# Patient Record
Sex: FEMALE | Race: WHITE | Employment: OTHER
[De-identification: names, ages, dates, MRNs, and addresses within clinical notes are randomized per-mention and may not be internally consistent; named-entity substitution may affect disease eponyms.]

---

## 2017-01-03 ENCOUNTER — TELEPHONE (OUTPATIENT)
Dept: CARDIOTHORACIC SURGERY | Facility: CLINIC | Age: 67
End: 2017-01-03

## 2017-01-03 DIAGNOSIS — R19.7 ACUTE DIARRHEA: Primary | ICD-10-CM

## 2017-01-05 ENCOUNTER — TELEPHONE (OUTPATIENT)
Dept: CARDIOTHORACIC SURGERY | Facility: CLINIC | Age: 67
End: 2017-01-05

## 2017-01-17 ENCOUNTER — TELEPHONE (OUTPATIENT)
Dept: CARDIOTHORACIC SURGERY | Facility: CLINIC | Age: 67
End: 2017-01-17

## 2017-01-27 PROBLEM — J96.10 CHRONIC RESPIRATORY FAILURE (HCC): Status: ACTIVE | Noted: 2017-01-27

## 2017-01-27 PROBLEM — R29.6 RECURRENT FALLS: Status: ACTIVE | Noted: 2017-01-27

## 2017-04-07 ENCOUNTER — HOSPITAL ENCOUNTER (OUTPATIENT)
Dept: PREADMISSION TESTING | Age: 67
Discharge: HOME | End: 2017-04-07
Payer: MEDICARE

## 2017-04-07 ENCOUNTER — HOSPITAL ENCOUNTER (OUTPATIENT)
Dept: GENERAL RADIOLOGY | Age: 67
Discharge: HOME | End: 2017-04-07
Payer: MEDICARE

## 2017-04-07 VITALS
BODY MASS INDEX: 28.8 KG/M2 | WEIGHT: 190.04 LBS | SYSTOLIC BLOOD PRESSURE: 105 MMHG | DIASTOLIC BLOOD PRESSURE: 71 MMHG | RESPIRATION RATE: 18 BRPM | OXYGEN SATURATION: 94 % | HEART RATE: 76 BPM | HEIGHT: 68 IN

## 2017-04-07 LAB
-: ABNORMAL
ABSOLUTE EOS #: 0.2 K/UL (ref 0–0.4)
ABSOLUTE LYMPH #: 3 K/UL (ref 1–4.8)
ABSOLUTE MONO #: 0.4 K/UL (ref 0.2–0.8)
ALBUMIN SERPL-MCNC: 4.1 G/DL (ref 3.5–5.2)
ALBUMIN/GLOBULIN RATIO: ABNORMAL (ref 1–2.5)
ALP BLD-CCNC: 32 U/L (ref 35–104)
ALT SERPL-CCNC: 23 U/L (ref 5–33)
AMORPHOUS: ABNORMAL
ANION GAP SERPL CALCULATED.3IONS-SCNC: 13 MMOL/L (ref 9–17)
AST SERPL-CCNC: 20 U/L
BACTERIA: ABNORMAL
BASOPHILS # BLD: 1 % (ref 0–2)
BASOPHILS ABSOLUTE: 0.1 K/UL (ref 0–0.2)
BILIRUB SERPL-MCNC: 0.15 MG/DL (ref 0.3–1.2)
BILIRUBIN URINE: NEGATIVE
BUN BLDV-MCNC: 17 MG/DL (ref 8–23)
BUN/CREAT BLD: 18 (ref 9–20)
CALCIUM SERPL-MCNC: 8.9 MG/DL (ref 8.6–10.4)
CASTS UA: ABNORMAL /LPF
CHLORIDE BLD-SCNC: 96 MMOL/L (ref 98–107)
CO2: 27 MMOL/L (ref 20–31)
COLOR: YELLOW
COMMENT UA: ABNORMAL
CREAT SERPL-MCNC: 0.96 MG/DL (ref 0.5–0.9)
CRYSTALS, UA: ABNORMAL /HPF
DIFFERENTIAL TYPE: NORMAL
EOSINOPHILS RELATIVE PERCENT: 2 % (ref 1–4)
EPITHELIAL CELLS UA: ABNORMAL /HPF
GFR AFRICAN AMERICAN: >60 ML/MIN
GFR NON-AFRICAN AMERICAN: 58 ML/MIN
GFR SERPL CREATININE-BSD FRML MDRD: ABNORMAL ML/MIN/{1.73_M2}
GFR SERPL CREATININE-BSD FRML MDRD: ABNORMAL ML/MIN/{1.73_M2}
GLUCOSE BLD-MCNC: 97 MG/DL (ref 70–99)
GLUCOSE URINE: NEGATIVE
HCT VFR BLD CALC: 38.5 % (ref 36–46)
HEMOGLOBIN: 13 G/DL (ref 12–16)
INR BLD: 1
KETONES, URINE: NEGATIVE
LEUKOCYTE ESTERASE, URINE: ABNORMAL
LYMPHOCYTES # BLD: 33 % (ref 24–44)
MCH RBC QN AUTO: 29.7 PG (ref 26–34)
MCHC RBC AUTO-ENTMCNC: 33.9 G/DL (ref 31–37)
MCV RBC AUTO: 87.7 FL (ref 80–100)
MONOCYTES # BLD: 4 % (ref 1–7)
MUCUS: ABNORMAL
NITRITE, URINE: NEGATIVE
OTHER OBSERVATIONS UA: ABNORMAL
PARTIAL THROMBOPLASTIN TIME: 27.9 SEC (ref 23–31)
PDW BLD-RTO: 13.4 % (ref 11.5–14.5)
PH UA: 5.5 (ref 5–8)
PLATELET # BLD: 269 K/UL (ref 130–400)
PLATELET ESTIMATE: NORMAL
PMV BLD AUTO: 7.2 FL (ref 6–12)
POTASSIUM SERPL-SCNC: 4.7 MMOL/L (ref 3.7–5.3)
PROTEIN UA: NEGATIVE
PROTHROMBIN TIME: 9.9 SEC (ref 9.7–11.6)
RBC # BLD: 4.39 M/UL (ref 4–5.2)
RBC # BLD: NORMAL 10*6/UL
RBC UA: ABNORMAL /HPF (ref 0–2)
RENAL EPITHELIAL, UA: ABNORMAL /HPF
SEG NEUTROPHILS: 60 % (ref 36–66)
SEGMENTED NEUTROPHILS ABSOLUTE COUNT: 5.6 K/UL (ref 1.8–7.7)
SODIUM BLD-SCNC: 136 MMOL/L (ref 135–144)
SPECIFIC GRAVITY UA: 1.01 (ref 1–1.03)
TOTAL PROTEIN: 7.1 G/DL (ref 6.4–8.3)
TRICHOMONAS: ABNORMAL
TURBIDITY: ABNORMAL
URINE HGB: ABNORMAL
UROBILINOGEN, URINE: NORMAL
WBC # BLD: 9.2 K/UL (ref 3.5–11)
WBC # BLD: NORMAL 10*3/UL
WBC UA: ABNORMAL /HPF (ref 0–5)
YEAST: ABNORMAL

## 2017-04-07 PROCEDURE — 93005 ELECTROCARDIOGRAM TRACING: CPT

## 2017-04-07 PROCEDURE — 85730 THROMBOPLASTIN TIME PARTIAL: CPT

## 2017-04-07 PROCEDURE — 36415 COLL VENOUS BLD VENIPUNCTURE: CPT

## 2017-04-07 PROCEDURE — 71020 XR CHEST STANDARD TWO VW: CPT

## 2017-04-07 PROCEDURE — 80053 COMPREHEN METABOLIC PANEL: CPT

## 2017-04-07 PROCEDURE — 86920 COMPATIBILITY TEST SPIN: CPT

## 2017-04-07 PROCEDURE — 87086 URINE CULTURE/COLONY COUNT: CPT

## 2017-04-07 PROCEDURE — 86900 BLOOD TYPING SEROLOGIC ABO: CPT

## 2017-04-07 PROCEDURE — 86850 RBC ANTIBODY SCREEN: CPT

## 2017-04-07 PROCEDURE — 86901 BLOOD TYPING SEROLOGIC RH(D): CPT

## 2017-04-07 PROCEDURE — 81001 URINALYSIS AUTO W/SCOPE: CPT

## 2017-04-07 PROCEDURE — 85025 COMPLETE CBC W/AUTO DIFF WBC: CPT

## 2017-04-07 PROCEDURE — 87641 MR-STAPH DNA AMP PROBE: CPT

## 2017-04-07 PROCEDURE — 85610 PROTHROMBIN TIME: CPT

## 2017-04-07 RX ORDER — CHOLECALCIFEROL (VITAMIN D3) 1250 MCG
1 CAPSULE ORAL
Status: ON HOLD | COMMUNITY
End: 2018-12-30 | Stop reason: HOSPADM

## 2017-04-08 LAB
CULTURE: NORMAL
CULTURE: NORMAL
EKG ATRIAL RATE: 69 BPM
EKG P AXIS: 66 DEGREES
EKG P-R INTERVAL: 170 MS
EKG Q-T INTERVAL: 448 MS
EKG QRS DURATION: 150 MS
EKG QTC CALCULATION (BAZETT): 480 MS
EKG R AXIS: -58 DEGREES
EKG T AXIS: 0 DEGREES
EKG VENTRICULAR RATE: 69 BPM
Lab: NORMAL
MRSA, DNA, NASAL: NORMAL
SPECIMEN DESCRIPTION: NORMAL
STATUS: NORMAL

## 2017-04-10 ENCOUNTER — TELEPHONE (OUTPATIENT)
Dept: CARDIOVASCULAR ICU | Age: 67
End: 2017-04-10

## 2017-04-11 ENCOUNTER — ANESTHESIA (OUTPATIENT)
Dept: OPERATING ROOM | Age: 67
DRG: 220 | End: 2017-04-11
Payer: MEDICARE

## 2017-04-11 ENCOUNTER — APPOINTMENT (OUTPATIENT)
Dept: GENERAL RADIOLOGY | Age: 67
DRG: 220 | End: 2017-04-11
Attending: THORACIC SURGERY (CARDIOTHORACIC VASCULAR SURGERY)
Payer: MEDICARE

## 2017-04-11 ENCOUNTER — HOSPITAL ENCOUNTER (INPATIENT)
Age: 67
LOS: 4 days | Discharge: NURSING, REHAB OR LONG-TERM CARE | DRG: 220 | End: 2017-04-15
Attending: THORACIC SURGERY (CARDIOTHORACIC VASCULAR SURGERY) | Admitting: THORACIC SURGERY (CARDIOTHORACIC VASCULAR SURGERY)
Payer: MEDICARE

## 2017-04-11 ENCOUNTER — ANESTHESIA EVENT (OUTPATIENT)
Dept: OPERATING ROOM | Age: 67
DRG: 220 | End: 2017-04-11
Payer: MEDICARE

## 2017-04-11 VITALS — TEMPERATURE: 95.4 F | DIASTOLIC BLOOD PRESSURE: 49 MMHG | SYSTOLIC BLOOD PRESSURE: 87 MMHG | OXYGEN SATURATION: 93 %

## 2017-04-11 DIAGNOSIS — J98.11 ATELECTASIS: ICD-10-CM

## 2017-04-11 DIAGNOSIS — J44.1 COPD EXACERBATION (HCC): ICD-10-CM

## 2017-04-11 DIAGNOSIS — R29.6 FALLS FREQUENTLY: ICD-10-CM

## 2017-04-11 DIAGNOSIS — Z95.2 S/P AORTIC VALVE REPLACEMENT: Primary | ICD-10-CM

## 2017-04-11 DIAGNOSIS — I38 VALVULAR HEART DISEASE: ICD-10-CM

## 2017-04-11 LAB
-: ABNORMAL
ABSOLUTE EOS #: 0.2 K/UL (ref 0–0.4)
ABSOLUTE LYMPH #: 3.1 K/UL (ref 1–4.8)
ABSOLUTE MONO #: 0.6 K/UL (ref 0.2–0.8)
ALBUMIN SERPL-MCNC: 4 G/DL (ref 3.5–5.2)
ALBUMIN/GLOBULIN RATIO: ABNORMAL (ref 1–2.5)
ALP BLD-CCNC: 37 U/L (ref 35–104)
ALT SERPL-CCNC: 41 U/L (ref 5–33)
AMORPHOUS: ABNORMAL
ANION GAP SERPL CALCULATED.3IONS-SCNC: 13 MMOL/L (ref 9–17)
ANION GAP SERPL CALCULATED.3IONS-SCNC: 13 MMOL/L (ref 9–17)
AST SERPL-CCNC: 33 U/L
BACTERIA: ABNORMAL
BASOPHILS # BLD: 0 % (ref 0–2)
BASOPHILS ABSOLUTE: 0 K/UL (ref 0–0.2)
BILIRUB SERPL-MCNC: 0.13 MG/DL (ref 0.3–1.2)
BILIRUBIN URINE: NEGATIVE
BUN BLDV-MCNC: 13 MG/DL (ref 8–23)
BUN BLDV-MCNC: 14 MG/DL (ref 8–23)
BUN/CREAT BLD: 14 (ref 9–20)
BUN/CREAT BLD: 16 (ref 9–20)
CALCIUM SERPL-MCNC: 7.3 MG/DL (ref 8.6–10.4)
CALCIUM SERPL-MCNC: 8.6 MG/DL (ref 8.6–10.4)
CASTS UA: ABNORMAL /LPF
CHLORIDE BLD-SCNC: 103 MMOL/L (ref 98–107)
CHLORIDE BLD-SCNC: 108 MMOL/L (ref 98–107)
CO2: 22 MMOL/L (ref 20–31)
CO2: 25 MMOL/L (ref 20–31)
COLOR: YELLOW
COMMENT UA: ABNORMAL
CREAT SERPL-MCNC: 0.82 MG/DL (ref 0.5–0.9)
CREAT SERPL-MCNC: 0.97 MG/DL (ref 0.5–0.9)
CRYSTALS, UA: ABNORMAL /HPF
DIFFERENTIAL TYPE: NORMAL
EOSINOPHILS RELATIVE PERCENT: 2 % (ref 1–4)
EPITHELIAL CELLS UA: ABNORMAL /HPF
FIO2: 40
FIO2: 40
FIO2: 50
FIO2: 50
FIO2: 60
FIO2: ABNORMAL
GFR AFRICAN AMERICAN: >60 ML/MIN
GFR AFRICAN AMERICAN: >60 ML/MIN
GFR NON-AFRICAN AMERICAN: 57 ML/MIN
GFR NON-AFRICAN AMERICAN: >60 ML/MIN
GFR SERPL CREATININE-BSD FRML MDRD: ABNORMAL ML/MIN/{1.73_M2}
GLUCOSE BLD-MCNC: 101 MG/DL (ref 65–105)
GLUCOSE BLD-MCNC: 103 MG/DL (ref 70–99)
GLUCOSE BLD-MCNC: 112 MG/DL (ref 65–105)
GLUCOSE BLD-MCNC: 123 MG/DL (ref 74–106)
GLUCOSE BLD-MCNC: 127 MG/DL (ref 65–105)
GLUCOSE BLD-MCNC: 147 MG/DL (ref 65–105)
GLUCOSE BLD-MCNC: 170 MG/DL (ref 65–105)
GLUCOSE BLD-MCNC: 225 MG/DL (ref 74–106)
GLUCOSE BLD-MCNC: 227 MG/DL (ref 70–99)
GLUCOSE BLD-MCNC: 227 MG/DL (ref 74–106)
GLUCOSE BLD-MCNC: 92 MG/DL (ref 74–106)
GLUCOSE BLD-MCNC: 97 MG/DL (ref 74–106)
GLUCOSE URINE: NEGATIVE
HCO3: 22.2 MMOL/L (ref 24–32)
HCT VFR BLD CALC: 36.4 % (ref 36–46)
HCT VFR BLD CALC: 37.1 % (ref 36–46)
HEMOGLOBIN: 12.2 G/DL (ref 12–16)
HEMOGLOBIN: 12.5 G/DL (ref 12–16)
INR BLD: 1.1
KETONES, URINE: NEGATIVE
LEUKOCYTE ESTERASE, URINE: ABNORMAL
LYMPHOCYTES # BLD: 31 % (ref 24–44)
MAGNESIUM: 2.6 MG/DL (ref 1.6–2.6)
MCH RBC QN AUTO: 29.3 PG (ref 26–34)
MCH RBC QN AUTO: 29.5 PG (ref 26–34)
MCHC RBC AUTO-ENTMCNC: 33.4 G/DL (ref 31–37)
MCHC RBC AUTO-ENTMCNC: 33.6 G/DL (ref 31–37)
MCV RBC AUTO: 87.2 FL (ref 80–100)
MCV RBC AUTO: 88.3 FL (ref 80–100)
MONOCYTES # BLD: 6 % (ref 1–7)
MUCUS: ABNORMAL
NEGATIVE BASE EXCESS, ART: 1 (ref 0–2)
NEGATIVE BASE EXCESS, ART: 2 (ref 0–2)
NEGATIVE BASE EXCESS, ART: 3 (ref 0–2)
NEGATIVE BASE EXCESS, ART: 4 (ref 0–2)
NEGATIVE BASE EXCESS, ART: 4 (ref 0–2)
NEGATIVE BASE EXCESS, ART: ABNORMAL (ref 0–2)
NEGATIVE BASE EXCESS: 6 (ref 0–2)
NITRITE, URINE: NEGATIVE
O2 DEVICE/FLOW/%: ABNORMAL
O2 SATURATION: 95 %
OTHER OBSERVATIONS UA: ABNORMAL
PARTIAL THROMBOPLASTIN TIME: 25.7 SEC (ref 23–31)
PARTIAL THROMBOPLASTIN TIME: 27 SEC (ref 23–31)
PATIENT TEMP: 36
PATIENT TEMP: 36.2
PATIENT TEMP: 36.5
PATIENT TEMP: 36.8
PATIENT TEMP: 37
PATIENT TEMP: 97.3
PATIENT TEMP: 98
PATIENT TEMP: 98.8
PATIENT TEMP: ABNORMAL
PCO2: 54 MM HG
PDW BLD-RTO: 13.5 % (ref 11.5–14.5)
PDW BLD-RTO: 13.6 % (ref 11.5–14.5)
PH UA: 6 (ref 5–8)
PH: 7.22
PLATELET # BLD: 160 K/UL (ref 130–400)
PLATELET # BLD: 258 K/UL (ref 130–400)
PLATELET ESTIMATE: NORMAL
PMV BLD AUTO: 7.3 FL (ref 6–12)
PMV BLD AUTO: 7.9 FL (ref 6–12)
PO2: 89 MM HG
POC HCO3: 23.1 MMOL/L (ref 22–27)
POC HCO3: 23.1 MMOL/L (ref 22–27)
POC HCO3: 23.3 MMOL/L (ref 22–27)
POC HCO3: 23.4 MMOL/L (ref 22–27)
POC HCO3: 23.7 MMOL/L (ref 22–27)
POC HCO3: 24.3 MMOL/L (ref 22–27)
POC HCO3: 24.6 MMOL/L (ref 22–27)
POC HCO3: 24.6 MMOL/L (ref 22–27)
POC HCO3: 24.7 MMOL/L (ref 22–27)
POC HCO3: 25.4 MMOL/L (ref 22–27)
POC HCO3: 26.3 MMOL/L (ref 22–27)
POC HEMATOCRIT: 23 % (ref 36–46)
POC HEMATOCRIT: 24 % (ref 36–46)
POC HEMATOCRIT: 28 % (ref 36–46)
POC HEMATOCRIT: 33 % (ref 36–46)
POC HEMATOCRIT: 35 % (ref 36–46)
POC HEMOGLOBIN: 11.2 GM/DL (ref 12–16)
POC HEMOGLOBIN: 11.9 GM/DL (ref 12–16)
POC HEMOGLOBIN: 7.8 GM/DL (ref 12–16)
POC HEMOGLOBIN: 8.2 GM/DL (ref 12–16)
POC HEMOGLOBIN: 9.5 GM/DL (ref 12–16)
POC IONIZED CALCIUM: 0.97 MMOL/L (ref 1.13–1.33)
POC IONIZED CALCIUM: 1.01 MMOL/L (ref 1.13–1.33)
POC IONIZED CALCIUM: 1.14 MMOL/L (ref 1.13–1.33)
POC IONIZED CALCIUM: 1.14 MMOL/L (ref 1.13–1.33)
POC IONIZED CALCIUM: 1.18 MMOL/L (ref 1.13–1.33)
POC O2 SATURATION: 100 %
POC O2 SATURATION: 90 %
POC O2 SATURATION: 96 %
POC O2 SATURATION: 97 %
POC O2 SATURATION: 98 %
POC O2 SATURATION: 98 %
POC O2 SATURATION: 99 %
POC PCO2 TEMP: ABNORMAL MM HG
POC PCO2: 38 MM HG (ref 32–45)
POC PCO2: 41 MM HG (ref 32–45)
POC PCO2: 42 MM HG (ref 32–45)
POC PCO2: 43 MM HG (ref 32–45)
POC PCO2: 43 MM HG (ref 32–45)
POC PCO2: 45 MM HG (ref 32–45)
POC PCO2: 48 MM HG (ref 32–45)
POC PCO2: 51 MM HG (ref 32–45)
POC PCO2: 53 MM HG (ref 32–45)
POC PCO2: 53 MM HG (ref 32–45)
POC PCO2: 57 MM HG (ref 32–45)
POC PH TEMP: ABNORMAL
POC PH: 7.25 (ref 7.35–7.45)
POC PH: 7.25 (ref 7.35–7.45)
POC PH: 7.26 (ref 7.35–7.45)
POC PH: 7.27 (ref 7.35–7.45)
POC PH: 7.3 (ref 7.35–7.45)
POC PH: 7.34 (ref 7.35–7.45)
POC PH: 7.35 (ref 7.35–7.45)
POC PH: 7.36 (ref 7.35–7.45)
POC PH: 7.38 (ref 7.35–7.45)
POC PH: 7.39 (ref 7.35–7.45)
POC PH: 7.45 (ref 7.35–7.45)
POC PO2 TEMP: ABNORMAL MM HG
POC PO2: 111 MM HG (ref 75–95)
POC PO2: 122 MM HG (ref 75–95)
POC PO2: 131 MM HG (ref 75–95)
POC PO2: 132 MM HG (ref 75–95)
POC PO2: 140 MM HG (ref 75–95)
POC PO2: 280 MM HG (ref 75–95)
POC PO2: 326 MM HG (ref 75–95)
POC PO2: 345 MM HG (ref 75–95)
POC PO2: 68 MM HG (ref 75–95)
POC PO2: 98 MM HG (ref 75–95)
POC PO2: 98 MM HG (ref 75–95)
POC POTASSIUM: 3.8 MMOL/L (ref 3.5–5.1)
POC POTASSIUM: 3.8 MMOL/L (ref 3.5–5.1)
POC POTASSIUM: 4 MMOL/L (ref 3.5–5.1)
POC POTASSIUM: 4 MMOL/L (ref 3.5–5.1)
POC POTASSIUM: 4.6 MMOL/L (ref 3.5–5.1)
POC SODIUM: 144 MMOL/L (ref 136–145)
POC TCO2: 24 MM HG
POSITIVE BASE EXCESS, ART: 0 (ref 0–2)
POSITIVE BASE EXCESS, ART: 0 (ref 0–2)
POSITIVE BASE EXCESS, ART: 2 (ref 0–2)
POSITIVE BASE EXCESS, ART: ABNORMAL (ref 0–2)
POSITIVE BASE EXCESS: ABNORMAL (ref 0–2)
POTASSIUM SERPL-SCNC: 3.9 MMOL/L (ref 3.7–5.3)
POTASSIUM SERPL-SCNC: 3.9 MMOL/L (ref 3.7–5.3)
POTASSIUM SERPL-SCNC: 4.5 MMOL/L (ref 3.7–5.3)
PROTEIN UA: ABNORMAL
PROTHROMBIN TIME: 11.7 SEC (ref 9.7–11.6)
RBC # BLD: 4.12 M/UL (ref 4–5.2)
RBC # BLD: 4.26 M/UL (ref 4–5.2)
RBC # BLD: NORMAL 10*6/UL
RBC UA: ABNORMAL /HPF (ref 0–2)
RENAL EPITHELIAL, UA: ABNORMAL /HPF
SEG NEUTROPHILS: 61 % (ref 36–66)
SEGMENTED NEUTROPHILS ABSOLUTE COUNT: 5.9 K/UL (ref 1.8–7.7)
SODIUM BLD-SCNC: 141 MMOL/L (ref 135–144)
SODIUM BLD-SCNC: 143 MMOL/L (ref 135–144)
SPECIFIC GRAVITY UA: 1.02 (ref 1–1.03)
TCO2 (CALC), ART: 24 MM HG (ref 23–28)
TCO2 (CALC), ART: 25 MM HG (ref 23–28)
TCO2 (CALC), ART: 26 MM HG (ref 23–28)
TCO2 (CALC), ART: 27 MM HG (ref 23–28)
TCO2 (CALC), ART: 27 MM HG (ref 23–28)
TOTAL PROTEIN: 7.2 G/DL (ref 6.4–8.3)
TRICHOMONAS: ABNORMAL
TURBIDITY: CLEAR
URINE HGB: ABNORMAL
UROBILINOGEN, URINE: NORMAL
WBC # BLD: 31.2 K/UL (ref 3.5–11)
WBC # BLD: 9.8 K/UL (ref 3.5–11)
WBC # BLD: NORMAL 10*3/UL
WBC UA: ABNORMAL /HPF (ref 0–5)
YEAST: ABNORMAL

## 2017-04-11 PROCEDURE — 2780000006 HC MISC HEART VALVE: Performed by: THORACIC SURGERY (CARDIOTHORACIC VASCULAR SURGERY)

## 2017-04-11 PROCEDURE — 2580000003 HC RX 258: Performed by: NURSE ANESTHETIST, CERTIFIED REGISTERED

## 2017-04-11 PROCEDURE — 6360000002 HC RX W HCPCS: Performed by: NURSE ANESTHETIST, CERTIFIED REGISTERED

## 2017-04-11 PROCEDURE — 93312 ECHO TRANSESOPHAGEAL: CPT

## 2017-04-11 PROCEDURE — 3700000001 HC ADD 15 MINUTES (ANESTHESIA): Performed by: THORACIC SURGERY (CARDIOTHORACIC VASCULAR SURGERY)

## 2017-04-11 PROCEDURE — 94770 HC ETCO2 MONITOR DAILY: CPT

## 2017-04-11 PROCEDURE — 2100000000 HC CCU R&B

## 2017-04-11 PROCEDURE — 6370000000 HC RX 637 (ALT 250 FOR IP): Performed by: NURSE ANESTHETIST, CERTIFIED REGISTERED

## 2017-04-11 PROCEDURE — 88305 TISSUE EXAM BY PATHOLOGIST: CPT

## 2017-04-11 PROCEDURE — 6370000000 HC RX 637 (ALT 250 FOR IP): Performed by: THORACIC SURGERY (CARDIOTHORACIC VASCULAR SURGERY)

## 2017-04-11 PROCEDURE — P9045 ALBUMIN (HUMAN), 5%, 250 ML: HCPCS | Performed by: THORACIC SURGERY (CARDIOTHORACIC VASCULAR SURGERY)

## 2017-04-11 PROCEDURE — 80048 BASIC METABOLIC PNL TOTAL CA: CPT

## 2017-04-11 PROCEDURE — 3600000018 HC SURGERY OHS ADDTL 15MIN: Performed by: THORACIC SURGERY (CARDIOTHORACIC VASCULAR SURGERY)

## 2017-04-11 PROCEDURE — 94761 N-INVAS EAR/PLS OXIMETRY MLT: CPT

## 2017-04-11 PROCEDURE — 2500000003 HC RX 250 WO HCPCS: Performed by: NURSE ANESTHETIST, CERTIFIED REGISTERED

## 2017-04-11 PROCEDURE — 84295 ASSAY OF SERUM SODIUM: CPT

## 2017-04-11 PROCEDURE — 83735 ASSAY OF MAGNESIUM: CPT

## 2017-04-11 PROCEDURE — 82947 ASSAY GLUCOSE BLOOD QUANT: CPT

## 2017-04-11 PROCEDURE — 85025 COMPLETE CBC W/AUTO DIFF WBC: CPT

## 2017-04-11 PROCEDURE — 94002 VENT MGMT INPAT INIT DAY: CPT

## 2017-04-11 PROCEDURE — 3700000000 HC ANESTHESIA ATTENDED CARE: Performed by: THORACIC SURGERY (CARDIOTHORACIC VASCULAR SURGERY)

## 2017-04-11 PROCEDURE — 82803 BLOOD GASES ANY COMBINATION: CPT

## 2017-04-11 PROCEDURE — 2580000003 HC RX 258: Performed by: THORACIC SURGERY (CARDIOTHORACIC VASCULAR SURGERY)

## 2017-04-11 PROCEDURE — C1757 CATH, THROMBECTOMY/EMBOLECT: HCPCS | Performed by: THORACIC SURGERY (CARDIOTHORACIC VASCULAR SURGERY)

## 2017-04-11 PROCEDURE — C1751 CATH, INF, PER/CENT/MIDLINE: HCPCS | Performed by: THORACIC SURGERY (CARDIOTHORACIC VASCULAR SURGERY)

## 2017-04-11 PROCEDURE — 85610 PROTHROMBIN TIME: CPT

## 2017-04-11 PROCEDURE — 3600000008 HC SURGERY OHS BASE: Performed by: THORACIC SURGERY (CARDIOTHORACIC VASCULAR SURGERY)

## 2017-04-11 PROCEDURE — 85730 THROMBOPLASTIN TIME PARTIAL: CPT

## 2017-04-11 PROCEDURE — 81001 URINALYSIS AUTO W/SCOPE: CPT

## 2017-04-11 PROCEDURE — 2500000003 HC RX 250 WO HCPCS: Performed by: THORACIC SURGERY (CARDIOTHORACIC VASCULAR SURGERY)

## 2017-04-11 PROCEDURE — 37799 UNLISTED PX VASCULAR SURGERY: CPT

## 2017-04-11 PROCEDURE — P9041 ALBUMIN (HUMAN),5%, 50ML: HCPCS | Performed by: THORACIC SURGERY (CARDIOTHORACIC VASCULAR SURGERY)

## 2017-04-11 PROCEDURE — 2720000010 HC SURG SUPPLY STERILE: Performed by: THORACIC SURGERY (CARDIOTHORACIC VASCULAR SURGERY)

## 2017-04-11 PROCEDURE — 88311 DECALCIFY TISSUE: CPT

## 2017-04-11 PROCEDURE — 6360000002 HC RX W HCPCS: Performed by: THORACIC SURGERY (CARDIOTHORACIC VASCULAR SURGERY)

## 2017-04-11 PROCEDURE — 87086 URINE CULTURE/COLONY COUNT: CPT

## 2017-04-11 PROCEDURE — 71010 XR CHEST PORTABLE: CPT

## 2017-04-11 PROCEDURE — 85014 HEMATOCRIT: CPT

## 2017-04-11 PROCEDURE — B246ZZ4 ULTRASONOGRAPHY OF RIGHT AND LEFT HEART, TRANSESOPHAGEAL: ICD-10-PCS | Performed by: THORACIC SURGERY (CARDIOTHORACIC VASCULAR SURGERY)

## 2017-04-11 PROCEDURE — C9113 INJ PANTOPRAZOLE SODIUM, VIA: HCPCS | Performed by: THORACIC SURGERY (CARDIOTHORACIC VASCULAR SURGERY)

## 2017-04-11 PROCEDURE — 02RF08Z REPLACEMENT OF AORTIC VALVE WITH ZOOPLASTIC TISSUE, OPEN APPROACH: ICD-10-PCS | Performed by: THORACIC SURGERY (CARDIOTHORACIC VASCULAR SURGERY)

## 2017-04-11 PROCEDURE — 94640 AIRWAY INHALATION TREATMENT: CPT

## 2017-04-11 PROCEDURE — C1894 INTRO/SHEATH, NON-LASER: HCPCS | Performed by: THORACIC SURGERY (CARDIOTHORACIC VASCULAR SURGERY)

## 2017-04-11 PROCEDURE — 85027 COMPLETE CBC AUTOMATED: CPT

## 2017-04-11 PROCEDURE — C1729 CATH, DRAINAGE: HCPCS | Performed by: THORACIC SURGERY (CARDIOTHORACIC VASCULAR SURGERY)

## 2017-04-11 PROCEDURE — 2000000000 HC ICU R&B

## 2017-04-11 PROCEDURE — 93005 ELECTROCARDIOGRAM TRACING: CPT

## 2017-04-11 PROCEDURE — 5A1221Z PERFORMANCE OF CARDIAC OUTPUT, CONTINUOUS: ICD-10-PCS | Performed by: THORACIC SURGERY (CARDIOTHORACIC VASCULAR SURGERY)

## 2017-04-11 PROCEDURE — L8670 VASCULAR GRAFT, SYNTHETIC: HCPCS | Performed by: THORACIC SURGERY (CARDIOTHORACIC VASCULAR SURGERY)

## 2017-04-11 PROCEDURE — 80053 COMPREHEN METABOLIC PANEL: CPT

## 2017-04-11 PROCEDURE — 82330 ASSAY OF CALCIUM: CPT

## 2017-04-11 PROCEDURE — 2720000003 HC MISC SUTURE/STAPLES/RELOADS/ETC: Performed by: THORACIC SURGERY (CARDIOTHORACIC VASCULAR SURGERY)

## 2017-04-11 PROCEDURE — 84132 ASSAY OF SERUM POTASSIUM: CPT

## 2017-04-11 DEVICE — VALVE AORT H16MM DIA23MM ORIFICE DIA20.5MM SUT RNG DIA28MM: Type: IMPLANTABLE DEVICE | Site: HEART | Status: FUNCTIONAL

## 2017-04-11 RX ORDER — SODIUM CHLORIDE 0.9 % (FLUSH) 0.9 %
10 SYRINGE (ML) INJECTION PRN
Status: DISCONTINUED | OUTPATIENT
Start: 2017-04-11 | End: 2017-04-11 | Stop reason: SDUPTHER

## 2017-04-11 RX ORDER — HYDROCODONE BITARTRATE AND ACETAMINOPHEN 5; 325 MG/1; MG/1
2 TABLET ORAL EVERY 4 HOURS PRN
Status: DISCONTINUED | OUTPATIENT
Start: 2017-04-11 | End: 2017-04-11

## 2017-04-11 RX ORDER — PROPOFOL 10 MG/ML
INJECTION, EMULSION INTRAVENOUS CONTINUOUS PRN
Status: DISCONTINUED | OUTPATIENT
Start: 2017-04-11 | End: 2017-04-11 | Stop reason: SDUPTHER

## 2017-04-11 RX ORDER — POTASSIUM CHLORIDE 7.45 MG/ML
10 INJECTION INTRAVENOUS PRN
Status: DISCONTINUED | OUTPATIENT
Start: 2017-04-11 | End: 2017-04-15 | Stop reason: HOSPADM

## 2017-04-11 RX ORDER — POTASSIUM CHLORIDE 29.8 MG/ML
20 INJECTION INTRAVENOUS PRN
Status: DISCONTINUED | OUTPATIENT
Start: 2017-04-11 | End: 2017-04-15 | Stop reason: HOSPADM

## 2017-04-11 RX ORDER — IPRATROPIUM BROMIDE AND ALBUTEROL SULFATE 2.5; .5 MG/3ML; MG/3ML
1 SOLUTION RESPIRATORY (INHALATION)
Status: DISCONTINUED | OUTPATIENT
Start: 2017-04-11 | End: 2017-04-15 | Stop reason: HOSPADM

## 2017-04-11 RX ORDER — PROTAMINE SULFATE 10 MG/ML
INJECTION, SOLUTION INTRAVENOUS PRN
Status: DISCONTINUED | OUTPATIENT
Start: 2017-04-11 | End: 2017-04-11 | Stop reason: SDUPTHER

## 2017-04-11 RX ORDER — AMIODARONE HYDROCHLORIDE 200 MG/1
200 TABLET ORAL 3 TIMES DAILY
Status: DISCONTINUED | OUTPATIENT
Start: 2017-04-11 | End: 2017-04-15 | Stop reason: HOSPADM

## 2017-04-11 RX ORDER — DEXTROSE MONOHYDRATE 25 G/50ML
12.5 INJECTION, SOLUTION INTRAVENOUS PRN
Status: DISCONTINUED | OUTPATIENT
Start: 2017-04-11 | End: 2017-04-15 | Stop reason: HOSPADM

## 2017-04-11 RX ORDER — ACETAMINOPHEN 325 MG/1
650 TABLET ORAL EVERY 4 HOURS PRN
Status: DISCONTINUED | OUTPATIENT
Start: 2017-04-11 | End: 2017-04-15 | Stop reason: HOSPADM

## 2017-04-11 RX ORDER — VECURONIUM BROMIDE 1 MG/ML
10 INJECTION, POWDER, LYOPHILIZED, FOR SOLUTION INTRAVENOUS ONCE
Status: DISCONTINUED | OUTPATIENT
Start: 2017-04-11 | End: 2017-04-11

## 2017-04-11 RX ORDER — DEXTROSE MONOHYDRATE 50 MG/ML
100 INJECTION, SOLUTION INTRAVENOUS PRN
Status: DISCONTINUED | OUTPATIENT
Start: 2017-04-11 | End: 2017-04-15 | Stop reason: HOSPADM

## 2017-04-11 RX ORDER — ROCURONIUM BROMIDE 10 MG/ML
INJECTION, SOLUTION INTRAVENOUS PRN
Status: DISCONTINUED | OUTPATIENT
Start: 2017-04-11 | End: 2017-04-11 | Stop reason: SDUPTHER

## 2017-04-11 RX ORDER — FENTANYL CITRATE 50 UG/ML
INJECTION, SOLUTION INTRAMUSCULAR; INTRAVENOUS PRN
Status: DISCONTINUED | OUTPATIENT
Start: 2017-04-11 | End: 2017-04-11 | Stop reason: SDUPTHER

## 2017-04-11 RX ORDER — MIDAZOLAM HYDROCHLORIDE 1 MG/ML
INJECTION INTRAMUSCULAR; INTRAVENOUS PRN
Status: DISCONTINUED | OUTPATIENT
Start: 2017-04-11 | End: 2017-04-11 | Stop reason: SDUPTHER

## 2017-04-11 RX ORDER — SODIUM CHLORIDE 0.9 % (FLUSH) 0.9 %
10 SYRINGE (ML) INJECTION EVERY 12 HOURS SCHEDULED
Status: DISCONTINUED | OUTPATIENT
Start: 2017-04-11 | End: 2017-04-15 | Stop reason: HOSPADM

## 2017-04-11 RX ORDER — OXYCODONE HYDROCHLORIDE AND ACETAMINOPHEN 5; 325 MG/1; MG/1
1 TABLET ORAL 3 TIMES DAILY PRN
Status: DISCONTINUED | OUTPATIENT
Start: 2017-04-11 | End: 2017-04-15 | Stop reason: HOSPADM

## 2017-04-11 RX ORDER — SODIUM CHLORIDE 0.9 % (FLUSH) 0.9 %
10 SYRINGE (ML) INJECTION EVERY 12 HOURS SCHEDULED
Status: DISCONTINUED | OUTPATIENT
Start: 2017-04-11 | End: 2017-04-11 | Stop reason: SDUPTHER

## 2017-04-11 RX ORDER — NICOTINE POLACRILEX 4 MG
15 LOZENGE BUCCAL PRN
Status: DISCONTINUED | OUTPATIENT
Start: 2017-04-11 | End: 2017-04-15 | Stop reason: HOSPADM

## 2017-04-11 RX ORDER — ASPIRIN 81 MG/1
81 TABLET ORAL DAILY
Status: DISCONTINUED | OUTPATIENT
Start: 2017-04-11 | End: 2017-04-12

## 2017-04-11 RX ORDER — MORPHINE SULFATE 2 MG/ML
2 INJECTION, SOLUTION INTRAMUSCULAR; INTRAVENOUS
Status: DISCONTINUED | OUTPATIENT
Start: 2017-04-11 | End: 2017-04-12

## 2017-04-11 RX ORDER — DOPAMINE HYDROCHLORIDE 160 MG/100ML
INJECTION, SOLUTION INTRAVENOUS CONTINUOUS PRN
Status: DISCONTINUED | OUTPATIENT
Start: 2017-04-11 | End: 2017-04-11 | Stop reason: SDUPTHER

## 2017-04-11 RX ORDER — HYDROCODONE BITARTRATE AND ACETAMINOPHEN 5; 325 MG/1; MG/1
1 TABLET ORAL EVERY 4 HOURS PRN
Status: DISCONTINUED | OUTPATIENT
Start: 2017-04-11 | End: 2017-04-11

## 2017-04-11 RX ORDER — PROPOFOL 10 MG/ML
INJECTION, EMULSION INTRAVENOUS PRN
Status: DISCONTINUED | OUTPATIENT
Start: 2017-04-11 | End: 2017-04-11 | Stop reason: SDUPTHER

## 2017-04-11 RX ORDER — ONDANSETRON 2 MG/ML
4 INJECTION INTRAMUSCULAR; INTRAVENOUS EVERY 8 HOURS PRN
Status: DISCONTINUED | OUTPATIENT
Start: 2017-04-11 | End: 2017-04-15 | Stop reason: HOSPADM

## 2017-04-11 RX ORDER — MEPERIDINE HYDROCHLORIDE 50 MG/ML
25 INJECTION INTRAMUSCULAR; INTRAVENOUS; SUBCUTANEOUS
Status: ACTIVE | OUTPATIENT
Start: 2017-04-11 | End: 2017-04-11

## 2017-04-11 RX ORDER — ALBUMIN, HUMAN INJ 5% 5 %
25 SOLUTION INTRAVENOUS PRN
Status: DISCONTINUED | OUTPATIENT
Start: 2017-04-11 | End: 2017-04-15 | Stop reason: HOSPADM

## 2017-04-11 RX ORDER — SODIUM CHLORIDE, SODIUM LACTATE, POTASSIUM CHLORIDE, CALCIUM CHLORIDE 600; 310; 30; 20 MG/100ML; MG/100ML; MG/100ML; MG/100ML
INJECTION, SOLUTION INTRAVENOUS CONTINUOUS PRN
Status: DISCONTINUED | OUTPATIENT
Start: 2017-04-11 | End: 2017-04-11 | Stop reason: SDUPTHER

## 2017-04-11 RX ORDER — 0.9 % SODIUM CHLORIDE 0.9 %
10 VIAL (ML) INJECTION DAILY
Status: DISCONTINUED | OUTPATIENT
Start: 2017-04-11 | End: 2017-04-13 | Stop reason: ALTCHOICE

## 2017-04-11 RX ORDER — HEPARIN SODIUM 1000 [USP'U]/ML
INJECTION, SOLUTION INTRAVENOUS; SUBCUTANEOUS PRN
Status: DISCONTINUED | OUTPATIENT
Start: 2017-04-11 | End: 2017-04-11 | Stop reason: SDUPTHER

## 2017-04-11 RX ORDER — SODIUM CHLORIDE 9 MG/ML
INJECTION, SOLUTION INTRAVENOUS CONTINUOUS
Status: DISCONTINUED | OUTPATIENT
Start: 2017-04-11 | End: 2017-04-12

## 2017-04-11 RX ORDER — DOPAMINE HYDROCHLORIDE 160 MG/100ML
2 INJECTION, SOLUTION INTRAVENOUS CONTINUOUS
Status: DISCONTINUED | OUTPATIENT
Start: 2017-04-11 | End: 2017-04-12

## 2017-04-11 RX ORDER — PROPOFOL 10 MG/ML
10 INJECTION, EMULSION INTRAVENOUS
Status: DISCONTINUED | OUTPATIENT
Start: 2017-04-11 | End: 2017-04-12

## 2017-04-11 RX ORDER — LIDOCAINE HYDROCHLORIDE 20 MG/ML
INJECTION, SOLUTION EPIDURAL; INFILTRATION; INTRACAUDAL; PERINEURAL PRN
Status: DISCONTINUED | OUTPATIENT
Start: 2017-04-11 | End: 2017-04-11 | Stop reason: SDUPTHER

## 2017-04-11 RX ORDER — SODIUM CHLORIDE 450 MG/100ML
INJECTION, SOLUTION INTRAVENOUS CONTINUOUS
Status: DISCONTINUED | OUTPATIENT
Start: 2017-04-11 | End: 2017-04-15 | Stop reason: HOSPADM

## 2017-04-11 RX ORDER — DOCUSATE SODIUM 100 MG/1
100 CAPSULE, LIQUID FILLED ORAL 2 TIMES DAILY
Status: DISCONTINUED | OUTPATIENT
Start: 2017-04-11 | End: 2017-04-15 | Stop reason: HOSPADM

## 2017-04-11 RX ORDER — PANTOPRAZOLE SODIUM 40 MG/10ML
40 INJECTION, POWDER, LYOPHILIZED, FOR SOLUTION INTRAVENOUS DAILY
Status: DISCONTINUED | OUTPATIENT
Start: 2017-04-11 | End: 2017-04-13 | Stop reason: ALTCHOICE

## 2017-04-11 RX ORDER — SODIUM BICARBONATE 42 MG/ML
INJECTION, SOLUTION INTRAVENOUS PRN
Status: DISCONTINUED | OUTPATIENT
Start: 2017-04-11 | End: 2017-04-11 | Stop reason: SDUPTHER

## 2017-04-11 RX ORDER — SODIUM CHLORIDE 0.9 % (FLUSH) 0.9 %
10 SYRINGE (ML) INJECTION PRN
Status: DISCONTINUED | OUTPATIENT
Start: 2017-04-11 | End: 2017-04-15 | Stop reason: HOSPADM

## 2017-04-11 RX ADMIN — FENTANYL CITRATE 100 MCG: 50 INJECTION, SOLUTION INTRAMUSCULAR; INTRAVENOUS at 07:20

## 2017-04-11 RX ADMIN — PHENYLEPHRINE HYDROCHLORIDE 50 MCG/MIN: 10 INJECTION, SOLUTION INTRAMUSCULAR; INTRAVENOUS; SUBCUTANEOUS at 09:50

## 2017-04-11 RX ADMIN — FENTANYL CITRATE 150 MCG: 50 INJECTION, SOLUTION INTRAMUSCULAR; INTRAVENOUS at 07:50

## 2017-04-11 RX ADMIN — PROTAMINE SULFATE 200 MG: 10 INJECTION, SOLUTION INTRAVENOUS at 10:04

## 2017-04-11 RX ADMIN — PHENYLEPHRINE HYDROCHLORIDE 100 MCG: 10 INJECTION INTRAVENOUS at 08:20

## 2017-04-11 RX ADMIN — DOPAMINE HYDROCHLORIDE 2 MCG/KG/MIN: 160 INJECTION, SOLUTION INTRAVENOUS at 09:52

## 2017-04-11 RX ADMIN — AMIODARONE HYDROCHLORIDE 200 MG: 200 TABLET ORAL at 14:04

## 2017-04-11 RX ADMIN — DOPAMINE HYDROCHLORIDE 2 MCG/KG/MIN: 160 INJECTION, SOLUTION INTRAVENOUS at 11:05

## 2017-04-11 RX ADMIN — PANTOPRAZOLE SODIUM 40 MG: 40 INJECTION, POWDER, FOR SOLUTION INTRAVENOUS at 11:50

## 2017-04-11 RX ADMIN — ROCURONIUM BROMIDE 70 MG: 10 INJECTION, SOLUTION INTRAVENOUS at 07:20

## 2017-04-11 RX ADMIN — PHENYLEPHRINE HYDROCHLORIDE 100 MCG: 10 INJECTION INTRAVENOUS at 08:12

## 2017-04-11 RX ADMIN — EPINEPHRINE 0.02 MCG/KG/MIN: 1 INJECTION PARENTERAL at 11:05

## 2017-04-11 RX ADMIN — SODIUM CHLORIDE: 4.5 INJECTION, SOLUTION INTRAVENOUS at 11:15

## 2017-04-11 RX ADMIN — VANCOMYCIN HYDROCHLORIDE 1.5 G: 1 INJECTION, POWDER, LYOPHILIZED, FOR SOLUTION INTRAVENOUS at 07:50

## 2017-04-11 RX ADMIN — PROPOFOL 130 MG: 10 INJECTION, EMULSION INTRAVENOUS at 07:20

## 2017-04-11 RX ADMIN — PHENYLEPHRINE HYDROCHLORIDE 100 MCG: 10 INJECTION INTRAVENOUS at 07:39

## 2017-04-11 RX ADMIN — CEFAZOLIN 2 G: 1 INJECTION, POWDER, FOR SOLUTION INTRAMUSCULAR; INTRAVENOUS at 19:59

## 2017-04-11 RX ADMIN — Medication 10 ML: at 11:50

## 2017-04-11 RX ADMIN — MIDAZOLAM HYDROCHLORIDE 2 MG: 1 INJECTION, SOLUTION INTRAMUSCULAR; INTRAVENOUS at 07:21

## 2017-04-11 RX ADMIN — Medication 10 ML: at 21:12

## 2017-04-11 RX ADMIN — FENTANYL CITRATE 250 MCG: 50 INJECTION, SOLUTION INTRAMUSCULAR; INTRAVENOUS at 08:45

## 2017-04-11 RX ADMIN — PROPOFOL 28 MCG/KG/MIN: 10 INJECTION, EMULSION INTRAVENOUS at 11:05

## 2017-04-11 RX ADMIN — Medication 1 UNITS/HR: at 09:22

## 2017-04-11 RX ADMIN — EPINEPHRINE 0.02 MCG/KG/MIN: 1 INJECTION PARENTERAL at 09:52

## 2017-04-11 RX ADMIN — FENTANYL CITRATE 250 MCG: 50 INJECTION, SOLUTION INTRAMUSCULAR; INTRAVENOUS at 10:53

## 2017-04-11 RX ADMIN — MIDAZOLAM HYDROCHLORIDE 2 MG: 1 INJECTION, SOLUTION INTRAMUSCULAR; INTRAVENOUS at 07:10

## 2017-04-11 RX ADMIN — OXYCODONE HYDROCHLORIDE AND ACETAMINOPHEN 1 TABLET: 5; 325 TABLET ORAL at 19:02

## 2017-04-11 RX ADMIN — ROCURONIUM BROMIDE 30 MG: 10 INJECTION, SOLUTION INTRAVENOUS at 08:37

## 2017-04-11 RX ADMIN — SODIUM CHLORIDE, POTASSIUM CHLORIDE, SODIUM LACTATE AND CALCIUM CHLORIDE: 600; 310; 30; 20 INJECTION, SOLUTION INTRAVENOUS at 07:50

## 2017-04-11 RX ADMIN — IPRATROPIUM BROMIDE AND ALBUTEROL SULFATE 1 AMPULE: .5; 3 SOLUTION RESPIRATORY (INHALATION) at 11:40

## 2017-04-11 RX ADMIN — IPRATROPIUM BROMIDE AND ALBUTEROL SULFATE 1 AMPULE: .5; 3 SOLUTION RESPIRATORY (INHALATION) at 15:20

## 2017-04-11 RX ADMIN — ALBUMIN (HUMAN) 25 G: 12.5 INJECTION, SOLUTION INTRAVENOUS at 12:28

## 2017-04-11 RX ADMIN — MIDAZOLAM HYDROCHLORIDE 2 MG: 1 INJECTION, SOLUTION INTRAMUSCULAR; INTRAVENOUS at 10:06

## 2017-04-11 RX ADMIN — Medication 8 UNITS/HR: at 11:05

## 2017-04-11 RX ADMIN — SODIUM CHLORIDE: 9 INJECTION, SOLUTION INTRAVENOUS at 06:31

## 2017-04-11 RX ADMIN — PROPOFOL 35 MCG/KG/MIN: 10 INJECTION, EMULSION INTRAVENOUS at 17:27

## 2017-04-11 RX ADMIN — PHENYLEPHRINE HYDROCHLORIDE 100 MCG: 10 INJECTION INTRAVENOUS at 07:25

## 2017-04-11 RX ADMIN — METOPROLOL TARTRATE 12.5 MG: 25 TABLET ORAL at 06:42

## 2017-04-11 RX ADMIN — BUMETANIDE 0.1 MG/HR: 0.25 INJECTION INTRAMUSCULAR; INTRAVENOUS at 09:50

## 2017-04-11 RX ADMIN — ROCURONIUM BROMIDE 50 MG: 10 INJECTION, SOLUTION INTRAVENOUS at 09:50

## 2017-04-11 RX ADMIN — FENTANYL CITRATE 250 MCG: 50 INJECTION, SOLUTION INTRAMUSCULAR; INTRAVENOUS at 08:30

## 2017-04-11 RX ADMIN — FENTANYL CITRATE 250 MCG: 50 INJECTION, SOLUTION INTRAMUSCULAR; INTRAVENOUS at 08:25

## 2017-04-11 RX ADMIN — IPRATROPIUM BROMIDE AND ALBUTEROL SULFATE 1 AMPULE: .5; 3 SOLUTION RESPIRATORY (INHALATION) at 19:34

## 2017-04-11 RX ADMIN — AMIODARONE HYDROCHLORIDE 200 MG: 200 TABLET ORAL at 21:12

## 2017-04-11 RX ADMIN — ONDANSETRON 4 MG: 2 INJECTION INTRAMUSCULAR; INTRAVENOUS at 22:18

## 2017-04-11 RX ADMIN — SODIUM CHLORIDE, POTASSIUM CHLORIDE, SODIUM LACTATE AND CALCIUM CHLORIDE: 600; 310; 30; 20 INJECTION, SOLUTION INTRAVENOUS at 10:12

## 2017-04-11 RX ADMIN — PROPOFOL 40 MCG/KG/MIN: 10 INJECTION, EMULSION INTRAVENOUS at 22:12

## 2017-04-11 RX ADMIN — PHENYLEPHRINE HYDROCHLORIDE 100 MCG/MIN: 10 INJECTION, SOLUTION INTRAMUSCULAR; INTRAVENOUS; SUBCUTANEOUS at 11:05

## 2017-04-11 RX ADMIN — SODIUM BICARBONATE 50 MEQ: 42 INJECTION, SOLUTION INTRAVENOUS at 10:37

## 2017-04-11 RX ADMIN — LIDOCAINE HYDROCHLORIDE 100 MG: 20 INJECTION, SOLUTION EPIDURAL; INFILTRATION; INTRACAUDAL; PERINEURAL at 07:20

## 2017-04-11 RX ADMIN — MIDAZOLAM HYDROCHLORIDE 2 MG: 1 INJECTION, SOLUTION INTRAMUSCULAR; INTRAVENOUS at 10:53

## 2017-04-11 RX ADMIN — BUMETANIDE 0.1 MG/HR: 0.25 INJECTION INTRAMUSCULAR; INTRAVENOUS at 11:05

## 2017-04-11 RX ADMIN — PROPOFOL 5 MCG/KG/MIN: 10 INJECTION, EMULSION INTRAVENOUS at 09:50

## 2017-04-11 RX ADMIN — HEPARIN SODIUM 20000 UNITS: 1000 INJECTION, SOLUTION INTRAVENOUS; SUBCUTANEOUS at 08:35

## 2017-04-11 RX ADMIN — PHENYLEPHRINE HYDROCHLORIDE 100 MCG: 10 INJECTION INTRAVENOUS at 07:21

## 2017-04-11 RX ADMIN — PHENYLEPHRINE HYDROCHLORIDE 100 MCG: 10 INJECTION INTRAVENOUS at 07:36

## 2017-04-11 RX ADMIN — CEFAZOLIN 2 G: 1 INJECTION, POWDER, FOR SOLUTION INTRAMUSCULAR; INTRAVENOUS at 11:46

## 2017-04-11 RX ADMIN — SODIUM BICARBONATE 50 MEQ: 84 INJECTION, SOLUTION INTRAVENOUS at 12:02

## 2017-04-11 RX ADMIN — PHENYLEPHRINE HYDROCHLORIDE 100 MCG: 10 INJECTION INTRAVENOUS at 08:10

## 2017-04-11 RX ADMIN — PHENYLEPHRINE HYDROCHLORIDE 100 MCG: 10 INJECTION INTRAVENOUS at 08:41

## 2017-04-11 RX ADMIN — ALBUMIN (HUMAN) 25 G: 12.5 INJECTION, SOLUTION INTRAVENOUS at 11:13

## 2017-04-11 ASSESSMENT — PULMONARY FUNCTION TESTS
PIF_VALUE: 31
PIF_VALUE: 27
PIF_VALUE: 11
PIF_VALUE: 23
PIF_VALUE: 25
PIF_VALUE: 25

## 2017-04-12 LAB
ANION GAP SERPL CALCULATED.3IONS-SCNC: 14 MMOL/L (ref 9–17)
BUN BLDV-MCNC: 13 MG/DL (ref 8–23)
BUN/CREAT BLD: 16 (ref 9–20)
CALCIUM SERPL-MCNC: 7.8 MG/DL (ref 8.6–10.4)
CHLORIDE BLD-SCNC: 105 MMOL/L (ref 98–107)
CO2: 24 MMOL/L (ref 20–31)
CREAT SERPL-MCNC: 0.83 MG/DL (ref 0.5–0.9)
CULTURE: NO GROWTH
CULTURE: NORMAL
FIO2: 40
FIO2: 40
GFR AFRICAN AMERICAN: >60 ML/MIN
GFR NON-AFRICAN AMERICAN: >60 ML/MIN
GFR SERPL CREATININE-BSD FRML MDRD: ABNORMAL ML/MIN/{1.73_M2}
GFR SERPL CREATININE-BSD FRML MDRD: ABNORMAL ML/MIN/{1.73_M2}
GLUCOSE BLD-MCNC: 112 MG/DL (ref 65–105)
GLUCOSE BLD-MCNC: 113 MG/DL (ref 65–105)
GLUCOSE BLD-MCNC: 114 MG/DL (ref 70–99)
GLUCOSE BLD-MCNC: 117 MG/DL (ref 65–105)
GLUCOSE BLD-MCNC: 117 MG/DL (ref 65–105)
GLUCOSE BLD-MCNC: 122 MG/DL (ref 65–105)
GLUCOSE BLD-MCNC: 123 MG/DL (ref 65–105)
GLUCOSE BLD-MCNC: 124 MG/DL (ref 65–105)
GLUCOSE BLD-MCNC: 127 MG/DL (ref 65–105)
GLUCOSE BLD-MCNC: 136 MG/DL (ref 65–105)
GLUCOSE BLD-MCNC: 142 MG/DL (ref 65–105)
GLUCOSE BLD-MCNC: 210 MG/DL (ref 65–105)
GLUCOSE BLD-MCNC: 236 MG/DL (ref 65–105)
GLUCOSE BLD-MCNC: 269 MG/DL (ref 65–105)
HCT VFR BLD CALC: 30.2 % (ref 36–46)
HEMOGLOBIN: 10.2 G/DL (ref 12–16)
INR BLD: 1
Lab: NORMAL
MAGNESIUM: 2.1 MG/DL (ref 1.6–2.6)
MCH RBC QN AUTO: 29.3 PG (ref 26–34)
MCHC RBC AUTO-ENTMCNC: 33.6 G/DL (ref 31–37)
MCV RBC AUTO: 87.2 FL (ref 80–100)
NEGATIVE BASE EXCESS, ART: NORMAL (ref 0–2)
NEGATIVE BASE EXCESS, ART: NORMAL (ref 0–2)
O2 DEVICE/FLOW/%: NORMAL
O2 DEVICE/FLOW/%: NORMAL
PATIENT TEMP: 98
PATIENT TEMP: NORMAL
PDW BLD-RTO: 13.9 % (ref 11.5–14.5)
PLATELET # BLD: 124 K/UL (ref 130–400)
PMV BLD AUTO: 7.4 FL (ref 6–12)
POC HCO3: 25.3 MMOL/L (ref 22–27)
POC HCO3: 26 MMOL/L (ref 22–27)
POC O2 SATURATION: 97 %
POC O2 SATURATION: 97 %
POC PCO2 TEMP: NORMAL MM HG
POC PCO2 TEMP: NORMAL MM HG
POC PCO2: 42 MM HG (ref 32–45)
POC PCO2: 45 MM HG (ref 32–45)
POC PH TEMP: NORMAL
POC PH TEMP: NORMAL
POC PH: 7.36 (ref 7.35–7.45)
POC PH: 7.4 (ref 7.35–7.45)
POC PO2 TEMP: NORMAL MM HG
POC PO2 TEMP: NORMAL MM HG
POC PO2: 93 MM HG (ref 75–95)
POC PO2: 95 MM HG (ref 75–95)
POSITIVE BASE EXCESS, ART: 0 (ref 0–2)
POSITIVE BASE EXCESS, ART: 1 (ref 0–2)
POTASSIUM SERPL-SCNC: 3.4 MMOL/L (ref 3.7–5.3)
POTASSIUM SERPL-SCNC: 3.9 MMOL/L (ref 3.7–5.3)
PROTHROMBIN TIME: 10.7 SEC (ref 9.7–11.6)
RBC # BLD: 3.46 M/UL (ref 4–5.2)
SODIUM BLD-SCNC: 143 MMOL/L (ref 135–144)
SPECIMEN DESCRIPTION: NORMAL
SPECIMEN DESCRIPTION: NORMAL
STATUS: NORMAL
SURGICAL PATHOLOGY REPORT: NORMAL
TCO2 (CALC), ART: 27 MM HG (ref 23–28)
TCO2 (CALC), ART: 27 MM HG (ref 23–28)
WBC # BLD: 14.1 K/UL (ref 3.5–11)

## 2017-04-12 PROCEDURE — 85610 PROTHROMBIN TIME: CPT

## 2017-04-12 PROCEDURE — 37799 UNLISTED PX VASCULAR SURGERY: CPT

## 2017-04-12 PROCEDURE — 2580000003 HC RX 258: Performed by: THORACIC SURGERY (CARDIOTHORACIC VASCULAR SURGERY)

## 2017-04-12 PROCEDURE — 82803 BLOOD GASES ANY COMBINATION: CPT

## 2017-04-12 PROCEDURE — 94760 N-INVAS EAR/PLS OXIMETRY 1: CPT

## 2017-04-12 PROCEDURE — 97166 OT EVAL MOD COMPLEX 45 MIN: CPT

## 2017-04-12 PROCEDURE — 94640 AIRWAY INHALATION TREATMENT: CPT

## 2017-04-12 PROCEDURE — 94667 MNPJ CHEST WALL 1ST: CPT

## 2017-04-12 PROCEDURE — 97535 SELF CARE MNGMENT TRAINING: CPT

## 2017-04-12 PROCEDURE — 83735 ASSAY OF MAGNESIUM: CPT

## 2017-04-12 PROCEDURE — 85027 COMPLETE CBC AUTOMATED: CPT

## 2017-04-12 PROCEDURE — 2700000000 HC OXYGEN THERAPY PER DAY

## 2017-04-12 PROCEDURE — 84132 ASSAY OF SERUM POTASSIUM: CPT

## 2017-04-12 PROCEDURE — 94761 N-INVAS EAR/PLS OXIMETRY MLT: CPT

## 2017-04-12 PROCEDURE — 82947 ASSAY GLUCOSE BLOOD QUANT: CPT

## 2017-04-12 PROCEDURE — 97530 THERAPEUTIC ACTIVITIES: CPT

## 2017-04-12 PROCEDURE — 6370000000 HC RX 637 (ALT 250 FOR IP): Performed by: THORACIC SURGERY (CARDIOTHORACIC VASCULAR SURGERY)

## 2017-04-12 PROCEDURE — P9045 ALBUMIN (HUMAN), 5%, 250 ML: HCPCS | Performed by: THORACIC SURGERY (CARDIOTHORACIC VASCULAR SURGERY)

## 2017-04-12 PROCEDURE — G8988 SELF CARE GOAL STATUS: HCPCS

## 2017-04-12 PROCEDURE — 97110 THERAPEUTIC EXERCISES: CPT

## 2017-04-12 PROCEDURE — G8987 SELF CARE CURRENT STATUS: HCPCS

## 2017-04-12 PROCEDURE — 80048 BASIC METABOLIC PNL TOTAL CA: CPT

## 2017-04-12 PROCEDURE — 97116 GAIT TRAINING THERAPY: CPT

## 2017-04-12 PROCEDURE — C9113 INJ PANTOPRAZOLE SODIUM, VIA: HCPCS | Performed by: THORACIC SURGERY (CARDIOTHORACIC VASCULAR SURGERY)

## 2017-04-12 PROCEDURE — 97530 THERAPEUTIC ACTIVITIES: CPT | Performed by: NURSE PRACTITIONER

## 2017-04-12 PROCEDURE — 6360000002 HC RX W HCPCS: Performed by: THORACIC SURGERY (CARDIOTHORACIC VASCULAR SURGERY)

## 2017-04-12 PROCEDURE — 2000000000 HC ICU R&B

## 2017-04-12 PROCEDURE — G8978 MOBILITY CURRENT STATUS: HCPCS

## 2017-04-12 PROCEDURE — G8979 MOBILITY GOAL STATUS: HCPCS

## 2017-04-12 PROCEDURE — 97162 PT EVAL MOD COMPLEX 30 MIN: CPT

## 2017-04-12 PROCEDURE — 94668 MNPJ CHEST WALL SBSQ: CPT

## 2017-04-12 RX ORDER — ASPIRIN 81 MG/1
81 TABLET, CHEWABLE ORAL DAILY
Status: DISCONTINUED | OUTPATIENT
Start: 2017-04-12 | End: 2017-04-15 | Stop reason: HOSPADM

## 2017-04-12 RX ORDER — CLONAZEPAM 0.5 MG/1
1 TABLET ORAL 3 TIMES DAILY
Status: DISCONTINUED | OUTPATIENT
Start: 2017-04-12 | End: 2017-04-15 | Stop reason: HOSPADM

## 2017-04-12 RX ORDER — GLIMEPIRIDE 2 MG/1
2 TABLET ORAL
Status: DISCONTINUED | OUTPATIENT
Start: 2017-04-13 | End: 2017-04-15 | Stop reason: HOSPADM

## 2017-04-12 RX ORDER — ATORVASTATIN CALCIUM 40 MG/1
40 TABLET, FILM COATED ORAL NIGHTLY
Status: DISCONTINUED | OUTPATIENT
Start: 2017-04-12 | End: 2017-04-15 | Stop reason: HOSPADM

## 2017-04-12 RX ORDER — GLIMEPIRIDE 1 MG/1
1 TABLET ORAL
Status: DISCONTINUED | OUTPATIENT
Start: 2017-04-12 | End: 2017-04-15 | Stop reason: HOSPADM

## 2017-04-12 RX ADMIN — AMIODARONE HYDROCHLORIDE 200 MG: 200 TABLET ORAL at 14:18

## 2017-04-12 RX ADMIN — IPRATROPIUM BROMIDE AND ALBUTEROL SULFATE 1 AMPULE: .5; 3 SOLUTION RESPIRATORY (INHALATION) at 15:06

## 2017-04-12 RX ADMIN — CLONAZEPAM 1 MG: 0.5 TABLET ORAL at 20:42

## 2017-04-12 RX ADMIN — INSULIN LISPRO 1 UNITS: 100 INJECTION, SOLUTION INTRAVENOUS; SUBCUTANEOUS at 20:42

## 2017-04-12 RX ADMIN — METOPROLOL TARTRATE 12.5 MG: 25 TABLET ORAL at 11:53

## 2017-04-12 RX ADMIN — Medication 10 ML: at 20:42

## 2017-04-12 RX ADMIN — METOPROLOL TARTRATE 12.5 MG: 25 TABLET ORAL at 09:16

## 2017-04-12 RX ADMIN — METOPROLOL TARTRATE 25 MG: 25 TABLET ORAL at 20:42

## 2017-04-12 RX ADMIN — IPRATROPIUM BROMIDE AND ALBUTEROL SULFATE 1 AMPULE: .5; 3 SOLUTION RESPIRATORY (INHALATION) at 07:19

## 2017-04-12 RX ADMIN — CLONAZEPAM 1 MG: 0.5 TABLET ORAL at 14:18

## 2017-04-12 RX ADMIN — GLIMEPIRIDE 1 MG: 1 TABLET ORAL at 16:13

## 2017-04-12 RX ADMIN — POTASSIUM CHLORIDE 20 MEQ: 400 INJECTION, SOLUTION INTRAVENOUS at 05:28

## 2017-04-12 RX ADMIN — IPRATROPIUM BROMIDE AND ALBUTEROL SULFATE 1 AMPULE: .5; 3 SOLUTION RESPIRATORY (INHALATION) at 20:07

## 2017-04-12 RX ADMIN — OXYCODONE HYDROCHLORIDE AND ACETAMINOPHEN 1 TABLET: 5; 325 TABLET ORAL at 02:06

## 2017-04-12 RX ADMIN — CEFAZOLIN 2 G: 1 INJECTION, POWDER, FOR SOLUTION INTRAMUSCULAR; INTRAVENOUS at 04:10

## 2017-04-12 RX ADMIN — OXYCODONE HYDROCHLORIDE AND ACETAMINOPHEN 1 TABLET: 5; 325 TABLET ORAL at 10:14

## 2017-04-12 RX ADMIN — ATORVASTATIN CALCIUM 40 MG: 40 TABLET, FILM COATED ORAL at 20:42

## 2017-04-12 RX ADMIN — ACETAMINOPHEN 650 MG: 325 TABLET ORAL at 16:13

## 2017-04-12 RX ADMIN — ASPIRIN 81 MG 81 MG: 81 TABLET ORAL at 11:52

## 2017-04-12 RX ADMIN — ALBUMIN (HUMAN) 25 G: 12.5 INJECTION, SOLUTION INTRAVENOUS at 02:16

## 2017-04-12 RX ADMIN — POTASSIUM CHLORIDE 20 MEQ: 400 INJECTION, SOLUTION INTRAVENOUS at 06:37

## 2017-04-12 RX ADMIN — PANTOPRAZOLE SODIUM 40 MG: 40 INJECTION, POWDER, FOR SOLUTION INTRAVENOUS at 09:15

## 2017-04-12 RX ADMIN — INSULIN LISPRO 3 UNITS: 100 INJECTION, SOLUTION INTRAVENOUS; SUBCUTANEOUS at 17:24

## 2017-04-12 RX ADMIN — ONDANSETRON 4 MG: 2 INJECTION INTRAMUSCULAR; INTRAVENOUS at 07:34

## 2017-04-12 RX ADMIN — Medication 10 ML: at 09:17

## 2017-04-12 RX ADMIN — INSULIN LISPRO 2 UNITS: 100 INJECTION, SOLUTION INTRAVENOUS; SUBCUTANEOUS at 11:54

## 2017-04-12 RX ADMIN — METFORMIN HYDROCHLORIDE 500 MG: 500 TABLET, FILM COATED ORAL at 16:13

## 2017-04-12 RX ADMIN — AMIODARONE HYDROCHLORIDE 200 MG: 200 TABLET ORAL at 20:42

## 2017-04-12 RX ADMIN — ACETAMINOPHEN 650 MG: 325 TABLET ORAL at 20:42

## 2017-04-12 RX ADMIN — IPRATROPIUM BROMIDE AND ALBUTEROL SULFATE 1 AMPULE: .5; 3 SOLUTION RESPIRATORY (INHALATION) at 11:10

## 2017-04-12 RX ADMIN — AMIODARONE HYDROCHLORIDE 200 MG: 200 TABLET ORAL at 09:15

## 2017-04-12 RX ADMIN — SODIUM CHLORIDE: 4.5 INJECTION, SOLUTION INTRAVENOUS at 02:14

## 2017-04-12 ASSESSMENT — PAIN SCALES - GENERAL
PAINLEVEL_OUTOF10: 7
PAINLEVEL_OUTOF10: 10
PAINLEVEL_OUTOF10: 7
PAINLEVEL_OUTOF10: 10
PAINLEVEL_OUTOF10: 6
PAINLEVEL_OUTOF10: 9
PAINLEVEL_OUTOF10: 10
PAINLEVEL_OUTOF10: 9
PAINLEVEL_OUTOF10: 7
PAINLEVEL_OUTOF10: 10

## 2017-04-12 ASSESSMENT — PULMONARY FUNCTION TESTS: PIF_VALUE: 15

## 2017-04-12 ASSESSMENT — PAIN DESCRIPTION - PAIN TYPE
TYPE: SURGICAL PAIN
TYPE_2: CHRONIC PAIN
TYPE: SURGICAL PAIN

## 2017-04-12 ASSESSMENT — PAIN DESCRIPTION - LOCATION
LOCATION_2: BACK
LOCATION: CHEST

## 2017-04-12 ASSESSMENT — PAIN DESCRIPTION - ORIENTATION: ORIENTATION: MID

## 2017-04-12 ASSESSMENT — PAIN DESCRIPTION - INTENSITY: RATING_2: 6

## 2017-04-13 ENCOUNTER — APPOINTMENT (OUTPATIENT)
Dept: GENERAL RADIOLOGY | Age: 67
DRG: 220 | End: 2017-04-13
Attending: THORACIC SURGERY (CARDIOTHORACIC VASCULAR SURGERY)
Payer: MEDICARE

## 2017-04-13 LAB
ANION GAP SERPL CALCULATED.3IONS-SCNC: 14 MMOL/L (ref 9–17)
BUN BLDV-MCNC: 11 MG/DL (ref 8–23)
BUN/CREAT BLD: 16 (ref 9–20)
CALCIUM SERPL-MCNC: 8 MG/DL (ref 8.6–10.4)
CHLORIDE BLD-SCNC: 101 MMOL/L (ref 98–107)
CO2: 24 MMOL/L (ref 20–31)
CREAT SERPL-MCNC: 0.68 MG/DL (ref 0.5–0.9)
EKG ATRIAL RATE: 105 BPM
EKG P AXIS: 11 DEGREES
EKG P-R INTERVAL: 138 MS
EKG Q-T INTERVAL: 412 MS
EKG QRS DURATION: 154 MS
EKG QTC CALCULATION (BAZETT): 544 MS
EKG R AXIS: -77 DEGREES
EKG T AXIS: 57 DEGREES
EKG VENTRICULAR RATE: 105 BPM
GFR AFRICAN AMERICAN: >60 ML/MIN
GFR NON-AFRICAN AMERICAN: >60 ML/MIN
GFR SERPL CREATININE-BSD FRML MDRD: ABNORMAL ML/MIN/{1.73_M2}
GFR SERPL CREATININE-BSD FRML MDRD: ABNORMAL ML/MIN/{1.73_M2}
GLUCOSE BLD-MCNC: 165 MG/DL (ref 65–105)
GLUCOSE BLD-MCNC: 183 MG/DL (ref 70–99)
GLUCOSE BLD-MCNC: 223 MG/DL (ref 65–105)
GLUCOSE BLD-MCNC: 248 MG/DL (ref 65–105)
GLUCOSE BLD-MCNC: 278 MG/DL (ref 65–105)
HCT VFR BLD CALC: 29.6 % (ref 36–46)
HEMOGLOBIN: 9.9 G/DL (ref 12–16)
INR BLD: 1.1
MAGNESIUM: 2 MG/DL (ref 1.6–2.6)
MCH RBC QN AUTO: 29.4 PG (ref 26–34)
MCHC RBC AUTO-ENTMCNC: 33.5 G/DL (ref 31–37)
MCV RBC AUTO: 87.8 FL (ref 80–100)
PDW BLD-RTO: 13.4 % (ref 11.5–14.5)
PLATELET # BLD: 130 K/UL (ref 130–400)
PMV BLD AUTO: 7.3 FL (ref 6–12)
POTASSIUM SERPL-SCNC: 3.6 MMOL/L (ref 3.7–5.3)
PROTHROMBIN TIME: 11.1 SEC (ref 9.7–11.6)
RBC # BLD: 3.37 M/UL (ref 4–5.2)
SODIUM BLD-SCNC: 139 MMOL/L (ref 135–144)
WBC # BLD: 17.7 K/UL (ref 3.5–11)

## 2017-04-13 PROCEDURE — 97535 SELF CARE MNGMENT TRAINING: CPT

## 2017-04-13 PROCEDURE — 97530 THERAPEUTIC ACTIVITIES: CPT

## 2017-04-13 PROCEDURE — 97116 GAIT TRAINING THERAPY: CPT

## 2017-04-13 PROCEDURE — 71020 XR CHEST STANDARD TWO VW: CPT

## 2017-04-13 PROCEDURE — 6370000000 HC RX 637 (ALT 250 FOR IP): Performed by: THORACIC SURGERY (CARDIOTHORACIC VASCULAR SURGERY)

## 2017-04-13 PROCEDURE — 85610 PROTHROMBIN TIME: CPT

## 2017-04-13 PROCEDURE — 2000000000 HC ICU R&B

## 2017-04-13 PROCEDURE — 94668 MNPJ CHEST WALL SBSQ: CPT

## 2017-04-13 PROCEDURE — 2580000003 HC RX 258: Performed by: THORACIC SURGERY (CARDIOTHORACIC VASCULAR SURGERY)

## 2017-04-13 PROCEDURE — 94640 AIRWAY INHALATION TREATMENT: CPT

## 2017-04-13 PROCEDURE — 6360000002 HC RX W HCPCS: Performed by: THORACIC SURGERY (CARDIOTHORACIC VASCULAR SURGERY)

## 2017-04-13 PROCEDURE — 85027 COMPLETE CBC AUTOMATED: CPT

## 2017-04-13 PROCEDURE — 82947 ASSAY GLUCOSE BLOOD QUANT: CPT

## 2017-04-13 PROCEDURE — 83735 ASSAY OF MAGNESIUM: CPT

## 2017-04-13 PROCEDURE — 2700000000 HC OXYGEN THERAPY PER DAY

## 2017-04-13 PROCEDURE — 80048 BASIC METABOLIC PNL TOTAL CA: CPT

## 2017-04-13 PROCEDURE — C9113 INJ PANTOPRAZOLE SODIUM, VIA: HCPCS | Performed by: THORACIC SURGERY (CARDIOTHORACIC VASCULAR SURGERY)

## 2017-04-13 PROCEDURE — 94761 N-INVAS EAR/PLS OXIMETRY MLT: CPT

## 2017-04-13 RX ORDER — FUROSEMIDE 40 MG/1
40 TABLET ORAL DAILY
Status: DISCONTINUED | OUTPATIENT
Start: 2017-04-13 | End: 2017-04-15 | Stop reason: HOSPADM

## 2017-04-13 RX ORDER — POTASSIUM CHLORIDE 20 MEQ/1
40 TABLET, EXTENDED RELEASE ORAL ONCE
Status: COMPLETED | OUTPATIENT
Start: 2017-04-13 | End: 2017-04-13

## 2017-04-13 RX ORDER — PANTOPRAZOLE SODIUM 40 MG/1
40 TABLET, DELAYED RELEASE ORAL
Status: DISCONTINUED | OUTPATIENT
Start: 2017-04-14 | End: 2017-04-15 | Stop reason: HOSPADM

## 2017-04-13 RX ADMIN — FUROSEMIDE 40 MG: 40 TABLET ORAL at 09:43

## 2017-04-13 RX ADMIN — INSULIN LISPRO 2 UNITS: 100 INJECTION, SOLUTION INTRAVENOUS; SUBCUTANEOUS at 12:17

## 2017-04-13 RX ADMIN — ACETAMINOPHEN 650 MG: 325 TABLET ORAL at 21:27

## 2017-04-13 RX ADMIN — IPRATROPIUM BROMIDE AND ALBUTEROL SULFATE 1 AMPULE: .5; 3 SOLUTION RESPIRATORY (INHALATION) at 15:46

## 2017-04-13 RX ADMIN — INSULIN LISPRO 1 UNITS: 100 INJECTION, SOLUTION INTRAVENOUS; SUBCUTANEOUS at 20:18

## 2017-04-13 RX ADMIN — AMIODARONE HYDROCHLORIDE 200 MG: 200 TABLET ORAL at 14:08

## 2017-04-13 RX ADMIN — AMIODARONE HYDROCHLORIDE 200 MG: 200 TABLET ORAL at 20:18

## 2017-04-13 RX ADMIN — Medication 10 ML: at 09:57

## 2017-04-13 RX ADMIN — DOCUSATE SODIUM 100 MG: 100 CAPSULE, LIQUID FILLED ORAL at 20:18

## 2017-04-13 RX ADMIN — METOPROLOL TARTRATE 25 MG: 25 TABLET ORAL at 09:43

## 2017-04-13 RX ADMIN — POTASSIUM CHLORIDE 40 MEQ: 1500 TABLET, EXTENDED RELEASE ORAL at 09:43

## 2017-04-13 RX ADMIN — AMIODARONE HYDROCHLORIDE 200 MG: 200 TABLET ORAL at 09:43

## 2017-04-13 RX ADMIN — IPRATROPIUM BROMIDE AND ALBUTEROL SULFATE 1 AMPULE: .5; 3 SOLUTION RESPIRATORY (INHALATION) at 11:51

## 2017-04-13 RX ADMIN — METFORMIN HYDROCHLORIDE 500 MG: 500 TABLET, FILM COATED ORAL at 17:21

## 2017-04-13 RX ADMIN — CLONAZEPAM 1 MG: 0.5 TABLET ORAL at 09:43

## 2017-04-13 RX ADMIN — SODIUM CHLORIDE: 4.5 INJECTION, SOLUTION INTRAVENOUS at 04:55

## 2017-04-13 RX ADMIN — INSULIN LISPRO 1 UNITS: 100 INJECTION, SOLUTION INTRAVENOUS; SUBCUTANEOUS at 09:44

## 2017-04-13 RX ADMIN — OXYCODONE HYDROCHLORIDE AND ACETAMINOPHEN 1 TABLET: 5; 325 TABLET ORAL at 23:26

## 2017-04-13 RX ADMIN — Medication 10 ML: at 20:19

## 2017-04-13 RX ADMIN — IPRATROPIUM BROMIDE AND ALBUTEROL SULFATE 1 AMPULE: .5; 3 SOLUTION RESPIRATORY (INHALATION) at 08:24

## 2017-04-13 RX ADMIN — IPRATROPIUM BROMIDE AND ALBUTEROL SULFATE 1 AMPULE: .5; 3 SOLUTION RESPIRATORY (INHALATION) at 19:37

## 2017-04-13 RX ADMIN — METOPROLOL TARTRATE 12.5 MG: 25 TABLET ORAL at 21:27

## 2017-04-13 RX ADMIN — INSULIN LISPRO 3 UNITS: 100 INJECTION, SOLUTION INTRAVENOUS; SUBCUTANEOUS at 17:21

## 2017-04-13 RX ADMIN — GLIMEPIRIDE 1 MG: 1 TABLET ORAL at 17:21

## 2017-04-13 RX ADMIN — DOCUSATE SODIUM 100 MG: 100 CAPSULE, LIQUID FILLED ORAL at 09:43

## 2017-04-13 RX ADMIN — ASPIRIN 81 MG 81 MG: 81 TABLET ORAL at 09:43

## 2017-04-13 RX ADMIN — CLONAZEPAM 1 MG: 0.5 TABLET ORAL at 14:08

## 2017-04-13 RX ADMIN — CLONAZEPAM 1 MG: 0.5 TABLET ORAL at 20:17

## 2017-04-13 RX ADMIN — METOPROLOL TARTRATE 25 MG: 25 TABLET ORAL at 20:18

## 2017-04-13 RX ADMIN — ACETAMINOPHEN 650 MG: 325 TABLET ORAL at 16:38

## 2017-04-13 RX ADMIN — ATORVASTATIN CALCIUM 40 MG: 40 TABLET, FILM COATED ORAL at 20:18

## 2017-04-13 RX ADMIN — METFORMIN HYDROCHLORIDE 500 MG: 500 TABLET, FILM COATED ORAL at 09:42

## 2017-04-13 RX ADMIN — PANTOPRAZOLE SODIUM 40 MG: 40 INJECTION, POWDER, FOR SOLUTION INTRAVENOUS at 09:43

## 2017-04-13 ASSESSMENT — PAIN SCALES - GENERAL
PAINLEVEL_OUTOF10: 10
PAINLEVEL_OUTOF10: 8
PAINLEVEL_OUTOF10: 10
PAINLEVEL_OUTOF10: 7
PAINLEVEL_OUTOF10: 0
PAINLEVEL_OUTOF10: 8
PAINLEVEL_OUTOF10: 9
PAINLEVEL_OUTOF10: 10
PAINLEVEL_OUTOF10: 8

## 2017-04-13 ASSESSMENT — PAIN DESCRIPTION - PROGRESSION
CLINICAL_PROGRESSION: NOT CHANGED
CLINICAL_PROGRESSION: GRADUALLY IMPROVING

## 2017-04-13 ASSESSMENT — PAIN DESCRIPTION - PAIN TYPE
TYPE: SURGICAL PAIN
TYPE: CHRONIC PAIN
TYPE: CHRONIC PAIN
TYPE: SURGICAL PAIN

## 2017-04-13 ASSESSMENT — PAIN DESCRIPTION - LOCATION
LOCATION: CHEST
LOCATION: CHEST
LOCATION: BACK
LOCATION: BACK

## 2017-04-13 ASSESSMENT — PAIN DESCRIPTION - ORIENTATION
ORIENTATION: MID
ORIENTATION: LOWER
ORIENTATION: MID

## 2017-04-13 ASSESSMENT — PAIN DESCRIPTION - FREQUENCY: FREQUENCY: CONTINUOUS

## 2017-04-13 ASSESSMENT — PAIN DESCRIPTION - ONSET: ONSET: ON-GOING

## 2017-04-13 ASSESSMENT — PAIN DESCRIPTION - DESCRIPTORS: DESCRIPTORS: PATIENT UNABLE TO DESCRIBE

## 2017-04-14 LAB
ANION GAP SERPL CALCULATED.3IONS-SCNC: 12 MMOL/L (ref 9–17)
BUN BLDV-MCNC: 14 MG/DL (ref 8–23)
BUN/CREAT BLD: 20 (ref 9–20)
CALCIUM SERPL-MCNC: 8.1 MG/DL (ref 8.6–10.4)
CHLORIDE BLD-SCNC: 102 MMOL/L (ref 98–107)
CO2: 27 MMOL/L (ref 20–31)
CREAT SERPL-MCNC: 0.69 MG/DL (ref 0.5–0.9)
GFR AFRICAN AMERICAN: >60 ML/MIN
GFR NON-AFRICAN AMERICAN: >60 ML/MIN
GFR SERPL CREATININE-BSD FRML MDRD: ABNORMAL ML/MIN/{1.73_M2}
GFR SERPL CREATININE-BSD FRML MDRD: ABNORMAL ML/MIN/{1.73_M2}
GLUCOSE BLD-MCNC: 112 MG/DL (ref 65–105)
GLUCOSE BLD-MCNC: 139 MG/DL (ref 70–99)
GLUCOSE BLD-MCNC: 143 MG/DL (ref 65–105)
GLUCOSE BLD-MCNC: 187 MG/DL (ref 65–105)
GLUCOSE BLD-MCNC: 197 MG/DL (ref 65–105)
GLUCOSE BLD-MCNC: 208 MG/DL (ref 65–105)
HCT VFR BLD CALC: 29.3 % (ref 36–46)
HEMOGLOBIN: 10 G/DL (ref 12–16)
INR BLD: 1
MAGNESIUM: 2 MG/DL (ref 1.6–2.6)
MCH RBC QN AUTO: 29.7 PG (ref 26–34)
MCHC RBC AUTO-ENTMCNC: 34.1 G/DL (ref 31–37)
MCV RBC AUTO: 87.1 FL (ref 80–100)
PDW BLD-RTO: 14.2 % (ref 11.5–14.5)
PLATELET # BLD: 157 K/UL (ref 130–400)
PMV BLD AUTO: 7.2 FL (ref 6–12)
POTASSIUM SERPL-SCNC: 3.8 MMOL/L (ref 3.7–5.3)
PROTHROMBIN TIME: 10.4 SEC (ref 9.7–11.6)
RBC # BLD: 3.37 M/UL (ref 4–5.2)
SODIUM BLD-SCNC: 141 MMOL/L (ref 135–144)
WBC # BLD: 14.4 K/UL (ref 3.5–11)

## 2017-04-14 PROCEDURE — 6370000000 HC RX 637 (ALT 250 FOR IP): Performed by: THORACIC SURGERY (CARDIOTHORACIC VASCULAR SURGERY)

## 2017-04-14 PROCEDURE — 94761 N-INVAS EAR/PLS OXIMETRY MLT: CPT

## 2017-04-14 PROCEDURE — 2060000000 HC ICU INTERMEDIATE R&B

## 2017-04-14 PROCEDURE — 85027 COMPLETE CBC AUTOMATED: CPT

## 2017-04-14 PROCEDURE — 36592 COLLECT BLOOD FROM PICC: CPT | Performed by: NURSE PRACTITIONER

## 2017-04-14 PROCEDURE — 97535 SELF CARE MNGMENT TRAINING: CPT | Performed by: NURSE PRACTITIONER

## 2017-04-14 PROCEDURE — 2700000000 HC OXYGEN THERAPY PER DAY

## 2017-04-14 PROCEDURE — 97110 THERAPEUTIC EXERCISES: CPT

## 2017-04-14 PROCEDURE — 82947 ASSAY GLUCOSE BLOOD QUANT: CPT

## 2017-04-14 PROCEDURE — 83735 ASSAY OF MAGNESIUM: CPT

## 2017-04-14 PROCEDURE — 94668 MNPJ CHEST WALL SBSQ: CPT

## 2017-04-14 PROCEDURE — 85610 PROTHROMBIN TIME: CPT

## 2017-04-14 PROCEDURE — 80048 BASIC METABOLIC PNL TOTAL CA: CPT

## 2017-04-14 PROCEDURE — 97116 GAIT TRAINING THERAPY: CPT

## 2017-04-14 PROCEDURE — 94640 AIRWAY INHALATION TREATMENT: CPT

## 2017-04-14 PROCEDURE — 97530 THERAPEUTIC ACTIVITIES: CPT

## 2017-04-14 PROCEDURE — 2580000003 HC RX 258: Performed by: THORACIC SURGERY (CARDIOTHORACIC VASCULAR SURGERY)

## 2017-04-14 RX ORDER — OXYCODONE HYDROCHLORIDE AND ACETAMINOPHEN 5; 325 MG/1; MG/1
1 TABLET ORAL EVERY 12 HOURS PRN
Qty: 25 TABLET | Refills: 0 | Status: SHIPPED | OUTPATIENT
Start: 2017-04-14 | End: 2017-05-09

## 2017-04-14 RX ADMIN — DOCUSATE SODIUM 100 MG: 100 CAPSULE, LIQUID FILLED ORAL at 08:09

## 2017-04-14 RX ADMIN — METFORMIN HYDROCHLORIDE 500 MG: 500 TABLET, FILM COATED ORAL at 08:15

## 2017-04-14 RX ADMIN — ASPIRIN 81 MG 81 MG: 81 TABLET ORAL at 08:10

## 2017-04-14 RX ADMIN — DOCUSATE SODIUM 100 MG: 100 CAPSULE, LIQUID FILLED ORAL at 21:02

## 2017-04-14 RX ADMIN — GLIMEPIRIDE 2 MG: 2 TABLET ORAL at 08:18

## 2017-04-14 RX ADMIN — Medication 10 ML: at 21:03

## 2017-04-14 RX ADMIN — OXYCODONE HYDROCHLORIDE AND ACETAMINOPHEN 1 TABLET: 5; 325 TABLET ORAL at 15:47

## 2017-04-14 RX ADMIN — IPRATROPIUM BROMIDE AND ALBUTEROL SULFATE 1 AMPULE: .5; 3 SOLUTION RESPIRATORY (INHALATION) at 11:23

## 2017-04-14 RX ADMIN — METOPROLOL TARTRATE 37.5 MG: 25 TABLET ORAL at 08:12

## 2017-04-14 RX ADMIN — IPRATROPIUM BROMIDE AND ALBUTEROL SULFATE 1 AMPULE: .5; 3 SOLUTION RESPIRATORY (INHALATION) at 15:32

## 2017-04-14 RX ADMIN — AMIODARONE HYDROCHLORIDE 200 MG: 200 TABLET ORAL at 08:10

## 2017-04-14 RX ADMIN — OXYCODONE HYDROCHLORIDE AND ACETAMINOPHEN 1 TABLET: 5; 325 TABLET ORAL at 08:11

## 2017-04-14 RX ADMIN — ATORVASTATIN CALCIUM 40 MG: 40 TABLET, FILM COATED ORAL at 21:01

## 2017-04-14 RX ADMIN — Medication 10 ML: at 14:04

## 2017-04-14 RX ADMIN — GLIMEPIRIDE 1 MG: 1 TABLET ORAL at 17:06

## 2017-04-14 RX ADMIN — CLONAZEPAM 1 MG: 0.5 TABLET ORAL at 08:10

## 2017-04-14 RX ADMIN — FUROSEMIDE 40 MG: 40 TABLET ORAL at 08:10

## 2017-04-14 RX ADMIN — INSULIN LISPRO 1 UNITS: 100 INJECTION, SOLUTION INTRAVENOUS; SUBCUTANEOUS at 17:05

## 2017-04-14 RX ADMIN — IPRATROPIUM BROMIDE AND ALBUTEROL SULFATE 1 AMPULE: .5; 3 SOLUTION RESPIRATORY (INHALATION) at 07:40

## 2017-04-14 RX ADMIN — INSULIN LISPRO 1 UNITS: 100 INJECTION, SOLUTION INTRAVENOUS; SUBCUTANEOUS at 21:03

## 2017-04-14 RX ADMIN — AMIODARONE HYDROCHLORIDE 200 MG: 200 TABLET ORAL at 13:59

## 2017-04-14 RX ADMIN — CLONAZEPAM 1 MG: 0.5 TABLET ORAL at 21:01

## 2017-04-14 RX ADMIN — IPRATROPIUM BROMIDE AND ALBUTEROL SULFATE 1 AMPULE: .5; 3 SOLUTION RESPIRATORY (INHALATION) at 20:05

## 2017-04-14 RX ADMIN — PANTOPRAZOLE SODIUM 40 MG: 40 TABLET, DELAYED RELEASE ORAL at 06:06

## 2017-04-14 RX ADMIN — METFORMIN HYDROCHLORIDE 500 MG: 500 TABLET, FILM COATED ORAL at 17:06

## 2017-04-14 RX ADMIN — INSULIN LISPRO 2 UNITS: 100 INJECTION, SOLUTION INTRAVENOUS; SUBCUTANEOUS at 13:58

## 2017-04-14 RX ADMIN — AMIODARONE HYDROCHLORIDE 200 MG: 200 TABLET ORAL at 21:01

## 2017-04-14 RX ADMIN — CLONAZEPAM 1 MG: 0.5 TABLET ORAL at 13:59

## 2017-04-14 RX ADMIN — METOPROLOL TARTRATE 37.5 MG: 25 TABLET ORAL at 21:02

## 2017-04-14 ASSESSMENT — PAIN SCALES - GENERAL
PAINLEVEL_OUTOF10: 10
PAINLEVEL_OUTOF10: 8

## 2017-04-14 ASSESSMENT — PAIN DESCRIPTION - ORIENTATION
ORIENTATION: MID;UPPER

## 2017-04-14 ASSESSMENT — PAIN DESCRIPTION - FREQUENCY
FREQUENCY: CONTINUOUS

## 2017-04-14 ASSESSMENT — PAIN DESCRIPTION - DESCRIPTORS
DESCRIPTORS: SHARP

## 2017-04-14 ASSESSMENT — PAIN DESCRIPTION - LOCATION
LOCATION: CHEST

## 2017-04-14 ASSESSMENT — PAIN DESCRIPTION - PAIN TYPE: TYPE: CHRONIC PAIN

## 2017-04-15 VITALS
HEIGHT: 68 IN | DIASTOLIC BLOOD PRESSURE: 54 MMHG | HEART RATE: 102 BPM | WEIGHT: 167.99 LBS | BODY MASS INDEX: 25.46 KG/M2 | RESPIRATION RATE: 18 BRPM | TEMPERATURE: 98.2 F | SYSTOLIC BLOOD PRESSURE: 125 MMHG | OXYGEN SATURATION: 95 %

## 2017-04-15 LAB
ABO/RH: NORMAL
ANION GAP SERPL CALCULATED.3IONS-SCNC: 15 MMOL/L (ref 9–17)
ANTIBODY SCREEN: NEGATIVE
ARM BAND NUMBER: NORMAL
BLD PROD TYP BPU: NORMAL
BLD PROD TYP BPU: NORMAL
BUN BLDV-MCNC: 16 MG/DL (ref 8–23)
BUN/CREAT BLD: 22 (ref 9–20)
CALCIUM SERPL-MCNC: 8.2 MG/DL (ref 8.6–10.4)
CHLORIDE BLD-SCNC: 103 MMOL/L (ref 98–107)
CO2: 25 MMOL/L (ref 20–31)
CREAT SERPL-MCNC: 0.72 MG/DL (ref 0.5–0.9)
CROSSMATCH RESULT: NORMAL
CROSSMATCH RESULT: NORMAL
DISPENSE STATUS BLOOD BANK: NORMAL
DISPENSE STATUS BLOOD BANK: NORMAL
EXPIRATION DATE: NORMAL
GFR AFRICAN AMERICAN: >60 ML/MIN
GFR NON-AFRICAN AMERICAN: >60 ML/MIN
GFR SERPL CREATININE-BSD FRML MDRD: ABNORMAL ML/MIN/{1.73_M2}
GFR SERPL CREATININE-BSD FRML MDRD: ABNORMAL ML/MIN/{1.73_M2}
GLUCOSE BLD-MCNC: 123 MG/DL (ref 65–105)
GLUCOSE BLD-MCNC: 137 MG/DL (ref 70–99)
GLUCOSE BLD-MCNC: 209 MG/DL (ref 65–105)
HCT VFR BLD CALC: 29.1 % (ref 36–46)
HEMOGLOBIN: 9.8 G/DL (ref 12–16)
INR BLD: 1
MAGNESIUM: 1.9 MG/DL (ref 1.6–2.6)
MCH RBC QN AUTO: 29.6 PG (ref 26–34)
MCHC RBC AUTO-ENTMCNC: 33.5 G/DL (ref 31–37)
MCV RBC AUTO: 88.3 FL (ref 80–100)
PDW BLD-RTO: 13.9 % (ref 11.5–14.5)
PLATELET # BLD: 179 K/UL (ref 130–400)
PMV BLD AUTO: 7.6 FL (ref 6–12)
POTASSIUM SERPL-SCNC: 3.6 MMOL/L (ref 3.7–5.3)
PROTHROMBIN TIME: 10.3 SEC (ref 9.7–11.6)
RBC # BLD: 3.3 M/UL (ref 4–5.2)
SODIUM BLD-SCNC: 143 MMOL/L (ref 135–144)
TRANSFUSION STATUS: NORMAL
TRANSFUSION STATUS: NORMAL
UNIT DIVISION: 0
UNIT DIVISION: 0
UNIT NUMBER: NORMAL
UNIT NUMBER: NORMAL
WBC # BLD: 10.7 K/UL (ref 3.5–11)

## 2017-04-15 PROCEDURE — 85610 PROTHROMBIN TIME: CPT

## 2017-04-15 PROCEDURE — 97535 SELF CARE MNGMENT TRAINING: CPT | Performed by: NURSE PRACTITIONER

## 2017-04-15 PROCEDURE — 2580000003 HC RX 258: Performed by: THORACIC SURGERY (CARDIOTHORACIC VASCULAR SURGERY)

## 2017-04-15 PROCEDURE — 94640 AIRWAY INHALATION TREATMENT: CPT

## 2017-04-15 PROCEDURE — 6370000000 HC RX 637 (ALT 250 FOR IP): Performed by: THORACIC SURGERY (CARDIOTHORACIC VASCULAR SURGERY)

## 2017-04-15 PROCEDURE — 85027 COMPLETE CBC AUTOMATED: CPT

## 2017-04-15 PROCEDURE — 97530 THERAPEUTIC ACTIVITIES: CPT | Performed by: NURSE PRACTITIONER

## 2017-04-15 PROCEDURE — 97110 THERAPEUTIC EXERCISES: CPT

## 2017-04-15 PROCEDURE — 94761 N-INVAS EAR/PLS OXIMETRY MLT: CPT

## 2017-04-15 PROCEDURE — 36415 COLL VENOUS BLD VENIPUNCTURE: CPT

## 2017-04-15 PROCEDURE — 82947 ASSAY GLUCOSE BLOOD QUANT: CPT

## 2017-04-15 PROCEDURE — 94668 MNPJ CHEST WALL SBSQ: CPT

## 2017-04-15 PROCEDURE — 83735 ASSAY OF MAGNESIUM: CPT

## 2017-04-15 PROCEDURE — 80048 BASIC METABOLIC PNL TOTAL CA: CPT

## 2017-04-15 PROCEDURE — 97116 GAIT TRAINING THERAPY: CPT

## 2017-04-15 RX ADMIN — CLONAZEPAM 1 MG: 0.5 TABLET ORAL at 08:49

## 2017-04-15 RX ADMIN — AMIODARONE HYDROCHLORIDE 200 MG: 200 TABLET ORAL at 14:50

## 2017-04-15 RX ADMIN — INSULIN LISPRO 2 UNITS: 100 INJECTION, SOLUTION INTRAVENOUS; SUBCUTANEOUS at 12:12

## 2017-04-15 RX ADMIN — OXYCODONE HYDROCHLORIDE AND ACETAMINOPHEN 1 TABLET: 5; 325 TABLET ORAL at 02:40

## 2017-04-15 RX ADMIN — METFORMIN HYDROCHLORIDE 500 MG: 500 TABLET, FILM COATED ORAL at 08:48

## 2017-04-15 RX ADMIN — CLONAZEPAM 1 MG: 0.5 TABLET ORAL at 14:50

## 2017-04-15 RX ADMIN — FUROSEMIDE 40 MG: 40 TABLET ORAL at 08:48

## 2017-04-15 RX ADMIN — METOPROLOL TARTRATE 37.5 MG: 25 TABLET ORAL at 08:48

## 2017-04-15 RX ADMIN — GLIMEPIRIDE 2 MG: 2 TABLET ORAL at 08:50

## 2017-04-15 RX ADMIN — Medication 10 ML: at 08:55

## 2017-04-15 RX ADMIN — ASPIRIN 81 MG 81 MG: 81 TABLET ORAL at 08:49

## 2017-04-15 RX ADMIN — AMIODARONE HYDROCHLORIDE 200 MG: 200 TABLET ORAL at 08:49

## 2017-04-15 RX ADMIN — PANTOPRAZOLE SODIUM 40 MG: 40 TABLET, DELAYED RELEASE ORAL at 08:57

## 2017-04-15 RX ADMIN — OXYCODONE HYDROCHLORIDE AND ACETAMINOPHEN 1 TABLET: 5; 325 TABLET ORAL at 11:57

## 2017-04-15 RX ADMIN — IPRATROPIUM BROMIDE AND ALBUTEROL SULFATE 1 AMPULE: .5; 3 SOLUTION RESPIRATORY (INHALATION) at 06:22

## 2017-04-15 RX ADMIN — IPRATROPIUM BROMIDE AND ALBUTEROL SULFATE 1 AMPULE: .5; 3 SOLUTION RESPIRATORY (INHALATION) at 10:54

## 2017-04-15 RX ADMIN — IPRATROPIUM BROMIDE AND ALBUTEROL SULFATE 1 AMPULE: .5; 3 SOLUTION RESPIRATORY (INHALATION) at 14:24

## 2017-04-15 RX ADMIN — ACETAMINOPHEN 650 MG: 325 TABLET ORAL at 14:50

## 2017-04-15 RX ADMIN — DOCUSATE SODIUM 100 MG: 100 CAPSULE, LIQUID FILLED ORAL at 08:48

## 2017-04-15 ASSESSMENT — PAIN DESCRIPTION - PAIN TYPE
TYPE: CHRONIC PAIN
TYPE: SURGICAL PAIN

## 2017-04-15 ASSESSMENT — PAIN DESCRIPTION - LOCATION
LOCATION: BACK
LOCATION: CHEST

## 2017-04-15 ASSESSMENT — PAIN SCALES - GENERAL
PAINLEVEL_OUTOF10: 7
PAINLEVEL_OUTOF10: 10

## 2017-04-15 ASSESSMENT — PAIN DESCRIPTION - DESCRIPTORS: DESCRIPTORS: SORE

## 2017-04-15 ASSESSMENT — PAIN DESCRIPTION - ORIENTATION: ORIENTATION: MID

## 2017-04-15 ASSESSMENT — PAIN DESCRIPTION - PROGRESSION: CLINICAL_PROGRESSION: GRADUALLY IMPROVING

## 2017-04-15 ASSESSMENT — PAIN DESCRIPTION - FREQUENCY: FREQUENCY: CONTINUOUS

## 2017-04-17 ENCOUNTER — HOSPITAL ENCOUNTER (OUTPATIENT)
Age: 67
Setting detail: SPECIMEN
Discharge: HOME | End: 2017-04-17
Payer: MEDICARE

## 2017-04-17 ENCOUNTER — TELEPHONE (OUTPATIENT)
Dept: CASE MANAGEMENT | Age: 67
End: 2017-04-17

## 2017-04-17 LAB
ANION GAP SERPL CALCULATED.3IONS-SCNC: 13 MMOL/L (ref 9–17)
BUN BLDV-MCNC: 16 MG/DL (ref 8–23)
BUN/CREAT BLD: 19 (ref 9–20)
CALCIUM SERPL-MCNC: 8.7 MG/DL (ref 8.6–10.4)
CHLORIDE BLD-SCNC: 102 MMOL/L (ref 98–107)
CO2: 28 MMOL/L (ref 20–31)
CREAT SERPL-MCNC: 0.84 MG/DL (ref 0.5–0.9)
GFR AFRICAN AMERICAN: >60 ML/MIN
GFR NON-AFRICAN AMERICAN: >60 ML/MIN
GFR SERPL CREATININE-BSD FRML MDRD: ABNORMAL ML/MIN/{1.73_M2}
GFR SERPL CREATININE-BSD FRML MDRD: ABNORMAL ML/MIN/{1.73_M2}
GLUCOSE BLD-MCNC: 116 MG/DL (ref 70–99)
HCT VFR BLD CALC: 30 % (ref 36–46)
HEMOGLOBIN: 10 G/DL (ref 12–16)
MCH RBC QN AUTO: 29.5 PG (ref 26–34)
MCHC RBC AUTO-ENTMCNC: 33.5 G/DL (ref 31–37)
MCV RBC AUTO: 88 FL (ref 80–100)
PDW BLD-RTO: 13.7 % (ref 11.5–14.5)
PLATELET # BLD: 293 K/UL (ref 130–400)
PMV BLD AUTO: 7.4 FL (ref 6–12)
POTASSIUM SERPL-SCNC: 4.2 MMOL/L (ref 3.7–5.3)
RBC # BLD: 3.4 M/UL (ref 4–5.2)
SODIUM BLD-SCNC: 143 MMOL/L (ref 135–144)
WBC # BLD: 10.1 K/UL (ref 3.5–11)

## 2017-04-17 PROCEDURE — 36415 COLL VENOUS BLD VENIPUNCTURE: CPT

## 2017-04-17 PROCEDURE — P9603 ONE-WAY ALLOW PRORATED MILES: HCPCS

## 2017-04-17 PROCEDURE — 85027 COMPLETE CBC AUTOMATED: CPT

## 2017-04-17 PROCEDURE — 80048 BASIC METABOLIC PNL TOTAL CA: CPT

## 2017-04-19 ENCOUNTER — HOSPITAL ENCOUNTER (OUTPATIENT)
Dept: GENERAL RADIOLOGY | Age: 67
Discharge: HOME | End: 2017-04-19
Payer: MEDICARE

## 2017-04-19 ENCOUNTER — HOSPITAL ENCOUNTER (OUTPATIENT)
Age: 67
Discharge: HOME | End: 2017-04-19
Payer: MEDICARE

## 2017-04-19 DIAGNOSIS — Z95.2 S/P AORTIC VALVE REPLACEMENT: ICD-10-CM

## 2017-04-19 DIAGNOSIS — J98.11 ATELECTASIS: ICD-10-CM

## 2017-04-19 PROCEDURE — 71020 XR CHEST STANDARD TWO VW: CPT

## 2017-04-24 ENCOUNTER — HOSPITAL ENCOUNTER (OUTPATIENT)
Age: 67
Setting detail: SPECIMEN
Discharge: HOME | End: 2017-04-24
Payer: MEDICARE

## 2017-04-24 LAB
ANION GAP SERPL CALCULATED.3IONS-SCNC: 12 MMOL/L (ref 9–17)
BUN BLDV-MCNC: 16 MG/DL (ref 8–23)
BUN/CREAT BLD: 18 (ref 9–20)
CALCIUM SERPL-MCNC: 8.8 MG/DL (ref 8.6–10.4)
CHLORIDE BLD-SCNC: 100 MMOL/L (ref 98–107)
CO2: 29 MMOL/L (ref 20–31)
CREAT SERPL-MCNC: 0.89 MG/DL (ref 0.5–0.9)
GFR AFRICAN AMERICAN: >60 ML/MIN
GFR NON-AFRICAN AMERICAN: >60 ML/MIN
GFR SERPL CREATININE-BSD FRML MDRD: NORMAL ML/MIN/{1.73_M2}
GFR SERPL CREATININE-BSD FRML MDRD: NORMAL ML/MIN/{1.73_M2}
GLUCOSE BLD-MCNC: 96 MG/DL (ref 70–99)
HCT VFR BLD CALC: 33.4 % (ref 36–46)
HEMOGLOBIN: 10.9 G/DL (ref 12–16)
MCH RBC QN AUTO: 28.7 PG (ref 26–34)
MCHC RBC AUTO-ENTMCNC: 32.5 G/DL (ref 31–37)
MCV RBC AUTO: 88.3 FL (ref 80–100)
PDW BLD-RTO: 14.2 % (ref 11.5–14.5)
PLATELET # BLD: 511 K/UL (ref 130–400)
PMV BLD AUTO: 6.8 FL (ref 6–12)
POTASSIUM SERPL-SCNC: 4.4 MMOL/L (ref 3.7–5.3)
RBC # BLD: 3.79 M/UL (ref 4–5.2)
SODIUM BLD-SCNC: 141 MMOL/L (ref 135–144)
WBC # BLD: 10.1 K/UL (ref 3.5–11)

## 2017-04-24 PROCEDURE — 36415 COLL VENOUS BLD VENIPUNCTURE: CPT

## 2017-04-24 PROCEDURE — P9603 ONE-WAY ALLOW PRORATED MILES: HCPCS

## 2017-04-24 PROCEDURE — 80048 BASIC METABOLIC PNL TOTAL CA: CPT

## 2017-04-24 PROCEDURE — 85027 COMPLETE CBC AUTOMATED: CPT

## 2017-04-25 DIAGNOSIS — J98.11 ATELECTASIS: ICD-10-CM

## 2017-04-25 DIAGNOSIS — Z95.2 S/P AVR (AORTIC VALVE REPLACEMENT): Primary | ICD-10-CM

## 2017-04-26 ENCOUNTER — HOSPITAL ENCOUNTER (OUTPATIENT)
Dept: GENERAL RADIOLOGY | Facility: CLINIC | Age: 67
Discharge: HOME | End: 2017-04-26
Payer: MEDICARE

## 2017-04-26 ENCOUNTER — HOSPITAL ENCOUNTER (OUTPATIENT)
Facility: CLINIC | Age: 67
Discharge: HOME | End: 2017-04-26
Payer: MEDICARE

## 2017-04-26 DIAGNOSIS — J98.11 ATELECTASIS: ICD-10-CM

## 2017-04-26 DIAGNOSIS — Z95.2 S/P AVR (AORTIC VALVE REPLACEMENT): ICD-10-CM

## 2017-04-26 PROCEDURE — 71020 XR CHEST STANDARD TWO VW: CPT

## 2017-05-01 ENCOUNTER — HOSPITAL ENCOUNTER (OUTPATIENT)
Age: 67
Setting detail: SPECIMEN
Discharge: HOME | End: 2017-05-01
Payer: MEDICARE

## 2017-05-01 LAB
ANION GAP SERPL CALCULATED.3IONS-SCNC: 13 MMOL/L (ref 9–17)
BUN BLDV-MCNC: 17 MG/DL (ref 8–23)
BUN/CREAT BLD: 22 (ref 9–20)
CALCIUM SERPL-MCNC: 9.2 MG/DL (ref 8.6–10.4)
CHLORIDE BLD-SCNC: 104 MMOL/L (ref 98–107)
CO2: 28 MMOL/L (ref 20–31)
CREAT SERPL-MCNC: 0.76 MG/DL (ref 0.5–0.9)
GFR AFRICAN AMERICAN: >60 ML/MIN
GFR NON-AFRICAN AMERICAN: >60 ML/MIN
GFR SERPL CREATININE-BSD FRML MDRD: ABNORMAL ML/MIN/{1.73_M2}
GFR SERPL CREATININE-BSD FRML MDRD: ABNORMAL ML/MIN/{1.73_M2}
GLUCOSE BLD-MCNC: 81 MG/DL (ref 70–99)
HCT VFR BLD CALC: 33.5 % (ref 36–46)
HEMOGLOBIN: 10.9 G/DL (ref 12–16)
MCH RBC QN AUTO: 28.1 PG (ref 26–34)
MCHC RBC AUTO-ENTMCNC: 32.7 G/DL (ref 31–37)
MCV RBC AUTO: 86.1 FL (ref 80–100)
PDW BLD-RTO: 14.2 % (ref 11.5–14.5)
PLATELET # BLD: 374 K/UL (ref 130–400)
PMV BLD AUTO: 7.5 FL (ref 6–12)
POTASSIUM SERPL-SCNC: 4.6 MMOL/L (ref 3.7–5.3)
RBC # BLD: 3.89 M/UL (ref 4–5.2)
SODIUM BLD-SCNC: 145 MMOL/L (ref 135–144)
WBC # BLD: 7.8 K/UL (ref 3.5–11)

## 2017-05-01 PROCEDURE — 85027 COMPLETE CBC AUTOMATED: CPT

## 2017-05-01 PROCEDURE — 36415 COLL VENOUS BLD VENIPUNCTURE: CPT

## 2017-05-01 PROCEDURE — P9603 ONE-WAY ALLOW PRORATED MILES: HCPCS

## 2017-05-01 PROCEDURE — 80048 BASIC METABOLIC PNL TOTAL CA: CPT

## 2017-05-12 ENCOUNTER — TELEPHONE (OUTPATIENT)
Dept: CARDIOVASCULAR ICU | Age: 67
End: 2017-05-12

## 2017-10-31 ENCOUNTER — APPOINTMENT (OUTPATIENT)
Dept: GENERAL RADIOLOGY | Age: 67
End: 2017-10-31
Payer: MEDICARE

## 2017-10-31 ENCOUNTER — APPOINTMENT (OUTPATIENT)
Dept: GENERAL RADIOLOGY | Age: 67
DRG: 086 | End: 2017-10-31
Payer: MEDICARE

## 2017-10-31 ENCOUNTER — APPOINTMENT (OUTPATIENT)
Dept: CT IMAGING | Age: 67
DRG: 086 | End: 2017-10-31
Payer: MEDICARE

## 2017-10-31 ENCOUNTER — APPOINTMENT (OUTPATIENT)
Dept: CT IMAGING | Age: 67
End: 2017-10-31
Payer: MEDICARE

## 2017-10-31 ENCOUNTER — HOSPITAL ENCOUNTER (INPATIENT)
Age: 67
LOS: 2 days | Discharge: HOME HEALTH | DRG: 086 | End: 2017-11-02
Attending: EMERGENCY MEDICINE | Admitting: SURGERY
Payer: MEDICARE

## 2017-10-31 ENCOUNTER — HOSPITAL ENCOUNTER (EMERGENCY)
Age: 67
Discharge: ANOTHER HOSPITAL | End: 2017-10-31
Attending: EMERGENCY MEDICINE
Payer: MEDICARE

## 2017-10-31 VITALS
BODY MASS INDEX: 30.92 KG/M2 | HEIGHT: 68 IN | HEART RATE: 90 BPM | TEMPERATURE: 97.7 F | DIASTOLIC BLOOD PRESSURE: 64 MMHG | RESPIRATION RATE: 20 BRPM | OXYGEN SATURATION: 96 % | WEIGHT: 204 LBS | SYSTOLIC BLOOD PRESSURE: 149 MMHG

## 2017-10-31 DIAGNOSIS — S06.5X0A TRAUMATIC SUBDURAL HEMATOMA WITHOUT LOSS OF CONSCIOUSNESS, INITIAL ENCOUNTER (HCC): Primary | ICD-10-CM

## 2017-10-31 DIAGNOSIS — I60.9 SUBARACHNOID HEMORRHAGE (HCC): Primary | ICD-10-CM

## 2017-10-31 DIAGNOSIS — S06.320A CONTUSION OF LEFT CEREBRAL HEMISPHERE WITHOUT LOSS OF CONSCIOUSNESS, INITIAL ENCOUNTER (HCC): ICD-10-CM

## 2017-10-31 DIAGNOSIS — S62.101A RIGHT WRIST FRACTURE, CLOSED, INITIAL ENCOUNTER: ICD-10-CM

## 2017-10-31 DIAGNOSIS — S06.5XAA SUBDURAL HEMATOMA: ICD-10-CM

## 2017-10-31 PROBLEM — S06.36AA TRAUMATIC HEMORRHAGE OF CEREBRUM: Status: ACTIVE | Noted: 2017-10-31

## 2017-10-31 PROBLEM — S52.501A CLOSED FRACTURE OF RIGHT DISTAL RADIUS: Status: ACTIVE | Noted: 2017-10-31

## 2017-10-31 PROBLEM — G89.29 CHRONIC BILATERAL LOW BACK PAIN: Status: ACTIVE | Noted: 2017-10-31

## 2017-10-31 PROBLEM — M54.50 CHRONIC BILATERAL LOW BACK PAIN: Status: ACTIVE | Noted: 2017-10-31

## 2017-10-31 PROBLEM — I62.00 SUBDURAL HEMORRHAGE (HCC): Status: ACTIVE | Noted: 2017-10-31

## 2017-10-31 LAB
ABO/RH: NORMAL
ABSOLUTE EOS #: 0.1 K/UL (ref 0–0.4)
ABSOLUTE IMMATURE GRANULOCYTE: ABNORMAL K/UL (ref 0–0.3)
ABSOLUTE LYMPH #: 2.2 K/UL (ref 1–4.8)
ABSOLUTE MONO #: 0.5 K/UL (ref 0.2–0.8)
ACT TEG: 89 SEC (ref 86–118)
ALLEN TEST: ABNORMAL
ANGLE, RAPID TEG: 76.4 DEG (ref 64–80)
ANION GAP SERPL CALCULATED.3IONS-SCNC: 13 MMOL/L (ref 9–17)
ANION GAP SERPL CALCULATED.3IONS-SCNC: 16 MMOL/L (ref 9–17)
ANTIBODY SCREEN: NEGATIVE
ARM BAND NUMBER: NORMAL
BASOPHILS # BLD: 0 %
BASOPHILS ABSOLUTE: 0 K/UL (ref 0–0.2)
BLOOD BANK SPECIMEN: ABNORMAL
BUN BLDV-MCNC: 20 MG/DL (ref 8–23)
BUN BLDV-MCNC: 21 MG/DL (ref 8–23)
BUN/CREAT BLD: 22 (ref 9–20)
CALCIUM SERPL-MCNC: 8.9 MG/DL (ref 8.6–10.4)
CARBOXYHEMOGLOBIN: 2.9 % (ref 0–5)
CHLORIDE BLD-SCNC: 101 MMOL/L (ref 98–107)
CHLORIDE BLD-SCNC: 102 MMOL/L (ref 98–107)
CO2: 21 MMOL/L (ref 20–31)
CO2: 27 MMOL/L (ref 20–31)
CREAT SERPL-MCNC: 0.97 MG/DL (ref 0.5–0.9)
CREAT SERPL-MCNC: 0.97 MG/DL (ref 0.5–0.9)
DIFFERENTIAL TYPE: ABNORMAL
EOSINOPHILS RELATIVE PERCENT: 1 %
EPL-TEG: 0.1 % (ref 0–15)
ETHANOL PERCENT: <0.01 %
ETHANOL: <10 MG/DL
EXPIRATION DATE: NORMAL
FIO2: ABNORMAL
GFR AFRICAN AMERICAN: >60 ML/MIN
GFR AFRICAN AMERICAN: >60 ML/MIN
GFR NON-AFRICAN AMERICAN: 57 ML/MIN
GFR NON-AFRICAN AMERICAN: 57 ML/MIN
GFR SERPL CREATININE-BSD FRML MDRD: ABNORMAL ML/MIN/{1.73_M2}
GLUCOSE BLD-MCNC: 128 MG/DL (ref 65–105)
GLUCOSE BLD-MCNC: 161 MG/DL (ref 70–99)
GLUCOSE BLD-MCNC: 177 MG/DL (ref 70–99)
HCO3 VENOUS: 29.9 MMOL/L (ref 24–30)
HCT VFR BLD CALC: 43.1 % (ref 36–46)
HCT VFR BLD CALC: 44.2 % (ref 36.3–47.1)
HEMOGLOBIN: 13.6 G/DL (ref 11.9–15.1)
HEMOGLOBIN: 14.3 G/DL (ref 12–16)
HEPARIN THERAPY: NORMAL
IMMATURE GRANULOCYTES: ABNORMAL %
INR BLD: 0.9
KINETICS RAPID TEG: 1.2 MIN (ref 1–2)
LY30 (LYSIS) TEG: 0.1 % (ref 0–8)
LYMPHOCYTES # BLD: 20 %
MA(MAX CLOT) RAPID TEG: 61.2 MM (ref 52–71)
MCH RBC QN AUTO: 28.4 PG (ref 25.2–33.5)
MCH RBC QN AUTO: 29.4 PG (ref 26–34)
MCHC RBC AUTO-ENTMCNC: 30.8 G/DL (ref 29.9–34.7)
MCHC RBC AUTO-ENTMCNC: 33.1 G/DL (ref 31–37)
MCV RBC AUTO: 88.8 FL (ref 80–100)
MCV RBC AUTO: 92.3 FL (ref 82.6–102.9)
METHEMOGLOBIN: ABNORMAL % (ref 0–1.5)
MODE: ABNORMAL
MONOCYTES # BLD: 5 %
NEGATIVE BASE EXCESS, VEN: ABNORMAL MMOL/L (ref 0–2)
NOTIFICATION TIME: ABNORMAL
NOTIFICATION: ABNORMAL
O2 DEVICE/FLOW/%: ABNORMAL
O2 SAT, VEN: 75.4 % (ref 60–85)
OXYHEMOGLOBIN: ABNORMAL % (ref 95–98)
PARTIAL THROMBOPLASTIN TIME: 24.1 SEC (ref 21.3–31.3)
PATIENT TEMP: 37
PCO2, VEN, TEMP ADJ: ABNORMAL MMHG (ref 39–55)
PCO2, VEN: 64.3 (ref 39–55)
PDW BLD-RTO: 14.7 % (ref 11.8–14.4)
PDW BLD-RTO: 15.4 % (ref 11.5–14.5)
PEEP/CPAP: ABNORMAL
PH VENOUS: 7.29 (ref 7.32–7.42)
PH, VEN, TEMP ADJ: ABNORMAL (ref 7.32–7.42)
PLATELET # BLD: 241 K/UL (ref 130–400)
PLATELET # BLD: 265 K/UL (ref 138–453)
PLATELET ESTIMATE: ABNORMAL
PMV BLD AUTO: 8 FL (ref 6–12)
PMV BLD AUTO: 9.2 FL (ref 8.1–13.5)
PO2, VEN, TEMP ADJ: ABNORMAL MMHG (ref 30–50)
PO2, VEN: 44.7 (ref 30–50)
POSITIVE BASE EXCESS, VEN: 2 MMOL/L (ref 0–2)
POTASSIUM SERPL-SCNC: 4.9 MMOL/L (ref 3.7–5.3)
POTASSIUM SERPL-SCNC: 5.2 MMOL/L (ref 3.7–5.3)
PROTHROMBIN TIME: 9.8 SEC (ref 9.4–12.6)
PSV: ABNORMAL
PT. POSITION: ABNORMAL
RBC # BLD: 4.79 M/UL (ref 3.95–5.11)
RBC # BLD: 4.85 M/UL (ref 4–5.2)
RBC # BLD: ABNORMAL 10*6/UL
REACTION TIME RAPID TEG: 0.4 MIN (ref 0–1)
RESPIRATORY RATE: ABNORMAL
SAMPLE SITE: ABNORMAL
SEG NEUTROPHILS: 74 %
SEGMENTED NEUTROPHILS ABSOLUTE COUNT: 8.3 K/UL (ref 1.8–7.7)
SET RATE: ABNORMAL
SODIUM BLD-SCNC: 139 MMOL/L (ref 135–144)
SODIUM BLD-SCNC: 141 MMOL/L (ref 135–144)
TEG COMMENT: NORMAL
TEXT FOR RESPIRATORY: ABNORMAL
TOTAL HB: ABNORMAL G/DL (ref 12–16)
TOTAL RATE: ABNORMAL
VITAMIN D 25-HYDROXY: 21.8 NG/ML (ref 30–100)
VT: ABNORMAL
WBC # BLD: 11.2 K/UL (ref 3.5–11)
WBC # BLD: 13.9 K/UL (ref 3.5–11.3)
WBC # BLD: ABNORMAL 10*3/UL

## 2017-10-31 PROCEDURE — 6370000000 HC RX 637 (ALT 250 FOR IP): Performed by: EMERGENCY MEDICINE

## 2017-10-31 PROCEDURE — 73100 X-RAY EXAM OF WRIST: CPT

## 2017-10-31 PROCEDURE — 84520 ASSAY OF UREA NITROGEN: CPT

## 2017-10-31 PROCEDURE — 80048 BASIC METABOLIC PNL TOTAL CA: CPT

## 2017-10-31 PROCEDURE — 85025 COMPLETE CBC W/AUTO DIFF WBC: CPT

## 2017-10-31 PROCEDURE — 85610 PROTHROMBIN TIME: CPT

## 2017-10-31 PROCEDURE — 73110 X-RAY EXAM OF WRIST: CPT

## 2017-10-31 PROCEDURE — 6360000002 HC RX W HCPCS: Performed by: STUDENT IN AN ORGANIZED HEALTH CARE EDUCATION/TRAINING PROGRAM

## 2017-10-31 PROCEDURE — 99285 EMERGENCY DEPT VISIT HI MDM: CPT

## 2017-10-31 PROCEDURE — 85730 THROMBOPLASTIN TIME PARTIAL: CPT

## 2017-10-31 PROCEDURE — 96372 THER/PROPH/DIAG INJ SC/IM: CPT

## 2017-10-31 PROCEDURE — 71010 XR CHEST PORTABLE: CPT

## 2017-10-31 PROCEDURE — 82947 ASSAY GLUCOSE BLOOD QUANT: CPT

## 2017-10-31 PROCEDURE — 70450 CT HEAD/BRAIN W/O DYE: CPT

## 2017-10-31 PROCEDURE — 82805 BLOOD GASES W/O2 SATURATION: CPT

## 2017-10-31 PROCEDURE — G0480 DRUG TEST DEF 1-7 CLASSES: HCPCS

## 2017-10-31 PROCEDURE — 70486 CT MAXILLOFACIAL W/O DYE: CPT

## 2017-10-31 PROCEDURE — 85390 FIBRINOLYSINS SCREEN I&R: CPT

## 2017-10-31 PROCEDURE — 86900 BLOOD TYPING SEROLOGIC ABO: CPT

## 2017-10-31 PROCEDURE — 2500000003 HC RX 250 WO HCPCS: Performed by: STUDENT IN AN ORGANIZED HEALTH CARE EDUCATION/TRAINING PROGRAM

## 2017-10-31 PROCEDURE — 82565 ASSAY OF CREATININE: CPT

## 2017-10-31 PROCEDURE — 72128 CT CHEST SPINE W/O DYE: CPT

## 2017-10-31 PROCEDURE — 72170 X-RAY EXAM OF PELVIS: CPT

## 2017-10-31 PROCEDURE — 2580000003 HC RX 258: Performed by: STUDENT IN AN ORGANIZED HEALTH CARE EDUCATION/TRAINING PROGRAM

## 2017-10-31 PROCEDURE — 85027 COMPLETE CBC AUTOMATED: CPT

## 2017-10-31 PROCEDURE — 90471 IMMUNIZATION ADMIN: CPT | Performed by: EMERGENCY MEDICINE

## 2017-10-31 PROCEDURE — 90715 TDAP VACCINE 7 YRS/> IM: CPT | Performed by: EMERGENCY MEDICINE

## 2017-10-31 PROCEDURE — 72125 CT NECK SPINE W/O DYE: CPT

## 2017-10-31 PROCEDURE — 36415 COLL VENOUS BLD VENIPUNCTURE: CPT

## 2017-10-31 PROCEDURE — 2000000003 HC NEURO ICU R&B

## 2017-10-31 PROCEDURE — 72131 CT LUMBAR SPINE W/O DYE: CPT

## 2017-10-31 PROCEDURE — 82306 VITAMIN D 25 HYDROXY: CPT

## 2017-10-31 PROCEDURE — 73080 X-RAY EXAM OF ELBOW: CPT

## 2017-10-31 PROCEDURE — 87641 MR-STAPH DNA AMP PROBE: CPT

## 2017-10-31 PROCEDURE — 0PSHXZZ REPOSITION RIGHT RADIUS, EXTERNAL APPROACH: ICD-10-PCS | Performed by: ORTHOPAEDIC SURGERY

## 2017-10-31 PROCEDURE — 86901 BLOOD TYPING SEROLOGIC RH(D): CPT

## 2017-10-31 PROCEDURE — 86850 RBC ANTIBODY SCREEN: CPT

## 2017-10-31 PROCEDURE — 6370000000 HC RX 637 (ALT 250 FOR IP): Performed by: STUDENT IN AN ORGANIZED HEALTH CARE EDUCATION/TRAINING PROGRAM

## 2017-10-31 PROCEDURE — 6360000002 HC RX W HCPCS: Performed by: EMERGENCY MEDICINE

## 2017-10-31 PROCEDURE — 29125 APPL SHORT ARM SPLINT STATIC: CPT

## 2017-10-31 PROCEDURE — 80051 ELECTROLYTE PANEL: CPT

## 2017-10-31 PROCEDURE — 85210 CLOT FACTOR II PROTHROM SPEC: CPT

## 2017-10-31 RX ORDER — ONDANSETRON 2 MG/ML
4 INJECTION INTRAMUSCULAR; INTRAVENOUS EVERY 6 HOURS PRN
Status: DISCONTINUED | OUTPATIENT
Start: 2017-10-31 | End: 2017-11-02 | Stop reason: HOSPADM

## 2017-10-31 RX ORDER — DOCUSATE SODIUM 100 MG/1
100 CAPSULE, LIQUID FILLED ORAL 2 TIMES DAILY
Status: DISCONTINUED | OUTPATIENT
Start: 2017-10-31 | End: 2017-11-02 | Stop reason: HOSPADM

## 2017-10-31 RX ORDER — BISACODYL 10 MG
10 SUPPOSITORY, RECTAL RECTAL DAILY PRN
Status: DISCONTINUED | OUTPATIENT
Start: 2017-10-31 | End: 2017-11-02 | Stop reason: HOSPADM

## 2017-10-31 RX ORDER — FUROSEMIDE 40 MG/1
40 TABLET ORAL DAILY
Status: DISCONTINUED | OUTPATIENT
Start: 2017-11-01 | End: 2017-10-31

## 2017-10-31 RX ORDER — DEXTROSE MONOHYDRATE 50 MG/ML
100 INJECTION, SOLUTION INTRAVENOUS PRN
Status: DISCONTINUED | OUTPATIENT
Start: 2017-10-31 | End: 2017-11-02 | Stop reason: HOSPADM

## 2017-10-31 RX ORDER — PANTOPRAZOLE SODIUM 40 MG/1
40 TABLET, DELAYED RELEASE ORAL
Status: DISCONTINUED | OUTPATIENT
Start: 2017-11-01 | End: 2017-11-02 | Stop reason: HOSPADM

## 2017-10-31 RX ORDER — DEXTROSE MONOHYDRATE 25 G/50ML
12.5 INJECTION, SOLUTION INTRAVENOUS PRN
Status: DISCONTINUED | OUTPATIENT
Start: 2017-10-31 | End: 2017-11-02 | Stop reason: HOSPADM

## 2017-10-31 RX ORDER — TRAZODONE HYDROCHLORIDE 150 MG/1
300 TABLET ORAL NIGHTLY
Status: ON HOLD | COMMUNITY
End: 2020-09-04 | Stop reason: SDUPTHER

## 2017-10-31 RX ORDER — SODIUM CHLORIDE 0.9 % (FLUSH) 0.9 %
10 SYRINGE (ML) INJECTION EVERY 12 HOURS SCHEDULED
Status: DISCONTINUED | OUTPATIENT
Start: 2017-10-31 | End: 2017-11-02 | Stop reason: HOSPADM

## 2017-10-31 RX ORDER — ACETAMINOPHEN 325 MG/1
650 TABLET ORAL EVERY 4 HOURS PRN
Status: DISCONTINUED | OUTPATIENT
Start: 2017-10-31 | End: 2017-11-02 | Stop reason: HOSPADM

## 2017-10-31 RX ORDER — POTASSIUM CHLORIDE 750 MG/1
10 CAPSULE, EXTENDED RELEASE ORAL DAILY
Status: DISCONTINUED | OUTPATIENT
Start: 2017-11-01 | End: 2017-11-02 | Stop reason: HOSPADM

## 2017-10-31 RX ORDER — LIDOCAINE HYDROCHLORIDE 10 MG/ML
20 INJECTION, SOLUTION INFILTRATION; PERINEURAL ONCE
Status: COMPLETED | OUTPATIENT
Start: 2017-10-31 | End: 2017-10-31

## 2017-10-31 RX ORDER — TRAZODONE HYDROCHLORIDE 50 MG/1
50 TABLET ORAL NIGHTLY PRN
Status: DISCONTINUED | OUTPATIENT
Start: 2017-11-01 | End: 2017-11-02 | Stop reason: HOSPADM

## 2017-10-31 RX ORDER — ESTRADIOL 1 MG/1
0.5 TABLET ORAL DAILY
Status: DISCONTINUED | OUTPATIENT
Start: 2017-11-01 | End: 2017-11-02 | Stop reason: HOSPADM

## 2017-10-31 RX ORDER — MORPHINE SULFATE 4 MG/ML
4 INJECTION, SOLUTION INTRAMUSCULAR; INTRAVENOUS
Status: DISCONTINUED | OUTPATIENT
Start: 2017-10-31 | End: 2017-10-31

## 2017-10-31 RX ORDER — MORPHINE SULFATE 2 MG/ML
2 INJECTION, SOLUTION INTRAMUSCULAR; INTRAVENOUS
Status: DISCONTINUED | OUTPATIENT
Start: 2017-10-31 | End: 2017-10-31

## 2017-10-31 RX ORDER — SODIUM CHLORIDE, SODIUM LACTATE, POTASSIUM CHLORIDE, CALCIUM CHLORIDE 600; 310; 30; 20 MG/100ML; MG/100ML; MG/100ML; MG/100ML
INJECTION, SOLUTION INTRAVENOUS CONTINUOUS
Status: DISCONTINUED | OUTPATIENT
Start: 2017-10-31 | End: 2017-11-01

## 2017-10-31 RX ORDER — NICOTINE POLACRILEX 4 MG
15 LOZENGE BUCCAL PRN
Status: DISCONTINUED | OUTPATIENT
Start: 2017-10-31 | End: 2017-11-02 | Stop reason: HOSPADM

## 2017-10-31 RX ORDER — SODIUM CHLORIDE 0.9 % (FLUSH) 0.9 %
10 SYRINGE (ML) INJECTION PRN
Status: DISCONTINUED | OUTPATIENT
Start: 2017-10-31 | End: 2017-11-02 | Stop reason: HOSPADM

## 2017-10-31 RX ADMIN — MOMETASONE FUROATE AND FORMOTEROL FUMARATE DIHYDRATE 2 PUFF: 200; 5 AEROSOL RESPIRATORY (INHALATION) at 23:08

## 2017-10-31 RX ADMIN — METOPROLOL TARTRATE 37.5 MG: 25 TABLET ORAL at 23:00

## 2017-10-31 RX ADMIN — HYDROMORPHONE HYDROCHLORIDE 0.5 MG: 1 INJECTION, SOLUTION INTRAMUSCULAR; INTRAVENOUS; SUBCUTANEOUS at 21:34

## 2017-10-31 RX ADMIN — LIDOCAINE HYDROCHLORIDE 20 ML: 10 INJECTION, SOLUTION INFILTRATION; PERINEURAL at 18:33

## 2017-10-31 RX ADMIN — SODIUM CHLORIDE, POTASSIUM CHLORIDE, SODIUM LACTATE AND CALCIUM CHLORIDE: 600; 310; 30; 20 INJECTION, SOLUTION INTRAVENOUS at 12:37

## 2017-10-31 RX ADMIN — DOCUSATE SODIUM 100 MG: 100 CAPSULE ORAL at 23:00

## 2017-10-31 RX ADMIN — TETANUS TOXOID, REDUCED DIPHTHERIA TOXOID AND ACELLULAR PERTUSSIS VACCINE, ADSORBED 0.5 ML: 5; 2.5; 8; 8; 2.5 SUSPENSION INTRAMUSCULAR at 08:52

## 2017-10-31 RX ADMIN — HYDROMORPHONE HYDROCHLORIDE 0.5 MG: 1 INJECTION, SOLUTION INTRAMUSCULAR; INTRAVENOUS; SUBCUTANEOUS at 17:14

## 2017-10-31 ASSESSMENT — ENCOUNTER SYMPTOMS
CONSTIPATION: 0
VOMITING: 0
SHORTNESS OF BREATH: 0
EYE DISCHARGE: 0
COLOR CHANGE: 0
ABDOMINAL PAIN: 0
EYE REDNESS: 0
DIARRHEA: 0
COUGH: 0
FACIAL SWELLING: 0

## 2017-10-31 ASSESSMENT — PAIN SCALES - GENERAL
PAINLEVEL_OUTOF10: 8
PAINLEVEL_OUTOF10: 7
PAINLEVEL_OUTOF10: 8
PAINLEVEL_OUTOF10: 8
PAINLEVEL_OUTOF10: 0
PAINLEVEL_OUTOF10: 0
PAINLEVEL_OUTOF10: 8

## 2017-10-31 ASSESSMENT — PAIN DESCRIPTION - FREQUENCY
FREQUENCY: CONTINUOUS
FREQUENCY: CONTINUOUS

## 2017-10-31 ASSESSMENT — PAIN DESCRIPTION - DESCRIPTORS
DESCRIPTORS: ACHING;THROBBING
DESCRIPTORS: ACHING

## 2017-10-31 ASSESSMENT — PAIN DESCRIPTION - PAIN TYPE
TYPE: ACUTE PAIN
TYPE: ACUTE PAIN;CHRONIC PAIN

## 2017-10-31 ASSESSMENT — PAIN DESCRIPTION - PROGRESSION
CLINICAL_PROGRESSION: GRADUALLY WORSENING
CLINICAL_PROGRESSION: NOT CHANGED

## 2017-10-31 ASSESSMENT — PAIN DESCRIPTION - LOCATION: LOCATION: ARM;BACK

## 2017-10-31 ASSESSMENT — PAIN DESCRIPTION - ORIENTATION
ORIENTATION: RIGHT
ORIENTATION: RIGHT

## 2017-10-31 ASSESSMENT — PAIN DESCRIPTION - ONSET
ONSET: ON-GOING
ONSET: SUDDEN

## 2017-10-31 NOTE — ED NOTES
Patient arrived by ems on stretcher. . advised by patient and ems that around one half hour prior to arrival she was ambulating  with assistance of a walker and lost her balance and struck her head and right arm against a wall. Claims that when she attempted to regain her balance she again struck her head against the wall. Denies loss of consciousness. Upon arrival patient is alert/oriented. Respirations non labored. Has large amt of swelling and bruises to right orbital area and has pain and swelling and a large bruise to right forearm and pain /swelling to right wrist.  denies any other specific areas of injury. Ice pack applied to right orbital and right lower arm/wrist area. Patient  Evaluated by doctor Sydney Whyte. Venus Leger ordered.      Rolando Kaufman RN  10/31/17 0568

## 2017-10-31 NOTE — ED PROVIDER NOTES
date: 8/24/2016    Smokeless tobacco: Never Used    Alcohol use No    Drug use: No    Sexual activity: Not Currently     Other Topics Concern    Not on file     Social History Narrative    No narrative on file       Family History   Problem Relation Age of Onset   [place] Cancer Mother     Cancer Father        Allergies:  Codeine and Dye [iodides]    Home Medications:  Prior to Admission medications    Medication Sig Start Date End Date Taking? Authorizing Provider   TRAZODONE HCL PO Take by mouth nightly as needed Pt unsure of dosage    Historical Provider, MD   Venlafaxine HCl (EFFEXOR PO) Take 2 tablets by mouth 2 times daily Pt unsure of dosage    Historical Provider, MD   metoprolol tartrate (LOPRESSOR) 25 MG tablet Take 1.5 tablets by mouth 2 times daily 4/13/17   Prisca Schmidt MD   Cholecalciferol (VITAMIN D3) 62415 UNITS CAPS Take 1 capsule by mouth every 7 days Mondays    Historical Provider, MD   clonazePAM Audriwatson Mclean) 1 MG tablet Take 1 tablet by mouth daily Take three tablets daily 1/28/17   Stef Hunter DO   oxyCODONE-acetaminophen (PERCOCET) 5-325 MG per tablet Take 1 tablet by mouth 3 times daily as needed for Pain . Patient taking differently: Take 1 tablet by mouth 4 times daily as needed for Pain  .  1/28/17   Stef Hunter DO   lansoprazole (PREVACID) 30 MG delayed release capsule Take 30 mg by mouth daily 11/10/16   Historical Provider, MD   metFORMIN (GLUCOPHAGE) 500 MG tablet Take 500 mg by mouth 2 times daily 12/7/16   Historical Provider, MD   aspirin EC 81 MG EC tablet Take 81 mg by mouth daily    Historical Provider, MD   mometasone-formoterol (DULERA) 200-5 MCG/ACT inhaler Inhale 2 puffs into the lungs every 12 hours    Historical Provider, MD   estradiol (ESTRACE) 0.5 MG tablet Take 0.5 mg by mouth daily    Historical Provider, MD   furosemide (LASIX) 40 MG tablet Take 40 mg by mouth daily    Historical Provider, MD   gabapentin (NEURONTIN) 400 MG capsule Take 400-800 mg by mouth 2 times daily     Historical Provider, MD   glimepiride (AMARYL) 1 MG tablet Take 2 mg by mouth every morning (before breakfast) Indications: Takes 2mg AM and 1mg PM     Historical Provider, MD   potassium chloride (MICRO-K) 10 MEQ CR capsule Take 10 mEq by mouth daily     Historical Provider, MD       REVIEW OF SYSTEMS    (2-9 systems for level 4, 10 or more for level 5)      Review of Systems   Unable to perform ROS: Acuity of condition       PHYSICAL EXAM   (up to 7 for level 4, 8 or more for level 5)      INITIAL VITALS:   There were no vitals taken for this visit. Physical Exam   Constitutional: She is oriented to person, place, and time. She appears well-developed and well-nourished. No distress. HENT:   Head: Normocephalic. Mouth/Throat: Oropharynx is clear and moist. No oropharyngeal exudate. R periorbital swelling/ecchymosis   Eyes: Conjunctivae and EOM are normal. Pupils are equal, round, and reactive to light. Right eye exhibits no discharge. Left eye exhibits no discharge. Neck: No tracheal deviation present. Soft collar in place. No posterior c/t ttp midline but +Lspine ttp midline   Cardiovascular: Normal rate. Pulmonary/Chest: Effort normal and breath sounds normal. No respiratory distress. She has no wheezes. She has no rales. Abdominal: Soft. There is no tenderness. No pelvic ttp, no pelvic instability on exam   Musculoskeletal:   5/5 motor strength bilateral upper/lower extremities. Sensation preserved/intact bilateral upper/lower extremities. Warm, well perfused bilateral upper/lower extremities. +splint in place RUE with good motor/sensory/cap refill distally. Neurological: She is alert and oriented to person, place, and time. Skin: Skin is warm. She is not diaphoretic.        DIFFERENTIAL  DIAGNOSIS     PLAN (LABS / IMAGING / EKG):  Orders Placed This Encounter   Procedures    XR Chest Portable    XR PELVIS (1-2 VIEWS)    CT THORACIC SPINE WO CONTRAST    CT LUMBAR SPINE WO CONTRAST    Trauma Panel    TEG, Rapid Citrated    Urine Drug Screen    Urinalysis    Type and Screen    PATIENT STATUS (FROM ED OR OR/PROCEDURAL) Inpatient       MEDICATIONS ORDERED:  No orders of the defined types were placed in this encounter. DIAGNOSTIC RESULTS / EMERGENCY DEPARTMENT COURSE / MDM     LABS:  Results for orders placed or performed during the hospital encounter of 10/31/17   Trauma Panel   Result Value Ref Range    WBC 13.9 (H) 3.5 - 11.3 k/uL    RBC 4.79 3.95 - 5.11 m/uL    Hemoglobin 13.6 11.9 - 15.1 g/dL    Hematocrit 44.2 36.3 - 47.1 %    MCV 92.3 82.6 - 102.9 fL    MCH 28.4 25.2 - 33.5 pg    MCHC 30.8 29.9 - 34.7 g/dL    RDW 14.7 (H) 11.8 - 14.4 %    Platelets 568 623 - 361 k/uL    MPV 9.2 8.1 - 13.5 fL    Sodium 141 135 - 144 mmol/L    Potassium 4.9 3.7 - 5.3 mmol/L    Chloride 101 98 - 107 mmol/L    CO2 27 20 - 31 mmol/L    Anion Gap 13 9 - 17 mmol/L    Glucose 177 (H) 70 - 99 mg/dL    pH, Varun 7.290 (L) 7.320 - 7.420    pCO2, Varun 64.3 (H) 39 - 55    pO2, Varun 44.7 30 - 50    HCO3, Venous 29.9 24 - 30 mmol/L    Positive Base Excess, Varun 2.0 0.0 - 2.0 mmol/L    Negative Base Excess, Varun NOT REPORTED 0.0 - 2.0 mmol/L    O2 Sat, Varun 75.4 60.0 - 85.0 %    Total Hb NOT REPORTED 12.0 - 16.0 g/dl    Oxyhemoglobin NOT REPORTED 95.0 - 98.0 %    Carboxyhemoglobin 2.9 0 - 5.0 %    Methemoglobin NOT REPORTED 0.0 - 1.5 %    Pt Temp 37.0     pH, Varun, Temp Adj NOT REPORTED 7.320 - 7.420    pCO2, Varun, Temp Adj NOT REPORTED 39 - 55 mmHg    pO2, Varun, Temp Adj NOT REPORTED 30 - 50 mmHg    O2 Device/Flow/% NOT REPORTED     Respiratory Rate NOT REPORTED     Jez Test NOT REPORTED     Sample Site NOT REPORTED     Pt.  Position NOT REPORTED     Mode NOT REPORTED     Set Rate NOT REPORTED     Total Rate NOT REPORTED     VT NOT REPORTED     FIO2 UNKNOWN     Peep/Cpap NOT REPORTED     PSV NOT REPORTED     Text for Respiratory NOT REPORTED     NOTIFICATION NOT REPORTED     NOTIFICATION TIME intact. The mandible is intact. The mandibular condyles are normally situated. The nasal bones and maxillary nasal processes are intact. ORBITS:  There is a comminuted, mildly displaced right lamina papyracea fracture. There is a minimally displaced right orbital floor and inferior rim fracture, involving the infraorbital foramen. Curvilinear soft tissue attenuation in the medial aspect of the right orbit measures up to 3 mm in thickness and is consistent with extraconal hemorrhage. The globes appear intact. The extraocular muscles, optic nerve sheath complexes and lacrimal glands appear unremarkable. No retrobulbar hematoma or mass is seen. The orbital walls and rims are intact. SINUSES/MASTOIDS:  There are nondisplaced fractures of the anterior and lateral right maxillary sinus wall. There is a mildly displaced fracture of the medial right maxillary sinus wall. High attenuation fluid opacifying the right maxillary sinus is consistent with blood products. Mild opacification of the right ethmoid air cells. Remaining paranasal sinuses are clear. No fluid is seen in the mastoid air cells. SOFT TISSUES:  There is marked right periorbital soft tissue swelling. Areas of bifrontal intraparenchymal hemorrhage are noted. Please refer to separately dictated report of CT of the brain performed earlier the same day. Comminuted, mildly displaced right lamina papyracea fracture. Minimally displaced right orbital floor and inferior rim fracture, involving the infraorbital foramen. There is thin curvilinear extraconal hemorrhage in the medial right orbit, measuring up to 3 mm in thickness. Fractures of the right anterior, lateral, and medial right maxillary sinus wall.      Ct Cervical Spine Wo Contrast    Result Date: 10/31/2017  EXAMINATION: CT OF THE CERVICAL SPINE WITHOUT CONTRAST 10/31/2017 9:10 am TECHNIQUE: CT of the cervical spine was performed without the administration of intravenous contrast.

## 2017-10-31 NOTE — ED NOTES
Bed: 02  Expected date: 10/31/17  Expected time: 10:29 AM  Means of arrival: Ambulance  Comments:  [facility] transfer      Layla Charlton RN  10/31/17 8880

## 2017-10-31 NOTE — CONSULTS
bladder     patient incont. wears a brief    Pneumonia     PONV (postoperative nausea and vomiting)     Vitamin D deficiency        Past Surgical History:    Past Surgical History:   Procedure Laterality Date    AORTIC VALVE REPAIR N/A 4/11/2017    AORTIC VALVE REPAIR REPLACEMENT performed by Armida [facility]      ENDOSCOPY, COLON, DIAGNOSTIC  12/08/2016    EYE SURGERY      HYSTERECTOMY      LUMBAR LAMINECTOMY      TONSILLECTOMY         Social History:   Social History     Social History    Marital status: Single     Spouse name: N/A    Number of children: N/A    Years of education: N/A     Occupational History    Not on file. Social History Main Topics    Smoking status: Former Smoker     Quit date: 8/24/2016    Smokeless tobacco: Never Used    Alcohol use No    Drug use: No    Sexual activity: Not Currently     Other Topics Concern    Not on file     Social History Narrative    No narrative on file       Family History:   Family History   Problem Relation Age of Onset    Cancer Mother     Cancer Father        Allergies:  Codeine and Dye [iodides]    Home Medications:  Prior to Admission medications    Medication Sig Start Date End Date Taking?  Authorizing Provider   TRAZODONE HCL PO Take by mouth nightly as needed Pt unsure of dosage    Historical Provider, MD   Venlafaxine HCl (EFFEXOR PO) Take 2 tablets by mouth 2 times daily Pt unsure of dosage    Historical Provider, MD   metoprolol tartrate (LOPRESSOR) 25 MG tablet Take 1.5 tablets by mouth 2 times daily 4/13/17   Armida Moreira MD   Cholecalciferol (VITAMIN D3) 27955 UNITS CAPS Take 1 capsule by mouth every 7 days Mondays    Historical Provider, MD   clonazePAM Angeles Hernandez) 1 MG tablet Take 1 tablet by mouth daily Take three tablets daily 1/28/17   Gilford Rattler, DO   oxyCODONE-acetaminophen (PERCOCET) 5-325 MG per tablet Take 1 tablet by mouth 3 times daily as needed for Pain . Patient taking differently: Take 1 tablet by mouth 4 times daily as needed for Pain  . 1/28/17   Cortney Valdes DO   lansoprazole (PREVACID) 30 MG delayed release capsule Take 30 mg by mouth daily 11/10/16   Historical Provider, MD   metFORMIN (GLUCOPHAGE) 500 MG tablet Take 500 mg by mouth 2 times daily 12/7/16   Historical Provider, MD   aspirin EC 81 MG EC tablet Take 81 mg by mouth daily    Historical Provider, MD   mometasone-formoterol (DULERA) 200-5 MCG/ACT inhaler Inhale 2 puffs into the lungs every 12 hours    Historical Provider, MD   estradiol (ESTRACE) 0.5 MG tablet Take 0.5 mg by mouth daily    Historical Provider, MD   furosemide (LASIX) 40 MG tablet Take 40 mg by mouth daily    Historical Provider, MD   gabapentin (NEURONTIN) 400 MG capsule Take 400-800 mg by mouth 2 times daily     Historical Provider, MD   glimepiride (AMARYL) 1 MG tablet Take 2 mg by mouth every morning (before breakfast) Indications: Takes 2mg AM and 1mg PM     Historical Provider, MD   potassium chloride (MICRO-K) 10 MEQ CR capsule Take 10 mEq by mouth daily     Historical Provider, MD         REVIEW OF SYSTEMS:       CONSTITUTIONAL: negative for fevers, chills, fatigue and malaise   EYES: negative for double vision, blurred vision and photophobia    HEENT: negative for tinnitus, epistaxis and sore throat   RESPIRATORY: negative for cough, shortness of breath, wheezing   CARDIOVASCULAR: negative for chest pain, palpitations, syncope, edema   GASTROINTESTINAL: negative for nausea, vomiting, diarrhea, constipation, abdominal pain   GENITOURINARY: negative for incontinence   MUSCULOSKELETAL: negative for neck pain, positive for back pain   NEUROLOGICAL: positive for headaches and visual disturbance  negative for weakness, numbness and tingling   PSYCHIATRIC: negative for agitated     Review of systems otherwise negative.     PHYSICAL EXAM:       There were no vitals taken for this intact. Zygomatic arches are intact. SINUSES: As noted previously, there is a fracture with hemorrhage in the right maxillary sinus. A small amount of hemorrhage is also seen in the right ethmoid sinuses, adjacent to the fracture in the medial wall of the right orbit. Minimal opacification is noted in the dependent right mastoid air cells. SOFT TISSUES/SKULL:  No evidence of a skull fracture. 1.  Tiny focus of subarachnoid hemorrhage in the right frontal region and focal intraparenchymal contusions in bilateral frontal lobes. In addition, there is a 7 mm subdural hematoma along the anterior falx. No significant midline shift. 2.  Large amount of right periorbital edema with nondisplaced fractures noted at the medial wall of the right orbit and lateral wall of the right maxillary sinus. Right maxillary intrasinus hemorrhage is noted as well. There is also mild stranding in the right retrobulbar fat. These findings could be assessed in more detail with dedicated CT maxillofacial. Critical results were called by Dr. Karen Solorzano to [facility] on 10/31/2017 at 08:40. Revised results (additional finding of right frontal contusion) were discussed with the nurse practitioner, Karen Stephen, at 9:34 a.m. Ct Facial Bones Wo Contrast    Result Date: 10/31/2017  EXAMINATION: CT OF THE FACE WITHOUT CONTRAST  10/31/2017 9:10 am TECHNIQUE: CT of the face was performed without the administration of intravenous contrast. Multiplanar reformatted images are provided for review. Dose modulation, iterative reconstruction, and/or weight based adjustment of the mA/kV was utilized to reduce the radiation dose to as low as reasonably achievable. COMPARISON: Noncontrast CT head 10/31/2017. HISTORY: ORDERING SYSTEM PROVIDED HISTORY: fx on head CT FINDINGS: FACIAL BONES:  The maxilla, pterygoid plates and zygomatic arches are intact. The mandible is intact. The mandibular condyles are normally situated.   The nasal bones and

## 2017-10-31 NOTE — CONSULTS
Lazarus Bode Dr. Jabier Forester      CC/Reason for consult:  Right distal radius fracture    HPI:      The patient is a 78 y.o. female who presented to the ED this morning after sustaining a mechanical fall from home. Patient fell from standing height hitting the right side of her head on the way down as well as her right arm. Patient utilized her life alert and initially taken to [facility] [address]. Was transferred here for definitive care after imaging demonstrated a right SAH, IPH, and SDH. Was also found to have multiple facial fractures. X rays demonstrated a right distal radius fracture for which we were consulted. We were consulted after patient admitted to floor. On presentation she has GCS15, VSS, complaining of right wrist pain, head pain, eye pain, and back pain. Past Medical History:    Past Medical History:   Diagnosis Date    AAA (abdominal aortic aneurysm) ([facility] [address]     Pt denies having a history of AAA    Acid reflux     Anxiety and depression     Aortic stenosis     Arthritis     Blister of ankle, right 10/13/2016    blister broke open & draining, is on antibiotics    CAD (coronary artery disease)     COPD [facility]ch[facility] obstructive pulmonary disease) ([facility])     Diabetes mellitus ([facility]     Falls     Heart block     bifasicular    Hypokalemia     MDRO (multiple drug resistant organisms) resistance 10/17/2014    E. Coli urine    MRSA (methicillin resistant staph aureus) culture positive resolved 12/2016    2 negative nasal screens - 2016 (hx in urine 2014)    On home oxygen therapy     uses 2 liters at night    On home oxygen therapy     patient states 2 liters/nasal cannula continuous    Overactive bladder     patient incont.  wears a brief    Pneumonia     PONV (postoperative nausea and vomiting)     Vitamin D deficiency        Past Surgical History:    Past Surgical History:   Procedure Laterality Date    AORTIC VALVE REPAIR N/A 4/11/2017    AORTIC VALVE REPAIR REPLACEMENT performed by (before breakfast) Indications: Takes 2mg AM and 1mg PM     Historical Provider, MD   potassium chloride (MICRO-K) 10 MEQ CR capsule Take 10 mEq by mouth daily     Historical Provider, MD       Allergies:    Codeine and Dye [iodides]    Social History:   Social History     Social History    Marital status: Single     Spouse name: N/A    Number of children: N/A    Years of education: N/A     Social History Main Topics    Smoking status: Former Smoker     Quit date: 8/24/2016    Smokeless tobacco: Never Used    Alcohol use No    Drug use: No    Sexual activity: Not Currently     Other Topics Concern    None     Social History Narrative    None       Family History:  Family History   Problem Relation Age of Onset    Cancer Mother     Cancer Father        REVIEW OF SYSTEMS:   Review of Systems   Constitutional: Negative for fever and chills. HENT: Negative for congestion. Eyes: Negative for blurred vision and double vision. Respiratory: Negative for cough, shortness of breath and wheezing. Cardiovascular: Negative for chest pain and palpitations. Gastrointestinal: Negative for nausea. Negative for vomiting. Musculoskeletal: Positive for myalgias and joint pain. Skin: Negative for itching and rash. Neurological: Negative for dizziness, sensory change and headaches. Psychiatric/Behavioral: Negative for depression and suicidal ideas. PHYSICAL EXAM:  Blood pressure (!) 164/79, pulse 93, temperature 98.8 °F (37.1 °C), temperature source Oral, resp. rate 19, height 5' 8\" (1.727 m), weight 212 lb 11.9 oz (96.5 kg), SpO2 99 %. Gen: alert and oriented, NAD, cooperative  Head: normocephalic, large periorbital hematoma of right eye with significant soft tissue swelling  Neck: supple  Chest: Symmetric chest excursion, non labored breathing. Heart: Regular rate, no edema, distal pulses 2+     RUE: Splint taken down, skin examined. Skin intact. Large area of ecchymoses along dorsal mid forearm. TTP about distal radius dorsally. No TTP or crepitus to shoulder, arm, elbow, or hand. Compartments soft and compressible. Hand is warm and perfused. Axillary/MSC/Ulnar/Median/AIN/PIN motor intact. C4-T1 SILT. Radial pulse 2+ with BCR    LABS:  Recent Labs      10/31/17   1100   WBC  13.9*   HGB  13.6   HCT  44.2   PLT  265   INR  0.9   NA  141   K  4.9   BUN  20   CREATININE  0.97*   GLUCOSE  177*        Radiology:   XR RIGHT WRIST - demonstrates minimally displaced distal radius fracture    A/P: 78 y.o. female being seen for right distal radius fracture s/p ffsh    - No acute orthopedic surgical intervention indicated at this time  - NWB RUE  - Splint placed after hematoma block  - Pain control and medical management per primary  - Maintain splint at all times. Do not remove. Do NOT get wet. If splint falls off or becomes wet or soiled do not attempt to re-apply yourself. Come to the ED for new splint.  - Always look for signs of compartment syndrome: pain out of proportion to the injury, pain not controlled with pain medication, numbness in digits, changing of color of digits (paleness). If these signs occur return to ED immediately for reassessment. - Ice (20 minutes on and off 1 hour) and elevate (above heart) as needed for swelling/pain  - DVT ppx: per primary  - F/u Vit D  - Case was discussed with Dr. Susie Das  - Please page DO Ortho with any questions or concerns    Nicola Barahona DO  7:22 PM 10/31/2017    Procedure Note: Procedure: Risks, benefits, and alternatives have been discussed regarding closed reduction of the fracture with use of hematoma block. Patient agreed to move forward with the proposed procedure. After injection of 10mL of 1% lidocaine dorsally into the fracture hematoma we proceeded to manually reduce the fracture with appreciable motion indicating improved alignment. At this time post reduction films were obtained demonstrating improved interval alignment.   Splint was applied at this

## 2017-10-31 NOTE — H&P
TRAUMA HISTORY AND PHYSICAL EXAMINATION    PATIENT NAME: Melvina Marcelino  YOB: 1950  MEDICAL RECORD NO. [id number]   DATE: 10/31/2017  PRIMARY CARE PHYSICIAN: Vitaliy Parr MD  PATIENT EVALUATED AT THE REQUEST OF Trauma Priority    ACTIVATION   []Trauma Alert     [x] Trauma Priority     []Trauma Consult. IMPRESSION:     Patient Active Problem List   Diagnosis    Fall    Valvular heart disease    Type 2 diabetes mellitus without complication, without long-term current use of insulin ([facility]    Open wound of right ankle    COPD exacerbation (HCC)    Syncope and collapse    Chronic ulcer of right heel ([address]    Multifocal pneumonia    Aortic valve stenosis    Esophageal dilatation    Aspiration pneumonia of both lower lobes due to gastric secretions (HCC)    Falls frequently    Chronic respiratory failure (HCC)    Contusion of right knee    Pneumonia     · SDH, IPH, SAH  · Right distal radius fx  · Thoraco-Lumbar back pain to palpation  · multiple facial fxs including right orbital floor and inferior rim fx and right maxillary sinus fxs. MEDICAL DECISION MAKING AND PLAN:     · Consult NeuroSurgery and Orthopedic surgery, and OMF  · NPO  · Pain Control   · Await imaging results for further recommendations  · CT thoracic and lumbar imaging  · Admit to Neuro ICU  · Trauma panel and PFA    CONSULT SERVICES    [x] Neurosurgery     [x] Orthopedic Surgery    [] Cardiothoracic     [] Facial Trauma    [] Plastic Surgery (Burn)    [] Pediatric Surgery     [] Internal Medicine    [] Pulmonary Medicine    [] Other:      HISTORY:     SOURCE OF INFORMATION  Patient information was obtained from patient, EMS personnel and past medical records. History/Exam limitations: none. INJURY SUMMARY  · SDH, IPH, SAH  · Right distal radius fx  · Thoraco-Lumbar back pain to palpation  · multiple facial fxs including right orbital floor and inferior rim fx and right maxillary sinus fxs.     GENERAL DATA  Age includes Cancer in her father and mother. SOCIAL HISTORY  []   Information not available due to exam limitations documented above     reports that she quit smoking about 14 months ago. She has never used smokeless tobacco.   reports that she does not drink alcohol. reports that she does not use drugs. PERTINENT SYSTEMIC REVIEW:  []   Information not available due to exam limitations documented above    Pertinent items are noted in HPI. PHYSICAL EXAMINATION:   GLASCOW COMA SCALE  NEUROMUSCULAR BLOCKADE PRIOR TO ARRIVAL     [x]No        []Yes      Variable  Score   Variable  Score  Eye opening [x]Spontaneous 4 Verbal  [x]Oriented  5     []To voice  3   []Confused  4    []To pain  2   []Inapp words  3    []None  1   []Incomp words 2       []None  1   Motor   [x]Obeys  6    []Localizes pain 5    []Withdraws(pain) 4    []Flexion(pain) 3  []Extension(pain) 2    []None  1     GCS Total = 15     PHYSICAL EXAMINATION  VITAL SIGNS: There were no vitals filed for this visit.     General Appearance: alert and oriented to person, place and time, well developed and well- nourished, in no acute distress  Skin: warm and dry, no rash or erythema  Head: normocephalic and atraumatic  Eyes: pupils equal, round, and reactive to light, extraocular eye movements intact, conjunctivae normal, significant periorbital swelling and ecchymosis around the right eye, no visual acuity change   ENT: tympanic membrane, external ear and ear canal normal bilaterally, nose without deformity, but tenderness around nasal bridge, nasal mucosa and turbinates normal without polyps  Neck: supple and non-tender without mass, no thyromegaly or thyroid nodules, no midline cervical tenderness, in soft cervical collar on arrival  Pulmonary/Chest: clear to auscultation bilaterally- no wheezes, rales or rhonchi, normal air movement, no respiratory distress  Cardiovascular: normal rate, regular rhythm, normal S1 and S2, no murmurs, rubs, clicks, or

## 2017-10-31 NOTE — FLOWSHEET NOTE
10/31/17 1116   Encounter Summary   Services provided to: Patient   Place of [facility] No   Continue Visiting (10/31/17)   Complexity of Encounter Moderate   Length of Encounter 30 minutes   Spiritual Assessment Completed Yes   Routine   Type Initial       SPIRITUAL CARE DEPARTMENT - [facility]     Emergency/Trauma Note    PATIENT NAME: Avis Angeles    Shift date: 10/31/17  Shift day: Tuesday   Shift # 1    Room # 8552/5152-51   Name: Avis Angeles            Age: 78 y.o. Gender: female          Jainism: [address] of Evangelical: [facility] on [facility]  Trauma/Incident type: Adult Trauma Priority  Admit Date & Time: 10/31/2017 10:40 AM    PATIENT/EVENT DESCRIPTION:  Avis Angeles is a 78 y.o. female who arrived via ground ambulance as transfer from [facility]. Per report the patient fell from a standing position at home. She contacted EMS via the medical alert button she wears. She has been awake, alert and talking since arrival at ED. She is diagnosed with some brain bleeds and a broken wrist. She has a swollen right eye. Pt to be admitted to 0528/0528-01. SPIRITUAL ASSESSMENT/INTERVENTION:  I spoke with the patient and at her request I called her son, Suzy Dobson ([phone]), and a friend, Harris Bee ([phone]). I left voice mails for both to call the ED and/or come to the hospital. I provided calming presence for the patient and a prayer which seemed to help her to relax. PATIENT BELONGINGS:  No belongings noted    REGISTRATION STAFF NOTIFIED? Yes    WHAT IS YOUR SPIRITUAL CARE PLAN FOR THIS PATIENT?:  Clint Chung will continue attempts to contact the patient's son.  will meet the son and any other family or friends upon arrival at hospital and arrange consult with appropriate doctor(s).   will escort the family to see the patient and to continue to provide emotional and spiritual support as needed.     Electronically signed by Chaplain Abida, on 10/31/2017 at 3:00 PM.

## 2017-10-31 NOTE — PROGRESS NOTES
A small amount of hemorrhage is also seen in the right ethmoid sinuses, adjacent to the fracture in the medial wall of the right orbit. Minimal opacification is noted in the dependent right mastoid air cells. SOFT TISSUES/SKULL:  No evidence of a skull fracture. 1.  Tiny focus of subarachnoid hemorrhage in the right frontal region and focal intraparenchymal contusions in bilateral frontal lobes. In addition, there is a 7 mm subdural hematoma along the anterior falx. No significant midline shift. 2.  Large amount of right periorbital edema with nondisplaced fractures noted at the medial wall of the right orbit and lateral wall of the right maxillary sinus. Right maxillary intrasinus hemorrhage is noted as well. There is also mild stranding in the right retrobulbar fat. These findings could be assessed in more detail with dedicated CT maxillofacial. Critical results were called by Dr. Jaspreet Le to Alivn Chowdary on 10/31/2017 at 08:40. Revised results (additional finding of right frontal contusion) were discussed with the nurse practitioner, Iker Oshea, at 9:34 a.m. Ct Facial Bones Wo Contrast    Result Date: 10/31/2017  EXAMINATION: CT OF THE FACE WITHOUT CONTRAST  10/31/2017 9:10 am TECHNIQUE: CT of the face was performed without the administration of intravenous contrast. Multiplanar reformatted images are provided for review. Dose modulation, iterative reconstruction, and/or weight based adjustment of the mA/kV was utilized to reduce the radiation dose to as low as reasonably achievable. COMPARISON: Noncontrast CT head 10/31/2017. HISTORY: ORDERING SYSTEM PROVIDED HISTORY: fx on head CT FINDINGS: FACIAL BONES:  The maxilla, pterygoid plates and zygomatic arches are intact. The mandible is intact. The mandibular condyles are normally situated. The nasal bones and maxillary nasal processes are intact. ORBITS:  There is a comminuted, mildly displaced right lamina papyracea fracture.   There is a cervical spine dated 10/5/2016 HISTORY: ORDERING SYSTEM PROVIDED HISTORY: fall FINDINGS: BONES/ALIGNMENT: There is no evidence of an acute cervical spine fracture. There is normal alignment of the cervical spine. DEGENERATIVE CHANGES: There is moderate disc space narrowing and endplate spurring at O5-K0. Mild to moderate multilevel facet arthropathy is also noted. There is mild neural foraminal narrowing at C3-C4. There is mild to moderate left neural foraminal narrowing at C4-C5. SOFT TISSUES: No prevertebral soft tissue swelling. Carotid artery calcifications are noted. Emphysema is seen at the lung apices. 1.  No acute fracture or subluxation in the cervical spine. 2.  Mild to moderate degenerative changes. 3.  Centrilobular emphysema in the lung apices. Ct Thoracic Spine Wo Contrast    Result Date: 10/31/2017  EXAMINATION: CT OF THE THORACIC SPINE WITHOUT CONTRAST,  10/31/2017 11:15 am: TECHNIQUE: CT of the thoracic spine was performed without the administration of intravenous contrast. Multiplanar reformatted images are provided for review. Dose modulation, iterative reconstruction, and/or weight based adjustment of the mA/kV was utilized to reduce the radiation dose to as low as reasonably achievable. COMPARISON: CT chest dated 12/06/2016 HISTORY: ORDERING SYSTEM PROVIDED HISTORY: Trauma FINDINGS: BONES/ALIGNMENT: There is normal alignment of the spine. The vertebral body heights are maintained. No osseous destructive lesion is seen. DEGENERATIVE CHANGES:  There is mild multilevel disc space narrowing and endplate spurring. No gross spinal canal stenosis or bony neural foraminal narrowing of the thoracic spine. SOFT TISSUES: No paraspinal mass is seen. 1.  No acute fracture or malalignment in the thoracic spine. 2.  Minimal-to-mild degenerative changes.      Ct Lumbar Spine Wo Contrast    Result Date: 10/31/2017  EXAMINATION: CT OF THE LUMBAR SPINE WITHOUT CONTRAST  10/31/2017 TECHNIQUE: CT of the lumbar spine was performed without the administration of intravenous contrast. Multiplanar reformatted images are provided for review. Dose modulation, iterative reconstruction, and/or weight based adjustment of the mA/kV was utilized to reduce the radiation dose to as low as reasonably achievable. COMPARISON: Lumbar spine radiographs from 01/26/2017 HISTORY: ORDERING SYSTEM PROVIDED HISTORY: trauma TECHNOLOGIST PROVIDED HISTORY: Reason for exam:->trauma Fall. FINDINGS: BONES/ALIGNMENT:  Partial right-sided laminectomy L4-L5. There is normal alignment of the spine. The vertebral body heights are maintained. No osseous destructive lesion is seen. DEGENERATIVE CHANGES: There is multilevel degenerative disc disease most evident at L4-L5. There are hypertrophic degenerative changes of the facet joints and ligamentum flavum most evident at L4-5 and L5-S1 on the right. These findings contribute to mild spinal canal narrowing and bilateral neural foraminal stenosis at L4-L5. SOFT TISSUES/RETROPERITONEUM: No paraspinal mass is seen. There is moderate calcific atherosclerotic disease of the abdominal aorta, which is ectatic, but without aneurysm. Multilevel degenerative disc disease and hypertrophic degenerative changes of the lumbar spine, but no CT evidence for acute osseous abnormality. Xr Chest Portable    Result Date: 10/31/2017  EXAMINATION: SINGLE VIEW OF THE CHEST 10/31/2017 10:58 am COMPARISON: August 26, 2017 HISTORY: ORDERING SYSTEM PROVIDED HISTORY: trauma TECHNOLOGIST PROVIDED HISTORY: Reason for exam:->trauma Acute / initial encounter FINDINGS: No pneumothorax. No focal areas of consolidation. Prior sternotomy. Mild cardiomegaly. Mild pulmonary edema. Mild pulmonary edema.        Physical Exam:    GCS:  4 - Opens eyes on own   6 - Follows simple motor commands  5 - Alert and oriented       Left Right   Pupil size: 3 mm Unable to assess 2/2 periorbital swelling (eye would not open)   Pupil ORTHOPEDIC SURGERY  IP CONSULT TO ORAL SURGERY  IP CONSULT TO CARDIOLOGY    Procedures:  Splint placed by ortho to patient's right forearm for right distal radius fracture    Injuries:  No new injuries found on tertiary exam.    Patient Active Problem List   Diagnosis    Fall    Closed fracture of right distal radius    Subdural hemorrhage ([address]    Subarachnoid hemorrhage ([address]    Traumatic hemorrhage of cerebrum (HCC)    Chronic bilateral low back pain       Assessment/Plan:     Continue to monitor progress. Bowel regimen placed. Patient started on liquid diet.     PROPHYLAXIS:   Stress ulcer: None at this time   VTE: SCDs    DISPOSITION:   Continue to assess progress, consider transfer from ICU to step-down      Dickson Rahman [place]  Emergency Medicine Resident  Trauma/Surgery Service  10/31/2017 at 8:11 PM

## 2017-10-31 NOTE — ED PROVIDER NOTES
[address] [place] [address] ED  eMERGENCY dEPARTMENT eNCOUnter      Pt Name: Humberto Bustos  MRN: [id number]  [place] 1950  Date of evaluation: 10/31/2017  Provider: Santos Holt MD    [address] [place] [address]       Chief Complaint   Patient presents with   Cephus Celis    Eye Pain     Right    Wrist Injury     Right         HISTORY OF PRESENT ILLNESS  (Location/Symptom, Timing/Onset, Context/Setting, Quality, Duration, Modifying Factors, Severity.)   Humberto Bustos is a 78 y.o. female who presents to the emergency department For an injury to her face. She was in her home today and was walking down the hallway to the bathroom when she lost her balance and fell and hit the right side of her face causing bruising and swelling around her right eye. She also complains of some pain to the right wrist.  No other injuries and no other complaints. No LOC or vomiting. No neck pain. The pain is moderate. Nursing Notes were reviewed.     ALLERGIES     Codeine and Dye [iodides]    CURRENT MEDICATIONS       Previous Medications    ASPIRIN EC 81 MG EC TABLET    Take 81 mg by mouth daily    CHOLECALCIFEROL (VITAMIN D3) 94398 UNITS CAPS    Take 1 capsule by mouth every 7 days Mondays    CLONAZEPAM (KLONOPIN) 1 MG TABLET    Take 1 tablet by mouth daily Take three tablets daily    ESTRADIOL (ESTRACE) 0.5 MG TABLET    Take 0.5 mg by mouth daily    FUROSEMIDE (LASIX) 40 MG TABLET    Take 40 mg by mouth daily    GABAPENTIN (NEURONTIN) 400 MG CAPSULE    Take 400-800 mg by mouth 2 times daily     GLIMEPIRIDE (AMARYL) 1 MG TABLET    Take 2 mg by mouth every morning (before breakfast) Indications: Takes 2mg AM and 1mg PM     LANSOPRAZOLE (PREVACID) 30 MG DELAYED RELEASE CAPSULE    Take 30 mg by mouth daily    METFORMIN (GLUCOPHAGE) 500 MG TABLET    Take 500 mg by mouth 2 times daily    METOPROLOL TARTRATE (LOPRESSOR) 25 MG TABLET    Take 1.5 tablets by mouth 2 times daily    MOMETASONE-FORMOTEROL (DULERA) 200-5 MCG/ACT INHALER    Inhale 2 quit smoking about 14 months ago. She has never used smokeless tobacco. She reports that she does not drink alcohol or use drugs. REVIEW OF SYSTEMS    (2-9 systems for level 4, 10 or more for level 5)     Review of Systems   Constitutional: Negative for chills, fatigue and fever. HENT: Negative for congestion, ear discharge and facial swelling. Eyes: Negative for discharge and redness. Respiratory: Negative for cough and shortness of breath. Cardiovascular: Negative for chest pain. Gastrointestinal: Negative for abdominal pain, constipation, diarrhea and vomiting. Genitourinary: Negative for dysuria and hematuria. Musculoskeletal: Negative for arthralgias. Skin: Negative for color change and rash. Neurological: Negative for syncope, numbness and headaches. Hematological: Negative for adenopathy. Psychiatric/Behavioral: Negative for confusion. The patient is not nervous/anxious. Except as noted above the remainder of the review of systems was reviewed and negative. PHYSICAL EXAM    (up to 7 for level 4, 8 or more for level 5)     Vitals:    10/31/17 0807   BP: (!) 147/64   Pulse: 88   Resp: 20   Temp: 97.7 °F (36.5 °C)   TempSrc: Oral   SpO2: 95%   Weight: 204 lb (92.5 kg)   Height: 5' 8\" (1.727 m)       Physical Exam   Constitutional: She is oriented to person, place, and time. She appears well-developed and well-nourished. No distress. HENT:   Head: Normocephalic. She has bruising and swelling around the right eye. The globe is unaffected. She reports that the vision to that eye is normal.   Eyes: Right eye exhibits no discharge. Left eye exhibits no discharge. No scleral icterus. Neck: Neck supple. Cardiovascular: Normal rate and regular rhythm. Pulmonary/Chest: Effort normal and breath sounds normal. No stridor. No respiratory distress. She has no wheezes. She has no rales. Abdominal: Soft. She exhibits no distension. There is no tenderness.    Musculoskeletal: She exhibits tenderness. She exhibits no deformity. She has tenderness diffusely in the right wrist.  There is bruising on the dorsum of her right forearm as well. Palpation of her lower extremities including the hips and knees and ankle shows no tenderness. Left arm is nontender. Right shoulder and right elbow are nontender. Lymphadenopathy:     She has no cervical adenopathy. Neurological: She is alert and oriented to person, place, and time. Skin: Skin is warm and dry. No rash noted. She is not diaphoretic. No erythema. Psychiatric: She has a normal mood and affect. Her behavior is normal.   Vitals reviewed. DIAGNOSTIC RESULTS     EKG: All EKG's are interpreted by the Emergency Department Physician who either signs or Co-signs this chart in the absence of a cardiologist.    RADIOLOGY:   Non-plain film images such as CT, Ultrasound and MRI are read by the radiologist. Plain radiographic images are visualized and preliminarily interpreted by the emergency physician with the below findings:    Interpretation per the Radiologist below, if available at the time of this note:    Ct Head Wo Contrast    Result Date: 10/31/2017  1. Tiny focus of subarachnoid hemorrhage in the right frontal region and a 5 mm focal intraparenchymal contusion in the left frontal lobe. In addition, there is a 7 mm subdural hematoma along the anterior falx. No significant midline shift. 2.  Large amount of right periorbital edema with nondisplaced fractures noted at the medial wall of the right orbit and lateral wall of the right maxillary sinus. Right maxillary intrasinus hemorrhage is noted as well. There is also mild strandiness in the right retrobulbar fat. These findings could be assessed in more detail with dedicated CT maxillofacial. Critical results were called by Dr. Jaspreet Le to Alvin Chowdary on 10/31/2017 at 08:40.      LABS:  Labs Reviewed - No data to display    All other labs were within normal range No medications on file         (Please note that portions of this note were completed with a voice recognition program.  Efforts were made to edit the dictations but occasionally words are mis-transcribed.)    Enio Lombardi MD  Attending Emergency Physician             Enio Lombardi MD  10/31/17 8789

## 2017-10-31 NOTE — ED PROVIDER NOTES
informed of the following: The patient may have Pre-hypertension/Hypertension: The patient has been informed that they may have pre-hypertension or Hypertension based on a blood pressure reading in the emergency department. I recommend that the patient call the primary care provider listed on their discharge instructions or a physician of their choice this week to arrange follow up for further evaluation of possible pre-hypertension or Hypertension. (Please note that portions of this note were completed with a voice recognition program. Efforts were made to edit the dictations but occasionally words are mis-transcribed.  Whenever words are used in this note in any gender, they shall be construed as though they were used in the gender appropriate to the circumstances; and whenever words are used in this note in the singular or plural form, they shall be construed as though they were used in the form appropriate to the circumstances.)    MD Sariah Bhatia  Attending Emergency Medicine Physician              Solange Zuluaga MD  10/31/17 5731

## 2017-10-31 NOTE — FLOWSHEET NOTE
10/31/17 1116   Encounter Summary   Services provided to: Patient   Place of [address] [place] [address] No   Continue Visiting (10/31/17)   Complexity of Encounter Moderate   Length of Encounter 30 minutes   Spiritual Assessment Completed Yes   Routine   Type Initial       SPIRITUAL CARE DEPARTMENT - [facility]     Emergency/Trauma Note    PATIENT NAME: Valdo Wilkerson    Shift date: 10/31/17  Shift day: Tuesday   Shift # 1    Room # 02/02   Name: Valdo Wilkerson            Age: 78 y.o. Gender: female          Yazidism: [address] of Mandaeism: [employer] on [facility]  Trauma/Incident type: Adult Trauma Priority  Admit Date & Time: 10/31/2017 10:40 AM    PATIENT/EVENT DESCRIPTION:  Valdo Wilkerson is a 78 y.o. female who arrived via ground ambulance as transfer from [facility]. Per report the patient fell from a standing position at home. She contacted EMS via the medical alert button she wears. She has been awake, alert and talking since arrival at ED. She is diagnosed with some brain bleeds and a broken wrist. She has a swollen right eye. Pt to be admitted to 02/02. SPIRITUAL ASSESSMENT/INTERVENTION:  I spoke with the patient and at her request I called her son, Santiago Garay ([phone]), and a friend, Lester Hernández ([phone]). I left voice mails for both to call the ED and/or come to the hospital. I provided calming presence for the patient and a prayer which seemed to help her to relax. PATIENT BELONGINGS:  No belongings noted    REGISTRATION STAFF NOTIFIED? Yes    WHAT IS YOUR SPIRITUAL CARE PLAN FOR THIS PATIENT?:  Mars Liu will continue attempts to contact the patient's son.  will meet the son and any other family or friends upon arrival at hospital and arrange consult with appropriate doctor(s).  will escort the family to see the patient and to continue to provide emotional and spiritual support as needed.     Electronically signed by Chaplain Rosmery, on 10/31/2017 at 11:19 AM.

## 2017-11-01 ENCOUNTER — APPOINTMENT (OUTPATIENT)
Dept: CT IMAGING | Age: 67
DRG: 086 | End: 2017-11-01
Payer: MEDICARE

## 2017-11-01 LAB
ABSOLUTE EOS #: 0.12 K/UL (ref 0–0.44)
ABSOLUTE IMMATURE GRANULOCYTE: 0.05 K/UL (ref 0–0.3)
ABSOLUTE LYMPH #: 3.28 K/UL (ref 1.1–3.7)
ABSOLUTE MONO #: 0.66 K/UL (ref 0.1–1.2)
ANION GAP SERPL CALCULATED.3IONS-SCNC: 12 MMOL/L (ref 9–17)
BASOPHILS # BLD: 1 % (ref 0–2)
BASOPHILS ABSOLUTE: 0.07 K/UL (ref 0–0.2)
BUN BLDV-MCNC: 16 MG/DL (ref 8–23)
BUN/CREAT BLD: ABNORMAL (ref 9–20)
CALCIUM SERPL-MCNC: 8.6 MG/DL (ref 8.6–10.4)
CHLORIDE BLD-SCNC: 100 MMOL/L (ref 98–107)
CO2: 25 MMOL/L (ref 20–31)
CREAT SERPL-MCNC: 0.91 MG/DL (ref 0.5–0.9)
DIFFERENTIAL TYPE: ABNORMAL
EOSINOPHILS RELATIVE PERCENT: 1 % (ref 1–4)
GFR AFRICAN AMERICAN: >60 ML/MIN
GFR NON-AFRICAN AMERICAN: >60 ML/MIN
GFR SERPL CREATININE-BSD FRML MDRD: ABNORMAL ML/MIN/{1.73_M2}
GFR SERPL CREATININE-BSD FRML MDRD: ABNORMAL ML/MIN/{1.73_M2}
GLUCOSE BLD-MCNC: 144 MG/DL (ref 70–99)
GLUCOSE BLD-MCNC: 161 MG/DL (ref 65–105)
GLUCOSE BLD-MCNC: 172 MG/DL (ref 65–105)
GLUCOSE BLD-MCNC: 176 MG/DL (ref 65–105)
GLUCOSE BLD-MCNC: 204 MG/DL (ref 65–105)
HCT VFR BLD CALC: 38.3 % (ref 36.3–47.1)
HEMOGLOBIN: 11.8 G/DL (ref 11.9–15.1)
IMMATURE GRANULOCYTES: 1 %
LYMPHOCYTES # BLD: 33 % (ref 24–43)
MCH RBC QN AUTO: 28.7 PG (ref 25.2–33.5)
MCHC RBC AUTO-ENTMCNC: 30.8 G/DL (ref 29.9–34.7)
MCV RBC AUTO: 93.2 FL (ref 82.6–102.9)
MONOCYTES # BLD: 7 % (ref 3–12)
MRSA, DNA, NASAL: NORMAL
PDW BLD-RTO: 14.6 % (ref 11.8–14.4)
PLATELET # BLD: 208 K/UL (ref 138–453)
PLATELET ESTIMATE: ABNORMAL
PMV BLD AUTO: 9.2 FL (ref 8.1–13.5)
POTASSIUM SERPL-SCNC: 4.3 MMOL/L (ref 3.7–5.3)
RBC # BLD: 4.11 M/UL (ref 3.95–5.11)
RBC # BLD: ABNORMAL 10*6/UL
SEG NEUTROPHILS: 59 % (ref 36–65)
SEGMENTED NEUTROPHILS ABSOLUTE COUNT: 5.9 K/UL (ref 1.5–8.1)
SODIUM BLD-SCNC: 137 MMOL/L (ref 135–144)
SPECIMEN DESCRIPTION: NORMAL
WBC # BLD: 10.1 K/UL (ref 3.5–11.3)
WBC # BLD: ABNORMAL 10*3/UL

## 2017-11-01 PROCEDURE — 6370000000 HC RX 637 (ALT 250 FOR IP): Performed by: STUDENT IN AN ORGANIZED HEALTH CARE EDUCATION/TRAINING PROGRAM

## 2017-11-01 PROCEDURE — 6370000000 HC RX 637 (ALT 250 FOR IP): Performed by: EMERGENCY MEDICINE

## 2017-11-01 PROCEDURE — 94640 AIRWAY INHALATION TREATMENT: CPT

## 2017-11-01 PROCEDURE — 97166 OT EVAL MOD COMPLEX 45 MIN: CPT

## 2017-11-01 PROCEDURE — 70450 CT HEAD/BRAIN W/O DYE: CPT

## 2017-11-01 PROCEDURE — 2580000003 HC RX 258: Performed by: STUDENT IN AN ORGANIZED HEALTH CARE EDUCATION/TRAINING PROGRAM

## 2017-11-01 PROCEDURE — 80048 BASIC METABOLIC PNL TOTAL CA: CPT

## 2017-11-01 PROCEDURE — 92523 SPEECH SOUND LANG COMPREHEN: CPT

## 2017-11-01 PROCEDURE — G9165 ATTEN CURRENT STATUS: HCPCS

## 2017-11-01 PROCEDURE — 6360000002 HC RX W HCPCS: Performed by: STUDENT IN AN ORGANIZED HEALTH CARE EDUCATION/TRAINING PROGRAM

## 2017-11-01 PROCEDURE — G8988 SELF CARE GOAL STATUS: HCPCS

## 2017-11-01 PROCEDURE — 97535 SELF CARE MNGMENT TRAINING: CPT

## 2017-11-01 PROCEDURE — 97530 THERAPEUTIC ACTIVITIES: CPT

## 2017-11-01 PROCEDURE — 97162 PT EVAL MOD COMPLEX 30 MIN: CPT

## 2017-11-01 PROCEDURE — G9166 ATTEN GOAL STATUS: HCPCS

## 2017-11-01 PROCEDURE — 36415 COLL VENOUS BLD VENIPUNCTURE: CPT

## 2017-11-01 PROCEDURE — 1200000000 HC SEMI PRIVATE

## 2017-11-01 PROCEDURE — 85025 COMPLETE CBC W/AUTO DIFF WBC: CPT

## 2017-11-01 PROCEDURE — 97116 GAIT TRAINING THERAPY: CPT

## 2017-11-01 PROCEDURE — 82947 ASSAY GLUCOSE BLOOD QUANT: CPT

## 2017-11-01 PROCEDURE — G8980 MOBILITY D/C STATUS: HCPCS

## 2017-11-01 PROCEDURE — 6360000002 HC RX W HCPCS: Performed by: EMERGENCY MEDICINE

## 2017-11-01 PROCEDURE — 6360000002 HC RX W HCPCS

## 2017-11-01 PROCEDURE — G8979 MOBILITY GOAL STATUS: HCPCS

## 2017-11-01 PROCEDURE — G8987 SELF CARE CURRENT STATUS: HCPCS

## 2017-11-01 RX ORDER — OXYCODONE HYDROCHLORIDE 5 MG/1
5 TABLET ORAL EVERY 4 HOURS PRN
Status: DISCONTINUED | OUTPATIENT
Start: 2017-11-01 | End: 2017-11-02 | Stop reason: HOSPADM

## 2017-11-01 RX ORDER — OXYCODONE HYDROCHLORIDE 5 MG/1
10 TABLET ORAL EVERY 4 HOURS PRN
Status: DISCONTINUED | OUTPATIENT
Start: 2017-11-01 | End: 2017-11-02 | Stop reason: HOSPADM

## 2017-11-01 RX ORDER — ERGOCALCIFEROL 1.25 MG/1
50000 CAPSULE ORAL WEEKLY
Qty: 8 CAPSULE | Refills: 0 | Status: SHIPPED | OUTPATIENT
Start: 2017-11-01 | End: 2018-12-28 | Stop reason: ALTCHOICE

## 2017-11-01 RX ORDER — AMOXICILLIN AND CLAVULANATE POTASSIUM 500; 125 MG/1; MG/1
1 TABLET, FILM COATED ORAL EVERY 12 HOURS SCHEDULED
Status: DISCONTINUED | OUTPATIENT
Start: 2017-11-01 | End: 2017-11-02 | Stop reason: HOSPADM

## 2017-11-01 RX ADMIN — METOPROLOL TARTRATE 37.5 MG: 25 TABLET ORAL at 20:11

## 2017-11-01 RX ADMIN — POTASSIUM CHLORIDE 10 MEQ: 750 CAPSULE, EXTENDED RELEASE ORAL at 09:04

## 2017-11-01 RX ADMIN — INSULIN LISPRO 2 UNITS: 100 INJECTION, SOLUTION INTRAVENOUS; SUBCUTANEOUS at 14:52

## 2017-11-01 RX ADMIN — HYDROMORPHONE HYDROCHLORIDE 0.5 MG: 1 INJECTION, SOLUTION INTRAMUSCULAR; INTRAVENOUS; SUBCUTANEOUS at 02:27

## 2017-11-01 RX ADMIN — MOMETASONE FUROATE AND FORMOTEROL FUMARATE DIHYDRATE 2 PUFF: 200; 5 AEROSOL RESPIRATORY (INHALATION) at 08:26

## 2017-11-01 RX ADMIN — DOCUSATE SODIUM 100 MG: 100 CAPSULE ORAL at 20:11

## 2017-11-01 RX ADMIN — PANTOPRAZOLE SODIUM 40 MG: 40 TABLET, DELAYED RELEASE ORAL at 06:46

## 2017-11-01 RX ADMIN — Medication 10 ML: at 06:47

## 2017-11-01 RX ADMIN — AMOXICILLIN AND CLAVULANATE POTASSIUM 1 TABLET: 500; 125 TABLET, FILM COATED ORAL at 20:11

## 2017-11-01 RX ADMIN — INSULIN LISPRO 2 UNITS: 100 INJECTION, SOLUTION INTRAVENOUS; SUBCUTANEOUS at 17:23

## 2017-11-01 RX ADMIN — Medication 10 ML: at 09:01

## 2017-11-01 RX ADMIN — OXYCODONE HYDROCHLORIDE 10 MG: 5 TABLET ORAL at 15:24

## 2017-11-01 RX ADMIN — HYDROMORPHONE HYDROCHLORIDE: 1 INJECTION, SOLUTION INTRAMUSCULAR; INTRAVENOUS; SUBCUTANEOUS at 08:59

## 2017-11-01 RX ADMIN — Medication 10 ML: at 20:12

## 2017-11-01 RX ADMIN — HYDROMORPHONE HYDROCHLORIDE 0.5 MG: 1 INJECTION, SOLUTION INTRAMUSCULAR; INTRAVENOUS; SUBCUTANEOUS at 06:47

## 2017-11-01 RX ADMIN — METOPROLOL TARTRATE 37.5 MG: 25 TABLET ORAL at 09:04

## 2017-11-01 RX ADMIN — ESTRADIOL 0.5 MG: 1 TABLET ORAL at 09:03

## 2017-11-01 RX ADMIN — INSULIN LISPRO 2 UNITS: 100 INJECTION, SOLUTION INTRAVENOUS; SUBCUTANEOUS at 20:12

## 2017-11-01 RX ADMIN — OXYCODONE HYDROCHLORIDE 10 MG: 5 TABLET ORAL at 11:21

## 2017-11-01 RX ADMIN — OXYCODONE HYDROCHLORIDE 10 MG: 5 TABLET ORAL at 19:45

## 2017-11-01 RX ADMIN — DOCUSATE SODIUM 100 MG: 100 CAPSULE ORAL at 09:04

## 2017-11-01 RX ADMIN — INSULIN LISPRO 2 UNITS: 100 INJECTION, SOLUTION INTRAVENOUS; SUBCUTANEOUS at 09:04

## 2017-11-01 RX ADMIN — ENOXAPARIN SODIUM 30 MG: 30 INJECTION SUBCUTANEOUS at 20:11

## 2017-11-01 ASSESSMENT — PAIN SCALES - GENERAL
PAINLEVEL_OUTOF10: 4
PAINLEVEL_OUTOF10: 7
PAINLEVEL_OUTOF10: 7
PAINLEVEL_OUTOF10: 4
PAINLEVEL_OUTOF10: 8
PAINLEVEL_OUTOF10: 8
PAINLEVEL_OUTOF10: 7
PAINLEVEL_OUTOF10: 10
PAINLEVEL_OUTOF10: 8
PAINLEVEL_OUTOF10: 5
PAINLEVEL_OUTOF10: 5
PAINLEVEL_OUTOF10: 8
PAINLEVEL_OUTOF10: 10

## 2017-11-01 ASSESSMENT — PAIN DESCRIPTION - PROGRESSION

## 2017-11-01 ASSESSMENT — PAIN DESCRIPTION - DESCRIPTORS
DESCRIPTORS: ACHING
DESCRIPTORS: THROBBING

## 2017-11-01 ASSESSMENT — PAIN DESCRIPTION - PAIN TYPE
TYPE: ACUTE PAIN
TYPE: ACUTE PAIN;CHRONIC PAIN
TYPE: ACUTE PAIN

## 2017-11-01 ASSESSMENT — PAIN DESCRIPTION - ORIENTATION
ORIENTATION: RIGHT
ORIENTATION: RIGHT

## 2017-11-01 ASSESSMENT — PAIN DESCRIPTION - LOCATION
LOCATION: WRIST
LOCATION: ARM

## 2017-11-01 ASSESSMENT — PAIN DESCRIPTION - FREQUENCY: FREQUENCY: INTERMITTENT

## 2017-11-01 NOTE — PROGRESS NOTES
Neurosurgery MARIBEL/Resident    Daily Progress Note     11/1/2017  9:55 AM    Chart reviewed. No acute events overnight. Continues to have mild headache. Denies vision changes, nausea or vomiting. Vitals:    11/01/17 0500 11/01/17 0600 11/01/17 0702 11/01/17 0828   BP:   (!) 127/52    Pulse: 84 88 84    Resp: 15 15 12    Temp:       TempSrc:       SpO2: 95% 95% 94% 95%   Weight:       Height:           PE:   AOx3   CNII-XII intact   PERRL, EOMI   CVS: normal s2/s1  Resp: equal air entry bilaterally   Abd: soft, no rigidity  Motor and sensory intact all over, except RUE limited due to cast      Lab Results   Component Value Date    WBC 10.1 11/01/2017    HGB 11.8 (L) 11/01/2017    HCT 38.3 11/01/2017     11/01/2017    ALT 41 (H) 04/11/2017    AST 33 (H) 04/11/2017     11/01/2017    K 4.3 11/01/2017     11/01/2017    CREATININE 0.91 (H) 11/01/2017    BUN 16 11/01/2017    CO2 25 11/01/2017    INR 0.9 10/31/2017    LABA1C 6.6 (H) 12/29/2016       Radiology   CT head 11/1  Impression:     Stable examination with very small foci of parenchymal, subarachnoid,  subdural hemorrhage. A/P    78 y.o. female who presents with subdural hematoma along anterior falx, bifrontal hemorrhagic contusions, scattered SAH, & right orbital fxs s/p mechanical fall against wall. Patient also has right distal radial fx.       - No neurosurgical interventions needed  - Neuro checks per protocol  - No anticoagulants/antiplatelets/NSAIDs, please hold aspirin home med x2 weeks  - CTLS cleared  - Repeat CTH today remains stable  - okay to discharge from NS standpoint  - Will sign off, follow up as needed  - Additional recommendations may follow      Please contact neurosurgery with any changes in patients neurologic status. --  NO ACUTE NEUROSURGICAL INTERVENTION AT THIS TIME    NEUROSURGERY TO SIGN OFF     Please page Neurosurgery pager with any questions.     ---  [facility]  [address] [place] [address]  [phone]        Electronically signed by Kirstie Bumpers, NP on 11/1/2017 at 9:55 AM

## 2017-11-01 NOTE — PROGRESS NOTES
Speech Language Pathology  Facility/Department: [address]  Initial Speech/Language/Cognitive Assessment    NAME: Gloria Rojas  : 1950   MRN: [id number]  ADMISSION DATE: 10/31/2017  ADMITTING DIAGNOSIS: has Fall; Valvular heart disease; Type 2 diabetes mellitus without complication, without long-term current use of insulin ([address] Open wound of right ankle; COPD exacerbation ([address] Syncope and collapse; Chronic ulcer of right heel ([address] Multifocal pneumonia; Aortic valve stenosis; Esophageal dilatation; Aspiration pneumonia of both lower lobes due to gastric secretions ([address] Falls frequently; Chronic respiratory failure ([address] Contusion of right knee; Pneumonia; Closed fracture of right distal radius; Subdural hemorrhage ([address] Subarachnoid hemorrhage ([address] Traumatic hemorrhage of cerebrum ([address] and Chronic bilateral low back pain on her problem list.  DATE ONSET: 10/31/17    Date of Eval: 2017   Evaluating Therapist: Neal Barr    RECENT RESULTS  CT OF HEAD/MRI:   Stable examination with very small foci of parenchymal, subarachnoid,   subdural hemorrhage. Primary Complaint: Gloria Rojas is a 78 y.o. female who presents to the emergency department For an injury to her face. She was in her home today and was walking down the hallway to the bathroom when she lost her balance and fell and hit the right side of her face causing bruising and swelling around her right eye. She also complains of some pain to the right wrist.  No other injuries and no other complaints. No LOC or vomiting. No neck pain. The pain is moderate.     Pain:  Pain Assessment  Patient Currently in Pain: Denies  Pain Assessment: 0-10  Pain Level: 7  Pain Type: Acute pain, Chronic pain  Pain Location: Arm, Back (right arm and back)  Pain Orientation: Right  Pain Descriptors: Aching, Throbbing  Pain Frequency: Continuous  Clinical Progression: Not changed  Multiple Pain Sites: Yes    Assessment:     Pt presents with moderate cognitive deficits characterized by impaired immediate and delayed recall, word associations, thought flexibility, divergent thinking, word generation, and deductive reasoning. ST to follow up and provide treatment to address noted deficits. Education provided. ST recommends inpatient rehab at this time. Recommendations:  Requires SLP Intervention: Yes  Duration/Frequency of Treatment: 3-5 x/wk  D/C Recommendations: Inpatient rehabilitation       Plan:   Goals:  Short-term Goals  Goal 1: Pt. will complete word association task with 90% accuracy  Goal 2: Pt. will complete immediate and delayed recall task with 90% accuracy  Goal 3: Pt. will complete divergent thinking task with 90% accuracy  Goal 4: Pt. will complete deductive reasoning task with 90% accuracy  Goal 5: Pt. will complete thought flexibility task with 90% accuracy   Patient/family involved in developing goals and treatment plan: yes    Subjective:  General  Chart Reviewed: Yes  Patient assessed for rehabilitation services?: Yes  Family / Caregiver Present: No     Vision  Vision: Within Functional Limits  Hearing  Hearing: Within functional limits     Cognition:      Orientation  Overall Orientation Status: Within Functional Limits  Attention  Attention: Within Functional Limits  Memory  Memory: [facility]  Working Memory: Moderate (3/3, 1/5, 3/3)  Delayed recall: mild (2/3, 2/3)  Problem Solving  Problem Solving: Exceptions to [place] [place]  Verbal Reasoning Skills: Moderate (deductive reasonin/4)  Abstract Reasoning  Abstract Reasoning: Within Functional Limits  Divergent Thinking:  Moderate (2/4)  Safety/Judgement  Safety/Judgement: Exceptions to [place] [place]  Flexibility of Thought: Moderate (1/3)  Organization (moderate)  Word generation: 4 in 30 seconds  Word associations: 2/4    Prognosis:  Speech Therapy Prognosis  Prognosis: Fair  Individuals consulted  Consulted and agree with results and recommendations: Patient    Education:  Patient Education: yes  Patient Education Response: Verbalizes understanding    G-Code:  SLP G-Codes  Functional Limitations: Attention  Attention Current Status (): At least 40 percent but less than 60 percent impaired, limited or restricted  Attention Goal Status ():  At least 20 percent but less than 40 percent impaired, limited or restricted    Therapy Time:   Individual Concurrent Group Co-treatment   Time In 1030         Time Out 1040         Minutes 10               Completed by Sujata Donohue,  Clinician    Co-signed by Ce Shepherd M.S., CCC/SLP    Sujata Donohue  11/1/2017 12:42 PM

## 2017-11-01 NOTE — PROGRESS NOTES
Orthopedic Progress Note    Patient:  Agustin Culver  YOB: 1950     78 y.o. female    Subjective:  Patient seen and examined  Pt having pain in her right wrist, head, and eye, does not complain of pain anywhere else  Pain controlled with medication  Denies fever, HA, CP, SOB, N/V, dysuria    Vitals reviewed, afebrile    Objective:   Vitals:    11/01/17 0103   BP: (!) 149/76   Pulse: 93   Resp: 17   Temp: 97.8 °F (36.6 °C)   SpO2: 98%     Gen: NAD, cooperative  Cardiovascular: Regular rate, no dependent edema, distal pulses 2+  Respiratory: Chest symmetric, no accessory muscle use, normal respirations, no audible wheezes  RUE: Splint in place, c/d/i, pt denies dysesthesias to the fingers. Compartments soft and compressible under the splint. Radial/median/ulnar/AIN/PIN motor complex intact grossly. Superficial radial/median/ulnar sensory nerves SILT grossly. Fingers warm and well perfused w/ BCR. Recent Labs      10/31/17   1100   WBC  13.9*   HGB  13.6   HCT  44.2   PLT  265   INR  0.9   NA  141   K  4.9   BUN  20   CREATININE  0.97*   GLUCOSE  177*      Meds:   See rec for complete list    Impression/plan: 78 y.o.  female being seen for right distal radius fracture s/p ffsh    -NWB RUE, do not lift, push, or pull anything  -Keep dressing clean and dry. Maintain splint, do not get wet, do not remove. -If splint were to fall off or become saturated, do not attempt to put back on or dry out. Return to ED/call the ortho team immediately for reapplication.  -Always look for signs of compartment syndrome: pain out of proportion to the injury, pain not controlled with pain medication, numbness in digits, changing of color of digits (paleness).  If these signs occur return to ED immediately for reassessment.   -Always work on finger motion (to non-injured fingers) while in splint to decrease swelling.  -It is important to ice (20 min on and 1 hour off) and elevate at heart level to decrease

## 2017-11-01 NOTE — PROGRESS NOTES
)  Position Activity Restriction  Other position/activity restrictions: Up w/ assistance; Per ortho phone call, pt okay to ambulate using platform walker  Vision/Hearing  Vision: Impaired  Vision Exceptions: Wears glasses at all times  Hearing: Within functional limits     Subjective  General  Chart Reviewed: Yes  Patient assessed for rehabilitation services?: Yes  Family / Caregiver Present: No  Follows Commands: Within Functional Limits  General Comment  Comments: Per RN, pt given okay to mobilize  Pain Screening  Patient Currently in Pain: Yes  Pain Assessment  Pain Assessment: 0-10  Pain Level: 5  Pain Type: Acute pain;Chronic pain (Acute (R wirst, headache); Chronic (back))  Vital Signs  Patient Currently in Pain: Yes       Orientation  Orientation  Overall Orientation Status: Within Functional Limits    Social/Functional History  Social/Functional History  Lives With: Alone (with great anam dog Simultaneous filing. User may not have seen previous data.)  Type of Home: House (Simultaneous filing. User may not have seen previous data.)  Home Layout: Multi-level, Able to Live on Main level with bedroom/bathroom (1.5 level Simultaneous filing. User may not have seen previous data.)  Home Access: Ramped entrance (Simultaneous filing. User may not have seen previous data.)  Bathroom Shower/Tub: Tub only, Walk-in shower (Simultaneous filing. User may not have seen previous data.)  Bathroom Toilet: Standard  Bathroom Equipment: Shower chair (Simultaneous filing. User may not have seen previous data.)  Bathroom Accessibility: Accessible  Home Equipment: Rolling walker, Oxygen (2L O2 at night Simultaneous filing. User may not have seen previous data.)  Receives Help From: Family  ADL Assistance: Independent (Simultaneous filing. User may not have seen previous data.)  Homemaking Assistance: Needs assistance (\"Maid\" performs cleaning and shopping Simultaneous filing.  User may not have seen previous data.)  Homemaking Responsibilities: Yes (Simultaneous filing. User may not have seen previous data.)  Ambulation Assistance: Independent (With RW Simultaneous filing. User may not have seen previous data.)  Transfer Assistance: Independent (Simultaneous filing. User may not have seen previous data.)  Active : No (Simultaneous filing. User may not have seen previous data.)  Mode of Transportation: Family (Son and privately hired aid)  Occupation: Retired (Simultaneous filing. User may not have seen previous data.)  Leisure & Hobbies: dog, tv  Additional Comments: Pt states aid comes in 1x week to bring in groceries and clean home (Simultaneous filing. User may not have seen previous data.)  Objective     Observation/Palpation  Posture: Fair  Observation: Forward head and shoulders in seated and standing positions    AROM RLE (degrees)  RLE AROM: WFL  AROM LLE (degrees)  LLE AROM : WFL  AROM RUE (degrees)  RUE AROM : WFL  AROM LUE (degrees)  LUE AROM : WFL  Strength RLE  Strength RLE: WFL  Comment: grossly 4+/5  Strength LLE  Strength LLE: WFL  Comment: grossly 4+/5  Strength RUE  Comment: Unable to assess due to WB status  Strength LUE  Strength LUE: WFL (Assessed by OT)  Motor Control  Gross Motor?: WFL  Sensation  Overall Sensation Status: WFL (Patient denies weakness or tingling in the extremities)  Bed mobility  Rolling to Left: Minimal assistance  Supine to Sit: Minimal assistance  Scooting: Minimal assistance  Transfers  Sit to Stand: Contact guard assistance  Stand to sit: Contact guard assistance  Ambulation  Ambulation?: Yes  More Ambulation?: Yes  Ambulation 1  Surface: level tile  Device: Rolling Walker  Assistance: Contact guard assistance  Distance: 10ft  Ambulation 2  Surface - 2: level tile  Device 2: Large Base Quad Cane  Assistance 2: Contact guard assistance  Distance: 30ft  Comments: Tried demonstrating modified-2 point gait with patient. Patient unable to complete at this time.  Patient able to ambulate with quad cane by moving quad cane forward with LUE, then RLE to level of cane, then LLE to level of cane     Balance  Posture: Fair  Sitting - Static: Fair;+  Sitting - Dynamic: Fair  Standing - Static: Fair;+  Standing - Dynamic: Fair        Assessment   Body structures, Functions, Activity limitations: Decreased functional mobility ; Decreased ROM; Decreased strength;Decreased endurance;Decreased coordination  Assessment: Patient currently NWB RUE. Patient was Aimee for bed mobility, SBA for functional transfers, and CGA 30ft using quad cane and 10ft using RW. Patient will benefit from acute PT at this time in order to increase strength and exercise tolerance as well as decrease the assistance needed for functional transfers (Assessment completed w/ OT)  Prognosis: Good  Decision Making: Medium Complexity  Activity Tolerance  Activity Tolerance: Patient limited by fatigue;Patient limited by pain     Discharge Recommendations:  [address] (If patient denies, patient will requires platform walker, home PT, and home nursing aid)      Plan   Plan  Times per week: 6-7x/wk  Current Treatment Recommendations: Strengthening, ROM, Balance Training, Functional Mobility Training, Transfer Training, Gait Training, Endurance Training, Neuromuscular Re-education  Plan Comment: ambulation w/ platform walker  Safety Devices  Type of devices: Call light within reach, Gait belt, Left in chair    G-Code  PT G-Codes  Functional Assessment Tool Used: [place] tool  Score: 17  Functional Limitation: Mobility: Walking and moving around  Mobility: Walking and Moving Around Goal Status (): At least 20 percent but less than 40 percent impaired, limited or restricted  Mobility: Walking and Moving Around Discharge Status ():  At least 1 percent but less than 20 percent impaired, limited or restricted    Goals  Short term goals  Time Frame for Short term goals: 14 visits  Short term goal 1: Patient will complete bed mobility tasks while maintaining WB status with CGA  Short term goal 2: Patient will complete functional transfers w/ [place] assist  Short term goal 3: Patient will ambulate 75 ft using platform walker w/ SBA  Short term goal 4: Patient will complete standing balance activites w/ good minus posture for 2'       Therapy Time   Individual Concurrent Group Co-treatment   Time In 0940         Time Out 1021         Minutes 39                 CHRIS Matamoros  Evaluation/treatment performed by Student PT under the supervision of co-signing PT who agrees with all evaluation/treatment and documentation.

## 2017-11-01 NOTE — PROGRESS NOTES
Contrast    Result Date: 11/1/2017  EXAMINATION: CT OF THE HEAD WITHOUT CONTRAST - STROKE ALERT  11/1/2017 4:46 am TECHNIQUE: CT of the head was performed without the administration of intravenous contrast. Dose modulation, iterative reconstruction, and/or weight based adjustment of the mA/kV was utilized to reduce the radiation dose to as low as reasonably achievable. COMPARISON: 10/31/2017 HISTORY: ORDERING SYSTEM PROVIDED HISTORY: [facility], [facility] TECHNOLOGIST PROVIDED HISTORY: Has a \"code stroke\" or \"stroke alert\" been called? ->No FINDINGS: BRAIN/VENTRICLES: Again seen are very small frontal lobe parenchymal contusions and trace subarachnoid hemorrhage. Also again seen is a subdural hematoma along the left side of falx. No new areas of hemorrhage or edema. The posterior fossa is normal. ORBITS: Right periorbital edema again noted. Right medial orbital wall and orbital floor fractures. SINUSES: Right maxillary and ethmoid sinus mucosal disease again noted. SOFT TISSUES/SKULL:  Skull base and calvarium are intact. Stable examination with very small foci of parenchymal, subarachnoid, subdural hemorrhage. Ct Head Wo Contrast    Result Date: 10/31/2017  EXAMINATION: CT OF THE HEAD WITHOUT CONTRAST - STROKE ALERT  10/31/2017 8:24 am TECHNIQUE: CT of the head was performed without the administration of intravenous contrast. Dose modulation, iterative reconstruction, and/or weight based adjustment of the mA/kV was utilized to reduce the radiation dose to as low as reasonably achievable. COMPARISON: CT head dated 1/26/2017 HISTORY: ORDERING SYSTEM PROVIDED HISTORY: head injury, right periorbital swelling FINDINGS: BRAIN/VENTRICLES: There is a tiny, 5 mm, intraparenchymal contusion in the left frontal lobe. There is also a tiny focus of intraparenchymal contusion in the right frontal lobe, measuring 0.6 x 1.3 cm. In addition, there is a tiny focus of subarachnoid hemorrhage in the right frontal region.   A subdural hematoma is seen along the anterior falx, measuring 7 mm in thickness. No significant midline shift. Gray-white matter differentiation is preserved. There is no hydrocephalus. ORBITS: There is prominent periorbital edema on the right. The globe appears to be grossly intact. However, there is mild stranding in the retrobulbar fat. There is a nondisplaced fracture in the medial wall of the right orbit. In addition, there is hemorrhage in the right maxillary sinus with a suspected nondisplaced fracture in the lateral wall of the right maxillary sinus. There is a small amount of soft tissue gas adjacent to the lateral wall of the right maxillary sinus. Pterygoid plates appear to be grossly intact. Zygomatic arches are intact. SINUSES: As noted previously, there is a fracture with hemorrhage in the right maxillary sinus. A small amount of hemorrhage is also seen in the right ethmoid sinuses, adjacent to the fracture in the medial wall of the right orbit. Minimal opacification is noted in the dependent right mastoid air cells. SOFT TISSUES/SKULL:  No evidence of a skull fracture. 1.  Tiny focus of subarachnoid hemorrhage in the right frontal region and focal intraparenchymal contusions in bilateral frontal lobes. In addition, there is a 7 mm subdural hematoma along the anterior falx. No significant midline shift. 2.  Large amount of right periorbital edema with nondisplaced fractures noted at the medial wall of the right orbit and lateral wall of the right maxillary sinus. Right maxillary intrasinus hemorrhage is noted as well. There is also mild stranding in the right retrobulbar fat. These findings could be assessed in more detail with dedicated CT maxillofacial. Critical results were called by Dr. Anthony Ryan to Jaylin Hogan on 10/31/2017 at 08:40. Revised results (additional finding of right frontal contusion) were discussed with the nurse practitioner, Klaus Calvo, at 9:34 a.m.      Ct Facial Bones Wo Contrast    Result Date: 10/31/2017  EXAMINATION: CT OF THE FACE WITHOUT CONTRAST  10/31/2017 9:10 am TECHNIQUE: CT of the face was performed without the administration of intravenous contrast. Multiplanar reformatted images are provided for review. Dose modulation, iterative reconstruction, and/or weight based adjustment of the mA/kV was utilized to reduce the radiation dose to as low as reasonably achievable. COMPARISON: Noncontrast CT head 10/31/2017. HISTORY: ORDERING SYSTEM PROVIDED HISTORY: fx on head CT FINDINGS: FACIAL BONES:  The maxilla, pterygoid plates and zygomatic arches are intact. The mandible is intact. The mandibular condyles are normally situated. The nasal bones and maxillary nasal processes are intact. ORBITS:  There is a comminuted, mildly displaced right lamina papyracea fracture. There is a minimally displaced right orbital floor and inferior rim fracture, involving the infraorbital foramen. Curvilinear soft tissue attenuation in the medial aspect of the right orbit measures up to 3 mm in thickness and is consistent with extraconal hemorrhage. The globes appear intact. The extraocular muscles, optic nerve sheath complexes and lacrimal glands appear unremarkable. No retrobulbar hematoma or mass is seen. The orbital walls and rims are intact. SINUSES/MASTOIDS:  There are nondisplaced fractures of the anterior and lateral right maxillary sinus wall. There is a mildly displaced fracture of the medial right maxillary sinus wall. High attenuation fluid opacifying the right maxillary sinus is consistent with blood products. Mild opacification of the right ethmoid air cells. Remaining paranasal sinuses are clear. No fluid is seen in the mastoid air cells. SOFT TISSUES:  There is marked right periorbital soft tissue swelling. Areas of bifrontal intraparenchymal hemorrhage are noted. Please refer to separately dictated report of CT of the brain performed earlier the same day. Comminuted, mildly displaced right lamina papyracea fracture. Minimally displaced right orbital floor and inferior rim fracture, involving the infraorbital foramen. There is thin curvilinear extraconal hemorrhage in the medial right orbit, measuring up to 3 mm in thickness. Fractures of the right anterior, lateral, and medial right maxillary sinus wall. Ct Cervical Spine Wo Contrast    Result Date: 10/31/2017  EXAMINATION: CT OF THE CERVICAL SPINE WITHOUT CONTRAST 10/31/2017 9:10 am TECHNIQUE: CT of the cervical spine was performed without the administration of intravenous contrast. Multiplanar reformatted images are provided for review. Dose modulation, iterative reconstruction, and/or weight based adjustment of the mA/kV was utilized to reduce the radiation dose to as low as reasonably achievable. COMPARISON: CT cervical spine dated 10/5/2016 HISTORY: ORDERING SYSTEM PROVIDED HISTORY: fall FINDINGS: BONES/ALIGNMENT: There is no evidence of an acute cervical spine fracture. There is normal alignment of the cervical spine. DEGENERATIVE CHANGES: There is moderate disc space narrowing and endplate spurring at J6-B0. Mild to moderate multilevel facet arthropathy is also noted. There is mild neural foraminal narrowing at C3-C4. There is mild to moderate left neural foraminal narrowing at C4-C5. SOFT TISSUES: No prevertebral soft tissue swelling. Carotid artery calcifications are noted. Emphysema is seen at the lung apices. 1.  No acute fracture or subluxation in the cervical spine. 2.  Mild to moderate degenerative changes. 3.  Centrilobular emphysema in the lung apices. Ct Thoracic Spine Wo Contrast    Result Date: 10/31/2017  EXAMINATION: CT OF THE THORACIC SPINE WITHOUT CONTRAST,  10/31/2017 11:15 am: TECHNIQUE: CT of the thoracic spine was performed without the administration of intravenous contrast. Multiplanar reformatted images are provided for review.  Dose modulation, iterative reconstruction, and/or weight based adjustment of the mA/kV was utilized to reduce the radiation dose to as low as reasonably achievable. COMPARISON: CT chest dated 12/06/2016 HISTORY: ORDERING SYSTEM PROVIDED HISTORY: Trauma FINDINGS: BONES/ALIGNMENT: There is normal alignment of the spine. The vertebral body heights are maintained. No osseous destructive lesion is seen. DEGENERATIVE CHANGES:  There is mild multilevel disc space narrowing and endplate spurring. No gross spinal canal stenosis or bony neural foraminal narrowing of the thoracic spine. SOFT TISSUES: No paraspinal mass is seen. 1.  No acute fracture or malalignment in the thoracic spine. 2.  Minimal-to-mild degenerative changes. Ct Lumbar Spine Wo Contrast    Result Date: 10/31/2017  EXAMINATION: CT OF THE LUMBAR SPINE WITHOUT CONTRAST  10/31/2017 TECHNIQUE: CT of the lumbar spine was performed without the administration of intravenous contrast. Multiplanar reformatted images are provided for review. Dose modulation, iterative reconstruction, and/or weight based adjustment of the mA/kV was utilized to reduce the radiation dose to as low as reasonably achievable. COMPARISON: Lumbar spine radiographs from 01/26/2017 HISTORY: ORDERING SYSTEM PROVIDED HISTORY: trauma TECHNOLOGIST PROVIDED HISTORY: Reason for exam:->trauma Fall. FINDINGS: BONES/ALIGNMENT:  Partial right-sided laminectomy L4-L5. There is normal alignment of the spine. The vertebral body heights are maintained. No osseous destructive lesion is seen. DEGENERATIVE CHANGES: There is multilevel degenerative disc disease most evident at L4-L5. There are hypertrophic degenerative changes of the facet joints and ligamentum flavum most evident at L4-5 and L5-S1 on the right. These findings contribute to mild spinal canal narrowing and bilateral neural foraminal stenosis at L4-L5. SOFT TISSUES/RETROPERITONEUM: No paraspinal mass is seen.   There is moderate calcific atherosclerotic disease of

## 2017-11-01 NOTE — PROGRESS NOTES
Occupational Therapy   Occupational Therapy Initial Assessment  Date: 2017   Patient Name: Jesus Thomas  MRN: [id number]     : 1950    Patient Diagnosis(es): The encounter diagnosis was Traumatic subdural hematoma without loss of consciousness, initial encounter [facility]). has a past medical history of AAA (abdominal aortic aneurysm) ([facility] [address] Acid reflux; Anxiety and depression; Aortic stenosis; Arthritis; Blister of ankle, right; CAD (coronary artery disease); COPD (chronic obstructive pulmonary disease) ([facility] Diabetes mellitus ([facility] Falls; Heart block; Hypokalemia; MDRO (multiple drug resistant organisms) resistance; MRSA (methicillin resistant staph aureus) culture positive; On home oxygen therapy; On home oxygen therapy; Overactive bladder; Pneumonia; PONV (postoperative nausea and vomiting); and Vitamin D deficiency. has a past surgical history that includes back surgery; eye surgery; Cholecystectomy; Appendectomy; Hysterectomy; Tonsillectomy; lumbar laminectomy; Endoscopy, colon, diagnostic (2016); and aortic valve repair (N/A, 2017). Restrictions  Restrictions/Precautions  Restrictions/Precautions: Weight Bearing, General Precautions, Contact Precautions (NWB RUE (no lifting, pushing, or pulling anything))  Required Braces or Orthoses?: No (Patient has RUE splint in place)  Upper Extremity Weight Bearing Restrictions  Right Upper Extremity Weight Bearing: Non Weight Bearing (RUE )  Position Activity Restriction  Other position/activity restrictions: Up w/ assistance    Subjective   General  Patient assessed for rehabilitation services?: Yes  Family / Caregiver Present: No  Diagnosis: Fall, R wrist fx  Pain Assessment  Patient Currently in Pain: Yes  Pain Assessment: 0-10  Pain Level: 5  Pain Type: Acute pain, Chronic pain (Acute (R wirst, headache); Chronic (back))  Pain Location: Wrist  Pain Orientation: Right  Pain Descriptors:  Throbbing  Pain Frequency: Impaired  Vision Exceptions: Wears glasses at all times  Hearing: Within functional limits    Orientation  Overall Orientation Status: Within Functional Limits  Observation/Palpation  Posture: Fair  Observation: Forward head and shoulders in seated and standing positions  Balance  Sitting Balance: Modified independent   Standing Balance: Contact guard assistance  Standing Balance  Time: 10 min  Activity: Pt stood bedside, and func mob around floor  Sit to stand: Contact guard assistance  Stand to sit: Contact guard assistance  Functional Mobility  Functional - Mobility Device: Cane (Quad)  Activity: Other  Assist Level: Minimal assistance  Functional Mobility Comments: Pt unsteady, demo's fall risk, pt educated on proper walking technique with quad cane which improved balance  ADL  Feeding: Setup;Supervision  Grooming: Setup;Stand by assistance  UE Bathing: Setup; Moderate assistance  LE Bathing: Setup; Moderate assistance  UE Dressing: Setup;Maximum assistance  LE Dressing: Setup; Moderate assistance  Toileting: Setup; Moderate assistance  Tone RUE  RUE Tone: Normotonic  Tone LUE  LUE Tone: Normotonic  Coordination  Movements Are Fluid And Coordinated: No  Coordination and Movement description: Fine motor impairments;Gross motor impairments;Right UE     Bed mobility  Rolling to Left: Minimal assistance  Supine to Sit: Minimal assistance  Scooting: Minimal assistance  Comment: Pt left in chair upon exit.   Transfers  Sit to stand: Contact guard assistance  Stand to sit: Contact guard assistance     Cognition  Overall Cognitive Status: Exceptions  Memory: Decreased short term memory;Decreased long term memory  Insights: Decreased awareness of deficits     Sensation  Overall Sensation Status: WFL (Patient denies weakness or tingling in the extremities)      LUE AROM (degrees)  LUE AROM : WFL  RUE AROM (degrees)  RUE AROM : Exceptions  RUE General AROM: Cast over forearm d/t wrist fx, digits WFL's  LUE Strength  Gross LUE Strength: WFL  L Shoulder Flex: 5/5  L Elbow Flex: 5/5  L Elbow Ext: 5/5  L Hand Grasp: 5/5  L Hand Release: 5/5  RUE Strength  Gross RUE Strength: Exceptions to [facility]  R Shoulder Flex: 5/5  R Elbow Flex: NT  R Elbow Ext: NT  R Hand Grasp: NT  R Hand Release: NT  RUE Strength Comment: Wrist limited d/t fx, cast limited testing of hand/elbow        Assessment   Performance deficits / Impairments: Decreased functional mobility ; Decreased safe awareness;Decreased balance;Decreased ADL status; Decreased high-level IADLs;Decreased endurance  Prognosis: Fair  Decision Making: Medium Complexity  Patient Education: Safety awareness, OTPOC, discharge rec  Discharge Recommendations: Subacute/Skilled Nursing Facility;Continue to assess pending progress (Pt likely to refuse SNF-if pt refuses SNF placement then Home with aid/OT/PT with a quad cane would be a necessity)  REQUIRES OT FOLLOW UP: Yes  Activity Tolerance  Activity Tolerance: Patient limited by pain; Patient limited by fatigue  Activity Tolerance: NWB-RUE  Safety Devices  Safety Devices in place: Yes  Type of devices: Nurse notified;Gait belt;Left in chair;Call light within reach; All fall risk precautions in place (SLP in room)  Restraints  Initially in place: No  OT Equipment Recommendations  Equipment Needed: Yes  Mobility Devices: Canes  Cane: Small Base Quad Cane        Discharge Recommendations:  [address], Continue to assess pending progress (Pt likely to refuse SNF-if pt refuses SNF placement then Home with aid/OT/PT with a quad cane would be a necessity)     Plan   Plan  Times per week: 4-5x  Current Treatment Recommendations: Balance Training, Functional Mobility Training, Safety Education & Training, Pain Management, Equipment Evaluation, Education, & procurement, Self-Care / ADL, Patient/Caregiver Education & Training, Home Management Training, Endurance Training    G-Code  OT G-codes  Functional Assessment Tool Used: [employer]

## 2017-11-01 NOTE — PLAN OF CARE
Problem: Falls - Risk of  Goal: Absence of falls  Outcome: Ongoing  Maintain bed alarm and education with call light.

## 2017-11-01 NOTE — PROGRESS NOTES
Patient transferred to Room 505_ from Room 528_ . Oriented patient to to room and call light. Plan of care reviewed with patient. Call light within reach. Will continue to monitor.

## 2017-11-02 VITALS
WEIGHT: 212.74 LBS | BODY MASS INDEX: 32.24 KG/M2 | OXYGEN SATURATION: 96 % | TEMPERATURE: 98.5 F | SYSTOLIC BLOOD PRESSURE: 136 MMHG | HEIGHT: 68 IN | DIASTOLIC BLOOD PRESSURE: 72 MMHG | RESPIRATION RATE: 16 BRPM | HEART RATE: 96 BPM

## 2017-11-02 LAB
ABSOLUTE EOS #: 0.16 K/UL (ref 0–0.44)
ABSOLUTE IMMATURE GRANULOCYTE: 0.05 K/UL (ref 0–0.3)
ABSOLUTE LYMPH #: 2.1 K/UL (ref 1.1–3.7)
ABSOLUTE MONO #: 0.52 K/UL (ref 0.1–1.2)
ANION GAP SERPL CALCULATED.3IONS-SCNC: 9 MMOL/L (ref 9–17)
BASOPHILS # BLD: 1 % (ref 0–2)
BASOPHILS ABSOLUTE: 0.04 K/UL (ref 0–0.2)
BUN BLDV-MCNC: 12 MG/DL (ref 8–23)
BUN/CREAT BLD: ABNORMAL (ref 9–20)
CALCIUM SERPL-MCNC: 8.7 MG/DL (ref 8.6–10.4)
CHLORIDE BLD-SCNC: 101 MMOL/L (ref 98–107)
CO2: 30 MMOL/L (ref 20–31)
CREAT SERPL-MCNC: 0.9 MG/DL (ref 0.5–0.9)
DIFFERENTIAL TYPE: ABNORMAL
EOSINOPHILS RELATIVE PERCENT: 2 % (ref 1–4)
GFR AFRICAN AMERICAN: >60 ML/MIN
GFR NON-AFRICAN AMERICAN: >60 ML/MIN
GFR SERPL CREATININE-BSD FRML MDRD: ABNORMAL ML/MIN/{1.73_M2}
GFR SERPL CREATININE-BSD FRML MDRD: ABNORMAL ML/MIN/{1.73_M2}
GLUCOSE BLD-MCNC: 152 MG/DL (ref 65–105)
GLUCOSE BLD-MCNC: 167 MG/DL (ref 70–99)
GLUCOSE BLD-MCNC: 225 MG/DL (ref 65–105)
HCT VFR BLD CALC: 38.5 % (ref 36.3–47.1)
HEMOGLOBIN: 12.2 G/DL (ref 11.9–15.1)
IMMATURE GRANULOCYTES: 1 %
LV EF: 50 %
LVEF MODALITY: NORMAL
LYMPHOCYTES # BLD: 26 % (ref 24–43)
MCH RBC QN AUTO: 28.6 PG (ref 25.2–33.5)
MCHC RBC AUTO-ENTMCNC: 31.7 G/DL (ref 29.9–34.7)
MCV RBC AUTO: 90.4 FL (ref 82.6–102.9)
MONOCYTES # BLD: 6 % (ref 3–12)
PDW BLD-RTO: 14.5 % (ref 11.8–14.4)
PLATELET # BLD: 229 K/UL (ref 138–453)
PLATELET ESTIMATE: ABNORMAL
PMV BLD AUTO: 9.2 FL (ref 8.1–13.5)
POTASSIUM SERPL-SCNC: 4.4 MMOL/L (ref 3.7–5.3)
RBC # BLD: 4.26 M/UL (ref 3.95–5.11)
RBC # BLD: ABNORMAL 10*6/UL
SEG NEUTROPHILS: 65 % (ref 36–65)
SEGMENTED NEUTROPHILS ABSOLUTE COUNT: 5.28 K/UL (ref 1.5–8.1)
SODIUM BLD-SCNC: 140 MMOL/L (ref 135–144)
WBC # BLD: 8.2 K/UL (ref 3.5–11.3)
WBC # BLD: ABNORMAL 10*3/UL

## 2017-11-02 PROCEDURE — 6370000000 HC RX 637 (ALT 250 FOR IP): Performed by: STUDENT IN AN ORGANIZED HEALTH CARE EDUCATION/TRAINING PROGRAM

## 2017-11-02 PROCEDURE — 80048 BASIC METABOLIC PNL TOTAL CA: CPT

## 2017-11-02 PROCEDURE — 6370000000 HC RX 637 (ALT 250 FOR IP): Performed by: EMERGENCY MEDICINE

## 2017-11-02 PROCEDURE — 97110 THERAPEUTIC EXERCISES: CPT

## 2017-11-02 PROCEDURE — 82947 ASSAY GLUCOSE BLOOD QUANT: CPT

## 2017-11-02 PROCEDURE — 85025 COMPLETE CBC W/AUTO DIFF WBC: CPT

## 2017-11-02 PROCEDURE — 93306 TTE W/DOPPLER COMPLETE: CPT

## 2017-11-02 PROCEDURE — 2580000003 HC RX 258: Performed by: STUDENT IN AN ORGANIZED HEALTH CARE EDUCATION/TRAINING PROGRAM

## 2017-11-02 PROCEDURE — 6360000002 HC RX W HCPCS: Performed by: EMERGENCY MEDICINE

## 2017-11-02 PROCEDURE — 36415 COLL VENOUS BLD VENIPUNCTURE: CPT

## 2017-11-02 PROCEDURE — 94640 AIRWAY INHALATION TREATMENT: CPT

## 2017-11-02 PROCEDURE — 97116 GAIT TRAINING THERAPY: CPT

## 2017-11-02 PROCEDURE — 94664 DEMO&/EVAL PT USE INHALER: CPT

## 2017-11-02 RX ORDER — OXYCODONE HYDROCHLORIDE 10 MG/1
10 TABLET ORAL EVERY 4 HOURS PRN
Qty: 45 TABLET | Refills: 0 | Status: SHIPPED | OUTPATIENT
Start: 2017-11-02 | End: 2018-07-02

## 2017-11-02 RX ORDER — AMOXICILLIN AND CLAVULANATE POTASSIUM 500; 125 MG/1; MG/1
1 TABLET, FILM COATED ORAL 2 TIMES DAILY
Qty: 20 TABLET | Refills: 0 | Status: SHIPPED | OUTPATIENT
Start: 2017-11-02 | End: 2017-11-12

## 2017-11-02 RX ORDER — FENTANYL CITRATE 50 UG/ML
50 INJECTION, SOLUTION INTRAMUSCULAR; INTRAVENOUS ONCE
Status: COMPLETED | OUTPATIENT
Start: 2017-11-02 | End: 2017-11-02

## 2017-11-02 RX ADMIN — FENTANYL CITRATE 50 MCG: 50 INJECTION, SOLUTION INTRAMUSCULAR; INTRAVENOUS at 02:02

## 2017-11-02 RX ADMIN — OXYCODONE HYDROCHLORIDE 10 MG: 5 TABLET ORAL at 00:08

## 2017-11-02 RX ADMIN — DOCUSATE SODIUM 100 MG: 100 CAPSULE ORAL at 08:44

## 2017-11-02 RX ADMIN — AMOXICILLIN AND CLAVULANATE POTASSIUM 1 TABLET: 500; 125 TABLET, FILM COATED ORAL at 08:44

## 2017-11-02 RX ADMIN — MOMETASONE FUROATE AND FORMOTEROL FUMARATE DIHYDRATE 2 PUFF: 200; 5 AEROSOL RESPIRATORY (INHALATION) at 09:51

## 2017-11-02 RX ADMIN — OXYCODONE HYDROCHLORIDE 10 MG: 5 TABLET ORAL at 06:10

## 2017-11-02 RX ADMIN — INSULIN LISPRO 2 UNITS: 100 INJECTION, SOLUTION INTRAVENOUS; SUBCUTANEOUS at 08:45

## 2017-11-02 RX ADMIN — POTASSIUM CHLORIDE 10 MEQ: 750 CAPSULE, EXTENDED RELEASE ORAL at 08:44

## 2017-11-02 RX ADMIN — Medication 10 ML: at 08:47

## 2017-11-02 RX ADMIN — OXYCODONE HYDROCHLORIDE 10 MG: 5 TABLET ORAL at 11:02

## 2017-11-02 RX ADMIN — ESTRADIOL 0.5 MG: 1 TABLET ORAL at 08:47

## 2017-11-02 RX ADMIN — ENOXAPARIN SODIUM 30 MG: 30 INJECTION SUBCUTANEOUS at 08:43

## 2017-11-02 RX ADMIN — METOPROLOL TARTRATE 37.5 MG: 25 TABLET ORAL at 08:48

## 2017-11-02 RX ADMIN — INSULIN LISPRO 4 UNITS: 100 INJECTION, SOLUTION INTRAVENOUS; SUBCUTANEOUS at 12:46

## 2017-11-02 RX ADMIN — PANTOPRAZOLE SODIUM 40 MG: 40 TABLET, DELAYED RELEASE ORAL at 08:44

## 2017-11-02 ASSESSMENT — PAIN DESCRIPTION - LOCATION
LOCATION: ARM;BACK
LOCATION: ARM

## 2017-11-02 ASSESSMENT — PAIN DESCRIPTION - DESCRIPTORS
DESCRIPTORS: ACHING
DESCRIPTORS: ACHING;THROBBING
DESCRIPTORS: ACHING;THROBBING

## 2017-11-02 ASSESSMENT — PAIN DESCRIPTION - PAIN TYPE
TYPE: ACUTE PAIN

## 2017-11-02 ASSESSMENT — PAIN DESCRIPTION - ORIENTATION
ORIENTATION: RIGHT

## 2017-11-02 ASSESSMENT — PAIN SCALES - GENERAL
PAINLEVEL_OUTOF10: 0
PAINLEVEL_OUTOF10: 8
PAINLEVEL_OUTOF10: 10
PAINLEVEL_OUTOF10: 5
PAINLEVEL_OUTOF10: 10

## 2017-11-02 ASSESSMENT — PAIN DESCRIPTION - FREQUENCY
FREQUENCY: CONTINUOUS

## 2017-11-02 ASSESSMENT — PAIN DESCRIPTION - PROGRESSION: CLINICAL_PROGRESSION: NOT CHANGED

## 2017-11-02 NOTE — PLAN OF CARE
Problem: Falls - Risk of  Goal: Absence of falls  Outcome: Met This Shift      Problem: Pain:  Goal: Pain level will decrease  Pain level will decrease   Outcome: Ongoing    Goal: Control of acute pain  Control of acute pain   Outcome: Ongoing    Goal: Control of chronic pain  Control of chronic pain   Outcome: Ongoing

## 2017-11-02 NOTE — CARE COORDINATION
Discharge [address] Case Management Department  Written by: Swetha Garcia RN    Patient Name: Marianela Benoit  Attending Provider: Noé Chavez DO  Admit Date: 10/31/2017 10:40 AM  MRN: [id number]  Account: [de-identified]                     : 1950  Discharge Date: 17      Disposition: Home with home care and [facility] for DME needs. Marvin Madden from [address] [place] [address] is notified of the discharge. Writer explained the [facility] completive bid and patient chose [facility] as her DME supplier. [facility] is faxed to [facility] with a request to deliver the DME to the patient's home.     Swetha Garcia RN

## 2017-11-02 NOTE — CONSULTS
[address] [facility]                                 CONSULTATION    PATIENT NAME: Patricia Arellano                  :         1950  MED REC NO:   [id number]                             ROOM:       0505  ACCOUNT NO:   [de-identified]                           ADMIT DATE:  10/31/2017  PROVIDER:     Ja Michael    CONSULT DATE:  2017    REASON FOR CONSULTATION:  Facial pain after a fall. HISTORY OF PRESENT ILLNESS:  A 20-year-old woman who fell striking her  face when she tripped over her walker. She hit the right side of her  face on the wall, falling forward to the ground. She had no loss of  consciousness. At the present time, she is complaining of some facial  pain. She really does not complain of any headaches. She was seen in  [facility], where CT scan showed a small traumatic subarachnoid  hemorrhage. There was also a small cerebral contusion. She is on no  anticoagulants. PAST MEDICAL HISTORY:  Significant for having had lumbar laminectomy. She has a history of COPD, history of diabetes mellitus, bifascicular  heart block, hypokalemia. PAST SURGICAL HISTORY:  Aortic valve repair, appendectomy,  laminectomy, and tonsillectomy. REVIEW OF SYSTEMS:  She has had no chest pain, shortness of breath,  melena, hematochezia, hemoptysis, hematemesis, or hematuria. PHYSICAL EXAMINATION:  She is awake, alert, and oriented. There are  periorbital ecchymoses and swelling. There is no focal tenderness to  her neck. Cranial nerves are intact. The strength is normal in all 4  extremities. I reviewed her films. This included a CT of the cervical spine, which  was negative. CT of the head does show a small cerebral contusion as  well as a small traumatic subarachnoid hemorrhage. All of which are  stable on a followup CT scan. IMPRESSION:  Craniocerebral trauma.     PLAN:  From our

## 2017-11-02 NOTE — FLOWSHEET NOTE
Pt discharged to home with home care. Heplock and monitor removed. Discharge instructions given and explained to pt. Pt verbalized understanding. Pt denies any questions or concerns. Pt assisted to front door in wheelchair with all belongings, discharge paperwork, scripts, and educational pamphlets on disease process. Son beside her to hear discharge instructions and follow up. plateform walker to be delivered to pt's home.

## 2017-11-02 NOTE — DISCHARGE INSTR - DIET

## 2017-11-02 NOTE — PROGRESS NOTES
Physical Therapy  Facility/Department: [address] NEURO  Daily Treatment Note  NAME: Noah Hillman  : 1950  MRN: [id number]    Date of Service: 2017    Patient Diagnosis(es):   Patient Active Problem List    Diagnosis Date Noted    Closed fracture of right distal radius 10/31/2017    Subdural hemorrhage ([address] 10/31/2017    Subarachnoid hemorrhage ([address] 10/31/2017    Traumatic hemorrhage of cerebrum ([address] 10/31/2017    Chronic bilateral low back pain 10/31/2017    Pneumonia     Falls frequently 2017    Chronic respiratory failure ([address] 2017    Contusion of right knee     Aspiration pneumonia of both lower lobes due to gastric secretions ([address]     Multifocal pneumonia 2016    Aortic valve stenosis 2016    Esophageal dilatation 2016    Chronic ulcer of right heel ([address] 10/21/2016    Fall 10/20/2016    Valvular heart disease 10/20/2016    Type 2 diabetes mellitus without complication, without long-term current use of insulin ([address] 10/20/2016    Open wound of right ankle 10/20/2016    COPD exacerbation ([address] 10/20/2016    Syncope and collapse        Past Medical History:   Diagnosis Date    AAA (abdominal aortic aneurysm) ([address]     Pt denies having a history of AAA    Acid reflux     Anxiety and depression     Aortic stenosis     Arthritis     Blister of ankle, right 10/13/2016    blister broke open & draining, is on antibiotics    CAD (coronary artery disease)     COPD (chronic obstructive pulmonary disease) ([address]     Diabetes mellitus ([address]     Falls     Heart block     bifasicular    Hypokalemia     MDRO (multiple drug resistant organisms) resistance 10/17/2014    E.  Coli urine    MRSA (methicillin resistant staph aureus) culture positive resolved 2016    2 negative nasal screens -  (hx in urine )    On home oxygen therapy     uses 2 liters at night    On home oxygen therapy     patient states 2 liters/nasal cannula continuous   

## 2017-11-02 NOTE — PROGRESS NOTES
[facility]   Section of Cardiology  Progress Note      Date:  11/2/2017  Patient: Tana Kevinr  Admission:  10/31/2017 10:40 AM  Admit DX: SDH (subdural hematoma) ([facility] [I62.00]  Age:  78 y.o., 1950     LOS: 2 days     Reason for evaluation:   S/p fall, hx AVR. SUBJECTIVE:     The patient was seen and examined. Notes and labs reviewed. There were not complications over night. Patient's cardiac review of systems: negative. The patient is generally feeling unchanged. OBJECTIVE:      EXAM:   Vitals:    VITALS:  /72   Pulse 96   Temp 98.5 °F (36.9 °C) (Oral)   Resp 16   Ht 5' 8\" (1.727 m)   Wt 212 lb 11.9 oz (96.5 kg)   SpO2 96%   BMI 32.35 kg/m²   24HR INTAKE/OUTPUT:  No intake or output data in the 24 hours ending 11/02/17 1544    CONSTITUTIONAL:  awake, alert, cooperative, no apparent distress, and appears stated age. HEENT: Normal jugular venous pulsations, no carotid bruits. Head is atraumatic, normocephalic. Eyes and oral mucosa are normal.  LUNGS: Good respiratory effort On auscultation: clear to auscultation bilaterally  CARDIOVASCULAR:  Normal apical impulse, regular rate and rhythm, normal S1 and S2, no S3 or 2/6 АНДРЕЙ AT AORTIC AREA. dorsalis pedis and bilateralpresent 2+  ABDOMEN: Soft, nontender, nondistended. Bowel sounds present. No masses or tenderness. No bruit. SKIN: Warm and dry. EXTREMITIES:No lower extremity edema. Motor movement grossly intact. No cyanosis or clubbing.     Current Inpatient Medications:   enoxaparin  30 mg Subcutaneous BID    amoxicillin-clavulanate  1 tablet Oral 2 times per day    sodium chloride flush  10 mL Intravenous 2 times per day    docusate sodium  100 mg Oral BID    estradiol  0.5 mg Oral Daily    pantoprazole  40 mg Oral QAM AC    metoprolol tartrate  37.5 mg Oral BID    mometasone-formoterol  2 puff Inhalation Q12H    potassium chloride  10 mEq Oral Daily    insulin lispro  0-12 Units Subcutaneous TID WC    insulin lispro  0-6 Units Subcutaneous Nightly       IV Infusions (if any):   dextrose         Diagnostics:   Telemetry:SINUS RHYTHM. Labs:   CBC:  Recent Labs      11/01/17   0631  11/02/17   0724   WBC  10.1  8.2   HGB  11.8*  12.2   HCT  38.3  38.5   PLT  208  229     Magnesium:No results for input(s): MG in the last 72 hours. BMP:  Recent Labs      11/01/17   0631  11/02/17   0724   NA  137  140   K  4.3  4.4   CALCIUM  8.6  8.7   CO2  25  30   BUN  16  12   CREATININE  0.91*  0.90   LABGLOM  >60  >60   GLUCOSE  144*  167*     BNP:No results for input(s): BNP in the last 72 hours. PT/INR:  Recent Labs      10/31/17   1100   PROTIME  9.8   INR  0.9     APTT:  Recent Labs      10/31/17   1100   APTT  24.1     CARDIAC ENZYMES:No results for input(s): CKTOTAL, CKMB, CKMBINDEX, TROPONINT in the last 72 hours. FASTING LIPID PANEL:No results found for: HDL, LDLDIRECT, LDLCALC, TRIG  LIVER PROFILE:No results for input(s): AST, ALT, LABALBU, ALKPHOS, BILITOT, BILIDIR, IBILI, PROT, GLOB, ALBUMIN in the last 72 hours. ASSESSMENT:  1. S/P FALL. 2. S/P AVR. Patient Active Problem List   Diagnosis    Fall    Valvular heart disease    Type 2 diabetes mellitus without complication, without long-term current use of insulin ([address]    Open wound of right ankle    COPD exacerbation (HCC)    Syncope and collapse    Chronic ulcer of right heel ([address]    Multifocal pneumonia    Aortic valve stenosis    Esophageal dilatation    Aspiration pneumonia of both lower lobes due to gastric secretions ([address]    Falls frequently    Chronic respiratory failure (HCC)    Contusion of right knee    Pneumonia    Closed fracture of right distal radius    Subdural hemorrhage (HCC)    Subarachnoid hemorrhage (HCC)    Traumatic hemorrhage of cerebrum (HCC)    Chronic bilateral low back pain       PLAN:  1. Continue current medications. 2. OK TO DISCHARGE WHEN OK WITH ALL SERVICES.   3. I RECOMMEND LOOP RECORDER IMPLANT IF SHE HAS RECURRENT SYNCOPE. Discussed with patient and nursing.     Cecilia Warren MD

## 2017-11-02 NOTE — PROGRESS NOTES
PROGRESS NOTE          PATIENT NAME: [facility] [facility] RECORD NO. [id number]  DATE: 2017  SURGEON: Dr. Jessica Higgins: Julien Prieto MD    HD: # 2    ASSESSMENT    SDH, SAH, Traumatic cerebral contusion, right distal radius fx    MEDICAL DECISION MAKING AND PLAN    1. Facial fractures: Sinus precautions and sinus antibiotics per OMF  2. Neurosurgery signed off  3. Continue work with PT/OT today  4. Continue general diet, and bowel regimen  5. Cardiology recommendations: Will obtain echocardiogram, if normal can follow-up outpatient  6. Follow-up with orthopedic surgery in 7-10 days for right upper extremity distal radius fracture, splinted and wrapped      SUBJECTIVE    Coralyn Grist is Unchanged since yesterday's exam.  Patient continues to have pain to your right upper extremity and right periorbital area. OMF is recommended to follow-up as an outpatient, with sinus precautions and sinus antibiotics. Neurosurgery is signed off. To work with PT/OT more today, with likely discharge later this afternoon. Patient to go home with home care. Tolerating PO. Awaiting echocardiogram results.       OBJECTIVE  VITALS: Temp: Temp: 99.2 °F (37.3 °C)Temp  Av.7 °F (37.1 °C)  Min: 97.5 °F (36.4 °C)  Max: 100 °F (33.3 °C) BP Systolic (77TBW), BHV:299 , Min:126 , LZX:928   Diastolic (16FXO), KGT:39, Min:52, Max:78   Pulse Pulse  Av.3  Min: 78  Max: 90 Resp Resp  Avg: 15.5  Min: 12  Max: 20 Pulse ox SpO2  Av.3 %  Min: 90 %  Max: 98 %  GENERAL: Mild-moderate pain, alert, no distress  NEURO: No focal neuro deficits noted  HEENT: Eye: PERRL, improving periorbital ecchymosis on the right, continues to have tenderness to palpation in the right periorbital area  : Deferred  LUNGS: CTAB  HEART: normal rate and regular rhythm  ABDOMEN: soft, non-tender, non-distended, bowel sounds present in all 4 quadrants and no guarding or peritoneal signs present  EXTERMITY: no cyanosis, clubbing or edema and splint and dressing right upper extremity, neurovascularly intact    No intake/output data recorded. Drain/tube output:  No intake/output data recorded.     LAB:  CBC:   Recent Labs      10/31/17   0956  10/31/17   1100  11/01/17   0631   WBC  11.2*  13.9*  10.1   HGB  14.3  13.6  11.8*   HCT  43.1  44.2  38.3   MCV  88.8  92.3  93.2   PLT  241  265  208     BMP:   Recent Labs      10/31/17   0956  10/31/17   1100  11/01/17   0631   NA  139  141  137   K  5.2  4.9  4.3   CL  102  101  100   CO2  21  27  25   BUN  21  20  16   CREATININE  0.97*  0.97*  0.91*   GLUCOSE  161*  177*  144*     COAGS:   Recent Labs      10/31/17   1100   APTT  24.1   INR  0.9       RADIOLOGY:  none        Mandi Rodriguez DO  Emergency Medicine Resident Physician  Trauma Surgery/ General Surgery Service

## 2017-11-02 NOTE — PROGRESS NOTES
[address]  Speech Language Pathology    Date: 11/2/2017  Patient Name: Samra Moore  YOB: 1950   AGE: 78 y.o. MRN: [id number]        Patient Not Available for Speech Therapy     Due to:  [] Testing  [] Hemodialysis  [] Cancelled by RN  [] Surgery   [] Intubation/Sedation/Pain Medication  [] Medical instability  [x] Other: Pt.  Refused     Next scheduled treatment: 11/3/17  Completed by: Carmen Jaquez,  Clinician    Co-signed by Uvaldo Stewart M.S., CCC/SLP

## 2017-11-02 NOTE — PROGRESS NOTES
Nancy Norman was evaluated today and a DME order was entered for a wheeled platform walker because she requires this to successfully complete daily living tasks of personal cares and ambulating. A wheeled walker is necessary due to the patient's unsteady gait, upper body weakness, and inability to  an ambulation device; and she can ambulate only by pushing a walker instead of a lesser assistive device such as a cane, crutch, or standard walker. The need for this equipment was discussed with the patient and she understands and is in agreement.

## 2017-11-02 NOTE — DISCHARGE SUMMARY
[facility]    DISCHARGE TO HOME w/ HOMECARE    PATIENT NAME: Nadia Andrade  YOB: 1950  MEDICAL RECORD NO. [id number]  DATE: 11/6/2017  PRIMARY CARE PHYSICIAN: Ham Brennan MD  DISCHARGE DATE:  11/2/2017  4:25 PM  DISPOSITION: to home  ADMITTING DIAGNOSIS:   1. Traumatic subdural hematoma without loss of consciousness, initial encounter ([facility]      DISCHARGE DIAGNOSIS:     Closed fracture of the right distal radius  Subdural hemorrhage  Subarachnoid hemorrhage  Traumatic hemorrhage of cerebrum    CONSULTANTS:  cardiology, orthopedic, OMF, surgery and Neurosurgery  PROCEDURES / DIAGNOSTIC TESTS:  Splint placement, RUE for distal radius fx    [address] originally presented to the hospital on 10/31/2017 10:40 AM. S/p fall from standing height due to syncopal episode, where she sustained  SDH, cerebral contusion, SAH, right distal radius fracture, and multiple orbital/maxillary fractures. She was evaluated and treated accordingly with the appropriate consultants. OMF to follow up with as an outpatient, as well as with Orthopedic surgery. Labs and imaging were followed daily. At time of discharge, Nadia Andrade was tolerating a regular diet, having bowel movements,ambulating on She own accord and had adequate analgesia on oral pain medications, and had no signs of symptoms of complications. She is medically stable to be discharged. PHYSICAL EXAMINATION        Discharge Vitals:  height is 5' 8\" (1.727 m) and weight is 212 lb 11.9 oz (96.5 kg). Her oral temperature is 98.5 °F (36.9 °C). Her blood pressure is 136/72 and her pulse is 96. Her respiration is 16 and oxygen saturation is 96%.    GENERAL: Mild-moderate pain, alert, no distress  NEURO: No focal neuro deficits noted  HEENT: Eye: PERRL, improving periorbital ecchymosis on the right, continues to have tenderness to palpation in the right periorbital area  : Deferred  LUNGS: CTAB  HEART: normal

## 2017-11-02 NOTE — PROGRESS NOTES
Contacted Dr. Padma Griffith about pt's breakthrough pain. Dr. Padma Griffith stated she will come to assess the patient. Will continue to monitor.     Electronically signed by Alannah Padron RN on 11/2/2017 at 1:49 AM

## 2017-11-02 NOTE — CONSULTS
15-point system review was performed and pertinent findings are as  noted. PHYSICAL EXAMINATION:  Currently on examination, the patient appears  comfortable at bedrest.  GENERAL:  The patient is awake, alert, and oriented. VITAL SIGNS:  Blood pressure of 150/70, pulse rate of 90, respiratory  rate of 18. Weight is 212 pounds. SpO2 is 99%. HEENT:  Cranium is atraumatic, normocephalic. There is no jugular  venous distention. Normal carotid upstrokes. No audible bruits. Sclerae anicteric. Oral mucosa is moist.  SKIN:  Warm and dry. HEART:  Reveals S1 and S2. There is a grade 8-4/2 systolic ejection  murmur at the aortic area. LUNGS:  Clear. ABDOMEN:  Soft. Bowel sounds are present. EXTREMITIES:  Without edema. Symmetric distal pulses are noted which  are equal in volume bilaterally. A 12-lead EKG on the record shows sinus tachycardia with bifascicular  AV block. IMPRESSION:  1. Status post fall. 2.  History of aortic valve replacement. 3.  Hypertension. 4.  Degenerative arthropathy. 5.  Esophageal strictures requiring dilation. 6.  History of subdural hemorrhage and subarachnoid hemorrhage in the  past.  7.  COPD. 8.  Diabetes mellitus. PLAN:  It does not appear that the patient has had any recurrent  episodes of dizziness. Telemetry monitoring shows sinus rhythm, and  echocardiogram will be ordered to evaluate for aortic stenosis and  guide further therapy. If she has recurrent episodes of syncope, a  loop recorder could be considered to evaluate for dysrhythmia and  guide further therapy. Thank you for allowing us to participate in  the care of the patient, and we will follow the patient with you and  make further recommendations.         On license of [facility]    D: 11/01/2017 19:52:46       T: 11/01/2017 23:54:07     [email or web address]  Job#: [id number]     Doc#: [id number]

## 2017-11-14 ENCOUNTER — HOSPITAL ENCOUNTER (OUTPATIENT)
Dept: GENERAL RADIOLOGY | Age: 67
Discharge: HOME | End: 2017-11-14
Payer: MEDICARE

## 2017-11-14 ENCOUNTER — HOSPITAL ENCOUNTER (OUTPATIENT)
Age: 67
Discharge: HOME | End: 2017-11-14
Payer: MEDICARE

## 2017-11-14 ENCOUNTER — TELEPHONE (OUTPATIENT)
Dept: ORTHOPEDIC SURGERY | Age: 67
End: 2017-11-14

## 2017-11-14 ENCOUNTER — OFFICE VISIT (OUTPATIENT)
Dept: ORTHOPEDIC SURGERY | Age: 67
End: 2017-11-14

## 2017-11-14 VITALS — WEIGHT: 212.74 LBS | BODY MASS INDEX: 32.24 KG/M2 | HEIGHT: 68 IN

## 2017-11-14 DIAGNOSIS — S52.591D OTHER CLOSED FRACTURE OF DISTAL END OF RIGHT RADIUS WITH ROUTINE HEALING, SUBSEQUENT ENCOUNTER: ICD-10-CM

## 2017-11-14 DIAGNOSIS — S52.591D OTHER CLOSED FRACTURE OF DISTAL END OF RIGHT RADIUS WITH ROUTINE HEALING, SUBSEQUENT ENCOUNTER: Primary | ICD-10-CM

## 2017-11-14 PROCEDURE — 99024 POSTOP FOLLOW-UP VISIT: CPT | Performed by: ORTHOPAEDIC SURGERY

## 2017-11-14 PROCEDURE — 73110 X-RAY EXAM OF WRIST: CPT

## 2017-11-14 NOTE — PROGRESS NOTES
This patient who is sustained a right distal radial fracture on October 31 is seen here in follow-up. She had actually fallen at home and was found to have a subarachnoid hemorrhage and was transferred from [place]. She also had multiple facial fractures. The fractured distal radius was treated with the close reduction and application of the splint. Today patient says that her symptoms are much improved. The splint is intact. She is able to make a full fist with the fingers. Shoulder motions are excellent. Diagnosis: Closed transverse fracture distal radius right wrist.    Treatment:The splint has been  circularized to a short-arm cast.   Today's x rays show the reduction is still acceptable allowing for the slight rotational difference in the X-rays of today compared with the last set but when compared with the pre casting ,post reduction it appears identical.    I have recommended she have repeat X rays on Saturday and return to the office on Tuesday. If there is gross displacement on new X rays will get in touch with her.

## 2017-11-21 ENCOUNTER — HOSPITAL ENCOUNTER (OUTPATIENT)
Age: 67
Discharge: HOME | End: 2017-11-21
Payer: MEDICARE

## 2017-11-21 ENCOUNTER — HOSPITAL ENCOUNTER (OUTPATIENT)
Dept: GENERAL RADIOLOGY | Age: 67
Discharge: HOME | End: 2017-11-21
Payer: MEDICARE

## 2017-11-21 ENCOUNTER — OFFICE VISIT (OUTPATIENT)
Dept: ORTHOPEDIC SURGERY | Age: 67
End: 2017-11-21

## 2017-11-21 VITALS — HEIGHT: 68 IN | WEIGHT: 212.74 LBS | BODY MASS INDEX: 32.24 KG/M2

## 2017-11-21 DIAGNOSIS — S52.591D OTHER CLOSED FRACTURE OF DISTAL END OF RIGHT RADIUS WITH ROUTINE HEALING, SUBSEQUENT ENCOUNTER: Primary | ICD-10-CM

## 2017-11-21 DIAGNOSIS — S52.591D OTHER CLOSED FRACTURE OF DISTAL END OF RIGHT RADIUS WITH ROUTINE HEALING, SUBSEQUENT ENCOUNTER: ICD-10-CM

## 2017-11-21 PROCEDURE — 73110 X-RAY EXAM OF WRIST: CPT

## 2017-11-21 PROCEDURE — 99024 POSTOP FOLLOW-UP VISIT: CPT | Performed by: ORTHOPAEDIC SURGERY

## 2017-11-21 RX ORDER — OXYCODONE HYDROCHLORIDE AND ACETAMINOPHEN 5; 325 MG/1; MG/1
1 TABLET ORAL 4 TIMES DAILY PRN
Status: ON HOLD | COMMUNITY
End: 2020-09-04 | Stop reason: SDUPTHER

## 2017-11-21 NOTE — PROGRESS NOTES
This patient who is sustained right distal radial fracture on October 31 came to the office but could not get the x-rays because of the traffic pattern and floating. Her cast is intact. She has good excellent motion of the fingers. However unfortunately she has not been doing her shoulder exercises. I did again emphasized to her and in fact the shoulder was getting a little stiff. This has been demonstrated to her. She did go to [facility]. and afterwards and further x-rays taken show the position is maintained.   She is she'll be asked to return here in 2 weeks' time to have cast off and then x-ray 3 views of the wrist.

## 2017-11-28 ENCOUNTER — TELEPHONE (OUTPATIENT)
Dept: ORTHOPEDIC SURGERY | Age: 67
End: 2017-11-28

## 2017-12-05 ENCOUNTER — HOSPITAL ENCOUNTER (OUTPATIENT)
Age: 67
Discharge: HOME | End: 2017-12-05
Payer: MEDICARE

## 2017-12-05 ENCOUNTER — HOSPITAL ENCOUNTER (OUTPATIENT)
Dept: GENERAL RADIOLOGY | Age: 67
Discharge: HOME | End: 2017-12-05
Payer: MEDICARE

## 2017-12-05 ENCOUNTER — OFFICE VISIT (OUTPATIENT)
Dept: ORTHOPEDIC SURGERY | Age: 67
End: 2017-12-05

## 2017-12-05 VITALS — WEIGHT: 206 LBS | BODY MASS INDEX: 30.51 KG/M2 | HEIGHT: 69 IN

## 2017-12-05 DIAGNOSIS — S52.591D OTHER CLOSED FRACTURE OF DISTAL END OF RIGHT RADIUS WITH ROUTINE HEALING, SUBSEQUENT ENCOUNTER: Primary | ICD-10-CM

## 2017-12-05 DIAGNOSIS — S52.591D OTHER CLOSED FRACTURE OF DISTAL END OF RIGHT RADIUS WITH ROUTINE HEALING, SUBSEQUENT ENCOUNTER: ICD-10-CM

## 2017-12-05 PROCEDURE — 73110 X-RAY EXAM OF WRIST: CPT

## 2017-12-05 PROCEDURE — 99024 POSTOP FOLLOW-UP VISIT: CPT | Performed by: ORTHOPAEDIC SURGERY

## 2017-12-05 NOTE — PROGRESS NOTES
This patient who is sustained a right distal radial fracture on October 31 had her cast removed today. Clinically and radiologically the fracture is well-healed. She has normal motions of the elbow and the fingers. I have demonstrated to her mobilizing exercises of the wrist.  I'll see her again in 2 weeks' time and if she is not improving then was referred to physical therapy.

## 2017-12-19 ENCOUNTER — OFFICE VISIT (OUTPATIENT)
Dept: ORTHOPEDIC SURGERY | Age: 67
End: 2017-12-19

## 2017-12-19 VITALS — WEIGHT: 205.91 LBS | BODY MASS INDEX: 30.5 KG/M2 | HEIGHT: 69 IN

## 2017-12-19 DIAGNOSIS — S52.591D OTHER CLOSED FRACTURE OF DISTAL END OF RIGHT RADIUS WITH ROUTINE HEALING, SUBSEQUENT ENCOUNTER: Primary | ICD-10-CM

## 2017-12-19 PROCEDURE — 99024 POSTOP FOLLOW-UP VISIT: CPT | Performed by: ORTHOPAEDIC SURGERY

## 2017-12-19 NOTE — PROGRESS NOTES
This patient was sustained a right distal radial fracture is seen here in follow-up. She is asymptomatic and has excellent full range of motion and excellent  strength. She will continue her regular activities and we will see her as necessary.

## 2018-07-02 ENCOUNTER — HOSPITAL ENCOUNTER (EMERGENCY)
Age: 68
Discharge: HOME | End: 2018-07-02
Attending: EMERGENCY MEDICINE
Payer: MEDICARE

## 2018-07-02 VITALS
RESPIRATION RATE: 24 BRPM | BODY MASS INDEX: 31.07 KG/M2 | HEART RATE: 92 BPM | HEIGHT: 68 IN | SYSTOLIC BLOOD PRESSURE: 144 MMHG | WEIGHT: 205 LBS | DIASTOLIC BLOOD PRESSURE: 72 MMHG | TEMPERATURE: 98.6 F | OXYGEN SATURATION: 96 %

## 2018-07-02 DIAGNOSIS — R10.13 EPIGASTRIC PAIN: Primary | ICD-10-CM

## 2018-07-02 DIAGNOSIS — R11.2 NON-INTRACTABLE VOMITING WITH NAUSEA, UNSPECIFIED VOMITING TYPE: ICD-10-CM

## 2018-07-02 LAB
ABSOLUTE EOS #: 0.2 K/UL (ref 0–0.4)
ABSOLUTE IMMATURE GRANULOCYTE: ABNORMAL K/UL (ref 0–0.3)
ABSOLUTE LYMPH #: 2.8 K/UL (ref 1–4.8)
ABSOLUTE MONO #: 0.5 K/UL (ref 0.2–0.8)
ALBUMIN SERPL-MCNC: 4.5 G/DL (ref 3.5–5.2)
ALBUMIN/GLOBULIN RATIO: ABNORMAL (ref 1–2.5)
ALP BLD-CCNC: 37 U/L (ref 35–104)
ALT SERPL-CCNC: 30 U/L (ref 5–33)
ANION GAP SERPL CALCULATED.3IONS-SCNC: 17 MMOL/L (ref 9–17)
AST SERPL-CCNC: 24 U/L
BASOPHILS # BLD: 0 % (ref 0–2)
BASOPHILS ABSOLUTE: 0 K/UL (ref 0–0.2)
BILIRUB SERPL-MCNC: 0.23 MG/DL (ref 0.3–1.2)
BILIRUBIN DIRECT: <0.08 MG/DL
BILIRUBIN, INDIRECT: ABNORMAL MG/DL (ref 0–1)
BUN BLDV-MCNC: 17 MG/DL (ref 8–23)
BUN/CREAT BLD: 18 (ref 9–20)
CALCIUM SERPL-MCNC: 9.3 MG/DL (ref 8.6–10.4)
CHLORIDE BLD-SCNC: 99 MMOL/L (ref 98–107)
CO2: 25 MMOL/L (ref 20–31)
CREAT SERPL-MCNC: 0.97 MG/DL (ref 0.5–0.9)
DIFFERENTIAL TYPE: ABNORMAL
EOSINOPHILS RELATIVE PERCENT: 2 % (ref 1–4)
GFR AFRICAN AMERICAN: >60 ML/MIN
GFR NON-AFRICAN AMERICAN: 57 ML/MIN
GFR SERPL CREATININE-BSD FRML MDRD: ABNORMAL ML/MIN/{1.73_M2}
GFR SERPL CREATININE-BSD FRML MDRD: ABNORMAL ML/MIN/{1.73_M2}
GLOBULIN: ABNORMAL G/DL (ref 1.5–3.8)
GLUCOSE BLD-MCNC: 164 MG/DL (ref 70–99)
HCT VFR BLD CALC: 41.7 % (ref 36–46)
HEMOGLOBIN: 13.7 G/DL (ref 12–16)
IMMATURE GRANULOCYTES: ABNORMAL %
LIPASE: 18 U/L (ref 13–60)
LYMPHOCYTES # BLD: 27 % (ref 24–44)
MCH RBC QN AUTO: 28.4 PG (ref 26–34)
MCHC RBC AUTO-ENTMCNC: 32.8 G/DL (ref 31–37)
MCV RBC AUTO: 86.4 FL (ref 80–100)
MONOCYTES # BLD: 5 % (ref 1–7)
NRBC AUTOMATED: ABNORMAL PER 100 WBC
PDW BLD-RTO: 15.8 % (ref 11.5–14.5)
PLATELET # BLD: 268 K/UL (ref 130–400)
PLATELET ESTIMATE: ABNORMAL
PMV BLD AUTO: 7.3 FL (ref 6–12)
POTASSIUM SERPL-SCNC: 4 MMOL/L (ref 3.7–5.3)
RBC # BLD: 4.82 M/UL (ref 4–5.2)
RBC # BLD: ABNORMAL 10*6/UL
SEG NEUTROPHILS: 66 % (ref 36–66)
SEGMENTED NEUTROPHILS ABSOLUTE COUNT: 6.7 K/UL (ref 1.8–7.7)
SODIUM BLD-SCNC: 141 MMOL/L (ref 135–144)
TOTAL PROTEIN: 7.5 G/DL (ref 6.4–8.3)
WBC # BLD: 10.2 K/UL (ref 3.5–11)
WBC # BLD: ABNORMAL 10*3/UL

## 2018-07-02 PROCEDURE — 99283 EMERGENCY DEPT VISIT LOW MDM: CPT

## 2018-07-02 PROCEDURE — 83690 ASSAY OF LIPASE: CPT

## 2018-07-02 PROCEDURE — 80048 BASIC METABOLIC PNL TOTAL CA: CPT

## 2018-07-02 PROCEDURE — 96375 TX/PRO/DX INJ NEW DRUG ADDON: CPT

## 2018-07-02 PROCEDURE — 85025 COMPLETE CBC W/AUTO DIFF WBC: CPT

## 2018-07-02 PROCEDURE — C9113 INJ PANTOPRAZOLE SODIUM, VIA: HCPCS | Performed by: NURSE PRACTITIONER

## 2018-07-02 PROCEDURE — 80076 HEPATIC FUNCTION PANEL: CPT

## 2018-07-02 PROCEDURE — 2580000003 HC RX 258: Performed by: NURSE PRACTITIONER

## 2018-07-02 PROCEDURE — 6360000002 HC RX W HCPCS: Performed by: NURSE PRACTITIONER

## 2018-07-02 PROCEDURE — 96374 THER/PROPH/DIAG INJ IV PUSH: CPT

## 2018-07-02 RX ORDER — SODIUM CHLORIDE 9 MG/ML
INJECTION, SOLUTION INTRAVENOUS CONTINUOUS
Status: DISCONTINUED | OUTPATIENT
Start: 2018-07-02 | End: 2018-07-02 | Stop reason: HOSPADM

## 2018-07-02 RX ORDER — 0.9 % SODIUM CHLORIDE 0.9 %
10 VIAL (ML) INJECTION ONCE
Status: COMPLETED | OUTPATIENT
Start: 2018-07-02 | End: 2018-07-02

## 2018-07-02 RX ORDER — ONDANSETRON 2 MG/ML
4 INJECTION INTRAMUSCULAR; INTRAVENOUS ONCE
Status: COMPLETED | OUTPATIENT
Start: 2018-07-02 | End: 2018-07-02

## 2018-07-02 RX ORDER — LORAZEPAM 2 MG/ML
1 INJECTION INTRAMUSCULAR ONCE
Status: COMPLETED | OUTPATIENT
Start: 2018-07-02 | End: 2018-07-02

## 2018-07-02 RX ORDER — ONDANSETRON 4 MG/1
4 TABLET, ORALLY DISINTEGRATING ORAL EVERY 8 HOURS PRN
Qty: 20 TABLET | Refills: 0 | Status: ON HOLD | OUTPATIENT
Start: 2018-07-02 | End: 2020-08-28

## 2018-07-02 RX ORDER — PANTOPRAZOLE SODIUM 40 MG/10ML
40 INJECTION, POWDER, LYOPHILIZED, FOR SOLUTION INTRAVENOUS ONCE
Status: COMPLETED | OUTPATIENT
Start: 2018-07-02 | End: 2018-07-02

## 2018-07-02 RX ADMIN — LORAZEPAM 1 MG: 2 INJECTION, SOLUTION INTRAMUSCULAR; INTRAVENOUS at 10:49

## 2018-07-02 RX ADMIN — Medication 10 ML: at 10:47

## 2018-07-02 RX ADMIN — ONDANSETRON 4 MG: 2 INJECTION INTRAMUSCULAR; INTRAVENOUS at 10:48

## 2018-07-02 RX ADMIN — PANTOPRAZOLE SODIUM 40 MG: 40 INJECTION, POWDER, FOR SOLUTION INTRAVENOUS at 10:47

## 2018-07-02 RX ADMIN — SODIUM CHLORIDE: 9 INJECTION, SOLUTION INTRAVENOUS at 10:47

## 2018-07-02 ASSESSMENT — ENCOUNTER SYMPTOMS
COUGH: 0
SHORTNESS OF BREATH: 0
VOMITING: 1
DIARRHEA: 0
SORE THROAT: 0
COLOR CHANGE: 0
BACK PAIN: 0
NAUSEA: 1
ABDOMINAL PAIN: 1

## 2018-07-02 ASSESSMENT — PAIN DESCRIPTION - DESCRIPTORS: DESCRIPTORS: CRAMPING

## 2018-07-02 ASSESSMENT — PAIN DESCRIPTION - ORIENTATION: ORIENTATION: MID

## 2018-07-02 ASSESSMENT — PAIN SCALES - GENERAL: PAINLEVEL_OUTOF10: 10

## 2018-07-02 ASSESSMENT — PAIN DESCRIPTION - LOCATION: LOCATION: ABDOMEN

## 2018-07-02 ASSESSMENT — PAIN DESCRIPTION - PAIN TYPE: TYPE: ACUTE PAIN

## 2018-07-02 NOTE — ED NOTES
Bed: 17  Expected date:   Expected time:   Means of arrival:   Comments:  vomiting     Khushbu Fair RN  07/02/18 [id number]
room air

## 2018-07-02 NOTE — ED PROVIDER NOTES
Attending Supervisory Note/Shared Visit   I have personally performed a face to face diagnostic evaluation on this patient. I have reviewed the mid-levels findings and agree.         (Please note that portions of this note were completed with a voice recognition program.  Efforts were made to edit the dictations but occasionally words are mis-transcribed.)    Ce Villasenor MD  Attending Emergency Physician        Ce Villasenor MD  07/02/18 8241

## 2018-07-02 NOTE — ED PROVIDER NOTES
.  [facility] ED  eMERGENCY dEPARTMENT eNCOUnter      Pt Name: Mary Mistry  MRN: [id number]  [place] 1950  Date of evaluation: 7/2/2018  Provider: AV Melchor 9726       Chief Complaint   Patient presents with    Emesis     x3 days    Anxiety     ran out of klonopin         HISTORY OF PRESENT ILLNESS  (Location/Symptom, Timing/Onset, Context/Setting, Quality, Duration, Modifying Factors, Severity.)   Mary Mistry is a 80 y.o. female who presents to the emergency department via EMS for epigastric pain, N/V, anxiety. Onset was 3 days ago. She ran out of her klonopin 3 days ago. States she has a refill waiting at her pharmacy. Denies fever, chills, diarrhea, urinary symptoms. Rates her pain 10/10 at this time. Nursing Notes were reviewed. ALLERGIES     Codeine and Dye [iodides]    CURRENT MEDICATIONS       Discharge Medication List as of 7/2/2018 11:29 AM      CONTINUE these medications which have NOT CHANGED    Details   oxyCODONE-acetaminophen (PERCOCET) 5-325 MG per tablet Take 1 tablet by mouth every 6 hours . Historical Med      TRAZODONE HCL PO Take by mouth nightly as needed Pt unsure of dosageHistorical Med      Venlafaxine HCl (EFFEXOR PO) Take 2 tablets by mouth 2 times daily Pt unsure of dosageHistorical Med      metoprolol tartrate (LOPRESSOR) 25 MG tablet Take 1.5 tablets by mouth 2 times daily, Disp-60 tablet, R-3Normal      Cholecalciferol (VITAMIN D3) 74399 UNITS CAPS Take 1 capsule by mouth every 7 days MondaysHistorical Med      clonazePAM (KLONOPIN) 1 MG tablet Take 1 tablet by mouth daily Take three tablets daily, Disp-10 tablet, R-3      lansoprazole (PREVACID) 30 MG delayed release capsule Take 30 mg by mouth daily      metFORMIN (GLUCOPHAGE) 500 MG tablet Take 500 mg by mouth 2 times daily      aspirin EC 81 MG EC tablet Take 81 mg by mouth daily      mometasone-formoterol (DULERA) 200-5 MCG/ACT inhaler Inhale 2 puffs into the HYSTERECTOMY      LUMBAR LAMINECTOMY      TONSILLECTOMY           FAMILY HISTORY       Family History   Problem Relation Age of Onset    Cancer Mother     Cancer Father      Family Status   Relation Status    Mother (Not Specified)    Father (Not Specified)        SOCIAL HISTORY      reports that she quit smoking about 22 months ago. She has never used smokeless tobacco. She reports that she does not drink alcohol or use drugs. REVIEW OF SYSTEMS    (2-9 systems for level 4, 10 or more for level 5)     Review of Systems   Constitutional: Positive for chills. Negative for diaphoresis, fatigue and fever. HENT: Negative for congestion and sore throat. Respiratory: Negative for cough and shortness of breath. Cardiovascular: Negative for chest pain. Gastrointestinal: Positive for abdominal pain, nausea and vomiting. Negative for diarrhea. Genitourinary: Negative for dysuria, flank pain, frequency, hematuria and urgency. Musculoskeletal: Negative for arthralgias, back pain, myalgias and neck pain. Skin: Negative for color change, rash and wound. Neurological: Negative for dizziness, light-headedness and headaches. Psychiatric/Behavioral: Negative for suicidal ideas. The patient is nervous/anxious. Except as noted above the remainder of the review of systems was reviewed and negative. PHYSICAL EXAM    (up to 7 for level 4, 8 or more for level 5)     ED Triage Vitals   BP Temp Temp Source Pulse Resp SpO2 Height Weight   07/02/18 0930 07/02/18 0923 07/02/18 0923 07/02/18 0923 07/02/18 0923 07/02/18 0923 07/02/18 0923 07/02/18 0923   (!) 143/111 98.6 °F (37 °C) Oral 58 24 96 % 5' 8\" (1.727 m) 205 lb (93 kg)     Physical Exam   Constitutional: She is oriented to person, place, and time. She appears well-developed and well-nourished. No distress.    HENT:   Mouth/Throat: Oropharynx is clear and moist.   Eyes: Conjunctivae are normal.   Cardiovascular: Normal rate, regular rhythm and normal

## 2018-12-28 ENCOUNTER — APPOINTMENT (OUTPATIENT)
Dept: GENERAL RADIOLOGY | Age: 68
DRG: 603 | End: 2018-12-28
Payer: MEDICARE

## 2018-12-28 ENCOUNTER — HOSPITAL ENCOUNTER (INPATIENT)
Age: 68
LOS: 1 days | Discharge: HOME | DRG: 603 | End: 2018-12-30
Attending: EMERGENCY MEDICINE | Admitting: INTERNAL MEDICINE
Payer: MEDICARE

## 2018-12-28 DIAGNOSIS — I50.9 ACUTE ON CHRONIC CONGESTIVE HEART FAILURE, UNSPECIFIED HEART FAILURE TYPE (HCC): ICD-10-CM

## 2018-12-28 DIAGNOSIS — L03.119 CELLULITIS OF LOWER EXTREMITY, UNSPECIFIED LATERALITY: Primary | ICD-10-CM

## 2018-12-28 DIAGNOSIS — R09.02 HYPOXIA: ICD-10-CM

## 2018-12-28 DIAGNOSIS — R60.0 LEG EDEMA: ICD-10-CM

## 2018-12-28 LAB
% CKMB: 5.6 % (ref 0–3)
ABSOLUTE EOS #: 0.2 K/UL (ref 0–0.4)
ABSOLUTE IMMATURE GRANULOCYTE: ABNORMAL K/UL (ref 0–0.3)
ABSOLUTE LYMPH #: 3.2 K/UL (ref 1–4.8)
ABSOLUTE MONO #: 0.5 K/UL (ref 0.2–0.8)
ANION GAP SERPL CALCULATED.3IONS-SCNC: 13 MMOL/L (ref 9–17)
BASOPHILS # BLD: 1 % (ref 0–2)
BASOPHILS ABSOLUTE: 0.1 K/UL (ref 0–0.2)
BNP INTERPRETATION: ABNORMAL
BUN BLDV-MCNC: 13 MG/DL (ref 8–23)
BUN/CREAT BLD: 15 (ref 9–20)
CALCIUM SERPL-MCNC: 8.8 MG/DL (ref 8.6–10.4)
CHLORIDE BLD-SCNC: 101 MMOL/L (ref 98–107)
CHP ED QC CHECK: NORMAL
CK MB: 2.7 NG/ML
CKMB INTERPRETATION: ABNORMAL
CO2: 26 MMOL/L (ref 20–31)
CREAT SERPL-MCNC: 0.88 MG/DL (ref 0.5–0.9)
DIFFERENTIAL TYPE: ABNORMAL
EKG ATRIAL RATE: 130 BPM
EKG P-R INTERVAL: 112 MS
EKG Q-T INTERVAL: 348 MS
EKG QRS DURATION: 156 MS
EKG QTC CALCULATION (BAZETT): 512 MS
EKG R AXIS: -67 DEGREES
EKG T AXIS: 78 DEGREES
EKG VENTRICULAR RATE: 130 BPM
EOSINOPHILS RELATIVE PERCENT: 2 % (ref 1–4)
GFR AFRICAN AMERICAN: >60 ML/MIN
GFR NON-AFRICAN AMERICAN: >60 ML/MIN
GFR SERPL CREATININE-BSD FRML MDRD: ABNORMAL ML/MIN/{1.73_M2}
GFR SERPL CREATININE-BSD FRML MDRD: ABNORMAL ML/MIN/{1.73_M2}
GLUCOSE BLD-MCNC: 195 MG/DL (ref 70–99)
GLUCOSE BLD-MCNC: 202 MG/DL
GLUCOSE BLD-MCNC: 202 MG/DL (ref 65–105)
HCT VFR BLD CALC: 39.2 % (ref 36–46)
HEMOGLOBIN: 12.9 G/DL (ref 12–16)
IMMATURE GRANULOCYTES: ABNORMAL %
LYMPHOCYTES # BLD: 29 % (ref 24–44)
MAGNESIUM: 1.8 MG/DL (ref 1.6–2.6)
MCH RBC QN AUTO: 26.9 PG (ref 26–34)
MCHC RBC AUTO-ENTMCNC: 32.8 G/DL (ref 31–37)
MCV RBC AUTO: 82.2 FL (ref 80–100)
MONOCYTES # BLD: 5 % (ref 1–7)
MYOGLOBIN: 27 NG/ML (ref 25–58)
NRBC AUTOMATED: ABNORMAL PER 100 WBC
PDW BLD-RTO: 15.1 % (ref 11.5–14.5)
PLATELET # BLD: 314 K/UL (ref 130–400)
PLATELET ESTIMATE: ABNORMAL
PMV BLD AUTO: ABNORMAL FL (ref 6–12)
POTASSIUM SERPL-SCNC: 4.2 MMOL/L (ref 3.7–5.3)
PRO-BNP: 352 PG/ML
RBC # BLD: 4.77 M/UL (ref 4–5.2)
RBC # BLD: ABNORMAL 10*6/UL
SEG NEUTROPHILS: 63 % (ref 36–66)
SEGMENTED NEUTROPHILS ABSOLUTE COUNT: 6.9 K/UL (ref 1.8–7.7)
SODIUM BLD-SCNC: 140 MMOL/L (ref 135–144)
TOTAL CK: 48 U/L (ref 26–192)
TROPONIN INTERP: NORMAL
TROPONIN T: NORMAL NG/ML
TROPONIN, HIGH SENSITIVITY: 14 NG/L (ref 0–14)
WBC # BLD: 10.9 K/UL (ref 3.5–11)
WBC # BLD: ABNORMAL 10*3/UL

## 2018-12-28 PROCEDURE — 82553 CREATINE MB FRACTION: CPT

## 2018-12-28 PROCEDURE — 85610 PROTHROMBIN TIME: CPT

## 2018-12-28 PROCEDURE — 80048 BASIC METABOLIC PNL TOTAL CA: CPT

## 2018-12-28 PROCEDURE — 85379 FIBRIN DEGRADATION QUANT: CPT

## 2018-12-28 PROCEDURE — 99285 EMERGENCY DEPT VISIT HI MDM: CPT

## 2018-12-28 PROCEDURE — 96365 THER/PROPH/DIAG IV INF INIT: CPT

## 2018-12-28 PROCEDURE — 96375 TX/PRO/DX INJ NEW DRUG ADDON: CPT

## 2018-12-28 PROCEDURE — 85730 THROMBOPLASTIN TIME PARTIAL: CPT

## 2018-12-28 PROCEDURE — 6360000002 HC RX W HCPCS: Performed by: EMERGENCY MEDICINE

## 2018-12-28 PROCEDURE — 83735 ASSAY OF MAGNESIUM: CPT

## 2018-12-28 PROCEDURE — 82550 ASSAY OF CK (CPK): CPT

## 2018-12-28 PROCEDURE — 83880 ASSAY OF NATRIURETIC PEPTIDE: CPT

## 2018-12-28 PROCEDURE — 93005 ELECTROCARDIOGRAM TRACING: CPT

## 2018-12-28 PROCEDURE — 84484 ASSAY OF TROPONIN QUANT: CPT

## 2018-12-28 PROCEDURE — 83874 ASSAY OF MYOGLOBIN: CPT

## 2018-12-28 PROCEDURE — 85025 COMPLETE CBC W/AUTO DIFF WBC: CPT

## 2018-12-28 PROCEDURE — 71045 X-RAY EXAM CHEST 1 VIEW: CPT

## 2018-12-28 PROCEDURE — 82947 ASSAY GLUCOSE BLOOD QUANT: CPT

## 2018-12-28 RX ORDER — MORPHINE SULFATE 2 MG/ML
2 INJECTION, SOLUTION INTRAMUSCULAR; INTRAVENOUS ONCE
Status: COMPLETED | OUTPATIENT
Start: 2018-12-28 | End: 2018-12-28

## 2018-12-28 RX ORDER — ONDANSETRON 2 MG/ML
4 INJECTION INTRAMUSCULAR; INTRAVENOUS ONCE
Status: COMPLETED | OUTPATIENT
Start: 2018-12-28 | End: 2018-12-28

## 2018-12-28 RX ORDER — MAGNESIUM SULFATE 1 G/100ML
1 INJECTION INTRAVENOUS ONCE
Status: COMPLETED | OUTPATIENT
Start: 2018-12-28 | End: 2018-12-29

## 2018-12-28 RX ORDER — IPRATROPIUM BROMIDE AND ALBUTEROL SULFATE 2.5; .5 MG/3ML; MG/3ML
1 SOLUTION RESPIRATORY (INHALATION) ONCE
Status: COMPLETED | OUTPATIENT
Start: 2018-12-28 | End: 2018-12-29

## 2018-12-28 RX ORDER — METOPROLOL TARTRATE 5 MG/5ML
2.5 INJECTION INTRAVENOUS ONCE
Status: DISCONTINUED | OUTPATIENT
Start: 2018-12-28 | End: 2018-12-30 | Stop reason: HOSPADM

## 2018-12-28 RX ORDER — FUROSEMIDE 10 MG/ML
40 INJECTION INTRAMUSCULAR; INTRAVENOUS ONCE
Status: DISCONTINUED | OUTPATIENT
Start: 2018-12-28 | End: 2018-12-28

## 2018-12-28 RX ORDER — FUROSEMIDE 10 MG/ML
20 INJECTION INTRAMUSCULAR; INTRAVENOUS ONCE
Status: COMPLETED | OUTPATIENT
Start: 2018-12-28 | End: 2018-12-28

## 2018-12-28 RX ADMIN — FUROSEMIDE 20 MG: 10 INJECTION, SOLUTION INTRAMUSCULAR; INTRAVENOUS at 23:08

## 2018-12-28 RX ADMIN — MAGNESIUM SULFATE HEPTAHYDRATE 1 G: 1 INJECTION, SOLUTION INTRAVENOUS at 23:01

## 2018-12-28 RX ADMIN — ONDANSETRON 4 MG: 2 INJECTION INTRAMUSCULAR; INTRAVENOUS at 23:02

## 2018-12-28 RX ADMIN — MORPHINE SULFATE 2 MG: 2 INJECTION, SOLUTION INTRAMUSCULAR; INTRAVENOUS at 23:03

## 2018-12-28 ASSESSMENT — ENCOUNTER SYMPTOMS
GASTROINTESTINAL NEGATIVE: 1
SHORTNESS OF BREATH: 1

## 2018-12-28 ASSESSMENT — PAIN SCALES - GENERAL
PAINLEVEL_OUTOF10: 8
PAINLEVEL_OUTOF10: 8

## 2018-12-29 ENCOUNTER — APPOINTMENT (OUTPATIENT)
Dept: NUCLEAR MEDICINE | Age: 68
DRG: 603 | End: 2018-12-29
Payer: MEDICARE

## 2018-12-29 PROBLEM — R60.0 BILATERAL LEG EDEMA: Status: ACTIVE | Noted: 2018-12-29

## 2018-12-29 PROBLEM — I50.9 ACUTE ON CHRONIC CONGESTIVE HEART FAILURE (HCC): Status: ACTIVE | Noted: 2018-12-29

## 2018-12-29 PROBLEM — L03.115 CELLULITIS OF BOTH FEET: Status: ACTIVE | Noted: 2018-12-29

## 2018-12-29 PROBLEM — L03.116 CELLULITIS OF BOTH FEET: Status: ACTIVE | Noted: 2018-12-29

## 2018-12-29 LAB
D-DIMER QUANTITATIVE: 0.58 MG/L FEU
GLUCOSE BLD-MCNC: 132 MG/DL (ref 65–105)
GLUCOSE BLD-MCNC: 148 MG/DL (ref 65–105)
GLUCOSE BLD-MCNC: 150 MG/DL (ref 65–105)
GLUCOSE BLD-MCNC: 188 MG/DL (ref 65–105)
INR BLD: 0.9
PARTIAL THROMBOPLASTIN TIME: 27.3 SEC (ref 23–31)
PROTHROMBIN TIME: 9.7 SEC (ref 9.7–11.6)
URIC ACID: 4.9 MG/DL (ref 2.4–5.7)
VITAMIN D 25-HYDROXY: 22.6 NG/ML (ref 30–100)

## 2018-12-29 PROCEDURE — 82947 ASSAY GLUCOSE BLOOD QUANT: CPT

## 2018-12-29 PROCEDURE — 2060000000 HC ICU INTERMEDIATE R&B

## 2018-12-29 PROCEDURE — 78582 LUNG VENTILAT&PERFUS IMAGING: CPT

## 2018-12-29 PROCEDURE — 96366 THER/PROPH/DIAG IV INF ADDON: CPT

## 2018-12-29 PROCEDURE — 96367 TX/PROPH/DG ADDL SEQ IV INF: CPT

## 2018-12-29 PROCEDURE — 87205 SMEAR GRAM STAIN: CPT

## 2018-12-29 PROCEDURE — 6360000002 HC RX W HCPCS: Performed by: EMERGENCY MEDICINE

## 2018-12-29 PROCEDURE — A9540 TC99M MAA: HCPCS | Performed by: EMERGENCY MEDICINE

## 2018-12-29 PROCEDURE — 2580000003 HC RX 258: Performed by: NURSE PRACTITIONER

## 2018-12-29 PROCEDURE — 99223 1ST HOSP IP/OBS HIGH 75: CPT | Performed by: INTERNAL MEDICINE

## 2018-12-29 PROCEDURE — 3430000000 HC RX DIAGNOSTIC RADIOPHARMACEUTICAL: Performed by: EMERGENCY MEDICINE

## 2018-12-29 PROCEDURE — 96376 TX/PRO/DX INJ SAME DRUG ADON: CPT

## 2018-12-29 PROCEDURE — 6360000002 HC RX W HCPCS: Performed by: NURSE PRACTITIONER

## 2018-12-29 PROCEDURE — 6370000000 HC RX 637 (ALT 250 FOR IP): Performed by: INTERNAL MEDICINE

## 2018-12-29 PROCEDURE — 6370000000 HC RX 637 (ALT 250 FOR IP): Performed by: NURSE PRACTITIONER

## 2018-12-29 PROCEDURE — 84550 ASSAY OF BLOOD/URIC ACID: CPT

## 2018-12-29 PROCEDURE — 94640 AIRWAY INHALATION TREATMENT: CPT

## 2018-12-29 PROCEDURE — A9538 TC99M PYROPHOSPHATE: HCPCS | Performed by: EMERGENCY MEDICINE

## 2018-12-29 PROCEDURE — 94760 N-INVAS EAR/PLS OXIMETRY 1: CPT

## 2018-12-29 PROCEDURE — 87040 BLOOD CULTURE FOR BACTERIA: CPT

## 2018-12-29 PROCEDURE — 87070 CULTURE OTHR SPECIMN AEROBIC: CPT

## 2018-12-29 PROCEDURE — 94664 DEMO&/EVAL PT USE INHALER: CPT

## 2018-12-29 PROCEDURE — 82306 VITAMIN D 25 HYDROXY: CPT

## 2018-12-29 PROCEDURE — 6370000000 HC RX 637 (ALT 250 FOR IP): Performed by: EMERGENCY MEDICINE

## 2018-12-29 PROCEDURE — 36415 COLL VENOUS BLD VENIPUNCTURE: CPT

## 2018-12-29 PROCEDURE — 93970 EXTREMITY STUDY: CPT

## 2018-12-29 PROCEDURE — 96372 THER/PROPH/DIAG INJ SC/IM: CPT

## 2018-12-29 PROCEDURE — 2580000003 HC RX 258: Performed by: EMERGENCY MEDICINE

## 2018-12-29 PROCEDURE — 93306 TTE W/DOPPLER COMPLETE: CPT

## 2018-12-29 RX ORDER — ONDANSETRON 4 MG/1
4 TABLET, ORALLY DISINTEGRATING ORAL EVERY 6 HOURS PRN
Status: DISCONTINUED | OUTPATIENT
Start: 2018-12-29 | End: 2018-12-30 | Stop reason: HOSPADM

## 2018-12-29 RX ORDER — ALBUTEROL SULFATE 2.5 MG/3ML
2.5 SOLUTION RESPIRATORY (INHALATION)
Status: DISCONTINUED | OUTPATIENT
Start: 2018-12-29 | End: 2018-12-30 | Stop reason: HOSPADM

## 2018-12-29 RX ORDER — TRAZODONE HYDROCHLORIDE 50 MG/1
50 TABLET ORAL NIGHTLY PRN
Status: DISCONTINUED | OUTPATIENT
Start: 2018-12-29 | End: 2018-12-30 | Stop reason: HOSPADM

## 2018-12-29 RX ORDER — GLIMEPIRIDE 1 MG/1
1 TABLET ORAL
Status: DISCONTINUED | OUTPATIENT
Start: 2018-12-29 | End: 2018-12-30 | Stop reason: HOSPADM

## 2018-12-29 RX ORDER — POTASSIUM CHLORIDE 20 MEQ/1
40 TABLET, EXTENDED RELEASE ORAL PRN
Status: DISCONTINUED | OUTPATIENT
Start: 2018-12-29 | End: 2018-12-30 | Stop reason: HOSPADM

## 2018-12-29 RX ORDER — CLONAZEPAM 0.5 MG/1
1 TABLET ORAL DAILY
Status: DISCONTINUED | OUTPATIENT
Start: 2018-12-29 | End: 2018-12-29

## 2018-12-29 RX ORDER — ONDANSETRON 2 MG/ML
4 INJECTION INTRAMUSCULAR; INTRAVENOUS EVERY 6 HOURS PRN
Status: DISCONTINUED | OUTPATIENT
Start: 2018-12-29 | End: 2018-12-29 | Stop reason: SDUPTHER

## 2018-12-29 RX ORDER — SODIUM CHLORIDE 9 MG/ML
INJECTION, SOLUTION INTRAVENOUS CONTINUOUS
Status: DISCONTINUED | OUTPATIENT
Start: 2018-12-29 | End: 2018-12-29

## 2018-12-29 RX ORDER — MAGNESIUM SULFATE 1 G/100ML
1 INJECTION INTRAVENOUS PRN
Status: DISCONTINUED | OUTPATIENT
Start: 2018-12-29 | End: 2018-12-30 | Stop reason: HOSPADM

## 2018-12-29 RX ORDER — SODIUM CHLORIDE 0.9 % (FLUSH) 0.9 %
10 SYRINGE (ML) INJECTION PRN
Status: DISCONTINUED | OUTPATIENT
Start: 2018-12-29 | End: 2018-12-30 | Stop reason: HOSPADM

## 2018-12-29 RX ORDER — DEXTROSE MONOHYDRATE 25 G/50ML
12.5 INJECTION, SOLUTION INTRAVENOUS PRN
Status: DISCONTINUED | OUTPATIENT
Start: 2018-12-29 | End: 2018-12-30 | Stop reason: HOSPADM

## 2018-12-29 RX ORDER — ACETAMINOPHEN 325 MG/1
650 TABLET ORAL EVERY 4 HOURS PRN
Status: DISCONTINUED | OUTPATIENT
Start: 2018-12-29 | End: 2018-12-30 | Stop reason: HOSPADM

## 2018-12-29 RX ORDER — MORPHINE SULFATE 2 MG/ML
2 INJECTION, SOLUTION INTRAMUSCULAR; INTRAVENOUS
Status: DISCONTINUED | OUTPATIENT
Start: 2018-12-29 | End: 2018-12-30 | Stop reason: HOSPADM

## 2018-12-29 RX ORDER — METOPROLOL TARTRATE 50 MG/1
50 TABLET, FILM COATED ORAL 2 TIMES DAILY
Status: DISCONTINUED | OUTPATIENT
Start: 2018-12-29 | End: 2018-12-30 | Stop reason: HOSPADM

## 2018-12-29 RX ORDER — POTASSIUM CHLORIDE 7.45 MG/ML
10 INJECTION INTRAVENOUS PRN
Status: DISCONTINUED | OUTPATIENT
Start: 2018-12-29 | End: 2018-12-30 | Stop reason: HOSPADM

## 2018-12-29 RX ORDER — DOCUSATE SODIUM 100 MG/1
100 CAPSULE, LIQUID FILLED ORAL 2 TIMES DAILY
Status: DISCONTINUED | OUTPATIENT
Start: 2018-12-29 | End: 2018-12-30 | Stop reason: HOSPADM

## 2018-12-29 RX ORDER — POTASSIUM CHLORIDE 750 MG/1
10 CAPSULE, EXTENDED RELEASE ORAL DAILY
Status: DISCONTINUED | OUTPATIENT
Start: 2018-12-29 | End: 2018-12-30 | Stop reason: HOSPADM

## 2018-12-29 RX ORDER — CLONAZEPAM 0.5 MG/1
1 TABLET ORAL 3 TIMES DAILY PRN
Status: DISCONTINUED | OUTPATIENT
Start: 2018-12-29 | End: 2018-12-30 | Stop reason: HOSPADM

## 2018-12-29 RX ORDER — ONDANSETRON 2 MG/ML
4 INJECTION INTRAMUSCULAR; INTRAVENOUS EVERY 6 HOURS PRN
Status: DISCONTINUED | OUTPATIENT
Start: 2018-12-29 | End: 2018-12-30 | Stop reason: HOSPADM

## 2018-12-29 RX ORDER — CHOLECALCIFEROL (VITAMIN D3) 1250 MCG
1 CAPSULE ORAL
Status: DISCONTINUED | OUTPATIENT
Start: 2018-12-29 | End: 2018-12-29

## 2018-12-29 RX ORDER — ALBUTEROL SULFATE 2.5 MG/3ML
2.5 SOLUTION RESPIRATORY (INHALATION) EVERY 6 HOURS PRN
Status: DISCONTINUED | OUTPATIENT
Start: 2018-12-29 | End: 2018-12-30 | Stop reason: HOSPADM

## 2018-12-29 RX ORDER — ASPIRIN 81 MG/1
81 TABLET ORAL DAILY
Status: DISCONTINUED | OUTPATIENT
Start: 2018-12-29 | End: 2018-12-30 | Stop reason: HOSPADM

## 2018-12-29 RX ORDER — DEXTROSE MONOHYDRATE 50 MG/ML
100 INJECTION, SOLUTION INTRAVENOUS PRN
Status: DISCONTINUED | OUTPATIENT
Start: 2018-12-29 | End: 2018-12-30 | Stop reason: HOSPADM

## 2018-12-29 RX ORDER — POTASSIUM CHLORIDE 20MEQ/15ML
40 LIQUID (ML) ORAL PRN
Status: DISCONTINUED | OUTPATIENT
Start: 2018-12-29 | End: 2018-12-30 | Stop reason: HOSPADM

## 2018-12-29 RX ORDER — FUROSEMIDE 40 MG/1
40 TABLET ORAL 2 TIMES DAILY
Status: DISCONTINUED | OUTPATIENT
Start: 2018-12-29 | End: 2018-12-30 | Stop reason: HOSPADM

## 2018-12-29 RX ORDER — OXYCODONE HYDROCHLORIDE AND ACETAMINOPHEN 5; 325 MG/1; MG/1
1 TABLET ORAL EVERY 6 HOURS PRN
Status: DISCONTINUED | OUTPATIENT
Start: 2018-12-29 | End: 2018-12-30 | Stop reason: HOSPADM

## 2018-12-29 RX ORDER — PANTOPRAZOLE SODIUM 40 MG/1
40 TABLET, DELAYED RELEASE ORAL
Status: DISCONTINUED | OUTPATIENT
Start: 2018-12-29 | End: 2018-12-30 | Stop reason: HOSPADM

## 2018-12-29 RX ORDER — NICOTINE POLACRILEX 4 MG
15 LOZENGE BUCCAL PRN
Status: DISCONTINUED | OUTPATIENT
Start: 2018-12-29 | End: 2018-12-30 | Stop reason: HOSPADM

## 2018-12-29 RX ORDER — MORPHINE SULFATE 4 MG/ML
4 INJECTION, SOLUTION INTRAMUSCULAR; INTRAVENOUS
Status: DISCONTINUED | OUTPATIENT
Start: 2018-12-29 | End: 2018-12-30 | Stop reason: HOSPADM

## 2018-12-29 RX ORDER — FUROSEMIDE 40 MG/1
40 TABLET ORAL DAILY
Status: DISCONTINUED | OUTPATIENT
Start: 2018-12-29 | End: 2018-12-29

## 2018-12-29 RX ORDER — DOXYCYCLINE 100 MG/1
100 CAPSULE ORAL EVERY 12 HOURS SCHEDULED
Status: DISCONTINUED | OUTPATIENT
Start: 2018-12-29 | End: 2018-12-30 | Stop reason: HOSPADM

## 2018-12-29 RX ORDER — BISACODYL 10 MG
10 SUPPOSITORY, RECTAL RECTAL DAILY PRN
Status: DISCONTINUED | OUTPATIENT
Start: 2018-12-29 | End: 2018-12-30 | Stop reason: HOSPADM

## 2018-12-29 RX ORDER — SODIUM CHLORIDE 0.9 % (FLUSH) 0.9 %
10 SYRINGE (ML) INJECTION EVERY 12 HOURS SCHEDULED
Status: DISCONTINUED | OUTPATIENT
Start: 2018-12-29 | End: 2018-12-30 | Stop reason: HOSPADM

## 2018-12-29 RX ORDER — NICOTINE 21 MG/24HR
1 PATCH, TRANSDERMAL 24 HOURS TRANSDERMAL DAILY PRN
Status: DISCONTINUED | OUTPATIENT
Start: 2018-12-29 | End: 2018-12-30 | Stop reason: HOSPADM

## 2018-12-29 RX ORDER — OXYCODONE HYDROCHLORIDE AND ACETAMINOPHEN 5; 325 MG/1; MG/1
1 TABLET ORAL EVERY 6 HOURS
Status: DISCONTINUED | OUTPATIENT
Start: 2018-12-29 | End: 2018-12-29

## 2018-12-29 RX ORDER — ESTRADIOL 1 MG/1
0.5 TABLET ORAL DAILY
Status: DISCONTINUED | OUTPATIENT
Start: 2018-12-29 | End: 2018-12-30 | Stop reason: HOSPADM

## 2018-12-29 RX ORDER — GLIMEPIRIDE 2 MG/1
2 TABLET ORAL
Status: DISCONTINUED | OUTPATIENT
Start: 2018-12-29 | End: 2018-12-30 | Stop reason: HOSPADM

## 2018-12-29 RX ADMIN — INSULIN LISPRO 1 UNITS: 100 INJECTION, SOLUTION INTRAVENOUS; SUBCUTANEOUS at 13:09

## 2018-12-29 RX ADMIN — FUROSEMIDE 40 MG: 40 TABLET ORAL at 09:23

## 2018-12-29 RX ADMIN — INSULIN LISPRO 1 UNITS: 100 INJECTION, SOLUTION INTRAVENOUS; SUBCUTANEOUS at 17:46

## 2018-12-29 RX ADMIN — METFORMIN HYDROCHLORIDE 500 MG: 500 TABLET ORAL at 09:23

## 2018-12-29 RX ADMIN — CLONAZEPAM 1 MG: 0.5 TABLET ORAL at 22:52

## 2018-12-29 RX ADMIN — MOMETASONE FUROATE AND FORMOTEROL FUMARATE DIHYDRATE 2 PUFF: 200; 5 AEROSOL RESPIRATORY (INHALATION) at 19:57

## 2018-12-29 RX ADMIN — Medication 8.5 MILLICURIE: at 03:01

## 2018-12-29 RX ADMIN — DOXYCYCLINE 100 MG: 100 CAPSULE ORAL at 12:46

## 2018-12-29 RX ADMIN — GABAPENTIN 400 MG: 100 CAPSULE ORAL at 20:38

## 2018-12-29 RX ADMIN — Medication 35.8 MILLICURIE: at 02:39

## 2018-12-29 RX ADMIN — SODIUM CHLORIDE: 9 INJECTION, SOLUTION INTRAVENOUS at 05:15

## 2018-12-29 RX ADMIN — METFORMIN HYDROCHLORIDE 500 MG: 500 TABLET ORAL at 20:38

## 2018-12-29 RX ADMIN — ASPIRIN 81 MG: 81 TABLET, COATED ORAL at 09:29

## 2018-12-29 RX ADMIN — OXYCODONE AND ACETAMINOPHEN 1 TABLET: 5; 325 TABLET ORAL at 12:47

## 2018-12-29 RX ADMIN — MORPHINE SULFATE 2 MG: 2 INJECTION, SOLUTION INTRAMUSCULAR; INTRAVENOUS at 09:32

## 2018-12-29 RX ADMIN — METOPROLOL TARTRATE 12.5 MG: 25 TABLET, FILM COATED ORAL at 09:48

## 2018-12-29 RX ADMIN — OXYCODONE AND ACETAMINOPHEN 1 TABLET: 5; 325 TABLET ORAL at 20:38

## 2018-12-29 RX ADMIN — MORPHINE SULFATE 4 MG: 4 INJECTION INTRAVENOUS at 05:43

## 2018-12-29 RX ADMIN — ENOXAPARIN SODIUM 40 MG: 40 INJECTION SUBCUTANEOUS at 09:23

## 2018-12-29 RX ADMIN — GABAPENTIN 400 MG: 100 CAPSULE ORAL at 09:23

## 2018-12-29 RX ADMIN — SODIUM CHLORIDE 3 G: 9 INJECTION, SOLUTION INTRAVENOUS at 06:56

## 2018-12-29 RX ADMIN — INSULIN LISPRO 1 UNITS: 100 INJECTION, SOLUTION INTRAVENOUS; SUBCUTANEOUS at 20:41

## 2018-12-29 RX ADMIN — VANCOMYCIN HYDROCHLORIDE 1500 MG: 1 INJECTION, POWDER, LYOPHILIZED, FOR SOLUTION INTRAVENOUS at 04:55

## 2018-12-29 RX ADMIN — ESTRADIOL 0.5 MG: 1 TABLET ORAL at 09:29

## 2018-12-29 RX ADMIN — METOPROLOL TARTRATE 37.5 MG: 25 TABLET ORAL at 09:23

## 2018-12-29 RX ADMIN — GLIMEPIRIDE 2 MG: 2 TABLET ORAL at 09:29

## 2018-12-29 RX ADMIN — PANTOPRAZOLE SODIUM 40 MG: 40 TABLET, DELAYED RELEASE ORAL at 07:24

## 2018-12-29 RX ADMIN — POTASSIUM CHLORIDE 10 MEQ: 750 CAPSULE, EXTENDED RELEASE ORAL at 09:23

## 2018-12-29 RX ADMIN — GLIMEPIRIDE 1 MG: 1 TABLET ORAL at 17:46

## 2018-12-29 RX ADMIN — Medication 10 ML: at 20:39

## 2018-12-29 RX ADMIN — IPRATROPIUM BROMIDE AND ALBUTEROL SULFATE 1 AMPULE: .5; 3 SOLUTION RESPIRATORY (INHALATION) at 00:15

## 2018-12-29 RX ADMIN — CLONAZEPAM 1 MG: 0.5 TABLET ORAL at 09:23

## 2018-12-29 RX ADMIN — DOXYCYCLINE 100 MG: 100 CAPSULE ORAL at 20:38

## 2018-12-29 RX ADMIN — ACETAMINOPHEN 650 MG: 325 TABLET ORAL at 05:38

## 2018-12-29 RX ADMIN — FUROSEMIDE 40 MG: 40 TABLET ORAL at 17:46

## 2018-12-29 RX ADMIN — METOPROLOL TARTRATE 50 MG: 50 TABLET ORAL at 20:38

## 2018-12-29 ASSESSMENT — PAIN SCALES - GENERAL
PAINLEVEL_OUTOF10: 8
PAINLEVEL_OUTOF10: 9
PAINLEVEL_OUTOF10: 3
PAINLEVEL_OUTOF10: 8
PAINLEVEL_OUTOF10: 6
PAINLEVEL_OUTOF10: 9
PAINLEVEL_OUTOF10: 6
PAINLEVEL_OUTOF10: 8

## 2018-12-29 ASSESSMENT — PAIN DESCRIPTION - LOCATION
LOCATION: LEG
LOCATION: HEAD

## 2018-12-29 ASSESSMENT — PAIN DESCRIPTION - PROGRESSION: CLINICAL_PROGRESSION: GRADUALLY IMPROVING

## 2018-12-29 ASSESSMENT — PAIN DESCRIPTION - PAIN TYPE
TYPE: ACUTE PAIN
TYPE: ACUTE PAIN

## 2018-12-29 ASSESSMENT — PAIN DESCRIPTION - ORIENTATION: ORIENTATION: RIGHT;LEFT

## 2018-12-30 VITALS
SYSTOLIC BLOOD PRESSURE: 138 MMHG | RESPIRATION RATE: 18 BRPM | HEART RATE: 81 BPM | BODY MASS INDEX: 29.52 KG/M2 | HEIGHT: 69 IN | DIASTOLIC BLOOD PRESSURE: 70 MMHG | TEMPERATURE: 97.9 F | WEIGHT: 199.3 LBS | OXYGEN SATURATION: 95 %

## 2018-12-30 LAB
ANION GAP SERPL CALCULATED.3IONS-SCNC: 10 MMOL/L (ref 9–17)
BUN BLDV-MCNC: 15 MG/DL (ref 8–23)
BUN/CREAT BLD: 15 (ref 9–20)
CALCIUM SERPL-MCNC: 8.9 MG/DL (ref 8.6–10.4)
CHLORIDE BLD-SCNC: 98 MMOL/L (ref 98–107)
CO2: 34 MMOL/L (ref 20–31)
CREAT SERPL-MCNC: 1.03 MG/DL (ref 0.5–0.9)
GFR AFRICAN AMERICAN: >60 ML/MIN
GFR NON-AFRICAN AMERICAN: 53 ML/MIN
GFR SERPL CREATININE-BSD FRML MDRD: ABNORMAL ML/MIN/{1.73_M2}
GFR SERPL CREATININE-BSD FRML MDRD: ABNORMAL ML/MIN/{1.73_M2}
GLUCOSE BLD-MCNC: 112 MG/DL (ref 70–99)
GLUCOSE BLD-MCNC: 97 MG/DL (ref 65–105)
HCT VFR BLD CALC: 42.7 % (ref 36–46)
HEMOGLOBIN: 13.9 G/DL (ref 12–16)
MCH RBC QN AUTO: 26.5 PG (ref 26–34)
MCHC RBC AUTO-ENTMCNC: 32.5 G/DL (ref 31–37)
MCV RBC AUTO: 81.5 FL (ref 80–100)
NRBC AUTOMATED: ABNORMAL PER 100 WBC
PDW BLD-RTO: 15.1 % (ref 11.5–14.5)
PLATELET # BLD: 339 K/UL (ref 130–400)
PMV BLD AUTO: ABNORMAL FL (ref 6–12)
POTASSIUM SERPL-SCNC: 4.4 MMOL/L (ref 3.7–5.3)
RBC # BLD: 5.24 M/UL (ref 4–5.2)
SODIUM BLD-SCNC: 142 MMOL/L (ref 135–144)
WBC # BLD: 12.1 K/UL (ref 3.5–11)

## 2018-12-30 PROCEDURE — 6360000002 HC RX W HCPCS: Performed by: NURSE PRACTITIONER

## 2018-12-30 PROCEDURE — 36415 COLL VENOUS BLD VENIPUNCTURE: CPT

## 2018-12-30 PROCEDURE — 80048 BASIC METABOLIC PNL TOTAL CA: CPT

## 2018-12-30 PROCEDURE — 6370000000 HC RX 637 (ALT 250 FOR IP): Performed by: NURSE PRACTITIONER

## 2018-12-30 PROCEDURE — 85027 COMPLETE CBC AUTOMATED: CPT

## 2018-12-30 PROCEDURE — 6370000000 HC RX 637 (ALT 250 FOR IP): Performed by: INTERNAL MEDICINE

## 2018-12-30 PROCEDURE — 96372 THER/PROPH/DIAG INJ SC/IM: CPT

## 2018-12-30 PROCEDURE — 94640 AIRWAY INHALATION TREATMENT: CPT

## 2018-12-30 PROCEDURE — 99232 SBSQ HOSP IP/OBS MODERATE 35: CPT | Performed by: INTERNAL MEDICINE

## 2018-12-30 PROCEDURE — 82947 ASSAY GLUCOSE BLOOD QUANT: CPT

## 2018-12-30 RX ORDER — ERGOCALCIFEROL (VITAMIN D2) 1250 MCG
50000 CAPSULE ORAL WEEKLY
Qty: 4 CAPSULE | Refills: 0 | Status: ON HOLD | OUTPATIENT
Start: 2018-12-30 | End: 2020-08-28

## 2018-12-30 RX ORDER — ERGOCALCIFEROL 1.25 MG/1
50000 CAPSULE ORAL WEEKLY
Status: DISCONTINUED | OUTPATIENT
Start: 2018-12-30 | End: 2018-12-30 | Stop reason: HOSPADM

## 2018-12-30 RX ORDER — DOXYCYCLINE HYCLATE 100 MG/1
100 CAPSULE ORAL 2 TIMES DAILY
Qty: 14 CAPSULE | Refills: 0 | Status: SHIPPED | OUTPATIENT
Start: 2018-12-30 | End: 2019-01-06

## 2018-12-30 RX ORDER — GUAIFENESIN/DEXTROMETHORPHAN 100-10MG/5
10 SYRUP ORAL EVERY 4 HOURS PRN
Status: DISCONTINUED | OUTPATIENT
Start: 2018-12-30 | End: 2018-12-30 | Stop reason: HOSPADM

## 2018-12-30 RX ORDER — NICOTINE 21 MG/24HR
1 PATCH, TRANSDERMAL 24 HOURS TRANSDERMAL DAILY
Qty: 30 PATCH | Refills: 0 | Status: ON HOLD | OUTPATIENT
Start: 2018-12-30 | End: 2020-08-28

## 2018-12-30 RX ORDER — GUAIFENESIN 100 MG/5ML
10 SYRUP ORAL EVERY 4 HOURS PRN
Qty: 236 ML | Refills: 0 | Status: ON HOLD | OUTPATIENT
Start: 2018-12-30 | End: 2020-08-28

## 2018-12-30 RX ADMIN — ASPIRIN 81 MG: 81 TABLET, COATED ORAL at 09:13

## 2018-12-30 RX ADMIN — OXYCODONE AND ACETAMINOPHEN 1 TABLET: 5; 325 TABLET ORAL at 03:27

## 2018-12-30 RX ADMIN — PANTOPRAZOLE SODIUM 40 MG: 40 TABLET, DELAYED RELEASE ORAL at 06:03

## 2018-12-30 RX ADMIN — METOPROLOL TARTRATE 50 MG: 50 TABLET ORAL at 09:12

## 2018-12-30 RX ADMIN — ERGOCALCIFEROL 50000 UNITS: 1.25 CAPSULE ORAL at 09:13

## 2018-12-30 RX ADMIN — GABAPENTIN 400 MG: 100 CAPSULE ORAL at 09:12

## 2018-12-30 RX ADMIN — FUROSEMIDE 40 MG: 40 TABLET ORAL at 09:12

## 2018-12-30 RX ADMIN — ESTRADIOL 0.5 MG: 1 TABLET ORAL at 09:12

## 2018-12-30 RX ADMIN — POTASSIUM CHLORIDE 10 MEQ: 750 CAPSULE, EXTENDED RELEASE ORAL at 09:13

## 2018-12-30 RX ADMIN — MOMETASONE FUROATE AND FORMOTEROL FUMARATE DIHYDRATE 2 PUFF: 200; 5 AEROSOL RESPIRATORY (INHALATION) at 10:03

## 2018-12-30 RX ADMIN — METFORMIN HYDROCHLORIDE 500 MG: 500 TABLET ORAL at 09:12

## 2018-12-30 RX ADMIN — GLIMEPIRIDE 2 MG: 2 TABLET ORAL at 09:18

## 2018-12-30 RX ADMIN — DOXYCYCLINE 100 MG: 100 CAPSULE ORAL at 09:13

## 2018-12-30 RX ADMIN — ENOXAPARIN SODIUM 40 MG: 40 INJECTION SUBCUTANEOUS at 09:13

## 2018-12-30 ASSESSMENT — PAIN SCALES - GENERAL: PAINLEVEL_OUTOF10: 6

## 2019-01-04 LAB
CULTURE: NORMAL
CULTURE: NORMAL
Lab: NORMAL
Lab: NORMAL
SPECIMEN DESCRIPTION: NORMAL
SPECIMEN DESCRIPTION: NORMAL
STATUS: NORMAL
STATUS: NORMAL

## 2019-01-24 ENCOUNTER — TELEPHONE (OUTPATIENT)
Dept: OTHER | Facility: CLINIC | Age: 69
End: 2019-01-24

## 2019-01-24 ENCOUNTER — APPOINTMENT (OUTPATIENT)
Dept: GENERAL RADIOLOGY | Age: 69
DRG: 603 | End: 2019-01-24
Payer: MEDICARE

## 2019-01-24 ENCOUNTER — HOSPITAL ENCOUNTER (INPATIENT)
Age: 69
LOS: 2 days | Discharge: HOME HEALTH | DRG: 603 | End: 2019-01-26
Attending: EMERGENCY MEDICINE | Admitting: FAMILY MEDICINE
Payer: MEDICARE

## 2019-01-24 DIAGNOSIS — L03.115 CELLULITIS OF BOTH FEET: Primary | ICD-10-CM

## 2019-01-24 DIAGNOSIS — L03.116 CELLULITIS OF BOTH FEET: Primary | ICD-10-CM

## 2019-01-24 PROBLEM — L03.90 CELLULITIS: Status: ACTIVE | Noted: 2019-01-24

## 2019-01-24 LAB
ABSOLUTE EOS #: 0.3 K/UL (ref 0–0.4)
ABSOLUTE IMMATURE GRANULOCYTE: ABNORMAL K/UL (ref 0–0.3)
ABSOLUTE LYMPH #: 3.2 K/UL (ref 1–4.8)
ABSOLUTE MONO #: 0.7 K/UL (ref 0.2–0.8)
ALBUMIN SERPL-MCNC: 4.2 G/DL (ref 3.5–5.2)
ALBUMIN/GLOBULIN RATIO: ABNORMAL (ref 1–2.5)
ALP BLD-CCNC: 36 U/L (ref 35–104)
ALT SERPL-CCNC: 16 U/L (ref 5–33)
ANION GAP SERPL CALCULATED.3IONS-SCNC: 11 MMOL/L (ref 9–17)
AST SERPL-CCNC: 22 U/L
BASOPHILS # BLD: 0 % (ref 0–2)
BASOPHILS ABSOLUTE: 0 K/UL (ref 0–0.2)
BILIRUB SERPL-MCNC: 0.1 MG/DL (ref 0.3–1.2)
BUN BLDV-MCNC: 11 MG/DL (ref 8–23)
BUN/CREAT BLD: 11 (ref 9–20)
CALCIUM SERPL-MCNC: 9.3 MG/DL (ref 8.6–10.4)
CHLORIDE BLD-SCNC: 99 MMOL/L (ref 98–107)
CO2: 30 MMOL/L (ref 20–31)
CREAT SERPL-MCNC: 0.96 MG/DL (ref 0.5–0.9)
DIFFERENTIAL TYPE: ABNORMAL
EKG ATRIAL RATE: 96 BPM
EKG P AXIS: 73 DEGREES
EKG P-R INTERVAL: 172 MS
EKG Q-T INTERVAL: 398 MS
EKG QRS DURATION: 154 MS
EKG QTC CALCULATION (BAZETT): 502 MS
EKG R AXIS: -55 DEGREES
EKG T AXIS: 52 DEGREES
EKG VENTRICULAR RATE: 96 BPM
EOSINOPHILS RELATIVE PERCENT: 3 % (ref 1–4)
GFR AFRICAN AMERICAN: >60 ML/MIN
GFR NON-AFRICAN AMERICAN: 58 ML/MIN
GFR SERPL CREATININE-BSD FRML MDRD: ABNORMAL ML/MIN/{1.73_M2}
GFR SERPL CREATININE-BSD FRML MDRD: ABNORMAL ML/MIN/{1.73_M2}
GLUCOSE BLD-MCNC: 51 MG/DL (ref 65–105)
GLUCOSE BLD-MCNC: 67 MG/DL (ref 70–99)
GLUCOSE BLD-MCNC: 68 MG/DL (ref 65–105)
HCT VFR BLD CALC: 43 % (ref 36–46)
HEMOGLOBIN: 13.9 G/DL (ref 12–16)
IMMATURE GRANULOCYTES: ABNORMAL %
LACTIC ACID: 1.1 MMOL/L (ref 0.5–2.2)
LYMPHOCYTES # BLD: 29 % (ref 24–44)
MCH RBC QN AUTO: 26.2 PG (ref 26–34)
MCHC RBC AUTO-ENTMCNC: 32.4 G/DL (ref 31–37)
MCV RBC AUTO: 80.8 FL (ref 80–100)
MONOCYTES # BLD: 6 % (ref 1–7)
NRBC AUTOMATED: ABNORMAL PER 100 WBC
PDW BLD-RTO: 16.7 % (ref 11.5–14.5)
PLATELET # BLD: 335 K/UL (ref 130–400)
PLATELET ESTIMATE: ABNORMAL
PMV BLD AUTO: 7.7 FL (ref 6–12)
POTASSIUM SERPL-SCNC: 4.6 MMOL/L (ref 3.7–5.3)
RBC # BLD: 5.32 M/UL (ref 4–5.2)
RBC # BLD: ABNORMAL 10*6/UL
SEG NEUTROPHILS: 62 % (ref 36–66)
SEGMENTED NEUTROPHILS ABSOLUTE COUNT: 7 K/UL (ref 1.8–7.7)
SODIUM BLD-SCNC: 140 MMOL/L (ref 135–144)
TOTAL PROTEIN: 7.5 G/DL (ref 6.4–8.3)
WBC # BLD: 11.2 K/UL (ref 3.5–11)
WBC # BLD: ABNORMAL 10*3/UL

## 2019-01-24 PROCEDURE — 83036 HEMOGLOBIN GLYCOSYLATED A1C: CPT

## 2019-01-24 PROCEDURE — 85025 COMPLETE CBC W/AUTO DIFF WBC: CPT

## 2019-01-24 PROCEDURE — 36415 COLL VENOUS BLD VENIPUNCTURE: CPT

## 2019-01-24 PROCEDURE — 80053 COMPREHEN METABOLIC PANEL: CPT

## 2019-01-24 PROCEDURE — 6360000002 HC RX W HCPCS: Performed by: EMERGENCY MEDICINE

## 2019-01-24 PROCEDURE — 83605 ASSAY OF LACTIC ACID: CPT

## 2019-01-24 PROCEDURE — 96365 THER/PROPH/DIAG IV INF INIT: CPT

## 2019-01-24 PROCEDURE — 93005 ELECTROCARDIOGRAM TRACING: CPT

## 2019-01-24 PROCEDURE — 73630 X-RAY EXAM OF FOOT: CPT

## 2019-01-24 PROCEDURE — 6370000000 HC RX 637 (ALT 250 FOR IP): Performed by: NURSE PRACTITIONER

## 2019-01-24 PROCEDURE — 87040 BLOOD CULTURE FOR BACTERIA: CPT

## 2019-01-24 PROCEDURE — 96375 TX/PRO/DX INJ NEW DRUG ADDON: CPT

## 2019-01-24 PROCEDURE — 1200000000 HC SEMI PRIVATE

## 2019-01-24 PROCEDURE — 99284 EMERGENCY DEPT VISIT MOD MDM: CPT

## 2019-01-24 PROCEDURE — 82947 ASSAY GLUCOSE BLOOD QUANT: CPT

## 2019-01-24 RX ORDER — POTASSIUM CHLORIDE 20 MEQ/1
40 TABLET, EXTENDED RELEASE ORAL PRN
Status: DISCONTINUED | OUTPATIENT
Start: 2019-01-24 | End: 2019-01-26 | Stop reason: HOSPADM

## 2019-01-24 RX ORDER — DOCUSATE SODIUM 100 MG/1
100 CAPSULE, LIQUID FILLED ORAL 2 TIMES DAILY PRN
Status: DISCONTINUED | OUTPATIENT
Start: 2019-01-24 | End: 2019-01-26 | Stop reason: HOSPADM

## 2019-01-24 RX ORDER — CEFAZOLIN SODIUM 1 G/50ML
1 INJECTION, SOLUTION INTRAVENOUS ONCE
Status: COMPLETED | OUTPATIENT
Start: 2019-01-24 | End: 2019-01-24

## 2019-01-24 RX ORDER — FUROSEMIDE 40 MG/1
40 TABLET ORAL DAILY
Status: DISCONTINUED | OUTPATIENT
Start: 2019-01-25 | End: 2019-01-26 | Stop reason: HOSPADM

## 2019-01-24 RX ORDER — POTASSIUM CHLORIDE 7.45 MG/ML
10 INJECTION INTRAVENOUS PRN
Status: DISCONTINUED | OUTPATIENT
Start: 2019-01-24 | End: 2019-01-26 | Stop reason: HOSPADM

## 2019-01-24 RX ORDER — ONDANSETRON 2 MG/ML
4 INJECTION INTRAMUSCULAR; INTRAVENOUS EVERY 6 HOURS PRN
Status: DISCONTINUED | OUTPATIENT
Start: 2019-01-24 | End: 2019-01-24 | Stop reason: SDUPTHER

## 2019-01-24 RX ORDER — MORPHINE SULFATE 4 MG/ML
4 INJECTION, SOLUTION INTRAMUSCULAR; INTRAVENOUS ONCE
Status: COMPLETED | OUTPATIENT
Start: 2019-01-24 | End: 2019-01-24

## 2019-01-24 RX ORDER — ESTRADIOL 1 MG/1
0.5 TABLET ORAL DAILY
Status: DISCONTINUED | OUTPATIENT
Start: 2019-01-25 | End: 2019-01-26 | Stop reason: HOSPADM

## 2019-01-24 RX ORDER — ACETAMINOPHEN 325 MG/1
650 TABLET ORAL EVERY 4 HOURS PRN
Status: DISCONTINUED | OUTPATIENT
Start: 2019-01-24 | End: 2019-01-26 | Stop reason: HOSPADM

## 2019-01-24 RX ORDER — CLONAZEPAM 0.5 MG/1
1 TABLET ORAL DAILY
Status: DISCONTINUED | OUTPATIENT
Start: 2019-01-25 | End: 2019-01-26 | Stop reason: HOSPADM

## 2019-01-24 RX ORDER — GABAPENTIN 400 MG/1
400 CAPSULE ORAL 2 TIMES DAILY
Status: DISCONTINUED | OUTPATIENT
Start: 2019-01-24 | End: 2019-01-26 | Stop reason: HOSPADM

## 2019-01-24 RX ORDER — DEXTROSE MONOHYDRATE 50 MG/ML
100 INJECTION, SOLUTION INTRAVENOUS PRN
Status: DISCONTINUED | OUTPATIENT
Start: 2019-01-24 | End: 2019-01-26 | Stop reason: HOSPADM

## 2019-01-24 RX ORDER — BISACODYL 10 MG
10 SUPPOSITORY, RECTAL RECTAL DAILY PRN
Status: DISCONTINUED | OUTPATIENT
Start: 2019-01-24 | End: 2019-01-26 | Stop reason: HOSPADM

## 2019-01-24 RX ORDER — ALBUTEROL SULFATE 2.5 MG/3ML
2.5 SOLUTION RESPIRATORY (INHALATION)
Status: DISCONTINUED | OUTPATIENT
Start: 2019-01-24 | End: 2019-01-24

## 2019-01-24 RX ORDER — POTASSIUM CHLORIDE 20MEQ/15ML
40 LIQUID (ML) ORAL PRN
Status: DISCONTINUED | OUTPATIENT
Start: 2019-01-24 | End: 2019-01-26 | Stop reason: HOSPADM

## 2019-01-24 RX ORDER — NICOTINE 21 MG/24HR
1 PATCH, TRANSDERMAL 24 HOURS TRANSDERMAL DAILY PRN
Status: DISCONTINUED | OUTPATIENT
Start: 2019-01-24 | End: 2019-01-26 | Stop reason: HOSPADM

## 2019-01-24 RX ORDER — CEFAZOLIN SODIUM 1 G/50ML
1 INJECTION, SOLUTION INTRAVENOUS EVERY 8 HOURS
Status: DISCONTINUED | OUTPATIENT
Start: 2019-01-25 | End: 2019-01-26 | Stop reason: HOSPADM

## 2019-01-24 RX ORDER — MAGNESIUM SULFATE 1 G/100ML
1 INJECTION INTRAVENOUS PRN
Status: DISCONTINUED | OUTPATIENT
Start: 2019-01-24 | End: 2019-01-26 | Stop reason: HOSPADM

## 2019-01-24 RX ORDER — ONDANSETRON 4 MG/1
4 TABLET, ORALLY DISINTEGRATING ORAL EVERY 6 HOURS PRN
Status: DISCONTINUED | OUTPATIENT
Start: 2019-01-24 | End: 2019-01-26 | Stop reason: HOSPADM

## 2019-01-24 RX ORDER — NICOTINE POLACRILEX 4 MG
15 LOZENGE BUCCAL PRN
Status: DISCONTINUED | OUTPATIENT
Start: 2019-01-24 | End: 2019-01-26 | Stop reason: HOSPADM

## 2019-01-24 RX ORDER — SODIUM CHLORIDE 0.9 % (FLUSH) 0.9 %
10 SYRINGE (ML) INJECTION EVERY 12 HOURS SCHEDULED
Status: DISCONTINUED | OUTPATIENT
Start: 2019-01-24 | End: 2019-01-26 | Stop reason: HOSPADM

## 2019-01-24 RX ORDER — ALBUTEROL SULFATE 2.5 MG/3ML
2.5 SOLUTION RESPIRATORY (INHALATION) EVERY 6 HOURS PRN
Status: DISCONTINUED | OUTPATIENT
Start: 2019-01-24 | End: 2019-01-26 | Stop reason: HOSPADM

## 2019-01-24 RX ORDER — SODIUM CHLORIDE 9 MG/ML
INJECTION, SOLUTION INTRAVENOUS CONTINUOUS
Status: DISCONTINUED | OUTPATIENT
Start: 2019-01-24 | End: 2019-01-25

## 2019-01-24 RX ORDER — PANTOPRAZOLE SODIUM 40 MG/1
40 TABLET, DELAYED RELEASE ORAL
Status: DISCONTINUED | OUTPATIENT
Start: 2019-01-25 | End: 2019-01-26 | Stop reason: HOSPADM

## 2019-01-24 RX ORDER — ASPIRIN 81 MG/1
81 TABLET ORAL DAILY
Status: DISCONTINUED | OUTPATIENT
Start: 2019-01-25 | End: 2019-01-26 | Stop reason: HOSPADM

## 2019-01-24 RX ORDER — ONDANSETRON 2 MG/ML
4 INJECTION INTRAMUSCULAR; INTRAVENOUS EVERY 6 HOURS PRN
Status: DISCONTINUED | OUTPATIENT
Start: 2019-01-24 | End: 2019-01-26 | Stop reason: HOSPADM

## 2019-01-24 RX ORDER — DEXTROSE MONOHYDRATE 25 G/50ML
12.5 INJECTION, SOLUTION INTRAVENOUS PRN
Status: DISCONTINUED | OUTPATIENT
Start: 2019-01-24 | End: 2019-01-26 | Stop reason: HOSPADM

## 2019-01-24 RX ORDER — SODIUM CHLORIDE 0.9 % (FLUSH) 0.9 %
10 SYRINGE (ML) INJECTION PRN
Status: DISCONTINUED | OUTPATIENT
Start: 2019-01-24 | End: 2019-01-26 | Stop reason: HOSPADM

## 2019-01-24 RX ADMIN — METOPROLOL TARTRATE 37.5 MG: 25 TABLET ORAL at 23:57

## 2019-01-24 RX ADMIN — GABAPENTIN 400 MG: 400 CAPSULE ORAL at 23:58

## 2019-01-24 RX ADMIN — CEFAZOLIN SODIUM 1 G: 1 INJECTION, SOLUTION INTRAVENOUS at 20:36

## 2019-01-24 RX ADMIN — MORPHINE SULFATE 4 MG: 4 INJECTION, SOLUTION INTRAMUSCULAR; INTRAVENOUS at 20:35

## 2019-01-24 ASSESSMENT — PAIN DESCRIPTION - PAIN TYPE: TYPE: ACUTE PAIN

## 2019-01-24 ASSESSMENT — PAIN DESCRIPTION - ORIENTATION: ORIENTATION: RIGHT;LEFT;LOWER

## 2019-01-24 ASSESSMENT — ENCOUNTER SYMPTOMS
VOMITING: 0
COLOR CHANGE: 1

## 2019-01-24 ASSESSMENT — PAIN DESCRIPTION - DESCRIPTORS: DESCRIPTORS: ACHING;THROBBING;TIGHTNESS

## 2019-01-24 ASSESSMENT — PAIN SCALES - GENERAL
PAINLEVEL_OUTOF10: 10
PAINLEVEL_OUTOF10: 10
PAINLEVEL_OUTOF10: 6

## 2019-01-24 ASSESSMENT — PAIN DESCRIPTION - LOCATION: LOCATION: LEG;FOOT

## 2019-01-25 LAB
ANION GAP SERPL CALCULATED.3IONS-SCNC: 10 MMOL/L (ref 9–17)
BUN BLDV-MCNC: 10 MG/DL (ref 8–23)
BUN/CREAT BLD: 11 (ref 9–20)
CALCIUM SERPL-MCNC: 8.6 MG/DL (ref 8.6–10.4)
CHLORIDE BLD-SCNC: 104 MMOL/L (ref 98–107)
CO2: 30 MMOL/L (ref 20–31)
CREAT SERPL-MCNC: 0.89 MG/DL (ref 0.5–0.9)
ESTIMATED AVERAGE GLUCOSE: 151 MG/DL
GFR AFRICAN AMERICAN: >60 ML/MIN
GFR NON-AFRICAN AMERICAN: >60 ML/MIN
GFR SERPL CREATININE-BSD FRML MDRD: ABNORMAL ML/MIN/{1.73_M2}
GFR SERPL CREATININE-BSD FRML MDRD: ABNORMAL ML/MIN/{1.73_M2}
GLUCOSE BLD-MCNC: 100 MG/DL (ref 70–99)
GLUCOSE BLD-MCNC: 111 MG/DL (ref 65–105)
GLUCOSE BLD-MCNC: 118 MG/DL (ref 65–105)
GLUCOSE BLD-MCNC: 153 MG/DL (ref 65–105)
GLUCOSE BLD-MCNC: 155 MG/DL (ref 65–105)
GLUCOSE BLD-MCNC: 79 MG/DL (ref 65–105)
HBA1C MFR BLD: 6.9 % (ref 4–6)
HCT VFR BLD CALC: 38.8 % (ref 36–46)
HEMOGLOBIN: 12.4 G/DL (ref 12–16)
MCH RBC QN AUTO: 26.1 PG (ref 26–34)
MCHC RBC AUTO-ENTMCNC: 32 G/DL (ref 31–37)
MCV RBC AUTO: 81.5 FL (ref 80–100)
NRBC AUTOMATED: ABNORMAL PER 100 WBC
PDW BLD-RTO: 17.3 % (ref 11.5–14.5)
PLATELET # BLD: 313 K/UL (ref 130–400)
PMV BLD AUTO: 7.7 FL (ref 6–12)
POTASSIUM SERPL-SCNC: 4.5 MMOL/L (ref 3.7–5.3)
RBC # BLD: 4.76 M/UL (ref 4–5.2)
SODIUM BLD-SCNC: 144 MMOL/L (ref 135–144)
WBC # BLD: 9.5 K/UL (ref 3.5–11)

## 2019-01-25 PROCEDURE — 99223 1ST HOSP IP/OBS HIGH 75: CPT | Performed by: FAMILY MEDICINE

## 2019-01-25 PROCEDURE — 1200000000 HC SEMI PRIVATE

## 2019-01-25 PROCEDURE — 94640 AIRWAY INHALATION TREATMENT: CPT

## 2019-01-25 PROCEDURE — 82947 ASSAY GLUCOSE BLOOD QUANT: CPT

## 2019-01-25 PROCEDURE — 2500000003 HC RX 250 WO HCPCS: Performed by: FAMILY MEDICINE

## 2019-01-25 PROCEDURE — 2700000000 HC OXYGEN THERAPY PER DAY

## 2019-01-25 PROCEDURE — 96372 THER/PROPH/DIAG INJ SC/IM: CPT

## 2019-01-25 PROCEDURE — 6370000000 HC RX 637 (ALT 250 FOR IP): Performed by: NURSE PRACTITIONER

## 2019-01-25 PROCEDURE — 2580000003 HC RX 258: Performed by: NURSE PRACTITIONER

## 2019-01-25 PROCEDURE — 36415 COLL VENOUS BLD VENIPUNCTURE: CPT

## 2019-01-25 PROCEDURE — 80048 BASIC METABOLIC PNL TOTAL CA: CPT

## 2019-01-25 PROCEDURE — 6360000002 HC RX W HCPCS: Performed by: NURSE PRACTITIONER

## 2019-01-25 PROCEDURE — 85027 COMPLETE CBC AUTOMATED: CPT

## 2019-01-25 PROCEDURE — 6370000000 HC RX 637 (ALT 250 FOR IP): Performed by: FAMILY MEDICINE

## 2019-01-25 PROCEDURE — 96366 THER/PROPH/DIAG IV INF ADDON: CPT

## 2019-01-25 PROCEDURE — 94664 DEMO&/EVAL PT USE INHALER: CPT

## 2019-01-25 RX ORDER — TRAZODONE HYDROCHLORIDE 50 MG/1
50 TABLET ORAL NIGHTLY PRN
Status: DISCONTINUED | OUTPATIENT
Start: 2019-01-25 | End: 2019-01-26 | Stop reason: HOSPADM

## 2019-01-25 RX ORDER — GUAIFENESIN 100 MG/5ML
10 SOLUTION ORAL EVERY 4 HOURS PRN
Status: DISCONTINUED | OUTPATIENT
Start: 2019-01-25 | End: 2019-01-26 | Stop reason: HOSPADM

## 2019-01-25 RX ORDER — INSULIN GLARGINE 100 [IU]/ML
12 INJECTION, SOLUTION SUBCUTANEOUS NIGHTLY
Status: DISCONTINUED | OUTPATIENT
Start: 2019-01-25 | End: 2019-01-26 | Stop reason: HOSPADM

## 2019-01-25 RX ORDER — NICOTINE 21 MG/24HR
1 PATCH, TRANSDERMAL 24 HOURS TRANSDERMAL DAILY
Status: DISCONTINUED | OUTPATIENT
Start: 2019-01-25 | End: 2019-01-26 | Stop reason: HOSPADM

## 2019-01-25 RX ORDER — OXYCODONE HYDROCHLORIDE AND ACETAMINOPHEN 5; 325 MG/1; MG/1
2 TABLET ORAL EVERY 4 HOURS PRN
Status: DISCONTINUED | OUTPATIENT
Start: 2019-01-25 | End: 2019-01-26 | Stop reason: HOSPADM

## 2019-01-25 RX ORDER — OXYCODONE HYDROCHLORIDE AND ACETAMINOPHEN 5; 325 MG/1; MG/1
1 TABLET ORAL EVERY 6 HOURS PRN
Status: DISCONTINUED | OUTPATIENT
Start: 2019-01-25 | End: 2019-01-26 | Stop reason: HOSPADM

## 2019-01-25 RX ORDER — OXYCODONE HYDROCHLORIDE AND ACETAMINOPHEN 5; 325 MG/1; MG/1
1 TABLET ORAL EVERY 4 HOURS PRN
Status: DISCONTINUED | OUTPATIENT
Start: 2019-01-25 | End: 2019-01-26 | Stop reason: HOSPADM

## 2019-01-25 RX ADMIN — ASPIRIN 81 MG: 81 TABLET, COATED ORAL at 08:27

## 2019-01-25 RX ADMIN — FUROSEMIDE 40 MG: 40 TABLET ORAL at 08:27

## 2019-01-25 RX ADMIN — INSULIN LISPRO 1 UNITS: 100 INJECTION, SOLUTION INTRAVENOUS; SUBCUTANEOUS at 21:09

## 2019-01-25 RX ADMIN — GABAPENTIN 400 MG: 400 CAPSULE ORAL at 22:02

## 2019-01-25 RX ADMIN — GABAPENTIN 400 MG: 400 CAPSULE ORAL at 08:27

## 2019-01-25 RX ADMIN — CEFAZOLIN SODIUM 1 G: 1 INJECTION, SOLUTION INTRAVENOUS at 12:59

## 2019-01-25 RX ADMIN — INSULIN LISPRO 2 UNITS: 100 INJECTION, SOLUTION INTRAVENOUS; SUBCUTANEOUS at 12:59

## 2019-01-25 RX ADMIN — CEFAZOLIN SODIUM 1 G: 1 INJECTION, SOLUTION INTRAVENOUS at 05:26

## 2019-01-25 RX ADMIN — CLONAZEPAM 1 MG: 0.5 TABLET ORAL at 08:27

## 2019-01-25 RX ADMIN — ENOXAPARIN SODIUM 40 MG: 40 INJECTION SUBCUTANEOUS at 08:29

## 2019-01-25 RX ADMIN — MICONAZOLE NITRATE: 20.6 POWDER TOPICAL at 23:18

## 2019-01-25 RX ADMIN — METOPROLOL TARTRATE 37.5 MG: 25 TABLET ORAL at 22:01

## 2019-01-25 RX ADMIN — MOMETASONE FUROATE AND FORMOTEROL FUMARATE DIHYDRATE 2 PUFF: 200; 5 AEROSOL RESPIRATORY (INHALATION) at 20:24

## 2019-01-25 RX ADMIN — PANTOPRAZOLE SODIUM 40 MG: 40 TABLET, DELAYED RELEASE ORAL at 05:26

## 2019-01-25 RX ADMIN — METOPROLOL TARTRATE 37.5 MG: 25 TABLET ORAL at 08:28

## 2019-01-25 RX ADMIN — SODIUM CHLORIDE: 9 INJECTION, SOLUTION INTRAVENOUS at 00:09

## 2019-01-25 RX ADMIN — OXYCODONE AND ACETAMINOPHEN 2 TABLET: 5; 325 TABLET ORAL at 22:01

## 2019-01-25 RX ADMIN — Medication 10 ML: at 00:00

## 2019-01-25 RX ADMIN — OXYCODONE AND ACETAMINOPHEN 1 TABLET: 5; 325 TABLET ORAL at 02:13

## 2019-01-25 RX ADMIN — INSULIN GLARGINE 12 UNITS: 100 INJECTION, SOLUTION SUBCUTANEOUS at 21:09

## 2019-01-25 RX ADMIN — CEFAZOLIN SODIUM 1 G: 1 INJECTION, SOLUTION INTRAVENOUS at 22:01

## 2019-01-25 RX ADMIN — OXYCODONE AND ACETAMINOPHEN 1 TABLET: 5; 325 TABLET ORAL at 09:22

## 2019-01-25 RX ADMIN — MOMETASONE FUROATE AND FORMOTEROL FUMARATE DIHYDRATE 2 PUFF: 200; 5 AEROSOL RESPIRATORY (INHALATION) at 09:41

## 2019-01-25 RX ADMIN — OXYCODONE AND ACETAMINOPHEN 1 TABLET: 5; 325 TABLET ORAL at 14:05

## 2019-01-25 ASSESSMENT — PAIN DESCRIPTION - FREQUENCY
FREQUENCY: CONTINUOUS
FREQUENCY: CONTINUOUS
FREQUENCY: INTERMITTENT

## 2019-01-25 ASSESSMENT — ENCOUNTER SYMPTOMS
SHORTNESS OF BREATH: 0
WHEEZING: 0
VOICE CHANGE: 0
SORE THROAT: 0
VOMITING: 0
COLOR CHANGE: 1
DIARRHEA: 0
CONSTIPATION: 0
ABDOMINAL PAIN: 0
TROUBLE SWALLOWING: 1
SINUS PRESSURE: 0
NAUSEA: 0
COUGH: 0
BLOOD IN STOOL: 0

## 2019-01-25 ASSESSMENT — PAIN DESCRIPTION - PAIN TYPE
TYPE: ACUTE PAIN

## 2019-01-25 ASSESSMENT — PAIN SCALES - GENERAL
PAINLEVEL_OUTOF10: 0
PAINLEVEL_OUTOF10: 9
PAINLEVEL_OUTOF10: 0
PAINLEVEL_OUTOF10: 7
PAINLEVEL_OUTOF10: 0
PAINLEVEL_OUTOF10: 5
PAINLEVEL_OUTOF10: 0
PAINLEVEL_OUTOF10: 4
PAINLEVEL_OUTOF10: 0

## 2019-01-25 ASSESSMENT — PAIN DESCRIPTION - DESCRIPTORS
DESCRIPTORS: ACHING;DISCOMFORT
DESCRIPTORS: ACHING
DESCRIPTORS: ACHING

## 2019-01-25 ASSESSMENT — PAIN DESCRIPTION - LOCATION
LOCATION: LEG;FOOT
LOCATION: LEG
LOCATION: FOOT;LEG

## 2019-01-25 ASSESSMENT — PAIN DESCRIPTION - ORIENTATION
ORIENTATION: RIGHT;LEFT
ORIENTATION: RIGHT;LEFT;LOWER

## 2019-01-25 ASSESSMENT — PAIN DESCRIPTION - ONSET
ONSET: ON-GOING
ONSET: ON-GOING

## 2019-01-25 ASSESSMENT — PAIN DESCRIPTION - PROGRESSION: CLINICAL_PROGRESSION: GRADUALLY WORSENING

## 2019-01-26 VITALS
HEART RATE: 93 BPM | RESPIRATION RATE: 18 BRPM | TEMPERATURE: 97.7 F | SYSTOLIC BLOOD PRESSURE: 134 MMHG | DIASTOLIC BLOOD PRESSURE: 65 MMHG | OXYGEN SATURATION: 95 % | WEIGHT: 192.9 LBS | HEIGHT: 68 IN | BODY MASS INDEX: 29.24 KG/M2

## 2019-01-26 LAB
GLUCOSE BLD-MCNC: 128 MG/DL (ref 65–105)
GLUCOSE BLD-MCNC: 93 MG/DL (ref 65–105)
LV EF: 55 %
LVEF MODALITY: NORMAL

## 2019-01-26 PROCEDURE — 99239 HOSP IP/OBS DSCHRG MGMT >30: CPT | Performed by: FAMILY MEDICINE

## 2019-01-26 PROCEDURE — 94760 N-INVAS EAR/PLS OXIMETRY 1: CPT

## 2019-01-26 PROCEDURE — 93306 TTE W/DOPPLER COMPLETE: CPT

## 2019-01-26 PROCEDURE — 82947 ASSAY GLUCOSE BLOOD QUANT: CPT

## 2019-01-26 PROCEDURE — 2700000000 HC OXYGEN THERAPY PER DAY

## 2019-01-26 PROCEDURE — 6370000000 HC RX 637 (ALT 250 FOR IP): Performed by: NURSE PRACTITIONER

## 2019-01-26 PROCEDURE — 6360000002 HC RX W HCPCS: Performed by: NURSE PRACTITIONER

## 2019-01-26 PROCEDURE — 2580000003 HC RX 258: Performed by: NURSE PRACTITIONER

## 2019-01-26 PROCEDURE — 96372 THER/PROPH/DIAG INJ SC/IM: CPT

## 2019-01-26 PROCEDURE — 96366 THER/PROPH/DIAG IV INF ADDON: CPT

## 2019-01-26 PROCEDURE — 94640 AIRWAY INHALATION TREATMENT: CPT

## 2019-01-26 RX ORDER — CEPHALEXIN 500 MG/1
500 CAPSULE ORAL 3 TIMES DAILY
Qty: 21 CAPSULE | Refills: 0 | Status: SHIPPED | OUTPATIENT
Start: 2019-01-26 | End: 2019-02-02

## 2019-01-26 RX ORDER — KETOCONAZOLE 20 MG/G
CREAM TOPICAL
Qty: 30 G | Refills: 1 | Status: ON HOLD | OUTPATIENT
Start: 2019-01-26 | End: 2020-08-28

## 2019-01-26 RX ADMIN — METOPROLOL TARTRATE 37.5 MG: 25 TABLET ORAL at 09:31

## 2019-01-26 RX ADMIN — ASPIRIN 81 MG: 81 TABLET, COATED ORAL at 09:31

## 2019-01-26 RX ADMIN — GABAPENTIN 400 MG: 400 CAPSULE ORAL at 09:30

## 2019-01-26 RX ADMIN — OXYCODONE AND ACETAMINOPHEN 2 TABLET: 5; 325 TABLET ORAL at 04:50

## 2019-01-26 RX ADMIN — FUROSEMIDE 40 MG: 40 TABLET ORAL at 09:34

## 2019-01-26 RX ADMIN — ESTRADIOL 0.5 MG: 1 TABLET ORAL at 09:30

## 2019-01-26 RX ADMIN — ENOXAPARIN SODIUM 40 MG: 40 INJECTION SUBCUTANEOUS at 09:33

## 2019-01-26 RX ADMIN — MOMETASONE FUROATE AND FORMOTEROL FUMARATE DIHYDRATE 2 PUFF: 200; 5 AEROSOL RESPIRATORY (INHALATION) at 09:24

## 2019-01-26 RX ADMIN — CEFAZOLIN SODIUM 1 G: 1 INJECTION, SOLUTION INTRAVENOUS at 04:46

## 2019-01-26 RX ADMIN — Medication 10 ML: at 09:33

## 2019-01-26 RX ADMIN — MICONAZOLE NITRATE: 20.6 POWDER TOPICAL at 09:29

## 2019-01-26 RX ADMIN — CLONAZEPAM 1 MG: 0.5 TABLET ORAL at 09:31

## 2019-01-26 RX ADMIN — PANTOPRAZOLE SODIUM 40 MG: 40 TABLET, DELAYED RELEASE ORAL at 06:22

## 2019-01-26 ASSESSMENT — ENCOUNTER SYMPTOMS
BLOOD IN STOOL: 0
SORE THROAT: 0
COLOR CHANGE: 1
SHORTNESS OF BREATH: 0
VOMITING: 0
DIARRHEA: 0
NAUSEA: 0
COUGH: 0
CONSTIPATION: 0
ABDOMINAL PAIN: 0
WHEEZING: 0
VOICE CHANGE: 0
SINUS PRESSURE: 0

## 2019-01-26 ASSESSMENT — PAIN DESCRIPTION - LOCATION: LOCATION: BACK;FOOT;LEG

## 2019-01-26 ASSESSMENT — PAIN DESCRIPTION - PAIN TYPE: TYPE: ACUTE PAIN;CHRONIC PAIN

## 2019-01-26 ASSESSMENT — PAIN DESCRIPTION - FREQUENCY: FREQUENCY: CONTINUOUS

## 2019-01-26 ASSESSMENT — PAIN SCALES - GENERAL: PAINLEVEL_OUTOF10: 10

## 2019-01-26 ASSESSMENT — PAIN DESCRIPTION - DESCRIPTORS: DESCRIPTORS: ACHING;DISCOMFORT

## 2019-03-16 ENCOUNTER — HOSPITAL ENCOUNTER (OUTPATIENT)
Facility: CLINIC | Age: 69
Setting detail: SPECIMEN
Discharge: HOME | End: 2019-03-16
Payer: MEDICARE

## 2019-03-16 LAB
-: ABNORMAL
AMORPHOUS: ABNORMAL
BACTERIA: ABNORMAL
BILIRUBIN URINE: NEGATIVE
CASTS UA: ABNORMAL /LPF (ref 0–2)
COLOR: YELLOW
COMMENT UA: ABNORMAL
CRYSTALS, UA: ABNORMAL /HPF
EPITHELIAL CELLS UA: ABNORMAL /HPF (ref 0–5)
GLUCOSE URINE: NEGATIVE
KETONES, URINE: NEGATIVE
LEUKOCYTE ESTERASE, URINE: ABNORMAL
MUCUS: ABNORMAL
NITRITE, URINE: NEGATIVE
OTHER OBSERVATIONS UA: ABNORMAL
PH UA: 7.5 (ref 5–8)
PROTEIN UA: ABNORMAL
RBC UA: ABNORMAL /HPF (ref 0–2)
RENAL EPITHELIAL, UA: ABNORMAL /HPF
SPECIFIC GRAVITY UA: 1.01 (ref 1–1.03)
TRICHOMONAS: ABNORMAL
TURBIDITY: ABNORMAL
URINE HGB: ABNORMAL
UROBILINOGEN, URINE: NORMAL
WBC UA: ABNORMAL /HPF (ref 0–5)
YEAST: ABNORMAL

## 2019-03-16 PROCEDURE — 87077 CULTURE AEROBIC IDENTIFY: CPT

## 2019-03-16 PROCEDURE — 87186 SC STD MICRODIL/AGAR DIL: CPT

## 2019-03-16 PROCEDURE — 81001 URINALYSIS AUTO W/SCOPE: CPT

## 2019-03-16 PROCEDURE — 87086 URINE CULTURE/COLONY COUNT: CPT

## 2019-03-18 LAB
CULTURE: ABNORMAL
Lab: ABNORMAL
SPECIMEN DESCRIPTION: ABNORMAL

## 2020-08-27 ENCOUNTER — APPOINTMENT (OUTPATIENT)
Dept: CT IMAGING | Age: 70
DRG: 167 | End: 2020-08-27
Payer: MEDICARE

## 2020-08-27 ENCOUNTER — APPOINTMENT (OUTPATIENT)
Dept: GENERAL RADIOLOGY | Age: 70
DRG: 167 | End: 2020-08-27
Payer: MEDICARE

## 2020-08-27 ENCOUNTER — HOSPITAL ENCOUNTER (INPATIENT)
Age: 70
LOS: 4 days | Discharge: NURSING, REHAB OR LONG-TERM CARE | DRG: 167 | End: 2020-09-04
Attending: EMERGENCY MEDICINE | Admitting: FAMILY MEDICINE
Payer: MEDICARE

## 2020-08-27 PROBLEM — R91.8 LUNG MASS: Status: ACTIVE | Noted: 2020-08-27

## 2020-08-27 LAB
-: ABNORMAL
ABSOLUTE EOS #: 0.12 K/UL (ref 0–0.44)
ABSOLUTE IMMATURE GRANULOCYTE: 0.07 K/UL (ref 0–0.3)
ABSOLUTE LYMPH #: 1.38 K/UL (ref 1.1–3.7)
ABSOLUTE MONO #: 0.56 K/UL (ref 0.1–1.2)
AMORPHOUS: ABNORMAL
ANION GAP SERPL CALCULATED.3IONS-SCNC: 8 MMOL/L (ref 9–17)
ANION GAP SERPL CALCULATED.3IONS-SCNC: 9 MMOL/L (ref 9–17)
BACTERIA: ABNORMAL
BASOPHILS # BLD: 0 % (ref 0–2)
BASOPHILS ABSOLUTE: 0.05 K/UL (ref 0–0.2)
BILIRUBIN URINE: NEGATIVE
BUN BLDV-MCNC: 10 MG/DL (ref 8–23)
BUN BLDV-MCNC: 9 MG/DL (ref 8–23)
BUN/CREAT BLD: 11 (ref 9–20)
BUN/CREAT BLD: 12 (ref 9–20)
CALCIUM SERPL-MCNC: 8.7 MG/DL (ref 8.6–10.4)
CALCIUM SERPL-MCNC: 9.5 MG/DL (ref 8.6–10.4)
CASTS UA: ABNORMAL /LPF
CHLORIDE BLD-SCNC: 100 MMOL/L (ref 98–107)
CHLORIDE BLD-SCNC: 99 MMOL/L (ref 98–107)
CHP ED QC CHECK: NORMAL
CO2: 32 MMOL/L (ref 20–31)
CO2: 33 MMOL/L (ref 20–31)
COLOR: YELLOW
COMMENT UA: ABNORMAL
CREAT SERPL-MCNC: 0.73 MG/DL (ref 0.5–0.9)
CREAT SERPL-MCNC: 0.87 MG/DL (ref 0.5–0.9)
CRYSTALS, UA: ABNORMAL /HPF
DIFFERENTIAL TYPE: ABNORMAL
EOSINOPHILS RELATIVE PERCENT: 1 % (ref 1–4)
EPITHELIAL CELLS UA: ABNORMAL /HPF (ref 0–5)
GFR AFRICAN AMERICAN: >60 ML/MIN
GFR AFRICAN AMERICAN: >60 ML/MIN
GFR NON-AFRICAN AMERICAN: >60 ML/MIN
GFR NON-AFRICAN AMERICAN: >60 ML/MIN
GFR SERPL CREATININE-BSD FRML MDRD: ABNORMAL ML/MIN/{1.73_M2}
GLUCOSE BLD-MCNC: 131 MG/DL (ref 65–105)
GLUCOSE BLD-MCNC: 140 MG/DL
GLUCOSE BLD-MCNC: 140 MG/DL (ref 65–105)
GLUCOSE BLD-MCNC: 148 MG/DL (ref 70–99)
GLUCOSE BLD-MCNC: 36 MG/DL (ref 70–99)
GLUCOSE BLD-MCNC: 85 MG/DL (ref 65–105)
GLUCOSE URINE: ABNORMAL
HCT VFR BLD CALC: 40.4 % (ref 36.3–47.1)
HEMOGLOBIN: 11.6 G/DL (ref 11.9–15.1)
IMMATURE GRANULOCYTES: 1 %
KETONES, URINE: NEGATIVE
LEUKOCYTE ESTERASE, URINE: ABNORMAL
LYMPHOCYTES # BLD: 12 % (ref 24–43)
MAGNESIUM: 1.9 MG/DL (ref 1.6–2.6)
MCH RBC QN AUTO: 22 PG (ref 25.2–33.5)
MCHC RBC AUTO-ENTMCNC: 28.7 G/DL (ref 28.4–34.8)
MCV RBC AUTO: 76.5 FL (ref 82.6–102.9)
MONOCYTES # BLD: 5 % (ref 3–12)
MUCUS: ABNORMAL
MYOGLOBIN: 83 NG/ML (ref 25–58)
NITRITE, URINE: POSITIVE
NRBC AUTOMATED: 0 PER 100 WBC
OTHER OBSERVATIONS UA: ABNORMAL
PDW BLD-RTO: 20 % (ref 11.8–14.4)
PH UA: 7.5 (ref 5–8)
PLATELET # BLD: 310 K/UL (ref 138–453)
PLATELET ESTIMATE: ABNORMAL
PMV BLD AUTO: 9.3 FL (ref 8.1–13.5)
POTASSIUM SERPL-SCNC: 3.9 MMOL/L (ref 3.7–5.3)
POTASSIUM SERPL-SCNC: 3.9 MMOL/L (ref 3.7–5.3)
PROTEIN UA: NEGATIVE
RBC # BLD: 5.28 M/UL (ref 3.95–5.11)
RBC # BLD: ABNORMAL 10*6/UL
RBC UA: ABNORMAL /HPF (ref 0–2)
RENAL EPITHELIAL, UA: ABNORMAL /HPF
SEG NEUTROPHILS: 81 % (ref 36–65)
SEGMENTED NEUTROPHILS ABSOLUTE COUNT: 9.81 K/UL (ref 1.5–8.1)
SODIUM BLD-SCNC: 139 MMOL/L (ref 135–144)
SODIUM BLD-SCNC: 142 MMOL/L (ref 135–144)
SPECIFIC GRAVITY UA: 1.01 (ref 1–1.03)
TOTAL CK: 42 U/L (ref 26–192)
TRICHOMONAS: ABNORMAL
TROPONIN INTERP: ABNORMAL
TROPONIN T: ABNORMAL NG/ML
TROPONIN, HIGH SENSITIVITY: 22 NG/L (ref 0–14)
TURBIDITY: ABNORMAL
URINE HGB: NEGATIVE
UROBILINOGEN, URINE: NORMAL
WBC # BLD: 12 K/UL (ref 3.5–11.3)
WBC # BLD: ABNORMAL 10*3/UL
WBC UA: ABNORMAL /HPF (ref 0–5)
YEAST: ABNORMAL

## 2020-08-27 PROCEDURE — 87088 URINE BACTERIA CULTURE: CPT

## 2020-08-27 PROCEDURE — 87150 DNA/RNA AMPLIFIED PROBE: CPT

## 2020-08-27 PROCEDURE — 6360000002 HC RX W HCPCS: Performed by: FAMILY MEDICINE

## 2020-08-27 PROCEDURE — 85025 COMPLETE CBC W/AUTO DIFF WBC: CPT

## 2020-08-27 PROCEDURE — 83735 ASSAY OF MAGNESIUM: CPT

## 2020-08-27 PROCEDURE — 71250 CT THORAX DX C-: CPT

## 2020-08-27 PROCEDURE — 84484 ASSAY OF TROPONIN QUANT: CPT

## 2020-08-27 PROCEDURE — G0378 HOSPITAL OBSERVATION PER HR: HCPCS

## 2020-08-27 PROCEDURE — 6370000000 HC RX 637 (ALT 250 FOR IP): Performed by: FAMILY MEDICINE

## 2020-08-27 PROCEDURE — 72100 X-RAY EXAM L-S SPINE 2/3 VWS: CPT

## 2020-08-27 PROCEDURE — 80048 BASIC METABOLIC PNL TOTAL CA: CPT

## 2020-08-27 PROCEDURE — 87186 SC STD MICRODIL/AGAR DIL: CPT

## 2020-08-27 PROCEDURE — 81001 URINALYSIS AUTO W/SCOPE: CPT

## 2020-08-27 PROCEDURE — 2580000003 HC RX 258: Performed by: FAMILY MEDICINE

## 2020-08-27 PROCEDURE — 96372 THER/PROPH/DIAG INJ SC/IM: CPT

## 2020-08-27 PROCEDURE — 96375 TX/PRO/DX INJ NEW DRUG ADDON: CPT

## 2020-08-27 PROCEDURE — 2580000003 HC RX 258: Performed by: NURSE PRACTITIONER

## 2020-08-27 PROCEDURE — 87077 CULTURE AEROBIC IDENTIFY: CPT

## 2020-08-27 PROCEDURE — 93005 ELECTROCARDIOGRAM TRACING: CPT | Performed by: EMERGENCY MEDICINE

## 2020-08-27 PROCEDURE — 36415 COLL VENOUS BLD VENIPUNCTURE: CPT

## 2020-08-27 PROCEDURE — 83874 ASSAY OF MYOGLOBIN: CPT

## 2020-08-27 PROCEDURE — 87040 BLOOD CULTURE FOR BACTERIA: CPT

## 2020-08-27 PROCEDURE — 87076 CULTURE ANAEROBE IDENT EACH: CPT

## 2020-08-27 PROCEDURE — 6360000002 HC RX W HCPCS: Performed by: NURSE PRACTITIONER

## 2020-08-27 PROCEDURE — 87205 SMEAR GRAM STAIN: CPT

## 2020-08-27 PROCEDURE — 82947 ASSAY GLUCOSE BLOOD QUANT: CPT

## 2020-08-27 PROCEDURE — 71045 X-RAY EXAM CHEST 1 VIEW: CPT

## 2020-08-27 PROCEDURE — 99285 EMERGENCY DEPT VISIT HI MDM: CPT

## 2020-08-27 PROCEDURE — 82550 ASSAY OF CK (CPK): CPT

## 2020-08-27 PROCEDURE — 87086 URINE CULTURE/COLONY COUNT: CPT

## 2020-08-27 PROCEDURE — 96374 THER/PROPH/DIAG INJ IV PUSH: CPT

## 2020-08-27 PROCEDURE — 96365 THER/PROPH/DIAG IV INF INIT: CPT

## 2020-08-27 RX ORDER — GLIMEPIRIDE 2 MG/1
2 TABLET ORAL
Status: DISCONTINUED | OUTPATIENT
Start: 2020-08-28 | End: 2020-09-04 | Stop reason: HOSPADM

## 2020-08-27 RX ORDER — ACETAMINOPHEN 650 MG/1
650 SUPPOSITORY RECTAL EVERY 6 HOURS PRN
Status: DISCONTINUED | OUTPATIENT
Start: 2020-08-27 | End: 2020-09-04 | Stop reason: HOSPADM

## 2020-08-27 RX ORDER — ACETAMINOPHEN 325 MG/1
650 TABLET ORAL EVERY 6 HOURS PRN
Status: DISCONTINUED | OUTPATIENT
Start: 2020-08-27 | End: 2020-09-04 | Stop reason: HOSPADM

## 2020-08-27 RX ORDER — CLONAZEPAM 0.5 MG/1
1 TABLET ORAL DAILY
Status: DISCONTINUED | OUTPATIENT
Start: 2020-08-27 | End: 2020-08-28

## 2020-08-27 RX ORDER — NICOTINE 21 MG/24HR
1 PATCH, TRANSDERMAL 24 HOURS TRANSDERMAL DAILY PRN
Status: DISCONTINUED | OUTPATIENT
Start: 2020-08-27 | End: 2020-09-04 | Stop reason: HOSPADM

## 2020-08-27 RX ORDER — DOCUSATE SODIUM 100 MG/1
100 CAPSULE, LIQUID FILLED ORAL 2 TIMES DAILY PRN
Status: DISCONTINUED | OUTPATIENT
Start: 2020-08-27 | End: 2020-09-04 | Stop reason: HOSPADM

## 2020-08-27 RX ORDER — ONDANSETRON 2 MG/ML
4 INJECTION INTRAMUSCULAR; INTRAVENOUS EVERY 6 HOURS PRN
Status: DISCONTINUED | OUTPATIENT
Start: 2020-08-27 | End: 2020-09-04 | Stop reason: HOSPADM

## 2020-08-27 RX ORDER — SODIUM CHLORIDE 9 MG/ML
INJECTION, SOLUTION INTRAVENOUS CONTINUOUS
Status: DISCONTINUED | OUTPATIENT
Start: 2020-08-27 | End: 2020-08-28

## 2020-08-27 RX ORDER — ACETAMINOPHEN 325 MG/1
650 TABLET ORAL EVERY 4 HOURS PRN
Status: DISCONTINUED | OUTPATIENT
Start: 2020-08-27 | End: 2020-08-27 | Stop reason: SDUPTHER

## 2020-08-27 RX ORDER — SODIUM CHLORIDE 0.9 % (FLUSH) 0.9 %
10 SYRINGE (ML) INJECTION EVERY 12 HOURS SCHEDULED
Status: DISCONTINUED | OUTPATIENT
Start: 2020-08-27 | End: 2020-08-27 | Stop reason: SDUPTHER

## 2020-08-27 RX ORDER — ASPIRIN 81 MG/1
81 TABLET ORAL DAILY
Status: DISCONTINUED | OUTPATIENT
Start: 2020-08-27 | End: 2020-09-04 | Stop reason: HOSPADM

## 2020-08-27 RX ORDER — SODIUM CHLORIDE 0.9 % (FLUSH) 0.9 %
10 SYRINGE (ML) INJECTION PRN
Status: DISCONTINUED | OUTPATIENT
Start: 2020-08-27 | End: 2020-08-27 | Stop reason: SDUPTHER

## 2020-08-27 RX ORDER — SODIUM POLYSTYRENE SULFONATE 15 G/60ML
15 SUSPENSION ORAL; RECTAL
Status: ACTIVE | OUTPATIENT
Start: 2020-08-27 | End: 2020-08-27

## 2020-08-27 RX ORDER — DEXTROSE MONOHYDRATE 25 G/50ML
25 INJECTION, SOLUTION INTRAVENOUS ONCE
Status: COMPLETED | OUTPATIENT
Start: 2020-08-27 | End: 2020-08-27

## 2020-08-27 RX ORDER — TRAZODONE HYDROCHLORIDE 50 MG/1
50 TABLET ORAL NIGHTLY PRN
Status: DISCONTINUED | OUTPATIENT
Start: 2020-08-27 | End: 2020-08-28

## 2020-08-27 RX ORDER — SODIUM CHLORIDE 0.9 % (FLUSH) 0.9 %
10 SYRINGE (ML) INJECTION EVERY 12 HOURS SCHEDULED
Status: DISCONTINUED | OUTPATIENT
Start: 2020-08-27 | End: 2020-09-04 | Stop reason: HOSPADM

## 2020-08-27 RX ORDER — SODIUM CHLORIDE 0.9 % (FLUSH) 0.9 %
10 SYRINGE (ML) INJECTION PRN
Status: DISCONTINUED | OUTPATIENT
Start: 2020-08-27 | End: 2020-09-04 | Stop reason: HOSPADM

## 2020-08-27 RX ORDER — BUDESONIDE AND FORMOTEROL FUMARATE DIHYDRATE 160; 4.5 UG/1; UG/1
2 AEROSOL RESPIRATORY (INHALATION) 2 TIMES DAILY
Status: DISCONTINUED | OUTPATIENT
Start: 2020-08-27 | End: 2020-09-04 | Stop reason: HOSPADM

## 2020-08-27 RX ORDER — PROMETHAZINE HYDROCHLORIDE 12.5 MG/1
12.5 TABLET ORAL EVERY 6 HOURS PRN
Status: DISCONTINUED | OUTPATIENT
Start: 2020-08-27 | End: 2020-09-04 | Stop reason: HOSPADM

## 2020-08-27 RX ORDER — GABAPENTIN 400 MG/1
400 CAPSULE ORAL 2 TIMES DAILY
Status: DISCONTINUED | OUTPATIENT
Start: 2020-08-27 | End: 2020-08-28

## 2020-08-27 RX ORDER — OXYCODONE HYDROCHLORIDE AND ACETAMINOPHEN 5; 325 MG/1; MG/1
1 TABLET ORAL EVERY 6 HOURS SCHEDULED
Status: DISCONTINUED | OUTPATIENT
Start: 2020-08-27 | End: 2020-09-04 | Stop reason: HOSPADM

## 2020-08-27 RX ADMIN — DEXTROSE MONOHYDRATE 25 G: 25 INJECTION, SOLUTION INTRAVENOUS at 14:40

## 2020-08-27 RX ADMIN — CEFTRIAXONE SODIUM 1 G: 1 INJECTION, POWDER, FOR SOLUTION INTRAMUSCULAR; INTRAVENOUS at 16:48

## 2020-08-27 RX ADMIN — SODIUM CHLORIDE: 9 INJECTION, SOLUTION INTRAVENOUS at 21:53

## 2020-08-27 RX ADMIN — METOPROLOL TARTRATE 37.5 MG: 25 TABLET, FILM COATED ORAL at 21:46

## 2020-08-27 RX ADMIN — SODIUM CHLORIDE, PRESERVATIVE FREE 10 ML: 5 INJECTION INTRAVENOUS at 21:51

## 2020-08-27 RX ADMIN — METFORMIN HYDROCHLORIDE 500 MG: 500 TABLET ORAL at 21:48

## 2020-08-27 RX ADMIN — CLONAZEPAM 1 MG: 0.5 TABLET ORAL at 21:53

## 2020-08-27 RX ADMIN — GABAPENTIN 400 MG: 400 CAPSULE ORAL at 21:46

## 2020-08-27 RX ADMIN — ENOXAPARIN SODIUM 40 MG: 40 INJECTION SUBCUTANEOUS at 21:46

## 2020-08-27 RX ADMIN — OXYCODONE HYDROCHLORIDE AND ACETAMINOPHEN 1 TABLET: 5; 325 TABLET ORAL at 21:48

## 2020-08-27 ASSESSMENT — ENCOUNTER SYMPTOMS
COUGH: 0
SHORTNESS OF BREATH: 0
SINUS PRESSURE: 0
NAUSEA: 0
RHINORRHEA: 0
COLOR CHANGE: 0
CONSTIPATION: 0
DIARRHEA: 0
SORE THROAT: 0
ABDOMINAL PAIN: 0
WHEEZING: 0
VOMITING: 0

## 2020-08-27 ASSESSMENT — PAIN DESCRIPTION - LOCATION
LOCATION: BACK
LOCATION: BACK

## 2020-08-27 ASSESSMENT — PAIN DESCRIPTION - ORIENTATION
ORIENTATION: LOWER
ORIENTATION: LOWER

## 2020-08-27 ASSESSMENT — PAIN SCALES - GENERAL
PAINLEVEL_OUTOF10: 7
PAINLEVEL_OUTOF10: 10

## 2020-08-27 ASSESSMENT — PAIN DESCRIPTION - DESCRIPTORS
DESCRIPTORS: ACHING
DESCRIPTORS: ACHING

## 2020-08-27 ASSESSMENT — PAIN DESCRIPTION - PAIN TYPE
TYPE: ACUTE PAIN;CHRONIC PAIN
TYPE: CHRONIC PAIN

## 2020-08-27 NOTE — ED PROVIDER NOTES
[address] [place] [address] ED  eMERGENCY dEPARTMENT eNCOUnter      Pt Name: Jermaine Arevalo  MRN: [id number]  [place] 1950  Date of evaluation: 8/27/2020  Provider: Gonzalo Wick NP, AV Mel[facility] [id number]       Chief Complaint   Patient presents with    Fall    Back Pain    Cough    Rash         HISTORY OF PRESENT ILLNESS  (Location/Symptom, Timing/Onset, Context/Setting, Quality, Duration, Modifying Factors, Severity.)   Jermaine Arevalo is a 72 y.o. female who presents to the emergency [facility] today by EMS for evaluation of a fall. The patient states that she has had weakness and frequent falls. She states that she is having some back pain. She states that she was walking lost her balance her knees give out on her and she fell. She lives at home alone. She states that EMS was there yesterday to assist her in getting up. She also states that she has had a cough. She states that she cannot cough anything up. She also has a rash under her breasts. Nursing Notes were reviewed. ALLERGIES     Codeine and Dye [iodides]    CURRENT MEDICATIONS       Previous Medications    ASPIRIN EC 81 MG EC TABLET    Take 81 mg by mouth daily    CLONAZEPAM (KLONOPIN) 1 MG TABLET    Take 1 tablet by mouth daily Take three tablets daily    DOCUSATE SODIUM 100 MG/5ML ENEM    Take 100 mg by mouth    ESTRADIOL (ESTRACE) 0.5 MG TABLET    Take 0.5 mg by mouth daily    FUROSEMIDE (LASIX) 40 MG TABLET    Take 40 mg by mouth daily    GABAPENTIN (NEURONTIN) 400 MG CAPSULE    Take 400-800 mg by mouth 2 times daily     GLIMEPIRIDE (AMARYL) 1 MG TABLET    Take 2 mg by mouth every morning (before breakfast) Indications: Takes 2mg AM and 1mg PM     GUAIFENESIN (ALTARUSSIN) 100 MG/5ML SYRUP    Take 10 mLs by mouth every 4 hours as needed for Cough    KETOCONAZOLE (NIZORAL) 2 % CREAM    Apply topically daily both feet .     LANSOPRAZOLE (PREVACID) 30 MG DELAYED RELEASE CAPSULE    Take 30 mg by mouth daily    METFORMIN (GLUCOPHAGE) 500 MG TABLET    Take 500 mg by mouth 2 times daily    METOPROLOL TARTRATE (LOPRESSOR) 25 MG TABLET    Take 1.5 tablets by mouth 2 times daily    MICONAZOLE (MICOTIN) 2 % POWDER    Apply topically 2 times daily. MOMETASONE-FORMOTEROL (DULERA) 200-5 MCG/ACT INHALER    Inhale 2 puffs into the lungs every 12 hours    NICOTINE (NICODERM CQ) 21 MG/24HR    Place 1 patch onto the skin daily    ONDANSETRON (ZOFRAN ODT) 4 MG DISINTEGRATING TABLET    Take 1 tablet by mouth every 8 hours as needed for Nausea or Vomiting    OXYCODONE-ACETAMINOPHEN (PERCOCET) 5-325 MG PER TABLET    Take 1 tablet by mouth every 6 hours . POTASSIUM CHLORIDE (MICRO-K) 10 MEQ CR CAPSULE    Take 10 mEq by mouth daily     TRAZODONE HCL PO    Take by mouth nightly as needed Pt unsure of dosage    VENLAFAXINE HCL (EFFEXOR PO)    Take 2 tablets by mouth 2 times daily Pt unsure of dosage    VITAMIN D (ERGOCALCIFEROL) 52106 UNITS CAPSULE    Take 1 capsule by mouth once a week for 4 doses       PAST MEDICAL HISTORY         Diagnosis Date    AAA (abdominal aortic aneurysm) ([facility] [address]     Pt denies having a history of AAA    Acid reflux     Anxiety and depression     Aortic stenosis     Arthritis     Blister of ankle, right 10/13/2016    blister broke open & draining, is on antibiotics    CAD (coronary artery disease)     COPD (chronic obstructive pulmonary disease) ([facility] [address] [facility]     Diabetes mellitus ([facility] [address] [facility]     Falls     Heart block     bifasicular    Hypokalemia     MDRO (multiple drug resistant organisms) resistance 10/17/2014    E. Coli urine    MRSA (methicillin resistant staph aureus) culture positive resolved 12/2016    2 negative nasal screens - 2016 (hx in urine 2014)    On home oxygen therapy     uses 2 liters at night    On home oxygen therapy     patient states 2 liters/nasal cannula continuous    Overactive bladder     patient incont.  wears a brief    Pneumonia     PONV (postoperative nausea and vomiting)     Vitamin D deficiency        SURGICAL HISTORY           Procedure Laterality Date    AORTIC VALVE REPAIR N/A 4/11/2017    AORTIC VALVE REPAIR REPLACEMENT performed by Kimberlee Griffin MD at [address]      ENDOSCOPY, COLON, DIAGNOSTIC  12/08/2016    EYE SURGERY      HYSTERECTOMY      LUMBAR LAMINECTOMY      TONSILLECTOMY           FAMILY HISTORY           Problem Relation Age of Onset    Cancer Mother     Cancer Father      Family Status   Relation Name Status    Mother  (Not Specified)    Father  (Not Specified)        SOCIAL HISTORY      reports that she quit smoking about 4 years ago. She has never used smokeless tobacco. She reports that she does not drink alcohol or use drugs. REVIEW OF SYSTEMS    (2-9 systems for level 4, 10 or more for level 5)     Review of Systems   Constitutional: Negative for chills, fever and unexpected weight change. HENT: Negative for congestion, rhinorrhea, sinus pressure and sore throat. Respiratory: Negative for cough, shortness of breath and wheezing. Cardiovascular: Negative for chest pain and palpitations. Gastrointestinal: Negative for abdominal pain, constipation, diarrhea, nausea and vomiting. Genitourinary: Negative for dysuria and hematuria. Musculoskeletal: Negative for arthralgias and myalgias. Skin: Negative for color change and rash. Neurological: Negative for dizziness, weakness and headaches. Hematological: Negative for adenopathy. All other systems reviewed and are negative. Except as noted above the remainder of the review of systems was reviewed and negative. PHYSICAL EXAM    (up to 7 for level 4, 8 or more for level 5)     ED Triage Vitals [08/27/20 1327]   BP Temp Temp Source Pulse Resp SpO2 Height Weight   (!) 145/48 97.6 °F (36.4 °C) Oral 100 20 95 % 5' 9\" (1.753 m) 140 lb (63.5 kg)       Physical Exam  Vitals signs reviewed.    Constitutional:       Appearance: She is images are provided for review. Dose modulation, iterative reconstruction, and/or weight based adjustment of the mA/kV was utilized to reduce the radiation dose to as low as reasonably achievable. COMPARISON: 12/06/2016 HISTORY: ORDERING SYSTEM PROVIDED HISTORY: abnormal xray TECHNOLOGIST PROVIDED HISTORY: abnormal xray Reason for Exam: Cough x 3 weeks. Abnormal xray; right sided mass/infiltrate. Hx prior cardiac surgery. Acuity: Acute Type of Exam: Initial FINDINGS: Mediastinum: Three vessel coronary artery calcification. Newly enlarged mediastinal lymph nodes including example station 7 node measuring 3.1 x 2.1 cm on series 2, image 45. Lungs/pleura: Mild upper lobe predominant centrilobular emphysema. Cindy Dye Spiculated mass of the right upper lobe measuring 7.2 x 6.5 cm with broad-base of contact with the mediastinal pleura, right major fissure, encircling and obliterating the right upper lobe bronchus. Spicules are seen to extend to the anterior costal pleura. There is adjacent bronchial wall thickening and interlobular septal thickening. Stable 5 mm solid nodule of the left upper lobe since 2016, benign. No pleural effusion or pneumothorax. Upper Abdomen: Adrenals are unremarkable. Soft Tissues/Bones: Old right rib fractures. Approximate 7.2 cm spiculated mass of the right upper lobe likely with invasion of the mediastinum, adjacent lymphangitic spread and suspected metastatic mediastinal lymphadenopathy. Xr Chest Portable    Result Date: 8/27/2020  EXAMINATION: ONE XRAY VIEW OF THE CHEST 8/27/2020 2:13 pm COMPARISON: December 28, 2018, chest examination HISTORY: ORDERING SYSTEM PROVIDED HISTORY: Chest Pain TECHNOLOGIST PROVIDED HISTORY: Chest Pain Reason for Exam: fell Acuity: Chronic Type of Exam: Ongoing Relevant Medical/Surgical History: multiple chronic falls FINDINGS: Median sternotomy Stable normal cardiac size Extensive poorly defined nodular opacity medial right upper lung.   Otherwise clear lungs Suspect right superior mediastinal mass with mild mass effect upon the adjacent tracheal air column No significant pleural process     Extensive poorly defined nodular opacity medial right upper lung with possible associated mediastinal involvement. Findings suggest underlying mass with associated infiltrate. Correlative CT chest suggested for further assessment     Interpretation per the Radiologist below, if available at the time of this note:    CT CHEST WO CONTRAST   Final Result   Approximate 7.2 cm spiculated mass of the right upper lobe likely with   invasion of the mediastinum, adjacent lymphangitic spread and suspected   metastatic mediastinal lymphadenopathy. XR LUMBAR SPINE (2-3 VIEWS)   Final Result   No acute osseous abnormality. Multilevel mild degenerative disc disease, not   significantly changed since 2017. XR CHEST PORTABLE   Final Result   Extensive poorly defined nodular opacity medial right upper lung with   possible associated mediastinal involvement. Findings suggest underlying   mass with associated infiltrate.   Correlative CT chest suggested for further   assessment                 LABS:  Labs Reviewed   CBC WITH AUTO DIFFERENTIAL - Abnormal; Notable for the following components:       Result Value    WBC 12.0 (*)     RBC 5.28 (*)     Hemoglobin 11.6 (*)     MCV 76.5 (*)     MCH 22.0 (*)     RDW 20.0 (*)     Seg Neutrophils 81 (*)     Lymphocytes 12 (*)     Immature Granulocytes 1 (*)     Segs Absolute 9.81 (*)     All other components within normal limits   BASIC METABOLIC PANEL - Abnormal; Notable for the following components:    Glucose 36 (*)     CO2 33 (*)     All other components within normal limits   URINE RT REFLEX TO CULTURE - Abnormal; Notable for the following components:    Turbidity UA CLOUDY (*)     Glucose, Ur TRACE (*)     Nitrite, Urine POSITIVE (*)     Leukocyte Esterase, Urine LARGE (*)     All other components within normal limits TROP/MYOGLOBIN - Abnormal; Notable for the following components:    Troponin, High Sensitivity 22 (*)     Myoglobin 83 (*)     All other components within normal limits   MICROSCOPIC URINALYSIS - Abnormal; Notable for the following components:    Bacteria, UA MANY (*)     All other components within normal limits   POC GLUCOSE FINGERSTICK - Abnormal; Notable for the following components:    POC Glucose 140 (*)     All other components within normal limits   POCT GLUCOSE - Normal   CULTURE, URINE   CULTURE, BLOOD 1   CULTURE, BLOOD 1   CK   MAGNESIUM   POC GLUCOSE FINGERSTICK       All other labs were within normal range or not returned as of this dictation. EMERGENCY DEPARTMENT COURSE and DIFFERENTIAL DIAGNOSIS/MDM:   Vitals:    Vitals:    08/27/20 1327 08/27/20 1506 08/27/20 1649   BP: (!) 145/48 (!) 140/66 (!) 137/56   Pulse: 100 99 101   Resp: 20 18 18   Temp: 97.6 °F (36.4 °C)     TempSrc: Oral     SpO2: 95% 95% 96%   Weight: 140 lb (63.5 kg)     Height: 5' 9\" (1.753 m)         Medical Decision Making: She had a normal CAT scan that showed a possible mass so recommended CT chest.  A CT chest was done without contrast due to IV dye allergy. It shows a spiculated mass with some involvement of mediastinum. She also is found to have a significant urinary tract infection with positive nitrates and large leukocytes. She will be started on IV Rocephin and admitted to the hospital.  Case was discussed with Dr. Laura Cevallos. Medications   dextrose 50 % IV solution (25 g Intravenous Given 8/27/20 1440)   cefTRIAXone (ROCEPHIN) 1 g IVPB in 50 mL D5W minibag (0 g Intravenous Stopped 8/27/20 1741)       CONSULTS:  IP CONSULT TO INTERNAL MEDICINE  IP CONSULT TO PULMONOLOGY      FINAL IMPRESSION      1. Lung mass    2.  Acute cystitis without hematuria          DISPOSITION/PLAN   DISPOSITION Admitted 08/27/2020 04:50:33 PM      PATIENT REFERRED TO:   Noah Braswell MD  [address] [place] [id number]  [address]:     New Prescriptions    No medications on file           (Please note that portions of this note were completed with a voice recognition program.  Efforts were made to edit the dictations but occasionally words are mis-transcribed.)    [address] [facility] Schmitz NP, APRN - CNP  Certified Nurse Practitioner          AV Tai CNP  08/27/20 [address], APRN - CNP  08/27/20 9686

## 2020-08-27 NOTE — ED PROVIDER NOTES
[address] [place] [address] ED  EMERGENCY DEPARTMENT ENCOUNTER   ATTENDING ATTESTATION     Pt Name: Josette Zayas  MRN: [id number]  [place] 1950  Date of evaluation: 8/27/20       Josette Zayas is a 72 y.o. female who presents with Fall; Back Pain; Cough; and Rash      MDM:     Patient's EKG shows sinus tachycardia with rate of 106, TX QRS QTC and was unremarkable and the patient has left axis deviation, no ST elevations or depressions, no significant T wave changes. Nonspecific EKG. Vitals:   Vitals:    08/27/20 1327 08/27/20 1506   BP: (!) 145/48 (!) 140/66   Pulse: 100 99   Resp: 20 18   Temp: 97.6 °F (36.4 °C)    TempSrc: Oral    SpO2: 95% 95%   Weight: 140 lb (63.5 kg)    Height: 5' 9\" (1.753 m)          I personally evaluated and examined the patient in conjunction with the Midlevel provider and agree with the assessment, treatment plan, and disposition of the patient as recorded by the midlevel. I performed a history and physical examination of the patient and discussed management with the midlevel. I reviewed the midlevels note and agree with the documented findings and plan of care. Any areas of disagreement are noted on the chart. I was personally present for the key portions of any procedures. I have documented in the chart those procedures where I was not present during the key portions. I have personally reviewed all images and agree with the midlevel's interpretation. I have reviewed the emergency nurses triage note. I agree with the chief complaint, past medical history, past surgical history, allergies, medications, social and family history as documented unless otherwise noted.     Kyle Matt MD  Attending Emergency  Physician                  Candida Adames MD  08/27/20 [id number]

## 2020-08-27 NOTE — ED NOTES
Pt arrived via EMS after a fall. EMS reports her BS was 42 and after 2 rounds of glucose it was 127. Pt reports that she has fallen a lot in the last few weeks. She states that EMS broke the lock on her door a few days ago and now she is afraid to be home alone. Pt has a strong smell of urine and feces. Feet are covered in dirt and toenails are extreamely long. Edema in BLE. Pt brief is saturated with urine and BM. There was also cigarette butt inside the brief. Pt has a bedsore on coccyx and back. Pt states her back hurts from fall. Under her breast the skin is excoriated and she states she has medication for it but is not using it. EKG done, IV placed, vitals taken. Minnie Johnson NP aware.            Brad Nielson RN  08/27/20 [id number]

## 2020-08-27 NOTE — ED NOTES
Bed: 15  Expected date:   Expected time:   Means of arrival:   Comments:  anders Patterson, ELLIE  08/27/20 [id number]

## 2020-08-28 LAB
ALBUMIN SERPL-MCNC: 2.6 G/DL (ref 3.5–5.2)
ALBUMIN/GLOBULIN RATIO: ABNORMAL (ref 1–2.5)
ALP BLD-CCNC: 30 U/L (ref 35–104)
ALT SERPL-CCNC: 6 U/L (ref 5–33)
ANION GAP SERPL CALCULATED.3IONS-SCNC: 7 MMOL/L (ref 9–17)
AST SERPL-CCNC: 11 U/L
BILIRUB SERPL-MCNC: 0.14 MG/DL (ref 0.3–1.2)
BUN BLDV-MCNC: 11 MG/DL (ref 8–23)
BUN/CREAT BLD: 13 (ref 9–20)
CALCIUM SERPL-MCNC: 8.3 MG/DL (ref 8.6–10.4)
CHLORIDE BLD-SCNC: 104 MMOL/L (ref 98–107)
CO2: 28 MMOL/L (ref 20–31)
CREAT SERPL-MCNC: 0.86 MG/DL (ref 0.5–0.9)
EKG ATRIAL RATE: 105 BPM
EKG ATRIAL RATE: 106 BPM
EKG P AXIS: 80 DEGREES
EKG P AXIS: 85 DEGREES
EKG P-R INTERVAL: 160 MS
EKG P-R INTERVAL: 162 MS
EKG Q-T INTERVAL: 396 MS
EKG Q-T INTERVAL: 406 MS
EKG QRS DURATION: 152 MS
EKG QRS DURATION: 156 MS
EKG QTC CALCULATION (BAZETT): 526 MS
EKG QTC CALCULATION (BAZETT): 536 MS
EKG R AXIS: -67 DEGREES
EKG R AXIS: -67 DEGREES
EKG T AXIS: 63 DEGREES
EKG T AXIS: 79 DEGREES
EKG VENTRICULAR RATE: 105 BPM
EKG VENTRICULAR RATE: 106 BPM
ESTIMATED AVERAGE GLUCOSE: 128 MG/DL
GFR AFRICAN AMERICAN: >60 ML/MIN
GFR NON-AFRICAN AMERICAN: >60 ML/MIN
GFR SERPL CREATININE-BSD FRML MDRD: ABNORMAL ML/MIN/{1.73_M2}
GFR SERPL CREATININE-BSD FRML MDRD: ABNORMAL ML/MIN/{1.73_M2}
GLUCOSE BLD-MCNC: 126 MG/DL (ref 65–105)
GLUCOSE BLD-MCNC: 144 MG/DL (ref 65–105)
GLUCOSE BLD-MCNC: 75 MG/DL (ref 65–105)
GLUCOSE BLD-MCNC: 87 MG/DL (ref 65–105)
GLUCOSE BLD-MCNC: 88 MG/DL (ref 70–99)
HBA1C MFR BLD: 6.1 % (ref 4–6)
HCT VFR BLD CALC: 34.4 % (ref 36.3–47.1)
HEMOGLOBIN: 9.9 G/DL (ref 11.9–15.1)
INR BLD: 1.1
LV EF: 65 %
LVEF MODALITY: NORMAL
MAGNESIUM: 1.7 MG/DL (ref 1.6–2.6)
MCH RBC QN AUTO: 21.7 PG (ref 25.2–33.5)
MCHC RBC AUTO-ENTMCNC: 28.8 G/DL (ref 28.4–34.8)
MCV RBC AUTO: 75.4 FL (ref 82.6–102.9)
NRBC AUTOMATED: 0 PER 100 WBC
PDW BLD-RTO: 19.8 % (ref 11.8–14.4)
PLATELET # BLD: 275 K/UL (ref 138–453)
PMV BLD AUTO: 9.3 FL (ref 8.1–13.5)
POTASSIUM SERPL-SCNC: 4.2 MMOL/L (ref 3.7–5.3)
PROTHROMBIN TIME: 14.4 SEC (ref 11.5–14.2)
RBC # BLD: 4.56 M/UL (ref 3.95–5.11)
SODIUM BLD-SCNC: 139 MMOL/L (ref 135–144)
TOTAL PROTEIN: 5.5 G/DL (ref 6.4–8.3)
WBC # BLD: 10.2 K/UL (ref 3.5–11.3)

## 2020-08-28 PROCEDURE — 85610 PROTHROMBIN TIME: CPT

## 2020-08-28 PROCEDURE — 83036 HEMOGLOBIN GLYCOSYLATED A1C: CPT

## 2020-08-28 PROCEDURE — 97116 GAIT TRAINING THERAPY: CPT

## 2020-08-28 PROCEDURE — 6370000000 HC RX 637 (ALT 250 FOR IP): Performed by: FAMILY MEDICINE

## 2020-08-28 PROCEDURE — 97530 THERAPEUTIC ACTIVITIES: CPT

## 2020-08-28 PROCEDURE — 96366 THER/PROPH/DIAG IV INF ADDON: CPT

## 2020-08-28 PROCEDURE — 6360000002 HC RX W HCPCS: Performed by: FAMILY MEDICINE

## 2020-08-28 PROCEDURE — G0378 HOSPITAL OBSERVATION PER HR: HCPCS

## 2020-08-28 PROCEDURE — 93306 TTE W/DOPPLER COMPLETE: CPT

## 2020-08-28 PROCEDURE — 2580000003 HC RX 258: Performed by: FAMILY MEDICINE

## 2020-08-28 PROCEDURE — 36415 COLL VENOUS BLD VENIPUNCTURE: CPT

## 2020-08-28 PROCEDURE — 93010 ELECTROCARDIOGRAM REPORT: CPT | Performed by: INTERNAL MEDICINE

## 2020-08-28 PROCEDURE — 97163 PT EVAL HIGH COMPLEX 45 MIN: CPT

## 2020-08-28 PROCEDURE — 97166 OT EVAL MOD COMPLEX 45 MIN: CPT

## 2020-08-28 PROCEDURE — 2700000000 HC OXYGEN THERAPY PER DAY

## 2020-08-28 PROCEDURE — 96372 THER/PROPH/DIAG INJ SC/IM: CPT

## 2020-08-28 PROCEDURE — 2500000003 HC RX 250 WO HCPCS: Performed by: FAMILY MEDICINE

## 2020-08-28 PROCEDURE — 94640 AIRWAY INHALATION TREATMENT: CPT

## 2020-08-28 PROCEDURE — 97535 SELF CARE MNGMENT TRAINING: CPT

## 2020-08-28 PROCEDURE — 82947 ASSAY GLUCOSE BLOOD QUANT: CPT

## 2020-08-28 PROCEDURE — 85027 COMPLETE CBC AUTOMATED: CPT

## 2020-08-28 PROCEDURE — 83735 ASSAY OF MAGNESIUM: CPT

## 2020-08-28 PROCEDURE — 80053 COMPREHEN METABOLIC PANEL: CPT

## 2020-08-28 RX ORDER — TRAZODONE HYDROCHLORIDE 100 MG/1
300 TABLET ORAL NIGHTLY
Status: DISCONTINUED | OUTPATIENT
Start: 2020-08-28 | End: 2020-09-04 | Stop reason: HOSPADM

## 2020-08-28 RX ORDER — IPRATROPIUM BROMIDE AND ALBUTEROL SULFATE 2.5; .5 MG/3ML; MG/3ML
1 SOLUTION RESPIRATORY (INHALATION) EVERY 4 HOURS PRN
Status: DISCONTINUED | OUTPATIENT
Start: 2020-08-28 | End: 2020-09-04 | Stop reason: HOSPADM

## 2020-08-28 RX ORDER — DEXTROSE MONOHYDRATE 50 MG/ML
100 INJECTION, SOLUTION INTRAVENOUS PRN
Status: DISCONTINUED | OUTPATIENT
Start: 2020-08-28 | End: 2020-09-04 | Stop reason: HOSPADM

## 2020-08-28 RX ORDER — DEXTROSE MONOHYDRATE 25 G/50ML
12.5 INJECTION, SOLUTION INTRAVENOUS PRN
Status: DISCONTINUED | OUTPATIENT
Start: 2020-08-28 | End: 2020-09-04 | Stop reason: HOSPADM

## 2020-08-28 RX ORDER — CLONAZEPAM 0.5 MG/1
1 TABLET ORAL 3 TIMES DAILY PRN
Status: DISCONTINUED | OUTPATIENT
Start: 2020-08-28 | End: 2020-09-04 | Stop reason: HOSPADM

## 2020-08-28 RX ORDER — GABAPENTIN 400 MG/1
400 CAPSULE ORAL NIGHTLY
Status: ON HOLD | COMMUNITY
End: 2020-09-04 | Stop reason: HOSPADM

## 2020-08-28 RX ORDER — GLIMEPIRIDE 1 MG/1
1 TABLET ORAL
Status: DISCONTINUED | OUTPATIENT
Start: 2020-08-28 | End: 2020-09-04 | Stop reason: HOSPADM

## 2020-08-28 RX ORDER — GLIMEPIRIDE 1 MG/1
1 TABLET ORAL 2 TIMES DAILY WITH MEALS
COMMUNITY

## 2020-08-28 RX ORDER — CLONAZEPAM 1 MG/1
1 TABLET ORAL 3 TIMES DAILY
Status: ON HOLD | COMMUNITY
End: 2020-09-04 | Stop reason: HOSPADM

## 2020-08-28 RX ORDER — NICOTINE POLACRILEX 4 MG
15 LOZENGE BUCCAL PRN
Status: DISCONTINUED | OUTPATIENT
Start: 2020-08-28 | End: 2020-09-04 | Stop reason: HOSPADM

## 2020-08-28 RX ORDER — CLONAZEPAM 0.5 MG/1
1 TABLET ORAL 2 TIMES DAILY PRN
Status: DISCONTINUED | OUTPATIENT
Start: 2020-08-28 | End: 2020-08-28

## 2020-08-28 RX ORDER — BENZONATATE 100 MG/1
100 CAPSULE ORAL 3 TIMES DAILY PRN
Status: DISCONTINUED | OUTPATIENT
Start: 2020-08-28 | End: 2020-09-04 | Stop reason: HOSPADM

## 2020-08-28 RX ORDER — VENLAFAXINE HYDROCHLORIDE 75 MG/1
150 CAPSULE, EXTENDED RELEASE ORAL 2 TIMES DAILY
Status: DISCONTINUED | OUTPATIENT
Start: 2020-08-28 | End: 2020-09-04 | Stop reason: HOSPADM

## 2020-08-28 RX ORDER — VENLAFAXINE HYDROCHLORIDE 150 MG/1
150 CAPSULE, EXTENDED RELEASE ORAL 2 TIMES DAILY
COMMUNITY

## 2020-08-28 RX ORDER — GABAPENTIN 400 MG/1
400 CAPSULE ORAL 3 TIMES DAILY
Status: DISCONTINUED | OUTPATIENT
Start: 2020-08-28 | End: 2020-09-04 | Stop reason: HOSPADM

## 2020-08-28 RX ADMIN — OXYCODONE HYDROCHLORIDE AND ACETAMINOPHEN 1 TABLET: 5; 325 TABLET ORAL at 12:24

## 2020-08-28 RX ADMIN — BENZONATATE 100 MG: 100 CAPSULE ORAL at 01:58

## 2020-08-28 RX ADMIN — GABAPENTIN 400 MG: 400 CAPSULE ORAL at 09:09

## 2020-08-28 RX ADMIN — OXYCODONE HYDROCHLORIDE AND ACETAMINOPHEN 1 TABLET: 5; 325 TABLET ORAL at 01:47

## 2020-08-28 RX ADMIN — ENOXAPARIN SODIUM 40 MG: 40 INJECTION SUBCUTANEOUS at 09:09

## 2020-08-28 RX ADMIN — ANTI-FUNGAL POWDER MICONAZOLE NITRATE TALC FREE: 1.42 POWDER TOPICAL at 01:48

## 2020-08-28 RX ADMIN — BUDESONIDE AND FORMOTEROL FUMARATE DIHYDRATE 2 PUFF: 160; 4.5 AEROSOL RESPIRATORY (INHALATION) at 20:07

## 2020-08-28 RX ADMIN — ANTI-FUNGAL POWDER MICONAZOLE NITRATE TALC FREE: 1.42 POWDER TOPICAL at 09:09

## 2020-08-28 RX ADMIN — INSULIN LISPRO 2 UNITS: 100 INJECTION, SOLUTION INTRAVENOUS; SUBCUTANEOUS at 12:24

## 2020-08-28 RX ADMIN — METFORMIN HYDROCHLORIDE 500 MG: 500 TABLET ORAL at 09:09

## 2020-08-28 RX ADMIN — ASPIRIN 81 MG: 81 TABLET, COATED ORAL at 09:09

## 2020-08-28 RX ADMIN — BUDESONIDE AND FORMOTEROL FUMARATE DIHYDRATE 2 PUFF: 160; 4.5 AEROSOL RESPIRATORY (INHALATION) at 09:19

## 2020-08-28 RX ADMIN — METOPROLOL TARTRATE 37.5 MG: 25 TABLET, FILM COATED ORAL at 09:09

## 2020-08-28 RX ADMIN — OXYCODONE HYDROCHLORIDE AND ACETAMINOPHEN 1 TABLET: 5; 325 TABLET ORAL at 18:11

## 2020-08-28 RX ADMIN — CEFTRIAXONE SODIUM 1 G: 1 INJECTION, POWDER, FOR SOLUTION INTRAMUSCULAR; INTRAVENOUS at 18:20

## 2020-08-28 RX ADMIN — OXYCODONE HYDROCHLORIDE AND ACETAMINOPHEN 1 TABLET: 5; 325 TABLET ORAL at 23:56

## 2020-08-28 RX ADMIN — OXYCODONE HYDROCHLORIDE AND ACETAMINOPHEN 1 TABLET: 5; 325 TABLET ORAL at 06:07

## 2020-08-28 RX ADMIN — TRAZODONE HYDROCHLORIDE 50 MG: 50 TABLET ORAL at 00:16

## 2020-08-28 ASSESSMENT — PAIN SCALES - GENERAL
PAINLEVEL_OUTOF10: 8
PAINLEVEL_OUTOF10: 7
PAINLEVEL_OUTOF10: 8
PAINLEVEL_OUTOF10: 6
PAINLEVEL_OUTOF10: 6
PAINLEVEL_OUTOF10: 9
PAINLEVEL_OUTOF10: 7
PAINLEVEL_OUTOF10: 8

## 2020-08-28 ASSESSMENT — PAIN DESCRIPTION - PAIN TYPE
TYPE: CHRONIC PAIN

## 2020-08-28 ASSESSMENT — PAIN DESCRIPTION - ORIENTATION
ORIENTATION: LOWER

## 2020-08-28 ASSESSMENT — PAIN DESCRIPTION - PROGRESSION: CLINICAL_PROGRESSION: NOT CHANGED

## 2020-08-28 ASSESSMENT — PAIN DESCRIPTION - LOCATION
LOCATION: BACK

## 2020-08-28 ASSESSMENT — PAIN DESCRIPTION - FREQUENCY: FREQUENCY: CONTINUOUS

## 2020-08-28 ASSESSMENT — PAIN - FUNCTIONAL ASSESSMENT: PAIN_FUNCTIONAL_ASSESSMENT: PREVENTS OR INTERFERES SOME ACTIVE ACTIVITIES AND ADLS

## 2020-08-28 ASSESSMENT — PAIN DESCRIPTION - DESCRIPTORS
DESCRIPTORS: ACHING

## 2020-08-28 ASSESSMENT — PAIN DESCRIPTION - ONSET: ONSET: ON-GOING

## 2020-08-28 NOTE — PROGRESS NOTES
Pt received approx 2020 via cart in stable condition, moved to bed x2 assist, minimal assistance from pt. Oriented to room and call bell, discussed POC, reviewed history --    Pt indicates understanding --    Patient education flyer \"[facility] What you need to know\" was provided and reviewed. Questions answered and understanding was verbalized by the patient and/or family.

## 2020-08-28 NOTE — PROGRESS NOTES
Subjective  General  Chart Reviewed: Yes  Patient assessed for rehabilitation services?: Yes  Response To Previous Treatment: Not applicable  Family / Caregiver Present: No  Follows Commands: Within Functional Limits  General Comment  Comments: OK for PT per Olga Lidia ARGUETA  Pain Screening  Patient Currently in Pain: Yes  Pain Assessment  Pain Assessment: 0-10  Pain Level: 7  Pain Type: Chronic pain  Pain Location: Back  Pain Orientation: Lower  Vital Signs  Patient Currently in Pain: Yes       Orientation  Orientation  Overall Orientation Status: Within Normal Limits  Social/Functional History  Social/Functional History  Lives With: Alone  Type of Home: House  Home Layout: Multi-level, Able to Live on Main level with bedroom/bathroom  Home Access: Ramped entrance  Bathroom Shower/Tub: Walk-in shower  Bathroom Toilet: Standard  Bathroom Equipment: Shower chair  Home Equipment: Rolling walker, Oxygen, Alert Button(O2 at night or when resting. 2L)  Receives Help From: Family, Friend(s)  ADL Assistance: Independent  Homemaking Assistance: Needs assistance(assist from family for cleaning, laundry, groceries)  Ambulation Assistance: Independent(uses RW)  Transfer Assistance: Independent  Active : No  Patient's  Info: family or friends. Occupation: Retired  Type of occupation: factory  Leisure & Hobbies: watches TV.   Additional Comments: 11 falls in last 3 weeks (states she falls backwards and legs give out)  Cognition        Objective     Observation/Palpation  Posture: Fair(Trunk flexed posture)    AROM RLE (degrees)  RLE AROM: WNL  AROM LLE (degrees)  LLE AROM : WNL  AROM RUE (degrees)  RUE General AROM: See OT eval for B UE ROM  Strength RLE  Strength RLE: WFL  Comment: B ankles 4/5, B hips/knees 3+/5  Strength LLE  Strength LLE: WFL  Strength RUE  Comment: See OT eval for B UE MMT  Tone RLE  RLE Tone: Normotonic  Tone LLE  LLE Tone: Normotonic  Motor Control  Gross Motor?: WNL  Sensation  Overall Sensation Co-treatment with OT warranted secondary to decreased safety and independence requiring 2 skilled therapy professionals to address individual discipline. Alexa Monroe, PT

## 2020-08-28 NOTE — H&P
History & Physical  [facility]    Adult Hospitalist      Name: Tha Khan  MRN: [id number]     [place]: [de-identified]  Room: 2026/2026-01    Admit Date: 8/27/2020  1:16 PM  PCP: Laurie Avendano MD    Primary Problem  Active Problems:    Lung mass  Resolved Problems:    * No resolved hospital problems. *        Assesment:     · Recurrent falls  · Hypoglycemia   · Acute cystitis without hematuria  · Lung mass   · Pedal edema  · Decubitus ulcer  · Cutaneous candidiasis   · Coronary artery disease   · Abdominal aortic aneurysm   · Diabetes mellitus type 2  · Essential hypertension  · Moderate persistent asthma / COPD  · Nicotine dependence  · Chronic pain syndrome  · Major depressive disorder    · Diabetic sensory neuropathy  · GERD  · Vitamin D deficiency   · Anxiety disorder         Plan:     · Admit MS  · Monitor vitals closely  · Keep SPO2 above 90%  · Is/ Os  · Daily weights  · IV heplock  · Check 2 D echo  · ASA  · Symbicort  · Klonopin  · Neurontin  · Amaryl  · Metformin  · Lopressor  · Add BP and HR parameters   · Oxycodone prn   · Pain control  · Hold lasix  · Hold Potassium  · Trazodone prn  · Check CBC, CMP  · Check BMP after that  · Consult Pulmonology  · Consult SW  · DVT and GI prophylaxis. Chief Complaint:     Chief Complaint   Patient presents with    Fall    Back Pain    Cough    Rash         History of Present Illness:      Tha Khan is a 72 y.o.  female who presents with Fall; Back Pain; Cough; and Rash    Pt presents to the emergency room via EMS after she had fallen several times at home. Pt says this has been happening for almost 3 weeks now. States she fell 2 to 3 times every week. States it seemed as if she did not have any strength in her legs and she would fall straight down or backwards. Patient states that she has been feeling weak. Pt states that she had fallen several days ago and EMTs had to break the door to get in.  She states that she is now afraid of being by herself at home alone now. Pt is noted to ave cough. She states that she has had a cough which has bene congested. Phlegm is clear and at times difficult to put out. She denies any chest pain, dyspnea, orthopnea, nausea, vomiting, abd pain. She also has a rash under her breasts which has been itching. Pt says she has a cream for it but never got to use it. Pt was also found to have a soiled diaper full of urine and BM. Pt also has a DQ ulcer over the back and c/o pain over it. Pt underwent testing which revealed a lung mass with likely metastasis. Pt says she quit smoking in the last few years. I have personally reviewed the past medical history, past surgical history, medications, social history, and family history, and summarized in the note. Review of Systems:     All 10 point system is reviewed and negative otherwise mentioned in HPI. Past Medical History:     Past Medical History:   Diagnosis Date    AAA (abdominal aortic aneurysm) ([facility] [address]     Pt denies having a history of AAA    Acid reflux     Anxiety and depression     Aortic stenosis     Arthritis     Blister of ankle, right 10/13/2016    blister broke open & draining, is on antibiotics    CAD (coronary artery disease)     COPD (chronic obstructive pulmonary disease) (HCC)     Diabetes mellitus ([facility] [address]     Falls     Heart block     bifasicular    Hypokalemia     MDRO (multiple drug resistant organisms) resistance 10/17/2014    E. Coli urine    MRSA (methicillin resistant staph aureus) culture positive resolved 12/2016    2 negative nasal screens - 2016 (hx in urine 2014)    On home oxygen therapy     uses 2 liters at night    On home oxygen therapy     patient states 2 liters/nasal cannula continuous    Overactive bladder     patient incont.  wears a brief    Pneumonia     PONV (postoperative nausea and vomiting)     Vitamin D deficiency         Past Surgical History:     Past Surgical History:   Procedure Laterality Date    AORTIC VALVE REPAIR N/A 4/11/2017    AORTIC VALVE REPAIR REPLACEMENT performed by Mathieu Schilling MD at [facility] ENDOSCOPY, COLON, DIAGNOSTIC  12/08/2016    EYE SURGERY      HYSTERECTOMY      LUMBAR LAMINECTOMY      TONSILLECTOMY          Medications Prior to Admission:       Prior to Admission medications    Medication Sig Start Date End Date Taking? Authorizing Provider   oxyCODONE-acetaminophen (PERCOCET) 5-325 MG per tablet Take 1 tablet by mouth every 6 hours . Yes Historical Provider, MD   TRAZODONE HCL PO Take by mouth nightly as needed Pt unsure of dosage   Yes Historical Provider, MD   Venlafaxine HCl (EFFEXOR PO) Take 2 tablets by mouth 2 times daily Pt unsure of dosage   Yes Historical Provider, MD   metoprolol tartrate (LOPRESSOR) 25 MG tablet Take 1.5 tablets by mouth 2 times daily 4/13/17  Yes Mathieu Schilling MD   clonazePAM (KLONOPIN) 1 MG tablet Take 1 tablet by mouth daily Take three tablets daily  Patient taking differently: Take 1 mg by mouth daily. Take three tablets daily - 1 in a.m., 2 at nite 1/28/17  Yes Jorge Pulse, DO   metFORMIN (GLUCOPHAGE) 500 MG tablet Take 500 mg by mouth 2 times daily 12/7/16  Yes Historical Provider, MD   aspirin EC 81 MG EC tablet Take 81 mg by mouth daily   Yes Historical Provider, MD   gabapentin (NEURONTIN) 400 MG capsule Take 400-800 mg by mouth 2 times daily    Yes Historical Provider, MD   glimepiride (AMARYL) 1 MG tablet Take 2 mg by mouth every morning (before breakfast) Indications: Takes 2mg AM and 1mg PM    Yes Historical Provider, MD   miconazole (MICOTIN) 2 % powder Apply topically 2 times daily. 1/26/19   Rosanne Thayer MD   ketoconazole (NIZORAL) 2 % cream Apply topically daily both feet .  1/26/19   Rosanne Thayer MD   nicotine (NICODERM CQ) 21 MG/24HR Place 1 patch onto the skin daily 12/30/18   Екатерина Castañeda MD   vitamin D (ERGOCALCIFEROL) 88400 units capsule Take 1 capsule by mouth once a week for 4 doses 18  John Tapia MD   guaiFENesin (ALTARUSSIN) 100 MG/5ML syrup Take 10 mLs by mouth every 4 hours as needed for Cough 18   Bay Leblanc MD   Docusate Sodium 100 MG/5ML ENEM Take 100 mg by mouth    Historical Provider, MD   ondansetron (ZOFRAN ODT) 4 MG disintegrating tablet Take 1 tablet by mouth every 8 hours as needed for Nausea or Vomiting 18   AV Simmons - CNP   lansoprazole (PREVACID) 30 MG delayed release capsule Take 30 mg by mouth daily 11/10/16   Historical Provider, MD   mometasone-formoterol (DULERA) 200-5 MCG/ACT inhaler Inhale 2 puffs into the lungs every 12 hours    Historical Provider, MD   estradiol (ESTRACE) 0.5 MG tablet Take 0.5 mg by mouth daily    Historical Provider, MD   furosemide (LASIX) 40 MG tablet Take 40 mg by mouth daily Hasn't taken in \"long time\"    Historical Provider, MD   potassium chloride (MICRO-K) 10 MEQ CR capsule Take 10 mEq by mouth daily     Historical Provider, MD        Allergies:       Codeine and Dye [iodides]    Social History:     Tobacco:    reports that she quit smoking about 4 years ago. She has never used smokeless tobacco.  Alcohol:      reports no history of alcohol use. Drug Use:  reports no history of drug use.     Family History:     Family History   Problem Relation Age of Onset    Cancer Mother     Cancer Father          Physical Exam:     Vitals:  BP (!) 133/50   Pulse 92   Temp 99.3 °F (37.4 °C) (Oral)   Resp 18   Ht 5' 8.5\" (1.74 m)   Wt 157 lb 3.2 oz (71.3 kg)   SpO2 95%   BMI 23.55 kg/m²   Temp (24hrs), Av.6 °F (37 °C), Min:97.6 °F (36.4 °C), Max:99.3 °F (37.4 °C)          General appearance - alert, well appearing, and in no acute distress, unkempt   Mental status - oriented to person, place, and time with normal affect  Head - normocephalic and atraumatic  Eyes - pupils equal and reactive, extraocular eye movements intact, conjunctiva clear  Ears - hearing appears to be intact  Nose - no drainage noted  Mouth - mucous membranes moist  Neck - supple, no carotid bruits, thyroid not palpable  Chest - coarse crackles to auscultation, normal effort  Heart - normal rate, regular rhythm, no murmur  Abdomen - soft, nontender, nondistended, bowel sounds present all four quadrants, no masses, hepatomegaly or splenomegaly  Neurological - normal speech, no focal findings or movement disorder noted, cranial nerves II through XII grossly intact  Extremities - peripheral pulses palpable, BL  pedal edema, no calf pain with palpation  Skin - no gross lesions, rashes, or induration noted        Data:     Labs:    Hematology:  Recent Labs     08/27/20  1327   WBC 12.0*   RBC 5.28*   HGB 11.6*   HCT 40.4   MCV 76.5*   MCH 22.0*   MCHC 28.7   RDW 20.0*      MPV 9.3     Chemistry:  Recent Labs     08/27/20  1327 08/27/20  1509 08/27/20  2044     --  139   K 3.9  --  3.9     --  99   CO2 33*  --  32*   GLUCOSE 36* 140 148*   BUN 9  --  10   CREATININE 0.73  --  0.87   MG 1.9  --   --    ANIONGAP 9  --  8*   LABGLOM >60  --  >60   GFRAA >60  --  >60   CALCIUM 9.5  --  8.7   TROPHS 22*  --   --    CKTOTAL 42  --   --    MYOGLOBIN 83*  --   --      Recent Labs     08/27/20  1506 08/27/20  1612 08/27/20  2204   POCGLU 140* 85 131*       Lab Results   Component Value Date    INR 0.9 12/28/2018    INR 0.9 10/31/2017    INR 1.0 04/15/2017    PROTIME 9.7 12/28/2018    PROTIME 9.8 10/31/2017    PROTIME 10.3 04/15/2017       Lab Results   Component Value Date/Time    SPECIAL NOT REPORTED 03/16/2019 09:00 AM     Lab Results   Component Value Date/Time    CULTURE ENTEROBACTER CLOACAE  >591013 CFU/ML   (A) 03/16/2019 09:00 AM       Lab Results   Component Value Date    POCPH 7.36 04/12/2017    PHART 7.42 08/26/2012    PH 7.22 04/11/2017    POCPCO2 45 04/12/2017    CJV1KGH 41 08/26/2012    PCO2 54 04/11/2017    POCPO2 93 04/12/2017    PO2ART 59 08/26/2012    PO2 89 04/11/2017    POCHCO3 25.3 04/12/2017    CWS6GEF 26.1 08/26/2012    HCO3 22.2 04/11/2017    NBEA NOT REPORTED 04/12/2017    PBEA 0 04/12/2017    YOI3TMW 27 04/12/2017    TMPB0NTR 97 04/12/2017    N5DGNNSK 92.1 08/26/2012    O2SAT 95 04/11/2017    FIO2 UNKNOWN 10/31/2017       Radiology:    Xr Lumbar Spine (2-3 Views)    Result Date: 8/27/2020  No acute osseous abnormality. Multilevel mild degenerative disc disease, not significantly changed since 2017. Ct Chest Wo Contrast    Result Date: 8/27/2020  Approximate 7.2 cm spiculated mass of the right upper lobe likely with invasion of the mediastinum, adjacent lymphangitic spread and suspected metastatic mediastinal lymphadenopathy. Xr Chest Portable    Result Date: 8/27/2020  Extensive poorly defined nodular opacity medial right upper lung with possible associated mediastinal involvement. Findings suggest underlying mass with associated infiltrate. Correlative CT chest suggested for further assessment         All radiological studies reviewed                Code Status:  Full Code    Electronically signed by Sam Park MD on 8/28/2020 at 12:41 AM     Copy sent to Dr. Naun Byrd MD    This note was created with the assistance of a speech-recognition program.  Although the intention is to generate a document that actually reflects the content of the visit, no guarantees can be provided that every mistake has been identified and corrected by editing. Note was updated later by me after  physical examination and  completion of the assessment.

## 2020-08-28 NOTE — PLAN OF CARE
Problem: Skin Integrity:  Goal: Will show no infection signs and symptoms  Description: Will show no infection signs and symptoms  Outcome: Ongoing  Note: Chronic redness to BLE per pt, redness under breasts/silver textile placed, coccyx red/zinc/mepilex placed, waffle mattress in place - checked inflation, scattered bruises noted     Problem: Falls - Risk of:  Goal: Will remain free from falls  Description: Will remain free from falls  Outcome: Ongoing  Note: Bed alarm in use, call bell in reach, frequent rounds, walker available -- pt weak at this time     Problem: Pain:  Goal: Pain level will decrease  Description: Pain level will decrease  Outcome: Ongoing  Note: Pt indicates adequate relief from scheduled pain med given     Problem: ABCDS Injury Assessment  Goal: Absence of physical injury  Outcome: Ongoing

## 2020-08-28 NOTE — PLAN OF CARE
Problem: Skin Integrity:  Goal: Will show no infection signs and symptoms  Description: Will show no infection signs and symptoms  8/28/2020 0908 by Alexa Vasquez RN  Outcome: Ongoing  8/28/2020 0046 by Sekou Ralph RN  Outcome: Ongoing  Note: Chronic redness to BLE per pt, redness under breasts/silver textile placed, coccyx red/zinc/mepilex placed, waffle mattress in place - checked inflation, scattered bruises noted  Goal: Absence of new skin breakdown  Description: Absence of new skin breakdown  Outcome: Ongoing     Problem: Falls - Risk of:  Goal: Will remain free from falls  Description: Will remain free from falls  8/28/2020 0908 by Alexa Vasquez RN  Outcome: Ongoing  8/28/2020 0046 by Sekou Ralph RN  Outcome: Ongoing  Note: Bed alarm in use, call bell in reach, frequent rounds, walker available -- pt weak at this time  Goal: Absence of physical injury  Description: Absence of physical injury  Outcome: Ongoing     Problem: Pain:  Goal: Pain level will decrease  Description: Pain level will decrease  8/28/2020 0908 by Alexa Vasquez RN  Outcome: Ongoing  8/28/2020 0046 by Sekou Ralph RN  Outcome: Ongoing  Note: Pt indicates adequate relief from scheduled pain med given  Goal: Control of acute pain  Description: Control of acute pain  Outcome: Ongoing  Goal: Control of chronic pain  Description: Control of chronic pain  Outcome: Ongoing     Problem: ABCDS Injury Assessment  Goal: Absence of physical injury  8/28/2020 0908 by Alexa Vasquez RN  Outcome: Ongoing  8/28/2020 0046 by Sekou Ralph RN  Outcome: Ongoing

## 2020-08-28 NOTE — CONSULTS
IR note    Imaging was reviewed which demonstrated large medial right upper lobe mass with ubrupt cutoff of a right upper lobar bronchial branch. Bronchoscopy and biopsy would be less invasive given the imaging appearance. In addition, due to pt's emphysema she is at a higher risk for pneumothorax with percutaneous biopsy. If endobronchial biopsy is inconclusive, then percutaneous biopsy can be attempted.     Ching Rankin MD  IR attending

## 2020-08-28 NOTE — CONSULTS
Pulmonary Medicine and Critical Care Consult  Esthela LEY-CNP/Janie Pena MD      Patient - Jermaine Arevalo   MRN -  [id number]   Acct # - [de-identified]   - 1950      Date of Admission -  2020  1:16 PM  Date of evaluation -  2020  Room -    Braydon Moreno MD Primary Care Physician - Enmanuel Milton MD     Reason for Consult    Lung mass    Assessment   · 7.2 cm spiculated mass right upper lobe with invasion of mediastinum, concerning for malignancy  · Mediastinal lymphadenopathy  · 5 mm left upper lobe nodule, stable since 2016  · Centrilobular emphysema/50-pack-year smoking history  · Chronic hypoxic/hypercarbic respiratory failure, on home oxygen  · Frequent falls  · Anxiety/depression, DM, diastolic heart failure s/p AVR     Recommendations   · Consult IR to evaluate for biopsy of right upper lobe mass, if unable to obtain patient will need bronchoscopy  · Continue Rocephin for UTI   · Discontinue IV fluids  · Albuterol and Ipratropium Q 4 hours prn  · Symbicort 160  · Labs: CBC and BMP in am  · Echocardiogram  · Incentive spirometry every hour while awake  · 2 liters/min via nasal cannula  · DVT prophylaxis with low molecular weight heparin  · PT/OT  · Discussed with RN  · Will follow with you    Problem List      Patient Active Problem List   Diagnosis    Valvular heart disease    Type 2 diabetes mellitus without complication, without long-term current use of insulin ([address]    Open wound of right ankle    COPD (chronic obstructive pulmonary disease) ([address]    Syncope and collapse    Chronic ulcer of right heel ([address]    Multifocal pneumonia    Aortic valve stenosis    Esophageal dilatation    Aspiration pneumonia of both lower lobes due to gastric secretions ([address]    Falls frequently    Chronic respiratory failure ([address]    Contusion of right knee    Pneumonia    Closed fracture of right distal radius    [facility] hemorrhage [facility]    Subarachnoid hemorrhage (HCC)    Traumatic hemorrhage of cerebrum (HCC)    Chronic bilateral low back pain    Cellulitis of both feet    Acute on chronic congestive heart failure (HCC)    Bilateral leg edema    Cellulitis    Lung mass       HPI     Nadira Hummel is 72 y.o.,  female, admitted because of lung mass and UTI. She presented to the emergency room via EMS for evaluation of a fall. She has been having multiple falls over the past 1 to 2 weeks. She states her knees give out and she falls. She is a history of emphysema, former smoker, about 50-pack-year history, quit 3 years ago. She is on oxygen at 2 L 24/7 at home. She takes Dulera at home. She denies any fever or chills. She denies any chest pain. She has occasional nonproductive cough. No hemoptysis. Her shortness of breath is at her baseline. She denies any dizziness. She used to follow in our office with Dr. Pennie Walters but has not been seen in about 3-4 years. PMHx   Past Medical History      Diagnosis Date    AAA (abdominal aortic aneurysm) ([facility]     Pt denies having a history of AAA    Acid reflux     Anxiety and depression     Aortic stenosis     Arthritis     Blister of ankle, right 10/13/2016    blister broke open & draining, is on antibiotics    CAD (coronary artery disease)     COPD (chronic obstructive pulmonary disease) ([facility]     Diabetes mellitus ([address]     Falls     Heart block     bifasicular    Hypokalemia     MDRO (multiple drug resistant organisms) resistance 10/17/2014    E. Coli urine    MRSA (methicillin resistant staph aureus) culture positive resolved 12/2016    2 negative nasal screens - 2016 (hx in urine 2014)    On home oxygen therapy     uses 2 liters at night    On home oxygen therapy     patient states 2 liters/nasal cannula continuous    Overactive bladder     patient incont.  wears a brief    Pneumonia     PONV (postoperative nausea and vomiting)     Vitamin D deficiency       Past Surgical History        Procedure Laterality Date    AORTIC VALVE REPAIR N/A 4/11/2017    AORTIC VALVE REPAIR REPLACEMENT performed by Mell Brooks MD at [address]      ENDOSCOPY, COLON, DIAGNOSTIC  12/08/2016    EYE SURGERY      HYSTERECTOMY      LUMBAR LAMINECTOMY      TONSILLECTOMY         Meds    Current Medications    miconazole   Topical BID    cefTRIAXone (ROCEPHIN) IV  1 g Intravenous Q24H    insulin lispro  0-12 Units Subcutaneous TID WC    insulin lispro  0-6 Units Subcutaneous Nightly    aspirin EC  81 mg Oral Daily    gabapentin  400 mg Oral BID    glimepiride  2 mg Oral QAM AC    metFORMIN  500 mg Oral BID    metoprolol tartrate  37.5 mg Oral BID    budesonide-formoterol  2 puff Inhalation BID    oxyCODONE-acetaminophen  1 tablet Oral 4 times per day    sodium chloride flush  10 mL Intravenous 2 times per day    enoxaparin  40 mg Subcutaneous Daily     glucose, dextrose, glucagon (rDNA), dextrose, clonazePAM, benzonatate, ipratropium-albuterol, docusate sodium, traZODone, sodium chloride flush, acetaminophen **OR** acetaminophen, promethazine **OR** ondansetron, nicotine  IV Drips/Infusions   dextrose      sodium chloride 50 mL/hr at 08/28/20 0133     Home Medications  Medications Prior to Admission: oxyCODONE-acetaminophen (PERCOCET) 5-325 MG per tablet, Take 1 tablet by mouth every 6 hours . TRAZODONE HCL PO, Take by mouth nightly as needed Pt unsure of dosage  Venlafaxine HCl (EFFEXOR PO), Take 2 tablets by mouth 2 times daily Pt unsure of dosage  metoprolol tartrate (LOPRESSOR) 25 MG tablet, Take 1.5 tablets by mouth 2 times daily  clonazePAM (KLONOPIN) 1 MG tablet, Take 1 tablet by mouth daily Take three tablets daily (Patient taking differently: Take 1 mg by mouth daily.  Take three tablets daily - 1 in a.m., 2 at nite)  metFORMIN (GLUCOPHAGE) 500 MG tablet, Take 500 mg by mouth 2 times incontinence  Heme Denies bruising or bleeding easily  Endocrine Denies any history of  thyroid disease  Neuro Denies any focal motor or sensory deficits  Psychiatric Denies anxiety, depression, suicidal ideation  Skin Denies rashes, itching, open sores    Vitals     height is 5' 8.5\" (1.74 m) and weight is 161 lb (73 kg). Her oral temperature is 98.4 °F (36.9 °C). Her blood pressure is 108/43 (abnormal) and her pulse is 69. Her respiration is 18 and oxygen saturation is 93%. Body mass index is 24.12 kg/m². I/O        Intake/Output Summary (Last 24 hours) at 8/28/2020 1019  Last data filed at 8/28/2020 0507  Gross per 24 hour   Intake 602.1 ml   Output --   Net 602.1 ml     I/O last 3 completed shifts: In: 602.1 [P.O.:300; I.V.:302.1]  Out: -    Patient Vitals for the past 96 hrs (Last 3 readings):   Weight   08/28/20 0424 161 lb (73 kg)   08/27/20 2039 157 lb 3.2 oz (71.3 kg)   08/27/20 1327 140 lb (63.5 kg)     Exam   General Appearance  Awake, alert, oriented, in no acute distress  HEENT - Head is normocephalic, atraumatic. Pupil reactive to light  Neck - Supple,  trachea midline and straight  Lungs -diminished right upper lobe, otherwise moderate air exchange, no wheezing  Cardiovascular - Heart sounds are normal.  Regular rhythm normal rate without murmur, gallop or rub. Abdomen - Soft, nontender, nondistended, no masses or organomegaly  Neurologic - CN II-XII are grossly intact.  There are no focal motor or sensory deficits  Skin - No bruising or bleeding  Extremities - No cyanosis, clubbing or edema    Labs  - Old records and notes have been reviewed in [facility]   CBC     Lab Results   Component Value Date    WBC 10.2 08/28/2020    RBC 4.56 08/28/2020    HGB 9.9 08/28/2020    HCT 34.4 08/28/2020     08/28/2020    MCV 75.4 08/28/2020    MCH 21.7 08/28/2020    MCHC 28.8 08/28/2020    RDW 19.8 08/28/2020    LYMPHOPCT 12 08/27/2020    MONOPCT 5 08/27/2020    BASOPCT 0 08/27/2020    MONOSABS 0.56 08/27/2020    LYMPHSABS 1.38 08/27/2020    EOSABS 0.12 08/27/2020    BASOSABS 0.05 08/27/2020    DIFFTYPE NOT REPORTED 08/27/2020     BMP   Lab Results   Component Value Date     08/28/2020    K 4.2 08/28/2020     08/28/2020    CO2 28 08/28/2020    BUN 11 08/28/2020    CREATININE 0.86 08/28/2020    GLUCOSE 88 08/28/2020    CALCIUM 8.3 08/28/2020    MG 1.7 08/28/2020     LFTS  Lab Results   Component Value Date    ALKPHOS 30 08/28/2020    ALT 6 08/28/2020    AST 11 08/28/2020    PROT 5.5 08/28/2020    BILITOT 0.14 08/28/2020    BILIDIR <0.08 07/02/2018    IBILI CANNOT BE CALCULATED 07/02/2018    LABALBU 2.6 08/28/2020     ABG   Lab Results   Component Value Date    PHART 7.42 08/26/2012    OHY2JDM 41 08/26/2012    PO2ART 59 08/26/2012    SCD5LHX 26.1 08/26/2012    LMH2KKF 27 04/12/2017    P8JMSMJI 92.1 08/26/2012     PTT  Lab Results   Component Value Date    APTT 27.3 12/28/2018     INR   Lab Results   Component Value Date    INR 1.1 08/28/2020    INR 0.9 12/28/2018    INR 0.9 10/31/2017    PROTIME 14.4 (H) 08/28/2020    PROTIME 9.7 12/28/2018    PROTIME 9.8 10/31/2017       Radiology    CXR  8/27/20       CT Scans  8/27/2020    (See actual reports for details)    \"Thank you for asking us to see this patient\"    Case discussed with nurse and patient. Questions and concerns addressed. Electronically signed by     AV Asencio-YEN  Pulmonary Critical Care and Sleep Medicine,  Patient seen under the supervision of Sascha Flower MD, [facility]       Patient seen and evaluated by me. 72years old white female with history of emphysema/COPD/smoking on home oxygen was admitted for urinary tract infection was also noted to have a lung mass. At this time she denies any chest pain. She does have shortness of breath. He has occasional nonproductive cough. Lung exam reveals him moderate air exchange no active wheezing. Her CT scan of the chest was reviewed. Patient does have large right upper lobe mass. Will ask IR for evaluation for CT-guided biopsy, in case patient not able to do CT-guided biopsy will consider bronchoscopy. Continue bronchodilators. Continue IV Rocephin for UTI. Obtain echocardiogram.  Patient encouraged for incentive spirometry. Above was reviewed and discussed with Esthela Hollis CNP. And I agree with assessment and plan of care.   Electronically signed by     Trice Young MD on 8/28/2020 at 1:50 PM  Pulmonary Critical Care and Sleep Medicine,  [facility]  Cell: [phone]  Office: [phone]

## 2020-08-28 NOTE — PROGRESS NOTES
[address] Hospitalist        8/28/2020   1:01 PM    Name:  Meg Mcginnis  MRN:    [id number]     [place]:     [de-identified]   Room:  2026/2026-01  IP Day: 0     Admit Date: 8/27/2020  1:16 PM  PCP: Lei Lee MD    C/C:   Chief Complaint   Patient presents with   Rondel Graham    Back Pain    Cough    Rash       Assessment:      · Recurrent falls  · Hypoglycemia   · Acute cystitis without hematuria  · Lung mass RUL 7.2 cm spiculated   · Pedal edema- improved  · Decubitus ulcer  · Cutaneous candidiasis   · Coronary artery disease   · Abdominal aortic aneurysm   · Diabetes mellitus type 2  · Essential hypertension  · Moderate persistent asthma / COPD  · Nicotine dependence  · Chronic pain syndrome  · Major depressive disorder    · Diabetic sensory neuropathy  · GERD  · Vitamin D deficiency   · Anxiety disorder         Plan:        · Admit MS  · Monitor vitals closely  · Keep SPO2 above 90%  · Is/ Os  · Daily weights  · IV heplock  · Check 2 D echo- EF 65%  · Aortic stenosis mod  · ASA  · Symbicort  · Rocephin IV  · Klonopin- dose adjusted through pharm  · Neurontin- dose adj  · Amaryl- dose adj  · Metformin  · Lopressor  · Add BP and HR parameters   · Oxycodone prn   · Pain control  · Hold lasix  · Hold Potassium  · Trazodone prn- dose adj  · Check CBC, CMP  · Check BMP after that  · Consult Pulmonology- input appreciated  · SW input appreciated  · Consult SW  · DVT and GI prophylaxis  · accuchecks AC and HS  · ISS  · HbA1C 6.1  · Hypoglycemia protocol  · Miconazole top  · effexor   · Tessalon pearles prn      Subjective:     Patient seen and examined at bedside. No overnight events. No acute complaints today. Afebrile    Feels better  Cough improved    Pt. Denies any CP, palpitation, HA, dizziness, cold, changes in urination, BM or skin changes or any pain. ROS:  A 10 point system reviewed and negative otherwise mentioned above.         Physical Examination:      Vitals:  BP (!) 100/46   Pulse 70   Temp 98.5 °F (36.9 °C) (Oral)   Resp 18   Ht 5' 8.5\" (1.74 m)   Wt 161 lb (73 kg)   SpO2 95%   BMI 24.12 kg/m²   Temp (24hrs), Av.5 °F (36.9 °C), Min:97.6 °F (36.4 °C), Max:99.3 °F (37.4 °C)    Weight:   Wt Readings from Last 3 Encounters:   20 161 lb (73 kg)   19 192 lb 14.4 oz (87.5 kg)   18 199 lb 4.8 oz (90.4 kg)     I/O last 3 completed shifts:  I/O last 3 completed shifts: In: 602.1 [P.O.:300; I.V.:302.1]  Out: -      Recent Labs     20  1612 20  2204 20  0618 20  1117   POCGLU 85 131* 75 144*         General appearance - alert, well appearing, and in no acute distress  Mental status - oriented to person, place, and time with normal affect  Head - normocephalic and atraumatic  Eyes - pupils equal and reactive, extraocular eye movements intact, conjunctiva clear  Ears - hearing appears to be intact  Nose - no drainage noted  Mouth - mucous membranes moist  Neck - supple, no carotid bruits, thyroid not palpable  Chest - coarse crackles to auscultation, normal effort  Heart - normal rate, regular rhythm, no murmur  Abdomen - soft, nontender, nondistended, bowel sounds present all four quadrants, no masses, hepatomegaly or splenomegaly  Neurological - normal speech, no focal findings or movement disorder noted, cranial nerves II through XII grossly intact  Extremities - peripheral pulses palpable, no pedal edema or calf pain with palpation, long nails  Skin - no gross lesions, rashes, or induration noted        Medications: Allergies:    Allergies   Allergen Reactions    Codeine      \"feeling of heavy on chest\"    Dye [Iodides]      Hallucination,vomiting       Current Meds:   Current Facility-Administered Medications:     miconazole (MICOTIN) 2 % powder, , Topical, BID, Whitney Becerril MD    cefTRIAXone (ROCEPHIN) 1 g IVPB in 50 mL D5W minibag, 1 g, Intravenous, Q24H, Whitney Becerril MD    insulin lispro (HUMALOG) injection vial 0-12 Units, 0-12 Units, Subcutaneous, TID WC, Whitney Becerril MD, 2 Units at 08/28/20 1224    insulin lispro (HUMALOG) injection vial 0-6 Units, 0-6 Units, Subcutaneous, Nightly, Whitney Becerril MD    glucose (GLUTOSE) 40 % oral gel 15 g, 15 g, Oral, PRN, Whitney Becerril MD    dextrose 50 % IV solution, 12.5 g, Intravenous, PRN, Whitney Becerril MD    glucagon (rDNA) injection 1 mg, 1 mg, Intramuscular, PRN, Whitney Becerril MD    dextrose 5 % solution, 100 mL/hr, Intravenous, PRN, Whitney Becerril MD    clonazePAM (KLONOPIN) tablet 1 mg, 1 mg, Oral, BID PRN, Whitney Becerril MD    benzonatate (TESSALON) capsule 100 mg, 100 mg, Oral, TID PRN, hWitney Becerril MD, 100 mg at 08/28/20 0158    ipratropium-albuterol (DUONEB) nebulizer solution 1 ampule, 1 ampule, Inhalation, Q4H PRN, Whitney Becerril MD    aspirin EC tablet 81 mg, 81 mg, Oral, Daily, Whitney Becerril MD, 81 mg at 08/28/20 0909    docusate sodium (COLACE) capsule 100 mg, 100 mg, Oral, BID PRN, Whitney Becerril MD    gabapentin (NEURONTIN) capsule 400 mg, 400 mg, Oral, BID, Whitney Becerril MD, 400 mg at 08/28/20 0909    glimepiride (AMARYL) tablet 2 mg, 2 mg, Oral, QAM AC, Whitney Becerril MD    metFORMIN (GLUCOPHAGE) tablet 500 mg, 500 mg, Oral, BID, Whitney Becerril MD, 500 mg at 08/28/20 0909    metoprolol tartrate (LOPRESSOR) tablet 37.5 mg, 37.5 mg, Oral, BID, Whitney Becerril MD, 37.5 mg at 08/28/20 0909    budesonide-formoterol (SYMBICORT) 160-4.5 MCG/ACT inhaler 2 puff, 2 puff, Inhalation, BID, Whitney Becerril MD, 2 puff at 08/28/20 0919    oxyCODONE-acetaminophen (PERCOCET) 5-325 MG per tablet 1 tablet, 1 tablet, Oral, 4 times per day, Missy Olmedo MD, 1 tablet at 08/28/20 1224    traZODone (DESYREL) tablet 50 mg, 50 mg, Oral, Nightly PRN, Whitney Becerril MD, 50 mg at 08/28/20 0016    sodium chloride flush 0.9 % injection 10 mL, 10 mL, Intravenous, 2 times per day, Whitney Becerril MD, 10 mL at 08/27/20 2151    sodium chloride flush 0.9 % injection 10 mL, 10 mL, Intravenous, PRN, Whitney Becerril MD    acetaminophen (TYLENOL) tablet 650 mg, 650 mg, Oral, Q6H PRN **OR** acetaminophen (TYLENOL) suppository 650 mg, 650 mg, Rectal, Q6H PRN, Whitney Becerril MD    promethazine (PHENERGAN) tablet 12.5 mg, 12.5 mg, Oral, Q6H PRN **OR** ondansetron (ZOFRAN) injection 4 mg, 4 mg, Intravenous, Q6H PRN, Whitney Becerril MD    nicotine (NICODERM CQ) 21 MG/24HR 1 patch, 1 patch, Transdermal, Daily PRN, Whitney Becerril MD    enoxaparin (LOVENOX) injection 40 mg, 40 mg, Subcutaneous, Daily, Whitney Becerril MD, 40 mg at 08/28/20 0909      I/O (24Hr):     Intake/Output Summary (Last 24 hours) at 8/28/2020 1301  Last data filed at 8/28/2020 0507  Gross per 24 hour   Intake 602.1 ml   Output --   Net 602.1 ml       Data:           Labs:    Hematology:  Recent Labs     08/27/20  1327 08/28/20  0546   WBC 12.0* 10.2   RBC 5.28* 4.56   HGB 11.6* 9.9*   HCT 40.4 34.4*   MCV 76.5* 75.4*   MCH 22.0* 21.7*   MCHC 28.7 28.8   RDW 20.0* 19.8*    275   MPV 9.3 9.3   INR  --  1.1     Chemistry:  Recent Labs     08/27/20  1327 08/27/20  1509 08/27/20  2044 08/28/20  0546     --  139 139   K 3.9  --  3.9 4.2     --  99 104   CO2 33*  --  32* 28   GLUCOSE 36* 140 148* 88   BUN 9  --  10 11   CREATININE 0.73  --  0.87 0.86   MG 1.9  --   --  1.7   ANIONGAP 9  --  8* 7*   LABGLOM >60  --  >60 >60   GFRAA >60  --  >60 >60   CALCIUM 9.5  --  8.7 8.3*   TROPHS 22*  --   --   --    CKTOTAL 42  --   --   --    MYOGLOBIN 83*  --   --   --      Recent Labs     08/27/20  1506 08/27/20  1612 08/27/20  2204 08/28/20  0546 08/28/20  0618 08/28/20  1117   PROT  --   --   --  5.5*  --   --    LABALBU  --   --   --  2.6*  --   --    LABA1C  --   --   --  6.1*  --   --    AST  --   --   --  11  --   --    ALT  --   --   --  6  --   --    ALKPHOS  --   --   --  30*  --   --    BILITOT  --   --   --  0.14*  --   --    POCGLU 140* 85 131*  --  75 144*       Lab Results   Component Value Date/Time    SPECIAL LTAC,10CC 08/27/2020 04:45 PM     Lab Results   Component Value Date/Time    CULTURE NO GROWTH 14 HOURS 08/27/2020 04:45 PM       Lab Results   Component Value Date    POCPH 7.36 04/12/2017    PHART 7.42 08/26/2012    PH 7.22 04/11/2017    POCPCO2 45 04/12/2017    CAZ7PEN 41 08/26/2012    PCO2 54 04/11/2017    POCPO2 93 04/12/2017    PO2ART 59 08/26/2012    PO2 89 04/11/2017    POCHCO3 25.3 04/12/2017    OZV2DXN 26.1 08/26/2012    HCO3 22.2 04/11/2017    NBEA NOT REPORTED 04/12/2017    PBEA 0 04/12/2017    WMV2RCN 27 04/12/2017    VZLA0SWE 97 04/12/2017    S1VKILBF 92.1 08/26/2012    O2SAT 95 04/11/2017    FIO2 UNKNOWN 10/31/2017       Radiology:    Xr Lumbar Spine (2-3 Views)    Result Date: 8/27/2020  No acute osseous abnormality. Multilevel mild degenerative disc disease, not significantly changed since 2017. Ct Chest Wo Contrast    Result Date: 8/27/2020  Approximate 7.2 cm spiculated mass of the right upper lobe likely with invasion of the mediastinum, adjacent lymphangitic spread and suspected metastatic mediastinal lymphadenopathy. Xr Chest Portable    Result Date: 8/27/2020  Extensive poorly defined nodular opacity medial right upper lung with possible associated mediastinal involvement. Findings suggest underlying mass with associated infiltrate. Correlative CT chest suggested for further assessment         All radiological studies reviewed  Code Status:  Full Code        Electronically signed by Miguel Angel Obregon MD on 8/28/2020 at 1:01 PM    This note was created with the assistance of a speech-recognition program.  Although the intention is to generate a document that actually reflects the content of the visit, no guarantees can be provided that every mistake has been identified and corrected by editing. Note was updated later by me after  physical examination and  completion of the assessment.

## 2020-08-28 NOTE — PROGRESS NOTES
Occupational Therapy   Occupational Therapy Initial Assessment  Date: 2020   Patient Name: João Jennings  MRN: [id number]     : 1950    Date of Service: 2020    Discharge Recommendations:  [address] RN reports patient is medically stable for therapy treatment this date. Chart reviewed prior to treatment and patient is agreeable for therapy. All lines intact and patient positioned comfortably at end of treatment. All patient needs addressed prior to ending therapy session. Assessment   Performance deficits / Impairments: Decreased functional mobility ; Decreased ADL status; Decreased strength;Decreased safe awareness;Decreased cognition;Decreased balance;Decreased endurance;Decreased posture  Prognosis: Good;Fair  Decision Making: High Complexity  OT Education: OT Role;Plan of Care;Home Exercise Program;Precautions; ADL Adaptive Strategies;Transfer Training;Energy Conservation;Equipment; Family Education;IADL Safety  REQUIRES OT FOLLOW UP: Yes  Activity Tolerance  Activity Tolerance: Patient Tolerated treatment well;Patient limited by fatigue  Safety Devices  Safety Devices in place: Yes  Type of devices: Call light within reach; All fall risk precautions in place;Nurse notified; Patient at risk for falls;Gait belt;Left in chair;Chair alarm in place           Patient Diagnosis(es): The primary encounter diagnosis was Lung mass. A diagnosis of Acute cystitis without hematuria was also pertinent to this visit.      has a past medical history of AAA (abdominal aortic aneurysm) (HCC), Acid reflux, Anxiety and depression, Aortic stenosis, Arthritis, Blister of ankle, right, CAD (coronary artery disease), COPD (chronic obstructive pulmonary disease) ([facility] [address] Diabetes mellitus ([facility] [address] Falls, Heart block, Hypokalemia, MDRO (multiple drug resistant organisms) resistance, MRSA (methicillin resistant staph aureus) culture positive, On home oxygen therapy, On home oxygen therapy, Overactive bladder, Pneumonia, PONV (postoperative nausea and vomiting), and Vitamin D deficiency. has a past surgical history that includes back surgery; eye surgery; Cholecystectomy; Appendectomy; Hysterectomy; Tonsillectomy; lumbar laminectomy; Endoscopy, colon, diagnostic (12/08/2016); and aortic valve repair (N/A, 4/11/2017). Restrictions  Restrictions/Precautions  Restrictions/Precautions: General Precautions, Fall Risk  Required Braces or Orthoses?: No    Subjective   General  Chart Reviewed: Yes  Patient assessed for rehabilitation services?: Yes  Family / Caregiver Present: No  Pain Assessment  Pain Level: 8  Vital Signs  Temp: 98.5 °F (36.9 °C)  Temp Source: Oral  Pulse: 70  Heart Rate Source: Monitor  Resp: 18  BP: (!) 100/46  BP Location: Left upper arm  BP Upper/Lower: Upper  MAP (mmHg): (!) 60  Oxygen Therapy  SpO2: 95 %  O2 Device: Nasal cannula  Social/Functional History  Social/Functional History  Lives With: Alone  Type of Home: House  Home Layout: Multi-level, Able to Live on Main level with bedroom/bathroom  Home Access: Ramped entrance  Bathroom Shower/Tub: Walk-in shower  Bathroom Toilet: Standard  Bathroom Equipment: Shower chair  Home Equipment: Rolling walker, Oxygen, Alert Button(O2 at night or when resting. 2L)  Receives Help From: Family, Friend(s)  ADL Assistance: Independent  Homemaking Assistance: Needs assistance(assist from family for cleaning, laundry, groceries)  Ambulation Assistance: Independent(uses RW)  Transfer Assistance: Independent  Active : No  Patient's  Info: family or friends. Occupation: Retired  Type of occupation: factory  Leisure & Hobbies: watches TV.   Additional Comments: 11 falls in last 3 weeks (states she falls backwards and legs give out)       Objective   Vision: Impaired  Vision Exceptions: Wears glasses at all times  Hearing: Within functional limits    Orientation  Overall Orientation Status: Impaired  Orientation Level: Oriented to person;Oriented to place; Disoriented to time;Oriented to situation  Observation/Palpation  Posture: Fair  Observation: pt with posterior LOB both in sitting and in standing. Balance  Sitting Balance: Minimal assistance(pt having difficulty gaining balance initially in sitting. Pt losing balance posteriorly and needing min-mod A to correct, then able to sit SBA for ~5 min in prep for standing.)  Standing Balance: Moderate assistance(pt mod A initially when standing, posterior lean and needing assist to shift wt forward over TIFFANY. Pt progressed to min A for short distance in room but only able to walk distance to bathroom before needing break.)  Functional Mobility  Functional - Mobility Device: Rolling Walker  Assist Level: Moderate assistance(min-mod A for safety (d/t posterior LOB) and for pacing as pt cannot tolerate much distance this session. Pt has multiple falls at home in last couple weeks and is significantly weak overall.)  Functional Mobility Comments: Educated on fall prevention techs and need for assistance when up  ADL  Feeding: Independent  Grooming: Stand by assistance  UE Bathing: Minimal assistance  LE Bathing: Moderate assistance  UE Dressing: Minimal assistance  LE Dressing: Moderate assistance  Toileting: Moderate assistance  Tone RUE  RUE Tone: Normotonic  Tone LUE  LUE Tone: Normotonic  Coordination  Movements Are Fluid And Coordinated: Yes     Bed mobility  Supine to Sit: Moderate assistance  Comment: up to chair at end of session. Transfers  Sit to stand: Moderate assistance  Stand to sit: Minimal assistance; Moderate assistance  Transfer Comments: cues for hand placement, safety with walker and keeping wt forward.      Cognition  Overall Cognitive Status: Exceptions  Safety Judgement: Decreased awareness of need for safety;Decreased awareness of need for assistance  Problem Solving: Assistance required to generate solutions;Assistance required to implement solutions;Decreased awareness of errors;Assistance required to correct errors made  Insights: Decreased awareness of deficits  Initiation: Requires cues for some  Sequencing: Requires cues for some  Perception  Overall Perceptual Status: WFL     Sensation  Overall Sensation Status: WFL        LUE AROM (degrees)  LUE AROM : WFL  RUE AROM (degrees)  RUE AROM : WFL  LUE Strength  Gross LUE Strength: WFL  LUE Strength Comment: B UE's 4-/5  RUE Strength  Gross RUE Strength: WFL                   Plan   Plan  Times per week: 4-5x/week, 1-2x/day  Current Treatment Recommendations: Strengthening, Balance Training, Functional Mobility Training, Endurance Training, Safety Education & Training, Self-Care / ADL, Equipment Evaluation, Education, & procurement, Patient/Caregiver Education & Training            Goals  Short term goals  Time Frame for Short term goals: by discharge, pt will  Short term goal 1: demo CGA with ADL transfers with good safety and AD/DME as needed  Short term goal 2: demo CGA with functional mob in room with good safety/pacing for safe ADL completions  Short term goal 3: demo CGA with toileting routine with good sfaety  Short term goal 4: demo SBA with UB ADLs with and min A with LB ADLs with good safety/pacing and DME as needed  Short term goal 5: demo and verb good understanding of fall prevention techs, pacing, possible equip needs, and B UE HEP  Patient Goals   Patient goals : to go home (does not want to go to SNF)       Therapy Time   Individual Concurrent Group Co-treatment   Time In 0905         Time Out 0939(+10 min chart review)         Minutes 34              Patient would benefit from SNF for continued occupational therapy to increase independence with  ADL of bathing, dressing, toileting and grooming. Writer recommending SNF placement for for activity tolerance and strength which will increase independence with ADL's coordinated with bed mobility and chair transfers.  Continued skilled OT services to address decreased safety

## 2020-08-29 LAB
ANION GAP SERPL CALCULATED.3IONS-SCNC: 8 MMOL/L (ref 9–17)
BUN BLDV-MCNC: 13 MG/DL (ref 8–23)
BUN/CREAT BLD: 15 (ref 9–20)
CALCIUM SERPL-MCNC: 8.6 MG/DL (ref 8.6–10.4)
CHLORIDE BLD-SCNC: 102 MMOL/L (ref 98–107)
CO2: 29 MMOL/L (ref 20–31)
CREAT SERPL-MCNC: 0.86 MG/DL (ref 0.5–0.9)
CULTURE: ABNORMAL
CULTURE: ABNORMAL
GFR AFRICAN AMERICAN: >60 ML/MIN
GFR NON-AFRICAN AMERICAN: >60 ML/MIN
GFR SERPL CREATININE-BSD FRML MDRD: ABNORMAL ML/MIN/{1.73_M2}
GFR SERPL CREATININE-BSD FRML MDRD: ABNORMAL ML/MIN/{1.73_M2}
GLUCOSE BLD-MCNC: 111 MG/DL (ref 65–105)
GLUCOSE BLD-MCNC: 141 MG/DL (ref 65–105)
GLUCOSE BLD-MCNC: 168 MG/DL (ref 65–105)
GLUCOSE BLD-MCNC: 94 MG/DL (ref 65–105)
GLUCOSE BLD-MCNC: 97 MG/DL (ref 70–99)
HCT VFR BLD CALC: 35.3 % (ref 36.3–47.1)
HEMOGLOBIN: 10.3 G/DL (ref 11.9–15.1)
Lab: ABNORMAL
MCH RBC QN AUTO: 22 PG (ref 25.2–33.5)
MCHC RBC AUTO-ENTMCNC: 29.2 G/DL (ref 28.4–34.8)
MCV RBC AUTO: 75.4 FL (ref 82.6–102.9)
NRBC AUTOMATED: 0 PER 100 WBC
PDW BLD-RTO: 19.6 % (ref 11.8–14.4)
PLATELET # BLD: 287 K/UL (ref 138–453)
PMV BLD AUTO: 9.7 FL (ref 8.1–13.5)
POTASSIUM SERPL-SCNC: 4.3 MMOL/L (ref 3.7–5.3)
RBC # BLD: 4.68 M/UL (ref 3.95–5.11)
SODIUM BLD-SCNC: 139 MMOL/L (ref 135–144)
SPECIMEN DESCRIPTION: ABNORMAL
WBC # BLD: 9.7 K/UL (ref 3.5–11.3)

## 2020-08-29 PROCEDURE — 96372 THER/PROPH/DIAG INJ SC/IM: CPT

## 2020-08-29 PROCEDURE — U0003 INFECTIOUS AGENT DETECTION BY NUCLEIC ACID (DNA OR RNA); SEVERE ACUTE RESPIRATORY SYNDROME CORONAVIRUS 2 (SARS-COV-2) (CORONAVIRUS DISEASE [COVID-19]), AMPLIFIED PROBE TECHNIQUE, MAKING USE OF HIGH THROUGHPUT TECHNOLOGIES AS DESCRIBED BY CMS-2020-01-R: HCPCS

## 2020-08-29 PROCEDURE — 6360000002 HC RX W HCPCS: Performed by: FAMILY MEDICINE

## 2020-08-29 PROCEDURE — G0378 HOSPITAL OBSERVATION PER HR: HCPCS

## 2020-08-29 PROCEDURE — 6370000000 HC RX 637 (ALT 250 FOR IP): Performed by: FAMILY MEDICINE

## 2020-08-29 PROCEDURE — 2700000000 HC OXYGEN THERAPY PER DAY

## 2020-08-29 PROCEDURE — 94640 AIRWAY INHALATION TREATMENT: CPT

## 2020-08-29 PROCEDURE — 2580000003 HC RX 258: Performed by: FAMILY MEDICINE

## 2020-08-29 PROCEDURE — 80048 BASIC METABOLIC PNL TOTAL CA: CPT

## 2020-08-29 PROCEDURE — 85027 COMPLETE CBC AUTOMATED: CPT

## 2020-08-29 PROCEDURE — 97116 GAIT TRAINING THERAPY: CPT

## 2020-08-29 PROCEDURE — 96366 THER/PROPH/DIAG IV INF ADDON: CPT

## 2020-08-29 PROCEDURE — 94761 N-INVAS EAR/PLS OXIMETRY MLT: CPT

## 2020-08-29 PROCEDURE — 97535 SELF CARE MNGMENT TRAINING: CPT

## 2020-08-29 PROCEDURE — 36415 COLL VENOUS BLD VENIPUNCTURE: CPT

## 2020-08-29 PROCEDURE — 97110 THERAPEUTIC EXERCISES: CPT

## 2020-08-29 PROCEDURE — 82947 ASSAY GLUCOSE BLOOD QUANT: CPT

## 2020-08-29 RX ADMIN — METFORMIN HYDROCHLORIDE 500 MG: 500 TABLET ORAL at 09:20

## 2020-08-29 RX ADMIN — BUDESONIDE AND FORMOTEROL FUMARATE DIHYDRATE 2 PUFF: 160; 4.5 AEROSOL RESPIRATORY (INHALATION) at 09:28

## 2020-08-29 RX ADMIN — INSULIN LISPRO 2 UNITS: 100 INJECTION, SOLUTION INTRAVENOUS; SUBCUTANEOUS at 12:37

## 2020-08-29 RX ADMIN — SODIUM CHLORIDE, PRESERVATIVE FREE 10 ML: 5 INJECTION INTRAVENOUS at 21:51

## 2020-08-29 RX ADMIN — ANTI-FUNGAL POWDER MICONAZOLE NITRATE TALC FREE: 1.42 POWDER TOPICAL at 21:50

## 2020-08-29 RX ADMIN — OXYCODONE HYDROCHLORIDE AND ACETAMINOPHEN 1 TABLET: 5; 325 TABLET ORAL at 06:00

## 2020-08-29 RX ADMIN — METOPROLOL TARTRATE 37.5 MG: 25 TABLET, FILM COATED ORAL at 21:49

## 2020-08-29 RX ADMIN — ASPIRIN 81 MG: 81 TABLET, COATED ORAL at 09:20

## 2020-08-29 RX ADMIN — BUDESONIDE AND FORMOTEROL FUMARATE DIHYDRATE 2 PUFF: 160; 4.5 AEROSOL RESPIRATORY (INHALATION) at 20:58

## 2020-08-29 RX ADMIN — GLIMEPIRIDE 2 MG: 2 TABLET ORAL at 09:19

## 2020-08-29 RX ADMIN — CEFTRIAXONE SODIUM 1 G: 1 INJECTION, POWDER, FOR SOLUTION INTRAMUSCULAR; INTRAVENOUS at 18:50

## 2020-08-29 RX ADMIN — ENOXAPARIN SODIUM 40 MG: 40 INJECTION SUBCUTANEOUS at 09:24

## 2020-08-29 RX ADMIN — INSULIN LISPRO 1 UNITS: 100 INJECTION, SOLUTION INTRAVENOUS; SUBCUTANEOUS at 21:50

## 2020-08-29 RX ADMIN — METFORMIN HYDROCHLORIDE 500 MG: 500 TABLET ORAL at 21:50

## 2020-08-29 RX ADMIN — METOPROLOL TARTRATE 37.5 MG: 25 TABLET, FILM COATED ORAL at 09:21

## 2020-08-29 RX ADMIN — ANTI-FUNGAL POWDER MICONAZOLE NITRATE TALC FREE: 1.42 POWDER TOPICAL at 10:42

## 2020-08-29 RX ADMIN — OXYCODONE HYDROCHLORIDE AND ACETAMINOPHEN 1 TABLET: 5; 325 TABLET ORAL at 14:03

## 2020-08-29 ASSESSMENT — PAIN SCALES - GENERAL
PAINLEVEL_OUTOF10: 10
PAINLEVEL_OUTOF10: 8
PAINLEVEL_OUTOF10: 8
PAINLEVEL_OUTOF10: 5
PAINLEVEL_OUTOF10: 0
PAINLEVEL_OUTOF10: 10

## 2020-08-29 ASSESSMENT — PAIN - FUNCTIONAL ASSESSMENT: PAIN_FUNCTIONAL_ASSESSMENT: PREVENTS OR INTERFERES SOME ACTIVE ACTIVITIES AND ADLS

## 2020-08-29 ASSESSMENT — PAIN DESCRIPTION - PROGRESSION: CLINICAL_PROGRESSION: NOT CHANGED

## 2020-08-29 ASSESSMENT — PAIN DESCRIPTION - ORIENTATION: ORIENTATION: LOWER

## 2020-08-29 ASSESSMENT — PAIN DESCRIPTION - FREQUENCY: FREQUENCY: CONTINUOUS

## 2020-08-29 ASSESSMENT — PAIN DESCRIPTION - PAIN TYPE: TYPE: CHRONIC PAIN

## 2020-08-29 ASSESSMENT — PAIN DESCRIPTION - DESCRIPTORS: DESCRIPTORS: ACHING

## 2020-08-29 ASSESSMENT — PAIN DESCRIPTION - ONSET: ONSET: ON-GOING

## 2020-08-29 ASSESSMENT — PAIN DESCRIPTION - LOCATION
LOCATION: GENERALIZED
LOCATION: BACK

## 2020-08-29 NOTE — PROGRESS NOTES
Physical Therapy  Facility/Department: [facility]  Daily Treatment Note  NAME: Simeon Nunez  : 1950  MRN: [id number]    Date of Service: 2020    Discharge Recommendations:  Subacute/Skilled Nursing Facility        Assessment   Body structures, Functions, Activity limitations: Decreased functional mobility ; Decreased strength;Decreased endurance;Decreased balance  Assessment: pt progressing toward goals   Activity Tolerance  Activity Tolerance: Patient limited by endurance     Patient Diagnosis(es): The primary encounter diagnosis was Lung mass. A diagnosis of Acute cystitis without hematuria was also pertinent to this visit. has a past medical history of AAA (abdominal aortic aneurysm) (HCC), Acid reflux, Anxiety and depression, Aortic stenosis, Arthritis, Blister of ankle, right, CAD (coronary artery disease), COPD (chronic obstructive pulmonary disease) ([facility] [address] Diabetes mellitus ([facility] [address] Falls, Heart block, Hypokalemia, MDRO (multiple drug resistant organisms) resistance, MRSA (methicillin resistant staph aureus) culture positive, On home oxygen therapy, On home oxygen therapy, Overactive bladder, Pneumonia, PONV (postoperative nausea and vomiting), and Vitamin D deficiency. has a past surgical history that includes back surgery; eye surgery; Cholecystectomy; Appendectomy; Hysterectomy; Tonsillectomy; lumbar laminectomy; Endoscopy, colon, diagnostic (2016); and aortic valve repair (N/A, 2017). Restrictions  Restrictions/Precautions  Restrictions/Precautions: General Precautions, Fall Risk  Required Braces or Orthoses?: No  Subjective   General  Chart Reviewed: Yes  General Comment  Comments: OK for PT per  RN  Pain Screening  Patient Currently in Pain: Yes  Pain Assessment  Pain Assessment: 0-10  Pain Level: 5  Pain Location: Generalized  Vital Signs  Patient Currently in Pain: Yes       Orientation     Cognition      Objective   Bed mobility  Scooting:  Moderate assistance;2 Person assistance  Transfers  Sit to Stand: Moderate Assistance;2 Person Assistance  Stand to sit: Moderate Assistance;2 Person Assistance  Stand Pivot Transfers: Moderate Assistance;2 Person Assistance  Ambulation  Ambulation?: Yes  Ambulation 1  Surface: level tile  Device: Rolling Walker  Assistance: Moderate assistance;2 Person assistance  Quality of Gait: gait unsteady  Gait Deviations: Decreased step length;Decreased step height  Distance: 5 ft  Comments: assisted pt to chair, pt incontinent of urine therefore brief change and edilia care completed      Balance  Posture: Fair  Sitting - Static: Good  Sitting - Dynamic: Good;-  Standing - Static: Fair;+  Standing - Dynamic: Fair  Exercises  Comments: seated AROM UE/LE ex x 20 reps                        G-Code     OutComes Score                                                     AM-PAC Score  AM-PAC Inpatient Mobility Raw Score : 14 (08/29/20 1109)  AM-PAC Inpatient T-Scale Score : 38.1 (08/29/20 1109)  Mobility Inpatient CMS 0-100% Score: 61.29 (08/29/20 1109)  Mobility Inpatient CMS G-Code Modifier : CL (08/29/20 1109)          Goals  Short term goals  Time Frame for Short term goals: 8 treatments  Short term goal 1: Independent bed mobility/transfers  Short term goal 2:  Independent ambulation w/ RW 75' x 1  Short term goal 3: Good standing balance and posture  Short term goal 4: Tolerate 30 min ther act  Short term goal 5: 1/2 to 1 grade strength increase  Patient Goals   Patient goals : Home    Plan    Plan  Times per week: 1-2x/day,6-7 days/week  Current Treatment Recommendations: Strengthening, Transfer Training, Endurance Training, Gait Training, Functional Mobility Training, Balance Training  Safety Devices  Type of devices: Call light within reach, Left in chair, Gait belt, Chair alarm in place, Nurse notified  Restraints  Initially in place: No     Therapy Time   Individual Concurrent Group Co-treatment   Time In  938         Time Out  1002 Minutes  24               Co-treatment with OT warranted secondary to decreased safety and independence requiring 2 skilled therapy professionals to address individual discipline's goals. PT addressing preparation for  Transfers, gait and pre gait activities,  sitting balance for increased  sitting/activity tolerance, functional mobility, environmental safety/scanning, fall prevention, functional mobility  transfers and functional LE strength. Upon writer exit, call light within reach, pt retired to bed. All lines intact and patient positioned comfortably. All patient needs addressed prior to ending therapy session. Chart reviewed prior to treatment and patient is agreeable for therapy. RN reports patient is medically stable for therapy treatment this date.        FARHANA GIL, PTA

## 2020-08-29 NOTE — PROGRESS NOTES
Pulmonary Critical Care Progress Note  Dagmar Veras MD     Patient seen for the follow up of lung mass, COPD/emphysema, chronic hypoxic/hypercarbic respiratory failure    Subjective:  She is sitting up in bedside chair, in no distress. No significant events noted overnight. She is feeling quite anxious/nervous secondary to finding out about the lung mass. Her shortness of breath is at her baseline. She denies any chest pain or significant cough. Examination:  Vitals: BP (!) 156/55   Pulse 92   Temp 98.6 °F (37 °C) (Oral)   Resp 8   Ht 5' 8.5\" (1.74 m)   Wt 162 lb 3.2 oz (73.6 kg)   SpO2 95%   BMI 24.30 kg/m²   General appearance: alert and cooperative with exam, sitting up in chair  Neck: No JVD  Lungs: Decreased air exchange right upper lobe  Heart: regular rate and rhythm, S1, S2 normal, no gallop  Abdomen: Soft, non tender, + BS  Extremities: no cyanosis or clubbing.  No significant edema    LABs:  CBC:   Recent Labs     08/28/20  0546 08/29/20  0526   WBC 10.2 9.7   HGB 9.9* 10.3*   HCT 34.4* 35.3*    287     BMP:   Recent Labs     08/28/20  0546 08/29/20  0526    139   K 4.2 4.3   CO2 28 29   BUN 11 13   CREATININE 0.86 0.86   LABGLOM >60 >60   GLUCOSE 88 97     PT/INR:   Recent Labs     08/28/20  0546   PROTIME 14.4*   INR 1.1     LIVER PROFILE:  Recent Labs     08/28/20  0546   AST 11   ALT 6   LABALBU 2.6*     ABG:  Lab Results   Component Value Date    PHART 7.42 08/26/2012    PH 7.22 04/11/2017    VMP4MPZ 41 08/26/2012    PCO2 54 04/11/2017    PO2ART 59 08/26/2012    PO2 89 04/11/2017    LJZ8UWV 26.1 08/26/2012    HCO3 22.2 04/11/2017    ONP9ZUO 27 04/12/2017    B5MHXCWN 92.1 08/26/2012    O2SAT 95 04/11/2017    FIO2 UNKNOWN 10/31/2017       Lab Results   Component Value Date    POCPH 7.36 04/12/2017    PHART 7.42 08/26/2012    PH 7.22 04/11/2017    POCPCO2 45 04/12/2017    TQJ5TAD 41 08/26/2012    PCO2 54 04/11/2017    POCPO2 93 04/12/2017    PO2ART 59 08/26/2012    PO2 89 04/11/2017    POCHCO3 25.3 04/12/2017    ICI6JDT 26.1 08/26/2012    HCO3 22.2 04/11/2017    NBEA NOT REPORTED 04/12/2017    PBEA 0 04/12/2017    NTA1HLM 27 04/12/2017    PSIO5JDI 97 04/12/2017    C9LWDVXD 92.1 08/26/2012    O2SAT 95 04/11/2017    FIO2 UNKNOWN 10/31/2017     Radiology:      Impression:  · 7.2 cm spiculated mass right upper lobe with invasion of mediastinum, concerning for malignancy  · Mediastinal lymphadenopathy  · 5 mm left upper lobe nodule, stable since 2016  · Centrilobular emphysema/50-pack-year smoking history  · Chronic hypoxic/hypercarbic respiratory failure, on home oxygen  · Frequent falls  · Anxiety/depression, DM, diastolic heart failure s/p AVR 2017    Recommendations:  · IR input noted, will arrange for bronchoscopy with biopsy on Monday or Tuesday. She will need to be n.p.o. after midnight on Sunday and a.m. dose of Lovenox on Monday morning will also need to be held. · Continue Rocephin for UTI   · Albuterol and Ipratropium Q 4 hours prn  · Symbicort 160  · Echocardiogram: EF 65%  · Incentive spirometry every hour while awake  · 2 liters/min via nasal cannula  · DVT prophylaxis with low molecular weight heparin  · PT/OT  · Discussed with RN  · Discussed with patient's son Melania Johnson.   · Will follow with you      Electronically signed by     Wilfred Escobar MD on 8/29/2020 at 12:43 PM  Pulmonary Critical Care and Sleep Medicine,  [facility]  Cell: [phone]  Office: [phone]

## 2020-08-29 NOTE — PLAN OF CARE
Problem: Skin Integrity:  Goal: Will show no infection signs and symptoms  Description: Will show no infection signs and symptoms  Outcome: Ongoing  Goal: Absence of new skin breakdown  Description: Absence of new skin breakdown  Outcome: Ongoing     Problem: Falls - Risk of:  Goal: Will remain free from falls  Description: Will remain free from falls  Outcome: Ongoing  Goal: Absence of physical injury  Description: Absence of physical injury  Outcome: Ongoing     Problem: Pain:  Goal: Pain level will decrease  Description: Pain level will decrease  Outcome: Ongoing  Goal: Control of acute pain  Description: Control of acute pain  Outcome: Ongoing  Goal: Control of chronic pain  Description: Control of chronic pain  Outcome: Ongoing     Problem: ABCDS Injury Assessment  Goal: Absence of physical injury  Outcome: Ongoing

## 2020-08-29 NOTE — PLAN OF CARE
Problem: Falls - Risk of:  Goal: Will remain free from falls  Description: Will remain free from falls  Outcome: Met This Shift     Problem: Falls - Risk of:  Goal: Absence of physical injury  Description: Absence of physical injury  Outcome: Met This Shift     Problem: ABCDS Injury Assessment  Goal: Absence of physical injury  Outcome: Met This Shift     Problem: Skin Integrity:  Goal: Will show no infection signs and symptoms  Description: Will show no infection signs and symptoms  Outcome: Ongoing     Problem: Skin Integrity:  Goal: Absence of new skin breakdown  Description: Absence of new skin breakdown  Outcome: Ongoing     Problem: Pain:  Goal: Control of acute pain  Description: Control of acute pain  Outcome: Ongoing     Problem: Pain:  Goal: Control of chronic pain  Description: Control of chronic pain  Outcome: Ongoing

## 2020-08-29 NOTE — PROGRESS NOTES
[address] Hospitalist        8/29/2020   2:37 PM    Name:  Laura Marcos  MRN:    [id number]     [place]:     [de-identified]   Room:  2026/2026-01  IP Day: 0     Admit Date: 8/27/2020  1:16 PM  PCP: Katherine Nevarez MD    C/C:   Chief Complaint   Patient presents with   Sabrina Murrain    Back Pain    Cough    Rash       Assessment:      · Recurrent falls  · Hypoglycemia   · Acute cystitis without hematuria  · Lung mass RUL 7.2 cm spiculated   · Pedal edema- improved  · Decubitus ulcer  · Cutaneous candidiasis   · Coronary artery disease   · Abdominal aortic aneurysm   · Diabetes mellitus type 2  · Essential hypertension  · Moderate persistent asthma / COPD  · Nicotine dependence  · Chronic pain syndrome  · Major depressive disorder    · Diabetic sensory neuropathy  · GERD  · Vitamin D deficiency   · Anxiety disorder   · Adjustment disorder         Plan:        · Admit MS  · Monitor vitals closely  · Keep SPO2 above 90%  · Is/ Os  · Daily weights  · IV heplock  · Check 2 D echo- EF 65%  · Aortic stenosis mod  · ASA  · Symbicort  · Rocephin IV  · Klonopin- dose adjusted through pharm  · Neurontin- dose adj  · Amaryl- dose adj  · Metformin  · Lopressor  · Add BP and HR parameters   · Oxycodone prn   · Pain control  · Hold lasix  · Hold Potassium  · Trazodone prn- dose adj  · Check CBC, CMP  · Check BMP after that  · Consult Pulmonology- input appreciated  · SW input appreciated  · Consult SW  · DVT and GI prophylaxis  · accuchecks AC and HS  · ISS  · HbA1C 6.1  · Hypoglycemia protocol  · Miconazole top  · effexor   · Tessalon pearles prn      Subjective:     Patient seen and examined at bedside. No overnight events. No acute complaints today. Afebrile  Stressed  Says ready for procedure on Monday or Tuesday  C/o low back pain  Angry     Pt. Denies any CP, palpitation, HA, dizziness, cold, changes in urination, BM or skin changes or any pain.     ROS:  A 10 point system reviewed and negative otherwise mentioned above. Physical Examination:      Vitals:  BP (!) 130/55   Pulse 76   Temp 98.2 °F (36.8 °C) (Oral)   Resp 18   Ht 5' 8.5\" (1.74 m)   Wt 162 lb 3.2 oz (73.6 kg)   SpO2 97%   BMI 24.30 kg/m²   Temp (24hrs), Av.4 °F (36.9 °C), Min:98.2 °F (36.8 °C), Max:98.6 °F (37 °C)    Weight:   Wt Readings from Last 3 Encounters:   20 162 lb 3.2 oz (73.6 kg)   19 192 lb 14.4 oz (87.5 kg)   18 199 lb 4.8 oz (90.4 kg)     I/O last 3 completed shifts:  I/O last 3 completed shifts: In: 100 [P.O.:100]  Out: -      Recent Labs     20  1644 20  2027 20  0645 20  1141   POCGLU 87 126* 94 168*         General appearance - alert, well appearing, and in no acute distress  Mental status - oriented to person, place, and time with normal affect  Head - normocephalic and atraumatic  Eyes - pupils equal and reactive, extraocular eye movements intact, conjunctiva clear  Ears - hearing appears to be intact  Nose - no drainage noted  Mouth - mucous membranes moist  Neck - supple, no carotid bruits, thyroid not palpable  Chest -  normal effort  Heart - normal rate  Abdomen - not examined  Neurological - normal speech, no focal findings or movement disorder noted  Extremities - no pedal edema or calf pain, long nails  Skin - no gross lesions, rashes, or induration noted        Medications: Allergies:    Allergies   Allergen Reactions    Codeine      \"feeling of heavy on chest\"    Dye [Iodides]      Hallucination,vomiting       Current Meds:   Current Facility-Administered Medications:     miconazole (MICOTIN) 2 % powder, , Topical, BID, Whitney Becerril MD    cefTRIAXone (ROCEPHIN) 1 g IVPB in 50 mL D5W minibag, 1 g, Intravenous, Q24H, Whitney Becerril MD, Stopped at 20 1850    insulin lispro (HUMALOG) injection vial 0-12 Units, 0-12 Units, Subcutaneous, TID WC, Whitney Becerril MD, 2 Units at 20 1237    insulin lispro (HUMALOG) injection vial 0-6 Units, 0-6 Units, Subcutaneous, Nightly, Whitney Becerril MD    glucose (GLUTOSE) 40 % oral gel 15 g, 15 g, Oral, PRN, Whitney Becerril MD    dextrose 50 % IV solution, 12.5 g, Intravenous, PRN, Whitney Becerril MD    glucagon (rDNA) injection 1 mg, 1 mg, Intramuscular, PRN, Whitney Becerril MD    dextrose 5 % solution, 100 mL/hr, Intravenous, PRN, Whitney Becerril MD    benzonatate (TESSALON) capsule 100 mg, 100 mg, Oral, TID PRN, Whitney Becerril MD, 100 mg at 08/28/20 0158    ipratropium-albuterol (DUONEB) nebulizer solution 1 ampule, 1 ampule, Inhalation, Q4H PRN, Whitney Becerril MD    clonazePAM (KLONOPIN) tablet 1 mg, 1 mg, Oral, TID PRN, Whitney Bceerril MD    gabapentin (NEURONTIN) capsule 400 mg, 400 mg, Oral, TID, Whitney Becerril MD    glimepiride (AMARYL) tablet 1 mg, 1 mg, Oral, Dinner, Whitney Becerril MD    traZODone (DESYREL) tablet 300 mg, 300 mg, Oral, Nightly, Whitney Becerril MD    venlafaxine (EFFEXOR XR) extended release capsule 150 mg, 150 mg, Oral, BID, Whitney Becerril MD    aspirin EC tablet 81 mg, 81 mg, Oral, Daily, Whitney Becerril MD, 81 mg at 08/29/20 0920    docusate sodium (COLACE) capsule 100 mg, 100 mg, Oral, BID PRN, Whitney Becerril MD    glimepiride (AMARYL) tablet 2 mg, 2 mg, Oral, QAM AC, Whitney Becerril MD, 2 mg at 08/29/20 0919    metFORMIN (GLUCOPHAGE) tablet 500 mg, 500 mg, Oral, BID, Whitney Becerril MD, 500 mg at 08/29/20 0920    metoprolol tartrate (LOPRESSOR) tablet 37.5 mg, 37.5 mg, Oral, BID, Whitney Becerril MD, 37.5 mg at 08/29/20 0921    budesonide-formoterol (SYMBICORT) 160-4.5 MCG/ACT inhaler 2 puff, 2 puff, Inhalation, BID, Whitney Becerril MD, 2 puff at 08/29/20 0928    oxyCODONE-acetaminophen (PERCOCET) 5-325 MG per tablet 1 tablet, 1 tablet, Oral, 4 times per day, Malissa Villasenor MD, 1 tablet at 08/29/20 1403    sodium chloride flush 0.9 % injection 10 mL, 10 mL, Intravenous, 2 times per day, Whitney Becerril MD, 10 mL at 08/27/20 2151    sodium chloride flush 0.9 % injection 10 mL, 10 mL, Intravenous, PRN, Whitney Becerril MD    acetaminophen (TYLENOL) tablet 650 mg, 650 mg, Oral, Q6H PRN **OR** acetaminophen (TYLENOL) suppository 650 mg, 650 mg, Rectal, Q6H PRN, Whitney Becerril MD    promethazine (PHENERGAN) tablet 12.5 mg, 12.5 mg, Oral, Q6H PRN **OR** ondansetron (ZOFRAN) injection 4 mg, 4 mg, Intravenous, Q6H PRN, Whitney Becerril MD    nicotine (NICODERM CQ) 21 MG/24HR 1 patch, 1 patch, Transdermal, Daily PRN, Whitney Becerril MD    enoxaparin (LOVENOX) injection 40 mg, 40 mg, Subcutaneous, Daily, Whitney Becerril MD, 40 mg at 08/29/20 0924      I/O (24Hr): Intake/Output Summary (Last 24 hours) at 8/29/2020 1437  Last data filed at 8/29/2020 1240  Gross per 24 hour   Intake 220 ml   Output --   Net 220 ml       Data:           Labs:    Hematology:  Recent Labs     08/27/20  1327 08/28/20  0546 08/29/20  0526   WBC 12.0* 10.2 9.7   RBC 5.28* 4.56 4.68   HGB 11.6* 9.9* 10.3*   HCT 40.4 34.4* 35.3*   MCV 76.5* 75.4* 75.4*   MCH 22.0* 21.7* 22.0*   MCHC 28.7 28.8 29.2   RDW 20.0* 19.8* 19.6*    275 287   MPV 9.3 9.3 9.7   INR  --  1.1  --      Chemistry:  Recent Labs     08/27/20  1327  08/27/20  2044 08/28/20  0546 08/29/20  0526     --  139 139 139   K 3.9  --  3.9 4.2 4.3     --  99 104 102   CO2 33*  --  32* 28 29   GLUCOSE 36*   < > 148* 88 97   BUN 9  --  10 11 13   CREATININE 0.73  --  0.87 0.86 0.86   MG 1.9  --   --  1.7  --    ANIONGAP 9  --  8* 7* 8*   LABGLOM >60  --  >60 >60 >60   GFRAA >60  --  >60 >60 >60   CALCIUM 9.5  --  8.7 8.3* 8.6   TROPHS 22*  --   --   --   --    CKTOTAL 42  --   --   --   --    MYOGLOBIN 83*  --   --   --   --     < > = values in this interval not displayed.      Recent Labs     08/28/20  0546 08/28/20  0618 08/28/20  1117 08/28/20  1644 08/28/20  2027 08/29/20  0645 08/29/20  1141   PROT 5.5*  --   --   --   --   --   --    LABALBU 2.6*  --   --   --   --   --   --    LABA1C 6.1*  --   --   --   --   --   --    AST 11  --   --   -- --   --   --    ALT 6  --   --   --   --   --   --    ALKPHOS 30*  --   --   --   --   --   --    BILITOT 0.14*  --   --   --   --   --   --    POCGLU  --  75 144* 87 126* 94 168*       Lab Results   Component Value Date/Time    SPECIAL LTAC,10CC 08/27/2020 04:45 PM     Lab Results   Component Value Date/Time    CULTURE NO GROWTH 2 DAYS 08/27/2020 04:45 PM       Lab Results   Component Value Date    POCPH 7.36 04/12/2017    PHART 7.42 08/26/2012    PH 7.22 04/11/2017    POCPCO2 45 04/12/2017    CPR9FKJ 41 08/26/2012    PCO2 54 04/11/2017    POCPO2 93 04/12/2017    PO2ART 59 08/26/2012    PO2 89 04/11/2017    POCHCO3 25.3 04/12/2017    OZE6WIV 26.1 08/26/2012    HCO3 22.2 04/11/2017    NBEA NOT REPORTED 04/12/2017    PBEA 0 04/12/2017    DJF2ISU 27 04/12/2017    POJQ4APX 97 04/12/2017    W7KWZCUT 92.1 08/26/2012    O2SAT 95 04/11/2017    FIO2 UNKNOWN 10/31/2017       Radiology:    Xr Lumbar Spine (2-3 Views)    Result Date: 8/27/2020  No acute osseous abnormality. Multilevel mild degenerative disc disease, not significantly changed since 2017. Ct Chest Wo Contrast    Result Date: 8/27/2020  Approximate 7.2 cm spiculated mass of the right upper lobe likely with invasion of the mediastinum, adjacent lymphangitic spread and suspected metastatic mediastinal lymphadenopathy. Xr Chest Portable    Result Date: 8/27/2020  Extensive poorly defined nodular opacity medial right upper lung with possible associated mediastinal involvement. Findings suggest underlying mass with associated infiltrate.   Correlative CT chest suggested for further assessment         All radiological studies reviewed  Code Status:  Full Code        Electronically signed by Nani Carlin MD on 8/29/2020 at 2:37 PM    This note was created with the assistance of a speech-recognition program.  Although the intention is to generate a document that actually reflects the content of the visit, no guarantees can be provided that every mistake has been identified and corrected by editing. Note was updated later by me after  physical examination and  completion of the assessment.

## 2020-08-29 NOTE — PROGRESS NOTES
Occupational Therapy  Facility/Department: [facility]  Daily Treatment Note  NAME: Jose Rafael Roy  : 1950  MRN: [id number]    Date of Service: 2020    Discharge Recommendations:  Subacute/Skilled Nursing Facility       Assessment   Performance deficits / Impairments: Decreased functional mobility ; Decreased ADL status; Decreased strength;Decreased safe awareness;Decreased cognition;Decreased balance;Decreased endurance;Decreased posture  Prognosis: Good;Fair  OT Education: OT Role;Plan of Care;Precautions;Transfer Training;Energy Conservation;Equipment;IADL Safety;Home Exercise Program  REQUIRES OT FOLLOW UP: Yes  Activity Tolerance  Activity Tolerance: Patient Tolerated treatment well;Patient limited by fatigue  Safety Devices  Safety Devices in place: Yes  Type of devices: Call light within reach; All fall risk precautions in place;Nurse notified; Patient at risk for falls;Gait belt;Left in chair;Chair alarm in place         Patient Diagnosis(es): The primary encounter diagnosis was Lung mass. A diagnosis of Acute cystitis without hematuria was also pertinent to this visit. has a past medical history of AAA (abdominal aortic aneurysm) (HCC), Acid reflux, Anxiety and depression, Aortic stenosis, Arthritis, Blister of ankle, right, CAD (coronary artery disease), COPD (chronic obstructive pulmonary disease) ([facility] [address] Diabetes mellitus ([facility] [address] Falls, Heart block, Hypokalemia, MDRO (multiple drug resistant organisms) resistance, MRSA (methicillin resistant staph aureus) culture positive, On home oxygen therapy, On home oxygen therapy, Overactive bladder, Pneumonia, PONV (postoperative nausea and vomiting), and Vitamin D deficiency. has a past surgical history that includes back surgery; eye surgery; Cholecystectomy; Appendectomy; Hysterectomy; Tonsillectomy; lumbar laminectomy;  Endoscopy, colon, diagnostic (2016); and aortic valve repair (N/A, 4/11/2017). Restrictions  Restrictions/Precautions  Restrictions/Precautions: General Precautions, Fall Risk  Required Braces or Orthoses?: No  Subjective   General  Chart Reviewed: Yes  Patient assessed for rehabilitation services?: Yes  Response to previous treatment: Patient with no complaints from previous session  Family / Caregiver Present: No      Orientation  Orientation  Overall Orientation Status: Within Functional Limits  Objective    ADL  Grooming: Maximum assistance(Max A to comb tangled hair. Patel's hair was wrapped around life alert necklace.)  Toileting: Moderate assistance(Max A x2 for brief change in stance)  Additional Comments: Patient in soiled brief and bed upon arrival. Patient required Mod A x2 for brief change to increase safety, and DEP for edilia hygiene        Balance  Sitting Balance: Contact guard assistance  Standing Balance: Minimal assistance(x2)  Standing Balance  Time: standing tolerance ~1 minute  Activity: for breif change  Comment: Mod A x2  Functional Mobility  Functional - Mobility Device: Rolling Walker  Activity: Other  Assist Level: Minimal assistance(x2)  Functional Mobility Comments: Min A x2 from EOB>bed side chair with verbal cues for pacing self, environment scanning, posture, and [place] assist with RW safety/navigation to increase overall safety and (I) with function  Bed mobility  Supine to Sit: Moderate assistance;2 Person assistance  Scooting: Moderate assistance  Comment: Assist with UB into upright position  Transfers  Sit to stand: Minimal assistance;2 Person assistance  Stand to sit: Minimal assistance;2 Person assistance  Transfer Comments: Patient slightly impulsive, Min A x2 to increase overall safety and verbal cues for handplacements and pacing self         Cognition  Overall Cognitive Status: Exceptions  Arousal/Alertness: Appropriate responses to stimuli  Following Commands:  Follows all commands without difficulty  Attention Span: Attends with cues to redirect  Memory: Decreased short term memory  Safety Judgement: Decreased awareness of need for safety;Decreased awareness of need for assistance  Problem Solving: Assistance required to generate solutions;Assistance required to implement solutions;Decreased awareness of errors;Assistance required to correct errors made  Insights: Decreased awareness of deficits  Initiation: Requires cues for some  Sequencing: Requires cues for some         Type of ROM/Therapeutic Exercise  Type of ROM/Therapeutic Exercise: AROM  Comment: Patient instructed in ~4 BUE shoulder exercises to increase ROM and stregnth in BUE's. Patel completed 10 reps x1 set and became fatigued with exercises and requested to stop.          Plan   Plan  Times per week: 4-5x/week, 1-2x/day  Current Treatment Recommendations: Strengthening, Balance Training, Functional Mobility Training, Endurance Training, Safety Education & Training, Self-Care / ADL, Equipment Evaluation, Education, & procurement, Patient/Caregiver Education & Training  AM-PAC Score        AM-[facility] Inpatient Daily Activity Raw Score: 16 (08/29/20 1104)  AM-PAC Inpatient ADL T-Scale Score : 35.96 (08/29/20 1104)  ADL Inpatient CMS 0-100% Score: 53.32 (08/29/20 1104)  ADL Inpatient CMS G-Code Modifier : CK (08/29/20 1104)    Goals  Short term goals  Time Frame for Short term goals: by discharge, pt will  Short term goal 1: demo CGA with ADL transfers with good safety and AD/DME as needed  Short term goal 2: demo CGA with functional mob in room with good safety/pacing for safe ADL completions  Short term goal 3: demo CGA with toileting routine with good sfaety  Short term goal 4: demo SBA with UB ADLs with and min A with LB ADLs with good safety/pacing and DME as needed  Short term goal 5: demo and verb good understanding of fall prevention techs, pacing, possible equip needs, and B UE HEP  Patient Goals   Patient goals : to go home (does not want to go to SNF)       Therapy Time Individual Concurrent Group Co-treatment   Time In [phone]         Time Out 200         Minutes 25           Co-treatment with PT warranted secondary to decreased safety and independence requiring 2 skilled therapy professionals to address individual discipline's goals. OT addressing preparation for ADL transfer, functional reaching, environmental safety/scanning, fall prevention, functional mobility for ADL transfers and functional UE strength. Upon writer exit, call light within reach, pt retired to chair. All lines intact and patient positioned comfortably. Chair alarm in place. All patient needs addressed prior to ending therapy session. Chart reviewed prior to treatment and patient is agreeable for therapy. RN reports patient is medically stable for therapy treatment this date.       HARSHA Barrios/JARROD

## 2020-08-30 LAB
ANION GAP SERPL CALCULATED.3IONS-SCNC: 12 MMOL/L (ref 9–17)
BUN BLDV-MCNC: 10 MG/DL (ref 8–23)
BUN/CREAT BLD: 14 (ref 9–20)
CALCIUM SERPL-MCNC: 8.9 MG/DL (ref 8.6–10.4)
CHLORIDE BLD-SCNC: 101 MMOL/L (ref 98–107)
CO2: 26 MMOL/L (ref 20–31)
CREAT SERPL-MCNC: 0.71 MG/DL (ref 0.5–0.9)
GFR AFRICAN AMERICAN: >60 ML/MIN
GFR NON-AFRICAN AMERICAN: >60 ML/MIN
GFR SERPL CREATININE-BSD FRML MDRD: NORMAL ML/MIN/{1.73_M2}
GFR SERPL CREATININE-BSD FRML MDRD: NORMAL ML/MIN/{1.73_M2}
GLUCOSE BLD-MCNC: 117 MG/DL (ref 65–105)
GLUCOSE BLD-MCNC: 178 MG/DL (ref 65–105)
GLUCOSE BLD-MCNC: 85 MG/DL (ref 70–99)
HCT VFR BLD CALC: 37.9 % (ref 36.3–47.1)
HEMOGLOBIN: 11.1 G/DL (ref 11.9–15.1)
MCH RBC QN AUTO: 21.8 PG (ref 25.2–33.5)
MCHC RBC AUTO-ENTMCNC: 29.3 G/DL (ref 28.4–34.8)
MCV RBC AUTO: 74.3 FL (ref 82.6–102.9)
NRBC AUTOMATED: 0 PER 100 WBC
PDW BLD-RTO: 19.4 % (ref 11.8–14.4)
PLATELET # BLD: 365 K/UL (ref 138–453)
PMV BLD AUTO: 10 FL (ref 8.1–13.5)
POTASSIUM SERPL-SCNC: 3.9 MMOL/L (ref 3.7–5.3)
RBC # BLD: 5.1 M/UL (ref 3.95–5.11)
SARS-COV-2, PCR: NORMAL
SARS-COV-2, RAPID: NORMAL
SARS-COV-2: NOT DETECTED
SODIUM BLD-SCNC: 139 MMOL/L (ref 135–144)
SOURCE: NORMAL
WBC # BLD: 9.9 K/UL (ref 3.5–11.3)

## 2020-08-30 PROCEDURE — 85027 COMPLETE CBC AUTOMATED: CPT

## 2020-08-30 PROCEDURE — 6370000000 HC RX 637 (ALT 250 FOR IP): Performed by: FAMILY MEDICINE

## 2020-08-30 PROCEDURE — 2700000000 HC OXYGEN THERAPY PER DAY

## 2020-08-30 PROCEDURE — 97530 THERAPEUTIC ACTIVITIES: CPT

## 2020-08-30 PROCEDURE — 80048 BASIC METABOLIC PNL TOTAL CA: CPT

## 2020-08-30 PROCEDURE — 82947 ASSAY GLUCOSE BLOOD QUANT: CPT

## 2020-08-30 PROCEDURE — G0378 HOSPITAL OBSERVATION PER HR: HCPCS

## 2020-08-30 PROCEDURE — 6360000002 HC RX W HCPCS: Performed by: FAMILY MEDICINE

## 2020-08-30 PROCEDURE — 36415 COLL VENOUS BLD VENIPUNCTURE: CPT

## 2020-08-30 PROCEDURE — 2580000003 HC RX 258: Performed by: FAMILY MEDICINE

## 2020-08-30 PROCEDURE — 97110 THERAPEUTIC EXERCISES: CPT

## 2020-08-30 PROCEDURE — 94640 AIRWAY INHALATION TREATMENT: CPT

## 2020-08-30 PROCEDURE — 96372 THER/PROPH/DIAG INJ SC/IM: CPT

## 2020-08-30 PROCEDURE — 96366 THER/PROPH/DIAG IV INF ADDON: CPT

## 2020-08-30 RX ADMIN — ASPIRIN 81 MG: 81 TABLET, COATED ORAL at 07:47

## 2020-08-30 RX ADMIN — SODIUM CHLORIDE, PRESERVATIVE FREE 10 ML: 5 INJECTION INTRAVENOUS at 07:52

## 2020-08-30 RX ADMIN — METOPROLOL TARTRATE 37.5 MG: 25 TABLET, FILM COATED ORAL at 23:22

## 2020-08-30 RX ADMIN — OXYCODONE HYDROCHLORIDE AND ACETAMINOPHEN 1 TABLET: 5; 325 TABLET ORAL at 23:23

## 2020-08-30 RX ADMIN — ENOXAPARIN SODIUM 40 MG: 40 INJECTION SUBCUTANEOUS at 07:49

## 2020-08-30 RX ADMIN — PROMETHAZINE HYDROCHLORIDE 12.5 MG: 12.5 TABLET ORAL at 16:33

## 2020-08-30 RX ADMIN — INSULIN LISPRO 2 UNITS: 100 INJECTION, SOLUTION INTRAVENOUS; SUBCUTANEOUS at 12:21

## 2020-08-30 RX ADMIN — METOPROLOL TARTRATE 37.5 MG: 25 TABLET, FILM COATED ORAL at 07:47

## 2020-08-30 RX ADMIN — CEFTRIAXONE SODIUM 1 G: 1 INJECTION, POWDER, FOR SOLUTION INTRAMUSCULAR; INTRAVENOUS at 17:34

## 2020-08-30 RX ADMIN — GLIMEPIRIDE 2 MG: 2 TABLET ORAL at 07:45

## 2020-08-30 RX ADMIN — BUDESONIDE AND FORMOTEROL FUMARATE DIHYDRATE 2 PUFF: 160; 4.5 AEROSOL RESPIRATORY (INHALATION) at 20:03

## 2020-08-30 RX ADMIN — SODIUM CHLORIDE, PRESERVATIVE FREE 10 ML: 5 INJECTION INTRAVENOUS at 23:33

## 2020-08-30 RX ADMIN — OXYCODONE HYDROCHLORIDE AND ACETAMINOPHEN 1 TABLET: 5; 325 TABLET ORAL at 00:03

## 2020-08-30 RX ADMIN — Medication 10 ML: at 17:37

## 2020-08-30 RX ADMIN — OXYCODONE HYDROCHLORIDE AND ACETAMINOPHEN 1 TABLET: 5; 325 TABLET ORAL at 13:58

## 2020-08-30 RX ADMIN — ANTI-FUNGAL POWDER MICONAZOLE NITRATE TALC FREE: 1.42 POWDER TOPICAL at 07:55

## 2020-08-30 RX ADMIN — OXYCODONE HYDROCHLORIDE AND ACETAMINOPHEN 1 TABLET: 5; 325 TABLET ORAL at 05:55

## 2020-08-30 RX ADMIN — METFORMIN HYDROCHLORIDE 500 MG: 500 TABLET ORAL at 07:46

## 2020-08-30 RX ADMIN — GABAPENTIN 400 MG: 400 CAPSULE ORAL at 23:22

## 2020-08-30 RX ADMIN — ANTI-FUNGAL POWDER MICONAZOLE NITRATE TALC FREE: 1.42 POWDER TOPICAL at 23:18

## 2020-08-30 RX ADMIN — INSULIN LISPRO 1 UNITS: 100 INJECTION, SOLUTION INTRAVENOUS; SUBCUTANEOUS at 23:22

## 2020-08-30 RX ADMIN — METFORMIN HYDROCHLORIDE 500 MG: 500 TABLET ORAL at 23:24

## 2020-08-30 ASSESSMENT — PAIN DESCRIPTION - ORIENTATION
ORIENTATION: LOWER

## 2020-08-30 ASSESSMENT — PAIN SCALES - GENERAL
PAINLEVEL_OUTOF10: 8
PAINLEVEL_OUTOF10: 10
PAINLEVEL_OUTOF10: 4
PAINLEVEL_OUTOF10: 10
PAINLEVEL_OUTOF10: 5

## 2020-08-30 ASSESSMENT — PAIN DESCRIPTION - PROGRESSION: CLINICAL_PROGRESSION: NOT CHANGED

## 2020-08-30 ASSESSMENT — PAIN DESCRIPTION - PAIN TYPE
TYPE: CHRONIC PAIN

## 2020-08-30 ASSESSMENT — PAIN DESCRIPTION - ONSET
ONSET: ON-GOING

## 2020-08-30 ASSESSMENT — PAIN DESCRIPTION - FREQUENCY
FREQUENCY: CONTINUOUS

## 2020-08-30 ASSESSMENT — PAIN DESCRIPTION - DESCRIPTORS
DESCRIPTORS: ACHING

## 2020-08-30 ASSESSMENT — PAIN DESCRIPTION - LOCATION
LOCATION: GENERALIZED
LOCATION: BACK
LOCATION: GENERALIZED

## 2020-08-30 NOTE — PLAN OF CARE
Problem: Skin Integrity:  Goal: Will show no infection signs and symptoms  Description: Will show no infection signs and symptoms  8/30/2020 0523 by Phyllis Fountain RN  Outcome: Ongoing  8/29/2020 1902 by Kerwin Lira RN  Outcome: Ongoing  Goal: Absence of new skin breakdown  Description: Absence of new skin breakdown  8/30/2020 0523 by Phlylis Fountain RN  Outcome: Ongoing  8/29/2020 1902 by Kerwin Lira RN  Outcome: Ongoing     Problem: Falls - Risk of:  Goal: Will remain free from falls  Description: Will remain free from falls  8/30/2020 0523 by Phyllis Fountain RN  Outcome: Ongoing  8/29/2020 1902 by Kerwin Lira RN  Outcome: Met This Shift  Goal: Absence of physical injury  Description: Absence of physical injury  8/30/2020 0523 by Phyllis Fountain RN  Outcome: Ongoing  8/29/2020 1902 by Kerwin Lira RN  Outcome: Met This Shift     Problem: Pain:  Goal: Pain level will decrease  Description: Pain level will decrease  Outcome: Ongoing  Goal: Control of acute pain  Description: Control of acute pain  8/30/2020 0523 by Phyllis Fountain RN  Outcome: Ongoing  8/29/2020 1902 by Kerwin Lira RN  Outcome: Ongoing  Goal: Control of chronic pain  Description: Control of chronic pain  8/30/2020 0523 by Phyllis Fountain RN  Outcome: Ongoing  8/29/2020 1902 by Kerwin Lira RN  Outcome: Ongoing     Problem: ABCDS Injury Assessment  Goal: Absence of physical injury  8/30/2020 0523 by Phyllis Fountain RN  Outcome: Ongoing  8/29/2020 1902 by Kerwin Lira RN  Outcome: Met This Shift

## 2020-08-30 NOTE — PROGRESS NOTES
[address] Hospitalist        8/30/2020   2:39 PM    Name:  João Jennings  MRN:    [id number]     [place]:     [de-identified]   Room:  2026/2026-01  IP Day: 0     Admit Date: 8/27/2020  1:16 PM  PCP: Naun Byrd MD    C/C:   Chief Complaint   Patient presents with   [place]    Back Pain    Cough    Rash       Assessment:      · Recurrent falls  · Hypoglycemia   · Acute cystitis without hematuria  · Lung mass RUL 7.2 cm spiculated   · Pedal edema- improved  · Decubitus ulcer  · Cutaneous candidiasis   · Coronary artery disease   · Abdominal aortic aneurysm   · Diabetes mellitus type 2  · Essential hypertension  · Moderate persistent asthma / COPD  · Nicotine dependence  · Chronic pain syndrome  · Major depressive disorder    · Diabetic sensory neuropathy  · GERD  · Vitamin D deficiency   · Anxiety disorder   · Adjustment disorder         Plan:        · Admit MS  · Monitor vitals closely  · Keep SPO2 above 90%  · Is/ Os  · Daily weights  · IV heplock  · Check 2 D echo- EF 65%  · Aortic stenosis mod  · ASA  · Symbicort  · Rocephin IV  · Klonopin- dose adjusted through pharm  · Neurontin- dose adj  · Amaryl- dose adj  · Metformin  · Lopressor  · Add BP and HR parameters   · Oxycodone prn   · Pain control  · Hold lasix  · Hold Potassium  · Trazodone prn- dose adj  · Check CBC, CMP  · Check BMP after that  · Consult Pulmonology- input appreciated  · SW input appreciated  · Consult SW  · DVT and GI prophylaxis  · accuchecks AC and HS  · ISS  · HbA1C 6.1  · Hypoglycemia protocol  · Miconazole top  · effexor   · Tessalon pearles prn      Subjective:     Patient seen and examined at bedside. No overnight events. No acute complaints today. Afebrile  Stressed  Says ready for procedure on Monday or Tuesday  C/o low back pain  Mood much improved     Pt. Denies any CP, palpitation, HA, dizziness, cold, changes in urination, BM or skin changes or any pain.     ROS:  A 10 point system reviewed and negative otherwise mentioned above. Physical Examination:      Vitals:  BP (!) 169/53   Pulse 84   Temp 97.9 °F (36.6 °C) (Oral)   Resp 16   Ht 5' 8.5\" (1.74 m)   Wt 148 lb 9.6 oz (67.4 kg)   SpO2 94%   BMI 22.27 kg/m²   Temp (24hrs), Av.2 °F (36.8 °C), Min:97.9 °F (36.6 °C), Max:98.4 °F (36.9 °C)    Weight:   Wt Readings from Last 3 Encounters:   20 148 lb 9.6 oz (67.4 kg)   19 192 lb 14.4 oz (87.5 kg)   18 199 lb 4.8 oz (90.4 kg)     I/O last 3 completed shifts:  I/O last 3 completed shifts: In: 120 [P.O.:120]  Out: -      Recent Labs     20  1700 20  2054 20  0852 20  1045   POCGLU 111* 141* 117* 178*         General appearance - alert, well appearing, and in no acute distress  Mental status - oriented to person, place, and time with normal affect  Head - normocephalic and atraumatic  Eyes - pupils equal and reactive, extraocular eye movements intact, conjunctiva clear  Ears - hearing appears to be intact  Nose - no drainage noted  Mouth - mucous membranes moist  Neck - supple, no carotid bruits, thyroid not palpable  Chest -  Lungs show coarse crackles, no rhochi, normal effort  Heart - RRR, normal rate, no murmer  Abdomen - soft, NT, no rebound, no rigidity   Neurological - normal speech, no focal findings or movement disorder noted  Extremities - no pedal edema or calf pain, long nails  Skin - no gross lesions, rashes, or induration noted        Medications: Allergies:    Allergies   Allergen Reactions    Codeine      \"feeling of heavy on chest\"    Dye [Iodides]      Hallucination,vomiting       Current Meds:   Current Facility-Administered Medications:     miconazole (MICOTIN) 2 % powder, , Topical, BID, Whitney Becerril MD    cefTRIAXone (ROCEPHIN) 1 g IVPB in 50 mL D5W minibag, 1 g, Intravenous, Q24H, Whitney Becerril MD, Stopped at 20 192    insulin lispro (HUMALOG) injection vial 0-12 Units, 0-12 Units, Subcutaneous, TID Whitney CRUZ Intravenous, 2 times per day, Paddy Robledo MD, 10 mL at 08/30/20 0752    sodium chloride flush 0.9 % injection 10 mL, 10 mL, Intravenous, PRN, Whitney Becerril MD    acetaminophen (TYLENOL) tablet 650 mg, 650 mg, Oral, Q6H PRN **OR** acetaminophen (TYLENOL) suppository 650 mg, 650 mg, Rectal, Q6H PRN, Whitney Becerril MD    promethazine (PHENERGAN) tablet 12.5 mg, 12.5 mg, Oral, Q6H PRN **OR** ondansetron (ZOFRAN) injection 4 mg, 4 mg, Intravenous, Q6H PRN, Whitney Becerril MD    nicotine (NICODERM CQ) 21 MG/24HR 1 patch, 1 patch, Transdermal, Daily PRN, Whitney Becerril MD    enoxaparin (LOVENOX) injection 40 mg, 40 mg, Subcutaneous, Daily, Whitney Becerril MD, 40 mg at 08/30/20 0749      I/O (24Hr):   No intake or output data in the 24 hours ending 08/30/20 1439    Data:           Labs:    Hematology:  Recent Labs     08/28/20  0546 08/29/20  0526 08/30/20  0610   WBC 10.2 9.7 9.9   RBC 4.56 4.68 5.10   HGB 9.9* 10.3* 11.1*   HCT 34.4* 35.3* 37.9   MCV 75.4* 75.4* 74.3*   MCH 21.7* 22.0* 21.8*   MCHC 28.8 29.2 29.3   RDW 19.8* 19.6* 19.4*    287 365   MPV 9.3 9.7 10.0   INR 1.1  --   --      Chemistry:  Recent Labs     08/28/20  0546 08/29/20  0526 08/30/20  0610    139 139   K 4.2 4.3 3.9    102 101   CO2 28 29 26   GLUCOSE 88 97 85   BUN 11 13 10   CREATININE 0.86 0.86 0.71   MG 1.7  --   --    ANIONGAP 7* 8* 12   LABGLOM >60 >60 >60   GFRAA >60 >60 >60   CALCIUM 8.3* 8.6 8.9     Recent Labs     08/28/20  0546  08/29/20  0645 08/29/20  1141 08/29/20  1700 08/29/20  2054 08/30/20  0852 08/30/20  1045   PROT 5.5*  --   --   --   --   --   --   --    LABALBU 2.6*  --   --   --   --   --   --   --    LABA1C 6.1*  --   --   --   --   --   --   --    AST 11  --   --   --   --   --   --   --    ALT 6  --   --   --   --   --   --   --    ALKPHOS 30*  --   --   --   --   --   --   --    BILITOT 0.14*  --   --   --   --   --   --   --    POCGLU  --    < > 94 168* 111* 141* 117* 178*    < > = values in this

## 2020-08-30 NOTE — PROGRESS NOTES
Physical Therapy  Facility/Department: [facility]  Daily Treatment Note  NAME: Josette Zayas  : 1950  MRN: [id number]    Date of Service: 2020    Discharge Recommendations:  (P) Subacute/Skilled Nursing Facility        Assessment   Body structures, Functions, Activity limitations: (P) Decreased functional mobility ; Decreased strength;Decreased endurance;Decreased balance  Assessment: (P) Pt unsteady with mobility. Therefore will be appropriate for SNF. Prognosis: (P) Good  Decision Making: (P) High Complexity  REQUIRES PT FOLLOW UP: (P) Yes  Activity Tolerance  Activity Tolerance: (P) Patient limited by endurance     Patient Diagnosis(es): The primary encounter diagnosis was Lung mass. A diagnosis of Acute cystitis without hematuria was also pertinent to this visit. has a past medical history of AAA (abdominal aortic aneurysm) (HCC), Acid reflux, Anxiety and depression, Aortic stenosis, Arthritis, Blister of ankle, right, CAD (coronary artery disease), COPD (chronic obstructive pulmonary disease) ([address] Diabetes mellitus ([facility] [address] Falls, Heart block, Hypokalemia, MDRO (multiple drug resistant organisms) resistance, MRSA (methicillin resistant staph aureus) culture positive, On home oxygen therapy, On home oxygen therapy, Overactive bladder, Pneumonia, PONV (postoperative nausea and vomiting), and Vitamin D deficiency. has a past surgical history that includes back surgery; eye surgery; Cholecystectomy; Appendectomy; Hysterectomy; Tonsillectomy; lumbar laminectomy; Endoscopy, colon, diagnostic (2016); and aortic valve repair (N/A, 2017). Restrictions  Restrictions/Precautions  Restrictions/Precautions: (P) General Precautions, Fall Risk  Required Braces or Orthoses?: (P) No  Subjective   General  Chart Reviewed: (P) Yes  Response To Previous Treatment: (P) Patient with no complaints from previous session.   Family / Caregiver Present: (P) No  General Comment  Comments: (P) OK for PT per  RN  Pain Screening  Patient Currently in Pain: (P) Yes  Pain Assessment  Pain Assessment: (P) 0-10  Pain Level: (P) 5  Pain Type: (P) Chronic pain  Pain Location: (P) Generalized  Vital Signs  Patient Currently in Pain: (P) Yes  Patient Observation  Observations: (P) Pt in bed upon arrival. Agreeable to therapy. Orientation  Orientation  Overall Orientation Status: (P) Within Normal Limits  Cognition      Objective   Bed mobility  Scooting: (P) Minimal assistance  Transfers  Sit to Stand: (P) Minimal Assistance  Stand to sit: (P) Minimal Assistance  Lateral Transfers: (P) Minimal Assistance  Ambulation  Ambulation?: (P) Yes  Ambulation 1  Surface: (P) level tile  Device: (P) Rolling Walker  Other Apparatus: (P) O2  Assistance: (P) Moderate assistance  Quality of Gait: (P) gait unsteady  Gait Deviations: (P) Decreased step length;Decreased step height  Distance: (P) 2'  Comments: (P) Pt assisted to the chair. Stairs/Curb  Stairs?: (P) No     Balance  Posture: (P) Fair  Sitting - Static: (P) Good  Sitting - Dynamic: (P) Good;-  Standing - Static: (P) Fair;+  Standing - Dynamic: (P) Fair  Exercises  Comments: (P) Seated AROM UE/LE ex x 20 reps with rest breaks.                         G-Code     OutComes Score                                                     AM-PAC Score  -PAC Inpatient Mobility Raw Score : 14 (08/30/20 1122)  AM-PAC Inpatient T-Scale Score : 38.1 (08/30/20 1122)  Mobility Inpatient CMS 0-100% Score: 61.29 (08/30/20 1122)  Mobility Inpatient CMS G-Code Modifier : CL (08/30/20 1122)          Goals  Short term goals  Time Frame for Short term goals: (P) 8 treatments  Short term goal 1: (P) Independent bed mobility/transfers  Short term goal 2: (P) Independent ambulation w/ RW 75' x 1  Short term goal 3: (P) Good standing balance and posture  Short term goal 4: (P) Tolerate 30 min ther act  Short term goal 5: (P) 1/2 to 1 grade strength increase  Patient Goals   Patient goals : (P) Home    Plan    Plan  Times per week: (P) 1-2x/day,6-7 days/week  Current Treatment Recommendations: (P) Strengthening, Transfer Training, Endurance Training, Gait Training, Functional Mobility Training, Balance Training  Safety Devices  Type of devices: (P) Call light within reach, Left in chair, Gait belt, Chair alarm in place, Nurse notified  Restraints  Initially in place: (P) No     Therapy Time   Individual Concurrent Group Co-treatment   Time In  0925         Time Out  0950         Minutes  [address]

## 2020-08-30 NOTE — PROGRESS NOTES
Pulmonary Critical Care Progress Note  Ena Councilman, MD     Patient seen for the follow up of lung mass, COPD/emphysema, chronic hypoxic/hypercarbic respiratory failure    Subjective:  She is sitting up in bedside chair, in no distress. She did not sleep very well last night. She is feeling quite anxious/nervous secondary to finding out about the lung mass. Her shortness of breath is at her baseline. She denies any chest pain. She has a nonproductive cough. Examination:  Vitals: BP (!) 169/53   Pulse 84   Temp 97.9 °F (36.6 °C) (Oral)   Resp 16   Ht 5' 8.5\" (1.74 m)   Wt 148 lb 9.6 oz (67.4 kg)   SpO2 94%   BMI 22.27 kg/m²   General appearance: alert and cooperative with exam, sitting up in chair  Neck: No JVD  Lungs: Decreased air exchange right upper lobe, no wheezing rales or rhonchi  Heart: regular rate and rhythm, S1, S2 normal, no gallop  Abdomen: Soft, non tender, + BS  Extremities: no cyanosis or clubbing.  No significant edema    LABs:  CBC:   Recent Labs     08/29/20  0526 08/30/20  0610   WBC 9.7 9.9   HGB 10.3* 11.1*   HCT 35.3* 37.9    365     BMP:   Recent Labs     08/29/20  0526 08/30/20  0610    139   K 4.3 3.9   CO2 29 26   BUN 13 10   CREATININE 0.86 0.71   LABGLOM >60 >60   GLUCOSE 97 85     PT/INR:   Recent Labs     08/28/20  0546   PROTIME 14.4*   INR 1.1     LIVER PROFILE:  Recent Labs     08/28/20  0546   AST 11   ALT 6   LABALBU 2.6*     ABG:  Lab Results   Component Value Date    PHART 7.42 08/26/2012    PH 7.22 04/11/2017    IFR1NUE 41 08/26/2012    PCO2 54 04/11/2017    PO2ART 59 08/26/2012    PO2 89 04/11/2017    FZA2EPC 26.1 08/26/2012    HCO3 22.2 04/11/2017    FSR9CIK 27 04/12/2017    L4IFXUUP 92.1 08/26/2012    O2SAT 95 04/11/2017    FIO2 UNKNOWN 10/31/2017       Lab Results   Component Value Date    POCPH 7.36 04/12/2017    PHART 7.42 08/26/2012    PH 7.22 04/11/2017    POCPCO2 45 04/12/2017    CNR0SKP 41 08/26/2012    PCO2 54 04/11/2017    POCPO2 93

## 2020-08-30 NOTE — FLOWSHEET NOTE
Patient was busy with RN's unable to visit. Silent prayer shared. Follow up as needed.      08/30/20 1820   Encounter Summary   Services provided to: Patient not available   Referral/Consult From: Henrry   Continue Visiting   (8-30-20 PT: busy, RN)   Complexity of Encounter Low   Length of Encounter 15 minutes   Routine   Type Initial   Assessment Unable to respond   Intervention Prayer

## 2020-08-31 ENCOUNTER — ANESTHESIA EVENT (OUTPATIENT)
Dept: OPERATING ROOM | Age: 70
DRG: 167 | End: 2020-08-31
Payer: MEDICARE

## 2020-08-31 ENCOUNTER — ANESTHESIA (OUTPATIENT)
Dept: OPERATING ROOM | Age: 70
DRG: 167 | End: 2020-08-31
Payer: MEDICARE

## 2020-08-31 VITALS — DIASTOLIC BLOOD PRESSURE: 81 MMHG | SYSTOLIC BLOOD PRESSURE: 139 MMHG | OXYGEN SATURATION: 98 %

## 2020-08-31 LAB
ANION GAP SERPL CALCULATED.3IONS-SCNC: 10 MMOL/L (ref 9–17)
BUN BLDV-MCNC: 9 MG/DL (ref 8–23)
BUN/CREAT BLD: 13 (ref 9–20)
CALCIUM SERPL-MCNC: 9.2 MG/DL (ref 8.6–10.4)
CHLORIDE BLD-SCNC: 103 MMOL/L (ref 98–107)
CO2: 29 MMOL/L (ref 20–31)
CREAT SERPL-MCNC: 0.68 MG/DL (ref 0.5–0.9)
CULTURE: NORMAL
CULTURE: NORMAL
DIRECT EXAM: NORMAL
DIRECT EXAM: NORMAL
GFR AFRICAN AMERICAN: >60 ML/MIN
GFR NON-AFRICAN AMERICAN: >60 ML/MIN
GFR SERPL CREATININE-BSD FRML MDRD: ABNORMAL ML/MIN/{1.73_M2}
GFR SERPL CREATININE-BSD FRML MDRD: ABNORMAL ML/MIN/{1.73_M2}
GLUCOSE BLD-MCNC: 106 MG/DL (ref 65–105)
GLUCOSE BLD-MCNC: 112 MG/DL (ref 65–105)
GLUCOSE BLD-MCNC: 113 MG/DL (ref 65–105)
GLUCOSE BLD-MCNC: 176 MG/DL (ref 65–105)
GLUCOSE BLD-MCNC: 243 MG/DL (ref 65–105)
GLUCOSE BLD-MCNC: 97 MG/DL (ref 65–105)
GLUCOSE BLD-MCNC: 99 MG/DL (ref 70–99)
HCT VFR BLD CALC: 41.4 % (ref 36.3–47.1)
HEMOGLOBIN: 12.2 G/DL (ref 11.9–15.1)
Lab: NORMAL
Lab: NORMAL
MCH RBC QN AUTO: 21.7 PG (ref 25.2–33.5)
MCHC RBC AUTO-ENTMCNC: 29.5 G/DL (ref 28.4–34.8)
MCV RBC AUTO: 73.7 FL (ref 82.6–102.9)
NRBC AUTOMATED: 0 PER 100 WBC
PDW BLD-RTO: 20.3 % (ref 11.8–14.4)
PLATELET # BLD: 421 K/UL (ref 138–453)
PMV BLD AUTO: 9.4 FL (ref 8.1–13.5)
POTASSIUM SERPL-SCNC: 3.6 MMOL/L (ref 3.7–5.3)
RBC # BLD: 5.62 M/UL (ref 3.95–5.11)
SODIUM BLD-SCNC: 142 MMOL/L (ref 135–144)
SPECIMEN DESCRIPTION: NORMAL
SPECIMEN DESCRIPTION: NORMAL
WBC # BLD: 10.7 K/UL (ref 3.5–11.3)

## 2020-08-31 PROCEDURE — 87300 AG DETECTION POLYVAL IF: CPT

## 2020-08-31 PROCEDURE — 7100000001 HC PACU RECOVERY - ADDTL 15 MIN: Performed by: INTERNAL MEDICINE

## 2020-08-31 PROCEDURE — 87102 FUNGUS ISOLATION CULTURE: CPT

## 2020-08-31 PROCEDURE — 2580000003 HC RX 258: Performed by: INTERNAL MEDICINE

## 2020-08-31 PROCEDURE — 88305 TISSUE EXAM BY PATHOLOGIST: CPT

## 2020-08-31 PROCEDURE — 0B9C8ZX DRAINAGE OF RIGHT UPPER LUNG LOBE, VIA NATURAL OR ARTIFICIAL OPENING ENDOSCOPIC, DIAGNOSTIC: ICD-10-PCS | Performed by: INTERNAL MEDICINE

## 2020-08-31 PROCEDURE — 3700000000 HC ANESTHESIA ATTENDED CARE: Performed by: INTERNAL MEDICINE

## 2020-08-31 PROCEDURE — 80048 BASIC METABOLIC PNL TOTAL CA: CPT

## 2020-08-31 PROCEDURE — 2500000003 HC RX 250 WO HCPCS: Performed by: NURSE ANESTHETIST, CERTIFIED REGISTERED

## 2020-08-31 PROCEDURE — 87106 FUNGI IDENTIFICATION YEAST: CPT

## 2020-08-31 PROCEDURE — 36415 COLL VENOUS BLD VENIPUNCTURE: CPT

## 2020-08-31 PROCEDURE — 2580000003 HC RX 258: Performed by: NURSE ANESTHETIST, CERTIFIED REGISTERED

## 2020-08-31 PROCEDURE — 2580000003 HC RX 258: Performed by: FAMILY MEDICINE

## 2020-08-31 PROCEDURE — 87015 SPECIMEN INFECT AGNT CONCNTJ: CPT

## 2020-08-31 PROCEDURE — 0BBC8ZX EXCISION OF RIGHT UPPER LUNG LOBE, VIA NATURAL OR ARTIFICIAL OPENING ENDOSCOPIC, DIAGNOSTIC: ICD-10-PCS | Performed by: INTERNAL MEDICINE

## 2020-08-31 PROCEDURE — 87252 VIRUS INOCULATION TISSUE: CPT

## 2020-08-31 PROCEDURE — 85027 COMPLETE CBC AUTOMATED: CPT

## 2020-08-31 PROCEDURE — 87205 SMEAR GRAM STAIN: CPT

## 2020-08-31 PROCEDURE — 3700000001 HC ADD 15 MINUTES (ANESTHESIA): Performed by: INTERNAL MEDICINE

## 2020-08-31 PROCEDURE — 87075 CULTR BACTERIA EXCEPT BLOOD: CPT

## 2020-08-31 PROCEDURE — 6360000002 HC RX W HCPCS: Performed by: FAMILY MEDICINE

## 2020-08-31 PROCEDURE — 6370000000 HC RX 637 (ALT 250 FOR IP): Performed by: INTERNAL MEDICINE

## 2020-08-31 PROCEDURE — 87255 GENET VIRUS ISOLATE HSV: CPT

## 2020-08-31 PROCEDURE — 6360000002 HC RX W HCPCS: Performed by: NURSE ANESTHETIST, CERTIFIED REGISTERED

## 2020-08-31 PROCEDURE — 7100000000 HC PACU RECOVERY - FIRST 15 MIN: Performed by: INTERNAL MEDICINE

## 2020-08-31 PROCEDURE — 0BC78ZZ EXTIRPATION OF MATTER FROM LEFT MAIN BRONCHUS, VIA NATURAL OR ARTIFICIAL OPENING ENDOSCOPIC: ICD-10-PCS | Performed by: INTERNAL MEDICINE

## 2020-08-31 PROCEDURE — 2700000000 HC OXYGEN THERAPY PER DAY

## 2020-08-31 PROCEDURE — 3609011300 HC BRONCHOSCOPY BRONCHIAL/ENDOBRNCL BX 1+ SITES: Performed by: INTERNAL MEDICINE

## 2020-08-31 PROCEDURE — 2709999900 HC NON-CHARGEABLE SUPPLY: Performed by: INTERNAL MEDICINE

## 2020-08-31 PROCEDURE — 87140 CULTURE TYPE IMMUNOFLUORESC: CPT

## 2020-08-31 PROCEDURE — 87070 CULTURE OTHR SPECIMN AEROBIC: CPT

## 2020-08-31 PROCEDURE — 87116 MYCOBACTERIA CULTURE: CPT

## 2020-08-31 PROCEDURE — 94640 AIRWAY INHALATION TREATMENT: CPT

## 2020-08-31 PROCEDURE — 1200000000 HC SEMI PRIVATE

## 2020-08-31 PROCEDURE — 87206 SMEAR FLUORESCENT/ACID STAI: CPT

## 2020-08-31 PROCEDURE — 82947 ASSAY GLUCOSE BLOOD QUANT: CPT

## 2020-08-31 PROCEDURE — 88112 CYTOPATH CELL ENHANCE TECH: CPT

## 2020-08-31 RX ORDER — FENTANYL CITRATE 50 UG/ML
25 INJECTION, SOLUTION INTRAMUSCULAR; INTRAVENOUS EVERY 5 MIN PRN
Status: DISCONTINUED | OUTPATIENT
Start: 2020-08-31 | End: 2020-08-31 | Stop reason: HOSPADM

## 2020-08-31 RX ORDER — OXYCODONE HYDROCHLORIDE AND ACETAMINOPHEN 5; 325 MG/1; MG/1
2 TABLET ORAL PRN
Status: DISCONTINUED | OUTPATIENT
Start: 2020-08-31 | End: 2020-08-31 | Stop reason: HOSPADM

## 2020-08-31 RX ORDER — OXYCODONE HYDROCHLORIDE AND ACETAMINOPHEN 5; 325 MG/1; MG/1
1 TABLET ORAL PRN
Status: DISCONTINUED | OUTPATIENT
Start: 2020-08-31 | End: 2020-08-31 | Stop reason: HOSPADM

## 2020-08-31 RX ORDER — DEXAMETHASONE SODIUM PHOSPHATE 10 MG/ML
INJECTION, SOLUTION INTRAMUSCULAR; INTRAVENOUS PRN
Status: DISCONTINUED | OUTPATIENT
Start: 2020-08-31 | End: 2020-08-31 | Stop reason: SDUPTHER

## 2020-08-31 RX ORDER — PHENYLEPHRINE HCL IN 0.9% NACL 1 MG/10 ML
SYRINGE (ML) INTRAVENOUS PRN
Status: DISCONTINUED | OUTPATIENT
Start: 2020-08-31 | End: 2020-08-31 | Stop reason: SDUPTHER

## 2020-08-31 RX ORDER — ESMOLOL HYDROCHLORIDE 10 MG/ML
INJECTION INTRAVENOUS PRN
Status: DISCONTINUED | OUTPATIENT
Start: 2020-08-31 | End: 2020-08-31 | Stop reason: SDUPTHER

## 2020-08-31 RX ORDER — SUCCINYLCHOLINE/SOD CL,ISO/PF 100 MG/5ML
SYRINGE (ML) INTRAVENOUS PRN
Status: DISCONTINUED | OUTPATIENT
Start: 2020-08-31 | End: 2020-08-31 | Stop reason: SDUPTHER

## 2020-08-31 RX ORDER — PROMETHAZINE HYDROCHLORIDE 25 MG/ML
6.25 INJECTION, SOLUTION INTRAMUSCULAR; INTRAVENOUS
Status: DISCONTINUED | OUTPATIENT
Start: 2020-08-31 | End: 2020-08-31 | Stop reason: HOSPADM

## 2020-08-31 RX ORDER — HYDROMORPHONE HCL 110MG/55ML
0.5 PATIENT CONTROLLED ANALGESIA SYRINGE INTRAVENOUS EVERY 5 MIN PRN
Status: DISCONTINUED | OUTPATIENT
Start: 2020-08-31 | End: 2020-08-31 | Stop reason: HOSPADM

## 2020-08-31 RX ORDER — PROPOFOL 10 MG/ML
INJECTION, EMULSION INTRAVENOUS PRN
Status: DISCONTINUED | OUTPATIENT
Start: 2020-08-31 | End: 2020-08-31 | Stop reason: SDUPTHER

## 2020-08-31 RX ORDER — SODIUM CHLORIDE 9 MG/ML
INJECTION, SOLUTION INTRAVENOUS CONTINUOUS PRN
Status: DISCONTINUED | OUTPATIENT
Start: 2020-08-31 | End: 2020-08-31 | Stop reason: SDUPTHER

## 2020-08-31 RX ORDER — LIDOCAINE HYDROCHLORIDE 20 MG/ML
INJECTION, SOLUTION EPIDURAL; INFILTRATION; INTRACAUDAL; PERINEURAL PRN
Status: DISCONTINUED | OUTPATIENT
Start: 2020-08-31 | End: 2020-08-31 | Stop reason: SDUPTHER

## 2020-08-31 RX ORDER — ONDANSETRON 2 MG/ML
INJECTION INTRAMUSCULAR; INTRAVENOUS PRN
Status: DISCONTINUED | OUTPATIENT
Start: 2020-08-31 | End: 2020-08-31 | Stop reason: SDUPTHER

## 2020-08-31 RX ORDER — ONDANSETRON 2 MG/ML
4 INJECTION INTRAMUSCULAR; INTRAVENOUS
Status: DISCONTINUED | OUTPATIENT
Start: 2020-08-31 | End: 2020-08-31 | Stop reason: HOSPADM

## 2020-08-31 RX ORDER — LABETALOL HYDROCHLORIDE 5 MG/ML
5 INJECTION, SOLUTION INTRAVENOUS EVERY 10 MIN PRN
Status: DISCONTINUED | OUTPATIENT
Start: 2020-08-31 | End: 2020-08-31 | Stop reason: HOSPADM

## 2020-08-31 RX ORDER — HYDRALAZINE HYDROCHLORIDE 20 MG/ML
5 INJECTION INTRAMUSCULAR; INTRAVENOUS EVERY 10 MIN PRN
Status: DISCONTINUED | OUTPATIENT
Start: 2020-08-31 | End: 2020-08-31 | Stop reason: HOSPADM

## 2020-08-31 RX ADMIN — PROPOFOL 50 MG: 10 INJECTION, EMULSION INTRAVENOUS at 15:24

## 2020-08-31 RX ADMIN — ANTI-FUNGAL POWDER MICONAZOLE NITRATE TALC FREE: 1.42 POWDER TOPICAL at 10:25

## 2020-08-31 RX ADMIN — SODIUM CHLORIDE, PRESERVATIVE FREE 10 ML: 5 INJECTION INTRAVENOUS at 10:25

## 2020-08-31 RX ADMIN — DEXAMETHASONE SODIUM PHOSPHATE 10 MG: 10 INJECTION INTRAMUSCULAR; INTRAVENOUS at 15:23

## 2020-08-31 RX ADMIN — BUDESONIDE AND FORMOTEROL FUMARATE DIHYDRATE 2 PUFF: 160; 4.5 AEROSOL RESPIRATORY (INHALATION) at 20:19

## 2020-08-31 RX ADMIN — INSULIN LISPRO 2 UNITS: 100 INJECTION, SOLUTION INTRAVENOUS; SUBCUTANEOUS at 21:47

## 2020-08-31 RX ADMIN — ONDANSETRON 4 MG: 2 INJECTION INTRAMUSCULAR; INTRAVENOUS at 16:25

## 2020-08-31 RX ADMIN — PROPOFOL 50 MG: 10 INJECTION, EMULSION INTRAVENOUS at 15:15

## 2020-08-31 RX ADMIN — TRAZODONE HYDROCHLORIDE 300 MG: 100 TABLET ORAL at 21:45

## 2020-08-31 RX ADMIN — Medication 150 MCG: at 15:35

## 2020-08-31 RX ADMIN — ONDANSETRON 4 MG: 2 INJECTION, SOLUTION INTRAMUSCULAR; INTRAVENOUS at 15:23

## 2020-08-31 RX ADMIN — LIDOCAINE HYDROCHLORIDE 1000 MG: 20 INJECTION, SOLUTION EPIDURAL; INFILTRATION; INTRACAUDAL; PERINEURAL at 15:15

## 2020-08-31 RX ADMIN — PROPOFOL 50 MG: 10 INJECTION, EMULSION INTRAVENOUS at 15:30

## 2020-08-31 RX ADMIN — Medication 100 MG: at 15:16

## 2020-08-31 RX ADMIN — ESMOLOL HYDROCHLORIDE 30 MG: 10 INJECTION, SOLUTION INTRAVENOUS at 15:17

## 2020-08-31 RX ADMIN — BUDESONIDE AND FORMOTEROL FUMARATE DIHYDRATE 2 PUFF: 160; 4.5 AEROSOL RESPIRATORY (INHALATION) at 09:42

## 2020-08-31 RX ADMIN — METFORMIN HYDROCHLORIDE 500 MG: 500 TABLET ORAL at 21:45

## 2020-08-31 RX ADMIN — Medication 200 MCG: at 15:47

## 2020-08-31 RX ADMIN — PROPOFOL 50 MG: 10 INJECTION, EMULSION INTRAVENOUS at 15:26

## 2020-08-31 RX ADMIN — OXYCODONE HYDROCHLORIDE AND ACETAMINOPHEN 1 TABLET: 5; 325 TABLET ORAL at 18:39

## 2020-08-31 RX ADMIN — VENLAFAXINE HYDROCHLORIDE 150 MG: 75 CAPSULE, EXTENDED RELEASE ORAL at 18:38

## 2020-08-31 RX ADMIN — SODIUM CHLORIDE, PRESERVATIVE FREE 10 ML: 5 INJECTION INTRAVENOUS at 21:48

## 2020-08-31 RX ADMIN — Medication 150 MCG: at 15:34

## 2020-08-31 RX ADMIN — ESMOLOL HYDROCHLORIDE 30 MG: 10 INJECTION, SOLUTION INTRAVENOUS at 15:26

## 2020-08-31 RX ADMIN — Medication 150 MCG: at 15:37

## 2020-08-31 RX ADMIN — ANTI-FUNGAL POWDER MICONAZOLE NITRATE TALC FREE: 1.42 POWDER TOPICAL at 21:47

## 2020-08-31 RX ADMIN — GLIMEPIRIDE 1 MG: 1 TABLET ORAL at 18:39

## 2020-08-31 RX ADMIN — PROPOFOL 50 MG: 10 INJECTION, EMULSION INTRAVENOUS at 15:18

## 2020-08-31 RX ADMIN — SODIUM CHLORIDE: 9 INJECTION, SOLUTION INTRAVENOUS at 15:43

## 2020-08-31 RX ADMIN — ESMOLOL HYDROCHLORIDE 20 MG: 10 INJECTION, SOLUTION INTRAVENOUS at 15:18

## 2020-08-31 RX ADMIN — SODIUM CHLORIDE: 9 INJECTION, SOLUTION INTRAVENOUS at 15:12

## 2020-08-31 RX ADMIN — PROPOFOL 50 MG: 10 INJECTION, EMULSION INTRAVENOUS at 15:16

## 2020-08-31 RX ADMIN — METOPROLOL TARTRATE 37.5 MG: 25 TABLET, FILM COATED ORAL at 21:43

## 2020-08-31 RX ADMIN — GABAPENTIN 400 MG: 400 CAPSULE ORAL at 21:45

## 2020-08-31 ASSESSMENT — PAIN SCALES - GENERAL
PAINLEVEL_OUTOF10: 0
PAINLEVEL_OUTOF10: 8
PAINLEVEL_OUTOF10: 0
PAINLEVEL_OUTOF10: 10
PAINLEVEL_OUTOF10: 0
PAINLEVEL_OUTOF10: 0

## 2020-08-31 ASSESSMENT — PAIN DESCRIPTION - LOCATION
LOCATION: BACK
LOCATION: BACK

## 2020-08-31 ASSESSMENT — PULMONARY FUNCTION TESTS
PIF_VALUE: 2
PIF_VALUE: 25
PIF_VALUE: 35
PIF_VALUE: 23
PIF_VALUE: 2
PIF_VALUE: 3
PIF_VALUE: 25
PIF_VALUE: 28
PIF_VALUE: 24
PIF_VALUE: 28
PIF_VALUE: 17
PIF_VALUE: 26
PIF_VALUE: 12
PIF_VALUE: 26
PIF_VALUE: 15
PIF_VALUE: 30
PIF_VALUE: 16
PIF_VALUE: 0
PIF_VALUE: 18
PIF_VALUE: 29
PIF_VALUE: 19
PIF_VALUE: 2
PIF_VALUE: 1
PIF_VALUE: 1
PIF_VALUE: 30
PIF_VALUE: 1
PIF_VALUE: 15
PIF_VALUE: 15
PIF_VALUE: 16
PIF_VALUE: 2
PIF_VALUE: 35
PIF_VALUE: 28
PIF_VALUE: 16
PIF_VALUE: 28
PIF_VALUE: 23
PIF_VALUE: 16
PIF_VALUE: 21
PIF_VALUE: 38
PIF_VALUE: 33
PIF_VALUE: 29
PIF_VALUE: 12

## 2020-08-31 ASSESSMENT — PAIN DESCRIPTION - DESCRIPTORS
DESCRIPTORS: ACHING
DESCRIPTORS: ACHING

## 2020-08-31 ASSESSMENT — PAIN DESCRIPTION - PROGRESSION
CLINICAL_PROGRESSION: NOT CHANGED

## 2020-08-31 ASSESSMENT — PAIN DESCRIPTION - PAIN TYPE
TYPE: CHRONIC PAIN
TYPE: CHRONIC PAIN

## 2020-08-31 ASSESSMENT — PAIN DESCRIPTION - ORIENTATION
ORIENTATION: LOWER
ORIENTATION: LOWER

## 2020-08-31 ASSESSMENT — PAIN DESCRIPTION - ONSET
ONSET: ON-GOING
ONSET: ON-GOING

## 2020-08-31 ASSESSMENT — PAIN DESCRIPTION - FREQUENCY
FREQUENCY: CONTINUOUS
FREQUENCY: CONTINUOUS

## 2020-08-31 ASSESSMENT — PAIN - FUNCTIONAL ASSESSMENT: PAIN_FUNCTIONAL_ASSESSMENT: PREVENTS OR INTERFERES SOME ACTIVE ACTIVITIES AND ADLS

## 2020-08-31 NOTE — PLAN OF CARE
Problem: Skin Integrity:  Goal: Will show no infection signs and symptoms  Description: Will show no infection signs and symptoms  8/31/2020 0316 by William Cox RN  Outcome: Ongoing  8/30/2020 2028 by Paul Persaud RN  Outcome: Met This Shift  Goal: Absence of new skin breakdown  Description: Absence of new skin breakdown  8/31/2020 0316 by William Cox RN  Outcome: Ongoing  8/30/2020 2028 by Paul Persaud RN  Outcome: Ongoing     Problem: Falls - Risk of:  Goal: Will remain free from falls  Description: Will remain free from falls  8/31/2020 0316 by William Cox RN  Outcome: Ongoing  8/30/2020 2028 by Paul Persaud RN  Outcome: Met This Shift  Goal: Absence of physical injury  Description: Absence of physical injury  8/31/2020 0316 by William Cox RN  Outcome: Ongoing  8/30/2020 2028 by Paul Persaud RN  Outcome: Met This Shift     Problem: Pain:  Goal: Pain level will decrease  Description: Pain level will decrease  Outcome: Ongoing  Goal: Control of acute pain  Description: Control of acute pain  8/31/2020 0316 by William Cox RN  Outcome: Ongoing  8/30/2020 2028 by Paul Persaud RN  Outcome: Ongoing  Goal: Control of chronic pain  Description: Control of chronic pain  8/31/2020 0316 by William Cox RN  Outcome: Ongoing  8/30/2020 2028 by Paul Persaud RN  Outcome: Ongoing     Problem: ABCDS Injury Assessment  Goal: Absence of physical injury  Outcome: Ongoing

## 2020-08-31 NOTE — PROGRESS NOTES
Physical Therapy  DATE: 2020    NAME: Mary Phelan  MRN: [id number]   : 1950    Patient not seen this date for Physical Therapy due to:  [] Blood transfusion in progress  [] Cancel by RN  [] Hemodialysis  [x]  Refusal by Patient  : Patient firmly refused PT including bed exercies. [] Spine Precautions   [] Strict Bedrest  [] Surgery  [] Testing      [] Other        [] PT being discontinued at this time. Patient independent. No further needs. [] PT being discontinued at this time as the patient has been transferred to hospice care. No further needs.     Zaid Balderrama, PT

## 2020-08-31 NOTE — PLAN OF CARE
Problem: Skin Integrity:  Goal: Will show no infection signs and symptoms  Description: Will show no infection signs and symptoms  Outcome: Met This Shift     Problem: Falls - Risk of:  Goal: Will remain free from falls  Description: Will remain free from falls  Outcome: Met This Shift  Goal: Absence of physical injury  Description: Absence of physical injury  Outcome: Met This Shift     Problem: Skin Integrity:  Goal: Absence of new skin breakdown  Description: Absence of new skin breakdown  Outcome: Ongoing     Problem: Pain:  Goal: Control of acute pain  Description: Control of acute pain  Outcome: Ongoing  Goal: Control of chronic pain  Description: Control of chronic pain  Outcome: Ongoing

## 2020-08-31 NOTE — PLAN OF CARE
Problem: Skin Integrity:  Goal: Will show no infection signs and symptoms  Description: Will show no infection signs and symptoms  8/31/2020 1052 by Allan Kaba RN  Outcome: Ongoing  Note: Patient displays no signs or symptoms of infection at the moment. Will continue to turn patient as needed, check brief every hour, and monitor lab values. Will continue to monitor. Problem: Falls - Risk of:  Goal: Will remain free from falls  Description: Will remain free from falls  8/31/2020 1052 by Allan Kaba RN  Outcome: Ongoing  Note: Patient is a fall risk during this admission. Fall risk assessment was performed. Patient is absent of falls. Bed is in the lowest position. Wheels on the bed are locked. Call light and bed side table are within reach. Clutter is removed. Patient was educated to call out when needing assistance or wanting to get out of bed. Patient offered toileting assistance during rounding. Hourly rounds have been performed.

## 2020-08-31 NOTE — PROGRESS NOTES
Pulmonary Critical Care Progress Note       Patient seen for the follow up of lung mass, COPD/emphysema, chronic hypoxic/hypercarbic respiratory failure    Subjective:  She is on oxygen 2 L nasal cannula. She denies shortness of breath. She has been n.p.o. She has occasional dry cough. No chest pain. She reports back chronic back pain      Examination:  Vitals: BP (!) 145/63   Pulse 93   Temp 98.2 °F (36.8 °C) (Oral)   Resp 16   Ht 5' 8.5\" (1.74 m)   Wt 150 lb (68 kg)   SpO2 92%   BMI 22.48 kg/m²   General appearance: alert and cooperative with exam  Neck: No JVD  Lungs: Decreased breath sounds right upper lobe no wheezing  Heart: regular rate and rhythm, S1, S2 normal, no gallop  Abdomen: Soft, non tender, + BS  Extremities: no cyanosis or clubbing.  No significant edema    LABs:  CBC:   Recent Labs     08/30/20  0610 08/31/20  0607   WBC 9.9 10.7   HGB 11.1* 12.2   HCT 37.9 41.4    421     BMP:   Recent Labs     08/30/20  0610 08/31/20  0607    142   K 3.9 3.6*   CO2 26 29   BUN 10 9   CREATININE 0.71 0.68   LABGLOM >60 >60   GLUCOSE 85 99     ABG:  Lab Results   Component Value Date    PHART 7.42 08/26/2012    PH 7.22 04/11/2017    MWD1COL 41 08/26/2012    PCO2 54 04/11/2017    PO2ART 59 08/26/2012    PO2 89 04/11/2017    PRG6JUM 26.1 08/26/2012    HCO3 22.2 04/11/2017    NHV0CQQ 27 04/12/2017    M9WCMUGP 92.1 08/26/2012    O2SAT 95 04/11/2017    FIO2 UNKNOWN 10/31/2017       Lab Results   Component Value Date    POCPH 7.36 04/12/2017    PHART 7.42 08/26/2012    PH 7.22 04/11/2017    POCPCO2 45 04/12/2017    KIQ3FAZ 41 08/26/2012    PCO2 54 04/11/2017    POCPO2 93 04/12/2017    PO2ART 59 08/26/2012    PO2 89 04/11/2017    POCHCO3 25.3 04/12/2017    NJH7GTD 26.1 08/26/2012    HCO3 22.2 04/11/2017    NBEA NOT REPORTED 04/12/2017    PBEA 0 04/12/2017    IAM2GCV 27 04/12/2017    AJFQ5FKY 97 04/12/2017    N7LHUEIU 92.1 08/26/2012    O2SAT 95 04/11/2017    FIO2 UNKNOWN 10/31/2017 Radiology:    CT chest 8/27/2020  Approximate 7.2 cm spiculated mass of the right upper lobe likely with    invasion of the mediastinum, adjacent lymphangitic spread and suspected    metastatic mediastinal lymphadenopathy. Impression:  · 7.2 cm spiculated mass right upper lobe with invasion of mediastinum, concerning for malignancy  · Mediastinal lymphadenopathy  · 5 mm left upper lobe nodule, stable since 2016  · Centrilobular emphysema/50-pack-year smoking history  · Chronic hypoxic/hypercarbic respiratory failure, on home oxygen  · Frequent falls  · Anxiety/depression, DM, diastolic heart failure s/p AVR 2017    Recommendations:  · Oxygen by nasal cannula   · Incentive spirometry every hour while awake  · DuoNeb by nebulizer  · Continue Rocephin for UTI   · Symbicort 160  · N.p.o. for bronchoscopy today  · PT/OT  · Discussed with RN  · Discussed with patient's son Jamila Diza; advised about risk of bronchoscopy including fatal bleeding/anesthesia side effects  · EPC for DVT prophylaxis for now.           Redd Banuelos MD on 8/31/2020 at 3:11 PM  Pulmonary Critical Care and Sleep Medicine,  [facility] AT [place]: [phone]

## 2020-08-31 NOTE — PROGRESS NOTES
[address] Hospitalist        8/31/2020   8:29 AM    Name:  Jovan West  MRN:    [id number]     Acct:     [de-identified]   Room:  2026/2026-01  IP Day: 0     Admit Date: 8/27/2020  1:16 PM  PCP: Lian Lombardo MD    C/C:   Chief Complaint   Patient presents with   Carrie Gibes    Back Pain    Cough    Rash       Assessment:      · Recurrent falls  · Hypoglycemia   · Acute cystitis without hematuria  · Lung mass RUL 7.2 cm spiculated   · Pedal edema- improved  · Decubitus ulcer  · Cutaneous candidiasis   · Coronary artery disease   · Abdominal aortic aneurysm   · Diabetes mellitus type 2  · Essential hypertension  · Moderate persistent asthma / COPD  · Nicotine dependence  · Chronic pain syndrome  · Major depressive disorder    · Diabetic sensory neuropathy  · GERD  · Vitamin D deficiency   · Anxiety disorder   · Adjustment disorder         Plan:        · Admit MS  · Monitor vitals closely  · Keep SPO2 above 90%  · Is/ Os  · Daily weights  · IV heplock  · Check 2 D echo- EF 65%  · Aortic stenosis mod  · ASA  · Symbicort  · Rocephin IV  · Klonopin- dose adjusted through pharm  · Neurontin- dose adj  · Amaryl- dose adj  · Metformin  · Lopressor  · Add BP and HR parameters   · Oxycodone prn   · Pain control  · Hold lasix  · Hold Potassium  · Trazodone prn- dose adj  · Check CBC, CMP  · Check BMP after that  · Consult Pulmonology- input appreciated  · SW input appreciated  · Consult SW  · DVT and GI prophylaxis  · accuchecks AC and HS  · ISS  · HbA1C 6.1  · Hypoglycemia protocol  · Miconazole top  · effexor   · Tessalon pearles prn  · Bronch today      Subjective:     Patient seen and examined at bedside. No overnight events. No acute complaints today. Afebrile    Pt. Denies any CP, palpitation, HA, dizziness, cold, changes in urination, BM or skin changes or any pain. ROS:  A 10 point system reviewed and negative otherwise mentioned above.     History of Present Illness:      [place] Place Devin Andrade is a 72 y.o.  female who presents with Fall; Back Pain; Cough; and Rash    Pt presents to the emergency room via EMS after she had fallen several times at home. Pt says this has been happening for almost 3 weeks now. States she fell 2 to 3 times every week. States it seemed as if she did not have any strength in her legs and she would fall straight down or backwards.      Patient states that she has been feeling weak. Pt states that she had fallen several days ago and EMTs had to break the door to get in. She states that she is now afraid of being by herself at home alone now.      Pt is noted to ave cough. She states that she has had a cough which has bene congested. Phlegm is clear and at times difficult to put out. She denies any chest pain, dyspnea, orthopnea, nausea, vomiting, abd pain.  She also has a rash under her breasts which has been itching. Pt says she has a cream for it but never got to use it. Pt was also found to have a soiled diaper full of urine and BM. Pt also has a DQ ulcer over the back and c/o pain over it.     Pt underwent testing which revealed a lung mass with likely metastasis. Pt says she quit smoking in the last few years.     I have personally reviewed the past medical history, past surgical history, medications, social history, and family history, and summarized in the note. Physical Examination:      Vitals:  BP (!) 146/45   Pulse 88   Temp 98.2 °F (36.8 °C) (Oral)   Resp 16   Ht 5' 8.5\" (1.74 m)   Wt 150 lb (68 kg)   SpO2 91%   BMI 22.48 kg/m²   Temp (24hrs), Av.2 °F (36.8 °C), Min:98.1 °F (36.7 °C), Max:98.2 °F (36.8 °C)    Weight:   Wt Readings from Last 3 Encounters:   20 150 lb (68 kg)   19 192 lb 14.4 oz (87.5 kg)   18 199 lb 4.8 oz (90.4 kg)     I/O last 3 completed shifts:  I/O last 3 completed shifts:   In: 240 [P.O.:240]  Out: -      Recent Labs     20  1045 20  1647 207 20  0705   IWONA 178* 113* 176* 97 General appearance - alert, well appearing, and in no acute distress  Mental status - oriented to person, place, and time with normal affect  Head - normocephalic and atraumatic  Eyes - pupils equal and reactive, extraocular eye movements intact, conjunctiva clear  Ears - hearing appears to be intact  Nose - no drainage noted  Mouth - mucous membranes moist  Neck - supple, no carotid bruits, thyroid not palpable  Chest -  Lungs show coarse crackles, no rhochi, normal effort  Heart - RRR, normal rate, no murmer  Abdomen - soft, NT, no rebound, no rigidity   Neurological - normal speech, no focal findings or movement disorder noted  Extremities - no pedal edema or calf pain, long nails  Skin - no gross lesions, rashes, or induration noted        Medications: Allergies:    Allergies   Allergen Reactions    Codeine      \"feeling of heavy on chest\"    Dye [Iodides]      Hallucination,vomiting       Current Meds:   Current Facility-Administered Medications:     miconazole (MICOTIN) 2 % powder, , Topical, BID, Whitney Becerril MD    cefTRIAXone (ROCEPHIN) 1 g IVPB in 50 mL D5W minibag, 1 g, Intravenous, Q24H, Whitney Becerril MD, Stopped at 08/30/20 1930    insulin lispro (HUMALOG) injection vial 0-12 Units, 0-12 Units, Subcutaneous, TID WC, Whitney Becerril MD, 2 Units at 08/30/20 1221    insulin lispro (HUMALOG) injection vial 0-6 Units, 0-6 Units, Subcutaneous, Nightly, Whitney Becerril MD, 1 Units at 08/30/20 2322    glucose (GLUTOSE) 40 % oral gel 15 g, 15 g, Oral, PRN, Whitney Becerril MD    dextrose 50 % IV solution, 12.5 g, Intravenous, PRN, Whitney Becerril MD    glucagon (rDNA) injection 1 mg, 1 mg, Intramuscular, PRN, Whitney Becerril MD    dextrose 5 % solution, 100 mL/hr, Intravenous, PRN, Whitney Becerril MD    benzonatate (TESSALON) capsule 100 mg, 100 mg, Oral, TID PRN, Whitney Becerril MD, 100 mg at 08/28/20 0158    ipratropium-albuterol (DUONEB) nebulizer solution 1 ampule, 1 ampule, Inhalation, Q4H PRN, Clover Molina MD    clonazePAM (KLONOPIN) tablet 1 mg, 1 mg, Oral, TID PRN, Whitney Becerril MD    gabapentin (NEURONTIN) capsule 400 mg, 400 mg, Oral, TID, Whitney Becerril MD, 400 mg at 08/30/20 2322    glimepiride (AMARYL) tablet 1 mg, 1 mg, Oral, Dinner, Whitney Becerril MD    traZODone (DESYREL) tablet 300 mg, 300 mg, Oral, Nightly, Whitney Becerril MD    venlafaxine (EFFEXOR XR) extended release capsule 150 mg, 150 mg, Oral, BID, Whitney Becerril MD    aspirin EC tablet 81 mg, 81 mg, Oral, Daily, Clover Molina MD, 81 mg at 08/30/20 0747    docusate sodium (COLACE) capsule 100 mg, 100 mg, Oral, BID PRN, Clover Molina MD    glimepiride (AMARYL) tablet 2 mg, 2 mg, Oral, QAM AC, Whitney Becerril MD, 2 mg at 08/30/20 0745    metFORMIN (GLUCOPHAGE) tablet 500 mg, 500 mg, Oral, BID, Clover Molina MD, 500 mg at 08/30/20 2324    metoprolol tartrate (LOPRESSOR) tablet 37.5 mg, 37.5 mg, Oral, BID, Clover Molina MD, 37.5 mg at 08/30/20 2322    budesonide-formoterol (SYMBICORT) 160-4.5 MCG/ACT inhaler 2 puff, 2 puff, Inhalation, BID, Clover Molina MD, 2 puff at 08/30/20 2003    oxyCODONE-acetaminophen (PERCOCET) 5-325 MG per tablet 1 tablet, 1 tablet, Oral, 4 times per day, Clover Molina MD, 1 tablet at 08/30/20 2323    sodium chloride flush 0.9 % injection 10 mL, 10 mL, Intravenous, 2 times per day, Clover Molina MD, 10 mL at 08/30/20 2333    sodium chloride flush 0.9 % injection 10 mL, 10 mL, Intravenous, PRN, Clover Molina MD, 10 mL at 08/30/20 1737    acetaminophen (TYLENOL) tablet 650 mg, 650 mg, Oral, Q6H PRN **OR** acetaminophen (TYLENOL) suppository 650 mg, 650 mg, Rectal, Q6H PRN, Whitney Becerril MD    promethazine (PHENERGAN) tablet 12.5 mg, 12.5 mg, Oral, Q6H PRN, 12.5 mg at 08/30/20 1633 **OR** ondansetron (ZOFRAN) injection 4 mg, 4 mg, Intravenous, Q6H PRN, Whitney Becerril MD    nicotine (NICODERM CQ) 21 MG/24HR 1 patch, 1 patch, Transdermal, Daily PRN, Clover Molina MD    enoxaparin (LOVENOX) injection 40 mg, 40 mg, Subcutaneous, Daily, Whitney Becerril MD, Stopped at 08/31/20 0900      I/O (24Hr): Intake/Output Summary (Last 24 hours) at 8/31/2020 0829  Last data filed at 8/30/2020 1734  Gross per 24 hour   Intake 240 ml   Output --   Net 240 ml       Data:           Labs:    Hematology:  Recent Labs     08/29/20  0526 08/30/20  0610 08/31/20  0607   WBC 9.7 9.9 10.7   RBC 4.68 5.10 5.62*   HGB 10.3* 11.1* 12.2   HCT 35.3* 37.9 41.4   MCV 75.4* 74.3* 73.7*   MCH 22.0* 21.8* 21.7*   MCHC 29.2 29.3 29.5   RDW 19.6* 19.4* 20.3*    365 421   MPV 9.7 10.0 9.4     Chemistry:  Recent Labs     08/29/20  0526 08/30/20  0610 08/31/20  0607    139 142   K 4.3 3.9 3.6*    101 103   CO2 29 26 29   GLUCOSE 97 85 99   BUN 13 10 9   CREATININE 0.86 0.71 0.68   ANIONGAP 8* 12 10   LABGLOM >60 >60 >60   GFRAA >60 >60 >60   CALCIUM 8.6 8.9 9.2     Recent Labs     08/29/20 2054 08/30/20  0852 08/30/20  1045 08/30/20  1647 08/30/20  1957 08/31/20  0705   POCGLU 141* 117* 178* 113* 176* 97       Lab Results   Component Value Date/Time    SPECIAL LTAC,10CC 08/27/2020 04:45 PM     Lab Results   Component Value Date/Time    CULTURE NO GROWTH 3 DAYS 08/27/2020 04:45 PM       Lab Results   Component Value Date    POCPH 7.36 04/12/2017    PHART 7.42 08/26/2012    PH 7.22 04/11/2017    POCPCO2 45 04/12/2017    FGD3KJC 41 08/26/2012    PCO2 54 04/11/2017    POCPO2 93 04/12/2017    PO2ART 59 08/26/2012    PO2 89 04/11/2017    POCHCO3 25.3 04/12/2017    MZG5AAY 26.1 08/26/2012    HCO3 22.2 04/11/2017    NBEA NOT REPORTED 04/12/2017    PBEA 0 04/12/2017    UIT8XPV 27 04/12/2017    WSQG3EWA 97 04/12/2017    I5ABMGGS 92.1 08/26/2012    O2SAT 95 04/11/2017    FIO2 UNKNOWN 10/31/2017       Radiology:    Xr Lumbar Spine (2-3 Views)    Result Date: 8/27/2020  No acute osseous abnormality. Multilevel mild degenerative disc disease, not significantly changed since 2017.      Ct Chest Wo Contrast    Result Date:

## 2020-08-31 NOTE — PROGRESS NOTES
Patient oriented back to room from PACU. Bed locked and in lowest position, 2/4 side rails up, call light within reach, and side table next to bed. Will continue to monitor.

## 2020-08-31 NOTE — OP NOTE
Operative Note      Patient: Dillard Alpers  YOB: 1950  MRN: [id number]    Date of Procedure: 8/31/2020    Pre-Op Diagnosis: Right upper lobe lung mass    Post-Op Diagnosis: Lung mass/probable carcinoma with right upper lobe endobronchial lesion       Procedure(s):  BRONCHOSCOPY BIOPSY BRONCHUS with bronchoalveolar lavage and endobronchial biopsies    Surgeon(s):  Shakira Moralez MD      Anesthesia: General    Estimated Blood Loss (mL): 2 ml     Complications: None    Specimens:   ID Type Source Tests Collected by Time Destination   B : Right Upper Lobe Body Fluid Lung CYTOLOGY, NON-GYN, BODY FLUID CELL COUNT, CULTURE, FUNGUS, CULTURE, BODY FLUID, FUNGAL STAIN, CULTURE WITH SMEAR, ACID FAST BACILLIUS, CULTURE, RESPIRATORY, CULTURE, ANAEROBIC AND AEROBIC Shakira Moralez MD 8/31/2020 1531    C : bronchial biopsy Tissue Bronchial Biopsy SURGICAL PATHOLOGY, CULTURE, FUNGUS, CULTURE, BODY FLUID, CULTURE, TISSUE, FUNGAL STAIN, CULTURE WITH SMEAR, ACID FAST BACILLIUS, CULTURE, ANAEROBIC AND AEROBIC, CULTURE, VIRUS, NON RESPIRATORY Shakira Moralez MD 8/31/2020 1534        Implants:  * No implants in log *      Drains: * No LDAs found *    Procedure/findings: Patient was intubated and sedated on the ventilator. I introduced the bronchoscope through the ET tube. Immediately was noted was a large mucous plug obstructing left main bronchus. Therapeutic aspiration of secretions with aggressive suctioning was performed. Airway exam was performed. There was no significant inflammation. There was evidence of right upper lobe endobronchial lesion obstructing the apical and anterior segment of the right upper lobe. BAL was done right upper lobe. Endobronchial biopsies were done 4 times with difficulty. Patient had no significant bleeding.       Electronically signed by Shakira Moralez MD on 8/31/2020 at 4:07 PM

## 2020-08-31 NOTE — ANESTHESIA POSTPROCEDURE EVALUATION
Department of Anesthesiology  Postprocedure Note    Patient: Daniela Eddy  MRN: [id number]  YOB: 1950  Date of evaluation: 8/31/2020  Time:  5:09 PM     Procedure Summary     Date:  08/31/20 Room / Location:  [place] [id number] / [facility] - INPATIENT    Anesthesia Start:  8741 Anesthesia Stop:  1600    Procedure:  BRONCHOSCOPY BIOPSY BRONCHUS (N/A ) Diagnosis:  (inpat)    Surgeon:  Cristian Bass MD Responsible Provider:  Tan Lew DO    Anesthesia Type:  general ASA Status:  3          Anesthesia Type: general    Franci Phase I: Franci Score: 9    Franci Phase II:      Last vitals: Reviewed and per EMR flowsheets.        Anesthesia Post Evaluation    Patient location during evaluation: PACU  Complications: no

## 2020-08-31 NOTE — ANESTHESIA PRE PROCEDURE
Department of Anesthesiology  Preprocedure Note       Name:  Yumiko Mays   Age:  72 y.o.  :  1950                                          MRN:  [id number]         Date:  2020      Surgeon: Dariusz Moon):  Stacia Lowry MD    Procedure: Procedure(s):  BRONCHOSCOPY BIOPSY BRONCHUS    Medications prior to admission:   Prior to Admission medications    Medication Sig Start Date End Date Taking? Authorizing Provider   clonazePAM (KLONOPIN) 1 MG tablet Take 1 mg by mouth 3 times daily. Yes Historical Provider, MD   gabapentin (NEURONTIN) 400 MG capsule Take 400 mg by mouth nightly. Yes Historical Provider, MD   glimepiride (AMARYL) 1 MG tablet Take 1 mg by mouth Daily with supper In addition to 2 tablets (=2mg) every morning    Yes Historical Provider, MD   metoprolol tartrate (LOPRESSOR) 25 MG tablet Take 25 mg by mouth 2 times daily   Yes Historical Provider, MD   venlafaxine (EFFEXOR XR) 150 MG extended release capsule Take 150 mg by mouth 2 times daily   Yes Historical Provider, MD   oxyCODONE-acetaminophen (PERCOCET) 5-325 MG per tablet Take 1 tablet by mouth 4 times daily as needed for Pain. Yes Historical Provider, MD   traZODone (DESYREL) 150 MG tablet Take 300 mg by mouth nightly Take 2 tablets (=300mg) nightly   Yes Historical Provider, MD   lansoprazole (PREVACID) 30 MG delayed release capsule Take 30 mg by mouth daily 11/10/16  Yes Historical Provider, MD   metFORMIN (GLUCOPHAGE) 500 MG tablet Take 500 mg by mouth 2 times daily 16  Yes Historical Provider, MD   aspirin EC 81 MG EC tablet Take 81 mg by mouth daily   Yes Historical Provider, MD   gabapentin (NEURONTIN) 400 MG capsule Take 400 mg by mouth 2 times daily.  And 1 capsules nightly   Yes Historical Provider, MD   glimepiride (AMARYL) 1 MG tablet Take 2 mg by mouth every morning In addition to 1mg nightly   Yes Historical Provider, MD   mometasone-formoterol (DULERA) 200-5 MCG/ACT inhaler Inhale 2 puffs into the lungs every 12 hours    Historical Provider, MD       Current medications:    Current Facility-Administered Medications   Medication Dose Route Frequency Provider Last Rate Last Dose    miconazole (MICOTIN) 2 % powder   Topical BID Whitney Becerril MD        cefTRIAXone (ROCEPHIN) 1 g IVPB in 50 mL D5W minibag  1 g Intravenous Q24H Shirl Siemens, MD   Stopped at 08/30/20 1930    insulin lispro (HUMALOG) injection vial 0-12 Units  0-12 Units Subcutaneous TID WC Whitney Becerril MD   2 Units at 08/30/20 1221    insulin lispro (HUMALOG) injection vial 0-6 Units  0-6 Units Subcutaneous Nightly Whitney Becerril MD   1 Units at 08/30/20 2322    glucose (GLUTOSE) 40 % oral gel 15 g  15 g Oral PRN Whitney Becerril MD        dextrose 50 % IV solution  12.5 g Intravenous PRN Shirl Siemens, MD        glucagon (rDNA) injection 1 mg  1 mg Intramuscular PRN Whitney Becerril MD        dextrose 5 % solution  100 mL/hr Intravenous PRN Shirl Siemens, MD        benzonatate (TESSALON) capsule 100 mg  100 mg Oral TID PRN Shirl Siemens, MD   100 mg at 08/28/20 0158    ipratropium-albuterol (DUONEB) nebulizer solution 1 ampule  1 ampule Inhalation Q4H PRN Whitney Becerril MD        clonazePAM (KLONOPIN) tablet 1 mg  1 mg Oral TID PRN Whitney Becerril MD        gabapentin (NEURONTIN) capsule 400 mg  400 mg Oral TID Shirl Siemens, MD   Stopped at 08/31/20 0904    glimepiride (AMARYL) tablet 1 mg  1 mg Oral Dinner Whitney Becerril MD        traZODone (DESYREL) tablet 300 mg  300 mg Oral Nightly Shirl Siemens, MD        venlafaxine (EFFEXOR XR) extended release capsule 150 mg  150 mg Oral BID Shirl Siemens, MD   Stopped at 08/31/20 0904    aspirin EC tablet 81 mg  81 mg Oral Daily Shirl Siemens, MD   Stopped at 08/31/20 0904    docusate sodium (COLACE) capsule 100 mg  100 mg Oral BID PRN Whitney Becerril MD        glimepiride (AMARYL) tablet 2 mg  2 mg Oral QAM AC Whitney Becerril MD   2 mg at 08/30/20 0745    metFORMIN (GLUCOPHAGE) tablet 500 mg  500 mg Oral BID Jenn Castro MD   Stopped at 08/31/20 0903    metoprolol tartrate (LOPRESSOR) tablet 37.5 mg  37.5 mg Oral BID Jenn Castro MD   Stopped at 08/31/20 0903    budesonide-formoterol (SYMBICORT) 160-4.5 MCG/ACT inhaler 2 puff  2 puff Inhalation BID Whitney Becerril MD   2 puff at 08/31/20 0942    oxyCODONE-acetaminophen (PERCOCET) 5-325 MG per tablet 1 tablet  1 tablet Oral 4 times per day Jenn Castro MD   1 tablet at 08/30/20 2323    sodium chloride flush 0.9 % injection 10 mL  10 mL Intravenous 2 times per day Jenn Castro MD   10 mL at 08/31/20 1025    sodium chloride flush 0.9 % injection 10 mL  10 mL Intravenous PRN Whitney Becerril MD   10 mL at 08/30/20 1737    acetaminophen (TYLENOL) tablet 650 mg  650 mg Oral Q6H PRN Whitney Becerril MD        Or    acetaminophen (TYLENOL) suppository 650 mg  650 mg Rectal Q6H PRN Whitney Becerril MD        promethazine (PHENERGAN) tablet 12.5 mg  12.5 mg Oral Q6H PRN Whitney Becerril MD   12.5 mg at 08/30/20 1633    Or    ondansetron (ZOFRAN) injection 4 mg  4 mg Intravenous Q6H PRN Whitney Becerril MD        nicotine (NICODERM CQ) 21 MG/24HR 1 patch  1 patch Transdermal Daily PRN Whitney Becerril MD        enoxaparin (LOVENOX) injection 40 mg  40 mg Subcutaneous Daily Jenn Castro MD   Stopped at 08/31/20 0900       Allergies:     Allergies   Allergen Reactions    Codeine      \"feeling of heavy on chest\"    Dye [Iodides]      Hallucination,vomiting       Problem List:    Patient Active Problem List   Diagnosis Code    Valvular heart disease I38    Type 2 diabetes mellitus without complication, without long-term current use of insulin ([facility] E11.9    Open wound of right ankle S91.001A    COPD (chronic obstructive pulmonary disease) ([facility]) J44.9    Syncope and collapse R55    Chronic ulcer of right heel ([facility]) L97.419    Multifocal pneumonia J18.9    Aortic valve stenosis I35.0    Esophageal dilatation K22.8    Aspiration pneumonia of both lower lobes History     Tobacco Use    Smoking status: Former Smoker     Last attempt to quit: 2016     Years since quittin.0    Smokeless tobacco: Never Used   Substance Use Topics    Alcohol use: No                                Counseling given: Not Answered      Vital Signs (Current):   Vitals:    20 2230 20 0447 20 0706 20 1147   BP: (!) 152/63  (!) 146/45 (!) 145/63   Pulse:   88 93   Resp:   16 16   Temp:   98.2 °F (36.8 °C) 98.2 °F (36.8 °C)   TempSrc:   Oral Oral   SpO2:   91% 92%   Weight:  150 lb (68 kg)     Height:                                                  BP Readings from Last 3 Encounters:   20 (!) 145/63   19 134/65   18 138/70       NPO Status: Time of last liquid consumption: (P)                         Time of last solid consumption: (P)                         Date of last liquid consumption: (P) 20                        Date of last solid food consumption: (P) 20    BMI:   Wt Readings from Last 3 Encounters:   20 150 lb (68 kg)   19 192 lb 14.4 oz (87.5 kg)   18 199 lb 4.8 oz (90.4 kg)     Body mass index is 22.48 kg/m².     CBC:   Lab Results   Component Value Date    WBC 10.7 2020    RBC 5.62 2020    HGB 12.2 2020    HCT 41.4 2020    MCV 73.7 2020    RDW 20.3 2020     2020       CMP:   Lab Results   Component Value Date     2020    K 3.6 2020     2020    CO2 29 2020    BUN 9 2020    CREATININE 0.68 2020    GFRAA >60 2020    LABGLOM >60 2020    GLUCOSE 99 2020    PROT 5.5 2020    CALCIUM 9.2 2020    BILITOT 0.14 2020    ALKPHOS 30 2020    AST 11 2020    ALT 6 2020       POC Tests:   Recent Labs     20  1113   POCGLU 106*       Coags:   Lab Results   Component Value Date    PROTIME 14.4 2020    INR 1.1 2020    APTT 27.3 2018       HCG (If

## 2020-09-01 LAB
ANION GAP SERPL CALCULATED.3IONS-SCNC: 8 MMOL/L (ref 9–17)
APPEARANCE FLUID: NORMAL
BUN BLDV-MCNC: 16 MG/DL (ref 8–23)
BUN/CREAT BLD: 20 (ref 9–20)
CALCIUM SERPL-MCNC: 8.7 MG/DL (ref 8.6–10.4)
CASE NUMBER:: NORMAL
CHLORIDE BLD-SCNC: 100 MMOL/L (ref 98–107)
CO2: 28 MMOL/L (ref 20–31)
COLOR FLUID: NORMAL
CREAT SERPL-MCNC: 0.81 MG/DL (ref 0.5–0.9)
CULTURE: ABNORMAL
DIRECT EXAM: ABNORMAL
DIRECT EXAM: ABNORMAL
DIRECT EXAM: NORMAL
GFR AFRICAN AMERICAN: >60 ML/MIN
GFR NON-AFRICAN AMERICAN: >60 ML/MIN
GFR SERPL CREATININE-BSD FRML MDRD: ABNORMAL ML/MIN/{1.73_M2}
GFR SERPL CREATININE-BSD FRML MDRD: ABNORMAL ML/MIN/{1.73_M2}
GLUCOSE BLD-MCNC: 128 MG/DL (ref 65–105)
GLUCOSE BLD-MCNC: 143 MG/DL (ref 65–105)
GLUCOSE BLD-MCNC: 81 MG/DL (ref 65–105)
GLUCOSE BLD-MCNC: 84 MG/DL (ref 70–99)
GLUCOSE BLD-MCNC: 85 MG/DL (ref 65–105)
HCT VFR BLD CALC: 37.1 % (ref 36.3–47.1)
HEMOGLOBIN: 11 G/DL (ref 11.9–15.1)
Lab: ABNORMAL
Lab: ABNORMAL
Lab: NORMAL
MAGNESIUM: 1.9 MG/DL (ref 1.6–2.6)
MCH RBC QN AUTO: 22 PG (ref 25.2–33.5)
MCHC RBC AUTO-ENTMCNC: 29.6 G/DL (ref 28.4–34.8)
MCV RBC AUTO: 74.3 FL (ref 82.6–102.9)
NRBC AUTOMATED: 0 PER 100 WBC
PDW BLD-RTO: 19.4 % (ref 11.8–14.4)
PLATELET # BLD: 374 K/UL (ref 138–453)
PMV BLD AUTO: 9.4 FL (ref 8.1–13.5)
POTASSIUM SERPL-SCNC: 4.6 MMOL/L (ref 3.7–5.3)
PROCALCITONIN: 0.06 NG/ML
RBC # BLD: 4.99 M/UL (ref 3.95–5.11)
RBC FLUID: 12 /MM3
SODIUM BLD-SCNC: 136 MMOL/L (ref 135–144)
SPECIMEN DESCRIPTION: ABNORMAL
SPECIMEN DESCRIPTION: ABNORMAL
SPECIMEN DESCRIPTION: NORMAL
SPECIMEN DESCRIPTION: NORMAL
SPECIMEN TYPE: NORMAL
WBC # BLD: 11.2 K/UL (ref 3.5–11.3)
WBC FLUID: 326 /MM3

## 2020-09-01 PROCEDURE — 6360000002 HC RX W HCPCS: Performed by: INTERNAL MEDICINE

## 2020-09-01 PROCEDURE — 2700000000 HC OXYGEN THERAPY PER DAY

## 2020-09-01 PROCEDURE — 2580000003 HC RX 258: Performed by: INTERNAL MEDICINE

## 2020-09-01 PROCEDURE — 6370000000 HC RX 637 (ALT 250 FOR IP): Performed by: HOSPITALIST

## 2020-09-01 PROCEDURE — 1200000000 HC SEMI PRIVATE

## 2020-09-01 PROCEDURE — 83735 ASSAY OF MAGNESIUM: CPT

## 2020-09-01 PROCEDURE — 80048 BASIC METABOLIC PNL TOTAL CA: CPT

## 2020-09-01 PROCEDURE — 94761 N-INVAS EAR/PLS OXIMETRY MLT: CPT

## 2020-09-01 PROCEDURE — 6370000000 HC RX 637 (ALT 250 FOR IP): Performed by: INTERNAL MEDICINE

## 2020-09-01 PROCEDURE — 99222 1ST HOSP IP/OBS MODERATE 55: CPT | Performed by: INTERNAL MEDICINE

## 2020-09-01 PROCEDURE — 82947 ASSAY GLUCOSE BLOOD QUANT: CPT

## 2020-09-01 PROCEDURE — 36415 COLL VENOUS BLD VENIPUNCTURE: CPT

## 2020-09-01 PROCEDURE — 85027 COMPLETE CBC AUTOMATED: CPT

## 2020-09-01 PROCEDURE — 94640 AIRWAY INHALATION TREATMENT: CPT

## 2020-09-01 PROCEDURE — 84145 PROCALCITONIN (PCT): CPT

## 2020-09-01 RX ORDER — POTASSIUM CHLORIDE 20 MEQ/1
40 TABLET, EXTENDED RELEASE ORAL PRN
Status: DISCONTINUED | OUTPATIENT
Start: 2020-09-01 | End: 2020-09-04 | Stop reason: HOSPADM

## 2020-09-01 RX ORDER — POTASSIUM CHLORIDE 7.45 MG/ML
10 INJECTION INTRAVENOUS PRN
Status: DISCONTINUED | OUTPATIENT
Start: 2020-09-01 | End: 2020-09-04 | Stop reason: HOSPADM

## 2020-09-01 RX ORDER — FUROSEMIDE 40 MG/1
40 TABLET ORAL DAILY
Status: DISCONTINUED | OUTPATIENT
Start: 2020-09-01 | End: 2020-09-04 | Stop reason: HOSPADM

## 2020-09-01 RX ORDER — MAGNESIUM SULFATE 1 G/100ML
1 INJECTION INTRAVENOUS PRN
Status: DISCONTINUED | OUTPATIENT
Start: 2020-09-01 | End: 2020-09-04 | Stop reason: HOSPADM

## 2020-09-01 RX ADMIN — CEFTRIAXONE SODIUM 1 G: 1 INJECTION, POWDER, FOR SOLUTION INTRAMUSCULAR; INTRAVENOUS at 17:13

## 2020-09-01 RX ADMIN — BUDESONIDE AND FORMOTEROL FUMARATE DIHYDRATE 2 PUFF: 160; 4.5 AEROSOL RESPIRATORY (INHALATION) at 19:37

## 2020-09-01 RX ADMIN — CLONAZEPAM 1 MG: 0.5 TABLET ORAL at 13:47

## 2020-09-01 RX ADMIN — OXYCODONE HYDROCHLORIDE AND ACETAMINOPHEN 1 TABLET: 5; 325 TABLET ORAL at 13:02

## 2020-09-01 RX ADMIN — BUDESONIDE AND FORMOTEROL FUMARATE DIHYDRATE 2 PUFF: 160; 4.5 AEROSOL RESPIRATORY (INHALATION) at 09:41

## 2020-09-01 RX ADMIN — GLIMEPIRIDE 1 MG: 1 TABLET ORAL at 17:14

## 2020-09-01 RX ADMIN — GLIMEPIRIDE 2 MG: 2 TABLET ORAL at 09:20

## 2020-09-01 RX ADMIN — ANTI-FUNGAL POWDER MICONAZOLE NITRATE TALC FREE: 1.42 POWDER TOPICAL at 09:29

## 2020-09-01 RX ADMIN — TRAZODONE HYDROCHLORIDE 300 MG: 100 TABLET ORAL at 21:26

## 2020-09-01 RX ADMIN — GABAPENTIN 400 MG: 400 CAPSULE ORAL at 09:25

## 2020-09-01 RX ADMIN — METFORMIN HYDROCHLORIDE 500 MG: 500 TABLET ORAL at 21:26

## 2020-09-01 RX ADMIN — ENOXAPARIN SODIUM 40 MG: 40 INJECTION SUBCUTANEOUS at 09:27

## 2020-09-01 RX ADMIN — GABAPENTIN 400 MG: 400 CAPSULE ORAL at 13:41

## 2020-09-01 RX ADMIN — OXYCODONE HYDROCHLORIDE AND ACETAMINOPHEN 1 TABLET: 5; 325 TABLET ORAL at 00:08

## 2020-09-01 RX ADMIN — FUROSEMIDE 40 MG: 40 TABLET ORAL at 13:04

## 2020-09-01 RX ADMIN — VENLAFAXINE HYDROCHLORIDE 150 MG: 75 CAPSULE, EXTENDED RELEASE ORAL at 09:23

## 2020-09-01 RX ADMIN — OXYCODONE HYDROCHLORIDE AND ACETAMINOPHEN 1 TABLET: 5; 325 TABLET ORAL at 17:14

## 2020-09-01 RX ADMIN — SODIUM CHLORIDE, PRESERVATIVE FREE 10 ML: 5 INJECTION INTRAVENOUS at 09:29

## 2020-09-01 RX ADMIN — METOPROLOL TARTRATE 37.5 MG: 25 TABLET, FILM COATED ORAL at 21:26

## 2020-09-01 RX ADMIN — CLONAZEPAM 1 MG: 0.5 TABLET ORAL at 21:32

## 2020-09-01 RX ADMIN — VENLAFAXINE HYDROCHLORIDE 150 MG: 75 CAPSULE, EXTENDED RELEASE ORAL at 17:14

## 2020-09-01 RX ADMIN — AMPICILLIN SODIUM 2 G: 2 INJECTION, POWDER, FOR SOLUTION INTRAMUSCULAR; INTRAVENOUS at 18:45

## 2020-09-01 RX ADMIN — ASPIRIN 81 MG: 81 TABLET, COATED ORAL at 09:20

## 2020-09-01 RX ADMIN — OXYCODONE HYDROCHLORIDE AND ACETAMINOPHEN 1 TABLET: 5; 325 TABLET ORAL at 06:44

## 2020-09-01 RX ADMIN — GABAPENTIN 400 MG: 400 CAPSULE ORAL at 21:26

## 2020-09-01 RX ADMIN — METFORMIN HYDROCHLORIDE 500 MG: 500 TABLET ORAL at 09:25

## 2020-09-01 RX ADMIN — METOPROLOL TARTRATE 37.5 MG: 25 TABLET, FILM COATED ORAL at 09:21

## 2020-09-01 RX ADMIN — INSULIN LISPRO 2 UNITS: 100 INJECTION, SOLUTION INTRAVENOUS; SUBCUTANEOUS at 13:03

## 2020-09-01 ASSESSMENT — PAIN DESCRIPTION - DESCRIPTORS
DESCRIPTORS: ACHING
DESCRIPTORS: ACHING;SORE
DESCRIPTORS: ACHING;SORE

## 2020-09-01 ASSESSMENT — PAIN DESCRIPTION - ONSET
ONSET: ON-GOING

## 2020-09-01 ASSESSMENT — PAIN SCALES - GENERAL
PAINLEVEL_OUTOF10: 8
PAINLEVEL_OUTOF10: 10
PAINLEVEL_OUTOF10: 10
PAINLEVEL_OUTOF10: 8
PAINLEVEL_OUTOF10: 10
PAINLEVEL_OUTOF10: 10
PAINLEVEL_OUTOF10: 8

## 2020-09-01 ASSESSMENT — PAIN DESCRIPTION - PROGRESSION
CLINICAL_PROGRESSION: NOT CHANGED

## 2020-09-01 ASSESSMENT — PAIN DESCRIPTION - FREQUENCY
FREQUENCY: CONTINUOUS

## 2020-09-01 ASSESSMENT — PAIN DESCRIPTION - PAIN TYPE
TYPE: CHRONIC PAIN

## 2020-09-01 ASSESSMENT — PAIN - FUNCTIONAL ASSESSMENT
PAIN_FUNCTIONAL_ASSESSMENT: PREVENTS OR INTERFERES SOME ACTIVE ACTIVITIES AND ADLS
PAIN_FUNCTIONAL_ASSESSMENT: PREVENTS OR INTERFERES SOME ACTIVE ACTIVITIES AND ADLS
PAIN_FUNCTIONAL_ASSESSMENT: PREVENTS OR INTERFERES WITH MANY ACTIVE NOT PASSIVE ACTIVITIES

## 2020-09-01 ASSESSMENT — PAIN DESCRIPTION - ORIENTATION
ORIENTATION: LOWER

## 2020-09-01 ASSESSMENT — PAIN DESCRIPTION - LOCATION
LOCATION: BACK

## 2020-09-01 NOTE — PLAN OF CARE
Problem: Skin Integrity:  Goal: Will show no infection signs and symptoms  Description: Will show no infection signs and symptoms  Outcome: Ongoing  Note: Checked for incontinence every 2 hours and prn. Pericare as needed. Assisted to reposition off back frequently. On waffle mattress. Heels off bed with pillows. Goal: Absence of new skin breakdown  Description: Absence of new skin breakdown  Outcome: Ongoing     Problem: Falls - Risk of:  Goal: Will remain free from falls  Description: Will remain free from falls  Outcome: Ongoing  Note: Siderails up x 2  Hourly rounding  Call light in reach  Instructed to call for assist before attempting out of bed. Remains free from falls and accidental injury at this time   Floor free from obstacles  Bed is locked and in lowest position  Adequate lighting provided  Bed alarm on, [place] star and Stay with Me signs posted  Goal: Absence of physical injury  Description: Absence of physical injury  Outcome: Ongoing     Problem: Pain:  Goal: Pain level will decrease  Description: Pain level will decrease  Outcome: Ongoing  Note: Pain level assessment complete.    Patient educated on pain scale and control interventions  Scheduled pain medication given -Perococet  Patient instructed to call out with new onset of pain or unrelieved pain    Goal: Control of acute pain  Description: Control of acute pain  Outcome: Ongoing  Goal: Control of chronic pain  Description: Control of chronic pain  Outcome: Ongoing     Problem: ABCDS Injury Assessment  Goal: Absence of physical injury  Outcome: Ongoing

## 2020-09-01 NOTE — PROGRESS NOTES
83   Temp 98.1 °F (36.7 °C) (Oral)   Resp 16   Ht 5' 8.5\" (1.74 m)   Wt 142 lb 9.6 oz (64.7 kg)   SpO2 92%   BMI 21.37 kg/m²   Temp (24hrs), Av.8 °F (36.6 °C), Min:97.3 °F (36.3 °C), Max:98.1 °F (36.7 °C)    Weight:   Wt Readings from Last 3 Encounters:   20 142 lb 9.6 oz (64.7 kg)   19 192 lb 14.4 oz (87.5 kg)   18 199 lb 4.8 oz (90.4 kg)     I/O last 3 completed shifts:  I/O last 3 completed shifts: In: 1180 [P.O.:360; I.V.:820]  Out: -      Recent Labs     20  1647 20  1935 20  0653 20  1115   POCGLU 112* 243* 85 143*         General appearance - alert, well appearing, and in no acute distress  Mental status - oriented to person, place, and time with normal affect  Head - normocephalic and atraumatic  Eyes - pupils equal and reactive, extraocular eye movements intact, conjunctiva clear  Ears - hearing appears to be intact  Nose - no drainage noted  Mouth - mucous membranes moist  Neck - supple, no carotid bruits, thyroid not palpable  Chest - clear to auscultation, normal effort  Heart - normal rate, regular rhythm, no murmur  Abdomen - soft, nontender, nondistended, bowel sounds present all four quadrants, no masses, hepatomegaly or splenomegaly  Neurological - normal speech, no focal findings or movement disorder noted, cranial nerves II through XII grossly intact  Extremities -1+ peripheral edema  Skin - no gross lesions, rashes, or induration noted        Medications: Allergies:    Allergies   Allergen Reactions    Codeine      \"feeling of heavy on chest\"    Dye [Iodides]      Hallucination,vomiting       Current Meds:   Current Facility-Administered Medications:     miconazole (MICOTIN) 2 % powder, , Topical, BID, Eileen Ireland MD    cefTRIAXone (ROCEPHIN) 1 g IVPB in 50 mL D5W minibag, 1 g, Intravenous, Q24H, Eileen Ireland MD, Stopped at 20    insulin lispro (HUMALOG) injection vial 0-12 Units, 0-12 Units, Subcutaneous, TID WC, Kell Fry MD, 2 Units at 08/30/20 1221    insulin lispro (HUMALOG) injection vial 0-6 Units, 0-6 Units, Subcutaneous, Nightly, Kell Fry MD, 2 Units at 08/31/20 2147    glucose (GLUTOSE) 40 % oral gel 15 g, 15 g, Oral, PRN, Kell Fry MD    dextrose 50 % IV solution, 12.5 g, Intravenous, PRN, Kell Fry MD    glucagon (rDNA) injection 1 mg, 1 mg, Intramuscular, PRN, Kell Fry MD    dextrose 5 % solution, 100 mL/hr, Intravenous, PRN, Kell Fry MD    benzonatate (TESSALON) capsule 100 mg, 100 mg, Oral, TID PRN, Kell Fry MD, 100 mg at 08/28/20 0158    ipratropium-albuterol (DUONEB) nebulizer solution 1 ampule, 1 ampule, Inhalation, Q4H PRN, Kell Fry MD    clonazePAM [facility]) tablet 1 mg, 1 mg, Oral, TID PRN, Kell Fry MD    gabapentin (NEURONTIN) capsule 400 mg, 400 mg, Oral, TID, Kell Fry MD, 400 mg at 09/01/20 7001    glimepiride (AMARYL) tablet 1 mg, 1 mg, Oral, Dinner, Kell Fry MD, 1 mg at 08/31/20 1839    traZODone (DESYREL) tablet 300 mg, 300 mg, Oral, Nightly, Kell Fry MD, 300 mg at 08/31/20 2145    venlafaxine (EFFEXOR XR) extended release capsule 150 mg, 150 mg, Oral, BID, Kell Fry MD, 150 mg at 09/01/20 2333    aspirin EC tablet 81 mg, 81 mg, Oral, Daily, Kell Fry MD, 81 mg at 09/01/20 0920    docusate sodium (COLACE) capsule 100 mg, 100 mg, Oral, BID PRN, Kell Fry MD    glimepiride (AMARYL) tablet 2 mg, 2 mg, Oral, QAM AC, Kell Fry MD, 2 mg at 09/01/20 0920    metFORMIN (GLUCOPHAGE) tablet 500 mg, 500 mg, Oral, BID, Kell Fry MD, 500 mg at 09/01/20 2657    metoprolol tartrate (LOPRESSOR) tablet 37.5 mg, 37.5 mg, Oral, BID, Kell Fry MD, 37.5 mg at 09/01/20 0921    budesonide-formoterol (SYMBICORT) 160-4.5 MCG/ACT inhaler 2 puff, 2 puff, Inhalation, BID, Kell Fry MD, 2 puff at 09/01/20 0941    oxyCODONE-acetaminophen (PERCOCET) 5-325 MG per tablet 1 tablet, 1 tablet, Oral, 4 times per day, Odin Jovel MD, 1 tablet at 09/01/20 0644    sodium chloride flush 0.9 % injection 10 mL, 10 mL, Intravenous, 2 times per day, Odin Jovel MD, 10 mL at 09/01/20 0929    sodium chloride flush 0.9 % injection 10 mL, 10 mL, Intravenous, PRN, Odin Jovel MD, 10 mL at 08/30/20 1737    acetaminophen (TYLENOL) tablet 650 mg, 650 mg, Oral, Q6H PRN **OR** acetaminophen (TYLENOL) suppository 650 mg, 650 mg, Rectal, Q6H PRN, Odin Jovel MD    promethazine (PHENERGAN) tablet 12.5 mg, 12.5 mg, Oral, Q6H PRN, 12.5 mg at 08/30/20 1633 **OR** ondansetron (ZOFRAN) injection 4 mg, 4 mg, Intravenous, Q6H PRN, Odin Jovel MD, 4 mg at 08/31/20 1625    nicotine (NICODERM CQ) 21 MG/24HR 1 patch, 1 patch, Transdermal, Daily PRN, Odin Jovel MD    enoxaparin (LOVENOX) injection 40 mg, 40 mg, Subcutaneous, Daily, Odin Jovel MD, 40 mg at 09/01/20 1766      I/O (24Hr):     Intake/Output Summary (Last 24 hours) at 9/1/2020 1203  Last data filed at 9/1/2020 1038  Gross per 24 hour   Intake 1380 ml   Output --   Net 1380 ml       Data:           Labs:    Hematology:  Recent Labs     08/30/20  0610 08/31/20  0607 09/01/20  0523   WBC 9.9 10.7 11.2   RBC 5.10 5.62* 4.99   HGB 11.1* 12.2 11.0*   HCT 37.9 41.4 37.1   MCV 74.3* 73.7* 74.3*   MCH 21.8* 21.7* 22.0*   MCHC 29.3 29.5 29.6   RDW 19.4* 20.3* 19.4*    421 374   MPV 10.0 9.4 9.4     Chemistry:  Recent Labs     08/30/20  0610 08/31/20  0607 09/01/20  0523    142 136   K 3.9 3.6* 4.6    103 100   CO2 26 29 28   GLUCOSE 85 99 84   BUN 10 9 16   CREATININE 0.71 0.68 0.81   ANIONGAP 12 10 8*   LABGLOM >60 >60 >60   GFRAA >60 >60 >60   CALCIUM 8.9 9.2 8.7     Recent Labs     08/31/20  0705 08/31/20  1113 08/31/20  1647 08/31/20  1935 09/01/20  0653 09/01/20  1115   POCGLU 97 106* 112* 243* 85 143*       Lab Results   Component Value Date/Time    SPECIAL NOT REPORTED 08/31/2020 03:31 PM    SPECIAL NOT REPORTED 08/31/2020 03:31 PM    SPECIAL NOT REPORTED 08/31/2020 03:31 PM    SPECIAL NOT REPORTED 08/31/2020 03:31 PM    SPECIAL NOT REPORTED 08/31/2020 03:31 PM     Lab Results   Component Value Date/Time    CULTURE DUPLICATE ORDER 86/38/0272 03:31 PM    CULTURE PENDING 08/31/2020 03:31 PM    CULTURE NORMAL RESPIRATORY OLIVA LIGHT GROWTH 08/31/2020 03:31 PM    CULTURE DUPLICATE ORDER 96/87/2689 03:31 PM       Lab Results   Component Value Date    POCPH 7.36 04/12/2017    PHART 7.42 08/26/2012    PH 7.22 04/11/2017    POCPCO2 45 04/12/2017    BAH6SSG 41 08/26/2012    PCO2 54 04/11/2017    POCPO2 93 04/12/2017    PO2ART 59 08/26/2012    PO2 89 04/11/2017    POCHCO3 25.3 04/12/2017    UTI3GVP 26.1 08/26/2012    HCO3 22.2 04/11/2017    NBEA NOT REPORTED 04/12/2017    PBEA 0 04/12/2017    IYV3VTJ 27 04/12/2017    SDJO3TWU 97 04/12/2017    U9UFVZBX 92.1 08/26/2012    O2SAT 95 04/11/2017    FIO2 UNKNOWN 10/31/2017       Radiology:    Xr Lumbar Spine (2-3 Views)    Result Date: 8/27/2020  No acute osseous abnormality. Multilevel mild degenerative disc disease, not significantly changed since 2017. Ct Chest Wo Contrast    Result Date: 8/27/2020  Approximate 7.2 cm spiculated mass of the right upper lobe likely with invasion of the mediastinum, adjacent lymphangitic spread and suspected metastatic mediastinal lymphadenopathy. Xr Chest Portable    Result Date: 8/27/2020  Extensive poorly defined nodular opacity medial right upper lung with possible associated mediastinal involvement. Findings suggest underlying mass with associated infiltrate.   Correlative CT chest suggested for further assessment         All radiological studies reviewed  Code Status:  Full Code        Electronically signed by Alex Mcnulty MD on 9/1/2020 at 12:03 PM    This note was created with the assistance of a speech-recognition program.  Although the intention is to generate a document that actually reflects the content of the visit, no guarantees can be provided that every mistake has been identified and corrected by editing. Note was updated later by me after  physical examination and  completion of the assessment.

## 2020-09-01 NOTE — PROGRESS NOTES
Physical Therapy  DATE: 2020    NAME: Nadira Hummel  MRN: [id number]   : 1950    Patient not seen this date for Physical Therapy due to:  [] Blood transfusion in progress  [] Cancel by RN  [] Hemodialysis  [x]  Refusal by Patient   [] Spine Precautions   [] Strict Bedrest  [] Surgery  [] Testing      [] Other        [] PT being discontinued at this time. Patient independent. No further needs. [] PT being discontinued at this time as the patient has been transferred to hospice care. No further needs.     Edi Vidales, PT

## 2020-09-01 NOTE — CONSULTS
[facility]  Initial Consult Note  Date: 9/1/2020    Hospital day :1     Impression:   1. Right upper lobe spiculated mass with invasion of the mediastinum and mediastinal lymphadenopathy concerning for malignancy  2. Chronic respiratory failure on home O2  3. Coagulase-negative staph bacteremia likely a contaminant  4. Actinomyces Odontolyticus bacteremia unclear if this is a pathogen or not  5. E. coli/Aerococcus urinary tract infection-asymptomatic bacteriuria    Recommendations   · I will switch the antimicrobial therapy to ampicillin which should cover the actinomyces, E. coli, and Aerococcus  · I will plan on a 2-week course of therapy given the actinomyces that was in the blood presuming that this is likely related to the malignancy  · The patient is status post biopsy and awaiting the pathology  · The care was discussed with the patient and all of her questions were answered    Chief complaint/reason for consultation:   Sepsis    History of Present Illness:   Yoana Clemente is a 72y.o.-year-old female who was initially admitted on 8/27/2020. Zulma has a history of diabetes mellitus, coronary artery disease, abdominal aortic aneurysm, essential hypertension, central lobar emphysema with 50-pack-year smoking history, chronic hypoxic and hypercapnic respiratory failure on home oxygen, depression/anxiety, diastolic heart failure and she was brought into the emergency room after multiple falls at home. On evaluation the patient was found to have bilateral breast rash, sacral pressure ulcer. Imaging of the chest showed extensively poorly defined nodular opacity in the right middle upper lobe with possible mediastinal involvement and a CT scan was suggested for further evaluation.   A CT scan of the chest was done that showed an approximate 7.2 cm spiculated mass in the right upper lobe with likely invasion of the mediastinum and adjacent lymphangitic spread as well as suspected metastatic mediastinal lymphadenopathy was noted. The patient was admitted and seen in consultation by the pulmonary team who consulted interventional radiology to evaluate for biopsy of the right upper lobe mass and they felt that a endoscopic approach was safer. A bronchoscopy was done 8/31/2020 with bronchoalveolar lavage as well as endobronchial biopsies. The patient was started on Rocephin for a urinary tract infection. An echocardiogram was done that showed ejection fraction 65% with moderate aortic stenosis and grade 1 diastolic dysfunction. Blood cultures done on admission had 1 out of 2 sets with coagulase-negative staph/actinomyces ordontolyticus. The patient reports some intermittent subjective fevers but no significant change in her respiratory parameters in terms of cough or shortness of breath, no abdominal pain nausea vomiting or diarrhea. She does report urinary incontinence but no dysuria frequency or urgency. No flank pain. I have personally reviewed the past medical history, past surgical history, medications, social history, and family history, and I have updated the database accordingly. Past Medical History:     Past Medical History:   Diagnosis Date    AAA (abdominal aortic aneurysm) ([facility] [address]     Pt denies having a history of AAA    Acid reflux     Anxiety and depression     Aortic stenosis     Arthritis     Blister of ankle, right 10/13/2016    blister broke open & draining, is on antibiotics    CAD (coronary artery disease)     COPD (chronic obstructive pulmonary disease) (HCC)     Diabetes mellitus ([address]     Falls     Heart block     bifasicular    Hypokalemia     MDRO (multiple drug resistant organisms) resistance 10/17/2014    E.  Coli urine    MRSA (methicillin resistant staph aureus) culture positive resolved 12/2016    2 negative nasal screens - 2016 (hx in urine 2014)    On home oxygen therapy     uses 2 liters at night    On home oxygen therapy     patient states 2 liters/nasal cannula continuous    Overactive bladder     patient incont.  wears a brief    Pneumonia     PONV (postoperative nausea and vomiting)     Vitamin D deficiency      Past Surgical  History:     Past Surgical History:   Procedure Laterality Date    AORTIC VALVE REPAIR N/A 4/11/2017    AORTIC VALVE REPAIR REPLACEMENT performed by Layne Madera MD at [address] N/A 8/31/2020    BRONCHOSCOPY BIOPSY BRONCHUS performed by Qasim Fabian MD at [address] [facility], COLON, DIAGNOSTIC  12/08/2016    EYE SURGERY      HYSTERECTOMY      LUMBAR LAMINECTOMY      TONSILLECTOMY       Medications:      furosemide  40 mg Oral Daily    miconazole   Topical BID    cefTRIAXone (ROCEPHIN) IV  1 g Intravenous Q24H    insulin lispro  0-12 Units Subcutaneous TID WC    insulin lispro  0-6 Units Subcutaneous Nightly    gabapentin  400 mg Oral TID    glimepiride  1 mg Oral Dinner    traZODone  300 mg Oral Nightly    venlafaxine  150 mg Oral BID    aspirin EC  81 mg Oral Daily    glimepiride  2 mg Oral QAM AC    metFORMIN  500 mg Oral BID    metoprolol tartrate  37.5 mg Oral BID    budesonide-formoterol  2 puff Inhalation BID    oxyCODONE-acetaminophen  1 tablet Oral 4 times per day    sodium chloride flush  10 mL Intravenous 2 times per day    enoxaparin  40 mg Subcutaneous Daily     Social History:     Social History     Socioeconomic History    Marital status: Single     Spouse name: Not on file    Number of children: Not on file    Years of education: Not on file    Highest education level: Not on file   Occupational History    Not on file   Social Needs    Financial resource strain: Not on file    Food insecurity     Worry: Not on file     Inability: Not on file    Transportation needs     Medical: Not on file     Non-medical: Not on file   Tobacco Use    Smoking status: Former Smoker     Last attempt to quit: 2016     Years since quittin.0    Smokeless tobacco: Never Used   Substance and Sexual Activity    Alcohol use: No    Drug use: No    Sexual activity: Not on file   Lifestyle    Physical activity     Days per week: Not on file     Minutes per session: Not on file    Stress: Not on file   Relationships    Social connections     Talks on phone: Not on file     Gets together: Not on file     Attends [employer] service: Not on file     Active member of club or organization: Not on file     Attends meetings of clubs or organizations: Not on file     Relationship status: Not on file    Intimate partner violence     Fear of current or ex partner: Not on file     Emotionally abused: Not on file     Physically abused: Not on file     Forced sexual activity: Not on file   Other Topics Concern    Not on file   Social History Narrative    Not on file     Family History:     Family History   Problem Relation Age of Onset    Cancer Mother     Cancer Father       Allergies:   Codeine and Dye [iodides]     Review of Systems:   General: No fevers or chills. Eyes: No double vision or blurry vision. ENT: No sore throat or runny nose. Cardiovascular: No chest pain or palpitations. Lung: Chronic shortness of breath with no acute changes is on home oxygen  Abdomen: No nausea, vomiting, diarrhea, or abdominal pain. Genitourinary: No increased urinary frequency, dysuria, flank pain but has urinary incontinence  Musculoskeletal: No muscle aches or pains. Hematologic: No bleeding or bruising. Neurologic: No headache, weakness, numbness, or tingling.     Physical Examination :   BP (!) 124/58   Pulse 83   Temp 98.1 °F (36.7 °C) (Oral)   Resp 16   Ht 5' 8.5\" (1.74 m)   Wt 142 lb 9.6 oz (64.7 kg)   SpO2 92%   BMI 21.37 kg/m²     Temperature Range: Temp: 98.1 °F (36.7 °C) Temp  Av.8 °F (36.6 °C)  Min: 97.3 °F (36.3 °C)  Max: 98.1 °F (36.7 °C)  General Appearance: Awake, alert, and in no apparent distress  Head: Normocephalic, without obvious abnormality, atraumatic  Eyes: Pupils equal, round, reactive, to light and accommodation; extraocular movements intact; sclera anicteric; conjunctivae pink  ENT: Oropharynx clear, without erythema, exudate, or thrush. Neck: Supple, without lymphadenopathy. Pulmonary/Chest: Clear to auscultation, without wheezes, rales, or rhonchi  Cardiovascular: Regular rate and rhythm without murmurs, rubs, or gallops. Abdomen: soft, non-tender, non-distended, normal bowel sounds, no masses or organomegaly and obese abdomen  Extremities: No cyanosis, clubbing, edema, or effusions. Neurologic: The patient does have some weakness and has not ambulated here and has refused to work with physical therapy  Skin: Warm and dry with no rash. Medical Decision Making:   I have independently reviewed/ordered the following labs:  CBC with Differential:   Recent Labs     08/31/20 0607 09/01/20  0523   WBC 10.7 11.2   HGB 12.2 11.0*   HCT 41.4 37.1    374     BMP:   Recent Labs     08/31/20 0607 09/01/20  0523    136   K 3.6* 4.6    100   CO2 29 28   BUN 9 16   CREATININE 0.68 0.81   MG  --  1.9     Hepatic Function Panel: No results for input(s): PROT, LABALBU, BILIDIR, IBILI, BILITOT, ALKPHOS, ALT, AST in the last 72 hours. Lab Results   Component Value Date    PROCAL 0.06 09/01/2020       No results found for: CRP  No results found for: FERRITIN  No results found for: FIBRINOGEN  Lab Results   Component Value Date    DDIMER 0.58 12/28/2018    DDIMER 0.45 12/23/2013     No results found for: LDH    No results found for: Sima Abner    Lab Results   Component Value Date    COVID19 Not Detected 08/29/2020     No results found for requested labs within last 30 days. Imaging Studies:   THREE XRAY VIEWS OF THE LUMBAR SPINE 8/27/2020 2:13 pm  FINDINGS:   No acute osseous abnormality. Multilevel mild degenerative disc disease, not significantly changed since 2017. CT OF THE CHEST WITHOUT CONTRAST 8/27/2020 3:35 pm  FINDINGS:   Approximate 7.2 cm spiculated mass of the right upper lobe likely with invasion of the mediastinum, adjacent lymphangitic spread and suspected metastatic mediastinal lymphadenopathy. ONE XRAY VIEW OF THE CHEST 8/27/2020 2:13 pm   FINDINGS:   Extensive poorly defined nodular opacity medial right upper lung with possible associated mediastinal involvement. Findings suggest underlying mass with associated infiltrate. Correlative CT chest suggested for further assessment       Cultures:     Culture, Respiratory [[id number]]  (Abnormal)  Collected: 08/31/20 1531    Order Status: Completed  Specimen: Body Fluid from Lung  Updated: 09/01/20 1154     Specimen Description  . LUNG     Special Requests  NOT REPORTED     Direct Exam  FEW NEUTROPHILSAbnormal        NO BACTERIA SEEN     Culture  NORMAL RESPIRATORY OLIVA LIGHT GROWTH      Culture, Blood 1 [[id number]]   Collected: 08/27/20 1645    Order Status: Completed  Specimen: Blood  Updated: 09/01/20 1019     Specimen Description  . BLOOD     Special Requests  LTAC,10CC     Culture  NO GROWTH 5 DAYS      Culture, Blood 1 [[id number]]  (Abnormal)  Collected: 08/27/20 1620    Order Status: Completed  Specimen: Blood  Updated: 09/01/20 0800     Specimen Description  . BLOOD     Special Requests  RT AC,10CC     Culture  POSITIVE Blood Culture Results called to and read back by: ELLIE ERICKSON AT 2150 ON 8/28/20Abnormal        DIRECT GRAM STAIN FROM BOTTLE: GRAM POSITIVE COCCI IN CLUSTERS AND GRAM POSITIVE RODS      Coagulase negative Staphylococcus species detected by PCR, mecA gene detected (Methicillin Resistant Organism)Abnormal        STAPHYLOCOCCUS SPECIES, COAGULASE NEGATIVE A single positive blood culture of coagulase negative Staphylocci, diptheroids, micrococci, Cutibacterium, viridans Streptocci, Bacillus, or Lactobacillus species should be interpreted with caution and viewed as a likely skin contaminant. Abnormal        ACTINOMYCES ODONTOLYTICUSAbnormal         Culture with Smear, Acid Fast Bacillius [[id number]]   Collected: 08/31/20 1531    Order Status: Completed  Specimen: Body Fluid from Lung  Updated: 09/01/20 1018     Specimen Description  . LUNG     Special Requests  NOT REPORTED     Direct Exam  NO ACID FAST BACILLI SEEN (CONCENTRATED SMEAR)     Culture  PENDING    Fungal stain [[id number]]   Collected: 08/31/20 1531    Order Status: Completed  Specimen: Body Fluid from Lung  Updated: 09/01/20 1018     Specimen Description  . LUNG     Special Requests  NOT REPORTED     Direct Exam  NO FUNGAL ELEMENTS SEEN      Culture, Urine [[id number]]  (Abnormal)   Collected: 08/27/20 1502    Order Status: Completed  Specimen: Urine  Updated: 08/29/20 1404     Specimen Description  . URINE     Special Requests  NOT REPORTED     Culture  ESCHERICHIA COLI >020877 CFU/MLAbnormal        AEROCOCCUS URINAE >280286 CFU/ML There are no CLSI interpretive guidelines for routine susceptibility testing.  Aerococcus species are reported to be susceptible to penicillin.  A. urinae has also been described as susceptible to amoxicillin and nitrofurantoin (for treatment of urinary tract infections only). Abnormal     Escherichia coli (1)     Antibiotic  Interpretation  MUNIR  Status     amikacin    Final      NOT REPORTED    ampicillin  Sensitive   Final      4   SUSCEPTIBLE    ampicillin-sulbactam    Final      NOT REPORTED    aztreonam  Sensitive   Final      <=1   SUSCEPTIBLE    ceFAZolin  Sensitive   Final      <=4   SUSCEPTIBLE    ceFAZolin  Sensitive  Cefazolin sensitivity results can be used to predict the effectiveness of oral cephalosporins (eg.  Cephalexin) in uncomplicated Urinary Tract Infections due to E. coli, K. pneumoniae, and P. mirabilis  Final     cefepime    Final      NOT REPORTED    cefTRIAXone  Sensitive   Final      <=1   SUSCEPTIBLE    ciprofloxacin  Sensitive   Final      <=0.25   SUSCEPTIBLE    ertapenem Final      NOT REPORTED    Confirmatory Extended Spectrum Beta-Lactamase  Negative  NEGATIVE  Final     gentamicin  Sensitive   Final      <=1   SUSCEPTIBLE    meropenem    Final      NOT REPORTED    nitrofurantoin  Sensitive   Final      <=16   SUSCEPTIBLE    tigecycline    Final      NOT REPORTED    tobramycin  Sensitive   Final      <=1   SUSCEPTIBLE    trimethoprim-sulfamethoxazole  Sensitive   Final      <=20   SUSCEPTIBLE    piperacillin-tazobactam  Sensitive   Final      <=4   SUSCEPTIBLE            Thank you for allowing us to participate in the care of this patient. Please call with questions. Electronically signed by Lawyer Mary MD on 9/1/2020 at 4:09 PM      [facility]  Lawyer Mary ARNOLD  [employer]  OFFICE: [phone]      This note is created with the assistance of a speech recognition program.  While intending to generate a document that actually reflects the content of the visit, the document can still have some errors including those of syntax and sound a like substitutions which may escape proof reading. In such instances, actual meaning can be extrapolated by contextual diversion.

## 2020-09-01 NOTE — PROGRESS NOTES
Pulmonary Critical Care Progress Note       Patient seen for the follow up of lung mass, COPD/emphysema, chronic hypoxic/hypercarbic respiratory failure    Subjective:    She is on oxygen 2 L nasal cannula. She has borderline oxygen saturation. She denies shortness of breath. She has tolerated oral intake. She has occasional dry cough. No chest pain. She is not ambulating much. Examination:  Vitals: BP (!) 124/58   Pulse 83   Temp 98.1 °F (36.7 °C) (Oral)   Resp 16   Ht 5' 8.5\" (1.74 m)   Wt 142 lb 9.6 oz (64.7 kg)   SpO2 92%   BMI 21.37 kg/m²   General appearance: alert and cooperative with exam  Neck: No JVD  Lungs: Decreased breath sounds right upper lobe no wheezing  Heart: regular rate and rhythm, S1, S2 normal, no gallop  Abdomen: Soft, non tender, + BS  Extremities: no cyanosis or clubbing.  No significant edema    LABs:  CBC:   Recent Labs     08/31/20  0607 09/01/20  0523   WBC 10.7 11.2   HGB 12.2 11.0*   HCT 41.4 37.1    374     BMP:   Recent Labs     08/31/20  0607 09/01/20  0523    136   K 3.6* 4.6   CO2 29 28   BUN 9 16   CREATININE 0.68 0.81   LABGLOM >60 >60   GLUCOSE 99 84     ABG:  Lab Results   Component Value Date    PHART 7.42 08/26/2012    PH 7.22 04/11/2017    UYO2TIS 41 08/26/2012    PCO2 54 04/11/2017    PO2ART 59 08/26/2012    PO2 89 04/11/2017    UPM6WVZ 26.1 08/26/2012    HCO3 22.2 04/11/2017    SKJ0JRU 27 04/12/2017    P3OYUYLM 92.1 08/26/2012    O2SAT 95 04/11/2017    FIO2 UNKNOWN 10/31/2017       Lab Results   Component Value Date    POCPH 7.36 04/12/2017    PHART 7.42 08/26/2012    PH 7.22 04/11/2017    POCPCO2 45 04/12/2017    PHT1IFF 41 08/26/2012    PCO2 54 04/11/2017    POCPO2 93 04/12/2017    PO2ART 59 08/26/2012    PO2 89 04/11/2017    POCHCO3 25.3 04/12/2017    XGS9JAK 26.1 08/26/2012    HCO3 22.2 04/11/2017    NBEA NOT REPORTED 04/12/2017    PBEA 0 04/12/2017    TZP0PDN 27 04/12/2017    RIHX6KWC 97 04/12/2017    C8NGLVSK 92.1 08/26/2012    O2SAT 95 04/11/2017    FIO2 UNKNOWN 10/31/2017     Radiology:    CT chest 8/27/2020  Approximate 7.2 cm spiculated mass of the right upper lobe likely with    invasion of the mediastinum, adjacent lymphangitic spread and suspected    metastatic mediastinal lymphadenopathy. Impression:  · 7.2 cm spiculated mass right upper lobe with invasion of mediastinum, concerning for malignancy  · Mediastinal lymphadenopathy  · 5 mm left upper lobe nodule, stable since 2016  · Centrilobular emphysema/50-pack-year smoking history  · Chronic hypoxic/hypercarbic respiratory failure, on home oxygen  · Frequent falls  · Anxiety/depression, DM, diastolic heart failure s/p AVR 2017    Recommendations:  · Oxygen by nasal cannula   · Incentive spirometry every hour while awake  · DuoNeb by nebulizer  · Check bronchoscopy/biopsy results  · Oncology consult  · Continue Rocephin for UTI   · Symbicort 160   · Diet as tolerated  · Physical therapy  · Discussed with RN  · Transfer to telemetry  · EPC for DVT prophylaxis for now.           Jasmin Ramirez MD on 9/1/2020 at 3:34 PM  Pulmonary Critical Care and Sleep Medicine,  [facility] AT [place]: [phone]

## 2020-09-01 NOTE — PLAN OF CARE
Problem: Skin Integrity:  Goal: Will show no infection signs and symptoms  Description: Will show no infection signs and symptoms  9/1/2020 1036 by Red Khan RN  Outcome: Ongoing  9/1/2020 0108 by Radha Gray RN  Outcome: Ongoing  Note: Checked for incontinence every 2 hours and prn. Pericare as needed. Assisted to reposition off back frequently. On waffle mattress. Heels off bed with pillows. Goal: Absence of new skin breakdown  Description: Absence of new skin breakdown  9/1/2020 1036 by Red Khan RN  Outcome: Ongoing  9/1/2020 0108 by Radha Gray RN  Outcome: Ongoing     Problem: Falls - Risk of:  Goal: Will remain free from falls  Description: Will remain free from falls  9/1/2020 1036 by Red Khan RN  Outcome: Ongoing  9/1/2020 0108 by Radha Gray RN  Outcome: Ongoing  Note: Siderails up x 2  Hourly rounding  Call light in reach  Instructed to call for assist before attempting out of bed. Remains free from falls and accidental injury at this time   Floor free from obstacles  Bed is locked and in lowest position  Adequate lighting provided  Bed alarm on, Red [place]alling star and Stay with Me signs posted  Goal: Absence of physical injury  Description: Absence of physical injury  9/1/2020 1036 by Red Khan RN  Outcome: Ongoing  9/1/2020 0108 by Radha Gray RN  Outcome: Ongoing     Problem: Pain:  Goal: Pain level will decrease  Description: Pain level will decrease  9/1/2020 1036 by Red Khan RN  Outcome: Ongoing  9/1/2020 0108 by Radha Gray RN  Outcome: Ongoing  Note: Pain level assessment complete.    Patient educated on pain scale and control interventions  Scheduled pain medication given -Perococet  Patient instructed to call out with new onset of pain or unrelieved pain    Goal: Control of acute pain  Description: Control of acute pain  9/1/2020 0108 by Radha Gray RN  Outcome: Ongoing  Goal: Control of chronic pain  Description: Control of chronic pain  9/1/2020 1036 by Kerwin Lira RN  Outcome: Ongoing  9/1/2020 0108 by Karin Vallejo RN  Outcome: Ongoing     Problem: ABCDS Injury Assessment  Goal: Absence of physical injury  9/1/2020 1036 by Kerwin Lira RN  Outcome: Ongoing  9/1/2020 0108 by Karin Vallejo RN  Outcome: Ongoing

## 2020-09-01 NOTE — PROGRESS NOTES
Occupational Therapy  DATE: 2020    NAME: Luli Howell  MRN: [id number]   : 1950  [facility]  Occupational Therapy Not Seen Note    Patient not available for Occupational Therapy due to:    [] Testing:    [] Hemodialysis    [] Cancelled by RN:    [x]Refusal by Patient:Patient refused therapy, writer attempted to educate about the benefits of getting up, but patient still refusing.     [] Surgery:     [] Intubation:     [] Pain Medication:    [] Sedation:     [] Spine Precautions :    [] Medical Instability:    [] Other:    HARSHA Andres/JARROD

## 2020-09-02 LAB
ANION GAP SERPL CALCULATED.3IONS-SCNC: 9 MMOL/L (ref 9–17)
BNP INTERPRETATION: ABNORMAL
BUN BLDV-MCNC: 19 MG/DL (ref 8–23)
BUN/CREAT BLD: 22 (ref 9–20)
CALCIUM SERPL-MCNC: 8.5 MG/DL (ref 8.6–10.4)
CHLORIDE BLD-SCNC: 102 MMOL/L (ref 98–107)
CO2: 28 MMOL/L (ref 20–31)
CREAT SERPL-MCNC: 0.85 MG/DL (ref 0.5–0.9)
CULTURE: NORMAL
GFR AFRICAN AMERICAN: >60 ML/MIN
GFR NON-AFRICAN AMERICAN: >60 ML/MIN
GFR SERPL CREATININE-BSD FRML MDRD: ABNORMAL ML/MIN/{1.73_M2}
GFR SERPL CREATININE-BSD FRML MDRD: ABNORMAL ML/MIN/{1.73_M2}
GLUCOSE BLD-MCNC: 103 MG/DL (ref 65–105)
GLUCOSE BLD-MCNC: 111 MG/DL (ref 65–105)
GLUCOSE BLD-MCNC: 175 MG/DL (ref 65–105)
GLUCOSE BLD-MCNC: 50 MG/DL (ref 70–99)
GLUCOSE BLD-MCNC: 72 MG/DL (ref 65–105)
HCT VFR BLD CALC: 34.7 % (ref 36.3–47.1)
HEMOGLOBIN: 10 G/DL (ref 11.9–15.1)
LACTIC ACID: 0.9 MMOL/L (ref 0.5–2.2)
Lab: NORMAL
MCH RBC QN AUTO: 21.7 PG (ref 25.2–33.5)
MCHC RBC AUTO-ENTMCNC: 28.8 G/DL (ref 28.4–34.8)
MCV RBC AUTO: 75.3 FL (ref 82.6–102.9)
NRBC AUTOMATED: 0 PER 100 WBC
PDW BLD-RTO: 19.8 % (ref 11.8–14.4)
PLATELET # BLD: 364 K/UL (ref 138–453)
PMV BLD AUTO: 9.6 FL (ref 8.1–13.5)
POTASSIUM SERPL-SCNC: 4.1 MMOL/L (ref 3.7–5.3)
PRO-BNP: 1350 PG/ML
RBC # BLD: 4.61 M/UL (ref 3.95–5.11)
SODIUM BLD-SCNC: 139 MMOL/L (ref 135–144)
SPECIMEN DESCRIPTION: NORMAL
WBC # BLD: 10.7 K/UL (ref 3.5–11.3)

## 2020-09-02 PROCEDURE — 6360000002 HC RX W HCPCS: Performed by: INTERNAL MEDICINE

## 2020-09-02 PROCEDURE — 2580000003 HC RX 258: Performed by: INTERNAL MEDICINE

## 2020-09-02 PROCEDURE — 6370000000 HC RX 637 (ALT 250 FOR IP): Performed by: HOSPITALIST

## 2020-09-02 PROCEDURE — 83605 ASSAY OF LACTIC ACID: CPT

## 2020-09-02 PROCEDURE — 1200000000 HC SEMI PRIVATE

## 2020-09-02 PROCEDURE — 99232 SBSQ HOSP IP/OBS MODERATE 35: CPT | Performed by: INTERNAL MEDICINE

## 2020-09-02 PROCEDURE — 97116 GAIT TRAINING THERAPY: CPT

## 2020-09-02 PROCEDURE — 6370000000 HC RX 637 (ALT 250 FOR IP): Performed by: INTERNAL MEDICINE

## 2020-09-02 PROCEDURE — 85027 COMPLETE CBC AUTOMATED: CPT

## 2020-09-02 PROCEDURE — 97530 THERAPEUTIC ACTIVITIES: CPT

## 2020-09-02 PROCEDURE — 2500000003 HC RX 250 WO HCPCS: Performed by: INTERNAL MEDICINE

## 2020-09-02 PROCEDURE — 83880 ASSAY OF NATRIURETIC PEPTIDE: CPT

## 2020-09-02 PROCEDURE — 82947 ASSAY GLUCOSE BLOOD QUANT: CPT

## 2020-09-02 PROCEDURE — 2700000000 HC OXYGEN THERAPY PER DAY

## 2020-09-02 PROCEDURE — 80048 BASIC METABOLIC PNL TOTAL CA: CPT

## 2020-09-02 PROCEDURE — 36415 COLL VENOUS BLD VENIPUNCTURE: CPT

## 2020-09-02 PROCEDURE — 94640 AIRWAY INHALATION TREATMENT: CPT

## 2020-09-02 PROCEDURE — 94761 N-INVAS EAR/PLS OXIMETRY MLT: CPT

## 2020-09-02 RX ADMIN — METFORMIN HYDROCHLORIDE 500 MG: 500 TABLET ORAL at 22:24

## 2020-09-02 RX ADMIN — CLONAZEPAM 1 MG: 0.5 TABLET ORAL at 22:24

## 2020-09-02 RX ADMIN — METOPROLOL TARTRATE 37.5 MG: 25 TABLET, FILM COATED ORAL at 10:18

## 2020-09-02 RX ADMIN — SODIUM CHLORIDE, PRESERVATIVE FREE 10 ML: 5 INJECTION INTRAVENOUS at 10:20

## 2020-09-02 RX ADMIN — BUDESONIDE AND FORMOTEROL FUMARATE DIHYDRATE 2 PUFF: 160; 4.5 AEROSOL RESPIRATORY (INHALATION) at 20:29

## 2020-09-02 RX ADMIN — AMPICILLIN SODIUM 2 G: 2 INJECTION, POWDER, FOR SOLUTION INTRAMUSCULAR; INTRAVENOUS at 17:28

## 2020-09-02 RX ADMIN — VENLAFAXINE HYDROCHLORIDE 150 MG: 75 CAPSULE, EXTENDED RELEASE ORAL at 10:18

## 2020-09-02 RX ADMIN — OXYCODONE HYDROCHLORIDE AND ACETAMINOPHEN 1 TABLET: 5; 325 TABLET ORAL at 17:23

## 2020-09-02 RX ADMIN — ASPIRIN 81 MG: 81 TABLET, COATED ORAL at 10:18

## 2020-09-02 RX ADMIN — AMPICILLIN SODIUM 2 G: 2 INJECTION, POWDER, FOR SOLUTION INTRAMUSCULAR; INTRAVENOUS at 06:12

## 2020-09-02 RX ADMIN — BUDESONIDE AND FORMOTEROL FUMARATE DIHYDRATE 2 PUFF: 160; 4.5 AEROSOL RESPIRATORY (INHALATION) at 09:51

## 2020-09-02 RX ADMIN — METFORMIN HYDROCHLORIDE 500 MG: 500 TABLET ORAL at 13:12

## 2020-09-02 RX ADMIN — OXYCODONE HYDROCHLORIDE AND ACETAMINOPHEN 1 TABLET: 5; 325 TABLET ORAL at 00:00

## 2020-09-02 RX ADMIN — SODIUM CHLORIDE, PRESERVATIVE FREE 10 ML: 5 INJECTION INTRAVENOUS at 22:25

## 2020-09-02 RX ADMIN — TRAZODONE HYDROCHLORIDE 300 MG: 100 TABLET ORAL at 22:24

## 2020-09-02 RX ADMIN — OXYCODONE HYDROCHLORIDE AND ACETAMINOPHEN 1 TABLET: 5; 325 TABLET ORAL at 06:11

## 2020-09-02 RX ADMIN — GABAPENTIN 400 MG: 400 CAPSULE ORAL at 10:19

## 2020-09-02 RX ADMIN — ENOXAPARIN SODIUM 40 MG: 40 INJECTION SUBCUTANEOUS at 10:18

## 2020-09-02 RX ADMIN — ANTI-FUNGAL POWDER MICONAZOLE NITRATE TALC FREE: 1.42 POWDER TOPICAL at 22:24

## 2020-09-02 RX ADMIN — VENLAFAXINE HYDROCHLORIDE 150 MG: 75 CAPSULE, EXTENDED RELEASE ORAL at 17:23

## 2020-09-02 RX ADMIN — GLIMEPIRIDE 1 MG: 1 TABLET ORAL at 17:23

## 2020-09-02 RX ADMIN — GABAPENTIN 400 MG: 400 CAPSULE ORAL at 15:09

## 2020-09-02 RX ADMIN — FUROSEMIDE 40 MG: 40 TABLET ORAL at 10:19

## 2020-09-02 RX ADMIN — METOPROLOL TARTRATE 37.5 MG: 25 TABLET, FILM COATED ORAL at 22:24

## 2020-09-02 RX ADMIN — AMPICILLIN SODIUM 2 G: 2 INJECTION, POWDER, FOR SOLUTION INTRAMUSCULAR; INTRAVENOUS at 13:12

## 2020-09-02 RX ADMIN — INSULIN LISPRO 2 UNITS: 100 INJECTION, SOLUTION INTRAVENOUS; SUBCUTANEOUS at 17:24

## 2020-09-02 RX ADMIN — GABAPENTIN 400 MG: 400 CAPSULE ORAL at 22:24

## 2020-09-02 RX ADMIN — ANTI-FUNGAL POWDER MICONAZOLE NITRATE TALC FREE: 1.42 POWDER TOPICAL at 14:26

## 2020-09-02 RX ADMIN — AMPICILLIN SODIUM 2 G: 2 INJECTION, POWDER, FOR SOLUTION INTRAMUSCULAR; INTRAVENOUS at 00:00

## 2020-09-02 RX ADMIN — GLIMEPIRIDE 2 MG: 2 TABLET ORAL at 06:11

## 2020-09-02 ASSESSMENT — ENCOUNTER SYMPTOMS
GASTROINTESTINAL NEGATIVE: 1
RESPIRATORY NEGATIVE: 1

## 2020-09-02 ASSESSMENT — PAIN SCALES - GENERAL
PAINLEVEL_OUTOF10: 8
PAINLEVEL_OUTOF10: 10
PAINLEVEL_OUTOF10: 10

## 2020-09-02 NOTE — PROGRESS NOTES
Occupational Therapy  DATE: 2020    NAME: Caitie Cedeño  MRN: [id number]   : 1950  [facility]  Occupational Therapy Not Seen Note    Patient not available for Occupational Therapy due to:    [] Testing:    [] Hemodialysis    [] Cancelled by RN:    [x]Refusal by Patient: Lisbeth Rocha refusal    [] Surgery:     [] Intubation:     [] Pain Medication:    [] Sedation:     [] Spine Precautions :    [] Medical Instability:    [] Other:    Eilleen Spatz, COTA/L

## 2020-09-02 NOTE — PROGRESS NOTES
mentioned above. Physical Examination:      Vitals:  BP (!) 125/53   Pulse 79   Temp 97.9 °F (36.6 °C) (Oral)   Resp 16   Ht 5' 8.5\" (1.74 m)   Wt 142 lb 9.6 oz (64.7 kg)   SpO2 92%   BMI 21.37 kg/m²   Temp (24hrs), Av.1 °F (36.7 °C), Min:97.9 °F (36.6 °C), Max:98.2 °F (36.8 °C)    Weight:   Wt Readings from Last 3 Encounters:   20 142 lb 9.6 oz (64.7 kg)   19 192 lb 14.4 oz (87.5 kg)   18 199 lb 4.8 oz (90.4 kg)     I/O last 3 completed shifts:  I/O last 3 completed shifts: In: 200 [P.O.:200]  Out: -      Recent Labs     20  1115 20  1657 20  0754   POCGLU 143* 81 128* 72         General appearance - alert, well appearing, and in no acute distress  Mental status - oriented to person, place, and time with normal affect  Head - normocephalic and atraumatic  Eyes - pupils equal and reactive, extraocular eye movements intact, conjunctiva clear  Ears - hearing appears to be intact  Nose - no drainage noted  Mouth - mucous membranes moist  Neck - supple, no carotid bruits, thyroid not palpable  Chest - clear to auscultation, normal effort  Heart - normal rate, regular rhythm, no murmur  Abdomen - soft, nontender, nondistended, bowel sounds present all four quadrants, no masses, hepatomegaly or splenomegaly  Neurological - normal speech, no focal findings or movement disorder noted, cranial nerves II through XII grossly intact  Extremities -1+ peripheral edema  Skin - no gross lesions, rashes, or induration noted        Medications: Allergies:    Allergies   Allergen Reactions    Codeine      \"feeling of heavy on chest\"    Dye [Iodides]      Hallucination,vomiting       Current Meds:   Current Facility-Administered Medications:     furosemide (LASIX) tablet 40 mg, 40 mg, Oral, Daily, Helen Delgadillo MD, 40 mg at 20 1019    magnesium sulfate 1 g in dextrose 5% 100 mL IVPB, 1 g, Intravenous, PRN, Helen Delgadillo MD    potassium chloride (KLOR-CON M) extended release tablet 40 mEq, 40 mEq, Oral, PRN **OR** potassium bicarb-citric acid (EFFER-K) effervescent tablet 40 mEq, 40 mEq, Oral, PRN **OR** potassium chloride 10 mEq/100 mL IVPB (Peripheral Line), 10 mEq, Intravenous, PRN, Jennifer Lin MD    ampicillin 2 g ivpb mini bag, 2 g, Intravenous, 4 times per day, [address] [place] [address], MD, Stopped at 09/02/20 0725    miconazole (MICOTIN) 2 % powder, , Topical, BID, Binh Solorio MD    insulin lispro (HUMALOG) injection vial 0-12 Units, 0-12 Units, Subcutaneous, TID WC, Binh Solorio MD, 2 Units at 09/01/20 1303    insulin lispro (HUMALOG) injection vial 0-6 Units, 0-6 Units, Subcutaneous, Nightly, Binh Solorio MD, 2 Units at 08/31/20 2147    glucose (GLUTOSE) 40 % oral gel 15 g, 15 g, Oral, PRN, Binh Solorio MD    dextrose 50 % IV solution, 12.5 g, Intravenous, PRN, Binh Solorio MD    glucagon (rDNA) injection 1 mg, 1 mg, Intramuscular, PRN, Binh Solorio MD    dextrose 5 % solution, 100 mL/hr, Intravenous, PRN, Binh Solorio MD    benzonatate (TESSALON) capsule 100 mg, 100 mg, Oral, TID PRN, Binh Solorio MD, 100 mg at 08/28/20 0158    ipratropium-albuterol (DUONEB) nebulizer solution 1 ampule, 1 ampule, Inhalation, Q4H PRN, Binh Solorio MD    clonazePAM Gerardo Fitting) tablet 1 mg, 1 mg, Oral, TID PRN, Binh Solorio MD, 1 mg at 09/01/20 2132    gabapentin (NEURONTIN) capsule 400 mg, 400 mg, Oral, TID, Binh Solorio MD, 400 mg at 09/02/20 1019    glimepiride (AMARYL) tablet 1 mg, 1 mg, Oral, Dinner, Binh Solorio MD, 1 mg at 09/01/20 1714    traZODone (DESYREL) tablet 300 mg, 300 mg, Oral, Nightly, Binh Solorio MD, 300 mg at 09/01/20 2126    venlafaxine (EFFEXOR XR) extended release capsule 150 mg, 150 mg, Oral, BID, Binh Solorio MD, 150 mg at 09/02/20 1018    aspirin EC tablet 81 mg, 81 mg, Oral, Daily, Binh Solorio MD, 81 mg at 09/02/20 1018    docusate sodium (COLACE) capsule 100 mg, 100 mg, Oral, BID PRN, Shakira Moralez MD    glimepiride (AMARYL) tablet 2 mg, 2 mg, Oral, QAM AC, Shakira Moralez MD, 2 mg at 09/02/20 4947    metFORMIN (GLUCOPHAGE) tablet 500 mg, 500 mg, Oral, BID, Shakira Moralez MD, 500 mg at 09/01/20 2126    metoprolol tartrate (LOPRESSOR) tablet 37.5 mg, 37.5 mg, Oral, BID, Shakira Moralez MD, 37.5 mg at 09/02/20 1018    budesonide-formoterol (SYMBICORT) 160-4.5 MCG/ACT inhaler 2 puff, 2 puff, Inhalation, BID, Shakira Moralez MD, 2 puff at 09/02/20 0951    oxyCODONE-acetaminophen (PERCOCET) 5-325 MG per tablet 1 tablet, 1 tablet, Oral, 4 times per day, Shakira Moralez MD, 1 tablet at 09/02/20 4306    sodium chloride flush 0.9 % injection 10 mL, 10 mL, Intravenous, 2 times per day, Shakira Moralez MD, 10 mL at 09/02/20 1020    sodium chloride flush 0.9 % injection 10 mL, 10 mL, Intravenous, PRN, Shakira Moralez MD, 10 mL at 08/30/20 1737    acetaminophen (TYLENOL) tablet 650 mg, 650 mg, Oral, Q6H PRN **OR** acetaminophen (TYLENOL) suppository 650 mg, 650 mg, Rectal, Q6H PRN, Shakira Moralez MD    promethazine (PHENERGAN) tablet 12.5 mg, 12.5 mg, Oral, Q6H PRN, 12.5 mg at 08/30/20 1633 **OR** ondansetron (ZOFRAN) injection 4 mg, 4 mg, Intravenous, Q6H PRN, Shakira Moralez MD, 4 mg at 08/31/20 1625    nicotine (NICODERM CQ) 21 MG/24HR 1 patch, 1 patch, Transdermal, Daily PRN, Shakira Moralez MD    enoxaparin (LOVENOX) injection 40 mg, 40 mg, Subcutaneous, Daily, Shakira Moralez MD, 40 mg at 09/02/20 1018      I/O (24Hr):   No intake or output data in the 24 hours ending 09/02/20 1138    Data:           Labs:    Hematology:  Recent Labs     08/31/20  0607 09/01/20  0523 09/02/20  0524   WBC 10.7 11.2 10.7   RBC 5.62* 4.99 4.61   HGB 12.2 11.0* 10.0*   HCT 41.4 37.1 34.7*   MCV 73.7* 74.3* 75.3*   MCH 21.7* 22.0* 21.7*   MCHC 29.5 29.6 28.8   RDW 20.3* 19.4* 19.8*    374 364   MPV 9.4 9.4 9.6     Chemistry:  Recent 8/27/2020  Extensive poorly defined nodular opacity medial right upper lung with possible associated mediastinal involvement. Findings suggest underlying mass with associated infiltrate. Correlative CT chest suggested for further assessment         All radiological studies reviewed  Code Status:  Full Code        Electronically signed by Travis Walden MD on 9/2/2020 at 11:38 AM    This note was created with the assistance of a speech-recognition program.  Although the intention is to generate a document that actually reflects the content of the visit, no guarantees can be provided that every mistake has been identified and corrected by editing. Note was updated later by me after  physical examination and  completion of the assessment.

## 2020-09-02 NOTE — PROGRESS NOTES
Pulmonary Critical Care Progress Note  Javid Solorio CNP / Dr. Ana Glover M.D. Patient seen for the follow up of lung mass, respiratory failure    Subjective:  She is resting in bed with family and RN present. She denies any shortness of breath, she does admit to an occasional cough without significant sputum production. She denies any chest pain. Length of stay: 2 Days    Examination:  Vitals: BP (!) 125/53   Pulse 79   Temp 97.9 °F (36.6 °C) (Oral)   Resp 16   Ht 5' 8.5\" (1.74 m)   Wt 142 lb 9.6 oz (64.7 kg)   SpO2 92%   BMI 21.37 kg/m²     General appearance: alert and cooperative with exam, somewhat confused  Neck: No JVD  Lungs: Moderate air exchange with slight rhonchi  Heart: regular rate and rhythm, S1, S2 normal, no gallop  Abdomen: Soft, non tender, + BS  Extremities: no cyanosis or clubbing.  No significant edema    LABs:  CBC:   Recent Labs     09/01/20 0523 09/02/20  0524   WBC 11.2 10.7   HGB 11.0* 10.0*   HCT 37.1 34.7*    364     BMP:   Recent Labs     09/01/20 0523 09/02/20  0524    139   K 4.6 4.1   CO2 28 28   BUN 16 19   CREATININE 0.81 0.85   LABGLOM >60 >60   GLUCOSE 84 50*     Impression:  · Chronic hypoxic/hypercarbic respiratory failure, on home oxygen  · 7.2 cm spiculated mass right upper lobe with invasion of mediastinum, concerning for malignancy  · Mediastinal lymphadenopathy  · 5 mm left upper lobe nodule, stable since 2016  · Centrilobular emphysema/50-pack-year smoking history  · Coagulase-negative staph bacteremia likely a contaminant  · E. coli/Aerococcus urinary tract infection-asymptomatic bacteriuria  · Frequent falls  · Diastolic heart failure s/p [id number]    Recommendations:  · Oxygen by nasal cannula  · Continue antibiotics per ID   · Albuterol and Ipratropium Q 4 hours and prn  · Symbicort 160  · Await biopsy results  · Oncology on consult  · PT/OT  · Labs: CBC and BMP in am  · DVT prophylaxis with low molecular weight heparin  · Incentive spirometry every hour while awake  · Discussed with RN  · Will follow with you    Electronically signed by     AV Connor CNP on 9/2/2020 at 12:49 PM  Patient was seen under the supervision of Dr. Percy Wang and Sleep Medicine,    [facility] AT [place]: [phone]

## 2020-09-02 NOTE — PROGRESS NOTES
[facility]  Progress Note    Charity Collazo  MRN: [id number]  Date: 9/2/2020  LOS: 2     Reason for F/U :   Actinomyces bacteremia    Impression :   1. Right upper lobe spiculated mass with invasion of the mediastinum and mediastinal lymphadenopathy concerning for malignancy  2. Chronic respiratory failure on home O2  3. Coagulase-negative staph bacteremia likely a contaminant  4. Actinomyces Odontolyticus bacteremia unclear if this is a pathogen or not  5. E. coli/Aerococcus urinary tract infection-asymptomatic bacteriuria    Recommendations:   · Continue intravenous antimicrobial therapy with ampicillin and the plan is to complete a 2-week course of therapy. · The patient is still awaiting the surgical pathology from the biopsy. · The source of the actinomyces is not entirely clear  · Clinically the patient is not toxic appearing    Infection Control Recommendations:   Universal precautions    Discharge Planning:   Estimated Length of IV antimicrobials: 14 days through 9/14/2020  Patient will need Midline Catheter Insertion/ PICC line Insertion: No  Patient will need: Home IV , [place],  SNF,  LTAC: Undetermined  Patient willneed outpatient wound care: No    Medical Decision making / Summary of Stay:   Charity Collazo is a 72y.o.-year-old female who was initially admitted on 8/27/2020. Fatemeh Backer has a history of diabetes mellitus, coronary artery disease, abdominal aortic aneurysm, essential hypertension, central lobar emphysema with 50-pack-year smoking history, chronic hypoxic and hypercapnic respiratory failure on home oxygen, depression/anxiety, diastolic heart failure and she was brought into the emergency room after multiple falls at home. On evaluation the patient was found to have bilateral breast rash, sacral pressure ulcer.   Imaging of the chest showed extensively poorly defined nodular opacity in the right middle upper lobe with possible mediastinal involvement and a CT scan was suggested for further evaluation. A CT scan of the chest was done that showed an approximate 7.2 cm spiculated mass in the right upper lobe with likely invasion of the mediastinum and adjacent lymphangitic spread as well as suspected metastatic mediastinal lymphadenopathy was noted. The patient was admitted and seen in consultation by the pulmonary team who consulted interventional radiology to evaluate for biopsy of the right upper lobe mass and they felt that a endoscopic approach was safer. A bronchoscopy was done 2020 with bronchoalveolar lavage as well as endobronchial biopsies. The patient was started on Rocephin for a urinary tract infection. An echocardiogram was done that showed ejection fraction 65% with moderate aortic stenosis and grade 1 diastolic dysfunction. Blood cultures done on admission had 1 out of 2 sets with coagulase-negative staph/actinomyces ordontolyticus. Current evaluation:2020    BP (!) 125/53   Pulse 79   Temp 97.9 °F (36.6 °C) (Oral)   Resp 16   Ht 5' 8.5\" (1.74 m)   Wt 142 lb 9.6 oz (64.7 kg)   SpO2 92%   BMI 21.37 kg/m²     Temperature Range: Temp: 97.9 °F (36.6 °C) Temp  Av.1 °F (36.7 °C)  Min: 97.9 °F (36.6 °C)  Max: 98.2 °F (36.8 °C)  The patient is seen and evaluated at bedside she is awake and alert in no acute distress. She was sleeping at the time of my evaluation and I was able to arouse her. She reports he did not sleep very well overnight. She does not report any subjective fevers or chills. No cough or shortness of breath. She did agree to being discharged to extended care facility. Review of Systems   Constitutional: Negative. HENT: Negative. Respiratory: Negative. Cardiovascular: Negative. Gastrointestinal: Negative. Genitourinary: Negative. Musculoskeletal: Negative. Skin: Negative. Neurological: Negative. Psychiatric/Behavioral: Negative.         Physical Examination :     Physical Exam  Constitutional:       Appearance: She is well-developed. HENT:      Head: Normocephalic and atraumatic. Mouth/Throat:      Mouth: Mucous membranes are moist.   Eyes:      Pupils: Pupils are equal, round, and reactive to light. Neck:      Musculoskeletal: Normal range of motion and neck supple. Cardiovascular:      Rate and Rhythm: Regular rhythm. Heart sounds: Normal heart sounds. Pulmonary:      Effort: Pulmonary effort is normal.      Breath sounds: Normal breath sounds. Abdominal:      General: Bowel sounds are normal.      Palpations: Abdomen is soft. Skin:     General: Skin is warm and dry. Neurological:      Mental Status: She is alert and oriented to person, place, and time. Laboratory data:   I have independently reviewed the followinglabs:  CBC with Differential:   Recent Labs     09/01/20 0523 09/02/20 0524   WBC 11.2 10.7   HGB 11.0* 10.0*   HCT 37.1 34.7*    364     BMP:   Recent Labs     09/01/20 0523 09/02/20 0524    139   K 4.6 4.1    102   CO2 28 28   BUN 16 19   CREATININE 0.81 0.85   MG 1.9  --      Hepatic Function Panel: No results for input(s): PROT, LABALBU, BILIDIR, IBILI, BILITOT, ALKPHOS, ALT, AST in the last 72 hours. Lab Results   Component Value Date    PROCAL 0.06 09/01/2020     No results found for: CRP  No results found for: SEDRATE      Lab Results   Component Value Date    DDIMER 0.58 12/28/2018    DDIMER 0.45 12/23/2013     No results found for: FERRITIN  No results found for: LDH  No results found for: FIBRINOGEN    Results in Past 30 Days  Result Component Current Result Ref Range Previous Result Ref Range   SARS-CoV-2, PCR      (8/29/2020)  Not in Time Range     Not Detected (8/29/2020) Not Detected           (8/29/2020)        Lab Results   Component Value Date    COVID19 Not Detected 08/29/2020       No results for input(s): VANCOTROUGH in the last 72 hours.     Imaging Studies:   No new imaging    Cultures: Culture, Virus, Non Respiratory [[id number]]   Collected: 08/31/20 1531    Order Status: Completed  Specimen: Tissue from Bronchial Biopsy  Updated: 09/02/20 1432     Specimen Description  . BRONCHIAL BIOPSY     Special Requests  NOT REPORTED     Culture  NEGATIVE.  No Herpes Simplex Virus detected by Enzyme Linked Virus Inducible System culture. at day 1      NEGATIVE for Influenza A and B, Para influenza 1,2,3, Adenovirus and RSV. at day 1      Negative results do not preclude respiratory virus infection and should not be used as the sole basis for diagnosis, treatment or other management decisions. NEGATIVE for Cytomegalovirus at day 1    Culture, Blood 1 [[id number]]   Collected: 08/27/20 1645    Order Status: Completed  Specimen: Blood  Updated: 09/02/20 0907     Specimen Description  . BLOOD     Special Requests  LTAC,10CC     Culture  NO GROWTH 6 DAYS    Culture, Respiratory [[id number]]  (Abnormal)  Collected: 08/31/20 1531    Order Status: Completed  Specimen: Body Fluid from Lung  Updated: 09/02/20 0827     Specimen Description  . LUNG     Special Requests  NOT REPORTED     Direct Exam  FEW NEUTROPHILSAbnormal        NO BACTERIA SEEN     Culture  NORMAL RESPIRATORY OLIVA LIGHT GROWTH    Culture with Smear, Acid Fast Bacillius [[id number]]   Collected: 08/31/20 1531    Order Status: Completed  Specimen: Body Fluid from Lung  Updated: 09/01/20 1018     Specimen Description  . LUNG     Special Requests  NOT REPORTED     Direct Exam  NO ACID FAST BACILLI SEEN (CONCENTRATED SMEAR)     Culture  PENDING    Fungal stain [[id number]]   Collected: 08/31/20 1531    Order Status: Completed  Specimen: Body Fluid from Lung  Updated: 09/01/20 1018     Specimen Description  . LUNG     Special Requests  NOT REPORTED     Direct Exam  NO FUNGAL ELEMENTS SEEN    Culture, Blood 1 [[id number]]  (Abnormal)  Collected: 08/27/20 1620    Order Status: Completed  Specimen: Blood  Updated: 09/01/20 0800     Specimen Description Rimafrandy Oliveros BLOOD     Special Requests  RT AC,10CC     Culture  POSITIVE Blood Culture Results called to and read back by: ELLIE ERICKSON AT 2150 ON 8/28/20Abnormal        DIRECT GRAM STAIN FROM BOTTLE: GRAM POSITIVE COCCI IN CLUSTERS AND GRAM POSITIVE RODS      Coagulase negative Staphylococcus species detected by PCR, mecA gene detected (Methicillin Resistant Organism)Abnormal        STAPHYLOCOCCUS SPECIES, COAGULASE NEGATIVE A single positive blood culture of coagulase negative Staphylocci, diptheroids, micrococci, Cutibacterium, viridans Streptocci, Bacillus, or Lactobacillus species should be interpreted with caution and viewed as a likely skin contaminant. Abnormal        ACTINOMYCES ODONTOLYTICUSAbnormal      Culture, Fungus [[id number]]   Collected: 08/31/20 1531    Order Status: Sent  Specimen: Body Fluid from Lung  Updated: 08/31/20 2228          Medications:      furosemide  40 mg Oral Daily    ampicillin IV  2 g Intravenous 4 times per day    miconazole   Topical BID    insulin lispro  0-12 Units Subcutaneous TID WC    insulin lispro  0-6 Units Subcutaneous Nightly    gabapentin  400 mg Oral TID    glimepiride  1 mg Oral Dinner    traZODone  300 mg Oral Nightly    venlafaxine  150 mg Oral BID    aspirin EC  81 mg Oral Daily    glimepiride  2 mg Oral QAM AC    metFORMIN  500 mg Oral BID    metoprolol tartrate  37.5 mg Oral BID    budesonide-formoterol  2 puff Inhalation BID    oxyCODONE-acetaminophen  1 tablet Oral 4 times per day    sodium chloride flush  10 mL Intravenous 2 times per day    enoxaparin  40 mg Subcutaneous Daily           [facility]  Torri Servin MD  [employer]  OFFICE: [phone]      Electronically signed by Torri Servin MD on 9/2/2020 at 3:41 PM  Thank you for allowing us to participate in the care of this patient. Please call with questions.     This note iscreated with the assistance of a speech recognition program.  While intending to generate a document that actually reflects the content of the visit, the document can still have some errors including those of syntax andsound a like substitutions which may escape proof reading. In such instances, actual meaning can be extrapolated by contextual diversion.

## 2020-09-02 NOTE — PROGRESS NOTES
Physical Therapy  Facility/Department: [facility] MED SURG  Daily Treatment Note  NAME: Florina Tavarez  : 1950  MRN: [id number]    Date of Service: 2020    Discharge Recommendations:  Subacute/Skilled Nursing Facility        Assessment   Body structures, Functions, Activity limitations: Decreased functional mobility ; Decreased strength;Decreased endurance;Decreased balance  Assessment: Recommend SNF as patient not able to ambulate functional distances. Patient will benefit from skilled PT to improve gait, transfers, and balance. Prognosis: Good  Decision Making: Medium Complexity  PT Education: Goals;PT Role;Plan of Care  Patient Education: Attempted to explain beenfits of PT and need for PT eval for SNF placement. Patient not receptive to education. REQUIRES PT FOLLOW UP: Yes  Activity Tolerance  Activity Tolerance: Patient limited by endurance     Patient Diagnosis(es): The primary encounter diagnosis was Lung mass. A diagnosis of Acute cystitis without hematuria was also pertinent to this visit. has a past medical history of AAA (abdominal aortic aneurysm) (HCC), Acid reflux, Anxiety and depression, Aortic stenosis, Arthritis, Blister of ankle, right, CAD (coronary artery disease), COPD (chronic obstructive pulmonary disease) ([address] Diabetes mellitus ([facility] [address] Falls, Heart block, Hypokalemia, MDRO (multiple drug resistant organisms) resistance, MRSA (methicillin resistant staph aureus) culture positive, On home oxygen therapy, On home oxygen therapy, Overactive bladder, Pneumonia, PONV (postoperative nausea and vomiting), and Vitamin D deficiency. has a past surgical history that includes back surgery; eye surgery; Cholecystectomy; Appendectomy; Hysterectomy; Tonsillectomy; lumbar laminectomy; Endoscopy, colon, diagnostic (2016); aortic valve repair (N/A, 2017); and bronchoscopy (N/A, 2020).     Restrictions  Restrictions/Precautions  Restrictions/Precautions: General Precautions, Fall Risk  Required Braces or Orthoses?: No  Position Activity Restriction  Other position/activity restrictions: 02  Subjective   General  Chart Reviewed: Yes  Response To Previous Treatment: Not applicable  Family / Caregiver Present: Yes(Son)  Subjective  Subjective: Patient alert. Patient only answering a few questions, mostly ignoring therapist's questions. Son present, did report social work was just in and patient agreed to SNF, but not happy about it. General Comment  Comments: RN reports patient medically appropriate for PT. Therapist and discharge planner went in to talk with patient together as patient has been firmly refusing PT and has not been OOB for a few days. Orientation     Cognition      Objective   Bed mobility  Rolling to Left: Modified independent  Rolling to Right: Modified independent  Supine to Sit: Stand by assistance  Sit to Supine: Stand by assistance  Transfers  Sit to Stand: Contact guard assistance(RW)  Stand to sit: Contact guard assistance(RW)  Lateral Transfers: Contact guard assistance(RW)  Comment: Patient required multiple attempts to stand, increased time and effort, but did not want therapist to touch her. Ambulation  Ambulation?: Yes  Ambulation 1  Surface: level tile  Device: Rolling Walker  Other Apparatus: O2  Assistance: Contact guard assistance  Quality of Gait: gait unsteady  Gait Deviations: Slow Gwendolyn; Increased TIFFANY; Decreased step length;Decreased step height  Distance: 3 feet forward and back  Comments: Patient did not want therapist to touch her     Balance  Sitting - Static: Good  Sitting - Dynamic: Good  Standing - Static: Fair;+(RW)  Standing - Dynamic: Fair(RW)        G-Code     OutComes Score  Balance Score: 3 (09/02/20 1549)  Gait Score: 5 (09/02/20 1549)        Tinetti Total Score: 8 (09/02/20 1549)                                      AM-PAC Score  AM-PAC Inpatient Mobility Raw Score : 15 (09/02/20 1549)  AM-PAC Inpatient T-Scale Score : 39.45 (09/02/20 1549)  Mobility Inpatient CMS 0-100% Score: 57.7 (09/02/20 1549)  Mobility Inpatient CMS G-Code Modifier : CK (09/02/20 1549)          Goals  Short term goals  Time Frame for Short term goals: 8 treatments  Short term goal 1: Independent bed mobility/transfers  Short term goal 2:  Independent ambulation w/ RW 75' x 1  Short term goal 3: Good standing balance and posture  Short term goal 4: Tolerate 30 min ther act  Short term goal 5: 1/2 to 1 grade strength increase  Patient Goals   Patient goals : Home    Plan    Plan  Times per week: 1-2x/day,6-7 days/week  Current Treatment Recommendations: Strengthening, Transfer Training, Endurance Training, Gait Training, Functional Mobility Training, Balance Training  Safety Devices  Type of devices: Call light within reach, Gait belt, Nurse notified, All fall risk precautions in place, Left in bed, Bed alarm in place  Restraints  Initially in place: No     Therapy Time   Individual Concurrent Group Co-treatment   Time In 1521         Time Out 1547         Minutes 69 Abrahan Corcoran, PT

## 2020-09-03 LAB
ANION GAP SERPL CALCULATED.3IONS-SCNC: 8 MMOL/L (ref 9–17)
BNP INTERPRETATION: ABNORMAL
BUN BLDV-MCNC: 17 MG/DL (ref 8–23)
BUN/CREAT BLD: 21 (ref 9–20)
CALCIUM SERPL-MCNC: 9 MG/DL (ref 8.6–10.4)
CHLORIDE BLD-SCNC: 101 MMOL/L (ref 98–107)
CO2: 33 MMOL/L (ref 20–31)
CREAT SERPL-MCNC: 0.81 MG/DL (ref 0.5–0.9)
GFR AFRICAN AMERICAN: >60 ML/MIN
GFR NON-AFRICAN AMERICAN: >60 ML/MIN
GFR SERPL CREATININE-BSD FRML MDRD: ABNORMAL ML/MIN/{1.73_M2}
GFR SERPL CREATININE-BSD FRML MDRD: ABNORMAL ML/MIN/{1.73_M2}
GLUCOSE BLD-MCNC: 132 MG/DL (ref 65–105)
GLUCOSE BLD-MCNC: 136 MG/DL (ref 65–105)
GLUCOSE BLD-MCNC: 196 MG/DL (ref 65–105)
GLUCOSE BLD-MCNC: 33 MG/DL (ref 65–105)
GLUCOSE BLD-MCNC: 57 MG/DL (ref 65–105)
GLUCOSE BLD-MCNC: 77 MG/DL (ref 65–105)
GLUCOSE BLD-MCNC: 83 MG/DL (ref 70–99)
HCT VFR BLD CALC: 39.6 % (ref 36.3–47.1)
HEMOGLOBIN: 11.3 G/DL (ref 11.9–15.1)
LACTIC ACID: 1.6 MMOL/L (ref 0.5–2.2)
MCH RBC QN AUTO: 21.9 PG (ref 25.2–33.5)
MCHC RBC AUTO-ENTMCNC: 28.5 G/DL (ref 28.4–34.8)
MCV RBC AUTO: 76.9 FL (ref 82.6–102.9)
NRBC AUTOMATED: 0 PER 100 WBC
PDW BLD-RTO: 20.3 % (ref 11.8–14.4)
PLATELET # BLD: 367 K/UL (ref 138–453)
PMV BLD AUTO: 9.4 FL (ref 8.1–13.5)
POTASSIUM SERPL-SCNC: 4.1 MMOL/L (ref 3.7–5.3)
PRO-BNP: 962 PG/ML
RBC # BLD: 5.15 M/UL (ref 3.95–5.11)
SODIUM BLD-SCNC: 142 MMOL/L (ref 135–144)
SURGICAL PATHOLOGY REPORT: NORMAL
SURGICAL PATHOLOGY REPORT: NORMAL
WBC # BLD: 9.9 K/UL (ref 3.5–11.3)

## 2020-09-03 PROCEDURE — 1200000000 HC SEMI PRIVATE

## 2020-09-03 PROCEDURE — 6360000002 HC RX W HCPCS: Performed by: INTERNAL MEDICINE

## 2020-09-03 PROCEDURE — 6370000000 HC RX 637 (ALT 250 FOR IP): Performed by: HOSPITALIST

## 2020-09-03 PROCEDURE — 2700000000 HC OXYGEN THERAPY PER DAY

## 2020-09-03 PROCEDURE — 97116 GAIT TRAINING THERAPY: CPT

## 2020-09-03 PROCEDURE — 36415 COLL VENOUS BLD VENIPUNCTURE: CPT

## 2020-09-03 PROCEDURE — 83880 ASSAY OF NATRIURETIC PEPTIDE: CPT

## 2020-09-03 PROCEDURE — 85027 COMPLETE CBC AUTOMATED: CPT

## 2020-09-03 PROCEDURE — 2580000003 HC RX 258: Performed by: INTERNAL MEDICINE

## 2020-09-03 PROCEDURE — 6370000000 HC RX 637 (ALT 250 FOR IP): Performed by: INTERNAL MEDICINE

## 2020-09-03 PROCEDURE — 80048 BASIC METABOLIC PNL TOTAL CA: CPT

## 2020-09-03 PROCEDURE — 99232 SBSQ HOSP IP/OBS MODERATE 35: CPT | Performed by: INTERNAL MEDICINE

## 2020-09-03 PROCEDURE — 83605 ASSAY OF LACTIC ACID: CPT

## 2020-09-03 PROCEDURE — 94761 N-INVAS EAR/PLS OXIMETRY MLT: CPT

## 2020-09-03 PROCEDURE — 82947 ASSAY GLUCOSE BLOOD QUANT: CPT

## 2020-09-03 PROCEDURE — 99223 1ST HOSP IP/OBS HIGH 75: CPT | Performed by: INTERNAL MEDICINE

## 2020-09-03 PROCEDURE — 94640 AIRWAY INHALATION TREATMENT: CPT

## 2020-09-03 RX ADMIN — FUROSEMIDE 40 MG: 40 TABLET ORAL at 09:14

## 2020-09-03 RX ADMIN — AMPICILLIN SODIUM 2 G: 2 INJECTION, POWDER, FOR SOLUTION INTRAMUSCULAR; INTRAVENOUS at 06:35

## 2020-09-03 RX ADMIN — METOPROLOL TARTRATE 37.5 MG: 25 TABLET, FILM COATED ORAL at 09:14

## 2020-09-03 RX ADMIN — METFORMIN HYDROCHLORIDE 500 MG: 500 TABLET ORAL at 21:18

## 2020-09-03 RX ADMIN — INSULIN LISPRO 2 UNITS: 100 INJECTION, SOLUTION INTRAVENOUS; SUBCUTANEOUS at 12:29

## 2020-09-03 RX ADMIN — VENLAFAXINE HYDROCHLORIDE 150 MG: 75 CAPSULE, EXTENDED RELEASE ORAL at 18:10

## 2020-09-03 RX ADMIN — VENLAFAXINE HYDROCHLORIDE 150 MG: 75 CAPSULE, EXTENDED RELEASE ORAL at 09:14

## 2020-09-03 RX ADMIN — GABAPENTIN 400 MG: 400 CAPSULE ORAL at 21:18

## 2020-09-03 RX ADMIN — METOPROLOL TARTRATE 37.5 MG: 25 TABLET, FILM COATED ORAL at 21:18

## 2020-09-03 RX ADMIN — GABAPENTIN 400 MG: 400 CAPSULE ORAL at 15:00

## 2020-09-03 RX ADMIN — OXYCODONE HYDROCHLORIDE AND ACETAMINOPHEN 1 TABLET: 5; 325 TABLET ORAL at 12:29

## 2020-09-03 RX ADMIN — ASPIRIN 81 MG: 81 TABLET, COATED ORAL at 09:15

## 2020-09-03 RX ADMIN — ENOXAPARIN SODIUM 40 MG: 40 INJECTION SUBCUTANEOUS at 09:15

## 2020-09-03 RX ADMIN — BUDESONIDE AND FORMOTEROL FUMARATE DIHYDRATE 2 PUFF: 160; 4.5 AEROSOL RESPIRATORY (INHALATION) at 19:48

## 2020-09-03 RX ADMIN — AMPICILLIN SODIUM 2 G: 2 INJECTION, POWDER, FOR SOLUTION INTRAMUSCULAR; INTRAVENOUS at 12:28

## 2020-09-03 RX ADMIN — ANTI-FUNGAL POWDER MICONAZOLE NITRATE TALC FREE: 1.42 POWDER TOPICAL at 19:52

## 2020-09-03 RX ADMIN — TRAZODONE HYDROCHLORIDE 300 MG: 100 TABLET ORAL at 21:18

## 2020-09-03 RX ADMIN — AMPICILLIN SODIUM 2 G: 2 INJECTION, POWDER, FOR SOLUTION INTRAMUSCULAR; INTRAVENOUS at 00:42

## 2020-09-03 RX ADMIN — ANTI-FUNGAL POWDER MICONAZOLE NITRATE TALC FREE: 1.42 POWDER TOPICAL at 09:15

## 2020-09-03 RX ADMIN — AMPICILLIN SODIUM 2 G: 2 INJECTION, POWDER, FOR SOLUTION INTRAMUSCULAR; INTRAVENOUS at 18:10

## 2020-09-03 RX ADMIN — SODIUM CHLORIDE, PRESERVATIVE FREE 10 ML: 5 INJECTION INTRAVENOUS at 09:15

## 2020-09-03 RX ADMIN — OXYCODONE HYDROCHLORIDE AND ACETAMINOPHEN 1 TABLET: 5; 325 TABLET ORAL at 23:45

## 2020-09-03 RX ADMIN — CLONAZEPAM 1 MG: 0.5 TABLET ORAL at 19:52

## 2020-09-03 RX ADMIN — OXYCODONE HYDROCHLORIDE AND ACETAMINOPHEN 1 TABLET: 5; 325 TABLET ORAL at 18:10

## 2020-09-03 RX ADMIN — GABAPENTIN 400 MG: 400 CAPSULE ORAL at 09:15

## 2020-09-03 RX ADMIN — AMPICILLIN SODIUM 2 G: 2 INJECTION, POWDER, FOR SOLUTION INTRAMUSCULAR; INTRAVENOUS at 23:46

## 2020-09-03 RX ADMIN — OXYCODONE HYDROCHLORIDE AND ACETAMINOPHEN 1 TABLET: 5; 325 TABLET ORAL at 06:36

## 2020-09-03 RX ADMIN — METFORMIN HYDROCHLORIDE 500 MG: 500 TABLET ORAL at 09:15

## 2020-09-03 RX ADMIN — GLIMEPIRIDE 1 MG: 1 TABLET ORAL at 18:10

## 2020-09-03 RX ADMIN — BUDESONIDE AND FORMOTEROL FUMARATE DIHYDRATE 2 PUFF: 160; 4.5 AEROSOL RESPIRATORY (INHALATION) at 09:51

## 2020-09-03 ASSESSMENT — PAIN DESCRIPTION - ONSET
ONSET: ON-GOING

## 2020-09-03 ASSESSMENT — PAIN SCALES - GENERAL
PAINLEVEL_OUTOF10: 10
PAINLEVEL_OUTOF10: 6
PAINLEVEL_OUTOF10: 10
PAINLEVEL_OUTOF10: 10
PAINLEVEL_OUTOF10: 4

## 2020-09-03 ASSESSMENT — PAIN DESCRIPTION - ORIENTATION
ORIENTATION: LOWER

## 2020-09-03 ASSESSMENT — PAIN DESCRIPTION - PROGRESSION
CLINICAL_PROGRESSION: NOT CHANGED
CLINICAL_PROGRESSION: NOT CHANGED

## 2020-09-03 ASSESSMENT — PAIN DESCRIPTION - FREQUENCY
FREQUENCY: CONTINUOUS

## 2020-09-03 ASSESSMENT — PAIN DESCRIPTION - DESCRIPTORS
DESCRIPTORS: ACHING;SHARP
DESCRIPTORS: ACHING
DESCRIPTORS: ACHING;SHARP

## 2020-09-03 ASSESSMENT — PAIN DESCRIPTION - PAIN TYPE
TYPE: CHRONIC PAIN

## 2020-09-03 ASSESSMENT — PAIN - FUNCTIONAL ASSESSMENT
PAIN_FUNCTIONAL_ASSESSMENT: PREVENTS OR INTERFERES WITH MANY ACTIVE NOT PASSIVE ACTIVITIES
PAIN_FUNCTIONAL_ASSESSMENT: PREVENTS OR INTERFERES WITH MANY ACTIVE NOT PASSIVE ACTIVITIES

## 2020-09-03 ASSESSMENT — PAIN DESCRIPTION - LOCATION
LOCATION: BACK

## 2020-09-03 ASSESSMENT — ENCOUNTER SYMPTOMS
RESPIRATORY NEGATIVE: 1
GASTROINTESTINAL NEGATIVE: 1

## 2020-09-03 NOTE — PLAN OF CARE
Problem: Skin Integrity:  Goal: Will show no infection signs and symptoms  Description: Will show no infection signs and symptoms  9/3/2020 0455 by Rex Keita RN  Outcome: Ongoing  9/2/2020 1530 by Brenton Schmitt RN  Outcome: Ongoing  Note: Skin assessment performed. Patient turns self PRN. Pressure ulcer prevention being practiced. Waffle mattress to the bed, using barrier cream to the buttocks,encouraging activity out of the bed and did in the afternoon sit at the side of the bed with PT  Goal: Absence of new skin breakdown  Description: Absence of new skin breakdown  9/3/2020 0455 by Rex Keita RN  Outcome: Ongoing  9/2/2020 1530 by Brenton Schmitt RN  Outcome: Ongoing     Problem: Falls - Risk of:  Goal: Will remain free from falls  Description: Will remain free from falls  9/3/2020 0455 by Rex Keita RN  Outcome: Ongoing  9/2/2020 1530 by Brenton Schmitt RN  Outcome: Ongoing  Note: Patient is a fall risk during this admission. Fall risk assessment was performed. Patient is absent of falls. Bed is in the lowest position. Wheels on the bed are locked. Call light and bed side table are within reach. Clutter is removed. Patient was educated to call out when needing assistance or wanting to get out of bed. Patient offered toileting assistance during rounding. Hourly rounds have been performed. Goal: Absence of physical injury  Description: Absence of physical injury  Outcome: Ongoing     Problem: Pain:  Goal: Pain level will decrease  Description: Pain level will decrease  9/3/2020 0455 by Rex Keita RN  Outcome: Ongoing  9/2/2020 1530 by Brenton Schmitt RN  Outcome: Ongoing  Note: Pain level assessment complete.    Patient educated on pain scale and control interventions  PRN pain medication given per patient request  Patient instructed to call out with new onset of pain or unrelieved pain , pt taking pain meds ATC every 6 hours, pt more lethargic this AM and needed to be repeatedly called by name and then would fall asleep while talking to this nurse, difficult to assess, even after being awakened and falling asleep would still rate pain as 10  Goal: Control of acute pain  Description: Control of acute pain  Outcome: Ongoing  Goal: Control of chronic pain  Description: Control of chronic pain  Outcome: Ongoing     Problem: ABCDS Injury Assessment  Goal: Absence of physical injury  Outcome: Ongoing

## 2020-09-03 NOTE — PROGRESS NOTES
Pulmonary Critical Care Progress Note  Paulina Primrose, YEN / Isidro Cheng M.D. Patient seen for the follow up of lung mass, respiratory failure    Subjective:  She is transferring from the chair to the bed. RN and brother at bedside. She denies any shortness of breath, she does admit to an occasional cough without significant sputum production. She denies any chest pain. Examination:  Vitals: BP (!) 107/46   Pulse 74   Temp 97.3 °F (36.3 °C) (Oral)   Resp 18   Ht 5' 8.5\" (1.74 m)   Wt 142 lb 8 oz (64.6 kg)   SpO2 91%   BMI 21.35 kg/m²     General appearance: alert and cooperative with exam, in no distress  Neck: No JVD  Lungs: Moderate air exchange with occasional rhonchi  Heart: regular rate and rhythm, S1, S2 normal, no gallop  Abdomen: Soft, non tender, + BS  Extremities: no cyanosis or clubbing.  No significant edema    LABs:  CBC:   Recent Labs     09/02/20  0524 09/03/20  0647   WBC 10.7 9.9   HGB 10.0* 11.3*   HCT 34.7* 39.6    367     BMP:   Recent Labs     09/02/20  0524 09/03/20  0647    142   K 4.1 4.1   CO2 28 33*   BUN 19 17   CREATININE 0.85 0.81   LABGLOM >60 >60   GLUCOSE 50* 83     Impression:  · Chronic hypoxic/hypercarbic respiratory failure, on home oxygen  · 7.2 cm spiculated mass right upper lobe with invasion of mediastinum, concerning for malignancy  · Mediastinal lymphadenopathy  · 5 mm left upper lobe nodule, stable since 2016  · Centrilobular emphysema/50-pack-year smoking history  · Coagulase-negative staph bacteremia likely a contaminant  · E. coli/Aerococcus urinary tract infection-asymptomatic bacteriuria  · Frequent falls  · Diastolic heart failure s/p JSU 5652  · Actinomyces Odontolyticus bacteremia   · Bronch washings positive for squamous cell carcinoma    Recommendations:  · Oxygen by nasal cannula  · Continue antibiotics per ID, ampicillin  · Albuterol and Ipratropium Q 4 hours and prn  · Symbicort 160  · Consult oncology  · PT/OT  · DVT prophylaxis with low molecular weight heparin  · Incentive spirometry every hour while awake  · Discussed with patient and her brother  · Discussed with RN  · Will follow with you    Electronically signed by     AV Napier CNP on 9/3/2020 at 10:49 AM  Patient was seen under the supervision of Dr. Melissa Johnson and Sleep Medicine,  [facility] AT [place]: [phone]

## 2020-09-03 NOTE — PROGRESS NOTES
Patient's blood sugar was 33. Patient awake and alert. Patient given orange juice, patient ate some of her personal chocolate.   Will recheck blood sugar

## 2020-09-03 NOTE — CONSULTS
Today's Date: 9/3/2020  Patient Name: Quynh Truong  Date of admission: 8/27/2020  1:16 PM  Patient's age: 72 y.o., 1950  Admission Dx: Lung mass [R91.8]  Lung mass [R91.8]    Reason for Consult: new diagnosis of squamous cell carcinoma   Requesting Physician: Gerardo Newsome MD    CHIEF COMPLAINT:    Chief Complaint   Patient presents with    Fall    Back Pain    Cough    Rash     History Obtained From: pt and chart    HISTORY OF PRESENT ILLNESS:      Bev Tom is a 67-year-old female with a past medical history of COPD, history of tobacco abuse, depression, CAD, arthritis was admitted with multiple falls at home. She complains of back pain and also chronic cough. On admission she had a CT scan of the chest which showed 7.2 cm spiculated mass in the right upper lobe with mediastinal lymphadenopathy suspicious for malignancy. 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 Oncology consulted for further evaluation. Patient has COPD and uses Home oxygen at night and sues walker at home. She smokes more than a PPD. 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 Past Medical History:   has a past medical history of AAA (abdominal aortic aneurysm) ([facility] [address] Acid reflux, Anxiety and depression, Aortic stenosis, Arthritis, Blister of ankle, right, CAD (coronary artery disease), COPD (chronic obstructive pulmonary disease) ([address] Diabetes mellitus ([address] Falls, Heart block, Hypokalemia, MDRO (multiple drug resistant organisms) resistance, MRSA (methicillin resistant staph aureus) culture positive, On home oxygen therapy, On home oxygen therapy, Overactive bladder, Pneumonia, PONV (postoperative nausea and vomiting), and Vitamin D deficiency. Past Surgical History:   has a past surgical history that includes back surgery; eye surgery; Cholecystectomy; Appendectomy; Hysterectomy;  Tonsillectomy; lumbar laminectomy; Endoscopy, colon, diagnostic (12/08/2016); aortic valve repair (N/A, 4/11/2017); and bronchoscopy (N/A, 8/31/2020). Medications:    Prior to Admission medications    Medication Sig Start Date End Date Taking? Authorizing Provider   clonazePAM (KLONOPIN) 1 MG tablet Take 1 mg by mouth 3 times daily. Yes Historical Provider, MD   gabapentin (NEURONTIN) 400 MG capsule Take 400 mg by mouth nightly. Yes Historical Provider, MD   glimepiride (AMARYL) 1 MG tablet Take 1 mg by mouth Daily with supper In addition to 2 tablets (=2mg) every morning    Yes Historical Provider, MD   metoprolol tartrate (LOPRESSOR) 25 MG tablet Take 25 mg by mouth 2 times daily   Yes Historical Provider, MD   venlafaxine (EFFEXOR XR) 150 MG extended release capsule Take 150 mg by mouth 2 times daily   Yes Historical Provider, MD   oxyCODONE-acetaminophen (PERCOCET) 5-325 MG per tablet Take 1 tablet by mouth 4 times daily as needed for Pain. Yes Historical Provider, MD   traZODone (DESYREL) 150 MG tablet Take 300 mg by mouth nightly Take 2 tablets (=300mg) nightly   Yes Historical Provider, MD   lansoprazole (PREVACID) 30 MG delayed release capsule Take 30 mg by mouth daily 11/10/16  Yes Historical Provider, MD   metFORMIN (GLUCOPHAGE) 500 MG tablet Take 500 mg by mouth 2 times daily 12/7/16  Yes Historical Provider, MD   aspirin EC 81 MG EC tablet Take 81 mg by mouth daily   Yes Historical Provider, MD   gabapentin (NEURONTIN) 400 MG capsule Take 400 mg by mouth 2 times daily.  And 1 capsules nightly   Yes Historical Provider, MD   glimepiride (AMARYL) 1 MG tablet Take 2 mg by mouth every morning In addition to 1mg nightly   Yes Historical Provider, MD   mometasone-formoterol (DULERA) 200-5 MCG/ACT inhaler Inhale 2 puffs into the lungs every 12 hours    Historical Provider, MD     Current Facility-Administered Medications   Medication Dose Route Frequency Provider Last Rate Last Dose    furosemide (LASIX) tablet 40 mg  40 mg Oral Daily Jaret Mccallum MD   40 mg at 09/03/20 0914    magnesium sulfate 1 g in dextrose 5% 100 mL IVPB  1 g Intravenous PRN Jaret Mccallum MD        potassium chloride (KLOR-CON M) extended release tablet 40 mEq  40 mEq Oral PRN Jaret Mccallum MD        Or    potassium bicarb-citric acid (EFFER-K) effervescent tablet 40 mEq  40 mEq Oral PRN Jaret Mccallum MD        Or    potassium chloride 10 mEq/100 mL IVPB (Peripheral Line)  10 mEq Intravenous PRN Jaret Mccallum MD        ampicillin 2 g ivpb mini bag  2 g Intravenous 4 times per day Wilmer Freitas MD   Stopped at 09/03/20 1258    miconazole (MICOTIN) 2 % powder   Topical BID Bola Newsome MD        insulin lispro (HUMALOG) injection vial 0-12 Units  0-12 Units Subcutaneous TID WC Bola Newsome MD   2 Units at 09/03/20 1229    insulin lispro (HUMALOG) injection vial 0-6 Units  0-6 Units Subcutaneous Nightly Bola Newsome MD   2 Units at 08/31/20 2147    glucose (GLUTOSE) 40 % oral gel 15 g  15 g Oral PRN Bola Newsome MD        dextrose 50 % IV solution  12.5 g Intravenous PRN Bola Newsome MD        glucagon (rDNA) injection 1 mg  1 mg Intramuscular PRN Bola Newsome MD        dextrose 5 % solution  100 mL/hr Intravenous PRN Bola Newsome MD        benzonatate (TESSALON) capsule 100 mg  100 mg Oral TID PRN Bola Newsome MD   100 mg at 08/28/20 0158    ipratropium-albuterol (DUONEB) nebulizer solution 1 ampule  1 ampule Inhalation Q4H PRN Bola Newsome MD        clonazePAM Cheri Lazara) tablet 1 mg  1 mg Oral TID PRN Bola Newsome MD   1 mg at 09/02/20 2224    gabapentin (NEURONTIN) capsule 400 mg  400 mg Oral TID Bola Newsome MD   400 mg at 09/03/20 1500    glimepiride (AMARYL) tablet 1 mg  1 mg Oral Dinner Bola Newsome MD   1 mg at 09/02/20 1723    traZODone (DESYREL) tablet 300 mg  300 mg Oral Nightly Bola Newsome MD   300 mg at 09/02/20 2224    venlafaxine [facility] XR) extended release capsule 150 mg  150 mg Oral BID Ginny Mcfadden MD   150 mg at 09/03/20 0914    aspirin EC tablet 81 mg  81 mg Oral Daily Ginny Mcfadden MD   81 mg at 09/03/20 0915    docusate sodium (COLACE) capsule 100 mg  100 mg Oral BID PRN Ginny Mcfadden MD        glimepiride (AMARYL) tablet 2 mg  2 mg Oral QAM AC Ginny Mcfadden MD   2 mg at 09/02/20 4781    metFORMIN (GLUCOPHAGE) tablet 500 mg  500 mg Oral BID Ginny Mcfadden MD   500 mg at 09/03/20 0915    metoprolol tartrate (LOPRESSOR) tablet 37.5 mg  37.5 mg Oral BID Ginny Mcfadden MD   37.5 mg at 09/03/20 0914    budesonide-formoterol (SYMBICORT) 160-4.5 MCG/ACT inhaler 2 puff  2 puff Inhalation BID Ginny Mcfadden MD   2 puff at 09/03/20 0951    oxyCODONE-acetaminophen (PERCOCET) 5-325 MG per tablet 1 tablet  1 tablet Oral 4 times per day Ginny Mcfadden MD   1 tablet at 09/03/20 1229    sodium chloride flush 0.9 % injection 10 mL  10 mL Intravenous 2 times per day Ginny Mcfadden MD   10 mL at 09/03/20 0915    sodium chloride flush 0.9 % injection 10 mL  10 mL Intravenous PRN Ginny Mcfadden MD   10 mL at 08/30/20 1737    acetaminophen (TYLENOL) tablet 650 mg  650 mg Oral Q6H PRN Ginny Mcfadden MD        Or   Jamesetta Medal acetaminophen (TYLENOL) suppository 650 mg  650 mg Rectal Q6H PRN Ginny Mcfadden MD        promethazine (PHENERGAN) tablet 12.5 mg  12.5 mg Oral Q6H PRN Ginny Mcfadden MD   12.5 mg at 08/30/20 1633    Or    ondansetron (ZOFRAN) injection 4 mg  4 mg Intravenous Q6H PRN Ginny Mcfadden MD   4 mg at 08/31/20 1625    nicotine (NICODERM CQ) 21 MG/24HR 1 patch  1 patch Transdermal Daily PRN Ginny Mcfadden MD        enoxaparin (LOVENOX) injection 40 mg  40 mg Subcutaneous Daily Ginny Mcfadden MD   40 mg at 09/03/20 0915       Allergies:  Codeine and Dye [iodides]    Social History:   reports that she quit smoking about 4 years ago.  She has never used smokeless tobacco. She reports that she does not drink alcohol or use drugs. Family History: family history includes Cancer in her father and mother. REVIEW OF SYSTEMS:    Constitutional: No fever or chills. No night sweats, no weight loss   Eyes: No eye discharge, double vision, or eye pain   HEENT: negative for sore mouth, sore throat, hoarseness and voice change   Respiratory: negative for cough , sputum, dyspnea, wheezing, hemoptysis, chest pain   Cardiovascular: negative for chest pain, dyspnea, palpitations, orthopnea, PND   Gastrointestinal: negative for nausea, vomiting, diarrhea, constipation, abdominal pain, Dysphagia, hematemesis and hematochezia   Genitourinary: negative for frequency, dysuria, nocturia, urinary incontinence, and hematuria   Integument: negative for rash, skin lesions, bruises.    Hematologic/Lymphatic: negative for easy bruising, bleeding, lymphadenopathy, or petechiae   Endocrine: negative for heat or cold intolerance,weight changes, change in bowel habits and hair loss   Musculoskeletal: negative for myalgias, arthralgias, pain, joint swelling,and bone pain   Neurological: negative for headaches, dizziness, seizures, weakness, numbness    PHYSICAL EXAM:      BP (!) 117/45   Pulse 78   Temp 97.3 °F (36.3 °C) (Oral)   Resp 17   Ht 5' 8.5\" (1.74 m)   Wt 142 lb 8 oz (64.6 kg)   SpO2 100%   BMI 21.35 kg/m²    Temp (24hrs), Av.7 °F (36.5 °C), Min:97.3 °F (36.3 °C), Max:98.1 °F (36.7 °C)    General appearance - well appearing, no in pain or distress   Mental status - alert and cooperative   Eyes - pupils equal and reactive, extraocular eye movements intact   Ears - bilateral TM's and external ear canals normal   Mouth - mucous membranes moist, pharynx normal without lesions   Neck - supple, no significant adenopathy   Lymphatics - no palpable lymphadenopathy, no hepatosplenomegaly   Chest - clear to auscultation, no wheezes, rales or rhonchi, symmetric air entry   Heart - normal rate, regular rhythm, normal S1, S2, no murmurs  Abdomen - soft, nontender, nondistended, no masses or organomegaly   Neurological - alert, oriented, normal speech, no focal findings or movement disorder noted   Musculoskeletal - no joint tenderness, deformity or swelling   Extremities - peripheral pulses normal, no pedal edema, no clubbing or cyanosis   Skin - normal coloration and turgor, no rashes, no suspicious skin lesions noted ,    DATA:    Labs:   CBC:   Recent Labs     09/02/20  0524 09/03/20  0647   WBC 10.7 9.9   HGB 10.0* 11.3*   HCT 34.7* 39.6    367     BMP:   Recent Labs     09/02/20  0524 09/03/20  0647    142   K 4.1 4.1   CO2 28 33*   BUN 19 17   CREATININE 0.85 0.81   LABGLOM >60 >60   GLUCOSE 50* 83     PT/INR: No results for input(s): PROTIME, INR in the last 72 hours. Surgical Pathology Report   Surgical Pathology   Collected:  08/31/20 0803    Lab status:  Final    Resulting lab:  Phase Eight    Value:  -- Diagnosis --     BRONCHIAL WASHINGS RIGHT UPPER LOBE:          POSITIVE FOR MALIGNANCY.        SQUAMOUS CELL CARCINOMA.           COMMENT: VOLUME OF TUMOR IN THE CELL BLOCK IS LOW AND ADDITIONAL   MATERIAL WOULD BE REQUIRED IF MOLECULAR TESTING IS NECESSARY. IMAGING DATA:  @IMG@   CT CHEST WO CONTRAST   Final Result   Approximate 7.2 cm spiculated mass of the right upper lobe likely with   invasion of the mediastinum, adjacent lymphangitic spread and suspected   metastatic mediastinal lymphadenopathy. XR LUMBAR SPINE (2-3 VIEWS)   Final Result   No acute osseous abnormality. Multilevel mild degenerative disc disease, not   significantly changed since 2017. XR CHEST PORTABLE   Final Result   Extensive poorly defined nodular opacity medial right upper lung with   possible associated mediastinal involvement. Findings suggest underlying   mass with associated infiltrate.   Correlative CT chest suggested for further   assessment         IR INSERT PICC VAD W SQ PORT >5 YEARS    (Results Pending)       Primary Problem  <principal problem not specified>    Active Hospital Problems    Diagnosis Date Noted    Lung mass [R91.8] 08/27/2020         IMPRESSION:   1. Right upper lobe non-small cell cancer biopsy showing squamous cell carcinoma, mediastinal involvement with contact with mediastinal pleura as well as encircling right upper bronchus and also mediastinal lymphadenopathy noted suggesting locally advanced disease  2. COPD  3. Falls  4. CAD  5. Tobacco abuse  6. Actinomyces bacteremia    RECOMMENDATIONS:  1. I reviewed the laboratory data, imaging studies, biopsy finding, diagnosis, prognosis and treatment options with patient  2. I reviewed the NCCN guidelines and goals of care  3. Patient appears to have at least locally advanced disease but she will need staging work-up including PET scan and MRI brain as outpatient  4. Further recommendations will be based on the staging work-up  5. We will follow her as outpatient to get the staging work-up and further recommendations  6. Continue IV antibiotic as per ID until 9/14/2020  7. Discharge planning as per primary. 8. Patient's questions were sought and answered to her satisfaction      Discussed with patient and Nurse. Thank you for asking us to see this patient. Mendoza Beaulieu MD  Hematologist/Medical Oncologist    Cell: [phone]      This note is created with the assistance of a speech recognition program.  While intending to generate a document that actually reflects the content of the visit, the document can still have some errors including those of syntax and sound a like substitutions which may escape proof reading. It such instances, actual meaning can be extrapolated by contextual diversion.

## 2020-09-03 NOTE — PROGRESS NOTES
[facility]  Progress Note    Sandra Salguero  MRN: [id number]  Date: 9/3/2020  LOS: 3     Reason for F/U :   Actinomyces bacteremia    Impression :   1. Right upper lobe spiculated mass with invasion of the mediastinum and mediastinal lymphadenopathy   · Status post bronchoscopy cytology showing squamous cell carcinoma  2. Chronic respiratory failure on home O2  3. Coagulase-negative staph bacteremia likely a contaminant  4. 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 E. coli/Aerococcus urinary tract infection-asymptomatic bacteriuria    Recommendations:   · Continue intravenous antimicrobial therapy with ampicillin and the plan is to complete a 2-week course of therapy on 9/14/2020. · The source of the actinomyces is not entirely clear  · The patient is to be seen by the oncology service today  · The plan is for discharge to the extended care facility  · I will plan on placement of a midline or potentially a PICC line prior to discharge    Infection Control Recommendations:   Universal precautions    Discharge Planning:   Estimated Length of IV antimicrobials: 14 days through 9/14/2020  Patient will need  PICC line Insertion  Patient will need: SNF  Patient willneed outpatient wound care: No    Medical Decision making / Summary of Stay:   Sandra Salguero is a 72y.o.-year-old female who was initially admitted on 8/27/2020. Charlene Faria has a history of diabetes mellitus, coronary artery disease, abdominal aortic aneurysm, essential hypertension, central lobar emphysema with 50-pack-year smoking history, chronic hypoxic and hypercapnic respiratory failure on home oxygen, depression/anxiety, diastolic heart failure and she was brought into the emergency room after multiple falls at home. On evaluation the patient was found to have bilateral breast rash, sacral pressure ulcer.   Imaging of the chest showed extensively poorly defined nodular opacity in the right middle upper lobe with possible mediastinal involvement and a CT scan was suggested for further evaluation. A CT scan of the chest was done that showed an approximate 7.2 cm spiculated mass in the right upper lobe with likely invasion of the mediastinum and adjacent lymphangitic spread as well as suspected metastatic mediastinal lymphadenopathy was noted. The patient was admitted and seen in consultation by the pulmonary team who consulted interventional radiology to evaluate for biopsy of the right upper lobe mass and they felt that a endoscopic approach was safer. A bronchoscopy was done 2020 with bronchoalveolar lavage as well as endobronchial biopsies. The patient was started on Rocephin for a urinary tract infection. An echocardiogram was done that showed ejection fraction 65% with moderate aortic stenosis and grade 1 diastolic dysfunction. Blood cultures done on admission had 1 out of 2 sets with coagulase-negative staph/actinomyces ordontolyticus. Current evaluation:9/3/2020    BP (!) 117/45   Pulse 78   Temp 97.3 °F (36.3 °C) (Oral)   Resp 17   Ht 5' 8.5\" (1.74 m)   Wt 142 lb 8 oz (64.6 kg)   SpO2 100%   BMI 21.35 kg/m²     Temperature Range: Temp: 97.3 °F (36.3 °C) Temp  Av.7 °F (36.5 °C)  Min: 97.3 °F (36.3 °C)  Max: 98.1 °F (36.7 °C)  The patient is seen and evaluated at bedside she is awake and alert in no acute distress. She is aware of the cancer cytology findings. She does not report any fevers, chills, cough or shortness of breath. No abdominal pain nausea vomiting or diarrhea. Review of Systems   Constitutional: Negative. HENT: Negative. Respiratory: Negative. Cardiovascular: Negative. Gastrointestinal: Negative. Genitourinary: Negative. Musculoskeletal: Negative. Skin: Negative. Neurological: Negative. Psychiatric/Behavioral: Negative. Physical Examination :     Physical Exam  Constitutional:       Appearance: She is well-developed.    HENT: Head: Normocephalic and atraumatic. Mouth/Throat:      Mouth: Mucous membranes are moist.   Eyes:      Pupils: Pupils are equal, round, and reactive to light. Neck:      Musculoskeletal: Normal range of motion and neck supple. Cardiovascular:      Rate and Rhythm: Regular rhythm. Heart sounds: Normal heart sounds. Pulmonary:      Effort: Pulmonary effort is normal.      Breath sounds: Normal breath sounds. Abdominal:      General: Bowel sounds are normal.      Palpations: Abdomen is soft. Skin:     General: Skin is warm and dry. Neurological:      Mental Status: She is alert and oriented to person, place, and time. Laboratory data:   I have independently reviewed the followinglabs:  CBC with Differential:   Recent Labs     09/02/20  0524 09/03/20  0647   WBC 10.7 9.9   HGB 10.0* 11.3*   HCT 34.7* 39.6    367     BMP:   Recent Labs     09/01/20 0523 09/02/20  0524 09/03/20  0647    139 142   K 4.6 4.1 4.1    102 101   CO2 28 28 33*   BUN 16 19 17   CREATININE 0.81 0.85 0.81   MG 1.9  --   --      Hepatic Function Panel: No results for input(s): PROT, LABALBU, BILIDIR, IBILI, BILITOT, ALKPHOS, ALT, AST in the last 72 hours. Lab Results   Component Value Date    PROCAL 0.06 09/01/2020     No results found for: CRP  No results found for: SEDRATE      Lab Results   Component Value Date    DDIMER 0.58 12/28/2018    DDIMER 0.45 12/23/2013     No results found for: FERRITIN  No results found for: LDH  No results found for: FIBRINOGEN    Results in Past 30 Days  Result Component Current Result Ref Range Previous Result Ref Range   SARS-CoV-2, PCR      (8/29/2020)  Not in Time Range     Not Detected (8/29/2020) Not Detected           (8/29/2020)        Lab Results   Component Value Date    COVID19 Not Detected 08/29/2020       No results for input(s): [facility] in [facility] last 72 hours.   Surgical Pathology Report  08/31/2020 10:15 AM  [facility]    -- Diagnosis --     Bronchial biopsy:          Bronchial mucosa and submucosa, negative for malignancy. Comment: See cytology case [id number].       CLARIBEL Lynch   **Electronically Signed Out**         rdd/9/2/2020         Clinical Information   Operative Findings:  RIGHT UPPER LOBE LUNG (CYTOLOGY, NON-GYN, BODY   FLUID CELL COUNT CULTURE, FUNGUS CULTURE, BODY FLUID FUNGAL STAIN,   CULTURE WITH SMEAR, ACID FAST BACILLUS CULTURE, RESPIRATORY CULTURE,   ANAEROBIC AND AEROBIC CULTURE); BRONCHIAL BIOPSY (PATHOLOGY, CULTURE   FUNGUS, CULTURE BODY FLUID, CULTURE TISSUE, FUNGAL STAIN, CULTURE WITH   SMEAR, ACID FAST)   Operation Performed:  BRONCHOSCOPY BIOPSY BRONCHUS     Source of Specimen   1: BRONCHIAL BIOPSY      Surgical Pathology Report  08/31/2020  8:03 AM  [address]    -- Diagnosis --     BRONCHIAL WASHINGS RIGHT UPPER LOBE:          POSITIVE FOR MALIGNANCY.        SQUAMOUS CELL CARCINOMA.           COMMENT: VOLUME OF TUMOR IN THE CELL BLOCK IS LOW AND ADDITIONAL   MATERIAL WOULD BE REQUIRED IF MOLECULAR TESTING IS NECESSARY.       Thanh Castro   **Electronically Signed Out**         rdd/9/3/2020            Imaging Studies:   No new imaging    Cultures:     Culture, Virus, Non Respiratory [[id number]]   Collected: 08/31/20 1531    Order Status: Completed  Specimen: Tissue from Bronchial Biopsy  Updated: 09/03/20 1013     Specimen Description  . BRONCHIAL BIOPSY     Special Requests  NOT REPORTED     Culture  NEGATIVE.  No Herpes Simplex Virus detected by Enzyme Linked Virus Inducible System culture. at day 2      NEGATIVE for Influenza A and B, Para influenza 1,2,3, Adenovirus and RSV. at day 1      Negative results do not preclude respiratory virus infection and should not be used as the sole basis for diagnosis, treatment or other management decisions.       NEGATIVE for Cytomegalovirus at day 1        Culture, Blood 1 [[id number]]   Collected: 08/27/20 1645    Order Status: Completed  Specimen: Blood  Updated: 09/02/20 0907     Specimen Description  . BLOOD     Special Requests  LTAC,10CC     Culture  NO GROWTH 6 DAYS    Culture, Respiratory [[id number]]  (Abnormal)  Collected: 08/31/20 1531    Order Status: Completed  Specimen: Body Fluid from Lung  Updated: 09/02/20 0827     Specimen Description  . LUNG     Special Requests  NOT REPORTED     Direct Exam  FEW NEUTROPHILSAbnormal        NO BACTERIA SEEN     Culture  NORMAL RESPIRATORY OLIVA LIGHT GROWTH    Culture with Smear, Acid Fast Bacillius [[id number]]   Collected: 08/31/20 1531    Order Status: Completed  Specimen: Body Fluid from Lung  Updated: 09/01/20 1018     Specimen Description  . LUNG     Special Requests  NOT REPORTED     Direct Exam  NO ACID FAST BACILLI SEEN (CONCENTRATED SMEAR)     Culture  PENDING    Fungal stain [[id number]]   Collected: 08/31/20 1531    Order Status: Completed  Specimen: Body Fluid from Lung  Updated: 09/01/20 1018     Specimen Description  . LUNG     Special Requests  NOT REPORTED     Direct Exam  NO FUNGAL ELEMENTS SEEN    Culture, Blood 1 [[id number]]  (Abnormal)  Collected: 08/27/20 1620    Order Status: Completed  Specimen: Blood  Updated: 09/01/20 0800     Specimen Description  . BLOOD     Special Requests  RT AC,10CC     Culture  POSITIVE Blood Culture Results called to and read back by: ELLIE ERICKSON AT 2150 ON 8/28/20Abnormal        DIRECT GRAM STAIN FROM BOTTLE: GRAM POSITIVE COCCI IN CLUSTERS AND GRAM POSITIVE RODS      Coagulase negative Staphylococcus species detected by PCR, mecA gene detected (Methicillin Resistant Organism)Abnormal        STAPHYLOCOCCUS SPECIES, COAGULASE NEGATIVE A single positive blood culture of coagulase negative Staphylocci, diptheroids, micrococci, Cutibacterium, viridans Streptocci, Bacillus, or Lactobacillus species should be interpreted with caution and viewed as a likely skin contaminant. Abnormal        ACTINOMYCES ODONTOLYTICUSAbnormal      Culture, Fungus [[id number]]   Collected: 08/31/20 1531    Order Status: Sent  Specimen: Body Fluid from Lung  Updated: 08/31/20 2228      Culture, Urine [[id number]]  (Abnormal)   Collected: 08/27/20 1502    Order Status: Completed  Specimen: Urine  Updated: 08/29/20 1404     Specimen Description  . URINE     Special Requests  NOT REPORTED     Culture  ESCHERICHIA COLI >[id number] CFU/MLAbnormal        AEROCOCCUS URINAE >235616 CFU/ML There are no CLSI interpretive guidelines for routine susceptibility testing.  Aerococcus species are reported to be susceptible to penicillin.  A. urinae has also been described as susceptible to amoxicillin and nitrofurantoin (for treatment of urinary tract infections only). Abnormal     Escherichia coli (1)     Antibiotic  Interpretation  MUNIR  Status     amikacin    Final      NOT REPORTED    ampicillin  Sensitive   Final      4   SUSCEPTIBLE    ampicillin-sulbactam    Final      NOT REPORTED    aztreonam  Sensitive   Final      <=1   SUSCEPTIBLE    ceFAZolin  Sensitive   Final      <=4   SUSCEPTIBLE    ceFAZolin  Sensitive  Cefazolin sensitivity results can be used to predict the effectiveness of oral cephalosporins (eg.  Cephalexin) in uncomplicated Urinary Tract Infections due to E. coli, K. pneumoniae, and P. mirabilis  Final     cefepime    Final      NOT REPORTED    cefTRIAXone  Sensitive   Final      <=1   SUSCEPTIBLE    ciprofloxacin  Sensitive   Final      <=0.25   SUSCEPTIBLE    ertapenem    Final      NOT REPORTED    Confirmatory Extended Spectrum Beta-Lactamase  Negative  NEGATIVE  Final     gentamicin  Sensitive   Final      <=1   SUSCEPTIBLE    meropenem    Final      NOT REPORTED    nitrofurantoin  Sensitive   Final      <=16   SUSCEPTIBLE    tigecycline    Final      NOT REPORTED    tobramycin  Sensitive   Final      <=1   SUSCEPTIBLE    trimethoprim-sulfamethoxazole  Sensitive   Final      <=20   SUSCEPTIBLE    piperacillin-tazobactam  Sensitive   Final      <=4   SUSCEPTIBLE Medications:      furosemide  40 mg Oral Daily    ampicillin IV  2 g Intravenous 4 times per day    miconazole   Topical BID    insulin lispro  0-12 Units Subcutaneous TID WC    insulin lispro  0-6 Units Subcutaneous Nightly    gabapentin  400 mg Oral TID    glimepiride  1 mg Oral Dinner    traZODone  300 mg Oral Nightly    venlafaxine  150 mg Oral BID    aspirin EC  81 mg Oral Daily    glimepiride  2 mg Oral QAM AC    metFORMIN  500 mg Oral BID    metoprolol tartrate  37.5 mg Oral BID    budesonide-formoterol  2 puff Inhalation BID    oxyCODONE-acetaminophen  1 tablet Oral 4 times per day    sodium chloride flush  10 mL Intravenous 2 times per day    enoxaparin  40 mg Subcutaneous Daily           [facility]  Suzanna Barrow MD  [employer] messaging  OFFICE: [phone]      Electronically signed by Suzanna Barrow MD on 9/3/2020 at 2:59 PM  Thank you for allowing us to participate in the care of this patient. Please call with questions. This note iscreated with the assistance of a speech recognition program.  While intending to generate a document that actually reflects the content of the visit, the document can still have some errors including those of syntax andsound a like substitutions which may escape proof reading. In such instances, actual meaning can be extrapolated by contextual diversion.

## 2020-09-03 NOTE — PROGRESS NOTES
Patient had a run of SVPB's with HR up to 142 with return to SR-SA on monitor.  Patient has no c/o CP or SOB and VSS

## 2020-09-03 NOTE — PROGRESS NOTES
[address] Hospitalist        9/3/2020   7:10 PM    Name:  Ilene Henning  MRN:    [id number]     [place]:     [de-identified]   Room:  2003/2003-02  IP Day: 3     Admit Date: 8/27/2020  1:16 PM  PCP: hBarati Campuzano MD    C/C:   Chief Complaint   Patient presents with   Jesus Simper    Back Pain    Cough    Rash       Assessment:      · Recurrent falls  · Hypoglycemia-resolved  · UTI-E. coli  · Right upper lobe spiculated mass with invasion of the mediastinum, biopsy results positive for squamous cell cancer  · Mediastinal lymphadenopathy  · Left upper lobe nodule/5 mm-stable since 2016  · Chronic COPD  · Chronic hypoxic respiratory failure on home oxygen  · Status post AVR in 2017  · Tobacco use  · Diabetes type 2  · Hypertension  · Coronary artery disease  · History of AAA  · Chronic diastolic CHF  · Depression with anxiety        Plan:        · Patient is currently on MedSurg with telemetry  · O2 maintain oxygen saturation greater than 92%  · Echocardiogram shows EF 65%, moderate aortic stenosis,, grade 1 diastolic dysfunction  · Urine culture shows E. coli  · Blood culture from 8/27/2020 shows coagulase-negative staph/actinomyces ordontolyticus.   · IV ceftriaxone switch to ampicillin  · Lactate and pro calcitonin  · ID consult, input noted  · Pulmonology following/status post bronchoscopy on 8/31/2020, confirmed squamous cell cancer  · Resume Lasix  · Monitor BNP  · Continue Amaryl, metformin, SSI/hemoglobin A1c 6.1  · Continue Lopressor  · Continue Effexor and trazodone  · DVT and GI prophylaxis  · Continue to monitor/telemetry/CBC with differential daily/BMP daily  · Hematology-Oncology consult      Subjective:      Patient seen examined bedside, patient denies any chest pain no nausea vomiting diarrhea  No headache no palpitation  Patient is aware of her cancer diagnosis, awaiting for Hematology-Oncology    ROS:  A 10 point system reviewed and negative otherwise mentioned above.        Physical Examination:      Vitals:  BP (!) 117/45   Pulse 78   Temp 97.3 °F (36.3 °C) (Oral)   Resp 17   Ht 5' 8.5\" (1.74 m)   Wt 142 lb 8 oz (64.6 kg)   SpO2 100%   BMI 21.35 kg/m²   Temp (24hrs), Av.7 °F (36.5 °C), Min:97.3 °F (36.3 °C), Max:98.1 °F (36.7 °C)    Weight:   Wt Readings from Last 3 Encounters:   20 142 lb 8 oz (64.6 kg)   19 192 lb 14.4 oz (87.5 kg)   18 199 lb 4.8 oz (90.4 kg)     I/O last 3 completed shifts:  I/O last 3 completed shifts: In: 350 [P.O.:250; IV Piggyback:100]  Out: -      Recent Labs     20  0615 20  0655 20  0714 20  1142   POCGLU 33* 57* 132* 196*         General appearance - alert, well appearing, and in no acute distress  Mental status - oriented to person, place, and time with normal affect  Head - normocephalic and atraumatic  Eyes - pupils equal and reactive, extraocular eye movements intact, conjunctiva clear  Ears - hearing appears to be intact  Nose - no drainage noted  Mouth - mucous membranes moist  Neck - supple, no carotid bruits, thyroid not palpable  Chest - clear to auscultation, normal effort  Heart - normal rate, regular rhythm, no murmur  Abdomen - soft, nontender, nondistended, bowel sounds present all four quadrants, no masses, hepatomegaly or splenomegaly  Neurological - normal speech, no focal findings or movement disorder noted, cranial nerves II through XII grossly intact  Extremities -1+ peripheral edema  Skin - no gross lesions, rashes, or induration noted        Medications: Allergies:    Allergies   Allergen Reactions    Codeine      \"feeling of heavy on chest\"    Dye [Iodides]      Hallucination,vomiting       Current Meds:   Current Facility-Administered Medications:     furosemide (LASIX) tablet 40 mg, 40 mg, Oral, Daily, Rosmery Harrington MD, 40 mg at 20 0914    magnesium sulfate 1 g in dextrose 5% 100 mL IVPB, 1 g, Intravenous, PRN, Rosmery Harrington MD    potassium chloride (KLOR-CON M) extended release tablet 40 mEq, 40 mEq, Oral, PRN **OR** potassium bicarb-citric acid (EFFER-K) effervescent tablet 40 mEq, 40 mEq, Oral, PRN **OR** potassium chloride 10 mEq/100 mL IVPB (Peripheral Line), 10 mEq, Intravenous, PRN, Tran Britt MD    ampicillin 2 g ivpb mini bag, 2 g, Intravenous, 4 times per day, Kristie Foreman MD, Last Rate: 200 mL/hr at 09/03/20 1810, 2 g at 09/03/20 1810    miconazole (MICOTIN) 2 % powder, , Topical, BID, Claudette Igo, MD    insulin lispro (HUMALOG) injection vial 0-12 Units, 0-12 Units, Subcutaneous, TID WC, Claudette Igo, MD, 2 Units at 09/03/20 1229    insulin lispro (HUMALOG) injection vial 0-6 Units, 0-6 Units, Subcutaneous, Nightly, Claudette Igo, MD, 2 Units at 08/31/20 2147    glucose (GLUTOSE) 40 % oral gel 15 g, 15 g, Oral, PRN, Claudette Igo, MD    dextrose 50 % IV solution, 12.5 g, Intravenous, PRN, Claudette Igo, MD    glucagon (rDNA) injection 1 mg, 1 mg, Intramuscular, PRN, Claudette Igo, MD    dextrose 5 % solution, 100 mL/hr, Intravenous, PRN, Claudette Igo, MD    benzonatate (TESSALON) capsule 100 mg, 100 mg, Oral, TID PRN, Claudette Igo, MD, 100 mg at 08/28/20 0158    ipratropium-albuterol (DUONEB) nebulizer solution 1 ampule, 1 ampule, Inhalation, Q4H PRN, Claudette Igo, MD    clonazePAM Jace Drafts) tablet 1 mg, 1 mg, Oral, TID PRN, Claudette Igo, MD, 1 mg at 09/02/20 2224    gabapentin (NEURONTIN) capsule 400 mg, 400 mg, Oral, TID, Claudette Igo, MD, 400 mg at 09/03/20 1500    glimepiride (AMARYL) tablet 1 mg, 1 mg, Oral, Dinner, Claudette Igo, MD, 1 mg at 09/03/20 1810    traZODone (DESYREL) tablet 300 mg, 300 mg, Oral, Nightly, Claudette Igo, MD, 300 mg at 09/02/20 2224    venlafaxine (EFFEXOR XR) extended release capsule 150 mg, 150 mg, Oral, BID, Claudette Igo, MD, 150 mg at 09/03/20 1810    aspirin EC tablet 81 mg, 81 mg, Oral, Daily, Claudette Igo, MD, 81 mg at 09/03/20 0915    docusate sodium (COLACE) capsule 100 mg, 100 mg, Oral, BID PRN, Eileen Ireland MD    glimepiride (AMARYL) tablet 2 mg, 2 mg, Oral, QAM AC, Eileen Ireland MD, 2 mg at 09/02/20 3285    metFORMIN (GLUCOPHAGE) tablet 500 mg, 500 mg, Oral, BID, Eileen Ireland MD, 500 mg at 09/03/20 0915    metoprolol tartrate (LOPRESSOR) tablet 37.5 mg, 37.5 mg, Oral, BID, Eileen Ireland MD, 37.5 mg at 09/03/20 0914    budesonide-formoterol (SYMBICORT) 160-4.5 MCG/ACT inhaler 2 puff, 2 puff, Inhalation, BID, Eileen Ireland MD, 2 puff at 09/03/20 0951    oxyCODONE-acetaminophen (PERCOCET) 5-325 MG per tablet 1 tablet, 1 tablet, Oral, 4 times per day, Eileen Ierland MD, 1 tablet at 09/03/20 1810    sodium chloride flush 0.9 % injection 10 mL, 10 mL, Intravenous, 2 times per day, Eileen Ireland MD, 10 mL at 09/03/20 0915    sodium chloride flush 0.9 % injection 10 mL, 10 mL, Intravenous, PRN, Eileen Ireland MD, 10 mL at 08/30/20 1737    acetaminophen (TYLENOL) tablet 650 mg, 650 mg, Oral, Q6H PRN **OR** acetaminophen (TYLENOL) suppository 650 mg, 650 mg, Rectal, Q6H PRN, Eileen Ireland MD    promethazine (PHENERGAN) tablet 12.5 mg, 12.5 mg, Oral, Q6H PRN, 12.5 mg at 08/30/20 1633 **OR** ondansetron (ZOFRAN) injection 4 mg, 4 mg, Intravenous, Q6H PRN, Eileen Ireland MD, 4 mg at 08/31/20 1625    nicotine (NICODERM CQ) 21 MG/24HR 1 patch, 1 patch, Transdermal, Daily PRN, Eileen Ireland MD    enoxaparin (LOVENOX) injection 40 mg, 40 mg, Subcutaneous, Daily, Eileen Ireland MD, 40 mg at 09/03/20 0915      I/O (24Hr):     Intake/Output Summary (Last 24 hours) at 9/3/2020 1910  Last data filed at 9/3/2020 1810  Gross per 24 hour   Intake 440 ml   Output --   Net 440 ml       Data:           Labs:    Hematology:  Recent Labs     09/01/20  0523 09/02/20  0524 09/03/20  0647   WBC 11.2 10.7 9.9   RBC 4.99 4.61 5.15*   HGB 11.0* 10.0* 11.3*   HCT 37.1 34.7* 39.6   MCV 74.3* 75.3* 76.9* MCH 22.0* 21.7* 21.9*   MCHC 29.6 28.8 28.5   RDW 19.4* 19.8* 20.3*    364 367   MPV 9.4 9.6 9.4     Chemistry:  Recent Labs     09/01/20  0523 09/02/20  0524 09/03/20  0647    139 142   K 4.6 4.1 4.1    102 101   CO2 28 28 33*   GLUCOSE 84 50* 83   BUN 16 19 17   CREATININE 0.81 0.85 0.81   MG 1.9  --   --    ANIONGAP 8* 9 8*   LABGLOM >60 >60 >60   GFRAA >60 >60 >60   CALCIUM 8.7 8.5* 9.0   PROBNP  --  1,350* 962*     Recent Labs     09/02/20  1648 09/02/20  2111 09/03/20  0615 09/03/20  0655 09/03/20  0714 09/03/20  1142   POCGLU 175* 103 33* 57* 132* 196*       Lab Results   Component Value Date/Time    SPECIAL NOT REPORTED 08/31/2020 03:31 PM    SPECIAL NOT REPORTED 08/31/2020 03:31 PM    SPECIAL NOT REPORTED 08/31/2020 03:31 PM    SPECIAL NOT REPORTED 08/31/2020 03:31 PM    SPECIAL NOT REPORTED 08/31/2020 03:31 PM    SPECIAL NOT REPORTED 08/31/2020 03:31 PM     Lab Results   Component Value Date/Time    CULTURE DUPLICATE ORDER 53/44/5093 03:31 PM    CULTURE PENDING 08/31/2020 03:31 PM    CULTURE NORMAL RESPIRATORY OLIVA LIGHT GROWTH 08/31/2020 03:31 PM    CULTURE DUPLICATE ORDER 01/63/7546 03:31 PM    CULTURE  08/31/2020 03:31 PM     NEGATIVE. No Herpes Simplex Virus detected by Enzyme Linked Virus Inducible System culture. at day 2    CULTURE  08/31/2020 03:31 PM     NEGATIVE for Influenza A and B, Para influenza 1,2,3, Adenovirus and RSV. at day 1    CULTURE  08/31/2020 03:31 PM     Negative results do not preclude respiratory virus infection and should not be used as the sole basis for diagnosis, treatment or other management decisions.     CULTURE NEGATIVE for Cytomegalovirus at day 1 08/31/2020 03:31 PM       Lab Results   Component Value Date    POCPH 7.36 04/12/2017    PHART 7.42 08/26/2012    PH 7.22 04/11/2017    POCPCO2 45 04/12/2017    HXF6WQI 41 08/26/2012    PCO2 54 04/11/2017    POCPO2 93 04/12/2017    PO2ART 59 08/26/2012    PO2 89 04/11/2017    POCHCO3 25.3 04/12/2017

## 2020-09-03 NOTE — PROGRESS NOTES
Gait belt, Nurse notified(RN attempted to put chair alarm on chair and patient yelled, no RN agreed to leave alarm off chair)  Restraints  Initially in place: No     Therapy Time   Individual Concurrent Group Co-treatment   Time In [address] [facility]. [facility] [address], [address]         Time Out 0855         Minutes 18                 Citlali Jha, PT

## 2020-09-04 ENCOUNTER — APPOINTMENT (OUTPATIENT)
Dept: INTERVENTIONAL RADIOLOGY/VASCULAR | Age: 70
DRG: 167 | End: 2020-09-04
Payer: MEDICARE

## 2020-09-04 VITALS
BODY MASS INDEX: 21.65 KG/M2 | WEIGHT: 146.19 LBS | HEART RATE: 72 BPM | RESPIRATION RATE: 16 BRPM | OXYGEN SATURATION: 96 % | HEIGHT: 69 IN | DIASTOLIC BLOOD PRESSURE: 50 MMHG | SYSTOLIC BLOOD PRESSURE: 109 MMHG | TEMPERATURE: 98.8 F

## 2020-09-04 LAB
ANION GAP SERPL CALCULATED.3IONS-SCNC: 9 MMOL/L (ref 9–17)
BNP INTERPRETATION: ABNORMAL
BUN BLDV-MCNC: 19 MG/DL (ref 8–23)
BUN/CREAT BLD: 23 (ref 9–20)
CALCIUM SERPL-MCNC: 8.7 MG/DL (ref 8.6–10.4)
CHLORIDE BLD-SCNC: 103 MMOL/L (ref 98–107)
CO2: 30 MMOL/L (ref 20–31)
CREAT SERPL-MCNC: 0.84 MG/DL (ref 0.5–0.9)
GFR AFRICAN AMERICAN: >60 ML/MIN
GFR NON-AFRICAN AMERICAN: >60 ML/MIN
GFR SERPL CREATININE-BSD FRML MDRD: ABNORMAL ML/MIN/{1.73_M2}
GFR SERPL CREATININE-BSD FRML MDRD: ABNORMAL ML/MIN/{1.73_M2}
GLUCOSE BLD-MCNC: 141 MG/DL (ref 65–105)
GLUCOSE BLD-MCNC: 219 MG/DL (ref 65–105)
GLUCOSE BLD-MCNC: 40 MG/DL (ref 65–105)
GLUCOSE BLD-MCNC: 40 MG/DL (ref 70–99)
GLUCOSE BLD-MCNC: 46 MG/DL (ref 65–105)
GLUCOSE BLD-MCNC: 56 MG/DL (ref 65–105)
GLUCOSE BLD-MCNC: 98 MG/DL (ref 65–105)
HCT VFR BLD CALC: 39.9 % (ref 36.3–47.1)
HEMOGLOBIN: 11.4 G/DL (ref 11.9–15.1)
LACTIC ACID: 0.7 MMOL/L (ref 0.5–2.2)
MCH RBC QN AUTO: 21.8 PG (ref 25.2–33.5)
MCHC RBC AUTO-ENTMCNC: 28.6 G/DL (ref 28.4–34.8)
MCV RBC AUTO: 76.4 FL (ref 82.6–102.9)
NRBC AUTOMATED: 0 PER 100 WBC
PDW BLD-RTO: 20.5 % (ref 11.8–14.4)
PLATELET # BLD: 359 K/UL (ref 138–453)
PMV BLD AUTO: 9.5 FL (ref 8.1–13.5)
POTASSIUM SERPL-SCNC: 4.7 MMOL/L (ref 3.7–5.3)
PRO-BNP: 964 PG/ML
RBC # BLD: 5.22 M/UL (ref 3.95–5.11)
SODIUM BLD-SCNC: 142 MMOL/L (ref 135–144)
WBC # BLD: 9.4 K/UL (ref 3.5–11.3)

## 2020-09-04 PROCEDURE — 83880 ASSAY OF NATRIURETIC PEPTIDE: CPT

## 2020-09-04 PROCEDURE — 6370000000 HC RX 637 (ALT 250 FOR IP): Performed by: INTERNAL MEDICINE

## 2020-09-04 PROCEDURE — 36573 INSJ PICC RS&I 5 YR+: CPT | Performed by: RADIOLOGY

## 2020-09-04 PROCEDURE — 6360000002 HC RX W HCPCS: Performed by: INTERNAL MEDICINE

## 2020-09-04 PROCEDURE — 94640 AIRWAY INHALATION TREATMENT: CPT

## 2020-09-04 PROCEDURE — 2580000003 HC RX 258: Performed by: INTERNAL MEDICINE

## 2020-09-04 PROCEDURE — 85027 COMPLETE CBC AUTOMATED: CPT

## 2020-09-04 PROCEDURE — 82947 ASSAY GLUCOSE BLOOD QUANT: CPT

## 2020-09-04 PROCEDURE — 99232 SBSQ HOSP IP/OBS MODERATE 35: CPT | Performed by: INTERNAL MEDICINE

## 2020-09-04 PROCEDURE — 2700000000 HC OXYGEN THERAPY PER DAY

## 2020-09-04 PROCEDURE — 80048 BASIC METABOLIC PNL TOTAL CA: CPT

## 2020-09-04 PROCEDURE — 02HV33Z INSERTION OF INFUSION DEVICE INTO SUPERIOR VENA CAVA, PERCUTANEOUS APPROACH: ICD-10-PCS | Performed by: FAMILY MEDICINE

## 2020-09-04 PROCEDURE — C1894 INTRO/SHEATH, NON-LASER: HCPCS

## 2020-09-04 PROCEDURE — 97535 SELF CARE MNGMENT TRAINING: CPT

## 2020-09-04 PROCEDURE — 83605 ASSAY OF LACTIC ACID: CPT

## 2020-09-04 PROCEDURE — 6370000000 HC RX 637 (ALT 250 FOR IP): Performed by: HOSPITALIST

## 2020-09-04 PROCEDURE — 94761 N-INVAS EAR/PLS OXIMETRY MLT: CPT

## 2020-09-04 PROCEDURE — 36415 COLL VENOUS BLD VENIPUNCTURE: CPT

## 2020-09-04 RX ORDER — TRAZODONE HYDROCHLORIDE 150 MG/1
300 TABLET ORAL NIGHTLY
Qty: 10 TABLET | Refills: 0 | Status: SHIPPED | OUTPATIENT
Start: 2020-09-04 | End: 2020-11-03 | Stop reason: SDUPTHER

## 2020-09-04 RX ORDER — OXYCODONE HYDROCHLORIDE AND ACETAMINOPHEN 5; 325 MG/1; MG/1
1 TABLET ORAL 4 TIMES DAILY PRN
Qty: 5 TABLET | Refills: 0 | Status: SHIPPED | OUTPATIENT
Start: 2020-09-04 | End: 2020-09-05

## 2020-09-04 RX ORDER — BENZONATATE 100 MG/1
100 CAPSULE ORAL 3 TIMES DAILY PRN
Qty: 20 CAPSULE | Refills: 0 | Status: SHIPPED | OUTPATIENT
Start: 2020-09-04 | End: 2020-09-11

## 2020-09-04 RX ORDER — FUROSEMIDE 40 MG/1
40 TABLET ORAL DAILY
Qty: 60 TABLET | Refills: 3 | Status: SHIPPED | OUTPATIENT
Start: 2020-09-04 | End: 2020-09-25

## 2020-09-04 RX ORDER — GABAPENTIN 400 MG/1
400 CAPSULE ORAL 2 TIMES DAILY
Qty: 2 CAPSULE | Refills: 0 | Status: SHIPPED | OUTPATIENT
Start: 2020-09-04 | End: 2020-09-25

## 2020-09-04 RX ORDER — CLONAZEPAM 1 MG/1
1 TABLET ORAL 3 TIMES DAILY PRN
Qty: 5 TABLET | Refills: 0 | Status: ON HOLD | OUTPATIENT
Start: 2020-09-04 | End: 2020-09-27

## 2020-09-04 RX ADMIN — AMPICILLIN SODIUM 2 G: 2 INJECTION, POWDER, FOR SOLUTION INTRAMUSCULAR; INTRAVENOUS at 05:36

## 2020-09-04 RX ADMIN — FUROSEMIDE 40 MG: 40 TABLET ORAL at 10:17

## 2020-09-04 RX ADMIN — AMPICILLIN SODIUM 2 G: 2 INJECTION, POWDER, FOR SOLUTION INTRAMUSCULAR; INTRAVENOUS at 13:16

## 2020-09-04 RX ADMIN — AMPICILLIN SODIUM 2 G: 2 INJECTION, POWDER, FOR SOLUTION INTRAMUSCULAR; INTRAVENOUS at 17:00

## 2020-09-04 RX ADMIN — ANTI-FUNGAL POWDER MICONAZOLE NITRATE TALC FREE: 1.42 POWDER TOPICAL at 10:17

## 2020-09-04 RX ADMIN — VENLAFAXINE HYDROCHLORIDE 150 MG: 75 CAPSULE, EXTENDED RELEASE ORAL at 16:59

## 2020-09-04 RX ADMIN — DEXTROSE MONOHYDRATE 12.5 G: 25 INJECTION, SOLUTION INTRAVENOUS at 16:45

## 2020-09-04 RX ADMIN — SODIUM CHLORIDE, PRESERVATIVE FREE 10 ML: 5 INJECTION INTRAVENOUS at 10:18

## 2020-09-04 RX ADMIN — GABAPENTIN 400 MG: 400 CAPSULE ORAL at 10:16

## 2020-09-04 RX ADMIN — OXYCODONE HYDROCHLORIDE AND ACETAMINOPHEN 1 TABLET: 5; 325 TABLET ORAL at 16:59

## 2020-09-04 RX ADMIN — Medication 10 ML: at 16:46

## 2020-09-04 RX ADMIN — METFORMIN HYDROCHLORIDE 500 MG: 500 TABLET ORAL at 10:16

## 2020-09-04 RX ADMIN — OXYCODONE HYDROCHLORIDE AND ACETAMINOPHEN 1 TABLET: 5; 325 TABLET ORAL at 13:15

## 2020-09-04 RX ADMIN — OXYCODONE HYDROCHLORIDE AND ACETAMINOPHEN 1 TABLET: 5; 325 TABLET ORAL at 05:36

## 2020-09-04 RX ADMIN — BUDESONIDE AND FORMOTEROL FUMARATE DIHYDRATE 2 PUFF: 160; 4.5 AEROSOL RESPIRATORY (INHALATION) at 09:29

## 2020-09-04 RX ADMIN — ENOXAPARIN SODIUM 40 MG: 40 INJECTION SUBCUTANEOUS at 10:17

## 2020-09-04 RX ADMIN — METOPROLOL TARTRATE 37.5 MG: 25 TABLET, FILM COATED ORAL at 13:15

## 2020-09-04 RX ADMIN — INSULIN LISPRO 4 UNITS: 100 INJECTION, SOLUTION INTRAVENOUS; SUBCUTANEOUS at 13:16

## 2020-09-04 RX ADMIN — VENLAFAXINE HYDROCHLORIDE 150 MG: 75 CAPSULE, EXTENDED RELEASE ORAL at 10:16

## 2020-09-04 RX ADMIN — GABAPENTIN 400 MG: 400 CAPSULE ORAL at 13:15

## 2020-09-04 RX ADMIN — ASPIRIN 81 MG: 81 TABLET, COATED ORAL at 10:16

## 2020-09-04 ASSESSMENT — PAIN DESCRIPTION - LOCATION
LOCATION: BACK

## 2020-09-04 ASSESSMENT — PAIN SCALES - GENERAL
PAINLEVEL_OUTOF10: 0
PAINLEVEL_OUTOF10: 3
PAINLEVEL_OUTOF10: 8
PAINLEVEL_OUTOF10: 10
PAINLEVEL_OUTOF10: 0
PAINLEVEL_OUTOF10: 3
PAINLEVEL_OUTOF10: 10

## 2020-09-04 ASSESSMENT — PAIN DESCRIPTION - DESCRIPTORS
DESCRIPTORS: CONSTANT
DESCRIPTORS: ACHING;SHARP
DESCRIPTORS: CONSTANT

## 2020-09-04 ASSESSMENT — PAIN DESCRIPTION - ONSET
ONSET: ON-GOING

## 2020-09-04 ASSESSMENT — PAIN DESCRIPTION - ORIENTATION
ORIENTATION: LOWER

## 2020-09-04 ASSESSMENT — PAIN DESCRIPTION - PROGRESSION
CLINICAL_PROGRESSION: NOT CHANGED

## 2020-09-04 ASSESSMENT — PAIN - FUNCTIONAL ASSESSMENT
PAIN_FUNCTIONAL_ASSESSMENT: PREVENTS OR INTERFERES SOME ACTIVE ACTIVITIES AND ADLS

## 2020-09-04 ASSESSMENT — PAIN DESCRIPTION - PAIN TYPE
TYPE: CHRONIC PAIN

## 2020-09-04 ASSESSMENT — ENCOUNTER SYMPTOMS
RESPIRATORY NEGATIVE: 1
GASTROINTESTINAL NEGATIVE: 1

## 2020-09-04 ASSESSMENT — PAIN DESCRIPTION - FREQUENCY
FREQUENCY: CONTINUOUS

## 2020-09-04 NOTE — PLAN OF CARE
Problem: Skin Integrity:  Goal: Will show no infection signs and symptoms  Description: Will show no infection signs and symptoms  9/4/2020 0316 by Ziyad Campbell RN  Outcome: Ongoing  9/3/2020 2000 by Ladan Gurrola RN  Outcome: Ongoing  Goal: Absence of new skin breakdown  Description: Absence of new skin breakdown  9/4/2020 0316 by Ziyad Campbell RN  Outcome: Ongoing  9/3/2020 2000 by Ladan Gurrola RN  Outcome: Ongoing     Problem: Falls - Risk of:  Goal: Will remain free from falls  Description: Will remain free from falls  9/4/2020 0316 by Ziyad Campbell RN  Outcome: Ongoing  9/3/2020 2000 by Ladan Gurrola RN  Outcome: Ongoing  Goal: Absence of physical injury  Description: Absence of physical injury  9/4/2020 0316 by Ziyad Campbell RN  Outcome: Ongoing  9/3/2020 2000 by Ladan Gurrola RN  Outcome: Ongoing     Problem: Pain:  Goal: Pain level will decrease  Description: Pain level will decrease  9/4/2020 0316 by Ziyad Campbell RN  Outcome: Ongoing  9/3/2020 2000 by Ladan Gurrola RN  Outcome: Ongoing  Goal: Control of acute pain  Description: Control of acute pain  9/4/2020 0316 by Ziyad Campbell RN  Outcome: Ongoing  9/3/2020 2000 by Ladan Gurrola RN  Outcome: Ongoing  Goal: Control of chronic pain  Description: Control of chronic pain  9/4/2020 0316 by Ziyad Campbell RN  Outcome: Ongoing  9/3/2020 2000 by Ladan Gurrola RN  Outcome: Ongoing     Problem: ABCDS Injury Assessment  Goal: Absence of physical injury  9/4/2020 0316 by Ziyad Campbell RN  Outcome: Ongoing  9/3/2020 2000 by Ladan Gurrola RN  Outcome: Ongoing

## 2020-09-04 NOTE — PROGRESS NOTES
[facility]  Progress Note    Kari Lau  MRN: [id number]  Date: 9/4/2020  LOS: 4     Reason for F/U :   Actinomyces bacteremia    Impression :   1. Right upper lobe spiculated mass with invasion of the mediastinum and mediastinal lymphadenopathy   · Status post bronchoscopy cytology showing squamous cell carcinoma  2. Chronic respiratory failure on home O2  3. Coagulase-negative staph bacteremia likely a contaminant  4. 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 E. coli/Aerococcus urinary tract infection-asymptomatic bacteriuria    Recommendations:   · The source of the actinomyces is not clear-continue intravenous antimicrobial therapy with ampicillin and the plan is to complete a 2-week course of therapy on 9/14/2020. · She will get the PICC line placed today  · The patient was seen by the oncology service and will need staging work-up with PET scan and MRI as an outpatient. · The patient is okay for discharge once all arrangements have been made    Infection Control Recommendations:   Universal precautions    Discharge Planning:   Estimated Length of IV antimicrobials: through 9/14/2020  Patient will need  PICC line Insertion  Patient will need: SNF  Patient willneed outpatient wound care: No    Medical Decision making / Summary of Stay:   Kari Lau is a 72y.o.-year-old female who was initially admitted on 8/27/2020. Willian Mcneal has a history of diabetes mellitus, coronary artery disease, abdominal aortic aneurysm, essential hypertension, central lobar emphysema with 50-pack-year smoking history, chronic hypoxic and hypercapnic respiratory failure on home oxygen, depression/anxiety, diastolic heart failure and she was brought into the emergency room after multiple falls at home. On evaluation the patient was found to have bilateral breast rash, sacral pressure ulcer.   Imaging of the chest showed extensively poorly defined nodular opacity in the right middle upper lobe with possible mediastinal involvement and a CT scan was suggested for further evaluation. A CT scan of the chest was done that showed an approximate 7.2 cm spiculated mass in the right upper lobe with likely invasion of the mediastinum and adjacent lymphangitic spread as well as suspected metastatic mediastinal lymphadenopathy was noted. The patient was admitted and seen in consultation by the pulmonary team who consulted interventional radiology to evaluate for biopsy of the right upper lobe mass and they felt that a endoscopic approach was safer. A bronchoscopy was done 2020 with bronchoalveolar lavage as well as endobronchial biopsies. The patient was started on Rocephin for a urinary tract infection. An echocardiogram was done that showed ejection fraction 65% with moderate aortic stenosis and grade 1 diastolic dysfunction. Blood cultures done on admission had 1 out of 2 sets with coagulase-negative staph/actinomyces ordontolyticus. Current evaluation:2020    BP (!) 116/52   Pulse 77   Temp 98.1 °F (36.7 °C) (Oral)   Resp 17   Ht 5' 8.5\" (1.74 m)   Wt 146 lb 3 oz (66.3 kg)   SpO2 97%   BMI 21.90 kg/m²     Temperature Range: Temp: 98.1 °F (36.7 °C) Temp  Av.1 °F (36.7 °C)  Min: 98 °F (36.7 °C)  Max: 98.2 °F (36.8 °C)  The patient is seen and evaluated at bedside she is sitting up in the chair awake and alert in no acute distress. Her brother is in the room with her and he came up to see her from [place]. No subjective fevers or chills. No abdominal pain nausea vomiting or diarrhea. Review of Systems   Constitutional: Negative. HENT: Negative. Respiratory: Negative. Cardiovascular: Negative. Gastrointestinal: Negative. Genitourinary: Negative. Musculoskeletal: Negative. Skin: Negative. Neurological: Negative. Psychiatric/Behavioral: Negative.         Physical Examination :     Physical Exam  Constitutional:       Appearance: She biopsy:          Bronchial mucosa and submucosa, negative for malignancy. Comment: See cytology case [id number].       CLARIBEL Kerr   **Electronically Signed Out**         rdd/9/2/2020         Clinical Information   Operative Findings:  RIGHT UPPER LOBE LUNG (CYTOLOGY, NON-GYN, BODY   FLUID CELL COUNT CULTURE, FUNGUS CULTURE, BODY FLUID FUNGAL STAIN,   CULTURE WITH SMEAR, ACID FAST BACILLUS CULTURE, RESPIRATORY CULTURE,   ANAEROBIC AND AEROBIC CULTURE); BRONCHIAL BIOPSY (PATHOLOGY, CULTURE   FUNGUS, CULTURE BODY FLUID, CULTURE TISSUE, FUNGAL STAIN, CULTURE WITH   SMEAR, ACID FAST)   Operation Performed:  BRONCHOSCOPY BIOPSY BRONCHUS     Source of Specimen   1: BRONCHIAL BIOPSY      Surgical Pathology Report  08/31/2020  8:03 AM  [address]    -- Diagnosis --     BRONCHIAL WASHINGS RIGHT UPPER LOBE:          POSITIVE FOR MALIGNANCY.        SQUAMOUS CELL CARCINOMA.           COMMENT: VOLUME OF TUMOR IN THE CELL BLOCK IS LOW AND ADDITIONAL   MATERIAL WOULD BE REQUIRED IF MOLECULAR TESTING IS NECESSARY.       Halina Varela   **Electronically Signed Out**         rdd/9/3/2020            Imaging Studies:   No new imaging    Cultures:     Culture, Virus, Non Respiratory [[id number]]   Collected: 08/31/20 1531    Order Status: Completed  Specimen: Tissue from Bronchial Biopsy  Updated: 09/03/20 1013     Specimen Description  . BRONCHIAL BIOPSY     Special Requests  NOT REPORTED     Culture  NEGATIVE.  No Herpes Simplex Virus detected by Enzyme Linked Virus Inducible System culture. at day 2      NEGATIVE for Influenza A and B, Para influenza 1,2,3, Adenovirus and RSV. at day 1      Negative results do not preclude respiratory virus infection and should not be used as the sole basis for diagnosis, treatment or other management decisions.       NEGATIVE for Cytomegalovirus at day 1        Culture, Blood 1 [[id number]]   Collected: 08/27/20 1645    Order Status: Completed  Specimen: Blood Updated: 09/02/20 0907     Specimen Description  . BLOOD     Special Requests  LTAC,10CC     Culture  NO GROWTH 6 DAYS    Culture, Respiratory [[id number]]  (Abnormal)  Collected: 08/31/20 1531    Order Status: Completed  Specimen: Body Fluid from Lung  Updated: 09/02/20 0827     Specimen Description  . LUNG     Special Requests  NOT REPORTED     Direct Exam  FEW NEUTROPHILSAbnormal        NO BACTERIA SEEN     Culture  NORMAL RESPIRATORY OLIVA LIGHT GROWTH    Culture with Smear, Acid Fast Bacillius [[id number]]   Collected: 08/31/20 1531    Order Status: Completed  Specimen: Body Fluid from Lung  Updated: 09/01/20 1018     Specimen Description  . LUNG     Special Requests  NOT REPORTED     Direct Exam  NO ACID FAST BACILLI SEEN (CONCENTRATED SMEAR)     Culture  PENDING    Fungal stain [[id number]]   Collected: 08/31/20 1531    Order Status: Completed  Specimen: Body Fluid from Lung  Updated: 09/01/20 1018     Specimen Description  . LUNG     Special Requests  NOT REPORTED     Direct Exam  NO FUNGAL ELEMENTS SEEN    Culture, Blood 1 [[id number]]  (Abnormal)  Collected: 08/27/20 1620    Order Status: Completed  Specimen: Blood  Updated: 09/01/20 0800     Specimen Description  . BLOOD     Special Requests  RT AC,10CC     Culture  POSITIVE Blood Culture Results called to and read back by: ELLIE ERICKSON AT 2150 ON 8/28/20Abnormal        DIRECT GRAM STAIN FROM BOTTLE: GRAM POSITIVE COCCI IN CLUSTERS AND GRAM POSITIVE RODS      Coagulase negative Staphylococcus species detected by PCR, mecA gene detected (Methicillin Resistant Organism)Abnormal        STAPHYLOCOCCUS SPECIES, COAGULASE NEGATIVE A single positive blood culture of coagulase negative Staphylocci, diptheroids, micrococci, Cutibacterium, viridans Streptocci, Bacillus, or Lactobacillus species should be interpreted with caution and viewed as a likely skin contaminant. Abnormal        ACTINOMYCES ODONTOLYTICUSAbnormal      Culture, Fungus [[id number]]   Collected: 08/31/20 [address]    Order Status: Sent  Specimen: Body Fluid from Lung  Updated: 08/31/20 2227      Culture, Urine [[id number]]  (Abnormal)   Collected: 08/27/20 1502    Order Status: Completed  Specimen: Urine  Updated: 08/29/20 1404     Specimen Description  . URINE     Special Requests  NOT REPORTED     Culture  ESCHERICHIA COLI >129745 CFU/MLAbnormal        AEROCOCCUS URINAE >414906 CFU/ML There are no CLSI interpretive guidelines for routine susceptibility testing.  Aerococcus species are reported to be susceptible to penicillin.  A. urinae has also been described as susceptible to amoxicillin and nitrofurantoin (for treatment of urinary tract infections only). Abnormal     Escherichia coli (1)     Antibiotic  Interpretation  MUNIR  Status     amikacin    Final      NOT REPORTED    ampicillin  Sensitive   Final      4   SUSCEPTIBLE    ampicillin-sulbactam    Final      NOT REPORTED    aztreonam  Sensitive   Final      <=1   SUSCEPTIBLE    ceFAZolin  Sensitive   Final      <=4   SUSCEPTIBLE    ceFAZolin  Sensitive  Cefazolin sensitivity results can be used to predict the effectiveness of oral cephalosporins (eg.  Cephalexin) in uncomplicated Urinary Tract Infections due to E. coli, K. pneumoniae, and P. mirabilis  Final     cefepime    Final      NOT REPORTED    cefTRIAXone  Sensitive   Final      <=1   SUSCEPTIBLE    ciprofloxacin  Sensitive   Final      <=0.25   SUSCEPTIBLE    ertapenem    Final      NOT REPORTED    Confirmatory Extended Spectrum Beta-Lactamase  Negative  NEGATIVE  Final     gentamicin  Sensitive   Final      <=1   SUSCEPTIBLE    meropenem    Final      NOT REPORTED    nitrofurantoin  Sensitive   Final      <=16   SUSCEPTIBLE    tigecycline    Final      NOT REPORTED    tobramycin  Sensitive   Final      <=1   SUSCEPTIBLE    trimethoprim-sulfamethoxazole  Sensitive   Final      <=20   SUSCEPTIBLE    piperacillin-tazobactam  Sensitive   Final      <=4   SUSCEPTIBLE        Medications:      furosemide  40 mg Oral Daily    ampicillin IV  2 g Intravenous 4 times per day    miconazole   Topical BID    insulin lispro  0-12 Units Subcutaneous TID WC    insulin lispro  0-6 Units Subcutaneous Nightly    gabapentin  400 mg Oral TID    glimepiride  1 mg Oral Dinner    traZODone  300 mg Oral Nightly    venlafaxine  150 mg Oral BID    aspirin EC  81 mg Oral Daily    glimepiride  2 mg Oral QAM AC    metFORMIN  500 mg Oral BID    metoprolol tartrate  37.5 mg Oral BID    budesonide-formoterol  2 puff Inhalation BID    oxyCODONE-acetaminophen  1 tablet Oral 4 times per day    sodium chloride flush  10 mL Intravenous 2 times per day    enoxaparin  40 mg Subcutaneous Daily           [facility]  [address] [place]  Perfect Serve messaging  OFFICE: [phone]      Electronically signed by [address], [place] on 9/4/2020 at 12:37 PM  Thank you for allowing us to participate in the care of this patient. Please call with questions. This note iscreated with the assistance of a speech recognition program.  While intending to generate a document that actually reflects the content of the visit, the document can still have some errors including those of syntax andsound a like substitutions which may escape proof reading. In such instances, actual meaning can be extrapolated by contextual diversion.

## 2020-09-04 NOTE — PLAN OF CARE
Problem: Skin Integrity:  Goal: Will show no infection signs and symptoms  Description: Will show no infection signs and symptoms  Outcome: Ongoing  Goal: Absence of new skin breakdown  Description: Absence of new skin breakdown  Outcome: Ongoing     Problem: Falls - Risk of:  Goal: Will remain free from falls  Description: Will remain free from falls  Outcome: Ongoing    Siderails up x 2  Hourly rounding  Call light in reach  Instructed to call for assist before attempting out of bed.   Remains free from falls and accidental injury at this time   Floor free from obstacles  Bed is locked and in lowest position  Adequate lighting provided  Bed alarm on, [place] star and Stay with Me signs posted    Goal: Absence of physical injury  Description: Absence of physical injury  Outcome: Ongoing     Problem: Pain:  Goal: Pain level will decrease  Description: Pain level will decrease  Outcome: Ongoing  Goal: Control of acute pain  Description: Control of acute pain  Outcome: Ongoing  Goal: Control of chronic pain  Description: Control of chronic pain  Outcome: Ongoing     Problem: ABCDS Injury Assessment  Goal: Absence of physical injury  Outcome: Ongoing

## 2020-09-04 NOTE — DISCHARGE INSTR - COC
Administered Date(s) Administered    Tdap (Boostrix, Adacel) 10/31/2017       Active Problems:  Patient Active Problem List   Diagnosis Code    Valvular heart disease I38    Type 2 diabetes mellitus without complication, without long-term current use of insulin ([facility] E11.9    Open wound of right ankle S91.001A    COPD (chronic obstructive pulmonary disease) ([facility]) J44.9    Syncope and collapse R55    Chronic ulcer of right heel ([facility]) L97.419    Multifocal pneumonia J18.9    Aortic valve stenosis I35.0    Esophageal dilatation K22.8    Aspiration pneumonia of both lower lobes due to gastric secretions ([facility] J69.0    Falls frequently R29.6    Chronic respiratory failure ([facility]) J96.10    Contusion of right knee S80. 01XA    Pneumonia J18.9    Closed fracture of right distal radius S52.501A    Subdural hemorrhage ([facility]) I62.00    Subarachnoid hemorrhage ([facility]) I60.9    Traumatic hemorrhage of cerebrum ([facility]) Z30.330F    Chronic bilateral low back pain M54.5, G89.29    Cellulitis of both feet L03.115, L03. 116    Acute on chronic congestive heart failure ([facility]) I50.9    Bilateral leg edema R60.0    Cellulitis L03.90    Lung mass R91.8       Isolation/Infection:   Isolation          Contact        Patient Infection Status     Infection Onset Added Last Indicated Last Indicated By Review Planned Expiration Resolved Resolved By    COVID-19 Rule Out 08/31/20 08/31/20 08/31/20 COVID-19 (Ordered) 09/07/20 09/14/20      MDRO (multi-drug resistant organism)  11/24/14 11/24/14 Daly Butcher RN        E.  Coli - urine 10/2016      Resolved    COVID-19 Rule Out 08/29/20 08/29/20 08/29/20 COVID-19 (Ordered)   08/30/20 Rule-Out Test Resulted    MRSA  11/24/14 11/24/14 Daly Butcher RN   04/11/17 Elida Santiago RN    2 negative nasal screens 10/2016 & 12/2016  Urine - 12/2014            Nurse Assessment:  Last Vital Signs: BP (!) 112/44   Pulse 68   Temp 98 °F (36.7 °C) (Oral)   Resp 16   Ht 5' 8.5\" (1.74 m)   Wt 142 lb 8 oz (64.6 kg)   SpO2 94%   BMI 21.35 kg/m²     Last documented pain score (0-10 scale): Pain Level: 0  Last Weight:   Wt Readings from Last 1 Encounters:   09/03/20 142 lb 8 oz (64.6 kg)     Mental Status:  oriented, alert, coherent, logical, thought processes intact and able to concentrate and follow conversation    IV Access:  - PICC - site  R Basilic, insertion date: 9/4/2020    Nursing Mobility/ADLs:  Walking   Assisted  Transfer  Assisted  Bathing  Assisted  Dressing  Assisted  Toileting  Assisted  Feeding  Independent  Med [address]  Independent  Med Delivery   whole    Wound Care Documentation and Therapy:        Elimination:  Continence:   · Bowel: No   · Bladder: No wears brief but knows when needs to void   Urinary Catheter: None   Colostomy/Ileostomy/Ileal Conduit: No       Date of Last BM: ***    Intake/Output Summary (Last 24 hours) at 9/4/2020 0336  Last data filed at 9/3/2020 1810  Gross per 24 hour   Intake 940 ml   Output --   Net 940 ml     I/O last 3 completed shifts: In: 940 [P.O.:640; I.V.:100; IV Piggyback:200]  Out: -     Safety Concerns:     History of Falls (last 30 days) and At Risk for Falls    Impairments/Disabilities:      Vision    Nutrition Therapy:  Current Nutrition Therapy:   - Oral Diet:  Carb Control 4 carbs/meal (1800kcals/day)    Routes of Feeding: Oral  Liquids: No Restrictions  Daily Fluid Restriction: no  Last Modified Barium Swallow with Video (Video Swallowing Test): not done    Treatments at the Time of Hospital Discharge:   Respiratory Treatments: yes  Oxygen Therapy:  is on oxygen at 2 L/min per nasal cannula.   Ventilator:    - No ventilator support    Rehab Therapies: Physical Therapy and Occupational Therapy  Weight Bearing Status/Restrictions: No weight bearing restirctions  Other Medical Equipment (for information only, NOT a DME order):  walker and bedside commode  Other Treatments:   · PICC line care   · Check Blood Sugars AC&HS  · Continue Oxygen at 2 liters per NC  · IV antibiotics through  9/14/2020  PET scan scheduled 9-11-20 at 12:30pm at [address] at [address]. · Pt needs to be there at noon, NPO 4-6 hours prior to scan   · Check CBC, BMP on Mondays    Patient's personal belongings (please select all that are sent with patient):  Dentures upper and lower, Jewelry, cell phone, purse    RN SIGNATURE:  Electronically signed by Agustin Baez RN on 9/4/20 at 4:16 PM EDT    CASE MANAGEMENT/SOCIAL WORK SECTION    Inpatient Status Date: ***    Readmission Risk Assessment Score:  Readmission Risk              Risk of Unplanned Readmission:        15           Discharging to Facility/ Agency   Name: Meg Coffey are discharging to [facility]    Name:       [facility]  Address:    [address] [place] [address], [address] [place] [address]  Phone:  [phone]  · Fax:  [phone]  · Address:   · Phone:  · Fax:       / signature: Electronically signed by Edi Maciel RN on 9/4/20 at 12:19 PM EDT    PHYSICIAN SECTION    Prognosis: Good    Condition at Discharge: Stable    Rehab Potential (if transferring to Rehab): Good    Recommended Labs or Other Treatments After Discharge:   Diagnosis:    Skilled Nursing per hospital recommendation ( Monitor Vitals,pain, Mental status, daily weight Blood sugar monitoring TIDAC.) Call MD if Blood sugar<60 or > 350,Fall precaution, wound care quach catheter removal as per Nursing home attending)  Skilled OT  Physical Therapy  Daily Labs: cbc,bmp q weekly  Monitor INR Daily if pt on coumadin   Coumadin management per SNF admitting physician unless otherwise specified. Oxygen 2L/minute   Blood sugar accucheck TIDAC  Daily Pulse oxymetry  Continue CPAP/BIPAP if pt wearing at home. Catheter/drain care per surgery  If pt on Tube feeding- please continue per hospital orders.   Continue IV ampicillin till 9/14/2020  PICC line care as per protocol      PET scan scheduled 9-11-20 at 12:30pm at [address] at [address]. Pt needs to be there at noon, NPO 4-6 hours prior to scan   Check CBC, BMP on Mondays    Physician Certification: I certify the above information and transfer of João Jennings  is necessary for the continuing treatment of the diagnosis listed and that she requires [facility] for less 30 days.      Update Admission H&P: No change in H&P    PHYSICIAN SIGNATURE:  Electronically signed by Devon Moore MD on 9/4/20 at 2:07 PM EDT

## 2020-09-04 NOTE — PROGRESS NOTES
Physical Therapy    Facility/Department: [facility]  Initial Assessment    NAME: Ilene Henning  : 1950  MRN: [id number]    Date of Service: 2020    Discharge Recommendations:  Subacute/Skilled Nursing Facility        Assessment   Body structures, Functions, Activity limitations: Decreased functional mobility ; Decreased strength;Decreased endurance;Decreased balance  Assessment: patient demonstrating decreased motivation making progress towards goals difficult. Continue to recommend SNF to improve gait, endurance, trasnfers, and overall function  PT Education: Goals;PT Role;Plan of Care;Gait Training  Activity Tolerance  Activity Tolerance: Patient limited by endurance       Patient Diagnosis(es): The primary encounter diagnosis was Lung mass. A diagnosis of Acute cystitis without hematuria was also pertinent to this visit. has a past medical history of AAA (abdominal aortic aneurysm) (HCC), Acid reflux, Anxiety and depression, Aortic stenosis, Arthritis, Blister of ankle, right, CAD (coronary artery disease), COPD (chronic obstructive pulmonary disease) ([facility] [address] Diabetes mellitus ([facility] [address] Falls, Heart block, Hypokalemia, MDRO (multiple drug resistant organisms) resistance, MRSA (methicillin resistant staph aureus) culture positive, On home oxygen therapy, On home oxygen therapy, Overactive bladder, Pneumonia, PONV (postoperative nausea and vomiting), and Vitamin D deficiency. has a past surgical history that includes back surgery; eye surgery; Cholecystectomy; Appendectomy; Hysterectomy; Tonsillectomy; lumbar laminectomy; Endoscopy, colon, diagnostic (2016); aortic valve repair (N/A, 2017); bronchoscopy (N/A, 2020); and IR INSERT PICC VAD W SQ PORT >5 YEARS (2020).     Restrictions  Restrictions/Precautions  Restrictions/Precautions: General Precautions, Fall Risk  Required Braces or Orthoses?: No  Position Activity Restriction  Other position/activity restrictions: 02  Vision/Hearing        Subjective  General  Patient assessed for rehabilitation services?: Yes  Response To Previous Treatment: Patient with no complaints from previous session. General Comment  Comments: ELLIE Fallon states patient appropriate for therapy  Subjective  Subjective: Patient in bathroom with RN, only agreeable to PT to get back to bed          Orientation     Social/Functional History  Social/Functional History  Lives With: Alone  Type of Home: House  Home Layout: Multi-level, Able to Live on Main level with bedroom/bathroom  Home Access: Ramped entrance  Bathroom Shower/Tub: Walk-in shower  Bathroom Toilet: Standard  Bathroom Equipment: Shower chair  Home Equipment: Rolling walker, Oxygen, Alert Button(O2 at night or when resting. 2L)  Receives Help From: Family, Friend(s)  ADL Assistance: Independent  Homemaking Assistance: Needs assistance(assist from family for cleaning, laundry, groceries)  Ambulation Assistance: Independent(uses RW)  Transfer Assistance: Independent  Active : No  Patient's  Info: family or friends. Occupation: Retired  Type of occupation: factory  Leisure & Hobbies: watches TV. Additional Comments: 11 falls in last 3 weeks (states she falls backwards and legs give out)  Cognition        Objective                      Bed mobility  Sit to Supine: Stand by assistance  Scooting: Stand by assistance  Transfers  Sit to Stand: Stand by assistance  Stand to sit: Stand by assistance  Ambulation  Ambulation?: Yes  Ambulation 1  Surface: level tile  Device: Rolling Walker  Other Apparatus: O2  Assistance: Stand by assistance  Gait Deviations: Decreased step length  Distance: 10 feet from bathroom to bed  Comments: stood at sink for hand hygiene with SBA, adamantly refused further gait, stated \"I'm going to bed! \"        Exercises  Comments: refused supine LE ther ex     Plan   Plan  Times per week: 1-2x/day,6-7 days/week  Current Treatment Recommendations: Strengthening, Transfer Training, Endurance Training, Gait Training, Functional Mobility Training, Balance Training  Safety Devices  Type of devices: Call light within reach, Gait belt, Nurse notified(RN attempted to put chair alarm on chair and patient yelled, no RN agreed to leave alarm off chair)  Restraints  Initially in place: No    G-Code       OutComes Score                                                  AM-PAC Score     AM-PAC Inpatient Mobility without Stair Climbing Raw Score : 15 (09/04/20 1429)  AM-PAC Inpatient without Stair Climbing T-Scale Score : 43.03 (09/04/20 1429)  Mobility Inpatient CMS 0-100% Score: 47.43 (09/04/20 1429)  Mobility Inpatient without Stair CMS G-Code Modifier : CK (09/04/20 1429)       Goals  Short term goals  Time Frame for Short term goals: 8 treatments  Short term goal 1: Independent bed mobility/transfers  Short term goal 2:  Independent ambulation w/ RW 75' x 1  Short term goal 3: Good standing balance and posture  Short term goal 4: Tolerate 30 min ther act  Short term goal 5: 1/2 to 1 grade strength increase  Patient Goals   Patient goals : Home       Therapy Time   Individual Concurrent Group Co-treatment   Time In [address]         Time Out 1429         Minutes 15                 Chris Patricio, PT

## 2020-09-04 NOTE — CARE COORDINATION
[facility] approved patient for admission and started precert.  Ginette Nieves
[facility] for [facility] [place] [employer] [place].  Mark Serna
Script written for PET scan per Caroline Rivers CNP. Spoke to Tess in scheduling and appointment scheduled 9-11-20 at 12:30pm in [facility].
Social Work-Met with patient and son to discuss dc options. Patient is insisting that she is returning home. She is refusing therapy. She has had multiple falls at home. Patient continues to refuse SNF. Discussed that APS has been notified due to her frequent falls and that she is not able to care for herself. Will meet with patient again tomorrow to discuss dc plans.  Elena Moyer
Social Work-Met with patient to discuss dc options. She is agreeable to SNF. The Plan for Transition of Care is related to the following treatment goals: SNF with PT/OT and skilled nursing    The Patient and/or patient representative patient/son was provided with a choice of provider and agrees   with the discharge plan. [x] Yes [] No    Freedom of choice list was provided with basic dialogue that supports the patient's individualized plan of care/goals, treatment preferences and shares the quality data associated with the providers. [x] Yes [] No. Patient would like to go to [facility].  Sent referral. Concetta Nyhan
Social Work-Met with patient to discuss discharge options . Discussed therapy recommendation of SNF. Pt refused. Discussed that she has had several falls at home and continued therapy may strengthen her so she would not fall. She continued to refuse. She states that she has Isa. Contacted Isa. Patient is current with [facility] for aide service. She would be agreeable to skilled services. Phone call to son. He states that patient has fallen probably more than 11 times in the past few weeks. He states that the police have kicked in her door around 6 times and the frame is bent. He states that she is on O2 and continues to smoke. He states that Kathleen Vazquez pays her bills. Referral made to APS.  Rajeev Salas
Social Work-Priti received precert and can admit today.  Khai Sunshine
Social Work-Spoke with patient's son regarding therapy recommendation of short term of SNF. He states that patient will continue to refuse and he is not able to talk her into it. Asked if there is a family member that she would be able to care for her . He said there was not. Will continue to talk with patient regarding safety issues at home.  April Hale
Patient stated she gets dizzy and fell backward. Patient stated she lives at home alone in 1.5 story home with ramp to enter. Patient stated she is independent with dressing, cooking, cleaning, laundry, finances, medications, and driving. Patient stated she is compliant with medication administration. Patient stated she has 2ww and elevated toilet seat with grab bars. Patient stated she does have an aide 2 times per week to assist with showers and cooking. LSW called [facility], message left regarding services and any concerns home care may have about patient living at home. Awaiting call back. LSW discussed concern of patient returning home at discharge. Patient does not agree with the concerns and stated she is taking care of herself at home. She stated she only needs help with showers and receives assist from home care. She did not acknowledge hygiene concerns and is adamant about discharge to home. Patient does not want snf. It is recommended for patient health and safety. Patient stated that when EMS broke down her door 2 days ago, the lock was not replaced and she is nervous to return home without a lock. Patient stated she does not have the financial means to pay for a new one. Patient will need resources for the lock. Patient may be APS referral.  Patient to be admitted. She was started on IV Ceftriaxone. She will need social work follow up.         Electronically signed by MINI Hernandez on 8/27/20 at 4:10 PM EDT

## 2020-09-04 NOTE — PROGRESS NOTES
8/31/2020). Restrictions  Restrictions/Precautions  Restrictions/Precautions: General Precautions, Fall Risk  Required Braces or Orthoses?: No  Position Activity Restriction  Other position/activity restrictions: 02  Subjective   General  Chart Reviewed: Yes  Patient assessed for rehabilitation services?: Yes  Response to previous treatment: Patient with no complaints from previous session  Family / Caregiver Present: No      Orientation  Orientation  Orientation Level: Oriented to person;Oriented to place; Disoriented to time;Oriented to situation  Objective    ADL  Grooming: Minimal assistance(standing at sink for hand hygiene post void)  Toileting: Maximum assistance(bowel hygiene and breif change)  Additional Comments: Patient in wet brief upon arrival, with edilia wick, required assist for use of toilet and breif change. Patient is limited by generalized weakness and increased fatgiue with activity        Balance  Sitting Balance: Stand by assistance  Standing Balance: Minimal assistance  Standing Balance  Time: standng tolerance ~3 minutes  Activity: brief change and hand hygiene at sink  Comment: Patient with 1 LOB standing at sink, Min A to correct  Functional Mobility  Functional - Mobility Device: Rolling Walker  Activity: To/from bathroom  Assist Level: Minimal assistance  Functional Mobility Comments: Patient completed functional mobility to/from bathroom with CGA-Min A with RW and verbal cues for pacing, environment scanning, and posture to increase safety with mobility.   Toilet Transfers  Toilet - Technique: Ambulating  Equipment Used: Grab bars  Toilet Transfer: Contact guard assistance  Toilet Transfers Comments: Increased time and effort  Bed mobility  Supine to Sit: Stand by assistance  Scooting: Stand by assistance  Comment: With increased time and effort with use of bed rails  Transfers  Sit to stand: Contact guard assistance  Stand to sit: Contact guard assistance  Transfer Comments: CGA with verbal cues for hand placements and increased time to complete         Cognition  Overall Cognitive Status: Exceptions  Arousal/Alertness: Appropriate responses to stimuli  Following Commands:  Follows all commands without difficulty  Attention Span: Attends with cues to redirect  Memory: Decreased short term memory;Decreased long term memory  Safety Judgement: Decreased awareness of need for safety;Decreased awareness of need for assistance  Problem Solving: Assistance required to generate solutions;Assistance required to implement solutions;Decreased awareness of errors;Assistance required to correct errors made  Insights: Decreased awareness of deficits  Initiation: Requires cues for some  Sequencing: Requires cues for some      Plan   Plan  Times per week: 4-5x/week, 1-2x/day  Current Treatment Recommendations: Strengthening, Balance Training, Functional Mobility Training, Endurance Training, Safety Education & Training, Self-Care / ADL, Equipment Evaluation, Education, & procurement, Patient/Caregiver Education & Training  AM-PAC Score        AM-[facility] Inpatient Daily Activity Raw Score: 16 (09/04/20 1017)  AM-PAC Inpatient ADL T-Scale Score : 35.96 (09/04/20 1017)  ADL Inpatient CMS 0-100% Score: 53.32 (09/04/20 1017)  ADL Inpatient CMS G-Code Modifier : CK (09/04/20 1017)    Goals  Short term goals  Time Frame for Short term goals: by discharge, pt will  Short term goal 1: demo CGA with ADL transfers with good safety and AD/DME as needed  Short term goal 2: demo CGA with functional mob in room with good safety/pacing for safe ADL completions  Short term goal 3: demo CGA with toileting routine with good sfaety  Short term goal 4: demo SBA with UB ADLs with and min A with LB ADLs with good safety/pacing and DME as needed  Short term goal 5: demo and verb good understanding of fall prevention techs, pacing, possible equip needs, and B UE HEP  Patient Goals   Patient goals : to go home (does not want to go to SNF) Therapy Time   Individual Concurrent Group Co-treatment   Time In 7255         Time Out 0909         Minutes 26           Upon writer exit, call light within reach, pt retired to chair. All lines intact and patient positioned comfortably. Chair alarm in place. All patient needs addressed prior to ending therapy session. Chart reviewed prior to treatment and patient is agreeable for therapy. RN reports patient is medically stable for therapy treatment this date.   HARSHA Fam/JARROD

## 2020-09-04 NOTE — PROGRESS NOTES
Patient discharged to [facility] via Ambulance with Margoth Molina. All patient's belongings were packed and taken with patient at time of discharge. PICC line was flushed and capped. Attempted to call report to [facility] with no answer. Will attempt to call report at a later time.

## 2020-09-04 NOTE — PROGRESS NOTES
Pulmonary Critical Care Progress Note  Rand Kaufman CNP / Yayo Dodson M.D. Patient seen for the follow up of lung mass, respiratory failure    Subjective:  She is up in chair. No significant overnight events noted. RN is at bedside as well as her brother. Her shortness of breath is at her baseline. She has occasional nonproductive cough. She denies any chest pain. Examination:  Vitals: BP (!) 105/39   Pulse 70   Temp 98 °F (36.7 °C) (Oral)   Resp 18   Ht 5' 8.5\" (1.74 m)   Wt 146 lb 3 oz (66.3 kg)   SpO2 95%   BMI 21.90 kg/m²     General appearance: alert and cooperative with exam, up in chair, in no distress  Neck: No JVD  Lungs: Moderate air exchange with occasional rhonchi  Heart: regular rate and rhythm, S1, S2 normal, no gallop  Abdomen: Soft, non tender, + BS  Extremities: no cyanosis or clubbing.  No significant edema    LABs:  CBC:   Recent Labs     09/03/20  0647 09/04/20  0543   WBC 9.9 9.4   HGB 11.3* 11.4*   HCT 39.6 39.9    359     BMP:   Recent Labs     09/03/20  0647 09/04/20  0543    142   K 4.1 4.7   CO2 33* 30   BUN 17 19   CREATININE 0.81 0.84   LABGLOM >60 >60   GLUCOSE 83 40*     Impression:  · Chronic hypoxic/hypercarbic respiratory failure, on home oxygen  · 7.2 cm spiculated mass right upper lobe with invasion of mediastinum, concerning for malignancy  · Mediastinal lymphadenopathy  · 5 mm left upper lobe nodule, stable since 2016  · Centrilobular emphysema/50-pack-year smoking history  · Coagulase-negative staph bacteremia likely a contaminant  · E. coli/Aerococcus urinary tract infection-asymptomatic bacteriuria  · Frequent falls  · Diastolic heart failure s/p AWL 4963  · Actinomyces Odontolyticus bacteremia   · Bronch washings positive for squamous cell carcinoma    Recommendations:  · Oxygen by nasal cannula  · Continue antibiotics per ID, ampicillin until 9/14/2020  · Albuterol and Ipratropium Q 4 hours and prn  · Symbicort 160  · Oncology input noted  · PT/OT  · DVT prophylaxis with low molecular weight heparin  · Incentive spirometry every hour while awake  · Okay for discharge planning to SNF from pulmonary standpoint when pre-CERT obtained. Outpatient follow-up in 1 to 2 weeks. · Will discuss with discharge planner about getting outpatient PET scan scheduled.   · Discussed with RN  · Will follow with you    Electronically signed by     AV Contreras CNP on 9/4/2020 at 10:14 AM  Patient was seen under the supervision of Dr. Yenni Bonilla and Sleep Medicine,  [facility] AT [place]: [phone]

## 2020-09-04 NOTE — DISCHARGE SUMMARY
4 [facility].,    Adult Hospitalist      Patient ID: Meg Mcginnis  MRN: [id number]     [place]:  [de-identified]       Patient's PCP: Lei Lee MD    Admit Date: 8/27/2020     Discharge Date:  9/4/2020    Admitting Physician: Alexa Leger MD    Discharge Physician: Rose Marie Hightower MD     CONSULTANTS: Patient Care Team:  Lei Lee MD as PCP - General (Family Medicine)    PROCEDURES PERFORMED: Bronchoscopy on 8/31/2020    Active Discharge Diagnoses:  · Recurrent falls  · Hypoglycemia-resolved  · UTI-E. coli  · Right upper lobe spiculated mass with invasion of the mediastinum concerning for malignancy  · Mediastinal lymphadenopathy  · Left upper lobe nodule/5 mm-stable since 2016  · Chronic COPD  · Chronic hypoxic respiratory failure on home oxygen  · Status post AVR in 2017  · Tobacco use  · Diabetes type 2  · Hypertension  · Coronary artery disease  · History of AAA  · Chronic diastolic CHF  · Depression with anxiety      Primary Problem  <principal problem not specified>    Hospital Course:     69-year-old lady with past medical history of COPD, chronic hypoxic respiratory failure on home O2, status post AVR in 2017, diabetes, hypertension, coronary artery disease, history of AAA, CHF presented to ER after a fall. Per report patient has had multiple falls at home. Patient mentioned that this is been going on for 3 weeks prior to her presentation. She fell 2-3 times every week. She felt as if she does not have the strength in her legs. Patient is also complaining of cough, congestion, productive cough. Patient denies any chest pain, shortness of breath, nausea, vomiting, abdominal pain. Patient stated that she quit smoking few years ago. Her CT chest was consistent with right upper lobe spiculated mass with invasion of the mediastinum concerning for malignancy along with mediastinal lymphadenopathy, left upper lobe nodule 5 mm. It was stable since 2016.   Patient also noted to have hypoglycemia on presentation which was resolved during hospital stay. Pulmonology was consulted. Patient had a bronchoscopy on 8/31/2020 with bronchoalveolar lavage as well as endotracheal biopsies. Biopsy came back positive for non-small cell cancer/squamous cell carcinoma, mediastinal involvement with mediastinal pleura as well as a encircling right upper bronchus and also mediastinal lymphadenopathy noted suggesting locally advanced disease. Oncology was consulted. Recommended to have a PET scan and MRI brain as outpatient. Further recommendations will be based on the staging work-up. Patient has a follow-up appointment scheduled with oncology. Patient also found to have UTI for which she was a started on Rocephin. She had an echocardiogram done which shows EF 65% with moderate aortic stenosis and a grade 1 diastolic dysfunction. 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 ID was consulted. IV antibiotic was switched to IV ampicillin. Source of actinomyces is not clear. ID recommended to continue IV antibiotic till 9/14/2020. Patient got a PICC line prior to her discharge. Patient is discharged once consulting services signed off. At the time of discharge patient was hemodynamically stable and asymptomatic. The plan was discussed in detail with patient and her son who agreed with the plan and verbalized understanding . The patient was seen and examined on day of discharge and this discharge summary is in conjunction with any daily progress note from day of discharge.     Hospital Data:    Labs:    Hematology:  Recent Labs     09/02/20  0524 09/03/20  0647 09/04/20  0543   WBC 10.7 9.9 9.4   RBC 4.61 5.15* 5.22*   HGB 10.0* 11.3* 11.4*   HCT 34.7* 39.6 39.9   MCV 75.3* 76.9* 76.4*   MCH 21.7* 21.9* 21.8*   MCHC 28.8 28.5 28.6   RDW 19.8* 20.3* 20.5*    367 359   MPV 9.6 9.4 9.5     Chemistry:  Recent Labs 09/02/20  0524 09/03/20  0647 09/04/20  0543    142 142   K 4.1 4.1 4.7    101 103   CO2 28 33* 30   GLUCOSE 50* 83 40*   BUN 19 17 19   CREATININE 0.85 0.81 0.84   ANIONGAP 9 8* 9   LABGLOM >60 >60 >60   GFRAA >60 >60 >60   CALCIUM 8.5* 9.0 8.7   PROBNP 1,350* 962* 964*     Recent Labs     09/03/20  1617 09/03/20  1941 09/04/20  0629 09/04/20  0658 09/04/20  0841 09/04/20  1137   POCGLU 77 136* 40* 56* 141* 219*     Lab Results   Component Value Date    INR 1.1 08/28/2020    INR 0.9 12/28/2018    INR 0.9 10/31/2017    PROTIME 14.4 (H) 08/28/2020    PROTIME 9.7 12/28/2018    PROTIME 9.8 10/31/2017     Lab Results   Component Value Date/Time    SPECIAL NOT REPORTED 08/31/2020 03:31 PM    SPECIAL NOT REPORTED 08/31/2020 03:31 PM    SPECIAL NOT REPORTED 08/31/2020 03:31 PM    SPECIAL NOT REPORTED 08/31/2020 03:31 PM    SPECIAL NOT REPORTED 08/31/2020 03:31 PM    SPECIAL NOT REPORTED 08/31/2020 03:31 PM     Lab Results   Component Value Date/Time    CULTURE DUPLICATE ORDER 43/62/0955 03:31 PM    CULTURE PENDING 08/31/2020 03:31 PM    CULTURE NORMAL RESPIRATORY OLIVA LIGHT GROWTH 08/31/2020 03:31 PM    CULTURE DUPLICATE ORDER 37/93/7229 03:31 PM    CULTURE  08/31/2020 03:31 PM     NEGATIVE. No Herpes Simplex Virus detected by Enzyme Linked Virus Inducible System culture. at day 2    CULTURE  08/31/2020 03:31 PM     NEGATIVE for Influenza A and B, Para influenza 1,2,3, Adenovirus and RSV. at day 1    CULTURE  08/31/2020 03:31 PM     Negative results do not preclude respiratory virus infection and should not be used as the sole basis for diagnosis, treatment or other management decisions.     CULTURE NEGATIVE for Cytomegalovirus at day 3 08/31/2020 03:31 PM       Lab Results   Component Value Date    POCPH 7.36 04/12/2017    PHART 7.42 08/26/2012    PH 7.22 04/11/2017    POCPCO2 45 04/12/2017    ZRF9MNO 41 08/26/2012    PCO2 54 04/11/2017    POCPO2 93 04/12/2017    PO2ART 59 08/26/2012    PO2 89 04/11/2017 POCHCO3 25.3 2017    NGH1BVV 26.1 2012    HCO3 22.2 2017    NBEA NOT REPORTED 2017    PBEA 0 2017    FOW0YQD 27 2017    DALF5RNU 97 2017    I8ZGQHTU 92.1 2012    O2SAT 95 2017    FIO2 UNKNOWN 10/31/2017       Radiology:    Ir Insert Picc Vad W Sq Port >5 Years    Result Date: 2020  Successful ultrasound and fluoroscopy guided PICC placement         All radiological studies reviewed      Reviews of Symptoms:    A 10 point system is reviewed and  negative except described in hospital course    Physical Exam:    Vitals:  BP (!) 116/52   Pulse 77   Temp 98.1 °F (36.7 °C) (Oral)   Resp 17   Ht 5' 8.5\" (1.74 m)   Wt 146 lb 3 oz (66.3 kg)   SpO2 97%   BMI 21.90 kg/m²   Temp (24hrs), Av.1 °F (36.7 °C), Min:98 °F (36.7 °C), Max:98.2 °F (36.8 °C)      General appearance - alert, well appearing, and in no acute distress  Mental status - oriented to person, place, and time with normal affect  Head - normocephalic and atraumatic  Eyes - pupils equal and reactive, extraocular eye movements intact, conjunctiva clear  Ears - hearing appears to be intact  Nose - no drainage noted  Mouth - mucous membranes moist  Neck - supple, no carotid bruits, thyroid not palpable  Chest - clear to auscultation, normal effort  Heart - normal rate, regular rhythm, no murmur  Abdomen - soft, nontender, nondistended, bowel sounds present all four quadrants, no masses, hepatomegaly or splenomegaly  Neurological - normal speech, no focal findings or movement disorder noted, cranial nerves II through XII grossly intact  Extremities - peripheral pulses palpable, no pedal edema or calf pain with palpation  Skin - no gross lesions, rashes, or induration noted      Consults:  IP CONSULT TO INTERNAL MEDICINE  IP CONSULT TO PULMONOLOGY  IP CONSULT TO PULMONOLOGY  IP CONSULT TO SOCIAL WORK  IP CONSULT TO INTERVENTIONAL RADIOLOGY  IP CONSULT TO INFECTIOUS DISEASES  IP CONSULT TO ONCOLOGY    Disposition: SNF    Discharged Condition: Stable    Follow Up: Wayne Rdz MD  [address] [facility]  [facility]. [address] [address]  [phone]  On 9/11/2020  Appointment for PET scan 9-11-20 at 12:30pm (be there by noon)      Lab Frequency Next Occurrence   PET CT SKULL BASE TO MID THIGH Once 09/11/2020   Up with assistance PRN    Up with assistance PRN    Intake and output EVERY 8 HOURS    Initiate Oxygen Therapy Protocol DAILY (RT)    Pulse Oximetry Spot Check PRN    CBC DAILY    Basic Metabolic Prof DAILY    POCT Glucose 4 TIMES DAILY BEFORE MEALS & AT BEDTIME    HYPOGLYCEMIA TREATMENT: blood glucose less than 50 mg/dL and patient  ALERT and TOLERATING PO PRN    HYPOGLYCEMIA TREATMENT: blood glucose less than 70 mg/dL and patient ALERT and TOLERATING PO PRN    HYPOGLYCEMIA TREATMENT: blood glucose less than 70 mg/dL and patient NOT ALERT or NPO PRN    POCT Glucose PRN    Respiratory care evaluation only DAILY (RT)    HHN Treatment EVERY 4 HOURS WHILE AWAKE    Nasal Cannula Oxygen DAILY (RT)    Brain Natriuretic Peptide DAILY    Basic Metabolic Panel DAILY    Basic Metabolic Panel w/ Reflex to MG DAILY    Lactic Acid DAILY          Diet: DIET CARB CONTROL; Discharge Medications:    Elizabeth Stalin   Home Medication Instructions [id number]    Printed on:09/04/20 6926   Medication Information                      ampicillin (OMNIPEN) infusion  Infuse 2,000 mg intravenously every 6 hours for 10 days Compound per protocol. aspirin EC 81 MG EC tablet  Take 81 mg by mouth daily             benzonatate (TESSALON) 100 MG capsule  Take 1 capsule by mouth 3 times daily as needed for Cough             clonazePAM (KLONOPIN) 1 MG tablet  Take 1 tablet by mouth 3 times daily as needed for Anxiety for up to 5 doses.              furosemide (LASIX) 40 MG tablet  Take 1 tablet by mouth daily Hasn't taken in \"long time\"             gabapentin (NEURONTIN) 400 MG capsule  Take 1 capsule by mouth 2 times daily for 1 day. And 1 capsules nightly             glimepiride (AMARYL) 1 MG tablet  Take 2 mg by mouth every morning In addition to 1mg nightly             glimepiride (AMARYL) 1 MG tablet  Take 1 mg by mouth Daily with supper In addition to 2 tablets (=2mg) every morning              lansoprazole (PREVACID) 30 MG delayed release capsule  Take 30 mg by mouth daily             metFORMIN (GLUCOPHAGE) 500 MG tablet  Take 500 mg by mouth 2 times daily             metoprolol tartrate (LOPRESSOR) 25 MG tablet  Take 25 mg by mouth 2 times daily             mometasone-formoterol (DULERA) 200-5 MCG/ACT inhaler  Inhale 2 puffs into the lungs every 12 hours             oxyCODONE-acetaminophen (PERCOCET) 5-325 MG per tablet  Take 1 tablet by mouth 4 times daily as needed for Pain for up to 5 doses. traZODone (DESYREL) 150 MG tablet  Take 300 mg by mouth nightly Take 2 tablets (=300mg) nightly             venlafaxine (EFFEXOR XR) 150 MG extended release capsule  Take 150 mg by mouth 2 times daily                 Code Status:  Full Code    Time Spent on discharge is  42 minutes in patient examination, evaluation, counseling as well as medication reconciliation, prescriptions for required medications, discharge plan and follow up. Electronically signed by Beto Lee MD on 9/4/2020 at 2:55 PM     Thank you Dr. Aidee Davis MD for the opportunity to be involved in this patient's care. This note was created with the assistance of a speech-recognition program.  Although the intention is to generate a document that actually reflects the content of the visit, no guarantees can be provided that every mistake has been identified and corrected by editing. Note was updated later by me after  physical examination and  completion of the assessment.

## 2020-09-04 NOTE — PLAN OF CARE
Problem: Falls - Risk of:  Goal: Will remain free from falls  Description: Will remain free from falls  9/4/2020 1503 by Evonne Carr RN  Outcome: Ongoing  Note: Patient is a fall risk during this admission. Fall risk assessment was performed. Patient is absent of falls. Bed is in the lowest position. Wheels on the bed are locked. Call light and bed side table are within reach. Clutter is removed. Patient was educated to call out when needing assistance or wanting to get out of bed. Patient offered toileting assistance during rounding. Hourly rounds have been performed.        Problem: Skin Integrity:  Goal: Will show no infection signs and symptoms  Description: Will show no infection signs and symptoms  9/4/2020 1503 by Evonne Carr RN  Outcome: Ongoing     Problem: Pain:  Goal: Pain level will decrease  Description: Pain level will decrease  9/4/2020 1503 by Evonne Carr RN  Outcome: Ongoing     Problem: Pain:  Goal: Control of chronic pain  Description: Control of chronic pain  9/4/2020 1503 by Evonne Carr RN  Outcome: Ongoing

## 2020-09-05 NOTE — PROGRESS NOTES
Second attempt to call report to [facility] made and report was given to [facility].

## 2020-09-06 LAB
CULTURE: ABNORMAL
CULTURE: ABNORMAL
Lab: ABNORMAL
SPECIMEN DESCRIPTION: ABNORMAL

## 2020-09-08 ENCOUNTER — TELEPHONE (OUTPATIENT)
Dept: ONCOLOGY | Age: 70
End: 2020-09-08

## 2020-09-08 LAB
CULTURE: NORMAL
Lab: NORMAL
SPECIMEN DESCRIPTION: NORMAL

## 2020-09-08 NOTE — TELEPHONE ENCOUNTER
Writer covering for [place]. Received assignment for pt's case r/t recent bronchial washings positive for squamous cell carcinoma. Noted Dr. Lourdes Gates completed inpatient consultation, pt dc'd from [facility] to [facility] 9/4/2020 & PET/CT scan scheduled 9/11/2020. Spoke with Dr. Lourdes Gates updated on findings. Dr. Lourdes Gates stated will place order for MRI brain, requested MRI brain scheduled, & pt scheduled for post hospital f/u 9/16/2020 to discuss PET/CT along with MRI brain results. Ester Valenzuela, [facility]/ , updated on pt & conversation with Dr. Lourdes Gates.

## 2020-09-09 NOTE — TELEPHONE ENCOUNTER
Tried calling the [facility] ([phone]) phone only rings, no answer. Tried calling twice. Then tried calling patient's cellphone and voice mail is full.

## 2020-09-09 NOTE — TELEPHONE ENCOUNTER
I CALLED AND SPOKE TO [place] WITH [facility] SCHEDULING AS DR. RUBIN ORDERED A CT SCAN TO REPLACE THE MRI. CT SCHEUDLED FOR [facility] ON 9/12/20 AT 10 AM WITH ARRIVAL TIME OF 9:45AM. I CALLED THE [address] [place] [address] Dr SPOKE TO Maria Guadalupe Lemus. I GAVE ALIYA THE INFO FOR THE CT SCAN AND WE SCHEDULED PT FOR 9/16/20 AT 1:15 PM WITH DR. RUBIN AT [address] [place] [address].

## 2020-09-10 ENCOUNTER — HOSPITAL ENCOUNTER (OUTPATIENT)
Facility: MEDICAL CENTER | Age: 70
End: 2020-09-10

## 2020-09-10 ENCOUNTER — HOSPITAL ENCOUNTER (OUTPATIENT)
Age: 70
Setting detail: SPECIMEN
Discharge: HOME | End: 2020-09-10
Payer: MEDICARE

## 2020-09-10 LAB
ANION GAP SERPL CALCULATED.3IONS-SCNC: 12 MMOL/L (ref 9–17)
BUN BLDV-MCNC: 12 MG/DL (ref 8–23)
BUN/CREAT BLD: ABNORMAL (ref 9–20)
CALCIUM SERPL-MCNC: 9.3 MG/DL (ref 8.6–10.4)
CHLORIDE BLD-SCNC: 104 MMOL/L (ref 98–107)
CO2: 29 MMOL/L (ref 20–31)
CREAT SERPL-MCNC: 0.71 MG/DL (ref 0.5–0.9)
GFR AFRICAN AMERICAN: >60 ML/MIN
GFR NON-AFRICAN AMERICAN: >60 ML/MIN
GFR SERPL CREATININE-BSD FRML MDRD: ABNORMAL ML/MIN/{1.73_M2}
GFR SERPL CREATININE-BSD FRML MDRD: ABNORMAL ML/MIN/{1.73_M2}
GLUCOSE BLD-MCNC: 82 MG/DL (ref 70–99)
HCT VFR BLD CALC: 42 % (ref 36.3–47.1)
HEMOGLOBIN: 12 G/DL (ref 11.9–15.1)
MCH RBC QN AUTO: 22.1 PG (ref 25.2–33.5)
MCHC RBC AUTO-ENTMCNC: 28.6 G/DL (ref 28.4–34.8)
MCV RBC AUTO: 77.3 FL (ref 82.6–102.9)
NRBC AUTOMATED: 0 PER 100 WBC
PDW BLD-RTO: 21.5 % (ref 11.8–14.4)
PLATELET # BLD: 437 K/UL (ref 138–453)
PMV BLD AUTO: 10.9 FL (ref 8.1–13.5)
POTASSIUM SERPL-SCNC: 3.9 MMOL/L (ref 3.7–5.3)
RBC # BLD: 5.43 M/UL (ref 3.95–5.11)
SODIUM BLD-SCNC: 145 MMOL/L (ref 135–144)
WBC # BLD: 12.5 K/UL (ref 3.5–11.3)

## 2020-09-10 PROCEDURE — P9603 ONE-WAY ALLOW PRORATED MILES: HCPCS

## 2020-09-10 PROCEDURE — 80048 BASIC METABOLIC PNL TOTAL CA: CPT

## 2020-09-10 PROCEDURE — 36415 COLL VENOUS BLD VENIPUNCTURE: CPT

## 2020-09-10 PROCEDURE — 85027 COMPLETE CBC AUTOMATED: CPT

## 2020-09-10 NOTE — TELEPHONE ENCOUNTER
Message from [facility] [phone], message left that they cannot get transportation for Ct scan on Saturday and they are cancelling     Called scheduling-notified of patient cancellation     Called [facility] x 2, rang over 20 times each, no answer     [facility] who would like us to reschedule and call them back     called scheduling, rescheduled Ct brain for 9- at 2pm and md visit 9- 1030.      [facility] spoke [place], [facility] appts given

## 2020-09-11 ENCOUNTER — HOSPITAL ENCOUNTER (OUTPATIENT)
Dept: NUCLEAR MEDICINE | Age: 70
Discharge: HOME | End: 2020-09-13
Payer: MEDICARE

## 2020-09-11 LAB — GLUCOSE BLD-MCNC: 73 MG/DL (ref 65–105)

## 2020-09-11 PROCEDURE — 3430000000 HC RX DIAGNOSTIC RADIOPHARMACEUTICAL: Performed by: NURSE PRACTITIONER

## 2020-09-11 PROCEDURE — 82947 ASSAY GLUCOSE BLOOD QUANT: CPT

## 2020-09-11 PROCEDURE — 78815 PET IMAGE W/CT SKULL-THIGH: CPT

## 2020-09-11 PROCEDURE — A9552 F18 FDG: HCPCS | Performed by: NURSE PRACTITIONER

## 2020-09-11 PROCEDURE — 2580000003 HC RX 258: Performed by: NURSE PRACTITIONER

## 2020-09-11 RX ORDER — FLUDEOXYGLUCOSE F 18 200 MCI/ML
10 INJECTION, SOLUTION INTRAVENOUS
Status: COMPLETED | OUTPATIENT
Start: 2020-09-11 | End: 2020-09-11

## 2020-09-11 RX ORDER — SODIUM CHLORIDE 0.9 % (FLUSH) 0.9 %
10 SYRINGE (ML) INJECTION ONCE
Status: COMPLETED | OUTPATIENT
Start: 2020-09-11 | End: 2020-09-11

## 2020-09-11 RX ADMIN — Medication 10 ML: at 12:14

## 2020-09-11 RX ADMIN — FLUDEOXYGLUCOSE F 18 12.2 MILLICURIE: 200 INJECTION, SOLUTION INTRAVENOUS at 12:11

## 2020-09-14 ENCOUNTER — TELEPHONE (OUTPATIENT)
Dept: CASE MANAGEMENT | Age: 70
End: 2020-09-14

## 2020-09-14 ENCOUNTER — HOSPITAL ENCOUNTER (OUTPATIENT)
Facility: MEDICAL CENTER | Age: 70
End: 2020-09-14

## 2020-09-16 ENCOUNTER — HOSPITAL ENCOUNTER (OUTPATIENT)
Dept: CT IMAGING | Age: 70
Discharge: HOME | End: 2020-09-18
Payer: MEDICARE

## 2020-09-16 PROCEDURE — 70450 CT HEAD/BRAIN W/O DYE: CPT

## 2020-09-17 ENCOUNTER — HOSPITAL ENCOUNTER (OUTPATIENT)
Age: 70
Setting detail: SPECIMEN
Discharge: HOME | End: 2020-09-17
Payer: MEDICARE

## 2020-09-17 LAB
ANION GAP SERPL CALCULATED.3IONS-SCNC: 11 MMOL/L (ref 9–17)
BUN BLDV-MCNC: 12 MG/DL (ref 8–23)
BUN/CREAT BLD: ABNORMAL (ref 9–20)
CALCIUM SERPL-MCNC: 9.5 MG/DL (ref 8.6–10.4)
CHLORIDE BLD-SCNC: 101 MMOL/L (ref 98–107)
CO2: 28 MMOL/L (ref 20–31)
CREAT SERPL-MCNC: 0.79 MG/DL (ref 0.5–0.9)
GFR AFRICAN AMERICAN: >60 ML/MIN
GFR NON-AFRICAN AMERICAN: >60 ML/MIN
GFR SERPL CREATININE-BSD FRML MDRD: ABNORMAL ML/MIN/{1.73_M2}
GFR SERPL CREATININE-BSD FRML MDRD: ABNORMAL ML/MIN/{1.73_M2}
GLUCOSE BLD-MCNC: 124 MG/DL (ref 70–99)
HCT VFR BLD CALC: 38 % (ref 36.3–47.1)
HEMOGLOBIN: 11.2 G/DL (ref 11.9–15.1)
MCH RBC QN AUTO: 22.6 PG (ref 25.2–33.5)
MCHC RBC AUTO-ENTMCNC: 29.5 G/DL (ref 28.4–34.8)
MCV RBC AUTO: 76.8 FL (ref 82.6–102.9)
NRBC AUTOMATED: 0 PER 100 WBC
PDW BLD-RTO: 22.4 % (ref 11.8–14.4)
PLATELET # BLD: 343 K/UL (ref 138–453)
PMV BLD AUTO: 10.4 FL (ref 8.1–13.5)
POTASSIUM SERPL-SCNC: 4.2 MMOL/L (ref 3.7–5.3)
RBC # BLD: 4.95 M/UL (ref 3.95–5.11)
SODIUM BLD-SCNC: 140 MMOL/L (ref 135–144)
WBC # BLD: 11.7 K/UL (ref 3.5–11.3)

## 2020-09-17 PROCEDURE — P9603 ONE-WAY ALLOW PRORATED MILES: HCPCS

## 2020-09-17 PROCEDURE — 80048 BASIC METABOLIC PNL TOTAL CA: CPT

## 2020-09-17 PROCEDURE — 36415 COLL VENOUS BLD VENIPUNCTURE: CPT

## 2020-09-17 PROCEDURE — 85027 COMPLETE CBC AUTOMATED: CPT

## 2020-09-18 ENCOUNTER — TELEPHONE (OUTPATIENT)
Dept: ONCOLOGY | Age: 70
End: 2020-09-18

## 2020-09-18 ENCOUNTER — TELEPHONE (OUTPATIENT)
Dept: RADIATION ONCOLOGY | Facility: MEDICAL CENTER | Age: 70
End: 2020-09-18

## 2020-09-18 ENCOUNTER — OFFICE VISIT (OUTPATIENT)
Dept: ONCOLOGY | Age: 70
End: 2020-09-18
Payer: MEDICARE

## 2020-09-18 VITALS
TEMPERATURE: 97.5 F | BODY MASS INDEX: 21.37 KG/M2 | HEIGHT: 69 IN | WEIGHT: 144.3 LBS | DIASTOLIC BLOOD PRESSURE: 40 MMHG | SYSTOLIC BLOOD PRESSURE: 88 MMHG | HEART RATE: 51 BPM

## 2020-09-18 PROBLEM — C34.11 MALIGNANT NEOPLASM OF UPPER LOBE OF RIGHT LUNG (HCC): Status: ACTIVE | Noted: 2020-09-18

## 2020-09-18 PROCEDURE — 99212 OFFICE O/P EST SF 10 MIN: CPT

## 2020-09-18 PROCEDURE — 99211 OFF/OP EST MAY X REQ PHY/QHP: CPT | Performed by: INTERNAL MEDICINE

## 2020-09-18 PROCEDURE — 99215 OFFICE O/P EST HI 40 MIN: CPT | Performed by: INTERNAL MEDICINE

## 2020-09-18 RX ORDER — OXYCODONE HYDROCHLORIDE AND ACETAMINOPHEN 5; 325 MG/1; MG/1
1 TABLET ORAL EVERY 6 HOURS PRN
COMMUNITY
End: 2020-09-25 | Stop reason: ALTCHOICE

## 2020-09-18 NOTE — TELEPHONE ENCOUNTER
Received call from [address] for consult request for Christina Fabian. I tried to call [facility] to schedule [phone]. Mailbox full can't leave a message. Patient Is at [facility]. Will try again on Monday to reach Mira Sultana.

## 2020-09-18 NOTE — PROGRESS NOTES
Today's Date: 9/18/2020  Patient Name: Laura Raines  Patient's age: 72 y.o., 1950    Diagnosis:   RUL squamous cell carcinoma,   Cancer Staging  Malignant neoplasm of upper lobe of right lung [facility])  Staging form: Lung, AJCC 8th Edition  - Clinical stage from 9/18/2020: Stage IIIB (cT4, cN2, cM0) - Signed by Rubi English MD on 9/18/2020    Current therapy:  Plan for concurrent chemoradiation    CHIEF COMPLAINT:    Chief Complaint   Patient presents with    Follow[facility]Up from [facility] Results     CT     INTERVAL HISTORY:    Emmanuel Luna is returning for follow-up visit and to discuss PET scan results and further recommendations. She is accompanied by her son Fer Rocha during today's office visit. She denies any chest pain or shortness of breath. She is still at rehab and receiving physical therapy and recovering well. During this visit patient's allergy, social, medical, surgical history and medications were reviewed and updated. HISTORY OF PRESENT ILLNESS:    Emmanuel Luna is a 81-year-old female with a past medical history of COPD, history of tobacco abuse, depression, CAD, arthritis was admitted with multiple falls at home. She complains of back pain and also chronic cough. On admission she had a CT scan of the chest which showed 7.2 cm spiculated mass in the right upper lobe with mediastinal lymphadenopathy suspicious for malignancy. 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 Oncology consulted for further evaluation. Patient has COPD and uses Home oxygen at night and sues walker at home. She smokes more than a PPD. 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     Past Medical History:   has a past medical history of AAA (abdominal aortic aneurysm) ([facility] [address] Acid reflux, Anxiety and depression, Aortic stenosis, Arthritis, Blister of ankle, right, CAD (coronary artery disease), COPD (chronic obstructive pulmonary disease) ([facility] [address] Diabetes mellitus ([facility] [address] Falls, Heart block, Hypokalemia, MDRO (multiple drug resistant organisms) resistance, MRSA (methicillin resistant staph aureus) culture positive, On home oxygen therapy, On home oxygen therapy, Overactive bladder, Pneumonia, PONV (postoperative nausea and vomiting), and Vitamin D deficiency. Past Surgical History:   has a past surgical history that includes back surgery; eye surgery; Cholecystectomy; Appendectomy; Hysterectomy; Tonsillectomy; lumbar laminectomy; Endoscopy, colon, diagnostic (12/08/2016); aortic valve repair (N/A, 4/11/2017); bronchoscopy (N/A, 8/31/2020); and IR INSERT PICC VAD W SQ PORT >5 YEARS (9/4/2020). Medications:    Current Outpatient Medications   Medication Sig Dispense Refill    Melatonin-Pyridoxine (MELATIN PO) Take 5 mg by mouth nightly      oxyCODONE-acetaminophen (PERCOCET) 5-325 MG per tablet Take 1 tablet by mouth every 6 hours as needed for Pain.  clonazePAM (KLONOPIN) 1 MG tablet Take 1 tablet by mouth 3 times daily as needed for Anxiety for up to 5 doses. 5 tablet 0    gabapentin (NEURONTIN) 400 MG capsule Take 1 capsule by mouth 2 times daily for 1 day.  And 1 capsules nightly 2 capsule 0    furosemide (LASIX) 40 MG tablet Take 1 tablet by mouth daily Hasn't taken in \"long time\" 60 tablet 3    traZODone (DESYREL) 150 MG tablet Take 2 tablets by mouth nightly for 5 doses Take 2 tablets (=300mg) nightly 10 tablet 0    glimepiride (AMARYL) 1 MG tablet Take 1 mg by mouth Daily with supper In addition to 2 tablets (=2mg) every morning       metoprolol tartrate (LOPRESSOR) 25 MG tablet Take 25 mg by mouth 2 times daily      venlafaxine (EFFEXOR XR) 150 MG extended release capsule Take 150 mg by mouth 2 times daily      lansoprazole (PREVACID) 30 MG delayed release capsule Take 30 mg by mouth daily      metFORMIN (GLUCOPHAGE) 500 MG tablet Take 500 mg by mouth 2 times daily      aspirin EC 81 MG EC tablet Take 81 mg by mouth daily      mometasone-formoterol (DULERA) 200-5 MCG/ACT inhaler Inhale 2 puffs into the lungs every 12 hours      glimepiride (AMARYL) 1 MG tablet Take 2 mg by mouth every morning In addition to 1mg nightly       No current facility-administered medications for this visit. Allergies:  Codeine and Dye [iodides]    Social History:   reports that she quit smoking about 4 years ago. She has never used smokeless tobacco. She reports that she does not drink alcohol or use drugs. Family History: family history includes Cancer in her father and mother. REVIEW OF SYSTEMS:    Constitutional: No fever or chills. No night sweats, no weight loss   Eyes: No eye discharge, double vision, or eye pain   HEENT: negative for sore mouth, sore throat, hoarseness and voice change   Respiratory: negative for cough , sputum, dyspnea, wheezing, hemoptysis, chest pain   Cardiovascular: negative for chest pain, dyspnea, palpitations, orthopnea, PND   Gastrointestinal: negative for nausea, vomiting, diarrhea, constipation, abdominal pain, Dysphagia, hematemesis and hematochezia   Genitourinary: negative for frequency, dysuria, nocturia, urinary incontinence, and hematuria   Integument: negative for rash, skin lesions, bruises.    Hematologic/Lymphatic: negative for easy bruising, bleeding, lymphadenopathy, or petechiae   Endocrine: negative for heat or cold intolerance,weight changes, change in bowel habits and hair loss   Musculoskeletal: negative for myalgias, arthralgias, pain, joint swelling,and bone pain   Neurological: negative for headaches, dizziness, seizures, weakness, numbness    PHYSICAL EXAM:      BP (!) 88/40   Pulse 51   Temp 97.5 °F (36.4 °C) (Temporal)   Ht 5' 8.5\" (1.74 m)   Wt 144 lb 4.8 oz (65.5 kg)   BMI 21.62 kg/m²    Temp (24hrs), Av.7 °F (36.5 °C), Min:97.3 °F (36.3 °C), Max:98.1 °F (36.7 °C)  General appearance - well appearing, no in pain or distress   Mental status - alert and cooperative   Eyes - pupils equal and reactive, extraocular eye movements intact   Ears - bilateral TM's and external ear canals normal   Mouth - mucous membranes moist, pharynx normal without lesions   Neck - supple, no significant adenopathy   Lymphatics - no palpable lymphadenopathy, no hepatosplenomegaly   Chest - clear to auscultation, no wheezes, rales or rhonchi, symmetric air entry   Heart - normal rate, regular rhythm, normal S1, S2, no murmurs  Abdomen - soft, nontender, nondistended, no masses or organomegaly   Neurological - alert, oriented, normal speech, no focal findings or movement disorder noted   Musculoskeletal - no joint tenderness, deformity or swelling   Extremities - peripheral pulses normal, no pedal edema, no clubbing or cyanosis   Skin - normal coloration and turgor, no rashes, no suspicious skin lesions noted ,    DATA:    Labs:   CBC:   Recent Labs     09/17/20  0842   WBC 11.7*   HGB 11.2*   HCT 38.0        BMP:   Recent Labs     09/17/20  0842      K 4.2   CO2 28   BUN 12   CREATININE 0.79   LABGLOM >60   GLUCOSE 124*     PT/INR: No results for input(s): PROTIME, INR in the last 72 hours. Surgical Pathology Report   Surgical Pathology   Collected:  08/31/20 0803    Lab status:  Final    Resulting lab:  [facility]    Value:  -- Diagnosis --     BRONCHIAL WASHINGS RIGHT UPPER LOBE:          POSITIVE FOR MALIGNANCY.        SQUAMOUS CELL CARCINOMA.           COMMENT: VOLUME OF TUMOR IN THE CELL BLOCK IS LOW AND ADDITIONAL   MATERIAL WOULD BE REQUIRED IF MOLECULAR TESTING IS NECESSARY. IMAGING DATA:  CT chest 8/27/2020:   FINDINGS:    Mediastinum: Three vessel coronary artery calcification.  Newly enlarged    mediastinal lymph nodes including example station 7 node measuring 3.1 x 2.1    cm on series 2, image 45.         Lungs/pleura: Mild upper lobe predominant centrilobular emphysema. Collette Ruts     Spiculated mass of the right upper lobe measuring 7.2 x 6.5 effects with patient  7. We will refer her to radiation oncology  8. Arrange for chemo teaching  9. Return to clinic with cycle  10. Patient's questions were sought and answered to her satisfaction      Mendoza Kaur MD  Hematologist/Medical Oncologist    This note is created with the assistance of a speech recognition program.  While intending to generate a document that actually reflects the content of the visit, the document can still have some errors including those of syntax and sound a like substitutions which may escape proof reading. It such instances, actual meaning can be extrapolated by contextual diversion.

## 2020-09-18 NOTE — TELEPHONE ENCOUNTER
[facility] MD VISIT  DR Delfina Bauer IN TO SEE PATIENT  ORDERS RECEIVED  CHEMO TEACH  IR FOR PORT PLACEMENT  REFER TO RAD/ONC  RV W/C1 D1  CALLED RAD/ONC, CLOSED FOR LUNCH, LEFT VOICEMAIL  CALLED IR, NO ANSWER, LEFT VOICEMAIL & FAXED ORDER [phone], CONFIRMATION SCANNED TO CHART  NEW CHEMO ORDERS ARE PENDING IN PRECERT, NOTE PRINTED AND GIVEN TO SHAYNE IN PRECERT  AVS PRINTED AND GIVEN TO PATIENT WITH INSTRUCTIONS  PATIENT DISCHARGED VIA WHEELCHAIR

## 2020-09-22 ENCOUNTER — TELEPHONE (OUTPATIENT)
Dept: ONCOLOGY | Age: 70
End: 2020-09-22

## 2020-09-22 ENCOUNTER — TELEPHONE (OUTPATIENT)
Dept: INFUSION THERAPY | Facility: MEDICAL CENTER | Age: 70
End: 2020-09-22

## 2020-09-22 ENCOUNTER — HOSPITAL ENCOUNTER (OUTPATIENT)
Facility: MEDICAL CENTER | Age: 70
Discharge: HOME | End: 2020-09-22
Payer: MEDICARE

## 2020-09-22 RX ORDER — DEXAMETHASONE 4 MG/1
20 TABLET ORAL SEE ADMIN INSTRUCTIONS
Qty: 30 TABLET | Refills: 2 | Status: ON HOLD | OUTPATIENT
Start: 2020-09-22 | End: 2020-09-27 | Stop reason: HOSPADM

## 2020-09-22 NOTE — TELEPHONE ENCOUNTER
Received new chemo orders today. Xsgsal=692 cm. Weight =65.5 kg. BSA=1.78. Cycle =weekly with radiation. Taxol 45 mg/m2=78 mg IV. Carboplatin AUC 2. Decadron script sent to MD to sign. Labs in [address] [place] [address] Rd.   Sent to pool for second RN check,

## 2020-09-23 ENCOUNTER — TELEPHONE (OUTPATIENT)
Dept: INFUSION THERAPY | Facility: MEDICAL CENTER | Age: 70
End: 2020-09-23

## 2020-09-23 NOTE — TELEPHONE ENCOUNTER
THE CHEMOTHERAPY FOR STEPHENIE IS APPROVED AND READY TO SCHEDULE. PT IS A CONCURRENT WITH RADIATION SO ONCE THEIR TREATMENT PLAN IS COMPLETED, PT CAN THEN GET SCHEDULED.

## 2020-09-24 ENCOUNTER — TELEPHONE (OUTPATIENT)
Dept: RADIATION ONCOLOGY | Facility: MEDICAL CENTER | Age: 70
End: 2020-09-24

## 2020-09-24 ENCOUNTER — TELEPHONE (OUTPATIENT)
Dept: ONCOLOGY | Age: 70
End: 2020-09-24

## 2020-09-24 ENCOUNTER — HOSPITAL ENCOUNTER (EMERGENCY)
Age: 70
Discharge: HOME | DRG: 312 | End: 2020-09-24
Attending: EMERGENCY MEDICINE
Payer: MEDICARE

## 2020-09-24 ENCOUNTER — APPOINTMENT (OUTPATIENT)
Dept: CT IMAGING | Age: 70
DRG: 312 | End: 2020-09-24
Payer: MEDICARE

## 2020-09-24 VITALS
DIASTOLIC BLOOD PRESSURE: 74 MMHG | BODY MASS INDEX: 21.39 KG/M2 | SYSTOLIC BLOOD PRESSURE: 144 MMHG | TEMPERATURE: 97.9 F | HEART RATE: 97 BPM | OXYGEN SATURATION: 95 % | WEIGHT: 144.4 LBS | RESPIRATION RATE: 18 BRPM | HEIGHT: 69 IN

## 2020-09-24 LAB
-: ABNORMAL
ABSOLUTE EOS #: 0 K/UL (ref 0–0.44)
ABSOLUTE IMMATURE GRANULOCYTE: 0.13 K/UL (ref 0–0.3)
ABSOLUTE LYMPH #: 1.63 K/UL (ref 1.1–3.7)
ABSOLUTE MONO #: 0.38 K/UL (ref 0.1–1.2)
ALBUMIN SERPL-MCNC: 3.8 G/DL (ref 3.5–5.2)
ALBUMIN/GLOBULIN RATIO: ABNORMAL (ref 1–2.5)
ALP BLD-CCNC: 42 U/L (ref 35–104)
ALT SERPL-CCNC: 9 U/L (ref 5–33)
AMORPHOUS: ABNORMAL
ANION GAP SERPL CALCULATED.3IONS-SCNC: 11 MMOL/L (ref 9–17)
AST SERPL-CCNC: 14 U/L
BACTERIA: ABNORMAL
BASOPHILS # BLD: 1 % (ref 0–2)
BASOPHILS ABSOLUTE: 0.13 K/UL (ref 0–0.2)
BILIRUB SERPL-MCNC: 0.21 MG/DL (ref 0.3–1.2)
BILIRUBIN DIRECT: <0.08 MG/DL
BILIRUBIN URINE: NEGATIVE
BILIRUBIN, INDIRECT: ABNORMAL MG/DL (ref 0–1)
BUN BLDV-MCNC: 18 MG/DL (ref 8–23)
BUN/CREAT BLD: 26 (ref 9–20)
CALCIUM SERPL-MCNC: 9.5 MG/DL (ref 8.6–10.4)
CASTS UA: ABNORMAL /LPF
CHLORIDE BLD-SCNC: 93 MMOL/L (ref 98–107)
CO2: 28 MMOL/L (ref 20–31)
COLOR: YELLOW
COMMENT UA: ABNORMAL
CREAT SERPL-MCNC: 0.68 MG/DL (ref 0.5–0.9)
CRYSTALS, UA: ABNORMAL /HPF
DIFFERENTIAL TYPE: ABNORMAL
EKG ATRIAL RATE: 103 BPM
EKG P AXIS: 74 DEGREES
EKG P-R INTERVAL: 162 MS
EKG Q-T INTERVAL: 402 MS
EKG QRS DURATION: 154 MS
EKG QTC CALCULATION (BAZETT): 526 MS
EKG R AXIS: -54 DEGREES
EKG T AXIS: 56 DEGREES
EKG VENTRICULAR RATE: 103 BPM
EOSINOPHILS RELATIVE PERCENT: 0 % (ref 1–4)
EPITHELIAL CELLS UA: ABNORMAL /HPF (ref 0–5)
GFR AFRICAN AMERICAN: >60 ML/MIN
GFR NON-AFRICAN AMERICAN: >60 ML/MIN
GFR SERPL CREATININE-BSD FRML MDRD: ABNORMAL ML/MIN/{1.73_M2}
GFR SERPL CREATININE-BSD FRML MDRD: ABNORMAL ML/MIN/{1.73_M2}
GLOBULIN: ABNORMAL G/DL (ref 1.5–3.8)
GLUCOSE BLD-MCNC: 145 MG/DL (ref 70–99)
GLUCOSE URINE: NEGATIVE
HCT VFR BLD CALC: 36.3 % (ref 36.3–47.1)
HEMOGLOBIN: 11 G/DL (ref 11.9–15.1)
IMMATURE GRANULOCYTES: 1 %
KETONES, URINE: NEGATIVE
LACTIC ACID: 1 MMOL/L (ref 0.5–2.2)
LEUKOCYTE ESTERASE, URINE: NEGATIVE
LIPASE: 11 U/L (ref 13–60)
LYMPHOCYTES # BLD: 13 % (ref 24–43)
MCH RBC QN AUTO: 23 PG (ref 25.2–33.5)
MCHC RBC AUTO-ENTMCNC: 30.3 G/DL (ref 28.4–34.8)
MCV RBC AUTO: 75.8 FL (ref 82.6–102.9)
MONOCYTES # BLD: 3 % (ref 3–12)
MORPHOLOGY: ABNORMAL
MUCUS: ABNORMAL
NITRITE, URINE: NEGATIVE
NRBC AUTOMATED: 0 PER 100 WBC
OTHER OBSERVATIONS UA: ABNORMAL
PDW BLD-RTO: 21.4 % (ref 11.8–14.4)
PH UA: 6.5 (ref 5–8)
PLATELET # BLD: 319 K/UL (ref 138–453)
PLATELET ESTIMATE: ABNORMAL
PMV BLD AUTO: 9.6 FL (ref 8.1–13.5)
POTASSIUM SERPL-SCNC: 4.8 MMOL/L (ref 3.7–5.3)
PROTEIN UA: ABNORMAL
RBC # BLD: 4.79 M/UL (ref 3.95–5.11)
RBC # BLD: ABNORMAL 10*6/UL
RBC UA: ABNORMAL /HPF (ref 0–2)
RENAL EPITHELIAL, UA: ABNORMAL /HPF
SEG NEUTROPHILS: 82 % (ref 36–65)
SEGMENTED NEUTROPHILS ABSOLUTE COUNT: 10.23 K/UL (ref 1.5–8.1)
SODIUM BLD-SCNC: 132 MMOL/L (ref 135–144)
SPECIFIC GRAVITY UA: 1.01 (ref 1–1.03)
TOTAL PROTEIN: 7.6 G/DL (ref 6.4–8.3)
TRICHOMONAS: ABNORMAL
TROPONIN INTERP: NORMAL
TROPONIN T: NORMAL NG/ML
TROPONIN, HIGH SENSITIVITY: 14 NG/L (ref 0–14)
TURBIDITY: CLEAR
URINE HGB: NEGATIVE
UROBILINOGEN, URINE: NORMAL
WBC # BLD: 12.5 K/UL (ref 3.5–11.3)
WBC # BLD: ABNORMAL 10*3/UL
WBC UA: ABNORMAL /HPF (ref 0–5)
YEAST: ABNORMAL

## 2020-09-24 PROCEDURE — 84484 ASSAY OF TROPONIN QUANT: CPT

## 2020-09-24 PROCEDURE — 2580000003 HC RX 258: Performed by: EMERGENCY MEDICINE

## 2020-09-24 PROCEDURE — 80076 HEPATIC FUNCTION PANEL: CPT

## 2020-09-24 PROCEDURE — 83605 ASSAY OF LACTIC ACID: CPT

## 2020-09-24 PROCEDURE — 99283 EMERGENCY DEPT VISIT LOW MDM: CPT

## 2020-09-24 PROCEDURE — 6360000002 HC RX W HCPCS: Performed by: EMERGENCY MEDICINE

## 2020-09-24 PROCEDURE — 85025 COMPLETE CBC W/AUTO DIFF WBC: CPT

## 2020-09-24 PROCEDURE — 6370000000 HC RX 637 (ALT 250 FOR IP): Performed by: EMERGENCY MEDICINE

## 2020-09-24 PROCEDURE — 96375 TX/PRO/DX INJ NEW DRUG ADDON: CPT

## 2020-09-24 PROCEDURE — 96374 THER/PROPH/DIAG INJ IV PUSH: CPT

## 2020-09-24 PROCEDURE — 81001 URINALYSIS AUTO W/SCOPE: CPT

## 2020-09-24 PROCEDURE — 74176 CT ABD & PELVIS W/O CONTRAST: CPT

## 2020-09-24 PROCEDURE — 93005 ELECTROCARDIOGRAM TRACING: CPT | Performed by: EMERGENCY MEDICINE

## 2020-09-24 PROCEDURE — 83690 ASSAY OF LIPASE: CPT

## 2020-09-24 PROCEDURE — 80048 BASIC METABOLIC PNL TOTAL CA: CPT

## 2020-09-24 PROCEDURE — 93010 ELECTROCARDIOGRAM REPORT: CPT | Performed by: INTERNAL MEDICINE

## 2020-09-24 PROCEDURE — C9113 INJ PANTOPRAZOLE SODIUM, VIA: HCPCS | Performed by: EMERGENCY MEDICINE

## 2020-09-24 RX ORDER — SODIUM CHLORIDE 9 MG/ML
10 INJECTION INTRAVENOUS ONCE
Status: COMPLETED | OUTPATIENT
Start: 2020-09-24 | End: 2020-09-24

## 2020-09-24 RX ORDER — PANTOPRAZOLE SODIUM 40 MG/10ML
40 INJECTION, POWDER, LYOPHILIZED, FOR SOLUTION INTRAVENOUS ONCE
Status: COMPLETED | OUTPATIENT
Start: 2020-09-24 | End: 2020-09-24

## 2020-09-24 RX ORDER — 0.9 % SODIUM CHLORIDE 0.9 %
1000 INTRAVENOUS SOLUTION INTRAVENOUS ONCE
Status: COMPLETED | OUTPATIENT
Start: 2020-09-24 | End: 2020-09-24

## 2020-09-24 RX ORDER — ONDANSETRON 4 MG/1
4 TABLET, ORALLY DISINTEGRATING ORAL EVERY 8 HOURS PRN
Qty: 20 TABLET | Refills: 0 | Status: SHIPPED | OUTPATIENT
Start: 2020-09-24 | End: 2020-09-25 | Stop reason: ALTCHOICE

## 2020-09-24 RX ORDER — PROMETHAZINE HYDROCHLORIDE 25 MG/ML
12.5 INJECTION, SOLUTION INTRAMUSCULAR; INTRAVENOUS ONCE
Status: COMPLETED | OUTPATIENT
Start: 2020-09-24 | End: 2020-09-24

## 2020-09-24 RX ORDER — ONDANSETRON 2 MG/ML
4 INJECTION INTRAMUSCULAR; INTRAVENOUS ONCE
Status: COMPLETED | OUTPATIENT
Start: 2020-09-24 | End: 2020-09-24

## 2020-09-24 RX ADMIN — ONDANSETRON 4 MG: 2 INJECTION INTRAMUSCULAR; INTRAVENOUS at 15:22

## 2020-09-24 RX ADMIN — LIDOCAINE HYDROCHLORIDE: 20 SOLUTION ORAL; TOPICAL at 15:57

## 2020-09-24 RX ADMIN — SODIUM CHLORIDE 10 ML: 9 INJECTION, SOLUTION INTRAMUSCULAR; INTRAVENOUS; SUBCUTANEOUS at 15:22

## 2020-09-24 RX ADMIN — SODIUM CHLORIDE 1000 ML: 9 INJECTION, SOLUTION INTRAVENOUS at 15:22

## 2020-09-24 RX ADMIN — PROMETHAZINE HYDROCHLORIDE 12.5 MG: 25 INJECTION INTRAMUSCULAR; INTRAVENOUS at 16:40

## 2020-09-24 RX ADMIN — PANTOPRAZOLE SODIUM 40 MG: 40 INJECTION, POWDER, FOR SOLUTION INTRAVENOUS at 15:22

## 2020-09-24 ASSESSMENT — ENCOUNTER SYMPTOMS
EYE DISCHARGE: 0
ABDOMINAL PAIN: 1
COUGH: 0
NAUSEA: 1
EYE REDNESS: 0
SHORTNESS OF BREATH: 0
COLOR CHANGE: 0
SORE THROAT: 0
VOMITING: 1
RHINORRHEA: 0
DIARRHEA: 1

## 2020-09-24 NOTE — ED TRIAGE NOTES
Patient to ED with complaints of vomiting. Patient states that vomiting started this morning when she woke up. Patient has home health that came out to see her today and told her she should go to the hospital. Patient was recently diagnosed with stage 3 lung cancer.

## 2020-09-24 NOTE — ED NOTES
Pt states she had recent stay at [facility] for her newly diagnosed lung cancer. Pt states she went to rehab after due to weakness and falls. Pt states she just got home Monday and today she began vomiting. Pt states she vomited x5 today. Pt does have PICC to [facility] that she arrived with. Pt denied diarrhea for this RN but when the doctor asked her, she said yes. Pt denies any cp or sob. Pt states her right side \"feels more swollen\" and point to her RUQ. Pt denies urinary complaints.       Herman Feldman, ELLEI  09/24/20 [id number]

## 2020-09-24 NOTE — ED PROVIDER NOTES
EMERGENCY DEPARTMENT ENCOUNTER    Pt Name: Malissa Pope  MRN: [id number]  [place] 1950  Date of evaluation: 9/24/20  CHIEF COMPLAINT       Chief Complaint   Patient presents with    Emesis     HISTORY OF PRESENT ILLNESS   This is a 71-year-old female that presents with complaints of nausea vomiting and some abdominal pain. The patient states that she woke up this morning and began feeling severely nauseated, she had multiple episodes of nonbilious emesis as well as a few episodes of loose watery diarrhea. Patient describes her pain is in the epigastrium and right upper quadrant, without any specific aggravating relieving factors. Unable to tolerate oral intake. She describes no bloody stool, no hematemesis or hematochezia. REVIEW OF SYSTEMS     Review of Systems   Constitutional: Negative for chills and fever. HENT: Negative for rhinorrhea and sore throat. Eyes: Negative for discharge, redness and visual disturbance. Respiratory: Negative for cough and shortness of breath. Cardiovascular: Negative for chest pain, palpitations and leg swelling. Gastrointestinal: Positive for abdominal pain, diarrhea, nausea and vomiting. Genitourinary: Negative for dysuria and hematuria. Musculoskeletal: Negative for arthralgias, myalgias and neck pain. Skin: Negative for color change and rash. Neurological: Negative for seizures, weakness and headaches. Psychiatric/Behavioral: Negative for hallucinations, self-injury and suicidal ideas.      PASTMEDICAL HISTORY     Past Medical History:   Diagnosis Date    AAA (abdominal aortic aneurysm) ([facility]     Pt denies having a history of AAA    Acid reflux     Anxiety and depression     Aortic stenosis     Arthritis     Blister of ankle, right 10/13/2016    blister broke open & draining, is on antibiotics    CAD (coronary artery disease)     COPD (chronic obstructive pulmonary disease) ([facility]     Diabetes mellitus ([facility]     Falls     Heart block     bifasicular    Hypokalemia     MDRO (multiple drug resistant organisms) resistance 10/17/2014    E. Coli urine    MRSA (methicillin resistant staph aureus) culture positive resolved 12/2016    2 negative nasal screens - 2016 (hx in urine 2014)    On home oxygen therapy     uses 2 liters at night    On home oxygen therapy     patient states 2 liters/nasal cannula continuous    Overactive bladder     patient incont. wears a brief    Pneumonia     PONV (postoperative nausea and vomiting)     Vitamin D deficiency      Past Problem List  Patient Active Problem List   Diagnosis Code    Valvular heart disease I38    Type 2 diabetes mellitus without complication, without long-term current use of insulin ([facility] E11.9    Open wound of right ankle S91.001A    COPD (chronic obstructive pulmonary disease) ([facility]) J44.9    Syncope and collapse R55    Chronic ulcer of right heel ([facility]) L97.419    Multifocal pneumonia J18.9    Aortic valve stenosis I35.0    Esophageal dilatation K22.8    Aspiration pneumonia of both lower lobes due to gastric secretions ([address] J69.0    Falls frequently R29.6    Chronic respiratory failure ([address] J96.10    Contusion of right knee S80. 01XA    Pneumonia J18.9    Closed fracture of right distal radius S52.501A    Subdural hemorrhage ([facility]) I62.00    Subarachnoid hemorrhage ([facility]) I60.9    Traumatic hemorrhage of cerebrum ([facility]) M62.482T    Chronic bilateral low back pain M54.5, G89.29    Cellulitis of both feet L03.115, L03. 116    Acute on chronic congestive heart failure ([facility]) I50.9    Bilateral leg edema R60.0    Cellulitis L03.90    Lung mass R91.8    Malignant neoplasm of upper lobe of right lung ([facility]) C34.11     SURGICAL HISTORY       Past Surgical History:   Procedure Laterality Date    AORTIC VALVE REPAIR N/A 4/11/2017    AORTIC VALVE REPAIR REPLACEMENT performed by Heron Helms MD at [address] [place] [address] 8/31/2020    BRONCHOSCOPY BIOPSY BRONCHUS performed by Chava Longoria MD at [address], COLON, DIAGNOSTIC  12/08/2016    EYE SURGERY      HYSTERECTOMY      IR INS PICC VAD W SQ PORT GREATER THAN 5  9/4/2020    IR INS PICC VAD W SQ PORT GREATER THAN 5 9/4/2020 [facility]    LUMBAR LAMINECTOMY      TONSILLECTOMY       CURRENT MEDICATIONS       Previous Medications    ASPIRIN EC 81 MG EC TABLET    Take 81 mg by mouth daily    CLONAZEPAM (KLONOPIN) 1 MG TABLET    Take 1 tablet by mouth 3 times daily as needed for Anxiety for up to 5 doses. DEXAMETHASONE (DECADRON) 4 MG TABLET    Take 5 tablets by mouth See Admin Instructions Take 20 mg by mouth 12 and 6 hours before Taxol    FUROSEMIDE (LASIX) 40 MG TABLET    Take 1 tablet by mouth daily Hasn't taken in \"long time\"    GABAPENTIN (NEURONTIN) 400 MG CAPSULE    Take 1 capsule by mouth 2 times daily for 1 day. And 1 capsules nightly    GLIMEPIRIDE (AMARYL) 1 MG TABLET    Take 2 mg by mouth every morning In addition to 1mg nightly    GLIMEPIRIDE (AMARYL) 1 MG TABLET    Take 1 mg by mouth Daily with supper In addition to 2 tablets (=2mg) every morning     LANSOPRAZOLE (PREVACID) 30 MG DELAYED RELEASE CAPSULE    Take 30 mg by mouth daily    MELATONIN-PYRIDOXINE (MELATIN PO)    Take 5 mg by mouth nightly    METFORMIN (GLUCOPHAGE) 500 MG TABLET    Take 500 mg by mouth 2 times daily    METOPROLOL TARTRATE (LOPRESSOR) 25 MG TABLET    Take 25 mg by mouth 2 times daily    MOMETASONE-FORMOTEROL (DULERA) 200-5 MCG/ACT INHALER    Inhale 2 puffs into the lungs every 12 hours    OXYCODONE-ACETAMINOPHEN (PERCOCET) 5-325 MG PER TABLET    Take 1 tablet by mouth every 6 hours as needed for Pain.     TRAZODONE (DESYREL) 150 MG TABLET    Take 2 tablets by mouth nightly for 5 doses Take 2 tablets (=300mg) nightly    VENLAFAXINE (EFFEXOR XR) 150 MG EXTENDED RELEASE CAPSULE    Take 150 mg by mouth 2 times daily     ALLERGIES     is allergic to codeine and dye [iodides]. FAMILY HISTORY     She indicated that the status of her mother is unknown. She indicated that the status of her father is unknown. SOCIAL HISTORY       Social History     Tobacco Use    Smoking status: Former Smoker     Last attempt to quit: 2016     Years since quittin.0    Smokeless tobacco: Never Used   Substance Use Topics    Alcohol use: No    Drug use: No     PHYSICAL EXAM     INITIAL VITALS: BP (!) 168/82   Pulse 105   Temp 97.9 °F (36.6 °C)   Resp 20   Ht 5' 8.5\" (1.74 m)   Wt 144 lb 6.4 oz (65.5 kg)   SpO2 96%   BMI 21.64 kg/m²    Physical Exam  Constitutional:       Appearance: Normal appearance. She is well-developed. She is not ill-appearing or toxic-appearing. HENT:      Head: Normocephalic and atraumatic. Eyes:      Conjunctiva/sclera: Conjunctivae normal.      Pupils: Pupils are equal, round, and reactive to light. Neck:      Musculoskeletal: Normal range of motion and neck supple. Trachea: Trachea normal.   Cardiovascular:      Rate and Rhythm: Normal rate and regular rhythm. Heart sounds: S1 normal and S2 normal. No murmur. Pulmonary:      Effort: Pulmonary effort is normal. No accessory muscle usage or respiratory distress. Breath sounds: Normal breath sounds. Chest:      Chest wall: No deformity or tenderness. Abdominal:      General: Bowel sounds are normal. There is no distension or abdominal bruit. Palpations: Abdomen is not rigid. Tenderness: There is no abdominal tenderness. There is no guarding or rebound. Skin:     General: Skin is warm. Findings: No rash. Neurological:      Mental Status: She is alert and oriented to person, place, and time. GCS: GCS eye subscore is 4. GCS verbal subscore is 5. GCS motor subscore is 6. Psychiatric:         Speech: Speech normal.         MEDICAL DECISION MAKIN-year-old female presents with complaints of nausea vomiting and some diarrhea. Patient symptoms began today. Plan is basic labs IV fluids and reevaluation. 4:53 PM EDT  Patient still has some mild nausea. We will provide a second dose of Phenergan, at this time the patient has no findings on her clinical exam to suggest an acute surgical pathology, her CT scan was negative. Plan is discharged with Zofran, outpatient follow-up with PCP and return if symptoms worsen or change. The patient's troponin and EKG were unremarkable. CRITICAL CARE:       PROCEDURES:    Procedures    DIAGNOSTIC RESULTS   EKG:All EKG's are interpreted by the Emergency Department Physician who either signs or Co-signs this chart in the absence of a cardiologist.    Patient's EKG shows sinus tachycardia with rate of 103, GA QRS QTC intervals unremarkable, the patient has left axis, no ST elevations or depressions, nonspecific EKG. RADIOLOGY:All plain film, CT, MRI, and formal ultrasound images (except ED bedside ultrasound) are read by the radiologist, see reports below, unless otherwisenoted in MDM or here. CT ABDOMEN PELVIS WO CONTRAST   Final Result   1. No acute process in the abdomen or pelvis. 2. Suspected cystic lesions of the pancreatic head without main pancreatic   ductal dilatation measuring up to 1.1 cm. Consider further evaluation with   nonemergent contrast-enhanced abdominal MRI with MRCP. 3. Colonic diverticulosis. 4. Distended trabeculated urinary bladder indicative of some degree of outlet   obstruction or neurogenic bladder. LABS: All lab results were reviewed by myself, and all abnormals are listed below.   Labs Reviewed   BASIC METABOLIC PANEL - Abnormal; Notable for the following components:       Result Value    Glucose 145 (*)     Bun/Cre Ratio 26 (*)     Sodium 132 (*)     Chloride 93 (*)     All other components within normal limits   CBC WITH AUTO DIFFERENTIAL - Abnormal; Notable for the following components:    WBC 12.5 (*)     Hemoglobin 11.0 (*)     MCV 75.8 (*)     MCH 23.0 (*)     RDW 21.4 (*)     Seg Neutrophils 82 (*)     Lymphocytes 13 (*)     Eosinophils % 0 (*)     Immature Granulocytes 1 (*)     Segs Absolute 10.23 (*)     All other components within normal limits   HEPATIC FUNCTION PANEL - Abnormal; Notable for the following components: Total Bilirubin 0.21 (*)     All other components within normal limits   LIPASE - Abnormal; Notable for the following components:    Lipase 11 (*)     All other components within normal limits   URINALYSIS - Abnormal; Notable for the following components:    Protein, UA TRACE (*)     All other components within normal limits   MICROSCOPIC URINALYSIS - Abnormal; Notable for the following components:    Bacteria, UA RARE (*)     All other components within normal limits   LACTIC ACID   TROPONIN   LACTIC ACID   TROPONIN       EMERGENCY DEPARTMENTCOURSE:         Vitals:    Vitals:    09/24/20 1455 09/24/20 1456   BP: (!) 168/82    Pulse: 105    Resp: 20    Temp: 97.9 °F (36.6 °C)    SpO2: 96%    Weight:  144 lb 6.4 oz (65.5 kg)   Height:  5' 8.5\" (1.74 m)       The patient was given the following medications while in the emergency department:  Orders Placed This Encounter   Medications    0.9 % sodium chloride bolus    ondansetron (ZOFRAN) injection 4 mg    AND Linked Order Group     pantoprazole (PROTONIX) injection 40 mg     sodium chloride (PF) 0.9 % injection 10 mL    aluminum & magnesium hydroxide-simethicone (MAALOX) 30 mL, lidocaine viscous hcl (XYLOCAINE) 5 mL (GI COCKTAIL)    promethazine (PHENERGAN) injection 12.5 mg    ondansetron (ZOFRAN ODT) 4 MG disintegrating tablet     Sig: Take 1 tablet by mouth every 8 hours as needed for Nausea     Dispense:  20 tablet     Refill:  0     CONSULTS:  None    FINAL IMPRESSION      1.  Non-intractable vomiting with nausea, unspecified vomiting type          DISPOSITION/PLAN   DISPOSITION Discharge - Pending Orders Complete 09/24/2020 04:52:59 PM      PATIENT REFERRED TO:  Maricarmen Andersen MD  [address] [address] [facility]  [phone]    Schedule an appointment as soon as possible for a visit in 2 days      DISCHARGE MEDICATIONS:  New Prescriptions    ONDANSETRON (ZOFRAN ODT) 4 MG DISINTEGRATING TABLET    Take 1 tablet by mouth every 8 hours as needed for Nausea     Jhonny Salvador MD  Attending Emergency Physician                   Jhonny Salvador MD  09/24/20 [id number]

## 2020-09-24 NOTE — TELEPHONE ENCOUNTER
9- call from [id number] [address] inquiring about the PICC line, states pt to have port inserted next week but they do not have orders for the PICC line to flush or change dressing   Per Dr Maricruz Head notes, PICC line to be removed     Spoke with Dr Maricruz Head, yes PICC line is to be removed     9-  Spoke with Kendal De Paz in IR, they can remove the PICC line with the port insertion   Updated orders faxed to [phone]  Confirmation rec'd

## 2020-09-25 ENCOUNTER — APPOINTMENT (OUTPATIENT)
Dept: NUCLEAR MEDICINE | Age: 70
DRG: 312 | End: 2020-09-25
Payer: MEDICARE

## 2020-09-25 ENCOUNTER — TELEPHONE (OUTPATIENT)
Dept: RADIATION ONCOLOGY | Facility: MEDICAL CENTER | Age: 70
End: 2020-09-25

## 2020-09-25 ENCOUNTER — APPOINTMENT (OUTPATIENT)
Dept: GENERAL RADIOLOGY | Age: 70
DRG: 312 | End: 2020-09-25
Payer: MEDICARE

## 2020-09-25 ENCOUNTER — APPOINTMENT (OUTPATIENT)
Dept: CT IMAGING | Age: 70
DRG: 312 | End: 2020-09-25
Payer: MEDICARE

## 2020-09-25 ENCOUNTER — HOSPITAL ENCOUNTER (INPATIENT)
Age: 70
LOS: 2 days | Discharge: HOME HEALTH | DRG: 312 | End: 2020-09-27
Attending: EMERGENCY MEDICINE | Admitting: FAMILY MEDICINE
Payer: MEDICARE

## 2020-09-25 PROBLEM — E86.0 DEHYDRATION: Status: ACTIVE | Noted: 2020-09-25

## 2020-09-25 LAB
-: ABNORMAL
ABSOLUTE EOS #: 0.14 K/UL (ref 0–0.44)
ABSOLUTE IMMATURE GRANULOCYTE: 0.14 K/UL (ref 0–0.3)
ABSOLUTE LYMPH #: 2.4 K/UL (ref 1.1–3.7)
ABSOLUTE MONO #: 0.71 K/UL (ref 0.1–1.2)
ALBUMIN SERPL-MCNC: 3.6 G/DL (ref 3.5–5.2)
ALBUMIN/GLOBULIN RATIO: ABNORMAL (ref 1–2.5)
ALP BLD-CCNC: 36 U/L (ref 35–104)
ALT SERPL-CCNC: 9 U/L (ref 5–33)
AMORPHOUS: ABNORMAL
ANION GAP SERPL CALCULATED.3IONS-SCNC: 10 MMOL/L (ref 9–17)
AST SERPL-CCNC: 18 U/L
BACTERIA: ABNORMAL
BASOPHILS # BLD: 1 % (ref 0–2)
BASOPHILS ABSOLUTE: 0.14 K/UL (ref 0–0.2)
BILIRUB SERPL-MCNC: 0.2 MG/DL (ref 0.3–1.2)
BILIRUBIN URINE: NEGATIVE
BNP INTERPRETATION: ABNORMAL
BNP INTERPRETATION: ABNORMAL
BUN BLDV-MCNC: 17 MG/DL (ref 8–23)
BUN/CREAT BLD: 22 (ref 9–20)
CALCIUM SERPL-MCNC: 9.7 MG/DL (ref 8.6–10.4)
CASTS UA: ABNORMAL /LPF
CHLORIDE BLD-SCNC: 99 MMOL/L (ref 98–107)
CO2: 28 MMOL/L (ref 20–31)
COLOR: YELLOW
COMMENT UA: ABNORMAL
CREAT SERPL-MCNC: 0.76 MG/DL (ref 0.5–0.9)
CRYSTALS, UA: ABNORMAL /HPF
D-DIMER QUANTITATIVE: 4.51 MG/L FEU (ref 0–0.59)
DIFFERENTIAL TYPE: ABNORMAL
EOSINOPHILS RELATIVE PERCENT: 1 % (ref 1–4)
EPITHELIAL CELLS UA: ABNORMAL /HPF (ref 0–5)
GFR AFRICAN AMERICAN: >60 ML/MIN
GFR NON-AFRICAN AMERICAN: >60 ML/MIN
GFR SERPL CREATININE-BSD FRML MDRD: ABNORMAL ML/MIN/{1.73_M2}
GFR SERPL CREATININE-BSD FRML MDRD: ABNORMAL ML/MIN/{1.73_M2}
GLUCOSE BLD-MCNC: 125 MG/DL (ref 70–99)
GLUCOSE URINE: NEGATIVE
HCT VFR BLD CALC: 38.2 % (ref 36.3–47.1)
HEMOGLOBIN: 11.6 G/DL (ref 11.9–15.1)
IMMATURE GRANULOCYTES: 1 %
KETONES, URINE: NEGATIVE
LACTIC ACID: 1.4 MMOL/L (ref 0.5–2.2)
LEUKOCYTE ESTERASE, URINE: NEGATIVE
LIPASE: 12 U/L (ref 13–60)
LYMPHOCYTES # BLD: 17 % (ref 24–43)
MAGNESIUM: 2 MG/DL (ref 1.6–2.6)
MCH RBC QN AUTO: 22.8 PG (ref 25.2–33.5)
MCHC RBC AUTO-ENTMCNC: 30.4 G/DL (ref 28.4–34.8)
MCV RBC AUTO: 75 FL (ref 82.6–102.9)
MONOCYTES # BLD: 5 % (ref 3–12)
MORPHOLOGY: ABNORMAL
MUCUS: ABNORMAL
MYOGLOBIN: 45 NG/ML (ref 25–58)
NITRITE, URINE: NEGATIVE
NRBC AUTOMATED: 0 PER 100 WBC
OTHER OBSERVATIONS UA: ABNORMAL
PDW BLD-RTO: 21.7 % (ref 11.8–14.4)
PH UA: 7 (ref 5–8)
PLATELET # BLD: 359 K/UL (ref 138–453)
PLATELET ESTIMATE: ABNORMAL
PMV BLD AUTO: 9.4 FL (ref 8.1–13.5)
POTASSIUM SERPL-SCNC: 5.3 MMOL/L (ref 3.7–5.3)
PRO-BNP: 1266 PG/ML
PRO-BNP: 2286 PG/ML
PROTEIN UA: ABNORMAL
RBC # BLD: 5.09 M/UL (ref 3.95–5.11)
RBC # BLD: ABNORMAL 10*6/UL
RBC UA: ABNORMAL /HPF (ref 0–2)
RENAL EPITHELIAL, UA: ABNORMAL /HPF
SEG NEUTROPHILS: 75 % (ref 36–65)
SEGMENTED NEUTROPHILS ABSOLUTE COUNT: 10.57 K/UL (ref 1.5–8.1)
SODIUM BLD-SCNC: 137 MMOL/L (ref 135–144)
SPECIFIC GRAVITY UA: 1.01 (ref 1–1.03)
TOTAL CK: 45 U/L (ref 26–192)
TOTAL PROTEIN: 7.4 G/DL (ref 6.4–8.3)
TRICHOMONAS: ABNORMAL
TROPONIN INTERP: ABNORMAL
TROPONIN T: ABNORMAL NG/ML
TROPONIN, HIGH SENSITIVITY: 18 NG/L (ref 0–14)
TROPONIN, HIGH SENSITIVITY: 20 NG/L (ref 0–14)
TROPONIN, HIGH SENSITIVITY: 22 NG/L (ref 0–14)
TURBIDITY: CLEAR
URINE HGB: NEGATIVE
UROBILINOGEN, URINE: NORMAL
WBC # BLD: 14.1 K/UL (ref 3.5–11.3)
WBC # BLD: ABNORMAL 10*3/UL
WBC UA: ABNORMAL /HPF (ref 0–5)
YEAST: ABNORMAL

## 2020-09-25 PROCEDURE — 87086 URINE CULTURE/COLONY COUNT: CPT

## 2020-09-25 PROCEDURE — 81001 URINALYSIS AUTO W/SCOPE: CPT

## 2020-09-25 PROCEDURE — 6370000000 HC RX 637 (ALT 250 FOR IP): Performed by: FAMILY MEDICINE

## 2020-09-25 PROCEDURE — 93005 ELECTROCARDIOGRAM TRACING: CPT | Performed by: EMERGENCY MEDICINE

## 2020-09-25 PROCEDURE — 83690 ASSAY OF LIPASE: CPT

## 2020-09-25 PROCEDURE — 6370000000 HC RX 637 (ALT 250 FOR IP): Performed by: EMERGENCY MEDICINE

## 2020-09-25 PROCEDURE — 83605 ASSAY OF LACTIC ACID: CPT

## 2020-09-25 PROCEDURE — 83735 ASSAY OF MAGNESIUM: CPT

## 2020-09-25 PROCEDURE — 2580000003 HC RX 258: Performed by: EMERGENCY MEDICINE

## 2020-09-25 PROCEDURE — G0378 HOSPITAL OBSERVATION PER HR: HCPCS

## 2020-09-25 PROCEDURE — 80053 COMPREHEN METABOLIC PANEL: CPT

## 2020-09-25 PROCEDURE — 2580000003 HC RX 258: Performed by: FAMILY MEDICINE

## 2020-09-25 PROCEDURE — 87077 CULTURE AEROBIC IDENTIFY: CPT

## 2020-09-25 PROCEDURE — 96374 THER/PROPH/DIAG INJ IV PUSH: CPT

## 2020-09-25 PROCEDURE — 70450 CT HEAD/BRAIN W/O DYE: CPT

## 2020-09-25 PROCEDURE — 84484 ASSAY OF TROPONIN QUANT: CPT

## 2020-09-25 PROCEDURE — 78580 LUNG PERFUSION IMAGING: CPT

## 2020-09-25 PROCEDURE — 82947 ASSAY GLUCOSE BLOOD QUANT: CPT

## 2020-09-25 PROCEDURE — A9540 TC99M MAA: HCPCS | Performed by: EMERGENCY MEDICINE

## 2020-09-25 PROCEDURE — 2060000000 HC ICU INTERMEDIATE R&B

## 2020-09-25 PROCEDURE — 71045 X-RAY EXAM CHEST 1 VIEW: CPT

## 2020-09-25 PROCEDURE — 85025 COMPLETE CBC W/AUTO DIFF WBC: CPT

## 2020-09-25 PROCEDURE — 36415 COLL VENOUS BLD VENIPUNCTURE: CPT

## 2020-09-25 PROCEDURE — 83880 ASSAY OF NATRIURETIC PEPTIDE: CPT

## 2020-09-25 PROCEDURE — 94640 AIRWAY INHALATION TREATMENT: CPT

## 2020-09-25 PROCEDURE — 6360000002 HC RX W HCPCS: Performed by: EMERGENCY MEDICINE

## 2020-09-25 PROCEDURE — 3430000000 HC RX DIAGNOSTIC RADIOPHARMACEUTICAL: Performed by: EMERGENCY MEDICINE

## 2020-09-25 PROCEDURE — 72125 CT NECK SPINE W/O DYE: CPT

## 2020-09-25 PROCEDURE — 6360000002 HC RX W HCPCS: Performed by: FAMILY MEDICINE

## 2020-09-25 PROCEDURE — 96372 THER/PROPH/DIAG INJ SC/IM: CPT

## 2020-09-25 PROCEDURE — 83874 ASSAY OF MYOGLOBIN: CPT

## 2020-09-25 PROCEDURE — 85379 FIBRIN DEGRADATION QUANT: CPT

## 2020-09-25 PROCEDURE — 87186 SC STD MICRODIL/AGAR DIL: CPT

## 2020-09-25 PROCEDURE — 96361 HYDRATE IV INFUSION ADD-ON: CPT

## 2020-09-25 PROCEDURE — 82550 ASSAY OF CK (CPK): CPT

## 2020-09-25 PROCEDURE — 99283 EMERGENCY DEPT VISIT LOW MDM: CPT

## 2020-09-25 PROCEDURE — 99284 EMERGENCY DEPT VISIT MOD MDM: CPT

## 2020-09-25 RX ORDER — LANOLIN ALCOHOL/MO/W.PET/CERES
3 CREAM (GRAM) TOPICAL NIGHTLY PRN
Status: DISCONTINUED | OUTPATIENT
Start: 2020-09-25 | End: 2020-09-27 | Stop reason: HOSPADM

## 2020-09-25 RX ORDER — ACETAMINOPHEN 325 MG/1
650 TABLET ORAL ONCE
Status: COMPLETED | OUTPATIENT
Start: 2020-09-25 | End: 2020-09-25

## 2020-09-25 RX ORDER — GLIMEPIRIDE 1 MG/1
1 TABLET ORAL
Status: DISCONTINUED | OUTPATIENT
Start: 2020-09-25 | End: 2020-09-27 | Stop reason: HOSPADM

## 2020-09-25 RX ORDER — ONDANSETRON 2 MG/ML
4 INJECTION INTRAMUSCULAR; INTRAVENOUS EVERY 8 HOURS PRN
Status: DISCONTINUED | OUTPATIENT
Start: 2020-09-25 | End: 2020-09-25

## 2020-09-25 RX ORDER — ACETAMINOPHEN 325 MG/1
650 TABLET ORAL EVERY 4 HOURS PRN
Status: DISCONTINUED | OUTPATIENT
Start: 2020-09-25 | End: 2020-09-25

## 2020-09-25 RX ORDER — POLYETHYLENE GLYCOL 3350 17 G/17G
17 POWDER, FOR SOLUTION ORAL DAILY PRN
Status: DISCONTINUED | OUTPATIENT
Start: 2020-09-25 | End: 2020-09-27 | Stop reason: HOSPADM

## 2020-09-25 RX ORDER — PANTOPRAZOLE SODIUM 40 MG/1
40 TABLET, DELAYED RELEASE ORAL
Status: DISCONTINUED | OUTPATIENT
Start: 2020-09-26 | End: 2020-09-27 | Stop reason: HOSPADM

## 2020-09-25 RX ORDER — ACETAMINOPHEN 325 MG/1
650 TABLET ORAL EVERY 6 HOURS PRN
Status: DISCONTINUED | OUTPATIENT
Start: 2020-09-25 | End: 2020-09-27 | Stop reason: HOSPADM

## 2020-09-25 RX ORDER — POTASSIUM CHLORIDE 7.45 MG/ML
10 INJECTION INTRAVENOUS PRN
Status: DISCONTINUED | OUTPATIENT
Start: 2020-09-25 | End: 2020-09-27 | Stop reason: HOSPADM

## 2020-09-25 RX ORDER — NICOTINE POLACRILEX 4 MG
15 LOZENGE BUCCAL PRN
Status: DISCONTINUED | OUTPATIENT
Start: 2020-09-25 | End: 2020-09-27 | Stop reason: HOSPADM

## 2020-09-25 RX ORDER — DEXTROSE MONOHYDRATE 25 G/50ML
12.5 INJECTION, SOLUTION INTRAVENOUS PRN
Status: DISCONTINUED | OUTPATIENT
Start: 2020-09-25 | End: 2020-09-27 | Stop reason: HOSPADM

## 2020-09-25 RX ORDER — PROMETHAZINE HYDROCHLORIDE 12.5 MG/1
12.5 TABLET ORAL EVERY 6 HOURS PRN
Status: DISCONTINUED | OUTPATIENT
Start: 2020-09-25 | End: 2020-09-27 | Stop reason: HOSPADM

## 2020-09-25 RX ORDER — GLIMEPIRIDE 2 MG/1
2 TABLET ORAL EVERY MORNING
Status: DISCONTINUED | OUTPATIENT
Start: 2020-09-26 | End: 2020-09-27 | Stop reason: HOSPADM

## 2020-09-25 RX ORDER — DEXTROSE MONOHYDRATE 50 MG/ML
100 INJECTION, SOLUTION INTRAVENOUS PRN
Status: DISCONTINUED | OUTPATIENT
Start: 2020-09-25 | End: 2020-09-27 | Stop reason: HOSPADM

## 2020-09-25 RX ORDER — 0.9 % SODIUM CHLORIDE 0.9 %
1000 INTRAVENOUS SOLUTION INTRAVENOUS ONCE
Status: COMPLETED | OUTPATIENT
Start: 2020-09-25 | End: 2020-09-25

## 2020-09-25 RX ORDER — ACETAMINOPHEN 650 MG/1
650 SUPPOSITORY RECTAL EVERY 6 HOURS PRN
Status: DISCONTINUED | OUTPATIENT
Start: 2020-09-25 | End: 2020-09-27 | Stop reason: HOSPADM

## 2020-09-25 RX ORDER — CHOLECALCIFEROL (VITAMIN D3) 125 MCG
20 CAPSULE ORAL NIGHTLY
Status: ON HOLD | COMMUNITY
End: 2021-11-27 | Stop reason: SDUPTHER

## 2020-09-25 RX ORDER — TRAZODONE HYDROCHLORIDE 100 MG/1
100 TABLET ORAL NIGHTLY
Status: DISCONTINUED | OUTPATIENT
Start: 2020-09-25 | End: 2020-09-27 | Stop reason: HOSPADM

## 2020-09-25 RX ORDER — GABAPENTIN 400 MG/1
400 CAPSULE ORAL NIGHTLY
Status: DISCONTINUED | OUTPATIENT
Start: 2020-09-25 | End: 2020-09-27 | Stop reason: HOSPADM

## 2020-09-25 RX ORDER — SODIUM CHLORIDE 0.9 % (FLUSH) 0.9 %
10 SYRINGE (ML) INJECTION PRN
Status: DISCONTINUED | OUTPATIENT
Start: 2020-09-25 | End: 2020-09-25

## 2020-09-25 RX ORDER — FUROSEMIDE 40 MG/1
40 TABLET ORAL DAILY
Status: ON HOLD | COMMUNITY
End: 2020-09-27 | Stop reason: SDUPTHER

## 2020-09-25 RX ORDER — FUROSEMIDE 40 MG/1
40 TABLET ORAL DAILY
Status: DISCONTINUED | OUTPATIENT
Start: 2020-09-25 | End: 2020-09-27 | Stop reason: HOSPADM

## 2020-09-25 RX ORDER — NICOTINE 21 MG/24HR
1 PATCH, TRANSDERMAL 24 HOURS TRANSDERMAL DAILY PRN
Status: DISCONTINUED | OUTPATIENT
Start: 2020-09-25 | End: 2020-09-27 | Stop reason: HOSPADM

## 2020-09-25 RX ORDER — VENLAFAXINE HYDROCHLORIDE 75 MG/1
150 CAPSULE, EXTENDED RELEASE ORAL 2 TIMES DAILY
Status: DISCONTINUED | OUTPATIENT
Start: 2020-09-25 | End: 2020-09-27 | Stop reason: HOSPADM

## 2020-09-25 RX ORDER — MAGNESIUM SULFATE 1 G/100ML
1 INJECTION INTRAVENOUS PRN
Status: DISCONTINUED | OUTPATIENT
Start: 2020-09-25 | End: 2020-09-27 | Stop reason: HOSPADM

## 2020-09-25 RX ORDER — ASPIRIN 81 MG/1
81 TABLET ORAL DAILY
Status: DISCONTINUED | OUTPATIENT
Start: 2020-09-25 | End: 2020-09-27 | Stop reason: HOSPADM

## 2020-09-25 RX ORDER — BUDESONIDE AND FORMOTEROL FUMARATE DIHYDRATE 160; 4.5 UG/1; UG/1
2 AEROSOL RESPIRATORY (INHALATION) 2 TIMES DAILY
Status: DISCONTINUED | OUTPATIENT
Start: 2020-09-25 | End: 2020-09-27 | Stop reason: HOSPADM

## 2020-09-25 RX ORDER — GABAPENTIN 400 MG/1
400 CAPSULE ORAL 2 TIMES DAILY
Status: ON HOLD | COMMUNITY
End: 2020-11-05 | Stop reason: SDUPTHER

## 2020-09-25 RX ORDER — OXYCODONE HYDROCHLORIDE AND ACETAMINOPHEN 5; 325 MG/1; MG/1
1 TABLET ORAL EVERY 4 HOURS PRN
Status: DISCONTINUED | OUTPATIENT
Start: 2020-09-25 | End: 2020-09-27 | Stop reason: HOSPADM

## 2020-09-25 RX ORDER — POTASSIUM CHLORIDE 20 MEQ/1
40 TABLET, EXTENDED RELEASE ORAL PRN
Status: DISCONTINUED | OUTPATIENT
Start: 2020-09-25 | End: 2020-09-27 | Stop reason: HOSPADM

## 2020-09-25 RX ORDER — ONDANSETRON 2 MG/ML
4 INJECTION INTRAMUSCULAR; INTRAVENOUS ONCE
Status: COMPLETED | OUTPATIENT
Start: 2020-09-25 | End: 2020-09-25

## 2020-09-25 RX ORDER — CLONAZEPAM 0.5 MG/1
1 TABLET ORAL 2 TIMES DAILY PRN
Status: DISCONTINUED | OUTPATIENT
Start: 2020-09-25 | End: 2020-09-27 | Stop reason: HOSPADM

## 2020-09-25 RX ORDER — SODIUM CHLORIDE 0.9 % (FLUSH) 0.9 %
10 SYRINGE (ML) INJECTION PRN
Status: DISCONTINUED | OUTPATIENT
Start: 2020-09-25 | End: 2020-09-27 | Stop reason: HOSPADM

## 2020-09-25 RX ORDER — SODIUM CHLORIDE 0.9 % (FLUSH) 0.9 %
10 SYRINGE (ML) INJECTION EVERY 12 HOURS SCHEDULED
Status: DISCONTINUED | OUTPATIENT
Start: 2020-09-25 | End: 2020-09-27 | Stop reason: HOSPADM

## 2020-09-25 RX ORDER — ONDANSETRON 2 MG/ML
4 INJECTION INTRAMUSCULAR; INTRAVENOUS EVERY 6 HOURS PRN
Status: DISCONTINUED | OUTPATIENT
Start: 2020-09-25 | End: 2020-09-27 | Stop reason: HOSPADM

## 2020-09-25 RX ORDER — SODIUM CHLORIDE 0.9 % (FLUSH) 0.9 %
10 SYRINGE (ML) INJECTION EVERY 12 HOURS SCHEDULED
Status: DISCONTINUED | OUTPATIENT
Start: 2020-09-25 | End: 2020-09-25

## 2020-09-25 RX ADMIN — ENOXAPARIN SODIUM 40 MG: 40 INJECTION SUBCUTANEOUS at 20:27

## 2020-09-25 RX ADMIN — OXYCODONE HYDROCHLORIDE AND ACETAMINOPHEN 1 TABLET: 5; 325 TABLET ORAL at 21:19

## 2020-09-25 RX ADMIN — TRAZODONE HYDROCHLORIDE 100 MG: 100 TABLET ORAL at 20:29

## 2020-09-25 RX ADMIN — METOPROLOL TARTRATE 25 MG: 25 TABLET, FILM COATED ORAL at 20:29

## 2020-09-25 RX ADMIN — ASPIRIN 81 MG: 81 TABLET, COATED ORAL at 20:29

## 2020-09-25 RX ADMIN — GLIMEPIRIDE 1 MG: 1 TABLET ORAL at 20:29

## 2020-09-25 RX ADMIN — Medication 10 ML: at 20:28

## 2020-09-25 RX ADMIN — FUROSEMIDE 40 MG: 40 TABLET ORAL at 20:29

## 2020-09-25 RX ADMIN — SODIUM CHLORIDE 1000 ML: 9 INJECTION, SOLUTION INTRAVENOUS at 10:28

## 2020-09-25 RX ADMIN — Medication 6.3 MILLICURIE: at 13:42

## 2020-09-25 RX ADMIN — ACETAMINOPHEN 650 MG: 325 TABLET ORAL at 10:28

## 2020-09-25 RX ADMIN — VENLAFAXINE HYDROCHLORIDE 150 MG: 75 CAPSULE, EXTENDED RELEASE ORAL at 20:29

## 2020-09-25 RX ADMIN — GABAPENTIN 400 MG: 400 CAPSULE ORAL at 20:29

## 2020-09-25 RX ADMIN — BUDESONIDE AND FORMOTEROL FUMARATE DIHYDRATE 2 PUFF: 160; 4.5 AEROSOL RESPIRATORY (INHALATION) at 19:50

## 2020-09-25 RX ADMIN — ONDANSETRON 4 MG: 2 INJECTION INTRAMUSCULAR; INTRAVENOUS at 10:28

## 2020-09-25 RX ADMIN — METFORMIN HYDROCHLORIDE 500 MG: 500 TABLET ORAL at 20:29

## 2020-09-25 ASSESSMENT — PAIN SCALES - GENERAL
PAINLEVEL_OUTOF10: 10
PAINLEVEL_OUTOF10: 2
PAINLEVEL_OUTOF10: 2

## 2020-09-25 ASSESSMENT — ENCOUNTER SYMPTOMS
BLOOD IN STOOL: 0
SHORTNESS OF BREATH: 0
CHEST TIGHTNESS: 1
NAUSEA: 1
DIARRHEA: 0
BACK PAIN: 0
ABDOMINAL PAIN: 1
VOMITING: 1
CONSTIPATION: 0
TROUBLE SWALLOWING: 0

## 2020-09-25 NOTE — TELEPHONE ENCOUNTER
Jannette's son called to cancel her consult this morning. She is in ER. She fell and hit her head.  He rescheduled for 10/01/2020 @ 9:15am

## 2020-09-25 NOTE — ED PROVIDER NOTES
EMERGENCY DEPARTMENT ENCOUNTER    Pt Name: Teo Millard  MRN: [id number]  [place] 1950  Date of evaluation: 9/25/20  CHIEF COMPLAINT       Chief Complaint   Patient presents with    Fall    Head Injury    Loss of Consciousness     HISTORY OF PRESENT ILLNESS   Patient is a 67-year-old female with history of CAD, CHF, COPD, lung cancer, diabetes, aortic valve replacement here after fall and syncopal episode and hitting her head. Her son had come over to her house to take her to her first radiation consultation given her new lung cancer diagnosis and he states she got up and when she was walking she fell and hit her head on the windowsill and has a large swollen area to the left side of her scalp. The patient does not remember anything from today, states she does not remember passing out. She is unsure if she has been taking her medications. She was seen here yesterday with abdominal pain nausea and vomiting and is felt very weak over the past few days. She has been dry heaving today. She states she has a headache over the area where she has the swelling. Denies any neck or back pain. Denies any blurred or double vision any trouble speech any history of strokes any focal weakness numbness tingling. She does have a prior traumatic intracranial bleed. Not on blood thinners. Denies any chest pain but has had some sensation of swelling to the left side of her chest.  No shortness of breath, has had mild chronic cough no worse no fever no abdominal pain today no dark or bloody stools. Denies history of PE or DVT or recent surgeries. REVIEW OF SYSTEMS     Review of Systems   Constitutional: Positive for activity change and fatigue. Negative for chills and fever. HENT: Negative for trouble swallowing. Eyes: Negative for visual disturbance. Respiratory: Positive for chest tightness. Negative for shortness of breath. Cardiovascular: Negative for chest pain.    Gastrointestinal: Positive for abdominal pain, nausea and vomiting. Negative for blood in stool, constipation and diarrhea. Genitourinary: Negative for difficulty urinating and hematuria. Musculoskeletal: Negative for back pain and neck pain. Skin: Negative for rash and wound. Neurological: Positive for syncope, weakness, light-headedness and headaches. Psychiatric/Behavioral: Negative for confusion. PASTMEDICAL HISTORY     Past Medical History:   Diagnosis Date    AAA (abdominal aortic aneurysm) ([facility] [address]     Pt denies having a history of AAA    Acid reflux     Anxiety and depression     Aortic stenosis     Arthritis     Blister of ankle, right 10/13/2016    blister broke open & draining, is on antibiotics    CAD (coronary artery disease)     COPD (chronic obstructive pulmonary disease) ([facility] [address] [facility]     Diabetes mellitus ([facility] [address] [facility]     Falls     Heart block     bifasicular    Hypokalemia     MDRO (multiple drug resistant organisms) resistance 10/17/2014    E. Coli urine    MRSA (methicillin resistant staph aureus) culture positive resolved 12/2016    2 negative nasal screens - 2016 (hx in urine 2014)    On home oxygen therapy     uses 2 liters at night    On home oxygen therapy     patient states 2 liters/nasal cannula continuous    Overactive bladder     patient incont.  wears a brief    Pneumonia     PONV (postoperative nausea and vomiting)     Vitamin D deficiency      SURGICAL HISTORY       Past Surgical History:   Procedure Laterality Date    AORTIC VALVE REPAIR N/A 4/11/2017    AORTIC VALVE REPAIR REPLACEMENT performed by Marty Garcia MD at [facility] N/A 8/31/2020    BRONCHOSCOPY BIOPSY BRONCHUS performed by Abraham Pratt MD at [facility], COLON, DIAGNOSTIC  12/08/2016    EYE SURGERY      HYSTERECTOMY      IR INS PICC VAD W SQ PORT GREATER THAN 5  9/4/2020    IR INS PICC VAD W SQ PORT GREATER THAN 5 9/4/2020 [facility] use: No     PHYSICAL EXAM     INITIAL VITALS: BP (!) 120/59   Pulse 104   Temp 98.1 °F (36.7 °C)   Resp 16   SpO2 96%    Physical Exam  Vitals signs and nursing note reviewed. Constitutional:       General: She is not in acute distress. Appearance: She is ill-appearing. She is not toxic-appearing or diaphoretic. Comments: Appears tired and frail. HENT:      Head: Normocephalic and atraumatic. No raccoon eyes or Roberts's sign. Jaw: No trismus or malocclusion. Comments: No raccoon eyes or roberts sign. Patient does have large swelling likely hematoma to the left parietal region of the scalp no open wound. Mouth/Throat:      Mouth: Mucous membranes are moist.      Pharynx: Oropharynx is clear. Eyes:      General: No scleral icterus. Extraocular Movements: Extraocular movements intact. Pupils: Pupils are equal, round, and reactive to light. Neck:      Musculoskeletal: Normal range of motion and neck supple. No neck rigidity. Vascular: No carotid bruit. Cardiovascular:      Rate and Rhythm: Regular rhythm. Tachycardia present. Pulses: Normal pulses. Radial pulses are 2+ on the right side and 2+ on the left side. Heart sounds: Normal heart sounds. Pulmonary:      Effort: Pulmonary effort is normal. No respiratory distress. Breath sounds: Normal breath sounds. Abdominal:      General: There is no distension. Palpations: Abdomen is soft. There is no mass. Tenderness: There is no abdominal tenderness. There is no right CVA tenderness, left CVA tenderness, guarding or rebound. Hernia: No hernia is present. Musculoskeletal: Normal range of motion. General: No deformity. Right lower leg: No edema. Left lower leg: No edema. Comments: No cervical thoracic or lumbar midline bony tenderness step-offs or deformities or signs of trauma to the neck or back   Skin:     General: Skin is warm and dry.       Capillary Refill: Capillary refill takes less than 2 seconds. Findings: No rash. Neurological:      General: No focal deficit present. Mental Status: She is alert and oriented to person, place, and time. GCS: GCS eye subscore is 4. GCS verbal subscore is 5. GCS motor subscore is 6. Cranial Nerves: No cranial nerve deficit, dysarthria or facial asymmetry. Sensory: Sensation is intact. Motor: Motor function is intact. Coordination: Finger-Nose-Finger Test normal.      Deep Tendon Reflexes: Babinski sign absent on the right side. Babinski sign absent on the left side. Psychiatric:         Thought Content: Thought content normal.         MEDICAL DECISION MAKING:          Please see ED Course below for MDM/ED course. DDx: Stroke, arrhythmia, intracranial bleed, PE    All patient's question's and concerns were answered prior to disposition and patient and/or family expressed understanding and agreement of treatment plan. NIH STROKE SCALE:            PROCEDURES:    Procedures    DIAGNOSTIC RESULTS   EKG:All EKG's are interpreted by the Emergency Department Physician who either signs or Co-signs this chart in the absence of a cardiologist.    Sinus tachycardia rate of 118 with PACs, right bundle branch block and left anterior fascicular block, bifascicular block, , QTc 552, septal infarct age undetermined, T wave inversions V1 V2 V3, biphasic in V4 and V5, nonspecific ST changes, LVH. Abnormal EKG, compared to yesterday September 24, 2020 no significant changes, QTC is longer however at 552    RADIOLOGY:All plain film, CT, MRI, and formal ultrasound images (except ED bedside ultrasound) are read by the radiologist, see reports below, unless otherwisenoted in MDM or here. CT CERVICAL SPINE WO CONTRAST   Final Result   1. No acute findings in the cervical spine. 2. Moderate cervical spine degenerative changes.    3. Partial inclusion of right upper lobe malignancy better evaluated on the   recent PET/CT. Adjacent interlobular septal thickening could be due to   lymphangitic carcinomatosis and/or edema from vascular obstruction. CT HEAD WO CONTRAST   Final Result   1. No acute intracranial abnormality nor acute calvarial fracture. 2. Subgaleal hematoma, subcutaneous hematoma, and subcutaneous contusion in   the posterior left parietal scalp. XR CHEST 1 VIEW   Final Result   Right upper lobe mass. No acute findings. NM LUNG SCAN PERFUSION ONLY    (Results Pending)     LABS: All lab results were reviewed by myself, and all abnormals are listed below.   Labs Reviewed   CBC WITH AUTO DIFFERENTIAL - Abnormal; Notable for the following components:       Result Value    WBC 14.1 (*)     Hemoglobin 11.6 (*)     MCV 75.0 (*)     MCH 22.8 (*)     RDW 21.7 (*)     Seg Neutrophils 75 (*)     Lymphocytes 17 (*)     Immature Granulocytes 1 (*)     Segs Absolute 10.57 (*)     All other components within normal limits   COMPREHENSIVE METABOLIC PANEL - Abnormal; Notable for the following components:    Glucose 125 (*)     Bun/Cre Ratio 22 (*)     Total Bilirubin 0.20 (*)     All other components within normal limits   LIPASE - Abnormal; Notable for the following components:    Lipase 12 (*)     All other components within normal limits   URINALYSIS WITH MICROSCOPIC - Abnormal; Notable for the following components:    Protein, UA TRACE (*)     All other components within normal limits   D-DIMER, QUANTITATIVE - Abnormal; Notable for the following components:    D-Dimer, Quant 4.51 (*)     All other components within normal limits   BRAIN NATRIURETIC PEPTIDE - Abnormal; Notable for the following components:    Pro-BNP 2,286 (*)     All other components within normal limits   TROP/MYOGLOBIN - Abnormal; Notable for the following components:    Troponin, High Sensitivity 18 (*)     All other components within normal limits   CULTURE, URINE   LACTIC ACID   MAGNESIUM   CK EMERGENCY DEPARTMENTCOURSE:     Patient is a 15-year-old female with history of COPD, aortic valve replacement, diabetes, CAD, CHF here after a fall with swelling to her scalp. She was seen here yesterday with nausea vomiting and abdominal pain and had work-up and was discharged. She was recently diagnosed with lung cancer. She has been dry heaving today. On exam she appears tired, pale, she is hypertensive tachycardic afebrile nontoxic neck is supple she does have some swollen area to her left parietal area with no wounds to the head or neck no back tenderness she is moving all extremities well cranial nerves II through XII intact heart sounds tachycardic abdomen soft nontender no pulsatile mass. Will check cardiac and metabolic work-up, infectious work-up, CT head and neck given her fall, chest x-ray, would like to CT her chest for PE however chart states she is allergic to iodine dye. Will get d-dimer, hydrate, treat her nausea, anticipate admission. 1:56 PM EDT  D-dimer is elevated at 4. Troponin is 18 as well as BNP elevated at 2200. EKG was nonischemic. Patient cannot have IV contrast she states she is very sick. Will get VQ scan for now, she is not hemodynamically unstable by any means. Urine not infected. She does avoid count of 14 but I do not believe she is septic. No pneumonia on chest x-ray no signs of skin infection no UTI. Will monitor this. Given her falls confusion and memory problems and borderline elevated troponin XVIII will admit for further evaluation. Spoke with Dr. Tamera Renae who agrees to admit.     Vitals:    Vitals:    09/25/20 1227 09/25/20 1253 09/25/20 1257 09/25/20 1313   BP: (!) 112/57  (!) 120/59    Pulse: 103 104 107 104   Resp:       Temp:       SpO2: 97% 96% 97% 96%       The patient was given the following medications while in the emergency department:  Orders Placed This Encounter   Medications    0.9 % sodium chloride bolus    ondansetron (ZOFRAN) injection 4

## 2020-09-25 NOTE — PROGRESS NOTES
morning        metoprolol tartrate (LOPRESSOR) 25 MG tablet Take 25 mg by mouth 2 times daily       lansoprazole (PREVACID) 30 MG delayed release capsule Take 30 mg by mouth daily       metFORMIN (GLUCOPHAGE) 500 MG tablet Take 500 mg by mouth 2 times daily       aspirin EC 81 MG EC tablet Take 81 mg by mouth daily       glimepiride (AMARYL) 1 MG tablet Take 2 mg by mouth every morning In addition to 1mg nightly       dexamethasone (DECADRON) 4 MG tablet Take 5 tablets by mouth See Admin Instructions Take 20 mg by mouth 12 and 6 hours before Taxol       traZODone (DESYREL) 150 MG tablet Take 2 tablets by mouth nightly for 5 doses Take 2 tablets (=300mg) nightly       venlafaxine (EFFEXOR XR) 150 MG extended release capsule Take 150 mg by mouth 2 times daily       mometasone-formoterol (DULERA) 200-5 MCG/ACT inhaler Inhale 2 puffs into the lungs every 12 hours as needed (wheezing/SOB)

## 2020-09-25 NOTE — H&P
History & Physical  Merged with [facility].,    Adult Hospitalist      Name: Kimmie Andrews  MRN: [id number]     Acct: [de-identified]  Room: 1001/1001-02    Admit Date: 9/25/2020  9:13 AM  PCP: Kimberlee Kanner, MD    Primary Problem  Active Problems:    Syncope and collapse    Dehydration  Resolved Problems:    * No resolved hospital problems. *        Assesment:     · Syncope  · Coronary artery disease  · Chronic obstructive pulmonary disease   · Lung cancer   · Former smoker   · Essential hypertension  · Diabetes mellitus   · Diabetic neuropathy   · AAA  · Anxiety disorder  · Major depressive disorder         Plan:     · Admit Progressive   · Monitor vitals closely  · Keep SpO2 above 90%  · Is/ Os  · IV heplock  · Daily weights  · Neuro checks  · Ortho stats  · Fall precaution  · Resume ASA  · Resume lasix  · Resume Neuronitin  · Resume Amaryl  · Resume Glucophage  · Resume Metoprolol  · Add parameters   · Resume Protonix  · Resume Effexor  · Resume Trazodone  · Resume Klonopin   · Resume Dulera   · Check CBC, BMP  · accu checks AC and HS  · Insulin sliding scale  · Hypoglycemia protocol   · DVT and GI prophylaxis. Chief Complaint:     Chief Complaint   Patient presents with    Fall    Head Injury    Loss of Consciousness         History of Present Illness:      Kimmie Andrews is a 72 y.o.  female who presents with Fall; Head Injury; and Loss of Consciousness    Pt presented to the ER after a syncopal attack. Pt says she had been at her home when her son came over to take her to her doctor's appointment. Pt says she was going for her appointment to the oncologist since there are plans to place a port early next week. She says that as she was walking and pivoted she lost her balance and fell backwards. She denies any dizziness or black out before her fall. However she says she remembers that she fell back wards and hit her head on the window sill.  Pt says she lost her consciousness and does not remember how long it would have been. She thinks it was probable several minutes. She thinks she came around spontaneously and had severe pain in her neck and upper back. Pt was brought to the ER were she underwent extensive testing. She says her neck pain and headache is better. However she feels dizzy. Denies double vision, fever, photophobia, nausea, vomiting, orthopnea, chest pain, abdominal pain, rash or joint swelling      I have personally reviewed the past medical history, past surgical history, medications, social history, and family history, and summarized in the note. Review of Systems:     All 10 point system is reviewed and negative otherwise mentioned in HPI. Past Medical History:     Past Medical History:   Diagnosis Date    AAA (abdominal aortic aneurysm) ([facility] [address]     Pt denies having a history of AAA    Acid reflux     Anxiety and depression     Aortic stenosis     Arthritis     Blister of ankle, right 10/13/2016    blister broke open & draining, is on antibiotics    CAD (coronary artery disease)     [facility]     Diabetes mellitus ([facility] [address]     Falls     Heart block     bifasicular    Hypokalemia     MDRO (multiple drug resistant organisms) resistance 10/17/2014    E. Coli urine    MRSA (methicillin resistant staph aureus) culture positive resolved 12/2016    2 negative nasal screens - 2016 (hx in urine 2014)    On home oxygen therapy     uses 2 liters at night    On home oxygen therapy     patient states 2 liters/nasal cannula continuous    Overactive bladder     patient incont.  wears a brief    Pneumonia     PONV (postoperative nausea and vomiting)     Vitamin D deficiency         Past Surgical History:     Past Surgical History:   Procedure Laterality Date    AORTIC VALVE REPAIR N/A 4/11/2017    AORTIC VALVE REPAIR REPLACEMENT performed by Marcial Allan MD at [address] [place] [address] 8/31/2020    BRONCHOSCOPY BIOPSY BRONCHUS performed by Jasmin Ramirez MD at [address] [facility] [address], COLON, DIAGNOSTIC  12/08/2016    EYE SURGERY      HYSTERECTOMY      IR INS PICC VAD W SQ PORT GREATER THAN 5  9/4/2020    IR INS PICC VAD W SQ PORT GREATER THAN 5 9/4/2020 [facility]    LUMBAR LAMINECTOMY      TONSILLECTOMY          Medications Prior to Admission:       Prior to Admission medications    Medication Sig Start Date End Date Taking? Authorizing Provider   gabapentin (NEURONTIN) 400 MG capsule Take 400 mg by mouth 2 times daily. Yes Historical Provider, MD   furosemide (LASIX) 40 MG tablet Take 40 mg by mouth daily   Yes Historical Provider, MD   melatonin 5 MG TABS tablet Take 5 mg by mouth nightly as needed (sleep)   Yes Historical Provider, MD   clonazePAM (KLONOPIN) 1 MG tablet Take 1 tablet by mouth 3 times daily as needed for Anxiety for up to 5 doses.  9/4/20 9/25/20 Yes Fabiola Stanton MD   glimepiride (AMARYL) 1 MG tablet Take 1 mg by mouth Daily with supper In addition to 2 tablets (=2mg) every morning    Yes Historical Provider, MD   metoprolol tartrate (LOPRESSOR) 25 MG tablet Take 25 mg by mouth 2 times daily   Yes Historical Provider, MD   lansoprazole (PREVACID) 30 MG delayed release capsule Take 30 mg by mouth daily 11/10/16  Yes Historical Provider, MD   metFORMIN (GLUCOPHAGE) 500 MG tablet Take 500 mg by mouth 2 times daily 12/7/16  Yes Historical Provider, MD   aspirin EC 81 MG EC tablet Take 81 mg by mouth daily   Yes Historical Provider, MD   glimepiride (AMARYL) 1 MG tablet Take 2 mg by mouth every morning In addition to 1mg nightly   Yes Historical Provider, MD   dexamethasone (DECADRON) 4 MG tablet Take 5 tablets by mouth See Admin Instructions Take 20 mg by mouth 12 and 6 hours before Taxol 9/22/20 10/22/20  Cristopher Franks MD   traZODone (DESYREL) 150 MG tablet Take 2 tablets by mouth nightly for 5 doses Take 2 tablets (=300mg) nightly diagnosis, treatment or other management decisions. CULTURE NEGATIVE for Cytomegalovirus at day 3 08/31/2020 03:31 PM    CULTURE NEGATIVE for Enterovirus at day 5 08/31/2020 03:31 PM       Lab Results   Component Value Date    POCPH 7.36 04/12/2017    PHART 7.42 08/26/2012    PH 7.22 04/11/2017    POCPCO2 45 04/12/2017    PUJ4DJS 41 08/26/2012    PCO2 54 04/11/2017    POCPO2 93 04/12/2017    PO2ART 59 08/26/2012    PO2 89 04/11/2017    POCHCO3 25.3 04/12/2017    URI2LBH 26.1 08/26/2012    HCO3 22.2 04/11/2017    NBEA NOT REPORTED 04/12/2017    PBEA 0 04/12/2017    FKB1DZA 27 04/12/2017    XUMJ3HAJ 97 04/12/2017    P1ZJMCBT 92.1 08/26/2012    O2SAT 95 04/11/2017    FIO2 UNKNOWN 10/31/2017       Radiology:    Ct Abdomen Pelvis Wo Contrast    Result Date: 9/24/2020  1. No acute process in the abdomen or pelvis. 2. Suspected cystic lesions of the pancreatic head without main pancreatic ductal dilatation measuring up to 1.1 cm. Consider further evaluation with nonemergent contrast-enhanced abdominal MRI with MRCP. 3. Colonic diverticulosis. 4. Distended trabeculated urinary bladder indicative of some degree of outlet obstruction or neurogenic bladder. Ct Head Wo Contrast    Result Date: 9/25/2020  1. No acute intracranial abnormality nor acute calvarial fracture. 2. Subgaleal hematoma, subcutaneous hematoma, and subcutaneous contusion in the posterior left parietal scalp. Ct Cervical Spine Wo Contrast    Result Date: 9/25/2020  1. No acute findings in the cervical spine. 2. Moderate cervical spine degenerative changes. 3. Partial inclusion of right upper lobe malignancy better evaluated on the recent PET/CT. Adjacent interlobular septal thickening could be due to lymphangitic carcinomatosis and/or edema from vascular obstruction. Nm Lung Scan Perfusion Only    Result Date: 9/25/2020  Very low probability for pulmonary embolism. Xr Chest 1 View    Result Date: 9/25/2020  Right upper lobe mass.  No acute findings. All radiological studies reviewed                Code Status:  Full Code    Electronically signed by Lisa Sherman MD on 9/25/2020 at 6:52 PM     Copy sent to Dr. Kimberlee Kanner, MD    This note was created with the assistance of a speech-recognition program.  Although the intention is to generate a document that actually reflects the content of the visit, no guarantees can be provided that every mistake has been identified and corrected by editing. Note was updated later by me after  physical examination and  completion of the assessment.

## 2020-09-25 NOTE — ED NOTES
Pt brief changed. Full brief noted.  Pt states increased comfort     Pablo Shipman RN  09/25/20 1623

## 2020-09-25 NOTE — ED NOTES
Pt presents to ED via wheelchair with steady, slow gait transferring to cart c/o of fall this morning with head injury. Pt's son states she was up walking about and he watched her stumble a couple steps backwards then fall backwards. Pt hit head on sink. Lump on left side of head noted. No open wounds or bleeding noted. Pt son states she was \"out cold for a couple seconds after hitting head. \" Pt states she does not remember falling at all. Pt states she also does not remember being at hospital yesterday, but is able to talk about things that occurred yesterday while in the hospital (example. Pt stated she had a CT scan yesterday and they yohana blood off of her picc line). Pt denies chest pain or SOB. Respirations are even and non-labored. Skin is warm and dry. Pt rates pain 2/10. PERRLA. Pt states some nausea, but denies v/d/c. Pt denies urinary symptoms. Pt denies fever or chills.       Pablo Shipman RN  09/25/20 [id number]

## 2020-09-26 LAB
ANION GAP SERPL CALCULATED.3IONS-SCNC: 8 MMOL/L (ref 9–17)
BUN BLDV-MCNC: 21 MG/DL (ref 8–23)
BUN/CREAT BLD: 24 (ref 9–20)
CALCIUM SERPL-MCNC: 9.1 MG/DL (ref 8.6–10.4)
CHLORIDE BLD-SCNC: 100 MMOL/L (ref 98–107)
CO2: 32 MMOL/L (ref 20–31)
CREAT SERPL-MCNC: 0.86 MG/DL (ref 0.5–0.9)
CULTURE: ABNORMAL
GFR AFRICAN AMERICAN: >60 ML/MIN
GFR NON-AFRICAN AMERICAN: >60 ML/MIN
GFR SERPL CREATININE-BSD FRML MDRD: ABNORMAL ML/MIN/{1.73_M2}
GFR SERPL CREATININE-BSD FRML MDRD: ABNORMAL ML/MIN/{1.73_M2}
GLUCOSE BLD-MCNC: 100 MG/DL (ref 65–105)
GLUCOSE BLD-MCNC: 118 MG/DL (ref 65–105)
GLUCOSE BLD-MCNC: 48 MG/DL (ref 65–105)
GLUCOSE BLD-MCNC: 65 MG/DL (ref 65–105)
GLUCOSE BLD-MCNC: 66 MG/DL (ref 65–105)
GLUCOSE BLD-MCNC: 67 MG/DL (ref 70–99)
HCT VFR BLD CALC: 32.2 % (ref 36.3–47.1)
HEMOGLOBIN: 9.7 G/DL (ref 11.9–15.1)
Lab: ABNORMAL
MAGNESIUM: 1.7 MG/DL (ref 1.6–2.6)
MCH RBC QN AUTO: 23.4 PG (ref 25.2–33.5)
MCHC RBC AUTO-ENTMCNC: 30.1 G/DL (ref 28.4–34.8)
MCV RBC AUTO: 77.6 FL (ref 82.6–102.9)
NRBC AUTOMATED: 0 PER 100 WBC
PDW BLD-RTO: 21.8 % (ref 11.8–14.4)
PLATELET # BLD: 281 K/UL (ref 138–453)
PMV BLD AUTO: 9.7 FL (ref 8.1–13.5)
POTASSIUM SERPL-SCNC: 4.1 MMOL/L (ref 3.7–5.3)
RBC # BLD: 4.15 M/UL (ref 3.95–5.11)
SODIUM BLD-SCNC: 140 MMOL/L (ref 135–144)
SPECIMEN DESCRIPTION: ABNORMAL
WBC # BLD: 11 K/UL (ref 3.5–11.3)

## 2020-09-26 PROCEDURE — 97535 SELF CARE MNGMENT TRAINING: CPT

## 2020-09-26 PROCEDURE — 36415 COLL VENOUS BLD VENIPUNCTURE: CPT

## 2020-09-26 PROCEDURE — 83735 ASSAY OF MAGNESIUM: CPT

## 2020-09-26 PROCEDURE — 97110 THERAPEUTIC EXERCISES: CPT

## 2020-09-26 PROCEDURE — 6370000000 HC RX 637 (ALT 250 FOR IP): Performed by: FAMILY MEDICINE

## 2020-09-26 PROCEDURE — 80048 BASIC METABOLIC PNL TOTAL CA: CPT

## 2020-09-26 PROCEDURE — 97166 OT EVAL MOD COMPLEX 45 MIN: CPT

## 2020-09-26 PROCEDURE — 97530 THERAPEUTIC ACTIVITIES: CPT

## 2020-09-26 PROCEDURE — 85027 COMPLETE CBC AUTOMATED: CPT

## 2020-09-26 PROCEDURE — 2580000003 HC RX 258: Performed by: FAMILY MEDICINE

## 2020-09-26 PROCEDURE — 82947 ASSAY GLUCOSE BLOOD QUANT: CPT

## 2020-09-26 PROCEDURE — 94640 AIRWAY INHALATION TREATMENT: CPT

## 2020-09-26 PROCEDURE — 97162 PT EVAL MOD COMPLEX 30 MIN: CPT

## 2020-09-26 PROCEDURE — 83036 HEMOGLOBIN GLYCOSYLATED A1C: CPT

## 2020-09-26 PROCEDURE — 6360000002 HC RX W HCPCS: Performed by: FAMILY MEDICINE

## 2020-09-26 PROCEDURE — 97116 GAIT TRAINING THERAPY: CPT

## 2020-09-26 PROCEDURE — G0378 HOSPITAL OBSERVATION PER HR: HCPCS

## 2020-09-26 PROCEDURE — 96372 THER/PROPH/DIAG INJ SC/IM: CPT

## 2020-09-26 PROCEDURE — 2060000000 HC ICU INTERMEDIATE R&B

## 2020-09-26 PROCEDURE — 94761 N-INVAS EAR/PLS OXIMETRY MLT: CPT

## 2020-09-26 RX ADMIN — METFORMIN HYDROCHLORIDE 500 MG: 500 TABLET ORAL at 09:33

## 2020-09-26 RX ADMIN — VENLAFAXINE HYDROCHLORIDE 150 MG: 75 CAPSULE, EXTENDED RELEASE ORAL at 09:33

## 2020-09-26 RX ADMIN — GLIMEPIRIDE 2 MG: 2 TABLET ORAL at 09:33

## 2020-09-26 RX ADMIN — Medication 10 ML: at 09:32

## 2020-09-26 RX ADMIN — METOPROLOL TARTRATE 25 MG: 25 TABLET, FILM COATED ORAL at 21:42

## 2020-09-26 RX ADMIN — OXYCODONE HYDROCHLORIDE AND ACETAMINOPHEN 1 TABLET: 5; 325 TABLET ORAL at 21:42

## 2020-09-26 RX ADMIN — FUROSEMIDE 40 MG: 40 TABLET ORAL at 09:33

## 2020-09-26 RX ADMIN — ENOXAPARIN SODIUM 40 MG: 40 INJECTION SUBCUTANEOUS at 09:32

## 2020-09-26 RX ADMIN — TRAZODONE HYDROCHLORIDE 100 MG: 100 TABLET ORAL at 21:42

## 2020-09-26 RX ADMIN — VENLAFAXINE HYDROCHLORIDE 150 MG: 75 CAPSULE, EXTENDED RELEASE ORAL at 21:42

## 2020-09-26 RX ADMIN — DEXTROSE 15 G: 15 GEL ORAL at 17:04

## 2020-09-26 RX ADMIN — PANTOPRAZOLE SODIUM 40 MG: 40 TABLET, DELAYED RELEASE ORAL at 05:42

## 2020-09-26 RX ADMIN — Medication 10 ML: at 21:42

## 2020-09-26 RX ADMIN — BUDESONIDE AND FORMOTEROL FUMARATE DIHYDRATE 2 PUFF: 160; 4.5 AEROSOL RESPIRATORY (INHALATION) at 07:57

## 2020-09-26 RX ADMIN — GABAPENTIN 400 MG: 400 CAPSULE ORAL at 21:41

## 2020-09-26 RX ADMIN — METFORMIN HYDROCHLORIDE 500 MG: 500 TABLET ORAL at 21:42

## 2020-09-26 RX ADMIN — OXYCODONE HYDROCHLORIDE AND ACETAMINOPHEN 1 TABLET: 5; 325 TABLET ORAL at 02:40

## 2020-09-26 RX ADMIN — OXYCODONE HYDROCHLORIDE AND ACETAMINOPHEN 1 TABLET: 5; 325 TABLET ORAL at 09:44

## 2020-09-26 RX ADMIN — METOPROLOL TARTRATE 25 MG: 25 TABLET, FILM COATED ORAL at 09:32

## 2020-09-26 RX ADMIN — BUDESONIDE AND FORMOTEROL FUMARATE DIHYDRATE 2 PUFF: 160; 4.5 AEROSOL RESPIRATORY (INHALATION) at 21:03

## 2020-09-26 RX ADMIN — ASPIRIN 81 MG: 81 TABLET, COATED ORAL at 09:33

## 2020-09-26 RX ADMIN — OXYCODONE HYDROCHLORIDE AND ACETAMINOPHEN 1 TABLET: 5; 325 TABLET ORAL at 14:08

## 2020-09-26 ASSESSMENT — PAIN DESCRIPTION - ORIENTATION
ORIENTATION: RIGHT
ORIENTATION: RIGHT

## 2020-09-26 ASSESSMENT — PAIN SCALES - GENERAL
PAINLEVEL_OUTOF10: 6
PAINLEVEL_OUTOF10: 8
PAINLEVEL_OUTOF10: 0
PAINLEVEL_OUTOF10: 8
PAINLEVEL_OUTOF10: 8
PAINLEVEL_OUTOF10: 0
PAINLEVEL_OUTOF10: 4
PAINLEVEL_OUTOF10: 6
PAINLEVEL_OUTOF10: 10
PAINLEVEL_OUTOF10: 6

## 2020-09-26 ASSESSMENT — PAIN DESCRIPTION - LOCATION
LOCATION: ABDOMEN;BACK
LOCATION: BACK;HEAD;ABDOMEN
LOCATION: BACK;HEAD;FLANK

## 2020-09-26 ASSESSMENT — PAIN DESCRIPTION - PAIN TYPE
TYPE: CHRONIC PAIN
TYPE: ACUTE PAIN;CHRONIC PAIN
TYPE: CHRONIC PAIN;ACUTE PAIN

## 2020-09-26 ASSESSMENT — PAIN DESCRIPTION - DESCRIPTORS: DESCRIPTORS: ACHING;DISCOMFORT

## 2020-09-26 ASSESSMENT — PAIN DESCRIPTION - ONSET: ONSET: ON-GOING

## 2020-09-26 ASSESSMENT — PAIN DESCRIPTION - FREQUENCY: FREQUENCY: CONTINUOUS

## 2020-09-26 ASSESSMENT — PAIN DESCRIPTION - PROGRESSION: CLINICAL_PROGRESSION: GRADUALLY WORSENING

## 2020-09-26 NOTE — PROGRESS NOTES
Occupational Therapy\   Occupational Therapy Initial Assessment  Date: 2020   Patient Name: Sandra Salguero  MRN: [id number]     : 1950    Date of Service: 2020    Discharge Recommendations:  Home with Home health OT     RN reports patient is medically stable for therapy treatment this date. Chart reviewed prior to treatment and patient is agreeable for therapy. All lines intact and patient positioned comfortably at end of treatment. All patient needs addressed prior to ending therapy session. Assessment   Neurological  Neurological (WDL): Within Defined Limits  Performance deficits / Impairments: Decreased functional mobility ; Decreased ADL status; Decreased balance;Decreased high-level IADLs  Prognosis: Good  Decision Making: Medium Complexity  OT Education: OT Role;Transfer Training;Plan of Care;ADL Adaptive Strategies;IADL Safety;Equipment  REQUIRES OT FOLLOW UP: Yes  Activity Tolerance  Activity Tolerance: Patient Tolerated treatment well  Safety Devices  Safety Devices in place: Yes  Type of devices: Bed alarm in place; All fall risk precautions in place; Chair alarm in place; Left in bed           Patient Diagnosis(es): The encounter diagnosis was Syncope and collapse.     has a past medical history of AAA (abdominal aortic aneurysm) (HCC), Acid reflux, Anxiety and depression, Aortic stenosis, Arthritis, Blister of ankle, right, CAD (coronary artery disease), COPD (chronic obstructive pulmonary disease) ([address] Diabetes mellitus ([address] Falls, Heart block, Hypokalemia, MDRO (multiple drug resistant organisms) resistance, MRSA (methicillin resistant staph aureus) culture positive, On home oxygen therapy, On home oxygen therapy, Overactive bladder, Pneumonia, PONV (postoperative nausea and vomiting), and Vitamin D deficiency. has a past surgical history that includes back surgery; eye surgery; Cholecystectomy; Appendectomy; Hysterectomy; Tonsillectomy; lumbar laminectomy;  Endoscopy, colon, diagnostic (12/08/2016); aortic valve repair (N/A, 4/11/2017); bronchoscopy (N/A, 8/31/2020); and IR INSERT PICC VAD W SQ PORT >5 YEARS (9/4/2020). Restrictions  Restrictions/Precautions  Restrictions/Precautions: General Precautions, Fall Risk, Up as Tolerated  Position Activity Restriction  Other position/activity restrictions: Fall history- reports \"11 falls\" in past month. States with each fall she was using her RW and fell backwards. Subjective   General  Chart Reviewed: Yes  Patient assessed for rehabilitation services?: Yes  Family / Caregiver Present: No  Patient Currently in Pain: Yes  Pain Assessment  Pain Assessment: 0-10  Pain Level: 8  Response to Pain Intervention: Patient Satisfied  Vital Signs  Temp: 98.8 °F (37.1 °C)  Temp Source: Oral  Pulse: 74  Heart Rate Source: Monitor  Resp: 18  BP: (!) 117/43  BP Location: Left Arm  BP Upper/Lower: Upper  MAP (mmHg): (!) 62  Level of Consciousness: Alert  MEWS Score: 1  Patient Currently in Pain: Yes  Oxygen Therapy  SpO2: 97 %  O2 Device: None (Room air)  Social/Functional History  Social/Functional History  Lives With: Alone  Type of Home: House  Home Layout: Multi-level, Able to Live on Main level with bedroom/bathroom  Home Access: Ramped entrance  Bathroom Toilet: Standard  Bathroom Equipment: Grab bars around toilet, Shower chair  Home Equipment: Rolling walker, Reacher  ADL Assistance: Independent  Homemaking Assistance: Needs assistance(Independent cooking assist with cleaning and shopping)  Ambulation Assistance: Independent(uses RW all the time)  Transfer Assistance: Independent  Active : No  Patient's  Info: family or friends. (son lives 10 minutes away)  Occupation: Retired  Type of occupation: Power Train  Leisure & Hobbies: TV  Additional Comments: States she came home 6 days ago from SNF.   (At [facility] beginning of Sept. and discharged to [address]) Since she has been home she has only sponge bathed and had not done assistance  Problem Solving: Assistance required to correct errors made  Insights: Decreased awareness of deficits        Sensation  Overall Sensation Status: WNL        LUE AROM (degrees)  LUE AROM : WFL  Left Hand AROM (degrees)  Left Hand AROM: WFL  RUE AROM (degrees)  RUE AROM : WFL  Right Hand AROM (degrees)  Right Hand AROM: WFL  LUE Strength  Gross LUE Strength: WFL  L Hand General: 4+/5  LUE Strength Comment: 4+/5 mmt  RUE Strength  Gross RUE Strength: WFL  R Hand General: 4+/5  RUE Strength Comment: 4+/5 mmt     Hand Dominance  Hand Dominance: Right             Plan   Plan  Times per week: 4-5X per week  Current Treatment Recommendations: Balance Training, Functional Mobility Training, Safety Education & Training, Patient/Caregiver Education & Training, Self-Care / ADL    G-Code     OutComes Score                                                  AM-PAC Score        AM-[facility] Inpatient Daily Activity Raw Score: 18 (09/26/20 1302)  AM-PAC Inpatient ADL T-Scale Score : 38.66 (09/26/20 1302)  ADL Inpatient CMS 0-100% Score: 46.65 (09/26/20 1302)  ADL Inpatient CMS G-Code Modifier : CK (09/26/20 1302)    Goals  Short term goals  Time Frame for Short term goals: LOS  Short term goal 1: Pt will independently complete grooming tasks. Short term goal 2: Pt will i ndependently complete UB and LB bathe and dress task. Short term goal 3: Pt will independently complete toileting task. Short term goal 4: Pt will independently complete functional mobility with good .   Patient Goals   Patient goals : Return home       Therapy Time   Individual Concurrent Group Co-treatment   Time In  1025         Time Out  1117         Minutes  52 minutes                 Daxaor Mary OT

## 2020-09-26 NOTE — PROGRESS NOTES
Physical Therapy    Facility/Department: [facility] PROGRESSIVE CARE  Initial Assessment    NAME: Ilene Henning  : 1950  MRN: [id number]    Date of Service: 2020  RN reports patient is medically stable for therapy treatment this date. Chart reviewed prior to treatment and patient is agreeable for therapy. All lines intact and patient positioned comfortably at end of treatment. All patient needs addressed prior to ending therapy session. Discharge Recommendations:  Home with Home health PT, Continue to assess pending progress      Patient is a 71-year-old female with history of CAD, CHF, COPD, lung cancer, diabetes, aortic valve replacement here after fall and syncopal episode and hitting her head. Her son had come over to her house to take her to her first radiation consultation given her new lung cancer diagnosis and he states she got up and when she was walking she fell and hit her head on the windowsill and has a large swollen area to the left side of her scalp. The patient does not remember anything from today, states she does not remember passing out. She is unsure if she has been taking her medications. She was seen here yesterday with abdominal pain nausea and vomiting and is felt very weak over the past few days. She has been dry heaving today. She states she has a headache over the area where she has the swelling. Denies any neck or back pain. Assessment   Body structures, Functions, Activity limitations: Decreased functional mobility ; Decreased balance; Increased pain;Decreased posture  Assessment: Pt. doing well with strength, gait, endurance during this eval, however has a concerning history over past month with reported 11 falls. Pt. is adamantly refusing return to SNF. Social Work trying to get pt. in [place] program for a home aide. Recommending home PT. Specific instructions for Next Treatment: Attempt to get pt. to try 4WW.   Show how to use seat and give her practical examples of how to use in her home to prevent falls (using seat during food prep vs. standing at counter or as a resting place, using seat to carry items)  Prognosis: Excellent  Decision Making: Medium Complexity  PT Education: Goals;PT Role;Plan of Care  Patient Education: Edu. for use of 4WW with seat to have a place to sit in kitchen, as this is where most falls have happened while using her RW. (Loss of balance backwards.)  Pt. is NOT interested in even trying 4WW, stating she likes her RW. Unable to follow thought process that she is falling with her RW during standing activities and needs to make a change.  (7HA with seat to sit on in kitchen). Reviewed proper way to approach counter with RW and then with reaching (moving in front of cabinet reaching into. )  Pt. states she is already doing this. REQUIRES PT FOLLOW UP: Yes  Activity Tolerance  Activity Tolerance: Patient Tolerated treatment well       Patient Diagnosis(es): The encounter diagnosis was Syncope and collapse.     has a past medical history of AAA (abdominal aortic aneurysm) (HCC), Acid reflux, Anxiety and depression, Aortic stenosis, Arthritis, Blister of ankle, right, CAD (coronary artery disease), COPD (chronic obstructive pulmonary disease) ([facility] Diabetes mellitus ([address] Falls, Heart block, Hypokalemia, MDRO (multiple drug resistant organisms) resistance, MRSA (methicillin resistant staph aureus) culture positive, On home oxygen therapy, On home oxygen therapy, Overactive bladder, Pneumonia, PONV (postoperative nausea and vomiting), and Vitamin D deficiency. has a past surgical history that includes back surgery; eye surgery; Cholecystectomy; Appendectomy; Hysterectomy; Tonsillectomy; lumbar laminectomy; Endoscopy, colon, diagnostic (12/08/2016); aortic valve repair (N/A, 4/11/2017); bronchoscopy (N/A, 8/31/2020); and IR INSERT PICC VAD W SQ PORT >5 YEARS (9/4/2020).     Restrictions  Restrictions/Precautions  Restrictions/Precautions: General Precautions, Fall Risk, Up as Tolerated  Position Activity Restriction  Other position/activity restrictions: Fall history- reports \"11 falls\" in past month. States with each fall she was using her RW and fell backwards. Vision/Hearing  Vision: Within Functional Limits  Hearing: Within functional limits     Subjective  General  Chart Reviewed: Yes  Patient assessed for rehabilitation services?: Yes  Family / Caregiver Present: No  Follows Commands: Within Functional Limits  Subjective  Subjective: reports back and Rt. side pain at time of eval, 8/10  \"from the lung CA in Rt. lung\"; Planning to put chemo port in this coming Tuesday. Pain Screening  Patient Currently in Pain: Denies          Orientation  Orientation  Overall Orientation Status: Within Functional Limits  Social/Functional History  Social/Functional History  Lives With: Alone  Type of Home: House  Home Layout: Multi-level, Able to Live on Main level with bedroom/bathroom  Home Access: Ramped entrance  Bathroom Toilet: Standard  Bathroom Equipment: Grab bars around toilet, Shower chair  Home Equipment: Rolling walker  ADL Assistance: Independent(see comment below)  Ambulation Assistance: Independent(uses RW at all times)  Transfer Assistance: Independent  Active : No  Occupation: Retired  Type of occupation: Power Train  Leisure & Hobbies: TV  Additional Comments: States she came home 6 days ago from SNF. (At [facility] beginning of Sept. and discharged to [address]) Since she has been home she has only sponge bathed and had not done any cleaning. Had done light meal prep. States she has been sleeping in a recliner at home for some time. Has not had any falls in last 6 days since home from SNF except syncopal event PTA. Cognition   Cognition  Overall Cognitive Status: Exceptions  Following Commands:  Follows all commands without difficulty  Safety Judgement: Decreased awareness of need for assistance  Problem Solving: Assistance required to correct errors made  Insights: Decreased awareness of deficits    Objective     Observation/Palpation  Posture: Fair(fwd. head; tight HS)  Observation: 2L O2 when sleeping    AROM RLE (degrees)  RLE AROM: WFL  AROM LLE (degrees)  LLE AROM : WFL  LLE General AROM: tight HS left > right  AROM RUE (degrees)  RUE AROM : WFL  AROM LUE (degrees)  LUE AROM : WFL  Strength RLE  Strength RLE: WFL  Strength LLE  Strength LLE: WFL  Strength RUE  Strength RUE: WFL  Strength LUE  Strength LUE: WFL  Tone RLE  RLE Tone: Normotonic  Tone LLE  LLE Tone: Normotonic  Sensation  Overall Sensation Status: WNL  Bed mobility  Supine to Sit: Stand by assistance  Sit to Supine: Stand by assistance  Transfers  Sit to Stand: Contact guard assistance  Stand to sit: Contact guard assistance  Ambulation  Ambulation?: Yes  Ambulation 1  Surface: level tile  Device: Rolling Walker  Assistance: Contact guard assistance  Quality of Gait: steady gait with RW  Distance: 80 ft. Comments: Pt. refused up to chair     Balance  Posture: Fair  Sitting - Static: Good  Standing - Static: Good;-(RW)  Exercises  Comments: standing HS stretch with both feet on floor, tall posture/hip ext. and knees straight. Held 30 sec. Edu. For how to incorporate this in her day. Sat unsupported EOB x 10 min. Plan   Plan  Times per week: 1-2x/day; 5-6days/wk  Specific instructions for Next Treatment: Attempt to get pt. to try 4WW. Show how to use seat and give her practical examples of how to use in her home to prevent falls (using seat during food prep vs. standing at counter or as a resting place, using seat to carry items)  Current Treatment Recommendations: Strengthening, ROM, Balance Training, Functional Mobility Training, Transfer Training, Gait Training, Endurance Training, Safety Education & Training, Patient/Caregiver Education & Training  Safety Devices  Type of devices:  All fall risk precautions in place, Bed alarm in place, Call light within reach, Gait belt, Patient at risk for falls, Left in bed, Nurse notified      Goals  Short term goals  Time Frame for Short term goals: 12 visits:  Short term goal 1: Pt. to be indep with bed mob. Short term goal 2: Pt. to be indep with transfers with RW  Short term goal 3: Pt. to be indep with gait with RW, 100ft. Short term goal 4: Pt. to have good standing dynamic balance with RW  Short term goal 5: Pt to tolerate 25+ min.  of PT for ther ex/ gait/ balance training  Patient Goals   Patient goals : no falls       Therapy Time   Individual Concurrent Group Co-treatment   Time In 0858         Time Out 0957         Minutes 59          Treatment time:  True Kumari PT

## 2020-09-26 NOTE — CARE COORDINATION
Social Work- Met with patient to discuss dc options. Patient states that she had only one fall since her dc from [place] and 2 ER visits. One visit was for nausea and the second was for the fall. She has history of multiple falls. She also smokes and wears O2. She states that [facility] was arranged, but she did not have a visit. Phone call to [facility] and spoke with answering service. She is refusing SNF. She is alert and oriented. She would like to return home with [facility]. Facesheet and H&P sent. Left message for APS. Patient was referred to APS on last hospitalization. Provided info on Passport.  Zenia Olivera
Social Work-Phone call back form APS. Patient has an open case. The worker is Sallie Orellana [facility]([phone]. Left message with Sallie Orellana.  Harris Palmer
patient may need long term care or Passport in the future.  [address]        Electronically signed by MINI Celestin on 9/26/20 at 9:06 AM EDT

## 2020-09-26 NOTE — DISCHARGE INSTR - COC
Continuity of Care Form    Patient Name: Alvin Og   :  1950  MRN:  [id number]    Admit date:  2020  Discharge date:  2020    Code Status Order: Full Code   Advance Directives:   [facility] Documentation       Date/Time Healthcare Directive Type of Healthcare Directive Copy in [address] Agent's Name Healthcare Agent's Phone Number    20 0844  Yes, patient has an advance directive for healthcare treatment  Durable power of  for health care  No, copy requested from family  Healthcare power of   son  [phone]            Admitting Physician:  Carole Faith MD  PCP: Arnaud Mena MD    Discharging Nurse: Genet Arias Unit/Room#: 1001/1001-02  Discharging Unit Phone Number: [phone]    Emergency Contact:   Extended Emergency Contact Information  Primary Emergency Contact: JoelKalen  Address: [address], [address] [place] [address] Phone: [phone]  Relation: Child  Secondary Emergency Contact: Henry Fish  Mobile Phone: [phone]  Relation: Other    Past Surgical History:  Past Surgical History:   Procedure Laterality Date    AORTIC VALVE REPAIR N/A 2017    AORTIC VALVE REPAIR REPLACEMENT performed by Robert Son MD at [facility] N/A 2020    BRONCHOSCOPY BIOPSY BRONCHUS performed by Mely Mary MD at [facility], COLON, DIAGNOSTIC  2016    EYE SURGERY      HYSTERECTOMY      IR INS PICC VAD W SQ PORT GREATER THAN 5  2020    IR INS PICC VAD W SQ PORT GREATER THAN 5 2020 [facility]    LUMBAR LAMINECTOMY      TONSILLECTOMY         Immunization History:   Immunization History   Administered Date(s) Administered    Tdap (Boostrix, Adacel) 10/31/2017       Active Problems:  Patient Active Problem List   Diagnosis Code    Valvular heart disease I38    Type 2 diabetes mellitus without complication, without long-term current use of insulin ([facility]) E11.9    Open wound of right ankle S91.001A    COPD (chronic obstructive pulmonary disease) ([facility]) J44.9    Syncope and collapse R55    Chronic ulcer of right heel ([facility]) L97.419    Multifocal pneumonia J18.9    Aortic valve stenosis I35.0    Esophageal dilatation K22.8    Aspiration pneumonia of both lower lobes due to gastric secretions ([address] J69.0    Falls frequently R29.6    Chronic respiratory failure ([facility]) J96.10    Contusion of right knee S80. 01XA    Pneumonia J18.9    Closed fracture of right distal radius S52.501A    Subdural hemorrhage ([facility]) I62.00    Subarachnoid hemorrhage ([facility]) I60.9    Traumatic hemorrhage of cerebrum ([facility]) I88.283W    Chronic bilateral low back pain M54.5, G89.29    Cellulitis of both feet L03.115, L03. 116    Acute on chronic congestive heart failure ([facility]) I50.9    Bilateral leg edema R60.0    Cellulitis L03.90    Lung mass R91.8    Malignant neoplasm of upper lobe of right lung ([facility]) C34.11    Dehydration E86.0       Isolation/Infection:   Isolation            Contact          Patient Infection Status       Infection Onset Added Last Indicated Last Indicated By Review Planned Expiration Resolved Resolved By    MDRO (multi-drug resistant organism)  14 Sintia Beavers RN        E.  Coli - urine 10/2016      Resolved    COVID-19 Rule Out 20 COVID-19 (Ordered)   20     COVID-19 Rule Out 20 COVID-19 (Ordered)   20 Rule-Out Test Resulted    MRSA  14 Sintia Beavers RN   17 Zayda Mota RN    2 negative nasal screens 10/2016 & 2016  Urine - 2014              Nurse Assessment:  Last Vital Signs: BP (!) 116/56   Pulse 88   Temp 98.2 °F (36.8 °C) (Oral)   Resp 16   Ht 5' 8.5\" (1.74 m)   Wt 144 lb 6.4 oz (65.5 kg)   SpO2 95%   BMI 21.63 kg/m² Last documented pain score (0-10 scale): Pain Level: 6  Last Weight:   Wt Readings from Last 1 Encounters:   09/25/20 144 lb 6.4 oz (65.5 kg)     Mental Status:  oriented and alert    IV Access:  - PICC - site  R Basilic, insertion date: ***    Nursing Mobility/ADLs:  Walking   Assisted  Transfer  Assisted  Bathing  Assisted  Dressing  Assisted  Toileting  Assisted  Feeding  [address]  Independent  Med Delivery   whole    Wound Care Documentation and Therapy:        Elimination:  Continence:   · Bowel: Yes  · Bladder: No and urge incontinence  ·   Urinary Catheter: None   Colostomy/Ileostomy/Ileal Conduit: No       Date of Last BM: 09/27/2020    Intake/Output Summary (Last 24 hours) at 9/26/2020 1115  Last data filed at 9/26/2020 0945  Gross per 24 hour   Intake 600 ml   Output 400 ml   Net 200 ml     I/O last 3 completed shifts:  In: -   Out: 400 [Urine:400]    Safety Concerns:     History of Falls (last 30 days) and At Risk for Falls    Impairments/Disabilities:      Vision    Nutrition Therapy:  Current Nutrition Therapy:   - Oral Diet:  Cardiac    Routes of Feeding: Oral  Liquids: No Restrictions  Daily Fluid Restriction: no  Last Modified Barium Swallow with Video (Video Swallowing Test): not done    Treatments at the Time of Hospital Discharge:   Respiratory Treatments: ***  Oxygen Therapy:  is not on home oxygen therapy.   Ventilator:    - No ventilator support    Rehab Therapies: Physical Therapy and Occupational Therapy  Weight Bearing Status/Restrictions: No weight bearing restirctions  Other Medical Equipment (for information only, NOT a DME order):  walker  Other Treatments: ***    Patient's personal belongings (please select all that are sent with patient):  Glasses    RN SIGNATURE:  Electronically signed by Janette Yang RN on 9/27/20 at 4:39 PM EDT    CASE MANAGEMENT/SOCIAL WORK SECTION    Inpatient Status Date: ***    Readmission Risk Assessment Score:  Readmission Risk Risk of Unplanned Readmission:        24           Discharging to Facility/ Agency   · Name: [facility]  · Address:  · Phone: [phone]  · Fax: [phone]    Dialysis Facility (if applicable)   · Name:  · Address:  · Dialysis Schedule:  · Phone:  · Fax:    / signature: Electronically signed by Juancarlos Valente RN on 9/27/20 at 4:42 PM EDT    PHYSICIAN SECTION    Prognosis: Fair    Condition at Discharge: Stable    Rehab Potential (if transferring to Rehab): Fair    Recommended Labs or Other Treatments After Discharge:     Physician Certification: I certify the above information and transfer of Eston Body  is necessary for the continuing treatment of the diagnosis listed and that she requires Home Care for greater 30 days.      Update Admission H&P: No change in H&P    PHYSICIAN SIGNATURE:  Electronically signed by Emily Arteaga MD on 9/27/20 at 2:51 PM EDT

## 2020-09-26 NOTE — FLOWSHEET NOTE
Patient states about her lung cancer, pain, worries, fears. Patient states her worries about her son the fear of her son about her possible death. Shared about her cancer treatment.  gave consult to [facility] about the patient. .   shared in presence, listening, prayer. Follow up as needed. 09/26/20 5019   Encounter Summary   Services provided to: Patient   Referral/Consult From: [address] [place] [address] Road Visiting   (9-26-20)   Complexity of Encounter High   Length of Encounter 30 minutes   Spiritual Assessment Completed Yes   Routine   Type Initial   Spiritual/Taoist   Type Spiritual support   Assessment Passive; Anxious; Fearful;Helplessness   Intervention Active listening;Explored feelings, thoughts, concerns;Prayer;Sustaining presence/ Ministry of presence; Discussed illness/injury and it's impact; Discussed belief system/Gnosticist practices/chuck;Grief care   Outcome Expressed gratitude;Receptive;Engaged in conversation;Expressed feelings/needs/concerns

## 2020-09-26 NOTE — PLAN OF CARE
Problem: Falls - Risk of:  Goal: Will remain free from falls  Description: Will remain free from falls  Outcome: Ongoing     Problem: Falls - Risk of:  Goal: Absence of physical injury  Description: Absence of physical injury  Outcome: Ongoing     Problem: Cardiac:  Goal: Ability to maintain vital signs within normal range will improve  Description: Ability to maintain vital signs within normal range will improve  Outcome: Ongoing     Problem: Cardiac:  Goal: Cardiovascular alteration will improve  Description: Cardiovascular alteration will improve  Outcome: Ongoing

## 2020-09-26 NOTE — PROGRESS NOTES
Office note from Dr. Letitia Rivas on 9/18/2020:  ----------------------------------------------------------  IMPRESSION:   1. 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 PET scan negative for distant metastasis: Clinical stage T4 N2 M0, stage IIIb  2. COPD  3. Falls  4. CAD  5. Tobacco abuse  6. Actinomyces bacteremia     RECOMMENDATIONS:  1. I reviewed the laboratory data, imaging studies, biopsy finding, diagnosis, prognosis and treatment options with patient  2. I reviewed the NCCN guidelines and goals of care  3. I reviewed the CT head and PET scan results with patient. 4. She has completed IV antibiotic for her actinomyces infection  5. I recommended getting port placement and removal of PICC line  6. I discussed the option of concurrent chemoradiation. I discussed the risk benefits and side effects with patient  7. We will refer her to radiation oncology  8. Arrange for chemo teaching  9. Return to clinic with cycle  10. Patient's questions were sought and answered to her satisfaction. Mendoza Rivas MD  Hematologist/Medical Oncologist  --------------------------------------------------------------------------    In regards to the patients current PICC line, RN will address with attending provider. It appears patient will keep PICC line until port placement can be obtained, per oncology.

## 2020-09-27 VITALS
OXYGEN SATURATION: 92 % | HEART RATE: 83 BPM | RESPIRATION RATE: 18 BRPM | BODY MASS INDEX: 21.39 KG/M2 | SYSTOLIC BLOOD PRESSURE: 120 MMHG | WEIGHT: 144.4 LBS | HEIGHT: 69 IN | DIASTOLIC BLOOD PRESSURE: 52 MMHG | TEMPERATURE: 98.6 F

## 2020-09-27 LAB
ANION GAP SERPL CALCULATED.3IONS-SCNC: 9 MMOL/L (ref 9–17)
BUN BLDV-MCNC: 21 MG/DL (ref 8–23)
BUN/CREAT BLD: 24 (ref 9–20)
CALCIUM SERPL-MCNC: 9 MG/DL (ref 8.6–10.4)
CHLORIDE BLD-SCNC: 100 MMOL/L (ref 98–107)
CO2: 31 MMOL/L (ref 20–31)
CREAT SERPL-MCNC: 0.86 MG/DL (ref 0.5–0.9)
GFR AFRICAN AMERICAN: >60 ML/MIN
GFR NON-AFRICAN AMERICAN: >60 ML/MIN
GFR SERPL CREATININE-BSD FRML MDRD: ABNORMAL ML/MIN/{1.73_M2}
GFR SERPL CREATININE-BSD FRML MDRD: ABNORMAL ML/MIN/{1.73_M2}
GLUCOSE BLD-MCNC: 248 MG/DL (ref 65–105)
GLUCOSE BLD-MCNC: 73 MG/DL (ref 65–105)
GLUCOSE BLD-MCNC: 73 MG/DL (ref 70–99)
HCT VFR BLD CALC: 34.6 % (ref 36.3–47.1)
HEMOGLOBIN: 10.2 G/DL (ref 11.9–15.1)
MAGNESIUM: 1.8 MG/DL (ref 1.6–2.6)
MCH RBC QN AUTO: 22.7 PG (ref 25.2–33.5)
MCHC RBC AUTO-ENTMCNC: 29.5 G/DL (ref 28.4–34.8)
MCV RBC AUTO: 76.9 FL (ref 82.6–102.9)
NRBC AUTOMATED: 0 PER 100 WBC
PDW BLD-RTO: 22.3 % (ref 11.8–14.4)
PLATELET # BLD: 286 K/UL (ref 138–453)
PMV BLD AUTO: 9.1 FL (ref 8.1–13.5)
POTASSIUM SERPL-SCNC: 4.5 MMOL/L (ref 3.7–5.3)
RBC # BLD: 4.5 M/UL (ref 3.95–5.11)
SODIUM BLD-SCNC: 140 MMOL/L (ref 135–144)
WBC # BLD: 11.7 K/UL (ref 3.5–11.3)

## 2020-09-27 PROCEDURE — 94640 AIRWAY INHALATION TREATMENT: CPT

## 2020-09-27 PROCEDURE — 83735 ASSAY OF MAGNESIUM: CPT

## 2020-09-27 PROCEDURE — 94761 N-INVAS EAR/PLS OXIMETRY MLT: CPT

## 2020-09-27 PROCEDURE — 36415 COLL VENOUS BLD VENIPUNCTURE: CPT

## 2020-09-27 PROCEDURE — 96372 THER/PROPH/DIAG INJ SC/IM: CPT

## 2020-09-27 PROCEDURE — 82947 ASSAY GLUCOSE BLOOD QUANT: CPT

## 2020-09-27 PROCEDURE — 80048 BASIC METABOLIC PNL TOTAL CA: CPT

## 2020-09-27 PROCEDURE — 6370000000 HC RX 637 (ALT 250 FOR IP): Performed by: FAMILY MEDICINE

## 2020-09-27 PROCEDURE — G0378 HOSPITAL OBSERVATION PER HR: HCPCS

## 2020-09-27 PROCEDURE — 2580000003 HC RX 258: Performed by: FAMILY MEDICINE

## 2020-09-27 PROCEDURE — 2700000000 HC OXYGEN THERAPY PER DAY

## 2020-09-27 PROCEDURE — 85027 COMPLETE CBC AUTOMATED: CPT

## 2020-09-27 PROCEDURE — 6360000002 HC RX W HCPCS: Performed by: FAMILY MEDICINE

## 2020-09-27 RX ORDER — CLONAZEPAM 1 MG/1
1 TABLET ORAL 2 TIMES DAILY PRN
Qty: 5 TABLET | Refills: 0
Start: 2020-09-27 | End: 2020-11-03

## 2020-09-27 RX ORDER — FUROSEMIDE 40 MG/1
20 TABLET ORAL DAILY
Qty: 60 TABLET | Refills: 3 | Status: SHIPPED | OUTPATIENT
Start: 2020-09-27 | End: 2020-11-03

## 2020-09-27 RX ADMIN — GLIMEPIRIDE 1 MG: 1 TABLET ORAL at 17:25

## 2020-09-27 RX ADMIN — PANTOPRAZOLE SODIUM 40 MG: 40 TABLET, DELAYED RELEASE ORAL at 06:06

## 2020-09-27 RX ADMIN — OXYCODONE HYDROCHLORIDE AND ACETAMINOPHEN 1 TABLET: 5; 325 TABLET ORAL at 07:42

## 2020-09-27 RX ADMIN — ASPIRIN 81 MG: 81 TABLET, COATED ORAL at 09:37

## 2020-09-27 RX ADMIN — METFORMIN HYDROCHLORIDE 500 MG: 500 TABLET ORAL at 09:37

## 2020-09-27 RX ADMIN — FUROSEMIDE 40 MG: 40 TABLET ORAL at 09:37

## 2020-09-27 RX ADMIN — OXYCODONE HYDROCHLORIDE AND ACETAMINOPHEN 1 TABLET: 5; 325 TABLET ORAL at 15:57

## 2020-09-27 RX ADMIN — ENOXAPARIN SODIUM 40 MG: 40 INJECTION SUBCUTANEOUS at 09:38

## 2020-09-27 RX ADMIN — BUDESONIDE AND FORMOTEROL FUMARATE DIHYDRATE 2 PUFF: 160; 4.5 AEROSOL RESPIRATORY (INHALATION) at 08:24

## 2020-09-27 RX ADMIN — Medication 10 ML: at 09:38

## 2020-09-27 RX ADMIN — GLIMEPIRIDE 2 MG: 2 TABLET ORAL at 09:37

## 2020-09-27 RX ADMIN — VENLAFAXINE HYDROCHLORIDE 150 MG: 75 CAPSULE, EXTENDED RELEASE ORAL at 09:37

## 2020-09-27 RX ADMIN — METOPROLOL TARTRATE 25 MG: 25 TABLET, FILM COATED ORAL at 09:37

## 2020-09-27 ASSESSMENT — PAIN SCALES - GENERAL
PAINLEVEL_OUTOF10: 6
PAINLEVEL_OUTOF10: 10
PAINLEVEL_OUTOF10: 8

## 2020-09-27 NOTE — PROGRESS NOTES
[address] Hospitalist            Name:  Rodolfo Hernandes  MRN:    [id number]     Acct:     [de-identified]   Room:  [facility]/1001[facility]02  IP Day: 2     Admit Date: 9/25/2020  9:13 AM  PCP: Samaria Guillen MD    C/C:   Chief Complaint   Patient presents with   [address]    Head Injury    Loss of Consciousness       Assessment:      · Syncope  · Coronary artery disease  · Chronic obstructive pulmonary disease   · Lung cancer   · Former smoker   · Essential hypertension  · Diabetes mellitus   · Diabetic neuropathy   · AAA  · Anxiety disorder  · Major depressive disorder         Plan:        · Admit Progressive   · Monitor vitals closely  · Keep SpO2 above 90%  · Is/ Os  · IV heplock  · Daily weights  · Neuro checks  · Ortho stats  · Fall precaution  · Resume ASA  · Resume lasix  · Resume Neuronitin  · Resume Amaryl  · Resume Glucophage  · Resume Metoprolol  · Add parameters   · Resume Protonix  · Resume Effexor  · Resume Trazodone  · Resume Klonopin   · Resume Dulera   · Check CBC, BMP  · accu checks AC and HS  · Insulin sliding scale  · Hypoglycemia protocol   · DVT and GI prophylaxis  · DC plan in am      Subjective:     Patient seen and examined at bedside. No overnight events. No acute complaints today. Afebrile  Ambulated in hallway  Feels some better  Pt. Denies any CP, SOB, palpitation, HA, chills, cough, cold, changes in urination, BM or skin changes or any pain. ROS:  A 10 point system reviewed and negative otherwise mentioned above. History of Present Illness:      Rodolfo Hernandes is a 72 y.o.  female who presents with Fall; Head Injury; and Loss of Consciousness     Pt presented to the ER after a syncopal attack. Pt says she had been at her home when her son came over to take her to her doctor's appointment. Pt says she was going for her appointment to the oncologist since there are plans to place a port early next week.  She says that as she was walking and pivoted she lost her rhythm, no murmur  Abdomen - soft, nontender, nondistended, bowel sounds present all four quadrants, no masses, hepatomegaly or splenomegaly  Neurological - normal speech, no focal findings or movement disorder noted, cranial nerves II through XII grossly intact  Extremities - peripheral pulses palpable, no pedal edema or calf pain with palpation  Skin - no gross lesions, rashes, or induration noted        Medications: Allergies:    Allergies   Allergen Reactions    Codeine      \"feeling of heavy on chest\"    Dye [Iodides]      Hallucination,vomiting       Current Meds:   Current Facility-Administered Medications:     aspirin EC tablet 81 mg, 81 mg, Oral, Daily, Whitney Becerril MD, 81 mg at 09/26/20 0933    budesonide-formoterol (SYMBICORT) 160-4.5 MCG/ACT inhaler 2 puff, 2 puff, Inhalation, BID, Whitney Becerril MD, 2 puff at 09/26/20 2103    glimepiride (AMARYL) tablet 2 mg, 2 mg, Oral, QAM, Whitney Becerril MD, 2 mg at 09/26/20 0933    pantoprazole (PROTONIX) tablet 40 mg, 40 mg, Oral, QAM AC, Whitney Becerril MD, 40 mg at 09/26/20 0542    metFORMIN (GLUCOPHAGE) tablet 500 mg, 500 mg, Oral, BID, Whitney Becerril MD, 500 mg at 09/26/20 2142    glimepiride (AMARYL) tablet 1 mg, 1 mg, Oral, Dinner, Susan Alberts MD, Stopped at 09/26/20 1700    metoprolol tartrate (LOPRESSOR) tablet 25 mg, 25 mg, Oral, BID, Whitney Becerril MD, 25 mg at 09/26/20 2142    venlafaxine (EFFEXOR XR) extended release capsule 150 mg, 150 mg, Oral, BID, Whitney Becerril MD, 150 mg at 09/26/20 2142    clonazePAM (KLONOPIN) tablet 1 mg, 1 mg, Oral, BID PRN, Whitney Becerril MD    traZODone (DESYREL) tablet 100 mg, 100 mg, Oral, Nightly, Whitney Becerril MD, 100 mg at 09/26/20 2142    gabapentin (NEURONTIN) capsule 400 mg, 400 mg, Oral, Nightly, Whitney Becerril MD, 400 mg at 09/26/20 2141    furosemide (LASIX) tablet 40 mg, 40 mg, Oral, Daily, Whitney Becerril MD, 40 mg at 09/26/20 0933    melatonin tablet 3 mg, 3 mg, Oral, Nightly PRN, Whitney MD Jerrica    sodium chloride flush 0.9 % injection 10 mL, 10 mL, Intravenous, 2 times per day, Lisa Sherman MD, 10 mL at 09/26/20 2142    sodium chloride flush 0.9 % injection 10 mL, 10 mL, Intravenous, PRN, Whitney Becerril MD    potassium chloride (KLOR-CON M) extended release tablet 40 mEq, 40 mEq, Oral, PRN **OR** potassium bicarb-citric acid (EFFER-K) effervescent tablet 40 mEq, 40 mEq, Oral, PRN **OR** potassium chloride 10 mEq/100 mL IVPB (Peripheral Line), 10 mEq, Intravenous, PRN, Whitney Becerril MD    magnesium sulfate 1 g in dextrose 5% 100 mL IVPB, 1 g, Intravenous, PRN, Whitney Becerril MD    acetaminophen (TYLENOL) tablet 650 mg, 650 mg, Oral, Q6H PRN **OR** acetaminophen (TYLENOL) suppository 650 mg, 650 mg, Rectal, Q6H PRN, Whitney Becerril MD    polyethylene glycol (GLYCOLAX) packet 17 g, 17 g, Oral, Daily PRN, Whitney Becerril MD    promethazine (PHENERGAN) tablet 12.5 mg, 12.5 mg, Oral, Q6H PRN **OR** ondansetron (ZOFRAN) injection 4 mg, 4 mg, Intravenous, Q6H PRN, Whitney Becerril MD    nicotine (NICODERM CQ) 21 MG/24HR 1 patch, 1 patch, Transdermal, Daily PRN, Whitney Becerril MD    enoxaparin (LOVENOX) injection 40 mg, 40 mg, Subcutaneous, Daily, Whitney Becerril MD, 40 mg at 09/26/20 0932    glucose (GLUTOSE) 40 % oral gel 15 g, 15 g, Oral, PRN, Whitney Becerril MD, 15 g at 09/26/20 1704    dextrose 50 % IV solution, 12.5 g, Intravenous, PRN, Whitney Becerril MD    glucagon (rDNA) injection 1 mg, 1 mg, Intramuscular, PRN, Whitney Becerril MD    dextrose 5 % solution, 100 mL/hr, Intravenous, PRNWhitney MD    oxyCODONE-acetaminophen (PERCOCET) 5-325 MG per tablet 1 tablet, 1 tablet, Oral, Q4H PRNWhitney MD, 1 tablet at 09/26/20 2142      I/O (24Hr):     Intake/Output Summary (Last 24 hours) at 9/27/2020 0030  Last data filed at 9/26/2020 0945  Gross per 24 hour   Intake 600 ml   Output --   Net 600 ml       Data:           Labs:    Hematology:  Recent Labs     09/24/20  3372 09/25/20  1036 09/26/20  0447   WBC 12.5* 14.1* 11.0   RBC 4.79 5.09 4.15   HGB 11.0* 11.6* 9.7*   HCT 36.3 38.2 32.2*   MCV 75.8* 75.0* 77.6*   MCH 23.0* 22.8* 23.4*   MCHC 30.3 30.4 30.1   RDW 21.4* 21.7* 21.8*    359 281   MPV 9.6 9.4 9.7   DDIMER  --  4.51*  --      Chemistry:  Recent Labs     09/24/20  1515 09/25/20  1036 09/25/20  1858 09/25/20  2250 09/26/20 0447   * 137  --   --  140   K 4.8 5.3  --   --  4.1   CL 93* 99  --   --  100   CO2 28 28  --   --  32*   GLUCOSE 145* 125*  --   --  67*   BUN 18 17  --   --  21   CREATININE 0.68 0.76  --   --  0.86   MG  --  2.0  --   --  1.7   ANIONGAP 11 10  --   --  8*   LABGLOM >60 >60  --   --  >60   GFRAA >60 >60  --   --  >60   CALCIUM 9.5 9.7  --   --  9.1   PROBNP  --  2,286* 1,266*  --   --    TROPHS 14 18* 22* 20*  --    CKTOTAL  --  45  --   --   --    MYOGLOBIN  --  45  --   --   --      Recent Labs     09/24/20  1515 09/25/20  1036 09/26/20  0716 09/26/20  1156 09/26/20  1623 09/26/20  1652 09/26/20  1925   PROT 7.6 7.4  --   --   --   --   --    LABALBU 3.8 3.6  --   --   --   --   --    AST 14 18  --   --   --   --   --    ALT 9 9  --   --   --   --   --    ALKPHOS 42 36  --   --   --   --   --    BILITOT 0.21* 0.20*  --   --   --   --   --    BILIDIR <0.08  --   --   --   --   --   --    LIPASE 11* 12*  --   --   --   --   --    POCGLU  --   --  65 100 48* 66 118*       Lab Results   Component Value Date/Time    SPECIAL NOT REPORTED 09/25/2020 11:27 AM     Lab Results   Component Value Date/Time    CULTURE ESCHERICHIA COLI 50 to 100,000 CFU/ML (A) 09/25/2020 11:27 AM       Lab Results   Component Value Date    POCPH 7.36 04/12/2017    PHART 7.42 08/26/2012    PH 7.22 04/11/2017    POCPCO2 45 04/12/2017    XGU0XUY 41 08/26/2012    PCO2 54 04/11/2017    POCPO2 93 04/12/2017    PO2ART 59 08/26/2012    PO2 89 04/11/2017    POCHCO3 25.3 04/12/2017    KLI9PMY 26.1 08/26/2012    HCO3 22.2 04/11/2017    NBEA NOT REPORTED 04/12/2017    PBEA 0 04/12/2017    VAD6VMC 27 04/12/2017    YLOC5DVU 97 04/12/2017    J1WABJRR 92.1 08/26/2012    O2SAT 95 04/11/2017    FIO2 UNKNOWN 10/31/2017       Radiology:    Ct Abdomen Pelvis Wo Contrast    Result Date: 9/24/2020  1. No acute process in the abdomen or pelvis. 2. Suspected cystic lesions of the pancreatic head without main pancreatic ductal dilatation measuring up to 1.1 cm. Consider further evaluation with nonemergent contrast-enhanced abdominal MRI with MRCP. 3. Colonic diverticulosis. 4. Distended trabeculated urinary bladder indicative of some degree of outlet obstruction or neurogenic bladder. Ct Head Wo Contrast    Result Date: 9/25/2020  1. No acute intracranial abnormality nor acute calvarial fracture. 2. Subgaleal hematoma, subcutaneous hematoma, and subcutaneous contusion in the posterior left parietal scalp. Ct Cervical Spine Wo Contrast    Result Date: 9/25/2020  1. No acute findings in the cervical spine. 2. Moderate cervical spine degenerative changes. 3. Partial inclusion of right upper lobe malignancy better evaluated on the recent PET/CT. Adjacent interlobular septal thickening could be due to lymphangitic carcinomatosis and/or edema from vascular obstruction. Nm Lung Scan Perfusion Only    Result Date: 9/25/2020  Very low probability for pulmonary embolism. Xr Chest 1 View    Result Date: 9/25/2020  Right upper lobe mass. No acute findings. All radiological studies reviewed  Code Status:  Full Code        Electronically signed by Francisco Javier Seo MD on 9/27/2020 at 12:30 AM    This note was created with the assistance of a speech-recognition program.  Although the intention is to generate a document that actually reflects the content of the visit, no guarantees can be provided that every mistake has been identified and corrected by editing. Note was updated later by me after  physical examination and  completion of the assessment.

## 2020-09-27 NOTE — PLAN OF CARE
Problem: Falls - Risk of:  Goal: Will remain free from falls  Description: Will remain free from falls  9/27/2020 1703 by Urban Brunner, RN  Outcome: Ongoing   Patient is fall risk per fall scale. Falling star on door. Fall sticker on armband. Hourly rounding performed. Personal belongings and call light within reach. Bed in low position. Restraint alternatives in place. Problem: Pain:  Goal: Pain level will decrease  Description: Pain level will decrease  9/27/2020 1703 by Urban Brunner, RN  Outcome: Ongoing   Patient states understanding of pain scale and interventions. Pain assessed with hourly rounding and PRN. Patient states pain level is moderate. Will continue to monitor.

## 2020-09-27 NOTE — PLAN OF CARE
Problem: Pain:  Goal: Pain level will decrease  Description: Pain level will decrease  Outcome: Ongoing   Pt c/o chronic back pain and has head pain d/t fall-small bump noted. Pt able to rate pain appropriately and request pain meds. . Will assess pain level  with rounding and medicate per prn order. Percocet effective pain control. Problem: Falls - Risk of:  Goal: Will remain free from falls  Description: Will remain free from falls  Outcome: Ongoing   Fall risk assessment done, bed locked in low position, falling star in place, call light in reach at all times and encouraged to use for assist. Pathway to bathroom clutter-free and non skid socks on. Continue with hourly rounding, monitor for change. Pt remains free of falls/injury . Pt using walker to aide with ambulation.  Fair gait observed

## 2020-09-27 NOTE — PROGRESS NOTES
Patient discharged per wheelchair to private vehicle with documented belongings. Discharge paperwork given and discussed, questions and concerns answered to the best of writers knowledge.

## 2020-09-27 NOTE — PROGRESS NOTES
[address] Hospitalist            Name:  Kari Lau  MRN:    [id number]     Acct:     [de-identified]   Room:  [facility]/1001-02  IP Day: 2     Admit Date: 9/25/2020  9:13 AM  PCP: Andrei Meza MD    C/C:   Chief Complaint   Patient presents with   [place]    Head Injury    Loss of Consciousness       Assessment:      · Syncope  · Coronary artery disease  · Chronic obstructive pulmonary disease   · Lung cancer   · Former smoker   · Essential hypertension  · Diabetes mellitus type 2  · Diabetic neuropathy   · AAA  · Anxiety disorder  · Major depressive disorder         Plan:        · Admit Progressive   · Monitor vitals closely  · Keep SpO2 above 90%  · Is/ Os  · IV heplock  · Daily weights  · Neuro checks  · Ortho stats  · Fall precaution  · Resume ASA  · Resume lasix  · Resume Neuronitin  · Resume Amaryl  · Resume Glucophage  · Resume Metoprolol  · Add parameters   · Resume Protonix  · Resume Effexor  · Resume Trazodone  · Resume Klonopin   · Resume Dulera   · Check CBC, BMP  · accu checks AC and HS  · Insulin sliding scale  · Hypoglycemia protocol   · DVT and GI prophylaxis  · DC plan today  · Adjusted meds  · Reduce lasix at DC  · Reduce Neurontin  · Reduce Klonopin  · Reviewed causes of falls  · continue to monitor  · VELMA signed  · meds reconciled      Subjective:     Patient seen and examined at bedside. No overnight events. No acute complaints today. Afebrile  Ambulated in hallway  Feels much better  Stable on feet now  Understands avoid pivoting and use of walker    Pt. Denies any CP, SOB, palpitation, HA, chills, cough, cold, changes in urination, BM or skin changes or any pain. ROS:  A 10 point system reviewed and negative otherwise mentioned above. History of Present Illness:      Kari Lau is a 72 y.o.  female who presents with Fall; Head Injury; and Loss of Consciousness     Pt presented to the ER after a syncopal attack.  Pt says she had been at her home (NEURONTIN) capsule 400 mg, 400 mg, Oral, Nightly, Whitney Becerril MD, 400 mg at 09/26/20 2141    furosemide (LASIX) tablet 40 mg, 40 mg, Oral, Daily, Whitney Becerril MD, 40 mg at 09/27/20 0937    melatonin tablet 3 mg, 3 mg, Oral, Nightly PRN, Whitney Becerril MD    sodium chloride flush 0.9 % injection 10 mL, 10 mL, Intravenous, 2 times per day, Frances Rocha MD, 10 mL at 09/27/20 0938    sodium chloride flush 0.9 % injection 10 mL, 10 mL, Intravenous, PRN, Whitney Becerril MD    potassium chloride (KLOR-CON M) extended release tablet 40 mEq, 40 mEq, Oral, PRN **OR** potassium bicarb-citric acid (EFFER-K) effervescent tablet 40 mEq, 40 mEq, Oral, PRN **OR** potassium chloride 10 mEq/100 mL IVPB (Peripheral Line), 10 mEq, Intravenous, PRN, Whitney Becerril MD    magnesium sulfate 1 g in dextrose 5% 100 mL IVPB, 1 g, Intravenous, PRN, Whitney Becerril MD    acetaminophen (TYLENOL) tablet 650 mg, 650 mg, Oral, Q6H PRN **OR** acetaminophen (TYLENOL) suppository 650 mg, 650 mg, Rectal, Q6H PRN, Whitney Becerril MD    polyethylene glycol (GLYCOLAX) packet 17 g, 17 g, Oral, Daily PRN, Whitney Becerril MD    promethazine (PHENERGAN) tablet 12.5 mg, 12.5 mg, Oral, Q6H PRN **OR** ondansetron (ZOFRAN) injection 4 mg, 4 mg, Intravenous, Q6H PRN, hWitney Becerril MD    nicotine (NICODERM CQ) 21 MG/24HR 1 patch, 1 patch, Transdermal, Daily PRN, Whitney Becerril MD    enoxaparin (LOVENOX) injection 40 mg, 40 mg, Subcutaneous, Daily, Whitney Becerril MD, 40 mg at 09/27/20 0938    glucose (GLUTOSE) 40 % oral gel 15 g, 15 g, Oral, PRN, Whitney Becerril MD, 15 g at 09/26/20 1704    dextrose 50 % IV solution, 12.5 g, Intravenous, PRN, Whitney Becerril MD    glucagon (rDNA) injection 1 mg, 1 mg, Intramuscular, PRN, Whitney Becerril MD    dextrose 5 % solution, 100 mL/hr, Intravenous, PRN, Whitney Becerril MD    oxyCODONE-acetaminophen (PERCOCET) 5-325 MG per tablet 1 tablet, 1 tablet, Oral, Q4H PRN, Whitney Becerril MD, 1 tablet at 09/27/20 5507      I/O (24Hr): No intake or output data in the 24 hours ending 09/27/20 1442    Data:           Labs:    Hematology:  Recent Labs     09/25/20  1036 09/26/20 0447 09/27/20  0518   WBC 14.1* 11.0 11.7*   RBC 5.09 4.15 4.50   HGB 11.6* 9.7* 10.2*   HCT 38.2 32.2* 34.6*   MCV 75.0* 77.6* 76.9*   MCH 22.8* 23.4* 22.7*   MCHC 30.4 30.1 29.5   RDW 21.7* 21.8* 22.3*    281 286   MPV 9.4 9.7 9.1   DDIMER 4.51*  --   --      Chemistry:  Recent Labs     09/25/20  1036 09/25/20  1858 09/25/20 2250 09/26/20 0447 09/27/20  0518     --   --  140 140   K 5.3  --   --  4.1 4.5   CL 99  --   --  100 100   CO2 28  --   --  32* 31   GLUCOSE 125*  --   --  67* 73   BUN 17  --   --  21 21   CREATININE 0.76  --   --  0.86 0.86   MG 2.0  --   --  1.7 1.8   ANIONGAP 10  --   --  8* 9   LABGLOM >60  --   --  >60 >60   GFRAA >60  --   --  >60 >60   CALCIUM 9.7  --   --  9.1 9.0   PROBNP 2,286* 1,266*  --   --   --    TROPHS 18* 22* 20*  --   --    CKTOTAL 45  --   --   --   --    MYOGLOBIN 45  --   --   --   --      Recent Labs     09/24/20  1515 09/25/20  1036  09/26/20  1156 09/26/20  1623 09/26/20  1652 09/26/20  1925 09/27/20  0733 09/27/20  1136   PROT 7.6 7.4  --   --   --   --   --   --   --    LABALBU 3.8 3.6  --   --   --   --   --   --   --    AST 14 18  --   --   --   --   --   --   --    ALT 9 9  --   --   --   --   --   --   --    ALKPHOS 42 36  --   --   --   --   --   --   --    BILITOT 0.21* 0.20*  --   --   --   --   --   --   --    BILIDIR <0.08  --   --   --   --   --   --   --   --    LIPASE 11* 12*  --   --   --   --   --   --   --    POCGLU  --   --    < > 100 48* 66 118* 73 248*    < > = values in this interval not displayed.        Lab Results   Component Value Date/Time    SPECIAL NOT REPORTED 09/25/2020 11:27 AM     Lab Results   Component Value Date/Time    CULTURE ESCHERICHIA COLI 50 to 100,000 CFU/ML (A) 09/25/2020 11:27 AM       Lab Results   Component Value Date    POCPH 7.36 04/12/2017 PHART 7.42 08/26/2012    PH 7.22 04/11/2017    POCPCO2 45 04/12/2017    UCF2PZG 41 08/26/2012    PCO2 54 04/11/2017    POCPO2 93 04/12/2017    PO2ART 59 08/26/2012    PO2 89 04/11/2017    POCHCO3 25.3 04/12/2017    CHN3BUB 26.1 08/26/2012    HCO3 22.2 04/11/2017    NBEA NOT REPORTED 04/12/2017    PBEA 0 04/12/2017    BIY1ZXV 27 04/12/2017    AIFW5YVK 97 04/12/2017    V6YKXBNM 92.1 08/26/2012    O2SAT 95 04/11/2017    FIO2 UNKNOWN 10/31/2017       Radiology:    Ct Abdomen Pelvis Wo Contrast    Result Date: 9/24/2020  1. No acute process in the abdomen or pelvis. 2. Suspected cystic lesions of the pancreatic head without main pancreatic ductal dilatation measuring up to 1.1 cm. Consider further evaluation with nonemergent contrast-enhanced abdominal MRI with MRCP. 3. Colonic diverticulosis. 4. Distended trabeculated urinary bladder indicative of some degree of outlet obstruction or neurogenic bladder. Ct Head Wo Contrast    Result Date: 9/25/2020  1. No acute intracranial abnormality nor acute calvarial fracture. 2. Subgaleal hematoma, subcutaneous hematoma, and subcutaneous contusion in the posterior left parietal scalp. Ct Cervical Spine Wo Contrast    Result Date: 9/25/2020  1. No acute findings in the cervical spine. 2. Moderate cervical spine degenerative changes. 3. Partial inclusion of right upper lobe malignancy better evaluated on the recent PET/CT. Adjacent interlobular septal thickening could be due to lymphangitic carcinomatosis and/or edema from vascular obstruction. Nm Lung Scan Perfusion Only    Result Date: 9/25/2020  Very low probability for pulmonary embolism. Xr Chest 1 View    Result Date: 9/25/2020  Right upper lobe mass. No acute findings.          All radiological studies reviewed  Code Status:  Full Code        Electronically signed by Leonela Hankins MD on 9/27/2020 at 2:42 PM    This note was created with the assistance of a speech-recognition program.  Although the intention is to generate a document that actually reflects the content of the visit, no guarantees can be provided that every mistake has been identified and corrected by editing. Note was updated later by me after  physical examination and  completion of the assessment.

## 2020-09-28 ENCOUNTER — TELEPHONE (OUTPATIENT)
Dept: ONCOLOGY | Age: 70
End: 2020-09-28

## 2020-09-28 ENCOUNTER — TELEPHONE (OUTPATIENT)
Dept: CASE MANAGEMENT | Age: 70
End: 2020-09-28

## 2020-09-28 LAB
EKG ATRIAL RATE: 118 BPM
EKG P AXIS: 67 DEGREES
EKG P-R INTERVAL: 146 MS
EKG Q-T INTERVAL: 394 MS
EKG QRS DURATION: 148 MS
EKG QTC CALCULATION (BAZETT): 552 MS
EKG R AXIS: -71 DEGREES
EKG T AXIS: 60 DEGREES
EKG VENTRICULAR RATE: 118 BPM
GLUCOSE BLD-MCNC: 151 MG/DL (ref 65–105)

## 2020-09-28 RX ORDER — SODIUM CHLORIDE 9 MG/ML
INJECTION, SOLUTION INTRAVENOUS CONTINUOUS
Status: CANCELLED | OUTPATIENT
Start: 2020-09-29

## 2020-09-28 RX ORDER — SODIUM CHLORIDE 0.9 % (FLUSH) 0.9 %
10 SYRINGE (ML) INJECTION PRN
Status: CANCELLED | OUTPATIENT
Start: 2020-09-29

## 2020-09-28 NOTE — TELEPHONE ENCOUNTER
Name: Hiren Stallings  : 1950  MRN: [id number]    Oncology Navigation Follow-Up Note    Navigator spoke with pt. And pt. Home and requesting nutritional supplements and incontinence pads. Aníbal Groves is in pts. Home for [address] services . Diane  @ [phone]. Plan to discuss pt. possiby getting samples of enlive when @ RO appt. Later this week  And pt. Unable to purchase incontinence pads and needing script. Plan to have Adelita assess need. Navigator spoke with dietary and will request supplements through [employer](pt. On medicare and will probably not be covered). Pt. tohave ort placed tomorrow.   Electronically signed by Clinton Ortiz RN on 2020 at 2:51 PM

## 2020-09-28 NOTE — TELEPHONE ENCOUNTER
RECEIVED A FAX FROM [facility] STATING THE RECENT XF ORDER FOR STE[facility] HAS BEEN CLOSED? [phone], SPOKE WITH DANILO TO OBTAIN CLARITY. SEEMS STEPHENIE ALREADY HAS TWO OPEN TEST ONE ON TISSUE AND ONE ON LIQUID  SO THIS WAS DUPLICATE AND CLOSED.

## 2020-09-29 ENCOUNTER — TELEPHONE (OUTPATIENT)
Dept: CASE MANAGEMENT | Age: 70
End: 2020-09-29

## 2020-09-29 ENCOUNTER — HOSPITAL ENCOUNTER (OUTPATIENT)
Dept: INTERVENTIONAL RADIOLOGY/VASCULAR | Age: 70
Discharge: HOME | End: 2020-10-01
Payer: MEDICARE

## 2020-09-29 VITALS
TEMPERATURE: 97.5 F | HEIGHT: 69 IN | WEIGHT: 148.5 LBS | OXYGEN SATURATION: 97 % | DIASTOLIC BLOOD PRESSURE: 52 MMHG | HEART RATE: 79 BPM | RESPIRATION RATE: 18 BRPM | BODY MASS INDEX: 22 KG/M2 | SYSTOLIC BLOOD PRESSURE: 128 MMHG

## 2020-09-29 LAB
ESTIMATED AVERAGE GLUCOSE: 134 MG/DL
HBA1C MFR BLD: 6.3 % (ref 4–6)
INR BLD: 0.9
PARTIAL THROMBOPLASTIN TIME: 33.4 SEC (ref 23.9–33.8)
PLATELET # BLD: 347 K/UL (ref 138–453)
PROTHROMBIN TIME: 11.8 SEC (ref 11.5–14.2)

## 2020-09-29 PROCEDURE — 7100000010 HC PHASE II RECOVERY - FIRST 15 MIN

## 2020-09-29 PROCEDURE — 2580000003 HC RX 258: Performed by: RADIOLOGY

## 2020-09-29 PROCEDURE — 96361 HYDRATE IV INFUSION ADD-ON: CPT

## 2020-09-29 PROCEDURE — 7100000011 HC PHASE II RECOVERY - ADDTL 15 MIN

## 2020-09-29 PROCEDURE — 6360000002 HC RX W HCPCS: Performed by: RADIOLOGY

## 2020-09-29 PROCEDURE — C1788 PORT, INDWELLING, IMP: HCPCS

## 2020-09-29 PROCEDURE — 99152 MOD SED SAME PHYS/QHP 5/>YRS: CPT | Performed by: RADIOLOGY

## 2020-09-29 PROCEDURE — 36561 INSERT TUNNELED CV CATH: CPT | Performed by: RADIOLOGY

## 2020-09-29 PROCEDURE — 85730 THROMBOPLASTIN TIME PARTIAL: CPT

## 2020-09-29 PROCEDURE — 96375 TX/PRO/DX INJ NEW DRUG ADDON: CPT

## 2020-09-29 PROCEDURE — 76937 US GUIDE VASCULAR ACCESS: CPT | Performed by: RADIOLOGY

## 2020-09-29 PROCEDURE — 85610 PROTHROMBIN TIME: CPT

## 2020-09-29 PROCEDURE — 77001 FLUOROGUIDE FOR VEIN DEVICE: CPT | Performed by: RADIOLOGY

## 2020-09-29 PROCEDURE — 99153 MOD SED SAME PHYS/QHP EA: CPT | Performed by: RADIOLOGY

## 2020-09-29 PROCEDURE — 85049 AUTOMATED PLATELET COUNT: CPT

## 2020-09-29 PROCEDURE — 96376 TX/PRO/DX INJ SAME DRUG ADON: CPT

## 2020-09-29 PROCEDURE — 96365 THER/PROPH/DIAG IV INF INIT: CPT

## 2020-09-29 RX ORDER — HEPARIN SODIUM (PORCINE) LOCK FLUSH IV SOLN 100 UNIT/ML 100 UNIT/ML
SOLUTION INTRAVENOUS
Status: COMPLETED | OUTPATIENT
Start: 2020-09-29 | End: 2020-09-29

## 2020-09-29 RX ORDER — SODIUM CHLORIDE 0.9 % (FLUSH) 0.9 %
10 SYRINGE (ML) INJECTION PRN
Status: DISCONTINUED | OUTPATIENT
Start: 2020-09-29 | End: 2020-10-02 | Stop reason: HOSPADM

## 2020-09-29 RX ORDER — ACETAMINOPHEN 325 MG/1
650 TABLET ORAL EVERY 4 HOURS PRN
Status: DISCONTINUED | OUTPATIENT
Start: 2020-09-29 | End: 2020-10-02 | Stop reason: HOSPADM

## 2020-09-29 RX ORDER — OXYCODONE HYDROCHLORIDE AND ACETAMINOPHEN 5; 325 MG/1; MG/1
1 TABLET ORAL EVERY 6 HOURS PRN
Qty: 60 TABLET | Refills: 0 | Status: SHIPPED | OUTPATIENT
Start: 2020-09-29 | End: 2020-10-23 | Stop reason: SDUPTHER

## 2020-09-29 RX ORDER — SODIUM CHLORIDE 9 MG/ML
INJECTION, SOLUTION INTRAVENOUS CONTINUOUS
Status: DISCONTINUED | OUTPATIENT
Start: 2020-09-29 | End: 2020-10-02 | Stop reason: HOSPADM

## 2020-09-29 RX ORDER — FENTANYL CITRATE 50 UG/ML
INJECTION, SOLUTION INTRAMUSCULAR; INTRAVENOUS
Status: COMPLETED | OUTPATIENT
Start: 2020-09-29 | End: 2020-09-29

## 2020-09-29 RX ADMIN — Medication 500 UNITS: at 10:09

## 2020-09-29 RX ADMIN — CEFAZOLIN SODIUM 1 G: 1 INJECTION, POWDER, FOR SOLUTION INTRAMUSCULAR; INTRAVENOUS at 09:39

## 2020-09-29 RX ADMIN — SODIUM CHLORIDE: 9 INJECTION, SOLUTION INTRAVENOUS at 08:17

## 2020-09-29 RX ADMIN — Medication 25 MCG: at 10:02

## 2020-09-29 RX ADMIN — Medication 50 MCG: at 09:54

## 2020-09-29 ASSESSMENT — PAIN SCALES - GENERAL
PAINLEVEL_OUTOF10: 0

## 2020-09-29 ASSESSMENT — PAIN DESCRIPTION - DESCRIPTORS: DESCRIPTORS: STABBING

## 2020-09-29 ASSESSMENT — PAIN - FUNCTIONAL ASSESSMENT: PAIN_FUNCTIONAL_ASSESSMENT: 0-10

## 2020-09-29 NOTE — BRIEF OP NOTE
Brief Postoperative Note    Susan Cueva  YOB: 1950  [id number]    Pre-operative Diagnosis: Lung cancer    Post-operative Diagnosis: Same    Procedure: Left IJ port insertion    Medications Given: fentanyl    Anesthesia: Local    Surgeons/Assistants: Tomas Regalado MD    Estimated Blood Loss: minimal    Complications: none    Specimens: were not obtained    Findings: 8 F std chemoport inserted via left IJ w tip in upper RA. Flushed and ready for use at this time. VSS. Tolerated well.       Electronically signed by Tomas Regalado on 9/29/2020 at 10:27 AM

## 2020-09-29 NOTE — H&P
History and Physical Update    Pt Name: Quynh Truong  MRN: [id number]  YOB: 1950  Date of evaluation: 9/29/2020      [x] I have reviewed the Oncology Progress Note by Dr Sharad Jennings dated 9/18/20 in epic which meets the criteria for an Interval History and Physical note and is attached below. .    [x] I have examined  Quynh Truong  There are no changes to the patient who is scheduled for a port placement in  by Dr Rob Vincent for squamous cell carcinoma. The patient denies new health changes, fever, chills, wheezing, cough, increased SOB, chest pain, open sores or wounds. Last ASA 81mg 9/28/20    Vital signs: BP (!) 106/53   Pulse 74   Temp 97.3 °F (36.3 °C) (Temporal)   Resp 18   Ht 5' 8.5\" (1.74 m)   Wt 148 lb 8 oz (67.4 kg)   SpO2 93%   BMI 22.25 kg/m²     Allergies:  Codeine and Dye [iodides]    Medications:    Prior to Admission medications    Medication Sig Start Date End Date Taking? Authorizing Provider   clonazePAM (KLONOPIN) 1 MG tablet Take 1 tablet by mouth 2 times daily as needed for Anxiety for up to 5 doses. 9/27/20 9/29/20 Yes Gerardo Newsome MD   furosemide (LASIX) 40 MG tablet Take 0.5 tablets by mouth daily 9/27/20  Yes Gerardo Newsome MD   gabapentin (NEURONTIN) 400 MG capsule Take 400 mg by mouth 2 times daily.    Yes Historical Provider, MD   melatonin 5 MG TABS tablet Take 5 mg by mouth nightly as needed (sleep)   Yes Historical Provider, MD   traZODone (DESYREL) 150 MG tablet Take 2 tablets by mouth nightly for 5 doses Take 2 tablets (=300mg) nightly 9/4/20 9/29/20 Yes Fanta Fraser MD   glimepiride (AMARYL) 1 MG tablet Take 1 mg by mouth Daily with supper In addition to 2 tablets (=2mg) every morning    Yes Historical Provider, MD   metoprolol tartrate (LOPRESSOR) 25 MG tablet Take 25 mg by mouth 2 times daily   Yes Historical Provider, MD   venlafaxine (EFFEXOR XR) 150 MG extended release capsule Take 150 mg by mouth 2 times daily   Yes Historical Provider, MD   lansoprazole (PREVACID) 30 MG delayed release capsule Take 30 mg by mouth daily 11/10/16  Yes Historical Provider, MD   metFORMIN (GLUCOPHAGE) 500 MG tablet Take 500 mg by mouth 2 times daily 12/7/16  Yes Historical Provider, MD   aspirin EC 81 MG EC tablet Take 81 mg by mouth daily   Yes Historical Provider, MD   mometasone-formoterol (DULERA) 200-5 MCG/ACT inhaler Inhale 2 puffs into the lungs every 12 hours as needed (wheezing/SOB)    Yes Historical Provider, MD   glimepiride (AMARYL) 1 MG tablet Take 2 mg by mouth every morning In addition to 1mg nightly   Yes Historical Provider, MD         This is a 72 y.o. female who is pleasant, cooperative, alert and oriented x3, in no acute distress. Heart: LEYLA dual lumen catheter Heart sounds are distant. HR 74 regular rate and rhythm without murmur, gallop or rub. Lungs: Normal respiratory effort with equal expansion, diminished breath sounds, unlabored without wheezes or rales bilaterally   Abdomen: mildly tender with palpation, nondistended with bowel sounds . Labs:  Recent Labs     09/29/20  0814 09/27/20  0518  09/25/20  1036   HGB  --  10.2*   < > 11.6*   HCT  --  34.6*   < > 38.2   WBC  --  11.7*   < > 14.1*   MCV  --  76.9*   < > 75.0*    286   < > 359   NA  --  140   < > 137   K  --  4.5   < > 5.3   CL  --  100   < > 99   CO2  --  31   < > 28   BUN  --  21   < > 17   CREATININE  --  0.86   < > 0.76   GLUCOSE  --  73   < > 125*   AST  --   --   --  18   ALT  --   --   --  9   LABALBU  --   --   --  3.6    < > = values in this interval not displayed. No results for input(s): COVID19 in the last 720 hours.     SUZE Lou-BC  Electronically signed 9/29/2020 at 8:33 Sergio Luevano MD    Physician    Specialty:  Hematology and Oncology    Progress Notes       Signed    Encounter Date:  9/18/2020          Related encounter: Office Visit from 9/18/2020 in [address] [facility] All Today's Date:   9/18/2020  Patient Name: Jovan West  Patient's age:   72 y.o., 1950     Diagnosis:   RUL squamous cell carcinoma,   Cancer Staging  Malignant neoplasm of upper lobe of right lung [facility])  Staging form: Lung, AJCC 8th Edition  - Clinical stage from 9/18/2020: Stage IIIB (cT4, cN2, cM0) - Signed by Sasha Theodore MD on 9/18/2020     Current therapy:  Plan for concurrent chemoradiation     CHIEF COMPLAINT:         Chief Complaint   Patient presents with    Follow[facility]Up from [facility] Results       CT      INTERVAL HISTORY:    Shamar Brooks is returning for follow-up visit and to discuss PET scan results and further recommendations. She is accompanied by her son Nirmala Chandler during today's office visit. She denies any chest pain or shortness of breath. She is still at rehab and receiving physical therapy and recovering well. During this visit patient's allergy, social, medical, surgical history and medications were reviewed and updated. HISTORY OF PRESENT ILLNESS:    Shamar Brooks is a 78-year-old female with a past medical history of COPD, history of tobacco abuse, depression, CAD, arthritis was admitted with multiple falls at home. She complains of back pain and also chronic cough. On admission she had a CT scan of the chest which showed 7.2 cm spiculated mass in the right upper lobe with mediastinal lymphadenopathy suspicious for malignancy. 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 Oncology consulted for further evaluation. Patient has COPD and uses Home oxygen at night and sues walker at home. She smokes more than a PPD. 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      Past Medical History:   has a past medical history of AAA (abdominal aortic aneurysm) ([facility] [address] Acid reflux, Anxiety and depression, Aortic stenosis, Arthritis, Blister of ankle, right, CAD (coronary artery (GLUCOPHAGE) 500 MG tablet Take 500 mg by mouth 2 times daily        aspirin EC 81 MG EC tablet Take 81 mg by mouth daily        mometasone-formoterol (DULERA) 200-5 MCG/ACT inhaler Inhale 2 puffs into the lungs every 12 hours        glimepiride (AMARYL) 1 MG tablet Take 2 mg by mouth every morning In addition to 1mg nightly          No current facility-administered medications for this visit. Allergies:  Codeine and Dye [iodides]     Social History:   reports that she quit smoking about 4 years ago. She has never used smokeless tobacco. She reports that she does not drink alcohol or use drugs. Family History: family history includes Cancer in her father and mother. REVIEW OF SYSTEMS:    Constitutional: No fever or chills. No night sweats, no weight loss   Eyes: No eye discharge, double vision, or eye pain   HEENT: negative for sore mouth, sore throat, hoarseness and voice change   Respiratory: negative for cough , sputum, dyspnea, wheezing, hemoptysis, chest pain   Cardiovascular: negative for chest pain, dyspnea, palpitations, orthopnea, PND   Gastrointestinal: negative for nausea, vomiting, diarrhea, constipation, abdominal pain, Dysphagia, hematemesis and hematochezia   Genitourinary: negative for frequency, dysuria, nocturia, urinary incontinence, and hematuria   Integument: negative for rash, skin lesions, bruises.    Hematologic/Lymphatic: negative for easy bruising, bleeding, lymphadenopathy, or petechiae   Endocrine: negative for heat or cold intolerance,weight changes, change in bowel habits and hair loss   Musculoskeletal: negative for myalgias, arthralgias, pain, joint swelling,and bone pain   Neurological: negative for headaches, dizziness, seizures, weakness, numbness     PHYSICAL EXAM:      BP (!) 88/40   Pulse 51   Temp 97.5 °F (36.4 °C) (Temporal)   Ht 5' 8.5\" (1.74 m)   Wt 144 lb 4.8 oz (65.5 kg)   BMI 21.62 kg/m²    Temp (24hrs), Av.7 °F (36.5 °C), Min:97.3 °F (36.3 °C), Max:98.1 °F (36.7 °C)  General appearance - well appearing, no in pain or distress   Mental status - alert and cooperative   Eyes - pupils equal and reactive, extraocular eye movements intact   Ears - bilateral TM's and external ear canals normal   Mouth - mucous membranes moist, pharynx normal without lesions   Neck - supple, no significant adenopathy   Lymphatics - no palpable lymphadenopathy, no hepatosplenomegaly   Chest - clear to auscultation, no wheezes, rales or rhonchi, symmetric air entry   Heart - normal rate, regular rhythm, normal S1, S2, no murmurs  Abdomen - soft, nontender, nondistended, no masses or organomegaly   Neurological - alert, oriented, normal speech, no focal findings or movement disorder noted   Musculoskeletal - no joint tenderness, deformity or swelling   Extremities - peripheral pulses normal, no pedal edema, no clubbing or cyanosis   Skin - normal coloration and turgor, no rashes, no suspicious skin lesions noted ,     DATA:    Labs:   CBC:       Recent Labs     09/17/20  0842   WBC 11.7*   HGB 11.2*   HCT 38.0         BMP:       Recent Labs     09/17/20  0842      K 4.2   CO2 28   BUN 12   CREATININE 0.79   LABGLOM >60   GLUCOSE 124*      PT/INR: No results for input(s): PROTIME, INR in the last 72 hours. Surgical Pathology Report   Surgical Pathology   Collected:  08/31/20 0803    Lab status:  Final    Resulting lab:  [facility]    Value:  -- Diagnosis --     BRONCHIAL WASHINGS RIGHT UPPER LOBE:          POSITIVE FOR MALIGNANCY. SQUAMOUS CELL CARCINOMA. COMMENT: VOLUME OF TUMOR IN THE CELL BLOCK IS LOW AND ADDITIONAL   MATERIAL WOULD BE REQUIRED IF MOLECULAR TESTING IS NECESSARY. IMAGING DATA:  CT chest 8/27/2020:   FINDINGS:    Mediastinum: Three vessel coronary artery calcification. Newly enlarged    mediastinal lymph nodes including example station 7 node measuring 3.1 x 2.1    cm on series 2, image 45. Lungs/pleura: Mild upper lobe predominant centrilobular emphysema. Jase Faviola Spiculated mass of the right upper lobe measuring 7.2 x 6.5 cm with    broad-base of contact with the mediastinal pleura, right major fissure,    encircling and obliterating the right upper lobe bronchus. Spicules are seen    to extend to the anterior costal pleura. There is adjacent bronchial wall    thickening and interlobular septal thickening. Stable 5 mm solid nodule of    the left upper lobe since 2016, benign. No pleural effusion or pneumothorax. Upper Abdomen: Adrenals are unremarkable. Soft Tissues/Bones: Old right rib fractures. Impression    Approximate 7.2 cm spiculated mass of the right upper lobe likely with    invasion of the mediastinum, adjacent lymphangitic spread and suspected    metastatic mediastinal lymphadenopathy. Scan 9/11/2020: Impression    1. The patient's known right upper lobe lung mass is FDG avid consistent with    the patient's primary malignancy. 2. FDG avid mediastinal lymph nodes consistent with metastatic disease. 3. No abnormal FDG activity is identified involving the abdomen or pelvis. IMPRESSION:   1. 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 PET scan negative for distant metastasis: Clinical stage T4 N2 M0, stage IIIb  2. COPD  3. Falls  4. CAD  5. Tobacco abuse  6. Actinomyces bacteremia     RECOMMENDATIONS:  1. I reviewed the laboratory data, imaging studies, biopsy finding, diagnosis, prognosis and treatment options with patient  2. I reviewed the NCCN guidelines and goals of care  3. I reviewed the CT head and PET scan results with patient. 4. She has completed IV antibiotic for her actinomyces infection  5.  I recommended getting port placement and removal of PICC line  6. I discussed the option of concurrent chemoradiation. I discussed the risk benefits and side effects with patient  7. We will refer her to radiation oncology  8. Arrange for chemo teaching  9. Return to clinic with cycle  10. Patient's questions were sought and answered to her satisfaction        Mendoza Orozco MD  Hematologist/Medical Oncologist     This note is created with the assistance of a speech recognition program.  While intending to generate a document that actually reflects the content of the visit, the document can still have some errors including those of syntax and sound a like substitutions which may escape proof reading. It such instances, actual meaning can be extrapolated by contextual diversion.

## 2020-09-29 NOTE — FLOWSHEET NOTE
Situation:  Writer received referral to visit with Ms. Celestina Mayes from [facility]. Assessment:  Ms. Celestina Mayes was in the recovery room post her procedure. She appeared somewhat fatigued. She talked about her concerns with starting chemotherapy, including being sick. She is looking forward to getting a dog soon and agrees that this will be helpful to her. She talked about her support system, in her son and a good friend. She shared about her experience with family members going through cancer. She shared the activities she enjoys doing, including being with her friend. She told writer she needed the nurse. Intervention:  Writer provided supportive presence and explored Pt's coping and needs; inquired about Pt's sources of support and strength; offered words of encouragement and support; affirmed Pt's strengths. Outcome:  Ms. Nik Fraga for the visit.        09/29/20 1217   Encounter Summary   Services provided to: Patient   Referral/Consult From: Other    Support System Children;Friends/neighbors   Continue Visiting   (9/29/20)   Complexity of Encounter High   Length of Encounter 30 minutes   Spiritual Assessment Completed Yes   Routine   Type Follow up   Spiritual/Jainism   Type Spiritual support   Assessment Calm; Approachable   Intervention Active listening;Explored feelings, thoughts, concerns;Explored coping resources;Sustaining presence/ Ministry of presence; Discussed meaning/purpose;Discussed relationship with God;Discussed belief system/Scientology practices/chuck;Discussed illness/injury and it's impact;Prayer   Outcome Encouraged; Hopeful;Receptive;Coping;Engaged in conversation;Expressed gratitude;Expressed feelings/needs/concerns     Electronically signed by Arash Hester, [facility] [address] Radiation Oncology  9/29/2020  12:19 PM

## 2020-10-01 ENCOUNTER — HOSPITAL ENCOUNTER (OUTPATIENT)
Dept: RADIATION ONCOLOGY | Facility: MEDICAL CENTER | Age: 70
Discharge: HOME | End: 2020-10-01
Payer: MEDICARE

## 2020-10-01 VITALS
OXYGEN SATURATION: 97 % | SYSTOLIC BLOOD PRESSURE: 109 MMHG | DIASTOLIC BLOOD PRESSURE: 63 MMHG | RESPIRATION RATE: 18 BRPM | WEIGHT: 151.2 LBS | TEMPERATURE: 96.9 F | HEART RATE: 82 BPM | BODY MASS INDEX: 22.39 KG/M2 | HEIGHT: 69 IN

## 2020-10-01 PROCEDURE — 99204 OFFICE O/P NEW MOD 45 MIN: CPT | Performed by: RADIOLOGY

## 2020-10-01 PROCEDURE — 77263 THER RADIOLOGY TX PLNG CPLX: CPT | Performed by: RADIOLOGY

## 2020-10-01 PROCEDURE — 99211 OFF/OP EST MAY X REQ PHY/QHP: CPT | Performed by: RADIOLOGY

## 2020-10-01 ASSESSMENT — PAIN DESCRIPTION - PAIN TYPE: TYPE: CHRONIC PAIN;ACUTE PAIN

## 2020-10-01 ASSESSMENT — PAIN DESCRIPTION - ORIENTATION: ORIENTATION: RIGHT

## 2020-10-01 ASSESSMENT — PAIN SCALES - GENERAL: PAINLEVEL_OUTOF10: 10

## 2020-10-01 ASSESSMENT — PAIN DESCRIPTION - DESCRIPTORS: DESCRIPTORS: ACHING;CONSTANT

## 2020-10-01 ASSESSMENT — PAIN DESCRIPTION - LOCATION: LOCATION: BACK;ABDOMEN

## 2020-10-01 NOTE — PROGRESS NOTES
Referring Physician: Phuong Cordero:    10/01/20 0917   BP: 109/63   Pulse: 82   Resp: 18   Temp: 96.9 °F (36.1 °C)   SpO2: 97%    :  Patient Currently in Pain: Yes  Pain Assessment: 0-10  Pain Level: 10       Wt Readings from Last 1 Encounters:   10/01/20 151 lb 3.2 oz (68.6 kg)        Body mass index is 22.66 kg/m². Height: 5' 8.5\" (174 cm)         Immunizations:    Influenza status:    []   Current   [x]   Patient declined    Pneumococcal status:  []   Current  [x]   Patient declined    Smoking Status:    [] Smoker - PPD:   [x] Nonsmoker - Quit Date:       2016        [] Never a smoker      No chief complaint on file. Cancer Staging  Malignant neoplasm of upper lobe of right lung [facility])  Staging form: Lung, AJCC 8th Edition  - Clinical stage from 9/18/2020: Stage IIIB (cT4, cN2, cM0) - Signed by Cristopher Franks MD on 9/18/2020      Prior Radiation Therapy? No   If yes, site treated:   Facility:                             Date:    Concurrent Chemo/radiation? No   If yes, start date:    Prior Chemotherapy? No   If yes    Facility:                             Date:    Prior Hormonal Therapy? Yes but unsure what it was    If yes   Facility:                             Date:    Head and Neck Cancer? No   If yes, please remind physician to place swallow study order and speech therapy order. Pacemaker/Defibulator/ICD:  No            Current Outpatient Medications   Medication Sig Dispense Refill    oxyCODONE-acetaminophen (PERCOCET) 5-325 MG per tablet Take 1 tablet by mouth every 6 hours as needed for Pain for up to 30 days. 60 tablet 0    clonazePAM (KLONOPIN) 1 MG tablet Take 1 tablet by mouth 2 times daily as needed for Anxiety for up to 5 doses. 5 tablet 0    furosemide (LASIX) 40 MG tablet Take 0.5 tablets by mouth daily 60 tablet 3    gabapentin (NEURONTIN) 400 MG capsule Take 400 mg by mouth 2 times daily.       melatonin 5 MG TABS tablet Take 5 mg by mouth nightly as needed (sleep)      traZODone (DESYREL) 150 MG tablet Take 2 tablets by mouth nightly for 5 doses Take 2 tablets (=300mg) nightly 10 tablet 0    glimepiride (AMARYL) 1 MG tablet Take 1 mg by mouth Daily with supper In addition to 2 tablets (=2mg) every morning       metoprolol tartrate (LOPRESSOR) 25 MG tablet Take 25 mg by mouth 2 times daily      venlafaxine (EFFEXOR XR) 150 MG extended release capsule Take 150 mg by mouth 2 times daily      lansoprazole (PREVACID) 30 MG delayed release capsule Take 30 mg by mouth daily      metFORMIN (GLUCOPHAGE) 500 MG tablet Take 500 mg by mouth 2 times daily      aspirin EC 81 MG EC tablet Take 81 mg by mouth daily      mometasone-formoterol (DULERA) 200-5 MCG/ACT inhaler Inhale 2 puffs into the lungs every 12 hours as needed (wheezing/SOB)       glimepiride (AMARYL) 1 MG tablet Take 2 mg by mouth every morning In addition to 1mg nightly       No current facility-administered medications for this encounter.       Facility-Administered Medications Ordered in Other Encounters   Medication Dose Route Frequency Provider Last Rate Last Dose    0.9 % sodium chloride infusion   Intravenous Continuous Jarrett Benoit MD   Stopped at 09/29/20 1141    sodium chloride flush 0.9 % injection 10 mL  10 mL Intravenous PRN Jarrett Benoit MD        acetaminophen (TYLENOL) tablet 650 mg  650 mg Oral Q4H PRN Jarrett Benoit MD           Past Medical History:   Diagnosis Date    AAA (abdominal aortic aneurysm) ([facility] [address]     Pt denies having a history of AAA    Acid reflux     Anxiety and depression     Aortic stenosis     Arthritis     Blister of ankle, right 10/13/2016    blister broke open & draining, is on antibiotics    CAD (coronary artery disease)     Cancer ([facility] [address]     lung cancer    COPD (chronic obstructive pulmonary disease) ([facility] [address]     Diabetes mellitus ([facility] [address]     Falls     Heart block     bifasicular    Hypokalemia     MDRO (multiple drug resistant organisms) resistance 10/17/2014    JEFFREY Coli urine    MRSA (methicillin resistant staph aureus) culture positive resolved 2016    2 negative nasal screens -  (hx in urine )    On home oxygen therapy     uses 2 liters at night    On home oxygen therapy     patient states 2 liters/nasal cannula continuous    Overactive bladder     patient incont.  wears a brief    Pneumonia     PONV (postoperative nausea and vomiting)     Vitamin D deficiency        Past Surgical History:   Procedure Laterality Date    AORTIC VALVE REPAIR N/A 2017    AORTIC VALVE REPAIR REPLACEMENT performed by Riaz Jones MD at [address] [facility] N/A 2020    BRONCHOSCOPY BIOPSY BRONCHUS performed by Jasmin Ramirez MD at [address] [facility], COLON, DIAGNOSTIC  2016    EYE SURGERY      HYSTERECTOMY      IR INS PICC VAD W SQ PORT GREATER THAN 5  2020    IR INS PICC VAD W SQ PORT GREATER THAN 5 2020 [facility]ED[facility]    IR PORT PLACEMENT EQUAL OR GREATER THAN 5 YEARS  2020    IR PORT PLACEMENT EQUAL OR GREATER THAN 5 YEARS 2020 MD JAY JAY Malcolm [facility] PROCEDURES    LUMBAR LAMINECTOMY      TONSILLECTOMY         Family History   Problem Relation Age of Onset    Cancer Mother     Cancer Father        Social History     Socioeconomic History    Marital status: Single     Spouse name: Not on file    Number of children: Not on file    Years of education: Not on file    Highest education level: Not on file   Occupational History    Not on file   Social Needs    Financial resource strain: Not on file    Food insecurity     Worry: Not on file     Inability: Not on file    Transportation needs     Medical: Not on file     Non-medical: Not on file   Tobacco Use    Smoking status: Former Smoker     Last attempt to quit: 2016     Years since quittin.1    Smokeless tobacco: Never Used   Substance and Sexual Activity    Alcohol use: No    Drug use: No    Sexual activity: Not on file   Lifestyle    Physical activity     Days per week: Not on file     Minutes per session: Not on file    Stress: Not on file   Relationships    Social connections     Talks on phone: Not on file     Gets together: Not on file     Attends Jew service: Not on file     Active member of club or organization: Not on file     Attends meetings of clubs or organizations: Not on file     Relationship status: Not on file    Intimate partner violence     Fear of current or ex partner: Not on file     Emotionally abused: Not on file     Physically abused: Not on file     Forced sexual activity: Not on file   Other Topics Concern    Not on file   Social History Narrative    Not on file             FALLS RISK SCREEN  Instructions:  Assess the patient and enter the appropriate indicators that are present for fall risk identification. Total the numbers entered and assign a fall risk score from Table 2.  Reassess patient at a minimum every 12 weeks or with status change. Assessment   Date  10/1/2020     1. Mental Ability: confusion/cognitively impaired 0     2. Elimination Issues: incontinence, frequency 3       3. Ambulatory: use of assistive devices (walker, cane, off-loading devices),        attached to equipment (IV pole, oxygen) 2     4. Sensory Limitations: dizziness, vertigo, impaired vision 3     5. Age less than 65        0     6. Age 72 or greater 1     7. Medication: diuretics, strong analgesics, hypnotics, sedatives,        antihypertensive agents 3   8. Falls:  recent history of falls within the last 3 months (not to include slipping or        tripping) 7   TOTAL 19    If score of 4 or greater was education given? Yes       TABLE 2   Risk Score Risk Level Plan of Care   0-3 Little or  No Risk 1. Provide assistance as indicated for ambulation activities  2. Reorient confused/cognitively impaired patient  3. Call-light/bell within patient's reach  4. Chair/bed in low position, stretcher/bed with siderails up except when performing patient care activities  5. Educate patient/family/caregiver on falls prevention  6.  Reassess in 12 weeks or with any noted change in patient condition which places them at a risk for a fall   4-6 Moderate Risk 1. Provide assistance as indicated for ambulation activities  2. Reorient confused/cognitively impaired patient  3. Call-light/bell within patient's reach  4. Chair/bed in low position, stretcher/bed with siderails up except when performing patient care activities  5. Educate patient/family/caregiver on falls prevention     7 or   Higher High Risk 1. Place patient in easily observable treatment room  2. Patient attended at all times by family member or staff  3. Provide assistance as indicated for ambulation activities  4. Reorient confused/cognitively impaired patient  5. Call-light/bell within patient's reach  6. Chair/bed in low position, stretcher/bed with siderails up except when performing patient care activities  7. Educate patient/family/caregiver on falls prevention             Assessment/Plan: Patient was seen today for consultation. Here to discuss RT with concurrent chemo. Pt has had a port placed. Dr. Andressa Segundo updated and examined pt. Per MD pt will return for simulation. No IV contrast as pt has and IV dye allergy.          Mary Martinez 10/1/2020 9:22 AM

## 2020-10-01 NOTE — CONSULTS
[address] [facility]    Date of Service: 10/1/2020  Location: [facility]    Patient ID:   Haroon Duque  : 1950   MRN: [id number]    Diagnosis:     Chief Complaint: \"I have cancer. \"    Dear Dr. Kenji Montes,    Thank you for requesting a Radiation Oncology consultation on your patient, Haroon Duque. As you know, Haroon Duque is 72 y.o.,  female, presented to the emergency room via EMS for evaluation of a fall. She has been having multiple falls over the past 1 to 2 weeks. She states her knees give out and she falls. She is a history of emphysema, former smoker, about 50-pack-year history, quit 3 years ago. She is on oxygen at 2 L 24/7 at home. She takes Dulera at home. She denies any fever or chills. She denies any chest pain. She has occasional nonproductive cough. No hemoptysis. Her shortness of breath is at her baseline. She denies any dizziness. She used to follow in our office with Dr. Julien Cullen but has not been seen in about 3-4 years. CT of the chest showed below.:       Impression:  · 7.2 cm spiculated mass right upper lobe with invasion of mediastinum, concerning for malignancy  · Mediastinal lymphadenopathy  · 5 mm left upper lobe nodule, stable since 2016  · Centrilobular emphysema/50-pack-year smoking history  · Chronic hypoxic/hypercarbic respiratory failure, on home oxygen    Bronchial biopsy:          Bronchial mucosa and submucosa, negative for malignancy. CHEST: The patient's known right upper lobe lung mass is FDG avid SUV max of    12.9.  Multiple FDG avid lymph nodes are identified within the pretracheal,    precarinal and AP window regions of the mediastinum, SUV max of 11.5.  No    abnormal FDG activity is identified involving the left lung. Biopsy is non-small cell lung cancer favor squamous cell carcinoma.     Medical and Surgical History:  Past Medical History:   Diagnosis Date    AAA (abdominal aortic aneurysm) ([facility]     Pt denies having a history of AAA    Acid reflux     Anxiety and depression     Aortic stenosis     Arthritis     Blister of ankle, right 10/13/2016    blister broke open & draining, is on antibiotics    CAD (coronary artery disease)     Cancer (HCC)     lung cancer    COPD (chronic obstructive pulmonary disease) (HCC)     Diabetes mellitus ([facility]     Falls     Heart block     bifasicular    Hypokalemia     MDRO (multiple drug resistant organisms) resistance 10/17/2014    E. Coli urine    MRSA (methicillin resistant staph aureus) culture positive resolved 12/2016    2 negative nasal screens - 2016 (hx in urine 2014)    On home oxygen therapy     uses 2 liters at night    On home oxygen therapy     patient states 2 liters/nasal cannula continuous    Overactive bladder     patient incont.  wears a brief    Pneumonia     PONV (postoperative nausea and vomiting)     Vitamin D deficiency      Past Surgical History:   Procedure Laterality Date    AORTIC VALVE REPAIR N/A 4/11/2017    AORTIC VALVE REPAIR REPLACEMENT performed by Marcial Allan MD at [facility] N/A 8/31/2020    BRONCHOSCOPY BIOPSY BRONCHUS performed by Davina Hale MD at [address], COLON, DIAGNOSTIC  12/08/2016    EYE SURGERY      HYSTERECTOMY      IR INS PICC VAD W SQ PORT GREATER THAN 5  9/4/2020    IR INS PICC VAD W SQ PORT GREATER THAN 5 9/4/2020 [facility]    IR PORT PLACEMENT EQUAL OR GREATER THAN 5 YEARS  9/29/2020    IR PORT PLACEMENT EQUAL OR GREATER THAN 5 YEARS 9/29/2020 [facility]    LUMBAR LAMINECTOMY      TONSILLECTOMY         Gyn Hx:    Social Hx:    Family Hx:    Allergies   Allergen Reactions    Codeine      \"feeling of heavy on chest\"    Dye [Iodides]      Hallucination,vomiting       Current Outpatient Medications:     oxyCODONE-acetaminophen (PERCOCET) 5-325 MG per tablet, Take 1 tablet by mouth every 6 hours as needed for Pain for up to 30 days. , Disp: 60 tablet, Rfl: 0    clonazePAM (KLONOPIN) 1 MG tablet, Take 1 tablet by mouth 2 times daily as needed for Anxiety for up to 5 doses. , Disp: 5 tablet, Rfl: 0    furosemide (LASIX) 40 MG tablet, Take 0.5 tablets by mouth daily, Disp: 60 tablet, Rfl: 3    gabapentin (NEURONTIN) 400 MG capsule, Take 400 mg by mouth 2 times daily. , Disp: , Rfl:     melatonin 5 MG TABS tablet, Take 5 mg by mouth nightly as needed (sleep), Disp: , Rfl:     traZODone (DESYREL) 150 MG tablet, Take 2 tablets by mouth nightly for 5 doses Take 2 tablets (=300mg) nightly, Disp: 10 tablet, Rfl: 0    glimepiride (AMARYL) 1 MG tablet, Take 1 mg by mouth Daily with supper In addition to 2 tablets (=2mg) every morning , Disp: , Rfl:     metoprolol tartrate (LOPRESSOR) 25 MG tablet, Take 25 mg by mouth 2 times daily, Disp: , Rfl:     venlafaxine (EFFEXOR XR) 150 MG extended release capsule, Take 150 mg by mouth 2 times daily, Disp: , Rfl:     lansoprazole (PREVACID) 30 MG delayed release capsule, Take 30 mg by mouth daily, Disp: , Rfl:     metFORMIN (GLUCOPHAGE) 500 MG tablet, Take 500 mg by mouth 2 times daily, Disp: , Rfl:     aspirin EC 81 MG EC tablet, Take 81 mg by mouth daily, Disp: , Rfl:     mometasone-formoterol (DULERA) 200-5 MCG/ACT inhaler, Inhale 2 puffs into the lungs every 12 hours as needed (wheezing/SOB) , Disp: , Rfl:     glimepiride (AMARYL) 1 MG tablet, Take 2 mg by mouth every morning In addition to 1mg nightly, Disp: , Rfl:   No current facility-administered medications for this encounter.      Facility-Administered Medications Ordered in Other Encounters:     0.9 % sodium chloride infusion, , Intravenous, Continuous, Iliana Lei MD, Stopped at 09/29/20 1141    sodium chloride flush 0.9 % injection 10 mL, 10 mL, Intravenous, PRN, Iliana Lei MD    acetaminophen (TYLENOL) tablet 650 mg, 650 mg, Oral, Q4H LYDIA, Asia Martinez MD      SYSTEMS REVIEW:  I reviewed the complete 14-Point Review of Systems with the patient. Pertinent ones noted in the HPI and below. ROS(+)  ROS(-)    PHYSICAL EXAMINATION:  Vital Signs: /63   Pulse 82   Temp 96.9 °F (36.1 °C) (Temporal)   Resp 18   Ht 5' 8.5\" (1.74 m)   Wt 151 lb 3.2 oz (68.6 kg)   SpO2 97%   BMI 22.66 kg/m²   Pain 0/10, KPS . GENERAL: Well-appearing, in no apparent distress, alert and fully oriented, answers questions appropriately. HEENT: Normocephalic, atraumatic, PER, EOMI, on inspection of the oral cavity and visible oropharynx, mucous membranes moist, normal dentition, uvula and soft palate elevate symmetrically on phonation. No suspicious asymmetry or mucosal lesions appreciated. NECK: No thyromegaly, cervical or supraclavicular lymphadenopathy. HEART: Normal rate, regular rhythm, normal S1/S2, no murmurs/rubs/gallops. LUNGS decreased air entry bilaterally more on the right side. ABD: Normoactive bowel sounds, soft, nontender, no visceromegaly or masses appreciated. LYMPH: No lymphadenopathy appreciated. EXT: Full range of motion in all extremities. No cyanosis/clubbing or edema. NEURO: Alert and fully oriented, answers questions appropriately, speech is fluent without dysarthria, cranial nerves II through XII grossly normal, muscle power in all extremities 5/5. PSYCH: Affect is appropriate for clinical situation. Insight and judgment do not appear impaired. LABS:    RADS: PET scan report is enclosed in her medical records. PATHOLOGY: Enclosed in her chart. 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 PLAN: Concurrent chemoradiation. Dr. Zunilda Moffett will treat her with chemotherapy. His note is enclosed in the electronic medical records. I am planning to give her 6000 cGy in 30 treatment using IMRT.   The chance of controlling her disease was concurrent chemoradiation is about 60% however that does not translate to cure because she got considerable chance of dissemination of her disease. Early and delayed side effects were explained to him. Early side effects would include tiredness weight loss dysphagia, dysphagia is the most annoying side effect and need to be treated medically during her course of treatment. Radiation pneumonitis should not occur with careful treatment planning or the chance of its occurance is very small. Late side effects would include fibrosis at the site of the tumor. Patient understand benefits versus early and delayed side effects and agreed to the treatment again at like thank you very much for letting me participate in her care and will keep you updated on her. Marietta Robertson MD, [address] [facility]  [phone] (cell)    I spent 60 minutes with the patient. >50% of the time was allotted to education, answering questions, and coordinating care. CC:  Patient Care Team:  Donald Ramirez MD as PCP - General (Family Medicine)  Nyla Champion RN as Nurse Navigator (Oncology)  Michele Larose MD as Consulting Physician (Radiation Oncology)  Cheri Bazzi MD as Consulting Physician (Hematology and Oncology)     N.B. This note is created with the assistance of a speech recognition program.  While intending to generate a document that actually reflects the content of the visit, the document can still contain errors including those of syntax and sound-alike substitutions that may escape proof reading. In such instances, actual meaning can be extrapolated by contextual diversion.

## 2020-10-05 PROCEDURE — 77470 SPECIAL RADIATION TREATMENT: CPT | Performed by: RADIOLOGY

## 2020-10-05 NOTE — PROGRESS NOTES
[address] Nutrition Note  PG-SGA screening tool reviewed with score of 4    Patient Reports:  1. Weight: no change (0) - reports current weight of 150#, one month ago 141#, six months ago 155#  2. Food Intake: no change (0)  3. Symptoms: no problems eating, nausea (1)  4. Activities and function: able to do little to no activity and spend most of the day in bed or chair (3)    *Full assessment for scores > 4. Height: 5' 8.5\" (174 cm)  Current Weight: 151 lb 3.2 oz (68.6 kg)  BMI: Body mass index is 22.66 kg/m². Will plan to follow up with patient. Recommend monitoring patient's weight and for potential side effects from CA itself and/or tx.     Jose Orozco RDN, JOHANNA  RD Office Phone: [phone]

## 2020-10-07 ENCOUNTER — TELEPHONE (OUTPATIENT)
Dept: CASE MANAGEMENT | Age: 70
End: 2020-10-07

## 2020-10-07 NOTE — TELEPHONE ENCOUNTER
Name: Jermaine Arevalo  : 1950  MRN: [id number]    Oncology Navigation Follow-Up Note  Navigator spoke with pt offering assistance. Pt. Denies any c/o post port insertion, pt. Needing nutritional supplement, but is diabetic, writer will offer samples if possible and pt. Requesting Chux for incontinence. Writer placed call to [facility] for assistance and left message with Deborah Dear [phone] and Nurse Emory Yanez. Writer unsure if pt. Is receiving only aid services or if skilled care is in the home. Pt. Has aid 2hours x 2xweek. Plan to follow.   Electronically signed by Binh Flores RN on 10/7/2020 at 4:36 PM

## 2020-10-08 ENCOUNTER — HOSPITAL ENCOUNTER (OUTPATIENT)
Dept: RADIATION ONCOLOGY | Facility: MEDICAL CENTER | Age: 70
Discharge: HOME | End: 2020-10-08
Attending: RADIOLOGY
Payer: MEDICARE

## 2020-10-08 ENCOUNTER — TELEPHONE (OUTPATIENT)
Dept: CASE MANAGEMENT | Age: 70
End: 2020-10-08

## 2020-10-08 PROCEDURE — 77470 SPECIAL RADIATION TREATMENT: CPT

## 2020-10-08 PROCEDURE — 77334 RADIATION TREATMENT AID(S): CPT

## 2020-10-08 PROCEDURE — 77334 RADIATION TREATMENT AID(S): CPT | Performed by: RADIOLOGY

## 2020-10-08 NOTE — PROGRESS NOTES
Pt here today for teach and simulation scan. Radiation and You information provided along with additional education regarding radiation therapy and what to expect. Pt verbalizes understanding and all questions answered to the best of my knowledge. Samples of aquaphor and community resource information provided. Pt escorted to CT room without any difficulty. Radiation Therapy Education    · A Simulation Scan is like having a CT scan. Your physician will use the images obtained to develop your radiation treatment. · May take 30 minutes to 1 hour to complete  · Marking will be applied to your skin to help insure accurate positioning for your future treatments. · A mold or a mask may also be needed to help aid in your positioning    · Schedule: You will have treatments Monday - Friday  · Treatments should take about 15 minutes from the time you enter the treatment room  · You will have the same appointment time each day  · You will see your doctor and nurse one day weekly while you are undergoing treatments. · Every effort will be make to start your treatment at the scheduled time. However, there may be occasional delays due to emergency patients, technical problems, or other difficulties. · Side Effects: Radiation is a local treatment so any side effects you may experience will be in your treatment area only. · If you experience side effects they will typically occur after the 2nd or 3rd week of treatments. · Many patients do not experience severe side effects and are able to continue working during their treatments.  If you feel you treatments are interfering with your job, please notify your nurse        Skin Care During Radiation Treatments          Start applying moisturizers to the skin two times a day when you start your radiation        treatments  · Suggested moisturizers include: Aquaphor, Eucerin, Exclair, Borion gel (may be purchased at [facility]) or [employer] (to order call 1-307.665.6025 or go to www.[employer]  · Do not apply anything to your skin in your treatment area that is not recommended by your radiation nurse and/or doctor  Good general hygiene/bathing should be performed daily  · Use moisturizing soaps that do not contain perfume or fragrances. Recommended soaps include Dove or Dial  · Use warm water, not hot water when bathing  · After bathing, pat the skin dry rather than rubbing it, especially at the treatment site  · Do not shave the treatment area. If you must shave, such as a beard, use an electric shaver. Don't use pre/after shave creams on treatment area. Avoid friction on and around your treatment area  · Do not use tape, bandages, or medicated patches in the treatment area  · Avoid tight fitting clothing  · No massaging or scratching    Avoid extremes of temperature on the treatment area such as heating pads, ice packs, hot tubs, or saunas    If the area being treated is exposed to the sun, apply sunscreen routinely to the treatment site whenever you are outdoors. A sunscreen with a minimum of SFF30 should be used. Since the area being treated will always be more sensitive than the rest of your skin, continue to protect the area from sun exposure after your treatment ends    If your armpit is in the treatment area, do not use antiperspirants. An aluminum-free, non-metallic deodorant may be used. Tell your nurse or doctor if you have any of the following symptoms:  · Blistered, open, swollen or tender areas of the skin in and around the treatment area  · Pain or itching that is not helped by prescribed medications or recommended ointments      Questions and Answers about Radiation Therapy  1. What is radiation therapy? Radiation therapy (also called radiotherapy) is a cancer treatment that uses high  doses of radiation to kill cancer cells and shrink tumors.  At low doses, radiation  is used as an x-ray to see inside your body and take pictures, such as once a day, or in smaller doses twice a day for several weeks. Spreading out the radiation dose allows normal cells to recover while cancer cells die. · Aimimng radiation at a precise part of your body. Some types of radiation therapy allow your doctor to aim high doses of radiaiton at your cancer while reducing radiation to nearby healthy tissue. These techniques use a computer to deliver precise radiation doses to a cancer tumor or to specific areas within the tumor. [facility]             Chest Side Effects  · Fatigue  · Skin Changes  · Throat changes, such as trouble swallowing  · Cough  · Shortness of breath    Ways to Manage Fatigue    · Try to sleep at least 8 hours each night. This may be more sleep than you needed before radiation therapy. One way to sleep better at night is to be active during the day. Another way is to relax right before going to bed. Do calming activities before bedtime, such as reading, working on a Xactium[employer] puzzle, or listening to music. · Plan time to rest. Take short naps or rest breaks between activities. · Try not to do too much. With fatigue, you ay not have enough energy to do all the things you want to do. Stay active, but choose the activities that are most important to you. Try to let go of things that don't matter as much now. For example, you might go to work but not do housework. You might watch your children's sprots events but not cook dinner. · Exercise. Research shows that most people feel better when they get some exercise each day. Go for a short walk, ride a bike, or do yoga. Talk with your doctor or nurse about types of exercise you can do while having radiation therapy. · Relax. Mediatation, prayer, gentle yoga, guided imagery, and visualization are ways you can learn to relax and decrease stress.     For relaxation exercises:  · Visit Learning to Relax on the National Cancer [employer]'s website at : www.cancer.gov/about-cancer/copingfeelings/relaxation  · See Facing Forward: Life After Cancer Treatment at: www.cancer.gov/publications/patient-education/facing-forward  · Eat and drink well. It can be easier to eat if you have 5 or 6 small meals each day, rather than 3 large ones. Keep foods around that are easy to fix, such as canned soups, frozen meals, yogurt, and cottage cheese. Drink plenty of fluids each day-about 8 cups of water or juice. · Plan a work schedule that is right for you. Fatigue may affect the amount of energy you have for your job. You may feel well enough to work your full schedule, or you may need to work less-maybe just a few hours a day or a few days each week. You may want to talk with your boss about ways to work from home so you do not need to commute. If possible, you may want to think about going on medical leave while you have radiation therapy. Ways to Manage Skin Changes    · Skin Care. Take extra good care of your skin during radiation therapy. Be gentle and do not rub, scrub, or scratch in the treatment area. Use creams that your doctor or nurse suggests. Acquaphor / Alliance [employer] / Miaderm   · Apply the recommended lotions to treatment area 2 times a day. Start this the day you begin radiation. · Do not put anything on your skin that is very hot or cold. Do not use heating pads, ice packs or other hot or cold items on the treatment area  · Be gentle when you shower or take a bath. You can take a lukewarm shower every day. If you prefer to take a lukewarm bath, so do only every other day and don't soak  For too long. Whether you take a shower or bath, make sure to use a mild soap. Dry yourself with a soft towel by patting, not rubbing, your skin. Be careful not to wash off the ink markings that you need for radiation therapy. · Use only those lotions and skin products that your doctor or nurse suggests.  If you are using a prescribed cream for a skin problem or acne, tell your include lotions for dry or itchy skin, antibiotics to treat infection, and drug to reduce swelling or itching. Ways to Manage Throat Changes    · Be careful what you eat when your throat is sore  · Choose foods that are easy to swallow. · Cut, blend, or shred foods to make them easier to eat. · Eat moist, soft foods such as cooked cereals, mashed potatoes, and scrambled eggs. · Wet and soften food with gravy, sauce, broth, yogurt, or other liquids. · Drink cool drinks. · Sip drinks through a straw. · Eat foods that are cool or at room temperature. · Eat small meals and snacks. It may be easier to eat a small amount of food at one tie. Instead of eating 3 large meals each day, eat 5 or 6 small meals and snacks. · Choose foods and drinks that are high in calories and protein. When it hurts to swallow, you may eat less and lose weight. It is important to maintain your wieght during radiation therapy. Having foods and drinks that are high in calories and protein can help you. · Sit upright and bend your head slightly forward when you are eating or drinking. Remain sitting or standing upright for at least 30 minutes after eating  · Avoid things that can burn or scrape your throat, such as:  · Hot foods and drinks  · Spicy foods  · Foods and juices that are high in acid, such as tomatoes and oranges  · Sharp, crunchy foods, such as potato or corn chips  · All tobacco products, such as cigarettes, pipes, cigars, and chewing tobacco  · Dinks that contain alcohol  · Talk with a dietitian. He or she can help make sure you eat enough to maintain your weight. · Talk with your doctor or nurse. Let you doctor or nurse know if you notice throat changes, such as trouble swallowing, feeling as if you are choking or coughing while eating or drinking. Also, let him or her know if you have throat pain or lose weight.  You doctor can prescribe medicines that may help relieve your symptoms, such as antacids, gels that coat your throat, and painkillers.     [employer]

## 2020-10-08 NOTE — TELEPHONE ENCOUNTER
Name: Jovan Said  : 1950  MRN: [id number]    Oncology Navigation Follow-Up Note    Pt. discussed in [address] and questions presented about possible induction CTX to \"shrink tumor\" to reduce field of radiation? Pt. SIM'd today for concurrent Tx. Delvin De La Garza spoke with Dr. Christal Mahoney and updated on conference discussion. Navigator then calling Dr. Micah Ferguson and his thoughts are pt. Should be okay with concurrent, but Dr. Micah Ferguson will complete Tx  plan  in next couple of days then decide best approach and will discuss with dr. Christal Mahoney. Radiologist also mentioning PET positive in tongue area, but more likely false positive and both Dr. Micah Ferguson and Dr. Christal Mahoney aware. Dr. Micah Ferguson planning to examine pt. tongue With next visit.  Navigator left message for Becky Parra and will forward note to Dr. Christal Mahoney  Electronically signed by Daniel Pal RN on 10/8/2020 at 1:59 PM

## 2020-10-08 NOTE — TELEPHONE ENCOUNTER
Name: Teo Millard  : 1950  MRN: [id number]    Oncology Navigation Follow-Up Note  Navigator dropping off nutritional supplements in RO for pt. , but pt. And RO nurse in middle of teaching and writer could not touch base with her . Josh Gibson giving navigation packet to pt. Along with my card. Plan to follow.                     Electronically signed by Veronica Esquivel RN on 10/8/2020 at 11:10 AM

## 2020-10-09 ENCOUNTER — TELEPHONE (OUTPATIENT)
Dept: CASE MANAGEMENT | Age: 70
End: 2020-10-09

## 2020-10-09 ENCOUNTER — TELEPHONE (OUTPATIENT)
Dept: ONCOLOGY | Age: 70
End: 2020-10-09

## 2020-10-09 NOTE — TELEPHONE ENCOUNTER
Name: Nadira Hummel  : 1950  MRN: [id number]    Oncology Navigation Follow-Up Note    Navigator received message from Triage in regards to [employer] testing and no PDL1 completed due to insufficient sample, message sent to MD via text as well-Thank Keenan No.  Electronically signed by Carolann Armstrong RN on 10/9/2020 at 11:38 AM

## 2020-10-09 NOTE — TELEPHONE ENCOUNTER
RECEIVED FAXED NOTICE FROM [facility] THEY IS INSUFFICIENT TISSUE TO COMPLET PD-L1 TESTING. IHC TESTING HAS BEEN CANCELED. IM NAVIGATION AS PT WAS DISCUSSED IN CANCER CONFERENCE YESTERDAY 10/08/2020. PLACED IN MD RACK AS WELL.

## 2020-10-12 ENCOUNTER — TELEPHONE (OUTPATIENT)
Dept: CASE MANAGEMENT | Age: 70
End: 2020-10-12

## 2020-10-12 NOTE — TELEPHONE ENCOUNTER
Name: Haroon Duque  : 1950  MRN: [id number]    Oncology Navigation Follow-Up Note  Navigator returning voicemail from [address] [place] [address] with [facility]. Writer trying to determine f pt. Has skilled nursing in her home or only aid services, message left for [facility].   Electronically signed by Flores Mancini RN on 10/12/2020 at 9:12 AM

## 2020-10-14 ENCOUNTER — TELEPHONE (OUTPATIENT)
Dept: ONCOLOGY | Age: 70
End: 2020-10-14

## 2020-10-14 NOTE — TELEPHONE ENCOUNTER
RECEIVED A FAX FROM [employer] FOR PT SAFETY AND HEALTH CONSIDERATIONS USING GABAPENTIN AND PERCOCET . REVIEWED WITH MD    NO NEW ORDERS AT THIS TIME.

## 2020-10-15 ENCOUNTER — HOSPITAL ENCOUNTER (OUTPATIENT)
Dept: RADIATION ONCOLOGY | Facility: MEDICAL CENTER | Age: 70
Discharge: HOME | End: 2020-10-15
Payer: MEDICARE

## 2020-10-15 ENCOUNTER — SOCIAL WORK (OUTPATIENT)
Dept: ONCOLOGY | Age: 70
End: 2020-10-15

## 2020-10-15 ENCOUNTER — TELEPHONE (OUTPATIENT)
Dept: CASE MANAGEMENT | Age: 70
End: 2020-10-15

## 2020-10-15 NOTE — TELEPHONE ENCOUNTER
Name: Colleen Le  : 1950  MRN: [id number]    Oncology Navigation Follow-Up Note  Navigator received call from Dr. Harish Vogel and RTX POC finished and Dr. Roselia Todd recommending No Ryan adjuvant needed, Dr. Harish Vogel given Dr. Oleksandr Beard number and he will call Dr. Christine Levine, plan to follow.    Electronically signed by Unique Ramirez RN on 10/15/2020 at 8:58 AM

## 2020-10-18 NOTE — DISCHARGE SUMMARY
41 08/26/2012    PCO2 54 04/11/2017    POCPO2 93 04/12/2017    PO2ART 59 08/26/2012    PO2 89 04/11/2017    POCHCO3 25.3 04/12/2017    NSA5VLH 26.1 08/26/2012    HCO3 22.2 04/11/2017    NBEA NOT REPORTED 04/12/2017    PBEA 0 04/12/2017    QLO2IEJ 27 04/12/2017    JNXD4NPE 97 04/12/2017    B7HTMLSI 92.1 08/26/2012    O2SAT 95 04/11/2017    FIO2 UNKNOWN 10/31/2017       Radiology:    No results found. All radiological studies reviewed      Reviews of Symptoms:    A 10 point system is reviewed and  negative except described in hospital course    Physical Exam:    Vitals:  BP (!) 120/52   Pulse 83   Temp 98.6 °F (37 °C) (Oral)   Resp 18   Ht 5' 8.5\" (1.74 m)   Wt 144 lb 6.4 oz (65.5 kg)   SpO2 92%   BMI 21.63 kg/m²   No data recorded.       General appearance - alert, well appearing, and in no acute distress  Mental status - oriented to person, place, and time with normal affect  Head - normocephalic and atraumatic  Eyes - pupils equal and reactive, extraocular eye movements intact, conjunctiva clear  Ears - hearing appears to be intact  Nose - no drainage noted  Mouth - mucous membranes moist  Neck - supple, no carotid bruits, thyroid not palpable  Chest - clear to auscultation, normal effort  Heart - normal rate, regular rhythm, no murmur  Abdomen - soft, nontender, nondistended, bowel sounds present all four quadrants, no masses, hepatomegaly or splenomegaly  Neurological - normal speech, no focal findings or movement disorder noted, cranial nerves II through XII grossly intact  Extremities - peripheral pulses palpable, no pedal edema or calf pain with palpation  Skin - no gross lesions, rashes, or induration noted      Consults:  IP CONSULT TO HOSPITALIST  IP CONSULT TO SOCIAL WORK    Disposition: Home    Discharged Condition: Stable    Follow Up: Joaquín Clemens MD  [address] [facility]  [phone]    In 5 days        Lab Frequency Next Occurrence   CBC With Auto Differential Comprehensive Metabolic Panel           Diet: No diet orders on file    Discharge Medications:    Sadie De Anda   Home Medication Instructions SUMEET:[id number]    Printed on:10/18/20 3284   Medication Information                      aspirin EC 81 MG EC tablet  Take 81 mg by mouth daily             clonazePAM (KLONOPIN) 1 MG tablet  Take 1 tablet by mouth 2 times daily as needed for Anxiety for up to 5 doses. furosemide (LASIX) 40 MG tablet  Take 0.5 tablets by mouth daily             gabapentin (NEURONTIN) 400 MG capsule  Take 400 mg by mouth 2 times daily. glimepiride (AMARYL) 1 MG tablet  Take 2 mg by mouth every morning In addition to 1mg nightly             glimepiride (AMARYL) 1 MG tablet  Take 1 mg by mouth Daily with supper In addition to 2 tablets (=2mg) every morning              lansoprazole (PREVACID) 30 MG delayed release capsule  Take 30 mg by mouth daily             melatonin 5 MG TABS tablet  Take 5 mg by mouth nightly as needed (sleep)             metFORMIN (GLUCOPHAGE) 500 MG tablet  Take 500 mg by mouth 2 times daily             metoprolol tartrate (LOPRESSOR) 25 MG tablet  Take 25 mg by mouth 2 times daily             mometasone-formoterol (DULERA) 200-5 MCG/ACT inhaler  Inhale 2 puffs into the lungs every 12 hours as needed (wheezing/SOB)              traZODone (DESYREL) 150 MG tablet  Take 2 tablets by mouth nightly for 5 doses Take 2 tablets (=300mg) nightly             venlafaxine (EFFEXOR XR) 150 MG extended release capsule  Take 150 mg by mouth 2 times daily                 Code Status:  Prior    Time Spent on discharge is  30 mins  in patient examination, evaluation, counseling as well as medication reconciliation, prescriptions for required medications, discharge plan and follow up. Electronically signed by Miguel Angel Obregon MD on 10/18/2020 at 4:53 PM     Thank you Dr. Dallas Mcdonough MD for the opportunity to be involved in this patient's care.     This note was created with the assistance of a speech-recognition program.  Although the intention is to generate a document that actually reflects the content of the visit, no guarantees can be provided that every mistake has been identified and corrected by editing. Note was updated later by me after  physical examination and  completion of the assessment.

## 2020-10-19 ENCOUNTER — TELEPHONE (OUTPATIENT)
Dept: RADIATION ONCOLOGY | Facility: MEDICAL CENTER | Age: 70
End: 2020-10-19

## 2020-10-19 ENCOUNTER — TELEPHONE (OUTPATIENT)
Dept: CASE MANAGEMENT | Age: 70
End: 2020-10-19

## 2020-10-19 LAB
CULTURE: NORMAL
DIRECT EXAM: NORMAL
Lab: NORMAL
SPECIMEN DESCRIPTION: NORMAL

## 2020-10-19 NOTE — TELEPHONE ENCOUNTER
Informed patient is ready to start treatment. I called and spoke to Jey at [facility] and she is okay with doing 11am 10-21. We have 1015am opening. Called patient and verified these were okay. Fernanda Raya RT informed of start time.

## 2020-10-19 NOTE — TELEPHONE ENCOUNTER
Name: Josette Zayas  : 1950  MRN: [id number]    Oncology Navigation Follow-Up Note  Navigator spoke with pt. Offering assistance. RTX/CTX scheduled for this Wednesday to start, plan to follow.   Electronically signed by Lori Hernandez RN on 10/19/2020 at 3:23 PM

## 2020-10-20 ENCOUNTER — HOSPITAL ENCOUNTER (OUTPATIENT)
Facility: MEDICAL CENTER | Age: 70
Discharge: HOME | End: 2020-10-20
Payer: MEDICARE

## 2020-10-20 ENCOUNTER — HOSPITAL ENCOUNTER (OUTPATIENT)
Facility: MEDICAL CENTER | Age: 70
End: 2020-10-20
Payer: MEDICARE

## 2020-10-21 ENCOUNTER — HOSPITAL ENCOUNTER (OUTPATIENT)
Dept: VASCULAR LAB | Age: 70
Discharge: HOME | End: 2020-10-21
Payer: MEDICARE

## 2020-10-21 ENCOUNTER — HOSPITAL ENCOUNTER (OUTPATIENT)
Dept: RADIATION ONCOLOGY | Facility: MEDICAL CENTER | Age: 70
Discharge: HOME | End: 2020-10-21
Payer: MEDICARE

## 2020-10-21 ENCOUNTER — TELEPHONE (OUTPATIENT)
Dept: INFUSION THERAPY | Facility: MEDICAL CENTER | Age: 70
End: 2020-10-21

## 2020-10-21 ENCOUNTER — OFFICE VISIT (OUTPATIENT)
Dept: ONCOLOGY | Age: 70
End: 2020-10-21
Payer: MEDICARE

## 2020-10-21 ENCOUNTER — TELEPHONE (OUTPATIENT)
Dept: ONCOLOGY | Age: 70
End: 2020-10-21

## 2020-10-21 ENCOUNTER — HOSPITAL ENCOUNTER (OUTPATIENT)
Dept: INFUSION THERAPY | Facility: MEDICAL CENTER | Age: 70
Discharge: HOME | End: 2020-10-21
Payer: MEDICARE

## 2020-10-21 ENCOUNTER — HOSPITAL ENCOUNTER (OUTPATIENT)
Dept: RADIATION ONCOLOGY | Facility: MEDICAL CENTER | Age: 70
End: 2020-10-21
Payer: MEDICARE

## 2020-10-21 VITALS
DIASTOLIC BLOOD PRESSURE: 63 MMHG | WEIGHT: 159.9 LBS | HEART RATE: 97 BPM | BODY MASS INDEX: 23.96 KG/M2 | TEMPERATURE: 98.9 F | SYSTOLIC BLOOD PRESSURE: 123 MMHG | RESPIRATION RATE: 18 BRPM

## 2020-10-21 DIAGNOSIS — C34.11 MALIGNANT NEOPLASM OF UPPER LOBE OF RIGHT LUNG (HCC): ICD-10-CM

## 2020-10-21 DIAGNOSIS — C34.91 NON-SMALL CELL CANCER OF RIGHT LUNG (HCC): Primary | ICD-10-CM

## 2020-10-21 LAB
ABSOLUTE EOS #: 0.24 K/UL (ref 0–0.4)
ABSOLUTE IMMATURE GRANULOCYTE: 0.12 K/UL (ref 0–0.3)
ABSOLUTE LYMPH #: 2.76 K/UL (ref 1–4.8)
ABSOLUTE MONO #: 0.6 K/UL (ref 0.2–0.8)
ALBUMIN SERPL-MCNC: 3.3 G/DL (ref 3.5–5.2)
ALBUMIN/GLOBULIN RATIO: ABNORMAL (ref 1–2.5)
ALP BLD-CCNC: 41 U/L (ref 35–104)
ALT SERPL-CCNC: 8 U/L (ref 5–33)
ANION GAP SERPL CALCULATED.3IONS-SCNC: 7 MMOL/L (ref 9–17)
AST SERPL-CCNC: 12 U/L
BASOPHILS # BLD: 1 %
BASOPHILS ABSOLUTE: 0.12 K/UL (ref 0–0.2)
BILIRUB SERPL-MCNC: <0.1 MG/DL (ref 0.3–1.2)
BUN BLDV-MCNC: 14 MG/DL (ref 8–23)
BUN/CREAT BLD: 20 (ref 9–20)
CALCIUM SERPL-MCNC: 9.1 MG/DL (ref 8.6–10.4)
CHLORIDE BLD-SCNC: 104 MMOL/L (ref 98–107)
CO2: 29 MMOL/L (ref 20–31)
CREAT SERPL-MCNC: 0.7 MG/DL (ref 0.5–0.9)
DIFFERENTIAL TYPE: ABNORMAL
EOSINOPHILS RELATIVE PERCENT: 2 % (ref 1–4)
GFR AFRICAN AMERICAN: >60 ML/MIN
GFR NON-AFRICAN AMERICAN: >60 ML/MIN
GFR SERPL CREATININE-BSD FRML MDRD: ABNORMAL ML/MIN/{1.73_M2}
GFR SERPL CREATININE-BSD FRML MDRD: ABNORMAL ML/MIN/{1.73_M2}
GLUCOSE BLD-MCNC: 58 MG/DL (ref 70–99)
HCT VFR BLD CALC: 29.2 % (ref 36.3–47.1)
HEMOGLOBIN: 8.8 G/DL (ref 11.9–15.1)
IMMATURE GRANULOCYTES: 1 %
LYMPHOCYTES # BLD: 23 % (ref 24–44)
MCH RBC QN AUTO: 23.3 PG (ref 25.2–33.5)
MCHC RBC AUTO-ENTMCNC: 30.1 G/DL (ref 28.4–34.8)
MCV RBC AUTO: 77.2 FL (ref 82.6–102.9)
MONOCYTES # BLD: 5 % (ref 1–7)
MORPHOLOGY: ABNORMAL
NRBC AUTOMATED: 0 PER 100 WBC
PDW BLD-RTO: 21 % (ref 11.8–14.4)
PLATELET # BLD: 403 K/UL (ref 138–453)
PLATELET ESTIMATE: ABNORMAL
PMV BLD AUTO: 8.3 FL (ref 8.1–13.5)
POTASSIUM SERPL-SCNC: 4.5 MMOL/L (ref 3.7–5.3)
RBC # BLD: 3.78 M/UL (ref 3.95–5.11)
RBC # BLD: ABNORMAL 10*6/UL
SEG NEUTROPHILS: 68 % (ref 36–66)
SEGMENTED NEUTROPHILS ABSOLUTE COUNT: 8.16 K/UL (ref 1.8–7.7)
SODIUM BLD-SCNC: 140 MMOL/L (ref 135–144)
TOTAL PROTEIN: 7 G/DL (ref 6.4–8.3)
WBC # BLD: 12 K/UL (ref 3.5–11.3)
WBC # BLD: ABNORMAL 10*3/UL

## 2020-10-21 PROCEDURE — 99211 OFF/OP EST MAY X REQ PHY/QHP: CPT | Performed by: INTERNAL MEDICINE

## 2020-10-21 PROCEDURE — 77386 HC NTSTY MODUL RAD TX DLVR CPLX: CPT

## 2020-10-21 PROCEDURE — 6360000002 HC RX W HCPCS: Performed by: INTERNAL MEDICINE

## 2020-10-21 PROCEDURE — 2580000003 HC RX 258: Performed by: INTERNAL MEDICINE

## 2020-10-21 PROCEDURE — 93970 EXTREMITY STUDY: CPT

## 2020-10-21 PROCEDURE — 2500000003 HC RX 250 WO HCPCS: Performed by: INTERNAL MEDICINE

## 2020-10-21 PROCEDURE — 96375 TX/PRO/DX INJ NEW DRUG ADDON: CPT

## 2020-10-21 PROCEDURE — 80053 COMPREHEN METABOLIC PANEL: CPT

## 2020-10-21 PROCEDURE — 96413 CHEMO IV INFUSION 1 HR: CPT

## 2020-10-21 PROCEDURE — 77014 PR CT GUIDANCE PLACEMENT RAD THERAPY FIELDS: CPT | Performed by: RADIOLOGY

## 2020-10-21 PROCEDURE — 96417 CHEMO IV INFUS EACH ADDL SEQ: CPT

## 2020-10-21 PROCEDURE — 96366 THER/PROPH/DIAG IV INF ADDON: CPT

## 2020-10-21 PROCEDURE — 99215 OFFICE O/P EST HI 40 MIN: CPT | Performed by: INTERNAL MEDICINE

## 2020-10-21 PROCEDURE — 36591 DRAW BLOOD OFF VENOUS DEVICE: CPT

## 2020-10-21 PROCEDURE — 85025 COMPLETE CBC W/AUTO DIFF WBC: CPT

## 2020-10-21 RX ORDER — DIPHENHYDRAMINE HYDROCHLORIDE 50 MG/ML
50 INJECTION INTRAMUSCULAR; INTRAVENOUS ONCE
Status: CANCELLED | OUTPATIENT
Start: 2020-10-21

## 2020-10-21 RX ORDER — OMEPRAZOLE 20 MG/1
20 CAPSULE, DELAYED RELEASE ORAL EVERY 8 HOURS PRN
Qty: 30 CAPSULE | Refills: 5 | Status: SHIPPED | OUTPATIENT
Start: 2020-10-21 | End: 2020-11-03

## 2020-10-21 RX ORDER — PALONOSETRON 0.05 MG/ML
0.25 INJECTION, SOLUTION INTRAVENOUS ONCE
Status: COMPLETED | OUTPATIENT
Start: 2020-10-21 | End: 2020-10-21

## 2020-10-21 RX ORDER — SODIUM CHLORIDE 0.9 % (FLUSH) 0.9 %
10 SYRINGE (ML) INJECTION PRN
Status: DISCONTINUED | OUTPATIENT
Start: 2020-10-21 | End: 2020-10-22 | Stop reason: HOSPADM

## 2020-10-21 RX ORDER — MEPERIDINE HYDROCHLORIDE 50 MG/ML
12.5 INJECTION INTRAMUSCULAR; INTRAVENOUS; SUBCUTANEOUS ONCE
Status: CANCELLED | OUTPATIENT
Start: 2020-10-21

## 2020-10-21 RX ORDER — LIDOCAINE AND PRILOCAINE 25; 25 MG/G; MG/G
CREAM TOPICAL
Qty: 1 TUBE | Refills: 1 | Status: SHIPPED | OUTPATIENT
Start: 2020-10-21 | End: 2021-03-31 | Stop reason: SDUPTHER

## 2020-10-21 RX ORDER — EPINEPHRINE 1 MG/ML
0.3 INJECTION, SOLUTION, CONCENTRATE INTRAVENOUS PRN
Status: CANCELLED | OUTPATIENT
Start: 2020-10-21

## 2020-10-21 RX ORDER — SODIUM CHLORIDE 9 MG/ML
20 INJECTION, SOLUTION INTRAVENOUS ONCE
Status: COMPLETED | OUTPATIENT
Start: 2020-10-21 | End: 2020-10-21

## 2020-10-21 RX ORDER — ONDANSETRON 4 MG/1
4 TABLET, FILM COATED ORAL 3 TIMES DAILY PRN
Qty: 30 TABLET | Refills: 0 | Status: SHIPPED | OUTPATIENT
Start: 2020-10-21 | End: 2020-11-03 | Stop reason: ALTCHOICE

## 2020-10-21 RX ORDER — DEXAMETHASONE SODIUM PHOSPHATE 100 MG/10ML
10 INJECTION INTRAMUSCULAR; INTRAVENOUS ONCE
Status: COMPLETED | OUTPATIENT
Start: 2020-10-21 | End: 2020-10-21

## 2020-10-21 RX ORDER — HEPARIN SODIUM (PORCINE) LOCK FLUSH IV SOLN 100 UNIT/ML 100 UNIT/ML
500 SOLUTION INTRAVENOUS PRN
Status: CANCELLED | OUTPATIENT
Start: 2020-10-21

## 2020-10-21 RX ORDER — METHYLPREDNISOLONE SODIUM SUCCINATE 125 MG/2ML
125 INJECTION, POWDER, LYOPHILIZED, FOR SOLUTION INTRAMUSCULAR; INTRAVENOUS ONCE
Status: CANCELLED | OUTPATIENT
Start: 2020-10-21

## 2020-10-21 RX ORDER — SODIUM CHLORIDE 9 MG/ML
20 INJECTION, SOLUTION INTRAVENOUS ONCE
Status: CANCELLED | OUTPATIENT
Start: 2020-10-21

## 2020-10-21 RX ORDER — SODIUM CHLORIDE 0.9 % (FLUSH) 0.9 %
5 SYRINGE (ML) INJECTION PRN
Status: CANCELLED | OUTPATIENT
Start: 2020-10-21

## 2020-10-21 RX ORDER — PALONOSETRON 0.05 MG/ML
0.25 INJECTION, SOLUTION INTRAVENOUS ONCE
Status: CANCELLED | OUTPATIENT
Start: 2020-10-21

## 2020-10-21 RX ORDER — HEPARIN SODIUM (PORCINE) LOCK FLUSH IV SOLN 100 UNIT/ML 100 UNIT/ML
500 SOLUTION INTRAVENOUS PRN
Status: DISCONTINUED | OUTPATIENT
Start: 2020-10-21 | End: 2020-10-22 | Stop reason: HOSPADM

## 2020-10-21 RX ORDER — SODIUM CHLORIDE 9 MG/ML
INJECTION, SOLUTION INTRAVENOUS CONTINUOUS
Status: CANCELLED | OUTPATIENT
Start: 2020-10-21

## 2020-10-21 RX ORDER — DIPHENHYDRAMINE HYDROCHLORIDE 50 MG/ML
50 INJECTION INTRAMUSCULAR; INTRAVENOUS ONCE
Status: COMPLETED | OUTPATIENT
Start: 2020-10-21 | End: 2020-10-21

## 2020-10-21 RX ORDER — SODIUM CHLORIDE 0.9 % (FLUSH) 0.9 %
10 SYRINGE (ML) INJECTION PRN
Status: CANCELLED | OUTPATIENT
Start: 2020-10-21

## 2020-10-21 RX ADMIN — Medication 10 ML: at 15:19

## 2020-10-21 RX ADMIN — DIPHENHYDRAMINE HYDROCHLORIDE 50 MG: 50 INJECTION, SOLUTION INTRAMUSCULAR; INTRAVENOUS at 12:40

## 2020-10-21 RX ADMIN — Medication 10 MG: at 12:17

## 2020-10-21 RX ADMIN — PACLITAXEL 78 MG: 6 INJECTION, SOLUTION, CONCENTRATE INTRAVENOUS at 13:00

## 2020-10-21 RX ADMIN — FAMOTIDINE 20 MG: 10 INJECTION INTRAVENOUS at 12:17

## 2020-10-21 RX ADMIN — CARBOPLATIN 200 MG: 10 INJECTION INTRAVENOUS at 14:25

## 2020-10-21 RX ADMIN — SODIUM CHLORIDE 20 ML/HR: 9 INJECTION, SOLUTION INTRAVENOUS at 12:12

## 2020-10-21 RX ADMIN — PALONOSETRON 0.25 MG: 0.05 INJECTION, SOLUTION INTRAVENOUS at 12:17

## 2020-10-21 RX ADMIN — HEPARIN 500 UNITS: 100 SYRINGE at 15:19

## 2020-10-21 NOTE — PROGRESS NOTES
Ling Miranda arrives ambulatory with aid of walker  Order for C1 D1 reviewed  Procedure reviewed and verbalizes understanding  Lt chest port accessed per protocol with #20 G 3/4 L Menjivar needle   Good blood return noted and blood work obtained and sent to lab  Dr Aj Neri vs and examined   Order to proceed with chemotherapy  Chemotherapy teaching done per Kamala Sharma RN  See [facility] for pre medications  Taxol initiated slowly first then rate increased to infuse over one hour   No reaction noted  Carboplatin initiated and completed   No reaction noted  Iv line flushed and mejnivar needle removed with needle intact  Band aid applied  Discharged per wheelchair with son  Patient to report to registration to have scan done on her legs and she reports they will go there

## 2020-10-21 NOTE — TELEPHONE ENCOUNTER
Patient is here with her son Jennifer Padgett for chemo teaching. Reviewed her chart. Discussed with them her treatment plan weekly with radiation. Reviewed at length the side effects of Taxol and Carboplatin. Answered their questions as they asked them. Reviewed office hours, phone number and when to contact us if symptoms occur. Both voiced understanding. Paperwork completed by patient and reviewed with her. Concerns voiced and answered. Scripts sent for Omeprazole, Dexamethasone and Zofran along with Emla cream.  Explained the use of Emla cream to port site for accessing procedure. Time spent was 35 minutes.

## 2020-10-21 NOTE — TELEPHONE ENCOUNTER
Paige Lea FOR MD VISIT & [address] IN TO SEE PATIENT  ORDERS RECEIVED  CHEMO AS PLANNED  RV 1 WEEK  MD VISIT 10/28/20 @11AM  [email or web address] DUE TO DR Kierra Herrera ON CALL   AVS PRINTED AND GIVEN TO PATIENT WITH INSTRUCTIONS  PATIENT REMAINS IN [address] [place] [address]

## 2020-10-21 NOTE — PROGRESS NOTES
mellitus ([facility] [address] Falls, Heart block, Hypokalemia, MDRO (multiple drug resistant organisms) resistance, MRSA (methicillin resistant staph aureus) culture positive, On home oxygen therapy, On home oxygen therapy, Overactive bladder, Pneumonia, PONV (postoperative nausea and vomiting), and Vitamin D deficiency. Past Surgical History:   has a past surgical history that includes back surgery; eye surgery; Cholecystectomy; Appendectomy; Hysterectomy; Tonsillectomy; lumbar laminectomy; Endoscopy, colon, diagnostic (12/08/2016); aortic valve repair (N/A, 4/11/2017); bronchoscopy (N/A, 8/31/2020); IR INSERT PICC VAD W SQ PORT >5 YEARS (9/4/2020); and IR PORT PLACEMENT > 5 YEARS (9/29/2020). Medications:    Current Outpatient Medications   Medication Sig Dispense Refill    oxyCODONE-acetaminophen (PERCOCET) 5-325 MG per tablet Take 1 tablet by mouth every 6 hours as needed for Pain for up to 30 days. 60 tablet 0    clonazePAM (KLONOPIN) 1 MG tablet Take 1 tablet by mouth 2 times daily as needed for Anxiety for up to 5 doses. 5 tablet 0    furosemide (LASIX) 40 MG tablet Take 0.5 tablets by mouth daily 60 tablet 3    gabapentin (NEURONTIN) 400 MG capsule Take 400 mg by mouth 2 times daily.       traZODone (DESYREL) 150 MG tablet Take 2 tablets by mouth nightly for 5 doses Take 2 tablets (=300mg) nightly 10 tablet 0    glimepiride (AMARYL) 1 MG tablet Take 1 mg by mouth Daily with supper In addition to 2 tablets (=2mg) every morning       metoprolol tartrate (LOPRESSOR) 25 MG tablet Take 25 mg by mouth 2 times daily      venlafaxine (EFFEXOR XR) 150 MG extended release capsule Take 150 mg by mouth 2 times daily      lansoprazole (PREVACID) 30 MG delayed release capsule Take 30 mg by mouth daily      metFORMIN (GLUCOPHAGE) 500 MG tablet Take 500 mg by mouth 2 times daily      aspirin EC 81 MG EC tablet Take 81 mg by mouth daily      mometasone-formoterol (DULERA) 200-5 MCG/ACT inhaler Inhale 2 puffs into the lungs every 12 hours as needed (wheezing/SOB)       glimepiride (AMARYL) 1 MG tablet Take 2 mg by mouth every morning In addition to 1mg nightly      melatonin 5 MG TABS tablet Take 5 mg by mouth nightly as needed (sleep)       No current facility-administered medications for this visit. Allergies:  Codeine and Dye [iodides]    Social History:   reports that she quit smoking about 4 years ago. She has never used smokeless tobacco. She reports that she does not drink alcohol or use drugs. Family History: family history includes Cancer in her father and mother. REVIEW OF SYSTEMS:    Constitutional: No fever or chills. No night sweats, no weight loss   Eyes: No eye discharge, double vision, or eye pain   HEENT: negative for sore mouth, sore throat, hoarseness and voice change   Respiratory: negative for cough , sputum, dyspnea, wheezing, hemoptysis, chest pain   Cardiovascular: negative for chest pain, dyspnea, palpitations, orthopnea, PND   Gastrointestinal: negative for nausea, vomiting, diarrhea, constipation, abdominal pain, Dysphagia, hematemesis and hematochezia   Genitourinary: negative for frequency, dysuria, nocturia, urinary incontinence, and hematuria   Integument: negative for rash, skin lesions, bruises.    Hematologic/Lymphatic: negative for easy bruising, bleeding, lymphadenopathy, or petechiae   Endocrine: negative for heat or cold intolerance,weight changes, change in bowel habits and hair loss   Musculoskeletal: negative for myalgias, arthralgias, pain, joint swelling,and bone pain   Neurological: negative for headaches, dizziness, seizures, weakness, numbness    PHYSICAL EXAM:      /63   Pulse 97   Temp 98.9 °F (37.2 °C) (Oral)   Resp 18   Wt 159 lb 14.4 oz (72.5 kg)   BMI 23.96 kg/m²    Temp (24hrs), Av.7 °F (36.5 °C), Min:97.3 °F (36.3 °C), Max:98.1 °F (36.7 °C)  General appearance - well appearing, no in pain or distress   Mental status - alert and cooperative   Eyes - contact with the mediastinal pleura, right major fissure,    encircling and obliterating the right upper lobe bronchus.  Spicules are seen    to extend to the anterior costal pleura.  There is adjacent bronchial wall    thickening and interlobular septal thickening.  Stable 5 mm solid nodule of    the left upper lobe since 2016, benign.  No pleural effusion or pneumothorax.         Upper Abdomen: Adrenals are unremarkable.         Soft Tissues/Bones: Old right rib fractures.              Impression    Approximate 7.2 cm spiculated mass of the right upper lobe likely with    invasion of the mediastinum, adjacent lymphangitic spread and suspected    metastatic mediastinal lymphadenopathy. Scan 9/11/2020: Impression    1. The patient's known right upper lobe lung mass is FDG avid consistent with    the patient's primary malignancy. 2. FDG avid mediastinal lymph nodes consistent with metastatic disease. 3. No abnormal FDG activity is identified involving the abdomen or pelvis. IMPRESSION:   1. 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 PET scan negative for distant metastasis: Clinical stage T4 N2 M0, stage IIIb  2. COPD  3. Falls  4. CAD  5. Tobacco abuse  6. Actinomyces bacteremia    RECOMMENDATIONS:  1. I reviewed the laboratory data, imaging studies, biopsy finding, diagnosis, prognosis and treatment options with patient  2. I reviewed the NCCN guidelines and goals of care  3. I reviewed the CT head and PET scan results with patient. 4. She has completed IV antibiotic for her actinomyces infection  5. I discussed the option of concurrent chemoradiation. I discussed the risk benefits and side effects with patient  6. Start chemoRT as planned  7.  Return to clinic in one week for

## 2020-10-22 ENCOUNTER — HOSPITAL ENCOUNTER (OUTPATIENT)
Dept: RADIATION ONCOLOGY | Facility: MEDICAL CENTER | Age: 70
Discharge: HOME | End: 2020-10-22
Payer: MEDICARE

## 2020-10-22 ENCOUNTER — HOSPITAL ENCOUNTER (OUTPATIENT)
Facility: MEDICAL CENTER | Age: 70
End: 2020-10-22
Payer: MEDICARE

## 2020-10-22 PROCEDURE — 77014 PR CT GUIDANCE PLACEMENT RAD THERAPY FIELDS: CPT | Performed by: RADIOLOGY

## 2020-10-22 PROCEDURE — 77386 HC NTSTY MODUL RAD TX DLVR CPLX: CPT

## 2020-10-23 ENCOUNTER — HOSPITAL ENCOUNTER (OUTPATIENT)
Dept: RADIATION ONCOLOGY | Facility: MEDICAL CENTER | Age: 70
Discharge: HOME | End: 2020-10-23
Payer: MEDICARE

## 2020-10-23 PROCEDURE — 77386 HC NTSTY MODUL RAD TX DLVR CPLX: CPT

## 2020-10-23 PROCEDURE — 77014 PR CT GUIDANCE PLACEMENT RAD THERAPY FIELDS: CPT | Performed by: RADIOLOGY

## 2020-10-23 PROCEDURE — 77336 RADIATION PHYSICS CONSULT: CPT

## 2020-10-23 RX ORDER — DEXAMETHASONE 4 MG/1
TABLET ORAL
Qty: 20 TABLET | Refills: 3 | Status: SHIPPED | OUTPATIENT
Start: 2020-10-23 | End: 2021-04-28

## 2020-10-23 RX ORDER — OXYCODONE HYDROCHLORIDE AND ACETAMINOPHEN 5; 325 MG/1; MG/1
1 TABLET ORAL EVERY 6 HOURS PRN
Qty: 60 TABLET | Refills: 0 | Status: ON HOLD | OUTPATIENT
Start: 2020-10-23 | End: 2020-11-05 | Stop reason: SDUPTHER

## 2020-10-25 PROBLEM — E86.0 DEHYDRATION: Status: RESOLVED | Noted: 2020-09-25 | Resolved: 2020-10-25

## 2020-10-26 ENCOUNTER — HOSPITAL ENCOUNTER (OUTPATIENT)
Dept: RADIATION ONCOLOGY | Facility: MEDICAL CENTER | Age: 70
Discharge: HOME | End: 2020-10-26
Payer: MEDICARE

## 2020-10-26 ENCOUNTER — SOCIAL WORK (OUTPATIENT)
Dept: ONCOLOGY | Age: 70
End: 2020-10-26

## 2020-10-26 PROCEDURE — 77014 PR CT GUIDANCE PLACEMENT RAD THERAPY FIELDS: CPT | Performed by: RADIOLOGY

## 2020-10-26 PROCEDURE — 77386 HC NTSTY MODUL RAD TX DLVR CPLX: CPT

## 2020-10-26 NOTE — PROGRESS NOTES
SW contacted patient to introduce SW services and review her psychosocial distress screening. Patient very concerned about her medical bills and out-of-pocket costs. Patient has [employer] in place. Patient states she is on a pension from [employer], but did not work there for very long. She also is receiving [employer] benefits. Patient told about the [address] and questions answered. SW mailed applications this day. Patient also referred to [facility] for follow-up. SW contact provided and patient encouraged to call with any other needs. Maryam Rose.  Unruly Anderson

## 2020-10-27 ENCOUNTER — HOSPITAL ENCOUNTER (OUTPATIENT)
Dept: RADIATION ONCOLOGY | Facility: MEDICAL CENTER | Age: 70
Discharge: HOME | End: 2020-10-27
Payer: MEDICARE

## 2020-10-27 VITALS
DIASTOLIC BLOOD PRESSURE: 55 MMHG | OXYGEN SATURATION: 98 % | BODY MASS INDEX: 23.84 KG/M2 | WEIGHT: 159.13 LBS | SYSTOLIC BLOOD PRESSURE: 117 MMHG | RESPIRATION RATE: 16 BRPM | TEMPERATURE: 97 F | HEART RATE: 102 BPM

## 2020-10-27 PROCEDURE — 77427 RADIATION TX MANAGEMENT X5: CPT | Performed by: RADIOLOGY

## 2020-10-27 PROCEDURE — 77014 PR CT GUIDANCE PLACEMENT RAD THERAPY FIELDS: CPT | Performed by: RADIOLOGY

## 2020-10-27 PROCEDURE — 77386 HC NTSTY MODUL RAD TX DLVR CPLX: CPT

## 2020-10-27 ASSESSMENT — PAIN DESCRIPTION - FREQUENCY: FREQUENCY: CONTINUOUS

## 2020-10-27 ASSESSMENT — PAIN DESCRIPTION - DESCRIPTORS: DESCRIPTORS: SQUEEZING;THROBBING;STABBING

## 2020-10-27 ASSESSMENT — PAIN DESCRIPTION - PROGRESSION: CLINICAL_PROGRESSION: GRADUALLY WORSENING

## 2020-10-27 ASSESSMENT — PAIN DESCRIPTION - LOCATION: LOCATION: CHEST

## 2020-10-27 ASSESSMENT — PAIN SCALES - GENERAL: PAINLEVEL_OUTOF10: 10

## 2020-10-27 NOTE — PROGRESS NOTES
Cale Crowe  10/27/2020  Wt Readings from Last 3 Encounters:   10/27/20 159 lb 2 oz (72.2 kg)   10/21/20 159 lb 14.4 oz (72.5 kg)   10/01/20 151 lb 3.2 oz (68.6 kg)     Body mass index is 23.84 kg/m². PT here for OTV. Patient states worsening chest pain. Patient states squeezing, stabbing, throbbing and it feels more intense. Denies any SOB. Dr. Starr Gonzalez to eval.    Treatment Area:lung    Patient was seen today for weekly visit. Comfort Alteration  Fatigue: Mild    Ventilation Alterations  Cough: Yes  Hemoptysis: No  Mucus Color: none  Dyspnea: No  O2 Sat: 98%    Nutritional Alteration  Anorexia: No  Nausea: No   Vomiting: No     Mucous Membrane Alteration  Voice Changes/ Stridor/Larynx: no  Pharynx & Esophagus: none    Elimination Alterations  Constipation: no  Diarrhea:  no    Skin Alteration   Sensation:none    Radiation Dermatitis:  Intact [x]     Erythema  []     Discoloration  []     Rash []     Dry desquamation  []     Moist desquamation []       Emotional  Coping: effective      Injury, potential bleeding or infection: none    Lab Results   Component Value Date    WBC 12.0 (H) 10/21/2020     10/21/2020         BP (!) 117/55   Pulse 102   Temp 97 °F (36.1 °C) (Oral)   Resp 16   Wt 159 lb 2 oz (72.2 kg)   SpO2 98%   BMI 23.84 kg/m²   Patient Currently in Pain: Yes  Pain Assessment: 0-10  Pain Level: 10         Assessment/Plan: Patient was seen today for weekly visit.       Mariia Mckeon

## 2020-10-28 ENCOUNTER — TELEPHONE (OUTPATIENT)
Dept: ONCOLOGY | Age: 70
End: 2020-10-28

## 2020-10-28 ENCOUNTER — TELEPHONE (OUTPATIENT)
Dept: CASE MANAGEMENT | Age: 70
End: 2020-10-28

## 2020-10-28 ENCOUNTER — HOSPITAL ENCOUNTER (OUTPATIENT)
Dept: RADIATION ONCOLOGY | Facility: MEDICAL CENTER | Age: 70
Discharge: HOME | End: 2020-10-28
Payer: MEDICARE

## 2020-10-28 ENCOUNTER — OFFICE VISIT (OUTPATIENT)
Dept: ONCOLOGY | Age: 70
End: 2020-10-28
Payer: MEDICARE

## 2020-10-28 ENCOUNTER — HOSPITAL ENCOUNTER (OUTPATIENT)
Facility: MEDICAL CENTER | Age: 70
End: 2020-10-28

## 2020-10-28 ENCOUNTER — HOSPITAL ENCOUNTER (OUTPATIENT)
Dept: INFUSION THERAPY | Facility: MEDICAL CENTER | Age: 70
Discharge: HOME | End: 2020-10-28
Payer: MEDICARE

## 2020-10-28 VITALS
WEIGHT: 158.3 LBS | HEART RATE: 111 BPM | DIASTOLIC BLOOD PRESSURE: 54 MMHG | TEMPERATURE: 97.9 F | BODY MASS INDEX: 23.72 KG/M2 | SYSTOLIC BLOOD PRESSURE: 118 MMHG

## 2020-10-28 DIAGNOSIS — C34.11 MALIGNANT NEOPLASM OF UPPER LOBE OF RIGHT LUNG (HCC): ICD-10-CM

## 2020-10-28 DIAGNOSIS — C34.91 NON-SMALL CELL CANCER OF RIGHT LUNG (HCC): Primary | ICD-10-CM

## 2020-10-28 LAB
ABSOLUTE EOS #: 0.12 K/UL (ref 0–0.4)
ABSOLUTE IMMATURE GRANULOCYTE: 0.12 K/UL (ref 0–0.3)
ABSOLUTE LYMPH #: 1.3 K/UL (ref 1–4.8)
ABSOLUTE MONO #: 0.59 K/UL (ref 0.2–0.8)
ALBUMIN SERPL-MCNC: 3.3 G/DL (ref 3.5–5.2)
ALBUMIN/GLOBULIN RATIO: ABNORMAL (ref 1–2.5)
ALP BLD-CCNC: 35 U/L (ref 35–104)
ALT SERPL-CCNC: 11 U/L (ref 5–33)
ANION GAP SERPL CALCULATED.3IONS-SCNC: 7 MMOL/L (ref 9–17)
AST SERPL-CCNC: 15 U/L
BASOPHILS # BLD: 1 %
BASOPHILS ABSOLUTE: 0.12 K/UL (ref 0–0.2)
BILIRUB SERPL-MCNC: 0.26 MG/DL (ref 0.3–1.2)
BUN BLDV-MCNC: 17 MG/DL (ref 8–23)
BUN/CREAT BLD: 23 (ref 9–20)
CALCIUM SERPL-MCNC: 9.2 MG/DL (ref 8.6–10.4)
CHLORIDE BLD-SCNC: 104 MMOL/L (ref 98–107)
CO2: 27 MMOL/L (ref 20–31)
CREAT SERPL-MCNC: 0.75 MG/DL (ref 0.5–0.9)
DIFFERENTIAL TYPE: ABNORMAL
EOSINOPHILS RELATIVE PERCENT: 1 % (ref 1–4)
GFR AFRICAN AMERICAN: >60 ML/MIN
GFR NON-AFRICAN AMERICAN: >60 ML/MIN
GFR SERPL CREATININE-BSD FRML MDRD: ABNORMAL ML/MIN/{1.73_M2}
GFR SERPL CREATININE-BSD FRML MDRD: ABNORMAL ML/MIN/{1.73_M2}
GLUCOSE BLD-MCNC: 158 MG/DL (ref 70–99)
HCT VFR BLD CALC: 27.4 % (ref 36.3–47.1)
HEMOGLOBIN: 8.2 G/DL (ref 11.9–15.1)
IMMATURE GRANULOCYTES: 1 %
LYMPHOCYTES # BLD: 11 % (ref 24–44)
MCH RBC QN AUTO: 23.8 PG (ref 25.2–33.5)
MCHC RBC AUTO-ENTMCNC: 29.9 G/DL (ref 28.4–34.8)
MCV RBC AUTO: 79.7 FL (ref 82.6–102.9)
MONOCYTES # BLD: 5 % (ref 1–7)
MORPHOLOGY: ABNORMAL
NRBC AUTOMATED: 0 PER 100 WBC
PDW BLD-RTO: 20.6 % (ref 11.8–14.4)
PLATELET # BLD: 352 K/UL (ref 138–453)
PLATELET ESTIMATE: ABNORMAL
PMV BLD AUTO: 9 FL (ref 8.1–13.5)
POTASSIUM SERPL-SCNC: 4.5 MMOL/L (ref 3.7–5.3)
RBC # BLD: 3.44 M/UL (ref 3.95–5.11)
RBC # BLD: ABNORMAL 10*6/UL
SEG NEUTROPHILS: 81 % (ref 36–66)
SEGMENTED NEUTROPHILS ABSOLUTE COUNT: 9.55 K/UL (ref 1.8–7.7)
SODIUM BLD-SCNC: 138 MMOL/L (ref 135–144)
TOTAL PROTEIN: 6.9 G/DL (ref 6.4–8.3)
WBC # BLD: 11.8 K/UL (ref 3.5–11.3)
WBC # BLD: ABNORMAL 10*3/UL

## 2020-10-28 PROCEDURE — 6360000002 HC RX W HCPCS: Performed by: INTERNAL MEDICINE

## 2020-10-28 PROCEDURE — 77014 PR CT GUIDANCE PLACEMENT RAD THERAPY FIELDS: CPT | Performed by: RADIOLOGY

## 2020-10-28 PROCEDURE — 99211 OFF/OP EST MAY X REQ PHY/QHP: CPT | Performed by: INTERNAL MEDICINE

## 2020-10-28 PROCEDURE — 80053 COMPREHEN METABOLIC PANEL: CPT

## 2020-10-28 PROCEDURE — 96417 CHEMO IV INFUS EACH ADDL SEQ: CPT

## 2020-10-28 PROCEDURE — 96413 CHEMO IV INFUSION 1 HR: CPT

## 2020-10-28 PROCEDURE — 2500000003 HC RX 250 WO HCPCS: Performed by: INTERNAL MEDICINE

## 2020-10-28 PROCEDURE — 99215 OFFICE O/P EST HI 40 MIN: CPT | Performed by: INTERNAL MEDICINE

## 2020-10-28 PROCEDURE — 85025 COMPLETE CBC W/AUTO DIFF WBC: CPT

## 2020-10-28 PROCEDURE — 2580000003 HC RX 258: Performed by: INTERNAL MEDICINE

## 2020-10-28 PROCEDURE — 96375 TX/PRO/DX INJ NEW DRUG ADDON: CPT

## 2020-10-28 PROCEDURE — 77386 HC NTSTY MODUL RAD TX DLVR CPLX: CPT

## 2020-10-28 PROCEDURE — 36591 DRAW BLOOD OFF VENOUS DEVICE: CPT

## 2020-10-28 RX ORDER — DIPHENHYDRAMINE HYDROCHLORIDE 50 MG/ML
50 INJECTION INTRAMUSCULAR; INTRAVENOUS ONCE
Status: CANCELLED | OUTPATIENT
Start: 2020-10-28

## 2020-10-28 RX ORDER — METHYLPREDNISOLONE SODIUM SUCCINATE 125 MG/2ML
125 INJECTION, POWDER, LYOPHILIZED, FOR SOLUTION INTRAMUSCULAR; INTRAVENOUS ONCE
Status: CANCELLED | OUTPATIENT
Start: 2020-10-28

## 2020-10-28 RX ORDER — MEPERIDINE HYDROCHLORIDE 50 MG/ML
12.5 INJECTION INTRAMUSCULAR; INTRAVENOUS; SUBCUTANEOUS ONCE
Status: CANCELLED | OUTPATIENT
Start: 2020-11-18

## 2020-10-28 RX ORDER — PALONOSETRON 0.05 MG/ML
0.25 INJECTION, SOLUTION INTRAVENOUS ONCE
Status: COMPLETED | OUTPATIENT
Start: 2020-10-28 | End: 2020-10-28

## 2020-10-28 RX ORDER — SODIUM CHLORIDE 9 MG/ML
20 INJECTION, SOLUTION INTRAVENOUS ONCE
Status: CANCELLED | OUTPATIENT
Start: 2020-10-28

## 2020-10-28 RX ORDER — HEPARIN SODIUM (PORCINE) LOCK FLUSH IV SOLN 100 UNIT/ML 100 UNIT/ML
500 SOLUTION INTRAVENOUS PRN
Status: CANCELLED | OUTPATIENT
Start: 2020-11-18

## 2020-10-28 RX ORDER — SODIUM CHLORIDE 0.9 % (FLUSH) 0.9 %
5 SYRINGE (ML) INJECTION PRN
Status: CANCELLED | OUTPATIENT
Start: 2020-10-28

## 2020-10-28 RX ORDER — SODIUM CHLORIDE 9 MG/ML
INJECTION, SOLUTION INTRAVENOUS CONTINUOUS
Status: CANCELLED | OUTPATIENT
Start: 2020-11-18

## 2020-10-28 RX ORDER — SODIUM CHLORIDE 9 MG/ML
20 INJECTION, SOLUTION INTRAVENOUS ONCE
Status: CANCELLED | OUTPATIENT
Start: 2020-11-18

## 2020-10-28 RX ORDER — SODIUM CHLORIDE 0.9 % (FLUSH) 0.9 %
10 SYRINGE (ML) INJECTION PRN
Status: CANCELLED | OUTPATIENT
Start: 2020-10-28

## 2020-10-28 RX ORDER — HEPARIN SODIUM (PORCINE) LOCK FLUSH IV SOLN 100 UNIT/ML 100 UNIT/ML
500 SOLUTION INTRAVENOUS PRN
Status: DISCONTINUED | OUTPATIENT
Start: 2020-10-28 | End: 2020-10-29 | Stop reason: HOSPADM

## 2020-10-28 RX ORDER — MEPERIDINE HYDROCHLORIDE 50 MG/ML
12.5 INJECTION INTRAMUSCULAR; INTRAVENOUS; SUBCUTANEOUS ONCE
Status: CANCELLED | OUTPATIENT
Start: 2020-10-28

## 2020-10-28 RX ORDER — SODIUM CHLORIDE 9 MG/ML
INJECTION, SOLUTION INTRAVENOUS CONTINUOUS
Status: CANCELLED | OUTPATIENT
Start: 2020-10-28

## 2020-10-28 RX ORDER — PALONOSETRON 0.05 MG/ML
0.25 INJECTION, SOLUTION INTRAVENOUS ONCE
Status: CANCELLED | OUTPATIENT
Start: 2020-11-18

## 2020-10-28 RX ORDER — PALONOSETRON 0.05 MG/ML
0.25 INJECTION, SOLUTION INTRAVENOUS ONCE
Status: CANCELLED | OUTPATIENT
Start: 2020-10-28

## 2020-10-28 RX ORDER — DIPHENHYDRAMINE HYDROCHLORIDE 50 MG/ML
50 INJECTION INTRAMUSCULAR; INTRAVENOUS ONCE
Status: CANCELLED | OUTPATIENT
Start: 2020-11-18

## 2020-10-28 RX ORDER — DIPHENHYDRAMINE HYDROCHLORIDE 50 MG/ML
50 INJECTION INTRAMUSCULAR; INTRAVENOUS ONCE
Status: COMPLETED | OUTPATIENT
Start: 2020-10-28 | End: 2020-10-28

## 2020-10-28 RX ORDER — HEPARIN SODIUM (PORCINE) LOCK FLUSH IV SOLN 100 UNIT/ML 100 UNIT/ML
500 SOLUTION INTRAVENOUS PRN
Status: CANCELLED | OUTPATIENT
Start: 2020-10-28

## 2020-10-28 RX ORDER — SODIUM CHLORIDE 9 MG/ML
20 INJECTION, SOLUTION INTRAVENOUS ONCE
Status: COMPLETED | OUTPATIENT
Start: 2020-10-28 | End: 2020-10-28

## 2020-10-28 RX ORDER — DEXAMETHASONE SODIUM PHOSPHATE 100 MG/10ML
10 INJECTION INTRAMUSCULAR; INTRAVENOUS ONCE
Status: COMPLETED | OUTPATIENT
Start: 2020-10-28 | End: 2020-10-28

## 2020-10-28 RX ORDER — EPINEPHRINE 1 MG/ML
0.3 INJECTION, SOLUTION, CONCENTRATE INTRAVENOUS PRN
Status: CANCELLED | OUTPATIENT
Start: 2020-11-18

## 2020-10-28 RX ORDER — EPINEPHRINE 1 MG/ML
0.3 INJECTION, SOLUTION, CONCENTRATE INTRAVENOUS PRN
Status: CANCELLED | OUTPATIENT
Start: 2020-10-28

## 2020-10-28 RX ORDER — METHYLPREDNISOLONE SODIUM SUCCINATE 125 MG/2ML
125 INJECTION, POWDER, LYOPHILIZED, FOR SOLUTION INTRAMUSCULAR; INTRAVENOUS ONCE
Status: CANCELLED | OUTPATIENT
Start: 2020-11-18

## 2020-10-28 RX ORDER — SODIUM CHLORIDE 0.9 % (FLUSH) 0.9 %
10 SYRINGE (ML) INJECTION PRN
Status: DISCONTINUED | OUTPATIENT
Start: 2020-10-28 | End: 2020-10-29 | Stop reason: HOSPADM

## 2020-10-28 RX ORDER — SODIUM CHLORIDE 0.9 % (FLUSH) 0.9 %
5 SYRINGE (ML) INJECTION PRN
Status: CANCELLED | OUTPATIENT
Start: 2020-11-18

## 2020-10-28 RX ORDER — SODIUM CHLORIDE 0.9 % (FLUSH) 0.9 %
10 SYRINGE (ML) INJECTION PRN
Status: CANCELLED | OUTPATIENT
Start: 2020-11-18

## 2020-10-28 RX ADMIN — HEPARIN 500 UNITS: 100 SYRINGE at 15:24

## 2020-10-28 RX ADMIN — Medication 10 MG: at 12:28

## 2020-10-28 RX ADMIN — PALONOSETRON 0.25 MG: 0.05 INJECTION, SOLUTION INTRAVENOUS at 12:28

## 2020-10-28 RX ADMIN — SODIUM CHLORIDE 20 ML/HR: 9 INJECTION, SOLUTION INTRAVENOUS at 11:35

## 2020-10-28 RX ADMIN — Medication 10 ML: at 15:24

## 2020-10-28 RX ADMIN — DIPHENHYDRAMINE HYDROCHLORIDE 50 MG: 50 INJECTION, SOLUTION INTRAMUSCULAR; INTRAVENOUS at 12:28

## 2020-10-28 RX ADMIN — FAMOTIDINE 20 MG: 10 INJECTION INTRAVENOUS at 12:28

## 2020-10-28 RX ADMIN — CARBOPLATIN 200 MG: 10 INJECTION INTRAVENOUS at 14:23

## 2020-10-28 RX ADMIN — PACLITAXEL 78 MG: 6 INJECTION, SOLUTION, CONCENTRATE INTRAVENOUS at 13:00

## 2020-10-28 NOTE — PROGRESS NOTES
Today's Date: 10/28/2020  Patient Name: Dillard Alpers  Patient's age: 72 y.o., 1950    Diagnosis:   RUL squamous cell carcinoma,   Cancer Staging  Malignant neoplasm of upper lobe of right lung [facility])  Staging form: Lung, AJCC 8th Edition  - Clinical stage from 9/18/2020: Stage IIIB (cT4, cN2, cM0) - Signed by Violeta Fitzgerald MD on 9/18/2020    Current therapy:  Started on concurrent chemoradiation with weekly carbotaxol on 10/21/20    CHIEF COMPLAINT:    Chief Complaint   Patient presents with    Follow-up     INTERVAL HISTORY:    Kristie Feliciano is returning for follow-up visit and to discuss  further recommendations. Started on chemoradiation last week. She has tolerated chemotherapy well. She denies any nausea vomiting. She has some mild mouth sores. She denies any trouble swallowing. She denies any chest pain or shortness of breath. During this visit patient's allergy, social, medical, surgical history and medications were reviewed and updated. HISTORY OF PRESENT ILLNESS:    Kristie Feliciano is a 61-year-old female with a past medical history of COPD, history of tobacco abuse, depression, CAD, arthritis was admitted with multiple falls at home. She complains of back pain and also chronic cough. On admission she had a CT scan of the chest which showed 7.2 cm spiculated mass in the right upper lobe with mediastinal lymphadenopathy suspicious for malignancy. 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 Oncology consulted for further evaluation. Patient has COPD and uses Home oxygen at night and sues walker at home. She smokes more than a PPD. 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     Past Medical History:   has a past medical history of AAA (abdominal aortic aneurysm) ([facility] [address] Acid reflux, Anxiety and depression, Aortic stenosis, Arthritis, Blister of ankle, right, CAD (coronary artery glimepiride (AMARYL) 1 MG tablet Take 1 mg by mouth Daily with supper In addition to 2 tablets (=2mg) every morning       metoprolol tartrate (LOPRESSOR) 25 MG tablet Take 25 mg by mouth 2 times daily      venlafaxine (EFFEXOR XR) 150 MG extended release capsule Take 150 mg by mouth 2 times daily      lansoprazole (PREVACID) 30 MG delayed release capsule Take 30 mg by mouth daily      metFORMIN (GLUCOPHAGE) 500 MG tablet Take 500 mg by mouth 2 times daily      aspirin EC 81 MG EC tablet Take 81 mg by mouth daily      mometasone-formoterol (DULERA) 200-5 MCG/ACT inhaler Inhale 2 puffs into the lungs every 12 hours as needed (wheezing/SOB)       glimepiride (AMARYL) 1 MG tablet Take 2 mg by mouth every morning In addition to 1mg nightly       No current facility-administered medications for this visit. Allergies:  Codeine and Dye [iodides]    Social History:   reports that she quit smoking about 4 years ago. She has never used smokeless tobacco. She reports that she does not drink alcohol or use drugs. Family History: family history includes Cancer in her father and mother. REVIEW OF SYSTEMS:    Constitutional: No fever or chills. No night sweats, no weight loss   Eyes: No eye discharge, double vision, or eye pain   HEENT: negative for sore mouth, sore throat, hoarseness and voice change   Respiratory: negative for cough , sputum, dyspnea, wheezing, hemoptysis, chest pain   Cardiovascular: negative for chest pain, dyspnea, palpitations, orthopnea, PND   Gastrointestinal: negative for nausea, vomiting, diarrhea, constipation, abdominal pain, Dysphagia, hematemesis and hematochezia   Genitourinary: negative for frequency, dysuria, nocturia, urinary incontinence, and hematuria   Integument: negative for rash, skin lesions, bruises.    Hematologic/Lymphatic: negative for easy bruising, bleeding, lymphadenopathy, or petechiae   Endocrine: negative for heat or cold intolerance,weight changes, change in bowel habits and hair loss   Musculoskeletal: negative for myalgias, arthralgias, pain, joint swelling,and bone pain   Neurological: negative for headaches, dizziness, seizures, weakness, numbness    PHYSICAL EXAM:      BP (!) 118/54   Pulse 111   Temp 97.9 °F (36.6 °C) (Temporal)   Wt 158 lb 4.8 oz (71.8 kg)   BMI 23.72 kg/m²    Temp (24hrs), Av.7 °F (36.5 °C), Min:97.3 °F (36.3 °C), Max:98.1 °F (36.7 °C)  General appearance - well appearing, no in pain or distress   Mental status - alert and cooperative   Eyes - pupils equal and reactive, extraocular eye movements intact   Ears - bilateral TM's and external ear canals normal   Mouth - mucous membranes moist, pharynx normal without lesions   Neck - supple, no significant adenopathy   Lymphatics - no palpable lymphadenopathy, no hepatosplenomegaly   Chest - clear to auscultation, no wheezes, rales or rhonchi, symmetric air entry   Heart - normal rate, regular rhythm, normal S1, S2, no murmurs  Abdomen - soft, nontender, nondistended, no masses or organomegaly   Neurological - alert, oriented, normal speech, no focal findings or movement disorder noted   Musculoskeletal - no joint tenderness, deformity or swelling   Extremities - peripheral pulses normal, no pedal edema, no clubbing or cyanosis   Skin - normal coloration and turgor, no rashes, no suspicious skin lesions noted ,    DATA:    Labs:   CBC:   No results for input(s): WBC, HGB, HCT, PLT in the last 72 hours. BMP:   No results for input(s): NA, K, CO2, BUN, CREATININE, LABGLOM, GLUCOSE in the last 72 hours. PT/INR: No results for input(s): PROTIME, INR in the last 72 hours. Surgical Pathology Report   Surgical Pathology   Collected:  20 0803    Lab status:  Final    Resulting lab:  [facility]    Value:  -- Diagnosis --     BRONCHIAL WASHINGS RIGHT UPPER LOBE:          POSITIVE FOR MALIGNANCY.        SQUAMOUS CELL CARCINOMA.                COMMENT: VOLUME OF TUMOR IN THE CELL BLOCK IS LOW AND ADDITIONAL   MATERIAL WOULD BE REQUIRED IF MOLECULAR TESTING IS NECESSARY. IMAGING DATA:  CT chest 8/27/2020:   FINDINGS:    Mediastinum: Three vessel coronary artery calcification.  Newly enlarged    mediastinal lymph nodes including example station 7 node measuring 3.1 x 2.1    cm on series 2, image 45.         Lungs/pleura: Mild upper lobe predominant centrilobular emphysema. Crystal Salome Spiculated mass of the right upper lobe measuring 7.2 x 6.5 cm with    broad-base of contact with the mediastinal pleura, right major fissure,    encircling and obliterating the right upper lobe bronchus.  Spicules are seen    to extend to the anterior costal pleura.  There is adjacent bronchial wall    thickening and interlobular septal thickening.  Stable 5 mm solid nodule of    the left upper lobe since 2016, benign.  No pleural effusion or pneumothorax.         Upper Abdomen: Adrenals are unremarkable.         Soft Tissues/Bones: Old right rib fractures.              Impression    Approximate 7.2 cm spiculated mass of the right upper lobe likely with    invasion of the mediastinum, adjacent lymphangitic spread and suspected    metastatic mediastinal lymphadenopathy. Scan 9/11/2020: Impression    1. The patient's known right upper lobe lung mass is FDG avid consistent with    the patient's primary malignancy. 2. FDG avid mediastinal lymph nodes consistent with metastatic disease. 3. No abnormal FDG activity is identified involving the abdomen or pelvis. IMPRESSION:   1. 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 PET scan negative for distant metastasis: Clinical stage T4 N2 M0, stage IIIb  2. COPD  3. Falls  4. CAD  5. Tobacco abuse  6.  Actinomyces bacteremia    RECOMMENDATIONS:  1. I

## 2020-10-28 NOTE — TELEPHONE ENCOUNTER
Name: Sandra Salguero  : 1950  MRN: [id number]    Oncology Navigation Follow-Up Note  Navigator met with pt. Face to face and offering assistance. Pt. Stated, \"I just dont know how I'm going to pay all these bills\" Pt. Spoke with Braydon(appreciate notes) and writer will have Braydon touch base as needed , message left for Braydon. Navigator asking pt. If she has bill statements and pt. Stated, \"I just throw them away\" Pt. Reminded to bring list of medication with her so nurse can make sure medications are updated. Son Zepeda usually with pt. , but not in lobby when writer checking , plan to follow.    Electronically signed by Héctor Concepcion RN on 10/28/2020 at 12:01 PM

## 2020-10-28 NOTE — FLOWSHEET NOTE
Valentinr received referral from Kevin Rose, , to provide pastoral support to Pt regarding her spiritual concerns. Writer encountered Patient while rounding the infusion clinic. Writer had met Pt briefly during her previous hospital admission. Patient appeared fatigued. Pt requested her lunch tray, which writer gave her. Writer told Pt she would call her or visit her when she returns the next time for treatment. 10/28/20 8731   Encounter Summary   Services provided to: Patient   Referral/Consult From: [address] Family members; Children   Continue Visiting   (10/28/20)   Complexity of Encounter Low   Length of Encounter 15 minutes   Routine   Type Follow up   Assessment Calm; Approachable   Intervention Sustaining presence/ Ministry of presence   Outcome Expressed gratitude     Electronically signed by Yandy Perry, Oncology Outpatient [facility], [address] Radiation Oncology  10/28/2020  5:40 PM

## 2020-10-28 NOTE — PLAN OF CARE
Problem: Safety:  Goal: Free from accidental physical injury  Description: Free from accidental physical injury  10/28/2020 1209 by Suzanne Olsen RN  Outcome: Completed  10/28/2020 1209 by Suzanne Olsen RN  Outcome: Met This Shift

## 2020-10-29 ENCOUNTER — TELEPHONE (OUTPATIENT)
Dept: CASE MANAGEMENT | Age: 70
End: 2020-10-29

## 2020-10-29 ENCOUNTER — HOSPITAL ENCOUNTER (OUTPATIENT)
Dept: RADIATION ONCOLOGY | Facility: MEDICAL CENTER | Age: 70
Discharge: HOME | End: 2020-10-29
Payer: MEDICARE

## 2020-10-29 ENCOUNTER — SOCIAL WORK (OUTPATIENT)
Dept: ONCOLOGY | Age: 70
End: 2020-10-29

## 2020-10-29 PROCEDURE — 77014 PR CT GUIDANCE PLACEMENT RAD THERAPY FIELDS: CPT | Performed by: RADIOLOGY

## 2020-10-29 PROCEDURE — 77386 HC NTSTY MODUL RAD TX DLVR CPLX: CPT

## 2020-10-29 NOTE — PROGRESS NOTES
RAJINDER and RN Navigator, Art, discussed patient's concerns regarding medical bills as patient reported that she \"throws them away\". Patient has [employer] and is over income for Medicaid. SW had previously spoken with patient and mailed applications for [email or web address]. Jose M Colon spoke with son, Enedina Burch, and he states an  is involved with patient's finances. SW following as needed. Erwin Alba.  Gisle Donohue

## 2020-10-29 NOTE — TELEPHONE ENCOUNTER
Name: Jermaine Arevalo  : 1950  MRN: [id number]    Oncology Navigation Follow-Up Note  navigator spoke with Duane, pt. Son and introduced self and informed son Rosemarie Fair  had met yesterday with pt., but did not get to speak with son. Navigator sharing with son pt. Had been \"concerned with bills\". Writer reminding son paperwork sent via mail along with business card from [employer] for PAP. Son stated, Antonia Otero mother has an  who takes care of all her bills\" Writer only wanting to inform son to look for form and then could communicate to pts. . Writer encouraging son to communicate if any medication changes if possible(medications not already in Epic).   Electronically signed by Binh Flores RN on 10/29/2020 at 11:30 AM

## 2020-10-30 ENCOUNTER — HOSPITAL ENCOUNTER (OUTPATIENT)
Dept: RADIATION ONCOLOGY | Facility: MEDICAL CENTER | Age: 70
Discharge: HOME | End: 2020-10-30
Payer: MEDICARE

## 2020-10-30 ENCOUNTER — TELEPHONE (OUTPATIENT)
Dept: SPIRITUAL SERVICES | Age: 70
End: 2020-10-30

## 2020-10-30 PROCEDURE — 77386 HC NTSTY MODUL RAD TX DLVR CPLX: CPT

## 2020-10-30 PROCEDURE — 77014 PR CT GUIDANCE PLACEMENT RAD THERAPY FIELDS: CPT | Performed by: RADIOLOGY

## 2020-10-30 PROCEDURE — 77336 RADIATION PHYSICS CONSULT: CPT

## 2020-10-30 NOTE — PROGRESS NOTES
Patient: Jermaine Arevalo     : 1950      Date of Service: 10/27/2020    [facility] Oncology  On Treatment Visit (OTV) Note    Diagnosis: Cancer Staging  Malignant neoplasm of upper lobe of right lung [facility])  Staging form: Lung, AJCC 8th Edition  - Clinical stage from 2020: Stage IIIB (cT4, cN2, cM0) - Signed by Maicol Tejada MD on 2020        Dose Accrued:    S:    O: BP (!) 117/55   Pulse 102   Temp 97 °F (36.1 °C) (Oral)   Resp 16   Wt 159 lb 2 oz (72.2 kg)   SpO2 98%   BMI 23.84 kg/m² . Well-appearing, in no apparent distress, alert and fully oriented, answers questions appropriately. No signs of acute radiation-induced toxicity on physical exam.    IGRT: Set-up images acquired to ensure daily treatment accuracy and precision were reviewed by the physician. A&P: Continue radiotherapy as planned. Moisturize treated area as instructed. We reviewed reasonably anticipated side effects in the upcoming weeks, as well as expected trajectory of recovery. Patient verbalized a good understanding to everything. Electronically signed by Rach Martin MD on 10/30/2020 at 2:38 PM Patient: Jermaine Arevalo     : 1950      Date of Service: 10/27/2020    [facility]  On Treatment Visit (OTV) Note    Diagnosis: Cancer Staging  Malignant neoplasm of upper lobe of right lung [facility])  Staging form: Lung, AJCC 8th Edition  - Clinical stage from 2020: Stage IIIB (cT4, cN2, cM0) - Signed by Maicol Tejada MD on 2020        Dose Accrued: Completed 1600 out of 6000, 8 out of 30 treatments    S: No complaint. O: BP (!) 117/55   Pulse 102   Temp 97 °F (36.1 °C) (Oral)   Resp 16   Wt 159 lb 2 oz (72.2 kg)   SpO2 98%   BMI 23.84 kg/m² . Well-appearing, in no apparent distress, alert and fully oriented, answers questions appropriately.   No signs of acute radiation-induced toxicity on physical exam.    IGRT: Set-up images acquired to ensure daily treatment accuracy and precision were reviewed by the physician. A&P: Continue radiotherapy as planned. Moisturize treated area as instructed. We reviewed reasonably anticipated side effects in the upcoming weeks, as well as expected trajectory of recovery. Patient verbalized a good understanding to everything.     Electronically signed by Juwan Duque MD on 10/30/2020 at 2:38 PM

## 2020-10-30 NOTE — TELEPHONE ENCOUNTER
Writer left a message on Pt's phone and included her business phone number. Writer will attempt to contact Patient at another time.     Electronically signed by Pooja Kumar, Oncology Outpatient [place] [id number], [address] [facility] Radiation Oncology  10/30/2020  3:09 PM

## 2020-11-02 ENCOUNTER — HOSPITAL ENCOUNTER (OUTPATIENT)
Dept: RADIATION ONCOLOGY | Facility: MEDICAL CENTER | Age: 70
Discharge: HOME | End: 2020-11-02
Payer: MEDICARE

## 2020-11-02 PROCEDURE — 77014 PR CT GUIDANCE PLACEMENT RAD THERAPY FIELDS: CPT | Performed by: RADIOLOGY

## 2020-11-02 PROCEDURE — 77386 HC NTSTY MODUL RAD TX DLVR CPLX: CPT

## 2020-11-02 NOTE — PROGRESS NOTES
Nutrition Assessment     Type and Reason for Visit:  PGSGA = 4; nausea and decrease in daily activity    Nutrition Recommendations/Plan:   1. Continue to monitor side effects/symptoms    Nutrition Assessment:   Patient with diagnosis of lung cancer stage IIIB. No reports of decreased appetite, n/v/d/c or chewing swallowing difficulties. Patient's weight has been stable x 1 month. Deemed moderate nutrition risk due to mild fatigue and chest pain that may cause decreased appetite in the future. Will continue to monitor nutrition status. Malnutrition Assessment:  Malnutrition Status:  No malnutrition    Estimated Daily Nutrient Needs:  Energy (kcal): 0706-8902 kcal ([place] [address]. Jeor x AF of 1.1-1.3)  Protein (g): 79-94 g  ; Weight Used for Protein Requirements:   72 kg      Fluid (ml/day): 1mL/kcal    Current Nutrition Therapies:  General diet; High calorie/high protein    Anthropometric Measures:  · Height:   5' 8.5\" (174 cm)  · Current Body Wt:   159 lb 2 oz (72.2 kg)  · BMI:   23.84 kg/m²    Nutrition Diagnosis:   No nutrition diagnosis at this time. Nutrition Risk:  Moderate    Nutrition Interventions:   No planned intervention at this time. Goals:   Weight maintenance; Keep up the patient's strength and energy;  Control side effects    Nutrition Monitoring and Evaluation:   Food/Nutrient Intake Outcomes:   Diet tolerance  Physical Signs/Symptoms Outcomes:    N/V/D/C, weight      Rodney Crespo RDN, LDN  RD Office Phone: [phone]

## 2020-11-03 ENCOUNTER — APPOINTMENT (OUTPATIENT)
Dept: GENERAL RADIOLOGY | Age: 70
DRG: 690 | End: 2020-11-03
Payer: MEDICARE

## 2020-11-03 ENCOUNTER — HOSPITAL ENCOUNTER (OUTPATIENT)
Dept: RADIATION ONCOLOGY | Facility: MEDICAL CENTER | Age: 70
Discharge: HOME | End: 2020-11-03
Payer: MEDICARE

## 2020-11-03 ENCOUNTER — HOSPITAL ENCOUNTER (INPATIENT)
Age: 70
LOS: 9 days | Discharge: HOME HEALTH | DRG: 690 | End: 2020-11-12
Attending: EMERGENCY MEDICINE | Admitting: HOSPITALIST
Payer: MEDICARE

## 2020-11-03 PROBLEM — J18.9 PNEUMONIA: Status: RESOLVED | Noted: 2020-11-03 | Resolved: 2020-11-03

## 2020-11-03 PROBLEM — N39.0 URINARY TRACT INFECTIOUS DISEASE: Status: ACTIVE | Noted: 2020-11-03

## 2020-11-03 LAB
-: ABNORMAL
ABSOLUTE EOS #: 0 K/UL (ref 0–0.4)
ABSOLUTE IMMATURE GRANULOCYTE: 0.09 K/UL (ref 0–0.3)
ABSOLUTE LYMPH #: 0.62 K/UL (ref 1–4.8)
ABSOLUTE MONO #: 0.45 K/UL (ref 0.2–0.8)
AMORPHOUS: ABNORMAL
ANION GAP SERPL CALCULATED.3IONS-SCNC: 9 MMOL/L (ref 9–17)
BACTERIA: ABNORMAL
BASOPHILS # BLD: 0 %
BASOPHILS ABSOLUTE: 0 K/UL (ref 0–0.2)
BILIRUBIN URINE: NEGATIVE
BUN BLDV-MCNC: 28 MG/DL (ref 8–23)
BUN/CREAT BLD: 35 (ref 9–20)
CALCIUM SERPL-MCNC: 9.2 MG/DL (ref 8.6–10.4)
CASTS UA: ABNORMAL /LPF
CHLORIDE BLD-SCNC: 98 MMOL/L (ref 98–107)
CO2: 27 MMOL/L (ref 20–31)
COLOR: YELLOW
COMMENT UA: ABNORMAL
CREAT SERPL-MCNC: 0.8 MG/DL (ref 0.5–0.9)
CRYSTALS, UA: ABNORMAL /HPF
DIFFERENTIAL TYPE: ABNORMAL
EOSINOPHILS RELATIVE PERCENT: 0 % (ref 1–4)
EPITHELIAL CELLS UA: ABNORMAL /HPF (ref 0–5)
GFR AFRICAN AMERICAN: >60 ML/MIN
GFR NON-AFRICAN AMERICAN: >60 ML/MIN
GFR SERPL CREATININE-BSD FRML MDRD: ABNORMAL ML/MIN/{1.73_M2}
GFR SERPL CREATININE-BSD FRML MDRD: ABNORMAL ML/MIN/{1.73_M2}
GLUCOSE BLD-MCNC: 152 MG/DL (ref 65–105)
GLUCOSE BLD-MCNC: 191 MG/DL (ref 70–99)
GLUCOSE BLD-MCNC: 231 MG/DL (ref 65–105)
GLUCOSE URINE: NEGATIVE
HCT VFR BLD CALC: 27.7 % (ref 36.3–47.1)
HEMOGLOBIN: 8.4 G/DL (ref 11.9–15.1)
IMMATURE GRANULOCYTES: 1 %
KETONES, URINE: NEGATIVE
LEUKOCYTE ESTERASE, URINE: ABNORMAL
LYMPHOCYTES # BLD: 7 % (ref 24–44)
MCH RBC QN AUTO: 24.2 PG (ref 25.2–33.5)
MCHC RBC AUTO-ENTMCNC: 30.3 G/DL (ref 28.4–34.8)
MCV RBC AUTO: 79.8 FL (ref 82.6–102.9)
MONOCYTES # BLD: 5 % (ref 1–7)
MUCUS: ABNORMAL
NITRITE, URINE: NEGATIVE
NRBC AUTOMATED: 0 PER 100 WBC
OTHER OBSERVATIONS UA: ABNORMAL
PDW BLD-RTO: 19.8 % (ref 11.8–14.4)
PH UA: 5.5 (ref 5–8)
PLATELET # BLD: 380 K/UL (ref 138–453)
PLATELET ESTIMATE: ABNORMAL
PMV BLD AUTO: 9.2 FL (ref 8.1–13.5)
POTASSIUM SERPL-SCNC: 4 MMOL/L (ref 3.7–5.3)
PROCALCITONIN: 0.26 NG/ML
PROTEIN UA: ABNORMAL
RBC # BLD: 3.47 M/UL (ref 3.95–5.11)
RBC # BLD: ABNORMAL 10*6/UL
RBC UA: ABNORMAL /HPF (ref 0–2)
RENAL EPITHELIAL, UA: ABNORMAL /HPF
SEG NEUTROPHILS: 87 % (ref 36–66)
SEGMENTED NEUTROPHILS ABSOLUTE COUNT: 7.74 K/UL (ref 1.8–7.7)
SODIUM BLD-SCNC: 134 MMOL/L (ref 135–144)
SPECIFIC GRAVITY UA: 1.02 (ref 1–1.03)
TRICHOMONAS: ABNORMAL
TURBIDITY: ABNORMAL
URINE HGB: ABNORMAL
UROBILINOGEN, URINE: NORMAL
WBC # BLD: 8.9 K/UL (ref 3.5–11.3)
WBC # BLD: ABNORMAL 10*3/UL
WBC UA: ABNORMAL /HPF (ref 0–5)
YEAST: ABNORMAL

## 2020-11-03 PROCEDURE — 72040 X-RAY EXAM NECK SPINE 2-3 VW: CPT

## 2020-11-03 PROCEDURE — 2580000003 HC RX 258: Performed by: EMERGENCY MEDICINE

## 2020-11-03 PROCEDURE — 81001 URINALYSIS AUTO W/SCOPE: CPT

## 2020-11-03 PROCEDURE — 93005 ELECTROCARDIOGRAM TRACING: CPT | Performed by: NURSE PRACTITIONER

## 2020-11-03 PROCEDURE — 2580000003 HC RX 258: Performed by: HOSPITALIST

## 2020-11-03 PROCEDURE — 71101 X-RAY EXAM UNILAT RIBS/CHEST: CPT

## 2020-11-03 PROCEDURE — 80048 BASIC METABOLIC PNL TOTAL CA: CPT

## 2020-11-03 PROCEDURE — 6370000000 HC RX 637 (ALT 250 FOR IP): Performed by: HOSPITALIST

## 2020-11-03 PROCEDURE — 84145 PROCALCITONIN (PCT): CPT

## 2020-11-03 PROCEDURE — 1200000000 HC SEMI PRIVATE

## 2020-11-03 PROCEDURE — 2580000003 HC RX 258: Performed by: NURSE PRACTITIONER

## 2020-11-03 PROCEDURE — 87086 URINE CULTURE/COLONY COUNT: CPT

## 2020-11-03 PROCEDURE — 96365 THER/PROPH/DIAG IV INF INIT: CPT

## 2020-11-03 PROCEDURE — 81003 URINALYSIS AUTO W/O SCOPE: CPT

## 2020-11-03 PROCEDURE — 96361 HYDRATE IV INFUSION ADD-ON: CPT

## 2020-11-03 PROCEDURE — 85025 COMPLETE CBC W/AUTO DIFF WBC: CPT

## 2020-11-03 PROCEDURE — 6360000002 HC RX W HCPCS: Performed by: EMERGENCY MEDICINE

## 2020-11-03 PROCEDURE — 87186 SC STD MICRODIL/AGAR DIL: CPT

## 2020-11-03 PROCEDURE — 99284 EMERGENCY DEPT VISIT MOD MDM: CPT

## 2020-11-03 PROCEDURE — 82947 ASSAY GLUCOSE BLOOD QUANT: CPT

## 2020-11-03 PROCEDURE — 87040 BLOOD CULTURE FOR BACTERIA: CPT

## 2020-11-03 PROCEDURE — 87088 URINE BACTERIA CULTURE: CPT

## 2020-11-03 RX ORDER — ARIPIPRAZOLE 5 MG/1
5 TABLET ORAL DAILY
Status: ON HOLD | COMMUNITY
End: 2020-11-05 | Stop reason: SDUPTHER

## 2020-11-03 RX ORDER — SODIUM CHLORIDE 0.9 % (FLUSH) 0.9 %
10 SYRINGE (ML) INJECTION EVERY 12 HOURS SCHEDULED
Status: DISCONTINUED | OUTPATIENT
Start: 2020-11-03 | End: 2020-11-03 | Stop reason: SDUPTHER

## 2020-11-03 RX ORDER — CLONAZEPAM 0.5 MG/1
1 TABLET ORAL 3 TIMES DAILY PRN
Status: DISCONTINUED | OUTPATIENT
Start: 2020-11-03 | End: 2020-11-12 | Stop reason: HOSPADM

## 2020-11-03 RX ORDER — ASPIRIN 81 MG/1
81 TABLET ORAL DAILY
Status: DISCONTINUED | OUTPATIENT
Start: 2020-11-03 | End: 2020-11-12 | Stop reason: HOSPADM

## 2020-11-03 RX ORDER — SODIUM CHLORIDE 0.9 % (FLUSH) 0.9 %
10 SYRINGE (ML) INJECTION EVERY 12 HOURS SCHEDULED
Status: DISCONTINUED | OUTPATIENT
Start: 2020-11-03 | End: 2020-11-12 | Stop reason: HOSPADM

## 2020-11-03 RX ORDER — TRAZODONE HYDROCHLORIDE 100 MG/1
300 TABLET ORAL NIGHTLY
Status: DISCONTINUED | OUTPATIENT
Start: 2020-11-03 | End: 2020-11-12 | Stop reason: HOSPADM

## 2020-11-03 RX ORDER — SENNA PLUS 8.6 MG/1
1 TABLET ORAL 2 TIMES DAILY PRN
Status: DISCONTINUED | OUTPATIENT
Start: 2020-11-03 | End: 2020-11-12 | Stop reason: HOSPADM

## 2020-11-03 RX ORDER — ARIPIPRAZOLE 5 MG/1
5 TABLET ORAL DAILY
Status: DISCONTINUED | OUTPATIENT
Start: 2020-11-03 | End: 2020-11-12 | Stop reason: HOSPADM

## 2020-11-03 RX ORDER — NICOTINE POLACRILEX 4 MG
15 LOZENGE BUCCAL PRN
Status: DISCONTINUED | OUTPATIENT
Start: 2020-11-03 | End: 2020-11-12 | Stop reason: SDUPTHER

## 2020-11-03 RX ORDER — SODIUM CHLORIDE 9 MG/ML
INJECTION, SOLUTION INTRAVENOUS CONTINUOUS
Status: DISCONTINUED | OUTPATIENT
Start: 2020-11-03 | End: 2020-11-06

## 2020-11-03 RX ORDER — VENLAFAXINE HYDROCHLORIDE 75 MG/1
150 CAPSULE, EXTENDED RELEASE ORAL 2 TIMES DAILY
Status: DISCONTINUED | OUTPATIENT
Start: 2020-11-03 | End: 2020-11-12 | Stop reason: HOSPADM

## 2020-11-03 RX ORDER — SODIUM CHLORIDE 0.9 % (FLUSH) 0.9 %
10 SYRINGE (ML) INJECTION PRN
Status: DISCONTINUED | OUTPATIENT
Start: 2020-11-03 | End: 2020-11-12 | Stop reason: HOSPADM

## 2020-11-03 RX ORDER — SODIUM CHLORIDE 0.9 % (FLUSH) 0.9 %
10 SYRINGE (ML) INJECTION PRN
Status: DISCONTINUED | OUTPATIENT
Start: 2020-11-03 | End: 2020-11-03 | Stop reason: SDUPTHER

## 2020-11-03 RX ORDER — MAGNESIUM SULFATE 1 G/100ML
1 INJECTION INTRAVENOUS PRN
Status: DISCONTINUED | OUTPATIENT
Start: 2020-11-03 | End: 2020-11-12 | Stop reason: HOSPADM

## 2020-11-03 RX ORDER — FAMOTIDINE 20 MG/1
20 TABLET, FILM COATED ORAL 2 TIMES DAILY
Status: DISCONTINUED | OUTPATIENT
Start: 2020-11-03 | End: 2020-11-12 | Stop reason: HOSPADM

## 2020-11-03 RX ORDER — DEXTROSE MONOHYDRATE 25 G/50ML
12.5 INJECTION, SOLUTION INTRAVENOUS PRN
Status: DISCONTINUED | OUTPATIENT
Start: 2020-11-03 | End: 2020-11-12 | Stop reason: SDUPTHER

## 2020-11-03 RX ORDER — GABAPENTIN 400 MG/1
800 CAPSULE ORAL NIGHTLY
Status: ON HOLD | COMMUNITY
End: 2020-11-05 | Stop reason: SDUPTHER

## 2020-11-03 RX ORDER — POTASSIUM CHLORIDE 7.45 MG/ML
10 INJECTION INTRAVENOUS PRN
Status: DISCONTINUED | OUTPATIENT
Start: 2020-11-03 | End: 2020-11-12 | Stop reason: HOSPADM

## 2020-11-03 RX ORDER — OMEPRAZOLE 20 MG/1
20 CAPSULE, DELAYED RELEASE ORAL DAILY
Status: ON HOLD | COMMUNITY
End: 2020-11-12 | Stop reason: HOSPADM

## 2020-11-03 RX ORDER — ACETAMINOPHEN 325 MG/1
650 TABLET ORAL EVERY 6 HOURS PRN
Status: DISCONTINUED | OUTPATIENT
Start: 2020-11-03 | End: 2020-11-12 | Stop reason: HOSPADM

## 2020-11-03 RX ORDER — TRAZODONE HYDROCHLORIDE 150 MG/1
300 TABLET ORAL NIGHTLY
Status: ON HOLD | COMMUNITY
End: 2020-11-05 | Stop reason: SDUPTHER

## 2020-11-03 RX ORDER — CLONAZEPAM 1 MG/1
1 TABLET ORAL 3 TIMES DAILY PRN
Status: ON HOLD | COMMUNITY
End: 2020-11-05 | Stop reason: SDUPTHER

## 2020-11-03 RX ORDER — FUROSEMIDE 40 MG/1
40 TABLET ORAL DAILY PRN
Status: ON HOLD | COMMUNITY
End: 2022-04-06 | Stop reason: HOSPADM

## 2020-11-03 RX ORDER — GLIMEPIRIDE 2 MG/1
2 TABLET ORAL EVERY MORNING
Status: DISCONTINUED | OUTPATIENT
Start: 2020-11-04 | End: 2020-11-12 | Stop reason: HOSPADM

## 2020-11-03 RX ORDER — LIDOCAINE HYDROCHLORIDE 10 MG/ML
20 INJECTION, SOLUTION INFILTRATION; PERINEURAL ONCE
Status: DISCONTINUED | OUTPATIENT
Start: 2020-11-03 | End: 2020-11-03

## 2020-11-03 RX ORDER — GLIMEPIRIDE 1 MG/1
1 TABLET ORAL
Status: DISCONTINUED | OUTPATIENT
Start: 2020-11-03 | End: 2020-11-12 | Stop reason: HOSPADM

## 2020-11-03 RX ORDER — DEXTROSE MONOHYDRATE 50 MG/ML
100 INJECTION, SOLUTION INTRAVENOUS PRN
Status: DISCONTINUED | OUTPATIENT
Start: 2020-11-03 | End: 2020-11-12 | Stop reason: SDUPTHER

## 2020-11-03 RX ORDER — POTASSIUM CHLORIDE 20 MEQ/1
40 TABLET, EXTENDED RELEASE ORAL PRN
Status: DISCONTINUED | OUTPATIENT
Start: 2020-11-03 | End: 2020-11-12 | Stop reason: HOSPADM

## 2020-11-03 RX ORDER — 0.9 % SODIUM CHLORIDE 0.9 %
1000 INTRAVENOUS SOLUTION INTRAVENOUS ONCE
Status: COMPLETED | OUTPATIENT
Start: 2020-11-03 | End: 2020-11-03

## 2020-11-03 RX ORDER — FUROSEMIDE 40 MG/1
40 TABLET ORAL DAILY
Status: DISCONTINUED | OUTPATIENT
Start: 2020-11-03 | End: 2020-11-12 | Stop reason: HOSPADM

## 2020-11-03 RX ORDER — PROMETHAZINE HYDROCHLORIDE 25 MG/1
12.5 TABLET ORAL EVERY 6 HOURS PRN
Status: DISCONTINUED | OUTPATIENT
Start: 2020-11-03 | End: 2020-11-12 | Stop reason: HOSPADM

## 2020-11-03 RX ORDER — OXYCODONE HYDROCHLORIDE AND ACETAMINOPHEN 5; 325 MG/1; MG/1
1 TABLET ORAL EVERY 6 HOURS PRN
Status: DISCONTINUED | OUTPATIENT
Start: 2020-11-03 | End: 2020-11-12 | Stop reason: HOSPADM

## 2020-11-03 RX ORDER — ACETAMINOPHEN 650 MG/1
650 SUPPOSITORY RECTAL EVERY 6 HOURS PRN
Status: DISCONTINUED | OUTPATIENT
Start: 2020-11-03 | End: 2020-11-12 | Stop reason: HOSPADM

## 2020-11-03 RX ORDER — ONDANSETRON 2 MG/ML
4 INJECTION INTRAMUSCULAR; INTRAVENOUS EVERY 6 HOURS PRN
Status: DISCONTINUED | OUTPATIENT
Start: 2020-11-03 | End: 2020-11-12 | Stop reason: HOSPADM

## 2020-11-03 RX ADMIN — FAMOTIDINE 20 MG: 20 TABLET, FILM COATED ORAL at 22:08

## 2020-11-03 RX ADMIN — OXYCODONE HYDROCHLORIDE AND ACETAMINOPHEN 1 TABLET: 5; 325 TABLET ORAL at 22:29

## 2020-11-03 RX ADMIN — ASPIRIN 81 MG: 81 TABLET, COATED ORAL at 22:10

## 2020-11-03 RX ADMIN — GABAPENTIN 800 MG: 300 CAPSULE ORAL at 22:09

## 2020-11-03 RX ADMIN — VENLAFAXINE HYDROCHLORIDE 150 MG: 75 CAPSULE, EXTENDED RELEASE ORAL at 22:10

## 2020-11-03 RX ADMIN — GLIMEPIRIDE 1 MG: 1 TABLET ORAL at 22:08

## 2020-11-03 RX ADMIN — ARIPIPRAZOLE 5 MG: 5 TABLET ORAL at 22:08

## 2020-11-03 RX ADMIN — TRAZODONE HYDROCHLORIDE 300 MG: 100 TABLET ORAL at 22:10

## 2020-11-03 RX ADMIN — SODIUM CHLORIDE 1000 ML: 9 INJECTION, SOLUTION INTRAVENOUS at 12:05

## 2020-11-03 RX ADMIN — INSULIN LISPRO 6 UNITS: 100 INJECTION, SOLUTION INTRAVENOUS; SUBCUTANEOUS at 18:06

## 2020-11-03 RX ADMIN — FUROSEMIDE 40 MG: 40 TABLET ORAL at 22:09

## 2020-11-03 RX ADMIN — METOPROLOL TARTRATE 25 MG: 25 TABLET, FILM COATED ORAL at 22:45

## 2020-11-03 RX ADMIN — CEFTRIAXONE SODIUM 1 G: 1 INJECTION, POWDER, FOR SOLUTION INTRAMUSCULAR; INTRAVENOUS at 14:30

## 2020-11-03 RX ADMIN — SODIUM CHLORIDE: 9 INJECTION, SOLUTION INTRAVENOUS at 22:07

## 2020-11-03 ASSESSMENT — ENCOUNTER SYMPTOMS
COUGH: 0
CONSTIPATION: 0
VOMITING: 0
BACK PAIN: 0
SHORTNESS OF BREATH: 0
SINUS PRESSURE: 0
DIARRHEA: 0
NAUSEA: 0
COLOR CHANGE: 0
ABDOMINAL PAIN: 0

## 2020-11-03 ASSESSMENT — PAIN SCALES - GENERAL
PAINLEVEL_OUTOF10: 10
PAINLEVEL_OUTOF10: 6
PAINLEVEL_OUTOF10: 10
PAINLEVEL_OUTOF10: 7

## 2020-11-03 ASSESSMENT — PAIN DESCRIPTION - PAIN TYPE
TYPE: ACUTE PAIN
TYPE: ACUTE PAIN

## 2020-11-03 ASSESSMENT — PAIN DESCRIPTION - ORIENTATION
ORIENTATION: RIGHT
ORIENTATION: RIGHT

## 2020-11-03 ASSESSMENT — PAIN DESCRIPTION - LOCATION
LOCATION: RIB CAGE;HIP
LOCATION: RIB CAGE;HIP

## 2020-11-03 ASSESSMENT — PAIN DESCRIPTION - FREQUENCY: FREQUENCY: INTERMITTENT

## 2020-11-03 ASSESSMENT — PAIN DESCRIPTION - DESCRIPTORS
DESCRIPTORS: ACHING;CRAMPING;SHOOTING
DESCRIPTORS: ACHING;DISCOMFORT

## 2020-11-03 NOTE — ED NOTES
Patient presented to the ED with complaints of right side rib/back pain from a fall that happened yesterday. She is A&Ox3 and she stated she was able to move around yesterday but this morning couldn't even walk, due to pain. Hx. Of lung cancer and is receiving radiation. She lives by herself.      Milton Giron RN  11/03/20 6240

## 2020-11-03 NOTE — ED PROVIDER NOTES
[facility] ED  EMERGENCY DEPARTMENT ENCOUNTER      Pt Name: Jose R Metcalf  MRN: [id number]  Lauragfdmitry 1950  Date of evaluation: 11/3/20  CHIEF COMPLAINT       Chief Complaint   Patient presents with    Fall     HISTORY OF PRESENT ILLNESS   Presents to the emergency room via squad with injuries after a fall that occurred yesterday. She is drowsy but awake. She is able to provide a history. She states that she tripped while using her walker and landed on her right side. She complains of pain to the right ribs. She states that she did not hit her head. No neck or back pain. Urine smells foul on arrival.  She does admit to some dysuria and urinary frequency. She does not know of any recent diagnosis of UTI. No back pain, cold symptoms or fevers. The history is provided by the patient and medical records. REVIEW OF SYSTEMS     Review of Systems   Constitutional: Positive for activity change and appetite change. HENT: Negative for congestion and sinus pressure. Respiratory: Negative for cough and shortness of breath. Cardiovascular: Positive for chest pain. Negative for palpitations. Gastrointestinal: Negative for abdominal pain, constipation, diarrhea, nausea and vomiting. Genitourinary: Positive for dysuria and frequency. Negative for flank pain and hematuria. Musculoskeletal: Negative for back pain and neck pain. Skin: Negative for color change and wound. Neurological: Negative for dizziness and headaches. Psychiatric/Behavioral: Negative for confusion and decreased concentration.      PASTMEDICAL HISTORY     Past Medical History:   Diagnosis Date    AAA (abdominal aortic aneurysm) ([facility]     Pt denies having a history of AAA    Acid reflux     Anxiety and depression     Aortic stenosis     Arthritis     Blister of ankle, right 10/13/2016    blister broke open & draining, is on antibiotics    CAD (coronary artery disease)     Cancer ([facility]     lung cancer    COPD (chronic obstructive pulmonary disease) [facility]     Diabetes mellitus ([facility]     Falls     Heart block     bifasicular    Hypokalemia     MDRO (multiple drug resistant organisms) resistance 10/17/2014    E. Coli urine    MRSA (methicillin resistant staph aureus) culture positive resolved 12/2016    2 negative nasal screens - 2016 (hx in urine 2014)    On home oxygen therapy     uses 2 liters at night    On home oxygen therapy     patient states 2 liters/nasal cannula continuous    Overactive bladder     patient incont. wears a brief    Pneumonia     PONV (postoperative nausea and vomiting)     Vitamin D deficiency      SURGICAL HISTORY       Past Surgical History:   Procedure Laterality Date    AORTIC VALVE REPAIR N/A 4/11/2017    AORTIC VALVE REPAIR REPLACEMENT performed by Kehinde Wright MD at [address] [facility] N/A 8/31/2020    BRONCHOSCOPY BIOPSY BRONCHUS performed by Jonathan Law MD at [address] [facility] [address], COLON, DIAGNOSTIC  12/08/2016    EYE SURGERY      HYSTERECTOMY      IR INS PICC VAD W SQ PORT GREATER THAN 5  9/4/2020    IR INS PICC VAD W SQ PORT GREATER THAN 5 9/4/2020 [facility]    IR PORT PLACEMENT EQUAL OR GREATER THAN 5 YEARS  9/29/2020    IR PORT PLACEMENT EQUAL OR GREATER THAN 5 YEARS 9/29/2020 Rizwana Berry MD [facility]    LUMBAR LAMINECTOMY      TONSILLECTOMY       CURRENT MEDICATIONS       Previous Medications    ASPIRIN EC 81 MG EC TABLET    Take 81 mg by mouth daily    CLONAZEPAM (KLONOPIN) 1 MG TABLET    Take 1 tablet by mouth 2 times daily as needed for Anxiety for up to 5 doses. DEXAMETHASONE (DECADRON) 4 MG TABLET    Take 20 mg orally 12 hours and 6 hours before Taxol    FUROSEMIDE (LASIX) 40 MG TABLET    Take 0.5 tablets by mouth daily    GABAPENTIN (NEURONTIN) 400 MG CAPSULE    Take 400 mg by mouth 2 times daily.     GLIMEPIRIDE (AMARYL) 1 MG TABLET    Take 2 mg by General: She is not in acute distress. Appearance: She is not diaphoretic. HENT:      Right Ear: External ear normal.      Left Ear: External ear normal.      Mouth/Throat:      Mouth: Mucous membranes are dry. Pharynx: Oropharynx is clear. Eyes:      Extraocular Movements: Extraocular movements intact. Conjunctiva/sclera: Conjunctivae normal.      Pupils: Pupils are equal, round, and reactive to light. Neck:      Musculoskeletal: Neck supple. Cardiovascular:      Rate and Rhythm: Regular rhythm. Tachycardia present. Pulmonary:      Effort: Pulmonary effort is normal.      Breath sounds: Normal breath sounds. Chest:      Chest wall: Tenderness present. Abdominal:      General: Bowel sounds are normal.      Palpations: Abdomen is soft. Tenderness: There is no abdominal tenderness. Musculoskeletal: Normal range of motion. Right lower leg: No edema. Left lower leg: No edema. Lymphadenopathy:      Cervical: No cervical adenopathy. Skin:     General: Skin is warm and dry. Neurological:      Comments: Drowsy but awakens easily with verbal stimuli. Following commands slowly but appropriately. Weak but equal upper and lower extremities. No cranial nerve deficit. Psychiatric:         Mood and Affect: Mood normal.         Thought Content: Thought content normal.         DIAGNOSTIC RESULTS     RADIOLOGY:All plain film, CT, MRI, and formal ultrasound images (except ED bedside ultrasound) are read by the radiologist, see reports below, unless otherwise noted in MDM or here. Interpretation per the Radiologist below, if available at the time of this note:    XR CERVICAL SPINE (2-3 VIEWS)   Final Result   No convincing evidence for acute fracture or malalignment of the cervical   spine. Masslike opacity within the right lung apex.          XR RIBS RIGHT INCLUDE CHEST (MIN 3 VIEWS)   Final Result   Subtle linear lucency within the 8th rib, suggestive of a nondisplaced   fracture. Masslike opacity in the right lung apex. LABS:  Labs Reviewed   URINALYSIS - Abnormal; Notable for the following components:       Result Value    Turbidity UA CLOUDY (*)     Urine Hgb 3+ (*)     Protein, UA 2+ (*)     Leukocyte Esterase, Urine MODERATE (*)     All other components within normal limits   CBC WITH AUTO DIFFERENTIAL - Abnormal; Notable for the following components:    RBC 3.47 (*)     Hemoglobin 8.4 (*)     Hematocrit 27.7 (*)     MCV 79.8 (*)     MCH 24.2 (*)     RDW 19.8 (*)     Seg Neutrophils 87 (*)     Lymphocytes 7 (*)     Eosinophils % 0 (*)     Immature Granulocytes 1 (*)     Segs Absolute 7.74 (*)     Absolute Lymph # 0.62 (*)     All other components within normal limits   BASIC METABOLIC PANEL - Abnormal; Notable for the following components:    Glucose 191 (*)     BUN 28 (*)     Bun/Cre Ratio 35 (*)     Sodium 134 (*)     All other components within normal limits   MICROSCOPIC URINALYSIS - Abnormal; Notable for the following components:    Bacteria, UA MANY (*)     Mucus, UA 1+ (*)     All other components within normal limits   CULTURE, BLOOD 1   CULTURE, BLOOD 1   CULTURE, URINE       All other labs were within normal range or not returned as of this dictation. EMERGENCY DEPARTMENT COURSE and DIFFERENTIAL DIAGNOSIS/MDM:   Vitals:    Vitals:    20 1050   BP: (!) 117/45   Pulse: 106   Resp: 16   Temp: 98.2 °F (36.8 °C)   TempSrc: Oral   SpO2: 96%   Weight: 150 lb (68 kg)   Height: 5' 8\" (1.727 m)       Medical Decision Makin-year-old female who is afebrile and nontoxic in appearance. She is drowsy but awake. She is following commands appropriately. She does have evidence of urinary tract infection with WBCs too numerous to count. Urine culture is pending. She will be started antibiotics and admitted for further evaluation and treatment.            CONSULTS:  IP CONSULT TO HOSPITALIST    PROCEDURES:  None    The patient was given the following meds in the ED:  Orders Placed This Encounter   Medications    0.9 % sodium chloride bolus    DISCONTD: lidocaine 1 % injection 20 mL    DISCONTD: Tetanus-Diphth-Acell Pertussis (BOOSTRIX) injection 0.5 mL    cefTRIAXone (ROCEPHIN) 1 g IVPB in 50 mL D5W minibag     Order Specific Question:   Antimicrobial Indications     Answer:   Urinary Tract Infection       FINAL IMPRESSION      1. Urinary tract infection without hematuria, site unspecified    2. Fall, initial encounter    3. Contusion of right chest wall, initial encounter    4. General weakness          DISPOSITION/PLAN   DISPOSITION Decision To Admit 11/03/2020 03:24:03 PM      PATIENT REFERRED TO:   No follow-up provider specified.     DISCHARGE MEDICATIONS:     New Prescriptions    No medications on file       CRITICAL CARE TIME       Please note that portions of this note were completed with a voice recognition program.      Kallie REEVES [id number], APRN - CNP  11/03/20 1002 [facility], APRN - CNP  11/03/20 2076

## 2020-11-03 NOTE — H&P
History & Physical  [facility]    Adult Hospitalist      Name: Simeon Nunez  MRN: [id number]     [place]: [de-identified]  Room: [facility]16/16    Admit Date: 11/3/2020 10:59 AM  PCP: Dallas Mcdonough MD    Primary Problem  Active Problems:    Urinary tract infectious disease  Resolved Problems:    * No resolved hospital problems. *        Assesment:     ·   UTI   · Generalized weakness  · Eighth rib lucencies suspicious for nondisplaced fracture  · Recent diagnosis of right upper lobe squamous cell carcinoma status post chemoradiation  · Chronic COPD  · Chronic hypoxic respiratory failure on nocturnal O2  · Former smoker  · Diabetes type 2  · Depression with anxiety        Plan:     · Admit to [facility] with telemetry  · O2 maintain oxygen saturation greater than 92%  · Follow urine culture  · IV ceftriaxone  · IV fluids  · Orthopedic consult  · Pain control  · X-ray lumbosacral spine  · Continue aspirin, Lasix,  Lopressor  · Continue Abilify, Effexor, trazodone, Neurontin  · Continue glimepiride, added SSI  · DVT and GI prophylaxis. Chief Complaint:     Chief Complaint   Patient presents with    Fall         History of Present Illness:      Simeon Nunez is a 72 y.o.  female who presents with Fall    72year-old lady with past medical history of COPD,  coronary artery disease, depression, arthritis presented to ER complaining of generalized weakness and fall. Patient has been recently diagnosed of lung cell carcinoma. She had a bronchoscopy on 8/31/2020 and endobronchial biopsy showed squamous cell carcinoma. She was started on chemoradiation. Her last chemotherapy was on 10/28/2020. She also has an appointment with Radiation Oncology on 10/30/2020. On presentation to ER her sodium was slightly low at 134. Her hemoglobin was 8.4. Her UA showed moderate leukocyte Estrace and many bacteria. Patient was so weak she was unable to get up and ambulate.   She is unable to take care of herself at home.  Complaining of low back pain and right-sided rib pain. Patient admitted for further management. I have personally reviewed the past medical history, past surgical history, medications, social history, and family history, and summarized in the note. Review of Systems:     All 10 point system is reviewed and negative otherwise mentioned in HPI. Past Medical History:     Past Medical History:   Diagnosis Date    AAA (abdominal aortic aneurysm) ([address]     Pt denies having a history of AAA    Acid reflux     Anxiety and depression     Aortic stenosis     Arthritis     Blister of ankle, right 10/13/2016    blister broke open & draining, is on antibiotics    CAD (coronary artery disease)     Cancer ([address]     lung cancer    COPD (ch[facility] obstructive [facility] disease) [facility] [facility]     Diabetes mellitus ([facility] [address]     Falls     Heart block     bifasicular    Hypokalemia     MDRO (multiple drug resistant organisms) resistance 10/17/2014    E. Coli urine    MRSA (methicillin resistant staph aureus) culture positive resolved 12/2016    2 negative nasal screens - 2016 (hx in urine 2014)    On home oxygen therapy     uses 2 liters at night    On home oxygen therapy     patient states 2 liters/nasal cannula continuous    Overactive bladder     patient incont.  wears a brief    Pneumonia     PONV (postoperative nausea and vomiting)     Vitamin D deficiency         Past Surgical History:     Past Surgical History:   Procedure Laterality Date    AORTIC VALVE REPAIR N/A 4/11/2017    AORTIC VALVE REPAIR REPLACEMENT performed by Layne Madera MD at [facility] N/A 8/31/2020    BRONCHOSCOPY BIOPSY BRONCHUS performed by Qasim Fabian MD at [facility], COLON, DIAGNOSTIC  12/08/2016    EYE SURGERY      HYSTERECTOMY      IR INS PICC VAD W SQ PORT GREATER THAN 5  9/4/2020    IR INS PICC VAD W SQ PORT GREATER THAN 5 9/4/2020 [facility]    IR PORT PLACEMENT EQUAL OR GREATER THAN 5 YEARS  9/29/2020    IR PORT PLACEMENT EQUAL OR GREATER THAN 5 YEARS 9/29/2020 MD [facility]    LUMBAR LAMINECTOMY      TONSILLECTOMY          Medications Prior to Admission:       Prior to Admission medications    Medication Sig Start Date End Date Taking? Authorizing Provider   gabapentin (NEURONTIN) 400 MG capsule Take 800 mg by mouth nightly. Take 2 capsules (=800mg) nightly - in addition to 1BID   Yes Historical Provider, MD   furosemide (LASIX) 40 MG tablet Take 40 mg by mouth daily   Yes Historical Provider, MD   clonazePAM (KLONOPIN) 1 MG tablet Take 1 mg by mouth 3 times daily as needed for Anxiety. Yes Historical Provider, MD   omeprazole (PRILOSEC) 20 MG delayed release capsule Take 20 mg by mouth daily   Yes Historical Provider, MD   ARIPiprazole (ABILIFY) 5 MG tablet Take 5 mg by mouth daily   Yes Historical Provider, MD   traZODone (DESYREL) 150 MG tablet Take 300 mg by mouth nightly Take 2 tablets (=300mg) nightly   Yes Historical Provider, MD   oxyCODONE-acetaminophen (PERCOCET) 5-325 MG per tablet Take 1 tablet by mouth every 6 hours as needed for Pain for up to 30 days. 10/23/20 11/22/20 Yes Arden Cranker, MD   dexamethasone (DECADRON) 4 MG tablet Take 20 mg orally 12 hours and 6 hours before Taxol 10/23/20  Yes Arden Cranker, MD   lidocaine-prilocaine (EMLA) 2.5-2.5 % cream To port site before chemo 10/21/20  Yes Arden Cranker, MD   gabapentin (NEURONTIN) 400 MG capsule Take 400 mg by mouth 2 times daily.  In addition to 2 capsules (=800mg) nightly   Yes Historical Provider, MD   melatonin 5 MG TABS tablet Take 5 mg by mouth nightly    Yes Historical Provider, MD   glimepiride (AMARYL) 1 MG tablet Take 1 mg by mouth Daily with supper In addition to 2 tablets (=2mg) every morning    Yes Historical Provider, MD   metoprolol tartrate (LOPRESSOR) 25 MG tablet Take 25 mg by mouth 2 times daily   Yes Historical Provider, MD   venlafaxine (EFFEXOR XR) 150 MG extended release capsule Take 150 mg by mouth 2 times daily   Yes Historical Provider, MD   lansoprazole (PREVACID) 30 MG delayed release capsule Take 30 mg by mouth daily 11/10/16  Yes Historical Provider, MD   metFORMIN (GLUCOPHAGE) 500 MG tablet Take 500 mg by mouth 2 times daily 16  Yes Historical Provider, MD   aspirin EC 81 MG EC tablet Take 81 mg by mouth daily   Yes Historical Provider, MD   mometasone-formoterol (DULERA) 200-5 MCG/ACT inhaler Inhale 2 puffs into the lungs every 12 hours as needed (wheezing/SOB)    Yes Historical Provider, MD   glimepiride (AMARYL) 1 MG tablet Take 2 mg by mouth every morning In addition to 1mg nightly   Yes Historical Provider, MD        Allergies:       Codeine and Dye [iodides]    Social History:     Tobacco:    reports that she quit smoking about 4 years ago. She has never used smokeless tobacco.  Alcohol:      reports no history of alcohol use. Drug Use:  reports no history of drug use.     Family History:     Family History   Problem Relation Age of Onset    Cancer Mother     Cancer Father          Physical Exam:     Vitals:  /66   Pulse 114   Temp 98.2 °F (36.8 °C) (Oral)   Resp 16   Ht 5' 8\" (1.727 m)   Wt 150 lb (68 kg)   SpO2 95%   BMI 22.81 kg/m²   Temp (24hrs), Av.2 °F (36.8 °C), Min:98.2 °F (36.8 °C), Max:98.2 °F (36.8 °C)          General appearance - alert, well appearing, and in no acute distress  Mental status - oriented to person, place, and time with normal affect  Head - normocephalic and atraumatic  Eyes - pupils equal and reactive, extraocular eye movements intact, conjunctiva clear  Ears - hearing appears to be intact  Nose - no drainage noted  Mouth - mucous membranes moist  Neck - supple, no carotid bruits, thyroid not palpable  Chest - clear to auscultation, normal effort  Heart - normal rate, regular rhythm, no murmur  Abdomen - soft, nontender, nondistended, bowel sounds present all four quadrants, no masses, hepatomegaly or splenomegaly  Neurological - normal speech, no focal findings or movement disorder noted, cranial nerves II through XII grossly intact  Extremities - peripheral pulses palpable, no pedal edema or calf pain with palpation  Skin - no gross lesions, rashes, or induration noted        Data:     Labs:    Hematology:  Recent Labs     11/03/20  1046   WBC 8.9   RBC 3.47*   HGB 8.4*   HCT 27.7*   MCV 79.8*   MCH 24.2*   MCHC 30.3   RDW 19.8*      MPV 9.2     Chemistry:  Recent Labs     11/03/20  1046   *   K 4.0   CL 98   CO2 27   GLUCOSE 191*   BUN 28*   CREATININE 0.80   ANIONGAP 9   LABGLOM >60   GFRAA >60   CALCIUM 9.2     Recent Labs     11/03/20  1801   POCGLU 231*       Lab Results   Component Value Date    INR 0.9 09/29/2020    INR 1.1 08/28/2020    INR 0.9 12/28/2018    PROTIME 11.8 09/29/2020    PROTIME 14.4 (H) 08/28/2020    PROTIME 9.7 12/28/2018       Lab Results   Component Value Date/Time    SPECIAL NOT REPORTED 09/25/2020 11:27 AM     Lab Results   Component Value Date/Time    CULTURE ESCHERICHIA COLI 50 to 100,000 CFU/ML (A) 09/25/2020 11:27 AM       Lab Results   Component Value Date    POCPH 7.36 04/12/2017    PHART 7.42 08/26/2012    PH 7.22 04/11/2017    POCPCO2 45 04/12/2017    HKW5SYM 41 08/26/2012    PCO2 54 04/11/2017    POCPO2 93 04/12/2017    PO2ART 59 08/26/2012    PO2 89 04/11/2017    POCHCO3 25.3 04/12/2017    SBZ3OQP 26.1 08/26/2012    HCO3 22.2 04/11/2017    NBEA NOT REPORTED 04/12/2017    PBEA 0 04/12/2017    BMI3WQE 27 04/12/2017    FLZD6BOC 97 04/12/2017    R4BVQGWG 92.1 08/26/2012    O2SAT 95 04/11/2017    FIO2 UNKNOWN 10/31/2017       Radiology:    Xr Ribs Right Include Chest (min 3 Views)    Result Date: 11/3/2020  Subtle linear lucency within the 8th rib, suggestive of a nondisplaced fracture. Masslike opacity in the right lung apex.      Xr Cervical Spine (2-3 Views)    Result Date: 11/3/2020  No convincing evidence for acute fracture or malalignment of the cervical spine. Masslike opacity within the right lung apex. All radiological studies reviewed                Code Status:  Prior    Electronically signed by Claudetta Bias, MD on 11/3/2020 at 6:31 PM     Copy sent to Dr. Noah Braswell MD    This note was created with the assistance of a speech-recognition program.  Although the intention is to generate a document that actually reflects the content of the visit, no guarantees can be provided that every mistake has been identified and corrected by editing. Note was updated later by me after  physical examination and  completion of the assessment.

## 2020-11-03 NOTE — ED PROVIDER NOTES
The patient was seen and examined by me in conjunction with the mid-level provider. I agree with his/her assessment and treatment plan. The patient has UTI and is given IV Rocephin after urine culture ordered. She is being admitted.      Alex Wilcox MD  11/03/20 [id number]

## 2020-11-03 NOTE — PROGRESS NOTES
[facility] Pharmacy Service   Medication Review    The patient's list of current home medications has been reviewed. Source(s) of information: patient, surescripts    Based on information provided by the above source(s), I have updated the patient's home med list as described below. I changed or updated the following medications on the patient's home medication list:  Discontinued Zofran 4mg - therapy complete     Added Abilify 5mg - 1QD  Gabapentin 400mg - 2QHS in addition to 1BID     Adjusted   Clonazepam 1mg - changed from 1BID PRN to 1TID PRN anxiety  Furosemide 40mg - changed from 1/2QD to 1QD  Melatonin 5mg - changed from 1QD PRN sleep to 1QD  Omeprazole 20mg - changed from 1Q8H PRN nausea/vomitting changed to 1QD     Other Notes Dexamethasone 4mg - Last filled 10/23/20 for a two day supply - patient is unsure is she is still supposed to be taking medication before Taxol   Melatonin 5mg - Patient out of medication but uses nightly            Please feel free to call me with any questions about this encounter. Thank you. This note will be reviewed and co-signed by the [facility] Pharmacist. The pharmacist will review inpatient orders and contact the physician about any discrepancies. Rupinder Bull, pharmacy technician  [facility] Pharm[facility] Service  Phone:  [phone]  Fax: [phone]      Electronically signed by Rupinder Bull on 11/3/2020 at 4:50 PM     Prior to Admission medications    Medication Sig Start Date End Date Taking? Authorizing Provider   gabapentin (NEURONTIN) 400 MG capsule Take 800 mg by mouth nightly. Take 2 capsules (=800mg) nightly - in addition to 1BID       furosemide (LASIX) 40 MG tablet Take 40 mg by mouth daily       clonazePAM (KLONOPIN) 1 MG tablet Take 1 mg by mouth 3 times daily as needed for Anxiety.        omeprazole (PRILOSEC) 20 MG delayed release capsule Take 20 mg by mouth daily       ARIPiprazole (ABILIFY) 5 MG tablet Take 5 mg by mouth daily       traZODone (DESYREL) 150 MG tablet Take 300 mg by mouth nightly Take 2 tablets (=300mg) nightly       oxyCODONE-acetaminophen (PERCOCET) 5-325 MG per tablet Take 1 tablet by mouth every 6 hours as needed for Pain for up to 30 days. dexamethasone (DECADRON) 4 MG tablet Take 20 mg orally 12 hours and 6 hours before Taxol       lidocaine-prilocaine (EMLA) 2.5-2.5 % cream To port site before chemo       gabapentin (NEURONTIN) 400 MG capsule Take 400 mg by mouth 2 times daily.  In addition to 2 capsules (=800mg) nightly       melatonin 5 MG TABS tablet Take 5 mg by mouth nightly        glimepiride (AMARYL) 1 MG tablet Take 1 mg by mouth Daily with supper In addition to 2 tablets (=2mg) every morning        metoprolol tartrate (LOPRESSOR) 25 MG tablet Take 25 mg by mouth 2 times daily       venlafaxine (EFFEXOR XR) 150 MG extended release capsule Take 150 mg by mouth 2 times daily       lansoprazole (PREVACID) 30 MG delayed release capsule Take 30 mg by mouth daily       metFORMIN (GLUCOPHAGE) 500 MG tablet Take 500 mg by mouth 2 times daily       aspirin EC 81 MG EC tablet Take 81 mg by mouth daily       mometasone-formoterol (DULERA) 200-5 MCG/ACT inhaler Inhale 2 puffs into the lungs every 12 hours as needed (wheezing/SOB)        glimepiride (AMARYL) 1 MG tablet Take 2 mg by mouth every morning In addition to 1mg nightly

## 2020-11-04 ENCOUNTER — HOSPITAL ENCOUNTER (OUTPATIENT)
Dept: RADIATION ONCOLOGY | Facility: MEDICAL CENTER | Age: 70
End: 2020-11-04
Payer: MEDICARE

## 2020-11-04 ENCOUNTER — APPOINTMENT (OUTPATIENT)
Dept: GENERAL RADIOLOGY | Age: 70
DRG: 690 | End: 2020-11-04
Payer: MEDICARE

## 2020-11-04 ENCOUNTER — TELEPHONE (OUTPATIENT)
Dept: INFUSION THERAPY | Facility: MEDICAL CENTER | Age: 70
End: 2020-11-04

## 2020-11-04 ENCOUNTER — APPOINTMENT (OUTPATIENT)
Dept: NUCLEAR MEDICINE | Age: 70
DRG: 690 | End: 2020-11-04
Payer: MEDICARE

## 2020-11-04 ENCOUNTER — HOSPITAL ENCOUNTER (OUTPATIENT)
Dept: INFUSION THERAPY | Facility: MEDICAL CENTER | Age: 70
Discharge: HOME | End: 2020-11-04

## 2020-11-04 PROBLEM — S22.31XA CLOSED FRACTURE OF ONE RIB OF RIGHT SIDE: Status: ACTIVE | Noted: 2020-11-04

## 2020-11-04 PROBLEM — M51.36 DEGENERATIVE DISC DISEASE, LUMBAR: Status: ACTIVE | Noted: 2020-11-04

## 2020-11-04 LAB
ABSOLUTE EOS #: 0.1 K/UL (ref 0–0.44)
ABSOLUTE IMMATURE GRANULOCYTE: 0.05 K/UL (ref 0–0.3)
ABSOLUTE LYMPH #: 0.92 K/UL (ref 1.1–3.7)
ABSOLUTE MONO #: 0.39 K/UL (ref 0.1–1.2)
ANION GAP SERPL CALCULATED.3IONS-SCNC: 9 MMOL/L (ref 9–17)
BASOPHILS # BLD: 0 % (ref 0–2)
BASOPHILS ABSOLUTE: 0.03 K/UL (ref 0–0.2)
BUN BLDV-MCNC: 17 MG/DL (ref 8–23)
BUN/CREAT BLD: 25 (ref 9–20)
CALCIUM SERPL-MCNC: 8.5 MG/DL (ref 8.6–10.4)
CHLORIDE BLD-SCNC: 107 MMOL/L (ref 98–107)
CO2: 25 MMOL/L (ref 20–31)
CREAT SERPL-MCNC: 0.68 MG/DL (ref 0.5–0.9)
CULTURE: ABNORMAL
CULTURE: NORMAL
DIFFERENTIAL TYPE: ABNORMAL
EKG ATRIAL RATE: 101 BPM
EKG P AXIS: 68 DEGREES
EKG P-R INTERVAL: 148 MS
EKG Q-T INTERVAL: 406 MS
EKG QRS DURATION: 154 MS
EKG QTC CALCULATION (BAZETT): 526 MS
EKG R AXIS: -59 DEGREES
EKG T AXIS: 29 DEGREES
EKG VENTRICULAR RATE: 101 BPM
EOSINOPHILS RELATIVE PERCENT: 2 % (ref 1–4)
GFR AFRICAN AMERICAN: >60 ML/MIN
GFR NON-AFRICAN AMERICAN: >60 ML/MIN
GFR SERPL CREATININE-BSD FRML MDRD: ABNORMAL ML/MIN/{1.73_M2}
GFR SERPL CREATININE-BSD FRML MDRD: ABNORMAL ML/MIN/{1.73_M2}
GLUCOSE BLD-MCNC: 105 MG/DL (ref 65–105)
GLUCOSE BLD-MCNC: 163 MG/DL (ref 65–105)
GLUCOSE BLD-MCNC: 180 MG/DL (ref 65–105)
GLUCOSE BLD-MCNC: 311 MG/DL (ref 65–105)
GLUCOSE BLD-MCNC: 61 MG/DL (ref 65–105)
GLUCOSE BLD-MCNC: 77 MG/DL (ref 70–99)
GLUCOSE BLD-MCNC: 91 MG/DL (ref 65–105)
HCT VFR BLD CALC: 26 % (ref 36.3–47.1)
HEMOGLOBIN: 7.7 G/DL (ref 11.9–15.1)
IMMATURE GRANULOCYTES: 1 %
LACTIC ACID: 0.6 MMOL/L (ref 0.5–2.2)
LYMPHOCYTES # BLD: 14 % (ref 24–43)
Lab: ABNORMAL
Lab: NORMAL
MCH RBC QN AUTO: 24.4 PG (ref 25.2–33.5)
MCHC RBC AUTO-ENTMCNC: 29.6 G/DL (ref 28.4–34.8)
MCV RBC AUTO: 82.5 FL (ref 82.6–102.9)
MONOCYTES # BLD: 6 % (ref 3–12)
NRBC AUTOMATED: 0 PER 100 WBC
PDW BLD-RTO: 19.9 % (ref 11.8–14.4)
PLATELET # BLD: 302 K/UL (ref 138–453)
PLATELET ESTIMATE: ABNORMAL
PMV BLD AUTO: 9.4 FL (ref 8.1–13.5)
POTASSIUM SERPL-SCNC: 4 MMOL/L (ref 3.7–5.3)
RBC # BLD: 3.15 M/UL (ref 3.95–5.11)
RBC # BLD: ABNORMAL 10*6/UL
SEG NEUTROPHILS: 78 % (ref 36–65)
SEGMENTED NEUTROPHILS ABSOLUTE COUNT: 5.22 K/UL (ref 1.5–8.1)
SODIUM BLD-SCNC: 141 MMOL/L (ref 135–144)
SPECIMEN DESCRIPTION: ABNORMAL
SPECIMEN DESCRIPTION: NORMAL
WBC # BLD: 6.7 K/UL (ref 3.5–11.3)
WBC # BLD: ABNORMAL 10*3/UL

## 2020-11-04 PROCEDURE — 72100 X-RAY EXAM L-S SPINE 2/3 VWS: CPT

## 2020-11-04 PROCEDURE — 93010 ELECTROCARDIOGRAM REPORT: CPT | Performed by: INTERNAL MEDICINE

## 2020-11-04 PROCEDURE — 6370000000 HC RX 637 (ALT 250 FOR IP): Performed by: HOSPITALIST

## 2020-11-04 PROCEDURE — 97116 GAIT TRAINING THERAPY: CPT

## 2020-11-04 PROCEDURE — 97110 THERAPEUTIC EXERCISES: CPT

## 2020-11-04 PROCEDURE — 78306 BONE IMAGING WHOLE BODY: CPT

## 2020-11-04 PROCEDURE — 97162 PT EVAL MOD COMPLEX 30 MIN: CPT

## 2020-11-04 PROCEDURE — 80048 BASIC METABOLIC PNL TOTAL CA: CPT

## 2020-11-04 PROCEDURE — 2580000003 HC RX 258: Performed by: HOSPITALIST

## 2020-11-04 PROCEDURE — 85025 COMPLETE CBC W/AUTO DIFF WBC: CPT

## 2020-11-04 PROCEDURE — 3430000000 HC RX DIAGNOSTIC RADIOPHARMACEUTICAL: Performed by: ORTHOPAEDIC SURGERY

## 2020-11-04 PROCEDURE — 97530 THERAPEUTIC ACTIVITIES: CPT

## 2020-11-04 PROCEDURE — 36415 COLL VENOUS BLD VENIPUNCTURE: CPT

## 2020-11-04 PROCEDURE — 77336 RADIATION PHYSICS CONSULT: CPT

## 2020-11-04 PROCEDURE — 6370000000 HC RX 637 (ALT 250 FOR IP): Performed by: ORTHOPAEDIC SURGERY

## 2020-11-04 PROCEDURE — 6360000002 HC RX W HCPCS: Performed by: HOSPITALIST

## 2020-11-04 PROCEDURE — A9503 TC99M MEDRONATE: HCPCS | Performed by: ORTHOPAEDIC SURGERY

## 2020-11-04 PROCEDURE — 72220 X-RAY EXAM SACRUM TAILBONE: CPT

## 2020-11-04 PROCEDURE — 83605 ASSAY OF LACTIC ACID: CPT

## 2020-11-04 PROCEDURE — 1200000000 HC SEMI PRIVATE

## 2020-11-04 PROCEDURE — 82947 ASSAY GLUCOSE BLOOD QUANT: CPT

## 2020-11-04 RX ORDER — TC 99M MEDRONATE 20 MG/10ML
25 INJECTION, POWDER, LYOPHILIZED, FOR SOLUTION INTRAVENOUS
Status: COMPLETED | OUTPATIENT
Start: 2020-11-04 | End: 2020-11-04

## 2020-11-04 RX ORDER — LIDOCAINE 4 G/G
1 PATCH TOPICAL DAILY
Status: DISCONTINUED | OUTPATIENT
Start: 2020-11-04 | End: 2020-11-12 | Stop reason: HOSPADM

## 2020-11-04 RX ORDER — MORPHINE SULFATE 2 MG/ML
1 INJECTION, SOLUTION INTRAMUSCULAR; INTRAVENOUS EVERY 4 HOURS PRN
Status: DISCONTINUED | OUTPATIENT
Start: 2020-11-04 | End: 2020-11-12 | Stop reason: HOSPADM

## 2020-11-04 RX ORDER — FENTANYL CITRATE 50 UG/ML
25 INJECTION, SOLUTION INTRAMUSCULAR; INTRAVENOUS EVERY 4 HOURS PRN
Status: DISCONTINUED | OUTPATIENT
Start: 2020-11-04 | End: 2020-11-04

## 2020-11-04 RX ADMIN — CEFTRIAXONE 1 G: 1 INJECTION, POWDER, FOR SOLUTION INTRAMUSCULAR; INTRAVENOUS at 14:48

## 2020-11-04 RX ADMIN — INSULIN LISPRO 12 UNITS: 100 INJECTION, SOLUTION INTRAVENOUS; SUBCUTANEOUS at 11:56

## 2020-11-04 RX ADMIN — VENLAFAXINE HYDROCHLORIDE 150 MG: 75 CAPSULE, EXTENDED RELEASE ORAL at 08:35

## 2020-11-04 RX ADMIN — Medication 10 ML: at 15:26

## 2020-11-04 RX ADMIN — FAMOTIDINE 20 MG: 20 TABLET, FILM COATED ORAL at 08:40

## 2020-11-04 RX ADMIN — OXYCODONE HYDROCHLORIDE AND ACETAMINOPHEN 1 TABLET: 5; 325 TABLET ORAL at 19:12

## 2020-11-04 RX ADMIN — SODIUM CHLORIDE: 9 INJECTION, SOLUTION INTRAVENOUS at 14:48

## 2020-11-04 RX ADMIN — ARIPIPRAZOLE 5 MG: 5 TABLET ORAL at 08:36

## 2020-11-04 RX ADMIN — GLIMEPIRIDE 1 MG: 1 TABLET ORAL at 18:04

## 2020-11-04 RX ADMIN — FAMOTIDINE 20 MG: 20 TABLET, FILM COATED ORAL at 21:34

## 2020-11-04 RX ADMIN — METOPROLOL TARTRATE 25 MG: 25 TABLET, FILM COATED ORAL at 08:37

## 2020-11-04 RX ADMIN — INSULIN LISPRO 2 UNITS: 100 INJECTION, SOLUTION INTRAVENOUS; SUBCUTANEOUS at 21:34

## 2020-11-04 RX ADMIN — ASPIRIN 81 MG: 81 TABLET, COATED ORAL at 08:36

## 2020-11-04 RX ADMIN — GABAPENTIN 400 MG: 300 CAPSULE ORAL at 06:33

## 2020-11-04 RX ADMIN — GABAPENTIN 800 MG: 300 CAPSULE ORAL at 21:34

## 2020-11-04 RX ADMIN — ENOXAPARIN SODIUM 40 MG: 40 INJECTION SUBCUTANEOUS at 08:37

## 2020-11-04 RX ADMIN — METOPROLOL TARTRATE 25 MG: 25 TABLET, FILM COATED ORAL at 21:34

## 2020-11-04 RX ADMIN — TRAZODONE HYDROCHLORIDE 300 MG: 100 TABLET ORAL at 21:34

## 2020-11-04 RX ADMIN — GABAPENTIN 400 MG: 300 CAPSULE ORAL at 11:57

## 2020-11-04 RX ADMIN — TC 99M MEDRONATE 25.6 MILLICURIE: 20 INJECTION, POWDER, LYOPHILIZED, FOR SOLUTION INTRAVENOUS at 11:10

## 2020-11-04 RX ADMIN — VENLAFAXINE HYDROCHLORIDE 150 MG: 75 CAPSULE, EXTENDED RELEASE ORAL at 21:34

## 2020-11-04 RX ADMIN — GLIMEPIRIDE 2 MG: 2 TABLET ORAL at 08:36

## 2020-11-04 RX ADMIN — MORPHINE SULFATE 1 MG: 2 INJECTION, SOLUTION INTRAMUSCULAR; INTRAVENOUS at 15:25

## 2020-11-04 RX ADMIN — SODIUM CHLORIDE, PRESERVATIVE FREE 10 ML: 5 INJECTION INTRAVENOUS at 21:00

## 2020-11-04 RX ADMIN — OXYCODONE HYDROCHLORIDE AND ACETAMINOPHEN 1 TABLET: 5; 325 TABLET ORAL at 10:18

## 2020-11-04 RX ADMIN — Medication 10 ML: at 11:46

## 2020-11-04 RX ADMIN — FUROSEMIDE 40 MG: 40 TABLET ORAL at 08:37

## 2020-11-04 ASSESSMENT — PAIN DESCRIPTION - ONSET
ONSET: ON-GOING

## 2020-11-04 ASSESSMENT — PAIN DESCRIPTION - ORIENTATION
ORIENTATION: RIGHT;LOWER
ORIENTATION: LOWER;RIGHT

## 2020-11-04 ASSESSMENT — PAIN SCALES - GENERAL
PAINLEVEL_OUTOF10: 4
PAINLEVEL_OUTOF10: 10
PAINLEVEL_OUTOF10: 10
PAINLEVEL_OUTOF10: 8
PAINLEVEL_OUTOF10: 10

## 2020-11-04 ASSESSMENT — PAIN DESCRIPTION - FREQUENCY
FREQUENCY: CONTINUOUS

## 2020-11-04 ASSESSMENT — PAIN DESCRIPTION - PROGRESSION
CLINICAL_PROGRESSION: GRADUALLY WORSENING

## 2020-11-04 ASSESSMENT — PAIN DESCRIPTION - DESCRIPTORS
DESCRIPTORS: ACHING;DISCOMFORT
DESCRIPTORS: ACHING;DISCOMFORT
DESCRIPTORS: ACHING;DISCOMFORT;SHARP

## 2020-11-04 ASSESSMENT — PAIN DESCRIPTION - LOCATION
LOCATION: BACK;RIB CAGE

## 2020-11-04 ASSESSMENT — PAIN DESCRIPTION - PAIN TYPE
TYPE: ACUTE PAIN

## 2020-11-04 NOTE — PROGRESS NOTES
Physical Therapy    Facility/Department: [facility]  Initial Assessment    NAME: Josette Zayas  : 1950  MRN: [id number]    Date of Service: 2020    Discharge Recommendations:  Subacute/Skilled Nursing Facility     Pt presented to ED on 11/3/20 after a standing height fall at home. Imaging identified a right eighth rib fracture she is also being treated for UTI. She denies any significant pain in her right or left upper extremity, right or left lower extremity. RN reports patient is medically stable for therapy treatment this date. Chart reviewed prior to treatment and patient is agreeable for therapy. Assessment   Body structures, Functions, Activity limitations: Decreased functional mobility ; Decreased safe awareness;Decreased strength;Decreased endurance;Decreased balance;Decreased ADL status  Assessment: Pt with mod deficits of bed mobility, transfers, ambulation, balance, cognition, safety awareness and endurance this session. Pt with frequent falls & HIGH risk for falls & recommend SNF at D/C to return to baseline function and a safe D/C back to home environment. Pt requires continued PT to maximize independence with functional mobility, balance, safety awareness & activity tolerance. Prognosis: Good  Decision Making: Medium Complexity  Exam: ROM, MMT, functional mobility, activity tolerance, Balance, & [employer] AM-PAC 6 Clicks Basic Mobility  Clinical Presentation: evolving  PT Education: Goals;PT Role;Plan of Care;Home Exercise Program;Transfer Training;General Safety; Functional Mobility Training;Gait Training  Patient Education: Ed pt on functional mobility, safety awareness, prevention of sedentary complications & LE ex's  REQUIRES PT FOLLOW UP: Yes  Activity Tolerance  Activity Tolerance: Patient limited by fatigue;Patient limited by endurance; Patient limited by pain       Patient Diagnosis(es): The primary encounter diagnosis was Urinary tract infection without hematuria, site unspecified. Diagnoses of Fall, initial encounter, Contusion of right chest wall, initial encounter, and General weakness were also pertinent to this visit. has a past medical history of AAA (abdominal aortic aneurysm) (HCC), Acid reflux, Anxiety and depression, Aortic stenosis, Arthritis, Blister of ankle, right, CAD (coronary artery disease), Cancer ([facility] COPD (chronic obstructive pulmonary disease) ([facility] Diabetes mellitus ([facility] [address] [facility] Falls, Heart block, Hypokalemia, MDRO (multiple drug resistant organisms) resistance, MRSA (methicillin resistant staph aureus) culture positive, On home oxygen therapy, On home oxygen therapy, Overactive bladder, Pneumonia, PONV (postoperative nausea and vomiting), and Vitamin D deficiency. has a past surgical history that includes back surgery; eye surgery; Cholecystectomy; Appendectomy; Hysterectomy; Tonsillectomy; lumbar laminectomy; Endoscopy, colon, diagnostic (12/08/2016); aortic valve repair (N/A, 4/11/2017); bronchoscopy (N/A, 8/31/2020); IR INSERT PICC VAD W SQ PORT >5 YEARS (9/4/2020); and IR PORT PLACEMENT > 5 YEARS (9/29/2020).     Restrictions  Restrictions/Precautions  Restrictions/Precautions: Seizure, Fall Risk  Position Activity Restriction  Other position/activity restrictions: up as tolerated, Weightbearing as tolerated all extremities without restrictions  Vision/Hearing  Vision: Impaired  Vision Exceptions: Wears glasses at all times  Hearing: Within functional limits     Subjective  General  Chart Reviewed: Yes  Patient assessed for rehabilitation services?: Yes  Additional Pertinent Hx: chronic mechanical lumbar pain, discectomy, lung carcinoma, COPD, falls, home O2 use  General Comment  Comments: RN lexa PT  Subjective  Subjective: Pt agreeable yousuf PT  Pain Screening  Patient Currently in Pain: Yes  Pain Assessment  Pain Assessment: 0-10  Pain Location: Back;Rib cage  Pain Orientation: Lower;Right  Vital Signs  Patient Currently in Pain: AM-PAC Score  AM-PAC Inpatient Mobility Raw Score : 12 (11/04/20 1008)  AM-PAC Inpatient T-Scale Score : 35.33 (11/04/20 1008)  Mobility Inpatient CMS 0-100% Score: 68.66 (11/04/20 1008)  Mobility Inpatient CMS G-Code Modifier : CL (11/04/20 1008)          Goals  Short term goals  Time Frame for Short term goals: 12 visits  Short term goal 1: Inc bed-mobility & transfers to independent to enable pt to safely get in/OOB  Short term goal 2: Inc gait to amb 150ft or > indep w/ RW to enable pt to return to previous level of independence  Short term goal 3: Inc strength to [address] standing balance to good with device to facilitate pt independence for performance of ADL's & functional mobility, & reduce fall risk; Short term goal 4: Pt able to tolerate 30-40 min of activity to include 15-20 reps of ex & functional mobility including 5 minutes of standing to facilitate activity tolerance to [facility];   Short term goal 5: Ed pt on home ex's, safety & energy principles & issue written home program;       Therapy Time   Individual Concurrent Group Co-treatment   Time In 0910         Time Out 1016         Minutes 66+10=76         Timed Code Treatment Minutes: 58 Minutes    Additional 10 minutes for chart review          [address], PT

## 2020-11-04 NOTE — FLOWSHEET NOTE
Situation:  Writer visited with Ms. Rochelle Ambrocio in the inpatient setting. Assessment:  Ms. Rochelle Ambrocio was lying in bed. She told writer that she had fallen and that her grandson helped her. She expressed gratitude for her grandson. She was receptive to a copy of \"Our Daily Bread\" and a rosary. Her son, Tres Stark, arrived and asked writer about her incident. Son provided support and empathy to Pt. Intervention:  Writer provided supportive presence and explored Pt's coping and needs; offered empathy and words of encouragement and support; affirmed Pt's strengths; gave Pt a copy of \"Our Daily Bread\" and a rosary. Outcome:  Pt and Son acknowledged writer's words of support and expressed thanks. 11/04/20 1550   Encounter Summary   Services provided to: Patient   Referral/Consult From: [facility];Family members   Continue Visiting   (11/4/20)   Complexity of Encounter Moderate   Length of Encounter 15 minutes   Spiritual Assessment Completed Yes   Routine   Type Follow up   Spiritual/Orthodox   Type Spiritual support   Assessment Calm; Approachable;Coping   Intervention Active listening;Explored feelings, thoughts, concerns;Explored coping resources;Provided reading materials/devotional materials;Sustaining presence/ Ministry of presence   Outcome Expressed gratitude;Engaged in conversation;Expressed feelings/needs/concerns;Coping; Hopeful;Encouraged;Receptive     Electronically signed by Savi Hernandez, Oncology Outpatient [address], [address] [facility] Radiation Oncology  11/4/2020  3:52 PM

## 2020-11-04 NOTE — TELEPHONE ENCOUNTER
WORKING IN TX ROOM. STEPHENIE WAS ON TX SCHEDULE 11/04/2020 C3 D1 HOWEVER SHE IS CURRENTLY IN HOUSE DUE TO A FALL AND FX RIB WITH UTI. PINK SLIP TO CLERICAL    CURRENTLY SCHEDULE 11/11/2020 WITH MD AND TX.  CONFIRMED SHE DID NOT COME TO RTX TODAY EITHER. DR Priyanka Mathew.

## 2020-11-04 NOTE — CARE COORDINATION
Case Management Initial Discharge Plan  [place],             Met with:patient or family member patient and son to discuss discharge plans. Information verified: address, contacts, phone number, , insurance Yes  PCP: Donald Ramirez MD  Date of last visit: several months go    Insurance Provider: [employer]    Discharge Planning    Living Arrangements:  Elliot[place] Carmenza Torres has 1 stories  1 stairs to climb to get into front door, 0 stairs to climb to reach second floor  Location of bedroom/bathroom in home  - main floor    Patient able to perform ADL's: Assisted    Current Services (outpatient & in home) SN, PT/OT, HHA   DME equipment: walker, nebulizer, shower bench, toilet riser, O2 2L HS  DME provider: Audelia Muñiz: [facility] [place] and Seth Beckwith    Potential Assistance Needed:  Home Care vs SNF    Patient agreeable to home care: Yes  Freedom of choice provided:  yes    Prior SNF/Rehab Placement and Facility: [facility] to SNF/Rehab: Yes  [place] of choice provided: yes   Evaluation: yes    Expected Discharge date:  20  Patient expects to be discharged to:  Home  Follow Up Appointment: Best Day/ Time: Friday AM    Transportation provider: ambulance  Transportation arrangements needed for discharge: Yes    Readmission Risk              Risk of Unplanned Readmission:        35             Does patient have a readmission risk score greater than 14?: Yes  If yes, follow-up appointment must be made within 7 days of discharge. Goal of Care:       Discharge Plan: Met with patient and son at bedside. Lives alone and is independent. Hamida Rides at home Monday morning. Pt states she lost her balance. Recently diagnosed in August with lung cancer and getting chemo every Wednesday at [facility] [address] and radiation daily for 5 more weeks. Oncologist is Dr. Joanna Rhoades. Current with [facility] for SN, PT/OT and HHA.   Therapy recommending SNF and list given. Luisito Robert informed and will follow. Unsure if pt will be accepted at rehab due to chemo and radiation. VELMA initiated.                      Electronically signed by Hailey Bartlett RN on 11/4/20 at 4:05 PM EST

## 2020-11-04 NOTE — CONSULTS
Orthopedic Consult    Requesting Physician: Dr Vicky Coffey: Right rib and urinary pain    HISTORY OF PRESENT ILLNESS:      The patient is a 72 y.o. female  who presents with a weighted the second floor of [facility] having required ER admission after a standing height fall at home. Imaging identified a right eighth rib fracture she is also being treated for UTI. She denies any significant pain in her right or left upper extremity, right or left lower extremity. She acknowledges chronic mechanical lumbar pain. She has no sciatica. She has had a previous discectomy. Her past orthopedic history includes lung carcinoma. A port is in place. She acknowledges bone scan years ago. No stage IV involvement. Also denies rheumatoid arthritis, lupus, gout. Past Medical History:    Past Medical History:   Diagnosis Date    AAA (abdominal aortic aneurysm) ([facility]     Pt denies having a history of AAA    Acid reflux     Anxiety and depression     Aortic stenosis     Arthritis     Blister of ankle, right 10/13/2016    blister broke open & draining, is on antibiotics    CAD (coronary artery disease)     Cancer ([facility] [address] [facility]     lung cancer    COPD (chronic obstructive pulmonary disease) ([facility] [address]     Diabetes mellitus ([facility] [address] [facility]     Falls     Heart block     bifasicular    Hypokalemia     MDRO (multiple drug resistant organisms) resistance 10/17/2014    E. Coli urine    MRSA (methicillin resistant staph aureus) culture positive resolved 12/2016    2 negative nasal screens - 2016 (hx in urine 2014)    On home oxygen therapy     uses 2 liters at night    On home oxygen therapy     patient states 2 liters/nasal cannula continuous    Overactive bladder     patient incont.  wears a brief    Pneumonia     PONV (postoperative nausea and vomiting)     Vitamin D deficiency        Past Surgical History:    Past Surgical History:   Procedure Laterality Date    AORTIC VALVE REPAIR N/A 4/11/2017    AORTIC VALVE REPAIR REPLACEMENT performed by Kimberlee Griffin MD at [address] [place] [address] N/A 8/31/2020    BRONCHOSCOPY BIOPSY BRONCHUS performed by Clement Cehn MD at [facility]  12/08/2016    EYE SURGERY      HYSTERECTOMY      IR INS PICC VAD W SQ PORT GREATER THAN 5  9/4/2020    IR INS PICC VAD W SQ PORT GREATER THAN 5 9/4/2020 [facility]    IR PORT PLACEMENT EQUAL OR GREATER THAN 5 YEARS  9/29/2020    IR PORT PLACEMENT EQUAL OR GREATER THAN 5 YEARS 9/29/2020 [facility]    LUMBAR LAMINECTOMY      TONSILLECTOMY         Medications Prior to Admission:   Current Facility-Administered Medications   Medication Dose Route Frequency Provider Last Rate Last Dose    lidocaine 4 % external patch 1 patch  1 patch Transdermal Daily Katelyn Hannah MD        ARIPiprazole (ABILIFY) tablet 5 mg  5 mg Oral Daily Jenn Escamilla MD   5 mg at 11/03/20 2208    aspirin EC tablet 81 mg  81 mg Oral Daily Jenn Escamilla MD   81 mg at 11/03/20 2210    clonazePAM (KLONOPIN) tablet 1 mg  1 mg Oral TID PRN Jenn Escamilla MD        furosemide (LASIX) tablet 40 mg  40 mg Oral Daily Jenn Escamilla MD   40 mg at 11/03/20 2209    gabapentin (NEURONTIN) capsule 400 mg  400 mg Oral BID Jenn Escamilla MD   400 mg at 11/04/20 5376    gabapentin (NEURONTIN) capsule 800 mg  800 mg Oral Nightly Jenn Escamilla MD   800 mg at 11/03/20 2209    glimepiride (AMARYL) tablet 2 mg  2 mg Oral QAM Jenn Escamilla MD        glimepiride (AMARYL) tablet 1 mg  1 mg Oral Dinner Jenn Escamilla MD   1 mg at 11/03/20 2208    metoprolol tartrate (LOPRESSOR) tablet 25 mg  25 mg Oral BID Jenn Escamilla MD   25 mg at 11/03/20 2245    oxyCODONE-acetaminophen (PERCOCET) 5-325 MG per tablet 1 tablet  1 tablet Oral Q6H PRN Jenn Escamilla MD   1 tablet at 11/03/20 2229    traZODone ([facility]) tablet 300 mg  300 mg Oral Nightly Anna Pimentel MD   300 mg at 11/03/20 2210    venlafaxine (EFFEXOR XR) extended release capsule 150 mg  150 mg Oral BID Anna Pimentel MD   150 mg at 11/03/20 2210    0.9 % sodium chloride infusion   Intravenous Continuous Anna Pimentel MD 75 mL/hr at 11/03/20 2207      sodium chloride flush 0.9 % injection 10 mL  10 mL Intravenous 2 times per day Anna Pimentel MD        sodium chloride flush 0.9 % injection 10 mL  10 mL Intravenous PRN Anna Pimentel MD        potassium chloride (KLOR-CON M) extended release tablet 40 mEq  40 mEq Oral PRN Anna Pimentel MD        Or    potassium bicarb-citric acid (EFFER-K) effervescent tablet 40 mEq  40 mEq Oral PRN Anna Pimentel MD        Or    potassium chloride 10 mEq/100 mL IVPB (Peripheral Line)  10 mEq Intravenous PRN Anna Pimentel MD        magnesium sulfate 1 g in dextrose 5% 100 mL IVPB  1 g Intravenous PRN Anna Pimentel MD        acetaminophen (TYLENOL) tablet 650 mg  650 mg Oral Q6H PRN Anna Pimentel MD        Or    acetaminophen (TYLENOL) suppository 650 mg  650 mg Rectal Q6H PRN Anna Pimentel MD        senna (SENOKOT) tablet 8.6 mg  1 tablet Oral BID PRN Anna Pimentel MD        promethazine (PHENERGAN) tablet 12.5 mg  12.5 mg Oral Q6H PRN Anna Pimentel MD        Or    [facility] injection 4 mg  4 mg Intravenous Q6H PRN Anna Pimentel MD        famotidine (PEPCID) tablet 20 mg  20 mg Oral BID Anna Pimentel MD   20 mg at 11/03/20 2208    enoxaparin (LOVENOX) injection 40 mg  40 mg Subcutaneous Daily Anna Pimentel MD        cefTRIAXone (ROCEPHIN) 1 g IVPB in 50 mL D5W minibag  1 g Intravenous Q24H Anna Pimentel MD        insulin lispro (HUMALOG) injection vial 0-18 Units  0-18 Units Subcutaneous TID WC Anna Pimentel MD   6 Units at 11/03/20 1806    insulin lispro (HUMALOG) injection vial 0-9 Units  0-9 Units Subcutaneous Nightly Anna Pimentel MD       [facility] crepitus, pain with hip, knee, ankle, foot range. She does have 3/4 power spine examination shows no lateralizing radiculopathy, myopathy motor or sensory and reflexes C5-S1. Healed lumbar laminectomy incision without infection. DATA:  CBC:   Lab Results   Component Value Date    WBC 6.7 11/04/2020    HGB 7.7 11/04/2020     11/04/2020     BMP:    Lab Results   Component Value Date     11/04/2020    K 4.0 11/04/2020     11/04/2020    CO2 25 11/04/2020    BUN 17 11/04/2020    CREATININE 0.68 11/04/2020    CALCIUM 8.5 11/04/2020    GLUCOSE 77 11/04/2020     PT/INR:    Lab Results   Component Value Date    PROTIME 11.8 09/29/2020    INR 0.9 09/29/2020     Troponin:    Lab Results   Component Value Date    TROPONINI <0.01 03/14/2014       Radiology:     Imaging reviewed. Radiographs of her right ribs identify 1/8 rib fracture and a right lung mass as well as port for her chemotherapy. Cervical spine shows evidence of spondylosis C5-6. Past imaging includes 9- CT abdomen, pelvis, cervical spine. Degenerative disc disease with anterior osteophyte C5-6. 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 ASSESSMENT:  Active Problems:    Falls frequently    Chronic bilateral low back pain    Lung mass    Malignant neoplasm of upper lobe of right lung (HCC)    Urinary tract infectious disease    Closed fracture of one rib of right side  Resolved Problems:    * No resolved hospital problems. *       PLAN:  1) body bone scan    2. Lidoderm Derm patch    3. Pain control    4. Incentive spirometry    5. DVT prophylaxis    6. Skilled therapy for walker ambulation. Weightbearing as tolerated all except remedies without restrictions    7. Further suggestions to follow. We will follow closely with you. Thank you very much for the consultation.         Savita Atwood

## 2020-11-05 ENCOUNTER — HOSPITAL ENCOUNTER (OUTPATIENT)
Dept: RADIATION ONCOLOGY | Facility: MEDICAL CENTER | Age: 70
Discharge: HOME | End: 2020-11-05
Payer: MEDICARE

## 2020-11-05 LAB
ABSOLUTE EOS #: 0.07 K/UL (ref 0–0.44)
ABSOLUTE IMMATURE GRANULOCYTE: 0.05 K/UL (ref 0–0.3)
ABSOLUTE LYMPH #: 0.75 K/UL (ref 1.1–3.7)
ABSOLUTE MONO #: 0.39 K/UL (ref 0.1–1.2)
ANION GAP SERPL CALCULATED.3IONS-SCNC: 7 MMOL/L (ref 9–17)
BASOPHILS # BLD: 1 % (ref 0–2)
BASOPHILS ABSOLUTE: 0.03 K/UL (ref 0–0.2)
BUN BLDV-MCNC: 14 MG/DL (ref 8–23)
BUN/CREAT BLD: 21 (ref 9–20)
CALCIUM SERPL-MCNC: 8.4 MG/DL (ref 8.6–10.4)
CHLORIDE BLD-SCNC: 103 MMOL/L (ref 98–107)
CO2: 29 MMOL/L (ref 20–31)
CREAT SERPL-MCNC: 0.67 MG/DL (ref 0.5–0.9)
DIFFERENTIAL TYPE: ABNORMAL
EOSINOPHILS RELATIVE PERCENT: 1 % (ref 1–4)
FERRITIN: 149 UG/L (ref 13–150)
FOLATE: >20 NG/ML
GFR AFRICAN AMERICAN: >60 ML/MIN
GFR NON-AFRICAN AMERICAN: >60 ML/MIN
GFR SERPL CREATININE-BSD FRML MDRD: ABNORMAL ML/MIN/{1.73_M2}
GFR SERPL CREATININE-BSD FRML MDRD: ABNORMAL ML/MIN/{1.73_M2}
GLUCOSE BLD-MCNC: 136 MG/DL (ref 65–105)
GLUCOSE BLD-MCNC: 180 MG/DL (ref 65–105)
GLUCOSE BLD-MCNC: 292 MG/DL (ref 65–105)
GLUCOSE BLD-MCNC: 87 MG/DL (ref 65–105)
GLUCOSE BLD-MCNC: 89 MG/DL (ref 70–99)
HCT VFR BLD CALC: 23.8 % (ref 36.3–47.1)
HEMOGLOBIN: 7.2 G/DL (ref 11.9–15.1)
IMMATURE GRANULOCYTES: 1 %
IRON SATURATION: 11 % (ref 20–55)
IRON: 19 UG/DL (ref 37–145)
LACTIC ACID: 0.4 MMOL/L (ref 0.5–2.2)
LYMPHOCYTES # BLD: 13 % (ref 24–43)
MCH RBC QN AUTO: 24.4 PG (ref 25.2–33.5)
MCHC RBC AUTO-ENTMCNC: 30.3 G/DL (ref 28.4–34.8)
MCV RBC AUTO: 80.7 FL (ref 82.6–102.9)
MONOCYTES # BLD: 7 % (ref 3–12)
NRBC AUTOMATED: 0 PER 100 WBC
PDW BLD-RTO: 19.7 % (ref 11.8–14.4)
PLATELET # BLD: 325 K/UL (ref 138–453)
PLATELET ESTIMATE: ABNORMAL
PMV BLD AUTO: 8.7 FL (ref 8.1–13.5)
POTASSIUM SERPL-SCNC: 3.6 MMOL/L (ref 3.7–5.3)
RBC # BLD: 2.95 M/UL (ref 3.95–5.11)
RBC # BLD: ABNORMAL 10*6/UL
SEG NEUTROPHILS: 78 % (ref 36–65)
SEGMENTED NEUTROPHILS ABSOLUTE COUNT: 4.54 K/UL (ref 1.5–8.1)
SODIUM BLD-SCNC: 139 MMOL/L (ref 135–144)
TOTAL IRON BINDING CAPACITY: 176 UG/DL (ref 250–450)
UNSATURATED IRON BINDING CAPACITY: 157 UG/DL (ref 112–347)
VITAMIN B-12: 190 PG/ML (ref 232–1245)
WBC # BLD: 5.8 K/UL (ref 3.5–11.3)
WBC # BLD: ABNORMAL 10*3/UL

## 2020-11-05 PROCEDURE — 36415 COLL VENOUS BLD VENIPUNCTURE: CPT

## 2020-11-05 PROCEDURE — 83605 ASSAY OF LACTIC ACID: CPT

## 2020-11-05 PROCEDURE — 82947 ASSAY GLUCOSE BLOOD QUANT: CPT

## 2020-11-05 PROCEDURE — 83540 ASSAY OF IRON: CPT

## 2020-11-05 PROCEDURE — 1200000000 HC SEMI PRIVATE

## 2020-11-05 PROCEDURE — 83550 IRON BINDING TEST: CPT

## 2020-11-05 PROCEDURE — 6370000000 HC RX 637 (ALT 250 FOR IP): Performed by: HOSPITALIST

## 2020-11-05 PROCEDURE — 99223 1ST HOSP IP/OBS HIGH 75: CPT | Performed by: INTERNAL MEDICINE

## 2020-11-05 PROCEDURE — 2580000003 HC RX 258: Performed by: HOSPITALIST

## 2020-11-05 PROCEDURE — 6360000002 HC RX W HCPCS: Performed by: HOSPITALIST

## 2020-11-05 PROCEDURE — 6370000000 HC RX 637 (ALT 250 FOR IP): Performed by: ORTHOPAEDIC SURGERY

## 2020-11-05 PROCEDURE — 82746 ASSAY OF FOLIC ACID SERUM: CPT

## 2020-11-05 PROCEDURE — 85025 COMPLETE CBC W/AUTO DIFF WBC: CPT

## 2020-11-05 PROCEDURE — 80048 BASIC METABOLIC PNL TOTAL CA: CPT

## 2020-11-05 PROCEDURE — 82607 VITAMIN B-12: CPT

## 2020-11-05 PROCEDURE — 97530 THERAPEUTIC ACTIVITIES: CPT

## 2020-11-05 PROCEDURE — 97116 GAIT TRAINING THERAPY: CPT

## 2020-11-05 PROCEDURE — 82728 ASSAY OF FERRITIN: CPT

## 2020-11-05 RX ORDER — ARIPIPRAZOLE 5 MG/1
5 TABLET ORAL DAILY
Qty: 3 TABLET | Refills: 0 | Status: SHIPPED | OUTPATIENT
Start: 2020-11-05 | End: 2022-03-18

## 2020-11-05 RX ORDER — TRAZODONE HYDROCHLORIDE 150 MG/1
300 TABLET ORAL NIGHTLY
Qty: 6 TABLET | Refills: 0 | Status: SHIPPED | OUTPATIENT
Start: 2020-11-05 | End: 2020-12-09 | Stop reason: ALTCHOICE

## 2020-11-05 RX ORDER — GABAPENTIN 400 MG/1
800 CAPSULE ORAL NIGHTLY
Qty: 6 CAPSULE | Refills: 0 | Status: SHIPPED | OUTPATIENT
Start: 2020-11-05 | End: 2020-11-18 | Stop reason: ALTCHOICE

## 2020-11-05 RX ORDER — GABAPENTIN 400 MG/1
400 CAPSULE ORAL 2 TIMES DAILY
Qty: 3 CAPSULE | Refills: 0 | Status: SHIPPED | OUTPATIENT
Start: 2020-11-05 | End: 2021-11-24

## 2020-11-05 RX ORDER — CLONAZEPAM 1 MG/1
1 TABLET ORAL 3 TIMES DAILY PRN
Qty: 3 TABLET | Refills: 0 | Status: ON HOLD | OUTPATIENT
Start: 2020-11-05 | End: 2022-04-04

## 2020-11-05 RX ORDER — OXYCODONE HYDROCHLORIDE AND ACETAMINOPHEN 5; 325 MG/1; MG/1
1 TABLET ORAL EVERY 6 HOURS PRN
Qty: 3 TABLET | Refills: 0 | Status: SHIPPED | OUTPATIENT
Start: 2020-11-05 | End: 2020-11-06

## 2020-11-05 RX ADMIN — CEFTRIAXONE 1 G: 1 INJECTION, POWDER, FOR SOLUTION INTRAMUSCULAR; INTRAVENOUS at 14:36

## 2020-11-05 RX ADMIN — ENOXAPARIN SODIUM 40 MG: 40 INJECTION SUBCUTANEOUS at 09:43

## 2020-11-05 RX ADMIN — METOPROLOL TARTRATE 25 MG: 25 TABLET, FILM COATED ORAL at 09:44

## 2020-11-05 RX ADMIN — VENLAFAXINE HYDROCHLORIDE 150 MG: 75 CAPSULE, EXTENDED RELEASE ORAL at 09:43

## 2020-11-05 RX ADMIN — FUROSEMIDE 40 MG: 40 TABLET ORAL at 09:44

## 2020-11-05 RX ADMIN — GABAPENTIN 400 MG: 300 CAPSULE ORAL at 06:20

## 2020-11-05 RX ADMIN — FAMOTIDINE 20 MG: 20 TABLET, FILM COATED ORAL at 09:43

## 2020-11-05 RX ADMIN — INSULIN LISPRO 9 UNITS: 100 INJECTION, SOLUTION INTRAVENOUS; SUBCUTANEOUS at 11:46

## 2020-11-05 RX ADMIN — SODIUM CHLORIDE: 9 INJECTION, SOLUTION INTRAVENOUS at 14:35

## 2020-11-05 RX ADMIN — GLIMEPIRIDE 1 MG: 1 TABLET ORAL at 17:05

## 2020-11-05 RX ADMIN — VENLAFAXINE HYDROCHLORIDE 150 MG: 75 CAPSULE, EXTENDED RELEASE ORAL at 20:59

## 2020-11-05 RX ADMIN — GABAPENTIN 800 MG: 300 CAPSULE ORAL at 20:59

## 2020-11-05 RX ADMIN — GLIMEPIRIDE 2 MG: 2 TABLET ORAL at 09:43

## 2020-11-05 RX ADMIN — OXYCODONE HYDROCHLORIDE AND ACETAMINOPHEN 1 TABLET: 5; 325 TABLET ORAL at 18:34

## 2020-11-05 RX ADMIN — FAMOTIDINE 20 MG: 20 TABLET, FILM COATED ORAL at 21:00

## 2020-11-05 RX ADMIN — GABAPENTIN 400 MG: 300 CAPSULE ORAL at 11:47

## 2020-11-05 RX ADMIN — TRAZODONE HYDROCHLORIDE 300 MG: 100 TABLET ORAL at 20:59

## 2020-11-05 RX ADMIN — INSULIN LISPRO 2 UNITS: 100 INJECTION, SOLUTION INTRAVENOUS; SUBCUTANEOUS at 20:59

## 2020-11-05 RX ADMIN — ASPIRIN 81 MG: 81 TABLET, COATED ORAL at 09:44

## 2020-11-05 RX ADMIN — OXYCODONE HYDROCHLORIDE AND ACETAMINOPHEN 1 TABLET: 5; 325 TABLET ORAL at 09:55

## 2020-11-05 RX ADMIN — METOPROLOL TARTRATE 25 MG: 25 TABLET, FILM COATED ORAL at 21:00

## 2020-11-05 RX ADMIN — ARIPIPRAZOLE 5 MG: 5 TABLET ORAL at 09:44

## 2020-11-05 ASSESSMENT — PAIN DESCRIPTION - FREQUENCY
FREQUENCY: CONTINUOUS
FREQUENCY: CONTINUOUS

## 2020-11-05 ASSESSMENT — PAIN DESCRIPTION - LOCATION
LOCATION: PELVIS;BACK
LOCATION: BACK;RIB CAGE
LOCATION: BACK;RIB CAGE

## 2020-11-05 ASSESSMENT — PAIN SCALES - GENERAL
PAINLEVEL_OUTOF10: 10
PAINLEVEL_OUTOF10: 10
PAINLEVEL_OUTOF10: 4

## 2020-11-05 ASSESSMENT — PAIN DESCRIPTION - PROGRESSION
CLINICAL_PROGRESSION: GRADUALLY WORSENING

## 2020-11-05 ASSESSMENT — PAIN DESCRIPTION - ORIENTATION
ORIENTATION: RIGHT;LOWER
ORIENTATION: RIGHT;LOWER

## 2020-11-05 ASSESSMENT — PAIN DESCRIPTION - DESCRIPTORS
DESCRIPTORS: ACHING;DISCOMFORT
DESCRIPTORS: ACHING;DISCOMFORT

## 2020-11-05 ASSESSMENT — PAIN - FUNCTIONAL ASSESSMENT
PAIN_FUNCTIONAL_ASSESSMENT: PREVENTS OR INTERFERES SOME ACTIVE ACTIVITIES AND ADLS
PAIN_FUNCTIONAL_ASSESSMENT: PREVENTS OR INTERFERES SOME ACTIVE ACTIVITIES AND ADLS

## 2020-11-05 ASSESSMENT — PAIN DESCRIPTION - ONSET
ONSET: ON-GOING
ONSET: ON-GOING

## 2020-11-05 ASSESSMENT — PAIN DESCRIPTION - PAIN TYPE
TYPE: ACUTE PAIN;CHRONIC PAIN
TYPE: ACUTE PAIN;CHRONIC PAIN
TYPE: ACUTE PAIN

## 2020-11-05 NOTE — PLAN OF CARE
Problem: Pain:  Goal: Pain level will decrease  Description: Pain level will decrease  11/5/2020 1411 by Candy Hilton RN  Outcome: Ongoing  11/5/2020 0327 by Oda Severs, RN  Outcome: Ongoing  Goal: Control of acute pain  Description: Control of acute pain  11/5/2020 1411 by Candy Hilton RN  Outcome: Ongoing  11/5/2020 0327 by Oda Severs, RN  Outcome: Ongoing  Goal: Control of chronic pain  Description: Control of chronic pain  11/5/2020 1411 by Candy Hilton RN  Outcome: Ongoing  11/5/2020 0327 by Oda Severs, RN  Outcome: Ongoing     Problem: Falls - Risk of:  Goal: Will remain free from falls  Description: Will remain free from falls  11/5/2020 1411 by Candy Hilton RN  Outcome: Ongoing  11/5/2020 0327 by Oda Severs, RN  Outcome: Ongoing  Goal: Absence of physical injury  Description: Absence of physical injury  11/5/2020 1411 by Candy Hilton RN  Outcome: Ongoing  11/5/2020 0327 by Oda Severs, RN  Outcome: Ongoing

## 2020-11-05 NOTE — PROGRESS NOTES
Progress Note    11/5/2020 12:16 PM  Subjective:   Admit Date: 11/3/2020  PCP: Oscar Pascual MD  Date of Discharge: As per hospitalist    Medications:   Scheduled Meds:   lidocaine  1 patch Transdermal Daily    ARIPiprazole  5 mg Oral Daily    aspirin EC  81 mg Oral Daily    furosemide  40 mg Oral Daily    gabapentin  400 mg Oral BID    gabapentin  800 mg Oral Nightly    glimepiride  2 mg Oral QAM    glimepiride  1 mg Oral Dinner    metoprolol tartrate  25 mg Oral BID    traZODone  300 mg Oral Nightly    venlafaxine  150 mg Oral BID    sodium chloride flush  10 mL Intravenous 2 times per day    famotidine  20 mg Oral BID    enoxaparin  40 mg Subcutaneous Daily    cefTRIAXone (ROCEPHIN) IV  1 g Intravenous Q24H    insulin lispro  0-18 Units Subcutaneous TID WC    insulin lispro  0-9 Units Subcutaneous Nightly     Continuous Infusions:   sodium chloride 75 mL/hr at 11/04/20 1448    dextrose       PRN Meds:morphine, clonazePAM, oxyCODONE-acetaminophen, sodium chloride flush, potassium chloride **OR** potassium alternative oral replacement **OR** potassium chloride, magnesium sulfate, acetaminophen **OR** acetaminophen, senna, promethazine **OR** ondansetron, glucose, dextrose, glucagon (rDNA), dextrose    Diet:   Diet: DIET GENERAL;    Subjective:   Systemic or Specific Complaints:Pain Control    Objective:     Patient Vitals for the past 24 hrs:   BP Temp Temp src Pulse Resp SpO2 Weight   11/05/20 0734 122/63 98.1 °F (36.7 °C) Oral 99 17 96 % --   11/05/20 0402 -- -- -- -- -- -- 146 lb (66.2 kg)   11/04/20 1930 94/63 98.4 °F (36.9 °C) Oral 102 19 100 % --     I/O last 3 completed shifts: In: 892 [I.V.:892]  Out: -   I/O this shift: In: 530 [I.V.:530]  Out: -       General: alert, appears stated age, cooperative and mild distress   Wound: Motion: Painful range of Motion in affected extremity   DVT Exam: No evidence of DVT seen on physical exam.  Negative Gregoria's sign.   No cords or calf tenderness. Additional exam:     Data Review  CBC:   Recent Labs     11/03/20  1046 11/04/20  0544 11/05/20  0502   WBC 8.9 6.7 5.8   HGB 8.4* 7.7* 7.2*    302 325     BMP:    Recent Labs     11/03/20  1046 11/04/20  0544 11/05/20  0502   * 141 139   K 4.0 4.0 3.6*   CL 98 107 103   CO2 27 25 29   BUN 28* 17 14   CREATININE 0.80 0.68 0.67   GLUCOSE 191* 77 89     Hepatic: No results for input(s): AST, ALT, ALB, BILITOT, ALKPHOS in the last 72 hours. Troponin: No results for input(s): TROPONINI in the last 72 hours. BNP: No results for input(s): BNP in the last 72 hours. Lipids: No results for input(s): CHOL, HDL in the last 72 hours. Invalid input(s): LDLCALCU  INR: No results for input(s): INR in the last 72 hours. Assessment:   Ishmael Dewey has slightly improved from yesterday. Pain is well controlled. She has no nausea and no vomiting. Current activity is up with assistance  Incision:       Plan:      1: Body bone scan reviewed. Polyostotic OA. Acute rib fracture not detected. No evidence of oncology  2:  Continue Deep venous thrombosis prophylaxis  3:  Continue Pain Control  4. Continue nonop treatment.   5. Office followup prn      Electronically signed by Ruth Ann David MD on 11/5/2020 at 12:16 PM

## 2020-11-05 NOTE — CARE COORDINATION
Social work: Phone call from [employer], they are researching on their end to see how they can accommodate patient with her radiation/chemo schedule. RAJINDER spoke with Kalen/son and confirmed family still plans to transport her to all appointments from the SNF. Dave Steen will follow-up with RAJINDER tomorrow regarding acceptance. Once accepted, she will need precert prior to admission. Stone started.

## 2020-11-05 NOTE — DISCHARGE INSTR - COC
work consult    Patient's personal belongings (please select all that are sent with patient):  Glasses, upper and lower dentures    RN SIGNATURE:  Electronically signed by Max Yeung RN on 11/12/20 at 1:22 PM EST    CASE MANAGEMENT/SOCIAL WORK SECTION    Inpatient Status Date: ***    Readmission Risk Assessment Score:  Readmission Risk              Risk of Unplanned Readmission:        36           Discharging to Facility/ Agency   Name:   [facility]            [address] [employer], [address] [place] [address]       Phone: [phone]       Fax: [phone]        · [address] [facility]: [phone]. Fax: [phone]  · [employer] [facility]er [facility]: [phone]    / signature: Electronically signed by Abdifatah Medellin RN on 11/12/20 at 2:37 PM EST    PHYSICIAN SECTION    Prognosis: Good    Condition at Discharge: Stable    Rehab Potential (if transferring to Rehab): Good    Recommended Labs or Other Treatments After Discharge: Diagnosis:    Skilled Nursing per hospital recommendation ( Monitor Vitals,pain, Mental status, daily weight Blood sugar monitoring TIDAC.) Call MD if Blood sugar<60 or > 350,Fall precaution, wound care quach catheter removal as per Nursing home attending)  Skilled OT  Physical Therapy  Daily Labs: cbc,bmp q weekly  Monitor INR Daily if pt on coumadin   Coumadin management per SNF admitting physician unless otherwise specified. Oxygen 2L/minute   Blood sugar accucheck TIDAC  Daily Pulse oxymetry  Continue CPAP/BIPAP if pt wearing at home. Catheter/drain care per surgery  If pt on Tube feeding- please continue per hospital orders. Physician Certification: I certify the above information and transfer of Justina Eid  is necessary for the continuing treatment of the diagnosis listed and that she requires home carefor greater 30 days.      Update Admission H&P: No change in H&P    PHYSICIAN SIGNATURE:  Electronically signed by Gurpreet Hernandez Loc King MD on 11/6/20 at 9:28 AM EST

## 2020-11-05 NOTE — PLAN OF CARE
Problem: Pain:  Goal: Pain level will decrease  Description: Pain level will decrease  11/5/2020 0327 by Aurora Man RN  Outcome: Ongoing     Problem: Pain:  Goal: Control of acute pain  Description: Control of acute pain  11/5/2020 0327 by Aurora Man RN  Outcome: Ongoing     Problem: Pain:  Goal: Control of chronic pain  Description: Control of chronic pain  11/5/2020 0327 by Aurora Man RN  Outcome: Ongoing     Problem: Falls - Risk of:  Goal: Will remain free from falls  Description: Will remain free from falls  11/5/2020 0327 by Aurora Man RN  Outcome: Ongoing     Problem: Falls - Risk of:  Goal: Absence of physical injury  Description: Absence of physical injury  11/5/2020 0327 by Aurora Man RN  Outcome: Ongoing

## 2020-11-05 NOTE — CONSULTS
Today's Date: 11/5/2020  Patient Name: Quynh Truong  Date of admission: 11/3/2020 10:59 AM  Patient's age: 72 y.o., 1950  Admission Dx: Urinary tract infectious disease [N39.0]    Reason for Consult: management recommendations  Requesting Physician: Fanta Fraser MD    CHIEF COMPLAINT: Weakness fatigue. UTI. History Obtained From:  patient, electronic medical record    HISTORY OF PRESENT ILLNESS:      The patient is a 72 y.o.  female who is admitted to the hospital with chief complaint of weakness and fatigue. Reportedly patient also had a standing height fall at home. Imaging revealed eighth rib fracture. Work-up also revealed UTI. Patient also has significant joint pain. 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 Patient has been started on concurrent chemoradiation. Last chemotherapy treatment was on 10/28/2020. Work-up reveals hemoglobin of 7.2 with MCV 80. Patient has adequate ANC and platelet count. Blood cultures are pending. Urine culture showing E. coli. Past Medical History:   has a past medical history of AAA (abdominal aortic aneurysm) ([facility] [address] Acid reflux, Anxiety and depression, Aortic stenosis, Arthritis, Blister of ankle, right, CAD (coronary artery disease), Cancer ([address] COPD (chronic obstructive pulmonary disease) ([facility] [address] Diabetes mellitus ([facility] [address] Falls, Heart block, Hypokalemia, MDRO (multiple drug resistant organisms) resistance, MRSA (methicillin resistant staph aureus) culture positive, On home oxygen therapy, On home oxygen therapy, Overactive bladder, Pneumonia, PONV (postoperative nausea and vomiting), and Vitamin D deficiency. Past Surgical History:   has a past surgical history that includes back surgery; eye surgery; Cholecystectomy; Appendectomy; Hysterectomy; Tonsillectomy; lumbar laminectomy;  Endoscopy, colon, diagnostic (12/08/2016); aortic Herlinda Sargent MD   1 tablet at 11/04/20 1912    traZODone (DESYREL) tablet 300 mg  300 mg Oral Nightly Herlinda Sargent MD   300 mg at 11/04/20 2134    venlafaxine (EFFEXOR XR) extended release capsule 150 mg  150 mg Oral BID Herlinda Sargent MD   150 mg at 11/04/20 2134    0.9 % sodium chloride infusion   Intravenous Continuous Herlinda Sargent MD 75 mL/hr at 11/04/20 1448      sodium chloride flush 0.9 % injection 10 mL  10 mL Intravenous 2 times per day Herlinda Sargent MD   10 mL at 11/04/20 2100    sodium chloride flush 0.9 % injection 10 mL  10 mL Intravenous PRN Herlinda Sargent MD   10 mL at 11/04/20 1526    potassium chloride (KLOR-CON M) extended release tablet 40 mEq  40 mEq Oral PRN Herlinda Sargent MD        Or    potassium bicarb-citric acid (EFFER-K) effervescent tablet 40 mEq  40 mEq Oral PRN Herlinda Sargent MD        Or    potassium chloride 10 mEq/100 mL IVPB (Peripheral Line)  10 mEq Intravenous PRN Herlinda Sargent MD        magnesium sulfate 1 g in dextrose 5% 100 mL IVPB  1 g Intravenous PRN Herlinda Sargent MD        acetaminophen (TYLENOL) tablet 650 mg  650 mg Oral Q6H PRN Herlinda Sargent MD        Or    acetaminophen (TYLENOL) suppository 650 mg  650 mg Rectal Q6H PRN Herlinda Sargent MD        senna (SENOKOT) tablet 8.6 mg  1 tablet Oral BID PRN Herlinda Sargent MD        promethazine (PHENERGAN) tablet 12.5 mg  12.5 mg Oral Q6H PRN Herlinda Sargent MD        Or    ondansetron [facility]) injection 4 mg  4 mg Intravenous Q6H PRN Herlinda Sargent MD        famotidine (PEPCID) tablet 20 mg  20 mg Oral BID Herlinda Sargent MD   20 mg at 11/04/20 2134    enoxaparin (LOVENOX) injection 40 mg  40 mg Subcutaneous Daily Herlinda Sargent MD   40 mg at 11/04/20 0837    cefTRIAXone (ROCEPHIN) 1 g IVPB in 50 mL D5W minibag  1 g Intravenous Q24H Herlinda Sargent MD   Stopped at 11/04/20 1676    insulin lispro (HUMALOG) injection vial 0-18 Units  0-18 Units Subcutaneous TID [facility] Alex Snyder MD   12 Units at 11/04/20 1156    insulin lispro (HUMALOG) injection vial 0-9 Units  0-9 Units Subcutaneous Nightly Joe Camarillo MD   2 Units at 11/04/20 2134    glucose (GLUTOSE) 40 % oral gel 15 g  15 g Oral PRN Joe Camarillo MD        dextrose 50 % IV solution  12.5 g Intravenous PRN Joe Camarillo MD        glucagon (rDNA) injection 1 mg  1 mg Intramuscular PRN Joe Camarillo MD        dextrose 5 % solution  100 mL/hr Intravenous PRN Joe Camarillo MD           Allergies:  Codeine and Dye [iodides]    Social History:   reports that she quit smoking about 4 years ago. She has never used smokeless tobacco. She reports that she does not drink alcohol or use drugs. Family History: family history includes Cancer in her father and mother. REVIEW OF SYSTEMS:      Constitutional: No fever or chills. No night sweats, no weight loss. Positive fatigue. Eyes: No eye discharge, double vision, or eye pain   HEENT: negative for sore mouth, sore throat, hoarseness and voice change   Respiratory: negative for cough , sputum, dyspnea, wheezing, hemoptysis, chest pain   Cardiovascular: negative for chest pain, dyspnea, palpitations, orthopnea, PND   Gastrointestinal: negative for nausea, vomiting, diarrhea, constipation, abdominal pain, Dysphagia, hematemesis and hematochezia   Genitourinary: negative for frequency, dysuria, nocturia, urinary incontinence, and hematuria   Integument: negative for rash, skin lesions, bruises.    Hematologic/Lymphatic: negative for easy bruising, bleeding, lymphadenopathy, or petechiae   Endocrine: negative for heat or cold intolerance,weight changes, change in bowel habits and hair loss   Musculoskeletal: negative for myalgias, arthralgias, pain, joint swelling,and bone pain   Neurological: negative for headaches, dizziness, seizures, weakness, numbness    PHYSICAL EXAM:        /63   Pulse 99   Temp 98.1 °F (36.7 °C) (Oral)   Resp 17   Ht 5' 8\" (1.727 m)   Wt 146 lb (66.2 kg)   SpO2 96%   BMI 22.20 kg/m²    Temp (24hrs), Av.3 °F (36.8 °C), Min:98.1 °F (36.7 °C), Max:98.4 °F (36.9 °C)      General appearance - well appearing, no in pain or distress   Mental status - alert and cooperative   Eyes - pupils equal and reactive, extraocular eye movements intact   Ears - bilateral TM's and external ear canals normal   Mouth - mucous membranes moist, pharynx normal without lesions   Neck - supple, no significant adenopathy   Lymphatics - no palpable lymphadenopathy, no hepatosplenomegaly   Chest - clear to auscultation, no wheezes, rales or rhonchi, symmetric air entry   Heart - normal rate, regular rhythm, normal S1, S2, no murmurs  Abdomen - soft, nontender, nondistended, no masses or organomegaly   Neurological - alert, oriented, normal speech, no focal findings or movement disorder noted   Musculoskeletal - no joint tenderness, deformity or swelling   Extremities - peripheral pulses normal, no pedal edema, no clubbing or cyanosis   Skin - normal coloration and turgor, no rashes, no suspicious skin lesions noted ,      DATA:      Labs:     Results for orders placed or performed during the hospital encounter of 20   Culture, Blood 1    Specimen: Blood   Result Value Ref Range    Specimen Description . BLOOD     Special Requests RFA 2 ML     Culture NO GROWTH 2 DAYS    Culture, Blood 1    Specimen: Blood   Result Value Ref Range    Specimen Description . BLOOD     Special Requests NOT REPORTED     Culture NO SAMPLE RECEIVED    Culture, Urine    Specimen: Urine, clean catch   Result Value Ref Range    Specimen Description . CLEAN CATCH URINE     Special Requests NOT REPORTED     Culture ESCHERICHIA COLI >[id number] CFU/ML (A)        Susceptibility    Escherichia coli - BACTERIAL SUSCEPTIBILITY PANEL MUNIR     amikacin Value in next row        NOT REPORTED     ampicillin Value in next row Resistant       >=32RESISTANT     ampicillin-sulbactam Value in next row NOT REPORTED     aztreonam Value in next row Sensitive       <=1SUSCEPTIBLE     ceFAZolin Value in next row Sensitive       <=4SUSCEPTIBLE     ceFAZolin Value in next row Sensitive       <=4SUSCEPTIBLE     cefepime Value in next row        NOT REPORTED     cefTRIAXone Value in next row Sensitive       <=1SUSCEPTIBLE     ciprofloxacin Value in next row Resistant       >=4RESISTANT     ertapenem Value in next row        NOT REPORTED     Confirmatory Extended Spectrum Beta-Lactamase Value in next row Negative       NOT REPORTED     gentamicin Value in next row Sensitive       <=1SUSCEPTIBLE     meropenem Value in next row        NOT REPORTED     nitrofurantoin Value in next row Sensitive       <=16SUSCEPTIBLE     tigecycline Value in next row        NOT REPORTED     tobramycin Value in next row Sensitive       <=1SUSCEPTIBLE     trimethoprim-sulfamethoxazole Value in next row Sensitive       <=20SUSCEPTIBLE     piperacillin-tazobactam Value in next row Sensitive       <=4SUSCEPTIBLE   Urinalysis   Result Value Ref Range    Color, UA YELLOW YELLOW    Turbidity UA CLOUDY (A) CLEAR    Glucose, Ur NEGATIVE NEGATIVE    Bilirubin Urine NEGATIVE NEGATIVE    Ketones, Urine NEGATIVE NEGATIVE    Specific Gravity, UA 1.023 1.005 - 1.030    Urine Hgb 3+ (A) NEGATIVE    pH, UA 5.5 5.0 - 8.0    Protein, UA 2+ (A) NEGATIVE    Urobilinogen, Urine Normal Normal    Nitrite, Urine NEGATIVE NEGATIVE    Leukocyte Esterase, Urine MODERATE (A) NEGATIVE    Urinalysis Comments NOT REPORTED    CBC Auto Differential   Result Value Ref Range    WBC 8.9 3.5 - 11.3 k/uL    RBC 3.47 (L) 3.95 - 5.11 m/uL    Hemoglobin 8.4 (L) 11.9 - 15.1 g/dL    Hematocrit 27.7 (L) 36.3 - 47.1 %    MCV 79.8 (L) 82.6 - 102.9 fL    MCH 24.2 (L) 25.2 - 33.5 pg    MCHC 30.3 28.4 - 34.8 g/dL    RDW 19.8 (H) 11.8 - 14.4 %    Platelets 436 919 - 548 k/uL    MPV 9.2 8.1 - 13.5 fL    NRBC Automated 0.0 0.0 per 100 WBC    Differential Type NOT REPORTED     WBC Morphology NOT REPORTED     RBC Morphology NOT REPORTED     Platelet Estimate NOT REPORTED     Seg Neutrophils 87 (H) 36 - 66 %    Lymphocytes 7 (L) 24 - 44 %    Monocytes 5 1 - 7 %    Eosinophils % 0 (L) 1 - 4 %    Basophils 0 %    Immature Granulocytes 1 (H) 0 %    Segs Absolute 7.74 (H) 1.8 - 7.7 k/uL    Absolute Lymph # 0.62 (L) 1.0 - 4.8 k/uL    Absolute Mono # 0.45 0.2 - 0.8 k/uL    Absolute Eos # 0.00 0.0 - 0.4 k/uL    Basophils Absolute 0.00 0.0 - 0.2 k/uL    Absolute Immature Granulocyte 0.09 0.00 - 0.30 k/uL   Basic Metabolic Panel   Result Value Ref Range    Glucose 191 (H) 70 - 99 mg/dL    BUN 28 (H) 8 - 23 mg/dL    CREATININE 0.80 0.50 - 0.90 mg/dL    Bun/Cre Ratio 35 (H) 9 - 20    Calcium 9.2 8.6 - 10.4 mg/dL    Sodium 134 (L) 135 - 144 mmol/L    Potassium 4.0 3.7 - 5.3 mmol/L    Chloride 98 98 - 107 mmol/L    CO2 27 20 - 31 mmol/L    Anion Gap 9 9 - 17 mmol/L    GFR Non-African American >60 >60 mL/min    GFR African American >60 >60 mL/min    GFR Comment          GFR Staging NOT REPORTED    Microscopic Urinalysis   Result Value Ref Range    -          WBC, UA TOO NUMEROUS TO COUNT 0 - 5 /HPF    RBC, UA TOO NUMEROUS TO COUNT 0 - 2 /HPF    Casts UA NOT REPORTED /LPF    Crystals, UA NOT REPORTED None /HPF    Epithelial Cells UA 2 TO 5 0 - 5 /HPF    Renal Epithelial, UA NOT REPORTED 0 /HPF    Bacteria, UA MANY (A) None    Mucus, UA 1+ (A) None    Trichomonas, UA NOT REPORTED None    Amorphous, UA NOT REPORTED None    Other Observations UA NOT REPORTED NOT REQ.     Yeast, UA NOT REPORTED None   Basic Metabolic Panel w/ Reflex to MG   Result Value Ref Range    Glucose 77 70 - 99 mg/dL    BUN 17 8 - 23 mg/dL    CREATININE 0.68 0.50 - 0.90 mg/dL    Bun/Cre Ratio 25 (H) 9 - 20    Calcium 8.5 (L) 8.6 - 10.4 mg/dL    Sodium 141 135 - 144 mmol/L    Potassium 4.0 3.7 - 5.3 mmol/L    Chloride 107 98 - 107 mmol/L    CO2 25 20 - 31 mmol/L    Anion Gap 9 9 - 17 mmol/L    GFR Non-African American >60 >60 mL/min    GFR African American 25.2 - 33.5 pg    MCHC 30.3 28.4 - 34.8 g/dL    RDW 19.7 (H) 11.8 - 14.4 %    Platelets 387 586 - 108 k/uL    MPV 8.7 8.1 - 13.5 fL    NRBC Automated 0.0 0.0 per 100 WBC    Differential Type NOT REPORTED     WBC Morphology NOT REPORTED     RBC Morphology ANISOCYTOSIS PRESENT     Platelet Estimate NOT REPORTED     Seg Neutrophils 78 (H) 36 - 65 %    Lymphocytes 13 (L) 24 - 43 %    Monocytes 7 3 - 12 %    Eosinophils % 1 1 - 4 %    Basophils 1 0 - 2 %    Immature Granulocytes 1 (H) 0 %    Segs Absolute 4.54 1.50 - 8.10 k/uL    Absolute Lymph # 0.75 (L) 1.10 - 3.70 k/uL    Absolute Mono # 0.39 0.10 - 1.20 k/uL    Absolute Eos # 0.07 0.00 - 0.44 k/uL    Basophils Absolute 0.03 0.00 - 0.20 k/uL    Absolute Immature Granulocyte 0.05 0.00 - 0.30 k/uL   Lactic Acid   Result Value Ref Range    Lactic Acid 0.4 (L) 0.5 - 2.2 mmol/L   POC Glucose Fingerstick   Result Value Ref Range    POC Glucose 231 (H) 65 - 105 mg/dL   POC Glucose Fingerstick   Result Value Ref Range    POC Glucose 152 (H) 65 - 105 mg/dL   POC Glucose Fingerstick   Result Value Ref Range    POC Glucose 61 (L) 65 - 105 mg/dL   POC Glucose Fingerstick   Result Value Ref Range    POC Glucose 91 65 - 105 mg/dL   POC Glucose Fingerstick   Result Value Ref Range    POC Glucose 311 (H) 65 - 105 mg/dL   POC Glucose Fingerstick   Result Value Ref Range    POC Glucose 105 65 - 105 mg/dL   POC Glucose Fingerstick   Result Value Ref Range    POC Glucose 180 (H) 65 - 105 mg/dL   POC Glucose Fingerstick   Result Value Ref Range    POC Glucose 163 (H) 65 - 105 mg/dL   POC Glucose Fingerstick   Result Value Ref Range    POC Glucose 87 65 - 105 mg/dL   EKG 12 Lead   Result Value Ref Range    Ventricular Rate 101 BPM    Atrial Rate 101 BPM    P-R Interval 148 ms    QRS Duration 154 ms    Q-T Interval 406 ms    QTc Calculation (Bazett) 526 ms    P Axis 68 degrees    R Axis -59 degrees    T Axis 29 degrees         IMAGING DATA:    Xr Ribs Right Include Chest (min 3 Views)    Result Date: 11/3/2020  EXAMINATION: 2 XRAY VIEWS OF THE RIGHT RIBS WITH FRONTAL XRAY VIEW OF THE CHEST 11/3/2020 11:53 am COMPARISON: 09/25/2020 HISTORY: ORDERING SYSTEM PROVIDED HISTORY: fall yesterday TECHNOLOGIST PROVIDED HISTORY: fall yesterday Reason for Exam: fell Acuity: Acute Type of Exam: Initial Relevant Medical/Surgical History: pt fell, pain in rt lat ribs and posterior neck FINDINGS: Masslike opacity in the right lung apex. There is a subtle linear lucency within the 8th rib which is suggestive of a nondisplaced fracture. Multiple remote/chronic fractures identified. Left hemithorax is clear. Subtle linear lucency within the 8th rib, suggestive of a nondisplaced fracture. Masslike opacity in the right lung apex. Xr Cervical Spine (2-3 Views)    Result Date: 11/3/2020  EXAMINATION: 3 XRAY VIEWS OF THE CERVICAL SPINE 11/3/2020 11:52 am COMPARISON: None. HISTORY: ORDERING SYSTEM PROVIDED HISTORY: fall yesterday TECHNOLOGIST PROVIDED HISTORY: fall yesterday Reason for Exam: fell Acuity: Acute Type of Exam: Initial Relevant Medical/Surgical History: pt fell, pain in rt lat ribs and posterior neck FINDINGS: Three views of the cervical spine are submitted for review. Advanced facet arthropathy at C2 through C5. Degenerative disease at C5-C6. The lower cervical spine is obscured on the lateral view due to overlying osseous structures and soft tissues. Masslike right upper lobe airspace opacity. No convincing evidence for acute fracture or malalignment of the cervical spine. Masslike opacity within the right lung apex.      Xr Lumbar Spine (2-3 Views)    Result Date: 11/4/2020  EXAMINATION: THREE XRAY VIEWS OF THE LUMBAR SPINE; THREE XRAY VIEWS OF THE SACRUM/COCCYX 11/4/2020 1:07 pm COMPARISON: CT abdomen and pelvis dated 09/24/2020 HISTORY: ORDERING SYSTEM PROVIDED HISTORY: back pain TECHNOLOGIST PROVIDED HISTORY: back pain Reason for Exam: pain to low back Acuity: Unknown Type of Exam: Unknown FINDINGS: Lumbar spine: Vertebral body heights and alignment are within normal limits. There is mild multilevel disc space narrowing and endplate spurring. Mild to moderate facet arthropathy. There are scattered vascular calcifications. The stomach is significantly distended with gas. No small bowel or large bowel distention. There is moderate fecal material throughout the large bowel. Sacrum/coccyx: Sacrum is not well visualized due to osteopenia and overlying soft tissue attenuation. Sacrococcygeal alignment is grossly preserved. No displaced fracture identified. Sacroiliac joints are symmetric, better visualized on the lumbar spine radiographs. 1.  Lumbar spine: No compression fracture or acute malalignment. Mild degenerative changes. Significant gas distention of the stomach, suggesting gastroparesis or possible obstruction. 2.  Sacrum/coccyx: Limited evaluation due to osteopenia and overlying soft tissue attenuation. No obvious acute osseous abnormality. Xr Sacrum Coccyx (min 2 Views)    Result Date: 11/4/2020  EXAMINATION: THREE XRAY VIEWS OF THE LUMBAR SPINE; THREE XRAY VIEWS OF THE SACRUM/COCCYX 11/4/2020 1:07 pm COMPARISON: CT abdomen and pelvis dated 09/24/2020 HISTORY: ORDERING SYSTEM PROVIDED HISTORY: back pain TECHNOLOGIST PROVIDED HISTORY: back pain Reason for Exam: pain to low back Acuity: Unknown Type of Exam: Unknown FINDINGS: Lumbar spine: Vertebral body heights and alignment are within normal limits. There is mild multilevel disc space narrowing and endplate spurring. Mild to moderate facet arthropathy. There are scattered vascular calcifications. The stomach is significantly distended with gas. No small bowel or large bowel distention. There is moderate fecal material throughout the large bowel. Sacrum/coccyx: Sacrum is not well visualized due to osteopenia and overlying soft tissue attenuation. Sacrococcygeal alignment is grossly preserved.   No displaced femoral, femoral,         The common femoral, femoral,  popliteal, tibials and saphenous     popliteal, tibials and saphenous  veins are compressible with normal   veins are compressible with normal  doppler responses. doppler responses. Allergies   - Allergy:Codeine(Drug). - Allergy:*Unlisted(Drug). Comments:iv dye Velocities are measured in cm/s ; Diameters are measured in cm Right Lower Extremities DVT Study Measurements Right 2D Measurements +------------------------------------+----------+---------------+----------+ ! Location                            ! Visualized! Compressibility! Thrombosis! +------------------------------------+----------+---------------+----------+ ! Common Femoral                      !Yes       ! Yes            ! None      ! +------------------------------------+----------+---------------+----------+ ! Prox Femoral                        !Yes       ! Yes            ! None      ! +------------------------------------+----------+---------------+----------+ ! Mid Femoral                         !Yes       ! Yes            ! None      ! +------------------------------------+----------+---------------+----------+ ! Dist Femoral                        !Yes       ! Yes            ! None      ! +------------------------------------+----------+---------------+----------+ ! Deep Femoral                        !Yes       ! Yes            ! None      ! +------------------------------------+----------+---------------+----------+ ! Popliteal                           !Yes       ! Yes            ! None      ! +------------------------------------+----------+---------------+----------+ ! Sapheno Femoral Junction            ! Yes       ! Yes            ! None      ! +------------------------------------+----------+---------------+----------+ ! PTV                                 ! Yes       ! Yes            ! None      ! +------------------------------------+----------+---------------+----------+ ! Peroneal !Yes       !Yes            ! None      ! +------------------------------------+----------+---------------+----------+ ! Gastroc                             ! Yes       ! Yes            ! None      ! +------------------------------------+----------+---------------+----------+ ! GSV Thigh                           ! Yes       ! Yes            ! None      ! +------------------------------------+----------+---------------+----------+ ! GSV Knee                            ! Yes       ! Yes            ! None      ! +------------------------------------+----------+---------------+----------+ ! GSV Ankle                           ! Yes       ! Yes            ! None      ! +------------------------------------+----------+---------------+----------+ ! SSV                                 ! Yes       ! Yes            ! None      ! +------------------------------------+----------+---------------+----------+ Right Doppler Measurements +---------------------------+------+------+--------------------------------+ ! Location                   ! Signal!Reflux! Reflux (msec)                   ! +---------------------------+------+------+--------------------------------+ ! Common Femoral             !Phasic!      !                                ! +---------------------------+------+------+--------------------------------+ ! Prox Femoral               !Phasic!      !                                ! +---------------------------+------+------+--------------------------------+ ! Popliteal                  !Phasic!      !                                ! +---------------------------+------+------+--------------------------------+ Left Lower Extremities DVT Study Measurements Left 2D Measurements +------------------------------------+----------+---------------+----------+ ! Location                            ! Visualized! Compressibility! Thrombosis! +------------------------------------+----------+---------------+----------+ ! Common Femoral !Yes       !Yes            ! None      ! +------------------------------------+----------+---------------+----------+ ! Prox Femoral                        !Yes       ! Yes            ! None      ! +------------------------------------+----------+---------------+----------+ ! Mid Femoral                         !Yes       ! Yes            ! None      ! +------------------------------------+----------+---------------+----------+ ! Dist Femoral                        !Yes       ! Yes            ! None      ! +------------------------------------+----------+---------------+----------+ ! Deep Femoral                        !Yes       ! Yes            ! None      ! +------------------------------------+----------+---------------+----------+ ! Popliteal                           !Yes       ! Yes            ! None      ! +------------------------------------+----------+---------------+----------+ ! Sapheno Femoral Junction            ! Yes       ! Yes            ! None      ! +------------------------------------+----------+---------------+----------+ ! PTV                                 ! Yes       ! Yes            ! None      ! +------------------------------------+----------+---------------+----------+ ! Peroneal                            !Yes       ! Yes            ! None      ! +------------------------------------+----------+---------------+----------+ ! Gastroc                             ! Yes       ! Yes            ! None      ! +------------------------------------+----------+---------------+----------+ ! GSV Thigh                           ! Yes       ! Yes            ! None      ! +------------------------------------+----------+---------------+----------+ ! GSV Knee                            ! Yes       ! Yes            ! None      ! +------------------------------------+----------+---------------+----------+ ! GSV Ankle                           ! Yes       ! Yes            ! None      ! +------------------------------------+----------+---------------+----------+ ! SSV                                 ! Yes       ! Yes            ! None      ! +------------------------------------+----------+---------------+----------+ Left Doppler Measurements +---------------------------+------+------+--------------------------------+ ! Location                   ! Signal!Reflux! Reflux (msec)                   ! +---------------------------+------+------+--------------------------------+ ! Common Femoral             !Phasic!      !                                ! +---------------------------+------+------+--------------------------------+ ! Prox Femoral               !Phasic!      !                                ! +---------------------------+------+------+--------------------------------+ ! Popliteal                  !Phasic!      !                                ! +---------------------------+------+------+--------------------------------+    Nm Bone Scan Whole Body    Result Date: 11/4/2020  EXAMINATION: WHOLE BODY BONE SCAN  11/4/2020 TECHNIQUE: The patient was injected intravenously with 25.6 mCi of 99 mTc MDP and scintigraphy of the entire skeleton was performed approximately three hours later. Coned-down images of the head, neck and thorax in obliques positions. COMPARISON: Patient did not have previous imaging studies for comparison. HISTORY: ORDERING SYSTEM PROVIDED HISTORY: Fall, lung cancer TECHNOLOGIST PROVIDED HISTORY: Fall, lung cancer Reason for Exam: pain Acuity: Unknown Type of Exam: Unknown FINDINGS: Symmetrical uptake of radiotracer is noted in the shoulders, sternoclavicular joints, hips, knees, and ankles, foci of activity in the aforementioned joints are most likely degenerative. The remainder of the osseous structures and soft tissues are unremarkable. Specifically, no radiotracer uptake in the ribcage is noted. Physiologic activity is present in the skeletal and renal collecting systems.      No evidence to suggest osseous metastatic disease. Pattern of uptake most compatible with age related degenerative change. IMPRESSION:   Primary Problem  <principal problem not specified>    Active Hospital Problems    Diagnosis Date Noted    Closed fracture of one rib of right side [S22.31XA] 11/04/2020    Degenerative disc disease, lumbar [M51.36] 11/04/2020    Urinary tract infectious disease [N39.0] 11/03/2020    Malignant neoplasm of upper lobe of right lung ([address] [C34.11] 09/18/2020    Lung mass [R91.8] 08/27/2020    Chronic bilateral low back pain [M54.5, G89.29] 10/31/2017    Falls frequently [R29.6] 01/27/2017       Squamous cell carcinoma of lung, clinical stage IIIb  Ongoing treatment with concurrent chemoradiation using carboplatin and Taxol  UTI  Asthenia  Anemia  Eighth rib fracture      RECOMMENDATIONS:  1. I personally reviewed results of lab work-up imaging studies and other relevant clinical data. Reviewed records and treatment history. Given ongoing acute illness we will hold chemotherapy until patient gets better  Continue symptomatic and supportive care  We will rule out nutritional deficiencies possibly contributing to anemia. Patient counseled on tobacco cessation  Pain control  Continue treatment of UTI. Bone scan does not show any evidence of metastasis    Discussed with patient and Nurse. Thank you for asking us to see this patient. Pedro Sarmiento MD          This note is created with the assistance of a speech recognition program.  While intending to generate a document that actually reflects the content of the visit, the document can still have some errors including those of syntax and sound a like substitutions which may escape proof reading. It such instances, actual meaning can be extrapolated by contextual diversion.

## 2020-11-05 NOTE — CARE COORDINATION
Social work: Met with patient at bedside to discuss SNF choices. List reviewed, choices given of [facility] and [facility] Referral faxed to both. Will need precert. Also will need to confirm patient if patient will be able to continue with chemo/radiation. Per patient, family (son or grandchildren) have been transporting to appointments.

## 2020-11-05 NOTE — PROGRESS NOTES
Progress note  [facility]    Adult Hospitalist      Name: Jose R Metcalf  MRN: [id number]     [place]: [de-identified]  Room: 2003/2003-02    Admit Date: 11/3/2020 10:59 AM  PCP: Misael Vickers MD    Primary Problem  Active Problems:    Falls frequently    Chronic bilateral low back pain    Lung mass    Malignant neoplasm of upper lobe of right lung [facility])    Urinary tract infectious disease    Closed fracture of one rib of right side    Degenerative disc disease, lumbar  Resolved Problems:    * No resolved hospital problems. *        Assesment:     ·   UTI   · Generalized weakness  · Eighth rib lucencies suspicious for nondisplaced fracture  · Recent diagnosis of right upper lobe squamous cell carcinoma status post chemoradiation  · Chronic COPD  · Chronic hypoxic respiratory failure on nocturnal O2  · Former smoker  · Diabetes type 2  · Depression with anxiety        Plan:     · Admit to [facility] with telemetry  · O2 maintain oxygen saturation greater than 92%  · Follow urine culture: E. coli, sensitive to ceftriaxone  · IV ceftriaxone  · IV fluids  · Orthopedic consult  · Bone scan was ordered  · Pain control  · X-ray lumbosacral spine reviewed  · Oncology consult for resumption of chemotherapy  · Continue aspirin, Lasix,  Lopressor  · Continue Abilify, Effexor, trazodone, Neurontin  · Continue glimepiride, added SSI  · DVT and GI prophylaxis. Discharge planning, likely to SNF once arrangements are done  Chief Complaint:     Chief Complaint   Patient presents with    Fall         History of Present Illness:      Patient seen and examined at bedside. Patient is complaining of generalized fatigue and tiredness. Complaining of flank pain and lower back pain. She has distal difficulty to ambulate. afebrile   Denies any chest pain, shortness of breath, palpitation, headache, dizziness, cough, cold, changes in urination or bowel habits.     HPI:    Jose R Metcalf is a 72 y.o.  female who presents with Fall    72year-old lady with past medical history of COPD,  coronary artery disease, depression, arthritis presented to ER complaining of generalized weakness and fall. Patient has been recently diagnosed of lung cell carcinoma. She had a bronchoscopy on 8/31/2020 and endobronchial biopsy showed squamous cell carcinoma. She was started on chemoradiation. Her last chemotherapy was on 10/28/2020. She also has an appointment with Radiation Oncology on 10/30/2020. On presentation to ER her sodium was slightly low at 134. Her hemoglobin was 8.4. Her UA showed moderate leukocyte Estrace and many bacteria. Patient was so weak she was unable to get up and ambulate. She is unable to take care of herself at home. Complaining of low back pain and right-sided rib pain. Patient admitted for further management. I have personally reviewed the past medical history, past surgical history, medications, social history, and family history, and summarized in the note. Review of Systems:     All 10 point system is reviewed and negative otherwise mentioned in HPI. Past Medical History:     Past Medical History:   Diagnosis Date    AAA (abdominal aortic aneurysm) ([address]     Pt denies having a history of AAA    Acid reflux     Anxiety and depression     Aortic stenosis     Arthritis     Blister of ankle, right 10/13/2016    blister broke open & draining, is on antibiotics    CAD (coronary artery disease)     Cancer ([address]     lung cancer    COPD (chronic obstructive pulmonary disease) ([address]     Diabetes mellitus ([address]     Falls     Heart block     bifasicular    Hypokalemia     MDRO (multiple drug resistant organisms) resistance 10/17/2014    E.  Coli urine    MRSA (methicillin resistant staph aureus) culture positive resolved 12/2016    2 negative nasal screens - 2016 (hx in urine 2014)    On home oxygen therapy     uses 2 liters at night    On home oxygen therapy     patient states 2 liters/nasal cannula continuous    Overactive bladder     patient incont. wears a brief    Pneumonia     PONV (postoperative nausea and vomiting)     Vitamin D deficiency         Past Surgical History:     Past Surgical History:   Procedure Laterality Date    AORTIC VALVE REPAIR N/A 4/11/2017    AORTIC VALVE REPAIR REPLACEMENT performed by Heron Helms MD at [address] N/A 8/31/2020    BRONCHOSCOPY BIOPSY BRONCHUS performed by Jax Lemon MD at [address], COLON, DIAGNOSTIC  12/08/2016    EYE SURGERY      HYSTERECTOMY      IR INS PICC VAD W SQ PORT GREATER THAN 5  9/4/2020    IR INS PICC VAD W SQ PORT GREATER THAN 5 9/4/2020 [facility]ED[facility]    IR PORT PLACEMENT EQUAL OR GREATER THAN 5 YEARS  9/29/2020    IR PORT PLACEMENT EQUAL OR GREATER THAN 5 YEARS 9/29/2020 MD JAY JAY [facility] PROC[facility]    LUMBAR LAMINECTOMY      TONSILLECTOMY          Medications Prior to Admission:       Prior to Admission medications    Medication Sig Start Date End Date Taking? Authorizing Provider   oxyCODONE-acetaminophen (PERCOCET) 5-325 MG per tablet Take 1 tablet by mouth every 6 hours as needed for Pain for up to 3 doses. 11/5/20 11/6/20 Yes Fanta Fraser MD   clonazePAM (KLONOPIN) 1 MG tablet Take 1 tablet by mouth 3 times daily as needed for Anxiety for up to 3 doses. 11/5/20 11/6/20 Yes Fanta Fraser MD   gabapentin (NEURONTIN) 400 MG capsule Take 1 capsule by mouth 2 times daily for 3 doses. In addition to 2 capsules (=800mg) nightly 11/5/20 11/7/20 Yes Fanta Fraser MD   gabapentin (NEURONTIN) 400 MG capsule Take 2 capsules by mouth nightly for 3 doses.  Take 2 capsules (=800mg) nightly - in addition to 1BID 11/5/20 11/8/20 Yes Fanta Fraser MD   traZODone (DESYREL) 150 MG tablet Take 2 tablets by mouth nightly for 3 doses Take 2 tablets (=300mg) nightly 11/5/20 11/8/20 Yes Fanta Fraser MD ARIPiprazole (ABILIFY) 5 MG tablet Take 1 tablet by mouth daily for 3 doses 11/5/20 11/8/20 Yes Willard Patricio MD   furosemide (LASIX) 40 MG tablet Take 40 mg by mouth daily   Yes Historical Provider, MD   omeprazole (PRILOSEC) 20 MG delayed release capsule Take 20 mg by mouth daily   Yes Historical Provider, MD   dexamethasone (DECADRON) 4 MG tablet Take 20 mg orally 12 hours and 6 hours before Taxol 10/23/20  Yes Jcarlos Shore MD   lidocaine-prilocaine (EMLA) 2.5-2.5 % cream To port site before chemo 10/21/20  Yes Jcarlos Shore MD   melatonin 5 MG TABS tablet Take 5 mg by mouth nightly    Yes Historical Provider, MD   glimepiride (AMARYL) 1 MG tablet Take 1 mg by mouth Daily with supper In addition to 2 tablets (=2mg) every morning    Yes Historical Provider, MD   metoprolol tartrate (LOPRESSOR) 25 MG tablet Take 25 mg by mouth 2 times daily   Yes Historical Provider, MD   venlafaxine (EFFEXOR XR) 150 MG extended release capsule Take 150 mg by mouth 2 times daily   Yes Historical Provider, MD   lansoprazole (PREVACID) 30 MG delayed release capsule Take 30 mg by mouth daily 11/10/16  Yes Historical Provider, MD   metFORMIN (GLUCOPHAGE) 500 MG tablet Take 500 mg by mouth 2 times daily 12/7/16  Yes Historical Provider, MD   aspirin EC 81 MG EC tablet Take 81 mg by mouth daily   Yes Historical Provider, MD   mometasone-formoterol (DULERA) 200-5 MCG/ACT inhaler Inhale 2 puffs into the lungs every 12 hours as needed (wheezing/SOB)    Yes Historical Provider, MD   glimepiride (AMARYL) 1 MG tablet Take 2 mg by mouth every morning In addition to 1mg nightly   Yes Historical Provider, MD        Allergies:       Codeine and Dye [iodides]    Social History:     Tobacco:    reports that she quit smoking about 4 years ago. She has never used smokeless tobacco.  Alcohol:      reports no history of alcohol use. Drug Use:  reports no history of drug use.     Family History:     Family History   Problem Relation Age of Onset    Cancer Mother     Cancer Father          Physical Exam:     Vitals:  /63   Pulse 99   Temp 98.1 °F (36.7 °C) (Oral)   Resp 17   Ht 5' 8\" (1.727 m)   Wt 146 lb (66.2 kg)   SpO2 96%   BMI 22.20 kg/m²   Temp (24hrs), Av.3 °F (36.8 °C), Min:98.1 °F (36.7 °C), Max:98.4 °F (36.9 °C)          General appearance - alert, well appearing, and in no acute distress  Mental status - oriented to person, place, and time with normal affect  Head - normocephalic and atraumatic  Eyes - pupils equal and reactive, extraocular eye movements intact, conjunctiva clear  Ears - hearing appears to be intact  Nose - no drainage noted  Mouth - mucous membranes moist  Neck - supple, no carotid bruits, thyroid not palpable  Chest - clear to auscultation, normal effort  Heart - normal rate, regular rhythm, no murmur  Abdomen - soft, nontender, nondistended, bowel sounds present all four quadrants, no masses, hepatomegaly or splenomegaly  Neurological - normal speech, no focal findings or movement disorder noted, cranial nerves II through XII grossly intact  Extremities - peripheral pulses palpable, no pedal edema or calf pain with palpation  Skin - no gross lesions, rashes, or induration noted        Data:     Labs:    Hematology:  Recent Labs     20  1046 20  0544 20  0502   WBC 8.9 6.7 5.8   RBC 3.47* 3.15* 2.95*   HGB 8.4* 7.7* 7.2*   HCT 27.7* 26.0* 23.8*   MCV 79.8* 82.5* 80.7*   MCH 24.2* 24.4* 24.4*   MCHC 30.3 29.6 30.3   RDW 19.8* 19.9* 19.7*    302 325   MPV 9.2 9.4 8.7     Chemistry:  Recent Labs     20  1046 20  0544 20  0502   * 141 139   K 4.0 4.0 3.6*   CL 98 107 103   CO2 27 25 29   GLUCOSE 191* 77 89   BUN 28* 17 14   CREATININE 0.80 0.68 0.67   ANIONGAP 9 9 7*   LABGLOM >60 >60 >60   GFRAA >60 >60 >60   CALCIUM 9.2 8.5* 8.4*     Recent Labs     20  0800 20  1057 20  1633 20  2048 20  0619   POCGLU 91 311* 105 180* 163* 87       Lab Results   Component Value Date    INR 0.9 09/29/2020    INR 1.1 08/28/2020    INR 0.9 12/28/2018    PROTIME 11.8 09/29/2020    PROTIME 14.4 (H) 08/28/2020    PROTIME 9.7 12/28/2018       Lab Results   Component Value Date/Time    SPECIAL NOT REPORTED 11/03/2020 02:15 PM     Lab Results   Component Value Date/Time    CULTURE NO SAMPLE RECEIVED 11/03/2020 02:15 PM       Lab Results   Component Value Date    POCPH 7.36 04/12/2017    PHART 7.42 08/26/2012    PH 7.22 04/11/2017    POCPCO2 45 04/12/2017    AVA7FTV 41 08/26/2012    PCO2 54 04/11/2017    POCPO2 93 04/12/2017    PO2ART 59 08/26/2012    PO2 89 04/11/2017    POCHCO3 25.3 04/12/2017    ZLT4NRL 26.1 08/26/2012    HCO3 22.2 04/11/2017    NBEA NOT REPORTED 04/12/2017    PBEA 0 04/12/2017    DZC4XMW 27 04/12/2017    ETHK9DOU 97 04/12/2017    P8NUBILW 92.1 08/26/2012    O2SAT 95 04/11/2017    FIO2 UNKNOWN 10/31/2017       Radiology:    Xr Ribs Right Include Chest (min 3 Views)    Result Date: 11/3/2020  Subtle linear lucency within the 8th rib, suggestive of a nondisplaced fracture. Masslike opacity in the right lung apex. Xr Cervical Spine (2-3 Views)    Result Date: 11/3/2020  No convincing evidence for acute fracture or malalignment of the cervical spine. Masslike opacity within the right lung apex. All radiological studies reviewed                Code Status:  Full Code    Electronically signed by Fanta Fraser MD on 11/5/2020 at 9:56 AM     Copy sent to Dr. Joaquín Clemens MD    This note was created with the assistance of a speech-recognition program.  Although the intention is to generate a document that actually reflects the content of the visit, no guarantees can be provided that every mistake has been identified and corrected by editing. Note was updated later by me after  physical examination and  completion of the assessment.

## 2020-11-05 NOTE — PROGRESS NOTES
Physical Therapy  Facility/Department: [facility]  Daily Treatment Note  NAME: Laura Marcos  : 1950  MRN: [id number]    Date of Service: 2020    Discharge Recommendations:  Subacute/Skilled Nursing Facility        Assessment   Body structures, Functions, Activity limitations: Decreased functional mobility ; Decreased safe awareness;Decreased strength;Decreased endurance;Decreased balance;Decreased ADL status  Assessment: Pt showed improved levels of assist with transfers and ambulation. Therapy needed to continue to work on bilateral LE muscle strengthing, balance, and safety. Prognosis: Good  Decision Making: Medium Complexity  Exam: ROM, MMT, functional mobility, activity tolerance, Balance, & [employer] AM-PAC 6 Clicks Basic Mobility  Clinical Presentation: evolving  PT Education: Goals;PT Role;General Safety; Functional Mobility Training  REQUIRES PT FOLLOW UP: Yes  Activity Tolerance  Activity Tolerance: Patient limited by pain; Patient limited by endurance     Patient Diagnosis(es): The primary encounter diagnosis was Urinary tract infection without hematuria, site unspecified. Diagnoses of Fall, initial encounter, Contusion of right chest wall, initial encounter, General weakness, Malignant neoplasm of upper lobe of right lung ([address] and Lung mass were also pertinent to this visit. has a past medical history of AAA (abdominal aortic aneurysm) (HCC), Acid reflux, Anxiety and depression, Aortic stenosis, Arthritis, Blister of ankle, right, CAD (coronary artery disease), Cancer ([address] COPD (chronic obstructive pulmonary disease) ([address] Diabetes mellitus ([address] Falls, Heart block, Hypokalemia, MDRO (multiple drug resistant organisms) resistance, MRSA (methicillin resistant staph aureus) culture positive, On home oxygen therapy, On home oxygen therapy, Overactive bladder, Pneumonia, PONV (postoperative nausea and vomiting), and Vitamin D deficiency.    has a past surgical history that includes back surgery; eye surgery; Cholecystectomy; Appendectomy; Hysterectomy; Tonsillectomy; lumbar laminectomy; Endoscopy, colon, diagnostic (12/08/2016); aortic valve repair (N/A, 4/11/2017); bronchoscopy (N/A, 8/31/2020); IR INSERT PICC VAD W SQ PORT >5 YEARS (9/4/2020); and IR PORT PLACEMENT > 5 YEARS (9/29/2020). Restrictions  Restrictions/Precautions  Restrictions/Precautions: Seizure, Fall Risk  Position Activity Restriction  Other position/activity restrictions: up as tolerated, Weightbearing as tolerated all extremities without restrictions  Subjective   General  Chart Reviewed: Yes  Family / Caregiver Present: No  Subjective  Subjective: Pt c/o pain with UTI and showed low motivation to do any therapy. General Comment  Comments: Checked with Emi Victor about patient doing therapy today and he gave he the ok to treat. Pain Screening  Patient Currently in Pain: Yes  Pain Assessment  Pain Level: 4  Pain Type: Acute pain;Chronic pain  Pain Location: Pelvis;Back  Vital Signs  Patient Currently in Pain: Yes       Orientation  Orientation  Overall Orientation Status: Within Functional Limits  Cognition      Objective   Bed mobility  Rolling to Right: Supervision  Supine to Sit: Minimal assistance  Sit to Supine: Minimal assistance  Comment: Pt takes increased time and effort to complete transfers. Transfers  Sit to Stand: Minimal Assistance  Stand to sit: Minimal Assistance  Stand Pivot Transfers: Minimal Assistance  Comment: While standing, pt needed brief change. Pt was able to maintain balance with one hand on walker while assisting therapist with brief change. Ambulation  Ambulation?: Yes  Ambulation 1  Surface: level tile  Device: Rolling Walker  Assistance: Contact guard assistance(IV pole and 02 line assist)  Quality of Gait: Slow  Gait Deviations: Slow Gwendolyn;Decreased step length;Decreased step height  Distance: 10 ft x 2  Comments: Pt takes time to ambulate but no LOB with turns. G-Code     OutComes Score                                                     AM-PAC Score             Goals  Short term goals  Time Frame for Short term goals: 12 visits  Short term goal 1: Inc bed-mobility & transfers to independent to enable pt to safely get in/OOB  Short term goal 2: Inc gait to amb 150ft or > indep w/ RW to enable pt to return to previous level of independence  Short term goal 3: Inc strength to [address] standing balance to good with device to facilitate pt independence for performance of ADL's & functional mobility, & reduce fall risk; Short term goal 4: Pt able to tolerate 30-40 min of activity to include 15-20 reps of ex & functional mobility including 5 minutes of standing to facilitate activity tolerance to [facility];   Short term goal 5: Ed pt on home ex's, safety & energy principles & issue written home program;    Plan    Plan  Times per week: 1-2x/D, 5-6d/week  Current Treatment Recommendations: Strengthening, Balance Training, Functional Mobility Training, Transfer Training  Safety Devices  Type of devices: Bed alarm in place, All fall risk precautions in place, Gait belt, Left in bed, Call light within reach     Therapy Time   Individual Concurrent Group Co-treatment   Time In 1240         Time Out 1315         Minutes [address], PTA

## 2020-11-06 ENCOUNTER — APPOINTMENT (OUTPATIENT)
Dept: INTERVENTIONAL RADIOLOGY/VASCULAR | Age: 70
DRG: 690 | End: 2020-11-06
Payer: MEDICARE

## 2020-11-06 ENCOUNTER — HOSPITAL ENCOUNTER (OUTPATIENT)
Dept: RADIATION ONCOLOGY | Facility: MEDICAL CENTER | Age: 70
Discharge: HOME | End: 2020-11-06
Payer: MEDICARE

## 2020-11-06 LAB
ABSOLUTE EOS #: 0.06 K/UL (ref 0–0.4)
ABSOLUTE IMMATURE GRANULOCYTE: 0.06 K/UL (ref 0–0.3)
ABSOLUTE LYMPH #: 0.6 K/UL (ref 1–4.8)
ABSOLUTE MONO #: 0.48 K/UL (ref 0.2–0.8)
ANION GAP SERPL CALCULATED.3IONS-SCNC: 8 MMOL/L (ref 9–17)
BASOPHILS # BLD: 1 %
BASOPHILS ABSOLUTE: 0.06 K/UL (ref 0–0.2)
BUN BLDV-MCNC: 11 MG/DL (ref 8–23)
BUN/CREAT BLD: 17 (ref 9–20)
CALCIUM SERPL-MCNC: 8.2 MG/DL (ref 8.6–10.4)
CHLORIDE BLD-SCNC: 102 MMOL/L (ref 98–107)
CO2: 29 MMOL/L (ref 20–31)
CREAT SERPL-MCNC: 0.63 MG/DL (ref 0.5–0.9)
DIFFERENTIAL TYPE: ABNORMAL
EOSINOPHILS RELATIVE PERCENT: 1 % (ref 1–4)
GFR AFRICAN AMERICAN: >60 ML/MIN
GFR NON-AFRICAN AMERICAN: >60 ML/MIN
GFR SERPL CREATININE-BSD FRML MDRD: ABNORMAL ML/MIN/{1.73_M2}
GFR SERPL CREATININE-BSD FRML MDRD: ABNORMAL ML/MIN/{1.73_M2}
GLUCOSE BLD-MCNC: 100 MG/DL (ref 65–105)
GLUCOSE BLD-MCNC: 156 MG/DL (ref 65–105)
GLUCOSE BLD-MCNC: 283 MG/DL (ref 65–105)
GLUCOSE BLD-MCNC: 364 MG/DL (ref 65–105)
GLUCOSE BLD-MCNC: 77 MG/DL (ref 65–105)
GLUCOSE BLD-MCNC: 95 MG/DL (ref 70–99)
HCT VFR BLD CALC: 24.6 % (ref 36.3–47.1)
HEMOGLOBIN: 7.3 G/DL (ref 11.9–15.1)
IMMATURE GRANULOCYTES: 1 %
LACTIC ACID: 1.1 MMOL/L (ref 0.5–2.2)
LYMPHOCYTES # BLD: 10 % (ref 24–44)
MAGNESIUM: 1.3 MG/DL (ref 1.6–2.6)
MCH RBC QN AUTO: 24.5 PG (ref 25.2–33.5)
MCHC RBC AUTO-ENTMCNC: 29.7 G/DL (ref 28.4–34.8)
MCV RBC AUTO: 82.6 FL (ref 82.6–102.9)
MONOCYTES # BLD: 8 % (ref 1–7)
MORPHOLOGY: ABNORMAL
NRBC AUTOMATED: 0 PER 100 WBC
PDW BLD-RTO: 19.6 % (ref 11.8–14.4)
PLATELET # BLD: 340 K/UL (ref 138–453)
PLATELET ESTIMATE: ABNORMAL
PMV BLD AUTO: 8.8 FL (ref 8.1–13.5)
POTASSIUM SERPL-SCNC: 3.4 MMOL/L (ref 3.7–5.3)
RBC # BLD: 2.98 M/UL (ref 3.95–5.11)
RBC # BLD: ABNORMAL 10*6/UL
SEG NEUTROPHILS: 79 % (ref 36–66)
SEGMENTED NEUTROPHILS ABSOLUTE COUNT: 4.74 K/UL (ref 1.8–7.7)
SODIUM BLD-SCNC: 139 MMOL/L (ref 135–144)
WBC # BLD: 6 K/UL (ref 3.5–11.3)
WBC # BLD: ABNORMAL 10*3/UL

## 2020-11-06 PROCEDURE — 6360000002 HC RX W HCPCS: Performed by: HOSPITALIST

## 2020-11-06 PROCEDURE — 02HV33Z INSERTION OF INFUSION DEVICE INTO SUPERIOR VENA CAVA, PERCUTANEOUS APPROACH: ICD-10-PCS | Performed by: HOSPITALIST

## 2020-11-06 PROCEDURE — 83605 ASSAY OF LACTIC ACID: CPT

## 2020-11-06 PROCEDURE — 83735 ASSAY OF MAGNESIUM: CPT

## 2020-11-06 PROCEDURE — 36415 COLL VENOUS BLD VENIPUNCTURE: CPT

## 2020-11-06 PROCEDURE — 97530 THERAPEUTIC ACTIVITIES: CPT

## 2020-11-06 PROCEDURE — 80048 BASIC METABOLIC PNL TOTAL CA: CPT

## 2020-11-06 PROCEDURE — 85025 COMPLETE CBC W/AUTO DIFF WBC: CPT

## 2020-11-06 PROCEDURE — 1200000000 HC SEMI PRIVATE

## 2020-11-06 PROCEDURE — 99232 SBSQ HOSP IP/OBS MODERATE 35: CPT | Performed by: INTERNAL MEDICINE

## 2020-11-06 PROCEDURE — 2580000003 HC RX 258: Performed by: HOSPITALIST

## 2020-11-06 PROCEDURE — 6370000000 HC RX 637 (ALT 250 FOR IP): Performed by: HOSPITALIST

## 2020-11-06 PROCEDURE — 82947 ASSAY GLUCOSE BLOOD QUANT: CPT

## 2020-11-06 PROCEDURE — 97535 SELF CARE MNGMENT TRAINING: CPT

## 2020-11-06 PROCEDURE — 6370000000 HC RX 637 (ALT 250 FOR IP): Performed by: ORTHOPAEDIC SURGERY

## 2020-11-06 PROCEDURE — 97166 OT EVAL MOD COMPLEX 45 MIN: CPT

## 2020-11-06 RX ADMIN — ENOXAPARIN SODIUM 40 MG: 40 INJECTION SUBCUTANEOUS at 09:13

## 2020-11-06 RX ADMIN — MAGNESIUM SULFATE HEPTAHYDRATE 1 G: 1 INJECTION, SOLUTION INTRAVENOUS at 12:14

## 2020-11-06 RX ADMIN — OXYCODONE HYDROCHLORIDE AND ACETAMINOPHEN 1 TABLET: 5; 325 TABLET ORAL at 13:48

## 2020-11-06 RX ADMIN — MAGNESIUM SULFATE HEPTAHYDRATE 1 G: 1 INJECTION, SOLUTION INTRAVENOUS at 19:19

## 2020-11-06 RX ADMIN — OXYCODONE HYDROCHLORIDE AND ACETAMINOPHEN 1 TABLET: 5; 325 TABLET ORAL at 04:13

## 2020-11-06 RX ADMIN — ARIPIPRAZOLE 5 MG: 5 TABLET ORAL at 09:12

## 2020-11-06 RX ADMIN — INSULIN LISPRO 15 UNITS: 100 INJECTION, SOLUTION INTRAVENOUS; SUBCUTANEOUS at 12:20

## 2020-11-06 RX ADMIN — GABAPENTIN 400 MG: 300 CAPSULE ORAL at 09:15

## 2020-11-06 RX ADMIN — GLIMEPIRIDE 1 MG: 1 TABLET ORAL at 18:26

## 2020-11-06 RX ADMIN — FUROSEMIDE 40 MG: 40 TABLET ORAL at 09:13

## 2020-11-06 RX ADMIN — ASPIRIN 81 MG: 81 TABLET, COATED ORAL at 09:13

## 2020-11-06 RX ADMIN — CEFTRIAXONE 1 G: 1 INJECTION, POWDER, FOR SOLUTION INTRAMUSCULAR; INTRAVENOUS at 16:04

## 2020-11-06 RX ADMIN — INSULIN LISPRO 5 UNITS: 100 INJECTION, SOLUTION INTRAVENOUS; SUBCUTANEOUS at 21:04

## 2020-11-06 RX ADMIN — FAMOTIDINE 20 MG: 20 TABLET, FILM COATED ORAL at 21:05

## 2020-11-06 RX ADMIN — GABAPENTIN 400 MG: 300 CAPSULE ORAL at 12:22

## 2020-11-06 RX ADMIN — VENLAFAXINE HYDROCHLORIDE 150 MG: 75 CAPSULE, EXTENDED RELEASE ORAL at 09:13

## 2020-11-06 RX ADMIN — GABAPENTIN 800 MG: 300 CAPSULE ORAL at 06:00

## 2020-11-06 RX ADMIN — FAMOTIDINE 20 MG: 20 TABLET, FILM COATED ORAL at 09:13

## 2020-11-06 RX ADMIN — METOPROLOL TARTRATE 25 MG: 25 TABLET, FILM COATED ORAL at 21:06

## 2020-11-06 RX ADMIN — POTASSIUM CHLORIDE 40 MEQ: 20 TABLET, EXTENDED RELEASE ORAL at 12:22

## 2020-11-06 RX ADMIN — MAGNESIUM SULFATE HEPTAHYDRATE 1 G: 1 INJECTION, SOLUTION INTRAVENOUS at 22:33

## 2020-11-06 RX ADMIN — SODIUM CHLORIDE: 9 INJECTION, SOLUTION INTRAVENOUS at 03:04

## 2020-11-06 RX ADMIN — GLIMEPIRIDE 2 MG: 2 TABLET ORAL at 09:13

## 2020-11-06 RX ADMIN — OXYCODONE HYDROCHLORIDE AND ACETAMINOPHEN 1 TABLET: 5; 325 TABLET ORAL at 21:06

## 2020-11-06 RX ADMIN — TRAZODONE HYDROCHLORIDE 300 MG: 100 TABLET ORAL at 21:06

## 2020-11-06 RX ADMIN — VENLAFAXINE HYDROCHLORIDE 150 MG: 75 CAPSULE, EXTENDED RELEASE ORAL at 21:05

## 2020-11-06 RX ADMIN — MAGNESIUM SULFATE HEPTAHYDRATE 1 G: 1 INJECTION, SOLUTION INTRAVENOUS at 21:04

## 2020-11-06 RX ADMIN — METOPROLOL TARTRATE 25 MG: 25 TABLET, FILM COATED ORAL at 09:13

## 2020-11-06 ASSESSMENT — PAIN SCALES - GENERAL
PAINLEVEL_OUTOF10: 10
PAINLEVEL_OUTOF10: 10
PAINLEVEL_OUTOF10: 0
PAINLEVEL_OUTOF10: 10

## 2020-11-06 ASSESSMENT — PAIN DESCRIPTION - LOCATION
LOCATION: RIB CAGE;BACK
LOCATION: BACK
LOCATION: BACK
LOCATION: GROIN

## 2020-11-06 ASSESSMENT — PAIN DESCRIPTION - FREQUENCY
FREQUENCY: CONTINUOUS

## 2020-11-06 ASSESSMENT — PAIN DESCRIPTION - DESCRIPTORS
DESCRIPTORS: BURNING
DESCRIPTORS: ACHING;DISCOMFORT;DULL
DESCRIPTORS: ACHING;DISCOMFORT
DESCRIPTORS: ACHING;DISCOMFORT

## 2020-11-06 ASSESSMENT — PAIN DESCRIPTION - ONSET
ONSET: ON-GOING

## 2020-11-06 ASSESSMENT — PAIN DESCRIPTION - PAIN TYPE
TYPE: ACUTE PAIN
TYPE: CHRONIC PAIN
TYPE: ACUTE PAIN

## 2020-11-06 ASSESSMENT — PAIN DESCRIPTION - PROGRESSION
CLINICAL_PROGRESSION: NOT CHANGED

## 2020-11-06 ASSESSMENT — PAIN DESCRIPTION - ORIENTATION
ORIENTATION: RIGHT;LOWER

## 2020-11-06 ASSESSMENT — PAIN - FUNCTIONAL ASSESSMENT: PAIN_FUNCTIONAL_ASSESSMENT: PREVENTS OR INTERFERES SOME ACTIVE ACTIVITIES AND ADLS

## 2020-11-06 NOTE — PROGRESS NOTES
Physical Therapy  DATE: 2020    NAME: Alvin Og  MRN: [id number]   : 1950    Patient not seen this date for Physical Therapy due to:  [] Blood transfusion in progress  [] Cancel by RN  [] Hemodialysis  []  Refusal by Patient   [] Spine Precautions   [] Strict Bedrest  [] Surgery  [] Testing      [x] Other (RN present using US to place new IV)        [] PT being discontinued at this time. Patient independent. No further needs. [] PT being discontinued at this time as the patient has been transferred to hospice care. No further needs.     Mary Wilson, PTA

## 2020-11-06 NOTE — PLAN OF CARE
Problem: Pain:  Goal: Pain level will decrease  Description: Pain level will decrease  11/6/2020 1407 by Nyla Dorado RN  Outcome: Ongoing  11/6/2020 0221 by Alexandria Melchor RN  Outcome: Ongoing  11/6/2020 0220 by Alexandria Melchor RN  Outcome: Ongoing  Goal: Control of acute pain  Description: Control of acute pain  11/6/2020 1407 by Nyla Dorado RN  Outcome: Ongoing  11/6/2020 0221 by Alexandria Melchor RN  Outcome: Ongoing  11/6/2020 0220 by Alexandria Melchor RN  Outcome: Ongoing  Goal: Control of chronic pain  Description: Control of chronic pain  11/6/2020 1407 by Nyla Dorado RN  Outcome: Ongoing  11/6/2020 0221 by Alexandria Melchor RN  Outcome: Ongoing  11/6/2020 0220 by Alexandria Melchor RN  Outcome: Ongoing     Problem: Falls - Risk of:  Goal: Will remain free from falls  Description: Will remain free from falls  11/6/2020 1407 by Nyla Dorado RN  Outcome: Ongoing  11/6/2020 0221 by Alexandria Melchor RN  Outcome: Ongoing  11/6/2020 0220 by Alexandria Melchor RN  Outcome: Ongoing  Goal: Absence of physical injury  Description: Absence of physical injury  11/6/2020 1407 by Nyla Dorado RN  Outcome: Ongoing  11/6/2020 0221 by Alexandria Melchor RN  Outcome: Ongoing  11/6/2020 0220 by Alexandria Melchor RN  Outcome: Ongoing    Pt was up to shower today. Incontinent of urine , does not call out, needs checked Q2 hours. Dr Brent Le is okay with discharge. IV antibiotics continues.  is working on SNF placement vs home with help. Currently [facility].

## 2020-11-06 NOTE — PROGRESS NOTES
Today's Date: 11/6/2020  Patient Name: Kimmie Andrews  Date of admission: 11/3/2020 10:59 AM  Patient's age: 72 y.o., 1950  Admission Dx: Urinary tract infectious disease [N39.0]    Reason for Consult: management recommendations  Requesting Physician: Danielle Rodriguez MD    CHIEF COMPLAINT: Weakness fatigue. UTI. History Obtained From:  patient, electronic medical record    Interval history:    Patient seen and examined. Labs and vitals reviewed. Patient still complains of being weak but working with physical therapy  Cell counts are stable. Patient afebrile overnight. T-max 99.5. Denies new complaint or interval event overnight. HISTORY OF PRESENT ILLNESS:      The patient is a 72 y.o.  female who is admitted to the hospital with chief complaint of weakness and fatigue. Reportedly patient also had a standing height fall at home. Imaging revealed eighth rib fracture. Work-up also revealed UTI. Patient also has significant joint pain. 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 Patient has been started on concurrent chemoradiation. Last chemotherapy treatment was on 10/28/2020. Work-up reveals hemoglobin of 7.2 with MCV 80. Patient has adequate ANC and platelet count. Blood cultures are pending. Urine culture showing E. coli.     Past Medical History:   has a past medical history of AAA (abdominal aortic aneurysm) ([facility] [address] Acid reflux, Anxiety and depression, Aortic stenosis, Arthritis, Blister of ankle, right, CAD (coronary artery disease), Cancer ([facility] [address] COPD (chronic obstructive pulmonary disease) ([facility] [address] Diabetes mellitus ([facility] [address] Falls, Heart block, Hypokalemia, MDRO (multiple drug resistant organisms) resistance, MRSA (methicillin resistant staph aureus) culture positive, On home oxygen therapy, On home oxygen therapy, Overactive bladder, Pneumonia, PONV (postoperative nausea and vomiting), and Vitamin D deficiency. Past Surgical History:   has a past surgical history that includes back surgery; eye surgery; Cholecystectomy; Appendectomy; Hysterectomy; Tonsillectomy; lumbar laminectomy; Endoscopy, colon, diagnostic (12/08/2016); aortic valve repair (N/A, 4/11/2017); bronchoscopy (N/A, 8/31/2020); IR INSERT PICC VAD W SQ PORT >5 YEARS (9/4/2020); and IR PORT PLACEMENT > 5 YEARS (9/29/2020). Medications:    Prior to Admission medications    Medication Sig Start Date End Date Taking? Authorizing Provider   oxyCODONE-acetaminophen (PERCOCET) 5-325 MG per tablet Take 1 tablet by mouth every 6 hours as needed for Pain for up to 3 doses. 11/5/20 11/6/20 Yes Jennifer Lin MD   clonazePAM (KLONOPIN) 1 MG tablet Take 1 tablet by mouth 3 times daily as needed for Anxiety for up to 3 doses. 11/5/20 11/6/20 Yes Jennifer Lin MD   gabapentin (NEURONTIN) 400 MG capsule Take 1 capsule by mouth 2 times daily for 3 doses. In addition to 2 capsules (=800mg) nightly 11/5/20 11/7/20 Yes Jennifer Lin MD   gabapentin (NEURONTIN) 400 MG capsule Take 2 capsules by mouth nightly for 3 doses.  Take 2 capsules (=800mg) nightly - in addition to 1BID 11/5/20 11/8/20 Yes Jennifer Lin MD   traZODone (DESYREL) 150 MG tablet Take 2 tablets by mouth nightly for 3 doses Take 2 tablets (=300mg) nightly 11/5/20 11/8/20 Yes Jennifer Lin MD   ARIPiprazole (ABILIFY) 5 MG tablet Take 1 tablet by mouth daily for 3 doses 11/5/20 11/8/20 Yes Jennifer Lin MD   furosemide (LASIX) 40 MG tablet Take 40 mg by mouth daily   Yes Historical Provider, MD   omeprazole (PRILOSEC) 20 MG delayed release capsule Take 20 mg by mouth daily   Yes Historical Provider, MD   dexamethasone (DECADRON) 4 MG tablet Take 20 mg orally 12 hours and 6 hours before Taxol 10/23/20  Yes Fay Guzmán MD   lidocaine-prilocaine (EMLA) 2.5-2.5 % cream To port site before chemo 10/21/20  Yes Fay Guzmán MD   melatonin 5 MG TABS tablet Take 5 mg by mouth nightly    Yes Historical Provider, MD   glimepiride (AMARYL) 1 MG tablet Take 1 mg by mouth Daily with supper In addition to 2 tablets (=2mg) every morning    Yes Historical Provider, MD   metoprolol tartrate (LOPRESSOR) 25 MG tablet Take 25 mg by mouth 2 times daily   Yes Historical Provider, MD   venlafaxine (EFFEXOR XR) 150 MG extended release capsule Take 150 mg by mouth 2 times daily   Yes Historical Provider, MD   lansoprazole (PREVACID) 30 MG delayed release capsule Take 30 mg by mouth daily 11/10/16  Yes Historical Provider, MD   metFORMIN (GLUCOPHAGE) 500 MG tablet Take 500 mg by mouth 2 times daily 12/7/16  Yes Historical Provider, MD   aspirin EC 81 MG EC tablet Take 81 mg by mouth daily   Yes Historical Provider, MD   mometasone-formoterol (DULERA) 200-5 MCG/ACT inhaler Inhale 2 puffs into the lungs every 12 hours as needed (wheezing/SOB)    Yes Historical Provider, MD   glimepiride (AMARYL) 1 MG tablet Take 2 mg by mouth every morning In addition to 1mg nightly   Yes Historical Provider, MD     Current Facility-Administered Medications   Medication Dose Route Frequency Provider Last Rate Last Dose    lidocaine 4 % external patch 1 patch  1 patch Transdermal Daily Inell MD Jose A   1 patch at 11/05/20 0945    morphine (PF) injection 1 mg  1 mg Intravenous Q4H PRN Terrial Night, MD   1 mg at 11/04/20 1525    ARIPiprazole (ABILIFY) tablet 5 mg  5 mg Oral Daily Terrial Night, MD   5 mg at 11/05/20 0944    aspirin EC tablet 81 mg  81 mg Oral Daily Terrial Night, MD   81 mg at 11/05/20 0944    clonazePAM (KLONOPIN) tablet 1 mg  1 mg Oral TID PRN Terrial Night, MD        furosemide (LASIX) tablet 40 mg  40 mg Oral Daily Terrial Night, MD   40 mg at 11/05/20 0944    gabapentin (NEURONTIN) capsule 400 mg  400 mg Oral BID Terrial Night, MD   400 mg at 11/05/20 1147    gabapentin (NEURONTIN) capsule 800 mg  800 mg Oral Nightly Terrial Night, MD   800 mg at 11/05/20 2059    glimepiride (AMARYL) tablet 2 mg  2 mg Oral QAM Joe Camarillo MD   2 mg at 11/05/20 0943    glimepiride (AMARYL) tablet 1 mg  1 mg Oral Dinner Joe Camarillo MD   1 mg at 11/05/20 1705    metoprolol tartrate (LOPRESSOR) tablet 25 mg  25 mg Oral BID Joe Camarillo MD   25 mg at 11/05/20 2100    oxyCODONE-acetaminophen (PERCOCET) 5-325 MG per tablet 1 tablet  1 tablet Oral Q6H PRN Joe Camarillo MD   1 tablet at 11/06/20 0413    traZODone (DESYREL) tablet 300 mg  300 mg Oral Nightly Joe Camarillo MD   300 mg at 11/05/20 2059    venlafaxine (EFFEXOR XR) extended release capsule 150 mg  150 mg Oral BID Joe Camarilol MD   150 mg at 11/05/20 2059    0.9 % sodium chloride infusion   Intravenous Continuous Joe Camarillo MD 75 mL/hr at 11/06/20 0304      sodium chloride flush 0.9 % injection 10 mL  10 mL Intravenous 2 times per day Joe Camarillo MD   10 mL at 11/04/20 2100    sodium chloride flush 0.9 % injection 10 mL  10 mL Intravenous PRN Joe Camarillo MD   10 mL at 11/04/20 1526    potassium chloride (KLOR-CON M) extended release tablet 40 mEq  40 mEq Oral PRN Joe Camarillo MD        Or    potassium bicarb-citric acid (EFFER-K) effervescent tablet 40 mEq  40 mEq Oral PRN Joe Camarillo MD        Or    potassium chloride 10 mEq/100 mL IVPB (Peripheral Line)  10 mEq Intravenous PRN Joe Camarillo MD        magnesium sulfate 1 g in dextrose 5% 100 mL IVPB  1 g Intravenous PRN Joe Camarillo MD        acetaminophen (TYLENOL) tablet 650 mg  650 mg Oral Q6H PRN Joe Camarillo MD        Or    acetaminophen (TYLENOL) suppository 650 mg  650 mg Rectal Q6H PRN Joe Camarillo MD        senna (SENOKOT) tablet 8.6 mg  1 tablet Oral BID PRN Joe Camarillo MD        promethazine (PHENERGAN) tablet 12.5 mg  12.5 mg Oral Q6H PRN Joe Camarillo MD        Or    ondansetron [facility] [place] [facility]) injection 4 mg  4 mg Intravenous Q6H PRN Joe Camarillo MD        famotidine (PEPCID) tablet 20 mg  20 mg Oral BID Alex Mcnulty MD   20 mg at 11/05/20 2100    enoxaparin (LOVENOX) injection 40 mg  40 mg Subcutaneous Daily Alex Mcnulty MD   40 mg at 11/05/20 0943    cefTRIAXone (ROCEPHIN) 1 g IVPB in 50 mL D5W minibag  1 g Intravenous Q24H Alex Mcnulty MD   Stopped at 11/05/20 1506    insulin lispro (HUMALOG) injection vial 0-18 Units  0-18 Units Subcutaneous TID WC Alex Mcnulty MD   9 Units at 11/05/20 1146    insulin lispro (HUMALOG) injection vial 0-9 Units  0-9 Units Subcutaneous Nightly Alex Mcnulty MD   2 Units at 11/05/20 2059    glucose (GLUTOSE) 40 % oral gel 15 g  15 g Oral PRN Alex Mcnulty MD        dextrose 50 % IV solution  12.5 g Intravenous PRN Alex Mcnulty MD        glucagon (rDNA) injection 1 mg  1 mg Intramuscular PRN Alex Mcnulty MD        dextrose 5 % solution  100 mL/hr Intravenous PRN Alex Mcnulty MD           Allergies:  Codeine and Dye [iodides]    Social History:   reports that she quit smoking about 4 years ago. She has never used smokeless tobacco. She reports that she does not drink alcohol or use drugs. Family History: family history includes Cancer in her father and mother. REVIEW OF SYSTEMS:      Constitutional: No fever or chills. No night sweats, no weight loss. Positive fatigue. Eyes: No eye discharge, double vision, or eye pain   HEENT: negative for sore mouth, sore throat, hoarseness and voice change   Respiratory: negative for cough , sputum, dyspnea, wheezing, hemoptysis, chest pain   Cardiovascular: negative for chest pain, dyspnea, palpitations, orthopnea, PND   Gastrointestinal: negative for nausea, vomiting, diarrhea, constipation, abdominal pain, Dysphagia, hematemesis and hematochezia   Genitourinary: negative for frequency, dysuria, nocturia, urinary incontinence, and hematuria   Integument: negative for rash, skin lesions, bruises.    Hematologic/Lymphatic: negative for easy bruising, bleeding, lymphadenopathy, or petechiae   Endocrine: negative for heat or cold intolerance,weight changes, change in bowel habits and hair loss   Musculoskeletal: negative for myalgias, arthralgias, pain, joint swelling,and bone pain   Neurological: negative for headaches, dizziness, seizures, weakness, numbness    PHYSICAL EXAM:        /62   Pulse 96   Temp 99.5 °F (37.5 °C) (Oral)   Resp 17   Ht 5' 8\" (1.727 m)   Wt 152 lb (68.9 kg)   SpO2 99%   BMI 23.11 kg/m²    Temp (24hrs), Av.5 °F (36.9 °C), Min:98 °F (36.7 °C), Max:99.5 °F (37.5 °C)      General appearance - well appearing, no in pain or distress   Mental status - alert and cooperative   Eyes - pupils equal and reactive, extraocular eye movements intact   Ears - bilateral TM's and external ear canals normal   Mouth - mucous membranes moist, pharynx normal without lesions   Neck - supple, no significant adenopathy   Lymphatics - no palpable lymphadenopathy, no hepatosplenomegaly   Chest - clear to auscultation, no wheezes, rales or rhonchi, symmetric air entry   Heart - normal rate, regular rhythm, normal S1, S2, no murmurs  Abdomen - soft, nontender, nondistended, no masses or organomegaly   Neurological - alert, oriented, normal speech, no focal findings or movement disorder noted   Musculoskeletal - no joint tenderness, deformity or swelling   Extremities - peripheral pulses normal, no pedal edema, no clubbing or cyanosis   Skin - normal coloration and turgor, no rashes, no suspicious skin lesions noted ,      DATA:      Labs:     Results for orders placed or performed during the hospital encounter of 20   Culture, Blood 1    Specimen: Blood   Result Value Ref Range    Specimen Description . BLOOD     Special Requests RFA 2 ML     Culture NO GROWTH 3 DAYS    Culture, Blood 1    Specimen: Blood   Result Value Ref Range    Specimen Description . BLOOD     Special Requests NOT REPORTED     Culture NO SAMPLE RECEIVED    Culture, Urine    Specimen: Urine, clean catch   Result Value Ref Range    Specimen Description . CLEAN CATCH URINE     Special Requests NOT REPORTED     Culture ESCHERICHIA COLI >[id number] CFU/ML (A)        Susceptibility    Escherichia coli - BACTERIAL SUSCEPTIBILITY PANEL MUNIR     amikacin Value in next row        NOT REPORTED     ampicillin Value in next row Resistant       >=32RESISTANT     ampicillin-sulbactam Value in next row        NOT REPORTED     aztreonam Value in next row Sensitive       <=1SUSCEPTIBLE     ceFAZolin Value in next row Sensitive       <=4SUSCEPTIBLE     ceFAZolin Value in next row Sensitive       <=4SUSCEPTIBLE     cefepime Value in next row        NOT REPORTED     cefTRIAXone Value in next row Sensitive       <=1SUSCEPTIBLE     ciprofloxacin Value in next row Resistant       >=4RESISTANT     ertapenem Value in next row        NOT REPORTED     Confirmatory Extended Spectrum Beta-Lactamase Value in next row Negative       NOT REPORTED     gentamicin Value in next row Sensitive       <=1SUSCEPTIBLE     meropenem Value in next row        NOT REPORTED     nitrofurantoin Value in next row Sensitive       <=16SUSCEPTIBLE     tigecycline Value in next row        NOT REPORTED     tobramycin Value in next row Sensitive       <=1SUSCEPTIBLE     trimethoprim-sulfamethoxazole Value in next row Sensitive       <=20SUSCEPTIBLE     piperacillin-tazobactam Value in next row Sensitive       <=4SUSCEPTIBLE   Urinalysis   Result Value Ref Range    Color, UA YELLOW YELLOW    Turbidity UA CLOUDY (A) CLEAR    Glucose, Ur NEGATIVE NEGATIVE    Bilirubin Urine NEGATIVE NEGATIVE    Ketones, Urine NEGATIVE NEGATIVE    Specific Gravity, UA 1.023 1.005 - 1.030    Urine Hgb 3+ (A) NEGATIVE    pH, UA 5.5 5.0 - 8.0    Protein, UA 2+ (A) NEGATIVE    Urobilinogen, Urine Normal Normal    Nitrite, Urine NEGATIVE NEGATIVE    Leukocyte Esterase, Urine MODERATE (A) NEGATIVE    Urinalysis Comments NOT REPORTED    CBC Auto Differential   Result Value Ref Range WBC 8.9 3.5 - 11.3 k/uL    RBC 3.47 (L) 3.95 - 5.11 m/uL    Hemoglobin 8.4 (L) 11.9 - 15.1 g/dL    Hematocrit 27.7 (L) 36.3 - 47.1 %    MCV 79.8 (L) 82.6 - 102.9 fL    MCH 24.2 (L) 25.2 - 33.5 pg    MCHC 30.3 28.4 - 34.8 g/dL    RDW 19.8 (H) 11.8 - 14.4 %    Platelets 546 109 - 526 k/uL    MPV 9.2 8.1 - 13.5 fL    NRBC Automated 0.0 0.0 per 100 WBC    Differential Type NOT REPORTED     WBC Morphology NOT REPORTED     RBC Morphology NOT REPORTED     Platelet Estimate NOT REPORTED     Seg Neutrophils 87 (H) 36 - 66 %    Lymphocytes 7 (L) 24 - 44 %    Monocytes 5 1 - 7 %    Eosinophils % 0 (L) 1 - 4 %    Basophils 0 %    Immature Granulocytes 1 (H) 0 %    Segs Absolute 7.74 (H) 1.8 - 7.7 k/uL    Absolute Lymph # 0.62 (L) 1.0 - 4.8 k/uL    Absolute Mono # 0.45 0.2 - 0.8 k/uL    Absolute Eos # 0.00 0.0 - 0.4 k/uL    Basophils Absolute 0.00 0.0 - 0.2 k/uL    Absolute Immature Granulocyte 0.09 0.00 - 0.30 k/uL   Basic Metabolic Panel   Result Value Ref Range    Glucose 191 (H) 70 - 99 mg/dL    BUN 28 (H) 8 - 23 mg/dL    CREATININE 0.80 0.50 - 0.90 mg/dL    Bun/Cre Ratio 35 (H) 9 - 20    Calcium 9.2 8.6 - 10.4 mg/dL    Sodium 134 (L) 135 - 144 mmol/L    Potassium 4.0 3.7 - 5.3 mmol/L    Chloride 98 98 - 107 mmol/L    CO2 27 20 - 31 mmol/L    Anion Gap 9 9 - 17 mmol/L    GFR Non-African American >60 >60 mL/min    GFR African American >60 >60 mL/min    GFR Comment          GFR Staging NOT REPORTED    Microscopic Urinalysis   Result Value Ref Range    -          WBC, UA TOO NUMEROUS TO COUNT 0 - 5 /HPF    RBC, UA TOO NUMEROUS TO COUNT 0 - 2 /HPF    Casts UA NOT REPORTED /LPF    Crystals, UA NOT REPORTED None /HPF    Epithelial Cells UA 2 TO 5 0 - 5 /HPF    Renal Epithelial, UA NOT REPORTED 0 /HPF    Bacteria, UA MANY (A) None    Mucus, UA 1+ (A) None    Trichomonas, UA NOT REPORTED None    Amorphous, UA NOT REPORTED None    Other Observations UA NOT REPORTED NOT REQ.     Yeast, UA NOT REPORTED None   Basic Metabolic Panel w/ Reflex to MG   Result Value Ref Range    Glucose 77 70 - 99 mg/dL    BUN 17 8 - 23 mg/dL    CREATININE 0.68 0.50 - 0.90 mg/dL    Bun/Cre Ratio 25 (H) 9 - 20    Calcium 8.5 (L) 8.6 - 10.4 mg/dL    Sodium 141 135 - 144 mmol/L    Potassium 4.0 3.7 - 5.3 mmol/L    Chloride 107 98 - 107 mmol/L    CO2 25 20 - 31 mmol/L    Anion Gap 9 9 - 17 mmol/L    GFR Non-African American >60 >60 mL/min    GFR African American >60 >60 mL/min    GFR Comment          GFR Staging NOT REPORTED    CBC auto differential   Result Value Ref Range    WBC 6.7 3.5 - 11.3 k/uL    RBC 3.15 (L) 3.95 - 5.11 m/uL    Hemoglobin 7.7 (L) 11.9 - 15.1 g/dL    Hematocrit 26.0 (L) 36.3 - 47.1 %    MCV 82.5 (L) 82.6 - 102.9 fL    MCH 24.4 (L) 25.2 - 33.5 pg    MCHC 29.6 28.4 - 34.8 g/dL    RDW 19.9 (H) 11.8 - 14.4 %    Platelets 490 598 - 134 k/uL    MPV 9.4 8.1 - 13.5 fL    NRBC Automated 0.0 0.0 per 100 WBC    Differential Type NOT REPORTED     WBC Morphology NOT REPORTED     RBC Morphology ANISOCYTOSIS PRESENT     Platelet Estimate NOT REPORTED     Seg Neutrophils 78 (H) 36 - 65 %    Lymphocytes 14 (L) 24 - 43 %    Monocytes 6 3 - 12 %    Eosinophils % 2 1 - 4 %    Basophils 0 0 - 2 %    Immature Granulocytes 1 (H) 0 %    Segs Absolute 5.22 1.50 - 8.10 k/uL    Absolute Lymph # 0.92 (L) 1.10 - 3.70 k/uL    Absolute Mono # 0.39 0.10 - 1.20 k/uL    Absolute Eos # 0.10 0.00 - 0.44 k/uL    Basophils Absolute 0.03 0.00 - 0.20 k/uL    Absolute Immature Granulocyte 0.05 0.00 - 0.30 k/uL   Lactic Acid   Result Value Ref Range    Lactic Acid 0.6 0.5 - 2.2 mmol/L   Procalcitonin   Result Value Ref Range    Procalcitonin 0.26 (H) <0.09 ng/mL   Basic Metabolic Panel w/ Reflex to MG   Result Value Ref Range    Glucose 89 70 - 99 mg/dL    BUN 14 8 - 23 mg/dL    CREATININE 0.67 0.50 - 0.90 mg/dL    Bun/Cre Ratio 21 (H) 9 - 20    Calcium 8.4 (L) 8.6 - 10.4 mg/dL    Sodium 139 135 - 144 mmol/L    Potassium 3.6 (L) 3.7 - 5.3 mmol/L    Chloride 103 98 - 107 mmol/L    CO2 29 8 (L) 9 - 17 mmol/L    GFR Non-African American >60 >60 mL/min    GFR African American >60 >60 mL/min    GFR Comment          GFR Staging NOT REPORTED    CBC auto differential   Result Value Ref Range    WBC 6.0 3.5 - 11.3 k/uL    RBC 2.98 (L) 3.95 - 5.11 m/uL    Hemoglobin 7.3 (L) 11.9 - 15.1 g/dL    Hematocrit 24.6 (L) 36.3 - 47.1 %    MCV 82.6 82.6 - 102.9 fL    MCH 24.5 (L) 25.2 - 33.5 pg    MCHC 29.7 28.4 - 34.8 g/dL    RDW 19.6 (H) 11.8 - 14.4 %    Platelets 876 395 - 142 k/uL    MPV 8.8 8.1 - 13.5 fL    NRBC Automated 0.0 0.0 per 100 WBC    Differential Type NOT REPORTED     Seg Neutrophils PENDING %    Lymphocytes PENDING %    Monocytes PENDING %    Eosinophils % PENDING %    Basophils PENDING %    Immature Granulocytes PENDING 0 %    Segs Absolute PENDING k/uL    Absolute Lymph # PENDING k/uL    Absolute Mono # PENDING k/uL    Absolute Eos # PENDING k/uL    Basophils Absolute PENDING 0.0 - 0.2 k/uL    Absolute Immature Granulocyte PENDING 0.00 - 0.30 k/uL    WBC Morphology NOT REPORTED     RBC Morphology NOT REPORTED     Platelet Estimate NOT REPORTED    Lactic Acid   Result Value Ref Range    Lactic Acid 1.1 0.5 - 2.2 mmol/L   Magnesium   Result Value Ref Range    Magnesium 1.3 (L) 1.6 - 2.6 mg/dL   POC Glucose Fingerstick   Result Value Ref Range    POC Glucose 231 (H) 65 - 105 mg/dL   POC Glucose Fingerstick   Result Value Ref Range    POC Glucose 152 (H) 65 - 105 mg/dL   POC Glucose Fingerstick   Result Value Ref Range    POC Glucose 61 (L) 65 - 105 mg/dL   POC Glucose Fingerstick   Result Value Ref Range    POC Glucose 91 65 - 105 mg/dL   POC Glucose Fingerstick   Result Value Ref Range    POC Glucose 311 (H) 65 - 105 mg/dL   POC Glucose Fingerstick   Result Value Ref Range    POC Glucose 105 65 - 105 mg/dL   POC Glucose Fingerstick   Result Value Ref Range    POC Glucose 180 (H) 65 - 105 mg/dL   POC Glucose Fingerstick   Result Value Ref Range    POC Glucose 163 (H) 65 - 105 mg/dL   POC Glucose Fingerstick   Result Value Ref Range    POC Glucose 87 65 - 105 mg/dL   POC Glucose Fingerstick   Result Value Ref Range    POC Glucose 292 (H) 65 - 105 mg/dL   POC Glucose Fingerstick   Result Value Ref Range    POC Glucose 136 (H) 65 - 105 mg/dL   POC Glucose Fingerstick   Result Value Ref Range    POC Glucose 180 (H) 65 - 105 mg/dL   POC Glucose Fingerstick   Result Value Ref Range    POC Glucose 77 65 - 105 mg/dL   EKG 12 Lead   Result Value Ref Range    Ventricular Rate 101 BPM    Atrial Rate 101 BPM    P-R Interval 148 ms    QRS Duration 154 ms    Q-T Interval 406 ms    QTc Calculation (Bazett) 526 ms    P Axis 68 degrees    R Axis -59 degrees    T Axis 29 degrees         IMAGING DATA:    Xr Ribs Right Include Chest (min 3 Views)    Result Date: 11/3/2020  EXAMINATION: 2 XRAY VIEWS OF THE RIGHT RIBS WITH FRONTAL XRAY VIEW OF THE CHEST 11/3/2020 11:53 am COMPARISON: 09/25/2020 HISTORY: ORDERING SYSTEM PROVIDED HISTORY: fall yesterday TECHNOLOGIST PROVIDED HISTORY: fall yesterday Reason for Exam: fell Acuity: Acute Type of Exam: Initial Relevant Medical/Surgical History: pt fell, pain in rt lat ribs and posterior neck FINDINGS: Masslike opacity in the right lung apex. There is a subtle linear lucency within the 8th rib which is suggestive of a nondisplaced fracture. Multiple remote/chronic fractures identified. Left hemithorax is clear. Subtle linear lucency within the 8th rib, suggestive of a nondisplaced fracture. Masslike opacity in the right lung apex. Xr Cervical Spine (2-3 Views)    Result Date: 11/3/2020  EXAMINATION: 3 XRAY VIEWS OF THE CERVICAL SPINE 11/3/2020 11:52 am COMPARISON: None.  HISTORY: ORDERING SYSTEM PROVIDED HISTORY: fall yesterday TECHNOLOGIST PROVIDED HISTORY: fall yesterday Reason for Exam: fell Acuity: Acute Type of Exam: Initial Relevant Medical/Surgical History: pt fell, pain in rt lat ribs and posterior neck FINDINGS: Three views of the cervical spine are submitted for dated 09/24/2020 HISTORY: ORDERING SYSTEM PROVIDED HISTORY: back pain TECHNOLOGIST PROVIDED HISTORY: back pain Reason for Exam: pain to low back Acuity: Unknown Type of Exam: Unknown FINDINGS: Lumbar spine: Vertebral body heights and alignment are within normal limits. There is mild multilevel disc space narrowing and endplate spurring. Mild to moderate facet arthropathy. There are scattered vascular calcifications. The stomach is significantly distended with gas. No small bowel or large bowel distention. There is moderate fecal material throughout the large bowel. Sacrum/coccyx: Sacrum is not well visualized due to osteopenia and overlying soft tissue attenuation. Sacrococcygeal alignment is grossly preserved. No displaced fracture identified. Sacroiliac joints are symmetric, better visualized on the lumbar spine radiographs. 1.  Lumbar spine: No compression fracture or acute malalignment. Mild degenerative changes. Significant gas distention of the stomach, suggesting gastroparesis or possible obstruction. 2.  Sacrum/coccyx: Limited evaluation due to osteopenia and overlying soft tissue attenuation. No obvious acute osseous abnormality.      Vl Lower Extremity Bilateral Venous Duplex    Result Date: 10/22/2020    [facility]  Vascular Lower Extremities DVT Study Procedure   Patient Name     Asif Frye        Date of Study             10/21/2020                   [facility]   Date of Birth    1950    Gender                    Female   Age              72 year(s)    Race                         Room Number      OPT   Corporate ID #   [id number]   Patient Acct #   [de-identified]   MR #             [id number]       Sonographer               Shira Banuelos   Accession #      [id number]    Interpreting Physician    Sinan Ariza   Referring Nurse                Referring Physician       Marcelo Amanda  Practitioner  Procedure Type of Study:   Veins: Lower Extremities DVT Study, Venous Scan Lower !Yes            !None      ! +------------------------------------+----------+---------------+----------+ ! Deep Femoral                        !Yes       ! Yes            ! None      ! +------------------------------------+----------+---------------+----------+ ! Popliteal                           !Yes       ! Yes            ! None      ! +------------------------------------+----------+---------------+----------+ ! Sapheno Femoral Junction            ! Yes       ! Yes            ! None      ! +------------------------------------+----------+---------------+----------+ ! PTV                                 ! Yes       ! Yes            ! None      ! +------------------------------------+----------+---------------+----------+ ! Peroneal                            !Yes       ! Yes            ! None      ! +------------------------------------+----------+---------------+----------+ ! Gastroc                             ! Yes       ! Yes            ! None      ! +------------------------------------+----------+---------------+----------+ ! GSV Thigh                           ! Yes       ! Yes            ! None      ! +------------------------------------+----------+---------------+----------+ ! GSV Knee                            ! Yes       ! Yes            ! None      ! +------------------------------------+----------+---------------+----------+ ! GSV Ankle                           ! Yes       ! Yes            ! None      ! +------------------------------------+----------+---------------+----------+ ! SSV                                 ! Yes       ! Yes            ! None      ! +------------------------------------+----------+---------------+----------+ Right Doppler Measurements +---------------------------+------+------+--------------------------------+ ! Location                   ! Signal!Reflux! Reflux (msec)                   ! +---------------------------+------+------+--------------------------------+ ! Common Femoral             !Phasic! !                                ! +---------------------------+------+------+--------------------------------+ ! Prox Femoral               !Phasic!      !                                ! +---------------------------+------+------+--------------------------------+ ! Popliteal                  !Phasic!      !                                ! +---------------------------+------+------+--------------------------------+ Left Lower Extremities DVT Study Measurements Left 2D Measurements +------------------------------------+----------+---------------+----------+ ! Location                            ! Visualized! Compressibility! Thrombosis! +------------------------------------+----------+---------------+----------+ ! Common Femoral                      !Yes       ! Yes            ! None      ! +------------------------------------+----------+---------------+----------+ ! Prox Femoral                        !Yes       ! Yes            ! None      ! +------------------------------------+----------+---------------+----------+ ! Mid Femoral                         !Yes       ! Yes            ! None      ! +------------------------------------+----------+---------------+----------+ ! Dist Femoral                        !Yes       ! Yes            ! None      ! +------------------------------------+----------+---------------+----------+ ! Deep Femoral                        !Yes       ! Yes            ! None      ! +------------------------------------+----------+---------------+----------+ ! Popliteal                           !Yes       ! Yes            ! None      ! +------------------------------------+----------+---------------+----------+ ! Sapheno Femoral Junction            ! Yes       ! Yes            ! None      ! +------------------------------------+----------+---------------+----------+ ! PTV                                 ! Yes       ! Yes            ! None      ! +------------------------------------+----------+---------------+----------+ !Peroneal                            !Yes       ! Yes            ! None      ! +------------------------------------+----------+---------------+----------+ ! Gastroc                             ! Yes       ! Yes            ! None      ! +------------------------------------+----------+---------------+----------+ ! GSV Thigh                           ! Yes       ! Yes            ! None      ! +------------------------------------+----------+---------------+----------+ ! GSV Knee                            ! Yes       ! Yes            ! None      ! +------------------------------------+----------+---------------+----------+ ! GSV Ankle                           ! Yes       ! Yes            ! None      ! +------------------------------------+----------+---------------+----------+ ! SSV                                 ! Yes       ! Yes            ! None      ! +------------------------------------+----------+---------------+----------+ Left Doppler Measurements +---------------------------+------+------+--------------------------------+ ! Location                   ! Signal!Reflux! Reflux (msec)                   ! +---------------------------+------+------+--------------------------------+ ! Common Femoral             !Phasic!      !                                ! +---------------------------+------+------+--------------------------------+ ! Prox Femoral               !Phasic!      !                                ! +---------------------------+------+------+--------------------------------+ ! Popliteal                  !Phasic!      !                                ! +---------------------------+------+------+--------------------------------+    Nm Bone Scan Whole Body    Result Date: 11/4/2020  EXAMINATION: WHOLE BODY BONE SCAN  11/4/2020 TECHNIQUE: The patient was injected intravenously with 25.6 mCi of 99 mTc MDP and scintigraphy of the entire skeleton was performed approximately three hours later.  Coned-down images of the head, neck and thorax in obliques positions. COMPARISON: Patient did not have previous imaging studies for comparison. HISTORY: ORDERING SYSTEM PROVIDED HISTORY: Fall, lung cancer TECHNOLOGIST PROVIDED HISTORY: Fall, lung cancer Reason for Exam: pain Acuity: Unknown Type of Exam: Unknown FINDINGS: Symmetrical uptake of radiotracer is noted in the shoulders, sternoclavicular joints, hips, knees, and ankles, foci of activity in the aforementioned joints are most likely degenerative. The remainder of the osseous structures and soft tissues are unremarkable. Specifically, no radiotracer uptake in the ribcage is noted. Physiologic activity is present in the skeletal and renal collecting systems. No evidence to suggest osseous metastatic disease. Pattern of uptake most compatible with age related degenerative change. IMPRESSION:   Primary Problem  <principal problem not specified>    Active Hospital Problems    Diagnosis Date Noted    Closed fracture of one rib of right side [S22.31XA] 11/04/2020    Degenerative disc disease, lumbar [M51.36] 11/04/2020    Urinary tract infectious disease [N39.0] 11/03/2020    Malignant neoplasm of upper lobe of right lung ([facility] [address] [C34.11] 09/18/2020    Lung mass [R91.8] 08/27/2020    Chronic bilateral low back pain [M54.5, G89.29] 10/31/2017    Falls frequently [R29.6] 01/27/2017       Squamous cell carcinoma of lung, clinical stage IIIb  Ongoing treatment with concurrent chemoradiation using carboplatin and Taxol  UTI  Asthenia  Anemia  Eighth rib fracture      RECOMMENDATIONS:  1. I personally reviewed results of lab work-up imaging studies and other relevant clinical data. Reviewed records and treatment history. Continue to hold chemotherapy  Continue symptomatic supportive care  Transfuse to keep hemoglobin above 7.   Continue treatment of UTI  Agree with PT OT  Patient counseled tobacco cessation  Pain control  Restart therapy as an outpatient once patient stronger hopefully next

## 2020-11-06 NOTE — PROGRESS NOTES
Progress note  [facility],    Adult Hospitalist      Name: Jsoette Zayas  MRN: [id number]     [place]: [de-identified]  Room: 2003/2003-02    Admit Date: 11/3/2020 10:59 AM  PCP: Omar Louis MD    Primary Problem  Active Problems:    Falls frequently    Chronic bilateral low back pain    Lung mass    Malignant neoplasm of upper lobe of right lung [facility])    Urinary tract infectious disease    Closed fracture of one rib of right side    Degenerative disc disease, lumbar  Resolved Problems:    * No resolved hospital problems. *        Assesment:     · UTI E. coli  · Generalized weakness  · 8th rib lucencies suspicious for nondisplaced fracture  · Recent diagnosis of right upper lobe squamous cell carcinoma status post chemoradiation  · Chronic COPD  · Chronic hypoxic respiratory failure on nocturnal O2  · Former smoker  · Diabetes type 2  · Depression with anxiety        Plan:     · Admit to [facility] with telemetry  · O2 maintain oxygen saturation greater than 92%  · Follow urine culture: E. coli, sensitive to ceftriaxone  · IV ceftriaxone  · IV fluids, discontinue  · Orthopedic consult  · Bone scan was ordered  · Pain control  · X-ray lumbosacral spine reviewed  · Oncology consult for resumption of chemotherapy  · Continue aspirin, Lasix,  Lopressor  · Continue Abilify, Effexor, trazodone, Neurontin  · Continue glimepiride, added SSI  · DVT and GI prophylaxis.       Discharge planning, likely to SNF once arrangements are done  Chief Complaint:     Chief Complaint   Patient presents with    Fall         History of Present Illness:      Patient seen and examined at bedside, patient denies any chest pain no nausea vomiting diarrhea  No neck palpitation  No focal logical deficit overall patient is feeling better  Patient is continued on IV antibiotic    HPI:    Josette Zayas is a 72 y.o.  female who presents with Fall    52-year-old lady with past medical history of COPD,  coronary artery disease, depression, arthritis presented to ER complaining of generalized weakness and fall. Patient has been recently diagnosed of lung cell carcinoma. She had a bronchoscopy on 8/31/2020 and endobronchial biopsy showed squamous cell carcinoma. She was started on chemoradiation. Her last chemotherapy was on 10/28/2020. She also has an appointment with Radiation Oncology on 10/30/2020. On presentation to ER her sodium was slightly low at 134. Her hemoglobin was 8.4. Her UA showed moderate leukocyte Estrace and many bacteria. Patient was so weak she was unable to get up and ambulate. She is unable to take care of herself at home. Complaining of low back pain and right-sided rib pain. Patient admitted for further management. I have personally reviewed the past medical history, past surgical history, medications, social history, and family history, and summarized in the note. Review of Systems:     All 10 point system is reviewed and negative otherwise mentioned in HPI. Past Medical History:     Past Medical History:   Diagnosis Date    AAA (abdominal aortic aneurysm) ([facility] [address]     Pt denies having a history of AAA    Acid reflux     Anxiety and depression     Aortic stenosis     Arthritis     Blister of ankle, right 10/13/2016    blister broke open & draining, is on antibiotics    CAD (coronary artery disease)     Cancer ([address]     lung cancer    COPD (chronic obstructive pulmonary disease) ([address]     Diabetes mellitus ([address]     Falls     Heart block     bifasicular    Hypokalemia     MDRO (multiple drug resistant organisms) resistance 10/17/2014    E. Coli urine    MRSA (methicillin resistant staph aureus) culture positive resolved 12/2016    2 negative nasal screens - 2016 (hx in urine 2014)    On home oxygen therapy     uses 2 liters at night    On home oxygen therapy     patient states 2 liters/nasal cannula continuous    Overactive bladder     patient incont.  wears a brief    Pneumonia     PONV (postoperative nausea and vomiting)     Vitamin D deficiency         Past Surgical History:     Past Surgical History:   Procedure Laterality Date    AORTIC VALVE REPAIR N/A 4/11/2017    AORTIC VALVE REPAIR REPLACEMENT performed by Suzanne Addison MD at [address] N/A 8/31/2020    BRONCHOSCOPY BIOPSY BRONCHUS performed by Calvin Warren MD at [address] [facility], COLON, DIAGNOSTIC  12/08/2016    EYE SURGERY      HYSTERECTOMY      IR INS PICC VAD W SQ PORT GREATER THAN 5  9/4/2020    IR INS PICC VAD W SQ PORT GREATER THAN 5 9/4/2020 [facility]ED[facility]    IR PORT PLACEMENT EQUAL OR GREATER THAN 5 YEARS  9/29/2020    IR PORT PLACEMENT EQUAL OR GREATER THAN 5 YEARS 9/29/2020 MD JAY JAY Agarwal [facility]    LUMBAR LAMINECTOMY      TONSILLECTOMY          Medications Prior to Admission:       Prior to Admission medications    Medication Sig Start Date End Date Taking? Authorizing Provider   oxyCODONE-acetaminophen (PERCOCET) 5-325 MG per tablet Take 1 tablet by mouth every 6 hours as needed for Pain for up to 3 doses. 11/5/20 11/6/20 Yes Rod Johnson MD   clonazePAM (KLONOPIN) 1 MG tablet Take 1 tablet by mouth 3 times daily as needed for Anxiety for up to 3 doses. 11/5/20 11/6/20 Yes Rod Johnson MD   gabapentin (NEURONTIN) 400 MG capsule Take 1 capsule by mouth 2 times daily for 3 doses. In addition to 2 capsules (=800mg) nightly 11/5/20 11/7/20 Yes Rod Johnson MD   gabapentin (NEURONTIN) 400 MG capsule Take 2 capsules by mouth nightly for 3 doses.  Take 2 capsules (=800mg) nightly - in addition to 1BID 11/5/20 11/8/20 Yes Rod Johnson MD   traZODone (DESYREL) 150 MG tablet Take 2 tablets by mouth nightly for 3 doses Take 2 tablets (=300mg) nightly 11/5/20 11/8/20 Yes Rod Johnson MD   ARIPiprazole (ABILIFY) 5 MG tablet Take 1 tablet by mouth daily for 3 doses 11/5/20 11/8/20 Yes Shi Long MD   furosemide (LASIX) 40 MG tablet Take 40 mg by mouth daily   Yes Historical Provider, MD   omeprazole (PRILOSEC) 20 MG delayed release capsule Take 20 mg by mouth daily   Yes Historical Provider, MD   dexamethasone (DECADRON) 4 MG tablet Take 20 mg orally 12 hours and 6 hours before Taxol 10/23/20  Yes Vidya Barker MD   lidocaine-prilocaine (EMLA) 2.5-2.5 % cream To port site before chemo 10/21/20  Yes Vidya Barker MD   melatonin 5 MG TABS tablet Take 5 mg by mouth nightly    Yes Historical Provider, MD   glimepiride (AMARYL) 1 MG tablet Take 1 mg by mouth Daily with supper In addition to 2 tablets (=2mg) every morning    Yes Historical Provider, MD   metoprolol tartrate (LOPRESSOR) 25 MG tablet Take 25 mg by mouth 2 times daily   Yes Historical Provider, MD   venlafaxine (EFFEXOR XR) 150 MG extended release capsule Take 150 mg by mouth 2 times daily   Yes Historical Provider, MD   lansoprazole (PREVACID) 30 MG delayed release capsule Take 30 mg by mouth daily 11/10/16  Yes Historical Provider, MD   metFORMIN (GLUCOPHAGE) 500 MG tablet Take 500 mg by mouth 2 times daily 12/7/16  Yes Historical Provider, MD   aspirin EC 81 MG EC tablet Take 81 mg by mouth daily   Yes Historical Provider, MD   mometasone-formoterol (DULERA) 200-5 MCG/ACT inhaler Inhale 2 puffs into the lungs every 12 hours as needed (wheezing/SOB)    Yes Historical Provider, MD   glimepiride (AMARYL) 1 MG tablet Take 2 mg by mouth every morning In addition to 1mg nightly   Yes Historical Provider, MD        Allergies:       Codeine and Dye [iodides]    Social History:     Tobacco:    reports that she quit smoking about 4 years ago. She has never used smokeless tobacco.  Alcohol:      reports no history of alcohol use. Drug Use:  reports no history of drug use.     Family History:     Family History   Problem Relation Age of Onset   Dossie Emily Cancer Mother     Cancer Father          Physical Exam:     Vitals:  BP (!) 121/42 Pulse 93   Temp 98.1 °F (36.7 °C) (Oral)   Resp 16   Ht 5' 8\" (1.727 m)   Wt 152 lb (68.9 kg)   SpO2 98%   BMI 23.11 kg/m²   Temp (24hrs), Av.5 °F (36.9 °C), Min:98 °F (36.7 °C), Max:99.5 °F (37.5 °C)          General appearance - alert, well appearing, and in no acute distress  Mental status - oriented to person, place, and time with normal affect  Head - normocephalic and atraumatic  Eyes - pupils equal and reactive, extraocular eye movements intact, conjunctiva clear  Ears - hearing appears to be intact  Nose - no drainage noted  Mouth - mucous membranes moist  Neck - supple, no carotid bruits, thyroid not palpable  Chest - clear to auscultation, normal effort  Heart - normal rate, regular rhythm, no murmur  Abdomen - soft, nontender, nondistended, bowel sounds present all four quadrants, no masses, hepatomegaly or splenomegaly  Neurological - normal speech, no focal findings or movement disorder noted, cranial nerves II through XII grossly intact  Extremities - peripheral pulses palpable, no pedal edema or calf pain with palpation  Skin - no gross lesions, rashes, or induration noted        Data:     Labs:    Hematology:  Recent Labs     20  0548   WBC 6.7 5.8 6.0   RBC 3.15* 2.95* 2.98*   HGB 7.7* 7.2* 7.3*   HCT 26.0* 23.8* 24.6*   MCV 82.5* 80.7* 82.6   MCH 24.4* 24.4* 24.5*   MCHC 29.6 30.3 29.7   RDW 19.9* 19.7* 19.6*    325 340   MPV 9.4 8.7 8.8     Chemistry:  Recent Labs     20  0548    139 139   K 4.0 3.6* 3.4*    103 102   CO2 25 29 29   GLUCOSE 77 89 95   BUN 17 14 11   CREATININE 0.68 0.67 0.63   MG  --   --  1.3*   ANIONGAP 9 7* 8*   LABGLOM >60 >60 >60   GFRAA >60 >60 >60   CALCIUM 8.5* 8.4* 8.2*     Recent Labs     20  0619 20  1135 20  1609 20  2046 20  0612 20  0723   POCGLU 87 292* 136* 180* 77 156*       Lab Results   Component Value Date    INR 0.9 09/29/2020    INR 1.1 08/28/2020    INR 0.9 12/28/2018    PROTIME 11.8 09/29/2020    PROTIME 14.4 (H) 08/28/2020    PROTIME 9.7 12/28/2018       Lab Results   Component Value Date/Time    SPECIAL NOT REPORTED 11/03/2020 02:15 PM     Lab Results   Component Value Date/Time    CULTURE NO SAMPLE RECEIVED 11/03/2020 02:15 PM       Lab Results   Component Value Date    POCPH 7.36 04/12/2017    PHART 7.42 08/26/2012    PH 7.22 04/11/2017    POCPCO2 45 04/12/2017    XVH9NHO 41 08/26/2012    PCO2 54 04/11/2017    POCPO2 93 04/12/2017    PO2ART 59 08/26/2012    PO2 89 04/11/2017    POCHCO3 25.3 04/12/2017    GRK7EKH 26.1 08/26/2012    HCO3 22.2 04/11/2017    NBEA NOT REPORTED 04/12/2017    PBEA 0 04/12/2017    FDP9VSP 27 04/12/2017    FFTY4NYE 97 04/12/2017    F4DRLGRK 92.1 08/26/2012    O2SAT 95 04/11/2017    FIO2 UNKNOWN 10/31/2017       Radiology:    Xr Ribs Right Include Chest (min 3 Views)    Result Date: 11/3/2020  Subtle linear lucency within the 8th rib, suggestive of a nondisplaced fracture. Masslike opacity in the right lung apex. Xr Cervical Spine (2-3 Views)    Result Date: 11/3/2020  No convincing evidence for acute fracture or malalignment of the cervical spine. Masslike opacity within the right lung apex. All radiological studies reviewed                Code Status:  Full Code    Electronically signed by Derrick Jones MD on 11/6/2020 at 10:00 AM     Copy sent to Dr. Noah Braswell MD    This note was created with the assistance of a speech-recognition program.  Although the intention is to generate a document that actually reflects the content of the visit, no guarantees can be provided that every mistake has been identified and corrected by editing. Note was updated later by me after  physical examination and  completion of the assessment.

## 2020-11-06 NOTE — PROGRESS NOTES
Occupational Therapy   Occupational Therapy Initial Assessment  Date: 2020   Patient Name: Catalina Lott  MRN: [id number]     : 1950    RN Megan Beltran reports patient is medically stable for therapy treatment this date. Chart reviewed prior to treatment and patient is agreeable for therapy. All lines intact and patient positioned comfortably at end of treatment. All patient needs addressed prior to ending therapy session. Date of Service: 2020    Discharge Recommendations:  Subacute/Skilled Nursing Facility  OT Equipment Recommendations  Equipment Needed: Yes  Mobility Devices: ADL Assistive Devices  ADL Assistive Devices: Grab Bars - shower;Sock-Aid Soft;Reacher;Long-handled [place];Long-handled Sponge;Emergency Alert System    Assessment   Performance deficits / Impairments: Decreased functional mobility ; Decreased safe awareness;Decreased balance;Decreased coordination;Decreased ADL status; Decreased posture;Decreased endurance;Decreased high-level IADLs;Decreased strength  Assessment: Skilled OT is necessary for this pt to increase overall I and safety with functional tasks and reduce fall risk to return home as able and with assist.  Prognosis: Fair  Decision Making: Medium Complexity  OT Education: OT Role;Plan of Care;Energy Conservation;Transfer Training  Patient Education: pursed lip breathing, call light use/fall prevention, safety in function, recommendations for continued therapy services, postural control, benefits of being up OOB as able  REQUIRES OT FOLLOW UP: Yes  Activity Tolerance  Activity Tolerance: Patient limited by fatigue;Patient limited by pain(limited by resp status)  Activity Tolerance: poor plus; pt fatigues easily  Safety Devices  Safety Devices in place: Yes  Type of devices: Bed alarm in place;Call light within reach; Left in bed;Patient at risk for falls;Gait belt;Nurse notified(Pt requested back to bed.   RN in with pt upon exit.)           Patient Diagnosis(es): The primary encounter diagnosis was Urinary tract infection without hematuria, site unspecified. Diagnoses of Fall, initial encounter, Contusion of right chest wall, initial encounter, General weakness, Malignant neoplasm of upper lobe of right lung ([address] and Lung mass were also pertinent to this visit. has a past medical history of AAA (abdominal aortic aneurysm) (HCC), Acid reflux, Anxiety and depression, Aortic stenosis, Arthritis, Blister of ankle, right, CAD (coronary artery disease), Cancer ([address] COPD (chronic obstructive pulmonary disease) ([address] Diabetes mellitus ([address] Falls, Heart block, Hypokalemia, MDRO (multiple drug resistant organisms) resistance, MRSA (methicillin resistant staph aureus) culture positive, On home oxygen therapy, On home oxygen therapy, Overactive bladder, Pneumonia, PONV (postoperative nausea and vomiting), and Vitamin D deficiency. has a past surgical history that includes back surgery; eye surgery; Cholecystectomy; Appendectomy; Hysterectomy; Tonsillectomy; lumbar laminectomy; Endoscopy, colon, diagnostic (12/08/2016); aortic valve repair (N/A, 4/11/2017); bronchoscopy (N/A, 8/31/2020); IR INSERT PICC VAD W SQ PORT >5 YEARS (9/4/2020); and IR PORT PLACEMENT > 5 YEARS (9/29/2020). PER H&P: Cale Crowe is a 72 y.o.  female who presents with Fall     26-year-old lady with past medical history of COPD,  coronary artery disease, depression, arthritis presented to ER complaining of generalized weakness and fall. Patient has been recently diagnosed of lung cell carcinoma. She had a bronchoscopy on 8/31/2020 and endobronchial biopsy showed squamous cell carcinoma. She was started on chemoradiation. Her last chemotherapy was on 10/28/2020. She also has an appointment with Radiation Oncology on 10/30/2020. On presentation to ER her sodium was slightly low at 134. Her hemoglobin was 8.4. Her UA showed moderate leukocyte Estrace and many bacteria. Patient was so weak she was unable to get up and ambulate. She is unable to take care of herself at home. Complaining of low back pain and right-sided rib pain. Patient admitted for further management. Restrictions  Restrictions/Precautions  Restrictions/Precautions: Seizure, Fall Risk, Contact Precautions  Position Activity Restriction  Other position/activity restrictions: up as tolerated, Weightbearing as tolerated all extremities without restrictions, 3 L O2 per NC, RUE IV, alarms    Subjective   General  Chart Reviewed: Yes  Patient assessed for rehabilitation services?: Yes  Family / Caregiver Present: No  Patient Currently in Pain: Yes  Pain Assessment  Pain Level: 10  Pre Treatment Pain Screening  Intervention List: Nurse/Physician notified  Comments / Details: Pt c/o R side and low back pain and rates at 10/10. Social/Functional History  Social/Functional History  Lives With: Alone  Type of Home: House  Home Layout: Able to Live on Main level with bedroom/bathroom(1.5 SH)  Home Access: Ramped entrance  Bathroom Shower/Tub: Walk-in shower  Bathroom Toilet: Handicap height  Bathroom Equipment: Tub transfer bench  Home Equipment: Rolling walker, 4 wheeled walker, Oxygen(Pt states O2 at night only)  Receives Help From: Family, Home health(HHA comes 2x a week/4.5 hrs; pt states she has a supportive son and grandchildren)  ADL Assistance: Independent(HHA assists shower)  Homemaking Assistance: Needs assistance(HHA asssists with all IADLS & son assisst with shopping prn)  Homemaking Responsibilities: Yes  Ambulation Assistance: Independent(uses 9SM)  Transfer Assistance: Independent  Active : No  Occupation: Retired  Type of occupation: [employer]  Leisure & Hobbies: get out of the house and drive around/shop  Additional Comments: Pt states she has had frequent falls. Pt states last Wed was her last chemo/radiation tx.        Objective   Vision: Impaired  Vision Exceptions: Wears glasses at all times(Pt denies visual changes.)  Hearing: Within functional limits    Orientation  Overall Orientation Status: Within Functional Limits  Observation/Palpation  Posture: Fair(with RW)  Observation: RUE IV. Pt can be impulsive due to incontinence issues. O2 sats monitored and WFLS in function with O2 on per NC. Balance  Sitting Balance: Stand by assistance  Standing Balance: Contact guard assistance(with RW)  Standing Balance  Time: stand emely < 40 seconds with RW for functional tasks; pt fatigues easily  Functional Mobility  Functional - Mobility Device: Rolling Walker  Activity: (bed to toilet, toilet back to bed)  Assist Level: Minimal assistance  Functional Mobility Comments: MOD verbal instruction needed for pacing self/slowing down movements, pursed lip breathing, scanning room, RW safety and awareness/assist with all lines to increase overall safety/reduce fall risk. Toilet Transfers  Toilet - Technique: Ambulating  Equipment Used: Grab bars  Toilet Transfer: Minimal assistance  Toilet Transfers Comments: MOD verbal instruction needed for hand placement on grab bar, controlled stand to sit and awareness/assist with all lines to increase overall safety awareness. ADL  Feeding: Independent  Grooming: Setup;Stand by assistance(seated)  UE Bathing: Setup;Stand by assistance  LE Bathing: Setup; Moderate assistance  UE Dressing: Setup;Minimal assistance(with hosp gown)  LE Dressing: Setup;Maximum assistance(with B socks)  Toileting: Setup;Maximum assistance(with brief/mgt up; pt was able to urinate and is I with hygiene seated)  Tone RUE  RUE Tone: Normotonic  Tone LUE  LUE Tone: Normotonic  Coordination  Movements Are Fluid And Coordinated: Yes  Coordination and Movement description: Fine motor impairments     Bed mobility  Supine to Sit: Minimal assistance  Sit to Supine: Contact guard assistance(Pt requested back to bed)  Comment: Min verbal instruction needed for hand placement on bed rail to assist and pursed lip breathing. Transfers  Stand Step Transfers: Minimal assistance(with RW)  Sit to stand: Minimal assistance  Stand to sit: Minimal assistance  Transfer Comments: MOD verbal instruction needed for hand placement, pursed lip breathing, scanning room, pacing, RW safety, controlled stand to sit and awareness/assist with all lines to increase safety. Cognition  Overall Cognitive Status: Exceptions  Arousal/Alertness: Appropriate responses to stimuli  Following Commands: Follows all commands without difficulty  Attention Span: Appears intact  Memory: Decreased short term memory  Safety Judgement: Decreased awareness of need for assistance;Decreased awareness of need for safety  Problem Solving: Assistance required to identify errors made;Assistance required to correct errors made;Decreased awareness of errors  Insights: Decreased awareness of deficits  Initiation: Requires cues for some  Sequencing: Requires cues for some  Perception  Overall Perceptual Status: WFL     Sensation  Overall Sensation Status: WFL        LUE AROM (degrees)  LUE AROM : WFL  RUE AROM (degrees)  RUE AROM : WFL  LUE Strength  Gross LUE Strength: WFL  LUE Strength Comment: BUE strength grossly 4/5  RUE Strength  Gross RUE Strength: WFL                   Plan   Plan  Times per week: 4-5x/week 1x/day as emely  Current Treatment Recommendations: Strengthening, Balance Training, Functional Mobility Training, Safety Education & Training, Endurance Training, Neuromuscular Re-education, Patient/Caregiver Education & Training, Self-Care / ADL, Equipment Evaluation, Education, & procurement, Home Management Training, Pain Management                                                      AM-PAC Score   16          Goals  Short term goals  Time Frame for Short term goals: by discharge, pt to demo  Short term goal 1: bed mob tasks with use of rail as needed to SUP.   Short term goal 2: increase in BUE strength by a 1/2 grade to assist with self care and be I with BUE HEP with use of hand outs. Short term goal 3: UB ADL to set up and LB ADL to min assist with use of AD/AE. Short term goal 4: toileting tasks with use of AD/grab bar as needed to min assist.  Short term goal 5: ADL transfers and functional mob with AD as needed to SBA level. Long term goals  Long term goal 1: Pt to stand with SBA and AD emely > 5 mins as able to reduce falls with ADL tasks. Long term goal 2: Pt/family to be I with EC/WS and fall prevention tech as well as DME/AE recommendations with use of hand outs. Patient Goals   Patient goals : Get out of here and get home!        Therapy Time   Individual Concurrent Group Co-treatment   Time In 1310(plus 10 min chart review/RN communication)         Time Out 1349         Minutes [address] [place], [place]

## 2020-11-06 NOTE — CARE COORDINATION
DC planning    Discussed dc options at length with pt. Pt. Lives home alone she feels she's  \" between a rock and a hard place\". She has fallen at home has aide services and vns. The home health aides come in 3x per week for 2.5 hours. She was getting PT at home. They do help with light housekeeping and bathing. She is agreeable to snf however states \"I'm only going cause my son wants me to go\" She knows she needs more therapy but also concerned going to ecf with high risk for covid infection. Discussed LSW issues with a facility accepting her with chemo and radiation. Discussed pros and cons again. She (pt.) agreed writer should talk with son to get his opinion again. Spoke with son Julien Weems, he does not feel mom is safe for home. Although, she does have some help she has fallen at home multiple times. They do visit and take her to appointments. Son is only family member and works 60h per week. States Moises Gill is stubborn. \" Discussed [facility] not accept, she has been to [facility] and he agrees to try there as writer will reach out to hem/onc on when chemo/radiation will resume. Writer did message Dr. Vicky Otero and typically radiation should cont. After dc and she is in the middle of tx. D/W Lsw-she will discuss with son for Medicaid and private pay options.

## 2020-11-06 NOTE — CARE COORDINATION
Social work: received a denial from [facility] due to chemo and radiation treatments. Explained to son that no snf is likely to take pt as the treatments somehow come out of their award. Therefore the idea of going to stay with son was suggested by home care coordinator but neither pt nor son was agreeable. Social work has discussed this with son on past admissions also. Pt staying with him does not seem to be an option. Asked if pt would be able to private pay for snf $8,000-10,000 per month. Or asked if pt would be eligible to obtain medicaid. He thinks she might be able to apply for medicaid but states pt does not allow him to handle her bills that is her . He did not provide his name or number but did agree to call him on Monday. Pt will need a precert if any snf would agree to try to take pt while chemo and radiation is on going. They usually advise that they would have to pay for that and snf's will not agree to that idea. Will need to find out if treatment is still planned and what pt wants to do. She has aides at home and can ask for additional support from them in order to make home safer for her if she wishes to return there. Son is the one who is asking for snf as pt keeps falling per son.   Lory cavazos

## 2020-11-06 NOTE — CARE COORDINATION
Social work: spoke to [employer] who advised that since the chemo and radiation would have to come out of the money the snf would get per day they nor any snf is likely to be able to say yes to an admission. RN states pt is walking to the bathroom. She may have to return home or  Stay with someone if she feels too weak to meet her daily needs. Will need elizabeth if home care is recommended.   Tamie Pay w

## 2020-11-06 NOTE — PLAN OF CARE
Problem: Pain:  Goal: Pain level will decrease  Description: Pain level will decrease  11/6/2020 0221 by Nadya Rowe RN  Outcome: Ongoing  Goal: Control of acute pain  Description: Control of acute pain  11/6/2020 0221 by Nadya Rowe RN  Outcome: Ongoing  Goal: Control of chronic pain  Description: Control of chronic pain  11/6/2020 0221 by Nadya Rowe RN  Outcome: Ongoing  Problem: Falls - Risk of:  Goal: Will remain free from falls  Description: Will remain free from falls  11/6/2020 0221 by Nadya Rowe RN  Outcome: Ongoing  Goal: Absence of physical injury  Description: Absence of physical injury  11/6/2020 0221 by Nadya Rowe RN  Outcome: Ongoing  Problem: Sensory:  Goal: General experience of comfort will improve  Description: General experience of comfort will improve  Outcome: Ongoing     Problem: Urinary Elimination:  Goal: Signs and symptoms of infection will decrease  Description: Signs and symptoms of infection will decrease  Outcome: Ongoing  Goal: Ability to reestablish a normal urinary elimination pattern will improve - after catheter removal  Description: Ability to reestablish a normal urinary elimination pattern will improve  Outcome: Ongoing  Goal: Complications related to the disease process, condition or treatment will be avoided or minimized  Description: Complications related to the disease process, condition or treatment will be avoided or minimized  Outcome: Ongoing

## 2020-11-07 LAB
ABSOLUTE EOS #: 0.05 K/UL (ref 0–0.44)
ABSOLUTE IMMATURE GRANULOCYTE: 0.06 K/UL (ref 0–0.3)
ABSOLUTE LYMPH #: 0.81 K/UL (ref 1.1–3.7)
ABSOLUTE MONO #: 0.53 K/UL (ref 0.1–1.2)
ANION GAP SERPL CALCULATED.3IONS-SCNC: 5 MMOL/L (ref 9–17)
BASOPHILS # BLD: 1 % (ref 0–2)
BASOPHILS ABSOLUTE: 0.03 K/UL (ref 0–0.2)
BUN BLDV-MCNC: 11 MG/DL (ref 8–23)
BUN/CREAT BLD: 17 (ref 9–20)
CALCIUM SERPL-MCNC: 8.4 MG/DL (ref 8.6–10.4)
CHLORIDE BLD-SCNC: 106 MMOL/L (ref 98–107)
CO2: 30 MMOL/L (ref 20–31)
CREAT SERPL-MCNC: 0.63 MG/DL (ref 0.5–0.9)
DIFFERENTIAL TYPE: ABNORMAL
EOSINOPHILS RELATIVE PERCENT: 1 % (ref 1–4)
GFR AFRICAN AMERICAN: >60 ML/MIN
GFR NON-AFRICAN AMERICAN: >60 ML/MIN
GFR SERPL CREATININE-BSD FRML MDRD: ABNORMAL ML/MIN/{1.73_M2}
GFR SERPL CREATININE-BSD FRML MDRD: ABNORMAL ML/MIN/{1.73_M2}
GLUCOSE BLD-MCNC: 133 MG/DL (ref 65–105)
GLUCOSE BLD-MCNC: 325 MG/DL (ref 65–105)
GLUCOSE BLD-MCNC: 67 MG/DL (ref 65–105)
GLUCOSE BLD-MCNC: 77 MG/DL (ref 70–99)
GLUCOSE BLD-MCNC: 81 MG/DL (ref 65–105)
HCT VFR BLD CALC: 25.9 % (ref 36.3–47.1)
HEMOGLOBIN: 7.5 G/DL (ref 11.9–15.1)
IMMATURE GRANULOCYTES: 1 %
LACTIC ACID: 0.8 MMOL/L (ref 0.5–2.2)
LYMPHOCYTES # BLD: 15 % (ref 24–43)
MCH RBC QN AUTO: 23.9 PG (ref 25.2–33.5)
MCHC RBC AUTO-ENTMCNC: 29 G/DL (ref 28.4–34.8)
MCV RBC AUTO: 82.5 FL (ref 82.6–102.9)
MONOCYTES # BLD: 10 % (ref 3–12)
NRBC AUTOMATED: 0 PER 100 WBC
PDW BLD-RTO: 19.6 % (ref 11.8–14.4)
PLATELET # BLD: 350 K/UL (ref 138–453)
PLATELET ESTIMATE: ABNORMAL
PMV BLD AUTO: 8.8 FL (ref 8.1–13.5)
POTASSIUM SERPL-SCNC: 4.1 MMOL/L (ref 3.7–5.3)
RBC # BLD: 3.14 M/UL (ref 3.95–5.11)
RBC # BLD: ABNORMAL 10*6/UL
SEG NEUTROPHILS: 72 % (ref 36–65)
SEGMENTED NEUTROPHILS ABSOLUTE COUNT: 3.87 K/UL (ref 1.5–8.1)
SODIUM BLD-SCNC: 141 MMOL/L (ref 135–144)
WBC # BLD: 5.4 K/UL (ref 3.5–11.3)
WBC # BLD: ABNORMAL 10*3/UL

## 2020-11-07 PROCEDURE — 83605 ASSAY OF LACTIC ACID: CPT

## 2020-11-07 PROCEDURE — 80048 BASIC METABOLIC PNL TOTAL CA: CPT

## 2020-11-07 PROCEDURE — 6370000000 HC RX 637 (ALT 250 FOR IP): Performed by: ORTHOPAEDIC SURGERY

## 2020-11-07 PROCEDURE — 6370000000 HC RX 637 (ALT 250 FOR IP): Performed by: HOSPITALIST

## 2020-11-07 PROCEDURE — 82947 ASSAY GLUCOSE BLOOD QUANT: CPT

## 2020-11-07 PROCEDURE — 99232 SBSQ HOSP IP/OBS MODERATE 35: CPT | Performed by: INTERNAL MEDICINE

## 2020-11-07 PROCEDURE — 36415 COLL VENOUS BLD VENIPUNCTURE: CPT

## 2020-11-07 PROCEDURE — 85025 COMPLETE CBC W/AUTO DIFF WBC: CPT

## 2020-11-07 PROCEDURE — 1200000000 HC SEMI PRIVATE

## 2020-11-07 PROCEDURE — 2580000003 HC RX 258: Performed by: HOSPITALIST

## 2020-11-07 PROCEDURE — 6360000002 HC RX W HCPCS: Performed by: HOSPITALIST

## 2020-11-07 RX ADMIN — CLONAZEPAM 1 MG: 0.5 TABLET ORAL at 13:20

## 2020-11-07 RX ADMIN — ARIPIPRAZOLE 5 MG: 5 TABLET ORAL at 09:14

## 2020-11-07 RX ADMIN — OXYCODONE HYDROCHLORIDE AND ACETAMINOPHEN 1 TABLET: 5; 325 TABLET ORAL at 19:17

## 2020-11-07 RX ADMIN — GABAPENTIN 800 MG: 300 CAPSULE ORAL at 21:01

## 2020-11-07 RX ADMIN — VENLAFAXINE HYDROCHLORIDE 150 MG: 75 CAPSULE, EXTENDED RELEASE ORAL at 21:01

## 2020-11-07 RX ADMIN — VENLAFAXINE HYDROCHLORIDE 150 MG: 75 CAPSULE, EXTENDED RELEASE ORAL at 09:14

## 2020-11-07 RX ADMIN — GABAPENTIN 400 MG: 300 CAPSULE ORAL at 06:17

## 2020-11-07 RX ADMIN — FAMOTIDINE 20 MG: 20 TABLET, FILM COATED ORAL at 09:14

## 2020-11-07 RX ADMIN — OXYCODONE HYDROCHLORIDE AND ACETAMINOPHEN 1 TABLET: 5; 325 TABLET ORAL at 10:39

## 2020-11-07 RX ADMIN — METOPROLOL TARTRATE 25 MG: 25 TABLET, FILM COATED ORAL at 21:01

## 2020-11-07 RX ADMIN — ASPIRIN 81 MG: 81 TABLET, COATED ORAL at 09:14

## 2020-11-07 RX ADMIN — OXYCODONE HYDROCHLORIDE AND ACETAMINOPHEN 1 TABLET: 5; 325 TABLET ORAL at 02:57

## 2020-11-07 RX ADMIN — INSULIN LISPRO 12 UNITS: 100 INJECTION, SOLUTION INTRAVENOUS; SUBCUTANEOUS at 13:22

## 2020-11-07 RX ADMIN — GABAPENTIN 400 MG: 300 CAPSULE ORAL at 13:20

## 2020-11-07 RX ADMIN — CEFTRIAXONE 1 G: 1 INJECTION, POWDER, FOR SOLUTION INTRAMUSCULAR; INTRAVENOUS at 13:24

## 2020-11-07 RX ADMIN — ENOXAPARIN SODIUM 40 MG: 40 INJECTION SUBCUTANEOUS at 09:16

## 2020-11-07 RX ADMIN — TRAZODONE HYDROCHLORIDE 300 MG: 100 TABLET ORAL at 21:00

## 2020-11-07 RX ADMIN — FAMOTIDINE 20 MG: 20 TABLET, FILM COATED ORAL at 21:01

## 2020-11-07 ASSESSMENT — PAIN DESCRIPTION - DESCRIPTORS
DESCRIPTORS: ACHING;CONSTANT;DISCOMFORT;DULL
DESCRIPTORS: ACHING
DESCRIPTORS: ACHING

## 2020-11-07 ASSESSMENT — PAIN DESCRIPTION - PAIN TYPE
TYPE: CHRONIC PAIN

## 2020-11-07 ASSESSMENT — PAIN SCALES - GENERAL
PAINLEVEL_OUTOF10: 10

## 2020-11-07 ASSESSMENT — PAIN DESCRIPTION - FREQUENCY
FREQUENCY: CONTINUOUS
FREQUENCY: INTERMITTENT
FREQUENCY: INTERMITTENT

## 2020-11-07 ASSESSMENT — PAIN DESCRIPTION - LOCATION
LOCATION: BACK

## 2020-11-07 ASSESSMENT — PAIN DESCRIPTION - PROGRESSION
CLINICAL_PROGRESSION: NOT CHANGED

## 2020-11-07 ASSESSMENT — PAIN DESCRIPTION - ORIENTATION
ORIENTATION: RIGHT
ORIENTATION: RIGHT;LOWER
ORIENTATION: RIGHT

## 2020-11-07 ASSESSMENT — PAIN DESCRIPTION - ONSET
ONSET: ON-GOING

## 2020-11-07 NOTE — PROGRESS NOTES
Progress note  [facility],    Adult Hospitalist      Name: Haroon Duque  MRN: [id number]     [place]: [de-identified]  Room: 2003/2003-02    Admit Date: 11/3/2020 10:59 AM  PCP: Kendal Fernández MD    Primary Problem  Active Problems:    Falls frequently    Chronic bilateral low back pain    Lung mass    Malignant neoplasm of upper lobe of right lung [facility])    Urinary tract infectious disease    Closed fracture of one rib of right side    Degenerative disc disease, lumbar  Resolved Problems:    * No resolved hospital problems. *        Assesment:     · UTI E. coli  · Generalized weakness  · 8th rib lucencies suspicious for nondisplaced fracture  · Recent diagnosis of right upper lobe squamous cell carcinoma status post chemoradiation  · Chronic COPD  · Chronic hypoxic respiratory failure on nocturnal O2  · Former smoker  · Diabetes type 2  · Depression with anxiety        Plan:     · Admit to [facility] with telemetry  · O2 maintain oxygen saturation greater than 92%  · Follow urine culture: E. coli, sensitive to ceftriaxone  · IV ceftriaxone  · IV fluids, discontinue  · Orthopedic consult  · Bone scan was ordered  · Pain control  · X-ray lumbosacral spine reviewed  · Oncology consult for resumption of chemotherapy  · Continue aspirin, Lasix,  Lopressor  · Continue Abilify, Effexor, trazodone, Neurontin  · Continue glimepiride, added SSI  · DVT and GI prophylaxis.       Discharge planning, likely to SNF once arrangements are done  Chief Complaint:     Chief Complaint   Patient presents with    Fall         History of Present Illness:      Patient seen and examined at bedside, patient denies any chest pain no nausea vomiting diarrhea  No neck palpitation  No focal logical deficit overall patient is feeling better  Patient is continued on IV antibiotic  Blood pressure and blood sugar was low, antihypertensive and glimepiride was held    HPI:    Haroon Duque is a 72 y.o.  female who presents with Fall    72-year-old lady with past medical history of COPD,  coronary artery disease, depression, arthritis presented to ER complaining of generalized weakness and fall. Patient has been recently diagnosed of lung cell carcinoma. She had a bronchoscopy on 8/31/2020 and endobronchial biopsy showed squamous cell carcinoma. She was started on chemoradiation. Her last chemotherapy was on 10/28/2020. She also has an appointment with Radiation Oncology on 10/30/2020. On presentation to ER her sodium was slightly low at 134. Her hemoglobin was 8.4. Her UA showed moderate leukocyte Estrace and many bacteria. Patient was so weak she was unable to get up and ambulate. She is unable to take care of herself at home. Complaining of low back pain and right-sided rib pain. Patient admitted for further management. I have personally reviewed the past medical history, past surgical history, medications, social history, and family history, and summarized in the note. Review of Systems:     All 10 point system is reviewed and negative otherwise mentioned in HPI. Past Medical History:     Past Medical History:   Diagnosis Date    AAA (abdominal aortic aneurysm) ([address]     Pt denies having a history of AAA    Acid reflux     Anxiety and depression     Aortic stenosis     Arthritis     Blister of ankle, right 10/13/2016    blister broke open & draining, is on antibiotics    CAD (coronary artery disease)     Cancer ([address]     lung cancer    COPD (chronic obstructive pulmonary disease) ([address]     Diabetes mellitus ([address]     Falls     Heart block     bifasicular    Hypokalemia     MDRO (multiple drug resistant organisms) resistance 10/17/2014    E.  Coli urine    MRSA (methicillin resistant staph aureus) culture positive resolved 12/2016    2 negative nasal screens - 2016 (hx in urine 2014)    On home oxygen therapy     uses 2 liters at night    On home oxygen therapy     patient states 2 liters/nasal cannula continuous    Overactive bladder     patient incont. wears a brief    Pneumonia     PONV (postoperative nausea and vomiting)     Vitamin D deficiency         Past Surgical History:     Past Surgical History:   Procedure Laterality Date    AORTIC VALVE REPAIR N/A 4/11/2017    AORTIC VALVE REPAIR REPLACEMENT performed by Tatiana Carlin MD at [address] N/A 8/31/2020    BRONCHOSCOPY BIOPSY BRONCHUS performed by Binh Solorio MD at [address], COLON, DIAGNOSTIC  12/08/2016    EYE SURGERY      HYSTERECTOMY      IR INS PICC VAD W SQ PORT GREATER THAN 5  9/4/2020    IR INS PICC VAD W SQ PORT GREATER THAN 5 9/4/2020 [facility]ED[facility]    IR PORT PLACEMENT EQUAL OR GREATER THAN 5 YEARS  9/29/2020    IR PORT PLACEMENT EQUAL OR GREATER THAN 5 YEARS 9/29/2020 MD JAY JAY Rosario SPECIAL PROCEDURES    LUMBAR LAMINECTOMY      TONSILLECTOMY          Medications Prior to Admission:       Prior to Admission medications    Medication Sig Start Date End Date Taking? Authorizing Provider   clonazePAM (KLONOPIN) 1 MG tablet Take 1 tablet by mouth 3 times daily as needed for Anxiety for up to 3 doses. 11/5/20 11/6/20 Yes Jennifer Lin MD   gabapentin (NEURONTIN) 400 MG capsule Take 1 capsule by mouth 2 times daily for 3 doses. In addition to 2 capsules (=800mg) nightly 11/5/20 11/7/20 Yes Jennifer Lin MD   gabapentin (NEURONTIN) 400 MG capsule Take 2 capsules by mouth nightly for 3 doses.  Take 2 capsules (=800mg) nightly - in addition to 1BID 11/5/20 11/8/20 Yes Jennifer Lin MD   traZODone (DESYREL) 150 MG tablet Take 2 tablets by mouth nightly for 3 doses Take 2 tablets (=300mg) nightly 11/5/20 11/8/20 Yes Jennifer Lin MD   ARIPiprazole (ABILIFY) 5 MG tablet Take 1 tablet by mouth daily for 3 doses 11/5/20 11/8/20 Yes Jennifer Lin MD   furosemide (LASIX) 40 MG tablet Take 40 mg by mouth daily   Yes Historical Provider, MD   omeprazole (PRILOSEC) 20 MG delayed release capsule Take 20 mg by mouth daily   Yes Historical Provider, MD   dexamethasone (DECADRON) 4 MG tablet Take 20 mg orally 12 hours and 6 hours before Taxol 10/23/20  Yes Viktor Lee MD   lidocaine-prilocaine (EMLA) 2.5-2.5 % cream To port site before chemo 10/21/20  Yes Viktor Lee MD   melatonin 5 MG TABS tablet Take 5 mg by mouth nightly    Yes Historical Provider, MD   glimepiride (AMARYL) 1 MG tablet Take 1 mg by mouth Daily with supper In addition to 2 tablets (=2mg) every morning    Yes Historical Provider, MD   metoprolol tartrate (LOPRESSOR) 25 MG tablet Take 25 mg by mouth 2 times daily   Yes Historical Provider, MD   venlafaxine (EFFEXOR XR) 150 MG extended release capsule Take 150 mg by mouth 2 times daily   Yes Historical Provider, MD   lansoprazole (PREVACID) 30 MG delayed release capsule Take 30 mg by mouth daily 11/10/16  Yes Historical Provider, MD   metFORMIN (GLUCOPHAGE) 500 MG tablet Take 500 mg by mouth 2 times daily 12/7/16  Yes Historical Provider, MD   aspirin EC 81 MG EC tablet Take 81 mg by mouth daily   Yes Historical Provider, MD   mometasone-formoterol (DULERA) 200-5 MCG/ACT inhaler Inhale 2 puffs into the lungs every 12 hours as needed (wheezing/SOB)    Yes Historical Provider, MD   glimepiride (AMARYL) 1 MG tablet Take 2 mg by mouth every morning In addition to 1mg nightly   Yes Historical Provider, MD        Allergies:       Codeine and Dye [iodides]    Social History:     Tobacco:    reports that she quit smoking about 4 years ago. She has never used smokeless tobacco.  Alcohol:      reports no history of alcohol use. Drug Use:  reports no history of drug use.     Family History:     Family History   Problem Relation Age of Onset    Cancer Mother     Cancer Father          Physical Exam:     Vitals:  BP (!) 101/51   Pulse 87   Temp 97.9 °F (36.6 °C) (Oral)   Resp 16   Ht 5' 8\" (1.727 m)   Wt 156 lb (70.8 kg)   SpO2 97%   BMI 23.72 kg/m²   Temp (24hrs), Av.8 °F (37.1 °C), Min:97.9 °F (36.6 °C), Max:99.5 °F (37.5 °C)          General appearance - alert, well appearing, and in no acute distress  Mental status - oriented to person, place, and time with normal affect  Head - normocephalic and atraumatic  Eyes - pupils equal and reactive, extraocular eye movements intact, conjunctiva clear  Ears - hearing appears to be intact  Nose - no drainage noted  Mouth - mucous membranes moist  Neck - supple, no carotid bruits, thyroid not palpable  Chest - clear to auscultation, normal effort  Heart - normal rate, regular rhythm, no murmur  Abdomen - soft, nontender, nondistended, bowel sounds present all four quadrants, no masses, hepatomegaly or splenomegaly  Neurological - normal speech, no focal findings or movement disorder noted, cranial nerves II through XII grossly intact  Extremities - peripheral pulses palpable, no pedal edema or calf pain with palpation  Skin - no gross lesions, rashes, or induration noted        Data:     Labs:    Hematology:  Recent Labs     20  [id number] 20  05   WBC 5.8 6.0 5.4   RBC 2.95* 2.98* 3.14*   HGB 7.2* 7.3* 7.5*   HCT 23.8* 24.6* 25.9*   MCV 80.7* 82.6 82.5*   MCH 24.4* 24.5* 23.9*   MCHC 30.3 29.7 29.0   RDW 19.7* 19.6* 19.6*    340 350   MPV 8.7 8.8 8.8     Chemistry:  Recent Labs     20  0502 20  0548 20  05    139 141   K 3.6* 3.4* 4.1    102 106   CO2 29 29 30   GLUCOSE 89 95 77   BUN 14 11 11   CREATININE 0.67 0.63 0.63   MG  --  1.3*  --    ANIONGAP 7* 8* 5*   LABGLOM >60 >60 >60   GFRAA >60 >60 >60   CALCIUM 8.4* 8.2* 8.4*     Recent Labs     20  0723 20  1134 20  1634 20  2036 20  0616 20  0644   POCGLU 156* 364* 100 283* 67 81       Lab Results   Component Value Date    INR 0.9 2020    INR 1.1 2020    INR 0.9 2018    PROTIME 11.8 2020    PROTIME 14. 4 (H) 08/28/2020    PROTIME 9.7 12/28/2018       Lab Results   Component Value Date/Time    SPECIAL NOT REPORTED 11/03/2020 02:15 PM     Lab Results   Component Value Date/Time    CULTURE NO SAMPLE RECEIVED 11/03/2020 02:15 PM       Lab Results   Component Value Date    POCPH 7.36 04/12/2017    PHART 7.42 08/26/2012    PH 7.22 04/11/2017    POCPCO2 45 04/12/2017    VWI4IOX 41 08/26/2012    PCO2 54 04/11/2017    POCPO2 93 04/12/2017    PO2ART 59 08/26/2012    PO2 89 04/11/2017    POCHCO3 25.3 04/12/2017    VBU2JVY 26.1 08/26/2012    HCO3 22.2 04/11/2017    NBEA NOT REPORTED 04/12/2017    PBEA 0 04/12/2017    ZSD1TOU 27 04/12/2017    TKRP2TBV 97 04/12/2017    P9PPHFBW 92.1 08/26/2012    O2SAT 95 04/11/2017    FIO2 UNKNOWN 10/31/2017       Radiology:    Xr Ribs Right Include Chest (min 3 Views)    Result Date: 11/3/2020  Subtle linear lucency within the 8th rib, suggestive of a nondisplaced fracture. Masslike opacity in the right lung apex. Xr Cervical Spine (2-3 Views)    Result Date: 11/3/2020  No convincing evidence for acute fracture or malalignment of the cervical spine. Masslike opacity within the right lung apex. All radiological studies reviewed                Code Status:  Full Code    Electronically signed by Jenn Escamilla MD on 11/7/2020 at 11:11 AM     Copy sent to Dr. Laurie Avendano MD    This note was created with the assistance of a speech-recognition program.  Although the intention is to generate a document that actually reflects the content of the visit, no guarantees can be provided that every mistake has been identified and corrected by editing. Note was updated later by me after  physical examination and  completion of the assessment.

## 2020-11-07 NOTE — PLAN OF CARE
Problem: Pain:  Goal: Pain level will decrease  Description: Pain level will decrease  Outcome: Ongoing  Goal: Control of acute pain  Description: Control of acute pain  Outcome: Ongoing  Goal: Control of chronic pain  Description: Control of chronic pain  Outcome: Ongoing     Problem: Falls - Risk of:  Goal: Will remain free from falls  Description: Will remain free from falls  Outcome: Ongoing  Goal: Absence of physical injury  Description: Absence of physical injury  Outcome: Ongoing     Problem: Sensory:  Goal: General experience of comfort will improve  Description: General experience of comfort will improve  Outcome: Ongoing     Problem: Urinary Elimination:  Goal: Signs and symptoms of infection will decrease  Description: Signs and symptoms of infection will decrease  Outcome: Ongoing  Goal: Ability to reestablish a normal urinary elimination pattern will improve - after catheter removal  Description: Ability to reestablish a normal urinary elimination pattern will improve  Outcome: Ongoing  Goal: Complications related to the disease process, condition or treatment will be avoided or minimized  Description: Complications related to the disease process, condition or treatment will be avoided or minimized  Outcome: Ongoing     Problem: Serum Glucose Level - Abnormal:  Goal: Ability to maintain appropriate glucose levels will improve  Description: Ability to maintain appropriate glucose levels will improve  Outcome: Ongoing     Problem: Sensory Perception - Impaired:  Goal: Ability to maintain a stable neurologic state will improve  Description: Ability to maintain a stable neurologic state will improve  Outcome: Ongoing

## 2020-11-07 NOTE — PROGRESS NOTES
Writer informed of blood sugar level, the patient is eating and drinking a diet pop with regular pop on tray. The writer discussed the elevated blood sugar level with the patient and offered more diet pop as opposed to regular pop. The patient began screaming at the writer and threw her call light and slammed bedside tray away from her. Lead RN notified and attending paged.

## 2020-11-07 NOTE — PROGRESS NOTES
Today's Date: 11/7/2020  Patient Name: Ilene Henning  Date of admission: 11/3/2020 10:59 AM  Patient's age: 72 y.o., 1950  Admission Dx: Urinary tract infectious disease [N39.0]    Reason for Consult: management recommendations  Requesting Physician: Reuben Hernandez MD    CHIEF COMPLAINT: Weakness fatigue. UTI. History Obtained From:  patient, electronic medical record    Interval history:    Patient seen and examined. Labs and vitals reviewed. She complaining of abdominal pain and fatigue   Denies any nausea vomiting. No fever chills     HISTORY OF PRESENT ILLNESS:    The patient is a 72 y.o.  female who is admitted to the hospital with chief complaint of weakness and fatigue. Reportedly patient also had a standing height fall at home. Imaging revealed eighth rib fracture. Work-up also revealed UTI. Patient also has significant joint pain. 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 Patient has been started on concurrent chemoradiation. Last chemotherapy treatment was on 10/28/2020. Work-up reveals hemoglobin of 7.2 with MCV 80. Patient has adequate ANC and platelet count. Blood cultures are pending. Urine culture showing E. coli. Past Medical History:   has a past medical history of AAA (abdominal aortic aneurysm) ([facility] [address] Acid reflux, Anxiety and depression, Aortic stenosis, Arthritis, Blister of ankle, right, CAD (coronary artery disease), Cancer ([address] [address] COPD (chronic obstructive pulmonary disease) ([facility] [address] Diabetes mellitus ([facility] [address] Falls, Heart block, Hypokalemia, MDRO (multiple drug resistant organisms) resistance, MRSA (methicillin resistant staph aureus) culture positive, On home oxygen therapy, On home oxygen therapy, Overactive bladder, Pneumonia, PONV (postoperative nausea and vomiting), and Vitamin D deficiency.     Past Surgical History:   has a past surgical history that includes back surgery; eye surgery; Cholecystectomy; Appendectomy; Hysterectomy; Tonsillectomy; lumbar laminectomy; Endoscopy, colon, diagnostic (12/08/2016); aortic valve repair (N/A, 4/11/2017); bronchoscopy (N/A, 8/31/2020); IR INSERT PICC VAD W SQ PORT >5 YEARS (9/4/2020); and IR PORT PLACEMENT > 5 YEARS (9/29/2020). Medications:    Prior to Admission medications    Medication Sig Start Date End Date Taking? Authorizing Provider   clonazePAM (KLONOPIN) 1 MG tablet Take 1 tablet by mouth 3 times daily as needed for Anxiety for up to 3 doses. 11/5/20 11/6/20 Yes Aubrie Nicholson MD   gabapentin (NEURONTIN) 400 MG capsule Take 1 capsule by mouth 2 times daily for 3 doses. In addition to 2 capsules (=800mg) nightly 11/5/20 11/7/20 Yes Aubrie Nicholson MD   gabapentin (NEURONTIN) 400 MG capsule Take 2 capsules by mouth nightly for 3 doses.  Take 2 capsules (=800mg) nightly - in addition to 1BID 11/5/20 11/8/20 Yes Aubrie Nicholson MD   traZODone (DESYREL) 150 MG tablet Take 2 tablets by mouth nightly for 3 doses Take 2 tablets (=300mg) nightly 11/5/20 11/8/20 Yes Aubrie Nicholson MD   ARIPiprazole (ABILIFY) 5 MG tablet Take 1 tablet by mouth daily for 3 doses 11/5/20 11/8/20 Yes Aubrie Nicholson MD   furosemide (LASIX) 40 MG tablet Take 40 mg by mouth daily   Yes Historical Provider, MD   omeprazole (PRILOSEC) 20 MG delayed release capsule Take 20 mg by mouth daily   Yes Historical Provider, MD   dexamethasone (DECADRON) 4 MG tablet Take 20 mg orally 12 hours and 6 hours before Taxol 10/23/20  Yes Cesar Erazo MD   lidocaine-prilocaine (EMLA) 2.5-2.5 % cream To port site before chemo 10/21/20  Yes Cesar Erazo MD   melatonin 5 MG TABS tablet Take 5 mg by mouth nightly    Yes Historical Provider, MD   glimepiride (AMARYL) 1 MG tablet Take 1 mg by mouth Daily with supper In addition to 2 tablets (=2mg) every morning    Yes Historical Provider, MD   metoprolol tartrate (LOPRESSOR) 25 MG tablet Take 25 mg by mouth 2 times daily   Yes Historical Provider, MD   venlafaxine (EFFEXOR XR) 150 MG extended release capsule Take 150 mg by mouth 2 times daily   Yes Historical Provider, MD   lansoprazole (PREVACID) 30 MG delayed release capsule Take 30 mg by mouth daily 11/10/16  Yes Historical Provider, MD   metFORMIN (GLUCOPHAGE) 500 MG tablet Take 500 mg by mouth 2 times daily 12/7/16  Yes Historical Provider, MD   aspirin EC 81 MG EC tablet Take 81 mg by mouth daily   Yes Historical Provider, MD   mometasone-formoterol (DULERA) 200-5 MCG/ACT inhaler Inhale 2 puffs into the lungs every 12 hours as needed (wheezing/SOB)    Yes Historical Provider, MD   glimepiride (AMARYL) 1 MG tablet Take 2 mg by mouth every morning In addition to 1mg nightly   Yes Historical Provider, MD     Current Facility-Administered Medications   Medication Dose Route Frequency Provider Last Rate Last Dose    lidocaine 4 % external patch 1 patch  1 patch Transdermal Daily Nuno Nguyen MD   1 patch at 11/06/20 0913    morphine (PF) injection 1 mg  1 mg Intravenous Q4H PRN Bre Maldonado MD   1 mg at 11/04/20 1525    ARIPiprazole (ABILIFY) tablet 5 mg  5 mg Oral Daily Bre Maldonado MD   5 mg at 11/07/20 0914    aspirin EC tablet 81 mg  81 mg Oral Daily Bre Maldonado MD   81 mg at 11/07/20 0914    clonazePAM (KLONOPIN) tablet 1 mg  1 mg Oral TID PRN Bre Maldonado MD        furosemide (LASIX) tablet 40 mg  40 mg Oral Daily Bre Maldonado MD   40 mg at 11/06/20 0913    gabapentin (NEURONTIN) capsule 400 mg  400 mg Oral BID Bre Maldonado MD   400 mg at 11/07/20 0617    gabapentin (NEURONTIN) capsule 800 mg  800 mg Oral Nightly Bre Maldonado MD   800 mg at 11/06/20 0600    glimepiride (AMARYL) tablet 2 mg  2 mg Oral QAM Bre Maldonado MD   2 mg at 11/06/20 0913    glimepiride (AMARYL) tablet 1 mg  1 mg Oral Dinner Bre Maldonado MD   1 mg at 11/06/20 1826    metoprolol tartrate (LOPRESSOR) tablet 25 mg  25 mg Oral BID Hong Maikol Shania Mcdaniel MD   25 mg at 11/06/20 2106    oxyCODONE-acetaminophen (PERCOCET) 5-325 MG per tablet 1 tablet  1 tablet Oral Q6H PRN Travis Walden MD   1 tablet at 11/07/20 0257    traZODone (DESYREL) tablet 300 mg  300 mg Oral Nightly Travis Walden MD   300 mg at 11/06/20 2106    venlafaxine (EFFEXOR XR) extended release capsule 150 mg  150 mg Oral BID Travis Walden MD   150 mg at 11/07/20 0914    sodium chloride flush 0.9 % injection 10 mL  10 mL Intravenous 2 times per day Travis Walden MD   10 mL at 11/04/20 2100    sodium chloride flush 0.9 % injection 10 mL  10 mL Intravenous PRN Travis Walden MD   10 mL at 11/04/20 1526    potassium chloride (KLOR-CON M) extended release tablet 40 mEq  40 mEq Oral PRN Travis Walden MD   40 mEq at 11/06/20 1222    Or    potassium bicarb-citric acid (EFFER-K) effervescent tablet 40 mEq  40 mEq Oral PRN Travis Walden MD        Or    potassium chloride 10 mEq/100 mL IVPB (Peripheral Line)  10 mEq Intravenous PRN Travis Walden MD        magnesium sulfate 1 g in dextrose 5% 100 mL IVPB  1 g Intravenous PRN Travis Walden MD   Stopped at 11/06/20 2333    acetaminophen (TYLENOL) tablet 650 mg  650 mg Oral Q6H PRN Travis Walden MD        Or    acetaminophen (TYLENOL) suppository 650 mg  650 mg Rectal Q6H PRN Travis Walden MD        senna (SENOKOT) tablet 8.6 mg  1 tablet Oral BID IVELISSEN Travis Walden MD        promethazine (PHENERGAN) tablet 12.5 mg  12.5 mg Oral Q6H PRN Travis Walden MD        Or    ondansetron [facility]) injection 4 mg  4 mg Intravenous Q6H PRN Travis Walden MD        famotidine (PEPCID) tablet 20 mg  20 mg Oral BID Travis Walden MD   20 mg at 11/07/20 0914    enoxaparin (LOVENOX) injection 40 mg  40 mg Subcutaneous Daily Travis Walden MD   40 mg at 11/07/20 0916    cefTRIAXone (ROCEPHIN) 1 g IVPB in 50 mL D5W minibag  1 g Intravenous Q24H Travis Walden MD   Stopped at 11/06/20 1634    insulin lispro (HUMALOG) injection vial 0-18 Units  0-18 Units Subcutaneous TID WC Shara Wakler MD   15 Units at 11/06/20 1220    insulin lispro (HUMALOG) injection vial 0-9 Units  0-9 Units Subcutaneous Nightly Shara Walker MD   5 Units at 11/06/20 2104    glucose (GLUTOSE) 40 % oral gel 15 g  15 g Oral PRN Shara Walker MD        dextrose 50 % IV solution  12.5 g Intravenous PRN Shara Walker MD        glucagon (rDNA) injection 1 mg  1 mg Intramuscular PRN Shara Walker MD        dextrose 5 % solution  100 mL/hr Intravenous PRN Shara Walker MD           Allergies:  Codeine and Dye [iodides]    Social History:   reports that she quit smoking about 4 years ago. She has never used smokeless tobacco. She reports that she does not drink alcohol or use drugs. Family History: family history includes Cancer in her father and mother. REVIEW OF SYSTEMS:      Constitutional: No fever or chills. No night sweats, no weight loss. Positive fatigue. Eyes: No eye discharge, double vision, or eye pain   HEENT: negative for sore mouth, sore throat, hoarseness and voice change   Respiratory: negative for cough , sputum, dyspnea, wheezing, hemoptysis, chest pain   Cardiovascular: negative for chest pain, dyspnea, palpitations, orthopnea, PND   Gastrointestinal: negative for nausea, vomiting, diarrhea, constipation, abdominal pain, Dysphagia, hematemesis and hematochezia   Genitourinary: negative for frequency, dysuria, nocturia, urinary incontinence, and hematuria   Integument: negative for rash, skin lesions, bruises.    Hematologic/Lymphatic: negative for easy bruising, bleeding, lymphadenopathy, or petechiae   Endocrine: negative for heat or cold intolerance,weight changes, change in bowel habits and hair loss   Musculoskeletal: negative for myalgias, arthralgias, pain, joint swelling,and bone pain   Neurological: negative for headaches, dizziness, seizures, weakness, numbness    PHYSICAL EXAM:        BP (!) 101/51   Pulse 87   Temp 97.9 °F (36.6 °C) (Oral)   Resp 16   Ht 5' 8\" (1.727 m)   Wt 156 lb (70.8 kg)   SpO2 97%   BMI 23.72 kg/m²    Temp (24hrs), Av.8 °F (37.1 °C), Min:97.9 °F (36.6 °C), Max:99.5 °F (37.5 °C)      General appearance - well appearing, no in pain or distress   Mental status - alert and cooperative   Eyes - pupils equal and reactive, extraocular eye movements intact   Ears - bilateral TM's and external ear canals normal   Mouth - mucous membranes moist, pharynx normal without lesions   Neck - supple, no significant adenopathy   Lymphatics - no palpable lymphadenopathy, no hepatosplenomegaly   Chest - clear to auscultation, no wheezes, rales or rhonchi, symmetric air entry   Heart - normal rate, regular rhythm, normal S1, S2, no murmurs  Abdomen - soft, nontender, nondistended, no masses or organomegaly   Neurological - alert, oriented, normal speech, no focal findings or movement disorder noted   Musculoskeletal - no joint tenderness, deformity or swelling   Extremities - peripheral pulses normal, no pedal edema, no clubbing or cyanosis   Skin - normal coloration and turgor, no rashes, no suspicious skin lesions noted ,      DATA:      Labs:     Results for orders placed or performed during the hospital encounter of 20   Culture, Blood 1    Specimen: Blood   Result Value Ref Range    Specimen Description . BLOOD     Special Requests RFA 2 ML     Culture NO GROWTH 4 DAYS    Culture, Blood 1    Specimen: Blood   Result Value Ref Range    Specimen Description . BLOOD     Special Requests NOT REPORTED     Culture NO SAMPLE RECEIVED    Culture, Urine    Specimen: Urine, clean catch   Result Value Ref Range    Specimen Description . CLEAN CATCH URINE     Special Requests NOT REPORTED     Culture ESCHERICHIA COLI >[id number] CFU/ML (A)        Susceptibility    Escherichia coli - BACTERIAL SUSCEPTIBILITY PANEL MUNIR     amikacin Value in next row        NOT REPORTED     ampicillin Value in next row Resistant       >=32RESISTANT     ampicillin-sulbactam Value in next row        NOT REPORTED     aztreonam Value in next row Sensitive       <=1SUSCEPTIBLE     ceFAZolin Value in next row Sensitive       <=4SUSCEPTIBLE     ceFAZolin Value in next row Sensitive       <=4SUSCEPTIBLE     cefepime Value in next row        NOT REPORTED     cefTRIAXone Value in next row Sensitive       <=1SUSCEPTIBLE     ciprofloxacin Value in next row Resistant       >=4RESISTANT     ertapenem Value in next row        NOT REPORTED     Confirmatory Extended Spectrum Beta-Lactamase Value in next row Negative       NOT REPORTED     gentamicin Value in next row Sensitive       <=1SUSCEPTIBLE     meropenem Value in next row        NOT REPORTED     nitrofurantoin Value in next row Sensitive       <=16SUSCEPTIBLE     tigecycline Value in next row        NOT REPORTED     tobramycin Value in next row Sensitive       <=1SUSCEPTIBLE     trimethoprim-sulfamethoxazole Value in next row Sensitive       <=20SUSCEPTIBLE     piperacillin-tazobactam Value in next row Sensitive       <=4SUSCEPTIBLE   Urinalysis   Result Value Ref Range    Color, UA YELLOW YELLOW    Turbidity UA CLOUDY (A) CLEAR    Glucose, Ur NEGATIVE NEGATIVE    Bilirubin Urine NEGATIVE NEGATIVE    Ketones, Urine NEGATIVE NEGATIVE    Specific Gravity, UA 1.023 1.005 - 1.030    Urine Hgb 3+ (A) NEGATIVE    pH, UA 5.5 5.0 - 8.0    Protein, UA 2+ (A) NEGATIVE    Urobilinogen, Urine Normal Normal    Nitrite, Urine NEGATIVE NEGATIVE    Leukocyte Esterase, Urine MODERATE (A) NEGATIVE    Urinalysis Comments NOT REPORTED    CBC Auto Differential   Result Value Ref Range    WBC 8.9 3.5 - 11.3 k/uL    RBC 3.47 (L) 3.95 - 5.11 m/uL    Hemoglobin 8.4 (L) 11.9 - 15.1 g/dL    Hematocrit 27.7 (L) 36.3 - 47.1 %    MCV 79.8 (L) 82.6 - 102.9 fL    MCH 24.2 (L) 25.2 - 33.5 pg    MCHC 30.3 28.4 - 34.8 g/dL    RDW 19.8 (H) 11.8 - 14.4 %    Platelets 361 641 - 437 k/uL    MPV 9.2 8.1 - 13.5 fL    NRBC Automated 0.0 0.0 per 100 WBC    Differential Type NOT REPORTED     WBC Morphology NOT REPORTED     RBC Morphology NOT REPORTED     Platelet Estimate NOT REPORTED     Seg Neutrophils 87 (H) 36 - 66 %    Lymphocytes 7 (L) 24 - 44 %    Monocytes 5 1 - 7 %    Eosinophils % 0 (L) 1 - 4 %    Basophils 0 %    Immature Granulocytes 1 (H) 0 %    Segs Absolute 7.74 (H) 1.8 - 7.7 k/uL    Absolute Lymph # 0.62 (L) 1.0 - 4.8 k/uL    Absolute Mono # 0.45 0.2 - 0.8 k/uL    Absolute Eos # 0.00 0.0 - 0.4 k/uL    Basophils Absolute 0.00 0.0 - 0.2 k/uL    Absolute Immature Granulocyte 0.09 0.00 - 0.30 k/uL   Basic Metabolic Panel   Result Value Ref Range    Glucose 191 (H) 70 - 99 mg/dL    BUN 28 (H) 8 - 23 mg/dL    CREATININE 0.80 0.50 - 0.90 mg/dL    Bun/Cre Ratio 35 (H) 9 - 20    Calcium 9.2 8.6 - 10.4 mg/dL    Sodium 134 (L) 135 - 144 mmol/L    Potassium 4.0 3.7 - 5.3 mmol/L    Chloride 98 98 - 107 mmol/L    CO2 27 20 - 31 mmol/L    Anion Gap 9 9 - 17 mmol/L    GFR Non-African American >60 >60 mL/min    GFR African American >60 >60 mL/min    GFR Comment          GFR Staging NOT REPORTED    Microscopic Urinalysis   Result Value Ref Range    -          WBC, UA TOO NUMEROUS TO COUNT 0 - 5 /HPF    RBC, UA TOO NUMEROUS TO COUNT 0 - 2 /HPF    Casts UA NOT REPORTED /LPF    Crystals, UA NOT REPORTED None /HPF    Epithelial Cells UA 2 TO 5 0 - 5 /HPF    Renal Epithelial, UA NOT REPORTED 0 /HPF    Bacteria, UA MANY (A) None    Mucus, UA 1+ (A) None    Trichomonas, UA NOT REPORTED None    Amorphous, UA NOT REPORTED None    Other Observations UA NOT REPORTED NOT REQ.     Yeast, UA NOT REPORTED None   Basic Metabolic Panel w/ Reflex to MG   Result Value Ref Range    Glucose 77 70 - 99 mg/dL    BUN 17 8 - 23 mg/dL    CREATININE 0.68 0.50 - 0.90 mg/dL    Bun/Cre Ratio 25 (H) 9 - 20    Calcium 8.5 (L) 8.6 - 10.4 mg/dL    Sodium 141 135 - 144 mmol/L    Potassium 4.0 3.7 - 5.3 mmol/L    Chloride 107 98 - 107 mmol/L    CO2 25 20 - 31 mmol/L    Anion Gap 9 9 - 17 mmol/L    GFR Non-African American >60 >60 mL/min    GFR African American >60 >60 mL/min    GFR Comment          GFR Staging NOT REPORTED    CBC auto differential   Result Value Ref Range    WBC 6.7 3.5 - 11.3 k/uL    RBC 3.15 (L) 3.95 - 5.11 m/uL    Hemoglobin 7.7 (L) 11.9 - 15.1 g/dL    Hematocrit 26.0 (L) 36.3 - 47.1 %    MCV 82.5 (L) 82.6 - 102.9 fL    MCH 24.4 (L) 25.2 - 33.5 pg    MCHC 29.6 28.4 - 34.8 g/dL    RDW 19.9 (H) 11.8 - 14.4 %    Platelets 445 210 - 640 k/uL    MPV 9.4 8.1 - 13.5 fL    NRBC Automated 0.0 0.0 per 100 WBC    Differential Type NOT REPORTED     WBC Morphology NOT REPORTED     RBC Morphology ANISOCYTOSIS PRESENT     Platelet Estimate NOT REPORTED     Seg Neutrophils 78 (H) 36 - 65 %    Lymphocytes 14 (L) 24 - 43 %    Monocytes 6 3 - 12 %    Eosinophils % 2 1 - 4 %    Basophils 0 0 - 2 %    Immature Granulocytes 1 (H) 0 %    Segs Absolute 5.22 1.50 - 8.10 k/uL    Absolute Lymph # 0.92 (L) 1.10 - 3.70 k/uL    Absolute Mono # 0.39 0.10 - 1.20 k/uL    Absolute Eos # 0.10 0.00 - 0.44 k/uL    Basophils Absolute 0.03 0.00 - 0.20 k/uL    Absolute Immature Granulocyte 0.05 0.00 - 0.30 k/uL   Lactic Acid   Result Value Ref Range    Lactic Acid 0.6 0.5 - 2.2 mmol/L   Procalcitonin   Result Value Ref Range    Procalcitonin 0.26 (H) <0.09 ng/mL   Basic Metabolic Panel w/ Reflex to MG   Result Value Ref Range    Glucose 89 70 - 99 mg/dL    BUN 14 8 - 23 mg/dL    CREATININE 0.67 0.50 - 0.90 mg/dL    Bun/Cre Ratio 21 (H) 9 - 20    Calcium 8.4 (L) 8.6 - 10.4 mg/dL    Sodium 139 135 - 144 mmol/L    Potassium 3.6 (L) 3.7 - 5.3 mmol/L    Chloride 103 98 - 107 mmol/L    CO2 29 20 - 31 mmol/L    Anion Gap 7 (L) 9 - 17 mmol/L    GFR Non-African American >60 >60 mL/min    GFR African American >60 >60 mL/min    GFR Comment          GFR Staging NOT REPORTED    CBC auto differential   Result Value Ref Range    WBC 5.8 3.5 - 11.3 k/uL    RBC 2.95 (L) 3.95 - 5.11 m/uL    Hemoglobin 7.2 (L) 11.9 - 15.1 g/dL    Hematocrit 23.8 (L) 36.3 - 47.1 %    MCV 80.7 (L) 82.6 - 102.9 fL    MCH 24.4 (L) 25.2 - 33.5 pg    MCHC 30.3 28.4 - 34.8 g/dL    RDW 19.7 (H) 11.8 - 14.4 %    Platelets 893 112 - 260 k/uL    MPV 8.7 8.1 - 13.5 fL    NRBC Automated 0.0 0.0 per 100 WBC    Differential Type NOT REPORTED     WBC Morphology NOT REPORTED     RBC Morphology ANISOCYTOSIS PRESENT     Platelet Estimate NOT REPORTED     Seg Neutrophils 78 (H) 36 - 65 %    Lymphocytes 13 (L) 24 - 43 %    Monocytes 7 3 - 12 %    Eosinophils % 1 1 - 4 %    Basophils 1 0 - 2 %    Immature Granulocytes 1 (H) 0 %    Segs Absolute 4.54 1.50 - 8.10 k/uL    Absolute Lymph # 0.75 (L) 1.10 - 3.70 k/uL    Absolute Mono # 0.39 0.10 - 1.20 k/uL    Absolute Eos # 0.07 0.00 - 0.44 k/uL    Basophils Absolute 0.03 0.00 - 0.20 k/uL    Absolute Immature Granulocyte 0.05 0.00 - 0.30 k/uL   Lactic Acid   Result Value Ref Range    Lactic Acid 0.4 (L) 0.5 - 2.2 mmol/L   Vitamin B12 & Folate   Result Value Ref Range    Vitamin B-12 190 (L) 232 - 1245 pg/mL    Folate >20.0 >4.8 ng/mL   Iron and TIBC   Result Value Ref Range    Iron 19 (L) 37 - 145 ug/dL    TIBC 176 (L) 250 - 450 ug/dL    Iron Saturation 11 (L) 20 - 55 %    UIBC 157 112 - 347 ug/dL   Ferritin   Result Value Ref Range    Ferritin 149 13 - 150 ug/L   Basic Metabolic Panel w/ Reflex to MG   Result Value Ref Range    Glucose 95 70 - 99 mg/dL    BUN 11 8 - 23 mg/dL    CREATININE 0.63 0.50 - 0.90 mg/dL    Bun/Cre Ratio 17 9 - 20    Calcium 8.2 (L) 8.6 - 10.4 mg/dL    Sodium 139 135 - 144 mmol/L    Potassium 3.4 (L) 3.7 - 5.3 mmol/L    Chloride 102 98 - 107 mmol/L    CO2 29 20 - 31 mmol/L    Anion Gap 8 (L) 9 - 17 mmol/L    GFR Non-African American >60 >60 mL/min    GFR African American >60 >60 mL/min    GFR Comment          GFR Staging NOT REPORTED    CBC auto differential   Result Value Ref Range    WBC 6.0 3.5 - 11.3 k/uL    RBC 2.98 (L) 3.95 - 5.11 m/uL    Hemoglobin 7.3 (L) 11.9 - 15.1 g/dL    Hematocrit 24.6 (L) 36.3 - 47.1 %    MCV 82.6 82.6 - 102.9 fL    MCH 24.5 (L) 25.2 - 33.5 pg    MCHC 29.7 28.4 - 34.8 g/dL    RDW 19.6 (H) 11.8 - 14.4 %    Platelets 000 176 - 761 k/uL    MPV 8.8 8.1 - 13.5 fL    NRBC Automated 0.0 0.0 per 100 WBC    Differential Type NOT REPORTED     WBC Morphology NOT REPORTED     RBC Morphology NOT REPORTED     Platelet Estimate NOT REPORTED     Seg Neutrophils 79 (H) 36 - 66 %    Lymphocytes 10 (L) 24 - 44 %    Monocytes 8 (H) 1 - 7 %    Eosinophils % 1 1 - 4 %    Basophils 1 %    Immature Granulocytes 1 (H) 0 %    Segs Absolute 4.74 1.8 - 7.7 k/uL    Absolute Lymph # 0.60 (L) 1.0 - 4.8 k/uL    Absolute Mono # 0.48 0.2 - 0.8 k/uL    Absolute Eos # 0.06 0.0 - 0.4 k/uL    Basophils Absolute 0.06 0.0 - 0.2 k/uL    Absolute Immature Granulocyte 0.06 0.00 - 0.30 k/uL    Morphology HYPOCHROMIA PRESENT    Lactic Acid   Result Value Ref Range    Lactic Acid 1.1 0.5 - 2.2 mmol/L   Magnesium   Result Value Ref Range    Magnesium 1.3 (L) 1.6 - 2.6 mg/dL   Basic Metabolic Panel w/ Reflex to MG   Result Value Ref Range    Glucose 77 70 - 99 mg/dL    BUN 11 8 - 23 mg/dL    CREATININE 0.63 0.50 - 0.90 mg/dL    Bun/Cre Ratio 17 9 - 20    Calcium 8.4 (L) 8.6 - 10.4 mg/dL    Sodium 141 135 - 144 mmol/L    Potassium 4.1 3.7 - 5.3 mmol/L    Chloride 106 98 - 107 mmol/L    CO2 30 20 - 31 mmol/L    Anion Gap 5 (L) 9 - 17 mmol/L    GFR Non-African American >60 >60 mL/min    GFR African American >60 >60 mL/min    GFR Comment          GFR Staging NOT REPORTED    CBC auto differential   Result Value Ref Range    WBC 5.4 3.5 - 11.3 k/uL    RBC 3.14 (L) 3.95 - 5.11 m/uL    Hemoglobin 7.5 (L) 11.9 - 15.1 g/dL    Hematocrit 25.9 (L) 36.3 - 47.1 %    MCV 82.5 (L) 82.6 - 102.9 fL    MCH 23.9 (L) 25.2 - 33.5 pg    MCHC 29.0 28.4 - 34.8 g/dL    RDW 19.6 (H) 11.8 - 14.4 %    Platelets 540 637 - 711 k/uL    MPV 8.8 8.1 - 13.5 fL    NRBC Automated 0.0 0.0 per 100 WBC    Differential Type NOT REPORTED     WBC Morphology NOT REPORTED RBC Morphology ANISOCYTOSIS PRESENT     Platelet Estimate NOT REPORTED     Seg Neutrophils 72 (H) 36 - 65 %    Lymphocytes 15 (L) 24 - 43 %    Monocytes 10 3 - 12 %    Eosinophils % 1 1 - 4 %    Basophils 1 0 - 2 %    Immature Granulocytes 1 (H) 0 %    Segs Absolute 3.87 1.50 - 8.10 k/uL    Absolute Lymph # 0.81 (L) 1.10 - 3.70 k/uL    Absolute Mono # 0.53 0.10 - 1.20 k/uL    Absolute Eos # 0.05 0.00 - 0.44 k/uL    Basophils Absolute 0.03 0.00 - 0.20 k/uL    Absolute Immature Granulocyte 0.06 0.00 - 0.30 k/uL   Lactic Acid   Result Value Ref Range    Lactic Acid 0.8 0.5 - 2.2 mmol/L   POC Glucose Fingerstick   Result Value Ref Range    POC Glucose 231 (H) 65 - 105 mg/dL   POC Glucose Fingerstick   Result Value Ref Range    POC Glucose 152 (H) 65 - 105 mg/dL   POC Glucose Fingerstick   Result Value Ref Range    POC Glucose 61 (L) 65 - 105 mg/dL   POC Glucose Fingerstick   Result Value Ref Range    POC Glucose 91 65 - 105 mg/dL   POC Glucose Fingerstick   Result Value Ref Range    POC Glucose 311 (H) 65 - 105 mg/dL   POC Glucose Fingerstick   Result Value Ref Range    POC Glucose 105 65 - 105 mg/dL   POC Glucose Fingerstick   Result Value Ref Range    POC Glucose 180 (H) 65 - 105 mg/dL   POC Glucose Fingerstick   Result Value Ref Range    POC Glucose 163 (H) 65 - 105 mg/dL   POC Glucose Fingerstick   Result Value Ref Range    POC Glucose 87 65 - 105 mg/dL   POC Glucose Fingerstick   Result Value Ref Range    POC Glucose 292 (H) 65 - 105 mg/dL   POC Glucose Fingerstick   Result Value Ref Range    POC Glucose 136 (H) 65 - 105 mg/dL   POC Glucose Fingerstick   Result Value Ref Range    POC Glucose 180 (H) 65 - 105 mg/dL   POC Glucose Fingerstick   Result Value Ref Range    POC Glucose 77 65 - 105 mg/dL   POC Glucose Fingerstick   Result Value Ref Range    POC Glucose 156 (H) 65 - 105 mg/dL   POC Glucose Fingerstick   Result Value Ref Range    POC Glucose 364 (H) 65 - 105 mg/dL   POC Glucose Fingerstick   Result Value on the lateral view due to overlying osseous structures and soft tissues. Masslike right upper lobe airspace opacity. No convincing evidence for acute fracture or malalignment of the cervical spine. Masslike opacity within the right lung apex. Xr Lumbar Spine (2-3 Views)    Result Date: 11/4/2020  EXAMINATION: THREE XRAY VIEWS OF THE LUMBAR SPINE; THREE XRAY VIEWS OF THE SACRUM/COCCYX 11/4/2020 1:07 pm COMPARISON: CT abdomen and pelvis dated 09/24/2020 HISTORY: ORDERING SYSTEM PROVIDED HISTORY: back pain TECHNOLOGIST PROVIDED HISTORY: back pain Reason for Exam: pain to low back Acuity: Unknown Type of Exam: Unknown FINDINGS: Lumbar spine: Vertebral body heights and alignment are within normal limits. There is mild multilevel disc space narrowing and endplate spurring. Mild to moderate facet arthropathy. There are scattered vascular calcifications. The stomach is significantly distended with gas. No small bowel or large bowel distention. There is moderate fecal material throughout the large bowel. Sacrum/coccyx: Sacrum is not well visualized due to osteopenia and overlying soft tissue attenuation. Sacrococcygeal alignment is grossly preserved. No displaced fracture identified. Sacroiliac joints are symmetric, better visualized on the lumbar spine radiographs. 1.  Lumbar spine: No compression fracture or acute malalignment. Mild degenerative changes. Significant gas distention of the stomach, suggesting gastroparesis or possible obstruction. 2.  Sacrum/coccyx: Limited evaluation due to osteopenia and overlying soft tissue attenuation. No obvious acute osseous abnormality.      Xr Sacrum Coccyx (min 2 Views)    Result Date: 11/4/2020  EXAMINATION: THREE XRAY VIEWS OF THE LUMBAR SPINE; THREE XRAY VIEWS OF THE SACRUM/COCCYX 11/4/2020 1:07 pm COMPARISON: CT abdomen and pelvis dated 09/24/2020 HISTORY: ORDERING SYSTEM PROVIDED HISTORY: back pain TECHNOLOGIST PROVIDED HISTORY: back pain Reason for Exam: pain to low back Acuity: Unknown Type of Exam: Unknown FINDINGS: Lumbar spine: Vertebral body heights and alignment are within normal limits. There is mild multilevel disc space narrowing and endplate spurring. Mild to moderate facet arthropathy. There are scattered vascular calcifications. The stomach is significantly distended with gas. No small bowel or large bowel distention. There is moderate fecal material throughout the large bowel. Sacrum/coccyx: Sacrum is not well visualized due to osteopenia and overlying soft tissue attenuation. Sacrococcygeal alignment is grossly preserved. No displaced fracture identified. Sacroiliac joints are symmetric, better visualized on the lumbar spine radiographs. 1.  Lumbar spine: No compression fracture or acute malalignment. Mild degenerative changes. Significant gas distention of the stomach, suggesting gastroparesis or possible obstruction. 2.  Sacrum/coccyx: Limited evaluation due to osteopenia and overlying soft tissue attenuation. No obvious acute osseous abnormality. Vl Lower Extremity Bilateral Venous Duplex    Result Date: 10/22/2020   Conclusions   Summary   No evidence of superficial or deep venous thrombosis in both lower  extremities. Findings:   Right Impression:                    Left Impression:  The common femoral, femoral,         The common femoral, femoral,  popliteal, tibials and saphenous     popliteal, tibials and saphenous  veins are compressible with normal   veins are compressible with normal  doppler responses. Nm Bone Scan Whole Body    Result Date: 11/4/2020  EXAMINATION: WHOLE BODY BONE SCAN  11/4/2020 TECHNIQUE: The patient was injected intravenously with 25.6 mCi of 99 mTc MDP and scintigraphy of the entire skeleton was performed approximately three hours later. Coned-down images of the head, neck and thorax in obliques positions. COMPARISON: Patient did not have previous imaging studies for comparison. HISTORY: ORDERING SYSTEM PROVIDED HISTORY: Fall, lung cancer TECHNOLOGIST PROVIDED HISTORY: Fall, lung cancer Reason for Exam: pain Acuity: Unknown Type of Exam: Unknown FINDINGS: Symmetrical uptake of radiotracer is noted in the shoulders, sternoclavicular joints, hips, knees, and ankles, foci of activity in the aforementioned joints are most likely degenerative. The remainder of the osseous structures and soft tissues are unremarkable. Specifically, no radiotracer uptake in the ribcage is noted. Physiologic activity is present in the skeletal and renal collecting systems. No evidence to suggest osseous metastatic disease. Pattern of uptake most compatible with age related degenerative change. IMPRESSION:   Primary Problem  <principal problem not specified>    Active Hospital Problems    Diagnosis Date Noted    Closed fracture of one rib of right side [S22.31XA] 11/04/2020    Degenerative disc disease, lumbar [M51.36] 11/04/2020    Urinary tract infectious disease [N39.0] 11/03/2020    Malignant neoplasm of upper lobe of right lung ([facility] [address] [C34.11] 09/18/2020    Lung mass [R91.8] 08/27/2020    Chronic bilateral low back pain [M54.5, G89.29] 10/31/2017    Falls frequently [R29.6] 01/27/2017   Squamous cell carcinoma of lung, clinical stage IIIb  Ongoing treatment with concurrent chemoradiation using carboplatin and Taxol  UTI  Asthenia  Anemia  Eighth rib fracture    RECOMMENDATIONS:  1. I personally reviewed results of lab work-up imaging studies and other relevant clinical data. Reviewed records and treatment history  2. Continue to hold chemotherapy  3. Continue symptomatic supportive care  4. Transfuse to keep hemoglobin above 7  5. Continue treatment of UTI  6. Patient counseled tobacco cessation  7. Pain control  8. Plan to resume her chemoradiation as outpatient after discharge  9. Discharge as per primary. Awaiting placement        Discussed with patient and Nurse.       Thank you for asking us to see this patient. Mendoza Rhoades MD  Hematologist/Medical Oncologist    Cell: [phone]      This note is created with the assistance of a speech recognition program.  While intending to generate a document that actually reflects the content of the visit, the document can still have some errors including those of syntax and sound a like substitutions which may escape proof reading. It such instances, actual meaning can be extrapolated by contextual diversion.

## 2020-11-07 NOTE — PROGRESS NOTES
The patient is resting in bed, no distress. The remains argumentative too the writer. The patient was informed Dr Saba Beltran was notified of elevated blood sugar level  And diet was changed.

## 2020-11-08 LAB
ABSOLUTE EOS #: 0.09 K/UL (ref 0–0.44)
ABSOLUTE IMMATURE GRANULOCYTE: 0.09 K/UL (ref 0–0.3)
ABSOLUTE LYMPH #: 0.91 K/UL (ref 1.1–3.7)
ABSOLUTE MONO #: 0.52 K/UL (ref 0.1–1.2)
ANION GAP SERPL CALCULATED.3IONS-SCNC: 7 MMOL/L (ref 9–17)
BASOPHILS # BLD: 1 % (ref 0–2)
BASOPHILS ABSOLUTE: 0.04 K/UL (ref 0–0.2)
BUN BLDV-MCNC: 11 MG/DL (ref 8–23)
BUN/CREAT BLD: 16 (ref 9–20)
CALCIUM SERPL-MCNC: 9.1 MG/DL (ref 8.6–10.4)
CHLORIDE BLD-SCNC: 104 MMOL/L (ref 98–107)
CO2: 30 MMOL/L (ref 20–31)
CREAT SERPL-MCNC: 0.68 MG/DL (ref 0.5–0.9)
DIFFERENTIAL TYPE: ABNORMAL
EOSINOPHILS RELATIVE PERCENT: 1 % (ref 1–4)
GFR AFRICAN AMERICAN: >60 ML/MIN
GFR NON-AFRICAN AMERICAN: >60 ML/MIN
GFR SERPL CREATININE-BSD FRML MDRD: ABNORMAL ML/MIN/{1.73_M2}
GFR SERPL CREATININE-BSD FRML MDRD: ABNORMAL ML/MIN/{1.73_M2}
GLUCOSE BLD-MCNC: 124 MG/DL (ref 65–105)
GLUCOSE BLD-MCNC: 133 MG/DL (ref 70–99)
GLUCOSE BLD-MCNC: 151 MG/DL (ref 65–105)
GLUCOSE BLD-MCNC: 189 MG/DL (ref 65–105)
GLUCOSE BLD-MCNC: 246 MG/DL (ref 65–105)
HCT VFR BLD CALC: 30.7 % (ref 36.3–47.1)
HEMOGLOBIN: 8.9 G/DL (ref 11.9–15.1)
IMMATURE GRANULOCYTES: 1 %
LACTIC ACID: 1.3 MMOL/L (ref 0.5–2.2)
LYMPHOCYTES # BLD: 13 % (ref 24–43)
MCH RBC QN AUTO: 24.5 PG (ref 25.2–33.5)
MCHC RBC AUTO-ENTMCNC: 29 G/DL (ref 28.4–34.8)
MCV RBC AUTO: 84.3 FL (ref 82.6–102.9)
MONOCYTES # BLD: 8 % (ref 3–12)
NRBC AUTOMATED: 0 PER 100 WBC
PDW BLD-RTO: 19.6 % (ref 11.8–14.4)
PLATELET # BLD: 428 K/UL (ref 138–453)
PLATELET ESTIMATE: ABNORMAL
PMV BLD AUTO: 8.7 FL (ref 8.1–13.5)
POTASSIUM SERPL-SCNC: 4.8 MMOL/L (ref 3.7–5.3)
RBC # BLD: 3.64 M/UL (ref 3.95–5.11)
RBC # BLD: ABNORMAL 10*6/UL
SEG NEUTROPHILS: 76 % (ref 36–65)
SEGMENTED NEUTROPHILS ABSOLUTE COUNT: 5.13 K/UL (ref 1.5–8.1)
SODIUM BLD-SCNC: 141 MMOL/L (ref 135–144)
WBC # BLD: 6.8 K/UL (ref 3.5–11.3)
WBC # BLD: ABNORMAL 10*3/UL

## 2020-11-08 PROCEDURE — 2580000003 HC RX 258: Performed by: HOSPITALIST

## 2020-11-08 PROCEDURE — 1200000000 HC SEMI PRIVATE

## 2020-11-08 PROCEDURE — 80048 BASIC METABOLIC PNL TOTAL CA: CPT

## 2020-11-08 PROCEDURE — 6370000000 HC RX 637 (ALT 250 FOR IP): Performed by: HOSPITALIST

## 2020-11-08 PROCEDURE — 6370000000 HC RX 637 (ALT 250 FOR IP): Performed by: ORTHOPAEDIC SURGERY

## 2020-11-08 PROCEDURE — 83605 ASSAY OF LACTIC ACID: CPT

## 2020-11-08 PROCEDURE — 99232 SBSQ HOSP IP/OBS MODERATE 35: CPT | Performed by: INTERNAL MEDICINE

## 2020-11-08 PROCEDURE — 6360000002 HC RX W HCPCS: Performed by: HOSPITALIST

## 2020-11-08 PROCEDURE — 36415 COLL VENOUS BLD VENIPUNCTURE: CPT

## 2020-11-08 PROCEDURE — 85025 COMPLETE CBC W/AUTO DIFF WBC: CPT

## 2020-11-08 PROCEDURE — 82947 ASSAY GLUCOSE BLOOD QUANT: CPT

## 2020-11-08 RX ADMIN — FAMOTIDINE 20 MG: 20 TABLET, FILM COATED ORAL at 10:04

## 2020-11-08 RX ADMIN — GABAPENTIN 400 MG: 300 CAPSULE ORAL at 12:51

## 2020-11-08 RX ADMIN — METOPROLOL TARTRATE 25 MG: 25 TABLET, FILM COATED ORAL at 10:03

## 2020-11-08 RX ADMIN — FUROSEMIDE 40 MG: 40 TABLET ORAL at 10:06

## 2020-11-08 RX ADMIN — OXYCODONE HYDROCHLORIDE AND ACETAMINOPHEN 1 TABLET: 5; 325 TABLET ORAL at 13:37

## 2020-11-08 RX ADMIN — INSULIN LISPRO 2 UNITS: 100 INJECTION, SOLUTION INTRAVENOUS; SUBCUTANEOUS at 21:32

## 2020-11-08 RX ADMIN — ASPIRIN 81 MG: 81 TABLET, COATED ORAL at 10:03

## 2020-11-08 RX ADMIN — TRAZODONE HYDROCHLORIDE 300 MG: 100 TABLET ORAL at 21:33

## 2020-11-08 RX ADMIN — METOPROLOL TARTRATE 25 MG: 25 TABLET, FILM COATED ORAL at 21:33

## 2020-11-08 RX ADMIN — INSULIN LISPRO 6 UNITS: 100 INJECTION, SOLUTION INTRAVENOUS; SUBCUTANEOUS at 12:50

## 2020-11-08 RX ADMIN — ARIPIPRAZOLE 5 MG: 5 TABLET ORAL at 10:04

## 2020-11-08 RX ADMIN — VENLAFAXINE HYDROCHLORIDE 150 MG: 75 CAPSULE, EXTENDED RELEASE ORAL at 10:04

## 2020-11-08 RX ADMIN — OXYCODONE HYDROCHLORIDE AND ACETAMINOPHEN 1 TABLET: 5; 325 TABLET ORAL at 06:17

## 2020-11-08 RX ADMIN — OXYCODONE HYDROCHLORIDE AND ACETAMINOPHEN 1 TABLET: 5; 325 TABLET ORAL at 19:26

## 2020-11-08 RX ADMIN — GABAPENTIN 400 MG: 300 CAPSULE ORAL at 06:27

## 2020-11-08 RX ADMIN — GLIMEPIRIDE 1 MG: 1 TABLET ORAL at 17:31

## 2020-11-08 RX ADMIN — FAMOTIDINE 20 MG: 20 TABLET, FILM COATED ORAL at 21:33

## 2020-11-08 RX ADMIN — ENOXAPARIN SODIUM 40 MG: 40 INJECTION SUBCUTANEOUS at 10:06

## 2020-11-08 RX ADMIN — INSULIN LISPRO 3 UNITS: 100 INJECTION, SOLUTION INTRAVENOUS; SUBCUTANEOUS at 17:25

## 2020-11-08 RX ADMIN — VENLAFAXINE HYDROCHLORIDE 150 MG: 75 CAPSULE, EXTENDED RELEASE ORAL at 21:33

## 2020-11-08 RX ADMIN — GABAPENTIN 800 MG: 300 CAPSULE ORAL at 21:33

## 2020-11-08 RX ADMIN — CEFTRIAXONE 1 G: 1 INJECTION, POWDER, FOR SOLUTION INTRAMUSCULAR; INTRAVENOUS at 13:38

## 2020-11-08 ASSESSMENT — PAIN SCALES - GENERAL
PAINLEVEL_OUTOF10: 10
PAINLEVEL_OUTOF10: 10
PAINLEVEL_OUTOF10: 6

## 2020-11-08 NOTE — PLAN OF CARE
Problem: Pain:  Goal: Pain level will decrease  Description: Pain level will decrease  11/7/2020 2256 by David Ashley RN  Outcome: Ongoing  Note: Pain controlled with meds, and repositioning   11/7/2020 1729 by Candy Gambino RN  Outcome: Ongoing  Goal: Control of acute pain  Description: Control of acute pain  11/7/2020 1729 by Candy Gambino RN  Outcome: Ongoing  Goal: Control of chronic pain  Description: Control of chronic pain  11/7/2020 1729 by Candy Gambino RN  Outcome: Ongoing     Problem: Falls - Risk of:  Goal: Will remain free from falls  Description: Will remain free from falls  11/7/2020 2256 by David Ashley RN  Outcome: Ongoing  Note: Hourly rounding, bed locked and lowered, call light within reach   11/7/2020 1729 by Candy Gambino RN  Outcome: Ongoing  Goal: Absence of physical injury  Description: Absence of physical injury  11/7/2020 1729 by Candy Gambino RN  Outcome: Ongoing     Problem: Sensory:  Goal: General experience of comfort will improve  Description: General experience of comfort will improve  11/7/2020 1729 by Candy Gambino RN  Outcome: Ongoing     Problem: Urinary Elimination:  Goal: Signs and symptoms of infection will decrease  Description: Signs and symptoms of infection will decrease  11/7/2020 1729 by Candy Gambino RN  Outcome: Ongoing  Goal: Ability to reestablish a normal urinary elimination pattern will improve - after catheter removal  Description: Ability to reestablish a normal urinary elimination pattern will improve  11/7/2020 1729 by Candy Gambino RN  Outcome: Ongoing  Goal: Complications related to the disease process, condition or treatment will be avoided or minimized  Description: Complications related to the disease process, condition or treatment will be avoided or minimized  11/7/2020 1729 by Candy Gambino RN  Outcome: Ongoing     Problem: Discharge Planning:  Goal: Discharged to appropriate level of care  Description: Discharged to appropriate level of care  11/7/2020 1729 by Fay Hodgkins, RN  Outcome: Ongoing     Problem: Serum Glucose Level - Abnormal:  Goal: Ability to maintain appropriate glucose levels will improve  Description: Ability to maintain appropriate glucose levels will improve  11/7/2020 1729 by Fay Hodgkins, RN  Outcome: Ongoing     Problem: Sensory Perception - Impaired:  Goal: Ability to maintain a stable neurologic state will improve  Description: Ability to maintain a stable neurologic state will improve  11/7/2020 1729 by Fay Hodgkins, RN  Outcome: Ongoing

## 2020-11-08 NOTE — PROGRESS NOTES
Today's Date: 11/8/2020  Patient Name: Haroon Duque  Date of admission: 11/3/2020 10:59 AM  Patient's age: 72 y.o., 1950  Admission Dx: Urinary tract infectious disease [N39.0]    Reason for Consult: management recommendations  Requesting Physician: Shara Walker MD    CHIEF COMPLAINT: Weakness fatigue. UTI. History Obtained From:  patient, electronic medical record    Interval history:  Patient seen and examined. NO Nausea vomiting  Labs and vitals reviewed. No fever chills     HISTORY OF PRESENT ILLNESS:    The patient is a 72 y.o.  female who is admitted to the hospital with chief complaint of weakness and fatigue. Reportedly patient also had a standing height fall at home. Imaging revealed eighth rib fracture. Work-up also revealed UTI. Patient also has significant joint pain. 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 Patient has been started on concurrent chemoradiation. Last chemotherapy treatment was on 10/28/2020. Work-up reveals hemoglobin of 7.2 with MCV 80. Patient has adequate ANC and platelet count. Blood cultures are pending. Urine culture showing E. coli. Past Medical History:   has a past medical history of AAA (abdominal aortic aneurysm) ([facility] [address] Acid reflux, Anxiety and depression, Aortic stenosis, Arthritis, Blister of ankle, right, CAD (coronary artery disease), Cancer ([address] COPD (chronic obstructive pulmonary disease) ([facility] [address] Diabetes mellitus ([facility] [address] Falls, Heart block, Hypokalemia, MDRO (multiple drug resistant organisms) resistance, MRSA (methicillin resistant staph aureus) culture positive, On home oxygen therapy, On home oxygen therapy, Overactive bladder, Pneumonia, PONV (postoperative nausea and vomiting), and Vitamin D deficiency. Past Surgical History:   has a past surgical history that includes back surgery; eye surgery;  Cholecystectomy; Appendectomy; Hysterectomy; Tonsillectomy; lumbar laminectomy; Endoscopy, colon, diagnostic (12/08/2016); aortic valve repair (N/A, 4/11/2017); bronchoscopy (N/A, 8/31/2020); IR INSERT PICC VAD W SQ PORT >5 YEARS (9/4/2020); and IR PORT PLACEMENT > 5 YEARS (9/29/2020). Medications:    Prior to Admission medications    Medication Sig Start Date End Date Taking? Authorizing Provider   clonazePAM (KLONOPIN) 1 MG tablet Take 1 tablet by mouth 3 times daily as needed for Anxiety for up to 3 doses. 11/5/20 11/6/20 Yes Alice Singh MD   gabapentin (NEURONTIN) 400 MG capsule Take 1 capsule by mouth 2 times daily for 3 doses. In addition to 2 capsules (=800mg) nightly 11/5/20 11/7/20 Yes Alice Singh MD   gabapentin (NEURONTIN) 400 MG capsule Take 2 capsules by mouth nightly for 3 doses.  Take 2 capsules (=800mg) nightly - in addition to 1BID 11/5/20 11/8/20 Yes Alice Singh MD   traZODone (DESYREL) 150 MG tablet Take 2 tablets by mouth nightly for 3 doses Take 2 tablets (=300mg) nightly 11/5/20 11/8/20 Yes Alice Singh MD   ARIPiprazole (ABILIFY) 5 MG tablet Take 1 tablet by mouth daily for 3 doses 11/5/20 11/8/20 Yes Alice Singh MD   furosemide (LASIX) 40 MG tablet Take 40 mg by mouth daily   Yes Historical Provider, MD   omeprazole (PRILOSEC) 20 MG delayed release capsule Take 20 mg by mouth daily   Yes Historical Provider, MD   dexamethasone (DECADRON) 4 MG tablet Take 20 mg orally 12 hours and 6 hours before Taxol 10/23/20  Yes Dexter Cancino MD   lidocaine-prilocaine (EMLA) 2.5-2.5 % cream To port site before chemo 10/21/20  Yes Dexter Cancino MD   melatonin 5 MG TABS tablet Take 5 mg by mouth nightly    Yes Historical Provider, MD   glimepiride (AMARYL) 1 MG tablet Take 1 mg by mouth Daily with supper In addition to 2 tablets (=2mg) every morning    Yes Historical Provider, MD   metoprolol tartrate (LOPRESSOR) 25 MG tablet Take 25 mg by mouth 2 times daily   Yes Historical Provider, MD venlafaxine (EFFEXOR XR) 150 MG extended release capsule Take 150 mg by mouth 2 times daily   Yes Historical Provider, MD   lansoprazole (PREVACID) 30 MG delayed release capsule Take 30 mg by mouth daily 11/10/16  Yes Historical Provider, MD   metFORMIN (GLUCOPHAGE) 500 MG tablet Take 500 mg by mouth 2 times daily 12/7/16  Yes Historical Provider, MD   aspirin EC 81 MG EC tablet Take 81 mg by mouth daily   Yes Historical Provider, MD   mometasone-formoterol (DULERA) 200-5 MCG/ACT inhaler Inhale 2 puffs into the lungs every 12 hours as needed (wheezing/SOB)    Yes Historical Provider, MD   glimepiride (AMARYL) 1 MG tablet Take 2 mg by mouth every morning In addition to 1mg nightly   Yes Historical Provider, MD     Current Facility-Administered Medications   Medication Dose Route Frequency Provider Last Rate Last Dose    lidocaine 4 % external patch 1 patch  1 patch Transdermal Daily Juanita Li MD   1 patch at 11/08/20 1004    morphine (PF) injection 1 mg  1 mg Intravenous Q4H PRN Tran Britt MD   1 mg at 11/04/20 1525    ARIPiprazole (ABILIFY) tablet 5 mg  5 mg Oral Daily Tran Britt MD   5 mg at 11/08/20 1004    aspirin EC tablet 81 mg  81 mg Oral Daily Tran Britt MD   81 mg at 11/08/20 1003    clonazePAM (KLONOPIN) tablet 1 mg  1 mg Oral TID PRN Tran Britt MD   1 mg at 11/07/20 1320    furosemide (LASIX) tablet 40 mg  40 mg Oral Daily Tran Britt MD   40 mg at 11/08/20 1006    gabapentin (NEURONTIN) capsule 400 mg  400 mg Oral BID Tran Britt MD   400 mg at 11/08/20 1423    gabapentin (NEURONTIN) capsule 800 mg  800 mg Oral Nightly Tran Britt MD   800 mg at 11/07/20 2101    glimepiride (AMARYL) tablet 2 mg  2 mg Oral QAM Tran Britt MD   2 mg at 11/06/20 0913    glimepiride (AMARYL) tablet 1 mg  1 mg Oral Dinner Tran Britt MD   1 mg at 11/06/20 1826    metoprolol tartrate (LOPRESSOR) tablet 25 mg  25 mg Oral BID Tran Britt MD   25 mg at 11/08/20 1003    oxyCODONE-acetaminophen (PERCOCET) 5-325 MG per tablet 1 tablet  1 tablet Oral Q6H PRN Joe Camarillo MD   1 tablet at 11/08/20 0617    traZODone (DESYREL) tablet 300 mg  300 mg Oral Nightly Joe Camarillo MD   300 mg at 11/07/20 2100    venlafaxine (EFFEXOR XR) extended release capsule 150 mg  150 mg Oral BID Joe Camarillo MD   150 mg at 11/08/20 1004    sodium chloride flush 0.9 % injection 10 mL  10 mL Intravenous 2 times per day Joe Camarillo MD   10 mL at 11/04/20 2100    sodium chloride flush 0.9 % injection 10 mL  10 mL Intravenous PRN Joe Camarillo MD   10 mL at 11/04/20 1526    potassium chloride (KLOR-CON M) extended release tablet 40 mEq  40 mEq Oral PRN Joe Camarillo MD   40 mEq at 11/06/20 1222    Or    potassium bicarb-citric acid (EFFER-K) effervescent tablet 40 mEq  40 mEq Oral PRN Joe Camarillo MD        Or    potassium chloride 10 mEq/100 mL IVPB (Peripheral Line)  10 mEq Intravenous PRN Joe Camarillo MD        magnesium sulfate 1 g in dextrose 5% 100 mL IVPB  1 g Intravenous PRN Joe Camarillo MD   Stopped at 11/06/20 2333    acetaminophen (TYLENOL) tablet 650 mg  650 mg Oral Q6H PRN Joe Camarillo MD        Or    acetaminophen (TYLENOL) suppository 650 mg  650 mg Rectal Q6H PRN Joe Camarillo MD        senna (SENOKOT) tablet 8.6 mg  1 tablet Oral BID PRN Joe Camarillo MD        promethazine (PHENERGAN) tablet 12.5 mg  12.5 mg Oral Q6H PRN Joe Camarillo MD        Or    ondansetron [facility]) injection 4 mg  4 mg Intravenous Q6H PRN Joe Camarillo MD        famotidine (PEPCID) tablet 20 mg  20 mg Oral BID Joe Camarillo MD   20 mg at 11/08/20 1004    enoxaparin (LOVENOX) injection 40 mg  40 mg Subcutaneous Daily Joe Camarillo MD   40 mg at 11/08/20 1006    cefTRIAXone (ROCEPHIN) 1 g IVPB in 50 mL D5W minibag  1 g Intravenous Q24H Joe Camarillo MD   Stopped at 11/07/20 1354    insulin lispro (HUMALOG) injection vial 0-18 Units  0-18 Units Subcutaneous TID WC Sunny Gonzalez MD   12 Units at 11/07/20 1322    insulin lispro (HUMALOG) injection vial 0-9 Units  0-9 Units Subcutaneous Nightly Sunny Gonzalez MD   5 Units at 11/06/20 2104    glucose (GLUTOSE) 40 % oral gel 15 g  15 g Oral PRN Sunny Gonzalez MD        dextrose 50 % IV solution  12.5 g Intravenous PRN Sunny Gonzalez MD        glucagon (rDNA) injection 1 mg  1 mg Intramuscular PRN Sunny Gonzalez MD        dextrose 5 % solution  100 mL/hr Intravenous PRN Sunny Gonzalez MD           Allergies:  Codeine and Dye [iodides]    Social History:   reports that she quit smoking about 4 years ago. She has never used smokeless tobacco. She reports that she does not drink alcohol or use drugs. Family History: family history includes Cancer in her father and mother. REVIEW OF SYSTEMS:      Constitutional: No fever or chills. No night sweats, no weight loss. Positive fatigue. Eyes: No eye discharge, double vision, or eye pain   HEENT: negative for sore mouth, sore throat, hoarseness and voice change   Respiratory: negative for cough , sputum, dyspnea, wheezing, hemoptysis, chest pain   Cardiovascular: negative for chest pain, dyspnea, palpitations, orthopnea, PND   Gastrointestinal: negative for nausea, vomiting, diarrhea, constipation, abdominal pain, Dysphagia, hematemesis and hematochezia   Genitourinary: negative for frequency, dysuria, nocturia, urinary incontinence, and hematuria   Integument: negative for rash, skin lesions, bruises.    Hematologic/Lymphatic: negative for easy bruising, bleeding, lymphadenopathy, or petechiae   Endocrine: negative for heat or cold intolerance,weight changes, change in bowel habits and hair loss   Musculoskeletal: negative for myalgias, arthralgias, pain, joint swelling,and bone pain   Neurological: negative for headaches, dizziness, seizures, weakness, numbness    PHYSICAL EXAM:        BP (!) 120/47   Pulse 93   Temp 98.4 °F (36.9 °C) (Oral)   Resp 14   Ht 5' 8\" (1.727 m)   Wt 154 lb (69.9 kg)   SpO2 100%   BMI 23.42 kg/m²    Temp (24hrs), Av.6 °F (37 °C), Min:98.4 °F (36.9 °C), Max:99.1 °F (37.3 °C)      General appearance - well appearing, no in pain or distress   Mental status - alert and cooperative   Eyes - pupils equal and reactive, extraocular eye movements intact   Ears - bilateral TM's and external ear canals normal   Mouth - mucous membranes moist, pharynx normal without lesions   Neck - supple, no significant adenopathy   Lymphatics - no palpable lymphadenopathy, no hepatosplenomegaly   Chest - clear to auscultation, no wheezes, rales or rhonchi, symmetric air entry   Heart - normal rate, regular rhythm, normal S1, S2, no murmurs  Abdomen - soft, nontender, nondistended, no masses or organomegaly   Neurological - alert, oriented, normal speech, no focal findings or movement disorder noted   Musculoskeletal - no joint tenderness, deformity or swelling   Extremities - peripheral pulses normal, no pedal edema, no clubbing or cyanosis   Skin - normal coloration and turgor, no rashes, no suspicious skin lesions noted ,      DATA:      Labs:     Results for orders placed or performed during the hospital encounter of 20   Culture, Blood 1    Specimen: Blood   Result Value Ref Range    Specimen Description . BLOOD     Special Requests RFA 2 ML     Culture NO GROWTH 5 DAYS    Culture, Blood 1    Specimen: Blood   Result Value Ref Range    Specimen Description . BLOOD     Special Requests NOT REPORTED     Culture NO SAMPLE RECEIVED    Culture, Urine    Specimen: Urine, clean catch   Result Value Ref Range    Specimen Description . CLEAN CATCH URINE     Special Requests NOT REPORTED     Culture ESCHERICHIA COLI >[id number] CFU/ML (A)        Susceptibility    Escherichia coli - BACTERIAL SUSCEPTIBILITY PANEL MUNIR     amikacin Value in next row        NOT REPORTED     ampicillin Value in next row Resistant       >=32RESISTANT ampicillin-sulbactam Value in next row        NOT REPORTED     aztreonam Value in next row Sensitive       <=1SUSCEPTIBLE     ceFAZolin Value in next row Sensitive       <=4SUSCEPTIBLE     ceFAZolin Value in next row Sensitive       <=4SUSCEPTIBLE     cefepime Value in next row        NOT REPORTED     cefTRIAXone Value in next row Sensitive       <=1SUSCEPTIBLE     ciprofloxacin Value in next row Resistant       >=4RESISTANT     ertapenem Value in next row        NOT REPORTED     Confirmatory Extended Spectrum Beta-Lactamase Value in next row Negative       NOT REPORTED     gentamicin Value in next row Sensitive       <=1SUSCEPTIBLE     meropenem Value in next row        NOT REPORTED     nitrofurantoin Value in next row Sensitive       <=16SUSCEPTIBLE     tigecycline Value in next row        NOT REPORTED     tobramycin Value in next row Sensitive       <=1SUSCEPTIBLE     trimethoprim-sulfamethoxazole Value in next row Sensitive       <=20SUSCEPTIBLE     piperacillin-tazobactam Value in next row Sensitive       <=4SUSCEPTIBLE   Urinalysis   Result Value Ref Range    Color, UA YELLOW YELLOW    Turbidity UA CLOUDY (A) CLEAR    Glucose, Ur NEGATIVE NEGATIVE    Bilirubin Urine NEGATIVE NEGATIVE    Ketones, Urine NEGATIVE NEGATIVE    Specific Gravity, UA 1.023 1.005 - 1.030    Urine Hgb 3+ (A) NEGATIVE    pH, UA 5.5 5.0 - 8.0    Protein, UA 2+ (A) NEGATIVE    Urobilinogen, Urine Normal Normal    Nitrite, Urine NEGATIVE NEGATIVE    Leukocyte Esterase, Urine MODERATE (A) NEGATIVE    Urinalysis Comments NOT REPORTED    CBC Auto Differential   Result Value Ref Range    WBC 8.9 3.5 - 11.3 k/uL    RBC 3.47 (L) 3.95 - 5.11 m/uL    Hemoglobin 8.4 (L) 11.9 - 15.1 g/dL    Hematocrit 27.7 (L) 36.3 - 47.1 %    MCV 79.8 (L) 82.6 - 102.9 fL    MCH 24.2 (L) 25.2 - 33.5 pg    MCHC 30.3 28.4 - 34.8 g/dL    RDW 19.8 (H) 11.8 - 14.4 %    Platelets 618 984 - 552 k/uL    MPV 9.2 8.1 - 13.5 fL    NRBC Automated 0.0 0.0 per 100 WBC Differential Type NOT REPORTED     WBC Morphology NOT REPORTED     RBC Morphology NOT REPORTED     Platelet Estimate NOT REPORTED     Seg Neutrophils 87 (H) 36 - 66 %    Lymphocytes 7 (L) 24 - 44 %    Monocytes 5 1 - 7 %    Eosinophils % 0 (L) 1 - 4 %    Basophils 0 %    Immature Granulocytes 1 (H) 0 %    Segs Absolute 7.74 (H) 1.8 - 7.7 k/uL    Absolute Lymph # 0.62 (L) 1.0 - 4.8 k/uL    Absolute Mono # 0.45 0.2 - 0.8 k/uL    Absolute Eos # 0.00 0.0 - 0.4 k/uL    Basophils Absolute 0.00 0.0 - 0.2 k/uL    Absolute Immature Granulocyte 0.09 0.00 - 0.30 k/uL   Basic Metabolic Panel   Result Value Ref Range    Glucose 191 (H) 70 - 99 mg/dL    BUN 28 (H) 8 - 23 mg/dL    CREATININE 0.80 0.50 - 0.90 mg/dL    Bun/Cre Ratio 35 (H) 9 - 20    Calcium 9.2 8.6 - 10.4 mg/dL    Sodium 134 (L) 135 - 144 mmol/L    Potassium 4.0 3.7 - 5.3 mmol/L    Chloride 98 98 - 107 mmol/L    CO2 27 20 - 31 mmol/L    Anion Gap 9 9 - 17 mmol/L    GFR Non-African American >60 >60 mL/min    GFR African American >60 >60 mL/min    GFR Comment          GFR Staging NOT REPORTED    Microscopic Urinalysis   Result Value Ref Range    -          WBC, UA TOO NUMEROUS TO COUNT 0 - 5 /HPF    RBC, UA TOO NUMEROUS TO COUNT 0 - 2 /HPF    Casts UA NOT REPORTED /LPF    Crystals, UA NOT REPORTED None /HPF    Epithelial Cells UA 2 TO 5 0 - 5 /HPF    Renal Epithelial, UA NOT REPORTED 0 /HPF    Bacteria, UA MANY (A) None    Mucus, UA 1+ (A) None    Trichomonas, UA NOT REPORTED None    Amorphous, UA NOT REPORTED None    Other Observations UA NOT REPORTED NOT REQ.     Yeast, UA NOT REPORTED None   Basic Metabolic Panel w/ Reflex to MG   Result Value Ref Range    Glucose 77 70 - 99 mg/dL    BUN 17 8 - 23 mg/dL    CREATININE 0.68 0.50 - 0.90 mg/dL    Bun/Cre Ratio 25 (H) 9 - 20    Calcium 8.5 (L) 8.6 - 10.4 mg/dL    Sodium 141 135 - 144 mmol/L    Potassium 4.0 3.7 - 5.3 mmol/L    Chloride 107 98 - 107 mmol/L    CO2 25 20 - 31 mmol/L    Anion Gap 9 9 - 17 mmol/L    GFR Non-African American >60 >60 mL/min    GFR African American >60 >60 mL/min    GFR Comment          GFR Staging NOT REPORTED    CBC auto differential   Result Value Ref Range    WBC 6.7 3.5 - 11.3 k/uL    RBC 3.15 (L) 3.95 - 5.11 m/uL    Hemoglobin 7.7 (L) 11.9 - 15.1 g/dL    Hematocrit 26.0 (L) 36.3 - 47.1 %    MCV 82.5 (L) 82.6 - 102.9 fL    MCH 24.4 (L) 25.2 - 33.5 pg    MCHC 29.6 28.4 - 34.8 g/dL    RDW 19.9 (H) 11.8 - 14.4 %    Platelets 995 390 - 050 k/uL    MPV 9.4 8.1 - 13.5 fL    NRBC Automated 0.0 0.0 per 100 WBC    Differential Type NOT REPORTED     WBC Morphology NOT REPORTED     RBC Morphology ANISOCYTOSIS PRESENT     Platelet Estimate NOT REPORTED     Seg Neutrophils 78 (H) 36 - 65 %    Lymphocytes 14 (L) 24 - 43 %    Monocytes 6 3 - 12 %    Eosinophils % 2 1 - 4 %    Basophils 0 0 - 2 %    Immature Granulocytes 1 (H) 0 %    Segs Absolute 5.22 1.50 - 8.10 k/uL    Absolute Lymph # 0.92 (L) 1.10 - 3.70 k/uL    Absolute Mono # 0.39 0.10 - 1.20 k/uL    Absolute Eos # 0.10 0.00 - 0.44 k/uL    Basophils Absolute 0.03 0.00 - 0.20 k/uL    Absolute Immature Granulocyte 0.05 0.00 - 0.30 k/uL   Lactic Acid   Result Value Ref Range    Lactic Acid 0.6 0.5 - 2.2 mmol/L   Procalcitonin   Result Value Ref Range    Procalcitonin 0.26 (H) <0.09 ng/mL   Basic Metabolic Panel w/ Reflex to MG   Result Value Ref Range    Glucose 89 70 - 99 mg/dL    BUN 14 8 - 23 mg/dL    CREATININE 0.67 0.50 - 0.90 mg/dL    Bun/Cre Ratio 21 (H) 9 - 20    Calcium 8.4 (L) 8.6 - 10.4 mg/dL    Sodium 139 135 - 144 mmol/L    Potassium 3.6 (L) 3.7 - 5.3 mmol/L    Chloride 103 98 - 107 mmol/L    CO2 29 20 - 31 mmol/L    Anion Gap 7 (L) 9 - 17 mmol/L    GFR Non-African American >60 >60 mL/min    GFR African American >60 >60 mL/min    GFR Comment          GFR Staging NOT REPORTED    CBC auto differential   Result Value Ref Range    WBC 5.8 3.5 - 11.3 k/uL    RBC 2.95 (L) 3.95 - 5.11 m/uL    Hemoglobin 7.2 (L) 11.9 - 15.1 g/dL    Hematocrit 23.8 (L) 36.3 - 47.1 %    MCV 80.7 (L) 82.6 - 102.9 fL    MCH 24.4 (L) 25.2 - 33.5 pg    MCHC 30.3 28.4 - 34.8 g/dL    RDW 19.7 (H) 11.8 - 14.4 %    Platelets 921 236 - 941 k/uL    MPV 8.7 8.1 - 13.5 fL    NRBC Automated 0.0 0.0 per 100 WBC    Differential Type NOT REPORTED     WBC Morphology NOT REPORTED     RBC Morphology ANISOCYTOSIS PRESENT     Platelet Estimate NOT REPORTED     Seg Neutrophils 78 (H) 36 - 65 %    Lymphocytes 13 (L) 24 - 43 %    Monocytes 7 3 - 12 %    Eosinophils % 1 1 - 4 %    Basophils 1 0 - 2 %    Immature Granulocytes 1 (H) 0 %    Segs Absolute 4.54 1.50 - 8.10 k/uL    Absolute Lymph # 0.75 (L) 1.10 - 3.70 k/uL    Absolute Mono # 0.39 0.10 - 1.20 k/uL    Absolute Eos # 0.07 0.00 - 0.44 k/uL    Basophils Absolute 0.03 0.00 - 0.20 k/uL    Absolute Immature Granulocyte 0.05 0.00 - 0.30 k/uL   Lactic Acid   Result Value Ref Range    Lactic Acid 0.4 (L) 0.5 - 2.2 mmol/L   Vitamin B12 & Folate   Result Value Ref Range    Vitamin B-12 190 (L) 232 - 1245 pg/mL    Folate >20.0 >4.8 ng/mL   Iron and TIBC   Result Value Ref Range    Iron 19 (L) 37 - 145 ug/dL    TIBC 176 (L) 250 - 450 ug/dL    Iron Saturation 11 (L) 20 - 55 %    UIBC 157 112 - 347 ug/dL   Ferritin   Result Value Ref Range    Ferritin 149 13 - 150 ug/L   Basic Metabolic Panel w/ Reflex to MG   Result Value Ref Range    Glucose 95 70 - 99 mg/dL    BUN 11 8 - 23 mg/dL    CREATININE 0.63 0.50 - 0.90 mg/dL    Bun/Cre Ratio 17 9 - 20    Calcium 8.2 (L) 8.6 - 10.4 mg/dL    Sodium 139 135 - 144 mmol/L    Potassium 3.4 (L) 3.7 - 5.3 mmol/L    Chloride 102 98 - 107 mmol/L    CO2 29 20 - 31 mmol/L    Anion Gap 8 (L) 9 - 17 mmol/L    GFR Non-African American >60 >60 mL/min    GFR African American >60 >60 mL/min    GFR Comment          GFR Staging NOT REPORTED    CBC auto differential   Result Value Ref Range    WBC 6.0 3.5 - 11.3 k/uL    RBC 2.98 (L) 3.95 - 5.11 m/uL    Hemoglobin 7.3 (L) 11.9 - 15.1 g/dL    Hematocrit 24.6 (L) 36.3 - 47.1 %    MCV 82.6 82.6 - 102.9 fL    MCH 24.5 (L) 25.2 - 33.5 pg    MCHC 29.7 28.4 - 34.8 g/dL    RDW 19.6 (H) 11.8 - 14.4 %    Platelets 032 279 - 719 k/uL    MPV 8.8 8.1 - 13.5 fL    NRBC Automated 0.0 0.0 per 100 WBC    Differential Type NOT REPORTED     WBC Morphology NOT REPORTED     RBC Morphology NOT REPORTED     Platelet Estimate NOT REPORTED     Seg Neutrophils 79 (H) 36 - 66 %    Lymphocytes 10 (L) 24 - 44 %    Monocytes 8 (H) 1 - 7 %    Eosinophils % 1 1 - 4 %    Basophils 1 %    Immature Granulocytes 1 (H) 0 %    Segs Absolute 4.74 1.8 - 7.7 k/uL    Absolute Lymph # 0.60 (L) 1.0 - 4.8 k/uL    Absolute Mono # 0.48 0.2 - 0.8 k/uL    Absolute Eos # 0.06 0.0 - 0.4 k/uL    Basophils Absolute 0.06 0.0 - 0.2 k/uL    Absolute Immature Granulocyte 0.06 0.00 - 0.30 k/uL    Morphology HYPOCHROMIA PRESENT    Lactic Acid   Result Value Ref Range    Lactic Acid 1.1 0.5 - 2.2 mmol/L   Magnesium   Result Value Ref Range    Magnesium 1.3 (L) 1.6 - 2.6 mg/dL   Basic Metabolic Panel w/ Reflex to MG   Result Value Ref Range    Glucose 77 70 - 99 mg/dL    BUN 11 8 - 23 mg/dL    CREATININE 0.63 0.50 - 0.90 mg/dL    Bun/Cre Ratio 17 9 - 20    Calcium 8.4 (L) 8.6 - 10.4 mg/dL    Sodium 141 135 - 144 mmol/L    Potassium 4.1 3.7 - 5.3 mmol/L    Chloride 106 98 - 107 mmol/L    CO2 30 20 - 31 mmol/L    Anion Gap 5 (L) 9 - 17 mmol/L    GFR Non-African American >60 >60 mL/min    GFR African American >60 >60 mL/min    GFR Comment          GFR Staging NOT REPORTED    CBC auto differential   Result Value Ref Range    WBC 5.4 3.5 - 11.3 k/uL    RBC 3.14 (L) 3.95 - 5.11 m/uL    Hemoglobin 7.5 (L) 11.9 - 15.1 g/dL    Hematocrit 25.9 (L) 36.3 - 47.1 %    MCV 82.5 (L) 82.6 - 102.9 fL    MCH 23.9 (L) 25.2 - 33.5 pg    MCHC 29.0 28.4 - 34.8 g/dL    RDW 19.6 (H) 11.8 - 14.4 %    Platelets 540 520 - 722 k/uL    MPV 8.8 8.1 - 13.5 fL    NRBC Automated 0.0 0.0 per 100 WBC    Differential Type NOT REPORTED     WBC Morphology NOT REPORTED     RBC Morphology k/uL    WBC Morphology NOT REPORTED     RBC Morphology ANISOCYTOSIS PRESENT     Platelet Estimate NOT REPORTED    Lactic Acid   Result Value Ref Range    Lactic Acid 1.3 0.5 - 2.2 mmol/L   POC Glucose Fingerstick   Result Value Ref Range    POC Glucose 231 (H) 65 - 105 mg/dL   POC Glucose Fingerstick   Result Value Ref Range    POC Glucose 152 (H) 65 - 105 mg/dL   POC Glucose Fingerstick   Result Value Ref Range    POC Glucose 61 (L) 65 - 105 mg/dL   POC Glucose Fingerstick   Result Value Ref Range    POC Glucose 91 65 - 105 mg/dL   POC Glucose Fingerstick   Result Value Ref Range    POC Glucose 311 (H) 65 - 105 mg/dL   POC Glucose Fingerstick   Result Value Ref Range    POC Glucose 105 65 - 105 mg/dL   POC Glucose Fingerstick   Result Value Ref Range    POC Glucose 180 (H) 65 - 105 mg/dL   POC Glucose Fingerstick   Result Value Ref Range    POC Glucose 163 (H) 65 - 105 mg/dL   POC Glucose Fingerstick   Result Value Ref Range    POC Glucose 87 65 - 105 mg/dL   POC Glucose Fingerstick   Result Value Ref Range    POC Glucose 292 (H) 65 - 105 mg/dL   POC Glucose Fingerstick   Result Value Ref Range    POC Glucose 136 (H) 65 - 105 mg/dL   POC Glucose Fingerstick   Result Value Ref Range    POC Glucose 180 (H) 65 - 105 mg/dL   POC Glucose Fingerstick   Result Value Ref Range    POC Glucose 77 65 - 105 mg/dL   POC Glucose Fingerstick   Result Value Ref Range    POC Glucose 156 (H) 65 - 105 mg/dL   POC Glucose Fingerstick   Result Value Ref Range    POC Glucose 364 (H) 65 - 105 mg/dL   POC Glucose Fingerstick   Result Value Ref Range    POC Glucose 100 65 - 105 mg/dL   POC Glucose Fingerstick   Result Value Ref Range    POC Glucose 283 (H) 65 - 105 mg/dL   POC Glucose Fingerstick   Result Value Ref Range    POC Glucose 67 65 - 105 mg/dL   POC Glucose Fingerstick   Result Value Ref Range    POC Glucose 81 65 - 105 mg/dL   POC Glucose Fingerstick   Result Value Ref Range    POC Glucose 325 (H) 65 - 105 mg/dL   POC Glucose Fingerstick   Result Value Ref Range    POC Glucose 133 (H) 65 - 105 mg/dL   POC Glucose Fingerstick   Result Value Ref Range    POC Glucose 124 (H) 65 - 105 mg/dL   EKG 12 Lead   Result Value Ref Range    Ventricular Rate 101 BPM    Atrial Rate 101 BPM    P-R Interval 148 ms    QRS Duration 154 ms    Q-T Interval 406 ms    QTc Calculation (Bazett) 526 ms    P Axis 68 degrees    R Axis -59 degrees    T Axis 29 degrees         IMAGING DATA:    Xr Ribs Right Include Chest (min 3 Views)    Result Date: 11/3/2020  EXAMINATION: 2 XRAY VIEWS OF THE RIGHT RIBS WITH FRONTAL XRAY VIEW OF THE CHEST 11/3/2020 11:53 am COMPARISON: 09/25/2020 HISTORY: ORDERING SYSTEM PROVIDED HISTORY: fall yesterday TECHNOLOGIST PROVIDED HISTORY: fall yesterday Reason for Exam: fell Acuity: Acute Type of Exam: Initial Relevant Medical/Surgical History: pt fell, pain in rt lat ribs and posterior neck FINDINGS: Masslike opacity in the right lung apex. There is a subtle linear lucency within the 8th rib which is suggestive of a nondisplaced fracture. Multiple remote/chronic fractures identified. Left hemithorax is clear. Subtle linear lucency within the 8th rib, suggestive of a nondisplaced fracture. Masslike opacity in the right lung apex. Xr Cervical Spine (2-3 Views)    Result Date: 11/3/2020  EXAMINATION: 3 XRAY VIEWS OF THE CERVICAL SPINE 11/3/2020 11:52 am COMPARISON: None. HISTORY: ORDERING SYSTEM PROVIDED HISTORY: fall yesterday TECHNOLOGIST PROVIDED HISTORY: fall yesterday Reason for Exam: fell Acuity: Acute Type of Exam: Initial Relevant Medical/Surgical History: pt fell, pain in rt lat ribs and posterior neck FINDINGS: Three views of the cervical spine are submitted for review. Advanced facet arthropathy at C2 through C5. Degenerative disease at C5-C6. The lower cervical spine is obscured on the lateral view due to overlying osseous structures and soft tissues. Masslike right upper lobe airspace opacity.      No convincing evidence for acute fracture or malalignment of the cervical spine. Masslike opacity within the right lung apex. Xr Lumbar Spine (2-3 Views)    Result Date: 11/4/2020  EXAMINATION: THREE XRAY VIEWS OF THE LUMBAR SPINE; THREE XRAY VIEWS OF THE SACRUM/COCCYX 11/4/2020 1:07 pm COMPARISON: CT abdomen and pelvis dated 09/24/2020 HISTORY: ORDERING SYSTEM PROVIDED HISTORY: back pain TECHNOLOGIST PROVIDED HISTORY: back pain Reason for Exam: pain to low back Acuity: Unknown Type of Exam: Unknown FINDINGS: Lumbar spine: Vertebral body heights and alignment are within normal limits. There is mild multilevel disc space narrowing and endplate spurring. Mild to moderate facet arthropathy. There are scattered vascular calcifications. The stomach is significantly distended with gas. No small bowel or large bowel distention. There is moderate fecal material throughout the large bowel. Sacrum/coccyx: Sacrum is not well visualized due to osteopenia and overlying soft tissue attenuation. Sacrococcygeal alignment is grossly preserved. No displaced fracture identified. Sacroiliac joints are symmetric, better visualized on the lumbar spine radiographs. 1.  Lumbar spine: No compression fracture or acute malalignment. Mild degenerative changes. Significant gas distention of the stomach, suggesting gastroparesis or possible obstruction. 2.  Sacrum/coccyx: Limited evaluation due to osteopenia and overlying soft tissue attenuation. No obvious acute osseous abnormality.      Xr Sacrum Coccyx (min 2 Views)    Result Date: 11/4/2020  EXAMINATION: THREE XRAY VIEWS OF THE LUMBAR SPINE; THREE XRAY VIEWS OF THE SACRUM/COCCYX 11/4/2020 1:07 pm COMPARISON: CT abdomen and pelvis dated 09/24/2020 HISTORY: ORDERING SYSTEM PROVIDED HISTORY: back pain TECHNOLOGIST PROVIDED HISTORY: back pain Reason for Exam: pain to low back Acuity: Unknown Type of Exam: Unknown FINDINGS: Lumbar spine: Vertebral body heights and alignment are within normal limits. There is mild multilevel disc space narrowing and endplate spurring. Mild to moderate facet arthropathy. There are scattered vascular calcifications. The stomach is significantly distended with gas. No small bowel or large bowel distention. There is moderate fecal material throughout the large bowel. Sacrum/coccyx: Sacrum is not well visualized due to osteopenia and overlying soft tissue attenuation. Sacrococcygeal alignment is grossly preserved. No displaced fracture identified. Sacroiliac joints are symmetric, better visualized on the lumbar spine radiographs. 1.  Lumbar spine: No compression fracture or acute malalignment. Mild degenerative changes. Significant gas distention of the stomach, suggesting gastroparesis or possible obstruction. 2.  Sacrum/coccyx: Limited evaluation due to osteopenia and overlying soft tissue attenuation. No obvious acute osseous abnormality. Vl Lower Extremity Bilateral Venous Duplex    Result Date: 10/22/2020   Conclusions   Summary   No evidence of superficial or deep venous thrombosis in both lower  extremities. Findings:   Right Impression:                    Left Impression:  The common femoral, femoral,         The common femoral, femoral,  popliteal, tibials and saphenous     popliteal, tibials and saphenous  veins are compressible with normal   veins are compressible with normal  doppler responses. Nm Bone Scan Whole Body    Result Date: 11/4/2020  EXAMINATION: WHOLE BODY BONE SCAN  11/4/2020 TECHNIQUE: The patient was injected intravenously with 25.6 mCi of 99 mTc MDP and scintigraphy of the entire skeleton was performed approximately three hours later. Coned-down images of the head, neck and thorax in obliques positions. COMPARISON: Patient did not have previous imaging studies for comparison.  HISTORY: ORDERING SYSTEM PROVIDED HISTORY: Fall, lung cancer TECHNOLOGIST PROVIDED HISTORY: Fall, lung cancer Reason for Exam: pain Acuity: Unknown Type of Exam: Unknown FINDINGS: Symmetrical uptake of radiotracer is noted in the shoulders, sternoclavicular joints, hips, knees, and ankles, foci of activity in the aforementioned joints are most likely degenerative. The remainder of the osseous structures and soft tissues are unremarkable. Specifically, no radiotracer uptake in the ribcage is noted. Physiologic activity is present in the skeletal and renal collecting systems. No evidence to suggest osseous metastatic disease. Pattern of uptake most compatible with age related degenerative change. IMPRESSION:   Primary Problem  <principal problem not specified>    Active Hospital Problems    Diagnosis Date Noted    Closed fracture of one rib of right side [S22.31XA] 11/04/2020    Degenerative disc disease, lumbar [M51.36] 11/04/2020    Urinary tract infectious disease [N39.0] 11/03/2020    Malignant neoplasm of upper lobe of right lung ([facility] [address] [C34.11] 09/18/2020    Lung mass [R91.8] 08/27/2020    Chronic bilateral low back pain [M54.5, G89.29] 10/31/2017    Falls frequently [R29.6] 01/27/2017   Squamous cell carcinoma of lung, clinical stage IIIb  Ongoing treatment with concurrent chemoradiation using carboplatin and Taxol  UTI  Asthenia  Anemia  Eighth rib fracture    RECOMMENDATIONS:  1. I personally reviewed results of lab work-up imaging studies and other relevant clinical data. Reviewed records and treatment history  2. Continue to hold chemotherapy  3. Continue symptomatic supportive care  4. Today Hb 8.9  5. Transfuse to keep hemoglobin above 7  6. Continue treatment of UTI  7. Patient counseled tobacco cessation  8. Pain control  9. Plan to resume her chemoradiation as outpatient after discharge  10. Discharge as per primary. Awaiting placement    Discussed with patient and Nurse. Thank you for asking us to see this patient. Mendoza Tran MD  Hematologist/Medical Oncologist    Cell: [phone]      This

## 2020-11-08 NOTE — PROGRESS NOTES
Progress note  .,    Adult Hospitalist      Name: aKri Lau  MRN: [id number]     [place]: [de-identified]  Room: 2003/2003-02    Admit Date: 11/3/2020 10:59 AM  PCP: Andrei Meza MD    Primary Problem  Active Problems:    Falls frequently    Chronic bilateral low back pain    Lung mass    Malignant neoplasm of upper lobe of right lung [facility])    Urinary tract infectious disease    Closed fracture of one rib of right side    Degenerative disc disease, lumbar  Resolved Problems:    * No resolved hospital problems. *        Assesment:     · UTI E. coli  · Generalized weakness  · 8th rib lucencies suspicious for nondisplaced fracture  · Recent diagnosis of right upper lobe squamous cell carcinoma status post chemoradiation  · Chronic COPD  · Chronic hypoxic respiratory failure on nocturnal O2  · Former smoker  · Diabetes type 2  · Depression with anxiety        Plan:     · Admit to [facility] with telemetry  · O2 maintain oxygen saturation greater than 92%  · Follow urine culture: E. coli, sensitive to ceftriaxone  · IV ceftriaxone, discontinue completed  · IV fluids, discontinue  · Orthopedic consult  · Bone scan was ordered  · Pain control  · X-ray lumbosacral spine reviewed  · Oncology consult for resumption of chemotherapy  · Continue aspirin, Lasix,  Lopressor  · Continue Abilify, Effexor, trazodone, Neurontin  · Continue glimepiride, added SSI  · DVT and GI prophylaxis.       Discharge planning, likely to SNF once arrangements are done  Chief Complaint:     Chief Complaint   Patient presents with    Fall         History of Present Illness:      Patient seen and examined bedside patient denies chest pain no nausea or anorexia headache no palpitation no focal logical deficit overall patient is feeling better  Afebrile  WBC count is normal  Received Rocephin for 5 days, I will discontinue it        HPI:    Kari Lau is a 72 y.o.  female who presents with Fall    72year-old lady with past medical history of COPD,  coronary artery disease, depression, arthritis presented to ER complaining of generalized weakness and fall. Patient has been recently diagnosed of lung cell carcinoma. She had a bronchoscopy on 8/31/2020 and endobronchial biopsy showed squamous cell carcinoma. She was started on chemoradiation. Her last chemotherapy was on 10/28/2020. She also has an appointment with Radiation Oncology on 10/30/2020. On presentation to ER her sodium was slightly low at 134. Her hemoglobin was 8.4. Her UA showed moderate leukocyte Estrace and many bacteria. Patient was so weak she was unable to get up and ambulate. She is unable to take care of herself at home. Complaining of low back pain and right-sided rib pain. Patient admitted for further management. I have personally reviewed the past medical history, past surgical history, medications, social history, and family history, and summarized in the note. Review of Systems:     All 10 point system is reviewed and negative otherwise mentioned in HPI. Past Medical History:     Past Medical History:   Diagnosis Date    AAA (abdominal aortic aneurysm) ([address]     Pt denies having a history of AAA    Acid reflux     Anxiety and depression     Aortic stenosis     Arthritis     Blister of ankle, right 10/13/2016    blister broke open & draining, is on antibiotics    CAD (coronary artery disease)     Cancer ([address]     lung cancer    COPD (chronic obstructive pulmonary disease) ([address]     Diabetes mellitus ([address]     Falls     Heart block     bifasicular    Hypokalemia     MDRO (multiple drug resistant organisms) resistance 10/17/2014    E.  Coli urine    MRSA (methicillin resistant staph aureus) culture positive resolved 12/2016    2 negative nasal screens - 2016 (hx in urine 2014)    On home oxygen therapy     uses 2 liters at night    On home oxygen therapy     patient states 2 liters/nasal cannula continuous    Overactive bladder     patient incont. wears a brief    Pneumonia     PONV (postoperative nausea and vomiting)     Vitamin D deficiency         Past Surgical History:     Past Surgical History:   Procedure Laterality Date    AORTIC VALVE REPAIR N/A 4/11/2017    AORTIC VALVE REPAIR REPLACEMENT performed by Essence Ballard MD at [address] N/A 8/31/2020    BRONCHOSCOPY BIOPSY BRONCHUS performed by Jose L Cantor MD at [address], COLON, DIAGNOSTIC  12/08/2016    EYE SURGERY      HYSTERECTOMY      IR INS PICC VAD W SQ PORT GREATER THAN 5  9/4/2020    IR INS PICC VAD W SQ PORT GREATER THAN 5 9/4/2020 [facility]    IR PORT PLACEMENT EQUAL OR GREATER THAN 5 YEARS  9/29/2020    IR PORT PLACEMENT EQUAL OR GREATER THAN 5 YEARS 9/29/2020 MD [facility] J[facility]    LUMBAR LAMINECTOMY      TONSILLECTOMY          Medications Prior to Admission:       Prior to Admission medications    Medication Sig Start Date End Date Taking? Authorizing Provider   clonazePAM (KLONOPIN) 1 MG tablet Take 1 tablet by mouth 3 times daily as needed for Anxiety for up to 3 doses. 11/5/20 11/6/20 Yes Denny Post MD   gabapentin (NEURONTIN) 400 MG capsule Take 1 capsule by mouth 2 times daily for 3 doses. In addition to 2 capsules (=800mg) nightly 11/5/20 11/7/20 Yes Denny Post MD   gabapentin (NEURONTIN) 400 MG capsule Take 2 capsules by mouth nightly for 3 doses.  Take 2 capsules (=800mg) nightly - in addition to 1BID 11/5/20 11/8/20 Yes Denny Post MD   traZODone (DESYREL) 150 MG tablet Take 2 tablets by mouth nightly for 3 doses Take 2 tablets (=300mg) nightly 11/5/20 11/8/20 Yes Denny Post MD   ARIPiprazole (ABILIFY) 5 MG tablet Take 1 tablet by mouth daily for 3 doses 11/5/20 11/8/20 Yes Denny Post MD   furosemide (LASIX) 40 MG tablet Take 40 mg by mouth daily   Yes Historical Provider, MD omeprazole (PRILOSEC) 20 MG delayed release capsule Take 20 mg by mouth daily   Yes Historical Provider, MD   dexamethasone (DECADRON) 4 MG tablet Take 20 mg orally 12 hours and 6 hours before Taxol 10/23/20  Yes Teena Rivera MD   lidocaine-prilocaine (EMLA) 2.5-2.5 % cream To port site before chemo 10/21/20  Yes Teena Rivera MD   melatonin 5 MG TABS tablet Take 5 mg by mouth nightly    Yes Historical Provider, MD   glimepiride (AMARYL) 1 MG tablet Take 1 mg by mouth Daily with supper In addition to 2 tablets (=2mg) every morning    Yes Historical Provider, MD   metoprolol tartrate (LOPRESSOR) 25 MG tablet Take 25 mg by mouth 2 times daily   Yes Historical Provider, MD   venlafaxine (EFFEXOR XR) 150 MG extended release capsule Take 150 mg by mouth 2 times daily   Yes Historical Provider, MD   lansoprazole (PREVACID) 30 MG delayed release capsule Take 30 mg by mouth daily 11/10/16  Yes Historical Provider, MD   metFORMIN (GLUCOPHAGE) 500 MG tablet Take 500 mg by mouth 2 times daily 12/7/16  Yes Historical Provider, MD   aspirin EC 81 MG EC tablet Take 81 mg by mouth daily   Yes Historical Provider, MD   mometasone-formoterol (DULERA) 200-5 MCG/ACT inhaler Inhale 2 puffs into the lungs every 12 hours as needed (wheezing/SOB)    Yes Historical Provider, MD   glimepiride (AMARYL) 1 MG tablet Take 2 mg by mouth every morning In addition to 1mg nightly   Yes Historical Provider, MD        Allergies:       Codeine and Dye [iodides]    Social History:     Tobacco:    reports that she quit smoking about 4 years ago. She has never used smokeless tobacco.  Alcohol:      reports no history of alcohol use. Drug Use:  reports no history of drug use.     Family History:     Family History   Problem Relation Age of Onset    Cancer Mother     Cancer Father          Physical Exam:     Vitals:  BP (!) 143/67   Pulse 89   Temp 99.1 °F (37.3 °C) (Oral)   Resp 15   Ht 5' 8\" (1.727 m)   Wt 154 lb (69.9 kg)   SpO2 94% BMI 23.42 kg/m²   Temp (24hrs), Av.9 °F (37.2 °C), Min:98.4 °F (36.9 °C), Max:99.1 °F (37.3 °C)          General appearance - alert, well appearing, and in no acute distress  Mental status - oriented to person, place, and time with normal affect  Head - normocephalic and atraumatic  Eyes - pupils equal and reactive, extraocular eye movements intact, conjunctiva clear  Ears - hearing appears to be intact  Nose - no drainage noted  Mouth - mucous membranes moist  Neck - supple, no carotid bruits, thyroid not palpable  Chest - clear to auscultation, normal effort  Heart - normal rate, regular rhythm, no murmur  Abdomen - soft, nontender, nondistended, bowel sounds present all four quadrants, no masses, hepatomegaly or splenomegaly  Neurological - normal speech, no focal findings or movement disorder noted, cranial nerves II through XII grossly intact  Extremities - peripheral pulses palpable, no pedal edema or calf pain with palpation  Skin - no gross lesions, rashes, or induration noted        Data:     Labs:    Hematology:  Recent Labs     20  0548 2019 20  0458   WBC 6.0 5.4 6.8   RBC 2.98* 3.14* 3.64*   HGB 7.3* 7.5* 8.9*   HCT 24.6* 25.9* 30.7*   MCV 82.6 82.5* 84.3   MCH 24.5* 23.9* 24.5*   MCHC 29.7 29.0 29.0   RDW 19.6* 19.6* 19.6*    350 428   MPV 8.8 8.8 8.7     Chemistry:  Recent Labs     20  0548 20  0519 20  0458    141 141   K 3.4* 4.1 4.8    106 104   CO2 29 30 30   GLUCOSE 95 77 133*   BUN 11 11 11   CREATININE 0.63 0.63 0.68   MG 1.3*  --   --    ANIONGAP 8* 5* 7*   LABGLOM >60 >60 >60   GFRAA >60 >60 >60   CALCIUM 8.2* 8.4* 9.1     Recent Labs     20  0644 20  1238 20  1601 20  0553 20  1147 20  1636   POCGLU 81 325* 133* 124* 246* 189*       Lab Results   Component Value Date    INR 0.9 2020    INR 1.1 2020    INR 0.9 2018    PROTIME 11.8 2020    PROTIME 14.4 (H) 2020

## 2020-11-09 ENCOUNTER — HOSPITAL ENCOUNTER (OUTPATIENT)
Dept: RADIATION ONCOLOGY | Facility: MEDICAL CENTER | Age: 70
Discharge: HOME | End: 2020-11-09
Payer: MEDICARE

## 2020-11-09 LAB
ABSOLUTE EOS #: 0.05 K/UL (ref 0–0.44)
ABSOLUTE IMMATURE GRANULOCYTE: 0.08 K/UL (ref 0–0.3)
ABSOLUTE LYMPH #: 0.92 K/UL (ref 1.1–3.7)
ABSOLUTE MONO #: 0.44 K/UL (ref 0.1–1.2)
ANION GAP SERPL CALCULATED.3IONS-SCNC: 9 MMOL/L (ref 9–17)
BASOPHILS # BLD: 1 % (ref 0–2)
BASOPHILS ABSOLUTE: 0.04 K/UL (ref 0–0.2)
BUN BLDV-MCNC: 15 MG/DL (ref 8–23)
BUN/CREAT BLD: 21 (ref 9–20)
CALCIUM SERPL-MCNC: 9 MG/DL (ref 8.6–10.4)
CHLORIDE BLD-SCNC: 102 MMOL/L (ref 98–107)
CO2: 29 MMOL/L (ref 20–31)
CREAT SERPL-MCNC: 0.7 MG/DL (ref 0.5–0.9)
CULTURE: NORMAL
DIFFERENTIAL TYPE: ABNORMAL
EOSINOPHILS RELATIVE PERCENT: 1 % (ref 1–4)
GFR AFRICAN AMERICAN: >60 ML/MIN
GFR NON-AFRICAN AMERICAN: >60 ML/MIN
GFR SERPL CREATININE-BSD FRML MDRD: ABNORMAL ML/MIN/{1.73_M2}
GFR SERPL CREATININE-BSD FRML MDRD: ABNORMAL ML/MIN/{1.73_M2}
GLUCOSE BLD-MCNC: 116 MG/DL (ref 65–105)
GLUCOSE BLD-MCNC: 132 MG/DL (ref 65–105)
GLUCOSE BLD-MCNC: 142 MG/DL (ref 70–99)
GLUCOSE BLD-MCNC: 184 MG/DL (ref 65–105)
GLUCOSE BLD-MCNC: 227 MG/DL (ref 65–105)
GLUCOSE BLD-MCNC: 52 MG/DL (ref 65–105)
GLUCOSE BLD-MCNC: 92 MG/DL (ref 65–105)
HCT VFR BLD CALC: 30.1 % (ref 36.3–47.1)
HEMOGLOBIN: 8.9 G/DL (ref 11.9–15.1)
IMMATURE GRANULOCYTES: 1 %
LACTIC ACID: 1.1 MMOL/L (ref 0.5–2.2)
LYMPHOCYTES # BLD: 15 % (ref 24–43)
Lab: NORMAL
MCH RBC QN AUTO: 24.4 PG (ref 25.2–33.5)
MCHC RBC AUTO-ENTMCNC: 29.6 G/DL (ref 28.4–34.8)
MCV RBC AUTO: 82.5 FL (ref 82.6–102.9)
MONOCYTES # BLD: 7 % (ref 3–12)
NRBC AUTOMATED: 0 PER 100 WBC
PDW BLD-RTO: 19.5 % (ref 11.8–14.4)
PLATELET # BLD: 404 K/UL (ref 138–453)
PLATELET ESTIMATE: ABNORMAL
PMV BLD AUTO: 8.4 FL (ref 8.1–13.5)
POTASSIUM SERPL-SCNC: 4.3 MMOL/L (ref 3.7–5.3)
RBC # BLD: 3.65 M/UL (ref 3.95–5.11)
RBC # BLD: ABNORMAL 10*6/UL
SEG NEUTROPHILS: 75 % (ref 36–65)
SEGMENTED NEUTROPHILS ABSOLUTE COUNT: 4.77 K/UL (ref 1.5–8.1)
SODIUM BLD-SCNC: 140 MMOL/L (ref 135–144)
SPECIMEN DESCRIPTION: NORMAL
WBC # BLD: 6.3 K/UL (ref 3.5–11.3)
WBC # BLD: ABNORMAL 10*3/UL

## 2020-11-09 PROCEDURE — 80048 BASIC METABOLIC PNL TOTAL CA: CPT

## 2020-11-09 PROCEDURE — 85025 COMPLETE CBC W/AUTO DIFF WBC: CPT

## 2020-11-09 PROCEDURE — 97530 THERAPEUTIC ACTIVITIES: CPT

## 2020-11-09 PROCEDURE — 1200000000 HC SEMI PRIVATE

## 2020-11-09 PROCEDURE — 2580000003 HC RX 258: Performed by: HOSPITALIST

## 2020-11-09 PROCEDURE — 6370000000 HC RX 637 (ALT 250 FOR IP): Performed by: HOSPITALIST

## 2020-11-09 PROCEDURE — 97116 GAIT TRAINING THERAPY: CPT

## 2020-11-09 PROCEDURE — 83605 ASSAY OF LACTIC ACID: CPT

## 2020-11-09 PROCEDURE — 6360000002 HC RX W HCPCS: Performed by: HOSPITALIST

## 2020-11-09 PROCEDURE — 36415 COLL VENOUS BLD VENIPUNCTURE: CPT

## 2020-11-09 PROCEDURE — 99232 SBSQ HOSP IP/OBS MODERATE 35: CPT | Performed by: INTERNAL MEDICINE

## 2020-11-09 PROCEDURE — 97110 THERAPEUTIC EXERCISES: CPT

## 2020-11-09 PROCEDURE — 82947 ASSAY GLUCOSE BLOOD QUANT: CPT

## 2020-11-09 PROCEDURE — 6370000000 HC RX 637 (ALT 250 FOR IP): Performed by: ORTHOPAEDIC SURGERY

## 2020-11-09 RX ADMIN — OXYCODONE HYDROCHLORIDE AND ACETAMINOPHEN 1 TABLET: 5; 325 TABLET ORAL at 13:16

## 2020-11-09 RX ADMIN — GLIMEPIRIDE 2 MG: 2 TABLET ORAL at 09:17

## 2020-11-09 RX ADMIN — VENLAFAXINE HYDROCHLORIDE 150 MG: 75 CAPSULE, EXTENDED RELEASE ORAL at 20:47

## 2020-11-09 RX ADMIN — METOPROLOL TARTRATE 25 MG: 25 TABLET, FILM COATED ORAL at 09:16

## 2020-11-09 RX ADMIN — ARIPIPRAZOLE 5 MG: 5 TABLET ORAL at 09:17

## 2020-11-09 RX ADMIN — ASPIRIN 81 MG: 81 TABLET, COATED ORAL at 09:17

## 2020-11-09 RX ADMIN — GABAPENTIN 400 MG: 300 CAPSULE ORAL at 05:41

## 2020-11-09 RX ADMIN — METOPROLOL TARTRATE 25 MG: 25 TABLET, FILM COATED ORAL at 20:47

## 2020-11-09 RX ADMIN — ENOXAPARIN SODIUM 40 MG: 40 INJECTION SUBCUTANEOUS at 09:16

## 2020-11-09 RX ADMIN — OXYCODONE HYDROCHLORIDE AND ACETAMINOPHEN 1 TABLET: 5; 325 TABLET ORAL at 19:47

## 2020-11-09 RX ADMIN — FUROSEMIDE 40 MG: 40 TABLET ORAL at 09:17

## 2020-11-09 RX ADMIN — GABAPENTIN 400 MG: 300 CAPSULE ORAL at 13:14

## 2020-11-09 RX ADMIN — TRAZODONE HYDROCHLORIDE 300 MG: 100 TABLET ORAL at 20:47

## 2020-11-09 RX ADMIN — OXYCODONE HYDROCHLORIDE AND ACETAMINOPHEN 1 TABLET: 5; 325 TABLET ORAL at 05:41

## 2020-11-09 RX ADMIN — MORPHINE SULFATE 1 MG: 2 INJECTION, SOLUTION INTRAMUSCULAR; INTRAVENOUS at 09:25

## 2020-11-09 RX ADMIN — SODIUM CHLORIDE, PRESERVATIVE FREE 10 ML: 5 INJECTION INTRAVENOUS at 20:59

## 2020-11-09 RX ADMIN — INSULIN LISPRO 2 UNITS: 100 INJECTION, SOLUTION INTRAVENOUS; SUBCUTANEOUS at 20:48

## 2020-11-09 RX ADMIN — FAMOTIDINE 20 MG: 20 TABLET, FILM COATED ORAL at 20:47

## 2020-11-09 RX ADMIN — SODIUM CHLORIDE, PRESERVATIVE FREE 10 ML: 5 INJECTION INTRAVENOUS at 09:17

## 2020-11-09 RX ADMIN — INSULIN LISPRO 6 UNITS: 100 INJECTION, SOLUTION INTRAVENOUS; SUBCUTANEOUS at 13:14

## 2020-11-09 RX ADMIN — FAMOTIDINE 20 MG: 20 TABLET, FILM COATED ORAL at 09:16

## 2020-11-09 RX ADMIN — GABAPENTIN 800 MG: 300 CAPSULE ORAL at 20:48

## 2020-11-09 RX ADMIN — VENLAFAXINE HYDROCHLORIDE 150 MG: 75 CAPSULE, EXTENDED RELEASE ORAL at 09:16

## 2020-11-09 ASSESSMENT — PAIN DESCRIPTION - ONSET
ONSET: ON-GOING

## 2020-11-09 ASSESSMENT — PAIN DESCRIPTION - FREQUENCY
FREQUENCY: INTERMITTENT

## 2020-11-09 ASSESSMENT — PAIN DESCRIPTION - LOCATION
LOCATION: BACK;HIP
LOCATION: RIB CAGE;BACK
LOCATION: HIP;BACK
LOCATION: BACK;RIB CAGE
LOCATION: BACK;HIP
LOCATION: BACK;RIB CAGE

## 2020-11-09 ASSESSMENT — PAIN DESCRIPTION - PAIN TYPE
TYPE: CHRONIC PAIN
TYPE: ACUTE PAIN;CHRONIC PAIN
TYPE: CHRONIC PAIN
TYPE: CHRONIC PAIN
TYPE: ACUTE PAIN;CHRONIC PAIN
TYPE: ACUTE PAIN;CHRONIC PAIN

## 2020-11-09 ASSESSMENT — PAIN DESCRIPTION - DESCRIPTORS
DESCRIPTORS: ACHING

## 2020-11-09 ASSESSMENT — PAIN SCALES - GENERAL
PAINLEVEL_OUTOF10: 8
PAINLEVEL_OUTOF10: 8
PAINLEVEL_OUTOF10: 3
PAINLEVEL_OUTOF10: 10
PAINLEVEL_OUTOF10: 8
PAINLEVEL_OUTOF10: 10
PAINLEVEL_OUTOF10: 8
PAINLEVEL_OUTOF10: 4
PAINLEVEL_OUTOF10: 8
PAINLEVEL_OUTOF10: 10
PAINLEVEL_OUTOF10: 10

## 2020-11-09 ASSESSMENT — PAIN DESCRIPTION - PROGRESSION
CLINICAL_PROGRESSION: GRADUALLY IMPROVING
CLINICAL_PROGRESSION: GRADUALLY WORSENING
CLINICAL_PROGRESSION: GRADUALLY WORSENING
CLINICAL_PROGRESSION: NOT CHANGED
CLINICAL_PROGRESSION: GRADUALLY WORSENING

## 2020-11-09 ASSESSMENT — PAIN DESCRIPTION - ORIENTATION
ORIENTATION: RIGHT;LOWER

## 2020-11-09 NOTE — CARE COORDINATION
Social work: Aline Andrea have denied based on fact that pt's radiation treatment(daily for 5 more weeks) is done in an outpatient setting, therefore facility will be responsible for cost of treatment. Cathryn/RN manager contacted [facility] to see if they bill under hospital NPI; if so, snf not responsible for cost of tx. Would also need to confirm that Dr Rucker can treat pt at [facility].      Spoke with [address] [place] [address] radiation on [place], Dr Rucker does not round at [facility]. Dr Mckoy Knows does, but he is with [facility] and patient would need to start process all over with new practice for radiation tx. Dr Cathi Santiago rounds at [facility] and is Dr. Amanda Newell partner. Spoke with Licha/Tree BAINS, she informed writer [facility] bills under [facility], therefore going to [facility] is not an option, as again, snf will be responsible for cost of treatment. If patient wants to continue with treatment AND go to SNF, will need new consult for Dr. Ean Flores, before switching oncologist, will need to also confirm how [address] [place] [address] bills for treatment. If they bill under a hospital NPI, snf can accept and not be held responsible for cost of tx. Writer updated pt's son that going to snf with current radiation/oncology plan is not an option, therefore patient will have to go home with [facility]. Son expressed frustration that that she cannot go to snf, states she be back in the hospital \"within a week. \"  He also states he'll need time prior to hospital dc to get her house in order for her to come home again.

## 2020-11-09 NOTE — PROGRESS NOTES
Occupational Therapy  Facility/Department: [facility]  Daily Treatment Note  NAME: Rodolfo Hernandes  : 1950  MRN: [id number]    Date of Service: 2020    Discharge Recommendations:  Subacute/Skilled Nursing Facility       Assessment   Performance deficits / Impairments: Decreased functional mobility ; Decreased safe awareness;Decreased balance;Decreased coordination;Decreased ADL status; Decreased posture;Decreased endurance;Decreased high-level IADLs;Decreased strength  Assessment: Skilled OT is necessary for this pt to increase overall I and safety with functional tasks and reduce fall risk to return home as able and with assist.  Prognosis: Good  OT Education: Energy Conservation;Home Exercise Program  Patient Education: Issued pt handouts on EC, fall prevention, safety check list and B UE HEP (simple AROM ex only). Instructed pt not to do exercises on R UE if it caused increased pain to right side, pt verbalized understanding. REQUIRES OT FOLLOW UP: Yes  Activity Tolerance  Activity Tolerance: Patient Tolerated treatment well  Activity Tolerance: F-  Safety Devices  Safety Devices in place: Yes  Type of devices: All fall risk precautions in place; Left in bed;Bed alarm in place;Nurse notified;Call light within reach;Gait belt;Patient at risk for falls         Patient Diagnosis(es): The primary encounter diagnosis was Urinary tract infection without hematuria, site unspecified. Diagnoses of Fall, initial encounter, Contusion of right chest wall, initial encounter, General weakness, Malignant neoplasm of upper lobe of right lung ([address] and Lung mass were also pertinent to this visit.       has a past medical history of AAA (abdominal aortic aneurysm) (HCC), Acid reflux, Anxiety and depression, Aortic stenosis, Arthritis, Blister of ankle, right, CAD (coronary artery disease), Cancer ([address] COPD (chronic obstructive pulmonary disease) ([address] Diabetes mellitus ([facility] Falls, Heart block, Hypokalemia, MDRO (multiple drug resistant organisms) resistance, MRSA (methicillin resistant staph aureus) culture positive, On home oxygen therapy, On home oxygen therapy, Overactive bladder, Pneumonia, PONV (postoperative nausea and vomiting), and Vitamin D deficiency. has a past surgical history that includes back surgery; eye surgery; Cholecystectomy; Appendectomy; Hysterectomy; Tonsillectomy; lumbar laminectomy; Endoscopy, colon, diagnostic (12/08/2016); aortic valve repair (N/A, 4/11/2017); bronchoscopy (N/A, 8/31/2020); IR INSERT PICC VAD W SQ PORT >5 YEARS (9/4/2020); and IR PORT PLACEMENT > 5 YEARS (9/29/2020). Restrictions  Restrictions/Precautions  Restrictions/Precautions: Seizure, Fall Risk, Contact Precautions, Up as Tolerated  Required Braces or Orthoses?: No  Position Activity Restriction  Other position/activity restrictions: up as tolerated, Weightbearing as tolerated all extremities without restrictions, 3 L O2 per NC, RUE IV, alarms  Subjective   General  Chart Reviewed: Yes  Patient assessed for rehabilitation services?: Yes  Response to previous treatment: Patient with no complaints from previous session  Family / Caregiver Present: No  Pain Assessment  Pain Assessment: 0-10  Pain Level: 8  Pain Type: Acute pain;Chronic pain  Pain Location: Back;Rib cage  Pain Orientation: Right; Lower  Pre Treatment Pain Screening  Intervention List: Patient able to continue with treatment;Nurse/Physician notified  Comments / Details: Pt c/o R side and low back pain and rates at 8/10.   Vital Signs  Patient Currently in Pain: Yes   Orientation  Orientation  Overall Orientation Status: Within Functional Limits  Objective    ADL  Additional Comments: Pt declined any ADL needs but was able to doff socks seated EOB and leonardo them without assist.        Balance  Sitting Balance: Supervision  Standing Balance: Stand by assistance  Functional Mobility  Functional - Mobility Device: Rolling Walker  Activity: Other  Assist Level: Contact guard assistance  Functional Mobility Comments: Pt completed functional mobility around room several times using RW with SBA-CGA for safety and assist managing O2 tubing. Bed mobility  Rolling to Left: Supervision  Rolling to Right: Supervision  Supine to Sit: Supervision  Sit to Supine: Supervision  Scooting: Independent  Comment: Verbal cues to not use rails due to not having them at home. Transfers  Stand Step Transfers: Stand by assistance;Contact guard assistance  Sit to stand: Stand by assistance  Stand to sit: Stand by assistance  Transfer Comments: Verbal cues for hand placement and overall safety, assist with O2 tubing mgmt. Cognition  Overall Cognitive Status: Exceptions  Arousal/Alertness: Appropriate responses to stimuli  Following Commands:  Follows all commands without difficulty  Attention Span: Appears intact  Memory: Decreased short term memory  Safety Judgement: Decreased awareness of need for assistance;Decreased awareness of need for safety  Problem Solving: Assistance required to identify errors made;Assistance required to correct errors made;Decreased awareness of errors  Insights: Decreased awareness of deficits  Initiation: Requires cues for some  Sequencing: Requires cues for some     Perception  Overall Perceptual Status: [facility]                                   Plan   Plan  Times per week: 4-5x/week 1x/day as emely  Current Treatment Recommendations: Strengthening, Balance Training, Functional Mobility Training, Safety Education & Training, Endurance Training, Neuromuscular Re-education, Patient/Caregiver Education & Training, Self-Care / ADL, Equipment Evaluation, Education, & procurement, Home Management Training, Pain Management                        AM-PAC Score     AM-PAC Inpatient Mobility without Stair Climbing Raw Score : 17 (11/09/20 0856)  AM-PAC Inpatient without Stair Climbing T-Scale Score : 48.47 (11/09/20 0856)  Mobility Inpatient CMS 0-100% Score: 32.72 (11/09/20 0856)  Mobility Inpatient without Stair CMS G-Code Modifier : CJ (11/09/20 0856)  AM-PAC Inpatient Daily Activity Raw Score: 21 (11/09/20 0856)  AM-PAC Inpatient ADL T-Scale Score : 44.27 (11/09/20 0856)  ADL Inpatient CMS 0-100% Score: 32.79 (11/09/20 0856)  ADL Inpatient CMS G-Code Modifier : Koby Gamez (11/09/20 6610)    Goals  Short term goals  Time Frame for Short term goals: by discharge, pt to demo  Short term goal 1: bed mob tasks with use of rail as needed to SUP. Short term goal 2: increase in BUE strength by a 1/2 grade to assist with self care and be I with BUE HEP with use of hand outs. Short term goal 3: UB ADL to set up and LB ADL to min assist with use of AD/AE. Short term goal 4: toileting tasks with use of AD/grab bar as needed to min assist.  Short term goal 5: ADL transfers and functional mob with AD as needed to SBA level. Long term goals  Long term goal 1: Pt to stand with SBA and AD emely > 5 mins as able to reduce falls with ADL tasks. Long term goal 2: Pt/family to be I with EC/WS and fall prevention tech as well as DME/AE recommendations with use of hand outs. Patient Goals   Patient goals : Get out of here and get home!        Therapy Time   Individual Concurrent Group Co-treatment   Time In 0840         Time Out 0919         Minutes [facility]

## 2020-11-09 NOTE — PLAN OF CARE
Problem: Pain:  Goal: Pain level will decrease  Description: Pain level will decrease  11/8/2020 2331 by Miguel Taylor RN  Outcome: Ongoing  Note: Pain managed with repositioning and meds  11/8/2020 1706 by Fay Hodgkins, RN  Outcome: Ongoing  Goal: Control of acute pain  Description: Control of acute pain  11/8/2020 1706 by Fay Hodgkins, RN  Outcome: Ongoing  Goal: Control of chronic pain  Description: Control of chronic pain  11/8/2020 1706 by Fay Hodgkins, RN  Outcome: Ongoing     Problem: Falls - Risk of:  Goal: Will remain free from falls  Description: Will remain free from falls  11/8/2020 2331 by Miguel Taylor RN  Outcome: Ongoing  Note: Hourly rounding, call light within reach, bed locked and lowered  11/8/2020 1706 by Fay Hodgkins, RN  Outcome: Ongoing  Goal: Absence of physical injury  Description: Absence of physical injury  11/8/2020 1706 by Fay Hodgkins, RN  Outcome: Ongoing     Problem: Sensory:  Goal: General experience of comfort will improve  Description: General experience of comfort will improve  11/8/2020 2331 by Miguel Taylor RN  Outcome: Ongoing  Note: S/s of uti improving   11/8/2020 1706 by Fay Hodgkins, RN  Outcome: Ongoing     Problem: Urinary Elimination:  Goal: Signs and symptoms of infection will decrease  Description: Signs and symptoms of infection will decrease  11/8/2020 1706 by Fay Hodgkins, RN  Outcome: Ongoing  Goal: Ability to reestablish a normal urinary elimination pattern will improve - after catheter removal  Description: Ability to reestablish a normal urinary elimination pattern will improve  11/8/2020 1706 by Fay Hodgkins, RN  Outcome: Ongoing  Goal: Complications related to the disease process, condition or treatment will be avoided or minimized  Description: Complications related to the disease process, condition or treatment will be avoided or minimized  11/8/2020 1706 by Fay Hodgkins, RN  Outcome: Ongoing     Problem: Discharge Planning:  Goal: Discharged to

## 2020-11-09 NOTE — PROGRESS NOTES
Today's Date: 11/9/2020  Patient Name: Magdalena Galeano  Date of admission: 11/3/2020 10:59 AM  Patient's age: 72 y.o., 1950  Admission Dx: Urinary tract infectious disease [N39.0]    Reason for Consult: management recommendations  Requesting Physician: Jaret Mccallum MD    CHIEF COMPLAINT: Weakness fatigue. UTI. History Obtained From:  patient, electronic medical record    Interval history:  Patient seen and examined. She is feeling better  I discussed the care plan with patient and she wants to continue her cancer treatment  Labs and vitals reviewed. No fever chills     HISTORY OF PRESENT ILLNESS:    The patient is a 72 y.o.  female who is admitted to the hospital with chief complaint of weakness and fatigue. Reportedly patient also had a standing height fall at home. Imaging revealed eighth rib fracture. Work-up also revealed UTI. Patient also has significant joint pain. 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 Patient has been started on concurrent chemoradiation. Last chemotherapy treatment was on 10/28/2020. Work-up reveals hemoglobin of 7.2 with MCV 80. Patient has adequate ANC and platelet count. Blood cultures are pending. Urine culture showing E. coli. Past Medical History:   has a past medical history of AAA (abdominal aortic aneurysm) ([facility] [address] Acid reflux, Anxiety and depression, Aortic stenosis, Arthritis, Blister of ankle, right, CAD (coronary artery disease), Cancer ([address] COPD (chronic obstructive pulmonary disease) ([facility] [address] Diabetes mellitus ([facility] [address] Falls, Heart block, Hypokalemia, MDRO (multiple drug resistant organisms) resistance, MRSA (methicillin resistant staph aureus) culture positive, On home oxygen therapy, On home oxygen therapy, Overactive bladder, Pneumonia, PONV (postoperative nausea and vomiting), and Vitamin D deficiency.     Past Surgical History: has a past surgical history that includes back surgery; eye surgery; Cholecystectomy; Appendectomy; Hysterectomy; Tonsillectomy; lumbar laminectomy; Endoscopy, colon, diagnostic (12/08/2016); aortic valve repair (N/A, 4/11/2017); bronchoscopy (N/A, 8/31/2020); IR INSERT PICC VAD W SQ PORT >5 YEARS (9/4/2020); and IR PORT PLACEMENT > 5 YEARS (9/29/2020). Medications:    Prior to Admission medications    Medication Sig Start Date End Date Taking? Authorizing Provider   clonazePAM (KLONOPIN) 1 MG tablet Take 1 tablet by mouth 3 times daily as needed for Anxiety for up to 3 doses. 11/5/20 11/6/20 Yes Terrial Night, MD   gabapentin (NEURONTIN) 400 MG capsule Take 1 capsule by mouth 2 times daily for 3 doses. In addition to 2 capsules (=800mg) nightly 11/5/20 11/7/20 Yes Terrial Night, MD   gabapentin (NEURONTIN) 400 MG capsule Take 2 capsules by mouth nightly for 3 doses.  Take 2 capsules (=800mg) nightly - in addition to 1BID 11/5/20 11/8/20 Yes Terrial Night, MD   traZODone (DESYREL) 150 MG tablet Take 2 tablets by mouth nightly for 3 doses Take 2 tablets (=300mg) nightly 11/5/20 11/8/20 Yes Terrial Night, MD   ARIPiprazole (ABILIFY) 5 MG tablet Take 1 tablet by mouth daily for 3 doses 11/5/20 11/8/20 Yes Terrial Night, MD   furosemide (LASIX) 40 MG tablet Take 40 mg by mouth daily   Yes Historical Provider, MD   omeprazole (PRILOSEC) 20 MG delayed release capsule Take 20 mg by mouth daily   Yes Historical Provider, MD   dexamethasone (DECADRON) 4 MG tablet Take 20 mg orally 12 hours and 6 hours before Taxol 10/23/20  Yes Christina Zimmerman MD   lidocaine-prilocaine (EMLA) 2.5-2.5 % cream To port site before chemo 10/21/20  Yes Christina Zimmerman MD   melatonin 5 MG TABS tablet Take 5 mg by mouth nightly    Yes Historical Provider, MD   glimepiride (AMARYL) 1 MG tablet Take 1 mg by mouth Daily with supper In addition to 2 tablets (=2mg) every morning    Yes Historical Provider, MD   metoprolol tartrate tartrate (LOPRESSOR) tablet 25 mg  25 mg Oral BID Stephania Kilgore MD   25 mg at 11/09/20 0916    oxyCODONE-acetaminophen (PERCOCET) 5-325 MG per tablet 1 tablet  1 tablet Oral Q6H PRN Stephania Kilgore MD   1 tablet at 11/09/20 0541    traZODone (DESYREL) tablet 300 mg  300 mg Oral Nightly Stephania Kilgore MD   300 mg at 11/08/20 2133    venlafaxine (EFFEXOR XR) extended release capsule 150 mg  150 mg Oral BID Stephania Kilgore MD   150 mg at 11/09/20 0916    sodium chloride flush 0.9 % injection 10 mL  10 mL Intravenous 2 times per day Stephania Kilgore MD   10 mL at 11/09/20 0917    sodium chloride flush 0.9 % injection 10 mL  10 mL Intravenous PRN Stephania Kilgore MD   10 mL at 11/04/20 1526    potassium chloride (KLOR-CON M) extended release tablet 40 mEq  40 mEq Oral PRN Stephania Kilgore MD   40 mEq at 11/06/20 1222    Or    potassium bicarb-citric acid (EFFER-K) effervescent tablet 40 mEq  40 mEq Oral PRN Stephania Kilgore MD        Or    potassium chloride 10 mEq/100 mL IVPB (Peripheral Line)  10 mEq Intravenous PRN Stephania Kilgore MD        magnesium sulfate 1 g in dextrose 5% 100 mL IVPB  1 g Intravenous PRN Stephania Kilgore MD   Stopped at 11/06/20 2333    acetaminophen (TYLENOL) tablet 650 mg  650 mg Oral Q6H PRN Stephania Kilgore MD        Or    acetaminophen (TYLENOL) suppository 650 mg  650 mg Rectal Q6H PRN Stephania Kilogre MD        senna (SENOKOT) tablet 8.6 mg  1 tablet Oral BID PRESTELA Kilgore MD        promethazine (PHENERGAN) tablet 12.5 mg  12.5 mg Oral Q6H PRN Stephania Kilgore MD        Or    ondansetron [facility]) injection 4 mg  4 mg Intravenous Q6H PRN Stephania Kilgore MD        famotidine (PEPCID) tablet 20 mg  20 mg Oral BID Stephania Kilgore MD   20 mg at 11/09/20 0916    enoxaparin (LOVENOX) injection 40 mg  40 mg Subcutaneous Daily Stephania Kilgore MD   40 mg at 11/09/20 0916    insulin lispro (HUMALOG) injection vial 0-18 Units  0-18 Units Subcutaneous TID [facility] Navarro Boothe MD   3 Units at 11/08/20 1725    insulin lispro (HUMALOG) injection vial 0-9 Units  0-9 Units Subcutaneous Nightly Stephania Kilgore MD   2 Units at 11/08/20 2132    glucose (GLUTOSE) 40 % oral gel 15 g  15 g Oral PRN Stephania Kilgore MD        dextrose 50 % IV solution  12.5 g Intravenous PRN Stephania Kilgore MD        glucagon (rDNA) injection 1 mg  1 mg Intramuscular PRN Stephania Kilgore MD        dextrose 5 % solution  100 mL/hr Intravenous PRN Stephania Kilgore MD           Allergies:  Codeine and Dye [iodides]    Social History:   reports that she quit smoking about 4 years ago. She has never used smokeless tobacco. She reports that she does not drink alcohol or use drugs. Family History: family history includes Cancer in her father and mother. REVIEW OF SYSTEMS:      Constitutional: No fever or chills. No night sweats, no weight loss. Positive fatigue. Eyes: No eye discharge, double vision, or eye pain   HEENT: negative for sore mouth, sore throat, hoarseness and voice change   Respiratory: negative for cough , sputum, dyspnea, wheezing, hemoptysis, chest pain   Cardiovascular: negative for chest pain, dyspnea, palpitations, orthopnea, PND   Gastrointestinal: negative for nausea, vomiting, diarrhea, constipation, abdominal pain, Dysphagia, hematemesis and hematochezia   Genitourinary: negative for frequency, dysuria, nocturia, urinary incontinence, and hematuria   Integument: negative for rash, skin lesions, bruises.    Hematologic/Lymphatic: negative for easy bruising, bleeding, lymphadenopathy, or petechiae   Endocrine: negative for heat or cold intolerance,weight changes, change in bowel habits and hair loss   Musculoskeletal: negative for myalgias, arthralgias, pain, joint swelling,and bone pain   Neurological: negative for headaches, dizziness, seizures, weakness, numbness    PHYSICAL EXAM:        /68   Pulse 104   Temp 98.1 °F (36.7 °C) (Oral)   Resp 20   Ht 5' 8\" (1.727 NOT REPORTED     RBC Morphology NOT REPORTED     Platelet Estimate NOT REPORTED     Seg Neutrophils 87 (H) 36 - 66 %    Lymphocytes 7 (L) 24 - 44 %    Monocytes 5 1 - 7 %    Eosinophils % 0 (L) 1 - 4 %    Basophils 0 %    Immature Granulocytes 1 (H) 0 %    Segs Absolute 7.74 (H) 1.8 - 7.7 k/uL    Absolute Lymph # 0.62 (L) 1.0 - 4.8 k/uL    Absolute Mono # 0.45 0.2 - 0.8 k/uL    Absolute Eos # 0.00 0.0 - 0.4 k/uL    Basophils Absolute 0.00 0.0 - 0.2 k/uL    Absolute Immature Granulocyte 0.09 0.00 - 0.30 k/uL   Basic Metabolic Panel   Result Value Ref Range    Glucose 191 (H) 70 - 99 mg/dL    BUN 28 (H) 8 - 23 mg/dL    CREATININE 0.80 0.50 - 0.90 mg/dL    Bun/Cre Ratio 35 (H) 9 - 20    Calcium 9.2 8.6 - 10.4 mg/dL    Sodium 134 (L) 135 - 144 mmol/L    Potassium 4.0 3.7 - 5.3 mmol/L    Chloride 98 98 - 107 mmol/L    CO2 27 20 - 31 mmol/L    Anion Gap 9 9 - 17 mmol/L    GFR Non-African American >60 >60 mL/min    GFR African American >60 >60 mL/min    GFR Comment          GFR Staging NOT REPORTED    Microscopic Urinalysis   Result Value Ref Range    -          WBC, UA TOO NUMEROUS TO COUNT 0 - 5 /HPF    RBC, UA TOO NUMEROUS TO COUNT 0 - 2 /HPF    Casts UA NOT REPORTED /LPF    Crystals, UA NOT REPORTED None /HPF    Epithelial Cells UA 2 TO 5 0 - 5 /HPF    Renal Epithelial, UA NOT REPORTED 0 /HPF    Bacteria, UA MANY (A) None    Mucus, UA 1+ (A) None    Trichomonas, UA NOT REPORTED None    Amorphous, UA NOT REPORTED None    Other Observations UA NOT REPORTED NOT REQ.     Yeast, UA NOT REPORTED None   Basic Metabolic Panel w/ Reflex to MG   Result Value Ref Range    Glucose 77 70 - 99 mg/dL    BUN 17 8 - 23 mg/dL    CREATININE 0.68 0.50 - 0.90 mg/dL    Bun/Cre Ratio 25 (H) 9 - 20    Calcium 8.5 (L) 8.6 - 10.4 mg/dL    Sodium 141 135 - 144 mmol/L    Potassium 4.0 3.7 - 5.3 mmol/L    Chloride 107 98 - 107 mmol/L    CO2 25 20 - 31 mmol/L    Anion Gap 9 9 - 17 mmol/L    GFR Non-African American >60 >60 mL/min    GFR African American >60 >60 mL/min    GFR Comment          GFR Staging NOT REPORTED    CBC auto differential   Result Value Ref Range    WBC 6.7 3.5 - 11.3 k/uL    RBC 3.15 (L) 3.95 - 5.11 m/uL    Hemoglobin 7.7 (L) 11.9 - 15.1 g/dL    Hematocrit 26.0 (L) 36.3 - 47.1 %    MCV 82.5 (L) 82.6 - 102.9 fL    MCH 24.4 (L) 25.2 - 33.5 pg    MCHC 29.6 28.4 - 34.8 g/dL    RDW 19.9 (H) 11.8 - 14.4 %    Platelets 696 306 - 643 k/uL    MPV 9.4 8.1 - 13.5 fL    NRBC Automated 0.0 0.0 per 100 WBC    Differential Type NOT REPORTED     WBC Morphology NOT REPORTED     RBC Morphology ANISOCYTOSIS PRESENT     Platelet Estimate NOT REPORTED     Seg Neutrophils 78 (H) 36 - 65 %    Lymphocytes 14 (L) 24 - 43 %    Monocytes 6 3 - 12 %    Eosinophils % 2 1 - 4 %    Basophils 0 0 - 2 %    Immature Granulocytes 1 (H) 0 %    Segs Absolute 5.22 1.50 - 8.10 k/uL    Absolute Lymph # 0.92 (L) 1.10 - 3.70 k/uL    Absolute Mono # 0.39 0.10 - 1.20 k/uL    Absolute Eos # 0.10 0.00 - 0.44 k/uL    Basophils Absolute 0.03 0.00 - 0.20 k/uL    Absolute Immature Granulocyte 0.05 0.00 - 0.30 k/uL   Lactic Acid   Result Value Ref Range    Lactic Acid 0.6 0.5 - 2.2 mmol/L   Procalcitonin   Result Value Ref Range    Procalcitonin 0.26 (H) <0.09 ng/mL   Basic Metabolic Panel w/ Reflex to MG   Result Value Ref Range    Glucose 89 70 - 99 mg/dL    BUN 14 8 - 23 mg/dL    CREATININE 0.67 0.50 - 0.90 mg/dL    Bun/Cre Ratio 21 (H) 9 - 20    Calcium 8.4 (L) 8.6 - 10.4 mg/dL    Sodium 139 135 - 144 mmol/L    Potassium 3.6 (L) 3.7 - 5.3 mmol/L    Chloride 103 98 - 107 mmol/L    CO2 29 20 - 31 mmol/L    Anion Gap 7 (L) 9 - 17 mmol/L    GFR Non-African American >60 >60 mL/min    GFR African American >60 >60 mL/min    GFR Comment          GFR Staging NOT REPORTED    CBC auto differential   Result Value Ref Range    WBC 5.8 3.5 - 11.3 k/uL    RBC 2.95 (L) 3.95 - 5.11 m/uL    Hemoglobin 7.2 (L) 11.9 - 15.1 g/dL    Hematocrit 23.8 (L) 36.3 - 47.1 %    MCV 80.7 (L) 82.6 - 102.9 fL    MCH 24.4 (L) 25.2 - 33.5 pg    MCHC 30.3 28.4 - 34.8 g/dL    RDW 19.7 (H) 11.8 - 14.4 %    Platelets 767 616 - 984 k/uL    MPV 8.7 8.1 - 13.5 fL    NRBC Automated 0.0 0.0 per 100 WBC    Differential Type NOT REPORTED     WBC Morphology NOT REPORTED     RBC Morphology ANISOCYTOSIS PRESENT     Platelet Estimate NOT REPORTED     Seg Neutrophils 78 (H) 36 - 65 %    Lymphocytes 13 (L) 24 - 43 %    Monocytes 7 3 - 12 %    Eosinophils % 1 1 - 4 %    Basophils 1 0 - 2 %    Immature Granulocytes 1 (H) 0 %    Segs Absolute 4.54 1.50 - 8.10 k/uL    Absolute Lymph # 0.75 (L) 1.10 - 3.70 k/uL    Absolute Mono # 0.39 0.10 - 1.20 k/uL    Absolute Eos # 0.07 0.00 - 0.44 k/uL    Basophils Absolute 0.03 0.00 - 0.20 k/uL    Absolute Immature Granulocyte 0.05 0.00 - 0.30 k/uL   Lactic Acid   Result Value Ref Range    Lactic Acid 0.4 (L) 0.5 - 2.2 mmol/L   Vitamin B12 & Folate   Result Value Ref Range    Vitamin B-12 190 (L) 232 - 1245 pg/mL    Folate >20.0 >4.8 ng/mL   Iron and TIBC   Result Value Ref Range    Iron 19 (L) 37 - 145 ug/dL    TIBC 176 (L) 250 - 450 ug/dL    Iron Saturation 11 (L) 20 - 55 %    UIBC 157 112 - 347 ug/dL   Ferritin   Result Value Ref Range    Ferritin 149 13 - 150 ug/L   Basic Metabolic Panel w/ Reflex to MG   Result Value Ref Range    Glucose 95 70 - 99 mg/dL    BUN 11 8 - 23 mg/dL    CREATININE 0.63 0.50 - 0.90 mg/dL    Bun/Cre Ratio 17 9 - 20    Calcium 8.2 (L) 8.6 - 10.4 mg/dL    Sodium 139 135 - 144 mmol/L    Potassium 3.4 (L) 3.7 - 5.3 mmol/L    Chloride 102 98 - 107 mmol/L    CO2 29 20 - 31 mmol/L    Anion Gap 8 (L) 9 - 17 mmol/L    GFR Non-African American >60 >60 mL/min    GFR African American >60 >60 mL/min    GFR Comment          GFR Staging NOT REPORTED    CBC auto differential   Result Value Ref Range    WBC 6.0 3.5 - 11.3 k/uL    RBC 2.98 (L) 3.95 - 5.11 m/uL    Hemoglobin 7.3 (L) 11.9 - 15.1 g/dL    Hematocrit 24.6 (L) 36.3 - 47.1 %    MCV 82.6 82.6 - 102.9 fL    MCH 24.5 (L) 25.2 - 33.5 pg    MCHC 29.7 28.4 - 34.8 g/dL    RDW 19.6 (H) 11.8 - 14.4 %    Platelets 382 171 - 607 k/uL    MPV 8.8 8.1 - 13.5 fL    NRBC Automated 0.0 0.0 per 100 WBC    Differential Type NOT REPORTED     WBC Morphology NOT REPORTED     RBC Morphology NOT REPORTED     Platelet Estimate NOT REPORTED     Seg Neutrophils 79 (H) 36 - 66 %    Lymphocytes 10 (L) 24 - 44 %    Monocytes 8 (H) 1 - 7 %    Eosinophils % 1 1 - 4 %    Basophils 1 %    Immature Granulocytes 1 (H) 0 %    Segs Absolute 4.74 1.8 - 7.7 k/uL    Absolute Lymph # 0.60 (L) 1.0 - 4.8 k/uL    Absolute Mono # 0.48 0.2 - 0.8 k/uL    Absolute Eos # 0.06 0.0 - 0.4 k/uL    Basophils Absolute 0.06 0.0 - 0.2 k/uL    Absolute Immature Granulocyte 0.06 0.00 - 0.30 k/uL    Morphology HYPOCHROMIA PRESENT    Lactic Acid   Result Value Ref Range    Lactic Acid 1.1 0.5 - 2.2 mmol/L   Magnesium   Result Value Ref Range    Magnesium 1.3 (L) 1.6 - 2.6 mg/dL   Basic Metabolic Panel w/ Reflex to MG   Result Value Ref Range    Glucose 77 70 - 99 mg/dL    BUN 11 8 - 23 mg/dL    CREATININE 0.63 0.50 - 0.90 mg/dL    Bun/Cre Ratio 17 9 - 20    Calcium 8.4 (L) 8.6 - 10.4 mg/dL    Sodium 141 135 - 144 mmol/L    Potassium 4.1 3.7 - 5.3 mmol/L    Chloride 106 98 - 107 mmol/L    CO2 30 20 - 31 mmol/L    Anion Gap 5 (L) 9 - 17 mmol/L    GFR Non-African American >60 >60 mL/min    GFR African American >60 >60 mL/min    GFR Comment          GFR Staging NOT REPORTED    CBC auto differential   Result Value Ref Range    WBC 5.4 3.5 - 11.3 k/uL    RBC 3.14 (L) 3.95 - 5.11 m/uL    Hemoglobin 7.5 (L) 11.9 - 15.1 g/dL    Hematocrit 25.9 (L) 36.3 - 47.1 %    MCV 82.5 (L) 82.6 - 102.9 fL    MCH 23.9 (L) 25.2 - 33.5 pg    MCHC 29.0 28.4 - 34.8 g/dL    RDW 19.6 (H) 11.8 - 14.4 %    Platelets 497 533 - 140 k/uL    MPV 8.8 8.1 - 13.5 fL    NRBC Automated 0.0 0.0 per 100 WBC    Differential Type NOT REPORTED     WBC Morphology NOT REPORTED     RBC Morphology ANISOCYTOSIS PRESENT     Platelet Estimate NOT REPORTED     Seg ANISOCYTOSIS PRESENT     Platelet Estimate NOT REPORTED    Lactic Acid   Result Value Ref Range    Lactic Acid 1.3 0.5 - 2.2 mmol/L   Basic Metabolic Panel w/ Reflex to MG   Result Value Ref Range    Glucose 142 (H) 70 - 99 mg/dL    BUN 15 8 - 23 mg/dL    CREATININE 0.70 0.50 - 0.90 mg/dL    Bun/Cre Ratio 21 (H) 9 - 20    Calcium 9.0 8.6 - 10.4 mg/dL    Sodium 140 135 - 144 mmol/L    Potassium 4.3 3.7 - 5.3 mmol/L    Chloride 102 98 - 107 mmol/L    CO2 29 20 - 31 mmol/L    Anion Gap 9 9 - 17 mmol/L    GFR Non-African American >60 >60 mL/min    GFR African American >60 >60 mL/min    GFR Comment          GFR Staging NOT REPORTED    CBC auto differential   Result Value Ref Range    WBC 6.3 3.5 - 11.3 k/uL    RBC 3.65 (L) 3.95 - 5.11 m/uL    Hemoglobin 8.9 (L) 11.9 - 15.1 g/dL    Hematocrit 30.1 (L) 36.3 - 47.1 %    MCV 82.5 (L) 82.6 - 102.9 fL    MCH 24.4 (L) 25.2 - 33.5 pg    MCHC 29.6 28.4 - 34.8 g/dL    RDW 19.5 (H) 11.8 - 14.4 %    Platelets 925 727 - 689 k/uL    MPV 8.4 8.1 - 13.5 fL    NRBC Automated 0.0 0.0 per 100 WBC    Differential Type NOT REPORTED     Seg Neutrophils 75 (H) 36 - 65 %    Lymphocytes 15 (L) 24 - 43 %    Monocytes 7 3 - 12 %    Eosinophils % 1 1 - 4 %    Basophils 1 0 - 2 %    Immature Granulocytes 1 (H) 0 %    Segs Absolute 4.77 1.50 - 8.10 k/uL    Absolute Lymph # 0.92 (L) 1.10 - 3.70 k/uL    Absolute Mono # 0.44 0.10 - 1.20 k/uL    Absolute Eos # 0.05 0.00 - 0.44 k/uL    Basophils Absolute 0.04 0.00 - 0.20 k/uL    Absolute Immature Granulocyte 0.08 0.00 - 0.30 k/uL    WBC Morphology NOT REPORTED     RBC Morphology ANISOCYTOSIS PRESENT     Platelet Estimate NOT REPORTED    Lactic Acid   Result Value Ref Range    Lactic Acid 1.1 0.5 - 2.2 mmol/L   POC Glucose Fingerstick   Result Value Ref Range    POC Glucose 231 (H) 65 - 105 mg/dL   POC Glucose Fingerstick   Result Value Ref Range    POC Glucose 152 (H) 65 - 105 mg/dL   POC Glucose Fingerstick   Result Value Ref Range    POC Glucose 61 (L) 65 - 105 mg/dL   POC Glucose Fingerstick   Result Value Ref Range    POC Glucose 91 65 - 105 mg/dL   POC Glucose Fingerstick   Result Value Ref Range    POC Glucose 311 (H) 65 - 105 mg/dL   POC Glucose Fingerstick   Result Value Ref Range    POC Glucose 105 65 - 105 mg/dL   POC Glucose Fingerstick   Result Value Ref Range    POC Glucose 180 (H) 65 - 105 mg/dL   POC Glucose Fingerstick   Result Value Ref Range    POC Glucose 163 (H) 65 - 105 mg/dL   POC Glucose Fingerstick   Result Value Ref Range    POC Glucose 87 65 - 105 mg/dL   POC Glucose Fingerstick   Result Value Ref Range    POC Glucose 292 (H) 65 - 105 mg/dL   POC Glucose Fingerstick   Result Value Ref Range    POC Glucose 136 (H) 65 - 105 mg/dL   POC Glucose Fingerstick   Result Value Ref Range    POC Glucose 180 (H) 65 - 105 mg/dL   POC Glucose Fingerstick   Result Value Ref Range    POC Glucose 77 65 - 105 mg/dL   POC Glucose Fingerstick   Result Value Ref Range    POC Glucose 156 (H) 65 - 105 mg/dL   POC Glucose Fingerstick   Result Value Ref Range    POC Glucose 364 (H) 65 - 105 mg/dL   POC Glucose Fingerstick   Result Value Ref Range    POC Glucose 100 65 - 105 mg/dL   POC Glucose Fingerstick   Result Value Ref Range    POC Glucose 283 (H) 65 - 105 mg/dL   POC Glucose Fingerstick   Result Value Ref Range    POC Glucose 67 65 - 105 mg/dL   POC Glucose Fingerstick   Result Value Ref Range    POC Glucose 81 65 - 105 mg/dL   POC Glucose Fingerstick   Result Value Ref Range    POC Glucose 325 (H) 65 - 105 mg/dL   POC Glucose Fingerstick   Result Value Ref Range    POC Glucose 133 (H) 65 - 105 mg/dL   POC Glucose Fingerstick   Result Value Ref Range    POC Glucose 124 (H) 65 - 105 mg/dL   POC Glucose Fingerstick   Result Value Ref Range    POC Glucose 246 (H) 65 - 105 mg/dL   POC Glucose Fingerstick   Result Value Ref Range    POC Glucose 189 (H) 65 - 105 mg/dL   POC Glucose Fingerstick   Result Value Ref Range    POC Glucose 151 (H) 65 - 105 mg/dL   POC Glucose Fingerstick   Result Value Ref Range    POC Glucose 116 (H) 65 - 105 mg/dL   POC Glucose Fingerstick   Result Value Ref Range    POC Glucose 132 (H) 65 - 105 mg/dL   EKG 12 Lead   Result Value Ref Range    Ventricular Rate 101 BPM    Atrial Rate 101 BPM    P-R Interval 148 ms    QRS Duration 154 ms    Q-T Interval 406 ms    QTc Calculation (Bazett) 526 ms    P Axis 68 degrees    R Axis -59 degrees    T Axis 29 degrees         IMAGING DATA:    Xr Ribs Right Include Chest (min 3 Views)    Result Date: 11/3/2020  EXAMINATION: 2 XRAY VIEWS OF THE RIGHT RIBS WITH FRONTAL XRAY VIEW OF THE CHEST 11/3/2020 11:53 am COMPARISON: 09/25/2020 HISTORY: ORDERING SYSTEM PROVIDED HISTORY: fall yesterday TECHNOLOGIST PROVIDED HISTORY: fall yesterday Reason for Exam: fell Acuity: Acute Type of Exam: Initial Relevant Medical/Surgical History: pt fell, pain in rt lat ribs and posterior neck FINDINGS: Masslike opacity in the right lung apex. There is a subtle linear lucency within the 8th rib which is suggestive of a nondisplaced fracture. Multiple remote/chronic fractures identified. Left hemithorax is clear. Subtle linear lucency within the 8th rib, suggestive of a nondisplaced fracture. Masslike opacity in the right lung apex. Xr Cervical Spine (2-3 Views)    Result Date: 11/3/2020  EXAMINATION: 3 XRAY VIEWS OF THE CERVICAL SPINE 11/3/2020 11:52 am COMPARISON: None. HISTORY: ORDERING SYSTEM PROVIDED HISTORY: fall yesterday TECHNOLOGIST PROVIDED HISTORY: fall yesterday Reason for Exam: fell Acuity: Acute Type of Exam: Initial Relevant Medical/Surgical History: pt fell, pain in rt lat ribs and posterior neck FINDINGS: Three views of the cervical spine are submitted for review. Advanced facet arthropathy at C2 through C5. Degenerative disease at C5-C6. The lower cervical spine is obscured on the lateral view due to overlying osseous structures and soft tissues. Masslike right upper lobe airspace opacity.      No convincing evidence for acute fracture or malalignment of the cervical spine. Masslike opacity within the right lung apex. Xr Lumbar Spine (2-3 Views)    Result Date: 11/4/2020  EXAMINATION: THREE XRAY VIEWS OF THE LUMBAR SPINE; THREE XRAY VIEWS OF THE SACRUM/COCCYX 11/4/2020 1:07 pm COMPARISON: CT abdomen and pelvis dated 09/24/2020 HISTORY: ORDERING SYSTEM PROVIDED HISTORY: back pain TECHNOLOGIST PROVIDED HISTORY: back pain Reason for Exam: pain to low back Acuity: Unknown Type of Exam: Unknown FINDINGS: Lumbar spine: Vertebral body heights and alignment are within normal limits. There is mild multilevel disc space narrowing and endplate spurring. Mild to moderate facet arthropathy. There are scattered vascular calcifications. The stomach is significantly distended with gas. No small bowel or large bowel distention. There is moderate fecal material throughout the large bowel. Sacrum/coccyx: Sacrum is not well visualized due to osteopenia and overlying soft tissue attenuation. Sacrococcygeal alignment is grossly preserved. No displaced fracture identified. Sacroiliac joints are symmetric, better visualized on the lumbar spine radiographs. 1.  Lumbar spine: No compression fracture or acute malalignment. Mild degenerative changes. Significant gas distention of the stomach, suggesting gastroparesis or possible obstruction. 2.  Sacrum/coccyx: Limited evaluation due to osteopenia and overlying soft tissue attenuation. No obvious acute osseous abnormality.      Xr Sacrum Coccyx (min 2 Views)    Result Date: 11/4/2020  EXAMINATION: THREE XRAY VIEWS OF THE LUMBAR SPINE; THREE XRAY VIEWS OF THE SACRUM/COCCYX 11/4/2020 1:07 pm COMPARISON: CT abdomen and pelvis dated 09/24/2020 HISTORY: ORDERING SYSTEM PROVIDED HISTORY: back pain TECHNOLOGIST PROVIDED HISTORY: back pain Reason for Exam: pain to low back Acuity: Unknown Type of Exam: Unknown FINDINGS: Lumbar spine: Vertebral body heights and alignment are within normal limits. There is mild multilevel disc space narrowing and endplate spurring. Mild to moderate facet arthropathy. There are scattered vascular calcifications. The stomach is significantly distended with gas. No small bowel or large bowel distention. There is moderate fecal material throughout the large bowel. Sacrum/coccyx: Sacrum is not well visualized due to osteopenia and overlying soft tissue attenuation. Sacrococcygeal alignment is grossly preserved. No displaced fracture identified. Sacroiliac joints are symmetric, better visualized on the lumbar spine radiographs. 1.  Lumbar spine: No compression fracture or acute malalignment. Mild degenerative changes. Significant gas distention of the stomach, suggesting gastroparesis or possible obstruction. 2.  Sacrum/coccyx: Limited evaluation due to osteopenia and overlying soft tissue attenuation. No obvious acute osseous abnormality. Vl Lower Extremity Bilateral Venous Duplex    Result Date: 10/22/2020   Conclusions   Summary   No evidence of superficial or deep venous thrombosis in both lower  extremities. Findings:   Right Impression:                    Left Impression:  The common femoral, femoral,         The common femoral, femoral,  popliteal, tibials and saphenous     popliteal, tibials and saphenous  veins are compressible with normal   veins are compressible with normal  doppler responses. Nm Bone Scan Whole Body    Result Date: 11/4/2020  EXAMINATION: WHOLE BODY BONE SCAN  11/4/2020 TECHNIQUE: The patient was injected intravenously with 25.6 mCi of 99 mTc MDP and scintigraphy of the entire skeleton was performed approximately three hours later. Coned-down images of the head, neck and thorax in obliques positions. COMPARISON: Patient did not have previous imaging studies for comparison.  HISTORY: ORDERING SYSTEM PROVIDED HISTORY: Fall, lung cancer TECHNOLOGIST PROVIDED HISTORY: Fall, lung cancer Reason for Exam: pain Acuity: Unknown Type of Exam: Unknown FINDINGS: Symmetrical uptake of radiotracer is noted in the shoulders, sternoclavicular joints, hips, knees, and ankles, foci of activity in the aforementioned joints are most likely degenerative. The remainder of the osseous structures and soft tissues are unremarkable. Specifically, no radiotracer uptake in the ribcage is noted. Physiologic activity is present in the skeletal and renal collecting systems. No evidence to suggest osseous metastatic disease. Pattern of uptake most compatible with age related degenerative change. IMPRESSION:   Primary Problem  <principal problem not specified>    Active Hospital Problems    Diagnosis Date Noted    Closed fracture of one rib of right side [S22.31XA] 11/04/2020    Degenerative disc disease, lumbar [M51.36] 11/04/2020    Urinary tract infectious disease [N39.0] 11/03/2020    Malignant neoplasm of upper lobe of right lung ([facility] [address] [C34.11] 09/18/2020    Lung mass [R91.8] 08/27/2020    Chronic bilateral low back pain [M54.5, G89.29] 10/31/2017    Falls frequently [R29.6] 01/27/2017   Squamous cell carcinoma of lung, clinical stage IIIb  Ongoing treatment with concurrent chemoradiation using carboplatin and Taxol  UTI  Asthenia  Anemia  Eighth rib fracture    RECOMMENDATIONS:  1. I personally reviewed results of lab work-up imaging studies and other relevant clinical data. Reviewed records and treatment history  2. Continue to hold chemotherapy  3. Continue symptomatic supportive care  4. Transfuse to keep hemoglobin above 7  5. Continue treatment of UTI  6. Patient counseled tobacco cessation  7. Pain control  8. Plan to resume her chemoradiation as outpatient after discharge  9. Discharge as per primary. Awaiting placement or possible home care. Patient wants to continue her chemoradiation. Discussed with patient and Nurse. Thank you for asking us to see this patient. Mendoza Solomon, MD  Hematologist/Medical Oncologist    Cell: [phone]      This note is created with the assistance of a speech recognition program.  While intending to generate a document that actually reflects the content of the visit, the document can still have some errors including those of syntax and sound a like substitutions which may escape proof reading. It such instances, actual meaning can be extrapolated by contextual diversion.

## 2020-11-09 NOTE — PROGRESS NOTES
Patient's blood sugar at 1614 was 52. Patient was given apple juice, ice cream, and jello as requested by the patient. Blood sugar recheck at 1650 was 92. Patient was alert and oriented throughout the event though did report feeling shaky when sugar was low.

## 2020-11-09 NOTE — PROGRESS NOTES
Progress note  [facility]    Adult Hospitalist      Name: Malissa Pope  MRN: [id number]     [place]: [de-identified]  Room: 2003/2003-02    Admit Date: 11/3/2020 10:59 AM  PCP: Vicente Cisneros MD    Primary Problem  Active Problems:    Falls frequently    Chronic bilateral low back pain    Lung mass    Malignant neoplasm of upper lobe of right lung [facility])    Urinary tract infectious disease    Closed fracture of one rib of right side    Degenerative disc disease, lumbar  Resolved Problems:    * No resolved hospital problems. *        Assesment:     · UTI E. coli  · Generalized weakness  · 8th rib lucencies suspicious for nondisplaced fracture  · Recent diagnosis of right upper lobe squamous cell carcinoma status post chemoradiation  · Chronic COPD  · Chronic hypoxic respiratory failure on nocturnal O2  · Former smoker  · Diabetes type 2  · Depression with anxiety        Plan:     · Admit to [facility] with telemetry  · O2 maintain oxygen saturation greater than 92%  · Follow urine culture: E. coli, sensitive to ceftriaxone  · Off antibiotics  · IV fluids, discontinue  · Orthopedic consult, input noted  · Had bone scan  · Pain control  · X-ray lumbosacral spine reviewed  · Oncology consult for resumption of chemotherapy  · Continue aspirin, Lasix,  Lopressor  · Continue Abilify, Effexor, trazodone, Neurontin  · Continue glimepiride, added SSI  · DVT and GI prophylaxis. Discharge planning, likely to SNF once arrangements are done. Patient is unable to go to SNF if she needs to continue with her radiation treatment due to insurance/cost reasons. Family is not ready to take her home yet and they are frustrated since they want her to go to SNF.   Chief Complaint:     Chief Complaint   Patient presents with    Fall         History of Present Illness:      Patient seen and examined bedside patient denies chest pain no nausea or anorexia headache no palpitation no focal logical deficit overall patient is feeling better  Afebrile  WBC count is normal          HPI:    Caitie Cedeño is a 72 y.o.  female who presents with Fall    72year-old lady with past medical history of COPD,  coronary artery disease, depression, arthritis presented to ER complaining of generalized weakness and fall. Patient has been recently diagnosed of lung cell carcinoma. She had a bronchoscopy on 8/31/2020 and endobronchial biopsy showed squamous cell carcinoma. She was started on chemoradiation. Her last chemotherapy was on 10/28/2020. She also has an appointment with Radiation Oncology on 10/30/2020. On presentation to ER her sodium was slightly low at 134. Her hemoglobin was 8.4. Her UA showed moderate leukocyte Estrace and many bacteria. Patient was so weak she was unable to get up and ambulate. She is unable to take care of herself at home. Complaining of low back pain and right-sided rib pain. Patient admitted for further management. I have personally reviewed the past medical history, past surgical history, medications, social history, and family history, and summarized in the note. Review of Systems:     All 10 point system is reviewed and negative otherwise mentioned in HPI. Past Medical History:     Past Medical History:   Diagnosis Date    AAA (abdominal aortic aneurysm) ([facility] [address]     Pt denies having a history of AAA    Acid reflux     Anxiety and depression     Aortic stenosis     Arthritis     Blister of ankle, right 10/13/2016    blister broke open & draining, is on antibiotics    CAD (coronary artery disease)     Cancer ([address]     lung cancer    COPD (chronic obstructive pulmonary disease) ([address]     Diabetes mellitus ([address]     Falls     Heart block     bifasicular    Hypokalemia     MDRO (multiple drug resistant organisms) resistance 10/17/2014    E.  Coli urine    MRSA (methicillin resistant staph aureus) culture positive resolved 12/2016    2 negative nasal screens - 2016 (hx in urine 2014)    On home oxygen therapy     uses 2 liters at night    On home oxygen therapy     patient states 2 liters/nasal cannula continuous    Overactive bladder     patient incont. wears a brief    Pneumonia     PONV (postoperative nausea and vomiting)     Vitamin D deficiency         Past Surgical History:     Past Surgical History:   Procedure Laterality Date    AORTIC VALVE REPAIR N/A 4/11/2017    AORTIC VALVE REPAIR REPLACEMENT performed by Marcial Allan MD at [address] N/A 8/31/2020    BRONCHOSCOPY BIOPSY BRONCHUS performed by Davina Hale MD at [address] [facility], COLON, DIAGNOSTIC  12/08/2016    EYE SURGERY      HYSTERECTOMY      IR INS PICC VAD W SQ PORT GREATER THAN 5  9/4/2020    IR INS PICC VAD W SQ PORT GREATER THAN 5 9/4/2020 [facility]    IR PORT PLACEMENT EQUAL OR GREATER THAN 5 YEARS  9/29/2020    IR PORT PLACEMENT EQUAL OR GREATER THAN 5 YEARS 9/29/2020 MD [facility]    LUMBAR LAMINECTOMY      TONSILLECTOMY          Medications Prior to Admission:       Prior to Admission medications    Medication Sig Start Date End Date Taking? Authorizing Provider   clonazePAM (KLONOPIN) 1 MG tablet Take 1 tablet by mouth 3 times daily as needed for Anxiety for up to 3 doses. 11/5/20 11/6/20 Yes Helen Delgadillo MD   gabapentin (NEURONTIN) 400 MG capsule Take 1 capsule by mouth 2 times daily for 3 doses. In addition to 2 capsules (=800mg) nightly 11/5/20 11/7/20 Yes Helen Delgadillo MD   gabapentin (NEURONTIN) 400 MG capsule Take 2 capsules by mouth nightly for 3 doses.  Take 2 capsules (=800mg) nightly - in addition to 1BID 11/5/20 11/8/20 Yes Helen Delgadillo MD   traZODone (DESYREL) 150 MG tablet Take 2 tablets by mouth nightly for 3 doses Take 2 tablets (=300mg) nightly 11/5/20 11/8/20 Yes Helen Delgadillo MD   ARIPiprazole (ABILIFY) 5 MG tablet Take 1 tablet by mouth daily for 3 doses 11/5/20 11/8/20 Yes Claudetta Bias, MD   furosemide (LASIX) 40 MG tablet Take 40 mg by mouth daily   Yes Historical Provider, MD   omeprazole (PRILOSEC) 20 MG delayed release capsule Take 20 mg by mouth daily   Yes Historical Provider, MD   dexamethasone (DECADRON) 4 MG tablet Take 20 mg orally 12 hours and 6 hours before Taxol 10/23/20  Yes John White MD   lidocaine-prilocaine (EMLA) 2.5-2.5 % cream To port site before chemo 10/21/20  Yes John White MD   melatonin 5 MG TABS tablet Take 5 mg by mouth nightly    Yes Historical Provider, MD   glimepiride (AMARYL) 1 MG tablet Take 1 mg by mouth Daily with supper In addition to 2 tablets (=2mg) every morning    Yes Historical Provider, MD   metoprolol tartrate (LOPRESSOR) 25 MG tablet Take 25 mg by mouth 2 times daily   Yes Historical Provider, MD   venlafaxine (EFFEXOR XR) 150 MG extended release capsule Take 150 mg by mouth 2 times daily   Yes Historical Provider, MD   lansoprazole (PREVACID) 30 MG delayed release capsule Take 30 mg by mouth daily 11/10/16  Yes Historical Provider, MD   metFORMIN (GLUCOPHAGE) 500 MG tablet Take 500 mg by mouth 2 times daily 12/7/16  Yes Historical Provider, MD   aspirin EC 81 MG EC tablet Take 81 mg by mouth daily   Yes Historical Provider, MD   mometasone-formoterol (DULERA) 200-5 MCG/ACT inhaler Inhale 2 puffs into the lungs every 12 hours as needed (wheezing/SOB)    Yes Historical Provider, MD   glimepiride (AMARYL) 1 MG tablet Take 2 mg by mouth every morning In addition to 1mg nightly   Yes Historical Provider, MD        Allergies:       Codeine and Dye [iodides]    Social History:     Tobacco:    reports that she quit smoking about 4 years ago. She has never used smokeless tobacco.  Alcohol:      reports no history of alcohol use. Drug Use:  reports no history of drug use.     Family History:     Family History   Problem Relation Age of Onset    Cancer Mother     Cancer Father          Physical Exam: Vitals:  /68   Pulse 104   Temp 98.1 °F (36.7 °C) (Oral)   Resp 20   Ht 5' 8\" (1.727 m)   Wt 150 lb 11.2 oz (68.4 kg)   SpO2 94%   BMI 22.91 kg/m²   Temp (24hrs), Av.8 °F (36.6 °C), Min:97.5 °F (36.4 °C), Max:98.1 °F (36.7 °C)          General appearance - alert, well appearing, and in no acute distress  Mental status - oriented to person, place, and time with normal affect  Head - normocephalic and atraumatic  Eyes - pupils equal and reactive, extraocular eye movements intact, conjunctiva clear  Ears - hearing appears to be intact  Nose - no drainage noted  Mouth - mucous membranes moist  Neck - supple, no carotid bruits, thyroid not palpable  Chest - clear to auscultation, normal effort  Heart - normal rate, regular rhythm, no murmur  Abdomen - soft, nontender, nondistended, bowel sounds present all four quadrants, no masses, hepatomegaly or splenomegaly  Neurological - normal speech, no focal findings or movement disorder noted, cranial nerves II through XII grossly intact  Extremities - peripheral pulses palpable, no pedal edema or calf pain with palpation  Skin - no gross lesions, rashes, or induration noted        Data:     Labs:    Hematology:  Recent Labs     20  0506   WBC 5.4 6.8 6.3   RBC 3.14* 3.64* 3.65*   HGB 7.5* 8.9* 8.9*   HCT 25.9* 30.7* 30.1*   MCV 82.5* 84.3 82.5*   MCH 23.9* 24.5* 24.4*   MCHC 29.0 29.0 29.6   RDW 19.6* 19.6* 19.5*    428 404   MPV 8.8 8.7 8.4     Chemistry:  Recent Labs     20  0506    141 140   K 4.1 4.8 4.3    104 102   CO2 30 30 29   GLUCOSE 77 133* 142*   BUN 11 11 15   CREATININE 0.63 0.68 0.70   ANIONGAP 5* 7* 9   LABGLOM >60 >60 >60   GFRAA >60 >60 >60   CALCIUM 8.4* 9.1 9.0     Recent Labs     20  1636 20  2041 20  0625 20  1133 20  1614 20  1650   POCGLU 189* 151* 116* 227* 52* 92       Lab Results   Component Value Date INR 0.9 09/29/2020    INR 1.1 08/28/2020    INR 0.9 12/28/2018    PROTIME 11.8 09/29/2020    PROTIME 14.4 (H) 08/28/2020    PROTIME 9.7 12/28/2018       Lab Results   Component Value Date/Time    SPECIAL NOT REPORTED 11/03/2020 02:15 PM     Lab Results   Component Value Date/Time    CULTURE NO SAMPLE RECEIVED 11/03/2020 02:15 PM       Lab Results   Component Value Date    POCPH 7.36 04/12/2017    PHART 7.42 08/26/2012    PH 7.22 04/11/2017    POCPCO2 45 04/12/2017    XIR2NWB 41 08/26/2012    PCO2 54 04/11/2017    POCPO2 93 04/12/2017    PO2ART 59 08/26/2012    PO2 89 04/11/2017    POCHCO3 25.3 04/12/2017    PJO1LOB 26.1 08/26/2012    HCO3 22.2 04/11/2017    NBEA NOT REPORTED 04/12/2017    PBEA 0 04/12/2017    ARY1PIL 27 04/12/2017    UNKS7ATL 97 04/12/2017    T7SFUUMK 92.1 08/26/2012    O2SAT 95 04/11/2017    FIO2 UNKNOWN 10/31/2017       Radiology:    Xr Ribs Right Include Chest (min 3 Views)    Result Date: 11/3/2020  Subtle linear lucency within the 8th rib, suggestive of a nondisplaced fracture. Masslike opacity in the right lung apex. Xr Cervical Spine (2-3 Views)    Result Date: 11/3/2020  No convincing evidence for acute fracture or malalignment of the cervical spine. Masslike opacity within the right lung apex. All radiological studies reviewed                Code Status:  Full Code    Electronically signed by Claudetta Bias, MD on 11/9/2020 at 5:05 PM     Copy sent to Dr. Noah Braswell MD    This note was created with the assistance of a speech-recognition program.  Although the intention is to generate a document that actually reflects the content of the visit, no guarantees can be provided that every mistake has been identified and corrected by editing. Note was updated later by me after  physical examination and  completion of the assessment.

## 2020-11-09 NOTE — PROGRESS NOTES
Physical Therapy  Facility/Department: [facility]  Daily Treatment Note  NAME: Kimmie Andrews  : 1950  MRN: [id number]    Date of Service: 2020    Discharge Recommendations:  Subacute/Skilled Nursing Facility   PT Equipment Recommendations  Equipment Needed: No    Assessment   Body structures, Functions, Activity limitations: Decreased functional mobility ; Decreased safe awareness;Decreased strength;Decreased endurance;Decreased balance;Decreased ADL status  Assessment: Pt showed improved levels of assist with transfers and ambulation. Therapy needed to continue to work on bilateral LE muscle strengthing, balance, and safety. Recommending SNF at this time due to frequent falls, decresed conditioning and balance deficits. Prognosis: Good  PT Education: Goals;PT Role;General Safety; Functional Mobility Training  REQUIRES PT FOLLOW UP: Yes  Activity Tolerance  Activity Tolerance: Patient limited by pain; Patient limited by endurance     Patient Diagnosis(es): The primary encounter diagnosis was Urinary tract infection without hematuria, site unspecified. Diagnoses of Fall, initial encounter, Contusion of right chest wall, initial encounter, General weakness, Malignant neoplasm of upper lobe of right lung ([address] and Lung mass were also pertinent to this visit. has a past medical history of AAA (abdominal aortic aneurysm) (HCC), Acid reflux, Anxiety and depression, Aortic stenosis, Arthritis, Blister of ankle, right, CAD (coronary artery disease), Cancer ([address] COPD (chronic obstructive pulmonary disease) ([address] Diabetes mellitus ([address] Falls, Heart block, Hypokalemia, MDRO (multiple drug resistant organisms) resistance, MRSA (methicillin resistant staph aureus) culture positive, On home oxygen therapy, On home oxygen therapy, Overactive bladder, Pneumonia, PONV (postoperative nausea and vomiting), and Vitamin D deficiency.    has a past surgical history that includes back surgery; eye surgery; Cholecystectomy; Appendectomy; Hysterectomy; Tonsillectomy; lumbar laminectomy; Endoscopy, colon, diagnostic (12/08/2016); aortic valve repair (N/A, 4/11/2017); bronchoscopy (N/A, 8/31/2020); IR INSERT PICC VAD W SQ PORT >5 YEARS (9/4/2020); and IR PORT PLACEMENT > 5 YEARS (9/29/2020). Restrictions  Restrictions/Precautions  Restrictions/Precautions: Seizure, Fall Risk, Contact Precautions, Up as Tolerated  Required Braces or Orthoses?: No  Position Activity Restriction  Other position/activity restrictions: up as tolerated, Weightbearing as tolerated all extremities without restrictions, 3 L O2 per NC, RUE IV, alarms  Subjective   General  Chart Reviewed: Yes  Additional Pertinent Hx: chronic mechanical lumbar pain, discectomy, lung carcinoma, COPD, falls, home O2 use  Response To Previous Treatment: Patient reporting fatigue but able to participate. Family / Caregiver Present: No  Subjective  Subjective: Patient c/o Rt flank and hip pain reporting she broke her rib and shurt her hip from the fall. General Comment  Comments: RNMaría reports patient medically stable for PT treatment. Pain Screening  Patient Currently in Pain: Yes  Vital Signs  Patient Currently in Pain: Yes       Orientation  Orientation  Overall Orientation Status: Within Functional Limits  Cognition   Cognition  Overall Cognitive Status: Exceptions  Arousal/Alertness: Appropriate responses to stimuli  Following Commands:  Follows all commands without difficulty  Attention Span: Appears intact  Memory: Decreased short term memory  Safety Judgement: Decreased awareness of need for assistance;Decreased awareness of need for safety  Problem Solving: Assistance required to identify errors made;Assistance required to correct errors made;Decreased awareness of errors  Insights: Decreased awareness of deficits  Initiation: Requires cues for some  Sequencing: Requires cues for some  Objective   Bed mobility  Rolling to Left: Supervision  Supine to Sit: Minimal assistance  Sit to Supine: Supervision  Scooting: Independent  Comment: Patient upset that writer would like to put the bed flat and have her not use the bed railing as she does not have them at home. Patient able to perfrom bed mob with minimal assist and VCs for safety and tech. Transfers  Sit to Stand: Contact guard assistance  Stand to sit: Contact guard assistance  Bed to Chair: Contact guard assistance  Stand Pivot Transfers: Contact guard assistance  Squat Pivot Transfers: Contact guard assistance  Comment: While standing, pt needed brief change. Pt was able to maintain balance with one hand on walker while assisting therapist with brief change. Patient refused to ambulate to the restroom  Ambulation  Ambulation?: Yes  More Ambulation?: No  Ambulation 1  Surface: level tile  Device: Rolling Walker  Assistance: Contact guard assistance  Gait Deviations: Slow Gwendolyn;Decreased step length;Decreased step height  Distance: 20' x 2  Comments: Pt takes time to ambulate but no LOB with turns, Patient declined further ambulation. Stairs/Curb  Stairs?: No     Balance  Posture: Fair  Sitting - Static: Good  Sitting - Dynamic: Good;-  Standing - Static: Fair;+  Standing - Dynamic: Fair  Exercises  Comments: Seated ousmane LE AROM x 10 reps with 2 rest breaks due to SOB. Standing exercises x 10 reps with several standing rest break within each set and required seated rest breaks x each set. Patient performed standing reaching activity with use of RW for unilateral UB support. Goals  Short term goals  Time Frame for Short term goals: 12 visits  Short term goal 1: Inc bed-mobility & transfers to independent to enable pt to safely get in/OOB  Short term goal 2: Inc gait to amb 150ft or > indep w/ RW to enable pt to return to previous level of independence  Short term goal 3:  Inc strength to [address] standing balance to good with device to facilitate pt independence for performance of ADL's & functional mobility, & reduce fall risk; Short term goal 4: Pt able to tolerate 30-40 min of activity to include 15-20 reps of ex & functional mobility including 5 minutes of standing to facilitate activity tolerance to [facility];   Short term goal 5: Ed pt on home ex's, safety & energy principles & issue written home program;    Plan    Plan  Times per week: 1-2x/D, 5-6d/week  Current Treatment Recommendations: Strengthening, Balance Training, Functional Mobility Training, Transfer Training  Safety Devices  Type of devices: Bed alarm in place, All fall risk precautions in place, Gait belt, Left in bed, Call light within reach  Restraints  Initially in place: No     Therapy Time   Individual Concurrent Group Co-treatment   Time In 1110         Time Out [address] [place] [address]

## 2020-11-09 NOTE — PLAN OF CARE
Problem: Pain:  Description: Pain management should include both nonpharmacologic and pharmacologic interventions. Goal: Pain level will decrease  Description: Pain level will decrease  Outcome: Ongoing  Note: Pain level assessment complete. Patient educated on pain scale and control interventions  PRN pain medication given per patient request  Patient instructed to call out with new onset of pain or unrelieved pain    Goal: Control of acute pain  Description: Control of acute pain  Outcome: Ongoing  Goal: Control of chronic pain  Description: Control of chronic pain  Outcome: Ongoing     Problem: Falls - Risk of:  Goal: Will remain free from falls  Description: Will remain free from falls  Outcome: Ongoing  Note: Siderails up x 2  Hourly rounding  Call light in reach  Instructed to call for assist before attempting out of bed.   Remains free from falls and accidental injury at this time   Floor free from obstacles  Bed is locked and in lowest position  Adequate lighting provided  Bed alarm on, Red [place]alling star and Stay with Me signs posted      Goal: Absence of physical injury  Description: Absence of physical injury  Outcome: Ongoing     Problem: Sensory:  Goal: General experience of comfort will improve  Description: General experience of comfort will improve  Outcome: Ongoing     Problem: Urinary Elimination:  Goal: Signs and symptoms of infection will decrease  Description: Signs and symptoms of infection will decrease  Outcome: Ongoing  Goal: Ability to reestablish a normal urinary elimination pattern will improve - after catheter removal  Description: Ability to reestablish a normal urinary elimination pattern will improve  Outcome: Ongoing  Goal: Complications related to the disease process, condition or treatment will be avoided or minimized  Description: Complications related to the disease process, condition or treatment will be avoided or minimized  Outcome: Ongoing     Problem: Discharge Planning:  Goal: Discharged to appropriate level of care  Description: Discharged to appropriate level of care  Outcome: Ongoing     Problem: Serum Glucose Level - Abnormal:  Goal: Ability to maintain appropriate glucose levels will improve  Description: Ability to maintain appropriate glucose levels will improve  Outcome: Ongoing     Problem: Sensory Perception - Impaired:  Goal: Ability to maintain a stable neurologic state will improve  Description: Ability to maintain a stable neurologic state will improve  Outcome: Ongoing

## 2020-11-10 ENCOUNTER — HOSPITAL ENCOUNTER (OUTPATIENT)
Dept: RADIATION ONCOLOGY | Facility: MEDICAL CENTER | Age: 70
End: 2020-11-10
Payer: MEDICARE

## 2020-11-10 PROBLEM — E44.0 MODERATE MALNUTRITION (HCC): Status: ACTIVE | Noted: 2020-11-10

## 2020-11-10 LAB
ABSOLUTE EOS #: 0.08 K/UL (ref 0–0.44)
ABSOLUTE IMMATURE GRANULOCYTE: 0.16 K/UL (ref 0–0.3)
ABSOLUTE LYMPH #: 1.64 K/UL (ref 1.1–3.7)
ABSOLUTE MONO #: 0.52 K/UL (ref 0.1–1.2)
ANION GAP SERPL CALCULATED.3IONS-SCNC: 9 MMOL/L (ref 9–17)
BASOPHILS # BLD: 1 % (ref 0–2)
BASOPHILS ABSOLUTE: 0.07 K/UL (ref 0–0.2)
BUN BLDV-MCNC: 17 MG/DL (ref 8–23)
BUN/CREAT BLD: 20 (ref 9–20)
CALCIUM SERPL-MCNC: 9.8 MG/DL (ref 8.6–10.4)
CHLORIDE BLD-SCNC: 101 MMOL/L (ref 98–107)
CO2: 32 MMOL/L (ref 20–31)
CREAT SERPL-MCNC: 0.87 MG/DL (ref 0.5–0.9)
DIFFERENTIAL TYPE: ABNORMAL
EOSINOPHILS RELATIVE PERCENT: 1 % (ref 1–4)
GFR AFRICAN AMERICAN: >60 ML/MIN
GFR NON-AFRICAN AMERICAN: >60 ML/MIN
GFR SERPL CREATININE-BSD FRML MDRD: ABNORMAL ML/MIN/{1.73_M2}
GFR SERPL CREATININE-BSD FRML MDRD: ABNORMAL ML/MIN/{1.73_M2}
GLUCOSE BLD-MCNC: 114 MG/DL (ref 65–105)
GLUCOSE BLD-MCNC: 128 MG/DL (ref 65–105)
GLUCOSE BLD-MCNC: 153 MG/DL (ref 70–99)
GLUCOSE BLD-MCNC: 154 MG/DL (ref 65–105)
GLUCOSE BLD-MCNC: 223 MG/DL (ref 65–105)
HCT VFR BLD CALC: 33.4 % (ref 36.3–47.1)
HEMOGLOBIN: 9.7 G/DL (ref 11.9–15.1)
IMMATURE GRANULOCYTES: 2 %
LACTIC ACID: 2.5 MMOL/L (ref 0.5–2.2)
LYMPHOCYTES # BLD: 21 % (ref 24–43)
MCH RBC QN AUTO: 24.5 PG (ref 25.2–33.5)
MCHC RBC AUTO-ENTMCNC: 29 G/DL (ref 28.4–34.8)
MCV RBC AUTO: 84.3 FL (ref 82.6–102.9)
MONOCYTES # BLD: 7 % (ref 3–12)
NRBC AUTOMATED: 0 PER 100 WBC
PDW BLD-RTO: 19.6 % (ref 11.8–14.4)
PLATELET # BLD: 499 K/UL (ref 138–453)
PLATELET ESTIMATE: ABNORMAL
PMV BLD AUTO: 8.7 FL (ref 8.1–13.5)
POTASSIUM SERPL-SCNC: 5 MMOL/L (ref 3.7–5.3)
RBC # BLD: 3.96 M/UL (ref 3.95–5.11)
RBC # BLD: ABNORMAL 10*6/UL
SARS-COV-2, RAPID: NORMAL
SARS-COV-2: NORMAL
SARS-COV-2: NOT DETECTED
SEG NEUTROPHILS: 68 % (ref 36–65)
SEGMENTED NEUTROPHILS ABSOLUTE COUNT: 5.49 K/UL (ref 1.5–8.1)
SODIUM BLD-SCNC: 142 MMOL/L (ref 135–144)
SOURCE: NORMAL
WBC # BLD: 8 K/UL (ref 3.5–11.3)
WBC # BLD: ABNORMAL 10*3/UL

## 2020-11-10 PROCEDURE — 80048 BASIC METABOLIC PNL TOTAL CA: CPT

## 2020-11-10 PROCEDURE — 6360000002 HC RX W HCPCS: Performed by: HOSPITALIST

## 2020-11-10 PROCEDURE — 82947 ASSAY GLUCOSE BLOOD QUANT: CPT

## 2020-11-10 PROCEDURE — 6370000000 HC RX 637 (ALT 250 FOR IP): Performed by: ORTHOPAEDIC SURGERY

## 2020-11-10 PROCEDURE — U0003 INFECTIOUS AGENT DETECTION BY NUCLEIC ACID (DNA OR RNA); SEVERE ACUTE RESPIRATORY SYNDROME CORONAVIRUS 2 (SARS-COV-2) (CORONAVIRUS DISEASE [COVID-19]), AMPLIFIED PROBE TECHNIQUE, MAKING USE OF HIGH THROUGHPUT TECHNOLOGIES AS DESCRIBED BY CMS-2020-01-R: HCPCS

## 2020-11-10 PROCEDURE — 99232 SBSQ HOSP IP/OBS MODERATE 35: CPT | Performed by: INTERNAL MEDICINE

## 2020-11-10 PROCEDURE — 1200000000 HC SEMI PRIVATE

## 2020-11-10 PROCEDURE — 85025 COMPLETE CBC W/AUTO DIFF WBC: CPT

## 2020-11-10 PROCEDURE — 6370000000 HC RX 637 (ALT 250 FOR IP): Performed by: HOSPITALIST

## 2020-11-10 PROCEDURE — 2580000003 HC RX 258: Performed by: HOSPITALIST

## 2020-11-10 PROCEDURE — 83605 ASSAY OF LACTIC ACID: CPT

## 2020-11-10 PROCEDURE — 36415 COLL VENOUS BLD VENIPUNCTURE: CPT

## 2020-11-10 RX ADMIN — GABAPENTIN 800 MG: 300 CAPSULE ORAL at 20:16

## 2020-11-10 RX ADMIN — ENOXAPARIN SODIUM 40 MG: 40 INJECTION SUBCUTANEOUS at 08:44

## 2020-11-10 RX ADMIN — OXYCODONE HYDROCHLORIDE AND ACETAMINOPHEN 1 TABLET: 5; 325 TABLET ORAL at 06:01

## 2020-11-10 RX ADMIN — TRAZODONE HYDROCHLORIDE 300 MG: 100 TABLET ORAL at 20:16

## 2020-11-10 RX ADMIN — SODIUM CHLORIDE, PRESERVATIVE FREE 10 ML: 5 INJECTION INTRAVENOUS at 08:44

## 2020-11-10 RX ADMIN — GABAPENTIN 400 MG: 300 CAPSULE ORAL at 12:21

## 2020-11-10 RX ADMIN — INSULIN LISPRO 6 UNITS: 100 INJECTION, SOLUTION INTRAVENOUS; SUBCUTANEOUS at 12:21

## 2020-11-10 RX ADMIN — Medication 10 ML: at 11:09

## 2020-11-10 RX ADMIN — SODIUM CHLORIDE, PRESERVATIVE FREE 10 ML: 5 INJECTION INTRAVENOUS at 20:17

## 2020-11-10 RX ADMIN — MORPHINE SULFATE 1 MG: 2 INJECTION, SOLUTION INTRAMUSCULAR; INTRAVENOUS at 19:09

## 2020-11-10 RX ADMIN — ARIPIPRAZOLE 5 MG: 5 TABLET ORAL at 08:44

## 2020-11-10 RX ADMIN — METOPROLOL TARTRATE 25 MG: 25 TABLET, FILM COATED ORAL at 20:17

## 2020-11-10 RX ADMIN — GABAPENTIN 400 MG: 300 CAPSULE ORAL at 06:01

## 2020-11-10 RX ADMIN — ASPIRIN 81 MG: 81 TABLET, COATED ORAL at 08:44

## 2020-11-10 RX ADMIN — MORPHINE SULFATE 1 MG: 2 INJECTION, SOLUTION INTRAMUSCULAR; INTRAVENOUS at 11:09

## 2020-11-10 RX ADMIN — Medication 10 ML: at 19:09

## 2020-11-10 RX ADMIN — FAMOTIDINE 20 MG: 20 TABLET, FILM COATED ORAL at 08:44

## 2020-11-10 RX ADMIN — OXYCODONE HYDROCHLORIDE AND ACETAMINOPHEN 1 TABLET: 5; 325 TABLET ORAL at 22:55

## 2020-11-10 RX ADMIN — GLIMEPIRIDE 2 MG: 2 TABLET ORAL at 08:44

## 2020-11-10 RX ADMIN — FUROSEMIDE 40 MG: 40 TABLET ORAL at 08:44

## 2020-11-10 RX ADMIN — VENLAFAXINE HYDROCHLORIDE 150 MG: 75 CAPSULE, EXTENDED RELEASE ORAL at 08:43

## 2020-11-10 RX ADMIN — INSULIN LISPRO 3 UNITS: 100 INJECTION, SOLUTION INTRAVENOUS; SUBCUTANEOUS at 08:43

## 2020-11-10 RX ADMIN — OXYCODONE HYDROCHLORIDE AND ACETAMINOPHEN 1 TABLET: 5; 325 TABLET ORAL at 16:04

## 2020-11-10 RX ADMIN — FAMOTIDINE 20 MG: 20 TABLET, FILM COATED ORAL at 20:17

## 2020-11-10 RX ADMIN — VENLAFAXINE HYDROCHLORIDE 150 MG: 75 CAPSULE, EXTENDED RELEASE ORAL at 20:17

## 2020-11-10 ASSESSMENT — PAIN SCALES - GENERAL
PAINLEVEL_OUTOF10: 6
PAINLEVEL_OUTOF10: 10
PAINLEVEL_OUTOF10: 9
PAINLEVEL_OUTOF10: 0
PAINLEVEL_OUTOF10: 10
PAINLEVEL_OUTOF10: 8
PAINLEVEL_OUTOF10: 10
PAINLEVEL_OUTOF10: 10
PAINLEVEL_OUTOF10: 4
PAINLEVEL_OUTOF10: 0
PAINLEVEL_OUTOF10: 0

## 2020-11-10 ASSESSMENT — PAIN DESCRIPTION - PROGRESSION
CLINICAL_PROGRESSION: GRADUALLY WORSENING
CLINICAL_PROGRESSION: NOT CHANGED
CLINICAL_PROGRESSION: GRADUALLY WORSENING

## 2020-11-10 ASSESSMENT — PAIN DESCRIPTION - LOCATION
LOCATION: HIP;BACK;RIB CAGE
LOCATION: BACK;RIB CAGE
LOCATION: HIP;BACK
LOCATION: HIP;BACK;RIB CAGE
LOCATION: HIP;BACK;RIB CAGE

## 2020-11-10 ASSESSMENT — PAIN DESCRIPTION - DESCRIPTORS
DESCRIPTORS: ACHING
DESCRIPTORS: ACHING;DISCOMFORT
DESCRIPTORS: ACHING

## 2020-11-10 ASSESSMENT — PAIN DESCRIPTION - FREQUENCY
FREQUENCY: INTERMITTENT
FREQUENCY: CONTINUOUS
FREQUENCY: INTERMITTENT

## 2020-11-10 ASSESSMENT — PAIN DESCRIPTION - ORIENTATION
ORIENTATION: RIGHT;LOWER

## 2020-11-10 ASSESSMENT — PAIN DESCRIPTION - PAIN TYPE
TYPE: CHRONIC PAIN

## 2020-11-10 ASSESSMENT — PAIN DESCRIPTION - ONSET
ONSET: ON-GOING

## 2020-11-10 NOTE — PROGRESS NOTES
Today's Date: 11/10/2020  Patient Name: Malissa Pope  Date of admission: 11/3/2020 10:59 AM  Patient's age: 72 y.o., 1950  Admission Dx: Urinary tract infectious disease [N39.0]    Reason for Consult: management recommendations  Requesting Physician: Helen Delgadillo MD    CHIEF COMPLAINT: Weakness fatigue. UTI. History Obtained From:  patient, electronic medical record    Interval history:  Patient seen and examined. She denies any nausea vomiting  Labs and vitals reviewed. No fever chills     HISTORY OF PRESENT ILLNESS:    The patient is a 72 y.o.  female who is admitted to the hospital with chief complaint of weakness and fatigue. Reportedly patient also had a standing height fall at home. Imaging revealed eighth rib fracture. Work-up also revealed UTI. Patient also has significant joint pain. 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 Patient has been started on concurrent chemoradiation. Last chemotherapy treatment was on 10/28/2020. Work-up reveals hemoglobin of 7.2 with MCV 80. Patient has adequate ANC and platelet count. Blood cultures are pending. Urine culture showing E. coli. Past Medical History:   has a past medical history of AAA (abdominal aortic aneurysm) ([facility] [address] Acid reflux, Anxiety and depression, Aortic stenosis, Arthritis, Blister of ankle, right, CAD (coronary artery disease), Cancer ([facility] [address] COPD (chronic obstructive pulmonary disease) ([facility] [address] Diabetes mellitus ([facility] [address] Falls, Heart block, Hypokalemia, MDRO (multiple drug resistant organisms) resistance, MRSA (methicillin resistant staph aureus) culture positive, On home oxygen therapy, On home oxygen therapy, Overactive bladder, Pneumonia, PONV (postoperative nausea and vomiting), and Vitamin D deficiency.     Past Surgical History:   has a past surgical history that includes back surgery; eye surgery; Cholecystectomy; Appendectomy; Hysterectomy; Tonsillectomy; lumbar laminectomy; Endoscopy, colon, diagnostic (12/08/2016); aortic valve repair (N/A, 4/11/2017); bronchoscopy (N/A, 8/31/2020); IR INSERT PICC VAD W SQ PORT >5 YEARS (9/4/2020); and IR PORT PLACEMENT > 5 YEARS (9/29/2020). Medications:    Prior to Admission medications    Medication Sig Start Date End Date Taking? Authorizing Provider   clonazePAM (KLONOPIN) 1 MG tablet Take 1 tablet by mouth 3 times daily as needed for Anxiety for up to 3 doses. 11/5/20 11/6/20 Yes Jenn Escamilla MD   gabapentin (NEURONTIN) 400 MG capsule Take 1 capsule by mouth 2 times daily for 3 doses. In addition to 2 capsules (=800mg) nightly 11/5/20 11/7/20 Yes Jenn Escamilla MD   gabapentin (NEURONTIN) 400 MG capsule Take 2 capsules by mouth nightly for 3 doses.  Take 2 capsules (=800mg) nightly - in addition to 1BID 11/5/20 11/8/20 Yes Jenn Escamilla MD   traZODone (DESYREL) 150 MG tablet Take 2 tablets by mouth nightly for 3 doses Take 2 tablets (=300mg) nightly 11/5/20 11/8/20 Yes Jenn Escamilla MD   ARIPiprazole (ABILIFY) 5 MG tablet Take 1 tablet by mouth daily for 3 doses 11/5/20 11/8/20 Yes Jenn Escamilla MD   furosemide (LASIX) 40 MG tablet Take 40 mg by mouth daily   Yes Historical Provider, MD   omeprazole (PRILOSEC) 20 MG delayed release capsule Take 20 mg by mouth daily   Yes Historical Provider, MD   dexamethasone (DECADRON) 4 MG tablet Take 20 mg orally 12 hours and 6 hours before Taxol 10/23/20  Yes Saul Syed MD   lidocaine-prilocaine (EMLA) 2.5-2.5 % cream To port site before chemo 10/21/20  Yes Saul Syed MD   melatonin 5 MG TABS tablet Take 5 mg by mouth nightly    Yes Historical Provider, MD   glimepiride (AMARYL) 1 MG tablet Take 1 mg by mouth Daily with supper In addition to 2 tablets (=2mg) every morning    Yes Historical Provider, MD   metoprolol tartrate (LOPRESSOR) 25 MG tablet Take 25 mg by mouth 2 times daily   Yes Historical injection vial 0-9 Units  0-9 Units Subcutaneous Nightly Jenn Escamilla MD   2 Units at 11/09/20 2048    glucose (GLUTOSE) 40 % oral gel 15 g  15 g Oral PRN Jenn Escamilla MD        dextrose 50 % IV solution  12.5 g Intravenous PRN Jenn Escamilla MD        glucagon (rDNA) injection 1 mg  1 mg Intramuscular PRN Jenn Escamilla MD        dextrose 5 % solution  100 mL/hr Intravenous PRN Jenn Escamilla MD           Allergies:  Codeine and Dye [iodides]    Social History:   reports that she quit smoking about 4 years ago. She has never used smokeless tobacco. She reports that she does not drink alcohol or use drugs. Family History: family history includes Cancer in her father and mother. REVIEW OF SYSTEMS:      Constitutional: No fever or chills. No night sweats, no weight loss. Positive fatigue. Eyes: No eye discharge, double vision, or eye pain   HEENT: negative for sore mouth, sore throat, hoarseness and voice change   Respiratory: negative for cough , sputum, dyspnea, wheezing, hemoptysis, chest pain   Cardiovascular: negative for chest pain, dyspnea, palpitations, orthopnea, PND   Gastrointestinal: negative for nausea, vomiting, diarrhea, constipation, abdominal pain, Dysphagia, hematemesis and hematochezia   Genitourinary: negative for frequency, dysuria, nocturia, urinary incontinence, and hematuria   Integument: negative for rash, skin lesions, bruises.    Hematologic/Lymphatic: negative for easy bruising, bleeding, lymphadenopathy, or petechiae   Endocrine: negative for heat or cold intolerance,weight changes, change in bowel habits and hair loss   Musculoskeletal: negative for myalgias, arthralgias, pain, joint swelling,and bone pain   Neurological: negative for headaches, dizziness, seizures, weakness, numbness    PHYSICAL EXAM:        BP (!) 94/47   Pulse 95   Temp 98.6 °F (37 °C) (Oral)   Resp 17   Ht 5' 8\" (1.727 m)   Wt 139 lb 11.2 oz (63.4 kg)   SpO2 99%   BMI 21.24 kg/m² Temp (24hrs), Av.4 °F (36.9 °C), Min:98.1 °F (36.7 °C), Max:98.6 °F (37 °C)      General appearance - well appearing, no in pain or distress   Mental status - alert and cooperative   Eyes - pupils equal and reactive, extraocular eye movements intact   Ears - bilateral TM's and external ear canals normal   Mouth - mucous membranes moist, pharynx normal without lesions   Neck - supple, no significant adenopathy   Lymphatics - no palpable lymphadenopathy, no hepatosplenomegaly   Chest - clear to auscultation, no wheezes, rales or rhonchi, symmetric air entry   Heart - normal rate, regular rhythm, normal S1, S2, no murmurs  Abdomen - soft, nontender, nondistended, no masses or organomegaly   Neurological - alert, oriented, normal speech, no focal findings or movement disorder noted   Musculoskeletal - no joint tenderness, deformity or swelling   Extremities - peripheral pulses normal, no pedal edema, no clubbing or cyanosis   Skin - normal coloration and turgor, no rashes, no suspicious skin lesions noted ,      DATA:      Labs:     Results for orders placed or performed during the hospital encounter of 20   Culture, Blood 1    Specimen: Blood   Result Value Ref Range    Specimen Description . BLOOD     Special Requests RFA 2 ML     Culture NO GROWTH 6 DAYS    Culture, Blood 1    Specimen: Blood   Result Value Ref Range    Specimen Description . BLOOD     Special Requests NOT REPORTED     Culture NO SAMPLE RECEIVED    Culture, Urine    Specimen: Urine, clean catch   Result Value Ref Range    Specimen Description . CLEAN CATCH URINE     Special Requests NOT REPORTED     Culture ESCHERICHIA COLI >[id number] CFU/ML (A)        Susceptibility    Escherichia coli - BACTERIAL SUSCEPTIBILITY PANEL MUNIR     amikacin Value in next row        NOT REPORTED     ampicillin Value in next row Resistant       >=32RESISTANT     ampicillin-sulbactam Value in next row        NOT REPORTED     aztreonam Value in next row Sensitive <=1SUSCEPTIBLE     ceFAZolin Value in next row Sensitive       <=4SUSCEPTIBLE     ceFAZolin Value in next row Sensitive       <=4SUSCEPTIBLE     cefepime Value in next row        NOT REPORTED     cefTRIAXone Value in next row Sensitive       <=1SUSCEPTIBLE     ciprofloxacin Value in next row Resistant       >=4RESISTANT     ertapenem Value in next row        NOT REPORTED     Confirmatory Extended Spectrum Beta-Lactamase Value in next row Negative       NOT REPORTED     gentamicin Value in next row Sensitive       <=1SUSCEPTIBLE     meropenem Value in next row        NOT REPORTED     nitrofurantoin Value in next row Sensitive       <=16SUSCEPTIBLE     tigecycline Value in next row        NOT REPORTED     tobramycin Value in next row Sensitive       <=1SUSCEPTIBLE     trimethoprim-sulfamethoxazole Value in next row Sensitive       <=20SUSCEPTIBLE     piperacillin-tazobactam Value in next row Sensitive       <=4SUSCEPTIBLE   Urinalysis   Result Value Ref Range    Color, UA YELLOW YELLOW    Turbidity UA CLOUDY (A) CLEAR    Glucose, Ur NEGATIVE NEGATIVE    Bilirubin Urine NEGATIVE NEGATIVE    Ketones, Urine NEGATIVE NEGATIVE    Specific Gravity, UA 1.023 1.005 - 1.030    Urine Hgb 3+ (A) NEGATIVE    pH, UA 5.5 5.0 - 8.0    Protein, UA 2+ (A) NEGATIVE    Urobilinogen, Urine Normal Normal    Nitrite, Urine NEGATIVE NEGATIVE    Leukocyte Esterase, Urine MODERATE (A) NEGATIVE    Urinalysis Comments NOT REPORTED    CBC Auto Differential   Result Value Ref Range    WBC 8.9 3.5 - 11.3 k/uL    RBC 3.47 (L) 3.95 - 5.11 m/uL    Hemoglobin 8.4 (L) 11.9 - 15.1 g/dL    Hematocrit 27.7 (L) 36.3 - 47.1 %    MCV 79.8 (L) 82.6 - 102.9 fL    MCH 24.2 (L) 25.2 - 33.5 pg    MCHC 30.3 28.4 - 34.8 g/dL    RDW 19.8 (H) 11.8 - 14.4 %    Platelets 327 983 - 240 k/uL    MPV 9.2 8.1 - 13.5 fL    NRBC Automated 0.0 0.0 per 100 WBC    Differential Type NOT REPORTED     WBC Morphology NOT REPORTED     RBC Morphology NOT REPORTED     Platelet Estimate NOT REPORTED     Seg Neutrophils 87 (H) 36 - 66 %    Lymphocytes 7 (L) 24 - 44 %    Monocytes 5 1 - 7 %    Eosinophils % 0 (L) 1 - 4 %    Basophils 0 %    Immature Granulocytes 1 (H) 0 %    Segs Absolute 7.74 (H) 1.8 - 7.7 k/uL    Absolute Lymph # 0.62 (L) 1.0 - 4.8 k/uL    Absolute Mono # 0.45 0.2 - 0.8 k/uL    Absolute Eos # 0.00 0.0 - 0.4 k/uL    Basophils Absolute 0.00 0.0 - 0.2 k/uL    Absolute Immature Granulocyte 0.09 0.00 - 0.30 k/uL   Basic Metabolic Panel   Result Value Ref Range    Glucose 191 (H) 70 - 99 mg/dL    BUN 28 (H) 8 - 23 mg/dL    CREATININE 0.80 0.50 - 0.90 mg/dL    Bun/Cre Ratio 35 (H) 9 - 20    Calcium 9.2 8.6 - 10.4 mg/dL    Sodium 134 (L) 135 - 144 mmol/L    Potassium 4.0 3.7 - 5.3 mmol/L    Chloride 98 98 - 107 mmol/L    CO2 27 20 - 31 mmol/L    Anion Gap 9 9 - 17 mmol/L    GFR Non-African American >60 >60 mL/min    GFR African American >60 >60 mL/min    GFR Comment          GFR Staging NOT REPORTED    Microscopic Urinalysis   Result Value Ref Range    -          WBC, UA TOO NUMEROUS TO COUNT 0 - 5 /HPF    RBC, UA TOO NUMEROUS TO COUNT 0 - 2 /HPF    Casts UA NOT REPORTED /LPF    Crystals, UA NOT REPORTED None /HPF    Epithelial Cells UA 2 TO 5 0 - 5 /HPF    Renal Epithelial, UA NOT REPORTED 0 /HPF    Bacteria, UA MANY (A) None    Mucus, UA 1+ (A) None    Trichomonas, UA NOT REPORTED None    Amorphous, UA NOT REPORTED None    Other Observations UA NOT REPORTED NOT REQ.     Yeast, UA NOT REPORTED None   Basic Metabolic Panel w/ Reflex to MG   Result Value Ref Range    Glucose 77 70 - 99 mg/dL    BUN 17 8 - 23 mg/dL    CREATININE 0.68 0.50 - 0.90 mg/dL    Bun/Cre Ratio 25 (H) 9 - 20    Calcium 8.5 (L) 8.6 - 10.4 mg/dL    Sodium 141 135 - 144 mmol/L    Potassium 4.0 3.7 - 5.3 mmol/L    Chloride 107 98 - 107 mmol/L    CO2 25 20 - 31 mmol/L    Anion Gap 9 9 - 17 mmol/L    GFR Non-African American >60 >60 mL/min    GFR African American >60 >60 mL/min    GFR Comment          GFR Staging NOT 19.7 (H) 11.8 - 14.4 %    Platelets 816 013 - 552 k/uL    MPV 8.7 8.1 - 13.5 fL    NRBC Automated 0.0 0.0 per 100 WBC    Differential Type NOT REPORTED     WBC Morphology NOT REPORTED     RBC Morphology ANISOCYTOSIS PRESENT     Platelet Estimate NOT REPORTED     Seg Neutrophils 78 (H) 36 - 65 %    Lymphocytes 13 (L) 24 - 43 %    Monocytes 7 3 - 12 %    Eosinophils % 1 1 - 4 %    Basophils 1 0 - 2 %    Immature Granulocytes 1 (H) 0 %    Segs Absolute 4.54 1.50 - 8.10 k/uL    Absolute Lymph # 0.75 (L) 1.10 - 3.70 k/uL    Absolute Mono # 0.39 0.10 - 1.20 k/uL    Absolute Eos # 0.07 0.00 - 0.44 k/uL    Basophils Absolute 0.03 0.00 - 0.20 k/uL    Absolute Immature Granulocyte 0.05 0.00 - 0.30 k/uL   Lactic Acid   Result Value Ref Range    Lactic Acid 0.4 (L) 0.5 - 2.2 mmol/L   Vitamin B12 & Folate   Result Value Ref Range    Vitamin B-12 190 (L) 232 - 1245 pg/mL    Folate >20.0 >4.8 ng/mL   Iron and TIBC   Result Value Ref Range    Iron 19 (L) 37 - 145 ug/dL    TIBC 176 (L) 250 - 450 ug/dL    Iron Saturation 11 (L) 20 - 55 %    UIBC 157 112 - 347 ug/dL   Ferritin   Result Value Ref Range    Ferritin 149 13 - 150 ug/L   Basic Metabolic Panel w/ Reflex to MG   Result Value Ref Range    Glucose 95 70 - 99 mg/dL    BUN 11 8 - 23 mg/dL    CREATININE 0.63 0.50 - 0.90 mg/dL    Bun/Cre Ratio 17 9 - 20    Calcium 8.2 (L) 8.6 - 10.4 mg/dL    Sodium 139 135 - 144 mmol/L    Potassium 3.4 (L) 3.7 - 5.3 mmol/L    Chloride 102 98 - 107 mmol/L    CO2 29 20 - 31 mmol/L    Anion Gap 8 (L) 9 - 17 mmol/L    GFR Non-African American >60 >60 mL/min    GFR African American >60 >60 mL/min    GFR Comment          GFR Staging NOT REPORTED    CBC auto differential   Result Value Ref Range    WBC 6.0 3.5 - 11.3 k/uL    RBC 2.98 (L) 3.95 - 5.11 m/uL    Hemoglobin 7.3 (L) 11.9 - 15.1 g/dL    Hematocrit 24.6 (L) 36.3 - 47.1 %    MCV 82.6 82.6 - 102.9 fL    MCH 24.5 (L) 25.2 - 33.5 pg    MCHC 29.7 28.4 - 34.8 g/dL    RDW 19.6 (H) 11.8 - 14.4 % Platelets 391 167 - 200 k/uL    MPV 8.8 8.1 - 13.5 fL    NRBC Automated 0.0 0.0 per 100 WBC    Differential Type NOT REPORTED     WBC Morphology NOT REPORTED     RBC Morphology NOT REPORTED     Platelet Estimate NOT REPORTED     Seg Neutrophils 79 (H) 36 - 66 %    Lymphocytes 10 (L) 24 - 44 %    Monocytes 8 (H) 1 - 7 %    Eosinophils % 1 1 - 4 %    Basophils 1 %    Immature Granulocytes 1 (H) 0 %    Segs Absolute 4.74 1.8 - 7.7 k/uL    Absolute Lymph # 0.60 (L) 1.0 - 4.8 k/uL    Absolute Mono # 0.48 0.2 - 0.8 k/uL    Absolute Eos # 0.06 0.0 - 0.4 k/uL    Basophils Absolute 0.06 0.0 - 0.2 k/uL    Absolute Immature Granulocyte 0.06 0.00 - 0.30 k/uL    Morphology HYPOCHROMIA PRESENT    Lactic Acid   Result Value Ref Range    Lactic Acid 1.1 0.5 - 2.2 mmol/L   Magnesium   Result Value Ref Range    Magnesium 1.3 (L) 1.6 - 2.6 mg/dL   Basic Metabolic Panel w/ Reflex to MG   Result Value Ref Range    Glucose 77 70 - 99 mg/dL    BUN 11 8 - 23 mg/dL    CREATININE 0.63 0.50 - 0.90 mg/dL    Bun/Cre Ratio 17 9 - 20    Calcium 8.4 (L) 8.6 - 10.4 mg/dL    Sodium 141 135 - 144 mmol/L    Potassium 4.1 3.7 - 5.3 mmol/L    Chloride 106 98 - 107 mmol/L    CO2 30 20 - 31 mmol/L    Anion Gap 5 (L) 9 - 17 mmol/L    GFR Non-African American >60 >60 mL/min    GFR African American >60 >60 mL/min    GFR Comment          GFR Staging NOT REPORTED    CBC auto differential   Result Value Ref Range    WBC 5.4 3.5 - 11.3 k/uL    RBC 3.14 (L) 3.95 - 5.11 m/uL    Hemoglobin 7.5 (L) 11.9 - 15.1 g/dL    Hematocrit 25.9 (L) 36.3 - 47.1 %    MCV 82.5 (L) 82.6 - 102.9 fL    MCH 23.9 (L) 25.2 - 33.5 pg    MCHC 29.0 28.4 - 34.8 g/dL    RDW 19.6 (H) 11.8 - 14.4 %    Platelets 324 876 - 463 k/uL    MPV 8.8 8.1 - 13.5 fL    NRBC Automated 0.0 0.0 per 100 WBC    Differential Type NOT REPORTED     WBC Morphology NOT REPORTED     RBC Morphology ANISOCYTOSIS PRESENT     Platelet Estimate NOT REPORTED     Seg Neutrophils 72 (H) 36 - 65 %    Lymphocytes 15 (L) 24 - mmol/L    CO2 32 (H) 20 - 31 mmol/L    Anion Gap 9 9 - 17 mmol/L    GFR Non-African American >60 >60 mL/min    GFR African American >60 >60 mL/min    GFR Comment          GFR Staging NOT REPORTED    CBC auto differential   Result Value Ref Range    WBC 8.0 3.5 - 11.3 k/uL    RBC 3.96 3.95 - 5.11 m/uL    Hemoglobin 9.7 (L) 11.9 - 15.1 g/dL    Hematocrit 33.4 (L) 36.3 - 47.1 %    MCV 84.3 82.6 - 102.9 fL    MCH 24.5 (L) 25.2 - 33.5 pg    MCHC 29.0 28.4 - 34.8 g/dL    RDW 19.6 (H) 11.8 - 14.4 %    Platelets 525 (H) 336 - 453 k/uL    MPV 8.7 8.1 - 13.5 fL    NRBC Automated 0.0 0.0 per 100 WBC    Differential Type NOT REPORTED     Seg Neutrophils 68 (H) 36 - 65 %    Lymphocytes 21 (L) 24 - 43 %    Monocytes 7 3 - 12 %    Eosinophils % 1 1 - 4 %    Basophils 1 0 - 2 %    Immature Granulocytes 2 (H) 0 %    Segs Absolute 5.49 1.50 - 8.10 k/uL    Absolute Lymph # 1.64 1.10 - 3.70 k/uL    Absolute Mono # 0.52 0.10 - 1.20 k/uL    Absolute Eos # 0.08 0.00 - 0.44 k/uL    Basophils Absolute 0.07 0.00 - 0.20 k/uL    Absolute Immature Granulocyte 0.16 0.00 - 0.30 k/uL    WBC Morphology NOT REPORTED     RBC Morphology ANISOCYTOSIS PRESENT     Platelet Estimate NOT REPORTED    Lactic Acid   Result Value Ref Range    Lactic Acid 2.5 (H) 0.5 - 2.2 mmol/L   POC Glucose Fingerstick   Result Value Ref Range    POC Glucose 231 (H) 65 - 105 mg/dL   POC Glucose Fingerstick   Result Value Ref Range    POC Glucose 152 (H) 65 - 105 mg/dL   POC Glucose Fingerstick   Result Value Ref Range    POC Glucose 61 (L) 65 - 105 mg/dL   POC Glucose Fingerstick   Result Value Ref Range    POC Glucose 91 65 - 105 mg/dL   POC Glucose Fingerstick   Result Value Ref Range    POC Glucose 311 (H) 65 - 105 mg/dL   POC Glucose Fingerstick   Result Value Ref Range    POC Glucose 105 65 - 105 mg/dL   POC Glucose Fingerstick   Result Value Ref Range    POC Glucose 180 (H) 65 - 105 mg/dL   POC Glucose Fingerstick   Result Value Ref Range    POC Glucose 163 (H) 65 - Glucose Fingerstick   Result Value Ref Range    POC Glucose 184 (H) 65 - 105 mg/dL   POC Glucose Fingerstick   Result Value Ref Range    POC Glucose 154 (H) 65 - 105 mg/dL   EKG 12 Lead   Result Value Ref Range    Ventricular Rate 101 BPM    Atrial Rate 101 BPM    P-R Interval 148 ms    QRS Duration 154 ms    Q-T Interval 406 ms    QTc Calculation (Bazett) 526 ms    P Axis 68 degrees    R Axis -59 degrees    T Axis 29 degrees         IMAGING DATA:    Xr Ribs Right Include Chest (min 3 Views)    Result Date: 11/3/2020  EXAMINATION: 2 XRAY VIEWS OF THE RIGHT RIBS WITH FRONTAL XRAY VIEW OF THE CHEST 11/3/2020 11:53 am COMPARISON: 09/25/2020 HISTORY: ORDERING SYSTEM PROVIDED HISTORY: fall yesterday TECHNOLOGIST PROVIDED HISTORY: fall yesterday Reason for Exam: fell Acuity: Acute Type of Exam: Initial Relevant Medical/Surgical History: pt fell, pain in rt lat ribs and posterior neck FINDINGS: Masslike opacity in the right lung apex. There is a subtle linear lucency within the 8th rib which is suggestive of a nondisplaced fracture. Multiple remote/chronic fractures identified. Left hemithorax is clear. Subtle linear lucency within the 8th rib, suggestive of a nondisplaced fracture. Masslike opacity in the right lung apex. Xr Cervical Spine (2-3 Views)    Result Date: 11/3/2020  EXAMINATION: 3 XRAY VIEWS OF THE CERVICAL SPINE 11/3/2020 11:52 am COMPARISON: None. HISTORY: ORDERING SYSTEM PROVIDED HISTORY: fall yesterday TECHNOLOGIST PROVIDED HISTORY: fall yesterday Reason for Exam: fell Acuity: Acute Type of Exam: Initial Relevant Medical/Surgical History: pt fell, pain in rt lat ribs and posterior neck FINDINGS: Three views of the cervical spine are submitted for review. Advanced facet arthropathy at C2 through C5. Degenerative disease at C5-C6. The lower cervical spine is obscured on the lateral view due to overlying osseous structures and soft tissues. Masslike right upper lobe airspace opacity.      No convincing evidence for acute fracture or malalignment of the cervical spine. Masslike opacity within the right lung apex. Xr Lumbar Spine (2-3 Views)    Result Date: 11/4/2020  EXAMINATION: THREE XRAY VIEWS OF THE LUMBAR SPINE; THREE XRAY VIEWS OF THE SACRUM/COCCYX 11/4/2020 1:07 pm COMPARISON: CT abdomen and pelvis dated 09/24/2020 HISTORY: ORDERING SYSTEM PROVIDED HISTORY: back pain TECHNOLOGIST PROVIDED HISTORY: back pain Reason for Exam: pain to low back Acuity: Unknown Type of Exam: Unknown FINDINGS: Lumbar spine: Vertebral body heights and alignment are within normal limits. There is mild multilevel disc space narrowing and endplate spurring. Mild to moderate facet arthropathy. There are scattered vascular calcifications. The stomach is significantly distended with gas. No small bowel or large bowel distention. There is moderate fecal material throughout the large bowel. Sacrum/coccyx: Sacrum is not well visualized due to osteopenia and overlying soft tissue attenuation. Sacrococcygeal alignment is grossly preserved. No displaced fracture identified. Sacroiliac joints are symmetric, better visualized on the lumbar spine radiographs. 1.  Lumbar spine: No compression fracture or acute malalignment. Mild degenerative changes. Significant gas distention of the stomach, suggesting gastroparesis or possible obstruction. 2.  Sacrum/coccyx: Limited evaluation due to osteopenia and overlying soft tissue attenuation. No obvious acute osseous abnormality.      Xr Sacrum Coccyx (min 2 Views)    Result Date: 11/4/2020  EXAMINATION: THREE XRAY VIEWS OF THE LUMBAR SPINE; THREE XRAY VIEWS OF THE SACRUM/COCCYX 11/4/2020 1:07 pm COMPARISON: CT abdomen and pelvis dated 09/24/2020 HISTORY: ORDERING SYSTEM PROVIDED HISTORY: back pain TECHNOLOGIST PROVIDED HISTORY: back pain Reason for Exam: pain to low back Acuity: Unknown Type of Exam: Unknown FINDINGS: Lumbar spine: Vertebral body heights and alignment are within normal limits. There is mild multilevel disc space narrowing and endplate spurring. Mild to moderate facet arthropathy. There are scattered vascular calcifications. The stomach is significantly distended with gas. No small bowel or large bowel distention. There is moderate fecal material throughout the large bowel. Sacrum/coccyx: Sacrum is not well visualized due to osteopenia and overlying soft tissue attenuation. Sacrococcygeal alignment is grossly preserved. No displaced fracture identified. Sacroiliac joints are symmetric, better visualized on the lumbar spine radiographs. 1.  Lumbar spine: No compression fracture or acute malalignment. Mild degenerative changes. Significant gas distention of the stomach, suggesting gastroparesis or possible obstruction. 2.  Sacrum/coccyx: Limited evaluation due to osteopenia and overlying soft tissue attenuation. No obvious acute osseous abnormality. Vl Lower Extremity Bilateral Venous Duplex    Result Date: 10/22/2020   Conclusions   Summary   No evidence of superficial or deep venous thrombosis in both lower  extremities. Findings:   Right Impression:                    Left Impression:  The common femoral, femoral,         The common femoral, femoral,  popliteal, tibials and saphenous     popliteal, tibials and saphenous  veins are compressible with normal   veins are compressible with normal  doppler responses. Nm Bone Scan Whole Body    Result Date: 11/4/2020  EXAMINATION: WHOLE BODY BONE SCAN  11/4/2020 TECHNIQUE: The patient was injected intravenously with 25.6 mCi of 99 mTc MDP and scintigraphy of the entire skeleton was performed approximately three hours later. Coned-down images of the head, neck and thorax in obliques positions. COMPARISON: Patient did not have previous imaging studies for comparison.  HISTORY: ORDERING SYSTEM PROVIDED HISTORY: Fall, lung cancer TECHNOLOGIST PROVIDED HISTORY: Fall, lung cancer Reason for Exam: pain Acuity: Unknown Type of Exam: Unknown FINDINGS: Symmetrical uptake of radiotracer is noted in the shoulders, sternoclavicular joints, hips, knees, and ankles, foci of activity in the aforementioned joints are most likely degenerative. The remainder of the osseous structures and soft tissues are unremarkable. Specifically, no radiotracer uptake in the ribcage is noted. Physiologic activity is present in the skeletal and renal collecting systems. No evidence to suggest osseous metastatic disease. Pattern of uptake most compatible with age related degenerative change. IMPRESSION:   Primary Problem  <principal problem not specified>    Active Hospital Problems    Diagnosis Date Noted    Closed fracture of one rib of right side [S22.31XA] 11/04/2020    Degenerative disc disease, lumbar [M51.36] 11/04/2020    Urinary tract infectious disease [N39.0] 11/03/2020    Malignant neoplasm of upper lobe of right lung ([facility] [address] [C34.11] 09/18/2020    Lung mass [R91.8] 08/27/2020    Chronic bilateral low back pain [M54.5, G89.29] 10/31/2017    Falls frequently [R29.6] 01/27/2017   Squamous cell carcinoma of lung, clinical stage IIIb  Ongoing treatment with concurrent chemoradiation using carboplatin and Taxol  UTI  Asthenia  Anemia  Eighth rib fracture    RECOMMENDATIONS:  1. I personally reviewed results of lab work-up imaging studies and other relevant clinical data. Reviewed records and treatment history  2. Continue to hold chemotherapy  3. Continue symptomatic supportive care  4. Transfuse to keep hemoglobin above 7  5. Continue treatment of UTI  6. Patient counseled tobacco cessation  7. Pain control  8. Plan to resume her chemoradiation as outpatient after discharge  9. Discharge as per primary. Awaiting placement    Discussed with patient and Nurse. Thank you for asking us to see this patient. Mendoza Giles MD  Hematologist/Medical Oncologist    Cell: [phone]      This note is created with the assistance of a speech recognition program.  While intending to generate a document that actually reflects the content of the visit, the document can still have some errors including those of syntax and sound a like substitutions which may escape proof reading. It such instances, actual meaning can be extrapolated by contextual diversion.

## 2020-11-10 NOTE — PROGRESS NOTES
Comprehensive Nutrition Assessment    Type and Reason for Visit:  RD Nutrition Re-Screen/LOS    Nutrition Recommendations/Plan: 1. Continue current Carb Control, fluid restriction 2000ml diet. 2. Suggest adding Magic Cup 2x/d per patient request. 3. Monitor P.O & supplement intake and tolerance. Nutrition Assessment:  Pt meets requirements for moderate malnutrition. Pt reported 48lb weight loss x1 month, however per EHR pt with 12lb (8%) wt loss x1 month (bed scale). Pt with mild subcutaneous fat loss. Recent dx: right upper lobe squamous cell carcinoma and requiring chemotherapy and radiation. Pt last chemo 10/28. Pt reported feeling weak and fatigued \"most of the day\". Pt reports consuming 100% of all meals and snacks and still \"feeling like she is starving\". Writer encouraged increasing protein intake and having snacks throughout the day. Pt mentioned diarrhea for 1.5 weeks. Writer provided education on diarrhea nutrition therapy. Pt agreeable to try Magic Cup 2x/d. Malnutrition Assessment:  Malnutrition Status: Moderate malnutrition    Context:  Chronic Illness     Findings of the 6 clinical characteristics of malnutrition:  Energy Intake:  No significant decrease in energy intake  Weight Loss:  7 - Greater than 5% over 1 month     Body Fat Loss:  1 - Mild body fat loss Orbital   Muscle Mass Loss:  Unable to assess    Fluid Accumulation:  Unable to assess     Strength:  Not Performed    Estimated Daily Nutrient Needs:  Energy (kcal):  9287-1433 (25-27 kcal/kg); Weight Used for Energy Requirements:  Usual     Protein (g):  70-80 (1.0-1.2 g/kg);  Weight Used for Protein Requirements:  Usual        Fluid (ml/day):  3673-1338; Method Used for Fluid Requirements:  1 ml/kcal      Nutrition Related Findings:  Active bowel sounds, no edema, chronic diarrhea 1.5 weeks      Wounds:  None       Current Nutrition Therapies:    DIET CARB CONTROL; Daily Fluid Restriction: 2000 ml    Anthropometric Measures:  · Height: 5' 8\" (172.7 cm)  · Current Body Weight: 139 lb 11.2 oz (63.4 kg)   · Admission Body Weight: 150 lb (68 kg)(stated)    · Usual Body Weight: 151 lb (68.5 kg)(151)     · Ideal Body Weight: 140 lbs; % Ideal Body Weight 99.8 %   · BMI: 21.2  · BMI Categories: Normal Weight (BMI 18.5-24. 9)       Nutrition Diagnosis:   · Moderate malnutrition related to increase demand for energy/nutrients as evidenced by weight loss greater than or equal to 5% in 1 month, mild loss of subcutaneous fat(dx: right upper lobe squamous cell carcinoma)      Nutrition Interventions:   Food and/or Nutrient Delivery:  Continue Current Diet, Start Oral Nutrition Supplement  Nutrition Education/Counseling:  Education not indicated   Coordination of Nutrition Care:  Continue to monitor while inpatient    Goals: P.O intake >75% of all meals       Nutrition Monitoring and Evaluation:   Behavioral-Environmental Outcomes:  None Identified   Food/Nutrient Intake Outcomes:  Food and Nutrient Intake, Supplement Intake  Physical Signs/Symptoms Outcomes:  Biochemical Data, Diarrhea, Skin, Weight     Discharge Planning:     Too soon to determine     Sidney Chavez, [address] [place] [address]  Office Number: [phone]

## 2020-11-10 NOTE — PLAN OF CARE
Problem: Pain:  Goal: Pain level will decrease  Description: Pain level will decrease  11/9/2020 2217 by Kyra Alford RN  Outcome: Ongoing  11/9/2020 1605 by Sherryle Mako, RN  Outcome: Ongoing  Note: Pain level assessment complete. Patient educated on pain scale and control interventions  PRN pain medication given per patient request  Patient instructed to call out with new onset of pain or unrelieved pain    Goal: Control of acute pain  Description: Control of acute pain  11/9/2020 2217 by Kyra Alford RN  Outcome: Ongoing  11/9/2020 1605 by Sherryle Mako, RN  Outcome: Ongoing  Goal: Control of chronic pain  Description: Control of chronic pain  11/9/2020 2217 by Kyra Alford RN  Outcome: Ongoing  11/9/2020 1605 by Sherryle Mako, RN  Outcome: Ongoing     Problem: Falls - Risk of:  Goal: Will remain free from falls  Description: Will remain free from falls  11/9/2020 2217 by Kyra Alford RN  Outcome: Ongoing  11/9/2020 1605 by Sherryle Mako, RN  Outcome: Ongoing  Note: Siderails up x 2  Hourly rounding  Call light in reach  Instructed to call for assist before attempting out of bed.   Remains free from falls and accidental injury at this time   Floor free from obstacles  Bed is locked and in lowest position  Adequate lighting provided  Bed alarm on, Red Falling star and Stay with Me signs posted      Goal: Absence of physical injury  Description: Absence of physical injury  11/9/2020 1605 by Sherryle Mako, RN  Outcome: Ongoing     Problem: Sensory:  Goal: General experience of comfort will improve  Description: General experience of comfort will improve  11/9/2020 1605 by Sherryle Mako, RN  Outcome: Ongoing     Problem: Urinary Elimination:  Goal: Signs and symptoms of infection will decrease  Description: Signs and symptoms of infection will decrease  11/9/2020 1605 by Sherryle Mako, RN  Outcome: Ongoing  Goal: Ability to reestablish a normal urinary elimination pattern will improve - after catheter removal  Description: Ability to reestablish a normal urinary elimination pattern will improve  11/9/2020 2217 by Meg Bell RN  Outcome: Ongoing  11/9/2020 1605 by Jamar Mariee RN  Outcome: Ongoing  Goal: Complications related to the disease process, condition or treatment will be avoided or minimized  Description: Complications related to the disease process, condition or treatment will be avoided or minimized  11/9/2020 1605 by Jamar Mariee RN  Outcome: Ongoing     Problem: Discharge Planning:  Goal: Discharged to appropriate level of care  Description: Discharged to appropriate level of care  11/9/2020 1605 by Jamar Mariee RN  Outcome: Ongoing     Problem: Serum Glucose Level - Abnormal:  Goal: Ability to maintain appropriate glucose levels will improve  Description: Ability to maintain appropriate glucose levels will improve  11/9/2020 2217 by Meg Bell RN  Outcome: Ongoing  11/9/2020 1605 by Jamar Mariee RN  Outcome: Ongoing     Problem: Sensory Perception - Impaired:  Goal: Ability to maintain a stable neurologic state will improve  Description: Ability to maintain a stable neurologic state will improve  11/9/2020 1605 by Jamar Mariee RN  Outcome: Ongoing

## 2020-11-10 NOTE — PLAN OF CARE
Problem: Pain:  Description: Pain management should include both nonpharmacologic and pharmacologic interventions. Goal: Pain level will decrease  Description: Pain level will decrease  Outcome: Ongoing  Note: Pain level assessment complete. Patient educated on pain scale and control interventions  PRN pain medication given per patient request  Patient instructed to call out with new onset of pain or unrelieved pain    Goal: Control of acute pain  Description: Control of acute pain  Outcome: Ongoing  Goal: Control of chronic pain  Description: Control of chronic pain  Outcome: Ongoing     Problem: Falls - Risk of:  Goal: Will remain free from falls  Description: Will remain free from falls  Outcome: Ongoing  Note: Siderails up x 2  Hourly rounding  Call light in reach  Instructed to call for assist before attempting out of bed.   Remains free from falls and accidental injury at this time   Floor free from obstacles  Bed is locked and in lowest position  Adequate lighting provided  Bed alarm on, Red Falling star and Stay with Me signs posted      Goal: Absence of physical injury  Description: Absence of physical injury  Outcome: Ongoing     Problem: Sensory:  Goal: General experience of comfort will improve  Description: General experience of comfort will improve  Outcome: Ongoing     Problem: Urinary Elimination:  Goal: Signs and symptoms of infection will decrease  Description: Signs and symptoms of infection will decrease  Outcome: Ongoing  Goal: Ability to reestablish a normal urinary elimination pattern will improve - after catheter removal  Description: Ability to reestablish a normal urinary elimination pattern will improve  Outcome: Ongoing  Goal: Complications related to the disease process, condition or treatment will be avoided or minimized  Description: Complications related to the disease process, condition or treatment will be avoided or minimized  Outcome: Ongoing     Problem: Discharge Planning:  Goal: Discharged to appropriate level of care  Description: Discharged to appropriate level of care  Outcome: Ongoing     Problem: Serum Glucose Level - Abnormal:  Goal: Ability to maintain appropriate glucose levels will improve  Description: Ability to maintain appropriate glucose levels will improve  Outcome: Ongoing

## 2020-11-10 NOTE — CARE COORDINATION
Social work: Cathryn/RN manager received call back from [employer]- she confirmed [employer] and [facility] both bill under [facility] NPI #. Writer will informed [facility] and [facility] of above, to see if they are able to accept with this information. If SNF will accept, will need to reach out to Dr. Rojelio Harris to see if he would be willing to t/f care to Dr. Cami Mcelroy at [facility], who rounds at [facility].

## 2020-11-10 NOTE — PLAN OF CARE
Nutrition Problem #1: Moderate malnutrition  Intervention: Food and/or Nutrient Delivery: Continue Current Diet, Start Oral Nutrition Supplement  Nutritional Goals:  P.O intake >75% of all meals

## 2020-11-10 NOTE — PROGRESS NOTES
Progress note  [facility]    Adult Hospitalist      Name: Kimmie Andrews  MRN: [id number]     Magali: [de-identified]  Room: 2003/2003-02    Admit Date: 11/3/2020 10:59 AM  PCP: Kimberlee Kanner, MD    Primary Problem  Active Problems:    Falls frequently    Chronic bilateral low back pain    Lung mass    Malignant neoplasm of upper lobe of right lung [facility])    Urinary tract infectious disease    Closed fracture of one rib of right side    Degenerative disc disease, lumbar  Resolved Problems:    * No resolved hospital problems. *        Assesment:     · UTI E. coli  · Generalized weakness  · 8th rib lucencies suspicious for nondisplaced fracture  · Recent diagnosis of right upper lobe squamous cell carcinoma status post chemoradiation  · Chronic COPD  · Chronic hypoxic respiratory failure on nocturnal O2  · Former smoker  · Diabetes type 2  · Major Depressive disorder  · Anxiety disorder         Plan:     · Admit to [facility] with telemetry  · O2 maintain oxygen saturation greater than 92%  · Follow urine culture: E. coli, sensitive to ceftriaxone  · Off antibiotics  · IV fluids, discontinue  · Orthopedic consult, input noted  · Had bone scan  · Pain control  · X-ray lumbosacral spine reviewed  · Oncology consult for resumption of chemotherapy  · Continue aspirin, Lasix,  Lopressor  · Continue Abilify, Effexor, trazodone, Neurontin  · Continue glimepiride, added SSI  · DVT and GI prophylaxis. Discharge planning, likely to SNF once arrangements are made. Patient is unable to go to SNF if she needs to continue with her radiation treatment due to insurance/cost reasons. SW arranging a SNF which would cover. Family is not ready to take her home.       Chief Complaint:     Chief Complaint   Patient presents with    Fall         History of Present Illness:      Patient seen and examined bedside  Pt feels better  Dysuria improved  C/o pain Rt ribs   patient denies chest pain no nausea or anorexia headache no palpitation no focal logical deficit   overall patient is feeling better  Afebrile  WBC count is normal          HPI:    Jong Parr is a 72 y.o.  female who presents with Fall    72year-old lady with past medical history of COPD,  coronary artery disease, depression, arthritis presented to ER complaining of generalized weakness and fall. Patient has been recently diagnosed of lung cell carcinoma. She had a bronchoscopy on 8/31/2020 and endobronchial biopsy showed squamous cell carcinoma. She was started on chemoradiation. Her last chemotherapy was on 10/28/2020. She also has an appointment with Radiation Oncology on 10/30/2020. On presentation to ER her sodium was slightly low at 134. Her hemoglobin was 8.4. Her UA showed moderate leukocyte Estrace and many bacteria. Patient was so weak she was unable to get up and ambulate. She is unable to take care of herself at home. Complaining of low back pain and right-sided rib pain. Patient admitted for further management. I have personally reviewed the past medical history, past surgical history, medications, social history, and family history, and summarized in the note. Review of Systems:     All 10 point system is reviewed and negative otherwise mentioned in HPI. Past Medical History:     Past Medical History:   Diagnosis Date    AAA (abdominal aortic aneurysm) ([address]     Pt denies having a history of AAA    Acid reflux     Anxiety and depression     Aortic stenosis     Arthritis     Blister of ankle, right 10/13/2016    blister broke open & draining, is on antibiotics    CAD (coronary artery disease)     Cancer ([address]     lung cancer    COPD (chronic obstructive pulmonary disease) ([address]     Diabetes mellitus ([address]     Falls     Heart block     bifasicular    Hypokalemia     MDRO (multiple drug resistant organisms) resistance 10/17/2014    E.  Coli urine    MRSA (methicillin resistant staph aureus) culture positive resolved ARIPiprazole (ABILIFY) 5 MG tablet Take 1 tablet by mouth daily for 3 doses 11/5/20 11/8/20 Yes Fanta Fraser MD   furosemide (LASIX) 40 MG tablet Take 40 mg by mouth daily   Yes Historical Provider, MD   omeprazole (PRILOSEC) 20 MG delayed release capsule Take 20 mg by mouth daily   Yes Historical Provider, MD   dexamethasone (DECADRON) 4 MG tablet Take 20 mg orally 12 hours and 6 hours before Taxol 10/23/20  Yes Maged Toussaint MD   lidocaine-prilocaine (EMLA) 2.5-2.5 % cream To port site before chemo 10/21/20  Yes Maged Toussaint MD   melatonin 5 MG TABS tablet Take 5 mg by mouth nightly    Yes Historical Provider, MD   glimepiride (AMARYL) 1 MG tablet Take 1 mg by mouth Daily with supper In addition to 2 tablets (=2mg) every morning    Yes Historical Provider, MD   metoprolol tartrate (LOPRESSOR) 25 MG tablet Take 25 mg by mouth 2 times daily   Yes Historical Provider, MD   venlafaxine (EFFEXOR XR) 150 MG extended release capsule Take 150 mg by mouth 2 times daily   Yes Historical Provider, MD   lansoprazole (PREVACID) 30 MG delayed release capsule Take 30 mg by mouth daily 11/10/16  Yes Historical Provider, MD   metFORMIN (GLUCOPHAGE) 500 MG tablet Take 500 mg by mouth 2 times daily 12/7/16  Yes Historical Provider, MD   aspirin EC 81 MG EC tablet Take 81 mg by mouth daily   Yes Historical Provider, MD   mometasone-formoterol (DULERA) 200-5 MCG/ACT inhaler Inhale 2 puffs into the lungs every 12 hours as needed (wheezing/SOB)    Yes Historical Provider, MD   glimepiride (AMARYL) 1 MG tablet Take 2 mg by mouth every morning In addition to 1mg nightly   Yes Historical Provider, MD        Allergies:       Codeine and Dye [iodides]    Social History:     Tobacco:    reports that she quit smoking about 4 years ago. She has never used smokeless tobacco.  Alcohol:      reports no history of alcohol use. Drug Use:  reports no history of drug use.     Family History:     Family History   Problem Relation Age of Onset    Cancer Mother     Cancer Father          Physical Exam:     Vitals:  BP (!) 94/47   Pulse 95   Temp 98.6 °F (37 °C) (Oral)   Resp 17   Ht 5' 8\" (1.727 m)   Wt 139 lb 11.2 oz (63.4 kg)   SpO2 99%   BMI 21.24 kg/m²   Temp (24hrs), Av.4 °F (36.9 °C), Min:98.1 °F (36.7 °C), Max:98.6 °F (37 °C)          General appearance - alert, well appearing, and in no acute distress  Mental status - oriented to person, place, and time with normal affect  Head - normocephalic and atraumatic  Eyes - pupils equal and reactive, extraocular eye movements intact, conjunctiva clear  Ears - hearing appears to be intact  Nose - no drainage noted  Mouth - mucous membranes moist  Neck - supple, no carotid bruits, thyroid not palpable  Chest - clear to auscultation, normal effort  Heart - normal rate, regular rhythm, no murmur  Abdomen - soft, nontender, nondistended, bowel sounds present all four quadrants, no masses, hepatomegaly or splenomegaly  Neurological - normal speech, no focal findings or movement disorder noted, cranial nerves II through XII grossly intact  Extremities - peripheral pulses palpable, no pedal edema or calf pain with palpation  Skin - no gross lesions, rashes, or induration noted        Data:     Labs:    Hematology:  Recent Labs     11/08/20  0458 11/09/20  0506 11/10/20  0557   WBC 6.8 6.3 8.0   RBC 3.64* 3.65* 3.96   HGB 8.9* 8.9* 9.7*   HCT 30.7* 30.1* 33.4*   MCV 84.3 82.5* 84.3   MCH 24.5* 24.4* 24.5*   MCHC 29.0 29.6 29.0   RDW 19.6* 19.5* 19.6*    404 499*   MPV 8.7 8.4 8.7     Chemistry:  Recent Labs     20  0506 11/10/20  0557    140 142   K 4.8 4.3 5.0    102 101   CO2 30 29 32*   GLUCOSE 133* 142* 153*   BUN 11 15 17   CREATININE 0.68 0.70 0.87   ANIONGAP 7* 9 9   LABGLOM >60 >60 >60   GFRAA >60 >60 >60   CALCIUM 9.1 9.0 9.8     Recent Labs     20  0625 20  1133 20  1614 20  1650 20  2024 11/10/20  0612   POCGLU 116* 227* 52* 92 184* 154*       Lab Results   Component Value Date    INR 0.9 09/29/2020    INR 1.1 08/28/2020    INR 0.9 12/28/2018    PROTIME 11.8 09/29/2020    PROTIME 14.4 (H) 08/28/2020    PROTIME 9.7 12/28/2018       Lab Results   Component Value Date/Time    SPECIAL NOT REPORTED 11/03/2020 02:15 PM     Lab Results   Component Value Date/Time    CULTURE NO SAMPLE RECEIVED 11/03/2020 02:15 PM       Lab Results   Component Value Date    POCPH 7.36 04/12/2017    PHART 7.42 08/26/2012    PH 7.22 04/11/2017    POCPCO2 45 04/12/2017    OEV9NSG 41 08/26/2012    PCO2 54 04/11/2017    POCPO2 93 04/12/2017    PO2ART 59 08/26/2012    PO2 89 04/11/2017    POCHCO3 25.3 04/12/2017    SBL9NXX 26.1 08/26/2012    HCO3 22.2 04/11/2017    NBEA NOT REPORTED 04/12/2017    PBEA 0 04/12/2017    HUL2NPP 27 04/12/2017    SGHL9BOE 97 04/12/2017    Q8LXPTDI 92.1 08/26/2012    O2SAT 95 04/11/2017    FIO2 UNKNOWN 10/31/2017       Radiology:    Xr Ribs Right Include Chest (min 3 Views)    Result Date: 11/3/2020  Subtle linear lucency within the 8th rib, suggestive of a nondisplaced fracture. Masslike opacity in the right lung apex. Xr Cervical Spine (2-3 Views)    Result Date: 11/3/2020  No convincing evidence for acute fracture or malalignment of the cervical spine. Masslike opacity within the right lung apex. All radiological studies reviewed                Code Status:  Full Code    Electronically signed by Porsche Pickering MD on 11/10/2020 at 8:12 AM     Copy sent to Dr. Vicente Cisneros MD    This note was created with the assistance of a speech-recognition program.  Although the intention is to generate a document that actually reflects the content of the visit, no guarantees can be provided that every mistake has been identified and corrected by editing. Note was updated later by me after  physical examination and  completion of the assessment.

## 2020-11-10 NOTE — CARE COORDINATION
Social work: Met with patient, she does not want to go to [facility]. She is agreeable to [facility], preference is home she is agreeable to snf because her son wants her to go. Writer informed Danilo that [facility] under [facility] NPI; Cherelle/danilo is checking with administrators to see if they can accommodate knowing this information. Regarding transportation, they can assist if family is willing to help as well. Call to Dr. Micah Ferguson to inform of this, states if he would to switch to dr Kath Liriano process would need to be started over. Writer explained to Dr Micah Ferguson issue is hospital based radiology vs freestanding building. Dr Rucker plans to speak with pt's son about concerns noted.

## 2020-11-11 ENCOUNTER — HOSPITAL ENCOUNTER (OUTPATIENT)
Dept: INFUSION THERAPY | Facility: MEDICAL CENTER | Age: 70
End: 2020-11-11
Payer: MEDICARE

## 2020-11-11 ENCOUNTER — HOSPITAL ENCOUNTER (OUTPATIENT)
Dept: RADIATION ONCOLOGY | Facility: MEDICAL CENTER | Age: 70
End: 2020-11-11
Payer: MEDICARE

## 2020-11-11 LAB
ABSOLUTE EOS #: 0.07 K/UL (ref 0–0.44)
ABSOLUTE IMMATURE GRANULOCYTE: 0.14 K/UL (ref 0–0.3)
ABSOLUTE LYMPH #: 1.04 K/UL (ref 1.1–3.7)
ABSOLUTE MONO #: 0.44 K/UL (ref 0.1–1.2)
ANION GAP SERPL CALCULATED.3IONS-SCNC: 8 MMOL/L (ref 9–17)
BASOPHILS # BLD: 1 % (ref 0–2)
BASOPHILS ABSOLUTE: 0.05 K/UL (ref 0–0.2)
BUN BLDV-MCNC: 22 MG/DL (ref 8–23)
BUN/CREAT BLD: 28 (ref 9–20)
CALCIUM SERPL-MCNC: 9.1 MG/DL (ref 8.6–10.4)
CHLORIDE BLD-SCNC: 101 MMOL/L (ref 98–107)
CO2: 31 MMOL/L (ref 20–31)
CREAT SERPL-MCNC: 0.8 MG/DL (ref 0.5–0.9)
DIFFERENTIAL TYPE: ABNORMAL
EOSINOPHILS RELATIVE PERCENT: 1 % (ref 1–4)
GFR AFRICAN AMERICAN: >60 ML/MIN
GFR NON-AFRICAN AMERICAN: >60 ML/MIN
GFR SERPL CREATININE-BSD FRML MDRD: ABNORMAL ML/MIN/{1.73_M2}
GFR SERPL CREATININE-BSD FRML MDRD: ABNORMAL ML/MIN/{1.73_M2}
GLUCOSE BLD-MCNC: 109 MG/DL (ref 65–105)
GLUCOSE BLD-MCNC: 118 MG/DL (ref 70–99)
GLUCOSE BLD-MCNC: 188 MG/DL (ref 65–105)
GLUCOSE BLD-MCNC: 273 MG/DL (ref 65–105)
GLUCOSE BLD-MCNC: 94 MG/DL (ref 65–105)
HCT VFR BLD CALC: 29 % (ref 36.3–47.1)
HEMOGLOBIN: 8.7 G/DL (ref 11.9–15.1)
IMMATURE GRANULOCYTES: 3 %
LACTIC ACID: 0.9 MMOL/L (ref 0.5–2.2)
LYMPHOCYTES # BLD: 20 % (ref 24–43)
MCH RBC QN AUTO: 24.7 PG (ref 25.2–33.5)
MCHC RBC AUTO-ENTMCNC: 30 G/DL (ref 28.4–34.8)
MCV RBC AUTO: 82.4 FL (ref 82.6–102.9)
MONOCYTES # BLD: 8 % (ref 3–12)
NRBC AUTOMATED: 0 PER 100 WBC
PDW BLD-RTO: 19.4 % (ref 11.8–14.4)
PLATELET # BLD: 346 K/UL (ref 138–453)
PLATELET ESTIMATE: ABNORMAL
PMV BLD AUTO: 8.4 FL (ref 8.1–13.5)
POTASSIUM SERPL-SCNC: 4.3 MMOL/L (ref 3.7–5.3)
RBC # BLD: 3.52 M/UL (ref 3.95–5.11)
RBC # BLD: ABNORMAL 10*6/UL
SEG NEUTROPHILS: 67 % (ref 36–65)
SEGMENTED NEUTROPHILS ABSOLUTE COUNT: 3.47 K/UL (ref 1.5–8.1)
SODIUM BLD-SCNC: 140 MMOL/L (ref 135–144)
WBC # BLD: 5.2 K/UL (ref 3.5–11.3)
WBC # BLD: ABNORMAL 10*3/UL

## 2020-11-11 PROCEDURE — 82947 ASSAY GLUCOSE BLOOD QUANT: CPT

## 2020-11-11 PROCEDURE — 1200000000 HC SEMI PRIVATE

## 2020-11-11 PROCEDURE — 2580000003 HC RX 258: Performed by: HOSPITALIST

## 2020-11-11 PROCEDURE — 97110 THERAPEUTIC EXERCISES: CPT

## 2020-11-11 PROCEDURE — 85025 COMPLETE CBC W/AUTO DIFF WBC: CPT

## 2020-11-11 PROCEDURE — 6370000000 HC RX 637 (ALT 250 FOR IP): Performed by: ORTHOPAEDIC SURGERY

## 2020-11-11 PROCEDURE — 83605 ASSAY OF LACTIC ACID: CPT

## 2020-11-11 PROCEDURE — 97530 THERAPEUTIC ACTIVITIES: CPT

## 2020-11-11 PROCEDURE — 36415 COLL VENOUS BLD VENIPUNCTURE: CPT

## 2020-11-11 PROCEDURE — 6370000000 HC RX 637 (ALT 250 FOR IP): Performed by: HOSPITALIST

## 2020-11-11 PROCEDURE — 80048 BASIC METABOLIC PNL TOTAL CA: CPT

## 2020-11-11 PROCEDURE — 6360000002 HC RX W HCPCS: Performed by: HOSPITALIST

## 2020-11-11 PROCEDURE — 99232 SBSQ HOSP IP/OBS MODERATE 35: CPT | Performed by: INTERNAL MEDICINE

## 2020-11-11 RX ADMIN — OXYCODONE HYDROCHLORIDE AND ACETAMINOPHEN 1 TABLET: 5; 325 TABLET ORAL at 06:36

## 2020-11-11 RX ADMIN — GLIMEPIRIDE 1 MG: 1 TABLET ORAL at 18:25

## 2020-11-11 RX ADMIN — GABAPENTIN 800 MG: 300 CAPSULE ORAL at 21:49

## 2020-11-11 RX ADMIN — VENLAFAXINE HYDROCHLORIDE 150 MG: 75 CAPSULE, EXTENDED RELEASE ORAL at 08:53

## 2020-11-11 RX ADMIN — METOPROLOL TARTRATE 25 MG: 25 TABLET, FILM COATED ORAL at 08:53

## 2020-11-11 RX ADMIN — INSULIN LISPRO 9 UNITS: 100 INJECTION, SOLUTION INTRAVENOUS; SUBCUTANEOUS at 12:45

## 2020-11-11 RX ADMIN — FAMOTIDINE 20 MG: 20 TABLET, FILM COATED ORAL at 08:53

## 2020-11-11 RX ADMIN — INSULIN LISPRO 2 UNITS: 100 INJECTION, SOLUTION INTRAVENOUS; SUBCUTANEOUS at 21:49

## 2020-11-11 RX ADMIN — SODIUM CHLORIDE, PRESERVATIVE FREE 10 ML: 5 INJECTION INTRAVENOUS at 19:38

## 2020-11-11 RX ADMIN — ENOXAPARIN SODIUM 40 MG: 40 INJECTION SUBCUTANEOUS at 08:53

## 2020-11-11 RX ADMIN — GLIMEPIRIDE 2 MG: 2 TABLET ORAL at 08:53

## 2020-11-11 RX ADMIN — METOPROLOL TARTRATE 25 MG: 25 TABLET, FILM COATED ORAL at 21:49

## 2020-11-11 RX ADMIN — VENLAFAXINE HYDROCHLORIDE 150 MG: 75 CAPSULE, EXTENDED RELEASE ORAL at 21:49

## 2020-11-11 RX ADMIN — GABAPENTIN 400 MG: 300 CAPSULE ORAL at 06:28

## 2020-11-11 RX ADMIN — FUROSEMIDE 40 MG: 40 TABLET ORAL at 08:53

## 2020-11-11 RX ADMIN — FAMOTIDINE 20 MG: 20 TABLET, FILM COATED ORAL at 21:49

## 2020-11-11 RX ADMIN — OXYCODONE HYDROCHLORIDE AND ACETAMINOPHEN 1 TABLET: 5; 325 TABLET ORAL at 12:49

## 2020-11-11 RX ADMIN — GABAPENTIN 400 MG: 300 CAPSULE ORAL at 12:46

## 2020-11-11 RX ADMIN — OXYCODONE HYDROCHLORIDE AND ACETAMINOPHEN 1 TABLET: 5; 325 TABLET ORAL at 19:38

## 2020-11-11 RX ADMIN — TRAZODONE HYDROCHLORIDE 300 MG: 100 TABLET ORAL at 21:49

## 2020-11-11 RX ADMIN — ASPIRIN 81 MG: 81 TABLET, COATED ORAL at 08:53

## 2020-11-11 RX ADMIN — ARIPIPRAZOLE 5 MG: 5 TABLET ORAL at 08:53

## 2020-11-11 RX ADMIN — SODIUM CHLORIDE, PRESERVATIVE FREE 10 ML: 5 INJECTION INTRAVENOUS at 08:55

## 2020-11-11 ASSESSMENT — PAIN DESCRIPTION - PROGRESSION
CLINICAL_PROGRESSION: GRADUALLY WORSENING
CLINICAL_PROGRESSION: NOT CHANGED
CLINICAL_PROGRESSION: NOT CHANGED

## 2020-11-11 ASSESSMENT — PAIN SCALES - GENERAL
PAINLEVEL_OUTOF10: 10
PAINLEVEL_OUTOF10: 8
PAINLEVEL_OUTOF10: 10
PAINLEVEL_OUTOF10: 10
PAINLEVEL_OUTOF10: 6
PAINLEVEL_OUTOF10: 10

## 2020-11-11 ASSESSMENT — PAIN DESCRIPTION - LOCATION
LOCATION: RIB CAGE
LOCATION: BACK;RIB CAGE
LOCATION: HIP
LOCATION: RIB CAGE

## 2020-11-11 ASSESSMENT — PAIN DESCRIPTION - ONSET
ONSET: ON-GOING

## 2020-11-11 ASSESSMENT — PAIN DESCRIPTION - ORIENTATION
ORIENTATION: RIGHT
ORIENTATION: RIGHT;LOWER

## 2020-11-11 ASSESSMENT — PAIN DESCRIPTION - FREQUENCY
FREQUENCY: CONTINUOUS

## 2020-11-11 ASSESSMENT — PAIN DESCRIPTION - DESCRIPTORS
DESCRIPTORS: ACHING;DISCOMFORT

## 2020-11-11 ASSESSMENT — PAIN DESCRIPTION - PAIN TYPE
TYPE: CHRONIC PAIN
TYPE: ACUTE PAIN
TYPE: CHRONIC PAIN

## 2020-11-11 NOTE — PROGRESS NOTES
Physical Therapy  Facility/Department: [facility]  Daily Treatment Note  NAME: Tha hKan  : 1950  MRN: [id number]    Date of Service: 2020    Discharge Recommendations:  Subacute/Skilled Nursing Facility        Assessment   Body structures, Functions, Activity limitations: Decreased functional mobility ; Decreased safe awareness;Decreased strength;Decreased endurance;Decreased balance;Decreased ADL status  Assessment: pt limited by hip pain  Activity Tolerance  Activity Tolerance: Patient limited by pain; Patient limited by endurance     Patient Diagnosis(es): The primary encounter diagnosis was Urinary tract infection without hematuria, site unspecified. Diagnoses of Fall, initial encounter, Contusion of right chest wall, initial encounter, General weakness, Malignant neoplasm of upper lobe of right lung ([facility] [address] and Lung mass were also pertinent to this visit. has a past medical history of AAA (abdominal aortic aneurysm) (HCC), Acid reflux, Anxiety and depression, Aortic stenosis, Arthritis, Blister of ankle, right, CAD (coronary artery disease), Cancer ([facility] [address] COPD (chronic obstructive pulmonary disease) ([facility] [address] Diabetes mellitus ([facility] [address] Falls, Heart block, Hypokalemia, MDRO (multiple drug resistant organisms) resistance, MRSA (methicillin resistant staph aureus) culture positive, On home oxygen therapy, On home oxygen therapy, Overactive bladder, Pneumonia, PONV (postoperative nausea and vomiting), and Vitamin D deficiency. has a past surgical history that includes back surgery; eye surgery; Cholecystectomy; Appendectomy; Hysterectomy; Tonsillectomy; lumbar laminectomy; Endoscopy, colon, diagnostic (2016); aortic valve repair (N/A, 2017); bronchoscopy (N/A, 2020); IR INSERT PICC VAD W SQ PORT >5 YEARS (2020); and IR PORT PLACEMENT > 5 YEARS (2020).     Restrictions  Restrictions/Precautions  Restrictions/Precautions: Seizure, Fall Risk, Contact Precautions, Up as Tolerated  Required Braces or Orthoses?: No  Position Activity Restriction  Other position/activity restrictions: up as tolerated, Weightbearing as tolerated all extremities without restrictions, 3 L O2 per NC, RUE IV, alarms  Subjective   General  Chart Reviewed: Yes  Additional Pertinent Hx: chronic mechanical lumbar pain, discectomy, lung carcinoma, COPD, falls, home O2 use  Subjective  Subjective: Patient c/o Rt flank and hip pain reporting she broke her rib and shurt her hip from the fall. General Comment  Comments: pt walked several laps in room with OT refsues to amb. again as her hip pan is 10/10  Pain Screening  Patient Currently in Pain: Yes  Pain Assessment  Pain Assessment: 0-10  Pain Level: 10  Pain Location: Hip  Pain Orientation: Right  Vital Signs  Patient Currently in Pain: Yes       Orientation     Cognition      Objective         Ambulation  Ambulation?: No     Balance  Posture: Fair  Sitting - Static: Good  Sitting - Dynamic: Good;-  Exercises  Comments: seated LE AROM, AROM x 15 UE's         Comment: assisted pt back to bed at conclusion of treatment session              G-Code     OutComes Score                                                     AM-PAC Score  AM-PAC Inpatient Mobility Raw Score : 18 (11/11/20 1339)  -PAC Inpatient T-Scale Score : 43.63 (11/11/20 1339)  Mobility Inpatient CMS 0-100% Score: 46.58 (11/11/20 1339)  Mobility Inpatient CMS G-Code Modifier : CK (11/11/20 1339)          Goals  Short term goals  Time Frame for Short term goals: 12 visits  Short term goal 1: Inc bed-mobility & transfers to independent to enable pt to safely get in/OOB  Short term goal 2: Inc gait to amb 150ft or > indep w/ RW to enable pt to return to previous level of independence  Short term goal 3: Inc strength to [address] standing balance to good with device to facilitate pt independence for performance of ADL's & functional mobility, & reduce fall risk;   Short term goal 4: Pt able to tolerate 30-40

## 2020-11-11 NOTE — FLOWSHEET NOTE
Patient is awake and alert while sitting up in bed and eating her dinner. Patient is approachable and engages in conversation. Patient expresses her frustration concerning the next step in her treatment. Patient expects to be transferred to a rehab facility soon. Patient expresses some frustration with her children understanding her situation. Patient appears to be coping adequately and expresses appreciation for the visit. Spiritual Care will follow as needed.        11/11/20 9259   Encounter Summary   Services provided to: Patient   Referral/Consult From: Other ;Rounding   Support System Children;Family members   Continue Visiting   (11/11/20)   Complexity of Encounter Moderate   Length of Encounter 15 minutes   Routine   Type Follow up   Spiritual/Yarsanism   Type Spiritual support   Assessment Approachable;Doubtful   Intervention Active listening;Explored feelings, thoughts, concerns;Explored coping resources;Nurtured hope   Outcome Expressed gratitude

## 2020-11-11 NOTE — PROGRESS NOTES
Occupational Therapy  Facility/Department: [facility]  Daily Treatment Note  NAME: Josette Zayas  : 1950  MRN: [id number]    Date of Service: 2020    Discharge Recommendations:  Subacute/Skilled Nursing Facility  OT Equipment Recommendations  Equipment Needed: Yes  Mobility Devices: ADL Assistive Devices  Walker: Rolling  ADL Assistive Devices: Grab Bars - shower;Sock-Aid Soft;Reacher;Long-handled Shoe Horn;Long-handled Sponge;Emergency Alert System    Assessment   Performance deficits / Impairments: Decreased functional mobility ; Decreased safe awareness;Decreased balance;Decreased coordination;Decreased ADL status; Decreased posture;Decreased endurance;Decreased high-level IADLs;Decreased strength  Assessment: Skilled OT is necessary for this pt to increase overall I and safety with functional tasks and reduce fall risk to return home as able and with assist.  Prognosis: Good  OT Education: Energy Conservation;OT Role;Plan of Care;Transfer Training  REQUIRES OT FOLLOW UP: Yes  Activity Tolerance  Activity Tolerance: Patient Tolerated treatment well  Safety Devices  Safety Devices in place: Yes  Type of devices: All fall risk precautions in place; Left in bed;Nurse notified;Call light within reach;Gait belt;Patient at risk for falls(PT entering room, left patient sitting up EOB)         Patient Diagnosis(es): The primary encounter diagnosis was Urinary tract infection without hematuria, site unspecified. Diagnoses of Fall, initial encounter, Contusion of right chest wall, initial encounter, General weakness, Malignant neoplasm of upper lobe of right lung ([address] and Lung mass were also pertinent to this visit.       has a past medical history of AAA (abdominal aortic aneurysm) (HCC), Acid reflux, Anxiety and depression, Aortic stenosis, Arthritis, Blister of ankle, right, CAD (coronary artery disease), Cancer ([address] COPD (chronic obstructive pulmonary disease) ([address] Diabetes mellitus ([address] Falls, Heart block, Hypokalemia, MDRO (multiple drug resistant organisms) resistance, MRSA (methicillin resistant staph aureus) culture positive, On home oxygen therapy, On home oxygen therapy, Overactive bladder, Pneumonia, PONV (postoperative nausea and vomiting), and Vitamin D deficiency. has a past surgical history that includes back surgery; eye surgery; Cholecystectomy; Appendectomy; Hysterectomy; Tonsillectomy; lumbar laminectomy; Endoscopy, colon, diagnostic (12/08/2016); aortic valve repair (N/A, 4/11/2017); bronchoscopy (N/A, 8/31/2020); IR INSERT PICC VAD W SQ PORT >5 YEARS (9/4/2020); and IR PORT PLACEMENT > 5 YEARS (9/29/2020). Restrictions  Restrictions/Precautions  Restrictions/Precautions: Seizure, Fall Risk, Contact Precautions, Up as Tolerated  Required Braces or Orthoses?: No  Position Activity Restriction  Other position/activity restrictions: up as tolerated, Weightbearing as tolerated all extremities without restrictions, 3 L O2 per NC, RUE IV, alarms  Subjective   General  Chart Reviewed: Yes  Patient assessed for rehabilitation services?: Yes  Response to previous treatment: Patient with no complaints from previous session  Family / Caregiver Present: No      Orientation  Orientation  Overall Orientation Status: Within Functional Limits  Objective    ADL  Additional Comments: Patient stated she completed all ADL's at 5:30 this am, no needs at this time        Balance  Sitting Balance: Supervision  Standing Balance: Stand by assistance    Functional Mobility  Functional - Mobility Device: Rolling Walker  Activity: Other  Assist Level: Contact guard assistance  Functional Mobility Comments: Pt completed functional mobility around room several times using RW with SBA-CGA for safety and assist managing O2 tubing. Bed mobility  Supine to Sit: Stand by assistance  Comment: Patient SBA for bed mobility with increased time to complete and verbal cues for pursed lip breathing.  Good use of bed rails    Transfers  Sit to stand: Stand by assistance  Stand to sit: Stand by assistance  Transfer Comments: Verbal cues for hand placement and overall safety, assist with O2 tubing mgmt. Patient with 1 LOB during sit>stand patient stated her R hips gives out then she finds herself on the floor at home. Cognition  Overall Cognitive Status: Exceptions  Arousal/Alertness: Appropriate responses to stimuli  Following Commands:  Follows all commands without difficulty  Attention Span: Appears intact  Memory: Decreased short term memory  Safety Judgement: Decreased awareness of need for assistance;Decreased awareness of need for safety  Problem Solving: Assistance required to identify errors made;Assistance required to correct errors made;Decreased awareness of errors  Insights: Decreased awareness of deficits  Initiation: Requires cues for some  Sequencing: Requires cues for some      Plan   Plan  Times per week: 4-5x/week 1x/day as emely  Current Treatment Recommendations: Strengthening, Balance Training, Functional Mobility Training, Safety Education & Training, Endurance Training, Neuromuscular Re-education, Patient/Caregiver Education & Training, Self-Care / ADL, Equipment Evaluation, Education, & procurement, Home Management Training, Pain Management  AM-PAC Score     AM-PAC Inpatient Mobility without Stair Climbing Raw Score : 17 (11/11/20 1118)  AM-PAC Inpatient without Stair Climbing T-Scale Score : 48.47 (11/11/20 1118)  Mobility Inpatient CMS 0-100% Score: 32.72 (11/11/20 1118)  Mobility Inpatient without Stair CMS G-Code Modifier : Alvina Rosa (11/11/20 1118)  AM-PAC Inpatient Daily Activity Raw Score: 21 (11/11/20 1118)  AM-PAC Inpatient ADL T-Scale Score : 44.27 (11/11/20 1118)  ADL Inpatient CMS 0-100% Score: 32.79 (11/11/20 1118)  ADL Inpatient CMS G-Code Modifier : Alvina Rosa (11/11/20 1118)    Goals  Short term goals  Time Frame for Short term goals: by discharge, pt to demo  Short term goal 1: bed mob tasks with use of rail as needed to SUP. Short term goal 2: increase in BUE strength by a 1/2 grade to assist with self care and be I with BUE HEP with use of hand outs. Short term goal 3: UB ADL to set up and LB ADL to min assist with use of AD/AE. Short term goal 4: toileting tasks with use of AD/grab bar as needed to min assist.  Short term goal 5: ADL transfers and functional mob with AD as needed to SBA level. Long term goals  Long term goal 1: Pt to stand with SBA and AD emely > 5 mins as able to reduce falls with ADL tasks. Long term goal 2: Pt/family to be I with EC/WS and fall prevention tech as well as DME/AE recommendations with use of hand outs. Patient Goals   Patient goals : Get out of here and get home! Therapy Time   Individual Concurrent Group Co-treatment   Time In 1050         Time Out 1114         Minutes 24           Upon writer exit, call light within reach, pt retired to bed. All lines intact and patient positioned comfortably. All patient needs addressed prior to ending therapy session. Chart reviewed prior to treatment and patient is agreeable for therapy. RN reports patient is medically stable for therapy treatment this date. Patient verbalized feeling upset that she cannot walk outside of room.  Writer offered to open blinds for stimulation, but patient refused      HARSHA Lomas/JARROD

## 2020-11-11 NOTE — PLAN OF CARE
Problem: Falls - Risk of:  Goal: Will remain free from falls  Description: Will remain free from falls  11/11/2020 0639 by Monty Lopez RN  Outcome: Ongoing  Note: Room free of clutter  Hourly rounding   Non-skid socks worn  Side rails up x2  Bed low and locked  Call light in reach  Instructed to call out before getting out of bed  Anticipatory needs met  Bed alarm on  Falling star at the door and on wristband      Problem: Discharge Planning:  Goal: Discharged to appropriate level of care  Description: Discharged to appropriate level of care  11/11/2020 0639 by Monty Lopez RN  Outcome: Ongoing  Note: Identify potential discharge needs/barriers on admission  Involve family/patient/significant other in discharge process  Collaborate with Case Management/ for discharge needs/concerns

## 2020-11-11 NOTE — CARE COORDINATION
Luli Todd called spoke with writer and charge nurse Zack Jj. He did speak with pt. Son and explained son would provide transportation to appointments. Writer explained-it is not completely a transportation issue but a financial issue  as facility is responsible for cost of radiation tx. Dr. Roselia Todd offered to call [facility] tomorrow  and speak with admin about situation he will ask  for [facility]. Updated LSW.

## 2020-11-12 ENCOUNTER — APPOINTMENT (OUTPATIENT)
Dept: RADIATION ONCOLOGY | Facility: MEDICAL CENTER | Age: 70
End: 2020-11-12
Payer: MEDICARE

## 2020-11-12 VITALS
BODY MASS INDEX: 22.11 KG/M2 | HEART RATE: 86 BPM | RESPIRATION RATE: 16 BRPM | OXYGEN SATURATION: 97 % | SYSTOLIC BLOOD PRESSURE: 121 MMHG | HEIGHT: 68 IN | TEMPERATURE: 98.4 F | WEIGHT: 145.9 LBS | DIASTOLIC BLOOD PRESSURE: 46 MMHG

## 2020-11-12 LAB
ABSOLUTE EOS #: 0.06 K/UL (ref 0–0.44)
ABSOLUTE IMMATURE GRANULOCYTE: 0.12 K/UL (ref 0–0.3)
ABSOLUTE LYMPH #: 1.07 K/UL (ref 1.1–3.7)
ABSOLUTE MONO #: 0.38 K/UL (ref 0.1–1.2)
ANION GAP SERPL CALCULATED.3IONS-SCNC: 8 MMOL/L (ref 9–17)
BASOPHILS # BLD: 1 % (ref 0–2)
BASOPHILS ABSOLUTE: 0.06 K/UL (ref 0–0.2)
BUN BLDV-MCNC: 27 MG/DL (ref 8–23)
BUN/CREAT BLD: 31 (ref 9–20)
CALCIUM SERPL-MCNC: 9.1 MG/DL (ref 8.6–10.4)
CHLORIDE BLD-SCNC: 102 MMOL/L (ref 98–107)
CO2: 30 MMOL/L (ref 20–31)
CREAT SERPL-MCNC: 0.86 MG/DL (ref 0.5–0.9)
DIFFERENTIAL TYPE: ABNORMAL
EOSINOPHILS RELATIVE PERCENT: 1 % (ref 1–4)
ESTIMATED AVERAGE GLUCOSE: 146 MG/DL
GFR AFRICAN AMERICAN: >60 ML/MIN
GFR NON-AFRICAN AMERICAN: >60 ML/MIN
GFR SERPL CREATININE-BSD FRML MDRD: ABNORMAL ML/MIN/{1.73_M2}
GFR SERPL CREATININE-BSD FRML MDRD: ABNORMAL ML/MIN/{1.73_M2}
GLUCOSE BLD-MCNC: 193 MG/DL (ref 65–105)
GLUCOSE BLD-MCNC: 223 MG/DL (ref 70–99)
GLUCOSE BLD-MCNC: 316 MG/DL (ref 65–105)
GLUCOSE BLD-MCNC: 68 MG/DL (ref 65–105)
GLUCOSE BLD-MCNC: 68 MG/DL (ref 65–105)
GLUCOSE BLD-MCNC: 79 MG/DL (ref 65–105)
HBA1C MFR BLD: 6.7 % (ref 4–6)
HCT VFR BLD CALC: 30 % (ref 36.3–47.1)
HEMOGLOBIN: 8.6 G/DL (ref 11.9–15.1)
IMMATURE GRANULOCYTES: 2 %
LACTIC ACID: 2.2 MMOL/L (ref 0.5–2.2)
LYMPHOCYTES # BLD: 19 % (ref 24–43)
MCH RBC QN AUTO: 24.5 PG (ref 25.2–33.5)
MCHC RBC AUTO-ENTMCNC: 28.7 G/DL (ref 28.4–34.8)
MCV RBC AUTO: 85.5 FL (ref 82.6–102.9)
MONOCYTES # BLD: 7 % (ref 3–12)
NRBC AUTOMATED: 0 PER 100 WBC
PDW BLD-RTO: 19.8 % (ref 11.8–14.4)
PLATELET # BLD: 313 K/UL (ref 138–453)
PLATELET ESTIMATE: ABNORMAL
PMV BLD AUTO: 8.6 FL (ref 8.1–13.5)
POTASSIUM SERPL-SCNC: 4.6 MMOL/L (ref 3.7–5.3)
RBC # BLD: 3.51 M/UL (ref 3.95–5.11)
RBC # BLD: ABNORMAL 10*6/UL
SEG NEUTROPHILS: 70 % (ref 36–65)
SEGMENTED NEUTROPHILS ABSOLUTE COUNT: 4.02 K/UL (ref 1.5–8.1)
SODIUM BLD-SCNC: 140 MMOL/L (ref 135–144)
WBC # BLD: 5.7 K/UL (ref 3.5–11.3)
WBC # BLD: ABNORMAL 10*3/UL

## 2020-11-12 PROCEDURE — 85025 COMPLETE CBC W/AUTO DIFF WBC: CPT

## 2020-11-12 PROCEDURE — 6370000000 HC RX 637 (ALT 250 FOR IP): Performed by: HOSPITALIST

## 2020-11-12 PROCEDURE — 83036 HEMOGLOBIN GLYCOSYLATED A1C: CPT

## 2020-11-12 PROCEDURE — 6370000000 HC RX 637 (ALT 250 FOR IP): Performed by: ORTHOPAEDIC SURGERY

## 2020-11-12 PROCEDURE — 99232 SBSQ HOSP IP/OBS MODERATE 35: CPT | Performed by: INTERNAL MEDICINE

## 2020-11-12 PROCEDURE — 94761 N-INVAS EAR/PLS OXIMETRY MLT: CPT

## 2020-11-12 PROCEDURE — 6360000002 HC RX W HCPCS: Performed by: HOSPITALIST

## 2020-11-12 PROCEDURE — 36415 COLL VENOUS BLD VENIPUNCTURE: CPT

## 2020-11-12 PROCEDURE — 2700000000 HC OXYGEN THERAPY PER DAY

## 2020-11-12 PROCEDURE — 97530 THERAPEUTIC ACTIVITIES: CPT

## 2020-11-12 PROCEDURE — 2580000003 HC RX 258: Performed by: HOSPITALIST

## 2020-11-12 PROCEDURE — 80048 BASIC METABOLIC PNL TOTAL CA: CPT

## 2020-11-12 PROCEDURE — 83605 ASSAY OF LACTIC ACID: CPT

## 2020-11-12 PROCEDURE — 97535 SELF CARE MNGMENT TRAINING: CPT

## 2020-11-12 PROCEDURE — 82947 ASSAY GLUCOSE BLOOD QUANT: CPT

## 2020-11-12 RX ORDER — NICOTINE POLACRILEX 4 MG
15 LOZENGE BUCCAL PRN
Status: DISCONTINUED | OUTPATIENT
Start: 2020-11-12 | End: 2020-11-12 | Stop reason: HOSPADM

## 2020-11-12 RX ORDER — LIDOCAINE 4 G/G
1 PATCH TOPICAL DAILY
Qty: 30 PATCH | Refills: 0 | Status: SHIPPED | OUTPATIENT
Start: 2020-11-13 | End: 2020-12-13

## 2020-11-12 RX ORDER — DEXTROSE MONOHYDRATE 50 MG/ML
100 INJECTION, SOLUTION INTRAVENOUS PRN
Status: DISCONTINUED | OUTPATIENT
Start: 2020-11-12 | End: 2020-11-12 | Stop reason: HOSPADM

## 2020-11-12 RX ORDER — DEXTROSE MONOHYDRATE 25 G/50ML
12.5 INJECTION, SOLUTION INTRAVENOUS PRN
Status: DISCONTINUED | OUTPATIENT
Start: 2020-11-12 | End: 2020-11-12 | Stop reason: HOSPADM

## 2020-11-12 RX ORDER — TRAZODONE HYDROCHLORIDE 300 MG/1
150 TABLET ORAL NIGHTLY
Qty: 30 TABLET | Refills: 0 | Status: SHIPPED | OUTPATIENT
Start: 2020-11-12 | End: 2022-03-18

## 2020-11-12 RX ADMIN — OXYCODONE HYDROCHLORIDE AND ACETAMINOPHEN 1 TABLET: 5; 325 TABLET ORAL at 09:55

## 2020-11-12 RX ADMIN — OXYCODONE HYDROCHLORIDE AND ACETAMINOPHEN 1 TABLET: 5; 325 TABLET ORAL at 02:37

## 2020-11-12 RX ADMIN — OXYCODONE HYDROCHLORIDE AND ACETAMINOPHEN 1 TABLET: 5; 325 TABLET ORAL at 18:13

## 2020-11-12 RX ADMIN — SODIUM CHLORIDE, PRESERVATIVE FREE 10 ML: 5 INJECTION INTRAVENOUS at 09:22

## 2020-11-12 RX ADMIN — INSULIN LISPRO 12 UNITS: 100 INJECTION, SOLUTION INTRAVENOUS; SUBCUTANEOUS at 12:44

## 2020-11-12 RX ADMIN — FUROSEMIDE 40 MG: 40 TABLET ORAL at 09:21

## 2020-11-12 RX ADMIN — ENOXAPARIN SODIUM 40 MG: 40 INJECTION SUBCUTANEOUS at 09:21

## 2020-11-12 RX ADMIN — VENLAFAXINE HYDROCHLORIDE 150 MG: 75 CAPSULE, EXTENDED RELEASE ORAL at 09:21

## 2020-11-12 RX ADMIN — METOPROLOL TARTRATE 25 MG: 25 TABLET, FILM COATED ORAL at 09:22

## 2020-11-12 RX ADMIN — GLIMEPIRIDE 2 MG: 2 TABLET ORAL at 09:21

## 2020-11-12 RX ADMIN — FAMOTIDINE 20 MG: 20 TABLET, FILM COATED ORAL at 09:21

## 2020-11-12 RX ADMIN — GABAPENTIN 400 MG: 300 CAPSULE ORAL at 12:44

## 2020-11-12 RX ADMIN — ARIPIPRAZOLE 5 MG: 5 TABLET ORAL at 09:21

## 2020-11-12 RX ADMIN — GABAPENTIN 400 MG: 300 CAPSULE ORAL at 06:25

## 2020-11-12 RX ADMIN — ASPIRIN 81 MG: 81 TABLET, COATED ORAL at 09:21

## 2020-11-12 ASSESSMENT — PAIN DESCRIPTION - ORIENTATION
ORIENTATION: RIGHT
ORIENTATION: LOWER

## 2020-11-12 ASSESSMENT — PAIN SCALES - GENERAL
PAINLEVEL_OUTOF10: 10
PAINLEVEL_OUTOF10: 6
PAINLEVEL_OUTOF10: 10
PAINLEVEL_OUTOF10: 10

## 2020-11-12 ASSESSMENT — PAIN DESCRIPTION - ONSET: ONSET: ON-GOING

## 2020-11-12 ASSESSMENT — PAIN DESCRIPTION - PAIN TYPE
TYPE: CHRONIC PAIN
TYPE: CHRONIC PAIN

## 2020-11-12 ASSESSMENT — PAIN DESCRIPTION - LOCATION
LOCATION: BACK
LOCATION: RIB CAGE

## 2020-11-12 ASSESSMENT — PAIN - FUNCTIONAL ASSESSMENT: PAIN_FUNCTIONAL_ASSESSMENT: PREVENTS OR INTERFERES SOME ACTIVE ACTIVITIES AND ADLS

## 2020-11-12 ASSESSMENT — PAIN DESCRIPTION - DESCRIPTORS: DESCRIPTORS: ACHING;DISCOMFORT

## 2020-11-12 ASSESSMENT — PAIN DESCRIPTION - PROGRESSION: CLINICAL_PROGRESSION: NOT CHANGED

## 2020-11-12 ASSESSMENT — PAIN DESCRIPTION - FREQUENCY: FREQUENCY: CONTINUOUS

## 2020-11-12 NOTE — PROGRESS NOTES
Today's Date: 11/12/2020  Patient Name: Susan Cueva  Date of admission: 11/3/2020 10:59 AM  Patient's age: 72 y.o., 1950  Admission Dx: Urinary tract infectious disease [N39.0]    Reason for Consult: management recommendations  Requesting Physician: Mariella Baig MD    CHIEF COMPLAINT: Weakness fatigue. UTI. History Obtained From:  patient, electronic medical record    Interval history:  Patient seen and examined. She denies any nausea vomiting  NO pain  No fever chills     HISTORY OF PRESENT ILLNESS:    The patient is a 72 y.o.  female who is admitted to the hospital with chief complaint of weakness and fatigue. Reportedly patient also had a standing height fall at home. Imaging revealed eighth rib fracture. Work-up also revealed UTI. Patient also has significant joint pain. 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 Patient has been started on concurrent chemoradiation. Last chemotherapy treatment was on 10/28/2020. Work-up reveals hemoglobin of 7.2 with MCV 80. Patient has adequate ANC and platelet count. Blood cultures are pending. Urine culture showing E. coli. Past Medical History:   has a past medical history of AAA (abdominal aortic aneurysm) ([facility] [address] Acid reflux, Anxiety and depression, Aortic stenosis, Arthritis, Blister of ankle, right, CAD (coronary artery disease), Cancer ([address] COPD (chronic obstructive pulmonary disease) ([facility] [address] Diabetes mellitus ([facility] [address] Falls, Heart block, Hypokalemia, MDRO (multiple drug resistant organisms) resistance, MRSA (methicillin resistant staph aureus) culture positive, On home oxygen therapy, On home oxygen therapy, Overactive bladder, Pneumonia, PONV (postoperative nausea and vomiting), and Vitamin D deficiency. Past Surgical History:   has a past surgical history that includes back surgery; eye surgery;  Cholecystectomy; Appendectomy; Hysterectomy; Tonsillectomy; lumbar laminectomy; Endoscopy, colon, diagnostic (12/08/2016); aortic valve repair (N/A, 4/11/2017); bronchoscopy (N/A, 8/31/2020); IR INSERT PICC VAD W SQ PORT >5 YEARS (9/4/2020); and IR PORT PLACEMENT > 5 YEARS (9/29/2020). Medications:    Prior to Admission medications    Medication Sig Start Date End Date Taking? Authorizing Provider   lidocaine 4 % external patch Place 1 patch onto the skin daily 11/13/20 12/13/20 Yes Missy Olmedo MD   traZODone (DESYREL) 300 MG tablet Take 0.5 tablets by mouth nightly 11/12/20  Yes Missy Olmedo MD   clonazePAM (KLONOPIN) 1 MG tablet Take 1 tablet by mouth 3 times daily as needed for Anxiety for up to 3 doses. 11/5/20 11/6/20 Yes Shi Long MD   gabapentin (NEURONTIN) 400 MG capsule Take 1 capsule by mouth 2 times daily for 3 doses. In addition to 2 capsules (=800mg) nightly 11/5/20 11/7/20 Yes Shi Long MD   gabapentin (NEURONTIN) 400 MG capsule Take 2 capsules by mouth nightly for 3 doses.  Take 2 capsules (=800mg) nightly - in addition to 1BID 11/5/20 11/8/20 Yes Shi Long MD   traZODone (DESYREL) 150 MG tablet Take 2 tablets by mouth nightly for 3 doses Take 2 tablets (=300mg) nightly 11/5/20 11/8/20 Yes Shi Long MD   ARIPiprazole (ABILIFY) 5 MG tablet Take 1 tablet by mouth daily for 3 doses 11/5/20 11/8/20 Yes Shi Long MD   furosemide (LASIX) 40 MG tablet Take 40 mg by mouth daily   Yes Historical Provider, MD   dexamethasone (DECADRON) 4 MG tablet Take 20 mg orally 12 hours and 6 hours before Taxol 10/23/20  Yes Vidya Barker MD   lidocaine-prilocaine (EMLA) 2.5-2.5 % cream To port site before chemo 10/21/20  Yes Vidya Barker MD   melatonin 5 MG TABS tablet Take 5 mg by mouth nightly    Yes Historical Provider, MD   glimepiride (AMARYL) 1 MG tablet Take 1 mg by mouth Daily with supper In addition to 2 tablets (=2mg) every morning    Yes Historical Provider, MD   metoprolol tartrate (LOPRESSOR) 25 MG tablet Take 25 mg by mouth 2 times daily   Yes Historical Provider, MD   venlafaxine (EFFEXOR XR) 150 MG extended release capsule Take 150 mg by mouth 2 times daily   Yes Historical Provider, MD   lansoprazole (PREVACID) 30 MG delayed release capsule Take 30 mg by mouth daily 11/10/16  Yes Historical Provider, MD   metFORMIN (GLUCOPHAGE) 500 MG tablet Take 500 mg by mouth 2 times daily 12/7/16  Yes Historical Provider, MD   aspirin EC 81 MG EC tablet Take 81 mg by mouth daily   Yes Historical Provider, MD   mometasone-formoterol (DULERA) 200-5 MCG/ACT inhaler Inhale 2 puffs into the lungs every 12 hours as needed (wheezing/SOB)    Yes Historical Provider, MD   glimepiride (AMARYL) 1 MG tablet Take 2 mg by mouth every morning In addition to 1mg nightly   Yes Historical Provider, MD     Current Facility-Administered Medications   Medication Dose Route Frequency Provider Last Rate Last Dose    glucose (GLUTOSE) 40 % oral gel 15 g  15 g Oral PRN Whitney Becerril MD        dextrose 50 % IV solution  12.5 g Intravenous PRN Whitney Becerril MD        glucagon (rDNA) injection 1 mg  1 mg Intramuscular PRN Whitney Becerril MD        dextrose 5 % solution  100 mL/hr Intravenous PRN Whitney Becerril MD        insulin lispro (HUMALOG) injection vial 0-12 Units  0-12 Units Subcutaneous TID WC Whitney Becerril MD        insulin lispro (HUMALOG) injection vial 0-6 Units  0-6 Units Subcutaneous Nightly Whitney Becerril MD        lidocaine 4 % external patch 1 patch  1 patch Transdermal Daily Jeremy Bell MD   1 patch at 11/12/20 0923    morphine (PF) injection 1 mg  1 mg Intravenous Q4H PRN Jennifer Ovalle MD   1 mg at 11/10/20 1909    ARIPiprazole (ABILIFY) tablet 5 mg  5 mg Oral Daily Jennifer Ovalle MD   5 mg at 11/12/20 3165    aspirin EC tablet 81 mg  81 mg Oral Daily Jennifer Ovalle MD   81 mg at 11/12/20 7551    clonazePAM (KLONOPIN) tablet 1 mg  1 mg Oral TID PRN Jennifer Ovalle MD   1 mg at 11/07/20 1320    furosemide (LASIX) tablet 40 mg  40 mg Oral Daily Will Narayan MD   40 mg at 11/12/20 4761    gabapentin (NEURONTIN) capsule 400 mg  400 mg Oral BID Will Narayan MD   400 mg at 11/12/20 1244    gabapentin (NEURONTIN) capsule 800 mg  800 mg Oral Nightly Will Narayan MD   800 mg at 11/11/20 2149    glimepiride (AMARYL) tablet 2 mg  2 mg Oral QAM Will Narayan MD   2 mg at 11/12/20 6028    glimepiride (AMARYL) tablet 1 mg  1 mg Oral Dinner Will Narayan MD   1 mg at 11/11/20 1825    metoprolol tartrate (LOPRESSOR) tablet 25 mg  25 mg Oral BID Will Narayan MD   25 mg at 11/12/20 0922    oxyCODONE-acetaminophen (PERCOCET) 5-325 MG per tablet 1 tablet  1 tablet Oral Q6H PRN Will Narayan MD   1 tablet at 11/12/20 0955    traZODone (DESYREL) tablet 300 mg  300 mg Oral Nightly Will Narayan MD   300 mg at 11/11/20 2149    venlafaxine (EFFEXOR XR) extended release capsule 150 mg  150 mg Oral BID Will Narayan MD   150 mg at 11/12/20 4447    sodium chloride flush 0.9 % injection 10 mL  10 mL Intravenous 2 times per day Will Narayan MD   10 mL at 11/12/20 3457    sodium chloride flush 0.9 % injection 10 mL  10 mL Intravenous PRN Will Narayan MD   10 mL at 11/10/20 1909    potassium chloride (KLOR-CON M) extended release tablet 40 mEq  40 mEq Oral PRN Will Narayan MD   40 mEq at 11/06/20 1222    Or    potassium bicarb-citric acid (EFFER-K) effervescent tablet 40 mEq  40 mEq Oral PRN Will Narayan MD        Or    potassium chloride 10 mEq/100 mL IVPB (Peripheral Line)  10 mEq Intravenous PRN Will Narayan MD        magnesium sulfate 1 g in dextrose 5% 100 mL IVPB  1 g Intravenous PRN Will Narayan MD   Stopped at 11/06/20 2333    acetaminophen (TYLENOL) tablet 650 mg  650 mg Oral Q6H PRN Will Narayan MD        Or    acetaminophen (TYLENOL) suppository 650 mg  650 mg Rectal Q6H PRN MD Lanie Castellanos (SENOKOT) tablet 8.6 mg  1 row Sensitive       <=1SUSCEPTIBLE     ceFAZolin Value in next row Sensitive       <=4SUSCEPTIBLE     ceFAZolin Value in next row Sensitive       <=4SUSCEPTIBLE     cefepime Value in next row        NOT REPORTED     cefTRIAXone Value in next row Sensitive       <=1SUSCEPTIBLE     ciprofloxacin Value in next row Resistant       >=4RESISTANT     ertapenem Value in next row        NOT REPORTED     Confirmatory Extended Spectrum Beta-Lactamase Value in next row Negative       NOT REPORTED     gentamicin Value in next row Sensitive       <=1SUSCEPTIBLE     meropenem Value in next row        NOT REPORTED     nitrofurantoin Value in next row Sensitive       <=16SUSCEPTIBLE     tigecycline Value in next row        NOT REPORTED     tobramycin Value in next row Sensitive       <=1SUSCEPTIBLE     trimethoprim-sulfamethoxazole Value in next row Sensitive       <=20SUSCEPTIBLE     piperacillin-tazobactam Value in next row Sensitive       <=4SUSCEPTIBLE   Urinalysis   Result Value Ref Range    Color, UA YELLOW YELLOW    Turbidity UA CLOUDY (A) CLEAR    Glucose, Ur NEGATIVE NEGATIVE    Bilirubin Urine NEGATIVE NEGATIVE    Ketones, Urine NEGATIVE NEGATIVE    Specific Gravity, UA 1.023 1.005 - 1.030    Urine Hgb 3+ (A) NEGATIVE    pH, UA 5.5 5.0 - 8.0    Protein, UA 2+ (A) NEGATIVE    Urobilinogen, Urine Normal Normal    Nitrite, Urine NEGATIVE NEGATIVE    Leukocyte Esterase, Urine MODERATE (A) NEGATIVE    Urinalysis Comments NOT REPORTED    CBC Auto Differential   Result Value Ref Range    WBC 8.9 3.5 - 11.3 k/uL    RBC 3.47 (L) 3.95 - 5.11 m/uL    Hemoglobin 8.4 (L) 11.9 - 15.1 g/dL    Hematocrit 27.7 (L) 36.3 - 47.1 %    MCV 79.8 (L) 82.6 - 102.9 fL    MCH 24.2 (L) 25.2 - 33.5 pg    MCHC 30.3 28.4 - 34.8 g/dL    RDW 19.8 (H) 11.8 - 14.4 %    Platelets 000 525 - 005 k/uL    MPV 9.2 8.1 - 13.5 fL    NRBC Automated 0.0 0.0 per 100 WBC    Differential Type NOT REPORTED     WBC Morphology NOT REPORTED     RBC Morphology NOT REPORTED Platelet Estimate NOT REPORTED     Seg Neutrophils 87 (H) 36 - 66 %    Lymphocytes 7 (L) 24 - 44 %    Monocytes 5 1 - 7 %    Eosinophils % 0 (L) 1 - 4 %    Basophils 0 %    Immature Granulocytes 1 (H) 0 %    Segs Absolute 7.74 (H) 1.8 - 7.7 k/uL    Absolute Lymph # 0.62 (L) 1.0 - 4.8 k/uL    Absolute Mono # 0.45 0.2 - 0.8 k/uL    Absolute Eos # 0.00 0.0 - 0.4 k/uL    Basophils Absolute 0.00 0.0 - 0.2 k/uL    Absolute Immature Granulocyte 0.09 0.00 - 0.30 k/uL   Basic Metabolic Panel   Result Value Ref Range    Glucose 191 (H) 70 - 99 mg/dL    BUN 28 (H) 8 - 23 mg/dL    CREATININE 0.80 0.50 - 0.90 mg/dL    Bun/Cre Ratio 35 (H) 9 - 20    Calcium 9.2 8.6 - 10.4 mg/dL    Sodium 134 (L) 135 - 144 mmol/L    Potassium 4.0 3.7 - 5.3 mmol/L    Chloride 98 98 - 107 mmol/L    CO2 27 20 - 31 mmol/L    Anion Gap 9 9 - 17 mmol/L    GFR Non-African American >60 >60 mL/min    GFR African American >60 >60 mL/min    GFR Comment          GFR Staging NOT REPORTED    Microscopic Urinalysis   Result Value Ref Range    -          WBC, UA TOO NUMEROUS TO COUNT 0 - 5 /HPF    RBC, UA TOO NUMEROUS TO COUNT 0 - 2 /HPF    Casts UA NOT REPORTED /LPF    Crystals, UA NOT REPORTED None /HPF    Epithelial Cells UA 2 TO 5 0 - 5 /HPF    Renal Epithelial, UA NOT REPORTED 0 /HPF    Bacteria, UA MANY (A) None    Mucus, UA 1+ (A) None    Trichomonas, UA NOT REPORTED None    Amorphous, UA NOT REPORTED None    Other Observations UA NOT REPORTED NOT REQ.     Yeast, UA NOT REPORTED None   Basic Metabolic Panel w/ Reflex to MG   Result Value Ref Range    Glucose 77 70 - 99 mg/dL    BUN 17 8 - 23 mg/dL    CREATININE 0.68 0.50 - 0.90 mg/dL    Bun/Cre Ratio 25 (H) 9 - 20    Calcium 8.5 (L) 8.6 - 10.4 mg/dL    Sodium 141 135 - 144 mmol/L    Potassium 4.0 3.7 - 5.3 mmol/L    Chloride 107 98 - 107 mmol/L    CO2 25 20 - 31 mmol/L    Anion Gap 9 9 - 17 mmol/L    GFR Non-African American >60 >60 mL/min    GFR African American >60 >60 mL/min    GFR Comment GFR Staging NOT REPORTED    CBC auto differential   Result Value Ref Range    WBC 6.7 3.5 - 11.3 k/uL    RBC 3.15 (L) 3.95 - 5.11 m/uL    Hemoglobin 7.7 (L) 11.9 - 15.1 g/dL    Hematocrit 26.0 (L) 36.3 - 47.1 %    MCV 82.5 (L) 82.6 - 102.9 fL    MCH 24.4 (L) 25.2 - 33.5 pg    MCHC 29.6 28.4 - 34.8 g/dL    RDW 19.9 (H) 11.8 - 14.4 %    Platelets 709 116 - 443 k/uL    MPV 9.4 8.1 - 13.5 fL    NRBC Automated 0.0 0.0 per 100 WBC    Differential Type NOT REPORTED     WBC Morphology NOT REPORTED     RBC Morphology ANISOCYTOSIS PRESENT     Platelet Estimate NOT REPORTED     Seg Neutrophils 78 (H) 36 - 65 %    Lymphocytes 14 (L) 24 - 43 %    Monocytes 6 3 - 12 %    Eosinophils % 2 1 - 4 %    Basophils 0 0 - 2 %    Immature Granulocytes 1 (H) 0 %    Segs Absolute 5.22 1.50 - 8.10 k/uL    Absolute Lymph # 0.92 (L) 1.10 - 3.70 k/uL    Absolute Mono # 0.39 0.10 - 1.20 k/uL    Absolute Eos # 0.10 0.00 - 0.44 k/uL    Basophils Absolute 0.03 0.00 - 0.20 k/uL    Absolute Immature Granulocyte 0.05 0.00 - 0.30 k/uL   Lactic Acid   Result Value Ref Range    Lactic Acid 0.6 0.5 - 2.2 mmol/L   Procalcitonin   Result Value Ref Range    Procalcitonin 0.26 (H) <0.09 ng/mL   Basic Metabolic Panel w/ Reflex to MG   Result Value Ref Range    Glucose 89 70 - 99 mg/dL    BUN 14 8 - 23 mg/dL    CREATININE 0.67 0.50 - 0.90 mg/dL    Bun/Cre Ratio 21 (H) 9 - 20    Calcium 8.4 (L) 8.6 - 10.4 mg/dL    Sodium 139 135 - 144 mmol/L    Potassium 3.6 (L) 3.7 - 5.3 mmol/L    Chloride 103 98 - 107 mmol/L    CO2 29 20 - 31 mmol/L    Anion Gap 7 (L) 9 - 17 mmol/L    GFR Non-African American >60 >60 mL/min    GFR African American >60 >60 mL/min    GFR Comment          GFR Staging NOT REPORTED    CBC auto differential   Result Value Ref Range    WBC 5.8 3.5 - 11.3 k/uL    RBC 2.95 (L) 3.95 - 5.11 m/uL    Hemoglobin 7.2 (L) 11.9 - 15.1 g/dL    Hematocrit 23.8 (L) 36.3 - 47.1 %    MCV 80.7 (L) 82.6 - 102.9 fL    MCH 24.4 (L) 25.2 - 33.5 pg    MCHC 30.3 28.4 - - 14.4 %    Platelets 356 609 - 311 k/uL    MPV 8.8 8.1 - 13.5 fL    NRBC Automated 0.0 0.0 per 100 WBC    Differential Type NOT REPORTED     WBC Morphology NOT REPORTED     RBC Morphology NOT REPORTED     Platelet Estimate NOT REPORTED     Seg Neutrophils 79 (H) 36 - 66 %    Lymphocytes 10 (L) 24 - 44 %    Monocytes 8 (H) 1 - 7 %    Eosinophils % 1 1 - 4 %    Basophils 1 %    Immature Granulocytes 1 (H) 0 %    Segs Absolute 4.74 1.8 - 7.7 k/uL    Absolute Lymph # 0.60 (L) 1.0 - 4.8 k/uL    Absolute Mono # 0.48 0.2 - 0.8 k/uL    Absolute Eos # 0.06 0.0 - 0.4 k/uL    Basophils Absolute 0.06 0.0 - 0.2 k/uL    Absolute Immature Granulocyte 0.06 0.00 - 0.30 k/uL    Morphology HYPOCHROMIA PRESENT    Lactic Acid   Result Value Ref Range    Lactic Acid 1.1 0.5 - 2.2 mmol/L   Magnesium   Result Value Ref Range    Magnesium 1.3 (L) 1.6 - 2.6 mg/dL   Basic Metabolic Panel w/ Reflex to MG   Result Value Ref Range    Glucose 77 70 - 99 mg/dL    BUN 11 8 - 23 mg/dL    CREATININE 0.63 0.50 - 0.90 mg/dL    Bun/Cre Ratio 17 9 - 20    Calcium 8.4 (L) 8.6 - 10.4 mg/dL    Sodium 141 135 - 144 mmol/L    Potassium 4.1 3.7 - 5.3 mmol/L    Chloride 106 98 - 107 mmol/L    CO2 30 20 - 31 mmol/L    Anion Gap 5 (L) 9 - 17 mmol/L    GFR Non-African American >60 >60 mL/min    GFR African American >60 >60 mL/min    GFR Comment          GFR Staging NOT REPORTED    CBC auto differential   Result Value Ref Range    WBC 5.4 3.5 - 11.3 k/uL    RBC 3.14 (L) 3.95 - 5.11 m/uL    Hemoglobin 7.5 (L) 11.9 - 15.1 g/dL    Hematocrit 25.9 (L) 36.3 - 47.1 %    MCV 82.5 (L) 82.6 - 102.9 fL    MCH 23.9 (L) 25.2 - 33.5 pg    MCHC 29.0 28.4 - 34.8 g/dL    RDW 19.6 (H) 11.8 - 14.4 %    Platelets 849 190 - 855 k/uL    MPV 8.8 8.1 - 13.5 fL    NRBC Automated 0.0 0.0 per 100 WBC    Differential Type NOT REPORTED     WBC Morphology NOT REPORTED     RBC Morphology ANISOCYTOSIS PRESENT     Platelet Estimate NOT REPORTED     Seg Neutrophils 72 (H) 36 - 65 %    Lymphocytes 15 (L) 24 - 43 %    Monocytes 10 3 - 12 %    Eosinophils % 1 1 - 4 %    Basophils 1 0 - 2 %    Immature Granulocytes 1 (H) 0 %    Segs Absolute 3.87 1.50 - 8.10 k/uL    Absolute Lymph # 0.81 (L) 1.10 - 3.70 k/uL    Absolute Mono # 0.53 0.10 - 1.20 k/uL    Absolute Eos # 0.05 0.00 - 0.44 k/uL    Basophils Absolute 0.03 0.00 - 0.20 k/uL    Absolute Immature Granulocyte 0.06 0.00 - 0.30 k/uL   Lactic Acid   Result Value Ref Range    Lactic Acid 0.8 0.5 - 2.2 mmol/L   Basic Metabolic Panel w/ Reflex to MG   Result Value Ref Range    Glucose 133 (H) 70 - 99 mg/dL    BUN 11 8 - 23 mg/dL    CREATININE 0.68 0.50 - 0.90 mg/dL    Bun/Cre Ratio 16 9 - 20    Calcium 9.1 8.6 - 10.4 mg/dL    Sodium 141 135 - 144 mmol/L    Potassium 4.8 3.7 - 5.3 mmol/L    Chloride 104 98 - 107 mmol/L    CO2 30 20 - 31 mmol/L    Anion Gap 7 (L) 9 - 17 mmol/L    GFR Non-African American >60 >60 mL/min    GFR African American >60 >60 mL/min    GFR Comment          GFR Staging NOT REPORTED    CBC auto differential   Result Value Ref Range    WBC 6.8 3.5 - 11.3 k/uL    RBC 3.64 (L) 3.95 - 5.11 m/uL    Hemoglobin 8.9 (L) 11.9 - 15.1 g/dL    Hematocrit 30.7 (L) 36.3 - 47.1 %    MCV 84.3 82.6 - 102.9 fL    MCH 24.5 (L) 25.2 - 33.5 pg    MCHC 29.0 28.4 - 34.8 g/dL    RDW 19.6 (H) 11.8 - 14.4 %    Platelets 382 762 - 474 k/uL    MPV 8.7 8.1 - 13.5 fL    NRBC Automated 0.0 0.0 per 100 WBC    Differential Type NOT REPORTED     Seg Neutrophils 76 (H) 36 - 65 %    Lymphocytes 13 (L) 24 - 43 %    Monocytes 8 3 - 12 %    Eosinophils % 1 1 - 4 %    Basophils 1 0 - 2 %    Immature Granulocytes 1 (H) 0 %    Segs Absolute 5.13 1.50 - 8.10 k/uL    Absolute Lymph # 0.91 (L) 1.10 - 3.70 k/uL    Absolute Mono # 0.52 0.10 - 1.20 k/uL    Absolute Eos # 0.09 0.00 - 0.44 k/uL    Basophils Absolute 0.04 0.00 - 0.20 k/uL    Absolute Immature Granulocyte 0.09 0.00 - 0.30 k/uL    WBC Morphology NOT REPORTED     RBC Morphology ANISOCYTOSIS PRESENT     Platelet Estimate NOT REPORTED    Lactic Acid   Result Value Ref Range    Lactic Acid 1.3 0.5 - 2.2 mmol/L   Basic Metabolic Panel w/ Reflex to MG   Result Value Ref Range    Glucose 142 (H) 70 - 99 mg/dL    BUN 15 8 - 23 mg/dL    CREATININE 0.70 0.50 - 0.90 mg/dL    Bun/Cre Ratio 21 (H) 9 - 20    Calcium 9.0 8.6 - 10.4 mg/dL    Sodium 140 135 - 144 mmol/L    Potassium 4.3 3.7 - 5.3 mmol/L    Chloride 102 98 - 107 mmol/L    CO2 29 20 - 31 mmol/L    Anion Gap 9 9 - 17 mmol/L    GFR Non-African American >60 >60 mL/min    GFR African American >60 >60 mL/min    GFR Comment          GFR Staging NOT REPORTED    CBC auto differential   Result Value Ref Range    WBC 6.3 3.5 - 11.3 k/uL    RBC 3.65 (L) 3.95 - 5.11 m/uL    Hemoglobin 8.9 (L) 11.9 - 15.1 g/dL    Hematocrit 30.1 (L) 36.3 - 47.1 %    MCV 82.5 (L) 82.6 - 102.9 fL    MCH 24.4 (L) 25.2 - 33.5 pg    MCHC 29.6 28.4 - 34.8 g/dL    RDW 19.5 (H) 11.8 - 14.4 %    Platelets 602 140 - 034 k/uL    MPV 8.4 8.1 - 13.5 fL    NRBC Automated 0.0 0.0 per 100 WBC    Differential Type NOT REPORTED     Seg Neutrophils 75 (H) 36 - 65 %    Lymphocytes 15 (L) 24 - 43 %    Monocytes 7 3 - 12 %    Eosinophils % 1 1 - 4 %    Basophils 1 0 - 2 %    Immature Granulocytes 1 (H) 0 %    Segs Absolute 4.77 1.50 - 8.10 k/uL    Absolute Lymph # 0.92 (L) 1.10 - 3.70 k/uL    Absolute Mono # 0.44 0.10 - 1.20 k/uL    Absolute Eos # 0.05 0.00 - 0.44 k/uL    Basophils Absolute 0.04 0.00 - 0.20 k/uL    Absolute Immature Granulocyte 0.08 0.00 - 0.30 k/uL    WBC Morphology NOT REPORTED     RBC Morphology ANISOCYTOSIS PRESENT     Platelet Estimate NOT REPORTED    Lactic Acid   Result Value Ref Range    Lactic Acid 1.1 0.5 - 2.2 mmol/L   Basic Metabolic Panel w/ Reflex to MG   Result Value Ref Range    Glucose 153 (H) 70 - 99 mg/dL    BUN 17 8 - 23 mg/dL    CREATININE 0.87 0.50 - 0.90 mg/dL    Bun/Cre Ratio 20 9 - 20    Calcium 9.8 8.6 - 10.4 mg/dL    Sodium 142 135 - 144 mmol/L    Potassium 5.0 3.7 - 5.3 mmol/L    Chloride 101 CBC auto differential   Result Value Ref Range    WBC 5.2 3.5 - 11.3 k/uL    RBC 3.52 (L) 3.95 - 5.11 m/uL    Hemoglobin 8.7 (L) 11.9 - 15.1 g/dL    Hematocrit 29.0 (L) 36.3 - 47.1 %    MCV 82.4 (L) 82.6 - 102.9 fL    MCH 24.7 (L) 25.2 - 33.5 pg    MCHC 30.0 28.4 - 34.8 g/dL    RDW 19.4 (H) 11.8 - 14.4 %    Platelets 450 716 - 832 k/uL    MPV 8.4 8.1 - 13.5 fL    NRBC Automated 0.0 0.0 per 100 WBC    Differential Type NOT REPORTED     WBC Morphology NOT REPORTED     RBC Morphology ANISOCYTOSIS PRESENT     Platelet Estimate NOT REPORTED     Seg Neutrophils 67 (H) 36 - 65 %    Lymphocytes 20 (L) 24 - 43 %    Monocytes 8 3 - 12 %    Eosinophils % 1 1 - 4 %    Basophils 1 0 - 2 %    Immature Granulocytes 3 (H) 0 %    Segs Absolute 3.47 1.50 - 8.10 k/uL    Absolute Lymph # 1.04 (L) 1.10 - 3.70 k/uL    Absolute Mono # 0.44 0.10 - 1.20 k/uL    Absolute Eos # 0.07 0.00 - 0.44 k/uL    Basophils Absolute 0.05 0.00 - 0.20 k/uL    Absolute Immature Granulocyte 0.14 0.00 - 0.30 k/uL   Lactic Acid   Result Value Ref Range    Lactic Acid 0.9 0.5 - 2.2 mmol/L   Basic Metabolic Panel w/ Reflex to MG   Result Value Ref Range    Glucose 223 (H) 70 - 99 mg/dL    BUN 27 (H) 8 - 23 mg/dL    CREATININE 0.86 0.50 - 0.90 mg/dL    Bun/Cre Ratio 31 (H) 9 - 20    Calcium 9.1 8.6 - 10.4 mg/dL    Sodium 140 135 - 144 mmol/L    Potassium 4.6 3.7 - 5.3 mmol/L    Chloride 102 98 - 107 mmol/L    CO2 30 20 - 31 mmol/L    Anion Gap 8 (L) 9 - 17 mmol/L    GFR Non-African American >60 >60 mL/min    GFR African American >60 >60 mL/min    GFR Comment          GFR Staging NOT REPORTED    CBC auto differential   Result Value Ref Range    WBC 5.7 3.5 - 11.3 k/uL    RBC 3.51 (L) 3.95 - 5.11 m/uL    Hemoglobin 8.6 (L) 11.9 - 15.1 g/dL    Hematocrit 30.0 (L) 36.3 - 47.1 %    MCV 85.5 82.6 - 102.9 fL    MCH 24.5 (L) 25.2 - 33.5 pg    MCHC 28.7 28.4 - 34.8 g/dL    RDW 19.8 (H) 11.8 - 14.4 %    Platelets 739 445 - 661 k/uL    MPV 8.6 8.1 - 13.5 fL    NRBC Automated 0.0 0.0 per 100 WBC    Differential Type NOT REPORTED     Seg Neutrophils 70 (H) 36 - 65 %    Lymphocytes 19 (L) 24 - 43 %    Monocytes 7 3 - 12 %    Eosinophils % 1 1 - 4 %    Basophils 1 0 - 2 %    Immature Granulocytes 2 (H) 0 %    Segs Absolute 4.02 1.50 - 8.10 k/uL    Absolute Lymph # 1.07 (L) 1.10 - 3.70 k/uL    Absolute Mono # 0.38 0.10 - 1.20 k/uL    Absolute Eos # 0.06 0.00 - 0.44 k/uL    Basophils Absolute 0.06 0.00 - 0.20 k/uL    Absolute Immature Granulocyte 0.12 0.00 - 0.30 k/uL    WBC Morphology NOT REPORTED     RBC Morphology ANISOCYTOSIS PRESENT     Platelet Estimate NOT REPORTED    Lactic Acid   Result Value Ref Range    Lactic Acid 2.2 0.5 - 2.2 mmol/L   Hemoglobin A1C   Result Value Ref Range    Hemoglobin A1C 6.7 (H) 4.0 - 6.0 %    Estimated Avg Glucose 146 mg/dL   POC Glucose Fingerstick   Result Value Ref Range    POC Glucose 231 (H) 65 - 105 mg/dL   POC Glucose Fingerstick   Result Value Ref Range    POC Glucose 152 (H) 65 - 105 mg/dL   POC Glucose Fingerstick   Result Value Ref Range    POC Glucose 61 (L) 65 - 105 mg/dL   POC Glucose Fingerstick   Result Value Ref Range    POC Glucose 91 65 - 105 mg/dL   POC Glucose Fingerstick   Result Value Ref Range    POC Glucose 311 (H) 65 - 105 mg/dL   POC Glucose Fingerstick   Result Value Ref Range    POC Glucose 105 65 - 105 mg/dL   POC Glucose Fingerstick   Result Value Ref Range    POC Glucose 180 (H) 65 - 105 mg/dL   POC Glucose Fingerstick   Result Value Ref Range    POC Glucose 163 (H) 65 - 105 mg/dL   POC Glucose Fingerstick   Result Value Ref Range    POC Glucose 87 65 - 105 mg/dL   POC Glucose Fingerstick   Result Value Ref Range    POC Glucose 292 (H) 65 - 105 mg/dL   POC Glucose Fingerstick   Result Value Ref Range    POC Glucose 136 (H) 65 - 105 mg/dL   POC Glucose Fingerstick   Result Value Ref Range    POC Glucose 180 (H) 65 - 105 mg/dL   POC Glucose Fingerstick   Result Value Ref Range    POC Glucose 77 65 - 105 mg/dL   POC Glucose Fingerstick   Result Value Ref Range    POC Glucose 156 (H) 65 - 105 mg/dL   POC Glucose Fingerstick   Result Value Ref Range    POC Glucose 364 (H) 65 - 105 mg/dL   POC Glucose Fingerstick   Result Value Ref Range    POC Glucose 100 65 - 105 mg/dL   POC Glucose Fingerstick   Result Value Ref Range    POC Glucose 283 (H) 65 - 105 mg/dL   POC Glucose Fingerstick   Result Value Ref Range    POC Glucose 67 65 - 105 mg/dL   POC Glucose Fingerstick   Result Value Ref Range    POC Glucose 81 65 - 105 mg/dL   POC Glucose Fingerstick   Result Value Ref Range    POC Glucose 325 (H) 65 - 105 mg/dL   POC Glucose Fingerstick   Result Value Ref Range    POC Glucose 133 (H) 65 - 105 mg/dL   POC Glucose Fingerstick   Result Value Ref Range    POC Glucose 124 (H) 65 - 105 mg/dL   POC Glucose Fingerstick   Result Value Ref Range    POC Glucose 246 (H) 65 - 105 mg/dL   POC Glucose Fingerstick   Result Value Ref Range    POC Glucose 189 (H) 65 - 105 mg/dL   POC Glucose Fingerstick   Result Value Ref Range    POC Glucose 151 (H) 65 - 105 mg/dL   POC Glucose Fingerstick   Result Value Ref Range    POC Glucose 116 (H) 65 - 105 mg/dL   POC Glucose Fingerstick   Result Value Ref Range    POC Glucose 132 (H) 65 - 105 mg/dL   POC Glucose Fingerstick   Result Value Ref Range    POC Glucose 227 (H) 65 - 105 mg/dL   POC Glucose Fingerstick   Result Value Ref Range    POC Glucose 52 (L) 65 - 105 mg/dL   POC Glucose Fingerstick   Result Value Ref Range    POC Glucose 92 65 - 105 mg/dL   POC Glucose Fingerstick   Result Value Ref Range    POC Glucose 184 (H) 65 - 105 mg/dL   POC Glucose Fingerstick   Result Value Ref Range    POC Glucose 154 (H) 65 - 105 mg/dL   POC Glucose Fingerstick   Result Value Ref Range    POC Glucose 223 (H) 65 - 105 mg/dL   POC Glucose Fingerstick   Result Value Ref Range    POC Glucose 114 (H) 65 - 105 mg/dL   POC Glucose Fingerstick   Result Value Ref Range    POC Glucose 128 (H) 65 - 105 mg/dL   POC Glucose Fingerstick   Result Value Ref Range    POC Glucose 94 65 - 105 mg/dL   POC Glucose Fingerstick   Result Value Ref Range    POC Glucose 273 (H) 65 - 105 mg/dL   POC Glucose Fingerstick   Result Value Ref Range    POC Glucose 109 (H) 65 - 105 mg/dL   POC Glucose Fingerstick   Result Value Ref Range    POC Glucose 188 (H) 65 - 105 mg/dL   POC Glucose Fingerstick   Result Value Ref Range    POC Glucose 79 65 - 105 mg/dL   POC Glucose Fingerstick   Result Value Ref Range    POC Glucose 316 (H) 65 - 105 mg/dL   POC Glucose Fingerstick   Result Value Ref Range    POC Glucose 68 65 - 105 mg/dL   POC Glucose Fingerstick   Result Value Ref Range    POC Glucose 68 65 - 105 mg/dL   POC Glucose Fingerstick   Result Value Ref Range    POC Glucose 193 (H) 65 - 105 mg/dL   EKG 12 Lead   Result Value Ref Range    Ventricular Rate 101 BPM    Atrial Rate 101 BPM    P-R Interval 148 ms    QRS Duration 154 ms    Q-T Interval 406 ms    QTc Calculation (Bazett) 526 ms    P Axis 68 degrees    R Axis -59 degrees    T Axis 29 degrees         IMAGING DATA:    Xr Ribs Right Include Chest (min 3 Views)    Result Date: 11/3/2020  EXAMINATION: 2 XRAY VIEWS OF THE RIGHT RIBS WITH FRONTAL XRAY VIEW OF THE CHEST 11/3/2020 11:53 am COMPARISON: 09/25/2020 HISTORY: ORDERING SYSTEM PROVIDED HISTORY: fall yesterday TECHNOLOGIST PROVIDED HISTORY: fall yesterday Reason for Exam: fell Acuity: Acute Type of Exam: Initial Relevant Medical/Surgical History: pt fell, pain in rt lat ribs and posterior neck FINDINGS: Masslike opacity in the right lung apex. There is a subtle linear lucency within the 8th rib which is suggestive of a nondisplaced fracture. Multiple remote/chronic fractures identified. Left hemithorax is clear. Subtle linear lucency within the 8th rib, suggestive of a nondisplaced fracture. Masslike opacity in the right lung apex.      Xr Cervical Spine (2-3 Views)    Result Date: 11/3/2020  EXAMINATION: 3 XRAY VIEWS OF THE CERVICAL SPINE veins are compressible with normal  doppler responses. Nm Bone Scan Whole Body    Result Date: 11/4/2020  EXAMINATION: WHOLE BODY BONE SCAN  11/4/2020 TECHNIQUE: The patient was injected intravenously with 25.6 mCi of 99 mTc MDP and scintigraphy of the entire skeleton was performed approximately three hours later. Coned-down images of the head, neck and thorax in obliques positions. COMPARISON: Patient did not have previous imaging studies for comparison. HISTORY: ORDERING SYSTEM PROVIDED HISTORY: Fall, lung cancer TECHNOLOGIST PROVIDED HISTORY: Fall, lung cancer Reason for Exam: pain Acuity: Unknown Type of Exam: Unknown FINDINGS: Symmetrical uptake of radiotracer is noted in the shoulders, sternoclavicular joints, hips, knees, and ankles, foci of activity in the aforementioned joints are most likely degenerative. The remainder of the osseous structures and soft tissues are unremarkable. Specifically, no radiotracer uptake in the ribcage is noted. Physiologic activity is present in the skeletal and renal collecting systems. No evidence to suggest osseous metastatic disease. Pattern of uptake most compatible with age related degenerative change.          IMPRESSION:   Primary Problem  <principal problem not specified>    Active Hospital Problems    Diagnosis Date Noted    Moderate malnutrition ([address] [E44.0] 11/10/2020    Closed fracture of one rib of right side [S22.31XA] 11/04/2020    Degenerative disc disease, lumbar [M51.36] 11/04/2020    Urinary tract infectious disease [N39.0] 11/03/2020    Malignant neoplasm of upper lobe of right lung ([address] [C34.11] 09/18/2020    Lung mass [R91.8] 08/27/2020    Chronic bilateral low back pain [M54.5, G89.29] 10/31/2017    Falls frequently [R29.6] 01/27/2017   Squamous cell carcinoma of lung, clinical stage IIIb  Ongoing treatment with concurrent chemoradiation using carboplatin and Taxol  UTI  Asthenia  Anemia  Eighth rib fracture    RECOMMENDATIONS:  1. I personally reviewed results of lab work-up imaging studies and other relevant clinical data. Reviewed records and treatment history  2. HB 8.7  3. Continue symptomatic supportive care  4. Patient counseled tobacco cessation  5. Pain control  6. Plan to resume her chemoradiation as per patient wishes as outpatient after discharge  7. Likely discharge today to home with Home/palliative care  8. Discharge as per primary. Discussed with patient and Nurse. Thank you for asking us to see this patient. Mendoza Mcfadden MD  Hematologist/Medical Oncologist    Cell: [phone]      This note is created with the assistance of a speech recognition program.  While intending to generate a document that actually reflects the content of the visit, the document can still have some errors including those of syntax and sound a like substitutions which may escape proof reading. It such instances, actual meaning can be extrapolated by contextual diversion.

## 2020-11-12 NOTE — PROGRESS NOTES
examined bedside  Pt feels better  Dysuria improved  C/o pain Rt ribs- better  no nausea or anorexia headache no palpitation no focal logical deficit   overall patient is feeling better  Afebrile  WBC count is normal          HPI:    Laura Marcos is a 72 y.o.  female who presents with Fall    52-year-old lady with past medical history of COPD,  coronary artery disease, depression, arthritis presented to ER complaining of generalized weakness and fall. Patient has been recently diagnosed of lung cell carcinoma. She had a bronchoscopy on 8/31/2020 and endobronchial biopsy showed squamous cell carcinoma. She was started on chemoradiation. Her last chemotherapy was on 10/28/2020. She also has an appointment with Radiation Oncology on 10/30/2020. On presentation to ER her sodium was slightly low at 134. Her hemoglobin was 8.4. Her UA showed moderate leukocyte Estrace and many bacteria. Patient was so weak she was unable to get up and ambulate. She is unable to take care of herself at home. Complaining of low back pain and right-sided rib pain. Patient admitted for further management. I have personally reviewed the past medical history, past surgical history, medications, social history, and family history, and summarized in the note. Review of Systems:     All 10 point system is reviewed and negative otherwise mentioned in HPI.       Past Medical History:     Past Medical History:   Diagnosis Date    AAA (abdominal aortic aneurysm) ([facility]     Pt denies having a history of AAA    Acid reflux     Anxiety and depression     Aortic stenosis     Arthritis     Blister of ankle, right 10/13/2016    blister broke open & draining, is on antibiotics    CAD (coronary artery disease)     Cancer ([address]     lung cancer    COPD (chronic obstructive pulmonary disease) (HCC)     Diabetes mellitus ([address]     Falls     Heart block     bifasicular    Hypokalemia     MDRO (multiple drug resistant organisms) resistance 10/17/2014    E. Coli urine    MRSA (methicillin resistant staph aureus) culture positive resolved 12/2016    2 negative nasal screens - 2016 (hx in urine 2014)    On home oxygen therapy     uses 2 liters at night    On home oxygen therapy     patient states 2 liters/nasal cannula continuous    Overactive bladder     patient incont. wears a brief    Pneumonia     PONV (postoperative nausea and vomiting)     Vitamin D deficiency         Past Surgical History:     Past Surgical History:   Procedure Laterality Date    AORTIC VALVE REPAIR N/A 4/11/2017    AORTIC VALVE REPAIR REPLACEMENT performed by Fer Bolivar MD at [address] N/A 8/31/2020    BRONCHOSCOPY BIOPSY BRONCHUS performed by Kell Fry MD at [address], COLON, DIAGNOSTIC  12/08/2016    EYE SURGERY      HYSTERECTOMY      IR INS PICC VAD W SQ PORT GREATER THAN 5  9/4/2020    IR INS PICC VAD W SQ PORT GREATER THAN 5 9/4/2020 [facility]    IR PORT PLACEMENT EQUAL OR GREATER THAN 5 YEARS  9/29/2020    IR PORT PLACEMENT EQUAL OR GREATER THAN 5 YEARS 9/29/2020 MD JAY JAY Everett [facility] PROC[facility]    LUMBAR LAMINECTOMY      TONSILLECTOMY          Medications Prior to Admission:       Prior to Admission medications    Medication Sig Start Date End Date Taking? Authorizing Provider   clonazePAM (KLONOPIN) 1 MG tablet Take 1 tablet by mouth 3 times daily as needed for Anxiety for up to 3 doses. 11/5/20 11/6/20 Yes María White MD   gabapentin (NEURONTIN) 400 MG capsule Take 1 capsule by mouth 2 times daily for 3 doses. In addition to 2 capsules (=800mg) nightly 11/5/20 11/7/20 Yes María White MD   gabapentin (NEURONTIN) 400 MG capsule Take 2 capsules by mouth nightly for 3 doses.  Take 2 capsules (=800mg) nightly - in addition to 1BID 11/5/20 11/8/20 Yes María White MD   traZODone (DESYREL) 150 MG tablet Take 2 tablets by mouth nightly for 3 doses Take 2 tablets (=300mg) nightly 11/5/20 11/8/20 Yes Joe Camarillo MD   ARIPiprazole (ABILIFY) 5 MG tablet Take 1 tablet by mouth daily for 3 doses 11/5/20 11/8/20 Yes Joe Camarillo MD   furosemide (LASIX) 40 MG tablet Take 40 mg by mouth daily   Yes Historical Provider, MD   omeprazole (PRILOSEC) 20 MG delayed release capsule Take 20 mg by mouth daily   Yes Historical Provider, MD   dexamethasone (DECADRON) 4 MG tablet Take 20 mg orally 12 hours and 6 hours before Taxol 10/23/20  Yes Richar Montesinos MD   lidocaine-prilocaine (EMLA) 2.5-2.5 % cream To port site before chemo 10/21/20  Yes Richar Montesinos MD   melatonin 5 MG TABS tablet Take 5 mg by mouth nightly    Yes Historical Provider, MD   glimepiride (AMARYL) 1 MG tablet Take 1 mg by mouth Daily with supper In addition to 2 tablets (=2mg) every morning    Yes Historical Provider, MD   metoprolol tartrate (LOPRESSOR) 25 MG tablet Take 25 mg by mouth 2 times daily   Yes Historical Provider, MD   venlafaxine (EFFEXOR XR) 150 MG extended release capsule Take 150 mg by mouth 2 times daily   Yes Historical Provider, MD   lansoprazole (PREVACID) 30 MG delayed release capsule Take 30 mg by mouth daily 11/10/16  Yes Historical Provider, MD   metFORMIN (GLUCOPHAGE) 500 MG tablet Take 500 mg by mouth 2 times daily 12/7/16  Yes Historical Provider, MD   aspirin EC 81 MG EC tablet Take 81 mg by mouth daily   Yes Historical Provider, MD   mometasone-formoterol (DULERA) 200-5 MCG/ACT inhaler Inhale 2 puffs into the lungs every 12 hours as needed (wheezing/SOB)    Yes Historical Provider, MD   glimepiride (AMARYL) 1 MG tablet Take 2 mg by mouth every morning In addition to 1mg nightly   Yes Historical Provider, MD        Allergies:       Codeine and Dye [iodides]    Social History:     Tobacco:    reports that she quit smoking about 4 years ago. She has never used smokeless tobacco.  Alcohol:      reports no history of alcohol use.   Drug Use: reports no history of drug use.     Family History:     Family History   Problem Relation Age of Onset    Cancer Mother     Cancer Father          Physical Exam:     Vitals:  BP (!) 140/58   Pulse 95   Temp 98.5 °F (36.9 °C) (Oral)   Resp 16   Ht 5' 8\" (1.727 m)   Wt 145 lb 11.2 oz (66.1 kg)   SpO2 99%   BMI 22.15 kg/m²   Temp (24hrs), Av.4 °F (36.9 °C), Min:97.9 °F (36.6 °C), Max:98.8 °F (37.1 °C)          General appearance - alert, well appearing, and in no acute distress  Mental status - oriented to person, place, and time with normal affect  Head - normocephalic and atraumatic  Eyes - pupils equal and reactive, extraocular eye movements intact, conjunctiva clear  Ears - hearing appears to be intact  Nose - no drainage noted  Mouth - mucous membranes moist  Neck - supple, no carotid bruits, thyroid not palpable  Chest - clear to auscultation, normal effort  Heart - normal rate, regular rhythm, no murmur  Abdomen - soft, nontender, nondistended, bowel sounds present all four quadrants, no masses, hepatomegaly or splenomegaly  Neurological - normal speech, no focal findings or movement disorder noted, cranial nerves II through XII grossly intact  Extremities - peripheral pulses palpable, no pedal edema or calf pain with palpation  Skin - no gross lesions, rashes, or induration noted        Data:     Labs:    Hematology:  Recent Labs     20  0506 11/10/20  0557 20  0532   WBC 6.3 8.0 5.2   RBC 3.65* 3.96 3.52*   HGB 8.9* 9.7* 8.7*   HCT 30.1* 33.4* 29.0*   MCV 82.5* 84.3 82.4*   MCH 24.4* 24.5* 24.7*   MCHC 29.6 29.0 30.0   RDW 19.5* 19.6* 19.4*    499* 346   MPV 8.4 8.7 8.4     Chemistry:  Recent Labs     20  0506 11/10/20  0557 20  0532    142 140   K 4.3 5.0 4.3    101 101   CO2 29 32* 31   GLUCOSE 142* 153* 118*   BUN 15 17 22   CREATININE 0.70 0.87 0.80   ANIONGAP 9 9 8*   LABGLOM >60 >60 >60   GFRAA >60 >60 >60   CALCIUM 9.0 9.8 9.1     Recent Labs 11/10/20  1605 11/10/20  2038 11/11/20  0623 11/11/20  1122 11/11/20  1650 11/11/20 2035   POCGLU 114* 128* 94 273* 109* 188*       Lab Results   Component Value Date    INR 0.9 09/29/2020    INR 1.1 08/28/2020    INR 0.9 12/28/2018    PROTIME 11.8 09/29/2020    PROTIME 14.4 (H) 08/28/2020    PROTIME 9.7 12/28/2018       Lab Results   Component Value Date/Time    SPECIAL NOT REPORTED 11/03/2020 02:15 PM     Lab Results   Component Value Date/Time    CULTURE NO SAMPLE RECEIVED 11/03/2020 02:15 PM       Lab Results   Component Value Date    POCPH 7.36 04/12/2017    PHART 7.42 08/26/2012    PH 7.22 04/11/2017    POCPCO2 45 04/12/2017    CLX4UFC 41 08/26/2012    PCO2 54 04/11/2017    POCPO2 93 04/12/2017    PO2ART 59 08/26/2012    PO2 89 04/11/2017    POCHCO3 25.3 04/12/2017    JUT3ASZ 26.1 08/26/2012    HCO3 22.2 04/11/2017    NBEA NOT REPORTED 04/12/2017    PBEA 0 04/12/2017    ZMB7MLR 27 04/12/2017    KZJU5ZPN 97 04/12/2017    X1VOEIVE 92.1 08/26/2012    O2SAT 95 04/11/2017    FIO2 UNKNOWN 10/31/2017       Radiology:    Xr Ribs Right Include Chest (min 3 Views)    Result Date: 11/3/2020  Subtle linear lucency within the 8th rib, suggestive of a nondisplaced fracture. Masslike opacity in the right lung apex. Xr Cervical Spine (2-3 Views)    Result Date: 11/3/2020  No convincing evidence for acute fracture or malalignment of the cervical spine. Masslike opacity within the right lung apex. All radiological studies reviewed                Code Status:  Full Code    Electronically signed by Kat Navarro MD on 11/11/2020 at 9:16 PM     Copy sent to Dr. Charbel Veloz MD    This note was created with the assistance of a speech-recognition program.  Although the intention is to generate a document that actually reflects the content of the visit, no guarantees can be provided that every mistake has been identified and corrected by editing.      Note was updated later by me after  physical examination and completion of the assessment.

## 2020-11-12 NOTE — PROGRESS NOTES
Occupational Therapy  Facility/Department: [facility] MED SURG  Daily Treatment Note  NAME: Florina Tavarez  : 1950  MRN: [id number]    Date of Service: 2020    Discharge Recommendations:  Subacute/Skilled Nursing Facility  OT Equipment Recommendations  Equipment Needed: Yes  Mobility Devices: ADL Assistive Devices  Walker: Rolling  ADL Assistive Devices: Grab Bars - shower;Sock-Aid Soft;Reacher;Long-handled [place];Long-handled Sponge;Emergency Alert System    Assessment   Performance deficits / Impairments: Decreased functional mobility ; Decreased safe awareness;Decreased balance;Decreased coordination;Decreased ADL status; Decreased posture;Decreased endurance;Decreased high-level IADLs;Decreased strength  Assessment: Skilled OT is necessary for this pt to increase overall I and safety with functional tasks and reduce fall risk to return home as able and with assist.  Prognosis: Good  OT Education: Energy Conservation;OT Role;Plan of Care;Transfer Training;ADL Adaptive Strategies  REQUIRES OT FOLLOW UP: Yes  Activity Tolerance  Activity Tolerance: Patient Tolerated treatment well  Safety Devices  Safety Devices in place: Yes  Type of devices: All fall risk precautions in place; Left in bed;Nurse notified;Call light within reach;Gait belt;Patient at risk for falls; Bed alarm in place         Patient Diagnosis(es): The primary encounter diagnosis was Urinary tract infection without hematuria, site unspecified. Diagnoses of Fall, initial encounter, Contusion of right chest wall, initial encounter, General weakness, Malignant neoplasm of upper lobe of right lung ([address] and Lung mass were also pertinent to this visit.       has a past medical history of AAA (abdominal aortic aneurysm) (HCC), Acid reflux, Anxiety and depression, Aortic stenosis, Arthritis, Blister of ankle, right, CAD (coronary artery disease), Cancer ([address] COPD (chronic obstructive pulmonary disease) ([address] Diabetes mellitus ([address] Falls, Heart block, Hypokalemia, MDRO (multiple drug resistant organisms) resistance, MRSA (methicillin resistant staph aureus) culture positive, On home oxygen therapy, On home oxygen therapy, Overactive bladder, Pneumonia, PONV (postoperative nausea and vomiting), and Vitamin D deficiency. has a past surgical history that includes back surgery; eye surgery; Cholecystectomy; Appendectomy; Hysterectomy; Tonsillectomy; lumbar laminectomy; Endoscopy, colon, diagnostic (12/08/2016); aortic valve repair (N/A, 4/11/2017); bronchoscopy (N/A, 8/31/2020); IR INSERT PICC VAD W SQ PORT >5 YEARS (9/4/2020); and IR PORT PLACEMENT > 5 YEARS (9/29/2020). Restrictions  Restrictions/Precautions  Restrictions/Precautions: Seizure, Fall Risk, Contact Precautions, Up as Tolerated  Required Braces or Orthoses?: No  Position Activity Restriction  Other position/activity restrictions: up as tolerated, Weightbearing as tolerated all extremities without restrictions, 3 L O2 per NC, RUE IV, alarms  Subjective   General  Chart Reviewed: Yes  Patient assessed for rehabilitation services?: Yes  Response to previous treatment: Patient with no complaints from previous session  Family / Caregiver Present: No      Orientation  Orientation  Overall Orientation Status: Within Functional Limits  Objective    ADL  Grooming: Setup;Stand by assistance(standing at sink)  Toileting: Stand by assistance(to complete toileting routine)        Balance  Sitting Balance: Modified independent   Standing Balance: Stand by assistance    Functional Mobility  Functional - Mobility Device: Rolling Walker  Activity: Other  Assist Level: Contact guard assistance  Functional Mobility Comments: Pt completed functional mobility to/from bathroom and around room several times using RW with SBA-CGA for safety and assist managing O2 tubing.     Toilet Transfers  Toilet - Technique: Ambulating  Equipment Used: Grab bars  Toilet Transfer: Contact guard assistance  Toilet Transfers Comments: cues for use of grab bars    Bed mobility  Supine to Sit: Stand by assistance    Transfers  Sit to stand: Stand by assistance  Stand to sit: Stand by assistance  Transfer Comments: Verbal cues for hand placement and overall safety, assist with O2 tubing mgmt. Cognition  Overall Cognitive Status: Exceptions  Arousal/Alertness: Appropriate responses to stimuli  Following Commands: Follows all commands without difficulty  Attention Span: Appears intact  Memory: Decreased short term memory  Safety Judgement: Decreased awareness of need for assistance;Decreased awareness of need for safety  Problem Solving: Assistance required to identify errors made;Assistance required to correct errors made;Decreased awareness of errors  Insights: Decreased awareness of deficits  Initiation: Requires cues for some  Sequencing: Requires cues for some      Plan   Plan  Times per week: 4-5x/week 1x/day as emely  Current Treatment Recommendations: Strengthening, Balance Training, Functional Mobility Training, Safety Education & Training, Endurance Training, Neuromuscular Re-education, Patient/Caregiver Education & Training, Self-Care / ADL, Equipment Evaluation, Education, & procurement, Home Management Training, Pain Management  AM-PAC Score     AM-PAC Inpatient Mobility without Stair Climbing Raw Score : 17 (11/11/20 1118)  AM-[facility] Inpatient without Stair Climbing T-Scale Score : 48.47 (11/11/20 1118)  Mobility Inpatient CMS 0-100% Score: 32.72 (11/11/20 1118)  Mobility Inpatient without Stair CMS G-Code Modifier : Cinda Hale (11/11/20 1118)  AM-PAC Inpatient Daily Activity Raw Score: 21 (11/12/20 1253)  AM-PAC Inpatient ADL T-Scale Score : 44.27 (11/12/20 1253)  ADL Inpatient CMS 0-100% Score: 32.79 (11/12/20 1253)  ADL Inpatient CMS G-Code Modifier : Cinda Hale (11/12/20 1253)    Goals  Short term goals  Time Frame for Short term goals: by discharge, pt to demo  Short term goal 1: bed mob tasks with use of rail as needed to SUP.   Short

## 2020-11-12 NOTE — PLAN OF CARE
Problem: Pain:  Goal: Pain level will decrease  Description: Pain level will decrease  Outcome: Ongoing  Note: Pain level assessment complete. Patient educated on pain scale and control interventions  PRN pain medication given per patient request  Patient instructed to call out with new onset of pain or unrelieved pain , pain is chronic in nature with some additional newer pain over the rt lateral chest, controlled well with infrequent Percocet dosing     Problem: Falls - Risk of:  Goal: Will remain free from falls  Description: Will remain free from falls  Outcome: Ongoing  Note: Patient is a fall risk during this admission. Fall risk assessment was performed. Patient is absent of falls. Bed is in the lowest position. Wheels on the bed are locked. Call light and bed side table are within reach. Clutter is removed. Patient was educated to call out when needing assistance or wanting to get out of bed. Patient offered toileting assistance during rounding. Hourly rounds have been performed.   Fall precautions in place     Problem: Sensory:  Goal: General experience of comfort will improve  Description: General experience of comfort will improve  Outcome: Ongoing     Problem: Urinary Elimination:  Goal: Signs and symptoms of infection will decrease  Description: Signs and symptoms of infection will decrease  Outcome: Ongoing  Goal: Ability to reestablish a normal urinary elimination pattern will improve - after catheter removal  Description: Ability to reestablish a normal urinary elimination pattern will improve  Outcome: Ongoing  Goal: Complications related to the disease process, condition or treatment will be avoided or minimized  Description: Complications related to the disease process, condition or treatment will be avoided or minimized  Outcome: Ongoing     Problem: Serum Glucose Level - Abnormal:  Goal: Ability to maintain appropriate glucose levels will improve  Description: Ability to maintain appropriate

## 2020-11-12 NOTE — CARE COORDINATION
Social work: Call to [address] [place] [address] [address] to see if they could do a \"carve out\" for patients radiation treatments. Deanna/rufino states they cannot because [employer] following medicare's guidelines. As a result, patient will have to return home with home healthcare- she is current with [facility] for aide services 2-3 hours a day. Writer will add PT, OT, Skilled nursing, social work consult and call to [facility]ia to see if aide hours can be increased. RAJINDER met with patient at bedside and informed her of above, she is in agreement to plan. Called [facility] and informed them of dc plan home today- writer also asked if aide hours could be increased. RAJINDER informed I would need to contact [facility] to discuss. CM name is Karan Syed [phone]- writer left voicemail for her to inquire. RAJINDER also left voicemail for intake at [facility] to add skilled nursing, PT/OT and  in the home. RAJINDER also made referral to APS as patient has frequent falls in the home and unable to care for herself.

## 2020-11-12 NOTE — CARE COORDINATION
Social work: Discussed with patient referral to [id number] [facility] for additional supports in the home. Patient is agreeable, referral called and faxed.

## 2020-11-12 NOTE — PROGRESS NOTES
Pt 's blood sugar low (68), drank apple juice and had 2 packs sharif crackers, when rechecked was 68 again, given more juice and sharif crackers, skin warm and dry, does not feel dizzy, thought processes clear, will observe

## 2020-11-12 NOTE — PLAN OF CARE
Problem: Falls - Risk of:  Goal: Will remain free from falls  Description: Will remain free from falls  11/11/2020 2244 by Rashad Jarvis RN  Outcome: Ongoing  Note: Room free of clutter  Hourly rounding   Non-skid socks worn  Side rails up x2  Bed low and locked  Call light in reach  Instructed to call out before getting out of bed  Anticipatory needs met  Bed alarm on  Falling star at the door and on wristband      Problem: Discharge Planning:  Goal: Discharged to appropriate level of care  Description: Discharged to appropriate level of care  Outcome: Ongoing  Note: Identify potential discharge needs/barriers on admission  Involve family/patient/significant other in discharge process  Collaborate with Case Management/ for discharge needs/concerns

## 2020-11-12 NOTE — PROGRESS NOTES
Today's Date: 11/11/2020  Patient Name: Alex Glass  Date of admission: 11/3/2020 10:59 AM  Patient's age: 72 y.o., 1950  Admission Dx: Urinary tract infectious disease [N39.0]    Reason for Consult: management recommendations  Requesting Physician: Sunny Gonzalez MD    CHIEF COMPLAINT: Weakness fatigue. UTI. History Obtained From:  patient, electronic medical record    Interval history:  Patient seen and examined. She denies any nausea vomiting  Discharge planning   No fever chills     HISTORY OF PRESENT ILLNESS:    The patient is a 72 y.o.  female who is admitted to the hospital with chief complaint of weakness and fatigue. Reportedly patient also had a standing height fall at home. Imaging revealed eighth rib fracture. Work-up also revealed UTI. Patient also has significant joint pain. 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 Patient has been started on concurrent chemoradiation. Last chemotherapy treatment was on 10/28/2020. Work-up reveals hemoglobin of 7.2 with MCV 80. Patient has adequate ANC and platelet count. Blood cultures are pending. Urine culture showing E. coli. Past Medical History:   has a past medical history of AAA (abdominal aortic aneurysm) ([facility] [address] Acid reflux, Anxiety and depression, Aortic stenosis, Arthritis, Blister of ankle, right, CAD (coronary artery disease), Cancer ([address] COPD (chronic obstructive pulmonary disease) ([facility] [address] Diabetes mellitus ([facility] [address] Falls, Heart block, Hypokalemia, MDRO (multiple drug resistant organisms) resistance, MRSA (methicillin resistant staph aureus) culture positive, On home oxygen therapy, On home oxygen therapy, Overactive bladder, Pneumonia, PONV (postoperative nausea and vomiting), and Vitamin D deficiency.     Past Surgical History:   has a past surgical history that includes back surgery; eye surgery; Cholecystectomy; Appendectomy; Hysterectomy; Tonsillectomy; lumbar laminectomy; Endoscopy, colon, diagnostic (12/08/2016); aortic valve repair (N/A, 4/11/2017); bronchoscopy (N/A, 8/31/2020); IR INSERT PICC VAD W SQ PORT >5 YEARS (9/4/2020); and IR PORT PLACEMENT > 5 YEARS (9/29/2020). Medications:    Prior to Admission medications    Medication Sig Start Date End Date Taking? Authorizing Provider   clonazePAM (KLONOPIN) 1 MG tablet Take 1 tablet by mouth 3 times daily as needed for Anxiety for up to 3 doses. 11/5/20 11/6/20 Yes Mariella Baig MD   gabapentin (NEURONTIN) 400 MG capsule Take 1 capsule by mouth 2 times daily for 3 doses. In addition to 2 capsules (=800mg) nightly 11/5/20 11/7/20 Yes Mariella Baig MD   gabapentin (NEURONTIN) 400 MG capsule Take 2 capsules by mouth nightly for 3 doses.  Take 2 capsules (=800mg) nightly - in addition to 1BID 11/5/20 11/8/20 Yes Mariella Baig MD   traZODone (DESYREL) 150 MG tablet Take 2 tablets by mouth nightly for 3 doses Take 2 tablets (=300mg) nightly 11/5/20 11/8/20 Yes Mariella Baig MD   ARIPiprazole (ABILIFY) 5 MG tablet Take 1 tablet by mouth daily for 3 doses 11/5/20 11/8/20 Yes Mariella Baig MD   furosemide (LASIX) 40 MG tablet Take 40 mg by mouth daily   Yes Historical Provider, MD   omeprazole (PRILOSEC) 20 MG delayed release capsule Take 20 mg by mouth daily   Yes Historical Provider, MD   dexamethasone (DECADRON) 4 MG tablet Take 20 mg orally 12 hours and 6 hours before Taxol 10/23/20  Yes Nicky Todd MD   lidocaine-prilocaine (EMLA) 2.5-2.5 % cream To port site before chemo 10/21/20  Yes Nicky Todd MD   melatonin 5 MG TABS tablet Take 5 mg by mouth nightly    Yes Historical Provider, MD   glimepiride (AMARYL) 1 MG tablet Take 1 mg by mouth Daily with supper In addition to 2 tablets (=2mg) every morning    Yes Historical Provider, MD   metoprolol tartrate (LOPRESSOR) 25 MG tablet Take 25 mg by mouth 2 times daily   Yes Historical Provider, MD   venlafaxine (EFFEXOR XR) 150 MG extended release capsule Take 150 mg by mouth 2 times daily   Yes Historical Provider, MD   lansoprazole (PREVACID) 30 MG delayed release capsule Take 30 mg by mouth daily 11/10/16  Yes Historical Provider, MD   metFORMIN (GLUCOPHAGE) 500 MG tablet Take 500 mg by mouth 2 times daily 12/7/16  Yes Historical Provider, MD   aspirin EC 81 MG EC tablet Take 81 mg by mouth daily   Yes Historical Provider, MD   mometasone-formoterol (DULERA) 200-5 MCG/ACT inhaler Inhale 2 puffs into the lungs every 12 hours as needed (wheezing/SOB)    Yes Historical Provider, MD   glimepiride (AMARYL) 1 MG tablet Take 2 mg by mouth every morning In addition to 1mg nightly   Yes Historical Provider, MD     Current Facility-Administered Medications   Medication Dose Route Frequency Provider Last Rate Last Dose    lidocaine 4 % external patch 1 patch  1 patch Transdermal Daily Jonathon Galindo MD   1 patch at 11/11/20 0853    morphine (PF) injection 1 mg  1 mg Intravenous Q4H PRN Claudetta Bias, MD   1 mg at 11/10/20 1909    ARIPiprazole (ABILIFY) tablet 5 mg  5 mg Oral Daily Claudetta Bias, MD   5 mg at 11/11/20 0853    aspirin EC tablet 81 mg  81 mg Oral Daily Claudetta Bias, MD   81 mg at 11/11/20 0853    clonazePAM (KLONOPIN) tablet 1 mg  1 mg Oral TID PRN Claudetta Bias, MD   1 mg at 11/07/20 1320    furosemide (LASIX) tablet 40 mg  40 mg Oral Daily Claudetta Bias, MD   40 mg at 11/11/20 0853    gabapentin (NEURONTIN) capsule 400 mg  400 mg Oral BID Claudetta Bias, MD   400 mg at 11/11/20 1246    gabapentin (NEURONTIN) capsule 800 mg  800 mg Oral Nightly Claudetta Bias, MD   800 mg at 11/10/20 2016    glimepiride (AMARYL) tablet 2 mg  2 mg Oral QAM Claudetta Bias, MD   2 mg at 11/11/20 0853    glimepiride (AMARYL) tablet 1 mg  1 mg Oral Elsy Fermin MD   1 mg at 11/11/20 1825    metoprolol tartrate (LOPRESSOR) tablet 25 mg  25 mg Oral BID Claudetta Bias, MD   19 mg at 11/11/20 0853    oxyCODONE-acetaminophen (PERCOCET) 5-325 MG per tablet 1 tablet  1 tablet Oral Q6H PRN Jennifer Ovalle MD   1 tablet at 11/11/20 1938    traZODone (DESYREL) tablet 300 mg  300 mg Oral Nightly Jennifer Ovalle MD   300 mg at 11/10/20 2016    venlafaxine (EFFEXOR XR) extended release capsule 150 mg  150 mg Oral BID Jennifer Ovalle MD   150 mg at 11/11/20 0853    sodium chloride flush 0.9 % injection 10 mL  10 mL Intravenous 2 times per day Jennifer Ovalle MD   10 mL at 11/11/20 1938    sodium chloride flush 0.9 % injection 10 mL  10 mL Intravenous PRN Jennifer Ovalle MD   10 mL at 11/10/20 1909    potassium chloride (KLOR-CON M) extended release tablet 40 mEq  40 mEq Oral PRN Jennifer Ovalle MD   40 mEq at 11/06/20 1222    Or    potassium bicarb-citric acid (EFFER-K) effervescent tablet 40 mEq  40 mEq Oral PRN Jennifer Ovalle MD        Or    potassium chloride 10 mEq/100 mL IVPB (Peripheral Line)  10 mEq Intravenous PRN Jennifer Ovalle MD        magnesium sulfate 1 g in dextrose 5% 100 mL IVPB  1 g Intravenous PRN Jennifer Ovalel MD   Stopped at 11/06/20 2333    acetaminophen (TYLENOL) tablet 650 mg  650 mg Oral Q6H PRN Jennifer Ovalle MD        Or    acetaminophen (TYLENOL) suppository 650 mg  650 mg Rectal Q6H PRN Jennifer Ovalle MD        senna (SENOKOT) tablet 8.6 mg  1 tablet Oral BID PRN Jennifer Ovalle MD        promethazine (PHENERGAN) tablet 12.5 mg  12.5 mg Oral Q6H PRN Jennifer Ovalle MD        Or    ondansetron [facility]) injection 4 mg  4 mg Intravenous Q6H PRN Jennifer Ovalle MD        famotidine (PEPCID) tablet 20 mg  20 mg Oral BID Jennifer Ovalle MD   20 mg at 11/11/20 0853    enoxaparin (LOVENOX) injection 40 mg  40 mg Subcutaneous Daily Jennifer Ovalle MD   40 mg at 11/11/20 0853    insulin lispro (HUMALOG) injection vial 0-18 Units  0-18 Units Subcutaneous TID  Jennifer Ovalle MD   9 Units at 11/11/20 1245    insulin lispro (HUMALOG) injection vial 0-9 Units  0-9 Units Subcutaneous Nightly Nimisha Shrestha MD   2 Units at 11/09/20 2048    glucose (GLUTOSE) 40 % oral gel 15 g  15 g Oral PRN Nimisha Shrestha MD        dextrose 50 % IV solution  12.5 g Intravenous PRN Nimisha Shrestha MD        glucagon (rDNA) injection 1 mg  1 mg Intramuscular PRN Nimisha Shrestha MD        dextrose 5 % solution  100 mL/hr Intravenous PRN Nimisha Shrestha MD           Allergies:  Codeine and Dye [iodides]    Social History:   reports that she quit smoking about 4 years ago. She has never used smokeless tobacco. She reports that she does not drink alcohol or use drugs. Family History: family history includes Cancer in her father and mother. REVIEW OF SYSTEMS:      Constitutional: No fever or chills. No night sweats, no weight loss. Positive fatigue. Eyes: No eye discharge, double vision, or eye pain   HEENT: negative for sore mouth, sore throat, hoarseness and voice change   Respiratory: negative for cough , sputum, dyspnea, wheezing, hemoptysis, chest pain   Cardiovascular: negative for chest pain, dyspnea, palpitations, orthopnea, PND   Gastrointestinal: negative for nausea, vomiting, diarrhea, constipation, abdominal pain, Dysphagia, hematemesis and hematochezia   Genitourinary: negative for frequency, dysuria, nocturia, urinary incontinence, and hematuria   Integument: negative for rash, skin lesions, bruises.    Hematologic/Lymphatic: negative for easy bruising, bleeding, lymphadenopathy, or petechiae   Endocrine: negative for heat or cold intolerance,weight changes, change in bowel habits and hair loss   Musculoskeletal: negative for myalgias, arthralgias, pain, joint swelling,and bone pain   Neurological: negative for headaches, dizziness, seizures, weakness, numbness    PHYSICAL EXAM:        BP (!) 140/58   Pulse 95   Temp 98.5 °F (36.9 °C) (Oral)   Resp 16   Ht 5' 8\" (1.727 m)   Wt 145 lb 11.2 oz (66.1 kg)   SpO2 99%   BMI 22.15 kg/m² <=1SUSCEPTIBLE     ceFAZolin Value in next row Sensitive       <=4SUSCEPTIBLE     ceFAZolin Value in next row Sensitive       <=4SUSCEPTIBLE     cefepime Value in next row        NOT REPORTED     cefTRIAXone Value in next row Sensitive       <=1SUSCEPTIBLE     ciprofloxacin Value in next row Resistant       >=4RESISTANT     ertapenem Value in next row        NOT REPORTED     Confirmatory Extended Spectrum Beta-Lactamase Value in next row Negative       NOT REPORTED     gentamicin Value in next row Sensitive       <=1SUSCEPTIBLE     meropenem Value in next row        NOT REPORTED     nitrofurantoin Value in next row Sensitive       <=16SUSCEPTIBLE     tigecycline Value in next row        NOT REPORTED     tobramycin Value in next row Sensitive       <=1SUSCEPTIBLE     trimethoprim-sulfamethoxazole Value in next row Sensitive       <=20SUSCEPTIBLE     piperacillin-tazobactam Value in next row Sensitive       <=4SUSCEPTIBLE   Urinalysis   Result Value Ref Range    Color, UA YELLOW YELLOW    Turbidity UA CLOUDY (A) CLEAR    Glucose, Ur NEGATIVE NEGATIVE    Bilirubin Urine NEGATIVE NEGATIVE    Ketones, Urine NEGATIVE NEGATIVE    Specific Gravity, UA 1.023 1.005 - 1.030    Urine Hgb 3+ (A) NEGATIVE    pH, UA 5.5 5.0 - 8.0    Protein, UA 2+ (A) NEGATIVE    Urobilinogen, Urine Normal Normal    Nitrite, Urine NEGATIVE NEGATIVE    Leukocyte Esterase, Urine MODERATE (A) NEGATIVE    Urinalysis Comments NOT REPORTED    CBC Auto Differential   Result Value Ref Range    WBC 8.9 3.5 - 11.3 k/uL    RBC 3.47 (L) 3.95 - 5.11 m/uL    Hemoglobin 8.4 (L) 11.9 - 15.1 g/dL    Hematocrit 27.7 (L) 36.3 - 47.1 %    MCV 79.8 (L) 82.6 - 102.9 fL    MCH 24.2 (L) 25.2 - 33.5 pg    MCHC 30.3 28.4 - 34.8 g/dL    RDW 19.8 (H) 11.8 - 14.4 %    Platelets 574 189 - 409 k/uL    MPV 9.2 8.1 - 13.5 fL    NRBC Automated 0.0 0.0 per 100 WBC    Differential Type NOT REPORTED     WBC Morphology NOT REPORTED     RBC Morphology NOT REPORTED     Platelet Estimate NOT REPORTED     Seg Neutrophils 87 (H) 36 - 66 %    Lymphocytes 7 (L) 24 - 44 %    Monocytes 5 1 - 7 %    Eosinophils % 0 (L) 1 - 4 %    Basophils 0 %    Immature Granulocytes 1 (H) 0 %    Segs Absolute 7.74 (H) 1.8 - 7.7 k/uL    Absolute Lymph # 0.62 (L) 1.0 - 4.8 k/uL    Absolute Mono # 0.45 0.2 - 0.8 k/uL    Absolute Eos # 0.00 0.0 - 0.4 k/uL    Basophils Absolute 0.00 0.0 - 0.2 k/uL    Absolute Immature Granulocyte 0.09 0.00 - 0.30 k/uL   Basic Metabolic Panel   Result Value Ref Range    Glucose 191 (H) 70 - 99 mg/dL    BUN 28 (H) 8 - 23 mg/dL    CREATININE 0.80 0.50 - 0.90 mg/dL    Bun/Cre Ratio 35 (H) 9 - 20    Calcium 9.2 8.6 - 10.4 mg/dL    Sodium 134 (L) 135 - 144 mmol/L    Potassium 4.0 3.7 - 5.3 mmol/L    Chloride 98 98 - 107 mmol/L    CO2 27 20 - 31 mmol/L    Anion Gap 9 9 - 17 mmol/L    GFR Non-African American >60 >60 mL/min    GFR African American >60 >60 mL/min    GFR Comment          GFR Staging NOT REPORTED    Microscopic Urinalysis   Result Value Ref Range    -          WBC, UA TOO NUMEROUS TO COUNT 0 - 5 /HPF    RBC, UA TOO NUMEROUS TO COUNT 0 - 2 /HPF    Casts UA NOT REPORTED /LPF    Crystals, UA NOT REPORTED None /HPF    Epithelial Cells UA 2 TO 5 0 - 5 /HPF    Renal Epithelial, UA NOT REPORTED 0 /HPF    Bacteria, UA MANY (A) None    Mucus, UA 1+ (A) None    Trichomonas, UA NOT REPORTED None    Amorphous, UA NOT REPORTED None    Other Observations UA NOT REPORTED NOT REQ.     Yeast, UA NOT REPORTED None   Basic Metabolic Panel w/ Reflex to MG   Result Value Ref Range    Glucose 77 70 - 99 mg/dL    BUN 17 8 - 23 mg/dL    CREATININE 0.68 0.50 - 0.90 mg/dL    Bun/Cre Ratio 25 (H) 9 - 20    Calcium 8.5 (L) 8.6 - 10.4 mg/dL    Sodium 141 135 - 144 mmol/L    Potassium 4.0 3.7 - 5.3 mmol/L    Chloride 107 98 - 107 mmol/L    CO2 25 20 - 31 mmol/L    Anion Gap 9 9 - 17 mmol/L    GFR Non-African American >60 >60 mL/min    GFR African American >60 >60 mL/min    GFR Comment          GFR Staging RDW 19.7 (H) 11.8 - 14.4 %    Platelets 947 137 - 536 k/uL    MPV 8.7 8.1 - 13.5 fL    NRBC Automated 0.0 0.0 per 100 WBC    Differential Type NOT REPORTED     WBC Morphology NOT REPORTED     RBC Morphology ANISOCYTOSIS PRESENT     Platelet Estimate NOT REPORTED     Seg Neutrophils 78 (H) 36 - 65 %    Lymphocytes 13 (L) 24 - 43 %    Monocytes 7 3 - 12 %    Eosinophils % 1 1 - 4 %    Basophils 1 0 - 2 %    Immature Granulocytes 1 (H) 0 %    Segs Absolute 4.54 1.50 - 8.10 k/uL    Absolute Lymph # 0.75 (L) 1.10 - 3.70 k/uL    Absolute Mono # 0.39 0.10 - 1.20 k/uL    Absolute Eos # 0.07 0.00 - 0.44 k/uL    Basophils Absolute 0.03 0.00 - 0.20 k/uL    Absolute Immature Granulocyte 0.05 0.00 - 0.30 k/uL   Lactic Acid   Result Value Ref Range    Lactic Acid 0.4 (L) 0.5 - 2.2 mmol/L   Vitamin B12 & Folate   Result Value Ref Range    Vitamin B-12 190 (L) 232 - 1245 pg/mL    Folate >20.0 >4.8 ng/mL   Iron and TIBC   Result Value Ref Range    Iron 19 (L) 37 - 145 ug/dL    TIBC 176 (L) 250 - 450 ug/dL    Iron Saturation 11 (L) 20 - 55 %    UIBC 157 112 - 347 ug/dL   Ferritin   Result Value Ref Range    Ferritin 149 13 - 150 ug/L   Basic Metabolic Panel w/ Reflex to MG   Result Value Ref Range    Glucose 95 70 - 99 mg/dL    BUN 11 8 - 23 mg/dL    CREATININE 0.63 0.50 - 0.90 mg/dL    Bun/Cre Ratio 17 9 - 20    Calcium 8.2 (L) 8.6 - 10.4 mg/dL    Sodium 139 135 - 144 mmol/L    Potassium 3.4 (L) 3.7 - 5.3 mmol/L    Chloride 102 98 - 107 mmol/L    CO2 29 20 - 31 mmol/L    Anion Gap 8 (L) 9 - 17 mmol/L    GFR Non-African American >60 >60 mL/min    GFR African American >60 >60 mL/min    GFR Comment          GFR Staging NOT REPORTED    CBC auto differential   Result Value Ref Range    WBC 6.0 3.5 - 11.3 k/uL    RBC 2.98 (L) 3.95 - 5.11 m/uL    Hemoglobin 7.3 (L) 11.9 - 15.1 g/dL    Hematocrit 24.6 (L) 36.3 - 47.1 %    MCV 82.6 82.6 - 102.9 fL    MCH 24.5 (L) 25.2 - 33.5 pg    MCHC 29.7 28.4 - 34.8 g/dL    RDW 19.6 (H) 11.8 - 14.4 % Platelets 578 119 - 348 k/uL    MPV 8.8 8.1 - 13.5 fL    NRBC Automated 0.0 0.0 per 100 WBC    Differential Type NOT REPORTED     WBC Morphology NOT REPORTED     RBC Morphology NOT REPORTED     Platelet Estimate NOT REPORTED     Seg Neutrophils 79 (H) 36 - 66 %    Lymphocytes 10 (L) 24 - 44 %    Monocytes 8 (H) 1 - 7 %    Eosinophils % 1 1 - 4 %    Basophils 1 %    Immature Granulocytes 1 (H) 0 %    Segs Absolute 4.74 1.8 - 7.7 k/uL    Absolute Lymph # 0.60 (L) 1.0 - 4.8 k/uL    Absolute Mono # 0.48 0.2 - 0.8 k/uL    Absolute Eos # 0.06 0.0 - 0.4 k/uL    Basophils Absolute 0.06 0.0 - 0.2 k/uL    Absolute Immature Granulocyte 0.06 0.00 - 0.30 k/uL    Morphology HYPOCHROMIA PRESENT    Lactic Acid   Result Value Ref Range    Lactic Acid 1.1 0.5 - 2.2 mmol/L   Magnesium   Result Value Ref Range    Magnesium 1.3 (L) 1.6 - 2.6 mg/dL   Basic Metabolic Panel w/ Reflex to MG   Result Value Ref Range    Glucose 77 70 - 99 mg/dL    BUN 11 8 - 23 mg/dL    CREATININE 0.63 0.50 - 0.90 mg/dL    Bun/Cre Ratio 17 9 - 20    Calcium 8.4 (L) 8.6 - 10.4 mg/dL    Sodium 141 135 - 144 mmol/L    Potassium 4.1 3.7 - 5.3 mmol/L    Chloride 106 98 - 107 mmol/L    CO2 30 20 - 31 mmol/L    Anion Gap 5 (L) 9 - 17 mmol/L    GFR Non-African American >60 >60 mL/min    GFR African American >60 >60 mL/min    GFR Comment          GFR Staging NOT REPORTED    CBC auto differential   Result Value Ref Range    WBC 5.4 3.5 - 11.3 k/uL    RBC 3.14 (L) 3.95 - 5.11 m/uL    Hemoglobin 7.5 (L) 11.9 - 15.1 g/dL    Hematocrit 25.9 (L) 36.3 - 47.1 %    MCV 82.5 (L) 82.6 - 102.9 fL    MCH 23.9 (L) 25.2 - 33.5 pg    MCHC 29.0 28.4 - 34.8 g/dL    RDW 19.6 (H) 11.8 - 14.4 %    Platelets 419 386 - 622 k/uL    MPV 8.8 8.1 - 13.5 fL    NRBC Automated 0.0 0.0 per 100 WBC    Differential Type NOT REPORTED     WBC Morphology NOT REPORTED     RBC Morphology ANISOCYTOSIS PRESENT     Platelet Estimate NOT REPORTED     Seg Neutrophils 72 (H) 36 - 65 %    Lymphocytes 15 (L) 24 - Lactic Acid   Result Value Ref Range    Lactic Acid 1.3 0.5 - 2.2 mmol/L   Basic Metabolic Panel w/ Reflex to MG   Result Value Ref Range    Glucose 142 (H) 70 - 99 mg/dL    BUN 15 8 - 23 mg/dL    CREATININE 0.70 0.50 - 0.90 mg/dL    Bun/Cre Ratio 21 (H) 9 - 20    Calcium 9.0 8.6 - 10.4 mg/dL    Sodium 140 135 - 144 mmol/L    Potassium 4.3 3.7 - 5.3 mmol/L    Chloride 102 98 - 107 mmol/L    CO2 29 20 - 31 mmol/L    Anion Gap 9 9 - 17 mmol/L    GFR Non-African American >60 >60 mL/min    GFR African American >60 >60 mL/min    GFR Comment          GFR Staging NOT REPORTED    CBC auto differential   Result Value Ref Range    WBC 6.3 3.5 - 11.3 k/uL    RBC 3.65 (L) 3.95 - 5.11 m/uL    Hemoglobin 8.9 (L) 11.9 - 15.1 g/dL    Hematocrit 30.1 (L) 36.3 - 47.1 %    MCV 82.5 (L) 82.6 - 102.9 fL    MCH 24.4 (L) 25.2 - 33.5 pg    MCHC 29.6 28.4 - 34.8 g/dL    RDW 19.5 (H) 11.8 - 14.4 %    Platelets 787 300 - 929 k/uL    MPV 8.4 8.1 - 13.5 fL    NRBC Automated 0.0 0.0 per 100 WBC    Differential Type NOT REPORTED     Seg Neutrophils 75 (H) 36 - 65 %    Lymphocytes 15 (L) 24 - 43 %    Monocytes 7 3 - 12 %    Eosinophils % 1 1 - 4 %    Basophils 1 0 - 2 %    Immature Granulocytes 1 (H) 0 %    Segs Absolute 4.77 1.50 - 8.10 k/uL    Absolute Lymph # 0.92 (L) 1.10 - 3.70 k/uL    Absolute Mono # 0.44 0.10 - 1.20 k/uL    Absolute Eos # 0.05 0.00 - 0.44 k/uL    Basophils Absolute 0.04 0.00 - 0.20 k/uL    Absolute Immature Granulocyte 0.08 0.00 - 0.30 k/uL    WBC Morphology NOT REPORTED     RBC Morphology ANISOCYTOSIS PRESENT     Platelet Estimate NOT REPORTED    Lactic Acid   Result Value Ref Range    Lactic Acid 1.1 0.5 - 2.2 mmol/L   Basic Metabolic Panel w/ Reflex to MG   Result Value Ref Range    Glucose 153 (H) 70 - 99 mg/dL    BUN 17 8 - 23 mg/dL    CREATININE 0.87 0.50 - 0.90 mg/dL    Bun/Cre Ratio 20 9 - 20    Calcium 9.8 8.6 - 10.4 mg/dL    Sodium 142 135 - 144 mmol/L    Potassium 5.0 3.7 - 5.3 mmol/L    Chloride 101 98 - 107 mmol/L    CO2 32 (H) 20 - 31 mmol/L    Anion Gap 9 9 - 17 mmol/L    GFR Non-African American >60 >60 mL/min    GFR African American >60 >60 mL/min    GFR Comment          GFR Staging NOT REPORTED    CBC auto differential   Result Value Ref Range    WBC 8.0 3.5 - 11.3 k/uL    RBC 3.96 3.95 - 5.11 m/uL    Hemoglobin 9.7 (L) 11.9 - 15.1 g/dL    Hematocrit 33.4 (L) 36.3 - 47.1 %    MCV 84.3 82.6 - 102.9 fL    MCH 24.5 (L) 25.2 - 33.5 pg    MCHC 29.0 28.4 - 34.8 g/dL    RDW 19.6 (H) 11.8 - 14.4 %    Platelets 409 (H) 981 - 453 k/uL    MPV 8.7 8.1 - 13.5 fL    NRBC Automated 0.0 0.0 per 100 WBC    Differential Type NOT REPORTED     Seg Neutrophils 68 (H) 36 - 65 %    Lymphocytes 21 (L) 24 - 43 %    Monocytes 7 3 - 12 %    Eosinophils % 1 1 - 4 %    Basophils 1 0 - 2 %    Immature Granulocytes 2 (H) 0 %    Segs Absolute 5.49 1.50 - 8.10 k/uL    Absolute Lymph # 1.64 1.10 - 3.70 k/uL    Absolute Mono # 0.52 0.10 - 1.20 k/uL    Absolute Eos # 0.08 0.00 - 0.44 k/uL    Basophils Absolute 0.07 0.00 - 0.20 k/uL    Absolute Immature Granulocyte 0.16 0.00 - 0.30 k/uL    WBC Morphology NOT REPORTED     RBC Morphology ANISOCYTOSIS PRESENT     Platelet Estimate NOT REPORTED    Lactic Acid   Result Value Ref Range    Lactic Acid 2.5 (H) 0.5 - 2.2 mmol/L   COVID-19    Specimen: Other   Result Value Ref Range    SARS-CoV-2 Not Detected Not Detected    SARS-CoV-2, Rapid          Source . NASOPHARYNGEAL SWAB     SARS-CoV-2         Basic Metabolic Panel w/ Reflex to MG   Result Value Ref Range    Glucose 118 (H) 70 - 99 mg/dL    BUN 22 8 - 23 mg/dL    CREATININE 0.80 0.50 - 0.90 mg/dL    Bun/Cre Ratio 28 (H) 9 - 20    Calcium 9.1 8.6 - 10.4 mg/dL    Sodium 140 135 - 144 mmol/L    Potassium 4.3 3.7 - 5.3 mmol/L    Chloride 101 98 - 107 mmol/L    CO2 31 20 - 31 mmol/L    Anion Gap 8 (L) 9 - 17 mmol/L    GFR Non-African American >60 >60 mL/min    GFR African American >60 >60 mL/min    GFR Comment          GFR Staging NOT REPORTED    CBC auto differential   Result Value Ref Range    WBC 5.2 3.5 - 11.3 k/uL    RBC 3.52 (L) 3.95 - 5.11 m/uL    Hemoglobin 8.7 (L) 11.9 - 15.1 g/dL    Hematocrit 29.0 (L) 36.3 - 47.1 %    MCV 82.4 (L) 82.6 - 102.9 fL    MCH 24.7 (L) 25.2 - 33.5 pg    MCHC 30.0 28.4 - 34.8 g/dL    RDW 19.4 (H) 11.8 - 14.4 %    Platelets 374 785 - 211 k/uL    MPV 8.4 8.1 - 13.5 fL    NRBC Automated 0.0 0.0 per 100 WBC    Differential Type NOT REPORTED     WBC Morphology NOT REPORTED     RBC Morphology ANISOCYTOSIS PRESENT     Platelet Estimate NOT REPORTED     Seg Neutrophils 67 (H) 36 - 65 %    Lymphocytes 20 (L) 24 - 43 %    Monocytes 8 3 - 12 %    Eosinophils % 1 1 - 4 %    Basophils 1 0 - 2 %    Immature Granulocytes 3 (H) 0 %    Segs Absolute 3.47 1.50 - 8.10 k/uL    Absolute Lymph # 1.04 (L) 1.10 - 3.70 k/uL    Absolute Mono # 0.44 0.10 - 1.20 k/uL    Absolute Eos # 0.07 0.00 - 0.44 k/uL    Basophils Absolute 0.05 0.00 - 0.20 k/uL    Absolute Immature Granulocyte 0.14 0.00 - 0.30 k/uL   Lactic Acid   Result Value Ref Range    Lactic Acid 0.9 0.5 - 2.2 mmol/L   POC Glucose Fingerstick   Result Value Ref Range    POC Glucose 231 (H) 65 - 105 mg/dL   POC Glucose Fingerstick   Result Value Ref Range    POC Glucose 152 (H) 65 - 105 mg/dL   POC Glucose Fingerstick   Result Value Ref Range    POC Glucose 61 (L) 65 - 105 mg/dL   POC Glucose Fingerstick   Result Value Ref Range    POC Glucose 91 65 - 105 mg/dL   POC Glucose Fingerstick   Result Value Ref Range    POC Glucose 311 (H) 65 - 105 mg/dL   POC Glucose Fingerstick   Result Value Ref Range    POC Glucose 105 65 - 105 mg/dL   POC Glucose Fingerstick   Result Value Ref Range    POC Glucose 180 (H) 65 - 105 mg/dL   POC Glucose Fingerstick   Result Value Ref Range    POC Glucose 163 (H) 65 - 105 mg/dL   POC Glucose Fingerstick   Result Value Ref Range    POC Glucose 87 65 - 105 mg/dL   POC Glucose Fingerstick   Result Value Ref Range    POC Glucose 292 (H) 65 - 105 mg/dL   POC Glucose Fingerstick   Result Value Ref Range    POC Glucose 136 (H) 65 - 105 mg/dL   POC Glucose Fingerstick   Result Value Ref Range    POC Glucose 180 (H) 65 - 105 mg/dL   POC Glucose Fingerstick   Result Value Ref Range    POC Glucose 77 65 - 105 mg/dL   POC Glucose Fingerstick   Result Value Ref Range    POC Glucose 156 (H) 65 - 105 mg/dL   POC Glucose Fingerstick   Result Value Ref Range    POC Glucose 364 (H) 65 - 105 mg/dL   POC Glucose Fingerstick   Result Value Ref Range    POC Glucose 100 65 - 105 mg/dL   POC Glucose Fingerstick   Result Value Ref Range    POC Glucose 283 (H) 65 - 105 mg/dL   POC Glucose Fingerstick   Result Value Ref Range    POC Glucose 67 65 - 105 mg/dL   POC Glucose Fingerstick   Result Value Ref Range    POC Glucose 81 65 - 105 mg/dL   POC Glucose Fingerstick   Result Value Ref Range    POC Glucose 325 (H) 65 - 105 mg/dL   POC Glucose Fingerstick   Result Value Ref Range    POC Glucose 133 (H) 65 - 105 mg/dL   POC Glucose Fingerstick   Result Value Ref Range    POC Glucose 124 (H) 65 - 105 mg/dL   POC Glucose Fingerstick   Result Value Ref Range    POC Glucose 246 (H) 65 - 105 mg/dL   POC Glucose Fingerstick   Result Value Ref Range    POC Glucose 189 (H) 65 - 105 mg/dL   POC Glucose Fingerstick   Result Value Ref Range    POC Glucose 151 (H) 65 - 105 mg/dL   POC Glucose Fingerstick   Result Value Ref Range    POC Glucose 116 (H) 65 - 105 mg/dL   POC Glucose Fingerstick   Result Value Ref Range    POC Glucose 132 (H) 65 - 105 mg/dL   POC Glucose Fingerstick   Result Value Ref Range    POC Glucose 227 (H) 65 - 105 mg/dL   POC Glucose Fingerstick   Result Value Ref Range    POC Glucose 52 (L) 65 - 105 mg/dL   POC Glucose Fingerstick   Result Value Ref Range    POC Glucose 92 65 - 105 mg/dL   POC Glucose Fingerstick   Result Value Ref Range    POC Glucose 184 (H) 65 - 105 mg/dL   POC Glucose Fingerstick   Result Value Ref Range    POC Glucose 154 (H) 65 - 105 mg/dL   POC Glucose Fingerstick Result Value Ref Range    POC Glucose 223 (H) 65 - 105 mg/dL   POC Glucose Fingerstick   Result Value Ref Range    POC Glucose 114 (H) 65 - 105 mg/dL   POC Glucose Fingerstick   Result Value Ref Range    POC Glucose 128 (H) 65 - 105 mg/dL   POC Glucose Fingerstick   Result Value Ref Range    POC Glucose 94 65 - 105 mg/dL   POC Glucose Fingerstick   Result Value Ref Range    POC Glucose 273 (H) 65 - 105 mg/dL   POC Glucose Fingerstick   Result Value Ref Range    POC Glucose 109 (H) 65 - 105 mg/dL   POC Glucose Fingerstick   Result Value Ref Range    POC Glucose 188 (H) 65 - 105 mg/dL   EKG 12 Lead   Result Value Ref Range    Ventricular Rate 101 BPM    Atrial Rate 101 BPM    P-R Interval 148 ms    QRS Duration 154 ms    Q-T Interval 406 ms    QTc Calculation (Bazett) 526 ms    P Axis 68 degrees    R Axis -59 degrees    T Axis 29 degrees         IMAGING DATA:    Xr Ribs Right Include Chest (min 3 Views)    Result Date: 11/3/2020  EXAMINATION: 2 XRAY VIEWS OF THE RIGHT RIBS WITH FRONTAL XRAY VIEW OF THE CHEST 11/3/2020 11:53 am COMPARISON: 09/25/2020 HISTORY: ORDERING SYSTEM PROVIDED HISTORY: fall yesterday TECHNOLOGIST PROVIDED HISTORY: fall yesterday Reason for Exam: fell Acuity: Acute Type of Exam: Initial Relevant Medical/Surgical History: pt fell, pain in rt lat ribs and posterior neck FINDINGS: Masslike opacity in the right lung apex. There is a subtle linear lucency within the 8th rib which is suggestive of a nondisplaced fracture. Multiple remote/chronic fractures identified. Left hemithorax is clear. Subtle linear lucency within the 8th rib, suggestive of a nondisplaced fracture. Masslike opacity in the right lung apex. Xr Cervical Spine (2-3 Views)    Result Date: 11/3/2020  EXAMINATION: 3 XRAY VIEWS OF THE CERVICAL SPINE 11/3/2020 11:52 am COMPARISON: None.  HISTORY: ORDERING SYSTEM PROVIDED HISTORY: fall yesterday TECHNOLOGIST PROVIDED HISTORY: fall yesterday Reason for Exam: fell Acuity: Acute Type of Exam: Initial Relevant Medical/Surgical History: pt fell, pain in rt lat ribs and posterior neck FINDINGS: Three views of the cervical spine are submitted for review. Advanced facet arthropathy at C2 through C5. Degenerative disease at C5-C6. The lower cervical spine is obscured on the lateral view due to overlying osseous structures and soft tissues. Masslike right upper lobe airspace opacity. No convincing evidence for acute fracture or malalignment of the cervical spine. Masslike opacity within the right lung apex. Xr Lumbar Spine (2-3 Views)    Result Date: 11/4/2020  EXAMINATION: THREE XRAY VIEWS OF THE LUMBAR SPINE; THREE XRAY VIEWS OF THE SACRUM/COCCYX 11/4/2020 1:07 pm COMPARISON: CT abdomen and pelvis dated 09/24/2020 HISTORY: ORDERING SYSTEM PROVIDED HISTORY: back pain TECHNOLOGIST PROVIDED HISTORY: back pain Reason for Exam: pain to low back Acuity: Unknown Type of Exam: Unknown FINDINGS: Lumbar spine: Vertebral body heights and alignment are within normal limits. There is mild multilevel disc space narrowing and endplate spurring. Mild to moderate facet arthropathy. There are scattered vascular calcifications. The stomach is significantly distended with gas. No small bowel or large bowel distention. There is moderate fecal material throughout the large bowel. Sacrum/coccyx: Sacrum is not well visualized due to osteopenia and overlying soft tissue attenuation. Sacrococcygeal alignment is grossly preserved. No displaced fracture identified. Sacroiliac joints are symmetric, better visualized on the lumbar spine radiographs. 1.  Lumbar spine: No compression fracture or acute malalignment. Mild degenerative changes. Significant gas distention of the stomach, suggesting gastroparesis or possible obstruction. 2.  Sacrum/coccyx: Limited evaluation due to osteopenia and overlying soft tissue attenuation. No obvious acute osseous abnormality.      Xr Sacrum Coccyx (min 2 Views)    Result Date: 11/4/2020  EXAMINATION: THREE XRAY VIEWS OF THE LUMBAR SPINE; THREE XRAY VIEWS OF THE SACRUM/COCCYX 11/4/2020 1:07 pm COMPARISON: CT abdomen and pelvis dated 09/24/2020 HISTORY: ORDERING SYSTEM PROVIDED HISTORY: back pain TECHNOLOGIST PROVIDED HISTORY: back pain Reason for Exam: pain to low back Acuity: Unknown Type of Exam: Unknown FINDINGS: Lumbar spine: Vertebral body heights and alignment are within normal limits. There is mild multilevel disc space narrowing and endplate spurring. Mild to moderate facet arthropathy. There are scattered vascular calcifications. The stomach is significantly distended with gas. No small bowel or large bowel distention. There is moderate fecal material throughout the large bowel. Sacrum/coccyx: Sacrum is not well visualized due to osteopenia and overlying soft tissue attenuation. Sacrococcygeal alignment is grossly preserved. No displaced fracture identified. Sacroiliac joints are symmetric, better visualized on the lumbar spine radiographs. 1.  Lumbar spine: No compression fracture or acute malalignment. Mild degenerative changes. Significant gas distention of the stomach, suggesting gastroparesis or possible obstruction. 2.  Sacrum/coccyx: Limited evaluation due to osteopenia and overlying soft tissue attenuation. No obvious acute osseous abnormality. Vl Lower Extremity Bilateral Venous Duplex    Result Date: 10/22/2020   Conclusions   Summary   No evidence of superficial or deep venous thrombosis in both lower  extremities. Findings:   Right Impression:                    Left Impression:  The common femoral, femoral,         The common femoral, femoral,  popliteal, tibials and saphenous     popliteal, tibials and saphenous  veins are compressible with normal   veins are compressible with normal  doppler responses.                   Nm Bone Scan Whole Body    Result Date: 11/4/2020  EXAMINATION: WHOLE BODY BONE SCAN  11/4/2020 TECHNIQUE: The patient was injected intravenously with 25.6 mCi of 99 mTc MDP and scintigraphy of the entire skeleton was performed approximately three hours later. Coned-down images of the head, neck and thorax in obliques positions. COMPARISON: Patient did not have previous imaging studies for comparison. HISTORY: ORDERING SYSTEM PROVIDED HISTORY: Fall, lung cancer TECHNOLOGIST PROVIDED HISTORY: Fall, lung cancer Reason for Exam: pain Acuity: Unknown Type of Exam: Unknown FINDINGS: Symmetrical uptake of radiotracer is noted in the shoulders, sternoclavicular joints, hips, knees, and ankles, foci of activity in the aforementioned joints are most likely degenerative. The remainder of the osseous structures and soft tissues are unremarkable. Specifically, no radiotracer uptake in the ribcage is noted. Physiologic activity is present in the skeletal and renal collecting systems. No evidence to suggest osseous metastatic disease. Pattern of uptake most compatible with age related degenerative change. IMPRESSION:   Primary Problem  <principal problem not specified>    Active Hospital Problems    Diagnosis Date Noted    Moderate malnutrition ([address] [E44.0] 11/10/2020    Closed fracture of one rib of right side [S22.31XA] 11/04/2020    Degenerative disc disease, lumbar [M51.36] 11/04/2020    Urinary tract infectious disease [N39.0] 11/03/2020    Malignant neoplasm of upper lobe of right lung ([address] [C34.11] 09/18/2020    Lung mass [R91.8] 08/27/2020    Chronic bilateral low back pain [M54.5, G89.29] 10/31/2017    Falls frequently [R29.6] 01/27/2017   Squamous cell carcinoma of lung, clinical stage IIIb  Ongoing treatment with concurrent chemoradiation using carboplatin and Taxol  UTI  Asthenia  Anemia  Eighth rib fracture    RECOMMENDATIONS:  1. I personally reviewed results of lab work-up imaging studies and other relevant clinical data. Reviewed records and treatment history  2. HB 8.7  3.  Continue symptomatic supportive care  4. Patient counseled tobacco cessation  5. Pain control  6. Plan to resume her chemoradiation as per patient wishes as outpatient after discharge  7. Awaiting discharge planning and placement  8. Discharge as per primary. Discussed with patient and Nurse. Thank you for asking us to see this patient. Mendoza Van MD  Hematologist/Medical Oncologist    Cell: [phone]      This note is created with the assistance of a speech recognition program.  While intending to generate a document that actually reflects the content of the visit, the document can still have some errors including those of syntax and sound a like substitutions which may escape proof reading. It such instances, actual meaning can be extrapolated by contextual diversion.

## 2020-11-12 NOTE — PROGRESS NOTES
Talked with Didi, informed him pt cannot go to SNF and will be going home with [facility] care , he will discharge and reconcile meds, VELMA already signed

## 2020-11-13 ENCOUNTER — HOSPITAL ENCOUNTER (OUTPATIENT)
Facility: MEDICAL CENTER | Age: 70
End: 2020-11-13
Payer: MEDICARE

## 2020-11-13 ENCOUNTER — HOSPITAL ENCOUNTER (OUTPATIENT)
Dept: RADIATION ONCOLOGY | Facility: MEDICAL CENTER | Age: 70
Discharge: HOME | End: 2020-11-13
Payer: MEDICARE

## 2020-11-13 VITALS
TEMPERATURE: 97 F | RESPIRATION RATE: 16 BRPM | BODY MASS INDEX: 23.26 KG/M2 | HEART RATE: 80 BPM | WEIGHT: 153 LBS | OXYGEN SATURATION: 97 % | SYSTOLIC BLOOD PRESSURE: 95 MMHG | DIASTOLIC BLOOD PRESSURE: 56 MMHG

## 2020-11-13 PROCEDURE — 77427 RADIATION TX MANAGEMENT X5: CPT | Performed by: RADIOLOGY

## 2020-11-13 PROCEDURE — 77014 PR CT GUIDANCE PLACEMENT RAD THERAPY FIELDS: CPT | Performed by: RADIOLOGY

## 2020-11-13 PROCEDURE — 77386 HC NTSTY MODUL RAD TX DLVR CPLX: CPT

## 2020-11-13 ASSESSMENT — PAIN SCALES - GENERAL: PAINLEVEL_OUTOF10: 8

## 2020-11-13 ASSESSMENT — PAIN DESCRIPTION - LOCATION: LOCATION: HIP

## 2020-11-13 ASSESSMENT — PAIN DESCRIPTION - ORIENTATION: ORIENTATION: RIGHT

## 2020-11-13 NOTE — PROGRESS NOTES
Luli Needs  11/13/2020  Wt Readings from Last 3 Encounters:   11/13/20 153 lb (69.4 kg)   11/12/20 145 lb 14.4 oz (66.2 kg)   10/28/20 158 lb 4.8 oz (71.8 kg)     Body mass index is 23.26 kg/m². Pt here for OTV. Patient was in the hospital for UTI and fall. Patient looks great. She states she feels better, but is tired. She is done with her antibiotic and living at home currently. Dr[facility] Mraibel Huber to [facility].    Treatment Area:lung    Patient was seen today for weekly visit. Comfort Alteration  Fatigue: Mild    Ventilation Alterations  Cough: No  Hemoptysis: No  Mucus Color: no  Dyspnea: No  O2 Sat: 97%    Nutritional Alteration  Anorexia: No  Nausea: No   Vomiting: No     Mucous Membrane Alteration  Voice Changes/ Stridor/Larynx: no  Pharynx & Esophagus: none    Elimination Alterations  Constipation: no  Diarrhea:  no    Skin Alteration   Sensation:none    Radiation Dermatitis:  Intact [x]     Erythema  []     Discoloration  []     Rash []     Dry desquamation  []     Moist desquamation []       Emotional  Coping: effective      Injury, potential bleeding or infection: none    Lab Results   Component Value Date    WBC 5.7 11/12/2020     11/12/2020         BP (!) 95/56   Pulse 80   Temp 97 °F (36.1 °C) (Oral)   Resp 16   Wt 153 lb (69.4 kg)   SpO2 97%   BMI 23.26 kg/m²   Patient Currently in Pain: Yes  Pain Assessment: 0-10  Pain Level: 8         Assessment/Plan: Patient was seen today for weekly visit.       Germain Marion

## 2020-11-13 NOTE — PROGRESS NOTES
Patient: Justina Eid     : 1950      Date of Service: 2020    [facility] Oncology  On Treatment Visit (OTV) Note    Diagnosis: Cancer Staging  Malignant neoplasm of upper lobe of right lung [facility])  Staging form: Lung, AJCC 8th Edition  - Clinical stage from 2020: Stage IIIB (cT4, cN2, cM0) - Signed by Shayy Sanches MD on 2020        Dose Accrued: She completed 2000 out of 6000 cGy, 10 out of 30 treatment    S: She feels well no new problem. She was hospitalized in the past week after she falling down but was discharged with no major problem. O: BP (!) 95/56   Pulse 80   Temp 97 °F (36.1 °C) (Oral)   Resp 16   Wt 153 lb (69.4 kg)   SpO2 97%   BMI 23.26 kg/m² . Well-appearing, in no apparent distress, alert and fully oriented, answers questions appropriately. No signs of acute radiation-induced toxicity on physical exam.    IGRT: Set-up images acquired to ensure daily treatment accuracy and precision were reviewed by the physician. A&P: Doing well, continue radiotherapy as planned. Moisturize treated area as instructed. We reviewed reasonably anticipated side effects in the upcoming weeks, as well as expected trajectory of recovery. Patient verbalized a good understanding to everything.     Electronically signed by Isidro Bermudez MD on 2020 at 5:51 PM

## 2020-11-16 ENCOUNTER — HOSPITAL ENCOUNTER (OUTPATIENT)
Dept: RADIATION ONCOLOGY | Facility: MEDICAL CENTER | Age: 70
Discharge: HOME | End: 2020-11-16
Payer: MEDICARE

## 2020-11-16 PROCEDURE — 77386 HC NTSTY MODUL RAD TX DLVR CPLX: CPT

## 2020-11-16 PROCEDURE — 77014 PR CT GUIDANCE PLACEMENT RAD THERAPY FIELDS: CPT | Performed by: RADIOLOGY

## 2020-11-17 ENCOUNTER — HOSPITAL ENCOUNTER (OUTPATIENT)
Dept: RADIATION ONCOLOGY | Facility: MEDICAL CENTER | Age: 70
Discharge: HOME | End: 2020-11-17
Payer: MEDICARE

## 2020-11-17 VITALS
WEIGHT: 157.13 LBS | SYSTOLIC BLOOD PRESSURE: 98 MMHG | BODY MASS INDEX: 23.89 KG/M2 | OXYGEN SATURATION: 96 % | DIASTOLIC BLOOD PRESSURE: 47 MMHG | TEMPERATURE: 96.6 F | HEART RATE: 79 BPM | RESPIRATION RATE: 16 BRPM

## 2020-11-17 PROCEDURE — 77386 HC NTSTY MODUL RAD TX DLVR CPLX: CPT

## 2020-11-17 PROCEDURE — 77014 PR CT GUIDANCE PLACEMENT RAD THERAPY FIELDS: CPT | Performed by: RADIOLOGY

## 2020-11-17 NOTE — PROGRESS NOTES
Patient: Teo Millard     : 1950      Date of Service: 2020    [facility] Oncology  On Treatment Visit (OTV) Note    Diagnosis: Cancer Staging  Malignant neoplasm of upper lobe of right lung [facility])  Staging form: Lung, AJCC 8th Edition  - Clinical stage from 2020: Stage IIIB (cT4, cN2, cM0) - Signed by Gege Sweeney MD on 2020        Dose Accrued: Completed 2400 cGy out of 6000 cGy, 12 out of 30 treatments. S: No new complaint except for feeling tired. O: BP (!) 98/47   Pulse 79   Temp 96.6 °F (35.9 °C) (Oral)   Resp 16   Wt 157 lb 2 oz (71.3 kg)   SpO2 96%   BMI 23.89 kg/m² . Well-appearing, in no apparent distress, alert and fully oriented, answers questions appropriately. No signs of acute radiation-induced toxicity on physical exam.    IGRT: Set-up images acquired to ensure daily treatment accuracy and precision were reviewed by the physician. A&P: Continue radiotherapy as planned. Moisturize treated area as instructed. We reviewed reasonably anticipated side effects in the upcoming weeks, as well as expected trajectory of recovery. Patient verbalized a good understanding to everything.     Electronically signed by Siomara Colunga MD on 2020 at 6:34 PM

## 2020-11-17 NOTE — PROGRESS NOTES
Tha Khan  11/17/2020  Wt Readings from Last 3 Encounters:   11/17/20 157 lb 2 oz (71.3 kg)   11/13/20 153 lb (69.4 kg)   11/12/20 145 lb 14.4 oz (66.2 kg)     Body mass index is 23.89 kg/m². Pt here for OTV. Denies any issues. Dr. David Valiente to [facility].    Treatment Area:lung    Patient was seen today for weekly visit. Comfort Alteration  Fatigue: Mild    Ventilation Alterations  Cough: Yes  Hemoptysis: No  Mucus Color: non-productive  Dyspnea: No  O2 Sat: 96%    Nutritional Alteration  Anorexia: No  Nausea: No   Vomiting: No     Mucous Membrane Alteration  Voice Changes/ Stridor/Larynx: no  Pharynx & Esophagus: no    Elimination Alterations  Constipation: no  Diarrhea:  no    Skin Alteration   Sensation:none    Radiation Dermatitis:  Intact [x]     Erythema  []     Discoloration  []     Rash []     Dry desquamation  []     Moist desquamation []       Emotional  Coping: effective      Injury, potential bleeding or infection: none    Lab Results   Component Value Date    WBC 5.7 11/12/2020     11/12/2020         BP (!) 98/47   Pulse 79   Temp 96.6 °F (35.9 °C) (Oral)   Resp 16   Wt 157 lb 2 oz (71.3 kg)   SpO2 96%   BMI 23.89 kg/m²   Patient Currently in Pain: No               Assessment/Plan: Patient was seen today for weekly visit.       Luis Robertson

## 2020-11-18 ENCOUNTER — OFFICE VISIT (OUTPATIENT)
Dept: ONCOLOGY | Age: 70
End: 2020-11-18
Payer: MEDICARE

## 2020-11-18 ENCOUNTER — HOSPITAL ENCOUNTER (OUTPATIENT)
Dept: INFUSION THERAPY | Facility: MEDICAL CENTER | Age: 70
Discharge: HOME | End: 2020-11-18
Payer: MEDICARE

## 2020-11-18 ENCOUNTER — TELEPHONE (OUTPATIENT)
Dept: ONCOLOGY | Age: 70
End: 2020-11-18

## 2020-11-18 ENCOUNTER — HOSPITAL ENCOUNTER (OUTPATIENT)
Dept: RADIATION ONCOLOGY | Facility: MEDICAL CENTER | Age: 70
Discharge: HOME | End: 2020-11-18
Payer: MEDICARE

## 2020-11-18 ENCOUNTER — HOSPITAL ENCOUNTER (OUTPATIENT)
Facility: MEDICAL CENTER | Age: 70
End: 2020-11-18
Payer: MEDICARE

## 2020-11-18 VITALS
WEIGHT: 155.7 LBS | SYSTOLIC BLOOD PRESSURE: 103 MMHG | TEMPERATURE: 97.7 F | RESPIRATION RATE: 18 BRPM | BODY MASS INDEX: 23.67 KG/M2 | HEART RATE: 61 BPM | DIASTOLIC BLOOD PRESSURE: 61 MMHG

## 2020-11-18 DIAGNOSIS — C34.11 MALIGNANT NEOPLASM OF UPPER LOBE OF RIGHT LUNG (HCC): ICD-10-CM

## 2020-11-18 DIAGNOSIS — C34.91 NON-SMALL CELL CANCER OF RIGHT LUNG (HCC): Primary | ICD-10-CM

## 2020-11-18 LAB
ABSOLUTE EOS #: 0.14 K/UL (ref 0–0.44)
ABSOLUTE IMMATURE GRANULOCYTE: 0.07 K/UL (ref 0–0.3)
ABSOLUTE LYMPH #: 0.85 K/UL (ref 1.1–3.7)
ABSOLUTE MONO #: 0.28 K/UL (ref 0.1–1.2)
ALBUMIN SERPL-MCNC: 3.8 G/DL (ref 3.5–5.2)
ALBUMIN/GLOBULIN RATIO: ABNORMAL (ref 1–2.5)
ALP BLD-CCNC: 70 U/L (ref 35–104)
ALT SERPL-CCNC: 11 U/L (ref 5–33)
ANION GAP SERPL CALCULATED.3IONS-SCNC: 9 MMOL/L (ref 9–17)
AST SERPL-CCNC: 15 U/L
BASOPHILS # BLD: 1 % (ref 0–2)
BASOPHILS ABSOLUTE: 0.07 K/UL (ref 0–0.2)
BILIRUB SERPL-MCNC: 0.12 MG/DL (ref 0.3–1.2)
BUN BLDV-MCNC: 17 MG/DL (ref 8–23)
BUN/CREAT BLD: 20 (ref 9–20)
CALCIUM SERPL-MCNC: 8.8 MG/DL (ref 8.6–10.4)
CHLORIDE BLD-SCNC: 103 MMOL/L (ref 98–107)
CO2: 28 MMOL/L (ref 20–31)
CREAT SERPL-MCNC: 0.87 MG/DL (ref 0.5–0.9)
DIFFERENTIAL TYPE: ABNORMAL
EOSINOPHILS RELATIVE PERCENT: 2 % (ref 1–4)
GFR AFRICAN AMERICAN: >60 ML/MIN
GFR NON-AFRICAN AMERICAN: >60 ML/MIN
GFR SERPL CREATININE-BSD FRML MDRD: ABNORMAL ML/MIN/{1.73_M2}
GFR SERPL CREATININE-BSD FRML MDRD: ABNORMAL ML/MIN/{1.73_M2}
GLUCOSE BLD-MCNC: 260 MG/DL (ref 70–99)
HCT VFR BLD CALC: 30.7 % (ref 36.3–47.1)
HEMOGLOBIN: 9.2 G/DL (ref 11.9–15.1)
IMMATURE GRANULOCYTES: 1 %
LYMPHOCYTES # BLD: 12 % (ref 24–43)
MCH RBC QN AUTO: 25.7 PG (ref 25.2–33.5)
MCHC RBC AUTO-ENTMCNC: 30 G/DL (ref 28.4–34.8)
MCV RBC AUTO: 85.8 FL (ref 82.6–102.9)
MONOCYTES # BLD: 4 % (ref 3–12)
MORPHOLOGY: ABNORMAL
NRBC AUTOMATED: 0 PER 100 WBC
PDW BLD-RTO: 20.7 % (ref 11.8–14.4)
PLATELET # BLD: 250 K/UL (ref 138–453)
PLATELET ESTIMATE: ABNORMAL
PMV BLD AUTO: 8.5 FL (ref 8.1–13.5)
POTASSIUM SERPL-SCNC: 4.7 MMOL/L (ref 3.7–5.3)
RBC # BLD: 3.58 M/UL (ref 3.95–5.11)
RBC # BLD: ABNORMAL 10*6/UL
SEG NEUTROPHILS: 80 % (ref 36–65)
SEGMENTED NEUTROPHILS ABSOLUTE COUNT: 5.69 K/UL (ref 1.5–8.1)
SODIUM BLD-SCNC: 140 MMOL/L (ref 135–144)
TOTAL PROTEIN: 7.1 G/DL (ref 6.4–8.3)
WBC # BLD: 7.1 K/UL (ref 3.5–11.3)
WBC # BLD: ABNORMAL 10*3/UL

## 2020-11-18 PROCEDURE — 99211 OFF/OP EST MAY X REQ PHY/QHP: CPT | Performed by: INTERNAL MEDICINE

## 2020-11-18 PROCEDURE — 80053 COMPREHEN METABOLIC PANEL: CPT

## 2020-11-18 PROCEDURE — 6360000002 HC RX W HCPCS: Performed by: INTERNAL MEDICINE

## 2020-11-18 PROCEDURE — 77336 RADIATION PHYSICS CONSULT: CPT

## 2020-11-18 PROCEDURE — 77386 HC NTSTY MODUL RAD TX DLVR CPLX: CPT

## 2020-11-18 PROCEDURE — 96415 CHEMO IV INFUSION ADDL HR: CPT

## 2020-11-18 PROCEDURE — 2580000003 HC RX 258: Performed by: INTERNAL MEDICINE

## 2020-11-18 PROCEDURE — 77014 PR CT GUIDANCE PLACEMENT RAD THERAPY FIELDS: CPT | Performed by: RADIOLOGY

## 2020-11-18 PROCEDURE — 99215 OFFICE O/P EST HI 40 MIN: CPT | Performed by: INTERNAL MEDICINE

## 2020-11-18 PROCEDURE — 85025 COMPLETE CBC W/AUTO DIFF WBC: CPT

## 2020-11-18 PROCEDURE — 96375 TX/PRO/DX INJ NEW DRUG ADDON: CPT

## 2020-11-18 PROCEDURE — 2500000003 HC RX 250 WO HCPCS: Performed by: INTERNAL MEDICINE

## 2020-11-18 PROCEDURE — 36591 DRAW BLOOD OFF VENOUS DEVICE: CPT

## 2020-11-18 PROCEDURE — 96413 CHEMO IV INFUSION 1 HR: CPT

## 2020-11-18 RX ORDER — HEPARIN SODIUM (PORCINE) LOCK FLUSH IV SOLN 100 UNIT/ML 100 UNIT/ML
500 SOLUTION INTRAVENOUS PRN
Status: CANCELLED | OUTPATIENT
Start: 2020-12-03

## 2020-11-18 RX ORDER — MEPERIDINE HYDROCHLORIDE 50 MG/ML
12.5 INJECTION INTRAMUSCULAR; INTRAVENOUS; SUBCUTANEOUS ONCE
Status: CANCELLED | OUTPATIENT
Start: 2020-11-25

## 2020-11-18 RX ORDER — DIPHENHYDRAMINE HYDROCHLORIDE 50 MG/ML
50 INJECTION INTRAMUSCULAR; INTRAVENOUS ONCE
Status: CANCELLED | OUTPATIENT
Start: 2020-11-25

## 2020-11-18 RX ORDER — HEPARIN SODIUM (PORCINE) LOCK FLUSH IV SOLN 100 UNIT/ML 100 UNIT/ML
500 SOLUTION INTRAVENOUS PRN
Status: DISCONTINUED | OUTPATIENT
Start: 2020-11-18 | End: 2020-11-19 | Stop reason: HOSPADM

## 2020-11-18 RX ORDER — SODIUM CHLORIDE 0.9 % (FLUSH) 0.9 %
10 SYRINGE (ML) INJECTION PRN
Status: CANCELLED | OUTPATIENT
Start: 2020-12-03

## 2020-11-18 RX ORDER — HEPARIN SODIUM (PORCINE) LOCK FLUSH IV SOLN 100 UNIT/ML 100 UNIT/ML
500 SOLUTION INTRAVENOUS PRN
Status: CANCELLED | OUTPATIENT
Start: 2020-11-25

## 2020-11-18 RX ORDER — PALONOSETRON 0.05 MG/ML
0.25 INJECTION, SOLUTION INTRAVENOUS ONCE
Status: COMPLETED | OUTPATIENT
Start: 2020-11-18 | End: 2020-11-18

## 2020-11-18 RX ORDER — SODIUM CHLORIDE 9 MG/ML
20 INJECTION, SOLUTION INTRAVENOUS ONCE
Status: CANCELLED | OUTPATIENT
Start: 2020-12-03

## 2020-11-18 RX ORDER — SODIUM CHLORIDE 9 MG/ML
20 INJECTION, SOLUTION INTRAVENOUS ONCE
Status: COMPLETED | OUTPATIENT
Start: 2020-11-18 | End: 2020-11-18

## 2020-11-18 RX ORDER — SODIUM CHLORIDE 9 MG/ML
INJECTION, SOLUTION INTRAVENOUS CONTINUOUS
Status: CANCELLED | OUTPATIENT
Start: 2020-12-03

## 2020-11-18 RX ORDER — SODIUM CHLORIDE 9 MG/ML
INJECTION, SOLUTION INTRAVENOUS CONTINUOUS
Status: CANCELLED | OUTPATIENT
Start: 2020-11-25

## 2020-11-18 RX ORDER — DIPHENHYDRAMINE HYDROCHLORIDE 50 MG/ML
50 INJECTION INTRAMUSCULAR; INTRAVENOUS ONCE
Status: CANCELLED | OUTPATIENT
Start: 2020-12-03

## 2020-11-18 RX ORDER — PALONOSETRON 0.05 MG/ML
0.25 INJECTION, SOLUTION INTRAVENOUS ONCE
Status: CANCELLED | OUTPATIENT
Start: 2020-11-25

## 2020-11-18 RX ORDER — DIPHENHYDRAMINE HYDROCHLORIDE 50 MG/ML
50 INJECTION INTRAMUSCULAR; INTRAVENOUS ONCE
Status: COMPLETED | OUTPATIENT
Start: 2020-11-18 | End: 2020-11-18

## 2020-11-18 RX ORDER — EPINEPHRINE 1 MG/ML
0.3 INJECTION, SOLUTION, CONCENTRATE INTRAVENOUS PRN
Status: CANCELLED | OUTPATIENT
Start: 2020-11-25

## 2020-11-18 RX ORDER — EPINEPHRINE 1 MG/ML
0.3 INJECTION, SOLUTION, CONCENTRATE INTRAVENOUS PRN
Status: CANCELLED | OUTPATIENT
Start: 2020-12-03

## 2020-11-18 RX ORDER — SODIUM CHLORIDE 0.9 % (FLUSH) 0.9 %
5 SYRINGE (ML) INJECTION PRN
Status: CANCELLED | OUTPATIENT
Start: 2020-12-03

## 2020-11-18 RX ORDER — SODIUM CHLORIDE 0.9 % (FLUSH) 0.9 %
10 SYRINGE (ML) INJECTION PRN
Status: CANCELLED | OUTPATIENT
Start: 2020-11-25

## 2020-11-18 RX ORDER — SODIUM CHLORIDE 0.9 % (FLUSH) 0.9 %
5 SYRINGE (ML) INJECTION PRN
Status: CANCELLED | OUTPATIENT
Start: 2020-11-25

## 2020-11-18 RX ORDER — METHYLPREDNISOLONE SODIUM SUCCINATE 125 MG/2ML
125 INJECTION, POWDER, LYOPHILIZED, FOR SOLUTION INTRAMUSCULAR; INTRAVENOUS ONCE
Status: CANCELLED | OUTPATIENT
Start: 2020-11-25

## 2020-11-18 RX ORDER — METHYLPREDNISOLONE SODIUM SUCCINATE 125 MG/2ML
125 INJECTION, POWDER, LYOPHILIZED, FOR SOLUTION INTRAMUSCULAR; INTRAVENOUS ONCE
Status: CANCELLED | OUTPATIENT
Start: 2020-12-03

## 2020-11-18 RX ORDER — SODIUM CHLORIDE 9 MG/ML
20 INJECTION, SOLUTION INTRAVENOUS ONCE
Status: CANCELLED | OUTPATIENT
Start: 2020-11-25

## 2020-11-18 RX ORDER — PALONOSETRON 0.05 MG/ML
0.25 INJECTION, SOLUTION INTRAVENOUS ONCE
Status: CANCELLED | OUTPATIENT
Start: 2020-12-03

## 2020-11-18 RX ORDER — MEPERIDINE HYDROCHLORIDE 50 MG/ML
12.5 INJECTION INTRAMUSCULAR; INTRAVENOUS; SUBCUTANEOUS ONCE
Status: CANCELLED | OUTPATIENT
Start: 2020-12-03

## 2020-11-18 RX ORDER — DEXAMETHASONE SODIUM PHOSPHATE 100 MG/10ML
10 INJECTION INTRAMUSCULAR; INTRAVENOUS ONCE
Status: COMPLETED | OUTPATIENT
Start: 2020-11-18 | End: 2020-11-18

## 2020-11-18 RX ORDER — SODIUM CHLORIDE 0.9 % (FLUSH) 0.9 %
10 SYRINGE (ML) INJECTION PRN
Status: DISCONTINUED | OUTPATIENT
Start: 2020-11-18 | End: 2020-11-19 | Stop reason: HOSPADM

## 2020-11-18 RX ADMIN — Medication 10 ML: at 15:17

## 2020-11-18 RX ADMIN — HEPARIN 500 UNITS: 100 SYRINGE at 15:16

## 2020-11-18 RX ADMIN — Medication 10 MG: at 12:36

## 2020-11-18 RX ADMIN — CARBOPLATIN 180 MG: 10 INJECTION INTRAVENOUS at 14:40

## 2020-11-18 RX ADMIN — PACLITAXEL 78 MG: 6 INJECTION, SOLUTION, CONCENTRATE INTRAVENOUS at 13:14

## 2020-11-18 RX ADMIN — FAMOTIDINE 20 MG: 10 INJECTION, SOLUTION INTRAVENOUS at 12:35

## 2020-11-18 RX ADMIN — SODIUM CHLORIDE 20 ML/HR: 9 INJECTION, SOLUTION INTRAVENOUS at 11:30

## 2020-11-18 RX ADMIN — PALONOSETRON 0.25 MG: 0.05 INJECTION, SOLUTION INTRAVENOUS at 12:34

## 2020-11-18 RX ADMIN — DIPHENHYDRAMINE HYDROCHLORIDE 50 MG: 50 INJECTION, SOLUTION INTRAMUSCULAR; INTRAVENOUS at 12:35

## 2020-11-18 ASSESSMENT — PAIN DESCRIPTION - ORIENTATION: ORIENTATION: LOWER

## 2020-11-18 ASSESSMENT — PAIN DESCRIPTION - LOCATION: LOCATION: BACK

## 2020-11-18 ASSESSMENT — PAIN SCALES - GENERAL: PAINLEVEL_OUTOF10: 4

## 2020-11-18 NOTE — PROGRESS NOTES
Today's Date: 11/18/2020  Patient Name: Simeon Nunez  Patient's age: 72 y.o., 1950    Diagnosis:   RUL squamous cell carcinoma,   Cancer Staging  Malignant neoplasm of upper lobe of right lung [facility])  Staging form: Lung, AJCC 8th Edition  - Clinical stage from 9/18/2020: Stage IIIB (cT4, cN2, cM0) - Signed by Teena Rivera MD on 9/18/2020    Current therapy:  Started on concurrent chemoradiation with weekly carbotaxol on 10/21/20    CHIEF COMPLAINT:    Chief Complaint   Patient presents with    Follow-up     INTERVAL HISTORY:    Josh Perea is returning for follow-up visit and to discuss  further recommendations. She was recently in the hospital with SOB. She is recovering well. NO fever chills. She denies any trouble swallowing. She denies any chest pain or shortness of breath. During this visit patient's allergy, social, medical, surgical history and medications were reviewed and updated. HISTORY OF PRESENT ILLNESS:    Josh Perea is a 67-year-old female with a past medical history of COPD, history of tobacco abuse, depression, CAD, arthritis was admitted with multiple falls at home. She complains of back pain and also chronic cough. On admission she had a CT scan of the chest which showed 7.2 cm spiculated mass in the right upper lobe with mediastinal lymphadenopathy suspicious for malignancy. 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 Oncology consulted for further evaluation. Patient has COPD and uses Home oxygen at night and sues walker at home. She smokes more than a PPD. 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     Past Medical History:   has a past medical history of AAA (abdominal aortic aneurysm) ([facility] [address] Acid reflux, Anxiety and depression, Aortic stenosis, Arthritis, Blister of ankle, right, CAD (coronary artery disease), Cancer ([address] COPD (chronic obstructive pulmonary disease) ([facility] [address] Diabetes mellitus ([facility] [address] Falls, Heart block, Hypokalemia, MDRO (multiple drug resistant organisms) resistance, MRSA (methicillin resistant staph aureus) culture positive, On home oxygen therapy, On home oxygen therapy, Overactive bladder, Pneumonia, PONV (postoperative nausea and vomiting), and Vitamin D deficiency. Past Surgical History:   has a past surgical history that includes back surgery; eye surgery; Cholecystectomy; Appendectomy; Hysterectomy; Tonsillectomy; lumbar laminectomy; Endoscopy, colon, diagnostic (12/08/2016); aortic valve repair (N/A, 4/11/2017); bronchoscopy (N/A, 8/31/2020); IR INSERT PICC VAD W SQ PORT >5 YEARS (9/4/2020); and IR PORT PLACEMENT > 5 YEARS (9/29/2020). Medications:    Current Outpatient Medications   Medication Sig Dispense Refill    lidocaine 4 % external patch Place 1 patch onto the skin daily 30 patch 0    traZODone (DESYREL) 300 MG tablet Take 0.5 tablets by mouth nightly 30 tablet 0    clonazePAM (KLONOPIN) 1 MG tablet Take 1 tablet by mouth 3 times daily as needed for Anxiety for up to 3 doses. 3 tablet 0    gabapentin (NEURONTIN) 400 MG capsule Take 1 capsule by mouth 2 times daily for 3 doses.  In addition to 2 capsules (=800mg) nightly 3 capsule 0    ARIPiprazole (ABILIFY) 5 MG tablet Take 1 tablet by mouth daily for 3 doses 3 tablet 0    furosemide (LASIX) 40 MG tablet Take 40 mg by mouth daily      lidocaine-prilocaine (EMLA) 2.5-2.5 % cream To port site before chemo 1 Tube 1    glimepiride (AMARYL) 1 MG tablet Take 1 mg by mouth Daily with supper In addition to 2 tablets (=2mg) every morning       metoprolol tartrate (LOPRESSOR) 25 MG tablet Take 25 mg by mouth 2 times daily      venlafaxine (EFFEXOR XR) 150 MG extended release capsule Take 150 mg by mouth 2 times daily      lansoprazole (PREVACID) 30 MG delayed release capsule Take 30 mg by mouth daily      metFORMIN (GLUCOPHAGE) 500 MG tablet Take 500 mg by mouth 2 times daily      aspirin EC 81 MG EC tablet Take 81 mg by mouth daily      mometasone-formoterol (DULERA) 200-5 MCG/ACT inhaler Inhale 2 puffs into the lungs every 12 hours as needed (wheezing/SOB)       traZODone (DESYREL) 150 MG tablet Take 2 tablets by mouth nightly for 3 doses Take 2 tablets (=300mg) nightly 6 tablet 0    dexamethasone (DECADRON) 4 MG tablet Take 20 mg orally 12 hours and 6 hours before Taxol 20 tablet 3    melatonin 5 MG TABS tablet Take 5 mg by mouth nightly        No current facility-administered medications for this visit. Allergies:  Codeine and Dye [iodides]    Social History:   reports that she quit smoking about 4 years ago. She has never used smokeless tobacco. She reports that she does not drink alcohol or use drugs. Family History: family history includes Cancer in her father and mother. REVIEW OF SYSTEMS:    Constitutional: No fever or chills. No night sweats, no weight loss   Eyes: No eye discharge, double vision, or eye pain   HEENT: negative for sore mouth, sore throat, hoarseness and voice change   Respiratory: negative for cough , sputum, dyspnea, wheezing, hemoptysis, chest pain   Cardiovascular: negative for chest pain, dyspnea, palpitations, orthopnea, PND   Gastrointestinal: negative for nausea, vomiting, diarrhea, constipation, abdominal pain, Dysphagia, hematemesis and hematochezia   Genitourinary: negative for frequency, dysuria, nocturia, urinary incontinence, and hematuria   Integument: negative for rash, skin lesions, bruises.    Hematologic/Lymphatic: negative for easy bruising, bleeding, lymphadenopathy, or petechiae   Endocrine: negative for heat or cold intolerance,weight changes, change in bowel habits and hair loss   Musculoskeletal: negative for myalgias, arthralgias, pain, joint swelling,and bone pain   Neurological: negative for headaches, dizziness, seizures, weakness, numbness    PHYSICAL EXAM:      /61   Pulse 61   Temp 97.7 °F (36.5 °C) (Oral)   Resp 18   Wt 155 lb 11.2 oz (70.6 kg)   BMI 23.67 kg/m²    Temp (24hrs), Av.7 °F (36.5 °C), Min:97.3 °F (36.3 °C), Max:98.1 °F (36.7 °C)  General appearance - well appearing, no in pain or distress   Mental status - alert and cooperative   Eyes - pupils equal and reactive, extraocular eye movements intact   Ears - bilateral TM's and external ear canals normal   Mouth - mucous membranes moist, pharynx normal without lesions   Neck - supple, no significant adenopathy   Lymphatics - no palpable lymphadenopathy, no hepatosplenomegaly   Chest - clear to auscultation, no wheezes, rales or rhonchi, symmetric air entry   Heart - normal rate, regular rhythm, normal S1, S2, no murmurs  Abdomen - soft, nontender, nondistended, no masses or organomegaly   Neurological - alert, oriented, normal speech, no focal findings or movement disorder noted   Musculoskeletal - no joint tenderness, deformity or swelling   Extremities - peripheral pulses normal, no pedal edema, no clubbing or cyanosis   Skin - normal coloration and turgor, no rashes, no suspicious skin lesions noted ,    DATA:    Labs:   CBC:   Recent Labs     20  1124   WBC 7.1   HGB 9.2*   HCT 30.7*        BMP:   Recent Labs     20  1124      K 4.7   CO2 28   BUN 17   CREATININE 0.87   LABGLOM >60   GLUCOSE 260*     PT/INR: No results for input(s): PROTIME, INR in the last 72 hours. Surgical Pathology Report   Surgical Pathology   Collected:  20 0803    Lab status:  Final    Resulting lab:  mInfo    Value:  -- Diagnosis --     BRONCHIAL WASHINGS RIGHT UPPER LOBE:          POSITIVE FOR MALIGNANCY.        SQUAMOUS CELL CARCINOMA.           COMMENT: VOLUME OF TUMOR IN THE CELL BLOCK IS LOW AND ADDITIONAL   MATERIAL WOULD BE REQUIRED IF MOLECULAR TESTING IS NECESSARY. IMAGING DATA:  CT chest 2020:   FINDINGS:    Mediastinum:  Three vessel coronary artery calcification.  Newly enlarged    mediastinal lymph nodes including example station 7 node measuring 3.1 x 2.1    cm on series 2, image 45.         Lungs/pleura: Mild upper lobe predominant centrilobular emphysema. Marnell Esteban Spiculated mass of the right upper lobe measuring 7.2 x 6.5 cm with    broad-base of contact with the mediastinal pleura, right major fissure,    encircling and obliterating the right upper lobe bronchus.  Spicules are seen    to extend to the anterior costal pleura.  There is adjacent bronchial wall    thickening and interlobular septal thickening.  Stable 5 mm solid nodule of    the left upper lobe since 2016, benign.  No pleural effusion or pneumothorax.         Upper Abdomen: Adrenals are unremarkable.         Soft Tissues/Bones: Old right rib fractures.              Impression    Approximate 7.2 cm spiculated mass of the right upper lobe likely with    invasion of the mediastinum, adjacent lymphangitic spread and suspected    metastatic mediastinal lymphadenopathy. Scan 9/11/2020: Impression    1. The patient's known right upper lobe lung mass is FDG avid consistent with    the patient's primary malignancy. 2. FDG avid mediastinal lymph nodes consistent with metastatic disease. 3. No abnormal FDG activity is identified involving the abdomen or pelvis. IMPRESSION:   1. 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 PET scan negative for distant metastasis: Clinical stage T4 N2 M0, stage IIIb  2. COPD  3. Falls  4. CAD  5. Tobacco abuse  6. Actinomyces bacteremia    RECOMMENDATIONS:  1. I reviewed the laboratory data, imaging studies, biopsy finding, diagnosis, prognosis and treatment options with patient  2. I reviewed the NCCN guidelines and goals of care  3.  I reviewed the CT head and PET scan results with patient. 4. I discussed the option of concurrent chemoradiation. I discussed the risk benefits and side effects with patient  5. Has tolerated chemotherapy well  6. Continue concurrent chemoradiation  7. Return to clinic in 2-3 weeks  8. Patient's questions were sought and answered to her satisfaction      Mendoza Tam MD  Hematologist/Medical Oncologist    This note is created with the assistance of a speech recognition program.  While intending to generate a document that actually reflects the content of the visit, the document can still have some errors including those of syntax and sound a like substitutions which may escape proof reading. It such instances, actual meaning can be extrapolated by contextual diversion.

## 2020-11-18 NOTE — TELEPHONE ENCOUNTER
Erika Douglas MD VISIT & [address] IN TO SEE PATIENT  ORDERS RECEIVED  CHEMO AS PLANNED  RV 2-3 WEEKS  MD VISIT 12/9/20 @11:45AM  [email or web address]  AVS PRINTED AND GIVEN TO PATIENT WITH INSTRUCTIONS  PATIENT REMAINS IN [address] [place] [address]

## 2020-11-18 NOTE — PLAN OF CARE
Problem: Safety:  Goal: Free from accidental physical injury  Description: Free from accidental physical injury  11/18/2020 1610 by Aminta Zapien RN  Outcome: Completed  11/18/2020 1609 by Aminta Zapien RN  Outcome: Met This Shift  Goal: Free from intentional harm  Description: Free from intentional harm  11/18/2020 1610 by Aminta Zapien RN  Outcome: Completed  11/18/2020 1609 by Aminta Zapien RN  Outcome: Met This Shift

## 2020-11-18 NOTE — PROGRESS NOTES
Deb Godinez arrives ambulatory alone with aid of walker  Reports recent fall and hospitalization for 11 days  Lt chest port accessed per protocol with #20 G 3/4 L Menjivar needle   Good blood return noted and blood work obtained and sent to lab  Lab results reviewed  Dr Arvind Greene vs and examined   Order to proceed with chemotherapy  See [facility] for pre medications  Information to obtain a wig given to patient per her request  Taxol initiated slowly first fifteen minutes then rate increased to infuse over one hour   No reaction noted  Carboplatin initiated and completed over thirty minutes and no reaction noted  Iv line flushed and menjivar needle flushed  Menjivar needle removed with needle intact  Band aid applied  Discharged per ambulatory with aid of walker

## 2020-11-19 ENCOUNTER — TELEPHONE (OUTPATIENT)
Dept: SPIRITUAL SERVICES | Age: 70
End: 2020-11-19

## 2020-11-19 ENCOUNTER — TELEPHONE (OUTPATIENT)
Dept: CASE MANAGEMENT | Age: 70
End: 2020-11-19

## 2020-11-19 ENCOUNTER — HOSPITAL ENCOUNTER (OUTPATIENT)
Dept: RADIATION ONCOLOGY | Facility: MEDICAL CENTER | Age: 70
Discharge: HOME | End: 2020-11-19
Payer: MEDICARE

## 2020-11-19 PROCEDURE — 77014 PR CT GUIDANCE PLACEMENT RAD THERAPY FIELDS: CPT | Performed by: RADIOLOGY

## 2020-11-19 PROCEDURE — 77386 HC NTSTY MODUL RAD TX DLVR CPLX: CPT

## 2020-11-19 NOTE — TELEPHONE ENCOUNTER
Writer spoke with Patient on the phone.     Electronically signed by John Escobedo, Oncology Outpatient [facility], [address] [facility] Radiation Oncology  11/19/2020  6:09 PM

## 2020-11-19 NOTE — TELEPHONE ENCOUNTER
Name: Ilene Henning  : 1950  MRN: [id number]    Oncology Navigation Follow-Up Note  Navigator reaching out to pt, but unable to leave a message(voicemail Full). Checking with [facility] on pt. Status and offering assistance if needed.   Electronically signed by Narinder Leon RN on 2020 at 3:53 PM

## 2020-11-19 NOTE — FLOWSHEET NOTE
Situation:  Writer provided a follow up phone call to Patient. Assessment:  Ms. Lavelle Shannon answered the phone. She reported that she was doing \"good. \" She shared that she has been home from the hospital for five days. She noted feeling tired. She expressed gratitude for her son, Matias Kelsey, and spoke of how she raised him to be how he is. She acknowledged her own strength, sharing that she was a single mother. She spoke of wanting another dog and of the dog she had. Her energy seemed to boost when she talked about her dog. She draws strength from her chuck and said that she feels God with her. Intervention:  Writer provided supportive listening; explored how Pt has been coping; inquired about Pt's sources of support and strength; offered words of encouragement and support; affirmed Pt's strengths; told pt she is available for support and that she would try to see her when she comes for treatment. Outcome:  Ms. Junior Levy for the call. 11/19/20 3004   Encounter Summary   Services provided to: Patient   Referral/Consult From: Nurse   Support System Children   Continue Visiting   (11/19/20)   Complexity of Encounter Low   Length of Encounter 15 minutes   Spiritual Assessment Completed Yes   Routine   Type Follow up   Spiritual/Shinto   Type Spiritual support   Assessment Calm;Coping; Hopeful   Intervention Active listening;Explored feelings, thoughts, concerns;Explored coping resources; Discussed illness/injury and it's impact; Discussed belief system/Confucianism practices/chuck   Outcome Receptive; Hopeful;Encouraged;Expressed gratitude;Coping;Engaged in conversation;Expressed feelings/needs/concerns     Electronically signed by Rody Fuentes, Oncology Outpatient [facility], [address] [facility] Radiation Oncology  11/19/2020  6:19 PM

## 2020-11-20 ENCOUNTER — HOSPITAL ENCOUNTER (OUTPATIENT)
Dept: RADIATION ONCOLOGY | Facility: MEDICAL CENTER | Age: 70
Discharge: HOME | End: 2020-11-20
Payer: MEDICARE

## 2020-11-20 PROCEDURE — 77427 RADIATION TX MANAGEMENT X5: CPT | Performed by: RADIOLOGY

## 2020-11-20 PROCEDURE — 77014 PR CT GUIDANCE PLACEMENT RAD THERAPY FIELDS: CPT | Performed by: RADIOLOGY

## 2020-11-20 PROCEDURE — 77386 HC NTSTY MODUL RAD TX DLVR CPLX: CPT

## 2020-11-22 ENCOUNTER — HOSPITAL ENCOUNTER (OUTPATIENT)
Dept: RADIATION ONCOLOGY | Facility: MEDICAL CENTER | Age: 70
Discharge: HOME | End: 2020-11-22
Payer: MEDICARE

## 2020-11-22 PROCEDURE — 77014 PR CT GUIDANCE PLACEMENT RAD THERAPY FIELDS: CPT | Performed by: RADIOLOGY

## 2020-11-22 PROCEDURE — 77386 HC NTSTY MODUL RAD TX DLVR CPLX: CPT

## 2020-11-23 ENCOUNTER — HOSPITAL ENCOUNTER (OUTPATIENT)
Dept: RADIATION ONCOLOGY | Facility: MEDICAL CENTER | Age: 70
Discharge: HOME | End: 2020-11-23
Payer: MEDICARE

## 2020-11-23 PROCEDURE — 77014 PR CT GUIDANCE PLACEMENT RAD THERAPY FIELDS: CPT | Performed by: RADIOLOGY

## 2020-11-23 PROCEDURE — 77386 HC NTSTY MODUL RAD TX DLVR CPLX: CPT

## 2020-11-23 NOTE — TELEPHONE ENCOUNTER
RECEIVED PHONE REQUEST FROM MICHELLE FOR REFILL OF PAIN MED. PERCOCET 5/325 #60 WRITTEN 10/23/2020 . SHE RECEIVED 3 ADDITIONAL TABS AT D/C FROM HOSPITALIST. SHE IS CURRENTLY RECEIVING CONCURRENT RTX AND TAXOL/ CARBO.     DR Lee Ann Rendon MD.

## 2020-11-24 ENCOUNTER — HOSPITAL ENCOUNTER (OUTPATIENT)
Facility: MEDICAL CENTER | Age: 70
End: 2020-11-24
Payer: MEDICARE

## 2020-11-24 ENCOUNTER — HOSPITAL ENCOUNTER (OUTPATIENT)
Dept: RADIATION ONCOLOGY | Facility: MEDICAL CENTER | Age: 70
Discharge: HOME | End: 2020-11-24
Payer: MEDICARE

## 2020-11-24 VITALS
RESPIRATION RATE: 16 BRPM | WEIGHT: 158.5 LBS | HEART RATE: 108 BPM | OXYGEN SATURATION: 96 % | BODY MASS INDEX: 24.1 KG/M2 | SYSTOLIC BLOOD PRESSURE: 111 MMHG | DIASTOLIC BLOOD PRESSURE: 69 MMHG | TEMPERATURE: 97.3 F

## 2020-11-24 PROCEDURE — 77386 HC NTSTY MODUL RAD TX DLVR CPLX: CPT

## 2020-11-24 PROCEDURE — 77014 PR CT GUIDANCE PLACEMENT RAD THERAPY FIELDS: CPT | Performed by: RADIOLOGY

## 2020-11-24 RX ORDER — OXYCODONE HYDROCHLORIDE AND ACETAMINOPHEN 5; 325 MG/1; MG/1
1 TABLET ORAL EVERY 6 HOURS PRN
Qty: 60 TABLET | Refills: 0 | Status: SHIPPED | OUTPATIENT
Start: 2020-11-24 | End: 2020-12-09 | Stop reason: SDUPTHER

## 2020-11-24 ASSESSMENT — PAIN DESCRIPTION - ORIENTATION: ORIENTATION: LOWER

## 2020-11-24 ASSESSMENT — PAIN DESCRIPTION - PAIN TYPE: TYPE: CHRONIC PAIN

## 2020-11-24 ASSESSMENT — PAIN SCALES - GENERAL: PAINLEVEL_OUTOF10: 10

## 2020-11-24 ASSESSMENT — PAIN DESCRIPTION - LOCATION: LOCATION: BACK

## 2020-11-24 ASSESSMENT — PAIN DESCRIPTION - FREQUENCY: FREQUENCY: INTERMITTENT

## 2020-11-24 NOTE — PROGRESS NOTES
Jose Rafael Roy  11/24/2020  Wt Readings from Last 3 Encounters:   11/24/20 158 lb 8 oz (71.9 kg)   11/18/20 155 lb 11.2 oz (70.6 kg)   11/17/20 157 lb 2 oz (71.3 kg)     Body mass index is 24.1 kg/m². PT here for OTV. Patient states feeling of things getting stuck. Dr. Megan Ulrich to cliffal. Verbal order for viscous xylocaine 2% 15mls prn. Called into [facility]. Treatment Area:lung    Patient was seen today for weekly visit. Comfort Alteration  Fatigue: Moderate    Ventilation Alterations  Cough: No  Hemoptysis: No  Mucus Color: no  Dyspnea: No  O2 Sat: 97%    Nutritional Alteration  Anorexia: No  Nausea: No   Vomiting: No     Mucous Membrane Alteration  Voice Changes/ Stridor/Larynx: no  Pharynx & Esophagus: yes, minor    Elimination Alterations  Constipation: no  Diarrhea:  no    Skin Alteration   Sensation:none    Radiation Dermatitis:  Intact [x]     Erythema  []     Discoloration  []     Rash []     Dry desquamation  []     Moist desquamation []       Emotional  Coping: effective      Injury, potential bleeding or infection: none    Lab Results   Component Value Date    WBC 7.1 11/18/2020     11/18/2020         /69   Pulse 108   Temp 97.3 °F (36.3 °C) (Oral)   Resp 16   Wt 158 lb 8 oz (71.9 kg)   BMI 24.10 kg/m²   Patient Currently in Pain: Yes  Pain Assessment: 0-10  Pain Level: 10         Assessment/Plan: Patient was seen today for weekly visit.       Yunier Or

## 2020-11-24 NOTE — PROGRESS NOTES
Patient: Dillard Alpers     : 1950      Date of Service: 2020    [facility] Oncology  On Treatment Visit (OTV) Note    Diagnosis: Cancer Staging  Malignant neoplasm of upper lobe of right lung [facility])  Staging form: Lung, AJCC 8th Edition  - Clinical stage from 2020: Stage IIIB (cT4, cN2, cM0) - Signed by Violeta Fitzgerald MD on 2020        Dose Accrued: Completed 3600cGy out of 6000 cGy, 18 out of 30 treatments    S: Feeling well just some irritation when she swallow. O: /69   Pulse 108   Temp 97.3 °F (36.3 °C) (Oral)   Resp 16   Wt 158 lb 8 oz (71.9 kg)   SpO2 96%   BMI 24.10 kg/m² . Well-appearing, in no apparent distress, alert and fully oriented, answers questions appropriately. No signs of acute radiation-induced toxicity on physical exam.    IGRT: Set-up images acquired to ensure daily treatment accuracy and precision were reviewed by the physician. A&P: She was given Xylocaine viscus to use 1 tablespoon half an hour before meals 3 times a day. Continue radiotherapy as planned. Moisturize treated area as instructed. We reviewed reasonably anticipated side effects in the upcoming weeks, as well as expected trajectory of recovery. Patient verbalized a good understanding to everything.     Electronically signed by Marika Adamson MD on 2020 at 4:10 PM

## 2020-11-25 ENCOUNTER — HOSPITAL ENCOUNTER (OUTPATIENT)
Dept: INFUSION THERAPY | Facility: MEDICAL CENTER | Age: 70
Discharge: HOME | End: 2020-11-25
Payer: MEDICARE

## 2020-11-25 ENCOUNTER — HOSPITAL ENCOUNTER (OUTPATIENT)
Dept: RADIATION ONCOLOGY | Facility: MEDICAL CENTER | Age: 70
Discharge: HOME | End: 2020-11-25
Payer: MEDICARE

## 2020-11-25 VITALS
HEART RATE: 55 BPM | RESPIRATION RATE: 18 BRPM | TEMPERATURE: 97.9 F | DIASTOLIC BLOOD PRESSURE: 57 MMHG | SYSTOLIC BLOOD PRESSURE: 99 MMHG

## 2020-11-25 DIAGNOSIS — C34.11 MALIGNANT NEOPLASM OF UPPER LOBE OF RIGHT LUNG (HCC): ICD-10-CM

## 2020-11-25 DIAGNOSIS — C34.91 NON-SMALL CELL CANCER OF RIGHT LUNG (HCC): Primary | ICD-10-CM

## 2020-11-25 LAB
ABSOLUTE EOS #: 0.24 K/UL (ref 0–0.44)
ABSOLUTE IMMATURE GRANULOCYTE: 0 K/UL (ref 0–0.3)
ABSOLUTE LYMPH #: 0.72 K/UL (ref 1.1–3.7)
ABSOLUTE MONO #: 0.24 K/UL (ref 0.1–1.2)
ALBUMIN SERPL-MCNC: 3.6 G/DL (ref 3.5–5.2)
ALBUMIN/GLOBULIN RATIO: ABNORMAL (ref 1–2.5)
ALP BLD-CCNC: 50 U/L (ref 35–104)
ALT SERPL-CCNC: 7 U/L (ref 5–33)
ANION GAP SERPL CALCULATED.3IONS-SCNC: 8 MMOL/L (ref 9–17)
AST SERPL-CCNC: 11 U/L
BASOPHILS # BLD: 1 % (ref 0–2)
BASOPHILS ABSOLUTE: 0.05 K/UL (ref 0–0.2)
BILIRUB SERPL-MCNC: 0.12 MG/DL (ref 0.3–1.2)
BUN BLDV-MCNC: 16 MG/DL (ref 8–23)
BUN/CREAT BLD: 20 (ref 9–20)
CALCIUM SERPL-MCNC: 8.8 MG/DL (ref 8.6–10.4)
CHLORIDE BLD-SCNC: 105 MMOL/L (ref 98–107)
CO2: 27 MMOL/L (ref 20–31)
CREAT SERPL-MCNC: 0.79 MG/DL (ref 0.5–0.9)
DIFFERENTIAL TYPE: ABNORMAL
EOSINOPHILS RELATIVE PERCENT: 5 % (ref 1–4)
GFR AFRICAN AMERICAN: >60 ML/MIN
GFR NON-AFRICAN AMERICAN: >60 ML/MIN
GFR SERPL CREATININE-BSD FRML MDRD: ABNORMAL ML/MIN/{1.73_M2}
GFR SERPL CREATININE-BSD FRML MDRD: ABNORMAL ML/MIN/{1.73_M2}
GLUCOSE BLD-MCNC: 99 MG/DL (ref 70–99)
HCT VFR BLD CALC: 29 % (ref 36.3–47.1)
HEMOGLOBIN: 8.7 G/DL (ref 11.9–15.1)
IMMATURE GRANULOCYTES: 0 %
LYMPHOCYTES # BLD: 15 % (ref 24–43)
MCH RBC QN AUTO: 26.4 PG (ref 25.2–33.5)
MCHC RBC AUTO-ENTMCNC: 30 G/DL (ref 28.4–34.8)
MCV RBC AUTO: 87.9 FL (ref 82.6–102.9)
MONOCYTES # BLD: 5 % (ref 3–12)
MORPHOLOGY: ABNORMAL
NRBC AUTOMATED: 0 PER 100 WBC
PDW BLD-RTO: 21.3 % (ref 11.8–14.4)
PLATELET # BLD: 255 K/UL (ref 138–453)
PLATELET ESTIMATE: ABNORMAL
PMV BLD AUTO: 8.5 FL (ref 8.1–13.5)
POTASSIUM SERPL-SCNC: 5 MMOL/L (ref 3.7–5.3)
RBC # BLD: 3.3 M/UL (ref 3.95–5.11)
RBC # BLD: ABNORMAL 10*6/UL
SEG NEUTROPHILS: 74 % (ref 36–65)
SEGMENTED NEUTROPHILS ABSOLUTE COUNT: 3.55 K/UL (ref 1.5–8.1)
SODIUM BLD-SCNC: 140 MMOL/L (ref 135–144)
TOTAL PROTEIN: 6.6 G/DL (ref 6.4–8.3)
WBC # BLD: 4.8 K/UL (ref 3.5–11.3)
WBC # BLD: ABNORMAL 10*3/UL

## 2020-11-25 PROCEDURE — 2500000003 HC RX 250 WO HCPCS: Performed by: INTERNAL MEDICINE

## 2020-11-25 PROCEDURE — 96413 CHEMO IV INFUSION 1 HR: CPT

## 2020-11-25 PROCEDURE — 77386 HC NTSTY MODUL RAD TX DLVR CPLX: CPT

## 2020-11-25 PROCEDURE — 2580000003 HC RX 258: Performed by: INTERNAL MEDICINE

## 2020-11-25 PROCEDURE — 6360000002 HC RX W HCPCS: Performed by: INTERNAL MEDICINE

## 2020-11-25 PROCEDURE — 96374 THER/PROPH/DIAG INJ IV PUSH: CPT

## 2020-11-25 PROCEDURE — 77014 PR CT GUIDANCE PLACEMENT RAD THERAPY FIELDS: CPT | Performed by: RADIOLOGY

## 2020-11-25 PROCEDURE — 96417 CHEMO IV INFUS EACH ADDL SEQ: CPT

## 2020-11-25 PROCEDURE — 77336 RADIATION PHYSICS CONSULT: CPT

## 2020-11-25 PROCEDURE — 85025 COMPLETE CBC W/AUTO DIFF WBC: CPT

## 2020-11-25 PROCEDURE — 96375 TX/PRO/DX INJ NEW DRUG ADDON: CPT

## 2020-11-25 PROCEDURE — 80053 COMPREHEN METABOLIC PANEL: CPT

## 2020-11-25 PROCEDURE — 36591 DRAW BLOOD OFF VENOUS DEVICE: CPT

## 2020-11-25 RX ORDER — PALONOSETRON 0.05 MG/ML
0.25 INJECTION, SOLUTION INTRAVENOUS ONCE
Status: COMPLETED | OUTPATIENT
Start: 2020-11-25 | End: 2020-11-25

## 2020-11-25 RX ORDER — SODIUM CHLORIDE 9 MG/ML
20 INJECTION, SOLUTION INTRAVENOUS ONCE
Status: COMPLETED | OUTPATIENT
Start: 2020-11-25 | End: 2020-11-25

## 2020-11-25 RX ORDER — SODIUM CHLORIDE 0.9 % (FLUSH) 0.9 %
10 SYRINGE (ML) INJECTION PRN
Status: DISCONTINUED | OUTPATIENT
Start: 2020-11-25 | End: 2020-11-26 | Stop reason: HOSPADM

## 2020-11-25 RX ORDER — DEXAMETHASONE SODIUM PHOSPHATE 10 MG/ML
10 INJECTION INTRAMUSCULAR; INTRAVENOUS ONCE
Status: COMPLETED | OUTPATIENT
Start: 2020-11-25 | End: 2020-11-25

## 2020-11-25 RX ORDER — HEPARIN SODIUM (PORCINE) LOCK FLUSH IV SOLN 100 UNIT/ML 100 UNIT/ML
500 SOLUTION INTRAVENOUS PRN
Status: DISCONTINUED | OUTPATIENT
Start: 2020-11-25 | End: 2020-11-26 | Stop reason: HOSPADM

## 2020-11-25 RX ORDER — DIPHENHYDRAMINE HYDROCHLORIDE 50 MG/ML
50 INJECTION INTRAMUSCULAR; INTRAVENOUS ONCE
Status: COMPLETED | OUTPATIENT
Start: 2020-11-25 | End: 2020-11-25

## 2020-11-25 RX ADMIN — DIPHENHYDRAMINE HYDROCHLORIDE 50 MG: 50 INJECTION, SOLUTION INTRAMUSCULAR; INTRAVENOUS at 12:58

## 2020-11-25 RX ADMIN — FAMOTIDINE 20 MG: 10 INJECTION, SOLUTION INTRAVENOUS at 12:51

## 2020-11-25 RX ADMIN — PALONOSETRON 0.25 MG: 0.05 INJECTION, SOLUTION INTRAVENOUS at 12:49

## 2020-11-25 RX ADMIN — DEXAMETHASONE SODIUM PHOSPHATE 10 MG: 10 INJECTION INTRAMUSCULAR; INTRAVENOUS at 12:54

## 2020-11-25 RX ADMIN — ALTEPLASE 2 MG: 2.2 INJECTION, POWDER, LYOPHILIZED, FOR SOLUTION INTRAVENOUS at 11:38

## 2020-11-25 RX ADMIN — HEPARIN 500 UNITS: 100 SYRINGE at 15:25

## 2020-11-25 RX ADMIN — CARBOPLATIN 190 MG: 10 INJECTION INTRAVENOUS at 14:40

## 2020-11-25 RX ADMIN — PACLITAXEL 78 MG: 6 INJECTION, SOLUTION, CONCENTRATE INTRAVENOUS at 13:31

## 2020-11-25 RX ADMIN — SODIUM CHLORIDE 20 ML/HR: 9 INJECTION, SOLUTION INTRAVENOUS at 12:49

## 2020-11-25 NOTE — PROGRESS NOTES
Patient here for labs and chemo. No complaints today. Vitals obtained. Port accessed per policy with no blood return after flushing several times. Activase placed in line. Lab here and labs drawn and sent. Labs reviewed. Line checked and blood return obtained. Premeds given per [facility]. Chemo hung per MAR. Infusing. Patient sleeping throughout the infusion. Chemo completed. Tolerated well. Port flushed per policy and gripper removed intact, band aid to site. Has a return visit scheduled. Discharged ambulatory per self.

## 2020-11-29 NOTE — DISCHARGE SUMMARY
[address] [place] [address],    Adult Hospitalist      Patient ID: Simeon Nunez  MRN: [id number]     [place]:  [de-identified]       Patient's PCP: Dallas Mcdonough MD    Admit Date: 11/3/2020     Discharge Date: 11/12/2020      Admitting Physician: Bre Maldonado MD    Discharge Physician: Miguel Angel Obregon MD     CONSULTANTS: Patient Care Team:  Dallas Mcdonough MD as PCP - General (Family Medicine)  Jeff Simon RN as Nurse Navigator (Oncology)  Alvarado Diallo MD as Consulting Physician (Radiation Oncology)  Teena Rivera MD as Consulting Physician (Hematology and Oncology)    PROCEDURES PERFORMED:     Active Discharge Diagnoses:  · UTI E. coli  · Generalized weakness  · 8th rib lucencies suspicious for nondisplaced fracture  · Recent diagnosis of right upper lobe squamous cell carcinoma status post chemoradiation  · Chronic COPD  · Chronic hypoxic respiratory failure on nocturnal O2  · Former smoker  · Diabetes type 2  · Major Depressive disorder  · Anxiety disorder       Primary Problem  <principal problem not specified>    Hospital Course: pt admitted with weakness and a fall resulting in trauma to her right lower ribs. Pt found to have E coli UTI treated with antibiotics. Family had difficulty in taking care of the patient at home and pre cert was initiated for a SNF. However since pt was actively getting treatment for [facility]. Coordination with several facilities and treating physicians was unsuccessful and family finally decided to take patient home and complete treatment. The plan was discussed in detail with patient who agreed with the plan and verbalized understanding . The patient was seen and examined on day of discharge and this discharge summary is in conjunction with any daily progress note from day of discharge.     Hospital Data:    Labs:    Hematology:No results for input(s): WBC, RBC, HGB, HCT, MCV, MCH, MCHC, RDW, PLT, MPV, SEDRATE, CRP, INR, DDIMER, AU4DKRNO, LABABSO in the last 72 hours. Invalid input(s): PT  Chemistry:No results for input(s): NA, K, CL, CO2, GLUCOSE, BUN, CREATININE, MG, ANIONGAP, LABGLOM, GFRAA, CALCIUM, CAION, PHOS, PSA, PROBNP, TROPHS, CKTOTAL, CKMB, CKMBINDEX, MYOGLOBIN, DIGOXIN, LACTACIDWB in the last 72 hours. No results for input(s): PROT, LABALBU, LABA1C, V1XOZEI, R7XRCLE, FT4, TSH, AST, ALT, LDH, GGT, ALKPHOS, LABGGT, BILITOT, BILIDIR, AMMONIA, AMYLASE, LIPASE, LACTATE, CHOL, HDL, LDLCHOLESTEROL, CHOLHDLRATIO, TRIG, VLDL, TFT27DD, PHENYTOIN, PHENYF, URICACID, POCGLU in the last 72 hours. Lab Results   Component Value Date    INR 0.9 09/29/2020    INR 1.1 08/28/2020    INR 0.9 12/28/2018    PROTIME 11.8 09/29/2020    PROTIME 14.4 (H) 08/28/2020    PROTIME 9.7 12/28/2018     Lab Results   Component Value Date/Time    SPECIAL NOT REPORTED 11/03/2020 02:15 PM     Lab Results   Component Value Date/Time    CULTURE NO SAMPLE RECEIVED 11/03/2020 02:15 PM       Lab Results   Component Value Date    POCPH 7.36 04/12/2017    PHART 7.42 08/26/2012    PH 7.22 04/11/2017    POCPCO2 45 04/12/2017    IEP7FUP 41 08/26/2012    PCO2 54 04/11/2017    POCPO2 93 04/12/2017    PO2ART 59 08/26/2012    PO2 89 04/11/2017    POCHCO3 25.3 04/12/2017    BIJ4PJS 26.1 08/26/2012    HCO3 22.2 04/11/2017    NBEA NOT REPORTED 04/12/2017    PBEA 0 04/12/2017    AAJ6JJM 27 04/12/2017    PQBG7ZMS 97 04/12/2017    E2WVYQDA 92.1 08/26/2012    O2SAT 95 04/11/2017    FIO2 UNKNOWN 10/31/2017       Radiology:    No results found. All radiological studies reviewed      Reviews of Symptoms:    A 10 point system is reviewed and  negative except described in hospital course    Physical Exam:    Vitals:  BP (!) 121/46   Pulse 86   Temp 98.4 °F (36.9 °C) (Oral)   Resp 16   Ht 5' 8\" (1.727 m)   Wt 145 lb 14.4 oz (66.2 kg)   SpO2 97%   BMI 22.18 kg/m²   No data recorded.       General appearance - alert, well appearing, and in no acute distress  Mental status - tablet  Take 20 mg orally 12 hours and 6 hours before Taxol             furosemide (LASIX) 40 MG tablet  Take 40 mg by mouth daily             gabapentin (NEURONTIN) 400 MG capsule  Take 1 capsule by mouth 2 times daily for 3 doses. In addition to 2 capsules (=800mg) nightly             glimepiride (AMARYL) 1 MG tablet  Take 1 mg by mouth Daily with supper In addition to 2 tablets (=2mg) every morning              lansoprazole (PREVACID) 30 MG delayed release capsule  Take 30 mg by mouth daily             lidocaine 4 % external patch  Place 1 patch onto the skin daily             lidocaine-prilocaine (EMLA) 2.5-2.5 % cream  To port site before chemo             melatonin 5 MG TABS tablet  Take 5 mg by mouth nightly              metFORMIN (GLUCOPHAGE) 500 MG tablet  Take 500 mg by mouth 2 times daily             metoprolol tartrate (LOPRESSOR) 25 MG tablet  Take 25 mg by mouth 2 times daily             mometasone-formoterol (DULERA) 200-5 MCG/ACT inhaler  Inhale 2 puffs into the lungs every 12 hours as needed (wheezing/SOB)              traZODone (DESYREL) 150 MG tablet  Take 2 tablets by mouth nightly for 3 doses Take 2 tablets (=300mg) nightly             traZODone (DESYREL) 300 MG tablet  Take 0.5 tablets by mouth nightly             venlafaxine (EFFEXOR XR) 150 MG extended release capsule  Take 150 mg by mouth 2 times daily                 Code Status:  Prior    Time Spent on discharge is  35 mins in patient examination, evaluation, counseling as well as medication reconciliation, prescriptions for required medications, discharge plan and follow up. Electronically signed by Gela Bowden MD on 11/29/2020 at 1:51 PM     Thank you Dr. Katherine Nevarez MD for the opportunity to be involved in this patient's care.     This note was created with the assistance of a speech-recognition program.  Although the intention is to generate a document that actually reflects the content of the visit, no guarantees can be provided that every mistake has been identified and corrected by editing. Note was updated later by me after  physical examination and  completion of the assessment.

## 2020-11-30 ENCOUNTER — HOSPITAL ENCOUNTER (OUTPATIENT)
Dept: RADIATION ONCOLOGY | Facility: MEDICAL CENTER | Age: 70
Discharge: HOME | End: 2020-11-30
Payer: MEDICARE

## 2020-11-30 PROCEDURE — 77386 HC NTSTY MODUL RAD TX DLVR CPLX: CPT

## 2020-11-30 PROCEDURE — 77427 RADIATION TX MANAGEMENT X5: CPT | Performed by: RADIOLOGY

## 2020-11-30 PROCEDURE — 77014 PR CT GUIDANCE PLACEMENT RAD THERAPY FIELDS: CPT | Performed by: RADIOLOGY

## 2020-12-01 ENCOUNTER — HOSPITAL ENCOUNTER (OUTPATIENT)
Dept: RADIATION ONCOLOGY | Facility: MEDICAL CENTER | Age: 70
Discharge: HOME | End: 2020-12-01
Payer: MEDICARE

## 2020-12-01 VITALS
HEART RATE: 78 BPM | DIASTOLIC BLOOD PRESSURE: 60 MMHG | TEMPERATURE: 98.6 F | SYSTOLIC BLOOD PRESSURE: 107 MMHG | OXYGEN SATURATION: 98 % | WEIGHT: 170.13 LBS | RESPIRATION RATE: 16 BRPM | BODY MASS INDEX: 25.87 KG/M2

## 2020-12-01 PROCEDURE — 77014 PR CT GUIDANCE PLACEMENT RAD THERAPY FIELDS: CPT | Performed by: RADIOLOGY

## 2020-12-01 PROCEDURE — 77386 HC NTSTY MODUL RAD TX DLVR CPLX: CPT

## 2020-12-01 ASSESSMENT — PAIN DESCRIPTION - DESCRIPTORS: DESCRIPTORS: SORE

## 2020-12-01 ASSESSMENT — PAIN DESCRIPTION - ONSET: ONSET: ON-GOING

## 2020-12-01 ASSESSMENT — PAIN DESCRIPTION - PAIN TYPE: TYPE: ACUTE PAIN

## 2020-12-01 ASSESSMENT — PAIN DESCRIPTION - FREQUENCY: FREQUENCY: INTERMITTENT

## 2020-12-01 ASSESSMENT — PAIN SCALES - GENERAL: PAINLEVEL_OUTOF10: 8

## 2020-12-01 ASSESSMENT — PAIN DESCRIPTION - LOCATION: LOCATION: THROAT

## 2020-12-01 NOTE — PROGRESS NOTES
Othella Done  12/1/2020  Wt Readings from Last 3 Encounters:   12/01/20 170 lb 2 oz (77.2 kg)   11/24/20 158 lb 8 oz (71.9 kg)   11/18/20 155 lb 11.2 oz (70.6 kg)     Body mass index is 25.87 kg/m². Pt here for OTV. Patient states some increased soreness to the throat when swallowing. Dr. Parvez Vasquez updated and into eval.    Treatment Area:lung    Patient was seen today for weekly visit. Comfort Alteration  Fatigue: Moderate    Ventilation Alterations  Cough: No  Hemoptysis: No  Mucus Color: no  Dyspnea: No  O2 Sat: 98%    Nutritional Alteration  Anorexia: No  Nausea: No   Vomiting: No     Mucous Membrane Alteration  Voice Changes/ Stridor/Larynx: no  Pharynx & Esophagus: yes, worse soreness    Elimination Alterations  Constipation: no  Diarrhea:  no    Skin Alteration   Sensation:none    Radiation Dermatitis:  Intact [x]     Erythema  []     Discoloration  []     Rash []     Dry desquamation  []     Moist desquamation []       Emotional  Coping: effective      Injury, potential bleeding or infection: none    Lab Results   Component Value Date    WBC 4.8 11/25/2020     11/25/2020         /60   Pulse 78   Temp 98.6 °F (37 °C) (Oral)   Resp 16   Wt 170 lb 2 oz (77.2 kg)   SpO2 98%   BMI 25.87 kg/m²   Patient Currently in Pain: Yes  Pain Assessment: 0-10  Pain Level: 8         Assessment/Plan: Patient was seen today for weekly visit.       Brian Cooks

## 2020-12-01 NOTE — PROGRESS NOTES
nt: Angélica Coley     : 1950      Date of Service: 2020    [facility] Oncology  On Treatment Visit (OTV) Note    Diagnosis: Cancer Staging  Malignant neoplasm of upper lobe of right lung [facility])  Staging form: Lung, AJCC 8th Edition  - Clinical stage from 2020: Stage IIIB (cT4, cN2, cM0) - Signed by Alie Ferrari MD on 2020        Dose Accrued: completed 4200 cGy Out Of 6000 cGy, 21 out of 30 treatment. S:Feeing better ,doing well. O: /60   Pulse 78   Temp 98.6 °F (37 °C) (Oral)   Resp 16   Wt 170 lb 2 oz (77.2 kg)   SpO2 98%   BMI 25.87 kg/m² . Well-appearing, in no apparent distress, alert and fully oriented, answers questions appropriately. No signs of acute radiation-induced toxicity on physical exam.    IGRT: Set-up images acquired to ensure daily treatment accuracy and precision were reviewed by the physician. A&P: Continue radiotherapy as planned. Moisturize treated area as instructed. We reviewed reasonably anticipated side effects in the upcoming weeks, as well as expected trajectory of recovery. Patient verbalized a good understanding to everything.     Electronically signed by Jarad Chacon MD on 2020 at 5:56 PM

## 2020-12-01 NOTE — PROGRESS NOTES
Nutrition Education  Patient with complaint of Dysphagia    · Verbally reviewed information with Patient  · Educated on Soft and Moist Foods for Dysphagia  · Written educational materials provided. · Contact name and number provided.     Tim Bull RD, LD  Office Number: [phone]

## 2020-12-02 ENCOUNTER — HOSPITAL ENCOUNTER (OUTPATIENT)
Dept: RADIATION ONCOLOGY | Facility: MEDICAL CENTER | Age: 70
Discharge: HOME | End: 2020-12-02
Payer: MEDICARE

## 2020-12-02 ENCOUNTER — HOSPITAL ENCOUNTER (OUTPATIENT)
Facility: MEDICAL CENTER | Age: 70
End: 2020-12-02
Payer: MEDICARE

## 2020-12-02 PROCEDURE — 77014 PR CT GUIDANCE PLACEMENT RAD THERAPY FIELDS: CPT | Performed by: RADIOLOGY

## 2020-12-02 PROCEDURE — 77386 HC NTSTY MODUL RAD TX DLVR CPLX: CPT

## 2020-12-03 ENCOUNTER — HOSPITAL ENCOUNTER (OUTPATIENT)
Dept: INFUSION THERAPY | Facility: MEDICAL CENTER | Age: 70
Discharge: HOME | End: 2020-12-03
Payer: MEDICARE

## 2020-12-03 ENCOUNTER — HOSPITAL ENCOUNTER (OUTPATIENT)
Dept: RADIATION ONCOLOGY | Facility: MEDICAL CENTER | Age: 70
Discharge: HOME | End: 2020-12-03
Payer: MEDICARE

## 2020-12-03 VITALS
HEART RATE: 76 BPM | TEMPERATURE: 98.4 F | SYSTOLIC BLOOD PRESSURE: 107 MMHG | WEIGHT: 167.7 LBS | RESPIRATION RATE: 16 BRPM | DIASTOLIC BLOOD PRESSURE: 46 MMHG | BODY MASS INDEX: 25.5 KG/M2

## 2020-12-03 DIAGNOSIS — C34.91 NON-SMALL CELL CANCER OF RIGHT LUNG (HCC): Primary | ICD-10-CM

## 2020-12-03 DIAGNOSIS — C34.11 MALIGNANT NEOPLASM OF UPPER LOBE OF RIGHT LUNG (HCC): ICD-10-CM

## 2020-12-03 LAB
ABSOLUTE EOS #: 0.16 K/UL (ref 0–0.4)
ABSOLUTE IMMATURE GRANULOCYTE: 0.03 K/UL (ref 0–0.3)
ABSOLUTE LYMPH #: 0.53 K/UL (ref 1–4.8)
ABSOLUTE MONO #: 0.28 K/UL (ref 0.2–0.8)
ALBUMIN SERPL-MCNC: 3.4 G/DL (ref 3.5–5.2)
ALBUMIN/GLOBULIN RATIO: ABNORMAL (ref 1–2.5)
ALP BLD-CCNC: 38 U/L (ref 35–104)
ALT SERPL-CCNC: 10 U/L (ref 5–33)
ANION GAP SERPL CALCULATED.3IONS-SCNC: 9 MMOL/L (ref 9–17)
AST SERPL-CCNC: 14 U/L
BASOPHILS # BLD: 1 %
BASOPHILS ABSOLUTE: 0.03 K/UL (ref 0–0.2)
BILIRUB SERPL-MCNC: <0.1 MG/DL (ref 0.3–1.2)
BUN BLDV-MCNC: 20 MG/DL (ref 8–23)
BUN/CREAT BLD: 27 (ref 9–20)
CALCIUM SERPL-MCNC: 8.7 MG/DL (ref 8.6–10.4)
CHLORIDE BLD-SCNC: 105 MMOL/L (ref 98–107)
CO2: 26 MMOL/L (ref 20–31)
CREAT SERPL-MCNC: 0.73 MG/DL (ref 0.5–0.9)
DIFFERENTIAL TYPE: ABNORMAL
EOSINOPHILS RELATIVE PERCENT: 5 % (ref 1–4)
GFR AFRICAN AMERICAN: >60 ML/MIN
GFR NON-AFRICAN AMERICAN: >60 ML/MIN
GFR SERPL CREATININE-BSD FRML MDRD: ABNORMAL ML/MIN/{1.73_M2}
GFR SERPL CREATININE-BSD FRML MDRD: ABNORMAL ML/MIN/{1.73_M2}
GLUCOSE BLD-MCNC: 76 MG/DL (ref 70–99)
HCT VFR BLD CALC: 28.2 % (ref 36.3–47.1)
HEMOGLOBIN: 8.4 G/DL (ref 11.9–15.1)
IMMATURE GRANULOCYTES: 1 %
LYMPHOCYTES # BLD: 17 % (ref 24–44)
MCH RBC QN AUTO: 26.5 PG (ref 25.2–33.5)
MCHC RBC AUTO-ENTMCNC: 29.8 G/DL (ref 28.4–34.8)
MCV RBC AUTO: 89 FL (ref 82.6–102.9)
MONOCYTES # BLD: 9 % (ref 1–7)
MORPHOLOGY: ABNORMAL
NRBC AUTOMATED: 0 PER 100 WBC
PDW BLD-RTO: 22.6 % (ref 11.8–14.4)
PLATELET # BLD: 305 K/UL (ref 138–453)
PLATELET ESTIMATE: ABNORMAL
PMV BLD AUTO: 8.5 FL (ref 8.1–13.5)
POTASSIUM SERPL-SCNC: 4.8 MMOL/L (ref 3.7–5.3)
RBC # BLD: 3.17 M/UL (ref 3.95–5.11)
RBC # BLD: ABNORMAL 10*6/UL
SEG NEUTROPHILS: 67 % (ref 36–66)
SEGMENTED NEUTROPHILS ABSOLUTE COUNT: 2.07 K/UL (ref 1.8–7.7)
SODIUM BLD-SCNC: 140 MMOL/L (ref 135–144)
TOTAL PROTEIN: 6.2 G/DL (ref 6.4–8.3)
WBC # BLD: 3.1 K/UL (ref 3.5–11.3)
WBC # BLD: ABNORMAL 10*3/UL

## 2020-12-03 PROCEDURE — 77014 PR CT GUIDANCE PLACEMENT RAD THERAPY FIELDS: CPT | Performed by: RADIOLOGY

## 2020-12-03 PROCEDURE — 2500000003 HC RX 250 WO HCPCS: Performed by: INTERNAL MEDICINE

## 2020-12-03 PROCEDURE — 2580000003 HC RX 258: Performed by: INTERNAL MEDICINE

## 2020-12-03 PROCEDURE — 80053 COMPREHEN METABOLIC PANEL: CPT

## 2020-12-03 PROCEDURE — 96413 CHEMO IV INFUSION 1 HR: CPT

## 2020-12-03 PROCEDURE — 96417 CHEMO IV INFUS EACH ADDL SEQ: CPT

## 2020-12-03 PROCEDURE — 77386 HC NTSTY MODUL RAD TX DLVR CPLX: CPT

## 2020-12-03 PROCEDURE — 96375 TX/PRO/DX INJ NEW DRUG ADDON: CPT

## 2020-12-03 PROCEDURE — 6360000002 HC RX W HCPCS: Performed by: INTERNAL MEDICINE

## 2020-12-03 PROCEDURE — 85025 COMPLETE CBC W/AUTO DIFF WBC: CPT

## 2020-12-03 PROCEDURE — 36591 DRAW BLOOD OFF VENOUS DEVICE: CPT

## 2020-12-03 RX ORDER — HEPARIN SODIUM (PORCINE) LOCK FLUSH IV SOLN 100 UNIT/ML 100 UNIT/ML
500 SOLUTION INTRAVENOUS PRN
Status: DISCONTINUED | OUTPATIENT
Start: 2020-12-03 | End: 2020-12-04 | Stop reason: HOSPADM

## 2020-12-03 RX ORDER — DIPHENHYDRAMINE HYDROCHLORIDE 50 MG/ML
50 INJECTION INTRAMUSCULAR; INTRAVENOUS ONCE
Status: COMPLETED | OUTPATIENT
Start: 2020-12-03 | End: 2020-12-03

## 2020-12-03 RX ORDER — SODIUM CHLORIDE 0.9 % (FLUSH) 0.9 %
10 SYRINGE (ML) INJECTION PRN
Status: DISCONTINUED | OUTPATIENT
Start: 2020-12-03 | End: 2020-12-04 | Stop reason: HOSPADM

## 2020-12-03 RX ORDER — SODIUM CHLORIDE 9 MG/ML
20 INJECTION, SOLUTION INTRAVENOUS ONCE
Status: COMPLETED | OUTPATIENT
Start: 2020-12-03 | End: 2020-12-03

## 2020-12-03 RX ORDER — PALONOSETRON 0.05 MG/ML
0.25 INJECTION, SOLUTION INTRAVENOUS ONCE
Status: COMPLETED | OUTPATIENT
Start: 2020-12-03 | End: 2020-12-03

## 2020-12-03 RX ORDER — DEXAMETHASONE SODIUM PHOSPHATE 10 MG/ML
10 INJECTION INTRAMUSCULAR; INTRAVENOUS ONCE
Status: COMPLETED | OUTPATIENT
Start: 2020-12-03 | End: 2020-12-03

## 2020-12-03 RX ADMIN — PACLITAXEL 78 MG: 6 INJECTION, SOLUTION, CONCENTRATE INTRAVENOUS at 12:39

## 2020-12-03 RX ADMIN — Medication 10 ML: at 14:46

## 2020-12-03 RX ADMIN — HEPARIN 500 UNITS: 100 SYRINGE at 14:46

## 2020-12-03 RX ADMIN — Medication 10 ML: at 12:02

## 2020-12-03 RX ADMIN — Medication 10 ML: at 11:01

## 2020-12-03 RX ADMIN — PALONOSETRON 0.25 MG: 0.05 INJECTION, SOLUTION INTRAVENOUS at 12:02

## 2020-12-03 RX ADMIN — SODIUM CHLORIDE 20 ML/HR: 9 INJECTION, SOLUTION INTRAVENOUS at 11:01

## 2020-12-03 RX ADMIN — DEXAMETHASONE SODIUM PHOSPHATE 10 MG: 10 INJECTION INTRAMUSCULAR; INTRAVENOUS at 12:02

## 2020-12-03 RX ADMIN — FAMOTIDINE 20 MG: 10 INJECTION INTRAVENOUS at 12:02

## 2020-12-03 RX ADMIN — CARBOPLATIN 190 MG: 10 INJECTION INTRAVENOUS at 13:49

## 2020-12-03 RX ADMIN — DIPHENHYDRAMINE HYDROCHLORIDE 50 MG: 50 INJECTION, SOLUTION INTRAMUSCULAR; INTRAVENOUS at 12:02

## 2020-12-03 NOTE — PROGRESS NOTES
Patient arrive ambulatory for cycle 5 day 1 treatment. Denies complaint or concern. Vitals as charted. Port accessed; specimen sent. Labs reviewed; patient premedicated. Taxol infusion begin slowly with no adverse reaction then increased to infuse over 1 hour. Completed with no sign of adverse reaction; line flushed. Carbo infused with no sign of adverse reaction; line flushed. Port de-accessed without difficulty. Patient off unit per self at discharge; writer finally successful in reaching her son via phone and he will pick her up from main lobby.

## 2020-12-03 NOTE — FLOWSHEET NOTE
Situation:  Writer visited with Ms. Sanjay Monroe in the treatment cubicle of the infusion clinic. Assessment:  Ms. Sanjay Monroe greeted writer and requested her lunch. When writer returned, she shared frustrations with her appointment yesterday and how she handled it. She expressed gratitude that she is feeling better. She is aware that writer and others on the [employer], including her nurse navigator, Geoff Ambrose, are available for support. Intervention:  Writer provided supportive presence and explored Pt's coping and needs; offered words of support and encouragement; gave Pt her lunch tray; actively listened as Pt voiced her concerns; told Pt she is available for support and that her nurse navigator is there for her as well. Outcome:  Ms. Cheyanne Danielson for the visit. 12/03/20 1329   Encounter Summary   Services provided to: Patient   Referral/Consult From: [facility] Family members   Continue Visiting   (12/3/20)   Complexity of Encounter Low   Length of Encounter 15 minutes   Routine   Type Follow up   Assessment Calm; Approachable; Hopeful;Coping   Intervention Sustaining presence/ Ministry of presence; Explored feelings, thoughts, concerns;Explored coping resources; Active listening   Outcome Expressed feelings/needs/concerns;Expressed gratitude     Electronically signed by Hebert Govea [address], [address] [facility] Radiation Oncology  12/3/2020  1:41 PM

## 2020-12-04 ENCOUNTER — HOSPITAL ENCOUNTER (OUTPATIENT)
Dept: RADIATION ONCOLOGY | Facility: MEDICAL CENTER | Age: 70
Discharge: HOME | End: 2020-12-04
Payer: MEDICARE

## 2020-12-04 PROCEDURE — 77386 HC NTSTY MODUL RAD TX DLVR CPLX: CPT

## 2020-12-04 PROCEDURE — 77014 PR CT GUIDANCE PLACEMENT RAD THERAPY FIELDS: CPT | Performed by: RADIOLOGY

## 2020-12-07 ENCOUNTER — HOSPITAL ENCOUNTER (OUTPATIENT)
Dept: RADIATION ONCOLOGY | Facility: MEDICAL CENTER | Age: 70
Discharge: HOME | End: 2020-12-07
Payer: MEDICARE

## 2020-12-07 PROCEDURE — 77386 HC NTSTY MODUL RAD TX DLVR CPLX: CPT

## 2020-12-07 PROCEDURE — 77014 PR CT GUIDANCE PLACEMENT RAD THERAPY FIELDS: CPT | Performed by: RADIOLOGY

## 2020-12-07 PROCEDURE — 77427 RADIATION TX MANAGEMENT X5: CPT | Performed by: RADIOLOGY

## 2020-12-08 ENCOUNTER — HOSPITAL ENCOUNTER (OUTPATIENT)
Dept: RADIATION ONCOLOGY | Facility: MEDICAL CENTER | Age: 70
Discharge: HOME | End: 2020-12-08
Payer: MEDICARE

## 2020-12-08 ENCOUNTER — HOSPITAL ENCOUNTER (OUTPATIENT)
Facility: MEDICAL CENTER | Age: 70
End: 2020-12-08
Payer: MEDICARE

## 2020-12-08 VITALS
SYSTOLIC BLOOD PRESSURE: 99 MMHG | BODY MASS INDEX: 26.3 KG/M2 | HEART RATE: 100 BPM | TEMPERATURE: 97.3 F | DIASTOLIC BLOOD PRESSURE: 62 MMHG | OXYGEN SATURATION: 99 % | RESPIRATION RATE: 16 BRPM | WEIGHT: 173 LBS

## 2020-12-08 PROCEDURE — 77386 HC NTSTY MODUL RAD TX DLVR CPLX: CPT

## 2020-12-08 PROCEDURE — 77014 PR CT GUIDANCE PLACEMENT RAD THERAPY FIELDS: CPT | Performed by: RADIOLOGY

## 2020-12-08 ASSESSMENT — PAIN DESCRIPTION - LOCATION: LOCATION: BACK

## 2020-12-08 ASSESSMENT — PAIN DESCRIPTION - ONSET: ONSET: ON-GOING

## 2020-12-08 ASSESSMENT — PAIN SCALES - GENERAL: PAINLEVEL_OUTOF10: 8

## 2020-12-08 ASSESSMENT — PAIN DESCRIPTION - ORIENTATION: ORIENTATION: LOWER

## 2020-12-08 ASSESSMENT — PAIN DESCRIPTION - FREQUENCY: FREQUENCY: INTERMITTENT

## 2020-12-08 NOTE — PROGRESS NOTES
nt: Jose Rafael Roy     : 1950      Date of Service: 2020    [facility] Oncology  On Treatment Visit (OTV) Note    Diagnosis: Cancer Staging  Malignant neoplasm of upper lobe of right lung [facility])  Staging form: Lung, AJCC 8th Edition  - Clinical stage from 2020: Stage IIIB (cT4, cN2, cM0) - Signed by Jcarlos Shore MD on 2020        Dose Accrued: Completed 5200 cGy out of 6000 cGy, 26 out of 30 treatments    S: Feeling much better still have discomfort when she swallow for that she is using Xylocaine Viscous 3-4 times a day before meals. O: BP 99/62   Pulse 100   Temp 97.3 °F (36.3 °C) (Oral)   Resp 16   Wt 173 lb (78.5 kg)   SpO2 99%   BMI 26.30 kg/m² . Well-appearing, in no apparent distress, alert and fully oriented, answers questions appropriately. No signs of acute radiation-induced toxicity on physical exam.    IGRT: Set-up images acquired to ensure daily treatment accuracy and precision were reviewed by the physician. A&P: Continue radiotherapy as planned. Moisturize treated area as instructed. We reviewed reasonably anticipated side effects in the upcoming weeks, as well as expected trajectory of recovery. Patient verbalized a good understanding to everything.     Electronically signed by Stone Davis MD on 2020 at 5:07 PM

## 2020-12-08 NOTE — PROGRESS NOTES
Catalina Lott  12/8/2020  Wt Readings from Last 3 Encounters:   12/08/20 173 lb (78.5 kg)   12/03/20 167 lb 11.2 oz (76.1 kg)   12/01/20 170 lb 2 oz (77.2 kg)     Body mass index is 26.3 kg/m². PT here for OTV. Patient states pain in her lower back and right side 8/10. Patient has foul odor or urine smell, when asked patient states dysuria and cloudy urine and that she called her family doctor, but has not heard back. Dr. Tracey Tarango into eval and verbal order for urine culture to reflux. Patient provided with written order. Treatment Area:esophagus    Patient was seen today for weekly visit. Comfort Alteration  Fatigue: Moderate    Ventilation Alterations  Cough: No  Hemoptysis: No  Mucus Color: no  Dyspnea: No  O2 Sat: 99%    Nutritional Alteration  Anorexia: No  Nausea: No   Vomiting: No     Mucous Membrane Alteration  Voice Changes/ Stridor/Larynx: no  Pharynx & Esophagus: hurts to swallow    Elimination Alterations  Constipation: no  Diarrhea:  no    Skin Alteration   Sensation:none    Radiation Dermatitis:  Intact [x]     Erythema  []     Discoloration  []     Rash []     Dry desquamation  []     Moist desquamation []       Emotional  Coping: effective      Injury, potential bleeding or infection: none    Lab Results   Component Value Date    WBC 3.1 (L) 12/03/2020     12/03/2020         BP 99/62   Pulse 100   Temp 97.3 °F (36.3 °C) (Oral)   Resp 16   Wt 173 lb (78.5 kg)   SpO2 99%   BMI 26.30 kg/m²   Patient Currently in Pain: Yes  Pain Assessment: 0-10  Pain Level: 8         Assessment/Plan: Patient was seen today for weekly visit.       Rohith Rojas

## 2020-12-09 ENCOUNTER — TELEPHONE (OUTPATIENT)
Dept: ONCOLOGY | Age: 70
End: 2020-12-09

## 2020-12-09 ENCOUNTER — HOSPITAL ENCOUNTER (OUTPATIENT)
Dept: RADIATION ONCOLOGY | Facility: MEDICAL CENTER | Age: 70
Discharge: HOME | End: 2020-12-09
Payer: MEDICARE

## 2020-12-09 ENCOUNTER — OFFICE VISIT (OUTPATIENT)
Dept: ONCOLOGY | Age: 70
End: 2020-12-09
Payer: MEDICARE

## 2020-12-09 ENCOUNTER — HOSPITAL ENCOUNTER (OUTPATIENT)
Dept: INFUSION THERAPY | Facility: MEDICAL CENTER | Age: 70
Discharge: HOME | End: 2020-12-09
Payer: MEDICARE

## 2020-12-09 VITALS
DIASTOLIC BLOOD PRESSURE: 49 MMHG | WEIGHT: 174.3 LBS | TEMPERATURE: 98.6 F | BODY MASS INDEX: 26.5 KG/M2 | RESPIRATION RATE: 16 BRPM | HEART RATE: 90 BPM | SYSTOLIC BLOOD PRESSURE: 89 MMHG

## 2020-12-09 VITALS
WEIGHT: 174.3 LBS | TEMPERATURE: 98.6 F | RESPIRATION RATE: 16 BRPM | DIASTOLIC BLOOD PRESSURE: 49 MMHG | SYSTOLIC BLOOD PRESSURE: 89 MMHG | HEART RATE: 90 BPM | BODY MASS INDEX: 26.5 KG/M2

## 2020-12-09 DIAGNOSIS — C34.91 NON-SMALL CELL CANCER OF RIGHT LUNG (HCC): Primary | ICD-10-CM

## 2020-12-09 DIAGNOSIS — C34.11 MALIGNANT NEOPLASM OF UPPER LOBE OF RIGHT LUNG (HCC): ICD-10-CM

## 2020-12-09 LAB
ABSOLUTE EOS #: 0.07 K/UL (ref 0–0.44)
ABSOLUTE IMMATURE GRANULOCYTE: 0.1 K/UL (ref 0–0.3)
ABSOLUTE LYMPH #: 0.36 K/UL (ref 1.1–3.7)
ABSOLUTE MONO #: 0.43 K/UL (ref 0.1–1.2)
ALBUMIN SERPL-MCNC: 3.5 G/DL (ref 3.5–5.2)
ALBUMIN/GLOBULIN RATIO: ABNORMAL (ref 1–2.5)
ALP BLD-CCNC: 52 U/L (ref 35–104)
ALT SERPL-CCNC: 12 U/L (ref 5–33)
ANION GAP SERPL CALCULATED.3IONS-SCNC: 9 MMOL/L (ref 9–17)
AST SERPL-CCNC: 12 U/L
BASOPHILS # BLD: 1 % (ref 0–2)
BASOPHILS ABSOLUTE: 0.03 K/UL (ref 0–0.2)
BILIRUB SERPL-MCNC: <0.1 MG/DL (ref 0.3–1.2)
BUN BLDV-MCNC: 14 MG/DL (ref 8–23)
BUN/CREAT BLD: 17 (ref 9–20)
CALCIUM SERPL-MCNC: 8.6 MG/DL (ref 8.6–10.4)
CHLORIDE BLD-SCNC: 104 MMOL/L (ref 98–107)
CO2: 27 MMOL/L (ref 20–31)
CREAT SERPL-MCNC: 0.82 MG/DL (ref 0.5–0.9)
DIFFERENTIAL TYPE: ABNORMAL
EOSINOPHILS RELATIVE PERCENT: 2 % (ref 1–4)
GFR AFRICAN AMERICAN: >60 ML/MIN
GFR NON-AFRICAN AMERICAN: >60 ML/MIN
GFR SERPL CREATININE-BSD FRML MDRD: ABNORMAL ML/MIN/{1.73_M2}
GFR SERPL CREATININE-BSD FRML MDRD: ABNORMAL ML/MIN/{1.73_M2}
GLUCOSE BLD-MCNC: 111 MG/DL (ref 70–99)
HCT VFR BLD CALC: 29.3 % (ref 36.3–47.1)
HEMOGLOBIN: 8.7 G/DL (ref 11.9–15.1)
IMMATURE GRANULOCYTES: 3 %
LYMPHOCYTES # BLD: 11 % (ref 24–43)
MCH RBC QN AUTO: 27.1 PG (ref 25.2–33.5)
MCHC RBC AUTO-ENTMCNC: 29.7 G/DL (ref 28.4–34.8)
MCV RBC AUTO: 91.3 FL (ref 82.6–102.9)
MONOCYTES # BLD: 13 % (ref 3–12)
MORPHOLOGY: ABNORMAL
NRBC AUTOMATED: 0 PER 100 WBC
PDW BLD-RTO: 22.7 % (ref 11.8–14.4)
PLATELET # BLD: 298 K/UL (ref 138–453)
PLATELET ESTIMATE: ABNORMAL
PMV BLD AUTO: 8.5 FL (ref 8.1–13.5)
POTASSIUM SERPL-SCNC: 4.5 MMOL/L (ref 3.7–5.3)
RBC # BLD: 3.21 M/UL (ref 3.95–5.11)
RBC # BLD: ABNORMAL 10*6/UL
SEG NEUTROPHILS: 70 % (ref 36–65)
SEGMENTED NEUTROPHILS ABSOLUTE COUNT: 2.31 K/UL (ref 1.5–8.1)
SODIUM BLD-SCNC: 140 MMOL/L (ref 135–144)
TOTAL PROTEIN: 6.5 G/DL (ref 6.4–8.3)
WBC # BLD: 3.3 K/UL (ref 3.5–11.3)
WBC # BLD: ABNORMAL 10*3/UL

## 2020-12-09 PROCEDURE — 36591 DRAW BLOOD OFF VENOUS DEVICE: CPT

## 2020-12-09 PROCEDURE — 2500000003 HC RX 250 WO HCPCS: Performed by: INTERNAL MEDICINE

## 2020-12-09 PROCEDURE — 99211 OFF/OP EST MAY X REQ PHY/QHP: CPT | Performed by: INTERNAL MEDICINE

## 2020-12-09 PROCEDURE — 85025 COMPLETE CBC W/AUTO DIFF WBC: CPT

## 2020-12-09 PROCEDURE — 6360000002 HC RX W HCPCS: Performed by: INTERNAL MEDICINE

## 2020-12-09 PROCEDURE — 2580000003 HC RX 258: Performed by: INTERNAL MEDICINE

## 2020-12-09 PROCEDURE — 96413 CHEMO IV INFUSION 1 HR: CPT

## 2020-12-09 PROCEDURE — 77014 PR CT GUIDANCE PLACEMENT RAD THERAPY FIELDS: CPT | Performed by: RADIOLOGY

## 2020-12-09 PROCEDURE — 99215 OFFICE O/P EST HI 40 MIN: CPT | Performed by: INTERNAL MEDICINE

## 2020-12-09 PROCEDURE — 96375 TX/PRO/DX INJ NEW DRUG ADDON: CPT

## 2020-12-09 PROCEDURE — 80053 COMPREHEN METABOLIC PANEL: CPT

## 2020-12-09 PROCEDURE — 96417 CHEMO IV INFUS EACH ADDL SEQ: CPT

## 2020-12-09 PROCEDURE — 77386 HC NTSTY MODUL RAD TX DLVR CPLX: CPT

## 2020-12-09 RX ORDER — EPINEPHRINE 1 MG/ML
0.3 INJECTION, SOLUTION, CONCENTRATE INTRAVENOUS PRN
Status: CANCELLED | OUTPATIENT
Start: 2020-12-09

## 2020-12-09 RX ORDER — SODIUM CHLORIDE 0.9 % (FLUSH) 0.9 %
10 SYRINGE (ML) INJECTION PRN
Status: DISCONTINUED | OUTPATIENT
Start: 2020-12-09 | End: 2020-12-10 | Stop reason: HOSPADM

## 2020-12-09 RX ORDER — SODIUM CHLORIDE 9 MG/ML
20 INJECTION, SOLUTION INTRAVENOUS ONCE
Status: COMPLETED | OUTPATIENT
Start: 2020-12-09 | End: 2020-12-09

## 2020-12-09 RX ORDER — MEPERIDINE HYDROCHLORIDE 50 MG/ML
12.5 INJECTION INTRAMUSCULAR; INTRAVENOUS; SUBCUTANEOUS ONCE
Status: CANCELLED | OUTPATIENT
Start: 2020-12-09

## 2020-12-09 RX ORDER — METHYLPREDNISOLONE SODIUM SUCCINATE 125 MG/2ML
125 INJECTION, POWDER, LYOPHILIZED, FOR SOLUTION INTRAMUSCULAR; INTRAVENOUS ONCE
Status: CANCELLED | OUTPATIENT
Start: 2020-12-09

## 2020-12-09 RX ORDER — DEXAMETHASONE SODIUM PHOSPHATE 10 MG/ML
10 INJECTION INTRAMUSCULAR; INTRAVENOUS ONCE
Status: COMPLETED | OUTPATIENT
Start: 2020-12-09 | End: 2020-12-09

## 2020-12-09 RX ORDER — HEPARIN SODIUM (PORCINE) LOCK FLUSH IV SOLN 100 UNIT/ML 100 UNIT/ML
500 SOLUTION INTRAVENOUS PRN
Status: DISCONTINUED | OUTPATIENT
Start: 2020-12-09 | End: 2020-12-10 | Stop reason: HOSPADM

## 2020-12-09 RX ORDER — SODIUM CHLORIDE 0.9 % (FLUSH) 0.9 %
5 SYRINGE (ML) INJECTION PRN
Status: CANCELLED | OUTPATIENT
Start: 2020-12-09

## 2020-12-09 RX ORDER — PALONOSETRON 0.05 MG/ML
0.25 INJECTION, SOLUTION INTRAVENOUS ONCE
Status: COMPLETED | OUTPATIENT
Start: 2020-12-09 | End: 2020-12-09

## 2020-12-09 RX ORDER — SODIUM CHLORIDE 9 MG/ML
INJECTION, SOLUTION INTRAVENOUS CONTINUOUS
Status: CANCELLED | OUTPATIENT
Start: 2020-12-09

## 2020-12-09 RX ORDER — PALONOSETRON 0.05 MG/ML
0.25 INJECTION, SOLUTION INTRAVENOUS ONCE
Status: CANCELLED | OUTPATIENT
Start: 2020-12-09

## 2020-12-09 RX ORDER — DIPHENHYDRAMINE HYDROCHLORIDE 50 MG/ML
50 INJECTION INTRAMUSCULAR; INTRAVENOUS ONCE
Status: CANCELLED | OUTPATIENT
Start: 2020-12-09

## 2020-12-09 RX ORDER — OXYCODONE HYDROCHLORIDE AND ACETAMINOPHEN 5; 325 MG/1; MG/1
1 TABLET ORAL EVERY 6 HOURS PRN
Qty: 120 TABLET | Refills: 0 | Status: SHIPPED | OUTPATIENT
Start: 2020-12-09 | End: 2021-01-08

## 2020-12-09 RX ORDER — DIPHENHYDRAMINE HYDROCHLORIDE 50 MG/ML
50 INJECTION INTRAMUSCULAR; INTRAVENOUS ONCE
Status: COMPLETED | OUTPATIENT
Start: 2020-12-09 | End: 2020-12-09

## 2020-12-09 RX ADMIN — PACLITAXEL 78 MG: 6 INJECTION, SOLUTION, CONCENTRATE INTRAVENOUS at 13:02

## 2020-12-09 RX ADMIN — DEXAMETHASONE SODIUM PHOSPHATE 10 MG: 10 INJECTION INTRAMUSCULAR; INTRAVENOUS at 12:15

## 2020-12-09 RX ADMIN — DIPHENHYDRAMINE HYDROCHLORIDE 50 MG: 50 INJECTION, SOLUTION INTRAMUSCULAR; INTRAVENOUS at 12:15

## 2020-12-09 RX ADMIN — HEPARIN 500 UNITS: 100 SYRINGE at 15:43

## 2020-12-09 RX ADMIN — Medication 10 ML: at 11:00

## 2020-12-09 RX ADMIN — CARBOPLATIN 190 MG: 10 INJECTION INTRAVENOUS at 14:19

## 2020-12-09 RX ADMIN — Medication 10 ML: at 12:15

## 2020-12-09 RX ADMIN — FAMOTIDINE 20 MG: 10 INJECTION INTRAVENOUS at 12:15

## 2020-12-09 RX ADMIN — SODIUM CHLORIDE 20 ML/HR: 9 INJECTION, SOLUTION INTRAVENOUS at 11:00

## 2020-12-09 RX ADMIN — PALONOSETRON 0.25 MG: 0.05 INJECTION, SOLUTION INTRAVENOUS at 12:15

## 2020-12-09 RX ADMIN — Medication 10 ML: at 15:43

## 2020-12-09 NOTE — PROGRESS NOTES
Today's Date: 12/9/2020  Patient Name: Jermaine Arevalo  Patient's age: 72 y.o., 1950    Diagnosis:   RUL squamous cell carcinoma,   Cancer Staging  Malignant neoplasm of upper lobe of right lung [facility])  Staging form: Lung, AJCC 8th Edition  - Clinical stage from 9/18/2020: Stage IIIB (cT4, cN2, cM0) - Signed by Maicol Tejada MD on 9/18/2020    Current therapy:  Started on concurrent chemoradiation with weekly carbotaxol on 10/21/20  She has 2 days of RT left  CHIEF COMPLAINT:    Chief Complaint   Patient presents with    Follow-up    Medication Refill    Other     is treatment working ?? INTERVAL HISTORY:    Sydnee Davila is returning for follow-up visit and to discuss  further recommendations. She is tolerating radiation well. She does have some fatigue. She denied any NV. NO fever chills. She denies any trouble swallowing. She denies any chest pain or shortness of breath. During this visit patient's allergy, social, medical, surgical history and medications were reviewed and updated. HISTORY OF PRESENT ILLNESS:    Sydnee Davila is a 72-year-old female with a past medical history of COPD, history of tobacco abuse, depression, CAD, arthritis was admitted with multiple falls at home. She complains of back pain and also chronic cough. On admission she had a CT scan of the chest which showed 7.2 cm spiculated mass in the right upper lobe with mediastinal lymphadenopathy suspicious for malignancy. 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 Oncology consulted for further evaluation. Patient has COPD and uses Home oxygen at night and sues walker at home. She smokes more than a PPD. 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 Past Medical History:   has a past medical history of AAA (abdominal aortic aneurysm) ([facility] [address] Acid reflux, Anxiety and depression, Aortic stenosis, Arthritis, Blister of ankle, right, CAD (coronary artery disease), Cancer ([facility] [address] COPD (chronic obstructive pulmonary disease) [facility] Diabetes mellitus [facility] Falls, Heart block, Hypokalemia, MDRO (multiple drug resistant organisms) resistance, MRSA (methicillin resistant staph aureus) culture positive, On home oxygen therapy, On home oxygen therapy, Overactive bladder, Pneumonia, PONV (postoperative nausea and vomiting), and Vitamin D deficiency. Past Surgical History:   has a past surgical history that includes back surgery; eye surgery; Cholecystectomy; Appendectomy; Hysterectomy; Tonsillectomy; lumbar laminectomy; Endoscopy, colon, diagnostic (12/08/2016); aortic valve repair (N/A, 4/11/2017); bronchoscopy (N/A, 8/31/2020); IR INSERT PICC VAD W SQ PORT >5 YEARS (9/4/2020); and IR PORT PLACEMENT > 5 YEARS (9/29/2020). Medications:    Current Outpatient Medications   Medication Sig Dispense Refill    oxyCODONE-acetaminophen (PERCOCET) 5-325 MG per tablet Take 1 tablet by mouth every 6 hours as needed for Pain for up to 30 days. 120 tablet 0    traZODone (DESYREL) 300 MG tablet Take 0.5 tablets by mouth nightly 30 tablet 0    clonazePAM (KLONOPIN) 1 MG tablet Take 1 tablet by mouth 3 times daily as needed for Anxiety for up to 3 doses. 3 tablet 0    gabapentin (NEURONTIN) 400 MG capsule Take 1 capsule by mouth 2 times daily for 3 doses.  In addition to 2 capsules (=800mg) nightly 3 capsule 0    ARIPiprazole (ABILIFY) 5 MG tablet Take 1 tablet by mouth daily for 3 doses 3 tablet 0    furosemide (LASIX) 40 MG tablet Take 40 mg by mouth daily      lidocaine-prilocaine (EMLA) 2.5-2.5 % cream To port site before chemo 1 Tube 1    melatonin 5 MG TABS tablet Take 5 mg by mouth nightly  glimepiride (AMARYL) 1 MG tablet Take 1 mg by mouth Daily with supper In addition to 2 tablets (=2mg) every morning       metoprolol tartrate (LOPRESSOR) 25 MG tablet Take 25 mg by mouth 2 times daily      venlafaxine (EFFEXOR XR) 150 MG extended release capsule Take 150 mg by mouth 2 times daily      lansoprazole (PREVACID) 30 MG delayed release capsule Take 30 mg by mouth daily      metFORMIN (GLUCOPHAGE) 500 MG tablet Take 500 mg by mouth 2 times daily      aspirin EC 81 MG EC tablet Take 81 mg by mouth daily      mometasone-formoterol (DULERA) 200-5 MCG/ACT inhaler Inhale 2 puffs into the lungs every 12 hours as needed (wheezing/SOB)       lidocaine 4 % external patch Place 1 patch onto the skin daily (Patient not taking: Reported on 12/9/2020) 30 patch 0    dexamethasone (DECADRON) 4 MG tablet Take 20 mg orally 12 hours and 6 hours before Taxol (Patient not taking: Reported on 12/9/2020) 20 tablet 3     No current facility-administered medications for this visit. Facility-Administered Medications Ordered in Other Visits   Medication Dose Route Frequency Provider Last Rate Last Dose    0.9 % sodium chloride infusion  20 mL/hr Intravenous Once Gege Sweeney MD        heparin flush 100 UNIT/ML injection 500 Units  500 Units Intracatheter PRN Gege Sweeney MD        sodium chloride flush 0.9 % injection 10 mL  10 mL Intravenous PRN Gege Sweeney MD         Allergies:  Codeine and Dye [iodides]    Social History:   reports that she quit smoking about 4 years ago. She has never used smokeless tobacco. She reports that she does not drink alcohol or use drugs. Family History: family history includes Cancer in her father and mother. REVIEW OF SYSTEMS:    Constitutional: ++fatigue, No fever or chills.  No night sweats, no weight loss   Eyes: No eye discharge, double vision, or eye pain   HEENT: negative for sore mouth, sore throat, hoarseness and voice change Respiratory: negative for cough , sputum, ++dyspnea, wheezing, hemoptysis, chest pain   Cardiovascular: negative for chest pain, dyspnea, palpitations, orthopnea, PND   Gastrointestinal: negative for nausea, vomiting, diarrhea, constipation, abdominal pain, Dysphagia, hematemesis and hematochezia   Genitourinary: negative for frequency, dysuria, nocturia, urinary incontinence, and hematuria   Integument: negative for rash, skin lesions, bruises.    Hematologic/Lymphatic: negative for easy bruising, bleeding, lymphadenopathy, or petechiae   Endocrine: negative for heat or cold intolerance,weight changes, change in bowel habits and hair loss   Musculoskeletal: negative for myalgias, arthralgias, pain, joint swelling,and bone pain   Neurological: negative for headaches, dizziness, seizures, weakness, numbness    PHYSICAL EXAM:      BP (!) 89/49   Pulse 90   Temp 98.6 °F (37 °C) (Oral)   Resp 16   Wt 174 lb 4.8 oz (79.1 kg)   BMI 26.50 kg/m²    Temp (24hrs), Av.7 °F (36.5 °C), Min:97.3 °F (36.3 °C), Max:98.1 °F (36.7 °C)  General appearance - well appearing, no in pain or distress   Mental status - alert and cooperative   Eyes - pupils equal and reactive, extraocular eye movements intact   Ears - bilateral TM's and external ear canals normal   Mouth - mucous membranes moist, pharynx normal without lesions   Neck - supple, no significant adenopathy   Lymphatics - no palpable lymphadenopathy, no hepatosplenomegaly   Chest - clear to auscultation, no wheezes, rales or rhonchi, symmetric air entry   Heart - normal rate, regular rhythm, normal S1, S2, no murmurs  Abdomen - soft, nontender, nondistended, no masses or organomegaly   Neurological - alert, oriented, normal speech, no focal findings or movement disorder noted   Musculoskeletal - no joint tenderness, deformity or swelling   Extremities - peripheral pulses normal, no pedal edema, no clubbing or cyanosis Skin - normal coloration and turgor, no rashes, no suspicious skin lesions noted ,    DATA:    Labs:   CBC:   Recent Labs     12/09/20  1100   WBC 3.3*   HGB 8.7*   HCT 29.3*        BMP:   Recent Labs     12/09/20  1100      K 4.5   CO2 27   BUN 14   CREATININE 0.82   LABGLOM >60   GLUCOSE 111*     PT/INR: No results for input(s): PROTIME, INR in the last 72 hours. Surgical Pathology Report   Surgical Pathology   Collected:  08/31/20 0803    Lab status:  Final    Resulting lab:  [facility]    Value:  -- Diagnosis --     BRONCHIAL WASHINGS RIGHT UPPER LOBE:          POSITIVE FOR MALIGNANCY.        SQUAMOUS CELL CARCINOMA.           COMMENT: VOLUME OF TUMOR IN THE CELL BLOCK IS LOW AND ADDITIONAL   MATERIAL WOULD BE REQUIRED IF MOLECULAR TESTING IS NECESSARY. IMAGING DATA:  CT chest 8/27/2020:   FINDINGS:    Mediastinum: Three vessel coronary artery calcification.  Newly enlarged    mediastinal lymph nodes including example station 7 node measuring 3.1 x 2.1    cm on series 2, image 45.         Lungs/pleura: Mild upper lobe predominant centrilobular emphysema. Marnell Esteban Spiculated mass of the right upper lobe measuring 7.2 x 6.5 cm with    broad-base of contact with the mediastinal pleura, right major fissure,    encircling and obliterating the right upper lobe bronchus.  Spicules are seen    to extend to the anterior costal pleura.  There is adjacent bronchial wall    thickening and interlobular septal thickening.  Stable 5 mm solid nodule of    the left upper lobe since 2016, benign.  No pleural effusion or pneumothorax.         Upper Abdomen: Adrenals are unremarkable.         Soft Tissues/Bones: Old right rib fractures.              Impression    Approximate 7.2 cm spiculated mass of the right upper lobe likely with    invasion of the mediastinum, adjacent lymphangitic spread and suspected    metastatic mediastinal lymphadenopathy. Scan 9/11/2020:   Impression 1. The patient's known right upper lobe lung mass is FDG avid consistent with    the patient's primary malignancy. 2. FDG avid mediastinal lymph nodes consistent with metastatic disease. 3. No abnormal FDG activity is identified involving the abdomen or pelvis. IMPRESSION:   1. 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 PET scan negative for distant metastasis: Clinical stage T4 N2 M0, stage IIIb  2. Anemia  3. Leukopenia: chemo related  4. COPD  5. Falls  6. CAD  7. Tobacco abuse  8. Actinomyces bacteremia    RECOMMENDATIONS:  1. I reviewed the laboratory data, imaging studies, biopsy finding, diagnosis, prognosis and treatment options with patient  2. I reviewed the NCCN guidelines and goals of care  3. I discussed the option of concurrent chemoradiation. I discussed the risk benefits and side effects with patient  4. She is tolerating chemoRt reasonably well  5. Has tolerated chemotherapy well  6. Continue concurrent chemoradiation  7. Return to clinic in 2-3 weeks  8. Patient's questions were sought and answered to her satisfaction      Mendoza Jennings MD  Hematologist/Medical Oncologist    This note is created with the assistance of a speech recognition program.  While intending to generate a document that actually reflects the content of the visit, the document can still have some errors including those of syntax and sound a like substitutions which may escape proof reading. It such instances, actual meaning can be extrapolated by contextual diversion.

## 2020-12-09 NOTE — PROGRESS NOTES
Patient arrive ambulatory for cycle 6 day 1 treatment and physician visit. Denies complaint or concern with exception of lower extremity edema. Vitals as charted. Port accessed; specimen sent. Physician met with patient; labs reviewed; he is notified of edema; will proceed with treatment. Taxol infusion begin slowly with no adverse reaction then increased to infuse over 1 hour. Completed with no sign of adverse reaction; line flushed. Carbo infused with no sign of adverse reaction; line flushed. Port de-accessed without difficulty. Patient off unit per self at discharge; AVS to be provided by front office staff; son in waiting area to take her home.

## 2020-12-09 NOTE — FLOWSHEET NOTE
Situation:  Writer visited with Ms. Deborah Means in the treatment cubicle of the infusion clinic. Assessment:  Ms. Deborah Means reported that she was doing well. She spoke of telling her brother when she was in the hospital, \"I want to live. She acknowledged that she is doing better and expressed gratitude. She told writer what she wanted for her lunch option. Intervention:  Writer offered supportive presence and explored Pt's coping and needs; actively listened as Pt talked about her experience; offered words of encouragement and support; affirmed Pt's strengths; gave Pt a lunch tray. Outcome:  Ms. Karen Castillo for the visit. 12/09/20 1726   Encounter Summary   Services provided to: Patient   Referral/Consult From: [facility] Family members   Continue Visiting   (12/9/20)   Complexity of Encounter Low   Length of Encounter 15 minutes   Spiritual Assessment Completed Yes   Routine   Type Follow up   Spiritual/Church   Type Spiritual support   Assessment Calm; Approachable;Coping   Intervention Explored feelings, thoughts, concerns; Active listening;Explored coping resources;Sustaining presence/ Ministry of presence   Outcome Engaged in conversation;Coping;Expressed feelings/needs/concerns;Expressed gratitude     Electronically signed by Samantha Mora, [address], [address] [facility] Radiation Oncology  12/9/2020  5:27 PM

## 2020-12-10 ENCOUNTER — HOSPITAL ENCOUNTER (OUTPATIENT)
Dept: RADIATION ONCOLOGY | Facility: MEDICAL CENTER | Age: 70
Discharge: HOME | End: 2020-12-10
Payer: MEDICARE

## 2020-12-10 PROCEDURE — 77386 HC NTSTY MODUL RAD TX DLVR CPLX: CPT

## 2020-12-10 PROCEDURE — 77336 RADIATION PHYSICS CONSULT: CPT

## 2020-12-10 PROCEDURE — 77014 PR CT GUIDANCE PLACEMENT RAD THERAPY FIELDS: CPT | Performed by: RADIOLOGY

## 2020-12-11 ENCOUNTER — TELEPHONE (OUTPATIENT)
Dept: RADIATION ONCOLOGY | Facility: MEDICAL CENTER | Age: 70
End: 2020-12-11

## 2020-12-11 ENCOUNTER — HOSPITAL ENCOUNTER (OUTPATIENT)
Dept: RADIATION ONCOLOGY | Facility: MEDICAL CENTER | Age: 70
Discharge: HOME | End: 2020-12-11
Payer: MEDICARE

## 2020-12-11 PROCEDURE — 77386 HC NTSTY MODUL RAD TX DLVR CPLX: CPT

## 2020-12-11 PROCEDURE — 77014 PR CT GUIDANCE PLACEMENT RAD THERAPY FIELDS: CPT | Performed by: RADIOLOGY

## 2020-12-11 NOTE — TELEPHONE ENCOUNTER
Showed Dr. Babatunde Oliveros the urine culture and senstivity. He would like to prescribe Bactrim DS 1 tab BID for 7 days. When I went to call the patient to inform her, she stated she has antibiotic Ciprofloxacin 500mgs 1 tab BID. I checked and that is on the report as susceptible. Updated Dr. Babatunde Oliveros and he is okay with patient staying on Cipro and no new orders needed. Informed patient to continue on that Cipro treatment, pt states she feels better now that she is taking it. She has been on it 2 days now.

## 2020-12-14 ENCOUNTER — HOSPITAL ENCOUNTER (OUTPATIENT)
Dept: RADIATION ONCOLOGY | Facility: MEDICAL CENTER | Age: 70
Discharge: HOME | End: 2020-12-14
Payer: MEDICARE

## 2020-12-14 ENCOUNTER — APPOINTMENT (OUTPATIENT)
Dept: RADIATION ONCOLOGY | Facility: MEDICAL CENTER | Age: 70
End: 2020-12-14
Payer: MEDICARE

## 2020-12-14 VITALS
SYSTOLIC BLOOD PRESSURE: 113 MMHG | TEMPERATURE: 96.1 F | WEIGHT: 174.13 LBS | BODY MASS INDEX: 26.48 KG/M2 | HEART RATE: 83 BPM | RESPIRATION RATE: 16 BRPM | OXYGEN SATURATION: 96 % | DIASTOLIC BLOOD PRESSURE: 53 MMHG

## 2020-12-14 PROCEDURE — 77427 RADIATION TX MANAGEMENT X5: CPT | Performed by: RADIOLOGY

## 2020-12-14 PROCEDURE — 77014 PR CT GUIDANCE PLACEMENT RAD THERAPY FIELDS: CPT | Performed by: RADIOLOGY

## 2020-12-14 PROCEDURE — 77386 HC NTSTY MODUL RAD TX DLVR CPLX: CPT

## 2020-12-14 ASSESSMENT — PAIN SCALES - GENERAL: PAINLEVEL_OUTOF10: 10

## 2020-12-14 NOTE — PROGRESS NOTES
Dear Dr Kendrick Crocker: Thank you for referring Jose Rafael Roy to me for evaluation and treatment. Below is a summary of the patient's recently completed radiation course. If you have questions, please do not hesitate to call me. I look forward to following this patient along with you. Sincerely,  Electronically signed by Stone Davis MD on 20 at 5:54 PM EST      CC: Patient Care Team:  Charbel Veloz MD as PCP - General (Family Medicine)  Shante Fairbanks RN as Nurse Navigator (Oncology)  Estefanía Diane MD as Consulting Physician (Radiation Oncology)  Jcarlos Shore MD as Consulting Physician (Hematology and Oncology)  ------------------------------------------------------------------------------------------------------------------------------------------------------------------------------------------        Date of Service: 2020     Location:         RADIATION ONCOLOGY END OF TREATMENT SUMMARY:    Patient ID:   Jose Rafael Roy  : 1950   MRN: [id number]    DIAGNOSIS:  Cancer Staging  Malignant neoplasm of upper lobe of right lung [facility])  Staging form: Lung, AJCC 8th Edition  - Clinical stage from 2020: Stage IIIB (cT4, cN2, cM0) - Signed by Jcarlos Shore MD on 2020      TREATMENT DETAILS:   (6000 cGy in 30 treatment at 200 cGy per fraction using rapid arc VMA T technique 6 MV  Energy. Treatment started on 10/21/2020 and was completed in 2020. Concurrent Chemotherapy:  CLINICAL COURSE:    Patient completed the treatment as prescribed (6000 cGy in 30 treatment at 200 cGy per fraction using rapid arc VMAT technique 6 MV  Energy. Treatment started on 10/21/2020 and was completed in 2020. Patient was seen and examined weekly during her course of treatment. And tolerated treatment as expected. Patient developed a minor degree of dysphagia and was given Xylocaine viscus to use before meals.   I should also that she was admitted to the hospital because of medical condition for that her treatment was interrupted. Patient will come back in 3 month for a follow up visit and to assess response. Patient was advised to continue close follow up with medical oncology . Patient does have our contact information in case they have any questions or concerns in the interim.     Electronically signed by Ian Landrum MD on 12/14/2020 at 5:54 PMakl

## 2020-12-14 NOTE — PROGRESS NOTES
nt: Yoana Bone     : 1950      Date of Service: 2020    [facility] Oncology  On Treatment Visit (OTV) Note    Diagnosis: Cancer Staging  Malignant neoplasm of upper lobe of right lung [facility])  Staging form: Lung, AJCC 8th Edition  - Clinical stage from 2020: Stage IIIB (cT4, cN2, cM0) - Signed by John White MD on 2020        Dose Accrued: 6000 out of 6000 cGy, 30 out of 30 treatments    S: Patient feels well denied any symptoms    O: There were no vitals taken for this visit. .  Well-appearing, in no apparent distress, alert and fully oriented, answers questions appropriately. No signs of acute radiation-induced toxicity on physical exam.    IGRT: Set-up images acquired to ensure daily treatment accuracy and precision were reviewed by the physician. A&P: She completed her course of radiation therapy well-tolerated she developed expected dysphagia which was mild and treated with Xylocaine viscus. Patient also was admitted to the hospital for medical condition. Patient will be seen by Dr. Autumn Caldwell and I gave her 3 months follow-up appointment. Patient need to go CT of the chest between 2 to 3 months to assess her response to radiation. I would like to thank you very much for letting us participate in her care and I will keep you updated on her.   Electronically signed by Jomar Long MD on 2020 at 10:34 AM

## 2020-12-16 ENCOUNTER — HOSPITAL ENCOUNTER (OUTPATIENT)
Dept: INFUSION THERAPY | Facility: MEDICAL CENTER | Age: 70
Discharge: HOME | End: 2020-12-16
Payer: MEDICARE

## 2020-12-16 VITALS
RESPIRATION RATE: 18 BRPM | HEART RATE: 107 BPM | SYSTOLIC BLOOD PRESSURE: 106 MMHG | DIASTOLIC BLOOD PRESSURE: 59 MMHG | TEMPERATURE: 98.8 F

## 2020-12-16 DIAGNOSIS — C34.11 MALIGNANT NEOPLASM OF UPPER LOBE OF RIGHT LUNG (HCC): ICD-10-CM

## 2020-12-16 DIAGNOSIS — C34.91 NON-SMALL CELL CANCER OF RIGHT LUNG (HCC): Primary | ICD-10-CM

## 2020-12-16 LAB
ABSOLUTE EOS #: 0.04 K/UL (ref 0–0.44)
ABSOLUTE IMMATURE GRANULOCYTE: 0.07 K/UL (ref 0–0.3)
ABSOLUTE LYMPH #: 0.29 K/UL (ref 1.1–3.7)
ABSOLUTE MONO #: 0.32 K/UL (ref 0.1–1.2)
ALBUMIN SERPL-MCNC: 3.3 G/DL (ref 3.5–5.2)
ALBUMIN/GLOBULIN RATIO: ABNORMAL (ref 1–2.5)
ALP BLD-CCNC: 40 U/L (ref 35–104)
ALT SERPL-CCNC: 10 U/L (ref 5–33)
ANION GAP SERPL CALCULATED.3IONS-SCNC: 8 MMOL/L (ref 9–17)
AST SERPL-CCNC: 13 U/L
BASOPHILS # BLD: 1 % (ref 0–2)
BASOPHILS ABSOLUTE: 0.04 K/UL (ref 0–0.2)
BILIRUB SERPL-MCNC: 0.12 MG/DL (ref 0.3–1.2)
BUN BLDV-MCNC: 13 MG/DL (ref 8–23)
BUN/CREAT BLD: 16 (ref 9–20)
CALCIUM SERPL-MCNC: 8.5 MG/DL (ref 8.6–10.4)
CHLORIDE BLD-SCNC: 104 MMOL/L (ref 98–107)
CO2: 28 MMOL/L (ref 20–31)
CREAT SERPL-MCNC: 0.83 MG/DL (ref 0.5–0.9)
DIFFERENTIAL TYPE: ABNORMAL
EOSINOPHILS RELATIVE PERCENT: 1 % (ref 1–4)
GFR AFRICAN AMERICAN: >60 ML/MIN
GFR NON-AFRICAN AMERICAN: >60 ML/MIN
GFR SERPL CREATININE-BSD FRML MDRD: ABNORMAL ML/MIN/{1.73_M2}
GFR SERPL CREATININE-BSD FRML MDRD: ABNORMAL ML/MIN/{1.73_M2}
GLUCOSE BLD-MCNC: 240 MG/DL (ref 70–99)
HCT VFR BLD CALC: 27.3 % (ref 36.3–47.1)
HEMOGLOBIN: 8.3 G/DL (ref 11.9–15.1)
IMMATURE GRANULOCYTES: 2 %
LYMPHOCYTES # BLD: 8 % (ref 24–43)
MCH RBC QN AUTO: 27.8 PG (ref 25.2–33.5)
MCHC RBC AUTO-ENTMCNC: 30.4 G/DL (ref 28.4–34.8)
MCV RBC AUTO: 91.3 FL (ref 82.6–102.9)
MONOCYTES # BLD: 9 % (ref 3–12)
MORPHOLOGY: ABNORMAL
NRBC AUTOMATED: 0 PER 100 WBC
PDW BLD-RTO: 22.7 % (ref 11.8–14.4)
PLATELET # BLD: 212 K/UL (ref 138–453)
PLATELET ESTIMATE: ABNORMAL
PMV BLD AUTO: 8.7 FL (ref 8.1–13.5)
POTASSIUM SERPL-SCNC: 4.5 MMOL/L (ref 3.7–5.3)
RBC # BLD: 2.99 M/UL (ref 3.95–5.11)
RBC # BLD: ABNORMAL 10*6/UL
SEG NEUTROPHILS: 79 % (ref 36–65)
SEGMENTED NEUTROPHILS ABSOLUTE COUNT: 2.84 K/UL (ref 1.5–8.1)
SODIUM BLD-SCNC: 140 MMOL/L (ref 135–144)
TOTAL PROTEIN: 6.3 G/DL (ref 6.4–8.3)
WBC # BLD: 3.6 K/UL (ref 3.5–11.3)
WBC # BLD: ABNORMAL 10*3/UL

## 2020-12-16 PROCEDURE — 36415 COLL VENOUS BLD VENIPUNCTURE: CPT

## 2020-12-16 PROCEDURE — 96374 THER/PROPH/DIAG INJ IV PUSH: CPT

## 2020-12-16 PROCEDURE — 96415 CHEMO IV INFUSION ADDL HR: CPT

## 2020-12-16 PROCEDURE — 2500000003 HC RX 250 WO HCPCS: Performed by: INTERNAL MEDICINE

## 2020-12-16 PROCEDURE — 36591 DRAW BLOOD OFF VENOUS DEVICE: CPT

## 2020-12-16 PROCEDURE — 80053 COMPREHEN METABOLIC PANEL: CPT

## 2020-12-16 PROCEDURE — 6360000002 HC RX W HCPCS: Performed by: INTERNAL MEDICINE

## 2020-12-16 PROCEDURE — 96375 TX/PRO/DX INJ NEW DRUG ADDON: CPT

## 2020-12-16 PROCEDURE — 2580000003 HC RX 258: Performed by: INTERNAL MEDICINE

## 2020-12-16 PROCEDURE — 85025 COMPLETE CBC W/AUTO DIFF WBC: CPT

## 2020-12-16 PROCEDURE — 96413 CHEMO IV INFUSION 1 HR: CPT

## 2020-12-16 RX ORDER — HEPARIN SODIUM (PORCINE) LOCK FLUSH IV SOLN 100 UNIT/ML 100 UNIT/ML
500 SOLUTION INTRAVENOUS PRN
Status: DISCONTINUED | OUTPATIENT
Start: 2020-12-16 | End: 2020-12-17 | Stop reason: HOSPADM

## 2020-12-16 RX ORDER — PALONOSETRON 0.05 MG/ML
0.25 INJECTION, SOLUTION INTRAVENOUS ONCE
Status: COMPLETED | OUTPATIENT
Start: 2020-12-16 | End: 2020-12-16

## 2020-12-16 RX ORDER — SODIUM CHLORIDE 0.9 % (FLUSH) 0.9 %
5 SYRINGE (ML) INJECTION PRN
Status: CANCELLED | OUTPATIENT
Start: 2020-12-16

## 2020-12-16 RX ORDER — SODIUM CHLORIDE 9 MG/ML
20 INJECTION, SOLUTION INTRAVENOUS ONCE
Status: COMPLETED | OUTPATIENT
Start: 2020-12-16 | End: 2020-12-16

## 2020-12-16 RX ORDER — SODIUM CHLORIDE 0.9 % (FLUSH) 0.9 %
10 SYRINGE (ML) INJECTION PRN
Status: DISCONTINUED | OUTPATIENT
Start: 2020-12-16 | End: 2020-12-17 | Stop reason: HOSPADM

## 2020-12-16 RX ORDER — DIPHENHYDRAMINE HYDROCHLORIDE 50 MG/ML
50 INJECTION INTRAMUSCULAR; INTRAVENOUS ONCE
Status: CANCELLED | OUTPATIENT
Start: 2020-12-16 | End: 2020-12-16

## 2020-12-16 RX ORDER — MEPERIDINE HYDROCHLORIDE 50 MG/ML
12.5 INJECTION INTRAMUSCULAR; INTRAVENOUS; SUBCUTANEOUS ONCE
Status: CANCELLED | OUTPATIENT
Start: 2020-12-16 | End: 2020-12-16

## 2020-12-16 RX ORDER — DEXAMETHASONE SODIUM PHOSPHATE 10 MG/ML
10 INJECTION INTRAMUSCULAR; INTRAVENOUS ONCE
Status: COMPLETED | OUTPATIENT
Start: 2020-12-16 | End: 2020-12-16

## 2020-12-16 RX ORDER — DIPHENHYDRAMINE HYDROCHLORIDE 50 MG/ML
50 INJECTION INTRAMUSCULAR; INTRAVENOUS ONCE
Status: COMPLETED | OUTPATIENT
Start: 2020-12-16 | End: 2020-12-16

## 2020-12-16 RX ORDER — METHYLPREDNISOLONE SODIUM SUCCINATE 125 MG/2ML
125 INJECTION, POWDER, LYOPHILIZED, FOR SOLUTION INTRAMUSCULAR; INTRAVENOUS ONCE
Status: CANCELLED | OUTPATIENT
Start: 2020-12-16 | End: 2020-12-16

## 2020-12-16 RX ORDER — SODIUM CHLORIDE 9 MG/ML
INJECTION, SOLUTION INTRAVENOUS CONTINUOUS
Status: CANCELLED | OUTPATIENT
Start: 2020-12-16

## 2020-12-16 RX ADMIN — PACLITAXEL 78 MG: 6 INJECTION, SOLUTION, CONCENTRATE INTRAVENOUS at 13:49

## 2020-12-16 RX ADMIN — FAMOTIDINE 20 MG: 10 INJECTION INTRAVENOUS at 13:17

## 2020-12-16 RX ADMIN — CARBOPLATIN 190 MG: 10 INJECTION INTRAVENOUS at 14:52

## 2020-12-16 RX ADMIN — Medication 10 ML: at 15:52

## 2020-12-16 RX ADMIN — SODIUM CHLORIDE 20 ML/HR: 9 INJECTION, SOLUTION INTRAVENOUS at 12:56

## 2020-12-16 RX ADMIN — DEXAMETHASONE SODIUM PHOSPHATE 10 MG: 10 INJECTION INTRAMUSCULAR; INTRAVENOUS at 13:19

## 2020-12-16 RX ADMIN — HEPARIN 500 UNITS: 100 SYRINGE at 15:52

## 2020-12-16 RX ADMIN — DIPHENHYDRAMINE HYDROCHLORIDE 50 MG: 50 INJECTION, SOLUTION INTRAMUSCULAR; INTRAVENOUS at 13:21

## 2020-12-16 RX ADMIN — PALONOSETRON HYDROCHLORIDE 0.25 MG: 0.25 INJECTION INTRAVENOUS at 13:15

## 2020-12-16 NOTE — PROGRESS NOTES
Patient here for labs and chemo. No complaints today. Vitals obtained. Port accessed per policy and labs drawn and sent. Labs reviewed. Premeds given per [facility]. Chemo hung per [facility]. Infusing. Patient sleeping on and off throughout treatment. Incontinent of urine in chair. Up to bathroom and clothes changed, diaper applied and scrubs given to her to wear home. Soiled clothes placed in a bag and given to her son when he arrives. Informed him of the accident. Her coat is also wet and will need to be cleaned. Port flushed per policy and gripper removed intact, band aid to site. Has a return visit scheduled. Discharged ambulatory with a walker with her son.

## 2020-12-23 NOTE — TELEPHONE ENCOUNTER
AS PER CONVERSATION PENDED NEW PERCOCET 10/325 SCRIPT TO MD FOR REVIEW. PT ADVISED    SHE HAS HAD AN INCREASE IN PAIN OVER THE LAST 3 DAYS AND CANNOT DEEP BREATH.     MD FOLLOW UP IS 01/20/2021

## 2020-12-24 RX ORDER — OXYCODONE AND ACETAMINOPHEN 10; 325 MG/1; MG/1
1 TABLET ORAL EVERY 6 HOURS PRN
Qty: 120 TABLET | Refills: 0 | Status: SHIPPED | OUTPATIENT
Start: 2020-12-24 | End: 2020-12-28 | Stop reason: SDUPTHER

## 2020-12-28 RX ORDER — OXYCODONE AND ACETAMINOPHEN 10; 325 MG/1; MG/1
1 TABLET ORAL EVERY 6 HOURS PRN
Qty: 120 TABLET | Refills: 0 | Status: SHIPPED | OUTPATIENT
Start: 2020-12-28 | End: 2021-01-27

## 2020-12-28 NOTE — TELEPHONE ENCOUNTER
RECEIVED VM FROM STEPHENIE REPORTING HER SCRIPT FROM 12/24/2020 COULD NOT BE FILLED AS HER [facility] DID NOT HAVE THE MEDICATION. Augusto Lowery 1934, SPOKE WITH DANYA. CONFIRMED SCRIPT FROM 12/24/2020 NOT FILLED AND THEY ONLY HAVE #6 TABS IN STORE. CONFIRMED [facility] ON [address] DOES HAVE DRUG AND COULD FILL. NEW SCRIPT PENDED TO ON CALL MD AS DR Nadira Duff IS CURRENTLY ON VACATION.

## 2021-01-04 ENCOUNTER — TELEPHONE (OUTPATIENT)
Dept: ONCOLOGY | Age: 71
End: 2021-01-04

## 2021-01-04 RX ORDER — ONDANSETRON 4 MG/1
4 TABLET, FILM COATED ORAL EVERY 8 HOURS PRN
Qty: 30 TABLET | Refills: 0 | Status: SHIPPED | OUTPATIENT
Start: 2021-01-04 | End: 2021-03-03 | Stop reason: SDUPTHER

## 2021-01-04 NOTE — TELEPHONE ENCOUNTER
STEPHENIE HAD PHONED AND LEFT  THAT SHE IS \"DOWN TO ONE PILL AND I REALLY DON'T WANT TO GO BACK TO THAT PAIN\"    UPON REVIEW LAST WEEK STEPHENIE HAD PHONED AND HER NORMAL [facility] [address] FILL THE PERCOCET 10/325 AND ADVISED SENDING TO [facility]  [address] WRITER FOLLOWED UP AS SCRIPT WAS APPROVED. SPOKE WITH MEGAN , PHARMACIST AT [address] EXPLAINED THEY DO NOT HAVE ENOUGH TO FILL ENTIRE SCRIPT. HAS APPROXIMATELY 95 TABS. GAVE PERMISSION TO PARTIAL FILL AS STEPHENIE IS DOWN TO 1 TABLET CURRENTLY. MEGAN CONFIRMED THEY WILL PARTIAL FILL/  WRITER UPDATED Keith Frank. UNDERSTANDING VOICED.

## 2021-01-04 NOTE — TELEPHONE ENCOUNTER
STEPHENIE PHONED TO REQUEST ZOFRAN SCRIPT.    WOULD LIKE TO RETURN TO HER MAIN [facility] [address] [facility].     PENDED TO MD

## 2021-01-06 ENCOUNTER — TELEPHONE (OUTPATIENT)
Dept: ONCOLOGY | Age: 71
End: 2021-01-06

## 2021-01-06 ENCOUNTER — APPOINTMENT (OUTPATIENT)
Dept: NUCLEAR MEDICINE | Age: 71
End: 2021-01-06
Payer: MEDICARE

## 2021-01-06 ENCOUNTER — HOSPITAL ENCOUNTER (EMERGENCY)
Age: 71
Discharge: HOME | End: 2021-01-06
Attending: EMERGENCY MEDICINE
Payer: MEDICARE

## 2021-01-06 ENCOUNTER — APPOINTMENT (OUTPATIENT)
Dept: GENERAL RADIOLOGY | Age: 71
End: 2021-01-06
Payer: MEDICARE

## 2021-01-06 VITALS
TEMPERATURE: 98.4 F | OXYGEN SATURATION: 98 % | HEART RATE: 89 BPM | RESPIRATION RATE: 13 BRPM | WEIGHT: 167 LBS | SYSTOLIC BLOOD PRESSURE: 131 MMHG | BODY MASS INDEX: 25.39 KG/M2 | DIASTOLIC BLOOD PRESSURE: 69 MMHG

## 2021-01-06 DIAGNOSIS — R07.89 ATYPICAL CHEST PAIN: Primary | ICD-10-CM

## 2021-01-06 DIAGNOSIS — Z85.118 HISTORY OF LUNG CANCER: ICD-10-CM

## 2021-01-06 LAB
ABSOLUTE EOS #: 0.38 K/UL (ref 0–0.44)
ABSOLUTE IMMATURE GRANULOCYTE: 0 K/UL (ref 0–0.3)
ABSOLUTE LYMPH #: 0.77 K/UL (ref 1.1–3.7)
ABSOLUTE MONO #: 0.34 K/UL (ref 0.1–1.2)
ALBUMIN SERPL-MCNC: 3.6 G/DL (ref 3.5–5.2)
ALBUMIN/GLOBULIN RATIO: ABNORMAL (ref 1–2.5)
ALP BLD-CCNC: 43 U/L (ref 35–104)
ALT SERPL-CCNC: 10 U/L (ref 5–33)
ANION GAP SERPL CALCULATED.3IONS-SCNC: 10 MMOL/L (ref 9–17)
AST SERPL-CCNC: 19 U/L
BASOPHILS # BLD: 0 % (ref 0–2)
BASOPHILS ABSOLUTE: 0 K/UL (ref 0–0.2)
BILIRUB SERPL-MCNC: <0.1 MG/DL (ref 0.3–1.2)
BILIRUBIN DIRECT: <0.08 MG/DL
BILIRUBIN, INDIRECT: ABNORMAL MG/DL (ref 0–1)
BNP INTERPRETATION: ABNORMAL
BUN BLDV-MCNC: 17 MG/DL (ref 8–23)
BUN/CREAT BLD: 20 (ref 9–20)
CALCIUM SERPL-MCNC: 8.6 MG/DL (ref 8.6–10.4)
CHLORIDE BLD-SCNC: 101 MMOL/L (ref 98–107)
CO2: 27 MMOL/L (ref 20–31)
CREAT SERPL-MCNC: 0.86 MG/DL (ref 0.5–0.9)
D-DIMER QUANTITATIVE: 1.91 MG/L FEU (ref 0–0.59)
DIFFERENTIAL TYPE: ABNORMAL
EOSINOPHILS RELATIVE PERCENT: 8 % (ref 1–4)
GFR AFRICAN AMERICAN: >60 ML/MIN
GFR NON-AFRICAN AMERICAN: >60 ML/MIN
GFR SERPL CREATININE-BSD FRML MDRD: ABNORMAL ML/MIN/{1.73_M2}
GFR SERPL CREATININE-BSD FRML MDRD: ABNORMAL ML/MIN/{1.73_M2}
GLOBULIN: ABNORMAL G/DL (ref 1.5–3.8)
GLUCOSE BLD-MCNC: 125 MG/DL (ref 70–99)
HCT VFR BLD CALC: 30.2 % (ref 36.3–47.1)
HEMOGLOBIN: 9.3 G/DL (ref 11.9–15.1)
IMMATURE GRANULOCYTES: 0 %
INR BLD: 1
LYMPHOCYTES # BLD: 16 % (ref 24–43)
MCH RBC QN AUTO: 28.8 PG (ref 25.2–33.5)
MCHC RBC AUTO-ENTMCNC: 30.8 G/DL (ref 28.4–34.8)
MCV RBC AUTO: 93.5 FL (ref 82.6–102.9)
MONOCYTES # BLD: 7 % (ref 3–12)
MORPHOLOGY: ABNORMAL
MYOGLOBIN: 99 NG/ML (ref 25–58)
NRBC AUTOMATED: 0 PER 100 WBC
PDW BLD-RTO: 21.3 % (ref 11.8–14.4)
PLATELET # BLD: 153 K/UL (ref 138–453)
PLATELET ESTIMATE: ABNORMAL
PMV BLD AUTO: 9.1 FL (ref 8.1–13.5)
POTASSIUM SERPL-SCNC: 4 MMOL/L (ref 3.7–5.3)
PRO-BNP: 324 PG/ML
PROTHROMBIN TIME: 12.6 SEC (ref 11.5–14.2)
RBC # BLD: 3.23 M/UL (ref 3.95–5.11)
RBC # BLD: ABNORMAL 10*6/UL
SEG NEUTROPHILS: 69 % (ref 36–65)
SEGMENTED NEUTROPHILS ABSOLUTE COUNT: 3.31 K/UL (ref 1.5–8.1)
SODIUM BLD-SCNC: 138 MMOL/L (ref 135–144)
TOTAL PROTEIN: 6.9 G/DL (ref 6.4–8.3)
TROPONIN INTERP: ABNORMAL
TROPONIN INTERP: ABNORMAL
TROPONIN T: ABNORMAL NG/ML
TROPONIN T: ABNORMAL NG/ML
TROPONIN, HIGH SENSITIVITY: 17 NG/L (ref 0–14)
TROPONIN, HIGH SENSITIVITY: 19 NG/L (ref 0–14)
WBC # BLD: 4.8 K/UL (ref 3.5–11.3)
WBC # BLD: ABNORMAL 10*3/UL

## 2021-01-06 PROCEDURE — 93005 ELECTROCARDIOGRAM TRACING: CPT | Performed by: NURSE PRACTITIONER

## 2021-01-06 PROCEDURE — 78580 LUNG PERFUSION IMAGING: CPT

## 2021-01-06 PROCEDURE — 83880 ASSAY OF NATRIURETIC PEPTIDE: CPT

## 2021-01-06 PROCEDURE — 96374 THER/PROPH/DIAG INJ IV PUSH: CPT

## 2021-01-06 PROCEDURE — 84484 ASSAY OF TROPONIN QUANT: CPT

## 2021-01-06 PROCEDURE — 99283 EMERGENCY DEPT VISIT LOW MDM: CPT

## 2021-01-06 PROCEDURE — 80076 HEPATIC FUNCTION PANEL: CPT

## 2021-01-06 PROCEDURE — 71045 X-RAY EXAM CHEST 1 VIEW: CPT

## 2021-01-06 PROCEDURE — 96375 TX/PRO/DX INJ NEW DRUG ADDON: CPT

## 2021-01-06 PROCEDURE — 80048 BASIC METABOLIC PNL TOTAL CA: CPT

## 2021-01-06 PROCEDURE — 6360000002 HC RX W HCPCS: Performed by: NURSE PRACTITIONER

## 2021-01-06 PROCEDURE — 83874 ASSAY OF MYOGLOBIN: CPT

## 2021-01-06 PROCEDURE — 85025 COMPLETE CBC W/AUTO DIFF WBC: CPT

## 2021-01-06 PROCEDURE — 85610 PROTHROMBIN TIME: CPT

## 2021-01-06 PROCEDURE — 3430000000 HC RX DIAGNOSTIC RADIOPHARMACEUTICAL: Performed by: NURSE PRACTITIONER

## 2021-01-06 PROCEDURE — A9540 TC99M MAA: HCPCS | Performed by: NURSE PRACTITIONER

## 2021-01-06 PROCEDURE — 85379 FIBRIN DEGRADATION QUANT: CPT

## 2021-01-06 RX ORDER — MORPHINE SULFATE 2 MG/ML
2 INJECTION, SOLUTION INTRAMUSCULAR; INTRAVENOUS ONCE
Status: COMPLETED | OUTPATIENT
Start: 2021-01-06 | End: 2021-01-06

## 2021-01-06 RX ORDER — ONDANSETRON 2 MG/ML
4 INJECTION INTRAMUSCULAR; INTRAVENOUS ONCE
Status: COMPLETED | OUTPATIENT
Start: 2021-01-06 | End: 2021-01-06

## 2021-01-06 RX ADMIN — Medication 6.5 MILLICURIE: at 19:40

## 2021-01-06 RX ADMIN — ONDANSETRON 4 MG: 2 INJECTION INTRAMUSCULAR; INTRAVENOUS at 20:18

## 2021-01-06 RX ADMIN — MORPHINE SULFATE 2 MG: 2 INJECTION, SOLUTION INTRAMUSCULAR; INTRAVENOUS at 20:18

## 2021-01-06 ASSESSMENT — ENCOUNTER SYMPTOMS
VOMITING: 0
DIARRHEA: 0
SHORTNESS OF BREATH: 1
BACK PAIN: 0
NAUSEA: 0
ABDOMINAL PAIN: 0
COLOR CHANGE: 0

## 2021-01-06 ASSESSMENT — PAIN SCALES - GENERAL: PAINLEVEL_OUTOF10: 8

## 2021-01-06 NOTE — ED PROVIDER NOTES
[address] ED  eMERGENCY dEPARTMENT eNCOUnter      Pt Name: Zarina Torres  MRN: [id number]  Lauragfdmitry 1950  Date of evaluation: 1/6/2021  Provider: AV Leggett 4775       Chief Complaint   Patient presents with    Lung Cancer    Chest Pain     right sided         HISTORY OF PRESENT ILLNESS  (Location/Symptom, Timing/Onset, Context/Setting, Quality, Duration, Modifying Factors, Severity.)   Zarina Torres is a 72 y.o. female who presents to the emergency department via private auto for right chest pain. Onset was this morning. She has right-sided lung CA. Denies fever, chills, cough, N/V/D. Rates her pain 8/10 at this time. States she has completed her chemotherapy and radiation. States her percocet was not helping with the pain today. She is not taking a blood thinner; PE is a concern. Nursing Notes were reviewed. ALLERGIES     Codeine and Dye [iodides]    CURRENT MEDICATIONS       Previous Medications    ARIPIPRAZOLE (ABILIFY) 5 MG TABLET    Take 1 tablet by mouth daily for 3 doses    ASPIRIN EC 81 MG EC TABLET    Take 81 mg by mouth daily    CLONAZEPAM (KLONOPIN) 1 MG TABLET    Take 1 tablet by mouth 3 times daily as needed for Anxiety for up to 3 doses. DEXAMETHASONE (DECADRON) 4 MG TABLET    Take 20 mg orally 12 hours and 6 hours before Taxol    FUROSEMIDE (LASIX) 40 MG TABLET    Take 40 mg by mouth daily    GABAPENTIN (NEURONTIN) 400 MG CAPSULE    Take 1 capsule by mouth 2 times daily for 3 doses.  In addition to 2 capsules (=800mg) nightly    GLIMEPIRIDE (AMARYL) 1 MG TABLET    Take 1 mg by mouth Daily with supper In addition to 2 tablets (=2mg) every morning     LANSOPRAZOLE (PREVACID) 30 MG DELAYED RELEASE CAPSULE    Take 30 mg by mouth daily    LIDOCAINE-PRILOCAINE (EMLA) 2.5-2.5 % CREAM    To port site before chemo    MELATONIN 5 MG TABS TABLET    Take 5 mg by mouth nightly     METFORMIN (GLUCOPHAGE) 500 MG TABLET    Take 500 mg by mouth 2 times daily    METOPROLOL TARTRATE (LOPRESSOR) 25 MG TABLET    Take 25 mg by mouth 2 times daily    MOMETASONE-FORMOTEROL (DULERA) 200-5 MCG/ACT INHALER    Inhale 2 puffs into the lungs every 12 hours as needed (wheezing/SOB)     ONDANSETRON (ZOFRAN) 4 MG TABLET    Take 1 tablet by mouth every 8 hours as needed for Nausea or Vomiting    OXYCODONE-ACETAMINOPHEN (PERCOCET)  MG PER TABLET    Take 1 tablet by mouth every 6 hours as needed for Pain for up to 30 days. OXYCODONE-ACETAMINOPHEN (PERCOCET) 5-325 MG PER TABLET    Take 1 tablet by mouth every 6 hours as needed for Pain for up to 30 days. TRAZODONE (DESYREL) 300 MG TABLET    Take 0.5 tablets by mouth nightly    VENLAFAXINE (EFFEXOR XR) 150 MG EXTENDED RELEASE CAPSULE    Take 150 mg by mouth 2 times daily       PAST MEDICAL HISTORY         Diagnosis Date    AAA (abdominal aortic aneurysm) ([facility]     Pt denies having a history of AAA    Acid reflux     Anxiety and depression     Aortic stenosis     Arthritis     Blister of ankle, right 10/13/2016    blister broke open & draining, is on antibiotics    CAD (coronary artery disease)     Cancer ([facility] [address]     lung cancer    COPD (chronic obstructive pulmonary disease) ([facility] [address] [facility]     Diabetes mellitus ([facility] [address] [facility]     Falls     Heart block     bifasicular    Hypokalemia     MDRO (multiple drug resistant organisms) resistance 10/17/2014    E. Coli urine    MRSA (methicillin resistant staph aureus) culture positive resolved 12/2016    2 negative nasal screens - 2016 (hx in urine 2014)    On home oxygen therapy     uses 2 liters at night    On home oxygen therapy     patient states 2 liters/nasal cannula continuous    Overactive bladder     patient incont.  wears a brief    Pneumonia     PONV (postoperative nausea and vomiting)     Vitamin D deficiency        SURGICAL HISTORY           Procedure Laterality Date    AORTIC VALVE REPAIR N/A 4/11/2017    AORTIC VALVE REPAIR REPLACEMENT performed by Kindra Leach MD Kristin at [facility] N/A 8/31/2020    BRONCHOSCOPY BIOPSY BRONCHUS performed by Leticia Gonzalez MD at [address], COLON, DIAGNOSTIC  12/08/2016    EYE SURGERY      HYSTERECTOMY      IR INS PICC VAD W SQ PORT GREATER THAN 5  9/4/2020    IR INS PICC VAD W SQ PORT GREATER THAN 5 9/4/2020 [facility]    IR PORT PLACEMENT EQUAL OR GREATER THAN 5 YEARS  9/29/2020    IR PORT PLACEMENT EQUAL OR GREATER THAN 5 YEARS 9/29/2020 Simi Crespo MD [facility]    LUMBAR LAMINECTOMY      TONSILLECTOMY           FAMILY HISTORY           Problem Relation Age of Onset    Cancer Mother     Cancer Father      Family Status   Relation Name Status    Mother  (Not Specified)    Father  (Not Specified)        SOCIAL HISTORY      reports that she quit smoking about 4 years ago. She has never used smokeless tobacco. She reports that she does not drink alcohol or use drugs. REVIEW OF SYSTEMS    (2-9 systems for level 4, 10 or more for level 5)     Review of Systems   Constitutional: Negative for chills, diaphoresis, fatigue and fever. Respiratory: Positive for shortness of breath. Cardiovascular: Positive for chest pain. Negative for palpitations and leg swelling. Gastrointestinal: Negative for abdominal pain, diarrhea, nausea and vomiting. Genitourinary: Negative for dysuria. Musculoskeletal: Negative for back pain. Skin: Negative for color change, rash and wound. Neurological: Negative for dizziness, weakness, light-headedness and headaches. Except as noted above the remainder of the review of systems was reviewed and negative.      PHYSICAL EXAM    (up to 7 for level 4, 8 or more for level 5)     ED Triage Vitals   BP Temp Temp src Pulse Resp SpO2 Height Weight   01/06/21 1711 01/06/21 1711 -- 01/06/21 1711 01/06/21 1711 01/06/21 1711 -- 01/06/21 1706   124/60 98.4 °F (36.9 °C)  91 20 99 %  167 lb (75.8 kg)     Physical Exam  Vitals signs reviewed. Constitutional:       General: She is not in acute distress. Appearance: She is well-developed. She is not diaphoretic. Eyes:      General: No scleral icterus. Conjunctiva/sclera: Conjunctivae normal.   Neck:      Vascular: No JVD. Cardiovascular:      Rate and Rhythm: Normal rate and regular rhythm. Pulmonary:      Effort: Pulmonary effort is normal. No respiratory distress. Breath sounds: No wheezing. Musculoskeletal:      Comments: Moves extremities   Skin:     General: Skin is warm and dry. Findings: No rash. Neurological:      Mental Status: She is alert and oriented to person, place, and time. GCS: GCS eye subscore is 4. GCS verbal subscore is 5. GCS motor subscore is 6. Motor: Motor function is intact. Coordination: Coordination is intact. Psychiatric:         Behavior: Behavior normal.         DIAGNOSTIC RESULTS     EKG: All EKG's are interpreted by the Emergency Department Physician who either signs or Co-signs this chart in the absence of a cardiologist.  EKG was interpreted by Dr. Verito Jeong after completion. RADIOLOGY:   Non-plain film images such as CT, Ultrasound and MRI are read by the radiologist. [facility] radiographic images are visualized and preliminarily interpreted by the emergency physician with the below findings:    Interpretation per the Radiologist below, if available at the time of this note:    Nm Lung Scan Perfusion Only    Result Date: 1/6/2021  EXAMINATION: LUNG PERFUSION IMAGING 1/6/2021 7:29 pm TECHNIQUE: 6.5 millicuries technetium 80 M AA perfusion scan only. COMPARISON: September 25, 2020. HISTORY: ORDERING SYSTEM PROVIDED HISTORY: r/o PE. TECHNOLOGIST PROVIDED HISTORY: r/o PE. Reason for Exam: chest pain, elevated d dimer, hx. lung cancer Acuity: Unknown Type of Exam: Unknown FINDINGS: No significant segmental or subsegmental defects.      Low probability for pulmonary embolism Xr Chest Portable    Result Date: 1/6/2021  EXAMINATION: ONE XRAY VIEW OF THE CHEST 1/6/2021 5:37 pm COMPARISON: November 3, 2020 HISTORY: ORDERING SYSTEM PROVIDED HISTORY: Rt chest pain TECHNOLOGIST PROVIDED HISTORY: Rt chest pain Reason for Exam: chest pain. patient is being treated for lung cancer Acuity: Unknown Type of Exam: Unknown FINDINGS: Left IJ MediPort unchanged. Sternotomy wires again noted. Right upper lobe airspace disease improved. Lungs otherwise clear. Heart and mediastinum normal.  Old rib fractures on the right noted. Improving right upper lobe airspace disease       LABS:  Labs Reviewed   BASIC METABOLIC PANEL - Abnormal; Notable for the following components:       Result Value    Glucose 125 (*)     All other components within normal limits   CBC WITH AUTO DIFFERENTIAL - Abnormal; Notable for the following components:    RBC 3.23 (*)     Hemoglobin 9.3 (*)     Hematocrit 30.2 (*)     RDW 21.3 (*)     Seg Neutrophils 69 (*)     Lymphocytes 16 (*)     Eosinophils % 8 (*)     Absolute Lymph # 0.77 (*)     All other components within normal limits   BRAIN NATRIURETIC PEPTIDE - Abnormal; Notable for the following components:    Pro- (*)     All other components within normal limits   HEPATIC FUNCTION PANEL - Abnormal; Notable for the following components:     Total Bilirubin <0.10 (*)     All other components within normal limits   TROPONIN - Abnormal; Notable for the following components:    Troponin, High Sensitivity 17 (*)     All other components within normal limits   TROP/MYOGLOBIN - Abnormal; Notable for the following components:    Troponin, High Sensitivity 19 (*)     Myoglobin 99 (*)     All other components within normal limits   D-DIMER, QUANTITATIVE - Abnormal; Notable for the following components:    D-Dimer, Quant 1.91 (*)     All other components within normal limits   PROTIME-INR       All other labs were within normal range or not returned as of this dictation. EMERGENCY DEPARTMENT COURSE and DIFFERENTIAL DIAGNOSIS/MDM:   Vitals:    Vitals:    01/06/21 1817 01/06/21 1917 01/06/21 2006 01/06/21 2018   BP: (!) 95/53 117/61     Pulse: 89 93 85 86   Resp: 15 18 12 10   Temp:       SpO2: 97%  100% 98%   Weight:           MEDICATIONS GIVEN IN THE ED:  Medications   technetium albumin aggregated (MAA) solution 6 millicurie (6.5 millicuries Intravenous Given 1/6/21 1940)   ondansetron (ZOFRAN) injection 4 mg (4 mg Intravenous Given 1/6/21 2018)   morphine (PF) injection 2 mg (2 mg Intravenous Given 1/6/21 2018)       CLINICAL DECISION MAKING:  The patient presented alert with a nontoxic appearance and was seen in conjunction with Dr. Verito Jeong. Laboratory studies were unremarkable. Imaging was negative for acute findings. follow up with pcp and oncology, return to ED if condition worsens. FINAL IMPRESSION      1. Atypical chest pain    2. History of lung cancer            Problem List  Patient Active Problem List   Diagnosis Code    Valvular heart disease I38    Type 2 diabetes mellitus without complication, without long-term current use of insulin ([facility] [address] E11.9    Open wound of right ankle S91.001A    COPD (chronic obstructive pulmonary disease) ([facility] J44.9    Syncope and collapse R55    Chronic ulcer of right heel ([facility]) L97.419    Multifocal pneumonia J18.9    Aortic valve stenosis I35.0    Esophageal dilatation K22.8    Aspiration pneumonia of both lower lobes due to gastric secretions ([address] J69.0    Falls frequently R29.6    Chronic respiratory failure ([address] J96.10    Contusion of right knee S80. 01XA    Closed fracture of right distal radius S52.501A    Subdural hemorrhage ([facility]) I62.00    Subarachnoid hemorrhage ([facility]) I60.9    Traumatic hemorrhage of cerebrum ([facility]) Y29.871Z    Chronic bilateral low back pain M54.5, G89.29    Cellulitis of both feet L03.115, L03. 116    Acute on chronic congestive heart failure ([facility]) I50.9    Bilateral leg edema R60.0    Cellulitis L03.90    Lung mass R91.8    Malignant neoplasm of upper lobe of right lung (HCC) C34.11    Urinary tract infectious disease N39.0    Closed fracture of one rib of right side S22.31XA    Degenerative disc disease, lumbar M51.36    Moderate malnutrition (HCC) E44.0         DISPOSITION/PLAN   DISPOSITION Decision To Discharge 01/06/2021 08:12:23 PM      PATIENT REFERRED TO:   Hai Hickman MD  [address] [place] [address]” [place]  [phone]    Schedule an appointment as soon as possible for a visit       Shauna More MD  [address] [place] [id number]  [phone]    Schedule an appointment as soon as possible for a visit         DISCHARGE MEDICATIONS:     New Prescriptions    No medications on file           (Please note that portions of this note were completed with a voice recognition program.  Efforts were made to edit the dictations but occasionally words are mis-transcribed.)    AV Butler CNP, APRN - CNP  01/06/21 5923

## 2021-01-06 NOTE — ED PROVIDER NOTES
[facility] ED  EMERGENCY DEPARTMENT ENCOUNTER   ATTENDING ATTESTATION     Pt Name: Ketty Wesley  MRN: [id number]  Lauragfdmitry 1950  Date of evaluation: 1/6/21       Ketty Wesley is a 72 y.o. female who presents with Lung Cancer and Chest Pain (right sided)      MDM:     Patient's EKG shows sinus rhythm with rate of 88, DC QRS QTC intervals unremarkable, the patient has left axis deviation, no ST elevations or depressions, no significant T wave changes. Nonspecific EKG. Vitals:   Vitals:    01/06/21 1706 01/06/21 1711 01/06/21 1817   BP:  124/60 (!) 95/53   Pulse:  91 89   Resp:  20 15   Temp:  98.4 °F (36.9 °C)    SpO2:  99% 97%   Weight: 167 lb (75.8 kg)           I personally evaluated and examined the patient in conjunction with the Midlevel provider and agree with the assessment, treatment plan, and disposition of the patient as recorded by the midlevel. I performed a history and physical examination of the patient and discussed management with the midlevel. I reviewed the midlevels note and agree with the documented findings and plan of care. Any areas of disagreement are noted on the chart. I was personally present for the key portions of any procedures. I have documented in the chart those procedures where I was not present during the key portions. I have personally reviewed all images and agree with the midlevel's interpretation. I have reviewed the emergency nurses triage note. I agree with the chief complaint, past medical history, past surgical history, allergies, medications, social and family history as documented unless otherwise noted.     Inez Bailey MD  Attending Emergency  Physician                  Yves Amato MD  01/06/21 2010

## 2021-01-06 NOTE — ED NOTES
Pt to er with c/o chest pain. Pt states she has had intermittent chest pain. Pt states she recently finished chemo and radiation for lung cancer that was dx in November. Pt denies feeling increased difficulty breathing. Pt denies recent fever, cough, or chills. Pt states she had out patient labs and x-ray done today. Pt states she was told to come to ed for possibility of blood clot in the lungs. Pt a&ox3. Skin warm and dry. Respirations even and non-labored.       Para Antonia, ELLIE  01/06/21 3262

## 2021-01-06 NOTE — TELEPHONE ENCOUNTER
ASSISTING SUSANA PIRES, RN NAVIGATOR. I CALLED TO CHECK IN WITH PATIENT TO SEE HOW SHE IS DOING. LEFT MESSAGE AS TO WHY I CALLED AND REMINDED HER THAT SHE CAN CALL SUSANA AT ANY TIME. LEFT SUSANA'S NUMBER AS A CALL BACK.

## 2021-01-07 LAB
EKG ATRIAL RATE: 88 BPM
EKG P AXIS: 62 DEGREES
EKG P-R INTERVAL: 162 MS
EKG Q-T INTERVAL: 436 MS
EKG QRS DURATION: 160 MS
EKG QTC CALCULATION (BAZETT): 527 MS
EKG R AXIS: -56 DEGREES
EKG T AXIS: 32 DEGREES
EKG VENTRICULAR RATE: 88 BPM

## 2021-01-08 ENCOUNTER — TELEPHONE (OUTPATIENT)
Dept: RADIATION ONCOLOGY | Facility: MEDICAL CENTER | Age: 71
End: 2021-01-08

## 2021-01-08 ENCOUNTER — TELEPHONE (OUTPATIENT)
Dept: CASE MANAGEMENT | Age: 71
End: 2021-01-08

## 2021-01-08 NOTE — TELEPHONE ENCOUNTER
Name: Teodoro Carey  : 1950  MRN: [id number]    Oncology Navigation Follow-Up Note  Navigator received call from pt. Returning MA call from Benigno Tsai. Pt. Denies needing any assistance at this time, plan to follow.    Electronically signed by Haydee Glasgow RN on 2021 at 4:19 PM

## 2021-01-08 NOTE — TELEPHONE ENCOUNTER
Name: Teodoro Carey  MRN: [id number]  Date: 1/8/2021    Diagnosis: Cancer Staging  Malignant neoplasm of upper lobe of right lung [facility])  Staging form: Lung, AJCC 8th Edition  - Clinical stage from 9/18/2020: Stage IIIB (cT4, cN2, cM0) - Signed by Armaan Llamas MD on 9/18/2020      Treatment Completion Date: 12/14/20    Subjective:  Pt notes she is still having pain in the chest where she had treatment. Pt also still having discomfort when swallowing. She is using MMW PRN and pt stated that MO increased her pain medications. Pt confirmed follow up appt's but would like to see Dr. Dyan Gracia sooner than March. Paulo Jaime will call pt back to arrange an appt sooner. Confirmed RO follow-up appointment:   [x]   Yes  []   No  []   N/A    Confirmed follow-up appointments with other referring physicians:   [x]   Yes  []   No  []   N/A    Instructions: Instructed to call back if she has further questions or concerns.      Reviewed and approved by:

## 2021-01-13 ENCOUNTER — TELEPHONE (OUTPATIENT)
Dept: ONCOLOGY | Age: 71
End: 2021-01-13

## 2021-01-14 ENCOUNTER — HOSPITAL ENCOUNTER (OUTPATIENT)
Dept: RADIATION ONCOLOGY | Facility: MEDICAL CENTER | Age: 71
Discharge: HOME | End: 2021-01-14
Payer: MEDICARE

## 2021-01-14 ENCOUNTER — HOSPITAL ENCOUNTER (OUTPATIENT)
Facility: MEDICAL CENTER | Age: 71
End: 2021-01-14
Payer: MEDICARE

## 2021-01-14 VITALS
DIASTOLIC BLOOD PRESSURE: 67 MMHG | WEIGHT: 178 LBS | TEMPERATURE: 98 F | OXYGEN SATURATION: 95 % | SYSTOLIC BLOOD PRESSURE: 98 MMHG | RESPIRATION RATE: 19 BRPM | BODY MASS INDEX: 27.06 KG/M2

## 2021-01-14 PROCEDURE — 99214 OFFICE O/P EST MOD 30 MIN: CPT | Performed by: RADIOLOGY

## 2021-01-14 PROCEDURE — 99212 OFFICE O/P EST SF 10 MIN: CPT | Performed by: RADIOLOGY

## 2021-01-14 ASSESSMENT — PAIN DESCRIPTION - LOCATION: LOCATION: CHEST

## 2021-01-14 ASSESSMENT — PAIN DESCRIPTION - PROGRESSION: CLINICAL_PROGRESSION: GRADUALLY WORSENING

## 2021-01-14 ASSESSMENT — PAIN DESCRIPTION - PAIN TYPE: TYPE: ACUTE PAIN

## 2021-01-14 ASSESSMENT — PAIN DESCRIPTION - DESCRIPTORS: DESCRIPTORS: SHARP;SHOOTING

## 2021-01-14 NOTE — PROGRESS NOTES
Susan Yunier  1/14/2021  1:11 PM     PT here for follow up visit. States 8/10 chest pain under the right breast and sharp and shooting. Patient states still hard time swallowing. Dr Eliza Garcia changed her percocet to 20/325. Dr. Jerome Grewal to eval. Dr. Jerome Grewal gave verbal order for Diflucan 100mgs Take 2 pills day 1 and 1 pill the rest of the time for 11 pills total. No refills Called into pharmacy of choice for patient at [facility] and Edwyna Frankel. Vitals:    01/14/21 1307   BP: 98/67   Resp: 19   Temp: 98 °F (36.7 °C)   SpO2: 95%    :  Patient Currently in Pain: Yes  Pain Assessment: 0-10  Pain Level: 8       Wt Readings from Last 1 Encounters:   01/14/21 178 lb (80.7 kg)                Current Outpatient Medications:     ondansetron (ZOFRAN) 4 MG tablet, Take 1 tablet by mouth every 8 hours as needed for Nausea or Vomiting, Disp: 30 tablet, Rfl: 0    oxyCODONE-acetaminophen (PERCOCET)  MG per tablet, Take 1 tablet by mouth every 6 hours as needed for Pain for up to 30 days. (Patient taking differently: Take 1 tablet by mouth every 6 hours as needed for Pain.  Indications: pt states this was changed to 20-325mgs ), Disp: 120 tablet, Rfl: 0    traZODone (DESYREL) 300 MG tablet, Take 0.5 tablets by mouth nightly, Disp: 30 tablet, Rfl: 0    furosemide (LASIX) 40 MG tablet, Take 40 mg by mouth daily, Disp: , Rfl:     dexamethasone (DECADRON) 4 MG tablet, Take 20 mg orally 12 hours and 6 hours before Taxol, Disp: 20 tablet, Rfl: 3    lidocaine-prilocaine (EMLA) 2.5-2.5 % cream, To port site before chemo, Disp: 1 Tube, Rfl: 1    melatonin 5 MG TABS tablet, Take 5 mg by mouth nightly , Disp: , Rfl:     glimepiride (AMARYL) 1 MG tablet, Take 1 mg by mouth Daily with supper In addition to 2 tablets (=2mg) every morning , Disp: , Rfl:     metoprolol tartrate (LOPRESSOR) 25 MG tablet, Take 25 mg by mouth 2 times daily, Disp: , Rfl:     venlafaxine (EFFEXOR XR) 150 MG extended release capsule, Take 150 mg by mouth 2 times daily, Disp: , Rfl:     lansoprazole (PREVACID) 30 MG delayed release capsule, Take 30 mg by mouth daily, Disp: , Rfl:     metFORMIN (GLUCOPHAGE) 500 MG tablet, Take 500 mg by mouth 2 times daily, Disp: , Rfl:     aspirin EC 81 MG EC tablet, Take 81 mg by mouth daily, Disp: , Rfl:     mometasone-formoterol (DULERA) 200-5 MCG/ACT inhaler, Inhale 2 puffs into the lungs every 12 hours as needed (wheezing/SOB) , Disp: , Rfl:     clonazePAM (KLONOPIN) 1 MG tablet, Take 1 tablet by mouth 3 times daily as needed for Anxiety for up to 3 doses. , Disp: 3 tablet, Rfl: 0    gabapentin (NEURONTIN) 400 MG capsule, Take 1 capsule by mouth 2 times daily for 3 doses.  In addition to 2 capsules (=800mg) nightly, Disp: 3 capsule, Rfl: 0    ARIPiprazole (ABILIFY) 5 MG tablet, Take 1 tablet by mouth daily for 3 doses, Disp: 3 tablet, Rfl: 0    Immunizations:    Influenza status:    []   Current   [x]   Patient declined    Pneumococcal status:  [x]   Current  []   Patient declined    Smoking Status:    [] Smoker - PPD:  Smoking cessation education: Provided []   Declined []    [] Nonsmoker - Quit Date:               [x] Never a smoker           Cancer Screening:  Colonoscopy [] Current [] Not current   [] Not current, but scheduled   [x] NA  Mammogram [] Current [] Not current   [] Not current, but scheduled   [x] NA  Prostate [] Current [] Not current   [] Not current, but scheduled   [x] NA  PAP/Pelvic [] Current [] Not current   [] Not current, but scheduled   [x] NA  Skin  [] Current  [] Not current   [] Not current, but scheduled   [x] NA    Hormone:  Lupron []   Last dose given:           Next dose due:   Eligard []   Last dose given:           Next dose due:   Aromatase Inhibitors []   Medication name:   N/A:  [x]              *BREAST Patient only:    Lymphedema Eval:   [] left arm      [] right arm  Location:     Measurement (cm)    Upper Bicep :    Lower Bicep :         FALLS RISK SCREEN  Instructions:  Assess the patient  5. Chair/bed in low position, stretcher/bed with siderails up except when performing patient care activities  6.   Educate patient/family/caregiver on falls prevention         PLAN: Patient is seen today in follow up        Tana Smith

## 2021-01-14 NOTE — PROGRESS NOTES
[facility] Radiation Oncology  Follow-Up note    Date of Service: 2021    Location: [facility]      Patient ID:   Erendira Soliman  : 1950   MRN: [id number]    DIAGNOSIS:   Cancer Staging  Malignant neoplasm of upper lobe of right lung [facility])  Staging form: Lung, AJCC 8th Edition  - Clinical stage from 2020: Stage IIIB (cT4, cN2, cM0) - Signed by Sharad Warren MD on 2020      Chief Complaint: \" Complained of increased dysphagia since finishing the radiation treatment about a month ago and according to her the dysphagia is severe and causing pain and according to him the pain is 8 out of 10. She stated also with swallowing food the pain increases but she could not eat with no problems. INTERVAL HISTORY:   Erendira Soliman returns in a previously scheduled follow-up visit. Patient was last seen in our clinic . I closely reviewed the medical, surgical, social and family history as per the detailed physician notes from our department. Interim changes as noted above. MEDICATIONS:    Current Outpatient Medications:     ondansetron (ZOFRAN) 4 MG tablet, Take 1 tablet by mouth every 8 hours as needed for Nausea or Vomiting, Disp: 30 tablet, Rfl: 0    oxyCODONE-acetaminophen (PERCOCET)  MG per tablet, Take 1 tablet by mouth every 6 hours as needed for Pain for up to 30 days. (Patient taking differently: Take 1 tablet by mouth every 6 hours as needed for Pain.  Indications: pt states this was changed to 20-325mgs ), Disp: 120 tablet, Rfl: 0    traZODone (DESYREL) 300 MG tablet, Take 0.5 tablets by mouth nightly, Disp: 30 tablet, Rfl: 0    furosemide (LASIX) 40 MG tablet, Take 40 mg by mouth daily, Disp: , Rfl:     dexamethasone (DECADRON) 4 MG tablet, Take 20 mg orally 12 hours and 6 hours before Taxol, Disp: 20 tablet, Rfl: 3    lidocaine-prilocaine (EMLA) 2.5-2.5 % cream, To port site before chemo, Disp: 1 Tube, Rfl: 1    melatonin 5 MG TABS tablet, Take 5 mg by mouth nightly , Disp: , Rfl:     glimepiride (AMARYL) 1 MG tablet, Take 1 mg by mouth Daily with supper In addition to 2 tablets (=2mg) every morning , Disp: , Rfl:     metoprolol tartrate (LOPRESSOR) 25 MG tablet, Take 25 mg by mouth 2 times daily, Disp: , Rfl:     venlafaxine (EFFEXOR XR) 150 MG extended release capsule, Take 150 mg by mouth 2 times daily, Disp: , Rfl:     lansoprazole (PREVACID) 30 MG delayed release capsule, Take 30 mg by mouth daily, Disp: , Rfl:     metFORMIN (GLUCOPHAGE) 500 MG tablet, Take 500 mg by mouth 2 times daily, Disp: , Rfl:     aspirin EC 81 MG EC tablet, Take 81 mg by mouth daily, Disp: , Rfl:     mometasone-formoterol (DULERA) 200-5 MCG/ACT inhaler, Inhale 2 puffs into the lungs every 12 hours as needed (wheezing/SOB) , Disp: , Rfl:     clonazePAM (KLONOPIN) 1 MG tablet, Take 1 tablet by mouth 3 times daily as needed for Anxiety for up to 3 doses. , Disp: 3 tablet, Rfl: 0    gabapentin (NEURONTIN) 400 MG capsule, Take 1 capsule by mouth 2 times daily for 3 doses. In addition to 2 capsules (=800mg) nightly, Disp: 3 capsule, Rfl: 0    ARIPiprazole (ABILIFY) 5 MG tablet, Take 1 tablet by mouth daily for 3 doses, Disp: 3 tablet, Rfl: 0    REVIEW OF SYSTEMS: I reviewed the complete 14-Point ROS with the patient. Pertinent ones noted in the HPI. PHYSICAL EXAMINATION:  BP 98/67 Comment: chest pain under right breast, started 2-3 weeks ago. Temp 98 °F (36.7 °C) (Oral)   Resp 19   Wt 178 lb (80.7 kg)   SpO2 95%   BMI 27.06 kg/m²   Pain 0/10, KPS  GENERAL:  Well-appearing, in no apparent distress, alert and fully oriented, answers questions appropriately. Oral examination revealed no oral lesion oropharyngeal examination difficult to rule out candidiasis. PSYCH:  Appropriate affect for the clinical situation. Insight and judgment not impaired. Labs:    RADS:    PATHOLOGY: No recent studies.       ASSESSMENT: Radiation esophagitis showed subside shortly after radiation treatment. Patient completed radiation treatment since 1 months ago. The likely reason for had pain when she swallow is candidiasis. PLAN: I suggested the patient see the gastroenterologist underscore care but she stated that she does not have money to pay for that and stated that she need to have co-pay and she cannot afford it. I gave her Diflucan 100 mg to use over 10 days first day 2 doses. Patient was informed to call us after few days to assess her response to Diflucan. Also patient had chest x-ray a week ago and that showed improvement of her tumor. Patient will be seen by her medical oncologist Dr. Christoper Riedel in few weeks. And she has an appointment with us on March. I would like thank you very much for letting me participate in her care and I will keep you updated on her. Electronically signed by Ramiro Botello MD on 1/14/2021 at 4:38 PM         Patient Care Team:  Ok Cook MD as PCP - General (Family Medicine)  Mikaela Julio RN as Nurse Navigator (Oncology)  Connee Klinefelter, MD as Consulting Physician (Radiation Oncology)  Mike Augustine MD as Consulting Physician (Hematology and Oncology)      Time spent with patient 25 minutes. >50% of time allotted to education, answering questions, and coordinating care.     Electronically signed by Ramiro Botello MD on 1/14/2021 at 4:38 PM    Patient Care Team:  Ok Cook MD as PCP - General (Family Medicine)  Mikaela Julio RN as Nurse Navigator (Oncology)  Connee Klinefelter, MD as Consulting Physician (Radiation Oncology)  Mike Augustine MD as Consulting Physician (Hematology and Oncology)

## 2021-01-20 ENCOUNTER — OFFICE VISIT (OUTPATIENT)
Dept: ONCOLOGY | Age: 71
End: 2021-01-20
Payer: MEDICARE

## 2021-01-20 ENCOUNTER — TELEPHONE (OUTPATIENT)
Dept: ONCOLOGY | Age: 71
End: 2021-01-20

## 2021-01-20 VITALS
WEIGHT: 178 LBS | DIASTOLIC BLOOD PRESSURE: 38 MMHG | SYSTOLIC BLOOD PRESSURE: 106 MMHG | HEART RATE: 81 BPM | TEMPERATURE: 97.6 F | BODY MASS INDEX: 27.06 KG/M2 | RESPIRATION RATE: 20 BRPM

## 2021-01-20 DIAGNOSIS — C34.11 MALIGNANT NEOPLASM OF UPPER LOBE OF RIGHT LUNG (HCC): Primary | ICD-10-CM

## 2021-01-20 PROCEDURE — 99211 OFF/OP EST MAY X REQ PHY/QHP: CPT | Performed by: INTERNAL MEDICINE

## 2021-01-20 PROCEDURE — 99215 OFFICE O/P EST HI 40 MIN: CPT | Performed by: INTERNAL MEDICINE

## 2021-01-20 NOTE — PROGRESS NOTES
Today's Date: 1/20/2021  Patient Name: Shruti Patel  Patient's age: 72 y.o., 1950    Diagnosis:   RUL squamous cell carcinoma,   Cancer Staging  Malignant neoplasm of upper lobe of right lung [facility])  Staging form: Lung, AJCC 8th Edition  - Clinical stage from 9/18/2020: Stage IIIB (cT4, cN2, cM0) - Signed by Arlyn Freedman MD on 9/18/2020    Current therapy:  Started on concurrent chemoradiation with weekly carbotaxol on 10/21/20  She completed radiation therapy in mid December  Plan to start her on consolidation durvalumab  CHIEF COMPLAINT:    Chief Complaint   Patient presents with    Follow-up    Other     having balance problems  fell twice    Other     having concerns with port    Insomnia     INTERVAL HISTORY:    Srikanth Storey is returning for follow-up visit and to discuss further recommendations. She has completed radiation therapy and tolerated well. She reports some spasm while swallowing at times. She has mild GERD symptoms. She denies any cough, shortness of breath or fever chills. During this visit patient's allergy, social, medical, surgical history and medications were reviewed and updated. HISTORY OF PRESENT ILLNESS:    Shania Ramirez is a 80-year-old female with a past medical history of COPD, history of tobacco abuse, depression, CAD, arthritis was admitted with multiple falls at home. She complains of back pain and also chronic cough. On admission she had a CT scan of the chest which showed 7.2 cm spiculated mass in the right upper lobe with mediastinal lymphadenopathy suspicious for malignancy. 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 Oncology consulted for further evaluation. Patient has COPD and uses Home oxygen at night and sues walker at home. She smokes more than a PPD. 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     Past Medical History:   has a past medical history of AAA (abdominal aortic aneurysm) (HCC), Acid reflux, Anxiety and depression, Aortic stenosis, Arthritis, Blister of ankle, right, CAD (coronary artery disease), Cancer ([facility] COPD (chronic obstructive pulmonary disease) ([facility] Diabetes mellitus ([facility] Falls, Heart block, Hypokalemia, MDRO (multiple drug resistant organisms) resistance, MRSA (methicillin resistant staph aureus) culture positive, On home oxygen therapy, On home oxygen therapy, Overactive bladder, Pneumonia, PONV (postoperative nausea and vomiting), and Vitamin D deficiency. Past Surgical History:   has a past surgical history that includes back surgery; eye surgery; Cholecystectomy; Appendectomy; Hysterectomy; Tonsillectomy; lumbar laminectomy; Endoscopy, colon, diagnostic (12/08/2016); aortic valve repair (N/A, 4/11/2017); bronchoscopy (N/A, 8/31/2020); IR INSERT PICC VAD W SQ PORT >5 YEARS (9/4/2020); and IR PORT PLACEMENT > 5 YEARS (9/29/2020). Medications:    Current Outpatient Medications   Medication Sig Dispense Refill    ondansetron (ZOFRAN) 4 MG tablet Take 1 tablet by mouth every 8 hours as needed for Nausea or Vomiting 30 tablet 0    oxyCODONE-acetaminophen (PERCOCET)  MG per tablet Take 1 tablet by mouth every 6 hours as needed for Pain for up to 30 days. (Patient taking differently: Take 1 tablet by mouth every 6 hours as needed for Pain. Indications: pt states this was changed to 20-325mgs ) 120 tablet 0    traZODone (DESYREL) 300 MG tablet Take 0.5 tablets by mouth nightly (Patient taking differently: Take 150 mg by mouth nightly Unsure of dosage / taking 2 tablets at bedtime) 30 tablet 0    clonazePAM (KLONOPIN) 1 MG tablet Take 1 tablet by mouth 3 times daily as needed for Anxiety for up to 3 doses. 3 tablet 0    gabapentin (NEURONTIN) 400 MG capsule Take 1 capsule by mouth 2 times daily for 3 doses.  In addition to 2 capsules (=800mg) nightly 3 capsule 0    ARIPiprazole (ABILIFY) 5 MG tablet Take 1 tablet by mouth daily for 3 doses 3 tablet 0    furosemide (LASIX) 40 MG tablet Take 40 mg by mouth daily      melatonin 5 MG TABS tablet Take 5 mg by mouth nightly       glimepiride (AMARYL) 1 MG tablet Take 1 mg by mouth Daily with supper In addition to 2 tablets (=2mg) every morning       metoprolol tartrate (LOPRESSOR) 25 MG tablet Take 25 mg by mouth 2 times daily      venlafaxine (EFFEXOR XR) 150 MG extended release capsule Take 150 mg by mouth 2 times daily      lansoprazole (PREVACID) 30 MG delayed release capsule Take 30 mg by mouth daily      metFORMIN (GLUCOPHAGE) 500 MG tablet Take 500 mg by mouth 2 times daily      aspirin EC 81 MG EC tablet Take 81 mg by mouth daily      mometasone-formoterol (DULERA) 200-5 MCG/ACT inhaler Inhale 2 puffs into the lungs every 12 hours as needed (wheezing/SOB)       dexamethasone (DECADRON) 4 MG tablet Take 20 mg orally 12 hours and 6 hours before Taxol (Patient not taking: Reported on 1/20/2021) 20 tablet 3    lidocaine-prilocaine (EMLA) 2.5-2.5 % cream To port site before chemo (Patient not taking: Reported on 1/20/2021) 1 Tube 1     No current facility-administered medications for this visit. Allergies:  Codeine and Dye [iodides]    Social History:   reports that she quit smoking about 4 years ago. She has never used smokeless tobacco. She reports that she does not drink alcohol or use drugs. Family History: family history includes Cancer in her father and mother. REVIEW OF SYSTEMS:    Constitutional: ++fatigue, No fever or chills.  No night sweats, no weight loss   Eyes: No eye discharge, double vision, or eye pain   HEENT: negative for sore mouth, sore throat, hoarseness and voice change   Respiratory: negative for cough , sputum, ++dyspnea, wheezing, hemoptysis, chest pain   Cardiovascular: negative for chest pain, dyspnea, palpitations, orthopnea, PND   Gastrointestinal: negative for nausea, vomiting, diarrhea, constipation, abdominal pain, Dysphagia, hematemesis and hematochezia   Genitourinary: negative for frequency, dysuria, nocturia, urinary incontinence, and hematuria   Integument: negative for rash, skin lesions, bruises. Hematologic/Lymphatic: negative for easy bruising, bleeding, lymphadenopathy, or petechiae   Endocrine: negative for heat or cold intolerance,weight changes, change in bowel habits and hair loss   Musculoskeletal: negative for myalgias, arthralgias, pain, joint swelling,and bone pain   Neurological: negative for headaches, dizziness, seizures, weakness, numbness    PHYSICAL EXAM:      BP (!) 106/38   Pulse 81   Temp 97.6 °F (36.4 °C) (Temporal)   Resp 20   Wt 178 lb (80.7 kg)   BMI 27.06 kg/m²    Temp (24hrs), Av.7 °F (36.5 °C), Min:97.3 °F (36.3 °C), Max:98.1 °F (36.7 °C)  General appearance - well appearing, no in pain or distress   Mental status - alert and cooperative   Eyes - pupils equal and reactive, extraocular eye movements intact   Ears - bilateral TM's and external ear canals normal   Mouth - mucous membranes moist, pharynx normal without lesions   Neck - supple, no significant adenopathy   Lymphatics - no palpable lymphadenopathy, no hepatosplenomegaly   Chest - clear to auscultation, no wheezes, rales or rhonchi, symmetric air entry   Heart - normal rate, regular rhythm, normal S1, S2, no murmurs  Abdomen - soft, nontender, nondistended, no masses or organomegaly   Neurological - alert, oriented, normal speech, no focal findings or movement disorder noted   Musculoskeletal - no joint tenderness, deformity or swelling   Extremities - peripheral pulses normal, no pedal edema, no clubbing or cyanosis   Skin - normal coloration and turgor, no rashes, no suspicious skin lesions noted ,    DATA:    Labs:   CBC:   No results for input(s): WBC, HGB, HCT, PLT in the last 72 hours. BMP:   No results for input(s): NA, K, CO2, BUN, CREATININE, LABGLOM, GLUCOSE in the last 72 hours.   PT/INR: No results for input(s): PROTIME, INR in the last 72 hours. Surgical Pathology Report   Surgical Pathology   Collected:  08/31/20 0803    Lab status:  Final    Resulting lab:  [facility]    Value:  -- Diagnosis --     BRONCHIAL WASHINGS RIGHT UPPER LOBE:          POSITIVE FOR MALIGNANCY.        SQUAMOUS CELL CARCINOMA.           COMMENT: VOLUME OF TUMOR IN THE CELL BLOCK IS LOW AND ADDITIONAL   MATERIAL WOULD BE REQUIRED IF MOLECULAR TESTING IS NECESSARY. IMAGING DATA:  CT chest 8/27/2020:   FINDINGS:    Mediastinum: Three vessel coronary artery calcification.  Newly enlarged    mediastinal lymph nodes including example station 7 node measuring 3.1 x 2.1    cm on series 2, image 45.         Lungs/pleura: Mild upper lobe predominant centrilobular emphysema. Elli Dues Spiculated mass of the right upper lobe measuring 7.2 x 6.5 cm with    broad-base of contact with the mediastinal pleura, right major fissure,    encircling and obliterating the right upper lobe bronchus.  Spicules are seen    to extend to the anterior costal pleura.  There is adjacent bronchial wall    thickening and interlobular septal thickening.  Stable 5 mm solid nodule of    the left upper lobe since 2016, benign.  No pleural effusion or pneumothorax.         Upper Abdomen: Adrenals are unremarkable.         Soft Tissues/Bones: Old right rib fractures.              Impression    Approximate 7.2 cm spiculated mass of the right upper lobe likely with    invasion of the mediastinum, adjacent lymphangitic spread and suspected    metastatic mediastinal lymphadenopathy. Scan 9/11/2020: Impression    1. The patient's known right upper lobe lung mass is FDG avid consistent with    the patient's primary malignancy. 2. FDG avid mediastinal lymph nodes consistent with metastatic disease. 3. No abnormal FDG activity is identified involving the abdomen or pelvis. IMPRESSION:   1.  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 PET scan negative for distant metastasis: Clinical stage T4 N2 M0, stage IIIb  2. Anemia  3. Leukopenia: chemo related  4. COPD  5. Falls  6. CAD  7. Tobacco abuse  8. Actinomyces bacteremia    RECOMMENDATIONS:  1. I reviewed the laboratory data, imaging studies, biopsy finding, diagnosis, prognosis and treatment options with patient  2. I reviewed the NCCN guidelines and goals of care  3. He has completed concurrent chemoradiation. I discussed the option of consolidation immunotherapy. I discussed the risk benefits and side effects with patient and family. 4. We will start her on immunotherapy in 2 weeks  5. She will need restaging PET scan about 3 months after completion of her radiation  6. Return to clinic in 6 weeks or earlier if needed  7. Patient's questions were sought and answered to her satisfaction      Mendoza Waldrop MD  Hematologist/Medical Oncologist    This note is created with the assistance of a speech recognition program.  While intending to generate a document that actually reflects the content of the visit, the document can still have some errors including those of syntax and sound a like substitutions which may escape proof reading. It such instances, actual meaning can be extrapolated by contextual diversion.

## 2021-01-22 ENCOUNTER — TELEPHONE (OUTPATIENT)
Dept: INFUSION THERAPY | Facility: MEDICAL CENTER | Age: 71
End: 2021-01-22

## 2021-01-22 NOTE — TELEPHONE ENCOUNTER
New Chemotherapy orders. Imfinzi  14 day cycle    Ht = 172.7 cm  Wt = 74 kg  BSA = 1.88    Imfinzi 10 mg/kg = 740 mg IV on day 1 every 14 days    Noted and routed to RN pool for double check. Kardex udpated.

## 2021-02-01 ENCOUNTER — HOSPITAL ENCOUNTER (OUTPATIENT)
Facility: MEDICAL CENTER | Age: 71
End: 2021-02-01
Payer: MEDICARE

## 2021-02-03 ENCOUNTER — TELEPHONE (OUTPATIENT)
Dept: ONCOLOGY | Age: 71
End: 2021-02-03

## 2021-02-03 ENCOUNTER — HOSPITAL ENCOUNTER (OUTPATIENT)
Dept: INFUSION THERAPY | Facility: MEDICAL CENTER | Age: 71
Discharge: HOME | End: 2021-02-03
Payer: MEDICARE

## 2021-02-03 VITALS
DIASTOLIC BLOOD PRESSURE: 52 MMHG | RESPIRATION RATE: 18 BRPM | TEMPERATURE: 97.9 F | SYSTOLIC BLOOD PRESSURE: 113 MMHG | HEART RATE: 86 BPM

## 2021-02-03 DIAGNOSIS — C34.91 NON-SMALL CELL CANCER OF RIGHT LUNG (HCC): Primary | ICD-10-CM

## 2021-02-03 DIAGNOSIS — C34.11 MALIGNANT NEOPLASM OF UPPER LOBE OF RIGHT LUNG (HCC): ICD-10-CM

## 2021-02-03 LAB
ABSOLUTE EOS #: 0.29 K/UL (ref 0–0.44)
ABSOLUTE IMMATURE GRANULOCYTE: 0.23 K/UL (ref 0–0.3)
ABSOLUTE LYMPH #: 1.08 K/UL (ref 1.1–3.7)
ABSOLUTE MONO #: 0.68 K/UL (ref 0.1–1.2)
ALBUMIN SERPL-MCNC: 3.1 G/DL (ref 3.5–5.2)
ALBUMIN/GLOBULIN RATIO: ABNORMAL (ref 1–2.5)
ALP BLD-CCNC: 55 U/L (ref 35–104)
ALT SERPL-CCNC: 15 U/L (ref 5–33)
ANION GAP SERPL CALCULATED.3IONS-SCNC: 10 MMOL/L (ref 9–17)
AST SERPL-CCNC: 16 U/L
BASOPHILS # BLD: 1 % (ref 0–2)
BASOPHILS ABSOLUTE: 0.08 K/UL (ref 0–0.2)
BILIRUB SERPL-MCNC: 0.12 MG/DL (ref 0.3–1.2)
BUN BLDV-MCNC: 16 MG/DL (ref 8–23)
BUN/CREAT BLD: 15 (ref 9–20)
CALCIUM SERPL-MCNC: 8.5 MG/DL (ref 8.6–10.4)
CHLORIDE BLD-SCNC: 102 MMOL/L (ref 98–107)
CO2: 25 MMOL/L (ref 20–31)
CREAT SERPL-MCNC: 1.04 MG/DL (ref 0.5–0.9)
DIFFERENTIAL TYPE: ABNORMAL
EOSINOPHILS RELATIVE PERCENT: 5 % (ref 1–4)
GFR AFRICAN AMERICAN: >60 ML/MIN
GFR NON-AFRICAN AMERICAN: 52 ML/MIN
GFR SERPL CREATININE-BSD FRML MDRD: ABNORMAL ML/MIN/{1.73_M2}
GFR SERPL CREATININE-BSD FRML MDRD: ABNORMAL ML/MIN/{1.73_M2}
GLUCOSE BLD-MCNC: 182 MG/DL (ref 70–99)
HCT VFR BLD CALC: 31.5 % (ref 36.3–47.1)
HEMOGLOBIN: 9.3 G/DL (ref 11.9–15.1)
IMMATURE GRANULOCYTES: 4 %
LYMPHOCYTES # BLD: 17 % (ref 24–43)
MCH RBC QN AUTO: 28.4 PG (ref 25.2–33.5)
MCHC RBC AUTO-ENTMCNC: 29.5 G/DL (ref 28.4–34.8)
MCV RBC AUTO: 96.3 FL (ref 82.6–102.9)
MONOCYTES # BLD: 11 % (ref 3–12)
NRBC AUTOMATED: 0 PER 100 WBC
PDW BLD-RTO: 16.3 % (ref 11.8–14.4)
PLATELET # BLD: 314 K/UL (ref 138–453)
PLATELET ESTIMATE: ABNORMAL
PMV BLD AUTO: 8.6 FL (ref 8.1–13.5)
POTASSIUM SERPL-SCNC: 5 MMOL/L (ref 3.7–5.3)
RBC # BLD: 3.27 M/UL (ref 3.95–5.11)
RBC # BLD: ABNORMAL 10*6/UL
SEG NEUTROPHILS: 62 % (ref 36–65)
SEGMENTED NEUTROPHILS ABSOLUTE COUNT: 4.01 K/UL (ref 1.5–8.1)
SODIUM BLD-SCNC: 137 MMOL/L (ref 135–144)
TOTAL PROTEIN: 6.9 G/DL (ref 6.4–8.3)
TSH SERPL DL<=0.05 MIU/L-ACNC: 4.4 MIU/L (ref 0.3–5)
WBC # BLD: 6.4 K/UL (ref 3.5–11.3)
WBC # BLD: ABNORMAL 10*3/UL

## 2021-02-03 PROCEDURE — 6360000002 HC RX W HCPCS: Performed by: INTERNAL MEDICINE

## 2021-02-03 PROCEDURE — 96413 CHEMO IV INFUSION 1 HR: CPT

## 2021-02-03 PROCEDURE — 96361 HYDRATE IV INFUSION ADD-ON: CPT

## 2021-02-03 PROCEDURE — 85025 COMPLETE CBC W/AUTO DIFF WBC: CPT

## 2021-02-03 PROCEDURE — 36591 DRAW BLOOD OFF VENOUS DEVICE: CPT

## 2021-02-03 PROCEDURE — 80053 COMPREHEN METABOLIC PANEL: CPT

## 2021-02-03 PROCEDURE — 2580000003 HC RX 258: Performed by: INTERNAL MEDICINE

## 2021-02-03 PROCEDURE — 84443 ASSAY THYROID STIM HORMONE: CPT

## 2021-02-03 RX ORDER — OXYCODONE AND ACETAMINOPHEN 10; 325 MG/1; MG/1
1 TABLET ORAL EVERY 6 HOURS PRN
Status: ON HOLD | COMMUNITY
End: 2022-04-04 | Stop reason: SDUPTHER

## 2021-02-03 RX ORDER — DIPHENHYDRAMINE HYDROCHLORIDE 50 MG/ML
50 INJECTION INTRAMUSCULAR; INTRAVENOUS ONCE
Status: CANCELLED | OUTPATIENT
Start: 2021-02-03 | End: 2021-02-03

## 2021-02-03 RX ORDER — HEPARIN SODIUM (PORCINE) LOCK FLUSH IV SOLN 100 UNIT/ML 100 UNIT/ML
500 SOLUTION INTRAVENOUS PRN
Status: DISCONTINUED | OUTPATIENT
Start: 2021-02-03 | End: 2021-02-04 | Stop reason: HOSPADM

## 2021-02-03 RX ORDER — SODIUM CHLORIDE 0.9 % (FLUSH) 0.9 %
5 SYRINGE (ML) INJECTION PRN
Status: CANCELLED | OUTPATIENT
Start: 2021-02-03

## 2021-02-03 RX ORDER — SODIUM CHLORIDE 9 MG/ML
INJECTION, SOLUTION INTRAVENOUS CONTINUOUS
Status: CANCELLED | OUTPATIENT
Start: 2021-02-03

## 2021-02-03 RX ORDER — SODIUM CHLORIDE 9 MG/ML
20 INJECTION, SOLUTION INTRAVENOUS ONCE
Status: COMPLETED | OUTPATIENT
Start: 2021-02-03 | End: 2021-02-03

## 2021-02-03 RX ORDER — METHYLPREDNISOLONE SODIUM SUCCINATE 125 MG/2ML
125 INJECTION, POWDER, LYOPHILIZED, FOR SOLUTION INTRAMUSCULAR; INTRAVENOUS ONCE
Status: CANCELLED | OUTPATIENT
Start: 2021-02-03 | End: 2021-02-03

## 2021-02-03 RX ORDER — SODIUM CHLORIDE 0.9 % (FLUSH) 0.9 %
10 SYRINGE (ML) INJECTION PRN
Status: DISCONTINUED | OUTPATIENT
Start: 2021-02-03 | End: 2021-02-04 | Stop reason: HOSPADM

## 2021-02-03 RX ORDER — 0.9 % SODIUM CHLORIDE 0.9 %
1000 INTRAVENOUS SOLUTION INTRAVENOUS ONCE
Status: COMPLETED | OUTPATIENT
Start: 2021-02-03 | End: 2021-02-03

## 2021-02-03 RX ADMIN — SODIUM CHLORIDE, PRESERVATIVE FREE 10 ML: 5 INJECTION INTRAVENOUS at 10:20

## 2021-02-03 RX ADMIN — SODIUM CHLORIDE, PRESERVATIVE FREE 10 ML: 5 INJECTION INTRAVENOUS at 13:47

## 2021-02-03 RX ADMIN — SODIUM CHLORIDE 1000 ML: 9 INJECTION, SOLUTION INTRAVENOUS at 10:55

## 2021-02-03 RX ADMIN — SODIUM CHLORIDE 20 ML/HR: 9 INJECTION, SOLUTION INTRAVENOUS at 10:20

## 2021-02-03 RX ADMIN — SODIUM CHLORIDE 740 MG: 9 INJECTION, SOLUTION INTRAVENOUS at 12:24

## 2021-02-03 RX ADMIN — HEPARIN 500 UNITS: 100 SYRINGE at 13:47

## 2021-02-03 ASSESSMENT — PAIN DESCRIPTION - PAIN TYPE: TYPE: ACUTE PAIN

## 2021-02-03 ASSESSMENT — PAIN DESCRIPTION - PROGRESSION: CLINICAL_PROGRESSION: GRADUALLY WORSENING

## 2021-02-03 ASSESSMENT — PAIN DESCRIPTION - DIRECTION: RADIATING_TOWARDS: RIGHT BREAST

## 2021-02-03 ASSESSMENT — PAIN DESCRIPTION - LOCATION: LOCATION: CHEST

## 2021-02-03 ASSESSMENT — PAIN DESCRIPTION - FREQUENCY: FREQUENCY: CONTINUOUS

## 2021-02-03 ASSESSMENT — PAIN DESCRIPTION - ONSET: ONSET: ON-GOING

## 2021-02-03 ASSESSMENT — PAIN DESCRIPTION - ORIENTATION: ORIENTATION: RIGHT

## 2021-02-03 ASSESSMENT — PAIN SCALES - GENERAL: PAINLEVEL_OUTOF10: 10

## 2021-02-03 ASSESSMENT — PAIN DESCRIPTION - DESCRIPTORS: DESCRIPTORS: CONSTANT

## 2021-02-03 NOTE — TELEPHONE ENCOUNTER
Call received from [facility], at 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 [facility] stated he will fax results to [facility] where patient is seen. Writer provided [facility] with [facility] [employer] number.  Josue Mendoza

## 2021-02-03 NOTE — PLAN OF CARE
Problem: Pain:  Goal: Pain level will decrease  Description: Pain level will decrease  Outcome: Ongoing  Goal: Control of acute pain  Description: Control of acute pain  Outcome: Ongoing  Goal: Control of chronic pain  Description: Control of chronic pain  Outcome: Ongoing     Problem: Safety:  Goal: Free from accidental physical injury  Description: Free from accidental physical injury  Outcome: Ongoing  Goal: Free from intentional harm  Description: Free from intentional harm  Outcome: Ongoing

## 2021-02-03 NOTE — PROGRESS NOTES
Patient arrive ambulatory using rollator for cycle 1 day 1 treatment. Complains of continued pain to right chest which transmits to right breast.  Denies other complaint or concern. Vitals as charted. Port accessed; specimen sent. Spoke with Dr. Jorge Schmidt regarding pain; no change to pain medication at this time; continue with Percocet that she has. Secondly, due to hypotension will administer 1 liter saline with treatment today. Hydration begins. Imfinzi infused with no sign of adverse reaction; line flushed. Patient educated on [address] [place] [address] and provided with written copy of Imfinzi medication info from [facility] website. Questions answered. Hydration completes. Port de-accessed without difficulty. Patient ambulate off unit per self at discharge; Lody Rivera has called grand daughter to pick her up. Copy of return to clinic calendar provided to patient.

## 2021-02-03 NOTE — FLOWSHEET NOTE
Situation:  Writer visited with Patient in the infusion clinic. Assessment:  Ms. Nancy Rubio appeared fatigued. She told writer she \"having pain\" and noted that it has been for \"the past two weeks. \" She responded slowly to writer's questions. She has spoken with her doctor and nurse about her situation. She shared that she has not spoken with her son in a couple weeks due to a recent encounter. She agreed that it is \"not easy. \" She has been in touch with a friend by phone. She told writer she has not sought spiritual support to help her and does not want to at this time. She did not indicate agreement or disagreement when writer suggested contacting her nurse navigator. She indicated that she did not know if this would help her. She was receptive to copies of \"Our Daily Bread\" and \"Guideposts. \"    Intervention:  Writer provided supportive presence and explored Pt's coping and needs; inquired about Pt's sources of support and strength; offered empathy and words of support; asked Pt if she could contact her nurse navigator to be able to support Pt; gave Pt spiritual literature. Outcome:  Ms. Nancy Rubio thanked Conrado Fish. 02/03/21 1607   Encounter Summary   Services provided to: Patient   Referral/Consult From: [address] Family members;Friends/neighbors   Continue Visiting   (2/3/21)   Complexity of Encounter Moderate   Length of Encounter 15 minutes   Spiritual Assessment Completed Yes   Routine   Type Follow up   Spiritual/Yazidi   Type Spiritual support   Assessment Calm; Approachable;Doubtful   Intervention Active listening;Explored feelings, thoughts, concerns;Explored coping resources;Sustaining presence/ Ministry of presence; Discussed illness/injury and it's impact   Outcome Expressed gratitude;Expressed feelings/needs/concerns     Electronically signed by Heron Gill, [facility]cology [facility] Radiation Oncology  2/3/2021  4:09 PM

## 2021-02-04 ENCOUNTER — TELEPHONE (OUTPATIENT)
Dept: CASE MANAGEMENT | Age: 71
End: 2021-02-04

## 2021-02-04 NOTE — TELEPHONE ENCOUNTER
Name: Ludivina Chavez  : 1950  MRN: [id number]    Oncology Navigation Follow-Up Note    Navigator reaching out to pt offering assistance, but could only leave a message, plan to follow.    2:06 Navigator attmpting a 2nd time to reach out, but could only leave a message  Electronically signed by Jules Gibson RN on 2021 at 8:58 AM

## 2021-02-11 ENCOUNTER — HOSPITAL ENCOUNTER (OUTPATIENT)
Facility: MEDICAL CENTER | Age: 71
End: 2021-02-11
Payer: MEDICARE

## 2021-02-17 ENCOUNTER — HOSPITAL ENCOUNTER (OUTPATIENT)
Dept: INFUSION THERAPY | Facility: MEDICAL CENTER | Age: 71
Discharge: HOME | End: 2021-02-17
Payer: MEDICARE

## 2021-02-17 ENCOUNTER — TELEPHONE (OUTPATIENT)
Dept: INFUSION THERAPY | Facility: MEDICAL CENTER | Age: 71
End: 2021-02-17

## 2021-02-17 RX ORDER — SODIUM CHLORIDE 0.9 % (FLUSH) 0.9 %
5 SYRINGE (ML) INJECTION PRN
Status: CANCELLED | OUTPATIENT
Start: 2021-03-03

## 2021-02-17 RX ORDER — SODIUM CHLORIDE 0.9 % (FLUSH) 0.9 %
10 SYRINGE (ML) INJECTION PRN
Status: CANCELLED | OUTPATIENT
Start: 2021-03-03

## 2021-02-17 RX ORDER — METHYLPREDNISOLONE SODIUM SUCCINATE 125 MG/2ML
125 INJECTION, POWDER, LYOPHILIZED, FOR SOLUTION INTRAMUSCULAR; INTRAVENOUS ONCE
Status: CANCELLED | OUTPATIENT
Start: 2021-03-03 | End: 2021-02-17

## 2021-02-17 RX ORDER — SODIUM CHLORIDE 9 MG/ML
20 INJECTION, SOLUTION INTRAVENOUS ONCE
Status: CANCELLED | OUTPATIENT
Start: 2021-03-17 | End: 2021-03-03

## 2021-02-17 RX ORDER — SODIUM CHLORIDE 9 MG/ML
INJECTION, SOLUTION INTRAVENOUS CONTINUOUS
Status: CANCELLED | OUTPATIENT
Start: 2021-03-17

## 2021-02-17 RX ORDER — HEPARIN SODIUM (PORCINE) LOCK FLUSH IV SOLN 100 UNIT/ML 100 UNIT/ML
500 SOLUTION INTRAVENOUS PRN
Status: CANCELLED | OUTPATIENT
Start: 2021-03-17

## 2021-02-17 RX ORDER — DIPHENHYDRAMINE HYDROCHLORIDE 50 MG/ML
50 INJECTION INTRAMUSCULAR; INTRAVENOUS ONCE
Status: CANCELLED | OUTPATIENT
Start: 2021-03-03 | End: 2021-02-17

## 2021-02-17 RX ORDER — DIPHENHYDRAMINE HYDROCHLORIDE 50 MG/ML
50 INJECTION INTRAMUSCULAR; INTRAVENOUS ONCE
Status: CANCELLED | OUTPATIENT
Start: 2021-03-17 | End: 2021-03-03

## 2021-02-17 RX ORDER — SODIUM CHLORIDE 0.9 % (FLUSH) 0.9 %
5 SYRINGE (ML) INJECTION PRN
Status: CANCELLED | OUTPATIENT
Start: 2021-03-17

## 2021-02-17 RX ORDER — SODIUM CHLORIDE 0.9 % (FLUSH) 0.9 %
10 SYRINGE (ML) INJECTION PRN
Status: CANCELLED | OUTPATIENT
Start: 2021-03-17

## 2021-02-17 RX ORDER — SODIUM CHLORIDE 9 MG/ML
20 INJECTION, SOLUTION INTRAVENOUS ONCE
Status: CANCELLED | OUTPATIENT
Start: 2021-03-03 | End: 2021-02-17

## 2021-02-17 RX ORDER — SODIUM CHLORIDE 9 MG/ML
INJECTION, SOLUTION INTRAVENOUS CONTINUOUS
Status: CANCELLED | OUTPATIENT
Start: 2021-03-03

## 2021-02-17 RX ORDER — METHYLPREDNISOLONE SODIUM SUCCINATE 125 MG/2ML
125 INJECTION, POWDER, LYOPHILIZED, FOR SOLUTION INTRAMUSCULAR; INTRAVENOUS ONCE
Status: CANCELLED | OUTPATIENT
Start: 2021-03-17 | End: 2021-03-03

## 2021-02-17 RX ORDER — HEPARIN SODIUM (PORCINE) LOCK FLUSH IV SOLN 100 UNIT/ML 100 UNIT/ML
500 SOLUTION INTRAVENOUS PRN
Status: CANCELLED | OUTPATIENT
Start: 2021-03-03

## 2021-02-17 NOTE — TELEPHONE ENCOUNTER
STEPHENIE CALLED AND STATED THAT SHE IS NOT COMING TO HER APPT TODAY. NO REASON WAS GIVEN. I ASKED  STEPHENIE IF SHE WANTED TO RESCHEDULE AND SHE STATED NO I HAVE A  APPT ON 3-3-21 AND I WILL JUST KEEP THAT APPT.  I VERIFIED APPT WITH PT.

## 2021-02-18 ENCOUNTER — TELEPHONE (OUTPATIENT)
Dept: CASE MANAGEMENT | Age: 71
End: 2021-02-18

## 2021-02-18 NOTE — TELEPHONE ENCOUNTER
Name: Scott Kraft  : 1950  MRN: [id number]    Oncology Navigation Follow-Up Note    Navigator spoke with pt. Offering assistance, pt. Did not keep todays appt. Due to pain . Navigator asking pt if pain controlled with present pain regime, pt. Responding , \"yes\" Plan to follow.    Electronically signed by Sy Elder RN on 2021 at 3:35 PM

## 2021-02-26 ENCOUNTER — TELEPHONE (OUTPATIENT)
Dept: ONCOLOGY | Age: 71
End: 2021-02-26

## 2021-02-26 ENCOUNTER — HOSPITAL ENCOUNTER (OUTPATIENT)
Facility: MEDICAL CENTER | Age: 71
End: 2021-02-26
Payer: MEDICARE

## 2021-03-03 ENCOUNTER — OFFICE VISIT (OUTPATIENT)
Dept: ONCOLOGY | Age: 71
End: 2021-03-03
Payer: MEDICARE

## 2021-03-03 ENCOUNTER — TELEPHONE (OUTPATIENT)
Dept: ONCOLOGY | Age: 71
End: 2021-03-03

## 2021-03-03 ENCOUNTER — HOSPITAL ENCOUNTER (OUTPATIENT)
Dept: INFUSION THERAPY | Facility: MEDICAL CENTER | Age: 71
Discharge: HOME | End: 2021-03-03
Payer: MEDICARE

## 2021-03-03 VITALS
HEART RATE: 76 BPM | DIASTOLIC BLOOD PRESSURE: 47 MMHG | TEMPERATURE: 99 F | WEIGHT: 180 LBS | BODY MASS INDEX: 27.37 KG/M2 | SYSTOLIC BLOOD PRESSURE: 104 MMHG | RESPIRATION RATE: 16 BRPM

## 2021-03-03 DIAGNOSIS — C34.91 NON-SMALL CELL CANCER OF RIGHT LUNG (HCC): Primary | ICD-10-CM

## 2021-03-03 DIAGNOSIS — C34.11 MALIGNANT NEOPLASM OF UPPER LOBE OF RIGHT LUNG (HCC): ICD-10-CM

## 2021-03-03 LAB
ABSOLUTE EOS #: 0.37 K/UL (ref 0–0.44)
ABSOLUTE IMMATURE GRANULOCYTE: 0.09 K/UL (ref 0–0.3)
ABSOLUTE LYMPH #: 0.86 K/UL (ref 1.1–3.7)
ABSOLUTE MONO #: 0.41 K/UL (ref 0.1–1.2)
ALBUMIN SERPL-MCNC: 3.5 G/DL (ref 3.5–5.2)
ALBUMIN/GLOBULIN RATIO: ABNORMAL (ref 1–2.5)
ALP BLD-CCNC: 55 U/L (ref 35–104)
ALT SERPL-CCNC: 7 U/L (ref 5–33)
ANION GAP SERPL CALCULATED.3IONS-SCNC: 10 MMOL/L (ref 9–17)
AST SERPL-CCNC: 12 U/L
BASOPHILS # BLD: 1 % (ref 0–2)
BASOPHILS ABSOLUTE: 0.05 K/UL (ref 0–0.2)
BILIRUB SERPL-MCNC: <0.1 MG/DL (ref 0.3–1.2)
BUN BLDV-MCNC: 14 MG/DL (ref 8–23)
BUN/CREAT BLD: 15 (ref 9–20)
CALCIUM SERPL-MCNC: 8.7 MG/DL (ref 8.6–10.4)
CHLORIDE BLD-SCNC: 104 MMOL/L (ref 98–107)
CO2: 26 MMOL/L (ref 20–31)
CREAT SERPL-MCNC: 0.94 MG/DL (ref 0.5–0.9)
DIFFERENTIAL TYPE: ABNORMAL
EOSINOPHILS RELATIVE PERCENT: 6 % (ref 1–4)
GFR AFRICAN AMERICAN: >60 ML/MIN
GFR NON-AFRICAN AMERICAN: 59 ML/MIN
GFR SERPL CREATININE-BSD FRML MDRD: ABNORMAL ML/MIN/{1.73_M2}
GFR SERPL CREATININE-BSD FRML MDRD: ABNORMAL ML/MIN/{1.73_M2}
GLUCOSE BLD-MCNC: 121 MG/DL (ref 70–99)
HCT VFR BLD CALC: 30.1 % (ref 36.3–47.1)
HEMOGLOBIN: 9.1 G/DL (ref 11.9–15.1)
IMMATURE GRANULOCYTES: 1 %
LYMPHOCYTES # BLD: 13 % (ref 24–43)
MCH RBC QN AUTO: 27.7 PG (ref 25.2–33.5)
MCHC RBC AUTO-ENTMCNC: 30.2 G/DL (ref 28.4–34.8)
MCV RBC AUTO: 91.8 FL (ref 82.6–102.9)
MONOCYTES # BLD: 6 % (ref 3–12)
NRBC AUTOMATED: 0 PER 100 WBC
PDW BLD-RTO: 14.2 % (ref 11.8–14.4)
PLATELET # BLD: 213 K/UL (ref 138–453)
PLATELET ESTIMATE: ABNORMAL
PMV BLD AUTO: 8.8 FL (ref 8.1–13.5)
POTASSIUM SERPL-SCNC: 4.7 MMOL/L (ref 3.7–5.3)
RBC # BLD: 3.28 M/UL (ref 3.95–5.11)
RBC # BLD: ABNORMAL 10*6/UL
SEG NEUTROPHILS: 73 % (ref 36–65)
SEGMENTED NEUTROPHILS ABSOLUTE COUNT: 4.84 K/UL (ref 1.5–8.1)
SODIUM BLD-SCNC: 140 MMOL/L (ref 135–144)
TOTAL PROTEIN: 6.8 G/DL (ref 6.4–8.3)
TSH SERPL DL<=0.05 MIU/L-ACNC: 2.26 MIU/L (ref 0.3–5)
WBC # BLD: 6.6 K/UL (ref 3.5–11.3)
WBC # BLD: ABNORMAL 10*3/UL

## 2021-03-03 PROCEDURE — 2580000003 HC RX 258: Performed by: INTERNAL MEDICINE

## 2021-03-03 PROCEDURE — 36591 DRAW BLOOD OFF VENOUS DEVICE: CPT

## 2021-03-03 PROCEDURE — 6360000002 HC RX W HCPCS: Performed by: INTERNAL MEDICINE

## 2021-03-03 PROCEDURE — 80053 COMPREHEN METABOLIC PANEL: CPT

## 2021-03-03 PROCEDURE — 96413 CHEMO IV INFUSION 1 HR: CPT

## 2021-03-03 PROCEDURE — 84443 ASSAY THYROID STIM HORMONE: CPT

## 2021-03-03 PROCEDURE — 85025 COMPLETE CBC W/AUTO DIFF WBC: CPT

## 2021-03-03 PROCEDURE — 99211 OFF/OP EST MAY X REQ PHY/QHP: CPT | Performed by: INTERNAL MEDICINE

## 2021-03-03 PROCEDURE — 99215 OFFICE O/P EST HI 40 MIN: CPT | Performed by: INTERNAL MEDICINE

## 2021-03-03 RX ORDER — ONDANSETRON 4 MG/1
4 TABLET, FILM COATED ORAL EVERY 8 HOURS PRN
Qty: 30 TABLET | Refills: 0 | Status: SHIPPED | OUTPATIENT
Start: 2021-03-03 | End: 2021-03-31 | Stop reason: SDUPTHER

## 2021-03-03 RX ORDER — HEPARIN SODIUM (PORCINE) LOCK FLUSH IV SOLN 100 UNIT/ML 100 UNIT/ML
500 SOLUTION INTRAVENOUS PRN
Status: DISCONTINUED | OUTPATIENT
Start: 2021-03-03 | End: 2021-03-04 | Stop reason: HOSPADM

## 2021-03-03 RX ORDER — SODIUM CHLORIDE 0.9 % (FLUSH) 0.9 %
10 SYRINGE (ML) INJECTION PRN
Status: DISCONTINUED | OUTPATIENT
Start: 2021-03-03 | End: 2021-03-04 | Stop reason: HOSPADM

## 2021-03-03 RX ORDER — SODIUM CHLORIDE 9 MG/ML
20 INJECTION, SOLUTION INTRAVENOUS ONCE
Status: COMPLETED | OUTPATIENT
Start: 2021-03-03 | End: 2021-03-03

## 2021-03-03 RX ADMIN — SODIUM CHLORIDE, PRESERVATIVE FREE 10 ML: 5 INJECTION INTRAVENOUS at 14:28

## 2021-03-03 RX ADMIN — HEPARIN 500 UNITS: 100 SYRINGE at 14:28

## 2021-03-03 RX ADMIN — SODIUM CHLORIDE 20 ML/HR: 9 INJECTION, SOLUTION INTRAVENOUS at 12:51

## 2021-03-03 RX ADMIN — SODIUM CHLORIDE 740 MG: 9 INJECTION, SOLUTION INTRAVENOUS at 12:49

## 2021-03-03 NOTE — TELEPHONE ENCOUNTER
Soha Barr MD VISIT & TX  TX AS PLANNED  RV 4 WKS  MD VISIT 3/31/21 @ 11:15AM  TX @ 11AM  AVS PRINTED W/ INSTRUCTIONS

## 2021-03-03 NOTE — PLAN OF CARE
Problem: Safety:  Goal: Free from accidental physical injury  Description: Free from accidental physical injury  3/3/2021 1302 by Rajan Longoria RN  Outcome: Completed  3/3/2021 1302 by Rajan Longoria, RN  Outcome: Met This Shift

## 2021-03-04 ENCOUNTER — TELEPHONE (OUTPATIENT)
Dept: CASE MANAGEMENT | Age: 71
End: 2021-03-04

## 2021-03-04 NOTE — TELEPHONE ENCOUNTER
Name: Margie Hart  : 1950  MRN: [id number]    Oncology Navigation Follow-Up Note  Navigator reaching out to pt. , but could only leave a message, plan to follow.   Electronically signed by Shelley Watkins RN on 3/4/2021 at 1:30 PM

## 2021-03-08 NOTE — PROGRESS NOTES
Today's Date: 3/7/2021  Patient Name: Teodoro Carey  Patient's age: 80 y. o., 1950    Diagnosis:   RUL squamous cell carcinoma,   Cancer Staging  Malignant neoplasm of upper lobe of right lung (HCC)  Staging form: Lung, AJCC 8th Edition  - Clinical stage from 9/18/2020: Stage IIIB (cT4, cN2, cM0) - Signed by Armaan Llamas MD on 9/18/2020    Current therapy:  Started on concurrent chemoradiation with weekly carbotaxol on 10/21/20  She completed radiation therapy in mid December  Plan to start her on consolidation durvalumab  CHIEF COMPLAINT:    Chief Complaint   Patient presents with    Follow-up    Pain     right side chest pain    Shortness of Breath    Medication Refill     INTERVAL HISTORY:    Ashu Gamez is returning for follow-up visit and to discuss PET scan results and further recommendations. She has tolerated first cycle of immunotherapy well. She complains of right chest pain. No NV or diarrhea. She denies any cough, shortness of breath or fever chills. During this visit patient's allergy, social, medical, surgical history and medications were reviewed and updated. HISTORY OF PRESENT ILLNESS:    Ashley Zaldivar is a 56-year-old female with a past medical history of COPD, history of tobacco abuse, depression, CAD, arthritis was admitted with multiple falls at home. She complains of back pain and also chronic cough. On admission she had a CT scan of the chest which showed 7.2 cm spiculated mass in the right upper lobe with mediastinal lymphadenopathy suspicious for malignancy. 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 Oncology consulted for further evaluation. Patient has COPD and uses Home oxygen at night and sues walker at home. She smokes more than a PPD. 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     Past Medical History:   has a past medical history of AAA (abdominal aortic aneurysm) ([facility] Acid reflux, Anxiety and depression, Aortic stenosis, Arthritis, Blister of ankle, right, CAD (coronary artery disease), Cancer ([facility] COPD (chronic obstructive pulmonary disease) ([facility] Diabetes mellitus ([facility] Falls, Heart block, Hypokalemia, MDRO (multiple drug resistant organisms) resistance, MRSA (methicillin resistant staph aureus) culture positive, On home oxygen therapy, On home oxygen therapy, Overactive bladder, Pneumonia, PONV (postoperative nausea and vomiting), and Vitamin D deficiency. Past Surgical History:   has a past surgical history that includes back surgery; eye surgery; Cholecystectomy; Appendectomy; Hysterectomy; Tonsillectomy; lumbar laminectomy; Endoscopy, colon, diagnostic (12/08/2016); aortic valve repair (N/A, 4/11/2017); bronchoscopy (N/A, 8/31/2020); IR INSERT PICC VAD W SQ PORT >5 YEARS (9/4/2020); and IR PORT PLACEMENT > 5 YEARS (9/29/2020). Medications:    Current Outpatient Medications   Medication Sig Dispense Refill    ondansetron (ZOFRAN) 4 MG tablet Take 1 tablet by mouth every 8 hours as needed for Nausea or Vomiting 30 tablet 0    oxyCODONE-acetaminophen (PERCOCET)  MG per tablet Take 1 tablet by mouth every 6 hours as needed for Pain.  clonazePAM (KLONOPIN) 1 MG tablet Take 1 tablet by mouth 3 times daily as needed for Anxiety for up to 3 doses. 3 tablet 0    gabapentin (NEURONTIN) 400 MG capsule Take 1 capsule by mouth 2 times daily for 3 doses.  In addition to 2 capsules (=800mg) nightly 3 capsule 0    ARIPiprazole (ABILIFY) 5 MG tablet Take 1 tablet by mouth daily for 3 doses 3 tablet 0    furosemide (LASIX) 40 MG tablet Take 40 mg by mouth daily      melatonin 5 MG TABS tablet Take 5 mg by mouth nightly       glimepiride (AMARYL) 1 MG tablet Take 1 mg by mouth Daily with supper In addition to 2 tablets (=2mg) every morning       metoprolol tartrate (LOPRESSOR) 25 MG tablet Take 25 mg by mouth 2 times daily      venlafaxine (EFFEXOR XR) 150 MG extended release capsule Take 150 mg by mouth 2 times daily      lansoprazole (PREVACID) 30 MG delayed release capsule Take 30 mg by mouth daily      metFORMIN (GLUCOPHAGE) 500 MG tablet Take 500 mg by mouth 2 times daily      aspirin EC 81 MG EC tablet Take 81 mg by mouth daily      mometasone-formoterol (DULERA) 200-5 MCG/ACT inhaler Inhale 2 puffs into the lungs every 12 hours as needed (wheezing/SOB)       traZODone (DESYREL) 300 MG tablet Take 0.5 tablets by mouth nightly (Patient not taking: Reported on 3/3/2021) 30 tablet 0    dexamethasone (DECADRON) 4 MG tablet Take 20 mg orally 12 hours and 6 hours before Taxol (Patient not taking: Reported on 3/3/2021) 20 tablet 3    lidocaine-prilocaine (EMLA) 2.5-2.5 % cream To port site before chemo (Patient not taking: Reported on 3/3/2021) 1 Tube 1     No current facility-administered medications for this visit. Allergies:  Codeine and Dye [iodides]    Social History:   reports that she quit smoking about 4 years ago. She has never used smokeless tobacco. She reports that she does not drink alcohol or use drugs. Family History: family history includes Cancer in her father and mother. REVIEW OF SYSTEMS:    Constitutional: ++fatigue, No fever or chills. No night sweats, no weight loss   Eyes: No eye discharge, double vision, or eye pain   HEENT: negative for sore mouth, sore throat, hoarseness and voice change   Respiratory: negative for cough , sputum, ++dyspnea, wheezing, hemoptysis, chest pain   Cardiovascular: negative for chest pain, dyspnea, palpitations, orthopnea, PND   Gastrointestinal: negative for nausea, vomiting, diarrhea, constipation, abdominal pain, Dysphagia, hematemesis and hematochezia   Genitourinary: negative for frequency, dysuria, nocturia, urinary incontinence, and hematuria   Integument: negative for rash, skin lesions, bruises.    Hematologic/Lymphatic: negative for easy bruising, bleeding, lymphadenopathy, or petechiae   Endocrine: negative for heat or cold intolerance,weight changes, change in bowel habits and hair loss   Musculoskeletal: negative for myalgias, arthralgias, pain, joint swelling,and bone pain   Neurological: negative for headaches, dizziness, seizures, weakness, numbness    PHYSICAL EXAM:      There were no vitals taken for this visit. Temp (24hrs), Av.7 °F (36.5 °C), Min:97.3 °F (36.3 °C), Max:98.1 °F (36.7 °C)  General appearance - well appearing, no in pain or distress   Mental status - alert and cooperative   Eyes - pupils equal and reactive, extraocular eye movements intact   Ears - bilateral TM's and external ear canals normal   Mouth - mucous membranes moist, pharynx normal without lesions   Neck - supple, no significant adenopathy   Lymphatics - no palpable lymphadenopathy, no hepatosplenomegaly   Chest - clear to auscultation, no wheezes, rales or rhonchi, symmetric air entry   Heart - normal rate, regular rhythm, normal S1, S2, no murmurs  Abdomen - soft, nontender, nondistended, no masses or organomegaly   Neurological - alert, oriented, normal speech, no focal findings or movement disorder noted   Musculoskeletal - no joint tenderness, deformity or swelling   Extremities - peripheral pulses normal, no pedal edema, no clubbing or cyanosis   Skin - normal coloration and turgor, no rashes, no suspicious skin lesions noted ,    DATA:    Labs:   CBC:   No results for input(s): WBC, HGB, HCT, PLT in the last 72 hours. BMP:   No results for input(s): NA, K, CO2, BUN, CREATININE, LABGLOM, GLUCOSE in the last 72 hours. PT/INR: No results for input(s): PROTIME, INR in the last 72 hours. Surgical Pathology Report   Surgical Pathology   Collected:  [id number]    Lab status:  Final    Resulting lab:  [employer] [employer] [facility]    Value:  -- Diagnosis --     BRONCHIAL WASHINGS RIGHT UPPER LOBE:          POSITIVE FOR MALIGNANCY.        SQUAMOUS CELL CARCINOMA.                COMMENT: VOLUME OF TUMOR IN THE CELL BLOCK IS LOW AND ADDITIONAL   MATERIAL WOULD BE REQUIRED IF MOLECULAR TESTING IS NECESSARY. IMAGING DATA:  CT chest 8/27/2020:   FINDINGS:    Mediastinum: Three vessel coronary artery calcification.  Newly enlarged    mediastinal lymph nodes including example station 7 node measuring 3.1 x 2.1    cm on series 2, image 45.         Lungs/pleura: Mild upper lobe predominant centrilobular emphysema. Marilynn Severance Spiculated mass of the right upper lobe measuring 7.2 x 6.5 cm with    broad-base of contact with the mediastinal pleura, right major fissure,    encircling and obliterating the right upper lobe bronchus.  Spicules are seen    to extend to the anterior costal pleura.  There is adjacent bronchial wall    thickening and interlobular septal thickening.  Stable 5 mm solid nodule of    the left upper lobe since 2016, benign.  No pleural effusion or pneumothorax.         Upper Abdomen: Adrenals are unremarkable.         Soft Tissues/Bones: Old right rib fractures.              Impression    Approximate 7.2 cm spiculated mass of the right upper lobe likely with    invasion of the mediastinum, adjacent lymphangitic spread and suspected    metastatic mediastinal lymphadenopathy. Scan 9/11/2020: Impression    1. The patient's known right upper lobe lung mass is FDG avid consistent with    the patient's primary malignancy. 2. FDG avid mediastinal lymph nodes consistent with metastatic disease. 3. No abnormal FDG activity is identified involving the abdomen or pelvis. PET 2/3/21  IMPRESSION:   *  Unexpected findings of emphysematous cystitis. *  Positive response to therapy with markedly decreased size and FDG avidity of right upper lobe mass and mediastinal adenopathy. *  Healing right rib fractures. IMPRESSION:   1.  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 PET scan negative for distant metastasis: Clinical stage T4 N2 M0, stage IIIb  2. Anemia  3. Leukopenia: chemo related  4. COPD  5. Falls  6. CAD  7. Tobacco abuse  8. Actinomyces bacteremia    RECOMMENDATIONS:  1. I reviewed the laboratory data, imaging studies, biopsy finding, diagnosis, prognosis and treatment options with patient  2. I reviewed the NCCN guidelines and goals of care  3. Now she is on immunotherapy   4. Her pET scan 2/3/21 showed no recurrence with positive response to treatment  5. Her right chest pain is due to rib fractures  6. Return to clinic in 4 weeks or earlier if needed  7. Patient's questions were sought and answered to her satisfaction      Abhay R. Alpheus Nageotte, MD  Hematologist/Medical Oncologist    This note is created with the assistance of a speech recognition program.  While intending to generate a document that actually reflects the content of the visit, the document can still have some errors including those of syntax and sound a like substitutions which may escape proof reading. It such instances, actual meaning can be extrapolated by contextual diversion.

## 2021-03-11 ENCOUNTER — HOSPITAL ENCOUNTER (OUTPATIENT)
Facility: MEDICAL CENTER | Age: 71
End: 2021-03-11
Payer: MEDICARE

## 2021-03-16 DIAGNOSIS — C34.92 SQUAMOUS CELL LUNG CANCER, LEFT (HCC): Primary | ICD-10-CM

## 2021-03-17 ENCOUNTER — HOSPITAL ENCOUNTER (OUTPATIENT)
Dept: INFUSION THERAPY | Facility: MEDICAL CENTER | Age: 71
Discharge: HOME | End: 2021-03-17
Payer: MEDICARE

## 2021-03-17 VITALS
RESPIRATION RATE: 18 BRPM | TEMPERATURE: 98 F | HEART RATE: 109 BPM | DIASTOLIC BLOOD PRESSURE: 85 MMHG | SYSTOLIC BLOOD PRESSURE: 142 MMHG

## 2021-03-17 DIAGNOSIS — C34.91 NON-SMALL CELL CANCER OF RIGHT LUNG (HCC): Primary | ICD-10-CM

## 2021-03-17 DIAGNOSIS — C34.11 MALIGNANT NEOPLASM OF UPPER LOBE OF RIGHT LUNG (HCC): ICD-10-CM

## 2021-03-17 LAB
ABSOLUTE EOS #: 0.47 K/UL (ref 0–0.44)
ABSOLUTE IMMATURE GRANULOCYTE: 0.05 K/UL (ref 0–0.3)
ABSOLUTE LYMPH #: 0.82 K/UL (ref 1.1–3.7)
ABSOLUTE MONO #: 0.45 K/UL (ref 0.1–1.2)
ALBUMIN SERPL-MCNC: 3.4 G/DL (ref 3.5–5.2)
ALBUMIN/GLOBULIN RATIO: ABNORMAL (ref 1–2.5)
ALP BLD-CCNC: 51 U/L (ref 35–104)
ALT SERPL-CCNC: 7 U/L (ref 5–33)
ANION GAP SERPL CALCULATED.3IONS-SCNC: 12 MMOL/L (ref 9–17)
AST SERPL-CCNC: 11 U/L
BASOPHILS # BLD: 1 % (ref 0–2)
BASOPHILS ABSOLUTE: 0.05 K/UL (ref 0–0.2)
BILIRUB SERPL-MCNC: 0.12 MG/DL (ref 0.3–1.2)
BUN BLDV-MCNC: 14 MG/DL (ref 8–23)
BUN/CREAT BLD: 12 (ref 9–20)
CALCIUM SERPL-MCNC: 8.8 MG/DL (ref 8.6–10.4)
CHLORIDE BLD-SCNC: 106 MMOL/L (ref 98–107)
CO2: 24 MMOL/L (ref 20–31)
CREAT SERPL-MCNC: 1.15 MG/DL (ref 0.5–0.9)
DIFFERENTIAL TYPE: ABNORMAL
EOSINOPHILS RELATIVE PERCENT: 9 % (ref 1–4)
GFR AFRICAN AMERICAN: 57 ML/MIN
GFR NON-AFRICAN AMERICAN: 47 ML/MIN
GFR SERPL CREATININE-BSD FRML MDRD: ABNORMAL ML/MIN/{1.73_M2}
GFR SERPL CREATININE-BSD FRML MDRD: ABNORMAL ML/MIN/{1.73_M2}
GLUCOSE BLD-MCNC: 68 MG/DL (ref 70–99)
HCT VFR BLD CALC: 33 % (ref 36.3–47.1)
HEMOGLOBIN: 9.9 G/DL (ref 11.9–15.1)
IMMATURE GRANULOCYTES: 1 %
LYMPHOCYTES # BLD: 16 % (ref 24–43)
MCH RBC QN AUTO: 26.9 PG (ref 25.2–33.5)
MCHC RBC AUTO-ENTMCNC: 30 G/DL (ref 28.4–34.8)
MCV RBC AUTO: 89.7 FL (ref 82.6–102.9)
MONOCYTES # BLD: 9 % (ref 3–12)
NRBC AUTOMATED: 0 PER 100 WBC
PDW BLD-RTO: 14.3 % (ref 11.8–14.4)
PLATELET # BLD: 236 K/UL (ref 138–453)
PLATELET ESTIMATE: ABNORMAL
PMV BLD AUTO: 8.7 FL (ref 8.1–13.5)
POTASSIUM SERPL-SCNC: 4.3 MMOL/L (ref 3.7–5.3)
RBC # BLD: 3.68 M/UL (ref 3.95–5.11)
RBC # BLD: ABNORMAL 10*6/UL
SEG NEUTROPHILS: 64 % (ref 36–65)
SEGMENTED NEUTROPHILS ABSOLUTE COUNT: 3.2 K/UL (ref 1.5–8.1)
SODIUM BLD-SCNC: 142 MMOL/L (ref 135–144)
TOTAL PROTEIN: 6.9 G/DL (ref 6.4–8.3)
TSH SERPL DL<=0.05 MIU/L-ACNC: 1.03 MIU/L (ref 0.3–5)
WBC # BLD: 5 K/UL (ref 3.5–11.3)
WBC # BLD: ABNORMAL 10*3/UL

## 2021-03-17 PROCEDURE — 80053 COMPREHEN METABOLIC PANEL: CPT

## 2021-03-17 PROCEDURE — 85025 COMPLETE CBC W/AUTO DIFF WBC: CPT

## 2021-03-17 PROCEDURE — 36591 DRAW BLOOD OFF VENOUS DEVICE: CPT

## 2021-03-17 PROCEDURE — 84443 ASSAY THYROID STIM HORMONE: CPT

## 2021-03-17 PROCEDURE — 2580000003 HC RX 258: Performed by: INTERNAL MEDICINE

## 2021-03-17 PROCEDURE — 6360000002 HC RX W HCPCS: Performed by: INTERNAL MEDICINE

## 2021-03-17 PROCEDURE — 96413 CHEMO IV INFUSION 1 HR: CPT

## 2021-03-17 RX ORDER — SODIUM CHLORIDE 0.9 % (FLUSH) 0.9 %
10 SYRINGE (ML) INJECTION PRN
Status: DISCONTINUED | OUTPATIENT
Start: 2021-03-17 | End: 2021-03-18 | Stop reason: HOSPADM

## 2021-03-17 RX ORDER — HEPARIN SODIUM (PORCINE) LOCK FLUSH IV SOLN 100 UNIT/ML 100 UNIT/ML
500 SOLUTION INTRAVENOUS PRN
Status: DISCONTINUED | OUTPATIENT
Start: 2021-03-17 | End: 2021-03-18 | Stop reason: HOSPADM

## 2021-03-17 RX ORDER — SODIUM CHLORIDE 9 MG/ML
20 INJECTION, SOLUTION INTRAVENOUS ONCE
Status: COMPLETED | OUTPATIENT
Start: 2021-03-17 | End: 2021-03-17

## 2021-03-17 RX ADMIN — SODIUM CHLORIDE 740 MG: 9 INJECTION, SOLUTION INTRAVENOUS at 12:57

## 2021-03-17 RX ADMIN — SODIUM CHLORIDE 20 ML/HR: 9 INJECTION, SOLUTION INTRAVENOUS at 12:57

## 2021-03-17 RX ADMIN — SODIUM CHLORIDE, PRESERVATIVE FREE 10 ML: 5 INJECTION INTRAVENOUS at 14:12

## 2021-03-17 RX ADMIN — SODIUM CHLORIDE, PRESERVATIVE FREE 10 ML: 5 INJECTION INTRAVENOUS at 11:30

## 2021-03-17 RX ADMIN — HEPARIN 500 UNITS: 100 SYRINGE at 14:12

## 2021-03-17 NOTE — PROGRESS NOTES
Patient arrive ambulatory using rollator for cycle 3 day 1 treatment. Denies complaint or concern. Vitals as charted. Port accessed; specimen sent. Imfinzi infused with no sign of adverse reaction; line flushed. Port de-accessed without difficulty. Patient ambulate off unit per self at discharge; writer call granddaughter [phone] to inform patient ready for . Copy of return to clinic calendar provided.

## 2021-03-18 ENCOUNTER — HOSPITAL ENCOUNTER (OUTPATIENT)
Dept: RADIATION ONCOLOGY | Facility: MEDICAL CENTER | Age: 71
Discharge: HOME | End: 2021-03-18
Payer: MEDICARE

## 2021-03-18 VITALS
TEMPERATURE: 96.6 F | RESPIRATION RATE: 16 BRPM | OXYGEN SATURATION: 95 % | SYSTOLIC BLOOD PRESSURE: 111 MMHG | BODY MASS INDEX: 27.25 KG/M2 | DIASTOLIC BLOOD PRESSURE: 62 MMHG | HEART RATE: 115 BPM | WEIGHT: 179.25 LBS

## 2021-03-18 ASSESSMENT — PAIN DESCRIPTION - ORIENTATION: ORIENTATION: RIGHT

## 2021-03-18 ASSESSMENT — PAIN DESCRIPTION - LOCATION: LOCATION: CHEST;BACK

## 2021-03-18 ASSESSMENT — PAIN SCALES - GENERAL: PAINLEVEL_OUTOF10: 9

## 2021-03-18 NOTE — PROGRESS NOTES
Ketty Wesley  3/18/2021  3:20 PM    Pt here for follow up visit. Patient states right chest pain 9/10 along with her back, on percocet for pain. Currently doing chemo once every other week. Strong odor of urine in the room, pt denies any trouble urinating. Dr. Kobi Duarte to eval.  Vitals:    03/18/21 1518   BP: 111/62   Pulse: 115   Resp: 16   Temp: 96.6 °F (35.9 °C)   SpO2: 95%    :  Patient Currently in Pain: Yes  Pain Assessment: 0-10  Pain Level: 9       Wt Readings from Last 1 Encounters:   03/18/21 179 lb 4 oz (81.3 kg)                Current Outpatient Medications:     dexamethasone (DECADRON) 4 MG tablet, Take 20 mg orally 12 hours and 6 hours before Taxol, Disp: 20 tablet, Rfl: 3    ondansetron (ZOFRAN) 4 MG tablet, Take 1 tablet by mouth every 8 hours as needed for Nausea or Vomiting, Disp: 30 tablet, Rfl: 0    oxyCODONE-acetaminophen (PERCOCET)  MG per tablet, Take 1 tablet by mouth every 6 hours as needed for Pain., Disp: , Rfl:     traZODone (DESYREL) 300 MG tablet, Take 0.5 tablets by mouth nightly (Patient not taking: Reported on 3/3/2021), Disp: 30 tablet, Rfl: 0    clonazePAM (KLONOPIN) 1 MG tablet, Take 1 tablet by mouth 3 times daily as needed for Anxiety for up to 3 doses. , Disp: 3 tablet, Rfl: 0    gabapentin (NEURONTIN) 400 MG capsule, Take 1 capsule by mouth 2 times daily for 3 doses.  In addition to 2 capsules (=800mg) nightly, Disp: 3 capsule, Rfl: 0    ARIPiprazole (ABILIFY) 5 MG tablet, Take 1 tablet by mouth daily for 3 doses, Disp: 3 tablet, Rfl: 0    furosemide (LASIX) 40 MG tablet, Take 40 mg by mouth daily, Disp: , Rfl:     lidocaine-prilocaine (EMLA) 2.5-2.5 % cream, To port site before chemo (Patient not taking: Reported on 3/3/2021), Disp: 1 Tube, Rfl: 1    melatonin 5 MG TABS tablet, Take 5 mg by mouth nightly , Disp: , Rfl:     glimepiride (AMARYL) 1 MG tablet, Take 1 mg by mouth Daily with supper In addition to 2 tablets (=2mg) every morning , Disp: , Rfl:    metoprolol tartrate (LOPRESSOR) 25 MG tablet, Take 25 mg by mouth 2 times daily, Disp: , Rfl:     venlafaxine (EFFEXOR XR) 150 MG extended release capsule, Take 150 mg by mouth 2 times daily, Disp: , Rfl:     lansoprazole (PREVACID) 30 MG delayed release capsule, Take 30 mg by mouth daily, Disp: , Rfl:     metFORMIN (GLUCOPHAGE) 500 MG tablet, Take 500 mg by mouth 2 times daily, Disp: , Rfl:     aspirin EC 81 MG EC tablet, Take 81 mg by mouth daily, Disp: , Rfl:     mometasone-formoterol (DULERA) 200-5 MCG/ACT inhaler, Inhale 2 puffs into the lungs every 12 hours as needed (wheezing/SOB) , Disp: , Rfl:     Immunizations:    Influenza status:    []   Current   [x]   Patient declined    Pneumococcal status:  []   Current  [x]   Patient declined  Covid status:   []  Dose #1:                     []  Dose #2:               [x]   Patient declined    Smoking Status:    [] Smoker - PPD:   [x] Nonsmoker - Quit Date: 7 yrs ago              [] Never a smoker      Cancer Screening:  Colonoscopy   [] Current       [] Not current   [] Not current, but scheduled   [x] NA  Mammogram   [] Current       [] Not current   [] Not current, but scheduled   [x] NA  Prostate           [] Current       [] Not current   [] Not current, but scheduled   [x] NA  PAP/Pelvic      [] Current       [] Not current   [] Not current, but scheduled   [x] NA  Skin                 [] Current       [] Not current   [] Not current, but scheduled   [x] NA     Hormone:  Lupron []   Last dose given:           Next dose due:   Eligard []   Last dose given:           Next dose due:   Aromatase Inhibitors []   Medication name:   N/A:  [x]             *BREAST Patient only:    Lymphedema Eval:   [] left arm      [] right arm  Location:     Measurement (cm)    Upper Bicep :    Lower Bicep :         FALLS RISK SCREEN  Instructions:  Assess the patient and enter the appropriate indicators that are present for fall risk identification.    Total the numbers entered and assign a fall risk score from Table 2.  Reassess patient at a minimum every 12 weeks or with status change. Assessment   Date  3/18/2021     1. Mental Ability: confusion/cognitively impaired 0     2. Elimination Issues: incontinence, frequency 0       3. Ambulatory: use of assistive devices (walker, cane, off-loading devices),        attached to equipment (IV pole, oxygen) 2     4. Sensory Limitations: dizziness, vertigo, impaired vision 0     5. Age less than 65        0     6. Age 72 or greater 1     7. Medication: diuretics, strong analgesics, hypnotics, sedatives,        antihypertensive agents 0   8. Falls:  recent history of falls within the last 3 months (not to include slipping or        tripping) 0   TOTAL 3    If score of 4 or greater was education given? No           TABLE 2   Risk Score Risk Level Plan of Care   0-3 Little or  No Risk 1. Provide assistance as indicated for ambulation activities  2. Reorient confused/cognitively impaired patient  3. Chair/bed in low position, stretcher/bed with siderails up except when performing patient care activities  5. Educate patient/family/caregiver on falls prevention  6.  Reassess in 12 weeks or with any noted change in patient condition which places them at a risk for a fall   4-6 Moderate Risk 1. Provide assistance as indicated for ambulation activities  2. Reorient confused/cognitively impaired patient  3. Chair/bed in low position, stretcher/bed with siderails up except when performing patient care activities  4. Educate patient/family/caregiver on falls prevention     7 or   Higher High Risk 1. Place patient in easily observable treatment room  2. Patient attended at all times by family member or staff  3. Provide assistance as indicated for ambulation activities  4. Reorient confused/cognitively impaired patient  5. Chair/bed in low position, stretcher/bed with siderails up except when performing patient care activities  6. Educate patient/family/caregiver on falls prevention         PLAN: Patient is seen today in follow up        Hattie Galloway

## 2021-03-24 ENCOUNTER — HOSPITAL ENCOUNTER (OUTPATIENT)
Facility: MEDICAL CENTER | Age: 71
End: 2021-03-24
Payer: MEDICARE

## 2021-03-31 ENCOUNTER — OFFICE VISIT (OUTPATIENT)
Dept: ONCOLOGY | Age: 71
End: 2021-03-31
Payer: MEDICARE

## 2021-03-31 ENCOUNTER — TELEPHONE (OUTPATIENT)
Dept: ONCOLOGY | Age: 71
End: 2021-03-31

## 2021-03-31 ENCOUNTER — HOSPITAL ENCOUNTER (OUTPATIENT)
Dept: INFUSION THERAPY | Facility: MEDICAL CENTER | Age: 71
Discharge: HOME | End: 2021-03-31
Payer: MEDICARE

## 2021-03-31 VITALS
HEART RATE: 122 BPM | SYSTOLIC BLOOD PRESSURE: 131 MMHG | RESPIRATION RATE: 20 BRPM | WEIGHT: 180.4 LBS | BODY MASS INDEX: 27.43 KG/M2 | TEMPERATURE: 98.5 F | DIASTOLIC BLOOD PRESSURE: 89 MMHG

## 2021-03-31 DIAGNOSIS — C34.91 NON-SMALL CELL CANCER OF RIGHT LUNG (HCC): Primary | ICD-10-CM

## 2021-03-31 DIAGNOSIS — C34.91 NON-SMALL CELL CANCER OF RIGHT LUNG (HCC): ICD-10-CM

## 2021-03-31 DIAGNOSIS — C34.11 MALIGNANT NEOPLASM OF UPPER LOBE OF RIGHT LUNG (HCC): ICD-10-CM

## 2021-03-31 LAB
ABSOLUTE EOS #: 0.34 K/UL (ref 0–0.44)
ABSOLUTE IMMATURE GRANULOCYTE: 0.06 K/UL (ref 0–0.3)
ABSOLUTE LYMPH #: 0.5 K/UL (ref 1.1–3.7)
ABSOLUTE MONO #: 0.39 K/UL (ref 0.1–1.2)
ALBUMIN SERPL-MCNC: 3.6 G/DL (ref 3.5–5.2)
ALBUMIN/GLOBULIN RATIO: ABNORMAL (ref 1–2.5)
ALP BLD-CCNC: 48 U/L (ref 35–104)
ALT SERPL-CCNC: 6 U/L (ref 5–33)
ANION GAP SERPL CALCULATED.3IONS-SCNC: 11 MMOL/L (ref 9–17)
AST SERPL-CCNC: 13 U/L
BASOPHILS # BLD: 1 % (ref 0–2)
BASOPHILS ABSOLUTE: 0.06 K/UL (ref 0–0.2)
BILIRUB SERPL-MCNC: 0.12 MG/DL (ref 0.3–1.2)
BUN BLDV-MCNC: 17 MG/DL (ref 8–23)
BUN/CREAT BLD: 15 (ref 9–20)
CALCIUM SERPL-MCNC: 8.5 MG/DL (ref 8.6–10.4)
CHLORIDE BLD-SCNC: 103 MMOL/L (ref 98–107)
CO2: 23 MMOL/L (ref 20–31)
CREAT SERPL-MCNC: 1.11 MG/DL (ref 0.5–0.9)
DIFFERENTIAL TYPE: ABNORMAL
EOSINOPHILS RELATIVE PERCENT: 6 % (ref 1–4)
GFR AFRICAN AMERICAN: 59 ML/MIN
GFR NON-AFRICAN AMERICAN: 49 ML/MIN
GFR SERPL CREATININE-BSD FRML MDRD: ABNORMAL ML/MIN/{1.73_M2}
GFR SERPL CREATININE-BSD FRML MDRD: ABNORMAL ML/MIN/{1.73_M2}
GLUCOSE BLD-MCNC: 82 MG/DL (ref 70–99)
HCT VFR BLD CALC: 32 % (ref 36.3–47.1)
HEMOGLOBIN: 9.4 G/DL (ref 11.9–15.1)
IMMATURE GRANULOCYTES: 1 %
LYMPHOCYTES # BLD: 9 % (ref 24–43)
MCH RBC QN AUTO: 26 PG (ref 25.2–33.5)
MCHC RBC AUTO-ENTMCNC: 29.4 G/DL (ref 28.4–34.8)
MCV RBC AUTO: 88.4 FL (ref 82.6–102.9)
MONOCYTES # BLD: 7 % (ref 3–12)
NRBC AUTOMATED: 0 PER 100 WBC
PDW BLD-RTO: 14.4 % (ref 11.8–14.4)
PLATELET # BLD: 208 K/UL (ref 138–453)
PLATELET ESTIMATE: ABNORMAL
PMV BLD AUTO: 8.5 FL (ref 8.1–13.5)
POTASSIUM SERPL-SCNC: 4.4 MMOL/L (ref 3.7–5.3)
RBC # BLD: 3.62 M/UL (ref 3.95–5.11)
RBC # BLD: ABNORMAL 10*6/UL
SEG NEUTROPHILS: 76 % (ref 36–65)
SEGMENTED NEUTROPHILS ABSOLUTE COUNT: 4.25 K/UL (ref 1.5–8.1)
SODIUM BLD-SCNC: 137 MMOL/L (ref 135–144)
TOTAL PROTEIN: 6.9 G/DL (ref 6.4–8.3)
TSH SERPL DL<=0.05 MIU/L-ACNC: 2.15 MIU/L (ref 0.3–5)
WBC # BLD: 5.6 K/UL (ref 3.5–11.3)
WBC # BLD: ABNORMAL 10*3/UL

## 2021-03-31 PROCEDURE — 99211 OFF/OP EST MAY X REQ PHY/QHP: CPT

## 2021-03-31 PROCEDURE — 99215 OFFICE O/P EST HI 40 MIN: CPT | Performed by: INTERNAL MEDICINE

## 2021-03-31 PROCEDURE — 80053 COMPREHEN METABOLIC PANEL: CPT

## 2021-03-31 PROCEDURE — 85025 COMPLETE CBC W/AUTO DIFF WBC: CPT

## 2021-03-31 PROCEDURE — 36591 DRAW BLOOD OFF VENOUS DEVICE: CPT

## 2021-03-31 PROCEDURE — 96375 TX/PRO/DX INJ NEW DRUG ADDON: CPT

## 2021-03-31 PROCEDURE — 96413 CHEMO IV INFUSION 1 HR: CPT

## 2021-03-31 PROCEDURE — 96366 THER/PROPH/DIAG IV INF ADDON: CPT

## 2021-03-31 PROCEDURE — 2580000003 HC RX 258: Performed by: INTERNAL MEDICINE

## 2021-03-31 PROCEDURE — 84443 ASSAY THYROID STIM HORMONE: CPT

## 2021-03-31 PROCEDURE — 6360000002 HC RX W HCPCS: Performed by: INTERNAL MEDICINE

## 2021-03-31 RX ORDER — DIPHENHYDRAMINE HYDROCHLORIDE 50 MG/ML
50 INJECTION INTRAMUSCULAR; INTRAVENOUS ONCE
Status: CANCELLED | OUTPATIENT
Start: 2021-03-31 | End: 2021-03-31

## 2021-03-31 RX ORDER — METHYLPREDNISOLONE SODIUM SUCCINATE 125 MG/2ML
125 INJECTION, POWDER, LYOPHILIZED, FOR SOLUTION INTRAMUSCULAR; INTRAVENOUS ONCE
Status: CANCELLED | OUTPATIENT
Start: 2021-03-31 | End: 2021-03-31

## 2021-03-31 RX ORDER — ONDANSETRON 4 MG/1
4 TABLET, FILM COATED ORAL EVERY 8 HOURS PRN
Qty: 30 TABLET | Refills: 0 | Status: SHIPPED | OUTPATIENT
Start: 2021-03-31 | End: 2021-04-28 | Stop reason: SDUPTHER

## 2021-03-31 RX ORDER — SODIUM CHLORIDE 0.9 % (FLUSH) 0.9 %
20 SYRINGE (ML) INJECTION PRN
Status: CANCELLED | OUTPATIENT
Start: 2021-03-31

## 2021-03-31 RX ORDER — SODIUM CHLORIDE 0.9 % (FLUSH) 0.9 %
10 SYRINGE (ML) INJECTION PRN
Status: CANCELLED | OUTPATIENT
Start: 2021-03-31

## 2021-03-31 RX ORDER — SODIUM CHLORIDE 9 MG/ML
20 INJECTION, SOLUTION INTRAVENOUS ONCE
Status: COMPLETED | OUTPATIENT
Start: 2021-03-31 | End: 2021-03-31

## 2021-03-31 RX ORDER — EPINEPHRINE 1 MG/ML
0.3 INJECTION, SOLUTION, CONCENTRATE INTRAVENOUS PRN
Status: CANCELLED | OUTPATIENT
Start: 2021-03-31

## 2021-03-31 RX ORDER — LIDOCAINE AND PRILOCAINE 25; 25 MG/G; MG/G
CREAM TOPICAL
Qty: 1 TUBE | Refills: 1 | Status: SHIPPED | OUTPATIENT
Start: 2021-03-31 | End: 2022-03-18

## 2021-03-31 RX ORDER — SODIUM CHLORIDE 0.9 % (FLUSH) 0.9 %
5 SYRINGE (ML) INJECTION PRN
Status: CANCELLED | OUTPATIENT
Start: 2021-03-31

## 2021-03-31 RX ORDER — SODIUM CHLORIDE 9 MG/ML
INJECTION, SOLUTION INTRAVENOUS CONTINUOUS
Status: CANCELLED | OUTPATIENT
Start: 2021-03-31

## 2021-03-31 RX ORDER — HEPARIN SODIUM (PORCINE) LOCK FLUSH IV SOLN 100 UNIT/ML 100 UNIT/ML
500 SOLUTION INTRAVENOUS PRN
Status: CANCELLED | OUTPATIENT
Start: 2021-03-31

## 2021-03-31 RX ORDER — SODIUM CHLORIDE 0.9 % (FLUSH) 0.9 %
10 SYRINGE (ML) INJECTION PRN
Status: DISCONTINUED | OUTPATIENT
Start: 2021-03-31 | End: 2021-04-01 | Stop reason: HOSPADM

## 2021-03-31 RX ORDER — HEPARIN SODIUM (PORCINE) LOCK FLUSH IV SOLN 100 UNIT/ML 100 UNIT/ML
500 SOLUTION INTRAVENOUS PRN
Status: DISCONTINUED | OUTPATIENT
Start: 2021-03-31 | End: 2021-04-01 | Stop reason: HOSPADM

## 2021-03-31 RX ORDER — SODIUM CHLORIDE 9 MG/ML
20 INJECTION, SOLUTION INTRAVENOUS ONCE
Status: CANCELLED | OUTPATIENT
Start: 2021-03-31 | End: 2021-03-31

## 2021-03-31 RX ADMIN — SODIUM CHLORIDE, PRESERVATIVE FREE 10 ML: 5 INJECTION INTRAVENOUS at 14:58

## 2021-03-31 RX ADMIN — SODIUM CHLORIDE 740 MG: 9 INJECTION, SOLUTION INTRAVENOUS at 13:28

## 2021-03-31 RX ADMIN — HEPARIN 500 UNITS: 100 SYRINGE at 14:58

## 2021-03-31 RX ADMIN — ALTEPLASE 2 MG: 2.2 INJECTION, POWDER, LYOPHILIZED, FOR SOLUTION INTRAVENOUS at 11:45

## 2021-03-31 RX ADMIN — SODIUM CHLORIDE 20 ML/HR: 9 INJECTION, SOLUTION INTRAVENOUS at 12:45

## 2021-03-31 NOTE — PLAN OF CARE
Problem: SAFETY  Goal: Free from accidental physical injury  3/31/2021 1258 by Too Benavidez RN  Outcome: Completed  3/31/2021 1258 by Too Benavidez RN  Outcome: Met This Shift

## 2021-03-31 NOTE — PROGRESS NOTES
Today's Date: 3/31/2021  Patient Name: Shruti Patel  Patient's age: 80 y. o., 1950    Diagnosis:   RUL squamous cell carcinoma,   Cancer Staging  Malignant neoplasm of upper lobe of right lung (HCC)  Staging form: Lung, AJCC 8th Edition  - Clinical stage from 9/18/2020: Stage IIIB (cT4, cN2, cM0) - Signed by Arlyn Freedman MD on 9/18/2020    Current therapy:  Started on concurrent chemoradiation with weekly carbotaxol on 10/21/20  She completed radiation therapy in mid December  Her pET scan 2/3/21 showed no recurrence with positive response to treatment  Restarted on consolidation durvalumab 2/3/2021  CHIEF COMPLAINT:    Chief Complaint   Patient presents with    Follow-up    Medication Refill     INTERVAL HISTORY:    Srikanth Storey is returning for follow-up visit and to lab results and further recommendations. She is tolerating immunotherapy well. She denies any diarrhea. She has some nausea. She denies any fever chills, cough, shortness of breath. During this visit patient's allergy, social, medical, surgical history and medications were reviewed and updated. HISTORY OF PRESENT ILLNESS:    Shania Ramirez is a 61-year-old female with a past medical history of COPD, history of tobacco abuse, depression, CAD, arthritis was admitted with multiple falls at home. She complains of back pain and also chronic cough. On admission she had a CT scan of the chest which showed 7.2 cm spiculated mass in the right upper lobe with mediastinal lymphadenopathy suspicious for malignancy. 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 Oncology consulted for further evaluation. Patient has COPD and uses Home oxygen at night and sues walker at home. She smokes more than a PPD. 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     Past Medical History:   has a past medical history of AAA (abdominal aortic aneurysm) ([facility] [address] Acid reflux, Anxiety and depression, Aortic stenosis, Arthritis, Blister of ankle, right, CAD (coronary artery disease), Cancer ([facility] [address] COPD (chronic obstructive pulmonary disease) ([facility] [address] Diabetes mellitus ([facility] [address] Falls, Heart block, Hypokalemia, MDRO (multiple drug resistant organisms) resistance, MRSA (methicillin resistant staph aureus) culture positive, On home oxygen therapy, On home oxygen therapy, Overactive bladder, Pneumonia, PONV (postoperative nausea and vomiting), and Vitamin D deficiency. Past Surgical History:   has a past surgical history that includes back surgery; eye surgery; Cholecystectomy; Appendectomy; Hysterectomy; Tonsillectomy; lumbar laminectomy; Endoscopy, colon, diagnostic (12/08/2016); aortic valve repair (N/A, 4/11/2017); bronchoscopy (N/A, 8/31/2020); IR INSERT PICC VAD W SQ PORT >5 YEARS (9/4/2020); and IR PORT PLACEMENT > 5 YEARS (9/29/2020). Medications:    Current Outpatient Medications   Medication Sig Dispense Refill    oxyCODONE-acetaminophen (PERCOCET)  MG per tablet Take 1 tablet by mouth every 6 hours as needed for Pain.  traZODone (DESYREL) 300 MG tablet Take 0.5 tablets by mouth nightly 30 tablet 0    clonazePAM (KLONOPIN) 1 MG tablet Take 1 tablet by mouth 3 times daily as needed for Anxiety for up to 3 doses. 3 tablet 0    gabapentin (NEURONTIN) 400 MG capsule Take 1 capsule by mouth 2 times daily for 3 doses.  In addition to 2 capsules (=800mg) nightly 3 capsule 0    ARIPiprazole (ABILIFY) 5 MG tablet Take 1 tablet by mouth daily for 3 doses 3 tablet 0    furosemide (LASIX) 40 MG tablet Take 40 mg by mouth daily      melatonin 5 MG TABS tablet Take 5 mg by mouth nightly       glimepiride (AMARYL) 1 MG tablet Take 1 mg by mouth Daily with supper In addition to 2 tablets (=2mg) every morning       metoprolol tartrate (LOPRESSOR) 25 MG tablet Take 25 mg by mouth 2 times daily      venlafaxine (EFFEXOR XR) 150 MG extended release capsule Take 150 mg by mouth 2 times daily      lansoprazole (PREVACID) 30 MG delayed release capsule Take 30 mg by mouth daily      metFORMIN (GLUCOPHAGE) 500 MG tablet Take 500 mg by mouth 2 times daily      aspirin EC 81 MG EC tablet Take 81 mg by mouth daily      mometasone-formoterol (DULERA) 200-5 MCG/ACT inhaler Inhale 2 puffs into the lungs every 12 hours as needed (wheezing/SOB)       ondansetron (ZOFRAN) 4 MG tablet Take 1 tablet by mouth every 8 hours as needed for Nausea or Vomiting (Patient not taking: Reported on 3/31/2021) 30 tablet 0    dexamethasone (DECADRON) 4 MG tablet Take 20 mg orally 12 hours and 6 hours before Taxol (Patient not taking: Reported on 3/31/2021) 20 tablet 3    lidocaine-prilocaine (EMLA) 2.5-2.5 % cream To port site before chemo (Patient not taking: Reported on 3/3/2021) 1 Tube 1     No current facility-administered medications for this visit. Facility-Administered Medications Ordered in Other Visits   Medication Dose Route Frequency Provider Last Rate Last Admin    alteplase (CATHFLO) injection 2 mg  2 mg Intracatheter Once Hung Cohen MD         Allergies:  Codeine and Dye [iodides]    Social History:   reports that she quit smoking about 4 years ago. She has never used smokeless tobacco. She reports that she does not drink alcohol or use drugs. Family History: family history includes Cancer in her father and mother. REVIEW OF SYSTEMS:    Constitutional: ++fatigue, No fever or chills.  No night sweats, no weight loss   Eyes: No eye discharge, double vision, or eye pain   HEENT: negative for sore mouth, sore throat, hoarseness and voice change   Respiratory: negative for cough , sputum, ++dyspnea, wheezing, hemoptysis, chest pain   Cardiovascular: negative for chest pain, dyspnea, palpitations, orthopnea, PND   Gastrointestinal: negative for nausea, vomiting, diarrhea, constipation, abdominal pain, Dysphagia, hematemesis and hematochezia   Genitourinary: negative for Surgical Pathology   Collected:  08/31/20 0803    Lab status:  Final    Resulting lab:  [facility]    Value:  -- Diagnosis --     BRONCHIAL WASHINGS RIGHT UPPER LOBE:          POSITIVE FOR MALIGNANCY.        SQUAMOUS CELL CARCINOMA.           COMMENT: VOLUME OF TUMOR IN THE CELL BLOCK IS LOW AND ADDITIONAL   MATERIAL WOULD BE REQUIRED IF MOLECULAR TESTING IS NECESSARY. IMAGING DATA:  CT chest 8/27/2020:   FINDINGS:    Mediastinum: Three vessel coronary artery calcification.  Newly enlarged    mediastinal lymph nodes including example station 7 node measuring 3.1 x 2.1    cm on series 2, image 45.         Lungs/pleura: Mild upper lobe predominant centrilobular emphysema. Peder Juancarlos Spiculated mass of the right upper lobe measuring 7.2 x 6.5 cm with    broad-base of contact with the mediastinal pleura, right major fissure,    encircling and obliterating the right upper lobe bronchus.  Spicules are seen    to extend to the anterior costal pleura.  There is adjacent bronchial wall    thickening and interlobular septal thickening.  Stable 5 mm solid nodule of    the left upper lobe since 2016, benign.  No pleural effusion or pneumothorax.         Upper Abdomen: Adrenals are unremarkable.         Soft Tissues/Bones: Old right rib fractures.              Impression    Approximate 7.2 cm spiculated mass of the right upper lobe likely with    invasion of the mediastinum, adjacent lymphangitic spread and suspected    metastatic mediastinal lymphadenopathy. Scan 9/11/2020: Impression    1. The patient's known right upper lobe lung mass is FDG avid consistent with    the patient's primary malignancy. 2. FDG avid mediastinal lymph nodes consistent with metastatic disease. 3. No abnormal FDG activity is identified involving the abdomen or pelvis. PET 2/3/21  IMPRESSION:   *  Unexpected findings of emphysematous cystitis.   *  Positive response to therapy with markedly decreased size and FDG avidity of right upper lobe mass and mediastinal adenopathy. *  Healing right rib fractures. IMPRESSION:   1. 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 PET scan negative for distant metastasis: Clinical stage T4 N2 M0, stage IIIb  2. Anemia  3. Leukopenia: chemo related  4. COPD  5. Falls  6. CAD  7. Tobacco abuse  8. Actinomyces bacteremia    RECOMMENDATIONS:  1. I reviewed the laboratory data, imaging studies, biopsy finding, diagnosis, prognosis and treatment options with patient  2. I reviewed the NCCN guidelines and goals of care  3. Currently she is receiving consolidation durvalumab and tolerating well  4. Return to clinic in 4 weeks or earlier if needed  5. Patient's questions were sought and answered to her satisfaction      Mendoza More MD  Hematologist/Medical Oncologist    This note is created with the assistance of a speech recognition program.  While intending to generate a document that actually reflects the content of the visit, the document can still have some errors including those of syntax and sound a like substitutions which may escape proof reading. It such instances, actual meaning can be extrapolated by contextual diversion.

## 2021-03-31 NOTE — TELEPHONE ENCOUNTER
Deyvi Roche MD VISIT & [facility]   RV IN 4 WKS   MD VISIT 4/28/21 @ 11:30AM TX 11AM  SCRIPTS SENT TO PT PHARMACY  AVS PRINTED W/ INSTRUCTIONS

## 2021-03-31 NOTE — PROGRESS NOTES
Patient arrive ambulatory  for cycle 4 day 1 treatment and MD visit   Complains of continued pain to back pain  . Denies any other complaint or concern. Vitals as charted. Port accessed;no blood return , lab to floor to obtain specimen . Cathflo given   Labs reviewed   MD met with pt , ok to proceed with treatment   Imfinzi infused with no sign of adverse reaction; line flushed. Port flushed and heparinized with intact johnston needle removed per protocol. Patient discharged off unit .

## 2021-04-01 DIAGNOSIS — C34.91 NON-SMALL CELL CANCER OF RIGHT LUNG (HCC): Primary | ICD-10-CM

## 2021-04-07 ENCOUNTER — HOSPITAL ENCOUNTER (OUTPATIENT)
Facility: MEDICAL CENTER | Age: 71
End: 2021-04-07
Payer: MEDICARE

## 2021-04-14 ENCOUNTER — HOSPITAL ENCOUNTER (OUTPATIENT)
Dept: INFUSION THERAPY | Facility: MEDICAL CENTER | Age: 71
Discharge: HOME | End: 2021-04-14
Payer: MEDICARE

## 2021-04-14 VITALS
SYSTOLIC BLOOD PRESSURE: 113 MMHG | DIASTOLIC BLOOD PRESSURE: 52 MMHG | TEMPERATURE: 97.5 F | WEIGHT: 185.2 LBS | BODY MASS INDEX: 28.16 KG/M2 | HEART RATE: 106 BPM | RESPIRATION RATE: 18 BRPM

## 2021-04-14 DIAGNOSIS — C34.91 NON-SMALL CELL CANCER OF RIGHT LUNG (HCC): Primary | ICD-10-CM

## 2021-04-14 DIAGNOSIS — C34.11 MALIGNANT NEOPLASM OF UPPER LOBE OF RIGHT LUNG (HCC): ICD-10-CM

## 2021-04-14 LAB
ABSOLUTE EOS #: 0.31 K/UL (ref 0–0.44)
ABSOLUTE IMMATURE GRANULOCYTE: 0.08 K/UL (ref 0–0.3)
ABSOLUTE LYMPH #: 0.73 K/UL (ref 1.1–3.7)
ABSOLUTE MONO #: 0.36 K/UL (ref 0.1–1.2)
ALBUMIN SERPL-MCNC: 3.7 G/DL (ref 3.5–5.2)
ALBUMIN/GLOBULIN RATIO: ABNORMAL (ref 1–2.5)
ALP BLD-CCNC: 57 U/L (ref 35–104)
ALT SERPL-CCNC: 9 U/L (ref 5–33)
ANION GAP SERPL CALCULATED.3IONS-SCNC: 11 MMOL/L (ref 9–17)
AST SERPL-CCNC: 15 U/L
BASOPHILS # BLD: 1 % (ref 0–2)
BASOPHILS ABSOLUTE: 0.04 K/UL (ref 0–0.2)
BILIRUB SERPL-MCNC: 0.15 MG/DL (ref 0.3–1.2)
BUN BLDV-MCNC: 16 MG/DL (ref 8–23)
BUN/CREAT BLD: 14 (ref 9–20)
CALCIUM SERPL-MCNC: 8.4 MG/DL (ref 8.6–10.4)
CHLORIDE BLD-SCNC: 106 MMOL/L (ref 98–107)
CO2: 24 MMOL/L (ref 20–31)
CREAT SERPL-MCNC: 1.15 MG/DL (ref 0.5–0.9)
DIFFERENTIAL TYPE: ABNORMAL
EOSINOPHILS RELATIVE PERCENT: 8 % (ref 1–4)
GFR AFRICAN AMERICAN: 57 ML/MIN
GFR NON-AFRICAN AMERICAN: 47 ML/MIN
GFR SERPL CREATININE-BSD FRML MDRD: ABNORMAL ML/MIN/{1.73_M2}
GFR SERPL CREATININE-BSD FRML MDRD: ABNORMAL ML/MIN/{1.73_M2}
GLUCOSE BLD-MCNC: 49 MG/DL (ref 70–99)
GLUCOSE BLD-MCNC: 76 MG/DL (ref 70–99)
HCT VFR BLD CALC: 31.9 % (ref 36.3–47.1)
HEMOGLOBIN: 9.4 G/DL (ref 11.9–15.1)
IMMATURE GRANULOCYTES: 2 %
LYMPHOCYTES # BLD: 18 % (ref 24–43)
MCH RBC QN AUTO: 25.9 PG (ref 25.2–33.5)
MCHC RBC AUTO-ENTMCNC: 29.5 G/DL (ref 28.4–34.8)
MCV RBC AUTO: 87.9 FL (ref 82.6–102.9)
MONOCYTES # BLD: 9 % (ref 3–12)
NRBC AUTOMATED: 0 PER 100 WBC
PDW BLD-RTO: 14.9 % (ref 11.8–14.4)
PLATELET # BLD: 220 K/UL (ref 138–453)
PLATELET ESTIMATE: ABNORMAL
PMV BLD AUTO: 8.5 FL (ref 8.1–13.5)
POTASSIUM SERPL-SCNC: 4.7 MMOL/L (ref 3.7–5.3)
RBC # BLD: 3.63 M/UL (ref 3.95–5.11)
RBC # BLD: ABNORMAL 10*6/UL
SEG NEUTROPHILS: 62 % (ref 36–65)
SEGMENTED NEUTROPHILS ABSOLUTE COUNT: 2.56 K/UL (ref 1.5–8.1)
SODIUM BLD-SCNC: 141 MMOL/L (ref 135–144)
TOTAL PROTEIN: 7 G/DL (ref 6.4–8.3)
TSH SERPL DL<=0.05 MIU/L-ACNC: 5.76 MIU/L (ref 0.3–5)
WBC # BLD: 4.1 K/UL (ref 3.5–11.3)
WBC # BLD: ABNORMAL 10*3/UL

## 2021-04-14 PROCEDURE — 82947 ASSAY GLUCOSE BLOOD QUANT: CPT

## 2021-04-14 PROCEDURE — 96413 CHEMO IV INFUSION 1 HR: CPT

## 2021-04-14 PROCEDURE — 84443 ASSAY THYROID STIM HORMONE: CPT

## 2021-04-14 PROCEDURE — 36591 DRAW BLOOD OFF VENOUS DEVICE: CPT

## 2021-04-14 PROCEDURE — 36415 COLL VENOUS BLD VENIPUNCTURE: CPT

## 2021-04-14 PROCEDURE — 80053 COMPREHEN METABOLIC PANEL: CPT

## 2021-04-14 PROCEDURE — 6360000002 HC RX W HCPCS: Performed by: INTERNAL MEDICINE

## 2021-04-14 PROCEDURE — 85025 COMPLETE CBC W/AUTO DIFF WBC: CPT

## 2021-04-14 PROCEDURE — 2580000003 HC RX 258: Performed by: INTERNAL MEDICINE

## 2021-04-14 RX ORDER — SODIUM CHLORIDE 0.9 % (FLUSH) 0.9 %
5 SYRINGE (ML) INJECTION PRN
Status: CANCELLED | OUTPATIENT
Start: 2021-04-14

## 2021-04-14 RX ORDER — DIPHENHYDRAMINE HYDROCHLORIDE 50 MG/ML
50 INJECTION INTRAMUSCULAR; INTRAVENOUS ONCE
Status: CANCELLED | OUTPATIENT
Start: 2021-04-14 | End: 2021-04-14

## 2021-04-14 RX ORDER — SODIUM CHLORIDE 0.9 % (FLUSH) 0.9 %
10 SYRINGE (ML) INJECTION PRN
Status: DISCONTINUED | OUTPATIENT
Start: 2021-04-14 | End: 2021-04-15 | Stop reason: HOSPADM

## 2021-04-14 RX ORDER — HEPARIN SODIUM (PORCINE) LOCK FLUSH IV SOLN 100 UNIT/ML 100 UNIT/ML
500 SOLUTION INTRAVENOUS PRN
Status: DISCONTINUED | OUTPATIENT
Start: 2021-04-14 | End: 2021-04-15 | Stop reason: HOSPADM

## 2021-04-14 RX ORDER — SODIUM CHLORIDE 9 MG/ML
INJECTION, SOLUTION INTRAVENOUS CONTINUOUS
Status: CANCELLED | OUTPATIENT
Start: 2021-04-14

## 2021-04-14 RX ORDER — METHYLPREDNISOLONE SODIUM SUCCINATE 125 MG/2ML
125 INJECTION, POWDER, LYOPHILIZED, FOR SOLUTION INTRAMUSCULAR; INTRAVENOUS ONCE
Status: CANCELLED | OUTPATIENT
Start: 2021-04-14 | End: 2021-04-14

## 2021-04-14 RX ORDER — SODIUM CHLORIDE 9 MG/ML
20 INJECTION, SOLUTION INTRAVENOUS ONCE
Status: COMPLETED | OUTPATIENT
Start: 2021-04-14 | End: 2021-04-14

## 2021-04-14 RX ADMIN — HEPARIN 500 UNITS: 100 SYRINGE at 13:43

## 2021-04-14 RX ADMIN — SODIUM CHLORIDE 20 ML/HR: 9 INJECTION, SOLUTION INTRAVENOUS at 11:15

## 2021-04-14 RX ADMIN — SODIUM CHLORIDE 740 MG: 9 INJECTION, SOLUTION INTRAVENOUS at 12:21

## 2021-04-14 RX ADMIN — SODIUM CHLORIDE, PRESERVATIVE FREE 10 ML: 5 INJECTION INTRAVENOUS at 13:43

## 2021-04-14 NOTE — PLAN OF CARE
Problem: Safety:  Goal: Free from accidental physical injury  Description: Free from accidental physical injury  4/14/2021 1240 by Axel Mckeon RN  Outcome: Completed

## 2021-04-14 NOTE — PROGRESS NOTES
Patient arrive  for cycle  day 1 treatment .  Pt stated that she is feeling depressed , sleeping more than she is awake , loss of interest in anything . Denies any other complaint or concern. Vitals as charted. Port accessed   Labs reviewed   Blood Sugar 49, Juice and food given to pt   Informed MD of lab value and pt concerns , orders received to have pt follow up with PCP about depression , obtain glucose before pt is d/c, ok to treat pt . Glucose obtained -76. Writer informed pt to check blood sugar this evening , and make sure pt is eating through out the day . Imfinzi infused with no sign of adverse reaction; line flushed.   Port flushed and heparinized with intact johnston needle removed per protocol.   Patient discharged off unit .

## 2021-04-15 ENCOUNTER — TELEPHONE (OUTPATIENT)
Dept: SPIRITUAL SERVICES | Age: 71
End: 2021-04-15

## 2021-04-16 DIAGNOSIS — C34.11 MALIGNANT NEOPLASM OF UPPER LOBE OF RIGHT LUNG (HCC): Primary | ICD-10-CM

## 2021-04-22 ENCOUNTER — TELEPHONE (OUTPATIENT)
Dept: CASE MANAGEMENT | Age: 71
End: 2021-04-22

## 2021-04-22 ENCOUNTER — HOSPITAL ENCOUNTER (OUTPATIENT)
Facility: MEDICAL CENTER | Age: 71
End: 2021-04-22
Payer: MEDICARE

## 2021-04-28 ENCOUNTER — TELEPHONE (OUTPATIENT)
Dept: ONCOLOGY | Age: 71
End: 2021-04-28

## 2021-04-28 ENCOUNTER — OFFICE VISIT (OUTPATIENT)
Dept: ONCOLOGY | Age: 71
End: 2021-04-28
Payer: MEDICARE

## 2021-04-28 ENCOUNTER — HOSPITAL ENCOUNTER (OUTPATIENT)
Dept: INFUSION THERAPY | Facility: MEDICAL CENTER | Age: 71
Discharge: HOME | End: 2021-04-28
Payer: MEDICARE

## 2021-04-28 VITALS
WEIGHT: 181.3 LBS | RESPIRATION RATE: 16 BRPM | HEART RATE: 93 BPM | TEMPERATURE: 98.9 F | DIASTOLIC BLOOD PRESSURE: 53 MMHG | SYSTOLIC BLOOD PRESSURE: 104 MMHG | BODY MASS INDEX: 27.57 KG/M2

## 2021-04-28 DIAGNOSIS — C34.91 NON-SMALL CELL CANCER OF RIGHT LUNG (HCC): Primary | ICD-10-CM

## 2021-04-28 DIAGNOSIS — C34.11 MALIGNANT NEOPLASM OF UPPER LOBE OF RIGHT LUNG (HCC): ICD-10-CM

## 2021-04-28 LAB
ABSOLUTE EOS #: 0.15 K/UL (ref 0–0.44)
ABSOLUTE IMMATURE GRANULOCYTE: 0.05 K/UL (ref 0–0.3)
ABSOLUTE LYMPH #: 0.99 K/UL (ref 1.1–3.7)
ABSOLUTE MONO #: 0.56 K/UL (ref 0.1–1.2)
ALBUMIN SERPL-MCNC: 3.7 G/DL (ref 3.5–5.2)
ALBUMIN/GLOBULIN RATIO: ABNORMAL (ref 1–2.5)
ALP BLD-CCNC: 54 U/L (ref 35–104)
ALT SERPL-CCNC: 7 U/L (ref 5–33)
ANION GAP SERPL CALCULATED.3IONS-SCNC: 14 MMOL/L (ref 9–17)
AST SERPL-CCNC: 13 U/L
BASOPHILS # BLD: 1 % (ref 0–2)
BASOPHILS ABSOLUTE: 0.03 K/UL (ref 0–0.2)
BILIRUB SERPL-MCNC: 0.15 MG/DL (ref 0.3–1.2)
BUN BLDV-MCNC: 17 MG/DL (ref 8–23)
BUN/CREAT BLD: 14 (ref 9–20)
CALCIUM SERPL-MCNC: 8.4 MG/DL (ref 8.6–10.4)
CHLORIDE BLD-SCNC: 104 MMOL/L (ref 98–107)
CO2: 20 MMOL/L (ref 20–31)
CREAT SERPL-MCNC: 1.23 MG/DL (ref 0.5–0.9)
DIFFERENTIAL TYPE: ABNORMAL
EOSINOPHILS RELATIVE PERCENT: 3 % (ref 1–4)
GFR AFRICAN AMERICAN: 52 ML/MIN
GFR NON-AFRICAN AMERICAN: 43 ML/MIN
GFR SERPL CREATININE-BSD FRML MDRD: ABNORMAL ML/MIN/{1.73_M2}
GFR SERPL CREATININE-BSD FRML MDRD: ABNORMAL ML/MIN/{1.73_M2}
GLUCOSE BLD-MCNC: 50 MG/DL (ref 70–99)
HCT VFR BLD CALC: 32.2 % (ref 36.3–47.1)
HEMOGLOBIN: 9.6 G/DL (ref 11.9–15.1)
IMMATURE GRANULOCYTES: 1 %
LYMPHOCYTES # BLD: 18 % (ref 24–43)
MCH RBC QN AUTO: 25.3 PG (ref 25.2–33.5)
MCHC RBC AUTO-ENTMCNC: 29.8 G/DL (ref 28.4–34.8)
MCV RBC AUTO: 85 FL (ref 82.6–102.9)
MONOCYTES # BLD: 10 % (ref 3–12)
NRBC AUTOMATED: 0 PER 100 WBC
PDW BLD-RTO: 15.1 % (ref 11.8–14.4)
PLATELET # BLD: 248 K/UL (ref 138–453)
PLATELET ESTIMATE: ABNORMAL
PMV BLD AUTO: 8.3 FL (ref 8.1–13.5)
POTASSIUM SERPL-SCNC: 4.3 MMOL/L (ref 3.7–5.3)
RBC # BLD: 3.79 M/UL (ref 3.95–5.11)
RBC # BLD: ABNORMAL 10*6/UL
SEG NEUTROPHILS: 67 % (ref 36–65)
SEGMENTED NEUTROPHILS ABSOLUTE COUNT: 3.72 K/UL (ref 1.5–8.1)
SODIUM BLD-SCNC: 138 MMOL/L (ref 135–144)
TOTAL PROTEIN: 7 G/DL (ref 6.4–8.3)
TSH SERPL DL<=0.05 MIU/L-ACNC: 4.43 MIU/L (ref 0.3–5)
WBC # BLD: 5.5 K/UL (ref 3.5–11.3)
WBC # BLD: ABNORMAL 10*3/UL

## 2021-04-28 PROCEDURE — 6360000002 HC RX W HCPCS: Performed by: INTERNAL MEDICINE

## 2021-04-28 PROCEDURE — 80053 COMPREHEN METABOLIC PANEL: CPT

## 2021-04-28 PROCEDURE — 99211 OFF/OP EST MAY X REQ PHY/QHP: CPT | Performed by: INTERNAL MEDICINE

## 2021-04-28 PROCEDURE — 96413 CHEMO IV INFUSION 1 HR: CPT

## 2021-04-28 PROCEDURE — 2580000003 HC RX 258: Performed by: INTERNAL MEDICINE

## 2021-04-28 PROCEDURE — 85025 COMPLETE CBC W/AUTO DIFF WBC: CPT

## 2021-04-28 PROCEDURE — 84443 ASSAY THYROID STIM HORMONE: CPT

## 2021-04-28 PROCEDURE — 99214 OFFICE O/P EST MOD 30 MIN: CPT | Performed by: INTERNAL MEDICINE

## 2021-04-28 PROCEDURE — 36591 DRAW BLOOD OFF VENOUS DEVICE: CPT

## 2021-04-28 RX ORDER — SODIUM CHLORIDE 9 MG/ML
INJECTION, SOLUTION INTRAVENOUS CONTINUOUS
Status: CANCELLED | OUTPATIENT
Start: 2021-04-28

## 2021-04-28 RX ORDER — HEPARIN SODIUM (PORCINE) LOCK FLUSH IV SOLN 100 UNIT/ML 100 UNIT/ML
500 SOLUTION INTRAVENOUS PRN
Status: DISCONTINUED | OUTPATIENT
Start: 2021-04-28 | End: 2021-04-29 | Stop reason: HOSPADM

## 2021-04-28 RX ORDER — METHYLPREDNISOLONE SODIUM SUCCINATE 125 MG/2ML
125 INJECTION, POWDER, LYOPHILIZED, FOR SOLUTION INTRAMUSCULAR; INTRAVENOUS ONCE
Status: CANCELLED | OUTPATIENT
Start: 2021-04-28 | End: 2021-04-28

## 2021-04-28 RX ORDER — SODIUM CHLORIDE 0.9 % (FLUSH) 0.9 %
10 SYRINGE (ML) INJECTION PRN
Status: DISCONTINUED | OUTPATIENT
Start: 2021-04-28 | End: 2021-04-29 | Stop reason: HOSPADM

## 2021-04-28 RX ORDER — DIPHENHYDRAMINE HYDROCHLORIDE 50 MG/ML
50 INJECTION INTRAMUSCULAR; INTRAVENOUS ONCE
Status: CANCELLED | OUTPATIENT
Start: 2021-04-28 | End: 2021-04-28

## 2021-04-28 RX ORDER — SODIUM CHLORIDE 0.9 % (FLUSH) 0.9 %
5 SYRINGE (ML) INJECTION PRN
Status: CANCELLED | OUTPATIENT
Start: 2021-04-28

## 2021-04-28 RX ORDER — ONDANSETRON 4 MG/1
4 TABLET, FILM COATED ORAL EVERY 8 HOURS PRN
Qty: 30 TABLET | Refills: 0 | Status: SHIPPED | OUTPATIENT
Start: 2021-04-28 | End: 2021-06-04 | Stop reason: SDUPTHER

## 2021-04-28 RX ORDER — SODIUM CHLORIDE 9 MG/ML
20 INJECTION, SOLUTION INTRAVENOUS ONCE
Status: COMPLETED | OUTPATIENT
Start: 2021-04-28 | End: 2021-04-28

## 2021-04-28 RX ADMIN — SODIUM CHLORIDE, PRESERVATIVE FREE 10 ML: 5 INJECTION INTRAVENOUS at 13:45

## 2021-04-28 RX ADMIN — SODIUM CHLORIDE 20 ML/HR: 9 INJECTION, SOLUTION INTRAVENOUS at 11:31

## 2021-04-28 RX ADMIN — SODIUM CHLORIDE 740 MG: 9 INJECTION, SOLUTION INTRAVENOUS at 12:29

## 2021-04-28 RX ADMIN — HEPARIN 500 UNITS: 100 SYRINGE at 13:45

## 2021-04-28 RX ADMIN — SODIUM CHLORIDE, PRESERVATIVE FREE 10 ML: 5 INJECTION INTRAVENOUS at 11:31

## 2021-04-28 NOTE — PROGRESS NOTES
Patient arrive ambulatory  for cycle 6 day 1 treatment and MD visit.   Denies complaints or concerns. Vitals as charted. Port accessed;specimen sent. Labs reviewed. MD met with alli hdz to proceed with treatment   Imfinzi infused with no sign of adverse reaction; line flushed.   Port flushed and heparinized with intact johnston needle removed per protocol. Patient ambulated off unit per self at discharge.

## 2021-04-28 NOTE — FLOWSHEET NOTE
Situation:  Writer visited with Ms. Mendoza Zarate in the treatment cubicle of the infusion clinic. Writer was given referral to visit Patient from Leigh Ann Rogers, Nurse Navigator. Assessment:  Ms. Mendoza Zarate processed her concerns and feelings about not being in touch with her son. She voiced her desire to call him. Later, she told writer she could get his phone number from his children whom she sees. She spoke of her close friend who also supports her through phone calls and cooked meals. She reflected on aspects of her life, including the work she did, her parents, and how she was raised. Writer escorted Pt to the lobby of the hospital as Pt waited for her granddaughter to arrive. She shared stories from her life and talked about her way of seeing things. She noticed other people with struggles. She acknowledged that she has always been a sensitive person and aware of others' needs. Intervention:  Writer offered supportive presence and explored Pt's coping and needs; inquired about Pt's desire to reconnect with family; asked about Pt's support system; facilitated story telling; affirmed Pt's strengths; gave Pt a copy of \"Our Daily Bread. \"    Outcome:  Ms. Mendoza Zarate thanked Jonathan Castro. 04/28/21 6196   Encounter Summary   Services provided to: Patient   Referral/Consult From: Nurse   Support System Family members;Friends/neighbors   Continue Visiting   (4/28/21)   Complexity of Encounter Moderate   Length of Encounter 45 minutes   Spiritual Assessment Completed Yes   Routine   Type Follow up   Spiritual/Confucianist   Type Spiritual support   Assessment Calm; Approachable; Hopeful;Coping;Grieving   Intervention Active listening;Explored feelings, thoughts, concerns;Explored coping resources;Sustaining presence/ Ministry of presence;Provided reading materials/devotional materials; Discussed relationship with God;Discussed belief system/Christian practices/chuck;Discussed illness/injury and it's impact; Discussed meaning/purpose   Outcome Receptive;Coping;Expressed feelings/needs/concerns;Engaged in conversation;Expressed gratitude     Electronically signed by Eleazar Bazzi, Oncology Out[facility] [address], [address] [facility]  4/28/2021  5:25 PM

## 2021-04-28 NOTE — PROGRESS NOTES
Today's Date: 4/28/2021  Patient Name: Aure Adams  Patient's age: 80 y. o., 1950    Diagnosis:   RUL squamous cell carcinoma,   Cancer Staging  Malignant neoplasm of upper lobe of right lung (HCC)  Staging form: Lung, AJCC 8th Edition  - Clinical stage from 9/18/2020: Stage IIIB (cT4, cN2, cM0) - Signed by Yari Marcial MD on 9/18/2020    Current therapy:  Started on concurrent chemoradiation with weekly carbotaxol on 10/21/20  She completed radiation therapy in mid December  Her pET scan 2/3/21 showed no recurrence with positive response to treatment  Restarted on consolidation durvalumab 2/3/2021  CHIEF COMPLAINT:    Chief Complaint   Patient presents with    Follow-up    Medication Refill     INTERVAL HISTORY:    Lizz Akins is returning for follow-up visit and to lab results and further recommendations. She denies any chest pain or shortness of breath. She has mild fatigue and now is slowly getting better. She denies any nausea vomiting, fever chills. During this visit patient's allergy, social, medical, surgical history and medications were reviewed and updated. HISTORY OF PRESENT ILLNESS:    Denny Martínez is a 27-year-old female with a past medical history of COPD, history of tobacco abuse, depression, CAD, arthritis was admitted with multiple falls at home. She complains of back pain and also chronic cough. On admission she had a CT scan of the chest which showed 7.2 cm spiculated mass in the right upper lobe with mediastinal lymphadenopathy suspicious for malignancy. 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 Oncology consulted for further evaluation. Patient has COPD and uses Home oxygen at night and sues walker at home. She smokes more than a PPD. 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     Past Medical History:   has a past medical history of AAA (abdominal aortic aneurysm) ([facility] [address] Acid reflux, Anxiety and depression, Aortic stenosis, Arthritis, Blister of ankle, right, CAD (coronary artery disease), Cancer ([facility] [address] COPD (chronic obstructive pulmonary disease) ([facility] [address] Diabetes mellitus ([address] Falls, Heart block, Hypokalemia, MDRO (multiple drug resistant organisms) resistance, MRSA (methicillin resistant staph aureus) culture positive, On home oxygen therapy, On home oxygen therapy, Overactive bladder, Pneumonia, PONV (postoperative nausea and vomiting), and Vitamin D deficiency. Past Surgical History:   has a past surgical history that includes back surgery; eye surgery; Cholecystectomy; Appendectomy; Hysterectomy; Tonsillectomy; lumbar laminectomy; Endoscopy, colon, diagnostic (12/08/2016); aortic valve repair (N/A, 4/11/2017); bronchoscopy (N/A, 8/31/2020); IR INSERT PICC VAD W SQ PORT >5 YEARS (9/4/2020); and IR PORT PLACEMENT > 5 YEARS (9/29/2020). Medications:    Current Outpatient Medications   Medication Sig Dispense Refill    ondansetron (ZOFRAN) 4 MG tablet Take 1 tablet by mouth every 8 hours as needed for Nausea or Vomiting 30 tablet 0    oxyCODONE-acetaminophen (PERCOCET)  MG per tablet Take 1 tablet by mouth every 6 hours as needed for Pain.  traZODone (DESYREL) 300 MG tablet Take 0.5 tablets by mouth nightly 30 tablet 0    clonazePAM (KLONOPIN) 1 MG tablet Take 1 tablet by mouth 3 times daily as needed for Anxiety for up to 3 doses. 3 tablet 0    gabapentin (NEURONTIN) 400 MG capsule Take 1 capsule by mouth 2 times daily for 3 doses.  In addition to 2 capsules (=800mg) nightly 3 capsule 0    ARIPiprazole (ABILIFY) 5 MG tablet Take 1 tablet by mouth daily for 3 doses 3 tablet 0    furosemide (LASIX) 40 MG tablet Take 40 mg by mouth daily      melatonin 5 MG TABS tablet Take 5 mg by mouth nightly       glimepiride (AMARYL) 1 MG tablet Take 1 mg by mouth Daily with supper In addition to 2 tablets (=2mg) every morning       metoprolol tartrate Integument: negative for rash, skin lesions, bruises. Hematologic/Lymphatic: negative for easy bruising, bleeding, lymphadenopathy, or petechiae   Endocrine: negative for heat or cold intolerance,weight changes, change in bowel habits and hair loss   Musculoskeletal: negative for myalgias, arthralgias, pain, joint swelling,and bone pain   Neurological: negative for headaches, dizziness, seizures, weakness, numbness    PHYSICAL EXAM:      BP (!) 104/53   Pulse 93   Temp 98.9 °F (37.2 °C) (Temporal)   Resp 16   Wt 181 lb 4.8 oz (82.2 kg)   BMI 27.57 kg/m²    Temp (24hrs), Av.7 °F (36.5 °C), Min:97.3 °F (36.3 °C), Max:98.1 °F (36.7 °C)  General appearance - well appearing, no in pain or distress   Mental status - alert and cooperative   Eyes - pupils equal and reactive, extraocular eye movements intact   Ears - bilateral TM's and external ear canals normal   Mouth - mucous membranes moist, pharynx normal without lesions   Neck - supple, no significant adenopathy   Lymphatics - no palpable lymphadenopathy, no hepatosplenomegaly   Chest - clear to auscultation, no wheezes, rales or rhonchi, symmetric air entry   Heart - normal rate, regular rhythm, normal S1, S2, no murmurs  Abdomen - soft, nontender, nondistended, no masses or organomegaly   Neurological - alert, oriented, normal speech, no focal findings or movement disorder noted   Musculoskeletal - no joint tenderness, deformity or swelling   Extremities - peripheral pulses normal, no pedal edema, no clubbing or cyanosis   Skin - normal coloration and turgor, no rashes, no suspicious skin lesions noted ,    DATA:    Labs:   CBC:   Recent Labs     21  1131   WBC 5.5   HGB 9.6*   HCT 32.2*        BMP:   Recent Labs     21  1131      K 4.3   CO2 20   BUN 17   CREATININE 1.23*   LABGLOM 43*   GLUCOSE 50*     PT/INR: No results for input(s): PROTIME, INR in the last 72 hours.   Surgical Pathology Report   Surgical Pathology and mediastinal adenopathy. *  Healing right rib fractures. IMPRESSION:   1. 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 PET scan negative for distant metastasis: Clinical stage T4 N2 M0, stage IIIb  2. Anemia  3. Leukopenia: chemo related  4. COPD  5. Falls  6. CAD  7. Tobacco abuse  8. Actinomyces bacteremia    RECOMMENDATIONS:  1. I reviewed the laboratory data, imaging studies, biopsy finding, diagnosis, prognosis and treatment options with patient  2. I reviewed the NCCN guidelines and goals of care  3. Currently she is receiving consolidation durvalumab and tolerating well  4. Return to clinic in 4 weeks or earlier if needed  5. Patient's questions were sought and answered to her satisfaction      Mendoza Pereira MD  Hematologist/Medical Oncologist    This note is created with the assistance of a speech recognition program.  While intending to generate a document that actually reflects the content of the visit, the document can still have some errors including those of syntax and sound a like substitutions which may escape proof reading. It such instances, actual meaning can be extrapolated by contextual diversion.

## 2021-05-05 ENCOUNTER — HOSPITAL ENCOUNTER (OUTPATIENT)
Facility: MEDICAL CENTER | Age: 71
End: 2021-05-05
Payer: MEDICARE

## 2021-05-12 ENCOUNTER — TELEPHONE (OUTPATIENT)
Dept: ONCOLOGY | Age: 71
End: 2021-05-12

## 2021-05-12 ENCOUNTER — HOSPITAL ENCOUNTER (OUTPATIENT)
Dept: INFUSION THERAPY | Facility: MEDICAL CENTER | Age: 71
Discharge: HOME | End: 2021-05-12
Payer: MEDICARE

## 2021-05-12 DIAGNOSIS — C34.91 NON-SMALL CELL CANCER OF RIGHT LUNG (HCC): ICD-10-CM

## 2021-05-12 NOTE — TELEPHONE ENCOUNTER
PATIENT CALLED AND LEFT A VM TO CANCEL BECAUSE SHE IS SICK. PATIENT LEFT A NUMBER IF OFFICE NEEDS TO CALL. [phone].

## 2021-05-17 ENCOUNTER — TELEPHONE (OUTPATIENT)
Dept: ONCOLOGY | Age: 71
End: 2021-05-17

## 2021-05-18 ENCOUNTER — HOSPITAL ENCOUNTER (OUTPATIENT)
Facility: MEDICAL CENTER | Age: 71
End: 2021-05-18
Payer: MEDICARE

## 2021-05-19 ENCOUNTER — HOSPITAL ENCOUNTER (OUTPATIENT)
Facility: MEDICAL CENTER | Age: 71
End: 2021-05-19
Payer: MEDICARE

## 2021-05-26 ENCOUNTER — TELEPHONE (OUTPATIENT)
Dept: CASE MANAGEMENT | Age: 71
End: 2021-05-26

## 2021-05-26 ENCOUNTER — HOSPITAL ENCOUNTER (OUTPATIENT)
Dept: INFUSION THERAPY | Facility: MEDICAL CENTER | Age: 71
End: 2021-05-26
Payer: MEDICARE

## 2021-05-26 NOTE — TELEPHONE ENCOUNTER
Name: Juju Bernardo  : 1950  MRN: [id number]    Oncology Navigation Follow-Up Note  Navigator reaching out to pt. Offering assistance with transportation if needed. Pt. Denies she needs assistance , usually has her family bring her for treatment. Plan to follow.    Electronically signed by Say Henriquez RN on 2021 at 3:07 PM

## 2021-05-27 ENCOUNTER — HOSPITAL ENCOUNTER (OUTPATIENT)
Facility: MEDICAL CENTER | Age: 71
End: 2021-05-27
Payer: MEDICARE

## 2021-06-02 ENCOUNTER — HOSPITAL ENCOUNTER (OUTPATIENT)
Dept: INFUSION THERAPY | Facility: MEDICAL CENTER | Age: 71
Discharge: HOME | End: 2021-06-02
Payer: MEDICARE

## 2021-06-02 ENCOUNTER — TELEPHONE (OUTPATIENT)
Dept: ONCOLOGY | Age: 71
End: 2021-06-02

## 2021-06-02 ENCOUNTER — OFFICE VISIT (OUTPATIENT)
Dept: ONCOLOGY | Age: 71
End: 2021-06-02
Payer: MEDICARE

## 2021-06-02 VITALS
WEIGHT: 182.4 LBS | BODY MASS INDEX: 27.73 KG/M2 | HEART RATE: 101 BPM | TEMPERATURE: 98.1 F | RESPIRATION RATE: 18 BRPM | SYSTOLIC BLOOD PRESSURE: 132 MMHG | DIASTOLIC BLOOD PRESSURE: 80 MMHG

## 2021-06-02 DIAGNOSIS — C34.11 MALIGNANT NEOPLASM OF UPPER LOBE OF RIGHT LUNG (HCC): ICD-10-CM

## 2021-06-02 DIAGNOSIS — C34.91 NON-SMALL CELL CANCER OF RIGHT LUNG (HCC): Primary | ICD-10-CM

## 2021-06-02 DIAGNOSIS — C34.11 MALIGNANT NEOPLASM OF UPPER LOBE OF RIGHT LUNG (HCC): Primary | ICD-10-CM

## 2021-06-02 LAB
ABSOLUTE EOS #: 0.12 K/UL (ref 0–0.44)
ABSOLUTE IMMATURE GRANULOCYTE: 0.06 K/UL (ref 0–0.3)
ABSOLUTE LYMPH #: 0.85 K/UL (ref 1.1–3.7)
ABSOLUTE MONO #: 0.38 K/UL (ref 0.1–1.2)
ALBUMIN SERPL-MCNC: 3.6 G/DL (ref 3.5–5.2)
ALBUMIN/GLOBULIN RATIO: ABNORMAL (ref 1–2.5)
ALP BLD-CCNC: 51 U/L (ref 35–104)
ALT SERPL-CCNC: 8 U/L (ref 5–33)
ANION GAP SERPL CALCULATED.3IONS-SCNC: 12 MMOL/L (ref 9–17)
AST SERPL-CCNC: 14 U/L
BASOPHILS # BLD: 1 % (ref 0–2)
BASOPHILS ABSOLUTE: 0.03 K/UL (ref 0–0.2)
BILIRUB SERPL-MCNC: 0.16 MG/DL (ref 0.3–1.2)
BUN BLDV-MCNC: 10 MG/DL (ref 8–23)
BUN/CREAT BLD: 10 (ref 9–20)
CALCIUM SERPL-MCNC: 8.4 MG/DL (ref 8.6–10.4)
CHLORIDE BLD-SCNC: 103 MMOL/L (ref 98–107)
CO2: 24 MMOL/L (ref 20–31)
CREAT SERPL-MCNC: 1.02 MG/DL (ref 0.5–0.9)
DIFFERENTIAL TYPE: ABNORMAL
EOSINOPHILS RELATIVE PERCENT: 2 % (ref 1–4)
GFR AFRICAN AMERICAN: >60 ML/MIN
GFR NON-AFRICAN AMERICAN: 54 ML/MIN
GFR SERPL CREATININE-BSD FRML MDRD: ABNORMAL ML/MIN/{1.73_M2}
GFR SERPL CREATININE-BSD FRML MDRD: ABNORMAL ML/MIN/{1.73_M2}
GLUCOSE BLD-MCNC: 90 MG/DL (ref 70–99)
HCT VFR BLD CALC: 31.8 % (ref 36.3–47.1)
HEMOGLOBIN: 9.4 G/DL (ref 11.9–15.1)
IMMATURE GRANULOCYTES: 1 %
LYMPHOCYTES # BLD: 15 % (ref 24–43)
MCH RBC QN AUTO: 24.9 PG (ref 25.2–33.5)
MCHC RBC AUTO-ENTMCNC: 29.6 G/DL (ref 28.4–34.8)
MCV RBC AUTO: 84.1 FL (ref 82.6–102.9)
MONOCYTES # BLD: 7 % (ref 3–12)
NRBC AUTOMATED: 0 PER 100 WBC
PDW BLD-RTO: 15.9 % (ref 11.8–14.4)
PLATELET # BLD: 262 K/UL (ref 138–453)
PLATELET ESTIMATE: ABNORMAL
PMV BLD AUTO: 8.5 FL (ref 8.1–13.5)
POTASSIUM SERPL-SCNC: 4.3 MMOL/L (ref 3.7–5.3)
RBC # BLD: 3.78 M/UL (ref 3.95–5.11)
RBC # BLD: ABNORMAL 10*6/UL
SEG NEUTROPHILS: 74 % (ref 36–65)
SEGMENTED NEUTROPHILS ABSOLUTE COUNT: 4.42 K/UL (ref 1.5–8.1)
SODIUM BLD-SCNC: 139 MMOL/L (ref 135–144)
TOTAL PROTEIN: 7.1 G/DL (ref 6.4–8.3)
TSH SERPL DL<=0.05 MIU/L-ACNC: 3.85 MIU/L (ref 0.3–5)
WBC # BLD: 5.9 K/UL (ref 3.5–11.3)
WBC # BLD: ABNORMAL 10*3/UL

## 2021-06-02 PROCEDURE — 36591 DRAW BLOOD OFF VENOUS DEVICE: CPT

## 2021-06-02 PROCEDURE — 99215 OFFICE O/P EST HI 40 MIN: CPT | Performed by: INTERNAL MEDICINE

## 2021-06-02 PROCEDURE — 99211 OFF/OP EST MAY X REQ PHY/QHP: CPT | Performed by: INTERNAL MEDICINE

## 2021-06-02 PROCEDURE — 96413 CHEMO IV INFUSION 1 HR: CPT

## 2021-06-02 PROCEDURE — 2580000003 HC RX 258: Performed by: INTERNAL MEDICINE

## 2021-06-02 PROCEDURE — 6360000002 HC RX W HCPCS: Performed by: INTERNAL MEDICINE

## 2021-06-02 PROCEDURE — 80053 COMPREHEN METABOLIC PANEL: CPT

## 2021-06-02 PROCEDURE — 85025 COMPLETE CBC W/AUTO DIFF WBC: CPT

## 2021-06-02 PROCEDURE — 84443 ASSAY THYROID STIM HORMONE: CPT

## 2021-06-02 PROCEDURE — 99212 OFFICE O/P EST SF 10 MIN: CPT

## 2021-06-02 RX ORDER — SODIUM CHLORIDE 0.9 % (FLUSH) 0.9 %
5 SYRINGE (ML) INJECTION PRN
Status: CANCELLED | OUTPATIENT
Start: 2021-06-02

## 2021-06-02 RX ORDER — HEPARIN SODIUM (PORCINE) LOCK FLUSH IV SOLN 100 UNIT/ML 100 UNIT/ML
500 SOLUTION INTRAVENOUS PRN
Status: CANCELLED | OUTPATIENT
Start: 2021-06-02

## 2021-06-02 RX ORDER — SODIUM CHLORIDE 9 MG/ML
20 INJECTION, SOLUTION INTRAVENOUS ONCE
Status: CANCELLED | OUTPATIENT
Start: 2021-06-02 | End: 2021-06-02

## 2021-06-02 RX ORDER — SODIUM CHLORIDE 0.9 % (FLUSH) 0.9 %
10 SYRINGE (ML) INJECTION PRN
Status: DISCONTINUED | OUTPATIENT
Start: 2021-06-02 | End: 2021-06-03 | Stop reason: HOSPADM

## 2021-06-02 RX ORDER — DIPHENHYDRAMINE HYDROCHLORIDE 50 MG/ML
50 INJECTION INTRAMUSCULAR; INTRAVENOUS ONCE
Status: CANCELLED | OUTPATIENT
Start: 2021-06-02 | End: 2021-06-02

## 2021-06-02 RX ORDER — METHYLPREDNISOLONE SODIUM SUCCINATE 125 MG/2ML
125 INJECTION, POWDER, LYOPHILIZED, FOR SOLUTION INTRAMUSCULAR; INTRAVENOUS ONCE
Status: CANCELLED | OUTPATIENT
Start: 2021-06-02 | End: 2021-06-02

## 2021-06-02 RX ORDER — EPINEPHRINE 1 MG/ML
0.3 INJECTION, SOLUTION, CONCENTRATE INTRAVENOUS PRN
Status: CANCELLED | OUTPATIENT
Start: 2021-06-02

## 2021-06-02 RX ORDER — HEPARIN SODIUM (PORCINE) LOCK FLUSH IV SOLN 100 UNIT/ML 100 UNIT/ML
500 SOLUTION INTRAVENOUS PRN
Status: DISCONTINUED | OUTPATIENT
Start: 2021-06-02 | End: 2021-06-03 | Stop reason: HOSPADM

## 2021-06-02 RX ORDER — SODIUM CHLORIDE 0.9 % (FLUSH) 0.9 %
10 SYRINGE (ML) INJECTION PRN
Status: CANCELLED | OUTPATIENT
Start: 2021-06-02

## 2021-06-02 RX ORDER — SODIUM CHLORIDE 9 MG/ML
INJECTION, SOLUTION INTRAVENOUS CONTINUOUS
Status: CANCELLED | OUTPATIENT
Start: 2021-06-02

## 2021-06-02 RX ORDER — SODIUM CHLORIDE 9 MG/ML
20 INJECTION, SOLUTION INTRAVENOUS ONCE
Status: COMPLETED | OUTPATIENT
Start: 2021-06-02 | End: 2021-06-02

## 2021-06-02 RX ADMIN — SODIUM CHLORIDE 20 ML/HR: 9 INJECTION, SOLUTION INTRAVENOUS at 12:31

## 2021-06-02 RX ADMIN — SODIUM CHLORIDE 740 MG: 9 INJECTION, SOLUTION INTRAVENOUS at 12:31

## 2021-06-02 RX ADMIN — HEPARIN 500 UNITS: 100 SYRINGE at 13:50

## 2021-06-02 RX ADMIN — SODIUM CHLORIDE, PRESERVATIVE FREE 10 ML: 5 INJECTION INTRAVENOUS at 13:50

## 2021-06-02 NOTE — TELEPHONE ENCOUNTER
Madeline Zayas MD VISIT & TX  TX AS PLANNED  RV 2 WKS W/ SCAN  CT C/A/P IS ON 6/11/21 @ [address] @ 3:30PM ARRIVAL @ 2:30PM  MD VISIT 6/16/21 @ 11:30AM  AVS PRINTED W/ INSTRUCTIONS AND GIVEN TO PT ON EXIT Products Recommended: SPF 30+ with titanium or zinc oxide General Sunscreen Counseling: I recommended a broad spectrum sunscreen with a SPF of 30 or higher.  I explained that SPF 30 sunscreens block approximately 97 percent of the sun's harmful rays.  Sunscreens should be applied at least 15 minutes prior to expected sun exposure and then every 2 hours after that as long as sun exposure continues. If swimming or exercising sunscreen should be reapplied every 45 minutes to an hour after getting wet or sweating.  One ounce, or the equivalent of a shot glass full of sunscreen, is adequate to protect the skin not covered by a bathing suit. I also recommended a lip balm with a sunscreen as well. Sun protective clothing can be used in lieu of sunscreen but must be worn the entire time you are exposed to the sun's rays. Detail Level: Detailed

## 2021-06-02 NOTE — PROGRESS NOTES
Today's Date: 6/2/2021  Patient Name: July Clark  Patient's age: 80 y. o., 1950    Diagnosis:   RUL squamous cell carcinoma,   Cancer Staging  Malignant neoplasm of upper lobe of right lung (HCC)  Staging form: Lung, AJCC 8th Edition  - Clinical stage from 9/18/2020: Stage IIIB (cT4, cN2, cM0) - Signed by Angela Key MD on 9/18/2020    Current therapy:  Started on concurrent chemoradiation with weekly carbotaxol on 10/21/20  She completed radiation therapy in mid December  Her pET scan 2/3/21 showed no recurrence with positive response to treatment  Restarted on consolidation durvalumab 2/3/2021  CHIEF COMPLAINT:    Chief Complaint   Patient presents with    Follow-up    Other     please do not prescribe any pain medication / gets from [facility]     could not giet up / spend night / day on floor      INTERVAL HISTORY:    Johana Banks is returning for follow-up visit and to discuss lab results and further recommendations. She does have some fatigue. She reported that she fell on Saturday at home and has hurt her left hip and noticed small bruise there. She is able to ambulate and denies any head injury. She denies any lightheadedness or passing out. Her shortness of breath is stable. She denies any abdominal pain, nausea vomiting, burning sensation in the urine, fever chills. During this visit patient's allergy, social, medical, surgical history and medications were reviewed and updated. HISTORY OF PRESENT ILLNESS:    Caro Jeans is a 61-year-old female with a past medical history of COPD, history of tobacco abuse, depression, CAD, arthritis was admitted with multiple falls at home. She complains of back pain and also chronic cough. On admission she had a CT scan of the chest which showed 7.2 cm spiculated mass in the right upper lobe with mediastinal lymphadenopathy suspicious for malignancy.   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 Oncology consulted for further evaluation. Patient has COPD and uses Home oxygen at night and sues walker at home. She smokes more than a PPD. 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 Past Medical History:   has a past medical history of AAA (abdominal aortic aneurysm) ([facility] [address] Acid reflux, Anxiety and depression, Aortic stenosis, Arthritis, Blister of ankle, right, CAD (coronary artery disease), Cancer ([facility] [address] COPD (chronic obstructive pulmonary disease) ([facility] [address] Diabetes mellitus ([facility] [address] Falls, Heart block, Hypokalemia, MDRO (multiple drug resistant organisms) resistance, MRSA (methicillin resistant staph aureus) culture positive, On home oxygen therapy, On home oxygen therapy, Overactive bladder, Pneumonia, PONV (postoperative nausea and vomiting), and Vitamin D deficiency. Past Surgical History:   has a past surgical history that includes back surgery; eye surgery; Cholecystectomy; Appendectomy; Hysterectomy; Tonsillectomy; lumbar laminectomy; Endoscopy, colon, diagnostic (12/08/2016); aortic valve repair (N/A, 4/11/2017); bronchoscopy (N/A, 8/31/2020); IR INSERT PICC VAD W SQ PORT >5 YEARS (9/4/2020); and IR PORT PLACEMENT > 5 YEARS (9/29/2020). Medications:    Current Outpatient Medications   Medication Sig Dispense Refill    ondansetron (ZOFRAN) 4 MG tablet Take 1 tablet by mouth every 8 hours as needed for Nausea or Vomiting 30 tablet 0    oxyCODONE-acetaminophen (PERCOCET)  MG per tablet Take 1 tablet by mouth every 6 hours as needed for Pain.  traZODone (DESYREL) 300 MG tablet Take 0.5 tablets by mouth nightly 30 tablet 0    clonazePAM (KLONOPIN) 1 MG tablet Take 1 tablet by mouth 3 times daily as needed for Anxiety for up to 3 doses. 3 tablet 0    gabapentin (NEURONTIN) 400 MG capsule Take 1 capsule by mouth 2 times daily for 3 doses.  In addition to 2 capsules (=800mg) nightly 3 capsule 0    ARIPiprazole (ABILIFY) 5 MG tablet Take 1 tablet by mouth daily for 3 doses 3 tablet 0    furosemide (LASIX) 40 MG tablet Take 40 mg by mouth daily      melatonin 5 MG TABS tablet Take 5 mg by mouth nightly       glimepiride (AMARYL) 1 MG tablet Take 1 mg by mouth Daily with supper In addition to 2 tablets (=2mg) every morning       venlafaxine (EFFEXOR XR) 150 MG extended release capsule Take 150 mg by mouth 2 times daily      lansoprazole (PREVACID) 30 MG delayed release capsule Take 30 mg by mouth daily      metFORMIN (GLUCOPHAGE) 500 MG tablet Take 500 mg by mouth 2 times daily      aspirin EC 81 MG EC tablet Take 81 mg by mouth daily      mometasone-formoterol (DULERA) 200-5 MCG/ACT inhaler Inhale 2 puffs into the lungs every 12 hours as needed (wheezing/SOB)       lidocaine-prilocaine (EMLA) 2.5-2.5 % cream To port site before chemo (Patient not taking: Reported on 4/28/2021) 1 Tube 1    metoprolol tartrate (LOPRESSOR) 25 MG tablet Take 25 mg by mouth 2 times daily       No current facility-administered medications for this visit. Allergies:  Codeine and Dye [iodides]    Social History:   reports that she quit smoking about 4 years ago. She has never used smokeless tobacco. She reports that she does not drink alcohol and does not use drugs. Family History: family history includes Cancer in her father and mother. REVIEW OF SYSTEMS:    Constitutional: ++fatigue, No fever or chills.  No night sweats, no weight loss   Eyes: No eye discharge, double vision, or eye pain   HEENT: negative for sore mouth, sore throat, hoarseness and voice change   Respiratory: negative for cough , sputum, ++dyspnea, wheezing, hemoptysis, chest pain   Cardiovascular: negative for chest pain, dyspnea, palpitations, orthopnea, PND   Gastrointestinal: negative for nausea, vomiting, diarrhea, constipation, abdominal pain, Dysphagia, hematemesis and hematochezia   Genitourinary: negative for frequency, dysuria, nocturia, urinary incontinence, and hematuria   Integument: negative for rash, skin lesions, bruises. Hematologic/Lymphatic: negative for easy bruising, bleeding, lymphadenopathy, or petechiae   Endocrine: negative for heat or cold intolerance,weight changes, change in bowel habits and hair loss   Musculoskeletal: negative for myalgias, arthralgias, pain, joint swelling,and bone pain   Neurological: negative for headaches, dizziness, seizures, weakness, numbness    PHYSICAL EXAM:      /80   Pulse 101   Temp 98.1 °F (36.7 °C) (Oral)   Resp 18   Wt 182 lb 6.4 oz (82.7 kg)   BMI 27.73 kg/m²    Temp (24hrs), Av.7 °F (36.5 °C), Min:97.3 °F (36.3 °C), Max:98.1 °F (36.7 °C)  General appearance - well appearing, no in pain or distress   Mental status - alert and cooperative   Eyes - pupils equal and reactive, extraocular eye movements intact   Ears - bilateral TM's and external ear canals normal   Mouth - mucous membranes moist, pharynx normal without lesions   Neck - supple, no significant adenopathy   Lymphatics - no palpable lymphadenopathy, no hepatosplenomegaly   Chest - clear to auscultation, no wheezes, rales or rhonchi, symmetric air entry   Heart - normal rate, regular rhythm, normal S1, S2, no murmurs  Abdomen - soft, nontender, nondistended, no masses or organomegaly   Neurological - alert, oriented, normal speech, no focal findings or movement disorder noted   Musculoskeletal - no joint tenderness, deformity or swelling   Extremities - peripheral pulses normal, no pedal edema, no clubbing or cyanosis   Skin - normal coloration and turgor, no rashes, no suspicious skin lesions noted ,    DATA:    Labs:   CBC:   Recent Labs     21  1111   WBC 5.9   HGB 9.4*   HCT 31.8*        BMP:   No results for input(s): NA, K, CO2, BUN, CREATININE, LABGLOM, GLUCOSE in the last 72 hours. PT/INR: No results for input(s): PROTIME, INR in the last 72 hours.   Surgical Pathology Report   Surgical Pathology   Collected:  20 5199 Lab status:  Final    Resulting lab:  [facility]    Value:  -- Diagnosis --     BRONCHIAL WASHINGS RIGHT UPPER LOBE:          POSITIVE FOR MALIGNANCY.        SQUAMOUS CELL CARCINOMA.           COMMENT: VOLUME OF TUMOR IN THE CELL BLOCK IS LOW AND ADDITIONAL   MATERIAL WOULD BE REQUIRED IF MOLECULAR TESTING IS NECESSARY. IMAGING DATA:  CT chest 8/27/2020:   FINDINGS:    Mediastinum: Three vessel coronary artery calcification.  Newly enlarged    mediastinal lymph nodes including example station 7 node measuring 3.1 x 2.1    cm on series 2, image 45.         Lungs/pleura: Mild upper lobe predominant centrilobular emphysema. Johny Tommy Spiculated mass of the right upper lobe measuring 7.2 x 6.5 cm with    broad-base of contact with the mediastinal pleura, right major fissure,    encircling and obliterating the right upper lobe bronchus.  Spicules are seen    to extend to the anterior costal pleura.  There is adjacent bronchial wall    thickening and interlobular septal thickening.  Stable 5 mm solid nodule of    the left upper lobe since 2016, benign.  No pleural effusion or pneumothorax.         Upper Abdomen: Adrenals are unremarkable.         Soft Tissues/Bones: Old right rib fractures.              Impression    Approximate 7.2 cm spiculated mass of the right upper lobe likely with    invasion of the mediastinum, adjacent lymphangitic spread and suspected    metastatic mediastinal lymphadenopathy. Scan 9/11/2020: Impression    1. The patient's known right upper lobe lung mass is FDG avid consistent with    the patient's primary malignancy. 2. FDG avid mediastinal lymph nodes consistent with metastatic disease. 3. No abnormal FDG activity is identified involving the abdomen or pelvis. PET 2/3/21  IMPRESSION:   *  Unexpected findings of emphysematous cystitis.   *  Positive response to therapy with markedly decreased size and FDG avidity of right upper lobe mass and mediastinal adenopathy. *  Healing right rib fractures. IMPRESSION:   1. 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 PET scan negative for distant metastasis: Clinical stage T4 N2 M0, stage IIIb  2. Anemia  3. Leukopenia: chemo related  4. COPD  5. Falls  6. CAD  7. Tobacco abuse  8. Actinomyces bacteremia    RECOMMENDATIONS:  1. I reviewed the laboratory data, imaging studies, biopsy finding, diagnosis, prognosis and treatment options with patient  2. I reviewed the NCCN guidelines and goals of care  3. Currently she is receiving consolidation durvalumab and tolerating well   4. Plan to continue consolidation durvalumab as scheduled  5. I will get restaging scans in 2 weeks  6. Return to clinic in 2 weeks with restaging scans prior or earlier if needed  7. Patient's questions were sought and answered to her satisfaction      Mendoza Marrufo MD  Hematologist/Medical Oncologist    This note is created with the assistance of a speech recognition program.  While intending to generate a document that actually reflects the content of the visit, the document can still have some errors including those of syntax and sound a like substitutions which may escape proof reading. It such instances, actual meaning can be extrapolated by contextual diversion.

## 2021-06-03 NOTE — TELEPHONE ENCOUNTER
Message from Zahira Morris requesting a refill on zofran and then inquiring if she can get the Covid vaccine    Called Zahira Morris back advised yes to get the Covid vaccine, but needs to be done in the next couple days as we need to be aware of the timing due to the [address] infusion, which is scheduled for June 16 She verbalizes an understanding     Last refill 4-28-21  Sent to md

## 2021-06-04 ENCOUNTER — TELEPHONE (OUTPATIENT)
Dept: ONCOLOGY | Age: 71
End: 2021-06-04

## 2021-06-04 RX ORDER — ONDANSETRON 4 MG/1
4 TABLET, FILM COATED ORAL EVERY 8 HOURS PRN
Qty: 30 TABLET | Refills: 0 | Status: SHIPPED | OUTPATIENT
Start: 2021-06-04 | End: 2021-06-30 | Stop reason: SDUPTHER

## 2021-06-04 NOTE — TELEPHONE ENCOUNTER
PT HAS CONTACTED AREA OFFICE ON AGING AND THEY CALLED TO OFFICE TO NOTIFY OF PT REQUESTS:    PT REQUESTS SCRIPT FOR NEW WALKER AND HOME CARE OR PHYSICAL THERAPY DUE TO FALLS X 2.    REQUESTING IN HOME, NOTE TO Asia Fuentes MD REVIEW.

## 2021-06-09 ENCOUNTER — HOSPITAL ENCOUNTER (OUTPATIENT)
Facility: MEDICAL CENTER | Age: 71
End: 2021-06-09
Payer: MEDICARE

## 2021-06-16 ENCOUNTER — TELEPHONE (OUTPATIENT)
Dept: ONCOLOGY | Age: 71
End: 2021-06-16

## 2021-06-16 ENCOUNTER — HOSPITAL ENCOUNTER (OUTPATIENT)
Dept: INFUSION THERAPY | Facility: MEDICAL CENTER | Age: 71
Discharge: HOME | End: 2021-06-16
Payer: MEDICARE

## 2021-06-16 ENCOUNTER — HOSPITAL ENCOUNTER (OUTPATIENT)
Facility: MEDICAL CENTER | Age: 71
Discharge: HOME | End: 2021-06-16
Payer: MEDICARE

## 2021-06-16 ENCOUNTER — OFFICE VISIT (OUTPATIENT)
Dept: ONCOLOGY | Age: 71
End: 2021-06-16
Payer: MEDICARE

## 2021-06-16 VITALS
RESPIRATION RATE: 18 BRPM | SYSTOLIC BLOOD PRESSURE: 121 MMHG | WEIGHT: 186.9 LBS | HEART RATE: 98 BPM | TEMPERATURE: 98.3 F | DIASTOLIC BLOOD PRESSURE: 65 MMHG | BODY MASS INDEX: 28.42 KG/M2

## 2021-06-16 DIAGNOSIS — C34.11 MALIGNANT NEOPLASM OF UPPER LOBE OF RIGHT LUNG (HCC): ICD-10-CM

## 2021-06-16 DIAGNOSIS — C34.91 NON-SMALL CELL CANCER OF RIGHT LUNG (HCC): Primary | ICD-10-CM

## 2021-06-16 DIAGNOSIS — C34.11 MALIGNANT NEOPLASM OF UPPER LOBE OF RIGHT LUNG (HCC): Primary | ICD-10-CM

## 2021-06-16 LAB
ABSOLUTE EOS #: 0.2 K/UL (ref 0–0.44)
ABSOLUTE IMMATURE GRANULOCYTE: 0.04 K/UL (ref 0–0.3)
ABSOLUTE LYMPH #: 1.91 K/UL (ref 1.1–3.7)
ABSOLUTE MONO #: 0.52 K/UL (ref 0.1–1.2)
ALBUMIN SERPL-MCNC: 3.6 G/DL (ref 3.5–5.2)
ALBUMIN/GLOBULIN RATIO: ABNORMAL (ref 1–2.5)
ALP BLD-CCNC: 50 U/L (ref 35–104)
ALT SERPL-CCNC: 6 U/L (ref 5–33)
ANION GAP SERPL CALCULATED.3IONS-SCNC: 12 MMOL/L (ref 9–17)
AST SERPL-CCNC: 12 U/L
BASOPHILS # BLD: 1 % (ref 0–2)
BASOPHILS ABSOLUTE: 0.04 K/UL (ref 0–0.2)
BILIRUB SERPL-MCNC: 0.18 MG/DL (ref 0.3–1.2)
BUN BLDV-MCNC: 12 MG/DL (ref 8–23)
BUN/CREAT BLD: 11 (ref 9–20)
CALCIUM SERPL-MCNC: 8.4 MG/DL (ref 8.6–10.4)
CHLORIDE BLD-SCNC: 103 MMOL/L (ref 98–107)
CO2: 24 MMOL/L (ref 20–31)
CREAT SERPL-MCNC: 1.09 MG/DL (ref 0.5–0.9)
DIFFERENTIAL TYPE: ABNORMAL
EOSINOPHILS RELATIVE PERCENT: 4 % (ref 1–4)
GFR AFRICAN AMERICAN: >60 ML/MIN
GFR NON-AFRICAN AMERICAN: 50 ML/MIN
GFR SERPL CREATININE-BSD FRML MDRD: ABNORMAL ML/MIN/{1.73_M2}
GFR SERPL CREATININE-BSD FRML MDRD: ABNORMAL ML/MIN/{1.73_M2}
GLUCOSE BLD-MCNC: 48 MG/DL (ref 70–99)
HCT VFR BLD CALC: 31.4 % (ref 36.3–47.1)
HEMOGLOBIN: 9.1 G/DL (ref 11.9–15.1)
IMMATURE GRANULOCYTES: 1 %
LYMPHOCYTES # BLD: 34 % (ref 24–43)
MCH RBC QN AUTO: 24.7 PG (ref 25.2–33.5)
MCHC RBC AUTO-ENTMCNC: 29 G/DL (ref 28.4–34.8)
MCV RBC AUTO: 85.1 FL (ref 82.6–102.9)
MONOCYTES # BLD: 9 % (ref 3–12)
NRBC AUTOMATED: ABNORMAL PER 100 WBC
PDW BLD-RTO: 16.5 % (ref 11.8–14.4)
PLATELET # BLD: 287 K/UL (ref 138–453)
PLATELET ESTIMATE: ABNORMAL
PMV BLD AUTO: 8.6 FL (ref 8.1–13.5)
POTASSIUM SERPL-SCNC: 4.3 MMOL/L (ref 3.7–5.3)
RBC # BLD: 3.69 M/UL (ref 3.95–5.11)
RBC # BLD: ABNORMAL 10*6/UL
SEG NEUTROPHILS: 52 % (ref 36–65)
SEGMENTED NEUTROPHILS ABSOLUTE COUNT: 2.91 K/UL (ref 1.5–8.1)
SODIUM BLD-SCNC: 139 MMOL/L (ref 135–144)
TOTAL PROTEIN: 7.1 G/DL (ref 6.4–8.3)
TSH SERPL DL<=0.05 MIU/L-ACNC: 12.73 MIU/L (ref 0.3–5)
WBC # BLD: 5.6 K/UL (ref 3.5–11.3)
WBC # BLD: ABNORMAL 10*3/UL

## 2021-06-16 PROCEDURE — 99215 OFFICE O/P EST HI 40 MIN: CPT | Performed by: INTERNAL MEDICINE

## 2021-06-16 PROCEDURE — 99212 OFFICE O/P EST SF 10 MIN: CPT

## 2021-06-16 PROCEDURE — 96413 CHEMO IV INFUSION 1 HR: CPT

## 2021-06-16 PROCEDURE — 80053 COMPREHEN METABOLIC PANEL: CPT

## 2021-06-16 PROCEDURE — 85025 COMPLETE CBC W/AUTO DIFF WBC: CPT

## 2021-06-16 PROCEDURE — 6360000002 HC RX W HCPCS: Performed by: INTERNAL MEDICINE

## 2021-06-16 PROCEDURE — 36591 DRAW BLOOD OFF VENOUS DEVICE: CPT

## 2021-06-16 PROCEDURE — 2580000003 HC RX 258: Performed by: INTERNAL MEDICINE

## 2021-06-16 PROCEDURE — 84443 ASSAY THYROID STIM HORMONE: CPT

## 2021-06-16 RX ORDER — DIPHENHYDRAMINE HYDROCHLORIDE 50 MG/ML
50 INJECTION INTRAMUSCULAR; INTRAVENOUS ONCE
Status: CANCELLED | OUTPATIENT
Start: 2021-07-14 | End: 2021-07-14

## 2021-06-16 RX ORDER — SODIUM CHLORIDE 0.9 % (FLUSH) 0.9 %
5 SYRINGE (ML) INJECTION PRN
Status: CANCELLED | OUTPATIENT
Start: 2021-07-14

## 2021-06-16 RX ORDER — SODIUM CHLORIDE 0.9 % (FLUSH) 0.9 %
5 SYRINGE (ML) INJECTION PRN
Status: CANCELLED | OUTPATIENT
Start: 2021-06-16

## 2021-06-16 RX ORDER — HEPARIN SODIUM (PORCINE) LOCK FLUSH IV SOLN 100 UNIT/ML 100 UNIT/ML
500 SOLUTION INTRAVENOUS PRN
Status: CANCELLED | OUTPATIENT
Start: 2021-06-16

## 2021-06-16 RX ORDER — METHYLPREDNISOLONE SODIUM SUCCINATE 125 MG/2ML
125 INJECTION, POWDER, LYOPHILIZED, FOR SOLUTION INTRAMUSCULAR; INTRAVENOUS ONCE
Status: CANCELLED | OUTPATIENT
Start: 2021-07-14 | End: 2021-07-14

## 2021-06-16 RX ORDER — EPINEPHRINE 1 MG/ML
0.3 INJECTION, SOLUTION, CONCENTRATE INTRAVENOUS PRN
Status: CANCELLED | OUTPATIENT
Start: 2021-07-14

## 2021-06-16 RX ORDER — METHYLPREDNISOLONE SODIUM SUCCINATE 125 MG/2ML
125 INJECTION, POWDER, LYOPHILIZED, FOR SOLUTION INTRAMUSCULAR; INTRAVENOUS ONCE
Status: CANCELLED | OUTPATIENT
Start: 2021-06-16 | End: 2021-06-16

## 2021-06-16 RX ORDER — SODIUM CHLORIDE 9 MG/ML
20 INJECTION, SOLUTION INTRAVENOUS ONCE
Status: CANCELLED | OUTPATIENT
Start: 2021-06-16 | End: 2021-06-16

## 2021-06-16 RX ORDER — EPINEPHRINE 1 MG/ML
0.3 INJECTION, SOLUTION, CONCENTRATE INTRAVENOUS PRN
Status: CANCELLED | OUTPATIENT
Start: 2021-06-30

## 2021-06-16 RX ORDER — HEPARIN SODIUM (PORCINE) LOCK FLUSH IV SOLN 100 UNIT/ML 100 UNIT/ML
500 SOLUTION INTRAVENOUS PRN
Status: CANCELLED | OUTPATIENT
Start: 2021-07-14

## 2021-06-16 RX ORDER — SODIUM CHLORIDE 0.9 % (FLUSH) 0.9 %
10 SYRINGE (ML) INJECTION PRN
Status: CANCELLED | OUTPATIENT
Start: 2021-07-14

## 2021-06-16 RX ORDER — HEPARIN SODIUM (PORCINE) LOCK FLUSH IV SOLN 100 UNIT/ML 100 UNIT/ML
500 SOLUTION INTRAVENOUS PRN
Status: CANCELLED | OUTPATIENT
Start: 2021-06-30

## 2021-06-16 RX ORDER — EPINEPHRINE 1 MG/ML
0.3 INJECTION, SOLUTION, CONCENTRATE INTRAVENOUS PRN
Status: CANCELLED | OUTPATIENT
Start: 2021-06-16

## 2021-06-16 RX ORDER — SODIUM CHLORIDE 0.9 % (FLUSH) 0.9 %
5 SYRINGE (ML) INJECTION PRN
Status: CANCELLED | OUTPATIENT
Start: 2021-06-30

## 2021-06-16 RX ORDER — DIPHENHYDRAMINE HYDROCHLORIDE 50 MG/ML
50 INJECTION INTRAMUSCULAR; INTRAVENOUS ONCE
Status: CANCELLED | OUTPATIENT
Start: 2021-06-16 | End: 2021-06-16

## 2021-06-16 RX ORDER — SODIUM CHLORIDE 9 MG/ML
INJECTION, SOLUTION INTRAVENOUS CONTINUOUS
Status: CANCELLED | OUTPATIENT
Start: 2021-06-16

## 2021-06-16 RX ORDER — METHYLPREDNISOLONE SODIUM SUCCINATE 125 MG/2ML
125 INJECTION, POWDER, LYOPHILIZED, FOR SOLUTION INTRAMUSCULAR; INTRAVENOUS ONCE
Status: CANCELLED | OUTPATIENT
Start: 2021-06-30 | End: 2021-06-30

## 2021-06-16 RX ORDER — SODIUM CHLORIDE 9 MG/ML
20 INJECTION, SOLUTION INTRAVENOUS ONCE
Status: COMPLETED | OUTPATIENT
Start: 2021-06-16 | End: 2021-06-16

## 2021-06-16 RX ORDER — DIPHENHYDRAMINE HYDROCHLORIDE 50 MG/ML
50 INJECTION INTRAMUSCULAR; INTRAVENOUS ONCE
Status: CANCELLED | OUTPATIENT
Start: 2021-06-30 | End: 2021-06-30

## 2021-06-16 RX ORDER — SODIUM CHLORIDE 9 MG/ML
INJECTION, SOLUTION INTRAVENOUS CONTINUOUS
Status: CANCELLED | OUTPATIENT
Start: 2021-06-30

## 2021-06-16 RX ORDER — SODIUM CHLORIDE 9 MG/ML
20 INJECTION, SOLUTION INTRAVENOUS ONCE
Status: CANCELLED | OUTPATIENT
Start: 2021-06-30 | End: 2021-06-30

## 2021-06-16 RX ORDER — SODIUM CHLORIDE 9 MG/ML
20 INJECTION, SOLUTION INTRAVENOUS ONCE
Status: CANCELLED | OUTPATIENT
Start: 2021-07-14 | End: 2021-07-14

## 2021-06-16 RX ORDER — SODIUM CHLORIDE 0.9 % (FLUSH) 0.9 %
10 SYRINGE (ML) INJECTION PRN
Status: CANCELLED | OUTPATIENT
Start: 2021-06-16

## 2021-06-16 RX ORDER — SODIUM CHLORIDE 9 MG/ML
INJECTION, SOLUTION INTRAVENOUS CONTINUOUS
Status: CANCELLED | OUTPATIENT
Start: 2021-07-14

## 2021-06-16 RX ORDER — HEPARIN SODIUM (PORCINE) LOCK FLUSH IV SOLN 100 UNIT/ML 100 UNIT/ML
500 SOLUTION INTRAVENOUS PRN
Status: DISCONTINUED | OUTPATIENT
Start: 2021-06-16 | End: 2021-06-17 | Stop reason: HOSPADM

## 2021-06-16 RX ORDER — SODIUM CHLORIDE 0.9 % (FLUSH) 0.9 %
10 SYRINGE (ML) INJECTION PRN
Status: CANCELLED | OUTPATIENT
Start: 2021-06-30

## 2021-06-16 RX ADMIN — SODIUM CHLORIDE 20 ML/HR: 9 INJECTION, SOLUTION INTRAVENOUS at 13:23

## 2021-06-16 RX ADMIN — SODIUM CHLORIDE 740 MG: 9 INJECTION, SOLUTION INTRAVENOUS at 13:23

## 2021-06-16 RX ADMIN — HEPARIN 500 UNITS: 100 SYRINGE at 14:47

## 2021-06-16 NOTE — PROGRESS NOTES
Today's Date: 6/16/2021  Patient Name: Dagmar Wheeler  Patient's age: 80 y. o., 1950    Diagnosis:   RUL squamous cell carcinoma,   Cancer Staging  Malignant neoplasm of upper lobe of right lung (HCC)  Staging form: Lung, AJCC 8th Edition  - Clinical stage from 9/18/2020: Stage IIIB (cT4, cN2, cM0) - Signed by Chanel Tariq MD on 9/18/2020    Current therapy:  Started on concurrent chemoradiation with weekly carbotaxol on 10/21/20  She completed radiation therapy in mid December  Her pET scan 2/3/21 showed no recurrence with positive response to treatment  Restarted on consolidation durvalumab 2/3/2021    CHIEF COMPLAINT:    Chief Complaint   Patient presents with    Follow-up    Results     CT Chest / [facility]    Pain     right chest area / started last night      INTERVAL HISTORY:    Yandel Ferreira is is returning for follow-up and to discuss lab results, CT scan results and further recommendations. Her CT scan showed increase in the size of lung mass as well as new lung nodule and also small lesion noted in the pancreas. Clinically she is doing well. She reports some chest pain which was started last night. She denies any worsening shortness of breath. Her blood sugar have been running low. I advised her to follow-up with her PCP to regulate her diabetic medications. She denies any abdominal pain, nausea vomiting, burning sensation in the urine, fever chills. During this visit patient's allergy, social, medical, surgical history and medications were reviewed and updated. HISTORY OF PRESENT ILLNESS:    Sanam Medellin is a 45-year-old female with a past medical history of COPD, history of tobacco abuse, depression, CAD, arthritis was admitted with multiple falls at home. She complains of back pain and also chronic cough.   On admission she had a CT scan of the chest which showed 7.2 cm spiculated mass in the right upper lobe with mediastinal lymphadenopathy suspicious for malignancy. 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 Oncology consulted for further evaluation. Patient has COPD and uses Home oxygen at night and sues walker at home. She smokes more than a PPD. 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 Past Medical History:   has a past medical history of AAA (abdominal aortic aneurysm) ([facility] [address] Acid reflux, Anxiety and depression, Aortic stenosis, Arthritis, Blister of ankle, right, CAD (coronary artery disease), Cancer ([facility] [address] COPD (chronic obstructive pulmonary disease) ([facility] [address] Diabetes mellitus ([facility] [address] Falls, Heart block, Hypokalemia, MDRO (multiple drug resistant organisms) resistance, MRSA (methicillin resistant staph aureus) culture positive, On home oxygen therapy, On home oxygen therapy, Overactive bladder, Pneumonia, PONV (postoperative nausea and vomiting), and Vitamin D deficiency. Past Surgical History:   has a past surgical history that includes back surgery; eye surgery; Cholecystectomy; Appendectomy; Hysterectomy; Tonsillectomy; lumbar laminectomy; Endoscopy, colon, diagnostic (12/08/2016); aortic valve repair (N/A, 4/11/2017); bronchoscopy (N/A, 8/31/2020); IR INSERT PICC VAD W SQ PORT >5 YEARS (9/4/2020); and IR PORT PLACEMENT > 5 YEARS (9/29/2020). Medications:    Current Outpatient Medications   Medication Sig Dispense Refill    ondansetron (ZOFRAN) 4 MG tablet Take 1 tablet by mouth every 8 hours as needed for Nausea or Vomiting 30 tablet 0    oxyCODONE-acetaminophen (PERCOCET)  MG per tablet Take 1 tablet by mouth every 6 hours as needed for Pain.  traZODone (DESYREL) 300 MG tablet Take 0.5 tablets by mouth nightly 30 tablet 0    clonazePAM (KLONOPIN) 1 MG tablet Take 1 tablet by mouth 3 times daily as needed for Anxiety for up to 3 doses.  3 tablet 0    gabapentin (NEURONTIN) 400 MG capsule Take 1 capsule by mouth 2 times daily for 3 hematochezia   Genitourinary: negative for frequency, dysuria, nocturia, urinary incontinence, and hematuria   Integument: negative for rash, skin lesions, bruises. Hematologic/Lymphatic: negative for easy bruising, bleeding, lymphadenopathy, or petechiae   Endocrine: negative for heat or cold intolerance,weight changes, change in bowel habits and hair loss   Musculoskeletal: negative for myalgias, arthralgias, pain, joint swelling,and bone pain   Neurological: negative for headaches, dizziness, seizures, weakness, numbness    PHYSICAL EXAM:      There were no vitals taken for this visit. Temp (24hrs), Av.7 °F (36.5 °C), Min:97.3 °F (36.3 °C), Max:98.1 °F (36.7 °C)  General appearance - well appearing, no in pain or distress   Mental status - alert and cooperative   Eyes - pupils equal and reactive, extraocular eye movements intact   Ears - bilateral TM's and external ear canals normal   Mouth - mucous membranes moist, pharynx normal without lesions   Neck - supple, no significant adenopathy   Lymphatics - no palpable lymphadenopathy, no hepatosplenomegaly   Chest - clear to auscultation, no wheezes, rales or rhonchi, symmetric air entry   Heart - normal rate, regular rhythm, normal S1, S2, no murmurs  Abdomen - soft, nontender, nondistended, no masses or organomegaly   Neurological - alert, oriented, normal speech, no focal findings or movement disorder noted   Musculoskeletal - no joint tenderness, deformity or swelling   Extremities - peripheral pulses normal, no pedal edema, no clubbing or cyanosis   Skin - normal coloration and turgor, no rashes, no suspicious skin lesions noted ,    DATA:    Labs:   CBC:   Recent Labs     21  1115   WBC 5.6   HGB 9.1*   HCT 31.4*        BMP:   No results for input(s): NA, K, CO2, BUN, CREATININE, LABGLOM, GLUCOSE in the last 72 hours. PT/INR: No results for input(s): PROTIME, INR in the last 72 hours.   Surgical Pathology Report   Surgical Pathology Collected:  08/31/20 0803    Lab status:  Final    Resulting lab:  [facility]    Value:  -- Diagnosis --     BRONCHIAL WASHINGS RIGHT UPPER LOBE:          POSITIVE FOR MALIGNANCY.        SQUAMOUS CELL CARCINOMA.           COMMENT: VOLUME OF TUMOR IN THE CELL BLOCK IS LOW AND ADDITIONAL   MATERIAL WOULD BE REQUIRED IF MOLECULAR TESTING IS NECESSARY. IMAGING DATA:  CT chest 8/27/2020:   FINDINGS:    Mediastinum: Three vessel coronary artery calcification.  Newly enlarged    mediastinal lymph nodes including example station 7 node measuring 3.1 x 2.1    cm on series 2, image 45.         Lungs/pleura: Mild upper lobe predominant centrilobular emphysema. Maurie Yas Spiculated mass of the right upper lobe measuring 7.2 x 6.5 cm with    broad-base of contact with the mediastinal pleura, right major fissure,    encircling and obliterating the right upper lobe bronchus.  Spicules are seen    to extend to the anterior costal pleura.  There is adjacent bronchial wall    thickening and interlobular septal thickening.  Stable 5 mm solid nodule of    the left upper lobe since 2016, benign.  No pleural effusion or pneumothorax.         Upper Abdomen: Adrenals are unremarkable.         Soft Tissues/Bones: Old right rib fractures.              Impression    Approximate 7.2 cm spiculated mass of the right upper lobe likely with    invasion of the mediastinum, adjacent lymphangitic spread and suspected    metastatic mediastinal lymphadenopathy. Scan 9/11/2020: Impression    1. The patient's known right upper lobe lung mass is FDG avid consistent with    the patient's primary malignancy. 2. FDG avid mediastinal lymph nodes consistent with metastatic disease. 3. No abnormal FDG activity is identified involving the abdomen or pelvis. PET 2/3/21  IMPRESSION:   *  Unexpected findings of emphysematous cystitis.   *  Positive response to therapy with markedly decreased size and FDG avidity of right upper lobe mass and mediastinal adenopathy. *  Healing right rib fractures. Ct chest 6/14/21  FINDINGS:     Emphysema.  Spiculated mass in the medial right upper lobe which appears to invade into the upper right mediastinum measures 5.2 x 3.4 cm (4.4 x 1.8 cm previously).  Malignant matted lymphadenopathy in the subcarinal and right paratracheal regions is unchanged.  Noncalcified left upper lobe lung   nodule measuring 0.6 cm is unchanged.  There is a 1.4 cm nodular opacity in the lingula that appear slightly larger than on previous study.  Left chest port in place.  Atherosclerotic thoracic aorta.  Visualized structures in the upper abdomen are unremarkable.  Normal heart size.  No pleural or   pericardial effusions.  The central pulmonary arteries are unremarkable.  Extensive coronary artery calcifications.  Status post median sternotomy.  Wall thickening of the esophagus.  Visualized chest wall structures are unremarkable.  There are multiple healing and/or old rib fractures. IMPRESSION:   1.  Increase in size of spiculated malignant mass in the medial right upper lobe measuring 5.2 x 3.4 cm, which shows evidence of mediastinal invasion.  Matted metastatic lymphadenopathy in the subcarinal and right paratracheal regions is unchanged. 2.  Unchanged noncalcified left upper lobe nodule measuring 0.6 cm.  A 1.4 cm nodular opacity in the lingula appears slightly larger than on previous study. 3.  Wall thickening of esophagus suggestive of esophagitis.      Ct abd  6/13/21  CT images of the abdomen and pelvis are obtained without contrast.  There is a 1.8 cm soft tissue density arising from or directly adjacent to the uncinate process of the pancreas.  Consider contrast-enhanced CT or MRI for further evaluation.  Bowel is unremarkable. Sydelle Piggs is no intraperitoneal or   retroperitoneal lymphadenopathy.  Colonic stool burden is moderate.  The adrenals appear symmetric.  Images the lung bases are grossly clear.  There appear to be remote right rib fractures.  Remote left rib fracture also noted.  There is no hepatic or splenic abnormality.  No hydronephrosis is seen. IMPRESSION:     1.8 cm soft tissue density arising from, or directly adjacent to, the uncinate process of the pancreas.  Contrast-enhanced CT or MRI recommended. IMPRESSION:   1. 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 PET scan negative for distant metastasis: Clinical stage T4 N2 M0, stage IIIb  2. Anemia  3. Leukopenia: chemo related  4. COPD  5. Falls  6. CAD  7. Tobacco abuse  8. Actinomyces bacteremia    RECOMMENDATIONS:  1. I reviewed the laboratory data, imaging studies, biopsy finding, diagnosis, prognosis and treatment options with patient  2. I reviewed the M CT scan results with patient. There is possible progression with increase in the size of lung mass and new pancreatic nodule. I will get PET scan to evaluate this further and I discussed possibility of changing her treatment to chemotherapy subsequently  3. Continue immunotherapy as planned for now  4. I will also get next[facility]generation sequencing with [facility]  5. I reviewed the NCCN guidelines and goals of care  6. C return to clinic in 2 weeks with PET scan prior  7. Patient's questions were sought and answered to her satisfaction      Mendoza Rosales MD  Hematologist/Medical Oncologist    This note is created with the assistance of a speech recognition program.  While intending to generate a document that actually reflects the content of the visit, the document can still have some errors including those of syntax and sound a like substitutions which may escape proof reading. It such instances, actual meaning can be extrapolated by contextual diversion.

## 2021-06-16 NOTE — PROGRESS NOTES
Patient here for labs, MD visit and chemo. Complains of right sided chest pain since last night. States \"that's where my cancer is\". Vitals obtained. Port accessed per policy and labs drawn and sent. Labs reviewed. Seen by MD. Stevenson Gunn since she received bad news from MD. Taylor Anders to treat per Dr. Jennifer Schroeder. Imfinzi hung per MAR. Infusing. No complaints. Completed. Tolerated well. Port flushed per policy and gripper removed intact, band aid to site. Has a return visit scheduled. Discharged ambulatory per self.

## 2021-06-17 ENCOUNTER — TELEPHONE (OUTPATIENT)
Dept: ONCOLOGY | Age: 71
End: 2021-06-17

## 2021-06-23 ENCOUNTER — HOSPITAL ENCOUNTER (OUTPATIENT)
Facility: MEDICAL CENTER | Age: 71
End: 2021-06-23
Payer: MEDICARE

## 2021-06-30 ENCOUNTER — HOSPITAL ENCOUNTER (OUTPATIENT)
Dept: INFUSION THERAPY | Facility: MEDICAL CENTER | Age: 71
Discharge: HOME | End: 2021-06-30
Payer: MEDICARE

## 2021-06-30 ENCOUNTER — TELEPHONE (OUTPATIENT)
Dept: ONCOLOGY | Age: 71
End: 2021-06-30

## 2021-06-30 ENCOUNTER — OFFICE VISIT (OUTPATIENT)
Dept: ONCOLOGY | Age: 71
End: 2021-06-30
Payer: MEDICARE

## 2021-06-30 VITALS
SYSTOLIC BLOOD PRESSURE: 142 MMHG | DIASTOLIC BLOOD PRESSURE: 74 MMHG | HEART RATE: 106 BPM | RESPIRATION RATE: 18 BRPM | BODY MASS INDEX: 27.99 KG/M2 | WEIGHT: 184.1 LBS | TEMPERATURE: 98 F

## 2021-06-30 DIAGNOSIS — C34.11 MALIGNANT NEOPLASM OF UPPER LOBE OF RIGHT LUNG (HCC): ICD-10-CM

## 2021-06-30 DIAGNOSIS — C34.91 NON-SMALL CELL CANCER OF RIGHT LUNG (HCC): Primary | ICD-10-CM

## 2021-06-30 LAB
ABSOLUTE EOS #: 0.1 K/UL (ref 0–0.44)
ABSOLUTE IMMATURE GRANULOCYTE: 0.05 K/UL (ref 0–0.3)
ABSOLUTE LYMPH #: 0.99 K/UL (ref 1.1–3.7)
ABSOLUTE MONO #: 0.43 K/UL (ref 0.1–1.2)
ALBUMIN SERPL-MCNC: 3.6 G/DL (ref 3.5–5.2)
ALBUMIN/GLOBULIN RATIO: ABNORMAL (ref 1–2.5)
ALP BLD-CCNC: 53 U/L (ref 35–104)
ALT SERPL-CCNC: 6 U/L (ref 5–33)
ANION GAP SERPL CALCULATED.3IONS-SCNC: 12 MMOL/L (ref 9–17)
AST SERPL-CCNC: 12 U/L
BASOPHILS # BLD: 1 % (ref 0–2)
BASOPHILS ABSOLUTE: 0.05 K/UL (ref 0–0.2)
BILIRUB SERPL-MCNC: 0.18 MG/DL (ref 0.3–1.2)
BUN BLDV-MCNC: 11 MG/DL (ref 8–23)
BUN/CREAT BLD: 11 (ref 9–20)
CALCIUM SERPL-MCNC: 8.3 MG/DL (ref 8.6–10.4)
CHLORIDE BLD-SCNC: 100 MMOL/L (ref 98–107)
CO2: 23 MMOL/L (ref 20–31)
CREAT SERPL-MCNC: 1.02 MG/DL (ref 0.5–0.9)
DIFFERENTIAL TYPE: ABNORMAL
EOSINOPHILS RELATIVE PERCENT: 2 % (ref 1–4)
GFR AFRICAN AMERICAN: >60 ML/MIN
GFR NON-AFRICAN AMERICAN: 54 ML/MIN
GFR SERPL CREATININE-BSD FRML MDRD: ABNORMAL ML/MIN/{1.73_M2}
GFR SERPL CREATININE-BSD FRML MDRD: ABNORMAL ML/MIN/{1.73_M2}
GLUCOSE BLD-MCNC: 94 MG/DL (ref 70–99)
HCT VFR BLD CALC: 31.3 % (ref 36.3–47.1)
HEMOGLOBIN: 9.2 G/DL (ref 11.9–15.1)
IMMATURE GRANULOCYTES: 1 %
LYMPHOCYTES # BLD: 16 % (ref 24–43)
MCH RBC QN AUTO: 24.7 PG (ref 25.2–33.5)
MCHC RBC AUTO-ENTMCNC: 29.4 G/DL (ref 28.4–34.8)
MCV RBC AUTO: 83.9 FL (ref 82.6–102.9)
MONOCYTES # BLD: 7 % (ref 3–12)
NRBC AUTOMATED: 0 PER 100 WBC
PDW BLD-RTO: 16.6 % (ref 11.8–14.4)
PLATELET # BLD: 264 K/UL (ref 138–453)
PLATELET ESTIMATE: ABNORMAL
PMV BLD AUTO: 9 FL (ref 8.1–13.5)
POTASSIUM SERPL-SCNC: 4.2 MMOL/L (ref 3.7–5.3)
RBC # BLD: 3.73 M/UL (ref 3.95–5.11)
RBC # BLD: ABNORMAL 10*6/UL
SEG NEUTROPHILS: 73 % (ref 36–65)
SEGMENTED NEUTROPHILS ABSOLUTE COUNT: 4.53 K/UL (ref 1.5–8.1)
SODIUM BLD-SCNC: 135 MMOL/L (ref 135–144)
TOTAL PROTEIN: 7 G/DL (ref 6.4–8.3)
TSH SERPL DL<=0.05 MIU/L-ACNC: 12.02 MIU/L (ref 0.3–5)
WBC # BLD: 6.2 K/UL (ref 3.5–11.3)
WBC # BLD: ABNORMAL 10*3/UL

## 2021-06-30 PROCEDURE — 6360000002 HC RX W HCPCS: Performed by: INTERNAL MEDICINE

## 2021-06-30 PROCEDURE — 80053 COMPREHEN METABOLIC PANEL: CPT

## 2021-06-30 PROCEDURE — 99211 OFF/OP EST MAY X REQ PHY/QHP: CPT | Performed by: INTERNAL MEDICINE

## 2021-06-30 PROCEDURE — 84443 ASSAY THYROID STIM HORMONE: CPT

## 2021-06-30 PROCEDURE — 85025 COMPLETE CBC W/AUTO DIFF WBC: CPT

## 2021-06-30 PROCEDURE — 96413 CHEMO IV INFUSION 1 HR: CPT

## 2021-06-30 PROCEDURE — 2580000003 HC RX 258: Performed by: INTERNAL MEDICINE

## 2021-06-30 PROCEDURE — 36591 DRAW BLOOD OFF VENOUS DEVICE: CPT

## 2021-06-30 PROCEDURE — 99215 OFFICE O/P EST HI 40 MIN: CPT | Performed by: INTERNAL MEDICINE

## 2021-06-30 RX ORDER — SODIUM CHLORIDE 0.9 % (FLUSH) 0.9 %
10 SYRINGE (ML) INJECTION PRN
Status: DISCONTINUED | OUTPATIENT
Start: 2021-06-30 | End: 2021-07-01 | Stop reason: HOSPADM

## 2021-06-30 RX ORDER — ONDANSETRON 4 MG/1
4 TABLET, FILM COATED ORAL EVERY 8 HOURS PRN
Qty: 30 TABLET | Refills: 0 | Status: SHIPPED | OUTPATIENT
Start: 2021-06-30 | End: 2021-08-04 | Stop reason: SDUPTHER

## 2021-06-30 RX ORDER — HEPARIN SODIUM (PORCINE) LOCK FLUSH IV SOLN 100 UNIT/ML 100 UNIT/ML
500 SOLUTION INTRAVENOUS PRN
Status: DISCONTINUED | OUTPATIENT
Start: 2021-06-30 | End: 2021-07-01 | Stop reason: HOSPADM

## 2021-06-30 RX ORDER — SODIUM CHLORIDE 9 MG/ML
20 INJECTION, SOLUTION INTRAVENOUS ONCE
Status: COMPLETED | OUTPATIENT
Start: 2021-06-30 | End: 2021-06-30

## 2021-06-30 RX ADMIN — SODIUM CHLORIDE, PRESERVATIVE FREE 10 ML: 5 INJECTION INTRAVENOUS at 14:43

## 2021-06-30 RX ADMIN — SODIUM CHLORIDE, PRESERVATIVE FREE 10 ML: 5 INJECTION INTRAVENOUS at 11:10

## 2021-06-30 RX ADMIN — HEPARIN 500 UNITS: 100 SYRINGE at 14:43

## 2021-06-30 RX ADMIN — SODIUM CHLORIDE 740 MG: 9 INJECTION, SOLUTION INTRAVENOUS at 12:57

## 2021-06-30 RX ADMIN — SODIUM CHLORIDE 20 ML/HR: 9 INJECTION, SOLUTION INTRAVENOUS at 11:10

## 2021-06-30 NOTE — TELEPHONE ENCOUNTER
[address] [facility] MD VISIT & TX  CHEMO AS PLANNED  CHANGE CHEMO TO CARBO TAXOL IN 2 WKS  RV C2 OF CARBO TAXOL  NEW TX CARBO TAXOL PENDING PRECERT W/ SHAYNE ARNOLD VISIT C2  AVS PRINTED W/ INSTRUCTIONS AND GIVEN TO PT ON EXIT

## 2021-06-30 NOTE — PROGRESS NOTES
Today's Date: 6/30/2021  Patient Name: Jose R Metcalf  Patient's age: 80 y. o., 1950    Diagnosis:   RUL squamous cell carcinoma,   Cancer Staging  Malignant neoplasm of upper lobe of right lung (HCC)  Staging form: Lung, AJCC 8th Edition  - Clinical stage from 9/18/2020: Stage IIIB (cT4, cN2, cM0) - Signed by Thais Piña MD on 9/18/2020    Current therapy:  Started on concurrent chemoradiation with weekly carbotaxol on 10/21/20  She completed radiation therapy in mid December  Her pET scan 2/3/21 showed no recurrence with positive response to treatment  Restarted on consolidation durvalumab 2/3/2021  Recent CT chest 6/14/21 showed Increase in size of spiculated malignant mass in the medial right upper lobe measuring 5.2 x 3.4 cm, which shows evidence of mediastinal invasion.  Matted metastatic lymphadenopathy in the subcarinal and right paratracheal regions is unchanged    CHIEF COMPLAINT:    Chief Complaint   Patient presents with    Follow-up    Other     PET Scan scheduled for tomorrow / [facility] [place] by [facility] back yet    Medication Refill     INTERVAL HISTORY:    Juanita Jaime is is returning for follow-up and to discuss lab results, CT scan results and further recommendations. Her CT chest showing progression. During this visit patient's allergy, social, medical, surgical history and medications were reviewed and updated. HISTORY OF PRESENT ILLNESS:    Luana Jon is a 43-year-old female with a past medical history of COPD, history of tobacco abuse, depression, CAD, arthritis was admitted with multiple falls at home. She complains of back pain and also chronic cough. On admission she had a CT scan of the chest which showed 7.2 cm spiculated mass in the right upper lobe with mediastinal lymphadenopathy suspicious for malignancy. 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 Oncology consulted for further evaluation.  Patient has COPD and uses Home oxygen at night and sues walker at home. She smokes more than a PPD. 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 Past Medical History:   has a past medical history of AAA (abdominal aortic aneurysm) ([facility] [address] Acid reflux, Anxiety and depression, Aortic stenosis, Arthritis, Blister of ankle, right, CAD (coronary artery disease), Cancer ([facility] [address] COPD (chronic obstructive pulmonary disease) ([facility] [address] Diabetes mellitus [facility] Falls, Heart block, Hypokalemia, MDRO (multiple drug resistant organisms) resistance, MRSA (methicillin resistant staph aureus) culture positive, On home oxygen therapy, On home oxygen therapy, Overactive bladder, Pneumonia, PONV (postoperative nausea and vomiting), and Vitamin D deficiency. Past Surgical History:   has a past surgical history that includes back surgery; eye surgery; Cholecystectomy; Appendectomy; Hysterectomy; Tonsillectomy; lumbar laminectomy; Endoscopy, colon, diagnostic (12/08/2016); aortic valve repair (N/A, 4/11/2017); bronchoscopy (N/A, 8/31/2020); IR INSERT PICC VAD W SQ PORT >5 YEARS (9/4/2020); and IR PORT PLACEMENT > 5 YEARS (9/29/2020). Medications:    Current Outpatient Medications   Medication Sig Dispense Refill    ondansetron (ZOFRAN) 4 MG tablet Take 1 tablet by mouth every 8 hours as needed for Nausea or Vomiting 30 tablet 0    oxyCODONE-acetaminophen (PERCOCET)  MG per tablet Take 1 tablet by mouth every 6 hours as needed for Pain.  traZODone (DESYREL) 300 MG tablet Take 0.5 tablets by mouth nightly 30 tablet 0    gabapentin (NEURONTIN) 400 MG capsule Take 1 capsule by mouth 2 times daily for 3 doses.  In addition to 2 capsules (=800mg) nightly 3 capsule 0    ARIPiprazole (ABILIFY) 5 MG tablet Take 1 tablet by mouth daily for 3 doses 3 tablet 0    furosemide (LASIX) 40 MG tablet Take 40 mg by mouth daily      melatonin 5 MG TABS tablet Take 5 mg by mouth nightly       glimepiride (AMARYL) 1 MG tablet Take 1 mg by mouth Daily with supper In addition to 2 tablets (=2mg) every morning       metoprolol tartrate (LOPRESSOR) 25 MG tablet Take 25 mg by mouth 2 times daily      venlafaxine (EFFEXOR XR) 150 MG extended release capsule Take 150 mg by mouth 2 times daily      lansoprazole (PREVACID) 30 MG delayed release capsule Take 30 mg by mouth daily      metFORMIN (GLUCOPHAGE) 500 MG tablet Take 500 mg by mouth 2 times daily      aspirin EC 81 MG EC tablet Take 81 mg by mouth daily      mometasone-formoterol (DULERA) 200-5 MCG/ACT inhaler Inhale 2 puffs into the lungs every 12 hours as needed (wheezing/SOB)       lidocaine-prilocaine (EMLA) 2.5-2.5 % cream To port site before chemo (Patient not taking: Reported on 4/28/2021) 1 Tube 1    clonazePAM (KLONOPIN) 1 MG tablet Take 1 tablet by mouth 3 times daily as needed for Anxiety for up to 3 doses. 3 tablet 0     No current facility-administered medications for this visit. Allergies:  Codeine and Dye [iodides]    Social History:   reports that she quit smoking about 4 years ago. She has never used smokeless tobacco. She reports that she does not drink alcohol and does not use drugs. Family History: family history includes Cancer in her father and mother. REVIEW OF SYSTEMS:    Constitutional: ++fatigue, No fever or chills. No night sweats, no weight loss   Eyes: No eye discharge, double vision, or eye pain   HEENT: negative for sore mouth, sore throat, hoarseness and voice change   Respiratory: negative for cough , sputum, ++dyspnea, wheezing, hemoptysis, chest pain   Cardiovascular: negative for chest pain, dyspnea, palpitations, orthopnea, PND   Gastrointestinal: negative for nausea, vomiting, diarrhea, constipation, abdominal pain, Dysphagia, hematemesis and hematochezia   Genitourinary: negative for frequency, dysuria, nocturia, urinary incontinence, and hematuria   Integument: negative for rash, skin lesions, bruises. Hematologic/Lymphatic: negative for easy bruising, bleeding, lymphadenopathy, or petechiae   Endocrine: negative for heat or cold intolerance,weight changes, change in bowel habits and hair loss   Musculoskeletal: negative for myalgias, arthralgias, pain, joint swelling,and bone pain   Neurological: negative for headaches, dizziness, seizures, weakness, numbness    PHYSICAL EXAM:      BP (!) 142/74   Pulse 106   Temp 98 °F (36.7 °C) (Temporal)   Resp 18   Wt 184 lb 1.6 oz (83.5 kg)   BMI 27.99 kg/m²    Temp (24hrs), Av.7 °F (36.5 °C), Min:97.3 °F (36.3 °C), Max:98.1 °F (36.7 °C)  General appearance - well appearing, no in pain or distress   Mental status - alert and cooperative   Eyes - pupils equal and reactive, extraocular eye movements intact   Ears - bilateral TM's and external ear canals normal   Mouth - mucous membranes moist, pharynx normal without lesions   Neck - supple, no significant adenopathy   Lymphatics - no palpable lymphadenopathy, no hepatosplenomegaly   Chest - clear to auscultation, no wheezes, rales or rhonchi, symmetric air entry   Heart - normal rate, regular rhythm, normal S1, S2, no murmurs  Abdomen - soft, nontender, nondistended, no masses or organomegaly   Neurological - alert, oriented, normal speech, no focal findings or movement disorder noted   Musculoskeletal - no joint tenderness, deformity or swelling   Extremities - peripheral pulses normal, no pedal edema, no clubbing or cyanosis   Skin - normal coloration and turgor, no rashes, no suspicious skin lesions noted ,    DATA:    Labs:   CBC:   Recent Labs     21  1110   WBC 6.2   HGB 9.2*   HCT 31.3*        BMP:   No results for input(s): NA, K, CO2, BUN, CREATININE, LABGLOM, GLUCOSE in the last 72 hours. PT/INR: No results for input(s): PROTIME, INR in the last 72 hours.   Surgical Pathology Report   Surgical Pathology   Collected:  20 0803    Lab status:  Final    Resulting lab:  [employer] Value: -- Diagnosis --     BRONCHIAL WASHINGS RIGHT UPPER LOBE:          POSITIVE FOR MALIGNANCY.        SQUAMOUS CELL CARCINOMA.           COMMENT: VOLUME OF TUMOR IN THE CELL BLOCK IS LOW AND ADDITIONAL   MATERIAL WOULD BE REQUIRED IF MOLECULAR TESTING IS NECESSARY. IMAGING DATA:  CT chest 8/27/2020:   FINDINGS:    Mediastinum: Three vessel coronary artery calcification.  Newly enlarged    mediastinal lymph nodes including example station 7 node measuring 3.1 x 2.1    cm on series 2, image 45.         Lungs/pleura: Mild upper lobe predominant centrilobular emphysema. Heddy  Spiculated mass of the right upper lobe measuring 7.2 x 6.5 cm with    broad-base of contact with the mediastinal pleura, right major fissure,    encircling and obliterating the right upper lobe bronchus.  Spicules are seen    to extend to the anterior costal pleura.  There is adjacent bronchial wall    thickening and interlobular septal thickening.  Stable 5 mm solid nodule of    the left upper lobe since 2016, benign.  No pleural effusion or pneumothorax.         Upper Abdomen: Adrenals are unremarkable.         Soft Tissues/Bones: Old right rib fractures.              Impression    Approximate 7.2 cm spiculated mass of the right upper lobe likely with    invasion of the mediastinum, adjacent lymphangitic spread and suspected    metastatic mediastinal lymphadenopathy. Scan 9/11/2020: Impression    1. The patient's known right upper lobe lung mass is FDG avid consistent with    the patient's primary malignancy. 2. FDG avid mediastinal lymph nodes consistent with metastatic disease. 3. No abnormal FDG activity is identified involving the abdomen or pelvis. PET 2/3/21  IMPRESSION:   *  Unexpected findings of emphysematous cystitis. *  Positive response to therapy with markedly decreased size and FDG avidity of right upper lobe mass and mediastinal adenopathy. *  Healing right rib fractures.     Ct chest 6/14/21  FINDINGS:     Emphysema.  Spiculated mass in the medial right upper lobe which appears to invade into the upper right mediastinum measures 5.2 x 3.4 cm (4.4 x 1.8 cm previously).  Malignant matted lymphadenopathy in the subcarinal and right paratracheal regions is unchanged.  Noncalcified left upper lobe lung   nodule measuring 0.6 cm is unchanged.  There is a 1.4 cm nodular opacity in the lingula that appear slightly larger than on previous study.  Left chest port in place.  Atherosclerotic thoracic aorta.  Visualized structures in the upper abdomen are unremarkable.  Normal heart size.  No pleural or   pericardial effusions.  The central pulmonary arteries are unremarkable.  Extensive coronary artery calcifications.  Status post median sternotomy.  Wall thickening of the esophagus.  Visualized chest wall structures are unremarkable.  There are multiple healing and/or old rib fractures. IMPRESSION:   1.  Increase in size of spiculated malignant mass in the medial right upper lobe measuring 5.2 x 3.4 cm, which shows evidence of mediastinal invasion.  Matted metastatic lymphadenopathy in the subcarinal and right paratracheal regions is unchanged. 2.  Unchanged noncalcified left upper lobe nodule measuring 0.6 cm.  A 1.4 cm nodular opacity in the lingula appears slightly larger than on previous study. 3.  Wall thickening of esophagus suggestive of esophagitis. Ct abd  6/13/21  CT images of the abdomen and pelvis are obtained without contrast.  There is a 1.8 cm soft tissue density arising from or directly adjacent to the uncinate process of the pancreas.  Consider contrast-enhanced CT or MRI for further evaluation.  Bowel is unremarkable. Dewitte Peto is no intraperitoneal or   retroperitoneal lymphadenopathy.  Colonic stool burden is moderate.  The adrenals appear symmetric.  Images the lung bases are grossly clear.  There appear to be remote right rib fractures.  Remote left rib fracture also noted. related  4. COPD  5. Falls  6. CAD  7. Tobacco abuse  8. Actinomyces bacteremia    RECOMMENDATIONS:  1. I reviewed the laboratory data, imaging studies, biopsy finding, diagnosis, prognosis and treatment options with patient  2. I reviewed the results CT scan which showed Increase in size of spiculated malignant mass in the medial right upper lobe measuring 5.2 x 3.4 cm, which shows evidence of mediastinal invasion.    3. Plan to change chemo to carboTaxol in 2 weeks  4. I reviewed the NCCN guidelines and goals of care  5. RTC in 4 weeks  6. Patient's questions were sought and answered to her satisfaction      Mendoza Stein MD  Hematologist/Medical Oncologist    This note is created with the assistance of a speech recognition program.  While intending to generate a document that actually reflects the content of the visit, the document can still have some errors including those of syntax and sound a like substitutions which may escape proof reading. It such instances, actual meaning can be extrapolated by contextual diversion.

## 2021-06-30 NOTE — PROGRESS NOTES
Pt arrives per ambulatory per self with walker and labs and orders are reviewed and TSH elevated, and discussed with md and pt and md states that pt will be changing treatments. Today last dose of imfinzi. Pt tolerated imfinzi well and NS flushing line before and after and no reactions or complaints and blood return present throughout infusion. Writer phoned pt's son and states will let granddaughter know to  pt. Pt discharged per ambulatory per self with walker with AVS with next appts from .

## 2021-06-30 NOTE — FLOWSHEET NOTE
Situation:  Writer visited with Ms. Maldonado when rounding the infusion clinic. Assessment:  Ms. Peyman Suggs was sitting in the treatment cubicle of the infusion clinic. She processed her feelings and thoughts about her condition. She is seeking support to help her cope with her feelings. She shared good news about her son and how that has helped her. She voiced concerns about getting proper traction for her rolling walker, noting that she cannot go to PT. She named her strength as \"speaking up. \" She was receptive to writer contacting her nurse navigator about her concerns. She received a copy of \"Our Daily Bread. \" She shared memories about trips she took to [place] and the nature scenes she enjoyed. Intervention:  Writer inquired about Pt's coping and needs; listened with empathy as Pt talked about her current concerns and shared good news about a relationship; affirmed Pt's strengths; offered words of support and encouragement; gave Pt a copy of \"Our Daily Bread. \"    Outcome:  Ms. Peyman Suggs thanked Tadeo Hilton. 06/30/21 1258   Encounter Summary   Services provided to: Patient   Referral/Consult From: [facility] Family members; Children;Friends/neighbors   Continue Visiting   (6/30/21)   Complexity of Encounter Moderate   Length of Encounter 30 minutes   Spiritual Assessment Completed Yes   Routine   Type Follow up   Spiritual/Mormon   Type Spiritual support   Assessment Calm; Approachable;Coping   Intervention Active listening;Explored feelings, thoughts, concerns;Explored coping resources;Sustaining presence/ Ministry of presence;Provided reading materials/devotional materials; Discussed illness/injury and it's impact   Outcome Receptive; Hopeful;Encouraged;Expressed feelings/needs/concerns;Engaged in conversation;Expressed gratitude     Electronically signed by Siddhartha Bal, Oncology Outpatient [facility], [address] [facility] Radiation Oncology  6/30/2021  12:59 PM

## 2021-07-02 ENCOUNTER — TELEPHONE (OUTPATIENT)
Dept: INFUSION THERAPY | Facility: MEDICAL CENTER | Age: 71
End: 2021-07-02

## 2021-07-02 ENCOUNTER — TELEPHONE (OUTPATIENT)
Dept: CASE MANAGEMENT | Age: 71
End: 2021-07-02

## 2021-07-02 NOTE — TELEPHONE ENCOUNTER
Received new chemo orders today. Wiivtn=905.7 cm. Weight=83.5 kg. BSA=2.00. Cycle =21 days. Taxol 200 mg/p9=281 mg IV. Carboplatin AUC 6.  Labs in [facility]. No home meds on orders. Sent to [facility] for second RN check.

## 2021-07-13 ENCOUNTER — TELEPHONE (OUTPATIENT)
Dept: CASE MANAGEMENT | Age: 71
End: 2021-07-13

## 2021-07-13 RX ORDER — DIPHENHYDRAMINE HYDROCHLORIDE 50 MG/ML
50 INJECTION INTRAMUSCULAR; INTRAVENOUS ONCE
Status: CANCELLED | OUTPATIENT
Start: 2021-07-14

## 2021-07-13 RX ORDER — METHYLPREDNISOLONE SODIUM SUCCINATE 125 MG/2ML
125 INJECTION, POWDER, LYOPHILIZED, FOR SOLUTION INTRAMUSCULAR; INTRAVENOUS ONCE
Status: CANCELLED | OUTPATIENT
Start: 2021-07-14 | End: 2021-07-14

## 2021-07-13 RX ORDER — DIPHENHYDRAMINE HYDROCHLORIDE 50 MG/ML
50 INJECTION INTRAMUSCULAR; INTRAVENOUS ONCE
Status: CANCELLED | OUTPATIENT
Start: 2021-07-14 | End: 2021-07-14

## 2021-07-13 RX ORDER — SODIUM CHLORIDE 9 MG/ML
20 INJECTION, SOLUTION INTRAVENOUS ONCE
Status: CANCELLED | OUTPATIENT
Start: 2021-07-14 | End: 2021-07-14

## 2021-07-13 RX ORDER — SODIUM CHLORIDE 0.9 % (FLUSH) 0.9 %
5-40 SYRINGE (ML) INJECTION PRN
Status: CANCELLED | OUTPATIENT
Start: 2021-07-14

## 2021-07-13 RX ORDER — SODIUM CHLORIDE 9 MG/ML
25 INJECTION, SOLUTION INTRAVENOUS PRN
Status: CANCELLED | OUTPATIENT
Start: 2021-07-14

## 2021-07-13 RX ORDER — MEPERIDINE HYDROCHLORIDE 50 MG/ML
12.5 INJECTION INTRAMUSCULAR; INTRAVENOUS; SUBCUTANEOUS ONCE
Status: CANCELLED | OUTPATIENT
Start: 2021-07-14 | End: 2021-07-14

## 2021-07-13 RX ORDER — PALONOSETRON 0.05 MG/ML
0.25 INJECTION, SOLUTION INTRAVENOUS ONCE
Status: CANCELLED | OUTPATIENT
Start: 2021-07-14

## 2021-07-13 RX ORDER — EPINEPHRINE 1 MG/ML
0.3 INJECTION, SOLUTION, CONCENTRATE INTRAVENOUS PRN
Status: CANCELLED | OUTPATIENT
Start: 2021-07-14

## 2021-07-13 RX ORDER — SODIUM CHLORIDE 9 MG/ML
INJECTION, SOLUTION INTRAVENOUS CONTINUOUS
Status: CANCELLED | OUTPATIENT
Start: 2021-07-14

## 2021-07-13 RX ORDER — HEPARIN SODIUM (PORCINE) LOCK FLUSH IV SOLN 100 UNIT/ML 100 UNIT/ML
500 SOLUTION INTRAVENOUS PRN
Status: CANCELLED | OUTPATIENT
Start: 2021-07-14

## 2021-07-14 ENCOUNTER — HOSPITAL ENCOUNTER (OUTPATIENT)
Dept: INFUSION THERAPY | Facility: MEDICAL CENTER | Age: 71
Discharge: HOME | End: 2021-07-14
Payer: MEDICARE

## 2021-07-14 VITALS
HEART RATE: 109 BPM | DIASTOLIC BLOOD PRESSURE: 80 MMHG | TEMPERATURE: 97.7 F | RESPIRATION RATE: 18 BRPM | SYSTOLIC BLOOD PRESSURE: 146 MMHG

## 2021-07-14 DIAGNOSIS — C34.91 NON-SMALL CELL CANCER OF RIGHT LUNG (HCC): ICD-10-CM

## 2021-07-14 DIAGNOSIS — C34.11 MALIGNANT NEOPLASM OF UPPER LOBE OF RIGHT LUNG (HCC): Primary | ICD-10-CM

## 2021-07-14 LAB
ABSOLUTE EOS #: 0.19 K/UL (ref 0–0.44)
ABSOLUTE IMMATURE GRANULOCYTE: 0.03 K/UL (ref 0–0.3)
ABSOLUTE LYMPH #: 1.23 K/UL (ref 1.1–3.7)
ABSOLUTE MONO #: 0.37 K/UL (ref 0.1–1.2)
ALBUMIN SERPL-MCNC: 3.7 G/DL (ref 3.5–5.2)
ALBUMIN/GLOBULIN RATIO: ABNORMAL (ref 1–2.5)
ALP BLD-CCNC: 63 U/L (ref 35–104)
ALT SERPL-CCNC: 6 U/L (ref 5–33)
ANION GAP SERPL CALCULATED.3IONS-SCNC: 13 MMOL/L (ref 9–17)
AST SERPL-CCNC: 14 U/L
BASOPHILS # BLD: 1 % (ref 0–2)
BASOPHILS ABSOLUTE: 0.04 K/UL (ref 0–0.2)
BILIRUB SERPL-MCNC: 0.11 MG/DL (ref 0.3–1.2)
BUN BLDV-MCNC: 12 MG/DL (ref 8–23)
BUN/CREAT BLD: 11 (ref 9–20)
CALCIUM SERPL-MCNC: 8.6 MG/DL (ref 8.6–10.4)
CHLORIDE BLD-SCNC: 104 MMOL/L (ref 98–107)
CO2: 22 MMOL/L (ref 20–31)
CREAT SERPL-MCNC: 1.08 MG/DL (ref 0.5–0.9)
DIFFERENTIAL TYPE: ABNORMAL
EOSINOPHILS RELATIVE PERCENT: 3 % (ref 1–4)
GFR AFRICAN AMERICAN: >60 ML/MIN
GFR NON-AFRICAN AMERICAN: 50 ML/MIN
GFR SERPL CREATININE-BSD FRML MDRD: ABNORMAL ML/MIN/{1.73_M2}
GFR SERPL CREATININE-BSD FRML MDRD: ABNORMAL ML/MIN/{1.73_M2}
GLUCOSE BLD-MCNC: 100 MG/DL (ref 70–99)
HCT VFR BLD CALC: 32.6 % (ref 36.3–47.1)
HEMOGLOBIN: 9.4 G/DL (ref 11.9–15.1)
IMMATURE GRANULOCYTES: 1 %
LYMPHOCYTES # BLD: 22 % (ref 24–43)
MCH RBC QN AUTO: 24.9 PG (ref 25.2–33.5)
MCHC RBC AUTO-ENTMCNC: 28.8 G/DL (ref 28.4–34.8)
MCV RBC AUTO: 86.2 FL (ref 82.6–102.9)
MONOCYTES # BLD: 7 % (ref 3–12)
NRBC AUTOMATED: 0 PER 100 WBC
PDW BLD-RTO: 17.2 % (ref 11.8–14.4)
PLATELET # BLD: 271 K/UL (ref 138–453)
PLATELET ESTIMATE: ABNORMAL
PMV BLD AUTO: 8.7 FL (ref 8.1–13.5)
POTASSIUM SERPL-SCNC: 4.3 MMOL/L (ref 3.7–5.3)
RBC # BLD: 3.78 M/UL (ref 3.95–5.11)
RBC # BLD: ABNORMAL 10*6/UL
SEG NEUTROPHILS: 66 % (ref 36–65)
SEGMENTED NEUTROPHILS ABSOLUTE COUNT: 3.68 K/UL (ref 1.5–8.1)
SODIUM BLD-SCNC: 139 MMOL/L (ref 135–144)
TOTAL PROTEIN: 7.3 G/DL (ref 6.4–8.3)
WBC # BLD: 5.5 K/UL (ref 3.5–11.3)
WBC # BLD: ABNORMAL 10*3/UL

## 2021-07-14 PROCEDURE — 85025 COMPLETE CBC W/AUTO DIFF WBC: CPT

## 2021-07-14 PROCEDURE — 36591 DRAW BLOOD OFF VENOUS DEVICE: CPT

## 2021-07-14 PROCEDURE — 96415 CHEMO IV INFUSION ADDL HR: CPT

## 2021-07-14 PROCEDURE — 96413 CHEMO IV INFUSION 1 HR: CPT

## 2021-07-14 PROCEDURE — 96375 TX/PRO/DX INJ NEW DRUG ADDON: CPT

## 2021-07-14 PROCEDURE — 2500000003 HC RX 250 WO HCPCS: Performed by: INTERNAL MEDICINE

## 2021-07-14 PROCEDURE — 96367 TX/PROPH/DG ADDL SEQ IV INF: CPT

## 2021-07-14 PROCEDURE — 2580000003 HC RX 258: Performed by: INTERNAL MEDICINE

## 2021-07-14 PROCEDURE — 96417 CHEMO IV INFUS EACH ADDL SEQ: CPT

## 2021-07-14 PROCEDURE — 6360000002 HC RX W HCPCS: Performed by: INTERNAL MEDICINE

## 2021-07-14 PROCEDURE — 80053 COMPREHEN METABOLIC PANEL: CPT

## 2021-07-14 RX ORDER — DEXAMETHASONE SODIUM PHOSPHATE 10 MG/ML
10 INJECTION INTRAMUSCULAR; INTRAVENOUS ONCE
Status: COMPLETED | OUTPATIENT
Start: 2021-07-14 | End: 2021-07-14

## 2021-07-14 RX ORDER — SODIUM CHLORIDE 9 MG/ML
20 INJECTION, SOLUTION INTRAVENOUS ONCE
Status: COMPLETED | OUTPATIENT
Start: 2021-07-14 | End: 2021-07-14

## 2021-07-14 RX ORDER — SODIUM CHLORIDE 0.9 % (FLUSH) 0.9 %
5-40 SYRINGE (ML) INJECTION PRN
Status: DISCONTINUED | OUTPATIENT
Start: 2021-07-14 | End: 2021-07-15 | Stop reason: HOSPADM

## 2021-07-14 RX ORDER — HEPARIN SODIUM (PORCINE) LOCK FLUSH IV SOLN 100 UNIT/ML 100 UNIT/ML
500 SOLUTION INTRAVENOUS PRN
Status: DISCONTINUED | OUTPATIENT
Start: 2021-07-14 | End: 2021-07-15 | Stop reason: HOSPADM

## 2021-07-14 RX ORDER — DIPHENHYDRAMINE HYDROCHLORIDE 50 MG/ML
50 INJECTION INTRAMUSCULAR; INTRAVENOUS ONCE
Status: COMPLETED | OUTPATIENT
Start: 2021-07-14 | End: 2021-07-14

## 2021-07-14 RX ORDER — PALONOSETRON 0.05 MG/ML
0.25 INJECTION, SOLUTION INTRAVENOUS ONCE
Status: COMPLETED | OUTPATIENT
Start: 2021-07-14 | End: 2021-07-14

## 2021-07-14 RX ADMIN — FOSAPREPITANT 150 MG: 150 INJECTION, POWDER, LYOPHILIZED, FOR SOLUTION INTRAVENOUS at 12:36

## 2021-07-14 RX ADMIN — PALONOSETRON HYDROCHLORIDE 0.25 MG: 0.25 INJECTION, SOLUTION INTRAVENOUS at 12:26

## 2021-07-14 RX ADMIN — DIPHENHYDRAMINE HYDROCHLORIDE 50 MG: 50 INJECTION INTRAMUSCULAR; INTRAVENOUS at 12:26

## 2021-07-14 RX ADMIN — SODIUM CHLORIDE, PRESERVATIVE FREE 10 ML: 5 INJECTION INTRAVENOUS at 11:23

## 2021-07-14 RX ADMIN — SODIUM CHLORIDE, PRESERVATIVE FREE 10 ML: 5 INJECTION INTRAVENOUS at 17:25

## 2021-07-14 RX ADMIN — PACLITAXEL 348 MG: 6 INJECTION, SOLUTION, CONCENTRATE INTRAVENOUS at 13:23

## 2021-07-14 RX ADMIN — SODIUM CHLORIDE 20 ML/HR: 9 INJECTION, SOLUTION INTRAVENOUS at 11:23

## 2021-07-14 RX ADMIN — CARBOPLATIN 480 MG: 10 INJECTION INTRAVENOUS at 16:34

## 2021-07-14 RX ADMIN — HEPARIN 500 UNITS: 100 SYRINGE at 17:25

## 2021-07-14 RX ADMIN — DEXAMETHASONE SODIUM PHOSPHATE 10 MG: 10 INJECTION INTRAMUSCULAR; INTRAVENOUS at 12:26

## 2021-07-14 RX ADMIN — FAMOTIDINE 20 MG: 10 INJECTION, SOLUTION INTRAVENOUS at 12:26

## 2021-07-14 NOTE — PROGRESS NOTES
Patient arrived ambulatory with walker per self for cycle 1 day 1 treatment. Patient complains of being tired and chronic pain. No other complaints or concerns. Port accessed;specimen sent. Labs reviewed. Patient premedicated. Patient provided printed chemotherapy education. Taxol initiated at 100 ml/hr for 15 minutes with no sign adverse reaction. Taxol increased to maximum rate for remainder of infusion with no sign adverse reaction;line flushed. Patient complains of mild headache upon return from bathroom. Carboplatin infused with no sign adverse reaction;line flushed. Port flushed and heparinized with intact johnston needle removed per protocol. Patient pushed in wheelchair to lobby for pickup at discharge. Printed calendar provided.

## 2021-07-15 ENCOUNTER — TELEPHONE (OUTPATIENT)
Dept: INFUSION THERAPY | Facility: MEDICAL CENTER | Age: 71
End: 2021-07-15

## 2021-07-15 NOTE — TELEPHONE ENCOUNTER
Message from [facility] reporting that they were unable to obtain a tumor sample to begin Next Generation DNA Sequencing  Paperwork to physician rack for review

## 2021-07-28 ENCOUNTER — TELEPHONE (OUTPATIENT)
Dept: ONCOLOGY | Age: 71
End: 2021-07-28

## 2021-07-28 NOTE — TELEPHONE ENCOUNTER
[facility] SLOSED ON PT DUE TO UNABLE TO OBTAIN A TUMOR SAMPLE TO BEGIN NEXT GENERATION DNA SEQUENCING. THE ORDER CAN BE REACTIVATE IF A TUMOR SAMPLE IS RECEIVED BY [facility] FOR TESTING    THIS NOTIFICATION REVIEWED PER DR RUBIN AND NO FURTHER INSTRUCTIONS AT THIS TIME. FORM TO FRONT OFFICE FOR SCANNING.

## 2021-08-01 ENCOUNTER — HOSPITAL ENCOUNTER (OUTPATIENT)
Facility: MEDICAL CENTER | Age: 71
End: 2021-08-01
Payer: MEDICARE

## 2021-08-04 ENCOUNTER — TELEPHONE (OUTPATIENT)
Dept: ONCOLOGY | Age: 71
End: 2021-08-04

## 2021-08-04 ENCOUNTER — HOSPITAL ENCOUNTER (OUTPATIENT)
Dept: INFUSION THERAPY | Facility: MEDICAL CENTER | Age: 71
Discharge: HOME | End: 2021-08-04
Payer: MEDICARE

## 2021-08-04 ENCOUNTER — OFFICE VISIT (OUTPATIENT)
Dept: ONCOLOGY | Age: 71
End: 2021-08-04
Payer: MEDICARE

## 2021-08-04 VITALS
TEMPERATURE: 97.8 F | SYSTOLIC BLOOD PRESSURE: 126 MMHG | DIASTOLIC BLOOD PRESSURE: 69 MMHG | HEART RATE: 115 BPM | RESPIRATION RATE: 16 BRPM

## 2021-08-04 VITALS
SYSTOLIC BLOOD PRESSURE: 126 MMHG | WEIGHT: 186.3 LBS | DIASTOLIC BLOOD PRESSURE: 69 MMHG | BODY MASS INDEX: 28.33 KG/M2 | RESPIRATION RATE: 18 BRPM | TEMPERATURE: 97.8 F | HEART RATE: 115 BPM

## 2021-08-04 DIAGNOSIS — C34.91 NON-SMALL CELL CANCER OF RIGHT LUNG (HCC): Primary | ICD-10-CM

## 2021-08-04 DIAGNOSIS — C34.11 MALIGNANT NEOPLASM OF UPPER LOBE OF RIGHT LUNG (HCC): ICD-10-CM

## 2021-08-04 LAB
ABSOLUTE EOS #: 0.06 K/UL (ref 0–0.44)
ABSOLUTE IMMATURE GRANULOCYTE: 0.05 K/UL (ref 0–0.3)
ABSOLUTE LYMPH #: 0.97 K/UL (ref 1.1–3.7)
ABSOLUTE MONO #: 0.29 K/UL (ref 0.1–1.2)
ALBUMIN SERPL-MCNC: 3.6 G/DL (ref 3.5–5.2)
ALBUMIN/GLOBULIN RATIO: ABNORMAL (ref 1–2.5)
ALP BLD-CCNC: 59 U/L (ref 35–104)
ALT SERPL-CCNC: 7 U/L (ref 5–33)
ANION GAP SERPL CALCULATED.3IONS-SCNC: 11 MMOL/L (ref 9–17)
AST SERPL-CCNC: 14 U/L
BASOPHILS # BLD: 1 % (ref 0–2)
BASOPHILS ABSOLUTE: 0.03 K/UL (ref 0–0.2)
BILIRUB SERPL-MCNC: <0.1 MG/DL (ref 0.3–1.2)
BUN BLDV-MCNC: 9 MG/DL (ref 8–23)
BUN/CREAT BLD: 9 (ref 9–20)
CALCIUM SERPL-MCNC: 8.3 MG/DL (ref 8.6–10.4)
CHLORIDE BLD-SCNC: 108 MMOL/L (ref 98–107)
CO2: 23 MMOL/L (ref 20–31)
CREAT SERPL-MCNC: 0.97 MG/DL (ref 0.5–0.9)
DIFFERENTIAL TYPE: ABNORMAL
EOSINOPHILS RELATIVE PERCENT: 2 % (ref 1–4)
GFR AFRICAN AMERICAN: >60 ML/MIN
GFR NON-AFRICAN AMERICAN: 57 ML/MIN
GFR SERPL CREATININE-BSD FRML MDRD: ABNORMAL ML/MIN/{1.73_M2}
GFR SERPL CREATININE-BSD FRML MDRD: ABNORMAL ML/MIN/{1.73_M2}
GLUCOSE BLD-MCNC: 73 MG/DL (ref 70–99)
HCT VFR BLD CALC: 30 % (ref 36.3–47.1)
HEMOGLOBIN: 8.7 G/DL (ref 11.9–15.1)
IMMATURE GRANULOCYTES: 2 %
LYMPHOCYTES # BLD: 31 % (ref 24–43)
MCH RBC QN AUTO: 24.9 PG (ref 25.2–33.5)
MCHC RBC AUTO-ENTMCNC: 29 G/DL (ref 28.4–34.8)
MCV RBC AUTO: 86 FL (ref 82.6–102.9)
MONOCYTES # BLD: 9 % (ref 3–12)
NRBC AUTOMATED: 0 PER 100 WBC
PDW BLD-RTO: 18.6 % (ref 11.8–14.4)
PLATELET # BLD: 160 K/UL (ref 138–453)
PLATELET ESTIMATE: ABNORMAL
PMV BLD AUTO: 8.4 FL (ref 8.1–13.5)
POTASSIUM SERPL-SCNC: 4.2 MMOL/L (ref 3.7–5.3)
RBC # BLD: 3.49 M/UL (ref 3.95–5.11)
RBC # BLD: ABNORMAL 10*6/UL
SEG NEUTROPHILS: 55 % (ref 36–65)
SEGMENTED NEUTROPHILS ABSOLUTE COUNT: 1.73 K/UL (ref 1.5–8.1)
SODIUM BLD-SCNC: 142 MMOL/L (ref 135–144)
TOTAL PROTEIN: 6.8 G/DL (ref 6.4–8.3)
WBC # BLD: 3.1 K/UL (ref 3.5–11.3)
WBC # BLD: ABNORMAL 10*3/UL

## 2021-08-04 PROCEDURE — 96413 CHEMO IV INFUSION 1 HR: CPT

## 2021-08-04 PROCEDURE — 6360000002 HC RX W HCPCS: Performed by: INTERNAL MEDICINE

## 2021-08-04 PROCEDURE — 85025 COMPLETE CBC W/AUTO DIFF WBC: CPT

## 2021-08-04 PROCEDURE — 96415 CHEMO IV INFUSION ADDL HR: CPT

## 2021-08-04 PROCEDURE — 96365 THER/PROPH/DIAG IV INF INIT: CPT

## 2021-08-04 PROCEDURE — 99211 OFF/OP EST MAY X REQ PHY/QHP: CPT | Performed by: INTERNAL MEDICINE

## 2021-08-04 PROCEDURE — 2580000003 HC RX 258: Performed by: INTERNAL MEDICINE

## 2021-08-04 PROCEDURE — 2500000003 HC RX 250 WO HCPCS: Performed by: INTERNAL MEDICINE

## 2021-08-04 PROCEDURE — 99215 OFFICE O/P EST HI 40 MIN: CPT | Performed by: INTERNAL MEDICINE

## 2021-08-04 PROCEDURE — 80053 COMPREHEN METABOLIC PANEL: CPT

## 2021-08-04 PROCEDURE — 96367 TX/PROPH/DG ADDL SEQ IV INF: CPT

## 2021-08-04 PROCEDURE — 36591 DRAW BLOOD OFF VENOUS DEVICE: CPT

## 2021-08-04 PROCEDURE — 96375 TX/PRO/DX INJ NEW DRUG ADDON: CPT

## 2021-08-04 PROCEDURE — 96417 CHEMO IV INFUS EACH ADDL SEQ: CPT

## 2021-08-04 RX ORDER — SODIUM CHLORIDE 0.9 % (FLUSH) 0.9 %
5-40 SYRINGE (ML) INJECTION PRN
Status: DISCONTINUED | OUTPATIENT
Start: 2021-08-04 | End: 2021-08-05 | Stop reason: HOSPADM

## 2021-08-04 RX ORDER — EPINEPHRINE 1 MG/ML
0.3 INJECTION, SOLUTION, CONCENTRATE INTRAVENOUS PRN
Status: CANCELLED | OUTPATIENT
Start: 2021-08-04

## 2021-08-04 RX ORDER — HEPARIN SODIUM (PORCINE) LOCK FLUSH IV SOLN 100 UNIT/ML 100 UNIT/ML
500 SOLUTION INTRAVENOUS PRN
Status: DISCONTINUED | OUTPATIENT
Start: 2021-08-04 | End: 2021-08-05 | Stop reason: HOSPADM

## 2021-08-04 RX ORDER — MEPERIDINE HYDROCHLORIDE 50 MG/ML
12.5 INJECTION INTRAMUSCULAR; INTRAVENOUS; SUBCUTANEOUS ONCE
Status: CANCELLED | OUTPATIENT
Start: 2021-08-04 | End: 2021-08-04

## 2021-08-04 RX ORDER — PALONOSETRON 0.05 MG/ML
0.25 INJECTION, SOLUTION INTRAVENOUS ONCE
Status: COMPLETED | OUTPATIENT
Start: 2021-08-04 | End: 2021-08-04

## 2021-08-04 RX ORDER — SODIUM CHLORIDE 9 MG/ML
20 INJECTION, SOLUTION INTRAVENOUS ONCE
Status: CANCELLED | OUTPATIENT
Start: 2021-08-04 | End: 2021-08-04

## 2021-08-04 RX ORDER — SODIUM CHLORIDE 0.9 % (FLUSH) 0.9 %
5-40 SYRINGE (ML) INJECTION PRN
Status: CANCELLED | OUTPATIENT
Start: 2021-08-04

## 2021-08-04 RX ORDER — SODIUM CHLORIDE 9 MG/ML
20 INJECTION, SOLUTION INTRAVENOUS ONCE
Status: COMPLETED | OUTPATIENT
Start: 2021-08-04 | End: 2021-08-04

## 2021-08-04 RX ORDER — DIPHENHYDRAMINE HYDROCHLORIDE 50 MG/ML
50 INJECTION INTRAMUSCULAR; INTRAVENOUS ONCE
Status: COMPLETED | OUTPATIENT
Start: 2021-08-04 | End: 2021-08-04

## 2021-08-04 RX ORDER — SODIUM CHLORIDE 9 MG/ML
25 INJECTION, SOLUTION INTRAVENOUS PRN
Status: CANCELLED | OUTPATIENT
Start: 2021-08-04

## 2021-08-04 RX ORDER — DIPHENHYDRAMINE HYDROCHLORIDE 50 MG/ML
50 INJECTION INTRAMUSCULAR; INTRAVENOUS ONCE
Status: CANCELLED | OUTPATIENT
Start: 2021-08-04

## 2021-08-04 RX ORDER — POTASSIUM CHLORIDE 20 MEQ/1
40 TABLET, EXTENDED RELEASE ORAL ONCE
Status: DISCONTINUED | OUTPATIENT
Start: 2021-08-04 | End: 2021-08-04

## 2021-08-04 RX ORDER — ONDANSETRON 4 MG/1
4 TABLET, FILM COATED ORAL EVERY 8 HOURS PRN
Qty: 30 TABLET | Refills: 0 | Status: SHIPPED | OUTPATIENT
Start: 2021-08-04 | End: 2021-08-25 | Stop reason: SDUPTHER

## 2021-08-04 RX ORDER — HEPARIN SODIUM (PORCINE) LOCK FLUSH IV SOLN 100 UNIT/ML 100 UNIT/ML
500 SOLUTION INTRAVENOUS PRN
Status: CANCELLED | OUTPATIENT
Start: 2021-08-04

## 2021-08-04 RX ORDER — DEXAMETHASONE SODIUM PHOSPHATE 10 MG/ML
10 INJECTION INTRAMUSCULAR; INTRAVENOUS ONCE
Status: COMPLETED | OUTPATIENT
Start: 2021-08-04 | End: 2021-08-04

## 2021-08-04 RX ORDER — DIPHENHYDRAMINE HYDROCHLORIDE 50 MG/ML
50 INJECTION INTRAMUSCULAR; INTRAVENOUS ONCE
Status: CANCELLED | OUTPATIENT
Start: 2021-08-04 | End: 2021-08-04

## 2021-08-04 RX ORDER — SODIUM CHLORIDE 9 MG/ML
INJECTION, SOLUTION INTRAVENOUS CONTINUOUS
Status: CANCELLED | OUTPATIENT
Start: 2021-08-04

## 2021-08-04 RX ORDER — PALONOSETRON 0.05 MG/ML
0.25 INJECTION, SOLUTION INTRAVENOUS ONCE
Status: CANCELLED | OUTPATIENT
Start: 2021-08-04

## 2021-08-04 RX ORDER — METHYLPREDNISOLONE SODIUM SUCCINATE 125 MG/2ML
125 INJECTION, POWDER, LYOPHILIZED, FOR SOLUTION INTRAMUSCULAR; INTRAVENOUS ONCE
Status: CANCELLED | OUTPATIENT
Start: 2021-08-04 | End: 2021-08-04

## 2021-08-04 RX ADMIN — SODIUM CHLORIDE 20 ML/HR: 9 INJECTION, SOLUTION INTRAVENOUS at 10:08

## 2021-08-04 RX ADMIN — PALONOSETRON 0.25 MG: 0.05 INJECTION, SOLUTION INTRAVENOUS at 10:08

## 2021-08-04 RX ADMIN — FOSAPREPITANT 150 MG: 150 INJECTION, POWDER, LYOPHILIZED, FOR SOLUTION INTRAVENOUS at 10:29

## 2021-08-04 RX ADMIN — PACLITAXEL 348 MG: 6 INJECTION, SOLUTION, CONCENTRATE INTRAVENOUS at 11:06

## 2021-08-04 RX ADMIN — HEPARIN 500 UNITS: 100 SYRINGE at 15:36

## 2021-08-04 RX ADMIN — DEXAMETHASONE SODIUM PHOSPHATE 10 MG: 10 INJECTION INTRAMUSCULAR; INTRAVENOUS at 10:08

## 2021-08-04 RX ADMIN — CARBOPLATIN 480 MG: 10 INJECTION INTRAVENOUS at 14:29

## 2021-08-04 RX ADMIN — FAMOTIDINE 20 MG: 10 INJECTION, SOLUTION INTRAVENOUS at 10:08

## 2021-08-04 RX ADMIN — DIPHENHYDRAMINE HYDROCHLORIDE 50 MG: 50 INJECTION INTRAMUSCULAR; INTRAVENOUS at 10:08

## 2021-08-04 RX ADMIN — SODIUM CHLORIDE, PRESERVATIVE FREE 10 ML: 5 INJECTION INTRAVENOUS at 15:36

## 2021-08-04 NOTE — PLAN OF CARE
Problem: SAFETY  Goal: Free from accidental physical injury  8/4/2021 1412 by Germain Kilgore RN  Outcome: Completed  8/4/2021 1412 by Germain Kilgore RN  Outcome: Met This Shift

## 2021-08-04 NOTE — FLOWSHEET NOTE
Situation:  Writer visited with Ms. Mendoza Zarate in the treatment cubicle of the infusion clinic. Assessment:  Ms. Mendoza Zarate smiled and greeted writer. She noted that her hair has been coming out. She talked about her plans to go to the [employer] next week. She acknowledged her feelings about losing her hair. She talked about and expressed gratitude for a recent outing with her friends, noting it was good to get out. She was receptive to a copy of \"Our Daily Bread. \"     Intervention:  Writer inquired about Pt's coping and needs; offered words of support and encouragement; gave Pt a copy of \"Our Daily Bread. \"    Outcome:  Ms. Mendoza Zarate thanked Jonathan Castro. 08/04/21 1446   Encounter Summary   Services provided to: Patient   Referral/Consult From: [address] [facility] Family members   Continue Visiting   (8/4/21)   Complexity of Encounter Moderate   Length of Encounter 15 minutes   Routine   Type Follow up   Assessment Calm; Approachable   Intervention Active listening;Explored feelings, thoughts, concerns;Explored coping resources;Sustaining presence/ Ministry of presence   Outcome Coping; Shared life review;Expressed gratitude     Electronically signed by Roxane Duverney, Oncology Outpatient [address], [address] [facility] Radiation Oncology  8/4/2021  2:58 PM

## 2021-08-04 NOTE — PROGRESS NOTES
Patient arrived for cycle 2 day 1 treatment and MD visit. Denies complaint or concern. Vitals as charged. Port accessed; specimen sent. Physician met with alli hdz to proceed with treatment  Labs reviewed. Patient premedicated. Taxol infusion begin slowly with no adverse reaction; then increased to infuse over 1 hour; completed with no adverse reaction; line flushed. Carbo infused with no sign of adverse reaction; line flushed. Port flushed and heparinized with intact johnston needle removed per protocol. Patient ambulate off unit with friend at discharge. AVS provided by .

## 2021-08-04 NOTE — PROGRESS NOTES
Today's Date: 8/4/2021  Patient Name: Meg Mcginnis  Patient's age: 80 y. o., 1950    Diagnosis:   RUL squamous cell carcinoma,   Cancer Staging  Malignant neoplasm of upper lobe of right lung (HCC)  Staging form: Lung, AJCC 8th Edition  - Clinical stage from 9/18/2020: Stage IIIB (cT4, cN2, cM0) - Signed by Christiano Castro MD on 9/18/2020    Current therapy:  Started on concurrent chemoradiation with weekly carbotaxol on 10/21/20  She completed radiation therapy in mid December  Her pET scan 2/3/21 showed no recurrence with positive response to treatment  Restarted on consolidation durvalumab 2/3/2021  Recent CT chest 6/14/21 showed Increase in size of spiculated malignant mass in the medial right upper lobe measuring 5.2 x 3.4 cm, which shows evidence of mediastinal invasion.  Matted metastatic lymphadenopathy in the subcarinal and right paratracheal regions is unchanged  Started on chemotherapy with carboplatin and Taxol on 7/14/2021  CHIEF COMPLAINT:    Chief Complaint   Patient presents with    Follow-up    Other     hair falling out    Medication Refill     INTERVAL HISTORY:    Vini Garvin is returning for follow-up and to discuss PET scan results and further recommendations. She is starting chemotherapy well. She has mild fatigue and nausea. Her shortness of breath is stable. Her PET scan showed worsening lung lesion. During this visit patient's allergy, social, medical, surgical history and medications were reviewed and updated. HISTORY OF PRESENT ILLNESS:    Meredith Vo is a 44-year-old female with a past medical history of COPD, history of tobacco abuse, depression, CAD, arthritis was admitted with multiple falls at home. She complains of back pain and also chronic cough. On admission she had a CT scan of the chest which showed 7.2 cm spiculated mass in the right upper lobe with mediastinal lymphadenopathy suspicious for malignancy.   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 Oncology consulted for further evaluation. Patient has COPD and uses Home oxygen at night and sues walker at home. She smokes more than a PPD. 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 Past Medical History:   has a past medical history of AAA (abdominal aortic aneurysm) ([facility] [address] Acid reflux, Anxiety and depression, Aortic stenosis, Arthritis, Blister of ankle, right, CAD (coronary artery disease), Cancer ([facility] [address] COPD (chronic obstructive pulmonary disease) ([facility] [address] Diabetes mellitus ([facility] [address] Falls, Heart block, Hypokalemia, MDRO (multiple drug resistant organisms) resistance, MRSA (methicillin resistant staph aureus) culture positive, On home oxygen therapy, On home oxygen therapy, Overactive bladder, Pneumonia, PONV (postoperative nausea and vomiting), and Vitamin D deficiency. Past Surgical History:   has a past surgical history that includes back surgery; eye surgery; Cholecystectomy; Appendectomy; Hysterectomy; Tonsillectomy; lumbar laminectomy; Endoscopy, colon, diagnostic (12/08/2016); aortic valve repair (N/A, 4/11/2017); bronchoscopy (N/A, 8/31/2020); IR INSERT PICC VAD W SQ PORT >5 YEARS (9/4/2020); and IR PORT PLACEMENT > 5 YEARS (9/29/2020). Medications:    Current Outpatient Medications   Medication Sig Dispense Refill    ondansetron (ZOFRAN) 4 MG tablet Take 1 tablet by mouth every 8 hours as needed for Nausea or Vomiting 30 tablet 0    oxyCODONE-acetaminophen (PERCOCET)  MG per tablet Take 1 tablet by mouth every 6 hours as needed for Pain.  traZODone (DESYREL) 300 MG tablet Take 0.5 tablets by mouth nightly 30 tablet 0    clonazePAM (KLONOPIN) 1 MG tablet Take 1 tablet by mouth 3 times daily as needed for Anxiety for up to 3 doses. 3 tablet 0    gabapentin (NEURONTIN) 400 MG capsule Take 1 capsule by mouth 2 times daily for 3 doses.  In addition to 2 capsules (=800mg) nightly 3 capsule 0    ARIPiprazole (ABILIFY) 5 MG tablet Take 1 tablet by mouth daily for 3 doses 3 tablet 0    furosemide (LASIX) 40 MG tablet Take 40 mg by mouth daily      melatonin 5 MG TABS tablet Take 5 mg by mouth nightly       glimepiride (AMARYL) 1 MG tablet Take 1 mg by mouth Daily with supper In addition to 2 tablets (=2mg) every morning       metoprolol tartrate (LOPRESSOR) 25 MG tablet Take 25 mg by mouth 2 times daily      venlafaxine (EFFEXOR XR) 150 MG extended release capsule Take 150 mg by mouth 2 times daily      lansoprazole (PREVACID) 30 MG delayed release capsule Take 30 mg by mouth daily      metFORMIN (GLUCOPHAGE) 500 MG tablet Take 500 mg by mouth 2 times daily      aspirin EC 81 MG EC tablet Take 81 mg by mouth daily      mometasone-formoterol (DULERA) 200-5 MCG/ACT inhaler Inhale 2 puffs into the lungs every 12 hours as needed (wheezing/SOB)       lidocaine-prilocaine (EMLA) 2.5-2.5 % cream To port site before chemo (Patient not taking: Reported on 4/28/2021) 1 Tube 1     No current facility-administered medications for this visit. Allergies:  Codeine and Dye [iodides]    Social History:   reports that she quit smoking about 4 years ago. She has never used smokeless tobacco. She reports that she does not drink alcohol and does not use drugs. Family History: family history includes Cancer in her father and mother. REVIEW OF SYSTEMS:    Constitutional: ++fatigue, No fever or chills.  No night sweats, no weight loss   Eyes: No eye discharge, double vision, or eye pain   HEENT: negative for sore mouth, sore throat, hoarseness and voice change   Respiratory: negative for cough , sputum, ++dyspnea, wheezing, hemoptysis, chest pain   Cardiovascular: negative for chest pain, dyspnea, palpitations, orthopnea, PND   Gastrointestinal: negative for nausea, vomiting, diarrhea, constipation, abdominal pain, Dysphagia, hematemesis and hematochezia   Genitourinary: negative for frequency, dysuria, nocturia, urinary incontinence, and hematuria   Integument: negative for rash, skin lesions, bruises. Hematologic/Lymphatic: negative for easy bruising, bleeding, lymphadenopathy, or petechiae   Endocrine: negative for heat or cold intolerance,weight changes, change in bowel habits and hair loss   Musculoskeletal: negative for myalgias, arthralgias, pain, joint swelling,and bone pain   Neurological: negative for headaches, dizziness, seizures, weakness, numbness    PHYSICAL EXAM:      /69   Pulse 115   Temp 97.8 °F (36.6 °C) (Oral)   Resp 18   Wt 186 lb 4.8 oz (84.5 kg)   BMI 28.33 kg/m²    Temp (24hrs), Av.7 °F (36.5 °C), Min:97.3 °F (36.3 °C), Max:98.1 °F (36.7 °C)  General appearance - well appearing, no in pain or distress   Mental status - alert and cooperative   Eyes - pupils equal and reactive, extraocular eye movements intact   Ears - bilateral TM's and external ear canals normal   Mouth - mucous membranes moist, pharynx normal without lesions   Neck - supple, no significant adenopathy   Lymphatics - no palpable lymphadenopathy, no hepatosplenomegaly   Chest - clear to auscultation, no wheezes, rales or rhonchi, symmetric air entry   Heart - normal rate, regular rhythm, normal S1, S2, no murmurs  Abdomen - soft, nontender, nondistended, no masses or organomegaly   Neurological - alert, oriented, normal speech, no focal findings or movement disorder noted   Musculoskeletal - no joint tenderness, deformity or swelling   Extremities - peripheral pulses normal, no pedal edema, no clubbing or cyanosis   Skin - normal coloration and turgor, no rashes, no suspicious skin lesions noted ,    DATA:    Labs:   CBC:   No results for input(s): WBC, HGB, HCT, PLT in the last 72 hours. BMP:   No results for input(s): NA, K, CO2, BUN, CREATININE, LABGLOM, GLUCOSE in the last 72 hours. PT/INR: No results for input(s): PROTIME, INR in the last 72 hours.   Surgical Pathology Report   Surgical Pathology Collected:  08/31/20 0803    Lab status:  Final    Resulting lab:  [facility]    Value:  -- Diagnosis --     BRONCHIAL WASHINGS RIGHT UPPER LOBE:          POSITIVE FOR MALIGNANCY.        SQUAMOUS CELL CARCINOMA.           COMMENT: VOLUME OF TUMOR IN THE CELL BLOCK IS LOW AND ADDITIONAL   MATERIAL WOULD BE REQUIRED IF MOLECULAR TESTING IS NECESSARY. IMAGING DATA:  CT chest 8/27/2020:   FINDINGS:    Mediastinum: Three vessel coronary artery calcification.  Newly enlarged    mediastinal lymph nodes including example station 7 node measuring 3.1 x 2.1    cm on series 2, image 45.         Lungs/pleura: Mild upper lobe predominant centrilobular emphysema. Gurwinder Glow Spiculated mass of the right upper lobe measuring 7.2 x 6.5 cm with    broad-base of contact with the mediastinal pleura, right major fissure,    encircling and obliterating the right upper lobe bronchus.  Spicules are seen    to extend to the anterior costal pleura.  There is adjacent bronchial wall    thickening and interlobular septal thickening.  Stable 5 mm solid nodule of    the left upper lobe since 2016, benign.  No pleural effusion or pneumothorax.         Upper Abdomen: Adrenals are unremarkable.         Soft Tissues/Bones: Old right rib fractures.              Impression    Approximate 7.2 cm spiculated mass of the right upper lobe likely with    invasion of the mediastinum, adjacent lymphangitic spread and suspected    metastatic mediastinal lymphadenopathy. Scan 9/11/2020: Impression    1. The patient's known right upper lobe lung mass is FDG avid consistent with    the patient's primary malignancy. 2. FDG avid mediastinal lymph nodes consistent with metastatic disease. 3. No abnormal FDG activity is identified involving the abdomen or pelvis. PET 2/3/21  IMPRESSION:   *  Unexpected findings of emphysematous cystitis.   *  Positive response to therapy with markedly decreased size and FDG avidity of right upper lobe mass and mediastinal adenopathy. *  Healing right rib fractures. Ct chest 6/14/21  FINDINGS:     Emphysema.  Spiculated mass in the medial right upper lobe which appears to invade into the upper right mediastinum measures 5.2 x 3.4 cm (4.4 x 1.8 cm previously).  Malignant matted lymphadenopathy in the subcarinal and right paratracheal regions is unchanged.  Noncalcified left upper lobe lung   nodule measuring 0.6 cm is unchanged.  There is a 1.4 cm nodular opacity in the lingula that appear slightly larger than on previous study.  Left chest port in place.  Atherosclerotic thoracic aorta.  Visualized structures in the upper abdomen are unremarkable.  Normal heart size.  No pleural or   pericardial effusions.  The central pulmonary arteries are unremarkable.  Extensive coronary artery calcifications.  Status post median sternotomy.  Wall thickening of the esophagus.  Visualized chest wall structures are unremarkable.  There are multiple healing and/or old rib fractures. IMPRESSION:   1.  Increase in size of spiculated malignant mass in the medial right upper lobe measuring 5.2 x 3.4 cm, which shows evidence of mediastinal invasion.  Matted metastatic lymphadenopathy in the subcarinal and right paratracheal regions is unchanged. 2.  Unchanged noncalcified left upper lobe nodule measuring 0.6 cm.  A 1.4 cm nodular opacity in the lingula appears slightly larger than on previous study. 3.  Wall thickening of esophagus suggestive of esophagitis.      Ct abd  6/13/21  CT images of the abdomen and pelvis are obtained without contrast.  There is a 1.8 cm soft tissue density arising from or directly adjacent to the uncinate process of the pancreas.  Consider contrast-enhanced CT or MRI for further evaluation.  Bowel is unremarkable. Miguel Nanny is no intraperitoneal or   retroperitoneal lymphadenopathy.  Colonic stool burden is moderate.  The adrenals appear symmetric.  Images the lung bases are grossly clear.  There appear to be remote right rib fractures.  Remote left rib fracture also noted.  There is no hepatic or splenic abnormality.  No hydronephrosis is seen. IMPRESSION:     1.8 cm soft tissue density arising from, or directly adjacent to, the uncinate process of the pancreas.  Contrast-enhanced CT or MRI recommended. CT Chest 6/14/21  IMPRESSION:     1.  Increase in size of spiculated malignant mass in the medial right upper lobe measuring 5.2 x 3.4 cm, which shows evidence of mediastinal invasion.  Matted metastatic lymphadenopathy in the subcarinal and right paratracheal regions is unchanged. 2.  Unchanged noncalcified left upper lobe nodule measuring 0.6 cm.  A 1.4 cm nodular opacity in the lingula appears slightly larger than on previous study. 3.  Wall thickening of esophagus suggestive of esophagitis. Ct abd  Pelvis 6/13/21  CT images of the abdomen and pelvis are obtained without contrast.  There is a 1.8 cm soft tissue density arising from or directly adjacent to the uncinate process of the pancreas.  Consider contrast-enhanced CT or MRI for further evaluation.  Bowel is unremarkable. Kennth Gazelle is no intraperitoneal or   retroperitoneal lymphadenopathy.  Colonic stool burden is moderate.  The adrenals appear symmetric.  Images the lung bases are grossly clear.  There appear to be remote right rib fractures.  Remote left rib fracture also noted.  There is no hepatic or splenic abnormality.  No hydronephrosis is seen. IMPRESSION:   1.8 cm soft tissue density arising from, or directly adjacent to, the uncinate process of the pancreas.  Contrast-enhanced CT or MRI recommended. IMPRESSION:   1. 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   PET scan negative for distant metastasis: Clinical

## 2021-08-12 ENCOUNTER — TELEPHONE (OUTPATIENT)
Dept: ONCOLOGY | Age: 71
End: 2021-08-12

## 2021-08-12 NOTE — TELEPHONE ENCOUNTER
RECEIVED VM FROM ERNST WITH [facility]. INQUIRES IF DR Juan Jose Morley WOULD BE WILLING TO [address] [facility]? FOLLOW UP CURRENTLY 08/25/21. PLEASE ADVISE IF WILLING TO WRITE.

## 2021-08-13 NOTE — TELEPHONE ENCOUNTER
[address] [facility] [address] WOULD PREFER PCP WRITE.   LEFT VM WITH ERNST NOVAK [facility] OFFICE

## 2021-08-19 ENCOUNTER — HOSPITAL ENCOUNTER (OUTPATIENT)
Facility: MEDICAL CENTER | Age: 71
End: 2021-08-19
Payer: MEDICARE

## 2021-08-25 ENCOUNTER — TELEPHONE (OUTPATIENT)
Dept: CASE MANAGEMENT | Age: 71
End: 2021-08-25

## 2021-08-25 ENCOUNTER — OFFICE VISIT (OUTPATIENT)
Dept: ONCOLOGY | Age: 71
End: 2021-08-25
Payer: MEDICARE

## 2021-08-25 ENCOUNTER — TELEPHONE (OUTPATIENT)
Dept: ONCOLOGY | Age: 71
End: 2021-08-25

## 2021-08-25 ENCOUNTER — HOSPITAL ENCOUNTER (OUTPATIENT)
Dept: INFUSION THERAPY | Facility: MEDICAL CENTER | Age: 71
Discharge: HOME | End: 2021-08-25
Payer: MEDICARE

## 2021-08-25 VITALS
WEIGHT: 185 LBS | HEART RATE: 90 BPM | RESPIRATION RATE: 18 BRPM | TEMPERATURE: 98.3 F | BODY MASS INDEX: 28.13 KG/M2 | SYSTOLIC BLOOD PRESSURE: 108 MMHG | DIASTOLIC BLOOD PRESSURE: 69 MMHG

## 2021-08-25 VITALS
RESPIRATION RATE: 18 BRPM | DIASTOLIC BLOOD PRESSURE: 69 MMHG | HEART RATE: 90 BPM | TEMPERATURE: 98.3 F | SYSTOLIC BLOOD PRESSURE: 108 MMHG

## 2021-08-25 DIAGNOSIS — C34.11 MALIGNANT NEOPLASM OF UPPER LOBE OF RIGHT LUNG (HCC): ICD-10-CM

## 2021-08-25 DIAGNOSIS — C34.91 NON-SMALL CELL CANCER OF RIGHT LUNG (HCC): Primary | ICD-10-CM

## 2021-08-25 LAB
ABSOLUTE EOS #: 0.03 K/UL (ref 0–0.44)
ABSOLUTE IMMATURE GRANULOCYTE: 0.05 K/UL (ref 0–0.3)
ABSOLUTE LYMPH #: 0.88 K/UL (ref 1.1–3.7)
ABSOLUTE MONO #: 0.29 K/UL (ref 0.1–1.2)
ALBUMIN SERPL-MCNC: 3.7 G/DL (ref 3.5–5.2)
ALBUMIN/GLOBULIN RATIO: ABNORMAL (ref 1–2.5)
ALP BLD-CCNC: 49 U/L (ref 35–104)
ALT SERPL-CCNC: 7 U/L (ref 5–33)
ANION GAP SERPL CALCULATED.3IONS-SCNC: 13 MMOL/L (ref 9–17)
AST SERPL-CCNC: 11 U/L
BASOPHILS # BLD: 1 % (ref 0–2)
BASOPHILS ABSOLUTE: 0.03 K/UL (ref 0–0.2)
BILIRUB SERPL-MCNC: 0.15 MG/DL (ref 0.3–1.2)
BUN BLDV-MCNC: 15 MG/DL (ref 8–23)
BUN/CREAT BLD: 10 (ref 9–20)
CALCIUM SERPL-MCNC: 8.5 MG/DL (ref 8.6–10.4)
CHLORIDE BLD-SCNC: 106 MMOL/L (ref 98–107)
CO2: 23 MMOL/L (ref 20–31)
CREAT SERPL-MCNC: 1.47 MG/DL (ref 0.5–0.9)
DIFFERENTIAL TYPE: ABNORMAL
EOSINOPHILS RELATIVE PERCENT: 1 % (ref 1–4)
GFR AFRICAN AMERICAN: 43 ML/MIN
GFR NON-AFRICAN AMERICAN: 35 ML/MIN
GFR SERPL CREATININE-BSD FRML MDRD: ABNORMAL ML/MIN/{1.73_M2}
GFR SERPL CREATININE-BSD FRML MDRD: ABNORMAL ML/MIN/{1.73_M2}
GLUCOSE BLD-MCNC: 151 MG/DL (ref 70–99)
HCT VFR BLD CALC: 29.2 % (ref 36.3–47.1)
HEMOGLOBIN: 8.4 G/DL (ref 11.9–15.1)
IMMATURE GRANULOCYTES: 2 %
LYMPHOCYTES # BLD: 34 % (ref 24–43)
MCH RBC QN AUTO: 26 PG (ref 25.2–33.5)
MCHC RBC AUTO-ENTMCNC: 28.8 G/DL (ref 28.4–34.8)
MCV RBC AUTO: 90.4 FL (ref 82.6–102.9)
MONOCYTES # BLD: 11 % (ref 3–12)
MORPHOLOGY: ABNORMAL
NRBC AUTOMATED: 0 PER 100 WBC
PDW BLD-RTO: 21.1 % (ref 11.8–14.4)
PLATELET # BLD: 144 K/UL (ref 138–453)
PLATELET ESTIMATE: ABNORMAL
PMV BLD AUTO: 8.5 FL (ref 8.1–13.5)
POTASSIUM SERPL-SCNC: 4.1 MMOL/L (ref 3.7–5.3)
RBC # BLD: 3.23 M/UL (ref 3.95–5.11)
RBC # BLD: ABNORMAL 10*6/UL
SEG NEUTROPHILS: 51 % (ref 36–65)
SEGMENTED NEUTROPHILS ABSOLUTE COUNT: 1.32 K/UL (ref 1.5–8.1)
SODIUM BLD-SCNC: 142 MMOL/L (ref 135–144)
TOTAL PROTEIN: 6.8 G/DL (ref 6.4–8.3)
TSH SERPL DL<=0.05 MIU/L-ACNC: 7.11 MIU/L (ref 0.3–5)
WBC # BLD: 2.6 K/UL (ref 3.5–11.3)
WBC # BLD: ABNORMAL 10*3/UL

## 2021-08-25 PROCEDURE — 96374 THER/PROPH/DIAG INJ IV PUSH: CPT

## 2021-08-25 PROCEDURE — 96375 TX/PRO/DX INJ NEW DRUG ADDON: CPT

## 2021-08-25 PROCEDURE — 99215 OFFICE O/P EST HI 40 MIN: CPT | Performed by: INTERNAL MEDICINE

## 2021-08-25 PROCEDURE — 96367 TX/PROPH/DG ADDL SEQ IV INF: CPT

## 2021-08-25 PROCEDURE — 96417 CHEMO IV INFUS EACH ADDL SEQ: CPT

## 2021-08-25 PROCEDURE — 85025 COMPLETE CBC W/AUTO DIFF WBC: CPT

## 2021-08-25 PROCEDURE — 6360000002 HC RX W HCPCS: Performed by: INTERNAL MEDICINE

## 2021-08-25 PROCEDURE — 96415 CHEMO IV INFUSION ADDL HR: CPT

## 2021-08-25 PROCEDURE — 96361 HYDRATE IV INFUSION ADD-ON: CPT

## 2021-08-25 PROCEDURE — 2580000003 HC RX 258: Performed by: INTERNAL MEDICINE

## 2021-08-25 PROCEDURE — 80053 COMPREHEN METABOLIC PANEL: CPT

## 2021-08-25 PROCEDURE — 96360 HYDRATION IV INFUSION INIT: CPT

## 2021-08-25 PROCEDURE — 84443 ASSAY THYROID STIM HORMONE: CPT

## 2021-08-25 PROCEDURE — 2500000003 HC RX 250 WO HCPCS: Performed by: INTERNAL MEDICINE

## 2021-08-25 PROCEDURE — 96413 CHEMO IV INFUSION 1 HR: CPT

## 2021-08-25 PROCEDURE — 36591 DRAW BLOOD OFF VENOUS DEVICE: CPT

## 2021-08-25 PROCEDURE — 99211 OFF/OP EST MAY X REQ PHY/QHP: CPT | Performed by: INTERNAL MEDICINE

## 2021-08-25 RX ORDER — EPINEPHRINE 1 MG/ML
0.3 INJECTION, SOLUTION, CONCENTRATE INTRAVENOUS PRN
Status: CANCELLED | OUTPATIENT
Start: 2021-08-25

## 2021-08-25 RX ORDER — SODIUM CHLORIDE 0.9 % (FLUSH) 0.9 %
5-40 SYRINGE (ML) INJECTION PRN
Status: CANCELLED | OUTPATIENT
Start: 2021-08-25

## 2021-08-25 RX ORDER — MEPERIDINE HYDROCHLORIDE 50 MG/ML
12.5 INJECTION INTRAMUSCULAR; INTRAVENOUS; SUBCUTANEOUS ONCE
Status: CANCELLED | OUTPATIENT
Start: 2021-08-25 | End: 2021-08-25

## 2021-08-25 RX ORDER — SODIUM CHLORIDE 9 MG/ML
20 INJECTION, SOLUTION INTRAVENOUS ONCE
Status: CANCELLED | OUTPATIENT
Start: 2021-08-25 | End: 2021-08-25

## 2021-08-25 RX ORDER — PALONOSETRON 0.05 MG/ML
0.25 INJECTION, SOLUTION INTRAVENOUS ONCE
Status: COMPLETED | OUTPATIENT
Start: 2021-08-25 | End: 2021-08-25

## 2021-08-25 RX ORDER — METHYLPREDNISOLONE SODIUM SUCCINATE 125 MG/2ML
125 INJECTION, POWDER, LYOPHILIZED, FOR SOLUTION INTRAMUSCULAR; INTRAVENOUS ONCE
Status: CANCELLED | OUTPATIENT
Start: 2021-08-25 | End: 2021-08-25

## 2021-08-25 RX ORDER — SODIUM CHLORIDE 9 MG/ML
25 INJECTION, SOLUTION INTRAVENOUS PRN
Status: CANCELLED | OUTPATIENT
Start: 2021-08-25

## 2021-08-25 RX ORDER — DIPHENHYDRAMINE HYDROCHLORIDE 50 MG/ML
50 INJECTION INTRAMUSCULAR; INTRAVENOUS ONCE
Status: CANCELLED | OUTPATIENT
Start: 2021-08-25

## 2021-08-25 RX ORDER — DEXAMETHASONE SODIUM PHOSPHATE 10 MG/ML
10 INJECTION INTRAMUSCULAR; INTRAVENOUS ONCE
Status: COMPLETED | OUTPATIENT
Start: 2021-08-25 | End: 2021-08-25

## 2021-08-25 RX ORDER — HEPARIN SODIUM (PORCINE) LOCK FLUSH IV SOLN 100 UNIT/ML 100 UNIT/ML
500 SOLUTION INTRAVENOUS PRN
Status: CANCELLED | OUTPATIENT
Start: 2021-08-25

## 2021-08-25 RX ORDER — DIPHENHYDRAMINE HYDROCHLORIDE 50 MG/ML
50 INJECTION INTRAMUSCULAR; INTRAVENOUS ONCE
Status: COMPLETED | OUTPATIENT
Start: 2021-08-25 | End: 2021-08-25

## 2021-08-25 RX ORDER — HEPARIN SODIUM (PORCINE) LOCK FLUSH IV SOLN 100 UNIT/ML 100 UNIT/ML
500 SOLUTION INTRAVENOUS PRN
Status: DISPENSED | OUTPATIENT
Start: 2021-08-25 | End: 2021-08-25

## 2021-08-25 RX ORDER — DIPHENHYDRAMINE HYDROCHLORIDE 50 MG/ML
50 INJECTION INTRAMUSCULAR; INTRAVENOUS ONCE
Status: CANCELLED | OUTPATIENT
Start: 2021-08-25 | End: 2021-08-25

## 2021-08-25 RX ORDER — 0.9 % SODIUM CHLORIDE 0.9 %
1000 INTRAVENOUS SOLUTION INTRAVENOUS ONCE
Status: COMPLETED | OUTPATIENT
Start: 2021-08-25 | End: 2021-08-25

## 2021-08-25 RX ORDER — SODIUM CHLORIDE 9 MG/ML
INJECTION, SOLUTION INTRAVENOUS CONTINUOUS
Status: CANCELLED | OUTPATIENT
Start: 2021-08-25

## 2021-08-25 RX ORDER — SODIUM CHLORIDE 9 MG/ML
20 INJECTION, SOLUTION INTRAVENOUS ONCE
Status: DISCONTINUED | OUTPATIENT
Start: 2021-08-25 | End: 2021-08-26 | Stop reason: HOSPADM

## 2021-08-25 RX ORDER — ONDANSETRON 4 MG/1
4 TABLET, FILM COATED ORAL EVERY 8 HOURS PRN
Qty: 30 TABLET | Refills: 0 | Status: SHIPPED | OUTPATIENT
Start: 2021-08-25 | End: 2021-09-15 | Stop reason: SDUPTHER

## 2021-08-25 RX ORDER — PALONOSETRON 0.05 MG/ML
0.25 INJECTION, SOLUTION INTRAVENOUS ONCE
Status: CANCELLED | OUTPATIENT
Start: 2021-08-25

## 2021-08-25 RX ADMIN — CARBOPLATIN 390 MG: 10 INJECTION INTRAVENOUS at 14:42

## 2021-08-25 RX ADMIN — FOSAPREPITANT 150 MG: 150 INJECTION, POWDER, LYOPHILIZED, FOR SOLUTION INTRAVENOUS at 10:49

## 2021-08-25 RX ADMIN — HEPARIN 500 UNITS: 100 SYRINGE at 16:12

## 2021-08-25 RX ADMIN — SODIUM CHLORIDE 20 ML/HR: 9 INJECTION, SOLUTION INTRAVENOUS at 10:42

## 2021-08-25 RX ADMIN — PACLITAXEL 300 MG: 6 INJECTION, SOLUTION, CONCENTRATE INTRAVENOUS at 11:20

## 2021-08-25 RX ADMIN — DEXAMETHASONE SODIUM PHOSPHATE 10 MG: 10 INJECTION INTRAMUSCULAR; INTRAVENOUS at 10:40

## 2021-08-25 RX ADMIN — DIPHENHYDRAMINE HYDROCHLORIDE 50 MG: 50 INJECTION INTRAMUSCULAR; INTRAVENOUS at 10:38

## 2021-08-25 RX ADMIN — SODIUM CHLORIDE 1000 ML: 9 INJECTION, SOLUTION INTRAVENOUS at 10:36

## 2021-08-25 RX ADMIN — PALONOSETRON 0.25 MG: 0.05 INJECTION, SOLUTION INTRAVENOUS at 10:36

## 2021-08-25 RX ADMIN — FAMOTIDINE 20 MG: 10 INJECTION, SOLUTION INTRAVENOUS at 10:38

## 2021-08-25 NOTE — PROGRESS NOTES
[address] [facility] AMBULATORY WITH USE OF WALKER. SHE IS LATE DUE TO HER CAB BEING LATE FOR . MEDIPORT IS ACCESSED PER POLICY AND ORDERED LABS COLLECTED. SALINE AT [facility]. STEPHENIE REPORTS NEW NUMBNESS / TINGLING TO RIGHT HAND. NAUSEA THAT IS CONTROLLED WITH ZOFRAN ODT HOWEVER IS IN NEED OF REFILL. C/O MOUTH TENDERNESS. NO WHITE COATING TO TOUNGE. SHE HAS NOT BEEN DOING THE SALT WATER RINSES. RE EDUCATE AND UNDERSTANDING VOICED. Ivan Richardson MD.    AWAIT LAB VALUES AND MD COVARRUBIAS. NOTE WBC= 2.6  ANC= 1.32  CRE= 1.47    DR RUBIN INTO SEE PT AND REVIEWED LABS  HYDRATION 1000 ML SALINE OVER ONE HOUR  PROCEED WITH TX.    PRE MEDS ARE GIVEN   HYDRATION COMPLETE  PLEASE REFER TO MAR  TAXOL INFUSED OVER 3 HOURS WITHOUT DIFFICULTY. CARB AUC 6 INFUSED OVER 1 HOUR. MEDIPORT IS FLUSHED / HEPARINIZED AND NEEDLE REMOVED. BAND AID APPLIED. NOTE PT ASSIST TO BATHROOM SEVERAL TIMES THROUGHOUT DAY. GAIT SLOW AND STEADY WITH USE OF WALKER. [facility] PHONED AND NOTIFIED OF PT'S COMPLETION OF [address]. WILL MEET PT IN [facility]. D/C VIA AMBULATORY WITH WALKER AND PERSONAL BELONGINGS.

## 2021-08-25 NOTE — TELEPHONE ENCOUNTER
Betsey Gonzalez FOR MD VISIT & [address] IN TO SEE PATIENT  ORDERS RECEIVED  [facility] [place]   [address] TOMORROW  CHEMO AS PLANNED WITH A REDUCED DOSE OF TAXOL  RV 3 WEEKS WITH PET/CT  WRITER SCHEDULED PET/CT THRU [facility] [phone] WITH NORIS FOR 9/3/21 @10AM ARRIVE BY 9:30AM @ [address] [place] [address]. FAXED ORDER & MD NOTE TO  [phone], CONFIRMATION SCANNED TO CHART.  [facility] IS MAILING PET/CT INSTRUCTIONS TO PATIENT  GRANIX SCHEDULED FOR 8/26/21 @2PM, NOTIFIED Casey Moy MD VISIT 9/15/21 @8:45AM  [email or web address]  SCRIPT CALLED INTO PATIENTS PHARMACY  AVS PRINTED AND GIVEN TO PATIENT WITH INSTRUCTIONS  PATIENT REMAINS IN [address] [place] [address]

## 2021-08-25 NOTE — PROGRESS NOTES
Today's Date: 8/25/2021  Patient Name: Kimmie Andrews  Patient's age: 80 y. o., 1950    Diagnosis:   RUL squamous cell carcinoma,   Cancer Staging  Malignant neoplasm of upper lobe of right lung (HCC)  Staging form: Lung, AJCC 8th Edition  - Clinical stage from 9/18/2020: Stage IIIB (cT4, cN2, cM0) - Signed by Dov Patterson MD on 9/18/2020    Current therapy:  Started on concurrent chemoradiation with weekly carbotaxol on 10/21/20  She completed radiation therapy in mid December  Her pET scan 2/3/21 showed no recurrence with positive response to treatment  Restarted on consolidation durvalumab 2/3/2021  Recent CT chest 6/14/21 showed Increase in size of spiculated malignant mass in the medial right upper lobe measuring 5.2 x 3.4 cm, which shows evidence of mediastinal invasion.  Matted metastatic lymphadenopathy in the subcarinal and right paratracheal regions is unchanged  PET scan on 7/2/2021 showed  There has been significant increase in size compared to prior PET/CT from February 2021 with new invasion into the right mediastinum.  There is associated increased FDG  PET activity measuring with a max SUV of 16.2, previously 3.1. Started on chemotherapy with carboplatin and Taxol on 7/14/2021  CHIEF COMPLAINT:    Chief Complaint   Patient presents with    Follow-up    Other     tingling in right hand X 3 weeks / gets cold / runs up arm     Other     sores in mouth     Medication Refill     INTERVAL HISTORY:    Concetta Jackson is returning for follow visit and to discuss lab results and further recommendations. She has completed 2 cycles of chemotherapy. She is has mild nausea. She also reported mild numbness in her right arm. Today her kidney function is slightly off with creatinine bump to 1.4. She denies any diarrhea. She denies any chest pain, worsening shortness of breath, fever chills.   During this visit patient's allergy, social, medical, surgical history and medications were reviewed and (PERCOCET)  MG per tablet Take 1 tablet by mouth every 6 hours as needed for Pain.  traZODone (DESYREL) 300 MG tablet Take 0.5 tablets by mouth nightly 30 tablet 0    clonazePAM (KLONOPIN) 1 MG tablet Take 1 tablet by mouth 3 times daily as needed for Anxiety for up to 3 doses. 3 tablet 0    gabapentin (NEURONTIN) 400 MG capsule Take 1 capsule by mouth 2 times daily for 3 doses. In addition to 2 capsules (=800mg) nightly 3 capsule 0    ARIPiprazole (ABILIFY) 5 MG tablet Take 1 tablet by mouth daily for 3 doses 3 tablet 0    furosemide (LASIX) 40 MG tablet Take 40 mg by mouth daily      melatonin 5 MG TABS tablet Take 5 mg by mouth nightly       glimepiride (AMARYL) 1 MG tablet Take 1 mg by mouth Daily with supper In addition to 2 tablets (=2mg) every morning / 8/25/21 taking 2  Tablets in the morning      metoprolol tartrate (LOPRESSOR) 25 MG tablet Take 25 mg by mouth 2 times daily      venlafaxine (EFFEXOR XR) 150 MG extended release capsule Take 150 mg by mouth 2 times daily      lansoprazole (PREVACID) 30 MG delayed release capsule Take 30 mg by mouth daily      metFORMIN (GLUCOPHAGE) 500 MG tablet Take 500 mg by mouth 2 times daily      aspirin EC 81 MG EC tablet Take 81 mg by mouth daily      mometasone-formoterol (DULERA) 200-5 MCG/ACT inhaler Inhale 2 puffs into the lungs every 12 hours as needed (wheezing/SOB)       ondansetron (ZOFRAN) 4 MG tablet Take 1 tablet by mouth every 8 hours as needed for Nausea or Vomiting 30 tablet 0     No current facility-administered medications for this visit. Facility-Administered Medications Ordered in Other Visits   Medication Dose Route Frequency Provider Last Rate Last Admin    heparin flush 100 UNIT/ML injection 500 Units  500 Units Intracatheter PRN Cesar Erazo MD         Allergies:  Codeine and Dye [iodides]    Social History:   reports that she quit smoking about 5 years ago.  She has never used smokeless tobacco. She reports that she does not drink alcohol and does not use drugs. Family History: family history includes Cancer in her father and mother. REVIEW OF SYSTEMS:    Constitutional: ++fatigue, No fever or chills. No night sweats, no weight loss   Eyes: No eye discharge, double vision, or eye pain   HEENT: negative for sore mouth, sore throat, hoarseness and voice change   Respiratory: negative for cough , sputum, ++dyspnea, wheezing, hemoptysis, chest pain   Cardiovascular: negative for chest pain, dyspnea, palpitations, orthopnea, PND   Gastrointestinal: negative for nausea, vomiting, diarrhea, constipation, abdominal pain, Dysphagia, hematemesis and hematochezia   Genitourinary: negative for frequency, dysuria, nocturia, urinary incontinence, and hematuria   Integument: negative for rash, skin lesions, bruises.    Hematologic/Lymphatic: negative for easy bruising, bleeding, lymphadenopathy, or petechiae   Endocrine: negative for heat or cold intolerance,weight changes, change in bowel habits and hair loss   Musculoskeletal: negative for myalgias, arthralgias, pain, joint swelling,and bone pain   Neurological: negative for headaches, dizziness, seizures, weakness, numbness    PHYSICAL EXAM:      /69   Pulse 90   Temp 98.3 °F (36.8 °C) (Temporal)   Resp 18   Wt 185 lb (83.9 kg)   BMI 28.13 kg/m²    Temp (24hrs), Av.7 °F (36.5 °C), Min:97.3 °F (36.3 °C), Max:98.1 °F (36.7 °C)  General appearance - well appearing, no in pain or distress   Mental status - alert and cooperative   Eyes - pupils equal and reactive, extraocular eye movements intact   Ears - bilateral TM's and external ear canals normal   Mouth - mucous membranes moist, pharynx normal without lesions   Neck - supple, no significant adenopathy   Lymphatics - no palpable lymphadenopathy, no hepatosplenomegaly   Chest - clear to auscultation, no wheezes, rales or rhonchi, symmetric air entry   Heart - normal rate, regular rhythm, normal S1, S2, no Soft Tissues/Bones: Old right rib fractures.              Impression    Approximate 7.2 cm spiculated mass of the right upper lobe likely with    invasion of the mediastinum, adjacent lymphangitic spread and suspected    metastatic mediastinal lymphadenopathy. Scan 9/11/2020: Impression    1. The patient's known right upper lobe lung mass is FDG avid consistent with    the patient's primary malignancy. 2. FDG avid mediastinal lymph nodes consistent with metastatic disease. 3. No abnormal FDG activity is identified involving the abdomen or pelvis. PET 2/3/21  IMPRESSION:   *  Unexpected findings of emphysematous cystitis. *  Positive response to therapy with markedly decreased size and FDG avidity of right upper lobe mass and mediastinal adenopathy. *  Healing right rib fractures. Ct chest 6/14/21  FINDINGS:     Emphysema.  Spiculated mass in the medial right upper lobe which appears to invade into the upper right mediastinum measures 5.2 x 3.4 cm (4.4 x 1.8 cm previously).  Malignant matted lymphadenopathy in the subcarinal and right paratracheal regions is unchanged.  Noncalcified left upper lobe lung   nodule measuring 0.6 cm is unchanged.  There is a 1.4 cm nodular opacity in the lingula that appear slightly larger than on previous study.  Left chest port in place.  Atherosclerotic thoracic aorta.  Visualized structures in the upper abdomen are unremarkable.  Normal heart size.  No pleural or   pericardial effusions.  The central pulmonary arteries are unremarkable.  Extensive coronary artery calcifications.  Status post median sternotomy.  Wall thickening of the esophagus.  Visualized chest wall structures are unremarkable.  There are multiple healing and/or old rib fractures.      IMPRESSION:   1.  Increase in size of spiculated malignant mass in the medial right upper lobe measuring 5.2 x 3.4 cm, which shows evidence of mediastinal invasion.  Matted metastatic lymphadenopathy in the subcarinal and right paratracheal regions is unchanged. 2.  Unchanged noncalcified left upper lobe nodule measuring 0.6 cm.  A 1.4 cm nodular opacity in the lingula appears slightly larger than on previous study. 3.  Wall thickening of esophagus suggestive of esophagitis. Ct abd  6/13/21  CT images of the abdomen and pelvis are obtained without contrast.  There is a 1.8 cm soft tissue density arising from or directly adjacent to the uncinate process of the pancreas.  Consider contrast-enhanced CT or MRI for further evaluation.  Bowel is unremarkable. [place]elli [place] is no intraperitoneal or   retroperitoneal lymphadenopathy.  Colonic stool burden is moderate.  The adrenals appear symmetric.  Images the lung bases are grossly clear.  There appear to be remote right rib fractures.  Remote left rib fracture also noted.  There is no hepatic or splenic abnormality.  No hydronephrosis is seen. IMPRESSION:     1.8 cm soft tissue density arising from, or directly adjacent to, the uncinate process of the pancreas.  Contrast-enhanced CT or MRI recommended. CT Chest 6/14/21  IMPRESSION:     1.  Increase in size of spiculated malignant mass in the medial right upper lobe measuring 5.2 x 3.4 cm, which shows evidence of mediastinal invasion.  Matted metastatic lymphadenopathy in the subcarinal and right paratracheal regions is unchanged. 2.  Unchanged noncalcified left upper lobe nodule measuring 0.6 cm.  A 1.4 cm nodular opacity in the lingula appears slightly larger than on previous study. 3.  Wall thickening of esophagus suggestive of esophagitis.      Ct abd  Pelvis 6/13/21  CT images of the abdomen and pelvis are obtained without contrast.  There is a 1.8 cm soft tissue density arising from or directly adjacent to the uncinate process of the pancreas.  Consider contrast-enhanced CT or MRI for further evaluation.  Bowel is unremarkable. [place] is no intraperitoneal or   retroperitoneal lymphadenopathy.  Colonic stool burden is moderate.  The adrenals appear symmetric.  Images the lung bases are grossly clear.  There appear to be remote right rib fractures.  Remote left rib fracture also noted.  There is no hepatic or splenic abnormality.  No hydronephrosis is seen. IMPRESSION:   1.8 cm soft tissue density arising from, or directly adjacent to, the uncinate process of the pancreas.  Contrast-enhanced CT or MRI recommended. PET scan 7/2/2021  FINDINGS:   Normal FDG uptake is seen in brain, oral pharyngeal region, myocardium, liver, spleen, bowel, kidneys/ureters and urinary bladder. Head and neck:   No suspicious hypermetabolic activity. No lymphadenopathy. Chest:   Relatively unchanged spiculated mass within the right upper lobe measuring 4.7 x 3.0 cm when compared to recent CT from June 2021. Bev Contes has been significant increase in size compared to prior PET/CT from February 2021 with new invasion into the right mediastinum.  There is associated increased FDG  PET activity measuring with a max SUV of 16.2, previously 3.1. Unchanged opacities along the posterior lateral right upper lobe.  Minimally increased activity is noted in this area. Improving nodular opacity within the lingula with no associated increased metabolic activity. No significant activity is noted within the previously seen mediastinal lymph nodes.  There is mild diffuse uptake throughout the esophagus with a thickened wall.  This is similar to prior CT in June 2021. Very severe calcification involving the left coronary, LAD, circumflex, ramus and right coronary. Abdomen:   Adrenals are normal.  Diffuse uptake is noted throughout the colon with no focal areas appreciated.  No increased activity is noted at the soft tissue density adjacent to the uncinate process of the pancreas as seen on recent CT. Heavy intra-abdominal vascular calcifications.    Persistent irregular appearance of the bladder contour with a small amount of intraluminal gas.  No emphysematous change within the bladder wall is currently visualized. Musculoskeletal system:   No suspicious hypermetabolic activity. No lymphadenopathy. Multiple bilateral chronic rib fractures.  Old transverse process fracture at L2 No focal osseous uptake. IMPRESSION:   *  Hypermetabolic right upper lobe spiculated mass compatible with patient's known neoplasm. *  Low level increased activity within the posterior lateral right upper lobe opacities most likely representing scarring from prior radiation. *  Diffuse mild uptake through the esophagus with persistent abnormal wall thickening suggestive of esophagitis. *  Nonfocal diffuse uptake throughout the colon which can be seen with certain medications such as metformin. *  Abnormal appearance to the bladder.  The bladder is distended and contains intraluminal gas which may be due to recent instrumentation and/or infection. Lorenzo [place] is also small amount of gas seen within the wall of the bladder.  This raises the possibility of emphysematous cystitis.  However, the   amount of emphysematous changes has improved.  As Correlation clinical exam recommended. *  Severe multivessel coronary artery calcification     IMPRESSION:   1. 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 PET scan negative for distant metastasis: Clinical stage T4 N2 M0, stage IIIb  2. Anemia  3. Leukopenia: chemo related  4. COPD  5. Falls  6. CAD  7. Tobacco abuse  8. Actinomyces bacteremia    RECOMMENDATIONS:  1. I reviewed the laboratory data,  diagnosis, prognosis and treatment options with patient  2. Her creatinine slightly increased to 1.4  3. I will give 1 L IV hydration  4. I will decrease the dose of Taxol slightly  5.  Her ANC is at borderline therefore I will arrange for Granix tomorrow  6. Continue chemotherapy with carboTaxol  7. I will plan to get restaging scans after 3 cycles  8. I reviewed the NCCN guidelines and goals of care  9. RTC in 3 weeks with PET scan prior or earlier if needed  10. Patient's questions were sought and answered to her satisfaction      Mendoza Walker MD  Hematologist/Medical Oncologist    This note is created with the assistance of a speech recognition program.  While intending to generate a document that actually reflects the content of the visit, the document can still have some errors including those of syntax and sound a like substitutions which may escape proof reading. It such instances, actual meaning can be extrapolated by contextual diversion.

## 2021-08-25 NOTE — TELEPHONE ENCOUNTER
Name: Malissa Pope  : 1950  MRN: [id number]    Oncology Navigation Follow-Up Note  Navigator met with pt. Face to face and offering assistance . Pt. Needing information written down in regards to type of cancer and staging? Writer giving pt. Information to give to her , plan to follow. Pt. Sharing she received wig and writer encouraging her to reach out to [facility] again if needed.                                 Electronically signed by Douglas Mejia RN on 2021 at 9:28 AM

## 2021-08-26 ENCOUNTER — TELEPHONE (OUTPATIENT)
Dept: INFUSION THERAPY | Facility: MEDICAL CENTER | Age: 71
End: 2021-08-26

## 2021-08-27 ENCOUNTER — TELEPHONE (OUTPATIENT)
Dept: INFUSION THERAPY | Facility: MEDICAL CENTER | Age: 71
End: 2021-08-27

## 2021-08-27 NOTE — LETTER
[address]  [address] [address]  Phone: [phone]            August 27, 2021     [address] [place] [id number]      Dear Ximena Main: We are sorry that you missed your appointment with [facility] for zarxio injection on 8/27/2021. Your health and follow-up medical care are important to us. Please call our office as soon as possible so that we may reschedule your appointment. If you have already rescheduled your appointment, please disregard this letter.     Sincerely,        [facility] Staff

## 2021-08-27 NOTE — TELEPHONE ENCOUNTER
PT NO SHOW AGAIN AFTER PT WAS RESCHEDULED PER PHONE ON 8/26/2021 FOR TODAY 8/27/2021 AT 1100, DUE TO COULD NOT GET A RIDE ON 8/26/2021. TODAY 8/27/2021 PT NO SHOW AGAIN FOR ZARXIO APPT.     LETTER SENT

## 2021-08-30 ENCOUNTER — TELEPHONE (OUTPATIENT)
Dept: INFUSION THERAPY | Facility: MEDICAL CENTER | Age: 71
End: 2021-08-30

## 2021-08-30 NOTE — TELEPHONE ENCOUNTER
New order per Dr. Ashton No 8/25/21    Zarxio 480 mcg SQ x 1    Noted; chart to  for filing. .per note, patient is already scheduled for injection on 8/26/21. Kardex updated.

## 2021-09-03 ENCOUNTER — TELEPHONE (OUTPATIENT)
Dept: ONCOLOGY | Age: 71
End: 2021-09-03

## 2021-09-03 NOTE — TELEPHONE ENCOUNTER
PATIENT CALLED BECAUSE SHE RECEIVED A LETTER TO GET SCHEDULED FOR THE INJECTION SHE \"NO SHOWED FOR ON 8/26/ AND 8/27/21\" PER NURSING, WE WILL SEE PATIENT ON HER SCHEDULED APPOINTMENT 9/15/21. PER NURSING, IF PATIENT FEELS LIKE SHE HAS AN INFECTION, FEVER/CHILLS, CALL THE OFFICE OR GO TO THE ER. PATIENT NOT REAL HAPPY AND HUNG UP BEFORE WRITER COMPLETED CALL.

## 2021-09-09 ENCOUNTER — HOSPITAL ENCOUNTER (OUTPATIENT)
Facility: MEDICAL CENTER | Age: 71
End: 2021-09-09
Payer: MEDICARE

## 2021-09-15 ENCOUNTER — HOSPITAL ENCOUNTER (OUTPATIENT)
Dept: INFUSION THERAPY | Facility: MEDICAL CENTER | Age: 71
Discharge: HOME | End: 2021-09-15
Payer: MEDICARE

## 2021-09-15 ENCOUNTER — OFFICE VISIT (OUTPATIENT)
Dept: ONCOLOGY | Age: 71
End: 2021-09-15
Payer: MEDICARE

## 2021-09-15 ENCOUNTER — TELEPHONE (OUTPATIENT)
Dept: ONCOLOGY | Age: 71
End: 2021-09-15

## 2021-09-15 VITALS
SYSTOLIC BLOOD PRESSURE: 106 MMHG | WEIGHT: 177.8 LBS | DIASTOLIC BLOOD PRESSURE: 61 MMHG | HEART RATE: 101 BPM | RESPIRATION RATE: 16 BRPM | TEMPERATURE: 98.4 F | BODY MASS INDEX: 27.03 KG/M2

## 2021-09-15 DIAGNOSIS — C34.11 MALIGNANT NEOPLASM OF UPPER LOBE OF RIGHT LUNG (HCC): ICD-10-CM

## 2021-09-15 DIAGNOSIS — C34.91 NON-SMALL CELL CANCER OF RIGHT LUNG (HCC): Primary | ICD-10-CM

## 2021-09-15 LAB
ABSOLUTE EOS #: 0.05 K/UL (ref 0–0.44)
ABSOLUTE IMMATURE GRANULOCYTE: 0.06 K/UL (ref 0–0.3)
ABSOLUTE LYMPH #: 0.94 K/UL (ref 1.1–3.7)
ABSOLUTE MONO #: 0.48 K/UL (ref 0.1–1.2)
ALBUMIN SERPL-MCNC: 3.8 G/DL (ref 3.5–5.2)
ALBUMIN/GLOBULIN RATIO: ABNORMAL (ref 1–2.5)
ALP BLD-CCNC: 47 U/L (ref 35–104)
ALT SERPL-CCNC: 8 U/L (ref 5–33)
ANION GAP SERPL CALCULATED.3IONS-SCNC: 11 MMOL/L (ref 9–17)
AST SERPL-CCNC: 12 U/L
BASOPHILS # BLD: 1 % (ref 0–2)
BASOPHILS ABSOLUTE: 0.03 K/UL (ref 0–0.2)
BILIRUB SERPL-MCNC: <0.1 MG/DL (ref 0.3–1.2)
BUN BLDV-MCNC: 19 MG/DL (ref 8–23)
BUN/CREAT BLD: 18 (ref 9–20)
CALCIUM SERPL-MCNC: 8.8 MG/DL (ref 8.6–10.4)
CHLORIDE BLD-SCNC: 104 MMOL/L (ref 98–107)
CO2: 24 MMOL/L (ref 20–31)
CREAT SERPL-MCNC: 1.03 MG/DL (ref 0.5–0.9)
DIFFERENTIAL TYPE: ABNORMAL
EOSINOPHILS RELATIVE PERCENT: 1 % (ref 1–4)
GFR AFRICAN AMERICAN: >60 ML/MIN
GFR NON-AFRICAN AMERICAN: 53 ML/MIN
GFR SERPL CREATININE-BSD FRML MDRD: ABNORMAL ML/MIN/{1.73_M2}
GFR SERPL CREATININE-BSD FRML MDRD: ABNORMAL ML/MIN/{1.73_M2}
GLUCOSE BLD-MCNC: 58 MG/DL (ref 70–99)
HCT VFR BLD CALC: 28.1 % (ref 36.3–47.1)
HEMOGLOBIN: 8.6 G/DL (ref 11.9–15.1)
IMMATURE GRANULOCYTES: 1 %
LYMPHOCYTES # BLD: 21 % (ref 24–43)
MCH RBC QN AUTO: 27.3 PG (ref 25.2–33.5)
MCHC RBC AUTO-ENTMCNC: 30.6 G/DL (ref 28.4–34.8)
MCV RBC AUTO: 89.2 FL (ref 82.6–102.9)
MONOCYTES # BLD: 11 % (ref 3–12)
NRBC AUTOMATED: 0 PER 100 WBC
PDW BLD-RTO: 20 % (ref 11.8–14.4)
PLATELET # BLD: 151 K/UL (ref 138–453)
PLATELET ESTIMATE: ABNORMAL
PMV BLD AUTO: 9.2 FL (ref 8.1–13.5)
POTASSIUM SERPL-SCNC: 4.3 MMOL/L (ref 3.7–5.3)
RBC # BLD: 3.15 M/UL (ref 3.95–5.11)
RBC # BLD: ABNORMAL 10*6/UL
SEG NEUTROPHILS: 65 % (ref 36–65)
SEGMENTED NEUTROPHILS ABSOLUTE COUNT: 2.96 K/UL (ref 1.5–8.1)
SODIUM BLD-SCNC: 139 MMOL/L (ref 135–144)
TOTAL PROTEIN: 7.2 G/DL (ref 6.4–8.3)
WBC # BLD: 4.5 K/UL (ref 3.5–11.3)
WBC # BLD: ABNORMAL 10*3/UL

## 2021-09-15 PROCEDURE — 99211 OFF/OP EST MAY X REQ PHY/QHP: CPT | Performed by: INTERNAL MEDICINE

## 2021-09-15 PROCEDURE — 96376 TX/PRO/DX INJ SAME DRUG ADON: CPT

## 2021-09-15 PROCEDURE — 96417 CHEMO IV INFUS EACH ADDL SEQ: CPT

## 2021-09-15 PROCEDURE — 99215 OFFICE O/P EST HI 40 MIN: CPT | Performed by: INTERNAL MEDICINE

## 2021-09-15 PROCEDURE — 36591 DRAW BLOOD OFF VENOUS DEVICE: CPT

## 2021-09-15 PROCEDURE — 2580000003 HC RX 258: Performed by: INTERNAL MEDICINE

## 2021-09-15 PROCEDURE — 96367 TX/PROPH/DG ADDL SEQ IV INF: CPT

## 2021-09-15 PROCEDURE — 80053 COMPREHEN METABOLIC PANEL: CPT

## 2021-09-15 PROCEDURE — 6360000002 HC RX W HCPCS: Performed by: INTERNAL MEDICINE

## 2021-09-15 PROCEDURE — 96415 CHEMO IV INFUSION ADDL HR: CPT

## 2021-09-15 PROCEDURE — 96375 TX/PRO/DX INJ NEW DRUG ADDON: CPT

## 2021-09-15 PROCEDURE — 96413 CHEMO IV INFUSION 1 HR: CPT

## 2021-09-15 PROCEDURE — 2500000003 HC RX 250 WO HCPCS: Performed by: INTERNAL MEDICINE

## 2021-09-15 PROCEDURE — 85025 COMPLETE CBC W/AUTO DIFF WBC: CPT

## 2021-09-15 RX ORDER — ONDANSETRON 4 MG/1
4 TABLET, FILM COATED ORAL EVERY 8 HOURS PRN
Qty: 30 TABLET | Refills: 0 | Status: SHIPPED | OUTPATIENT
Start: 2021-09-15 | End: 2021-10-15 | Stop reason: SDUPTHER

## 2021-09-15 RX ORDER — DIPHENHYDRAMINE HYDROCHLORIDE 50 MG/ML
50 INJECTION INTRAMUSCULAR; INTRAVENOUS ONCE
Status: CANCELLED | OUTPATIENT
Start: 2021-09-15

## 2021-09-15 RX ORDER — HEPARIN SODIUM (PORCINE) LOCK FLUSH IV SOLN 100 UNIT/ML 100 UNIT/ML
500 SOLUTION INTRAVENOUS PRN
Status: DISCONTINUED | OUTPATIENT
Start: 2021-09-15 | End: 2021-09-16 | Stop reason: HOSPADM

## 2021-09-15 RX ORDER — HEPARIN SODIUM (PORCINE) LOCK FLUSH IV SOLN 100 UNIT/ML 100 UNIT/ML
500 SOLUTION INTRAVENOUS PRN
Status: CANCELLED | OUTPATIENT
Start: 2021-09-15

## 2021-09-15 RX ORDER — MEPERIDINE HYDROCHLORIDE 50 MG/ML
12.5 INJECTION INTRAMUSCULAR; INTRAVENOUS; SUBCUTANEOUS ONCE
Status: CANCELLED | OUTPATIENT
Start: 2021-09-15 | End: 2021-09-15

## 2021-09-15 RX ORDER — SODIUM CHLORIDE 9 MG/ML
INJECTION, SOLUTION INTRAVENOUS CONTINUOUS
Status: CANCELLED | OUTPATIENT
Start: 2021-09-15

## 2021-09-15 RX ORDER — SODIUM CHLORIDE 9 MG/ML
20 INJECTION, SOLUTION INTRAVENOUS ONCE
Status: COMPLETED | OUTPATIENT
Start: 2021-09-15 | End: 2021-09-15

## 2021-09-15 RX ORDER — SODIUM CHLORIDE 0.9 % (FLUSH) 0.9 %
5-40 SYRINGE (ML) INJECTION PRN
Status: CANCELLED | OUTPATIENT
Start: 2021-09-15

## 2021-09-15 RX ORDER — DEXAMETHASONE SODIUM PHOSPHATE 10 MG/ML
10 INJECTION INTRAMUSCULAR; INTRAVENOUS ONCE
Status: COMPLETED | OUTPATIENT
Start: 2021-09-15 | End: 2021-09-15

## 2021-09-15 RX ORDER — METHYLPREDNISOLONE SODIUM SUCCINATE 125 MG/2ML
125 INJECTION, POWDER, LYOPHILIZED, FOR SOLUTION INTRAMUSCULAR; INTRAVENOUS ONCE
Status: CANCELLED | OUTPATIENT
Start: 2021-09-15 | End: 2021-09-15

## 2021-09-15 RX ORDER — EPINEPHRINE 1 MG/ML
0.3 INJECTION, SOLUTION, CONCENTRATE INTRAVENOUS PRN
Status: CANCELLED | OUTPATIENT
Start: 2021-09-15

## 2021-09-15 RX ORDER — DIPHENHYDRAMINE HYDROCHLORIDE 50 MG/ML
50 INJECTION INTRAMUSCULAR; INTRAVENOUS ONCE
Status: COMPLETED | OUTPATIENT
Start: 2021-09-15 | End: 2021-09-15

## 2021-09-15 RX ORDER — SODIUM CHLORIDE 9 MG/ML
20 INJECTION, SOLUTION INTRAVENOUS ONCE
Status: CANCELLED | OUTPATIENT
Start: 2021-09-15 | End: 2021-09-15

## 2021-09-15 RX ORDER — SODIUM CHLORIDE 9 MG/ML
25 INJECTION, SOLUTION INTRAVENOUS PRN
Status: CANCELLED | OUTPATIENT
Start: 2021-09-15

## 2021-09-15 RX ORDER — AZITHROMYCIN 250 MG/1
TABLET, FILM COATED ORAL
Qty: 1 PACKET | Refills: 0 | Status: SHIPPED | OUTPATIENT
Start: 2021-09-15 | End: 2021-10-06 | Stop reason: ALTCHOICE

## 2021-09-15 RX ORDER — PALONOSETRON 0.05 MG/ML
0.25 INJECTION, SOLUTION INTRAVENOUS ONCE
Status: CANCELLED | OUTPATIENT
Start: 2021-09-15

## 2021-09-15 RX ORDER — PALONOSETRON 0.05 MG/ML
0.25 INJECTION, SOLUTION INTRAVENOUS ONCE
Status: COMPLETED | OUTPATIENT
Start: 2021-09-15 | End: 2021-09-15

## 2021-09-15 RX ORDER — DIPHENHYDRAMINE HYDROCHLORIDE 50 MG/ML
50 INJECTION INTRAMUSCULAR; INTRAVENOUS ONCE
Status: CANCELLED | OUTPATIENT
Start: 2021-09-15 | End: 2021-09-15

## 2021-09-15 RX ORDER — SODIUM CHLORIDE 0.9 % (FLUSH) 0.9 %
5-40 SYRINGE (ML) INJECTION PRN
Status: DISCONTINUED | OUTPATIENT
Start: 2021-09-15 | End: 2021-09-16 | Stop reason: HOSPADM

## 2021-09-15 RX ADMIN — DIPHENHYDRAMINE HYDROCHLORIDE 50 MG: 50 INJECTION, SOLUTION INTRAMUSCULAR; INTRAVENOUS at 09:39

## 2021-09-15 RX ADMIN — SODIUM CHLORIDE, PRESERVATIVE FREE 10 ML: 5 INJECTION INTRAVENOUS at 08:26

## 2021-09-15 RX ADMIN — DEXAMETHASONE SODIUM PHOSPHATE 10 MG: 10 INJECTION INTRAMUSCULAR; INTRAVENOUS at 09:39

## 2021-09-15 RX ADMIN — SODIUM CHLORIDE, PRESERVATIVE FREE 10 ML: 5 INJECTION INTRAVENOUS at 14:51

## 2021-09-15 RX ADMIN — FOSAPREPITANT 150 MG: 150 INJECTION, POWDER, LYOPHILIZED, FOR SOLUTION INTRAVENOUS at 09:47

## 2021-09-15 RX ADMIN — FAMOTIDINE 20 MG: 10 INJECTION, SOLUTION INTRAVENOUS at 09:39

## 2021-09-15 RX ADMIN — PACLITAXEL 270 MG: 6 INJECTION, SOLUTION, CONCENTRATE INTRAVENOUS at 10:22

## 2021-09-15 RX ADMIN — HEPARIN 500 UNITS: 100 SYRINGE at 14:51

## 2021-09-15 RX ADMIN — CARBOPLATIN 400 MG: 10 INJECTION INTRAVENOUS at 13:25

## 2021-09-15 RX ADMIN — SODIUM CHLORIDE 20 ML/HR: 9 INJECTION, SOLUTION INTRAVENOUS at 08:26

## 2021-09-15 RX ADMIN — PALONOSETRON 0.25 MG: 0.05 INJECTION, SOLUTION INTRAVENOUS at 09:39

## 2021-09-15 NOTE — PROGRESS NOTES
Patient arrived ambulatory with walker per self for cycle 4 day 1 treatment and physician visit. Patient complains of being tired and chronic pain. No other complaints or concerns. Port accessed;specimen sent. Labs reviewed. Physician at bedside. Okay to treat. Patient premedicated. Taxol infused over 3 hours with no sign adverse reaction;line flushed. Carboplatin infused with no sign adverse reaction;line flushed. Port flushed and heparinized with intact johnston needle removed per protocol. Patient pushed in wheelchair to lobby for pickup at discharge.  AVS in hand.

## 2021-09-15 NOTE — PROGRESS NOTES
Today's Date: 9/15/2021  Patient Name: Teo Millard  Patient's age: 80 y. o., 1950    Diagnosis:   RUL squamous cell carcinoma,   Cancer Staging  Malignant neoplasm of upper lobe of right lung (HCC)  Staging form: Lung, AJCC 8th Edition  - Clinical stage from 9/18/2020: Stage IIIB (cT4, cN2, cM0) - Signed by Gege Sweeney MD on 9/18/2020    Current therapy:  Started on concurrent chemoradiation with weekly carbotaxol on 10/21/20  She completed radiation therapy in mid December  Her pET scan 2/3/21 showed no recurrence with positive response to treatment  Restarted on consolidation durvalumab 2/3/2021  Recent CT chest 6/14/21 showed Increase in size of spiculated malignant mass in the medial right upper lobe measuring 5.2 x 3.4 cm, which shows evidence of mediastinal invasion.  Matted metastatic lymphadenopathy in the subcarinal and right paratracheal regions is unchanged  PET scan on 7/2/2021 showed  There has been significant increase in size compared to prior PET/CT from February 2021 with new invasion into the right mediastinum.  There is associated increased FDG  PET activity measuring with a max SUV of 16.2, previously 3.1. Started on chemotherapy with carboplatin and Taxol on 7/14/2021  CHIEF COMPLAINT:    Chief Complaint   Patient presents with    Follow-up    Results     PET Scan    Medication Refill     INTERVAL HISTORY:    China Horne is returning for follow visit and to discuss lab results, PET scan results and further recommendations. Her PET scan showed decrease in the size of the lung mass as well as decreased activity suggesting good response. She is tolerating chemo well except some fatigue. Today she feels fatigue and tired and may be slightly depressed but denies any suicidal ideation. She does have mild cough. Her CT scan suggested possible infiltrate.     .  During this visit patient's allergy, social, medical, surgical history and medications were reviewed and updated. HISTORY OF PRESENT ILLNESS:    Marcial Fuentes is a 61-year-old female with a past medical history of COPD, history of tobacco abuse, depression, CAD, arthritis was admitted with multiple falls at home. She complains of back pain and also chronic cough. On admission she had a CT scan of the chest which showed 7.2 cm spiculated mass in the right upper lobe with mediastinal lymphadenopathy suspicious for malignancy. 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 Oncology consulted for further evaluation. Patient has COPD and uses Home oxygen at night and sues walker at home. She smokes more than a PPD. 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 Past Medical History:   has a past medical history of AAA (abdominal aortic aneurysm) ([facility] [address] Acid reflux, Anxiety and depression, Aortic stenosis, Arthritis, Blister of ankle, right, CAD (coronary artery disease), Cancer ([address] COPD (chronic obstructive pulmonary disease) ([facility] [address] Diabetes mellitus ([facility] [address] Falls, Heart block, Hypokalemia, MDRO (multiple drug resistant organisms) resistance, MRSA (methicillin resistant staph aureus) culture positive, On home oxygen therapy, On home oxygen therapy, Overactive bladder, Pneumonia, PONV (postoperative nausea and vomiting), and Vitamin D deficiency. Past Surgical History:   has a past surgical history that includes back surgery; eye surgery; Cholecystectomy; Appendectomy; Hysterectomy; Tonsillectomy; lumbar laminectomy; Endoscopy, colon, diagnostic (12/08/2016); aortic valve repair (N/A, 4/11/2017); bronchoscopy (N/A, 8/31/2020); IR INSERT PICC VAD W SQ PORT >5 YEARS (9/4/2020); and IR PORT PLACEMENT > 5 YEARS (9/29/2020).      Medications:    Current Outpatient Medications   Medication Sig Dispense Refill    ondansetron (ZOFRAN) 4 MG tablet Take 1 tablet by mouth every 8 hours as needed for Nausea or Vomiting 30 tablet 0  lidocaine-prilocaine (EMLA) 2.5-2.5 % cream To port site before chemo 1 Tube 1    oxyCODONE-acetaminophen (PERCOCET)  MG per tablet Take 1 tablet by mouth every 6 hours as needed for Pain.  traZODone (DESYREL) 300 MG tablet Take 0.5 tablets by mouth nightly 30 tablet 0    clonazePAM (KLONOPIN) 1 MG tablet Take 1 tablet by mouth 3 times daily as needed for Anxiety for up to 3 doses. 3 tablet 0    gabapentin (NEURONTIN) 400 MG capsule Take 1 capsule by mouth 2 times daily for 3 doses. In addition to 2 capsules (=800mg) nightly 3 capsule 0    ARIPiprazole (ABILIFY) 5 MG tablet Take 1 tablet by mouth daily for 3 doses 3 tablet 0    furosemide (LASIX) 40 MG tablet Take 40 mg by mouth daily      melatonin 5 MG TABS tablet Take 5 mg by mouth nightly       glimepiride (AMARYL) 1 MG tablet Take 1 mg by mouth Daily with supper In addition to 2 tablets (=2mg) every morning / 8/25/21 taking 2  Tablets in the morning      metoprolol tartrate (LOPRESSOR) 25 MG tablet Take 25 mg by mouth 2 times daily      venlafaxine (EFFEXOR XR) 150 MG extended release capsule Take 150 mg by mouth 2 times daily      lansoprazole (PREVACID) 30 MG delayed release capsule Take 30 mg by mouth daily      metFORMIN (GLUCOPHAGE) 500 MG tablet Take 500 mg by mouth 2 times daily      aspirin EC 81 MG EC tablet Take 81 mg by mouth daily      mometasone-formoterol (DULERA) 200-5 MCG/ACT inhaler Inhale 2 puffs into the lungs every 12 hours as needed (wheezing/SOB)        No current facility-administered medications for this visit. Allergies:  Codeine and Dye [iodides]    Social History:   reports that she quit smoking about 5 years ago. She has never used smokeless tobacco. She reports that she does not drink alcohol and does not use drugs. Family History: family history includes Cancer in her father and mother. REVIEW OF SYSTEMS:    Constitutional: ++fatigue, No fever or chills.  No night sweats, no weight loss   Eyes: - peripheral pulses normal, no pedal edema, no clubbing or cyanosis   Skin - normal coloration and turgor, no rashes, no suspicious skin lesions noted ,    DATA:    Labs:   CBC:   Recent Labs     09/15/21  0828   WBC 4.5   HGB 8.6*   HCT 28.1*        BMP:   Recent Labs     09/15/21  0828      K 4.3   CO2 24   BUN 19   CREATININE 1.03*   LABGLOM 53*   GLUCOSE 58*     PT/INR: No results for input(s): PROTIME, INR in the last 72 hours. Surgical Pathology Report   Surgical Pathology   Collected:  08/31/20 0803    Lab status:  Final    Resulting lab:  [email or web address]    Value:  -- Diagnosis --     BRONCHIAL WASHINGS RIGHT UPPER LOBE:          POSITIVE FOR MALIGNANCY.        SQUAMOUS CELL CARCINOMA.           COMMENT: VOLUME OF TUMOR IN THE CELL BLOCK IS LOW AND ADDITIONAL   MATERIAL WOULD BE REQUIRED IF MOLECULAR TESTING IS NECESSARY. IMAGING DATA:  CT chest 8/27/2020:   FINDINGS:    Mediastinum: Three vessel coronary artery calcification.  Newly enlarged    mediastinal lymph nodes including example station 7 node measuring 3.1 x 2.1    cm on series 2, image 45.         Lungs/pleura: Mild upper lobe predominant centrilobular emphysema. Jose Ra Spiculated mass of the right upper lobe measuring 7.2 x 6.5 cm with    broad-base of contact with the mediastinal pleura, right major fissure,    encircling and obliterating the right upper lobe bronchus.  Spicules are seen    to extend to the anterior costal pleura.  There is adjacent bronchial wall    thickening and interlobular septal thickening.  Stable 5 mm solid nodule of    the left upper lobe since 2016, benign.  No pleural effusion or pneumothorax.         Upper Abdomen: Adrenals are unremarkable.         Soft Tissues/Bones:  Old right rib fractures.              Impression    Approximate 7.2 cm spiculated mass of the right upper lobe likely with    invasion of the mediastinum, adjacent lymphangitic spread and suspected    metastatic mediastinal lymphadenopathy. Scan 9/11/2020: Impression    1. The patient's known right upper lobe lung mass is FDG avid consistent with    the patient's primary malignancy. 2. FDG avid mediastinal lymph nodes consistent with metastatic disease. 3. No abnormal FDG activity is identified involving the abdomen or pelvis. PET 2/3/21  IMPRESSION:   *  Unexpected findings of emphysematous cystitis. *  Positive response to therapy with markedly decreased size and FDG avidity of right upper lobe mass and mediastinal adenopathy. *  Healing right rib fractures. Ct chest 6/14/21  FINDINGS:     Emphysema.  Spiculated mass in the medial right upper lobe which appears to invade into the upper right mediastinum measures 5.2 x 3.4 cm (4.4 x 1.8 cm previously).  Malignant matted lymphadenopathy in the subcarinal and right paratracheal regions is unchanged.  Noncalcified left upper lobe lung   nodule measuring 0.6 cm is unchanged.  There is a 1.4 cm nodular opacity in the lingula that appear slightly larger than on previous study.  Left chest port in place.  Atherosclerotic thoracic aorta.  Visualized structures in the upper abdomen are unremarkable.  Normal heart size.  No pleural or   pericardial effusions.  The central pulmonary arteries are unremarkable.  Extensive coronary artery calcifications.  Status post median sternotomy.  Wall thickening of the esophagus.  Visualized chest wall structures are unremarkable.  There are multiple healing and/or old rib fractures. IMPRESSION:   1.  Increase in size of spiculated malignant mass in the medial right upper lobe measuring 5.2 x 3.4 cm, which shows evidence of mediastinal invasion.  Matted metastatic lymphadenopathy in the subcarinal and right paratracheal regions is unchanged. 2.  Unchanged noncalcified left upper lobe nodule measuring 0.6 cm.  A 1.4 cm nodular opacity in the lingula appears slightly larger than on previous study.    3.  Wall thickening of esophagus seen.     IMPRESSION:   1.8 cm soft tissue density arising from, or directly adjacent to, the uncinate process of the pancreas.  Contrast-enhanced CT or MRI recommended. PET scan 7/2/2021  FINDINGS:   Normal FDG uptake is seen in brain, oral pharyngeal region, myocardium, liver, spleen, bowel, kidneys/ureters and urinary bladder. Head and neck:   No suspicious hypermetabolic activity. No lymphadenopathy. Chest:   Relatively unchanged spiculated mass within the right upper lobe measuring 4.7 x 3.0 cm when compared to recent CT from June 2021. Conner Lava has been significant increase in size compared to prior PET/CT from February 2021 with new invasion into the right mediastinum.  There is associated increased FDG  PET activity measuring with a max SUV of 16.2, previously 3.1. Unchanged opacities along the posterior lateral right upper lobe.  Minimally increased activity is noted in this area. Improving nodular opacity within the lingula with no associated increased metabolic activity. No significant activity is noted within the previously seen mediastinal lymph nodes.  There is mild diffuse uptake throughout the esophagus with a thickened wall.  This is similar to prior CT in June 2021. Very severe calcification involving the left coronary, LAD, circumflex, ramus and right coronary. Abdomen:   Adrenals are normal.  Diffuse uptake is noted throughout the colon with no focal areas appreciated.  No increased activity is noted at the soft tissue density adjacent to the uncinate process of the pancreas as seen on recent CT. Heavy intra-abdominal vascular calcifications. Persistent irregular appearance of the bladder contour with a small amount of intraluminal gas.  No emphysematous change within the bladder wall is currently visualized. Musculoskeletal system:   No suspicious hypermetabolic activity. No lymphadenopathy.    Multiple bilateral chronic rib fractures.  Old transverse process fracture at L2 No focal osseous uptake. IMPRESSION:   *  Hypermetabolic right upper lobe spiculated mass compatible with patient's known neoplasm. *  Low level increased activity within the posterior lateral right upper lobe opacities most likely representing scarring from prior radiation. *  Diffuse mild uptake through the esophagus with persistent abnormal wall thickening suggestive of esophagitis. *  Nonfocal diffuse uptake throughout the colon which can be seen with certain medications such as metformin. *  Abnormal appearance to the bladder.  The bladder is distended and contains intraluminal gas which may be due to recent instrumentation and/or infection. Pradeep Jennie is also small amount of gas seen within the wall of the bladder.  This raises the possibility of emphysematous cystitis.  However, the   amount of emphysematous changes has improved.  As Correlation clinical exam recommended. *  Severe multivessel coronary artery calcification   PET scan 9/3/2021  IMPRESSION:     1.  There is a hypermetabolic pulmonary neoplasm involving the right upper lobe medially.  There has been interval improvement with a decrease in the size and the an intensity since prior study of 7/1/2021.  Findings are suggestive of partial response.  It has 9.8 SUV compared to the 16.1 SUV on   prior study. 2.  Focal small opacity with FDG uptake at the right lung base.  This is a new uptake.  This could be a focal infiltrate, atelectasis or satellite lesion.  Follow-up CT scan examination is recommended. 3. Pradeep Schneider is a stable linear mildly FDG avid abnormality in the lateral portion of the right upper mid lung field.  Abnormalities from parenchymal scarring. 4.  No evidence of distance metastasis in the neck, abdomen and pelvis and visualized skeleton. IMPRESSION:   1.  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 PET scan negative for distant metastasis: Clinical stage T4 N2 M0, stage IIIb  2. Anemia  3. Leukopenia: chemo related  4. COPD  5. Falls  6. CAD  7. Tobacco abuse  8. Actinomyces bacteremia    RECOMMENDATIONS:  1. I reviewed the laboratory data,  diagnosis, prognosis and treatment options with patient  2. Her creatinine has improved  3. Her PET scan showing good response with decrease in the size of the lung mass and activity  4. She has mild neuropathy therefore I will decrease the dose of Taxol and carboplatin slightly  5. I will give a prescription for antibiotic for possible infiltrate in the lung  6. Proceed with chemotherapy  7. Return to clinic with next cycle or earlier if needed  8. I reviewed the NCCN guidelines and goals of care  9. Patient's questions were sought and answered to her satisfaction      Mendoza Rivas MD  Hematologist/Medical Oncologist    This note is created with the assistance of a speech recognition program.  While intending to generate a document that actually reflects the content of the visit, the document can still have some errors including those of syntax and sound a like substitutions which may escape proof reading. It such instances, actual meaning can be extrapolated by contextual diversion.

## 2021-09-15 NOTE — TELEPHONE ENCOUNTER
Angel Ma MD VISIT AND TX   PRESCRIPTION FOR 41 Deanne Evan TO PHARMACY   CHEMO TODAY WITH SLIGHT DOSE REDUCTION- [facility] NOTIFIED   RV NEXT CYCLE   10-6-21 845 AM

## 2021-09-17 ENCOUNTER — TELEPHONE (OUTPATIENT)
Dept: CASE MANAGEMENT | Age: 71
End: 2021-09-17

## 2021-09-29 ENCOUNTER — HOSPITAL ENCOUNTER (OUTPATIENT)
Facility: MEDICAL CENTER | Age: 71
End: 2021-09-29
Payer: MEDICARE

## 2021-10-05 DIAGNOSIS — C34.11 MALIGNANT NEOPLASM OF UPPER LOBE OF RIGHT LUNG (HCC): Primary | ICD-10-CM

## 2021-10-06 ENCOUNTER — TELEPHONE (OUTPATIENT)
Dept: ONCOLOGY | Age: 71
End: 2021-10-06

## 2021-10-06 ENCOUNTER — HOSPITAL ENCOUNTER (OUTPATIENT)
Dept: INFUSION THERAPY | Facility: MEDICAL CENTER | Age: 71
Discharge: HOME | End: 2021-10-06
Payer: MEDICARE

## 2021-10-06 ENCOUNTER — HOSPITAL ENCOUNTER (OUTPATIENT)
Age: 71
Discharge: HOME | End: 2021-10-08
Payer: MEDICARE

## 2021-10-06 ENCOUNTER — HOSPITAL ENCOUNTER (OUTPATIENT)
Dept: GENERAL RADIOLOGY | Age: 71
Discharge: HOME | End: 2021-10-08
Payer: MEDICARE

## 2021-10-06 ENCOUNTER — OFFICE VISIT (OUTPATIENT)
Dept: ONCOLOGY | Age: 71
End: 2021-10-06
Payer: MEDICARE

## 2021-10-06 VITALS
WEIGHT: 183.9 LBS | SYSTOLIC BLOOD PRESSURE: 104 MMHG | DIASTOLIC BLOOD PRESSURE: 64 MMHG | HEART RATE: 98 BPM | TEMPERATURE: 98.6 F | BODY MASS INDEX: 27.96 KG/M2

## 2021-10-06 DIAGNOSIS — C34.11 MALIGNANT NEOPLASM OF UPPER LOBE OF RIGHT LUNG (HCC): ICD-10-CM

## 2021-10-06 DIAGNOSIS — C34.11 MALIGNANT NEOPLASM OF UPPER LOBE OF RIGHT LUNG (HCC): Primary | ICD-10-CM

## 2021-10-06 LAB
ABSOLUTE EOS #: 0.04 K/UL (ref 0–0.44)
ABSOLUTE IMMATURE GRANULOCYTE: 0.06 K/UL (ref 0–0.3)
ABSOLUTE LYMPH #: 0.92 K/UL (ref 1.1–3.7)
ABSOLUTE MONO #: 0.42 K/UL (ref 0.1–1.2)
ALBUMIN SERPL-MCNC: 3.6 G/DL (ref 3.5–5.2)
ALBUMIN/GLOBULIN RATIO: ABNORMAL (ref 1–2.5)
ALP BLD-CCNC: 44 U/L (ref 35–104)
ALT SERPL-CCNC: 8 U/L (ref 5–33)
ANION GAP SERPL CALCULATED.3IONS-SCNC: 14 MMOL/L (ref 9–17)
AST SERPL-CCNC: 13 U/L
BASOPHILS # BLD: 1 % (ref 0–2)
BASOPHILS ABSOLUTE: <0.03 K/UL (ref 0–0.2)
BILIRUB SERPL-MCNC: 0.18 MG/DL (ref 0.3–1.2)
BUN BLDV-MCNC: 12 MG/DL (ref 8–23)
BUN/CREAT BLD: 11 (ref 9–20)
CALCIUM SERPL-MCNC: 8.3 MG/DL (ref 8.6–10.4)
CHLORIDE BLD-SCNC: 104 MMOL/L (ref 98–107)
CO2: 22 MMOL/L (ref 20–31)
CREAT SERPL-MCNC: 1.07 MG/DL (ref 0.5–0.9)
DIFFERENTIAL TYPE: ABNORMAL
EOSINOPHILS RELATIVE PERCENT: 1 % (ref 1–4)
GFR AFRICAN AMERICAN: >60 ML/MIN
GFR NON-AFRICAN AMERICAN: 51 ML/MIN
GFR SERPL CREATININE-BSD FRML MDRD: ABNORMAL ML/MIN/{1.73_M2}
GFR SERPL CREATININE-BSD FRML MDRD: ABNORMAL ML/MIN/{1.73_M2}
GLUCOSE BLD-MCNC: 143 MG/DL (ref 70–99)
HCT VFR BLD CALC: 27.7 % (ref 36.3–47.1)
HEMOGLOBIN: 8.4 G/DL (ref 11.9–15.1)
IMMATURE GRANULOCYTES: 2 %
LYMPHOCYTES # BLD: 27 % (ref 24–43)
MCH RBC QN AUTO: 27.9 PG (ref 25.2–33.5)
MCHC RBC AUTO-ENTMCNC: 30.3 G/DL (ref 28.4–34.8)
MCV RBC AUTO: 92 FL (ref 82.6–102.9)
MONOCYTES # BLD: 12 % (ref 3–12)
NRBC AUTOMATED: 0 PER 100 WBC
PDW BLD-RTO: 19.9 % (ref 11.8–14.4)
PLATELET # BLD: 116 K/UL (ref 138–453)
PLATELET ESTIMATE: ABNORMAL
PMV BLD AUTO: 9.2 FL (ref 8.1–13.5)
POTASSIUM SERPL-SCNC: 4.4 MMOL/L (ref 3.7–5.3)
RBC # BLD: 3.01 M/UL (ref 3.95–5.11)
RBC # BLD: ABNORMAL 10*6/UL
SEG NEUTROPHILS: 57 % (ref 36–65)
SEGMENTED NEUTROPHILS ABSOLUTE COUNT: 1.98 K/UL (ref 1.5–8.1)
SODIUM BLD-SCNC: 140 MMOL/L (ref 135–144)
TOTAL PROTEIN: 6.8 G/DL (ref 6.4–8.3)
WBC # BLD: 3.4 K/UL (ref 3.5–11.3)
WBC # BLD: ABNORMAL 10*3/UL

## 2021-10-06 PROCEDURE — 96415 CHEMO IV INFUSION ADDL HR: CPT

## 2021-10-06 PROCEDURE — 2580000003 HC RX 258: Performed by: INTERNAL MEDICINE

## 2021-10-06 PROCEDURE — 96375 TX/PRO/DX INJ NEW DRUG ADDON: CPT

## 2021-10-06 PROCEDURE — 96417 CHEMO IV INFUS EACH ADDL SEQ: CPT

## 2021-10-06 PROCEDURE — 85025 COMPLETE CBC W/AUTO DIFF WBC: CPT

## 2021-10-06 PROCEDURE — 6360000002 HC RX W HCPCS: Performed by: INTERNAL MEDICINE

## 2021-10-06 PROCEDURE — 96413 CHEMO IV INFUSION 1 HR: CPT

## 2021-10-06 PROCEDURE — 80053 COMPREHEN METABOLIC PANEL: CPT

## 2021-10-06 PROCEDURE — 2500000003 HC RX 250 WO HCPCS: Performed by: INTERNAL MEDICINE

## 2021-10-06 PROCEDURE — 96367 TX/PROPH/DG ADDL SEQ IV INF: CPT

## 2021-10-06 PROCEDURE — 99215 OFFICE O/P EST HI 40 MIN: CPT | Performed by: INTERNAL MEDICINE

## 2021-10-06 PROCEDURE — 71045 X-RAY EXAM CHEST 1 VIEW: CPT

## 2021-10-06 PROCEDURE — 36591 DRAW BLOOD OFF VENOUS DEVICE: CPT

## 2021-10-06 PROCEDURE — 99211 OFF/OP EST MAY X REQ PHY/QHP: CPT | Performed by: INTERNAL MEDICINE

## 2021-10-06 RX ORDER — EPINEPHRINE 1 MG/ML
0.3 INJECTION, SOLUTION, CONCENTRATE INTRAVENOUS PRN
Status: CANCELLED | OUTPATIENT
Start: 2021-10-06

## 2021-10-06 RX ORDER — DIPHENHYDRAMINE HYDROCHLORIDE 50 MG/ML
50 INJECTION INTRAMUSCULAR; INTRAVENOUS ONCE
Status: CANCELLED | OUTPATIENT
Start: 2021-10-06

## 2021-10-06 RX ORDER — PALONOSETRON 0.05 MG/ML
0.25 INJECTION, SOLUTION INTRAVENOUS ONCE
Status: CANCELLED | OUTPATIENT
Start: 2021-10-06

## 2021-10-06 RX ORDER — PALONOSETRON 0.05 MG/ML
0.25 INJECTION, SOLUTION INTRAVENOUS ONCE
Status: COMPLETED | OUTPATIENT
Start: 2021-10-06 | End: 2021-10-06

## 2021-10-06 RX ORDER — DIPHENHYDRAMINE HYDROCHLORIDE 50 MG/ML
50 INJECTION INTRAMUSCULAR; INTRAVENOUS ONCE
Status: CANCELLED | OUTPATIENT
Start: 2021-10-06 | End: 2021-10-06

## 2021-10-06 RX ORDER — SODIUM CHLORIDE 9 MG/ML
20 INJECTION, SOLUTION INTRAVENOUS ONCE
Status: COMPLETED | OUTPATIENT
Start: 2021-10-06 | End: 2021-10-06

## 2021-10-06 RX ORDER — DEXAMETHASONE SODIUM PHOSPHATE 10 MG/ML
10 INJECTION INTRAMUSCULAR; INTRAVENOUS ONCE
Status: COMPLETED | OUTPATIENT
Start: 2021-10-06 | End: 2021-10-06

## 2021-10-06 RX ORDER — SODIUM CHLORIDE 9 MG/ML
25 INJECTION, SOLUTION INTRAVENOUS PRN
Status: CANCELLED | OUTPATIENT
Start: 2021-10-06

## 2021-10-06 RX ORDER — MEPERIDINE HYDROCHLORIDE 50 MG/ML
12.5 INJECTION INTRAMUSCULAR; INTRAVENOUS; SUBCUTANEOUS ONCE
Status: CANCELLED | OUTPATIENT
Start: 2021-10-06 | End: 2021-10-06

## 2021-10-06 RX ORDER — METHYLPREDNISOLONE SODIUM SUCCINATE 125 MG/2ML
125 INJECTION, POWDER, LYOPHILIZED, FOR SOLUTION INTRAMUSCULAR; INTRAVENOUS ONCE
Status: CANCELLED | OUTPATIENT
Start: 2021-10-06 | End: 2021-10-06

## 2021-10-06 RX ORDER — DIPHENHYDRAMINE HYDROCHLORIDE 50 MG/ML
50 INJECTION INTRAMUSCULAR; INTRAVENOUS ONCE
Status: COMPLETED | OUTPATIENT
Start: 2021-10-06 | End: 2021-10-06

## 2021-10-06 RX ORDER — SODIUM CHLORIDE 9 MG/ML
INJECTION, SOLUTION INTRAVENOUS CONTINUOUS
Status: CANCELLED | OUTPATIENT
Start: 2021-10-06

## 2021-10-06 RX ORDER — HEPARIN SODIUM (PORCINE) LOCK FLUSH IV SOLN 100 UNIT/ML 100 UNIT/ML
500 SOLUTION INTRAVENOUS PRN
Status: DISCONTINUED | OUTPATIENT
Start: 2021-10-06 | End: 2021-10-07 | Stop reason: HOSPADM

## 2021-10-06 RX ORDER — SODIUM CHLORIDE 0.9 % (FLUSH) 0.9 %
5-40 SYRINGE (ML) INJECTION PRN
Status: DISCONTINUED | OUTPATIENT
Start: 2021-10-06 | End: 2021-10-07 | Stop reason: HOSPADM

## 2021-10-06 RX ADMIN — HEPARIN SODIUM (PORCINE) LOCK FLUSH IV SOLN 100 UNIT/ML 500 UNITS: 100 SOLUTION at 14:30

## 2021-10-06 RX ADMIN — DIPHENHYDRAMINE HYDROCHLORIDE 50 MG: 50 INJECTION, SOLUTION INTRAMUSCULAR; INTRAVENOUS at 09:26

## 2021-10-06 RX ADMIN — DEXAMETHASONE SODIUM PHOSPHATE 10 MG: 10 INJECTION INTRAMUSCULAR; INTRAVENOUS at 09:26

## 2021-10-06 RX ADMIN — FOSAPREPITANT 150 MG: 150 INJECTION, POWDER, LYOPHILIZED, FOR SOLUTION INTRAVENOUS at 09:28

## 2021-10-06 RX ADMIN — SODIUM CHLORIDE, PRESERVATIVE FREE 10 ML: 5 INJECTION INTRAVENOUS at 14:29

## 2021-10-06 RX ADMIN — FAMOTIDINE 20 MG: 10 INJECTION, SOLUTION INTRAVENOUS at 09:26

## 2021-10-06 RX ADMIN — PACLITAXEL 270 MG: 6 INJECTION, SOLUTION, CONCENTRATE INTRAVENOUS at 10:08

## 2021-10-06 RX ADMIN — PALONOSETRON 0.25 MG: 0.05 INJECTION, SOLUTION INTRAVENOUS at 09:25

## 2021-10-06 RX ADMIN — SODIUM CHLORIDE 20 ML/HR: 9 INJECTION, SOLUTION INTRAVENOUS at 08:35

## 2021-10-06 RX ADMIN — SODIUM CHLORIDE, PRESERVATIVE FREE 10 ML: 5 INJECTION INTRAVENOUS at 08:35

## 2021-10-06 RX ADMIN — CARBOPLATIN 400 MG: 10 INJECTION INTRAVENOUS at 13:14

## 2021-10-06 RX ADMIN — SODIUM CHLORIDE, PRESERVATIVE FREE 10 ML: 5 INJECTION INTRAVENOUS at 09:26

## 2021-10-06 NOTE — PROGRESS NOTES
Patient arrive ambulatory using rollator for cycle 5 day 1 treatment and physician visit. Denies complaint or concern to writer. Vitals as charted. Port accessed; specimen sent. Physician met with patient; labs reviewed; ok to proceed with treatment. Patient premedicated. Taxol infusion completed with no adverse reaction; line flushed. Carbo infusion completed with no adverse reaction; line flushed. CXR completed while patient on unit today; per Dr. Pj Lutz was OK to change to a portable. Port de-accessed without difficulty. Patient ambulate off unit using rollator per self at discharge; AVS per front office staff.

## 2021-10-06 NOTE — PROGRESS NOTES
Today's Date: 10/6/2021  Patient Name: Jl Greer  Patient's age: 80 y. o., 1950    Diagnosis:   RUL squamous cell carcinoma,   Cancer Staging  Malignant neoplasm of upper lobe of right lung (HCC)  Staging form: Lung, AJCC 8th Edition  - Clinical stage from 9/18/2020: Stage IIIB (cT4, cN2, cM0) - Signed by Leigh Booth MD on 9/18/2020    Current therapy:  Started on concurrent chemoradiation with weekly carbotaxol on 10/21/20  She completed radiation therapy in mid December  Her pET scan 2/3/21 showed no recurrence with positive response to treatment  Restarted on consolidation durvalumab 2/3/2021  Recent CT chest 6/14/21 showed Increase in size of spiculated malignant mass in the medial right upper lobe measuring 5.2 x 3.4 cm, which shows evidence of mediastinal invasion.  Matted metastatic lymphadenopathy in the subcarinal and right paratracheal regions is unchanged  PET scan on 7/2/2021 showed  There has been significant increase in size compared to prior PET/CT from February 2021 with new invasion into the right mediastinum.  There is associated increased FDG  PET activity measuring with a max SUV of 16.2, previously 3.1. Started on chemotherapy with carboplatin and Taxol on 7/14/2021  CHIEF COMPLAINT:    Chief Complaint   Patient presents with    Follow-up    Chest Congestion     would like ATB refilled      INTERVAL HISTORY:    Grzegorz Vickers is returning for follow that and to discuss lab results and further recommendations. He is tolerating chemotherapy reasonably well. She has mild neuropathy but has been stable. She has completed course of antibiotic. She denies any fever chills. She does not have shortness of breath but has some mild cough. During this visit patient's allergy, social, medical, surgical history and medications were reviewed and updated.     HISTORY OF PRESENT ILLNESS:    Srinivas Jean is a 43-year-old female with a past medical history of COPD, history of tobacco abuse, depression, CAD, arthritis was admitted with multiple falls at home. She complains of back pain and also chronic cough. On admission she had a CT scan of the chest which showed 7.2 cm spiculated mass in the right upper lobe with mediastinal lymphadenopathy suspicious for malignancy. 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 Oncology consulted for further evaluation. Patient has COPD and uses Home oxygen at night and sues walker at home. She smokes more than a PPD. 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 Past Medical History:   has a past medical history of AAA (abdominal aortic aneurysm) ([facility] [address] Acid reflux, Anxiety and depression, Aortic stenosis, Arthritis, Blister of ankle, right, CAD (coronary artery disease), Cancer ([facility] [address] COPD (chronic obstructive pulmonary disease) ([facility] [address] Diabetes mellitus ([facility] [address] Falls, Heart block, Hypokalemia, MDRO (multiple drug resistant organisms) resistance, MRSA (methicillin resistant staph aureus) culture positive, On home oxygen therapy, On home oxygen therapy, Overactive bladder, Pneumonia, PONV (postoperative nausea and vomiting), and Vitamin D deficiency. Past Surgical History:   has a past surgical history that includes back surgery; eye surgery; Cholecystectomy; Appendectomy; Hysterectomy; Tonsillectomy; lumbar laminectomy; Endoscopy, colon, diagnostic (12/08/2016); aortic valve repair (N/A, 4/11/2017); bronchoscopy (N/A, 8/31/2020); IR INSERT PICC VAD W SQ PORT >5 YEARS (9/4/2020); and IR PORT PLACEMENT > 5 YEARS (9/29/2020).      Medications:    Current Outpatient Medications   Medication Sig Dispense Refill    ondansetron (ZOFRAN) 4 MG tablet Take 1 tablet by mouth every 8 hours as needed for Nausea or Vomiting 30 tablet 0    lidocaine-prilocaine (EMLA) 2.5-2.5 % cream To port site before chemo 1 Tube 1    oxyCODONE-acetaminophen (PERCOCET)  MG per tablet Take 1 tablet by mouth every 6 hours as needed for Pain.  traZODone (DESYREL) 300 MG tablet Take 0.5 tablets by mouth nightly 30 tablet 0    clonazePAM (KLONOPIN) 1 MG tablet Take 1 tablet by mouth 3 times daily as needed for Anxiety for up to 3 doses. 3 tablet 0    gabapentin (NEURONTIN) 400 MG capsule Take 1 capsule by mouth 2 times daily for 3 doses. In addition to 2 capsules (=800mg) nightly 3 capsule 0    ARIPiprazole (ABILIFY) 5 MG tablet Take 1 tablet by mouth daily for 3 doses 3 tablet 0    furosemide (LASIX) 40 MG tablet Take 40 mg by mouth daily      melatonin 5 MG TABS tablet Take 5 mg by mouth nightly       glimepiride (AMARYL) 1 MG tablet Take 1 mg by mouth Daily with supper In addition to 2 tablets (=2mg) every morning / 8/25/21 taking 2  Tablets in the morning      metoprolol tartrate (LOPRESSOR) 25 MG tablet Take 25 mg by mouth 2 times daily      venlafaxine (EFFEXOR XR) 150 MG extended release capsule Take 150 mg by mouth 2 times daily      lansoprazole (PREVACID) 30 MG delayed release capsule Take 30 mg by mouth daily      metFORMIN (GLUCOPHAGE) 500 MG tablet Take 500 mg by mouth 2 times daily      aspirin EC 81 MG EC tablet Take 81 mg by mouth daily      mometasone-formoterol (DULERA) 200-5 MCG/ACT inhaler Inhale 2 puffs into the lungs every 12 hours as needed (wheezing/SOB)        No current facility-administered medications for this visit. Facility-Administered Medications Ordered in Other Visits   Medication Dose Route Frequency Provider Last Rate Last Admin    0.9 % sodium chloride infusion  20 mL/hr IntraVENous Once Mariposa Lopez MD        heparin flush 100 UNIT/ML injection 500 Units  500 Units IntraCATHeter PRN Mariposa Lopez MD        sodium chloride flush 0.9 % injection 5-40 mL  5-40 mL IntraVENous PRN Mariposa Lopez MD         Allergies:  Codeine and Dye [iodides]    Social History:   reports that she quit smoking about 5 years ago.  She has never used smokeless tobacco. She reports that she does not drink alcohol and does not use drugs. Family History: family history includes Cancer in her father and mother. REVIEW OF SYSTEMS:    Constitutional: ++fatigue, No fever or chills. No night sweats, no weight loss   Eyes: No eye discharge, double vision, or eye pain   HEENT: negative for sore mouth, sore throat, hoarseness and voice change   Respiratory: negative for cough , sputum, ++dyspnea, wheezing, hemoptysis, chest pain   Cardiovascular: negative for chest pain, dyspnea, palpitations, orthopnea, PND   Gastrointestinal: negative for nausea, vomiting, diarrhea, constipation, abdominal pain, Dysphagia, hematemesis and hematochezia   Genitourinary: negative for frequency, dysuria, nocturia, urinary incontinence, and hematuria   Integument: negative for rash, skin lesions, bruises.    Hematologic/Lymphatic: negative for easy bruising, bleeding, lymphadenopathy, or petechiae   Endocrine: negative for heat or cold intolerance,weight changes, change in bowel habits and hair loss   Musculoskeletal: negative for myalgias, arthralgias, pain, joint swelling,and bone pain   Neurological: negative for headaches, dizziness, seizures, weakness, numbness    PHYSICAL EXAM:      /64   Pulse 98   Temp 98.6 °F (37 °C) (Temporal)   Wt 183 lb 14.4 oz (83.4 kg)   BMI 27.96 kg/m²    Temp (24hrs), Av.7 °F (36.5 °C), Min:97.3 °F (36.3 °C), Max:98.1 °F (36.7 °C)  General appearance - well appearing, no in pain or distress   Mental status - alert and cooperative   Eyes - pupils equal and reactive, extraocular eye movements intact   Ears - bilateral TM's and external ear canals normal   Mouth - mucous membranes moist, pharynx normal without lesions   Neck - supple, no significant adenopathy   Lymphatics - no palpable lymphadenopathy, no hepatosplenomegaly   Chest - clear to auscultation, no wheezes, rales or rhonchi, symmetric air entry   Heart - normal rate, regular rhythm, normal S1, S2, no murmurs  Abdomen - soft, nontender, nondistended, no masses or organomegaly   Neurological - alert, oriented, normal speech, no focal findings or movement disorder noted   Musculoskeletal - no joint tenderness, deformity or swelling   Extremities - peripheral pulses normal, no pedal edema, no clubbing or cyanosis   Skin - normal coloration and turgor, no rashes, no suspicious skin lesions noted ,    DATA:    Labs:   CBC:   Recent Labs     10/06/21  0835   WBC 3.4*   HGB 8.4*   HCT 27.7*   *     BMP:   No results for input(s): NA, K, CO2, BUN, CREATININE, LABGLOM, GLUCOSE in the last 72 hours. PT/INR: No results for input(s): PROTIME, INR in the last 72 hours. Surgical Pathology Report   Surgical Pathology   Collected:  08/31/20 0803    Lab status:  Final    Resulting lab:  [facility]    Value:  -- Diagnosis --     BRONCHIAL WASHINGS RIGHT UPPER LOBE:          POSITIVE FOR MALIGNANCY.        SQUAMOUS CELL CARCINOMA.           COMMENT: VOLUME OF TUMOR IN THE CELL BLOCK IS LOW AND ADDITIONAL   MATERIAL WOULD BE REQUIRED IF MOLECULAR TESTING IS NECESSARY. IMAGING DATA:  CT chest 8/27/2020:   FINDINGS:    Mediastinum: Three vessel coronary artery calcification.  Newly enlarged    mediastinal lymph nodes including example station 7 node measuring 3.1 x 2.1    cm on series 2, image 45.         Lungs/pleura: Mild upper lobe predominant centrilobular emphysema. Barnetta Tarango     Spiculated mass of the right upper lobe measuring 7.2 x 6.5 cm with    broad-base of contact with the mediastinal pleura, right major fissure,    encircling and obliterating the right upper lobe bronchus.  Spicules are seen    to extend to the anterior costal pleura.  There is adjacent bronchial wall    thickening and interlobular septal thickening.  Stable 5 mm solid nodule of    the left upper lobe since 2016, benign.  No pleural effusion or pneumothorax.         Upper Abdomen: Adrenals are unremarkable.      Soft Tissues/Bones: Old right rib fractures.              Impression    Approximate 7.2 cm spiculated mass of the right upper lobe likely with    invasion of the mediastinum, adjacent lymphangitic spread and suspected    metastatic mediastinal lymphadenopathy. Scan 9/11/2020: Impression    1. The patient's known right upper lobe lung mass is FDG avid consistent with    the patient's primary malignancy. 2. FDG avid mediastinal lymph nodes consistent with metastatic disease. 3. No abnormal FDG activity is identified involving the abdomen or pelvis. PET 2/3/21  IMPRESSION:   *  Unexpected findings of emphysematous cystitis. *  Positive response to therapy with markedly decreased size and FDG avidity of right upper lobe mass and mediastinal adenopathy. *  Healing right rib fractures. Ct chest 6/14/21  FINDINGS:     Emphysema.  Spiculated mass in the medial right upper lobe which appears to invade into the upper right mediastinum measures 5.2 x 3.4 cm (4.4 x 1.8 cm previously).  Malignant matted lymphadenopathy in the subcarinal and right paratracheal regions is unchanged.  Noncalcified left upper lobe lung   nodule measuring 0.6 cm is unchanged.  There is a 1.4 cm nodular opacity in the lingula that appear slightly larger than on previous study.  Left chest port in place.  Atherosclerotic thoracic aorta.  Visualized structures in the upper abdomen are unremarkable.  Normal heart size.  No pleural or   pericardial effusions.  The central pulmonary arteries are unremarkable.  Extensive coronary artery calcifications.  Status post median sternotomy.  Wall thickening of the esophagus.  Visualized chest wall structures are unremarkable.  There are multiple healing and/or old rib fractures.      IMPRESSION:   1.  Increase in size of spiculated malignant mass in the medial right upper lobe measuring 5.2 x 3.4 cm, which shows evidence of mediastinal invasion.  Matted metastatic lymphadenopathy in the subcarinal and right paratracheal regions is unchanged. 2.  Unchanged noncalcified left upper lobe nodule measuring 0.6 cm.  A 1.4 cm nodular opacity in the lingula appears slightly larger than on previous study. 3.  Wall thickening of esophagus suggestive of esophagitis. Ct abd  6/13/21  CT images of the abdomen and pelvis are obtained without contrast.  There is a 1.8 cm soft tissue density arising from or directly adjacent to the uncinate process of the pancreas.  Consider contrast-enhanced CT or MRI for further evaluation.  Bowel is unremarkable. Shelda Clock is no intraperitoneal or   retroperitoneal lymphadenopathy.  Colonic stool burden is moderate.  The adrenals appear symmetric.  Images the lung bases are grossly clear.  There appear to be remote right rib fractures.  Remote left rib fracture also noted.  There is no hepatic or splenic abnormality.  No hydronephrosis is seen. IMPRESSION:     1.8 cm soft tissue density arising from, or directly adjacent to, the uncinate process of the pancreas.  Contrast-enhanced CT or MRI recommended. CT Chest 6/14/21  IMPRESSION:     1.  Increase in size of spiculated malignant mass in the medial right upper lobe measuring 5.2 x 3.4 cm, which shows evidence of mediastinal invasion.  Matted metastatic lymphadenopathy in the subcarinal and right paratracheal regions is unchanged. 2.  Unchanged noncalcified left upper lobe nodule measuring 0.6 cm.  A 1.4 cm nodular opacity in the lingula appears slightly larger than on previous study. 3.  Wall thickening of esophagus suggestive of esophagitis.      Ct abd  Pelvis 6/13/21  CT images of the abdomen and pelvis are obtained without contrast.  There is a 1.8 cm soft tissue density arising from or directly adjacent to the uncinate process of the pancreas.  Consider contrast-enhanced CT or MRI for further evaluation.  Bowel is unremarkable. Shelda Clock is no intraperitoneal or   retroperitoneal lymphadenopathy.  Colonic stool burden is moderate.  The adrenals appear symmetric.  Images the lung bases are grossly clear.  There appear to be remote right rib fractures.  Remote left rib fracture also noted.  There is no hepatic or splenic abnormality.  No hydronephrosis is seen. IMPRESSION:   1.8 cm soft tissue density arising from, or directly adjacent to, the uncinate process of the pancreas.  Contrast-enhanced CT or MRI recommended. PET scan 7/2/2021  FINDINGS:   Normal FDG uptake is seen in brain, oral pharyngeal region, myocardium, liver, spleen, bowel, kidneys/ureters and urinary bladder. Head and neck:   No suspicious hypermetabolic activity. No lymphadenopathy. Chest:   Relatively unchanged spiculated mass within the right upper lobe measuring 4.7 x 3.0 cm when compared to recent CT from June 2021. Honora Glad has been significant increase in size compared to prior PET/CT from February 2021 with new invasion into the right mediastinum.  There is associated increased FDG  PET activity measuring with a max SUV of 16.2, previously 3.1. Unchanged opacities along the posterior lateral right upper lobe.  Minimally increased activity is noted in this area. Improving nodular opacity within the lingula with no associated increased metabolic activity. No significant activity is noted within the previously seen mediastinal lymph nodes.  There is mild diffuse uptake throughout the esophagus with a thickened wall.  This is similar to prior CT in June 2021. Very severe calcification involving the left coronary, LAD, circumflex, ramus and right coronary. Abdomen:   Adrenals are normal.  Diffuse uptake is noted throughout the colon with no focal areas appreciated.  No increased activity is noted at the soft tissue density adjacent to the uncinate process of the pancreas as seen on recent CT. Heavy intra-abdominal vascular calcifications.    Persistent irregular appearance of the bladder contour with a small amount of intraluminal gas.  No emphysematous change within the bladder wall is currently visualized. Musculoskeletal system:   No suspicious hypermetabolic activity. No lymphadenopathy. Multiple bilateral chronic rib fractures.  Old transverse process fracture at L2 No focal osseous uptake. IMPRESSION:   *  Hypermetabolic right upper lobe spiculated mass compatible with patient's known neoplasm. *  Low level increased activity within the posterior lateral right upper lobe opacities most likely representing scarring from prior radiation. *  Diffuse mild uptake through the esophagus with persistent abnormal wall thickening suggestive of esophagitis. *  Nonfocal diffuse uptake throughout the colon which can be seen with certain medications such as metformin. *  Abnormal appearance to the bladder.  The bladder is distended and contains intraluminal gas which may be due to recent instrumentation and/or infection. Robertha Shahid is also small amount of gas seen within the wall of the bladder.  This raises the possibility of emphysematous cystitis.  However, the   amount of emphysematous changes has improved.  As Correlation clinical exam recommended. *  Severe multivessel coronary artery calcification   PET scan 9/3/2021  IMPRESSION:     1.  There is a hypermetabolic pulmonary neoplasm involving the right upper lobe medially.  There has been interval improvement with a decrease in the size and the an intensity since prior study of 7/1/2021.  Findings are suggestive of partial response.  It has 9.8 SUV compared to the 16.1 SUV on   prior study. 2.  Focal small opacity with FDG uptake at the right lung base.  This is a new uptake.  This could be a focal infiltrate, atelectasis or satellite lesion.  Follow-up CT scan examination is recommended. 3. Robertha Shahid is a stable linear mildly FDG avid abnormality in the lateral portion of the right upper mid lung field.  Abnormalities from parenchymal scarring.    4.  No evidence of distance metastasis in the neck, abdomen and pelvis and visualized skeleton. IMPRESSION:   1. 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 PET scan negative for distant metastasis: Clinical stage T4 N2 M0, stage IIIb  2. Anemia  3. Leukopenia: chemo related  4. COPD  5. Falls  6. CAD  7. Tobacco abuse  8. Actinomyces bacteremia    RECOMMENDATIONS:  1. I reviewed the laboratory data,  diagnosis, prognosis and treatment options with patient  2. Her creatinine has improved  3. Her PET scan showing good response with decrease in the size of the lung mass and activity. So plan is to continue chemotherapy  4. She has mild neuropathy therefore I will decrease the dose of Taxol and carboplatin slightly  5. She still has some residual cough and I will get chest x-ray to evaluate this  6. Proceed with chemotherapy  7. Return to clinic with next cycle or earlier if needed  8. I reviewed the NCCN guidelines and goals of care  9. Patient's questions were sought and answered to her satisfaction      Mendoza Mckeon MD  Hematologist/Medical Oncologist    This note is created with the assistance of a speech recognition program.  While intending to generate a document that actually reflects the content of the visit, the document can still have some errors including those of syntax and sound a like substitutions which may escape proof reading. It such instances, actual meaning can be extrapolated by contextual diversion.

## 2021-10-15 DIAGNOSIS — C34.91 NON-SMALL CELL CANCER OF RIGHT LUNG (HCC): Primary | ICD-10-CM

## 2021-10-15 RX ORDER — ONDANSETRON 4 MG/1
4 TABLET, FILM COATED ORAL EVERY 8 HOURS PRN
Qty: 30 TABLET | Refills: 3 | OUTPATIENT
Start: 2021-10-15 | End: 2021-12-01 | Stop reason: SDUPTHER

## 2021-10-22 ENCOUNTER — HOSPITAL ENCOUNTER (OUTPATIENT)
Facility: MEDICAL CENTER | Age: 71
End: 2021-10-22
Payer: MEDICARE

## 2021-10-27 ENCOUNTER — HOSPITAL ENCOUNTER (OUTPATIENT)
Dept: INFUSION THERAPY | Facility: MEDICAL CENTER | Age: 71
Discharge: HOME | End: 2021-10-27
Payer: MEDICARE

## 2021-10-27 ENCOUNTER — TELEPHONE (OUTPATIENT)
Dept: ONCOLOGY | Age: 71
End: 2021-10-27

## 2021-10-27 ENCOUNTER — OFFICE VISIT (OUTPATIENT)
Dept: ONCOLOGY | Age: 71
End: 2021-10-27
Payer: MEDICARE

## 2021-10-27 VITALS
RESPIRATION RATE: 16 BRPM | HEART RATE: 99 BPM | SYSTOLIC BLOOD PRESSURE: 130 MMHG | BODY MASS INDEX: 28.55 KG/M2 | TEMPERATURE: 98.1 F | DIASTOLIC BLOOD PRESSURE: 59 MMHG | WEIGHT: 187.8 LBS

## 2021-10-27 VITALS
RESPIRATION RATE: 16 BRPM | HEART RATE: 81 BPM | DIASTOLIC BLOOD PRESSURE: 65 MMHG | TEMPERATURE: 97.7 F | SYSTOLIC BLOOD PRESSURE: 131 MMHG

## 2021-10-27 DIAGNOSIS — C34.91 NON-SMALL CELL CANCER OF RIGHT LUNG (HCC): Primary | ICD-10-CM

## 2021-10-27 DIAGNOSIS — C34.32 MALIGNANT NEOPLASM OF LOWER LOBE, LEFT BRONCHUS OR LUNG (HCC): ICD-10-CM

## 2021-10-27 DIAGNOSIS — C34.92 SQUAMOUS CELL LUNG CANCER, LEFT (HCC): ICD-10-CM

## 2021-10-27 DIAGNOSIS — C34.11 MALIGNANT NEOPLASM OF UPPER LOBE OF RIGHT LUNG (HCC): ICD-10-CM

## 2021-10-27 LAB
ABSOLUTE EOS #: 0.03 K/UL (ref 0–0.44)
ABSOLUTE IMMATURE GRANULOCYTE: 0.02 K/UL (ref 0–0.3)
ABSOLUTE LYMPH #: 0.72 K/UL (ref 1.1–3.7)
ABSOLUTE MONO #: 0.35 K/UL (ref 0.1–1.2)
ALBUMIN SERPL-MCNC: 3.5 G/DL (ref 3.5–5.2)
ALBUMIN/GLOBULIN RATIO: ABNORMAL (ref 1–2.5)
ALP BLD-CCNC: 41 U/L (ref 35–104)
ALT SERPL-CCNC: 7 U/L (ref 5–33)
ANION GAP SERPL CALCULATED.3IONS-SCNC: 13 MMOL/L (ref 9–17)
AST SERPL-CCNC: 12 U/L
BASOPHILS # BLD: 1 % (ref 0–2)
BASOPHILS ABSOLUTE: <0.03 K/UL (ref 0–0.2)
BILIRUB SERPL-MCNC: 0.19 MG/DL (ref 0.3–1.2)
BUN BLDV-MCNC: 12 MG/DL (ref 8–23)
BUN/CREAT BLD: 11 (ref 9–20)
CALCIUM SERPL-MCNC: 8.5 MG/DL (ref 8.6–10.4)
CHLORIDE BLD-SCNC: 105 MMOL/L (ref 98–107)
CO2: 22 MMOL/L (ref 20–31)
CREAT SERPL-MCNC: 1.07 MG/DL (ref 0.5–0.9)
DIFFERENTIAL TYPE: ABNORMAL
EOSINOPHILS RELATIVE PERCENT: 1 % (ref 1–4)
GFR AFRICAN AMERICAN: >60 ML/MIN
GFR NON-AFRICAN AMERICAN: 51 ML/MIN
GFR SERPL CREATININE-BSD FRML MDRD: ABNORMAL ML/MIN/{1.73_M2}
GFR SERPL CREATININE-BSD FRML MDRD: ABNORMAL ML/MIN/{1.73_M2}
GLUCOSE BLD-MCNC: 118 MG/DL (ref 70–99)
HCT VFR BLD CALC: 22.4 % (ref 36.3–47.1)
HEMOGLOBIN: 6.5 G/DL (ref 11.9–15.1)
IMMATURE GRANULOCYTES: 1 %
LYMPHOCYTES # BLD: 21 % (ref 24–43)
MCH RBC QN AUTO: 28.4 PG (ref 25.2–33.5)
MCHC RBC AUTO-ENTMCNC: 29 G/DL (ref 28.4–34.8)
MCV RBC AUTO: 97.8 FL (ref 82.6–102.9)
MONOCYTES # BLD: 10 % (ref 3–12)
NRBC AUTOMATED: 0 PER 100 WBC
PDW BLD-RTO: 19.1 % (ref 11.8–14.4)
PLATELET # BLD: 128 K/UL (ref 138–453)
PLATELET ESTIMATE: ABNORMAL
PMV BLD AUTO: 8.9 FL (ref 8.1–13.5)
POTASSIUM SERPL-SCNC: 4.3 MMOL/L (ref 3.7–5.3)
RBC # BLD: 2.29 M/UL (ref 3.95–5.11)
RBC # BLD: ABNORMAL 10*6/UL
SEG NEUTROPHILS: 67 % (ref 36–65)
SEGMENTED NEUTROPHILS ABSOLUTE COUNT: 2.34 K/UL (ref 1.5–8.1)
SODIUM BLD-SCNC: 140 MMOL/L (ref 135–144)
TOTAL PROTEIN: 6.8 G/DL (ref 6.4–8.3)
WBC # BLD: 3.5 K/UL (ref 3.5–11.3)
WBC # BLD: ABNORMAL 10*3/UL

## 2021-10-27 PROCEDURE — P9016 RBC LEUKOCYTES REDUCED: HCPCS

## 2021-10-27 PROCEDURE — 36591 DRAW BLOOD OFF VENOUS DEVICE: CPT

## 2021-10-27 PROCEDURE — 80053 COMPREHEN METABOLIC PANEL: CPT

## 2021-10-27 PROCEDURE — 86901 BLOOD TYPING SEROLOGIC RH(D): CPT

## 2021-10-27 PROCEDURE — 2580000003 HC RX 258: Performed by: INTERNAL MEDICINE

## 2021-10-27 PROCEDURE — 86850 RBC ANTIBODY SCREEN: CPT

## 2021-10-27 PROCEDURE — 6360000002 HC RX W HCPCS: Performed by: INTERNAL MEDICINE

## 2021-10-27 PROCEDURE — 99215 OFFICE O/P EST HI 40 MIN: CPT | Performed by: INTERNAL MEDICINE

## 2021-10-27 PROCEDURE — 85025 COMPLETE CBC W/AUTO DIFF WBC: CPT

## 2021-10-27 PROCEDURE — 99211 OFF/OP EST MAY X REQ PHY/QHP: CPT | Performed by: INTERNAL MEDICINE

## 2021-10-27 PROCEDURE — 86900 BLOOD TYPING SEROLOGIC ABO: CPT

## 2021-10-27 PROCEDURE — 86920 COMPATIBILITY TEST SPIN: CPT

## 2021-10-27 PROCEDURE — 36430 TRANSFUSION BLD/BLD COMPNT: CPT

## 2021-10-27 RX ORDER — METHYLPREDNISOLONE SODIUM SUCCINATE 125 MG/2ML
125 INJECTION, POWDER, LYOPHILIZED, FOR SOLUTION INTRAMUSCULAR; INTRAVENOUS ONCE
Status: CANCELLED | OUTPATIENT
Start: 2021-11-03 | End: 2021-10-27

## 2021-10-27 RX ORDER — CEPHALEXIN 500 MG/1
500 CAPSULE ORAL 2 TIMES DAILY
Qty: 14 CAPSULE | Refills: 0 | Status: SHIPPED | OUTPATIENT
Start: 2021-10-27 | End: 2021-11-03

## 2021-10-27 RX ORDER — SODIUM CHLORIDE 0.9 % (FLUSH) 0.9 %
5-40 SYRINGE (ML) INJECTION PRN
Status: CANCELLED | OUTPATIENT
Start: 2021-11-03

## 2021-10-27 RX ORDER — MEPERIDINE HYDROCHLORIDE 50 MG/ML
12.5 INJECTION INTRAMUSCULAR; INTRAVENOUS; SUBCUTANEOUS ONCE
Status: CANCELLED | OUTPATIENT
Start: 2021-11-03 | End: 2021-10-27

## 2021-10-27 RX ORDER — SODIUM CHLORIDE 9 MG/ML
INJECTION, SOLUTION INTRAVENOUS CONTINUOUS
Status: CANCELLED | OUTPATIENT
Start: 2021-11-03

## 2021-10-27 RX ORDER — EPINEPHRINE 1 MG/ML
0.3 INJECTION, SOLUTION, CONCENTRATE INTRAVENOUS PRN
Status: CANCELLED | OUTPATIENT
Start: 2021-11-03

## 2021-10-27 RX ORDER — SODIUM CHLORIDE 0.9 % (FLUSH) 0.9 %
5-40 SYRINGE (ML) INJECTION PRN
Status: DISCONTINUED | OUTPATIENT
Start: 2021-10-27 | End: 2021-10-28 | Stop reason: HOSPADM

## 2021-10-27 RX ORDER — SODIUM CHLORIDE 9 MG/ML
20 INJECTION, SOLUTION INTRAVENOUS ONCE
Status: CANCELLED | OUTPATIENT
Start: 2021-11-03 | End: 2021-10-27

## 2021-10-27 RX ORDER — SODIUM CHLORIDE 9 MG/ML
INJECTION, SOLUTION INTRAVENOUS PRN
Status: DISCONTINUED | OUTPATIENT
Start: 2021-10-27 | End: 2021-10-28 | Stop reason: HOSPADM

## 2021-10-27 RX ORDER — HEPARIN SODIUM (PORCINE) LOCK FLUSH IV SOLN 100 UNIT/ML 100 UNIT/ML
500 SOLUTION INTRAVENOUS PRN
Status: CANCELLED | OUTPATIENT
Start: 2021-11-03

## 2021-10-27 RX ORDER — DIPHENHYDRAMINE HYDROCHLORIDE 50 MG/ML
50 INJECTION INTRAMUSCULAR; INTRAVENOUS ONCE
Status: CANCELLED | OUTPATIENT
Start: 2021-11-03

## 2021-10-27 RX ORDER — PALONOSETRON 0.05 MG/ML
0.25 INJECTION, SOLUTION INTRAVENOUS ONCE
Status: CANCELLED | OUTPATIENT
Start: 2021-11-03

## 2021-10-27 RX ORDER — HEPARIN SODIUM (PORCINE) LOCK FLUSH IV SOLN 100 UNIT/ML 100 UNIT/ML
500 SOLUTION INTRAVENOUS PRN
Status: DISCONTINUED | OUTPATIENT
Start: 2021-10-27 | End: 2021-10-28 | Stop reason: HOSPADM

## 2021-10-27 RX ORDER — DIPHENHYDRAMINE HYDROCHLORIDE 50 MG/ML
50 INJECTION INTRAMUSCULAR; INTRAVENOUS ONCE
Status: CANCELLED | OUTPATIENT
Start: 2021-11-03 | End: 2021-10-27

## 2021-10-27 RX ORDER — SODIUM CHLORIDE 9 MG/ML
25 INJECTION, SOLUTION INTRAVENOUS PRN
Status: CANCELLED | OUTPATIENT
Start: 2021-11-03

## 2021-10-27 RX ADMIN — SODIUM CHLORIDE, PRESERVATIVE FREE 10 ML: 5 INJECTION INTRAVENOUS at 14:05

## 2021-10-27 RX ADMIN — HEPARIN SODIUM (PORCINE) LOCK FLUSH IV SOLN 100 UNIT/ML 500 UNITS: 100 SOLUTION at 14:05

## 2021-10-27 NOTE — PROGRESS NOTES
Today's Date: 10/27/2021  Patient Name: Latha Lilly  Patient's age: 80 y. o., 1950    Diagnosis:   RUL squamous cell carcinoma,   Cancer Staging  Malignant neoplasm of upper lobe of right lung (HCC)  Staging form: Lung, AJCC 8th Edition  - Clinical stage from 9/18/2020: Stage IIIB (cT4, cN2, cM0) - Signed by Ras Flores MD on 9/18/2020    Current therapy:  Started on concurrent chemoradiation with weekly carbotaxol on 10/21/20  She completed radiation therapy in mid December  Her pET scan 2/3/21 showed no recurrence with positive response to treatment  Restarted on consolidation durvalumab 2/3/2021  Recent CT chest 6/14/21 showed Increase in size of spiculated malignant mass in the medial right upper lobe measuring 5.2 x 3.4 cm, which shows evidence of mediastinal invasion.  Matted metastatic lymphadenopathy in the subcarinal and right paratracheal regions is unchanged  PET scan on 7/2/2021 showed  There has been significant increase in size compared to prior PET/CT from February 2021 with new invasion into the right mediastinum.  There is associated increased FDG  PET activity measuring with a max SUV of 16.2, previously 3.1. Started on chemotherapy with carboplatin and Taxol on 7/14/2021  PET scan on 9/3/2021 showed interval improvement with decrease in the size and intensity of the right upper lobe mass    CHIEF COMPLAINT:    Chief Complaint   Patient presents with    Follow-up    Leg Swelling     sore / painful / seeping      INTERVAL HISTORY:    Naa Larsen is returning for follow visit and to discuss lab results and further recommendations. Her hemoglobin has dropped little bit. She feels tired and fatigued. She has bilateral lower extremity swelling and also redness on the right lower extremity with serosanguineous discharge. She denies any fever chills. She denies any blood in stool, hematemesis. She has some fatigue and mild shortness of breath.   During this visit patient's allergy, social, medical, surgical history and medications were reviewed and updated. HISTORY OF PRESENT ILLNESS:    Yennifer Adair is a 45-year-old female with a past medical history of COPD, history of tobacco abuse, depression, CAD, arthritis was admitted with multiple falls at home. She complains of back pain and also chronic cough. On admission she had a CT scan of the chest which showed 7.2 cm spiculated mass in the right upper lobe with mediastinal lymphadenopathy suspicious for malignancy. 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 Oncology consulted for further evaluation. Patient has COPD and uses Home oxygen at night and sues walker at home. She smokes more than a PPD. 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 Past Medical History:   has a past medical history of AAA (abdominal aortic aneurysm) ([facility] [address] Acid reflux, Anxiety and depression, Aortic stenosis, Arthritis, Blister of ankle, right, CAD (coronary artery disease), Cancer ([address] COPD (chronic obstructive pulmonary disease) ([address] Diabetes mellitus ([address] Falls, Heart block, Hypokalemia, MDRO (multiple drug resistant organisms) resistance, MRSA (methicillin resistant staph aureus) culture positive, On home oxygen therapy, On home oxygen therapy, Overactive bladder, Pneumonia, PONV (postoperative nausea and vomiting), and Vitamin D deficiency. Past Surgical History:   has a past surgical history that includes back surgery; eye surgery; Cholecystectomy; Appendectomy; Hysterectomy; Tonsillectomy; lumbar laminectomy; Endoscopy, colon, diagnostic (12/08/2016); aortic valve repair (N/A, 4/11/2017); bronchoscopy (N/A, 8/31/2020); IR INSERT PICC VAD W SQ PORT >5 YEARS (9/4/2020); and IR PORT PLACEMENT > 5 YEARS (9/29/2020).      Medications:    Current Outpatient Medications   Medication Sig Dispense Refill    ondansetron (ZOFRAN) 4 MG tablet Take 1 tablet by mouth every 8 hours as needed for Nausea or Vomiting 30 tablet 3    oxyCODONE-acetaminophen (PERCOCET)  MG per tablet Take 1 tablet by mouth every 6 hours as needed for Pain.  traZODone (DESYREL) 300 MG tablet Take 0.5 tablets by mouth nightly 30 tablet 0    clonazePAM (KLONOPIN) 1 MG tablet Take 1 tablet by mouth 3 times daily as needed for Anxiety for up to 3 doses. 3 tablet 0    gabapentin (NEURONTIN) 400 MG capsule Take 1 capsule by mouth 2 times daily for 3 doses. In addition to 2 capsules (=800mg) nightly 3 capsule 0    ARIPiprazole (ABILIFY) 5 MG tablet Take 1 tablet by mouth daily for 3 doses 3 tablet 0    furosemide (LASIX) 40 MG tablet Take 40 mg by mouth daily      melatonin 5 MG TABS tablet Take 5 mg by mouth nightly       glimepiride (AMARYL) 1 MG tablet Take 1 mg by mouth Daily with supper In addition to 2 tablets (=2mg) every morning / 8/25/21 taking 2  Tablets in the morning      metoprolol tartrate (LOPRESSOR) 25 MG tablet Take 25 mg by mouth 2 times daily      venlafaxine (EFFEXOR XR) 150 MG extended release capsule Take 150 mg by mouth 2 times daily      lansoprazole (PREVACID) 30 MG delayed release capsule Take 30 mg by mouth daily      metFORMIN (GLUCOPHAGE) 500 MG tablet Take 500 mg by mouth 2 times daily      aspirin EC 81 MG EC tablet Take 81 mg by mouth daily      mometasone-formoterol (DULERA) 200-5 MCG/ACT inhaler Inhale 2 puffs into the lungs every 12 hours as needed (wheezing/SOB)       lidocaine-prilocaine (EMLA) 2.5-2.5 % cream To port site before chemo (Patient not taking: Reported on 10/27/2021) 1 Tube 1     No current facility-administered medications for this visit. Allergies:  Codeine and Dye [iodides]    Social History:   reports that she quit smoking about 5 years ago. She has never used smokeless tobacco. She reports that she does not drink alcohol and does not use drugs.      Family History: family history includes Cancer in her father and mother. REVIEW OF SYSTEMS:    Constitutional: ++fatigue, No fever or chills. No night sweats, no weight loss   Eyes: No eye discharge, double vision, or eye pain   HEENT: negative for sore mouth, sore throat, hoarseness and voice change   Respiratory: negative for cough , sputum, ++dyspnea, wheezing, hemoptysis, chest pain   Cardiovascular: negative for chest pain, dyspnea, palpitations, orthopnea, PND   Gastrointestinal: negative for nausea, vomiting, diarrhea, constipation, abdominal pain, Dysphagia, hematemesis and hematochezia   Genitourinary: negative for frequency, dysuria, nocturia, urinary incontinence, and hematuria   Integument: negative for rash, skin lesions, bruises.    Hematologic/Lymphatic: negative for easy bruising, bleeding, lymphadenopathy, or petechiae   Endocrine: negative for heat or cold intolerance,weight changes, change in bowel habits and hair loss   Musculoskeletal: negative for myalgias, arthralgias, pain, joint swelling,and bone pain   Neurological: negative for headaches, dizziness, seizures, weakness, numbness    PHYSICAL EXAM:      BP (!) 130/59   Pulse 99   Temp 98.1 °F (36.7 °C) (Oral)   Resp 16   Wt 187 lb 12.8 oz (85.2 kg)   BMI 28.55 kg/m²    Temp (24hrs), Av.7 °F (36.5 °C), Min:97.3 °F (36.3 °C), Max:98.1 °F (36.7 °C)  General appearance - well appearing, no in pain or distress   Mental status - alert and cooperative   Eyes - pupils equal and reactive, extraocular eye movements intact   Ears - bilateral TM's and external ear canals normal   Mouth - mucous membranes moist, pharynx normal without lesions   Neck - supple, no significant adenopathy   Lymphatics - no palpable lymphadenopathy, no hepatosplenomegaly   Chest - clear to auscultation, no wheezes, rales or rhonchi, symmetric air entry   Heart - normal rate, regular rhythm, normal S1, S2, no murmurs  Abdomen - soft, nontender, nondistended, no masses or organomegaly   Neurological - alert, oriented, normal speech, no focal findings or movement disorder noted   Musculoskeletal - no joint tenderness, deformity or swelling   Extremities - peripheral pulses normal, no pedal edema, no clubbing or cyanosis   Skin - normal coloration and turgor, no rashes, no suspicious skin lesions noted ,    DATA:    Labs:   CBC:   Recent Labs     10/27/21  0830   WBC 3.5   HGB 6.5*   HCT 22.4*   *     BMP:   Recent Labs     10/27/21  0830      K 4.3   CO2 22   BUN 12   CREATININE 1.07*   LABGLOM 51*   GLUCOSE 118*     PT/INR: No results for input(s): PROTIME, INR in the last 72 hours. Surgical Pathology Report   Surgical Pathology   Collected:  08/31/20 0803    Lab status:  Final    Resulting lab:  [employer]    Value:  -- Diagnosis --     BRONCHIAL WASHINGS RIGHT UPPER LOBE:          POSITIVE FOR MALIGNANCY.        SQUAMOUS CELL CARCINOMA.           COMMENT: VOLUME OF TUMOR IN THE CELL BLOCK IS LOW AND ADDITIONAL   MATERIAL WOULD BE REQUIRED IF MOLECULAR TESTING IS NECESSARY. IMAGING DATA:  CT chest 8/27/2020:   FINDINGS:    Mediastinum: Three vessel coronary artery calcification.  Newly enlarged    mediastinal lymph nodes including example station 7 node measuring 3.1 x 2.1    cm on series 2, image 45.         Lungs/pleura: Mild upper lobe predominant centrilobular emphysema. Roberto Power Spiculated mass of the right upper lobe measuring 7.2 x 6.5 cm with    broad-base of contact with the mediastinal pleura, right major fissure,    encircling and obliterating the right upper lobe bronchus.  Spicules are seen    to extend to the anterior costal pleura.  There is adjacent bronchial wall    thickening and interlobular septal thickening.  Stable 5 mm solid nodule of    the left upper lobe since 2016, benign.  No pleural effusion or pneumothorax.         Upper Abdomen: Adrenals are unremarkable.         Soft Tissues/Bones:  Old right rib fractures.              Impression    Approximate 7.2 cm spiculated mass of the right upper lobe likely with    invasion of the mediastinum, adjacent lymphangitic spread and suspected    metastatic mediastinal lymphadenopathy. Scan 9/11/2020: Impression    1. The patient's known right upper lobe lung mass is FDG avid consistent with    the patient's primary malignancy. 2. FDG avid mediastinal lymph nodes consistent with metastatic disease. 3. No abnormal FDG activity is identified involving the abdomen or pelvis. PET 2/3/21  IMPRESSION:   *  Unexpected findings of emphysematous cystitis. *  Positive response to therapy with markedly decreased size and FDG avidity of right upper lobe mass and mediastinal adenopathy. *  Healing right rib fractures. Ct chest 6/14/21  FINDINGS:     Emphysema.  Spiculated mass in the medial right upper lobe which appears to invade into the upper right mediastinum measures 5.2 x 3.4 cm (4.4 x 1.8 cm previously).  Malignant matted lymphadenopathy in the subcarinal and right paratracheal regions is unchanged.  Noncalcified left upper lobe lung   nodule measuring 0.6 cm is unchanged.  There is a 1.4 cm nodular opacity in the lingula that appear slightly larger than on previous study.  Left chest port in place.  Atherosclerotic thoracic aorta.  Visualized structures in the upper abdomen are unremarkable.  Normal heart size.  No pleural or   pericardial effusions.  The central pulmonary arteries are unremarkable.  Extensive coronary artery calcifications.  Status post median sternotomy.  Wall thickening of the esophagus.  Visualized chest wall structures are unremarkable.  There are multiple healing and/or old rib fractures. IMPRESSION:   1.  Increase in size of spiculated malignant mass in the medial right upper lobe measuring 5.2 x 3.4 cm, which shows evidence of mediastinal invasion.  Matted metastatic lymphadenopathy in the subcarinal and right paratracheal regions is unchanged.    2.  Unchanged noncalcified left upper lobe nodule measuring 0.6 cm.  A 1.4 cm nodular opacity in the lingula appears slightly larger than on previous study. 3.  Wall thickening of esophagus suggestive of esophagitis. Ct abd  6/13/21  CT images of the abdomen and pelvis are obtained without contrast.  There is a 1.8 cm soft tissue density arising from or directly adjacent to the uncinate process of the pancreas.  Consider contrast-enhanced CT or MRI for further evaluation.  Bowel is unremarkable. Lamona Monique is no intraperitoneal or   retroperitoneal lymphadenopathy.  Colonic stool burden is moderate.  The adrenals appear symmetric.  Images the lung bases are grossly clear.  There appear to be remote right rib fractures.  Remote left rib fracture also noted.  There is no hepatic or splenic abnormality.  No hydronephrosis is seen. IMPRESSION:     1.8 cm soft tissue density arising from, or directly adjacent to, the uncinate process of the pancreas.  Contrast-enhanced CT or MRI recommended. CT Chest 6/14/21  IMPRESSION:     1.  Increase in size of spiculated malignant mass in the medial right upper lobe measuring 5.2 x 3.4 cm, which shows evidence of mediastinal invasion.  Matted metastatic lymphadenopathy in the subcarinal and right paratracheal regions is unchanged. 2.  Unchanged noncalcified left upper lobe nodule measuring 0.6 cm.  A 1.4 cm nodular opacity in the lingula appears slightly larger than on previous study. 3.  Wall thickening of esophagus suggestive of esophagitis.      Ct abd  Pelvis 6/13/21  CT images of the abdomen and pelvis are obtained without contrast.  There is a 1.8 cm soft tissue density arising from or directly adjacent to the uncinate process of the pancreas.  Consider contrast-enhanced CT or MRI for further evaluation.  Bowel is unremarkable. Lamona Monique is no intraperitoneal or   retroperitoneal lymphadenopathy.  Colonic stool burden is moderate.  The adrenals appear symmetric.  Images the lung bases are grossly clear.  There appear to be remote right rib fractures.  Remote left rib fracture also noted.  There is no hepatic or splenic abnormality.  No hydronephrosis is seen. IMPRESSION:   1.8 cm soft tissue density arising from, or directly adjacent to, the uncinate process of the pancreas.  Contrast-enhanced CT or MRI recommended. PET scan 7/2/2021  FINDINGS:   Normal FDG uptake is seen in brain, oral pharyngeal region, myocardium, liver, spleen, bowel, kidneys/ureters and urinary bladder. Head and neck:   No suspicious hypermetabolic activity. No lymphadenopathy. Chest:   Relatively unchanged spiculated mass within the right upper lobe measuring 4.7 x 3.0 cm when compared to recent CT from June 2021. Louanna Edwards has been significant increase in size compared to prior PET/CT from February 2021 with new invasion into the right mediastinum.  There is associated increased FDG  PET activity measuring with a max SUV of 16.2, previously 3.1. Unchanged opacities along the posterior lateral right upper lobe.  Minimally increased activity is noted in this area. Improving nodular opacity within the lingula with no associated increased metabolic activity. No significant activity is noted within the previously seen mediastinal lymph nodes.  There is mild diffuse uptake throughout the esophagus with a thickened wall.  This is similar to prior CT in June 2021. Very severe calcification involving the left coronary, LAD, circumflex, ramus and right coronary. Abdomen:   Adrenals are normal.  Diffuse uptake is noted throughout the colon with no focal areas appreciated.  No increased activity is noted at the soft tissue density adjacent to the uncinate process of the pancreas as seen on recent CT. Heavy intra-abdominal vascular calcifications. Persistent irregular appearance of the bladder contour with a small amount of intraluminal gas.  No emphysematous change within the bladder wall is currently visualized.    Musculoskeletal system:   No suspicious hypermetabolic activity. No lymphadenopathy. Multiple bilateral chronic rib fractures.  Old transverse process fracture at L2 No focal osseous uptake. IMPRESSION:   *  Hypermetabolic right upper lobe spiculated mass compatible with patient's known neoplasm. *  Low level increased activity within the posterior lateral right upper lobe opacities most likely representing scarring from prior radiation. *  Diffuse mild uptake through the esophagus with persistent abnormal wall thickening suggestive of esophagitis. *  Nonfocal diffuse uptake throughout the colon which can be seen with certain medications such as metformin. *  Abnormal appearance to the bladder.  The bladder is distended and contains intraluminal gas which may be due to recent instrumentation and/or infection. Windell Carmen is also small amount of gas seen within the wall of the bladder.  This raises the possibility of emphysematous cystitis.  However, the   amount of emphysematous changes has improved.  As Correlation clinical exam recommended. *  Severe multivessel coronary artery calcification   PET scan 9/3/2021  IMPRESSION:     1.  There is a hypermetabolic pulmonary neoplasm involving the right upper lobe medially.  There has been interval improvement with a decrease in the size and the an intensity since prior study of 7/1/2021.  Findings are suggestive of partial response.  It has 9.8 SUV compared to the 16.1 SUV on   prior study. 2.  Focal small opacity with FDG uptake at the right lung base.  This is a new uptake.  This could be a focal infiltrate, atelectasis or satellite lesion.  Follow-up CT scan examination is recommended. 3. Windell Carmen is a stable linear mildly FDG avid abnormality in the lateral portion of the right upper mid lung field.  Abnormalities from parenchymal scarring. 4.  No evidence of distance metastasis in the neck, abdomen and pelvis and visualized skeleton. IMPRESSION:   1.  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 PET scan negative for distant metastasis: Clinical stage T4 N2 M0, stage IIIb  2. Anemia  3. Leukopenia: chemo related  4. COPD  5. Falls  6. CAD  7. Tobacco abuse  8. Actinomyces bacteremia  9. Right lower extremity cellulitis    RECOMMENDATIONS:  1. I reviewed the laboratory data,  diagnosis, prognosis and treatment options with patient  2. I discussed the recent lab work. Her hemoglobin is low therefore we will hold off chemotherapy today  3. Arrange 1 unit PRBC today  4. I will send Keflex for possible cellulitis of right lower extremity. 5. Reschedule chemo next week. This will be her cycle 6 and will plan to give her treatment break after restaging scan with PET scan in 4 weeks from now. 6. If her scans showed progression we will likely start her on Keytruda  7. Return to clinic in 4 weeks with labs prior  8. I reviewed the NCCN guidelines and goals of care  9. Patient's questions were sought and answered to her satisfaction      Mendoza Guevara MD  Hematologist/Medical Oncologist    This note is created with the assistance of a speech recognition program.  While intending to generate a document that actually reflects the content of the visit, the document can still have some errors including those of syntax and sound a like substitutions which may escape proof reading. It such instances, actual meaning can be extrapolated by contextual diversion.

## 2021-10-27 NOTE — TELEPHONE ENCOUNTER
[facility] FOR MD VISIT & TX  ONE UNIT OF PRBC  RESCHEDULE CHEMO NEXT WK  LAST CHEMO FOR NOW  RV 4 WKS W/ PET  PET/CT IS ON 11/12/21 @ 11:30AM AT [facility]  MD VISIT 11/24/21 @ 10:45AM  AVS PRINTED W/ INSTRUCTIONS AND GIVEN TO PT ON EXIT

## 2021-10-27 NOTE — PROGRESS NOTES
Patient arrived ambulatory per self for planned D1 C6 and MD visit. Patient states legs and ankles are swollen, red, and draining clear liquid. Patient also admits to increased shortness of breath. Patient expressed occasional constipation as well. Vitals obtained. Port accessed; specimen sent. Hgb 6.5. MD notified; decision to not continue with treatment; orders for 1U PRBC. Type and cross sent. 1U PRBC checked with Kesha Fisher RN; rate initiated slowly and increased as tolerated; completed without adverse effects. Vitals as charted. Port flushed and heparinized with intact johnston needle removed per protocol. Patient ambulated off unit per self at discharge.

## 2021-10-28 ENCOUNTER — HOSPITAL ENCOUNTER (OUTPATIENT)
Facility: MEDICAL CENTER | Age: 71
End: 2021-10-28
Payer: MEDICARE

## 2021-10-28 LAB
ABO/RH: NORMAL
ANTIBODY SCREEN: NEGATIVE
ARM BAND NUMBER: NORMAL
BLD PROD TYP BPU: NORMAL
CROSSMATCH RESULT: NORMAL
DISPENSE STATUS BLOOD BANK: NORMAL
EXPIRATION DATE: NORMAL
TRANSFUSION STATUS: NORMAL
UNIT DIVISION: 0
UNIT NUMBER: NORMAL

## 2021-11-03 ENCOUNTER — HOSPITAL ENCOUNTER (OUTPATIENT)
Dept: INFUSION THERAPY | Facility: MEDICAL CENTER | Age: 71
Discharge: HOME | End: 2021-11-03
Payer: MEDICARE

## 2021-11-03 VITALS
SYSTOLIC BLOOD PRESSURE: 137 MMHG | RESPIRATION RATE: 18 BRPM | BODY MASS INDEX: 27.2 KG/M2 | HEART RATE: 101 BPM | TEMPERATURE: 97.9 F | WEIGHT: 178.9 LBS | DIASTOLIC BLOOD PRESSURE: 70 MMHG

## 2021-11-03 DIAGNOSIS — C34.11 MALIGNANT NEOPLASM OF UPPER LOBE OF RIGHT LUNG (HCC): Primary | ICD-10-CM

## 2021-11-03 LAB
ABSOLUTE EOS #: 0.06 K/UL (ref 0–0.44)
ABSOLUTE IMMATURE GRANULOCYTE: 0.03 K/UL (ref 0–0.3)
ABSOLUTE LYMPH #: 0.74 K/UL (ref 1.1–3.7)
ABSOLUTE MONO #: 0.31 K/UL (ref 0.1–1.2)
ALBUMIN SERPL-MCNC: 3.9 G/DL (ref 3.5–5.2)
ALBUMIN/GLOBULIN RATIO: ABNORMAL (ref 1–2.5)
ALP BLD-CCNC: 45 U/L (ref 35–104)
ALT SERPL-CCNC: 8 U/L (ref 5–33)
ANION GAP SERPL CALCULATED.3IONS-SCNC: 13 MMOL/L (ref 9–17)
AST SERPL-CCNC: 14 U/L
BASOPHILS # BLD: 1 % (ref 0–2)
BASOPHILS ABSOLUTE: <0.03 K/UL (ref 0–0.2)
BILIRUB SERPL-MCNC: 0.25 MG/DL (ref 0.3–1.2)
BUN BLDV-MCNC: 13 MG/DL (ref 8–23)
BUN/CREAT BLD: 14 (ref 9–20)
CALCIUM SERPL-MCNC: 8.7 MG/DL (ref 8.6–10.4)
CHLORIDE BLD-SCNC: 107 MMOL/L (ref 98–107)
CO2: 23 MMOL/L (ref 20–31)
CREAT SERPL-MCNC: 0.9 MG/DL (ref 0.5–0.9)
DIFFERENTIAL TYPE: ABNORMAL
EOSINOPHILS RELATIVE PERCENT: 2 % (ref 1–4)
GFR AFRICAN AMERICAN: >60 ML/MIN
GFR NON-AFRICAN AMERICAN: >60 ML/MIN
GFR SERPL CREATININE-BSD FRML MDRD: ABNORMAL ML/MIN/{1.73_M2}
GFR SERPL CREATININE-BSD FRML MDRD: ABNORMAL ML/MIN/{1.73_M2}
GLUCOSE BLD-MCNC: 116 MG/DL (ref 70–99)
HCT VFR BLD CALC: 32.7 % (ref 36.3–47.1)
HEMOGLOBIN: 9.9 G/DL (ref 11.9–15.1)
IMMATURE GRANULOCYTES: 1 %
LYMPHOCYTES # BLD: 22 % (ref 24–43)
MCH RBC QN AUTO: 28.9 PG (ref 25.2–33.5)
MCHC RBC AUTO-ENTMCNC: 30.3 G/DL (ref 28.4–34.8)
MCV RBC AUTO: 95.6 FL (ref 82.6–102.9)
MONOCYTES # BLD: 9 % (ref 3–12)
NRBC AUTOMATED: 0 PER 100 WBC
PDW BLD-RTO: 18 % (ref 11.8–14.4)
PLATELET # BLD: 138 K/UL (ref 138–453)
PLATELET ESTIMATE: ABNORMAL
PMV BLD AUTO: 8.5 FL (ref 8.1–13.5)
POTASSIUM SERPL-SCNC: 4.2 MMOL/L (ref 3.7–5.3)
RBC # BLD: 3.42 M/UL (ref 3.95–5.11)
RBC # BLD: ABNORMAL 10*6/UL
SEG NEUTROPHILS: 66 % (ref 36–65)
SEGMENTED NEUTROPHILS ABSOLUTE COUNT: 2.27 K/UL (ref 1.5–8.1)
SODIUM BLD-SCNC: 143 MMOL/L (ref 135–144)
TOTAL PROTEIN: 7.3 G/DL (ref 6.4–8.3)
WBC # BLD: 3.4 K/UL (ref 3.5–11.3)
WBC # BLD: ABNORMAL 10*3/UL

## 2021-11-03 PROCEDURE — 6360000002 HC RX W HCPCS: Performed by: INTERNAL MEDICINE

## 2021-11-03 PROCEDURE — 85025 COMPLETE CBC W/AUTO DIFF WBC: CPT

## 2021-11-03 PROCEDURE — 96417 CHEMO IV INFUS EACH ADDL SEQ: CPT

## 2021-11-03 PROCEDURE — 36591 DRAW BLOOD OFF VENOUS DEVICE: CPT

## 2021-11-03 PROCEDURE — 96375 TX/PRO/DX INJ NEW DRUG ADDON: CPT

## 2021-11-03 PROCEDURE — 96413 CHEMO IV INFUSION 1 HR: CPT

## 2021-11-03 PROCEDURE — 96367 TX/PROPH/DG ADDL SEQ IV INF: CPT

## 2021-11-03 PROCEDURE — 80053 COMPREHEN METABOLIC PANEL: CPT

## 2021-11-03 PROCEDURE — 96415 CHEMO IV INFUSION ADDL HR: CPT

## 2021-11-03 PROCEDURE — 2580000003 HC RX 258: Performed by: INTERNAL MEDICINE

## 2021-11-03 PROCEDURE — 96376 TX/PRO/DX INJ SAME DRUG ADON: CPT

## 2021-11-03 PROCEDURE — 2500000003 HC RX 250 WO HCPCS: Performed by: INTERNAL MEDICINE

## 2021-11-03 RX ORDER — PALONOSETRON 0.05 MG/ML
0.25 INJECTION, SOLUTION INTRAVENOUS ONCE
Status: COMPLETED | OUTPATIENT
Start: 2021-11-03 | End: 2021-11-03

## 2021-11-03 RX ORDER — SODIUM CHLORIDE 0.9 % (FLUSH) 0.9 %
5-40 SYRINGE (ML) INJECTION PRN
Status: DISCONTINUED | OUTPATIENT
Start: 2021-11-03 | End: 2021-11-04 | Stop reason: HOSPADM

## 2021-11-03 RX ORDER — HEPARIN SODIUM (PORCINE) LOCK FLUSH IV SOLN 100 UNIT/ML 100 UNIT/ML
500 SOLUTION INTRAVENOUS PRN
Status: DISCONTINUED | OUTPATIENT
Start: 2021-11-03 | End: 2021-11-04 | Stop reason: HOSPADM

## 2021-11-03 RX ORDER — DIPHENHYDRAMINE HYDROCHLORIDE 50 MG/ML
50 INJECTION INTRAMUSCULAR; INTRAVENOUS ONCE
Status: COMPLETED | OUTPATIENT
Start: 2021-11-03 | End: 2021-11-03

## 2021-11-03 RX ORDER — DEXAMETHASONE SODIUM PHOSPHATE 10 MG/ML
10 INJECTION INTRAMUSCULAR; INTRAVENOUS ONCE
Status: COMPLETED | OUTPATIENT
Start: 2021-11-03 | End: 2021-11-03

## 2021-11-03 RX ORDER — SODIUM CHLORIDE 9 MG/ML
20 INJECTION, SOLUTION INTRAVENOUS ONCE
Status: COMPLETED | OUTPATIENT
Start: 2021-11-03 | End: 2021-11-03

## 2021-11-03 RX ADMIN — SODIUM CHLORIDE, PRESERVATIVE FREE 10 ML: 5 INJECTION INTRAVENOUS at 09:06

## 2021-11-03 RX ADMIN — HEPARIN SODIUM (PORCINE) LOCK FLUSH IV SOLN 100 UNIT/ML 500 UNITS: 100 SOLUTION at 15:07

## 2021-11-03 RX ADMIN — CARBOPLATIN 400 MG: 10 INJECTION INTRAVENOUS at 14:22

## 2021-11-03 RX ADMIN — PACLITAXEL 270 MG: 6 INJECTION, SOLUTION, CONCENTRATE INTRAVENOUS at 11:04

## 2021-11-03 RX ADMIN — DIPHENHYDRAMINE HYDROCHLORIDE 50 MG: 50 INJECTION, SOLUTION INTRAMUSCULAR; INTRAVENOUS at 10:01

## 2021-11-03 RX ADMIN — DEXAMETHASONE SODIUM PHOSPHATE 10 MG: 10 INJECTION INTRAMUSCULAR; INTRAVENOUS at 10:01

## 2021-11-03 RX ADMIN — FOSAPREPITANT 150 MG: 150 INJECTION, POWDER, LYOPHILIZED, FOR SOLUTION INTRAVENOUS at 10:02

## 2021-11-03 RX ADMIN — SODIUM CHLORIDE, PRESERVATIVE FREE 10 ML: 5 INJECTION INTRAVENOUS at 15:07

## 2021-11-03 RX ADMIN — SODIUM CHLORIDE 20 ML/HR: 9 INJECTION, SOLUTION INTRAVENOUS at 09:06

## 2021-11-03 RX ADMIN — PALONOSETRON 0.25 MG: 0.05 INJECTION, SOLUTION INTRAVENOUS at 10:01

## 2021-11-03 RX ADMIN — FAMOTIDINE 20 MG: 10 INJECTION, SOLUTION INTRAVENOUS at 10:01

## 2021-11-03 NOTE — FLOWSHEET NOTE
Situation:  Writer visited with Ms. Evie Espinal in the treatment cubicle of the infusion clinic. Assessment:  Ms. Evie Espinal was receiving care from her nurse. She greeted writer. She shared that she and her son and granddaughter recently attended a concert. She smiled and agreed that she enjoyed it and shared more details about it. She told writer about her last visit at the clinic and how she needed a transfusion. She showed writer the summary of the visit and noted that she was not aware of her diagnosis. She shared how she was feeling about this. She was receptive to writer giving her the number for triage and asking her nurse navigator to call her. Intervention:  Writer provided supportive presence and explored Pt's coping and needs; inquired about Pt's experience of the concert; asked about Pt's feelings in response to her medical summary; offered empathy and words of support; told Pt she would give her the triage number of the clinic and ask her nurse navigator to call her. Outcome:  Ms. Evie Espinal voiced her concerns and needs. She thanked writer. Plan:  Writer will be available for follow up support to Patient as she receives treatment. 11/03/21 1047   Encounter Summary   Services provided to: Patient   Referral/Consult From: [address] Family members; Children   Continue Visiting   (11/3/21)   Complexity of Encounter Moderate   Length of Encounter 30 minutes;15 minutes   Routine   Type Follow up   Spiritual/Spiritism   Type Spiritual support   Assessment Calm; Approachable   Intervention Active listening;Explored feelings, thoughts, concerns;Explored coping resources;Sustaining presence/ Ministry of presence; Discussed illness/injury and it's impact   Outcome Expressed gratitude;Engaged in conversation;Coping;Expressed feelings/needs/concerns;Receptive     Electronically signed by Geri Moran, Oncology Outpatient [address] [place] [address] 3 [facility]  11/3/2021  11:56 AM No significant past surgical history

## 2021-11-03 NOTE — PROGRESS NOTES
Patient arrived ambulatory using walker per self for cycle 6 day 1 treatment. Patient denies complaints or concerns. Port accessed;specimen sent. Labs reviewed. Patient premedicated. Taxol infused with no sign adverse reaction;line flushed. Carboplatin infused with no sign adverse reaction;line flushed. Port flushed and heparinized with intact johnston needle removed per protocol. Patient ambulated off unit with walker per self at discharge.

## 2021-11-09 ENCOUNTER — TELEPHONE (OUTPATIENT)
Dept: CASE MANAGEMENT | Age: 71
End: 2021-11-09

## 2021-11-09 NOTE — TELEPHONE ENCOUNTER
Name: Jelani Aranda  : 1950  MRN: [id number]    Oncology Navigation Follow-Up Note  Navigator reaching out to pt. Offering assistance , but could only leave a message.    Electronically signed by Anil Bradley RN on 2021 at 3:26 PM

## 2021-11-18 ENCOUNTER — TELEPHONE (OUTPATIENT)
Dept: RADIATION ONCOLOGY | Facility: MEDICAL CENTER | Age: 71
End: 2021-11-18

## 2021-11-18 ENCOUNTER — HOSPITAL ENCOUNTER (OUTPATIENT)
Dept: RADIATION ONCOLOGY | Facility: MEDICAL CENTER | Age: 71
Discharge: HOME | End: 2021-11-18
Payer: MEDICARE

## 2021-11-18 ENCOUNTER — HOSPITAL ENCOUNTER (OUTPATIENT)
Facility: MEDICAL CENTER | Age: 71
End: 2021-11-18

## 2021-11-18 VITALS
HEART RATE: 116 BPM | RESPIRATION RATE: 20 BRPM | OXYGEN SATURATION: 91 % | TEMPERATURE: 96.7 F | SYSTOLIC BLOOD PRESSURE: 133 MMHG | DIASTOLIC BLOOD PRESSURE: 81 MMHG | BODY MASS INDEX: 27.25 KG/M2 | WEIGHT: 179.25 LBS

## 2021-11-18 DIAGNOSIS — C34.11 MALIGNANT NEOPLASM OF UPPER LOBE OF RIGHT LUNG (HCC): Primary | ICD-10-CM

## 2021-11-18 PROCEDURE — 99212 OFFICE O/P EST SF 10 MIN: CPT | Performed by: STUDENT IN AN ORGANIZED HEALTH CARE EDUCATION/TRAINING PROGRAM

## 2021-11-18 ASSESSMENT — PAIN SCALES - GENERAL: PAINLEVEL_OUTOF10: 10

## 2021-11-18 NOTE — PROGRESS NOTES
Radiation Oncology Office Note    Diagnosis/Treatment History:   Cancer Staging  Malignant neoplasm of upper lobe of right lung [facility])  Staging form: Lung, AJCC 8th Edition  - Clinical stage from 9/18/2020: Stage IIIB (cT4, cN2, cM0) - Signed by Mariposa Lopez MD on 9/18/2020    - S/p chemoradation - 60 Gy in 30 fractions - completed 12/14/2020  - PET scan 2/3/21 showed no recurrence with positive response to treatment, started on consolidation durvalumab 2/3/2021  - CT chest 6/14/21 showed Increase in size of spiculated malignant mass in the medial right upper lobe measuring 5.2 x 3.4 cm, which shows evidence of mediastinal invasion.  Matted metastatic lymphadenopathy in the subcarinal and right paratracheal regions is unchanged PET scan on 7/2/2021 showed  There has been significant increase in size compared to prior PET/CT from February 2021 with new invasion into the right mediastinum.  There is associated increased FDG  PET activity measuring with a max SUV of 16.2, previously 3.1. Started on chemotherapy with carboplatin and Taxol on 7/14/2021  - PET scan on 9/3/2021 showed interval improvement with decrease in the size and intensity of the right upper lobe mass    Interval History:   I am seeing this patient in follow-up on behalf of prior Radiation Oncologists who are no longer part of the health system. Ms. Lizzie Angel returns with granddaughter for routine follow-up. Overall, she reports some substernal pain resolved with available pain medication. Her dyspnea is at baseline. She denies cough, fevers, chills, weigh loss. Interval Imaging  11/15/2021 PET-CT    FINDINGS:     Neck:   No significant hypermetabolic foci. Major vascular structures are unremarkable. Dental amalgam artifact is present. Nonhypermetabolic small lymph nodes. Nonhypermetabolic homogeneous thyroid. Thorax:    There continues to be a hypermetabolic focus in the medial right apex similar in size and avidity from prior study with maximum SUV of 10.8.  The previously seen hypermetabolic focus adjacent to the diaphragm is no longer visualized.  However, there is a nonspecific area of soft tissue irregularity   in the posterior right lung base with maximal activity of 2.7, indeterminate   Heart and supracardiac vessels are normal. Atherosclerotic calcifications. No hypermetabolic mediastinal adenopathy. No pleural or pericardial effusion. Adequate lung volumes with satisfactory inflation. Abdomen:   No significant hypermetabolic foci. Liver, spleen, pancreas, gall bladder and great abdominal vessels are normal. Kidneys are normal. Anterior abdominal wall is intact. No hypermetabolic lymph nodes. Pelvis:   No significant hypermetabolic foci. Anterior and inner pelvic walls are intact. Pelvic viscera are normal. No significant infradiaphragmatic free air. No free pelvic fluid. No hypermetabolic lymph nodes. Atherosclerotic calcifications. Osseous:   No significant hypermetabolic foci. Intact pelvis and hip girdles. Dorsolumbar spondylosis with no lytic or blastic foci. Intact ribs and shoulder girdles. IMPRESSION:     Stable area of malignancy within the right apex.  Interval resolution of activity adjacent to diaphragm with development of indeterminate activity in the posterior lung base. Physical Examination:   /81   Pulse 116   Temp 96.7 °F (35.9 °C) (Temporal)   Resp 20   Wt 179 lb 4 oz (81.3 kg)   SpO2 91%   BMI 27.25 kg/m²   ECO Symptomatic, <50% in bed during the day  CONSTITUTIONAL: Well developed, well nourished female. Alert and oriented x3. No acute distress. HEENT: Head normocephalic and atraumatic. Extraocular movements intact. NEUROLOGIC EXAM: Cranial nerves II through XII grossly intact. No focal neurologic deficit. Speech is fluent. Gait and posture are steady with a walker. PSYCHIATRIC:  Appropriate mood and affect for her clinical situation.     Assessment/Plan:  Stable appearing (but persistent) disease on recent PET-CT. Upon receiving the actual images - I am concerned about the persistent PET-avid on the right medial lung apex. I sent the patient home prior to my review of the actual images (which were not available during the visit). However, I will bring the patient back for a CT simulation to consider re-irradiation to this area. I attempted to discuss this with the patient's medical oncologist and left a message with him. Total time spent on this case on the day of encounter is more than 15 minutes. This time includes combination of one or more of the following - review of necessary tests, review of pertinent medical records from the EMR, performing medically appropriate examination and evaluation, counseling and educating the patient/family/caregiver, ordering necessary medical tests, procedures etc., documenting the clinical information in the electronic medical record, care coordination, referring and communicating with other health care providers and interpretation of results independently.

## 2021-11-18 NOTE — PROGRESS NOTES
Laymon Ahumada  11/18/2021  2:03 PM    Pt here for follow up visit. Patient states pain in the chest and in the back. Patient on percocet. Patient seems a bit confused, but oriented x 4. Dr Dajuan Gatica to eval. Dr Dajuan Gatica reviewed imaging from [facility] and will bring back for simulation. Vitals:    11/18/21 1400   BP: 133/81   Pulse: 116   Resp: 20   Temp: 96.7 °F (35.9 °C)   SpO2: 91%    :  Patient Currently in Pain: Yes  Pain Assessment: 0-10  Pain Level: 10       Wt Readings from Last 1 Encounters:   11/18/21 179 lb 4 oz (81.3 kg)                Current Outpatient Medications:     ondansetron (ZOFRAN) 4 MG tablet, Take 1 tablet by mouth every 8 hours as needed for Nausea or Vomiting, Disp: 30 tablet, Rfl: 3    lidocaine-prilocaine (EMLA) 2.5-2.5 % cream, To port site before chemo (Patient not taking: Reported on 10/27/2021), Disp: 1 Tube, Rfl: 1    oxyCODONE-acetaminophen (PERCOCET)  MG per tablet, Take 1 tablet by mouth every 6 hours as needed for Pain., Disp: , Rfl:     traZODone (DESYREL) 300 MG tablet, Take 0.5 tablets by mouth nightly, Disp: 30 tablet, Rfl: 0    clonazePAM (KLONOPIN) 1 MG tablet, Take 1 tablet by mouth 3 times daily as needed for Anxiety for up to 3 doses. , Disp: 3 tablet, Rfl: 0    gabapentin (NEURONTIN) 400 MG capsule, Take 1 capsule by mouth 2 times daily for 3 doses.  In addition to 2 capsules (=800mg) nightly, Disp: 3 capsule, Rfl: 0    ARIPiprazole (ABILIFY) 5 MG tablet, Take 1 tablet by mouth daily for 3 doses, Disp: 3 tablet, Rfl: 0    furosemide (LASIX) 40 MG tablet, Take 40 mg by mouth daily, Disp: , Rfl:     melatonin 5 MG TABS tablet, Take 5 mg by mouth nightly , Disp: , Rfl:     glimepiride (AMARYL) 1 MG tablet, Take 1 mg by mouth Daily with supper In addition to 2 tablets (=2mg) every morning / 8/25/21 taking 2  Tablets in the morning, Disp: , Rfl:     metoprolol tartrate (LOPRESSOR) 25 MG tablet, Take 25 mg by mouth 2 times daily, Disp: , Rfl:     venlafaxine (EFFEXOR XR) 150 MG extended release capsule, Take 150 mg by mouth 2 times daily, Disp: , Rfl:     lansoprazole (PREVACID) 30 MG delayed release capsule, Take 30 mg by mouth daily, Disp: , Rfl:     metFORMIN (GLUCOPHAGE) 500 MG tablet, Take 500 mg by mouth 2 times daily, Disp: , Rfl:     aspirin EC 81 MG EC tablet, Take 81 mg by mouth daily, Disp: , Rfl:     mometasone-formoterol (DULERA) 200-5 MCG/ACT inhaler, Inhale 2 puffs into the lungs every 12 hours as needed (wheezing/SOB) , Disp: , Rfl:     Immunizations:    Influenza status:    []   Current   [x]   Patient declined    Pneumococcal status:  []   Current  [x]   Patient declined  Covid status:   [x]  Dose #1:                     [x]  Dose #2:               []   Patient declined    Smoking Status:    [] Smoker - PPD:   [x] Nonsmoker - Quit Date: 2016              [] Never a smoker      Cancer Screening:  Colonoscopy   [] Current       [] Not current   [] Not current, but scheduled   [x] NA  Mammogram   [] Current       [x] Not current   [] Not current, but scheduled   [] NA  Prostate           [] Current       [] Not current   [] Not current, but scheduled   [x] NA  PAP/Pelvic      [] Current       [] Not current   [] Not current, but scheduled   [x] NA  Skin                 [] Current       [x] Not current   [] Not current, but scheduled   [] NA     Hormone:  Lupron []   Last dose given:           Next dose due:   Eligard []   Last dose given:           Next dose due:   Aromatase Inhibitors []   Medication name:   N/A:  [x]           FALLS RISK SCREEN  Instructions:  Assess the patient and enter the appropriate indicators that are present for fall risk identification. Total the numbers entered and assign a fall risk score from Table 2.  Reassess patient at a minimum every 12 weeks or with status change. Assessment   Date  11/18/2021     1. Mental Ability: confusion/cognitively impaired 0     2. Elimination Issues: incontinence, frequency 3       3. Ambulatory: use of assistive devices (walker, cane, off-loading devices),        attached to equipment (IV pole, oxygen) 2     4. Sensory Limitations: dizziness, vertigo, impaired vision 3     5. Age less than 65        0     6. Age 72 or greater 1     7. Medication: diuretics, strong analgesics, hypnotics, sedatives,        antihypertensive agents 1   8. Falls:  recent history of falls within the last 3 months (not to include slipping or        tripping) 0   TOTAL 10    If score of 4 or greater was education given? No           TABLE 2   Risk Score Risk Level Plan of Care   0-3 Little or  No Risk 1. Provide assistance as indicated for ambulation activities  2. Reorient confused/cognitively impaired patient  3. Chair/bed in low position, stretcher/bed with siderails up except when performing patient care activities  5. Educate patient/family/caregiver on falls prevention  6.  Reassess in 12 weeks or with any noted change in patient condition which places them at a risk for a fall   4-6 Moderate Risk 1. Provide assistance as indicated for ambulation activities  2. Reorient confused/cognitively impaired patient  3. Chair/bed in low position, stretcher/bed with siderails up except when performing patient care activities  4. Educate patient/family/caregiver on falls prevention     7 or   Higher High Risk 1. Place patient in easily observable treatment room  2. Patient attended at all times by family member or staff  3. Provide assistance as indicated for ambulation activities  4. Reorient confused/cognitively impaired patient  5. Chair/bed in low position, stretcher/bed with siderails up except when performing patient care activities  6.   Educate patient/family/caregiver on falls prevention         PLAN: Patient is seen today in follow up        Domo Lazaro RN

## 2021-11-18 NOTE — TELEPHONE ENCOUNTER
Per Dr Tomasz Villafuerte no follow up needed at this time. [facility] to look [facility].

## 2021-11-19 ENCOUNTER — TELEPHONE (OUTPATIENT)
Dept: RADIATION ONCOLOGY | Age: 71
End: 2021-11-19

## 2021-11-19 ENCOUNTER — TELEPHONE (OUTPATIENT)
Dept: INTERVENTIONAL RADIOLOGY/VASCULAR | Age: 71
End: 2021-11-19

## 2021-11-19 DIAGNOSIS — C34.11 MALIGNANT NEOPLASM OF UPPER LOBE OF RIGHT LUNG (HCC): Primary | ICD-10-CM

## 2021-11-19 NOTE — PROGRESS NOTES
After reviewing PET images from [facility], I would like to send patient for biopsy of the persistent PET-avid area in the right lung apex for tissue confirmation prior to additional radiation. Can also send the sample for next gen sequencing/ PD-1 testing, which was not avaialbe on first biopsy in 2020 due to insufficient sample. Will arrange for this and re-schedule CT simulation 1 week after the biopsy to review the pathology results.

## 2021-11-22 ENCOUNTER — HOSPITAL ENCOUNTER (OUTPATIENT)
Dept: RADIATION ONCOLOGY | Facility: MEDICAL CENTER | Age: 71
Discharge: HOME | End: 2021-11-22
Payer: MEDICARE

## 2021-11-22 ENCOUNTER — APPOINTMENT (OUTPATIENT)
Dept: RADIATION ONCOLOGY | Facility: MEDICAL CENTER | Age: 71
End: 2021-11-22
Attending: RADIOLOGY
Payer: MEDICARE

## 2021-11-22 VITALS
RESPIRATION RATE: 16 BRPM | SYSTOLIC BLOOD PRESSURE: 152 MMHG | TEMPERATURE: 96.7 F | OXYGEN SATURATION: 93 % | HEART RATE: 125 BPM | DIASTOLIC BLOOD PRESSURE: 87 MMHG

## 2021-11-22 DIAGNOSIS — C34.11 MALIGNANT NEOPLASM OF UPPER LOBE OF RIGHT LUNG (HCC): Primary | ICD-10-CM

## 2021-11-22 PROCEDURE — 99212 OFFICE O/P EST SF 10 MIN: CPT | Performed by: STUDENT IN AN ORGANIZED HEALTH CARE EDUCATION/TRAINING PROGRAM

## 2021-11-22 ASSESSMENT — PAIN DESCRIPTION - LOCATION: LOCATION: BACK;CHEST

## 2021-11-22 ASSESSMENT — PAIN DESCRIPTION - PAIN TYPE: TYPE: ACUTE PAIN

## 2021-11-22 ASSESSMENT — PAIN SCALES - GENERAL: PAINLEVEL_OUTOF10: 7

## 2021-11-22 NOTE — PROGRESS NOTES
Radiation Oncology Office Note    Diagnosis/Treatment History:   Cancer Staging  Malignant neoplasm of upper lobe of right lung [facility])  Staging form: Lung, AJCC 8th Edition  - Clinical stage from 9/18/2020: Stage IIIB (cT4, cN2, cM0) - Signed by Magy Jacobs MD on 9/18/2020    - S/p chemoradation - 60 Gy in 30 fractions - completed 12/14/2020  - PET scan 2/3/21 showed no recurrence with positive response to treatment, started on consolidation durvalumab 2/3/2021  - CT chest 6/14/21 showed Increase in size of spiculated malignant mass in the medial right upper lobe measuring 5.2 x 3.4 cm, which shows evidence of mediastinal invasion.  Matted metastatic lymphadenopathy in the subcarinal and right paratracheal regions is unchanged PET scan on 7/2/2021 showed  There has been significant increase in size compared to prior PET/CT from February 2021 with new invasion into the right mediastinum.  There is associated increased FDG  PET activity measuring with a max SUV of 16.2, previously 3.1. Started on chemotherapy with carboplatin and Taxol on 7/14/2021  - PET scan on 9/3/2021 showed interval improvement with decrease in the size and intensity of the right upper lobe mass    Interval History:   I am seeing this patient in follow-up on behalf of prior Radiation Oncologists who are no longer part of the health system. Ms. Kalyn Lopez returns with granddaughter for routine follow-up. Overall, she reports some substernal pain resolved with available pain medication. Her dyspnea is at baseline. She denies cough, fevers, chills, weigh loss. Interval Imaging  11/15/2021 PET-CT    FINDINGS:     Neck:   No significant hypermetabolic foci. Major vascular structures are unremarkable. Dental amalgam artifact is present. Nonhypermetabolic small lymph nodes. Nonhypermetabolic homogeneous thyroid. Thorax:    There continues to be a hypermetabolic focus in the medial right apex similar in size and avidity from prior study with maximum SUV of 10.8.  The previously seen hypermetabolic focus adjacent to the diaphragm is no longer visualized.  However, there is a nonspecific area of soft tissue irregularity   in the posterior right lung base with maximal activity of 2.7, indeterminate   Heart and supracardiac vessels are normal. Atherosclerotic calcifications. No hypermetabolic mediastinal adenopathy. No pleural or pericardial effusion. Adequate lung volumes with satisfactory inflation. Abdomen:   No significant hypermetabolic foci. Liver, spleen, pancreas, gall bladder and great abdominal vessels are normal. Kidneys are normal. Anterior abdominal wall is intact. No hypermetabolic lymph nodes. Pelvis:   No significant hypermetabolic foci. Anterior and inner pelvic walls are intact. Pelvic viscera are normal. No significant infradiaphragmatic free air. No free pelvic fluid. No hypermetabolic lymph nodes. Atherosclerotic calcifications. Osseous:   No significant hypermetabolic foci. Intact pelvis and hip girdles. Dorsolumbar spondylosis with no lytic or blastic foci. Intact ribs and shoulder girdles. IMPRESSION:     Stable area of malignancy within the right apex.  Interval resolution of activity adjacent to diaphragm with development of indeterminate activity in the posterior lung base. Physical Examination:   There were no vitals taken for this visit. ECO Symptomatic, <50% in bed during the day  CONSTITUTIONAL: Well developed, well nourished female. Alert and oriented x3. No acute distress. HEENT: Head normocephalic and atraumatic. Extraocular movements intact. NEUROLOGIC EXAM: Cranial nerves II through XII grossly intact. No focal neurologic deficit. Speech is fluent. Gait and posture are steady with a walker. PSYCHIATRIC:  Appropriate mood and affect for her clinical situation.     Assessment/Plan:  After reviewing PET images from [address], I would like to send patient for biopsy of the persistent PET-avid area in the right lung apex for tissue confirmation prior to additional radiation. Can also send the sample for next gen sequencing/ PD-1 testing, which was not avaialbe on first biopsy in 2020 due to insufficient sample.      I explained that re-irradiation to this area does come with additional risks, such as brachial plexopathy or potentially fatal radiation pneumonitis. She is unsure about how to proceed, but will discuss it with her oncologist.    Will tentatively arrange for this and re-schedule CT simulation 1 week after the biopsy to review the pathology results. I once again attempted to discuss this with the patient's medical oncologist and left a message with him. Total time spent on this case on the day of encounter is more than 15 minutes. This time includes combination of one or more of the following - review of necessary tests, review of pertinent medical records from the EMR, performing medically appropriate examination and evaluation, counseling and educating the patient/family/caregiver, ordering necessary medical tests, procedures etc., documenting the clinical information in the electronic medical record, care coordination, referring and communicating with other health care providers and interpretation of results independently.

## 2021-11-22 NOTE — PROGRESS NOTES
Jl Greer  11/22/2021  10:22 AM    PT here for follow up visit. Patient states prior to coming in today she feel outside, lost her balance while walking. Patient states her buttocks hurts slightly, but she is okay. Vital signs taken, patient oriented x4. Offered exam of her buttocks, pt denied the need for me to see it. Pt having pain in her chest and back. Dr Melissa Torres to eval. Biopsy and then teach and sim. Waiting on biopsy from IR-order has been placed. Vitals:    11/22/21 1020   BP: (!) 152/87   Pulse: 125   Resp: 16   Temp: 96.7 °F (35.9 °C)   SpO2: 93%    :  Patient Currently in Pain: Yes  Pain Assessment: 0-10  Pain Level: 7       Wt Readings from Last 1 Encounters:   11/18/21 179 lb 4 oz (81.3 kg)                Current Outpatient Medications:     ondansetron (ZOFRAN) 4 MG tablet, Take 1 tablet by mouth every 8 hours as needed for Nausea or Vomiting, Disp: 30 tablet, Rfl: 3    lidocaine-prilocaine (EMLA) 2.5-2.5 % cream, To port site before chemo (Patient not taking: Reported on 10/27/2021), Disp: 1 Tube, Rfl: 1    oxyCODONE-acetaminophen (PERCOCET)  MG per tablet, Take 1 tablet by mouth every 6 hours as needed for Pain., Disp: , Rfl:     traZODone (DESYREL) 300 MG tablet, Take 0.5 tablets by mouth nightly, Disp: 30 tablet, Rfl: 0    clonazePAM (KLONOPIN) 1 MG tablet, Take 1 tablet by mouth 3 times daily as needed for Anxiety for up to 3 doses. , Disp: 3 tablet, Rfl: 0    gabapentin (NEURONTIN) 400 MG capsule, Take 1 capsule by mouth 2 times daily for 3 doses.  In addition to 2 capsules (=800mg) nightly, Disp: 3 capsule, Rfl: 0    ARIPiprazole (ABILIFY) 5 MG tablet, Take 1 tablet by mouth daily for 3 doses, Disp: 3 tablet, Rfl: 0    furosemide (LASIX) 40 MG tablet, Take 40 mg by mouth daily, Disp: , Rfl:     melatonin 5 MG TABS tablet, Take 5 mg by mouth nightly , Disp: , Rfl:     glimepiride (AMARYL) 1 MG tablet, Take 1 mg by mouth Daily with supper In addition to 2 tablets (=2mg) every morning / 8/25/21 taking 2  Tablets in the morning, Disp: , Rfl:     metoprolol tartrate (LOPRESSOR) 25 MG tablet, Take 25 mg by mouth 2 times daily, Disp: , Rfl:     venlafaxine (EFFEXOR XR) 150 MG extended release capsule, Take 150 mg by mouth 2 times daily, Disp: , Rfl:     lansoprazole (PREVACID) 30 MG delayed release capsule, Take 30 mg by mouth daily, Disp: , Rfl:     metFORMIN (GLUCOPHAGE) 500 MG tablet, Take 500 mg by mouth 2 times daily, Disp: , Rfl:     aspirin EC 81 MG EC tablet, Take 81 mg by mouth daily, Disp: , Rfl:     mometasone-formoterol (DULERA) 200-5 MCG/ACT inhaler, Inhale 2 puffs into the lungs every 12 hours as needed (wheezing/SOB) , Disp: , Rfl:                FALLS RISK SCREEN  Instructions:  Assess the patient and enter the appropriate indicators that are present for fall risk identification. Total the numbers entered and assign a fall risk score from Table 2.  Reassess patient at a minimum every 12 weeks or with status change. Assessment   Date  11/22/2021     1. Mental Ability: confusion/cognitively impaired 0     2. Elimination Issues: incontinence, frequency 0       3. Ambulatory: use of assistive devices (walker, cane, off-loading devices),        attached to equipment (IV pole, oxygen) 2     4. Sensory Limitations: dizziness, vertigo, impaired vision 0     5. Age less than 65        0     6. Age 72 or greater 1     7. Medication: diuretics, strong analgesics, hypnotics, sedatives,        antihypertensive agents 0   8. Falls:  recent history of falls within the last 3 months (not to include slipping or        tripping) 7   TOTAL 10    If score of 4 or greater was education given? Yes           TABLE 2   Risk Score Risk Level Plan of Care   0-3 Little or  No Risk 1. Provide assistance as indicated for ambulation activities  2. Reorient confused/cognitively impaired patient  3.   Chair/bed in low position, stretcher/bed with siderails up except when performing patient care activities  5. Educate patient/family/caregiver on falls prevention  6.  Reassess in 12 weeks or with any noted change in patient condition which places them at a risk for a fall   4-6 Moderate Risk 1. Provide assistance as indicated for ambulation activities  2. Reorient confused/cognitively impaired patient  3. Chair/bed in low position, stretcher/bed with siderails up except when performing patient care activities  4. Educate patient/family/caregiver on falls prevention     7 or   Higher High Risk 1. Place patient in easily observable treatment room  2. Patient attended at all times by family member or staff  3. Provide assistance as indicated for ambulation activities  4. Reorient confused/cognitively impaired patient  5. Chair/bed in low position, stretcher/bed with siderails up except when performing patient care activities  6.   Educate patient/family/caregiver on falls prevention         PLAN: Patient is seen today in follow up        Dayna Leija RN

## 2021-11-24 ENCOUNTER — APPOINTMENT (OUTPATIENT)
Dept: GENERAL RADIOLOGY | Age: 71
DRG: 689 | End: 2021-11-24
Payer: MEDICARE

## 2021-11-24 ENCOUNTER — APPOINTMENT (OUTPATIENT)
Dept: CT IMAGING | Age: 71
DRG: 689 | End: 2021-11-24
Payer: MEDICARE

## 2021-11-24 ENCOUNTER — TELEPHONE (OUTPATIENT)
Dept: CASE MANAGEMENT | Age: 71
End: 2021-11-24

## 2021-11-24 ENCOUNTER — HOSPITAL ENCOUNTER (INPATIENT)
Age: 71
LOS: 3 days | Discharge: HOME HEALTH | DRG: 689 | End: 2021-11-27
Attending: EMERGENCY MEDICINE | Admitting: FAMILY MEDICINE
Payer: MEDICARE

## 2021-11-24 DIAGNOSIS — N39.0 URINARY TRACT INFECTION WITHOUT HEMATURIA, SITE UNSPECIFIED: ICD-10-CM
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
## 2021-11-24 DIAGNOSIS — Z85.118 HX OF CANCER OF LUNG: ICD-10-CM

## 2021-11-24 DIAGNOSIS — N17.9 AKI (ACUTE KIDNEY INJURY) (HCC): ICD-10-CM

## 2021-11-24 LAB
-: ABNORMAL
ABSOLUTE EOS #: 0 K/UL (ref 0–0.4)
ABSOLUTE IMMATURE GRANULOCYTE: 0.08 K/UL (ref 0–0.3)
ABSOLUTE LYMPH #: 0.68 K/UL (ref 1–4.8)
ABSOLUTE MONO #: 0.23 K/UL (ref 0.2–0.8)
AMMONIA: 16 UMOL/L (ref 11–51)
AMORPHOUS: ABNORMAL
ANION GAP SERPL CALCULATED.3IONS-SCNC: 12 MMOL/L (ref 9–17)
BACTERIA: ABNORMAL
BASOPHILS # BLD: 0 %
BASOPHILS ABSOLUTE: 0 K/UL (ref 0–0.2)
BILIRUBIN URINE: NEGATIVE
BUN BLDV-MCNC: 15 MG/DL (ref 8–23)
BUN/CREAT BLD: 17 (ref 9–20)
CALCIUM SERPL-MCNC: 8.8 MG/DL (ref 8.6–10.4)
CASTS UA: ABNORMAL /LPF
CHLORIDE BLD-SCNC: 102 MMOL/L (ref 98–107)
CO2: 23 MMOL/L (ref 20–31)
COLOR: YELLOW
COMMENT UA: ABNORMAL
CREAT SERPL-MCNC: 0.89 MG/DL (ref 0.5–0.9)
CRYSTALS, UA: ABNORMAL /HPF
DIFFERENTIAL TYPE: ABNORMAL
EOSINOPHILS RELATIVE PERCENT: 0 % (ref 1–4)
EPITHELIAL CELLS UA: ABNORMAL /HPF (ref 0–5)
GFR AFRICAN AMERICAN: >60 ML/MIN
GFR NON-AFRICAN AMERICAN: >60 ML/MIN
GFR SERPL CREATININE-BSD FRML MDRD: ABNORMAL ML/MIN/{1.73_M2}
GFR SERPL CREATININE-BSD FRML MDRD: ABNORMAL ML/MIN/{1.73_M2}
GLUCOSE BLD-MCNC: 103 MG/DL (ref 70–99)
GLUCOSE URINE: NEGATIVE
HCT VFR BLD CALC: 32.3 % (ref 36.3–47.1)
HEMOGLOBIN: 10.3 G/DL (ref 11.9–15.1)
IMMATURE GRANULOCYTES: 1 %
KETONES, URINE: NEGATIVE
LACTIC ACID, SEPSIS WHOLE BLOOD: NORMAL MMOL/L (ref 0.5–1.9)
LACTIC ACID, SEPSIS WHOLE BLOOD: NORMAL MMOL/L (ref 0.5–1.9)
LACTIC ACID, SEPSIS: 0.6 MMOL/L (ref 0.5–1.9)
LACTIC ACID, SEPSIS: 0.8 MMOL/L (ref 0.5–1.9)
LEUKOCYTE ESTERASE, URINE: ABNORMAL
LYMPHOCYTES # BLD: 9 % (ref 24–44)
MCH RBC QN AUTO: 29.7 PG (ref 25.2–33.5)
MCHC RBC AUTO-ENTMCNC: 31.9 G/DL (ref 28.4–34.8)
MCV RBC AUTO: 93.1 FL (ref 82.6–102.9)
MONOCYTES # BLD: 3 % (ref 1–7)
MORPHOLOGY: ABNORMAL
MUCUS: ABNORMAL
MYOGLOBIN: 142 NG/ML (ref 25–58)
NITRITE, URINE: NEGATIVE
NRBC AUTOMATED: 0 PER 100 WBC
OTHER OBSERVATIONS UA: ABNORMAL
PDW BLD-RTO: 17 % (ref 11.8–14.4)
PH UA: 6 (ref 5–8)
PLATELET # BLD: 162 K/UL (ref 138–453)
PLATELET ESTIMATE: ABNORMAL
PMV BLD AUTO: 9.3 FL (ref 8.1–13.5)
POTASSIUM SERPL-SCNC: 4.2 MMOL/L (ref 3.7–5.3)
PROTEIN UA: NEGATIVE
RBC # BLD: 3.47 M/UL (ref 3.95–5.11)
RBC # BLD: ABNORMAL 10*6/UL
RBC UA: ABNORMAL /HPF (ref 0–2)
RENAL EPITHELIAL, UA: ABNORMAL /HPF
SEG NEUTROPHILS: 87 % (ref 36–66)
SEGMENTED NEUTROPHILS ABSOLUTE COUNT: 6.51 K/UL (ref 1.8–7.7)
SODIUM BLD-SCNC: 137 MMOL/L (ref 135–144)
SPECIFIC GRAVITY UA: 1.01 (ref 1–1.03)
TRICHOMONAS: ABNORMAL
TROPONIN INTERP: ABNORMAL
TROPONIN INTERP: ABNORMAL
TROPONIN T: ABNORMAL NG/ML
TROPONIN T: ABNORMAL NG/ML
TROPONIN, HIGH SENSITIVITY: 17 NG/L (ref 0–14)
TROPONIN, HIGH SENSITIVITY: 18 NG/L (ref 0–14)
TURBIDITY: ABNORMAL
URINE HGB: ABNORMAL
UROBILINOGEN, URINE: NORMAL
WBC # BLD: 7.5 K/UL (ref 3.5–11.3)
WBC # BLD: ABNORMAL 10*3/UL
WBC UA: ABNORMAL /HPF (ref 0–5)
YEAST: ABNORMAL

## 2021-11-24 PROCEDURE — 72170 X-RAY EXAM OF PELVIS: CPT

## 2021-11-24 PROCEDURE — 36415 COLL VENOUS BLD VENIPUNCTURE: CPT

## 2021-11-24 PROCEDURE — 87088 URINE BACTERIA CULTURE: CPT

## 2021-11-24 PROCEDURE — 72125 CT NECK SPINE W/O DYE: CPT

## 2021-11-24 PROCEDURE — 85025 COMPLETE CBC W/AUTO DIFF WBC: CPT

## 2021-11-24 PROCEDURE — 82140 ASSAY OF AMMONIA: CPT

## 2021-11-24 PROCEDURE — 2580000003 HC RX 258: Performed by: NURSE PRACTITIONER

## 2021-11-24 PROCEDURE — 87186 SC STD MICRODIL/AGAR DIL: CPT

## 2021-11-24 PROCEDURE — 94761 N-INVAS EAR/PLS OXIMETRY MLT: CPT

## 2021-11-24 PROCEDURE — 71045 X-RAY EXAM CHEST 1 VIEW: CPT

## 2021-11-24 PROCEDURE — 87040 BLOOD CULTURE FOR BACTERIA: CPT

## 2021-11-24 PROCEDURE — 2700000000 HC OXYGEN THERAPY PER DAY

## 2021-11-24 PROCEDURE — 84484 ASSAY OF TROPONIN QUANT: CPT

## 2021-11-24 PROCEDURE — 2580000003 HC RX 258: Performed by: FAMILY MEDICINE

## 2021-11-24 PROCEDURE — 6360000002 HC RX W HCPCS: Performed by: FAMILY MEDICINE

## 2021-11-24 PROCEDURE — 83605 ASSAY OF LACTIC ACID: CPT

## 2021-11-24 PROCEDURE — 80048 BASIC METABOLIC PNL TOTAL CA: CPT

## 2021-11-24 PROCEDURE — 93005 ELECTROCARDIOGRAM TRACING: CPT | Performed by: NURSE PRACTITIONER

## 2021-11-24 PROCEDURE — 1200000000 HC SEMI PRIVATE

## 2021-11-24 PROCEDURE — 81001 URINALYSIS AUTO W/SCOPE: CPT

## 2021-11-24 PROCEDURE — 6360000002 HC RX W HCPCS: Performed by: NURSE PRACTITIONER

## 2021-11-24 PROCEDURE — 87086 URINE CULTURE/COLONY COUNT: CPT

## 2021-11-24 PROCEDURE — 6370000000 HC RX 637 (ALT 250 FOR IP): Performed by: NURSE PRACTITIONER

## 2021-11-24 PROCEDURE — 83874 ASSAY OF MYOGLOBIN: CPT

## 2021-11-24 PROCEDURE — 70450 CT HEAD/BRAIN W/O DYE: CPT

## 2021-11-24 PROCEDURE — 99285 EMERGENCY DEPT VISIT HI MDM: CPT

## 2021-11-24 RX ORDER — GABAPENTIN 400 MG/1
400-800 CAPSULE ORAL SEE ADMIN INSTRUCTIONS
Status: ON HOLD | COMMUNITY
End: 2021-11-27 | Stop reason: SDUPTHER

## 2021-11-24 RX ORDER — SODIUM CHLORIDE 0.9 % (FLUSH) 0.9 %
5-40 SYRINGE (ML) INJECTION PRN
Status: DISCONTINUED | OUTPATIENT
Start: 2021-11-24 | End: 2021-11-27 | Stop reason: HOSPADM

## 2021-11-24 RX ORDER — ONDANSETRON 4 MG/1
4 TABLET, ORALLY DISINTEGRATING ORAL EVERY 8 HOURS PRN
Status: DISCONTINUED | OUTPATIENT
Start: 2021-11-24 | End: 2021-11-27 | Stop reason: HOSPADM

## 2021-11-24 RX ORDER — IPRATROPIUM BROMIDE AND ALBUTEROL SULFATE 2.5; .5 MG/3ML; MG/3ML
1 SOLUTION RESPIRATORY (INHALATION) EVERY 8 HOURS PRN
COMMUNITY
End: 2022-01-26

## 2021-11-24 RX ORDER — CEPHALEXIN 500 MG/1
500 CAPSULE ORAL 3 TIMES DAILY
Status: ON HOLD | COMMUNITY
End: 2021-11-27 | Stop reason: SDUPTHER

## 2021-11-24 RX ORDER — SODIUM CHLORIDE 0.9 % (FLUSH) 0.9 %
5-40 SYRINGE (ML) INJECTION EVERY 12 HOURS SCHEDULED
Status: DISCONTINUED | OUTPATIENT
Start: 2021-11-24 | End: 2021-11-27 | Stop reason: HOSPADM

## 2021-11-24 RX ORDER — ROSUVASTATIN CALCIUM 5 MG/1
5 TABLET, COATED ORAL DAILY
COMMUNITY

## 2021-11-24 RX ORDER — ONDANSETRON 2 MG/ML
4 INJECTION INTRAMUSCULAR; INTRAVENOUS EVERY 6 HOURS PRN
Status: DISCONTINUED | OUTPATIENT
Start: 2021-11-24 | End: 2021-11-27 | Stop reason: HOSPADM

## 2021-11-24 RX ORDER — ACETAMINOPHEN 325 MG/1
650 TABLET ORAL EVERY 4 HOURS PRN
Status: DISCONTINUED | OUTPATIENT
Start: 2021-11-24 | End: 2021-11-27 | Stop reason: HOSPADM

## 2021-11-24 RX ORDER — SODIUM CHLORIDE 9 MG/ML
25 INJECTION, SOLUTION INTRAVENOUS PRN
Status: DISCONTINUED | OUTPATIENT
Start: 2021-11-24 | End: 2021-11-27 | Stop reason: HOSPADM

## 2021-11-24 RX ORDER — ACETAMINOPHEN 325 MG/1
650 TABLET ORAL ONCE
Status: COMPLETED | OUTPATIENT
Start: 2021-11-24 | End: 2021-11-24

## 2021-11-24 RX ADMIN — ACETAMINOPHEN 650 MG: 325 TABLET ORAL at 16:19

## 2021-11-24 RX ADMIN — CEFTRIAXONE SODIUM 1000 MG: 1 INJECTION, POWDER, FOR SOLUTION INTRAMUSCULAR; INTRAVENOUS at 18:36

## 2021-11-24 RX ADMIN — SODIUM CHLORIDE, PRESERVATIVE FREE 10 ML: 5 INJECTION INTRAVENOUS at 21:01

## 2021-11-24 RX ADMIN — ENOXAPARIN SODIUM 40 MG: 100 INJECTION SUBCUTANEOUS at 21:01

## 2021-11-24 ASSESSMENT — PAIN SCALES - GENERAL
PAINLEVEL_OUTOF10: 8

## 2021-11-24 ASSESSMENT — PAIN DESCRIPTION - LOCATION
LOCATION: CHEST;BACK
LOCATION: BACK;CHEST

## 2021-11-24 ASSESSMENT — PAIN DESCRIPTION - PAIN TYPE
TYPE: ACUTE PAIN
TYPE: CHRONIC PAIN

## 2021-11-24 ASSESSMENT — ENCOUNTER SYMPTOMS
NAUSEA: 0
TROUBLE SWALLOWING: 0
DIARRHEA: 0
SHORTNESS OF BREATH: 0
EYE PAIN: 0
VOMITING: 0
SORE THROAT: 0
VOICE CHANGE: 0
COLOR CHANGE: 0
ABDOMINAL PAIN: 0
PHOTOPHOBIA: 0
BACK PAIN: 0
COUGH: 0

## 2021-11-24 NOTE — ED PROVIDER NOTES
[address] ED  eMERGENCY dEPARTMENT eNCOUnter      Pt Name: Usama Bravo  MRN: [id number]  [place] 1950  Date of evaluation: 11/24/2021  Provider: AV Le CNP    CHIEF COMPLAINT       Chief Complaint   Patient presents with    Extremity Weakness    Fall         HISTORY OF PRESENT ILLNESS  (Location/Symptom, Timing/Onset, Context/Setting, Quality, Duration, Modifying Factors, Severity.)   Usama Bravo is a 80 y.o. female who presents to the emergency department via EMS for generalized weakness, fall. She fell this morning. Denies LOC. She believes she struck her head. Reports pain to her head and left neck. States she was able to get herself up off of the floor. She is having intermittent episodes of confusion and keeps stating \"I don't know what the rules are. \" She let her aide into her home this morning and the aide called EMS. She has a hx of lung cancer. Denies fever, chills, vision changes. Denies cough, chest pain, SOB. Denies abd pain, N/V/D, urinary symptoms. Rates her pain 8/10 at this time. She wears oxygen at home. Nursing Notes were reviewed. ALLERGIES     Codeine and Dye [iodides]    CURRENT MEDICATIONS       Previous Medications    ARIPIPRAZOLE (ABILIFY) 5 MG TABLET    Take 1 tablet by mouth daily for 3 doses    ASPIRIN EC 81 MG EC TABLET    Take 81 mg by mouth daily    CLONAZEPAM (KLONOPIN) 1 MG TABLET    Take 1 tablet by mouth 3 times daily as needed for Anxiety for up to 3 doses. FUROSEMIDE (LASIX) 40 MG TABLET    Take 40 mg by mouth daily    GABAPENTIN (NEURONTIN) 400 MG CAPSULE    Take 1 capsule by mouth 2 times daily for 3 doses.  In addition to 2 capsules (=800mg) nightly    GLIMEPIRIDE (AMARYL) 1 MG TABLET    Take 1 mg by mouth Daily with supper In addition to 2 tablets (=2mg) every morning / 8/25/21 taking 2  Tablets in the morning    LANSOPRAZOLE (PREVACID) 30 MG DELAYED RELEASE CAPSULE    Take 30 mg by mouth daily LIDOCAINE-PRILOCAINE (EMLA) 2.5-2.5 % CREAM    To port site before chemo    MELATONIN 5 MG TABS TABLET    Take 5 mg by mouth nightly     METFORMIN (GLUCOPHAGE) 500 MG TABLET    Take 500 mg by mouth 2 times daily    METOPROLOL TARTRATE (LOPRESSOR) 25 MG TABLET    Take 25 mg by mouth 2 times daily    MOMETASONE-FORMOTEROL (DULERA) 200-5 MCG/ACT INHALER    Inhale 2 puffs into the lungs every 12 hours as needed (wheezing/SOB)     ONDANSETRON (ZOFRAN) 4 MG TABLET    Take 1 tablet by mouth every 8 hours as needed for Nausea or Vomiting    OXYCODONE-ACETAMINOPHEN (PERCOCET)  MG PER TABLET    Take 1 tablet by mouth every 6 hours as needed for Pain. TRAZODONE (DESYREL) 300 MG TABLET    Take 0.5 tablets by mouth nightly    VENLAFAXINE (EFFEXOR XR) 150 MG EXTENDED RELEASE CAPSULE    Take 150 mg by mouth 2 times daily       PAST MEDICAL HISTORY         Diagnosis Date    AAA (abdominal aortic aneurysm) ([facility]     Pt denies having a history of AAA    Acid reflux     Anxiety and depression     Aortic stenosis     Arthritis     Blister of ankle, right 10/13/2016    blister broke open & draining, is on antibiotics    CAD (coronary artery disease)     Cancer ([facility] [address] [facility]     lung cancer    COPD (chronic obstructive pulmonary disease) ([facility]     Diabetes mellitus ([facility] [facility]     Falls     Heart block     bifasicular    Hypokalemia     MDRO (multiple drug resistant organisms) resistance 10/17/2014    E. Coli urine    MRSA (methicillin resistant staph aureus) culture positive resolved 12/2016    2 negative nasal screens - 2016 (hx in urine 2014)    On home oxygen therapy     uses 2 liters at night    On home oxygen therapy     patient states 2 liters/nasal cannula continuous    Overactive bladder     patient incont.  wears a brief    Pneumonia     PONV (postoperative nausea and vomiting)     Vitamin D deficiency        SURGICAL HISTORY           Procedure Laterality Date    AORTIC VALVE REPAIR N/A 4/11/2017    AORTIC VALVE REPAIR REPLACEMENT performed by Carie Phan MD at [address] [facility] N/A 8/31/2020    BRONCHOSCOPY BIOPSY BRONCHUS performed by Shania Nicole MD at [address] [facility] [address], COLON, DIAGNOSTIC  12/08/2016    EYE SURGERY      HYSTERECTOMY      IR INS PICC VAD W SQ PORT GREATER THAN 5  9/4/2020    IR INS PICC VAD W SQ PORT GREATER THAN 5 9/4/2020 [facility]    IR PORT PLACEMENT EQUAL OR GREATER THAN 5 YEARS  9/29/2020    IR PORT PLACEMENT EQUAL OR GREATER THAN 5 YEARS 9/29/2020 Gayland Homans, MD [facility]    LUMBAR LAMINECTOMY      TONSILLECTOMY           FAMILY HISTORY           Problem Relation Age of Onset    Cancer Mother     Cancer Father      Family Status   Relation Name Status    Mother  (Not Specified)    Father  (Not Specified)        SOCIAL HISTORY      reports that she quit smoking about 5 years ago. She has never used smokeless tobacco. She reports that she does not drink alcohol and does not use drugs. REVIEW OF SYSTEMS    (2-9 systems for level 4, 10 or more for level 5)     Review of Systems   Constitutional: Positive for fatigue. Negative for chills, diaphoresis and fever. HENT: Negative for congestion, ear pain, sore throat, trouble swallowing and voice change. Eyes: Negative for photophobia, pain and visual disturbance. Respiratory: Negative for cough and shortness of breath. Cardiovascular: Negative for chest pain and palpitations. Gastrointestinal: Negative for abdominal pain, diarrhea, nausea and vomiting. Genitourinary: Negative for dysuria, flank pain, frequency, hematuria and urgency. Musculoskeletal: Positive for gait problem, myalgias and neck pain. Negative for arthralgias, back pain and neck stiffness. Skin: Negative for color change, rash and wound. Neurological: Positive for weakness and headaches.  Negative for dizziness, facial asymmetry, speech difficulty, light-headedness and numbness. Psychiatric/Behavioral: Positive for confusion. Except as noted above the remainder of the review of systems was reviewed and negative. PHYSICAL EXAM    (up to 7 for level 4, 8 or more for level 5)     ED Triage Vitals [11/24/21 1312]   BP Temp Temp Source Pulse Resp SpO2 Height Weight   (!) 183/94 99.9 °F (37.7 °C) Oral 105 19 93 % 5' 8\" (1.727 m) 173 lb (78.5 kg)     Physical Exam  Vitals reviewed. Constitutional:       General: She is not in acute distress. Appearance: She is well-developed. She is not diaphoretic. HENT:      Head: No raccoon eyes or [place]'s sign. Right Ear: External ear normal.      Left Ear: External ear normal.   Eyes:      General: No scleral icterus. Extraocular Movements: Extraocular movements intact. Conjunctiva/sclera: Conjunctivae normal.      Pupils: Pupils are equal, round, and reactive to light. Cardiovascular:      Rate and Rhythm: Normal rate and regular rhythm. Pulmonary:      Effort: Pulmonary effort is normal. No respiratory distress. Breath sounds: No stridor. Abdominal:      General: There is no distension. Palpations: Abdomen is soft. Tenderness: There is no abdominal tenderness. There is no guarding. Musculoskeletal:      Cervical back: Neck supple. Tenderness present. No swelling, deformity or bony tenderness. Thoracic back: No swelling, deformity, tenderness or bony tenderness. Lumbar back: No swelling, deformity, tenderness or bony tenderness. Back:       Right hip: No deformity or tenderness. Normal range of motion. Left hip: No deformity or tenderness. Normal range of motion. Right knee: No swelling or deformity. Normal range of motion. No tenderness. Left knee: No swelling or deformity. Normal range of motion. No tenderness. Right lower leg: No edema. Left lower leg: No edema. Comments: Moves extremities.  Generalized weakness. Denies tenderness to extremities. Skin:     General: Skin is warm and dry. Capillary Refill: Capillary refill takes less than 2 seconds. Findings: No rash. Neurological:      General: No focal deficit present. Mental Status: She is alert and oriented to person, place, and time. She is confused. GCS: GCS eye subscore is 4. GCS verbal subscore is 5. GCS motor subscore is 6. Cranial Nerves: No cranial nerve deficit. Motor: Weakness (generalized.) present. Coordination: Coordination is intact. Coordination normal. Finger-Nose-Finger Test normal.      Gait: Gait is intact. Comments: Pt able to minimally raise legs off cart. She has intermittent episodes of confusion, but answers most questions appropriately. Psychiatric:         Behavior: Behavior normal.         DIAGNOSTIC RESULTS     EKG: All EKG's are interpreted by the Emergency Department Physician who either signs or Co-signs this chart in the absence of a cardiologist.  EKG was interpreted by Dr. Connie Ross after completion. RADIOLOGY:   Non-plain film images such as CT, Ultrasound and MRI are read by the radiologist. Plain radiographic images are visualized and preliminarily interpreted by the emergency physician with the below findings:    Interpretation per the Radiologist below, if available at the time of this note:    XR PELVIS (1-2 VIEWS)    Result Date: 11/24/2021  EXAMINATION: ONE XRAY VIEW OF THE PELVIS 11/24/2021 1:33 pm COMPARISON: None. HISTORY: ORDERING SYSTEM PROVIDED HISTORY: fall TECHNOLOGIST PROVIDED HISTORY: fall Reason for Exam: fell Acuity: Acute Type of Exam: Initial Relevant Medical/Surgical History: fell, pain in lt hip/pelvis FINDINGS: Overlying bowel limits evaluation for subtle fractures. No appreciable acute fracture. Partially seen spondylosis in the lower lumbosacral spine.      No acute osseous abnormality     CT HEAD WO CONTRAST    Result Date: 11/24/2021  EXAMINATION: CT OF THE HEAD WITHOUT CONTRAST  11/24/2021 1:36 pm TECHNIQUE: CT of the head was performed without the administration of intravenous contrast. Dose modulation, iterative reconstruction, and/or weight based adjustment of the mA/kV was utilized to reduce the radiation dose to as low as reasonably achievable. COMPARISON: None. HISTORY: ORDERING SYSTEM PROVIDED HISTORY: fall, dizziness, confusion TECHNOLOGIST PROVIDED HISTORY: fall, dizziness, confusion Decision Support Exception - unselect if not a suspected or confirmed emergency medical condition->Emergency Medical Condition (MA) Reason for Exam: fall, dizziness, confusion Acuity: Acute Type of Exam: Initial FINDINGS: BRAIN/VENTRICLES: There is no acute intracranial hemorrhage, mass effect or midline shift. No abnormal extra-axial fluid collection. The gray-white differentiation is maintained without evidence of an acute infarct. There is no evidence of hydrocephalus. ORBITS: The visualized portion of the orbits demonstrate no acute abnormality. SINUSES: The visualized paranasal sinuses and mastoid air cells demonstrate no acute abnormality. SOFT TISSUES/SKULL:  No acute abnormality of the visualized skull or soft tissues. No acute intracranial abnormality. CT CERVICAL SPINE WO CONTRAST    Result Date: 11/24/2021  EXAMINATION: CT OF THE CERVICAL SPINE WITHOUT CONTRAST, 11/24/2021 1:36 pm TECHNIQUE: CT of the cervical spine was performed without the administration of intravenous contrast. Multiplanar reformatted images are provided for review. Dose modulation, iterative reconstruction, and/or weight based adjustment of the mA/kV was utilized to reduce the radiation dose to as low as reasonably achievable. COMPARISON: 09/25/2020, plain radiographs of the cervical spine from 11/03/2020.  HISTORY: ORDERING SYSTEM PROVIDED HISTORY: pain, fall TECHNOLOGIST PROVIDED HISTORY: pain, fall Decision Support Exception - unselect if not a suspected or confirmed emergency medical condition->Emergency Medical Condition (MA) Reason for Exam: fall, dizziness, confusion Acuity: Acute Type of Exam: Initial 69-year-old female with fall, dizziness, and confusion. FINDINGS: BONES/ALIGNMENT: Cervical spine is imaged from the skull base to the inferior endplate of T3 on the sagittal reconstructions. Gross preservation of the vertebral body heights and intervertebral disc spaces. Alignment within the cervical spine well maintained. Odontoid appears intact. Lateral masses symmetric in appearance. Occipital condyles articulate properly with the lateral masses. Axial images demonstrate no clear evidence for acute fracture within the cervical spine. DEGENERATIVE CHANGES: Stable mild multilevel degenerative changes/osteophyte spurring within the anterior cervical spine. Stable mild to moderate multilevel facet arthrosis. Mild degenerative changes of the atlantodental and craniocervical junctions. SOFT TISSUES: There is no prevertebral soft tissue swelling. Atherosclerotic calcification of the bilateral carotid vasculature. Atherosclerotic calcification of the aortic knob. Cavitary spiculated mass lesion at the superomedial right upper lobe/right lung apex. Visualized thyroid gland grossly unremarkable in appearance. Moderate biapical emphysema. 1. Mild multilevel degenerative changes in the cervical spine, similar to 09/25/2020. 2. Cavitary spiculated mass lesion in the superomedial right upper lobe/right lung apex consistent with patient's known history of right upper lobe malignancy. 3. Atherosclerotic calcification of the vasculature. 4. No acute vertebral body height loss or malalignment within the cervical spine.      XR CHEST PORTABLE    Result Date: 11/24/2021  EXAMINATION: ONE XRAY VIEW OF THE CHEST 11/24/2021 1:33 pm COMPARISON: 10/06/2021, PET-CT from 09/11/2020 HISTORY: ORDERING SYSTEM PROVIDED HISTORY: weakness TECHNOLOGIST PROVIDED HISTORY: weakness Reason for Exam: fell Acuity: Acute Type of Exam: Initial Relevant Medical/Surgical History: fell, pain in lt hip/pelvis 73-year-old female with acute left hip and pelvic pain and weakness after a fall FINDINGS: Portable upright view of the chest. Prior median sternotomy and CABG. Left IJ approach Port-A-Cath distal tip overlying the cavoatrial junction. Trachea midline. No pneumothorax. Underlying COPD. No new acute focal airspace consolidation or pleural effusions. Stable mild opacity at the left base, atelectasis and/or infiltrate. Masslike opacity in the superomedial right upper lobe. Cardiac and mediastinal contours remain unchanged. Atherosclerotic calcification of the aortic knob. Visualized osseous structures remain unchanged. Multiple healed right-sided rib fracture deformities. No free air below the diaphragm. 1. Masslike opacity at the superomedial right upper lobe likely related to underlying malignancy. Left-sided Port-A-Cath in place. 2. Stable mild airspace disease at the left base, atelectasis and/or infiltrate. Follow-up is recommended to document resolution. 3. Underlying COPD. No new focal airspace disease or pleural effusion. 4. Prior median sternotomy and CABG.        LABS:  Labs Reviewed   URINALYSIS WITH MICROSCOPIC - Abnormal; Notable for the following components:       Result Value    Turbidity UA Cloudy (*)     Urine Hgb TRACE (*)     Leukocyte Esterase, Urine SMALL (*)     Bacteria, UA MANY (*)     All other components within normal limits   BASIC METABOLIC PANEL - Abnormal; Notable for the following components:    Glucose 103 (*)     All other components within normal limits   CBC WITH AUTO DIFFERENTIAL - Abnormal; Notable for the following components:    RBC 3.47 (*)     Hemoglobin 10.3 (*)     Hematocrit 32.3 (*)     RDW 17.0 (*)     Seg Neutrophils 87 (*)     Lymphocytes 9 (*)     Eosinophils % 0 (*)     Immature Granulocytes 1 (*)     Absolute Lymph # 0.68 (*)     All other components within normal limits   TROP/MYOGLOBIN - Abnormal; Notable for the following components:    Troponin, High Sensitivity 18 (*)     Myoglobin 142 (*)     All other components within normal limits   CULTURE, BLOOD 1   CULTURE, BLOOD 1   CULTURE, URINE   AMMONIA   LACTATE, SEPSIS   LACTATE, SEPSIS   TROPONIN       All other labs were within normal range or not returned as of this dictation. EMERGENCY DEPARTMENT COURSE and DIFFERENTIAL DIAGNOSIS/MDM:   Vitals:    Vitals:    11/24/21 1312 11/24/21 1349   BP: (!) 183/94 (!) 143/100   Pulse: 105 101   Resp: 19 20   Temp: 99.9 °F (37.7 °C)    TempSrc: Oral    SpO2: 93% 100%   Weight: 173 lb (78.5 kg)    Height: 5' 8\" (1.727 m)          MEDICATIONS GIVEN IN THE ED:  Medications   cefTRIAXone (ROCEPHIN) 1000 mg IVPB in 50 mL D5W minibag (has no administration in time range)   acetaminophen (TYLENOL) tablet 650 mg (has no administration in time range)       CLINICAL DECISION MAKING:  The patient presented alert with a nontoxic appearance and was seen in conjunction with Dr. Manny Hubbard. Urinalysis showed infection; culture is pending. She will be admitted for further evaluation and treatment. I spoke with Dr. Alli Uribe. IV antibiotics were started. Care was provided during an unprecedented national emergency due to the novel coronavirus, Covid-19. CONSULTS:  IP CONSULT TO INTERNAL MEDICINE        FINAL IMPRESSION      1. Fall, initial encounter    2. Urinary tract infection without hematuria, site unspecified    3.  Hx of cancer of lung            Problem List  Patient Active Problem List   Diagnosis Code    Valvular heart disease I38    Type 2 diabetes mellitus without complication, without long-term current use of insulin ([address] E11.9    Open wound of right ankle S91.001A    COPD (chronic obstructive pulmonary disease) ([facility]) J44.9    Syncope and collapse R55    Chronic ulcer of right heel ([address] L97.419    Multifocal pneumonia J18.9    Aortic valve stenosis I35.0    Esophageal

## 2021-11-24 NOTE — ED NOTES
Bed: 22  Expected date: 11/24/21  Expected time: 12:56 PM  Means of arrival: [facility] SURGICAL AND CARDIOVASCULAR [facility]  Comments:  [facility] [address] [facility], [address] [facility]  11/24/21 1381

## 2021-11-24 NOTE — ED PROVIDER NOTES
The patient was seen and examined by me in conjunction with the mid-level provider. I agree with his/her assessment and treatment plan. The patient is being admitted for UTI.      Talia Henriquez MD  11/24/21 1909

## 2021-11-24 NOTE — H&P
History & Physical  [facility].,    Adult Hospitalist      Name: Claudette Guillen  MRN: [id number]     [place]: [de-identified]  Room: [facility]/    Admit Date: 11/24/2021  1:08 PM  PCP: Jose Gomez MD    Primary Problem  Active Problems:    UTI (urinary tract infection)  Resolved Problems:    * No resolved hospital problems. *        Assesment:     · Fall   · Encephalopathy  · Acute cystitis without hematuria  · Diabetes mellitus type 2  · Coronary artery disease, native vessel  · History of lung cancer  · Gastroesophageal reflux disease without esophagitis  · Essential hypertension  · Moderate persistent asthma/COPD  · Anxiety disorder  · Insomnia  · Osteoarthritis multiple joints        Plan:     · Admit to MedSurg  · Monitor vitals closely  · Keep SPO2 above 90%  · I's and O's  · IV fluids  · Pain control  · Rocephin IV daily  · Antiemetics as needed  · Resume essential home meds  · Accu-Cheks before meals and at bedtime  · Insulin sliding scale  · Hypoglycemia protocol  · CBC, BMP  · Incentive spirometry  · DVT and GI prophylaxis. Chief Complaint:     Chief Complaint   Patient presents with    Extremity Weakness    Fall         History of Present Illness:      Claudette Guillen is a 80 y.o.  female who presents with Extremity Weakness and Fall    Patient admitted through the emergency room where she presented via EMS with complaints of generalized weakness. Patient had fallen earlier this morning. At that time she had denied any loss of consciousness or any seizures. There was no bleeding either. Patient says she thinks she did strike her head somewhere. She also complained of pain in her head and left side of her neck. Patient says she was able to get up on her own and was able to ambulate. However since then patient has been found to be confused. She was visited by her home care nursing aide who found the patient confused and called 911.   Patient shows intermittent labs and orientation in the emergency room. She was told she was given IV antibiotics but patient is adamant that she was not. Patient denies any chest pain, dyspnea or orthopnea. Denies any nausea, vomiting or abdominal pain. Denies any diplopia or photophobia. Denies back pain. Denies rash or joint swelling    I have personally reviewed the past medical history, past surgical history, medications, social history, and family history, and summarized in the note. Review of Systems:     All 10 point system is reviewed and negative otherwise mentioned in HPI. Past Medical History:     Past Medical History:   Diagnosis Date    AAA (abdominal aortic aneurysm) ([facility]     Pt denies having a history of AAA    Acid reflux     Anxiety and depression     Aortic stenosis     Arthritis     Blister of ankle, right 10/13/2016    blister broke open & draining, is on antibiotics    CAD (coronary artery disease)     Cancer ([facility]     lung cancer    COPD (chronic obstructive pulmonary disease) ([facility]     Diabetes mellitus ([facility]     Falls     Heart block     bifasicular    Hypokalemia     MDRO (multiple drug resistant organisms) resistance 10/17/2014    E. Coli urine    MRSA (methicillin resistant staph aureus) culture positive resolved 12/2016    2 negative nasal screens - 2016 (hx in urine 2014)    On home oxygen therapy     uses 2 liters at night    On home oxygen therapy     patient states 2 liters/nasal cannula continuous    Overactive bladder     patient incont.  wears a brief    Pneumonia     PONV (postoperative nausea and vomiting)     Vitamin D deficiency         Past Surgical History:     Past Surgical History:   Procedure Laterality Date    AORTIC VALVE REPAIR N/A 4/11/2017    AORTIC VALVE REPAIR REPLACEMENT performed by Jennifer Fofana MD at [facility] N/A 8/31/2020    BRONCHOSCOPY BIOPSY BRONCHUS performed by Yoly Nicole MD at [facility] CHOLECYSTECTOMY      ENDOSCOPY, COLON, DIAGNOSTIC  12/08/2016    EYE SURGERY      HYSTERECTOMY      IR INS PICC VAD W SQ PORT GREATER THAN 5  9/4/2020    IR INS PICC VAD W SQ PORT GREATER THAN 5 9/4/2020 STAPEDRO LUIS SPECIAL PROCEDURES    IR PORT PLACEMENT EQUAL OR GREATER THAN 5 YEARS  9/29/2020    IR PORT PLACEMENT EQUAL OR GREATER THAN 5 YEARS 9/29/2020 MD JAY JAY Joshi SPECIAL PROCEDURES    LUMBAR LAMINECTOMY      TONSILLECTOMY          Medications Prior to Admission:       Prior to Admission medications    Medication Sig Start Date End Date Taking? Authorizing Provider   gabapentin (NEURONTIN) 400 MG capsule Take 400-800 mg by mouth See Admin Instructions. Indications: 400mg AM, 400mg PM and 800mg HS 400mg AM, 400mg PM and 800mg HS    Historical Provider, MD   cephALEXin (KEFLEX) 500 MG capsule Take 500 mg by mouth 3 times daily    Historical Provider, MD   ipratropium-albuterol (DUONEB) 0.5-2.5 (3) MG/3ML SOLN nebulizer solution Inhale 1 vial into the lungs every 8 hours as needed for Shortness of Breath    Historical Provider, MD   rosuvastatin (CRESTOR) 5 MG tablet Take 5 mg by mouth daily    Historical Provider, MD   ondansetron (ZOFRAN) 4 MG tablet Take 1 tablet by mouth every 8 hours as needed for Nausea or Vomiting 10/15/21   Flaquito Zuluaga MD   lidocaine-prilocaine (EMLA) 2.5-2.5 % cream To port site before chemo  Patient not taking: Reported on 10/27/2021 3/31/21   Flaquito Zuluaga MD   oxyCODONE-acetaminophen (PERCOCET)  MG per tablet Take 1 tablet by mouth every 6 hours as needed for Pain. Historical Provider, MD   traZODone (DESYREL) 300 MG tablet Take 0.5 tablets by mouth nightly 11/12/20   Whitney Becerril MD   clonazePAM (KLONOPIN) 1 MG tablet Take 1 tablet by mouth 3 times daily as needed for Anxiety for up to 3 doses.  11/5/20 10/27/21  Sola Cancino MD   ARIPiprazole (ABILIFY) 5 MG tablet Take 1 tablet by mouth daily for 3 doses 11/5/20 10/27/21  Sola Cancino MD   furosemide (LASIX) 40 MG tablet Take 40 mg by mouth daily    Historical Provider, MD   melatonin 5 MG TABS tablet Take 5 mg by mouth nightly     Historical Provider, MD   glimepiride (AMARYL) 1 MG tablet Take 1-2 mg by mouth See Admin Instructions Indications: 2mg AM and 1mg PM 2mg AM and 1mg PM    Historical Provider, MD   metoprolol tartrate (LOPRESSOR) 25 MG tablet Take 25 mg by mouth 2 times daily    Historical Provider, MD   venlafaxine (EFFEXOR XR) 150 MG extended release capsule Take 150 mg by mouth 2 times daily    Historical Provider, MD   lansoprazole (PREVACID) 30 MG delayed release capsule Take 30 mg by mouth daily 11/10/16   Historical Provider, MD   metFORMIN (GLUCOPHAGE) 500 MG tablet Take 500 mg by mouth 2 times daily 16   Historical Provider, MD   aspirin EC 81 MG EC tablet Take 81 mg by mouth daily    Historical Provider, MD   mometasone-formoterol (DULERA) 200-5 MCG/ACT inhaler Inhale 2 puffs into the lungs every 12 hours as needed (wheezing/SOB)     Historical Provider, MD        Allergies:       Codeine and Dye [iodides]    Social History:     Tobacco:    reports that she quit smoking about 5 years ago. She has never used smokeless tobacco.  Alcohol:      reports no history of alcohol use. Drug Use:  reports no history of drug use.     Family History:     Family History   Problem Relation Age of Onset    Cancer Mother     Cancer Father          Physical Exam:     Vitals:  BP (!) 143/100   Pulse 101   Temp 99.9 °F (37.7 °C) (Oral)   Resp 20   Ht 5' 8\" (1.727 m)   Wt 173 lb (78.5 kg)   SpO2 100%   BMI 26.30 kg/m²   Temp (24hrs), Av.9 °F (37.7 °C), Min:99.9 °F (37.7 °C), Max:99.9 °F (37.7 °C)          General appearance - alert, well appearing, and in no acute distress  Mental status -not oriented to person, place, and time with normal affect  Head - normocephalic and atraumatic  Eyes - pupils equal and reactive, extraocular eye movements intact, conjunctiva clear  Ears - hearing appears to be intact  Nose - no drainage noted  Mouth - mucous membranes moist  Neck - supple, no carotid bruits, thyroid not palpable  Chest - clear to auscultation, normal effort  Heart - normal rate, regular rhythm, no murmur  Abdomen - soft, nontender, nondistended, bowel sounds present all four quadrants, no masses, hepatomegaly or splenomegaly  Neurological - normal speech, no focal findings or movement disorder noted, cranial nerves II through XII grossly intact  Extremities - peripheral pulses palpable, no pedal edema or calf pain with palpation  Skin - no gross lesions, rashes, or induration noted        Data:     Labs:    Hematology:  Recent Labs     11/24/21  1403   WBC 7.5   RBC 3.47*   HGB 10.3*   HCT 32.3*   MCV 93.1   MCH 29.7   MCHC 31.9   RDW 17.0*      MPV 9.3     Chemistry:  Recent Labs     11/24/21  1403 11/24/21  1545     --    K 4.2  --      --    CO2 23  --    GLUCOSE 103*  --    BUN 15  --    CREATININE 0.89  --    ANIONGAP 12  --    LABGLOM >60  --    GFRAA >60  --    CALCIUM 8.8  --    TROPHS 18* 17*   MYOGLOBIN 142*  --      Recent Labs     11/24/21  1403   AMMONIA 16       Lab Results   Component Value Date    INR 1.0 01/06/2021    INR 0.9 09/29/2020    INR 1.1 08/28/2020    PROTIME 12.6 01/06/2021    PROTIME 11.8 09/29/2020    PROTIME 14.4 (H) 08/28/2020       Lab Results   Component Value Date/Time    SPECIAL NOT REPORTED 11/03/2020 02:15 PM     Lab Results   Component Value Date/Time    CULTURE NO SAMPLE RECEIVED 11/03/2020 02:15 PM       Lab Results   Component Value Date    POCPH 7.36 04/12/2017    PHART 7.42 08/26/2012    PH 7.22 04/11/2017    POCPCO2 45 04/12/2017    OWL7ARW 41 08/26/2012    PCO2 54 04/11/2017    POCPO2 93 04/12/2017    PO2ART 59 08/26/2012    PO2 89 04/11/2017    POCHCO3 25.3 04/12/2017    ZIS9WHV 26.1 08/26/2012    HCO3 22.2 04/11/2017    NBEA NOT REPORTED 04/12/2017    PBEA 0 04/12/2017    COV9GAK 27 04/12/2017    FORP6SYX 97 04/12/2017    O5MPNWWJ 92.1 08/26/2012    O2SAT 95 04/11/2017    FIO2 UNKNOWN 10/31/2017       Radiology:    XR PELVIS (1-2 VIEWS)    Result Date: 11/24/2021  No acute osseous abnormality     CT HEAD WO CONTRAST    Result Date: 11/24/2021  No acute intracranial abnormality. CT CERVICAL SPINE WO CONTRAST    Result Date: 11/24/2021  1. Mild multilevel degenerative changes in the cervical spine, similar to 09/25/2020. 2. Cavitary spiculated mass lesion in the superomedial right upper lobe/right lung apex consistent with patient's known history of right upper lobe malignancy. 3. Atherosclerotic calcification of the vasculature. 4. No acute vertebral body height loss or malalignment within the cervical spine. XR CHEST PORTABLE    Result Date: 11/24/2021  1. Masslike opacity at the superomedial right upper lobe likely related to underlying malignancy. Left-sided Port-A-Cath in place. 2. Stable mild airspace disease at the left base, atelectasis and/or infiltrate. Follow-up is recommended to document resolution. 3. Underlying COPD. No new focal airspace disease or pleural effusion. 4. Prior median sternotomy and CABG. All radiological studies reviewed                Code Status:  Prior    Electronically signed by Chivo Henderson MD on 11/24/2021 at 5:32 PM     Copy sent to Dr. Zurdo Cook MD    This note was created with the assistance of a speech-recognition program.  Although the intention is to generate a document that actually reflects the content of the visit, no guarantees can be provided that every mistake has been identified and corrected by editing. Note was updated later by me after  physical examination and  completion of the assessment.

## 2021-11-24 NOTE — TELEPHONE ENCOUNTER
Name: Obi Regalado  : 1950  MRN: [id number]    Oncology Navigation Follow-Up Note    Navigator reviewing recent RO notes and checking on status of biopsy? Writer left message with IR for updates. Pt. Did not keep appt. With MO today and message left for pt. Offering assistance and attempting to reschedule.    Electronically signed by Cat Tellez RN on 2021 at 1:05 PM

## 2021-11-25 LAB
ANION GAP SERPL CALCULATED.3IONS-SCNC: 14 MMOL/L (ref 9–17)
BUN BLDV-MCNC: 18 MG/DL (ref 8–23)
BUN/CREAT BLD: 18 (ref 9–20)
CALCIUM SERPL-MCNC: 8.8 MG/DL (ref 8.6–10.4)
CHLORIDE BLD-SCNC: 103 MMOL/L (ref 98–107)
CO2: 24 MMOL/L (ref 20–31)
CREAT SERPL-MCNC: 1.01 MG/DL (ref 0.5–0.9)
GFR AFRICAN AMERICAN: >60 ML/MIN
GFR NON-AFRICAN AMERICAN: 54 ML/MIN
GFR SERPL CREATININE-BSD FRML MDRD: ABNORMAL ML/MIN/{1.73_M2}
GFR SERPL CREATININE-BSD FRML MDRD: ABNORMAL ML/MIN/{1.73_M2}
GLUCOSE BLD-MCNC: 118 MG/DL (ref 65–105)
GLUCOSE BLD-MCNC: 124 MG/DL (ref 70–99)
GLUCOSE BLD-MCNC: 262 MG/DL (ref 65–105)
GLUCOSE BLD-MCNC: 99 MG/DL (ref 65–105)
HCT VFR BLD CALC: 33.3 % (ref 36.3–47.1)
HEMOGLOBIN: 10.3 G/DL (ref 11.9–15.1)
MCH RBC QN AUTO: 29.5 PG (ref 25.2–33.5)
MCHC RBC AUTO-ENTMCNC: 30.9 G/DL (ref 28.4–34.8)
MCV RBC AUTO: 95.4 FL (ref 82.6–102.9)
NRBC AUTOMATED: 0 PER 100 WBC
PDW BLD-RTO: 17.2 % (ref 11.8–14.4)
PLATELET # BLD: 149 K/UL (ref 138–453)
PMV BLD AUTO: 8.9 FL (ref 8.1–13.5)
POTASSIUM SERPL-SCNC: 3.4 MMOL/L (ref 3.7–5.3)
RBC # BLD: 3.49 M/UL (ref 3.95–5.11)
SODIUM BLD-SCNC: 141 MMOL/L (ref 135–144)
WBC # BLD: 7.3 K/UL (ref 3.5–11.3)

## 2021-11-25 PROCEDURE — 1200000000 HC SEMI PRIVATE

## 2021-11-25 PROCEDURE — 85027 COMPLETE CBC AUTOMATED: CPT

## 2021-11-25 PROCEDURE — 94761 N-INVAS EAR/PLS OXIMETRY MLT: CPT

## 2021-11-25 PROCEDURE — 6360000002 HC RX W HCPCS: Performed by: FAMILY MEDICINE

## 2021-11-25 PROCEDURE — 6370000000 HC RX 637 (ALT 250 FOR IP): Performed by: FAMILY MEDICINE

## 2021-11-25 PROCEDURE — 2580000003 HC RX 258: Performed by: FAMILY MEDICINE

## 2021-11-25 PROCEDURE — 80048 BASIC METABOLIC PNL TOTAL CA: CPT

## 2021-11-25 PROCEDURE — 2700000000 HC OXYGEN THERAPY PER DAY

## 2021-11-25 PROCEDURE — 82947 ASSAY GLUCOSE BLOOD QUANT: CPT

## 2021-11-25 PROCEDURE — 36415 COLL VENOUS BLD VENIPUNCTURE: CPT

## 2021-11-25 RX ORDER — QUETIAPINE FUMARATE 25 MG/1
25 TABLET, FILM COATED ORAL 2 TIMES DAILY PRN
Status: DISCONTINUED | OUTPATIENT
Start: 2021-11-25 | End: 2021-11-27 | Stop reason: HOSPADM

## 2021-11-25 RX ORDER — DEXTROSE MONOHYDRATE 50 MG/ML
100 INJECTION, SOLUTION INTRAVENOUS PRN
Status: DISCONTINUED | OUTPATIENT
Start: 2021-11-25 | End: 2021-11-27 | Stop reason: HOSPADM

## 2021-11-25 RX ORDER — GABAPENTIN 400 MG/1
400 CAPSULE ORAL 3 TIMES DAILY
Status: DISCONTINUED | OUTPATIENT
Start: 2021-11-25 | End: 2021-11-27 | Stop reason: HOSPADM

## 2021-11-25 RX ORDER — VENLAFAXINE HYDROCHLORIDE 75 MG/1
150 CAPSULE, EXTENDED RELEASE ORAL DAILY
Status: DISCONTINUED | OUTPATIENT
Start: 2021-11-25 | End: 2021-11-27 | Stop reason: HOSPADM

## 2021-11-25 RX ORDER — PANTOPRAZOLE SODIUM 40 MG/1
40 TABLET, DELAYED RELEASE ORAL
Status: DISCONTINUED | OUTPATIENT
Start: 2021-11-25 | End: 2021-11-27 | Stop reason: HOSPADM

## 2021-11-25 RX ORDER — VENLAFAXINE HYDROCHLORIDE 75 MG/1
75 CAPSULE, EXTENDED RELEASE ORAL 2 TIMES DAILY
Status: DISCONTINUED | OUTPATIENT
Start: 2021-11-25 | End: 2021-11-25

## 2021-11-25 RX ORDER — OXYCODONE HYDROCHLORIDE AND ACETAMINOPHEN 5; 325 MG/1; MG/1
2 TABLET ORAL EVERY 6 HOURS PRN
Status: DISCONTINUED | OUTPATIENT
Start: 2021-11-25 | End: 2021-11-25

## 2021-11-25 RX ORDER — FUROSEMIDE 40 MG/1
40 TABLET ORAL DAILY
Status: DISCONTINUED | OUTPATIENT
Start: 2021-11-25 | End: 2021-11-27 | Stop reason: HOSPADM

## 2021-11-25 RX ORDER — CLONAZEPAM 0.5 MG/1
1 TABLET ORAL 3 TIMES DAILY PRN
Status: DISCONTINUED | OUTPATIENT
Start: 2021-11-25 | End: 2021-11-27 | Stop reason: HOSPADM

## 2021-11-25 RX ORDER — LANSOPRAZOLE 15 MG/1
30 CAPSULE, DELAYED RELEASE ORAL
Status: DISCONTINUED | OUTPATIENT
Start: 2021-11-25 | End: 2021-11-25 | Stop reason: CLARIF

## 2021-11-25 RX ORDER — LANOLIN ALCOHOL/MO/W.PET/CERES
3 CREAM (GRAM) TOPICAL NIGHTLY PRN
Status: DISCONTINUED | OUTPATIENT
Start: 2021-11-25 | End: 2021-11-25

## 2021-11-25 RX ORDER — ARIPIPRAZOLE 5 MG/1
5 TABLET ORAL DAILY
Status: DISCONTINUED | OUTPATIENT
Start: 2021-11-25 | End: 2021-11-27 | Stop reason: HOSPADM

## 2021-11-25 RX ORDER — GLIMEPIRIDE 1 MG/1
1 TABLET ORAL
Status: DISCONTINUED | OUTPATIENT
Start: 2021-11-25 | End: 2021-11-27 | Stop reason: HOSPADM

## 2021-11-25 RX ORDER — OXYCODONE AND ACETAMINOPHEN 10; 325 MG/1; MG/1
1 TABLET ORAL EVERY 6 HOURS PRN
Status: DISCONTINUED | OUTPATIENT
Start: 2021-11-25 | End: 2021-11-27 | Stop reason: HOSPADM

## 2021-11-25 RX ORDER — NICOTINE POLACRILEX 4 MG
15 LOZENGE BUCCAL PRN
Status: DISCONTINUED | OUTPATIENT
Start: 2021-11-25 | End: 2021-11-27 | Stop reason: HOSPADM

## 2021-11-25 RX ORDER — DEXTROSE MONOHYDRATE 25 G/50ML
12.5 INJECTION, SOLUTION INTRAVENOUS PRN
Status: DISCONTINUED | OUTPATIENT
Start: 2021-11-25 | End: 2021-11-27 | Stop reason: HOSPADM

## 2021-11-25 RX ORDER — ROSUVASTATIN CALCIUM 5 MG/1
5 TABLET, COATED ORAL NIGHTLY
Status: DISCONTINUED | OUTPATIENT
Start: 2021-11-25 | End: 2021-11-27 | Stop reason: HOSPADM

## 2021-11-25 RX ORDER — LANOLIN ALCOHOL/MO/W.PET/CERES
9 CREAM (GRAM) TOPICAL NIGHTLY PRN
Status: DISCONTINUED | OUTPATIENT
Start: 2021-11-25 | End: 2021-11-27 | Stop reason: HOSPADM

## 2021-11-25 RX ADMIN — SODIUM CHLORIDE, PRESERVATIVE FREE 10 ML: 5 INJECTION INTRAVENOUS at 08:21

## 2021-11-25 RX ADMIN — Medication 325 MG: at 08:20

## 2021-11-25 RX ADMIN — METOPROLOL TARTRATE 25 MG: 25 TABLET, FILM COATED ORAL at 22:34

## 2021-11-25 RX ADMIN — METFORMIN HYDROCHLORIDE 500 MG: 500 TABLET ORAL at 08:20

## 2021-11-25 RX ADMIN — GABAPENTIN 400 MG: 400 CAPSULE ORAL at 22:34

## 2021-11-25 RX ADMIN — OXYCODONE AND ACETAMINOPHEN 1 TABLET: 325; 10 TABLET ORAL at 22:34

## 2021-11-25 RX ADMIN — Medication 9 MG: at 22:39

## 2021-11-25 RX ADMIN — PANTOPRAZOLE SODIUM 40 MG: 40 TABLET, DELAYED RELEASE ORAL at 08:20

## 2021-11-25 RX ADMIN — ARIPIPRAZOLE 5 MG: 5 TABLET ORAL at 08:20

## 2021-11-25 RX ADMIN — GLIMEPIRIDE 1 MG: 1 TABLET ORAL at 08:20

## 2021-11-25 RX ADMIN — VENLAFAXINE HYDROCHLORIDE 150 MG: 75 CAPSULE, EXTENDED RELEASE ORAL at 08:20

## 2021-11-25 RX ADMIN — METOPROLOL TARTRATE 25 MG: 25 TABLET, FILM COATED ORAL at 08:20

## 2021-11-25 RX ADMIN — GABAPENTIN 400 MG: 400 CAPSULE ORAL at 08:21

## 2021-11-25 RX ADMIN — CEFTRIAXONE SODIUM 1000 MG: 1 INJECTION, POWDER, FOR SOLUTION INTRAMUSCULAR; INTRAVENOUS at 18:10

## 2021-11-25 RX ADMIN — CLONAZEPAM 1 MG: 0.5 TABLET ORAL at 04:33

## 2021-11-25 RX ADMIN — FUROSEMIDE 40 MG: 40 TABLET ORAL at 08:20

## 2021-11-25 RX ADMIN — TRAZODONE HYDROCHLORIDE 150 MG: 100 TABLET ORAL at 22:34

## 2021-11-25 RX ADMIN — SODIUM CHLORIDE, PRESERVATIVE FREE 10 ML: 5 INJECTION INTRAVENOUS at 22:34

## 2021-11-25 RX ADMIN — OXYCODONE AND ACETAMINOPHEN 1 TABLET: 325; 10 TABLET ORAL at 08:21

## 2021-11-25 RX ADMIN — QUETIAPINE FUMARATE 25 MG: 25 TABLET ORAL at 10:13

## 2021-11-25 ASSESSMENT — PAIN DESCRIPTION - LOCATION
LOCATION: BACK;CHEST
LOCATION: BACK;CHEST
LOCATION: BACK;LEG;CHEST

## 2021-11-25 ASSESSMENT — PAIN SCALES - GENERAL
PAINLEVEL_OUTOF10: 8
PAINLEVEL_OUTOF10: 5

## 2021-11-25 ASSESSMENT — PAIN DESCRIPTION - ONSET
ONSET: ON-GOING

## 2021-11-25 ASSESSMENT — PAIN DESCRIPTION - PROGRESSION
CLINICAL_PROGRESSION: NOT CHANGED

## 2021-11-25 ASSESSMENT — PAIN DESCRIPTION - DESCRIPTORS
DESCRIPTORS: TIGHTNESS
DESCRIPTORS: ACHING;DISCOMFORT
DESCRIPTORS: CONSTANT

## 2021-11-25 ASSESSMENT — PAIN DESCRIPTION - PAIN TYPE
TYPE: CHRONIC PAIN

## 2021-11-25 ASSESSMENT — PAIN DESCRIPTION - FREQUENCY
FREQUENCY: CONTINUOUS

## 2021-11-25 ASSESSMENT — PAIN - FUNCTIONAL ASSESSMENT
PAIN_FUNCTIONAL_ASSESSMENT: ACTIVITIES ARE NOT PREVENTED

## 2021-11-25 NOTE — PLAN OF CARE
Problem: Falls - Risk of:  Goal: Will remain free from falls  Description: Will remain free from falls  11/25/2021 1316 by Bobby Gardner RN  Outcome: Ongoing  Note: Patient is a fall risk during this admission. Fall risk assessment was performed. Patient is absent of falls. Bed is in the lowest position. Wheels on the bed are locked. Call light and bed side table are within reach. Clutter is removed. Patient was educated to call out when needing assistance or wanting to get out of bed. Patient offered toileting assistance during rounding. Hourly rounds have been performed. Problem: Urinary Elimination:  Goal: Signs and symptoms of infection will decrease  Description: Signs and symptoms of infection will decrease  11/25/2021 1316 by Bobby Gardner RN  Outcome: Ongoing     Problem: Urinary Elimination:  Goal: Complications related to the disease process, condition or treatment will be avoided or minimized  Description: Complications related to the disease process, condition or treatment will be avoided or minimized  11/25/2021 1316 by Bobby Gardner RN  Outcome: Ongoing     Problem: Pain:  Goal: Control of chronic pain  Description: Control of chronic pain  Outcome: Ongoing  Note: Pt medicated with pain medication prn. Assessed all pain characteristics including level, type, location, frequency, and onset. Non-pharmacologic interventions offered to pt as well. Pt states pain is tolerable at this time.  Will continue to monitor

## 2021-11-25 NOTE — CARE COORDINATION
Case Management Initial Discharge Plan  Edwige Tushary,         Readmission Risk              Risk of Unplanned Readmission:  21             Met with:patient to discuss discharge plans. Information verified: address, contacts, phone number, , insurance Yes  PCP: Lorie Miranda MD  Date of last visit: December     Insurance Provider: Benny     Discharge Planning  Current Residence:  Private home   Living Arrangements:  [address] [facility] has 2 stories/1 stairs to climb to enter. Her bed and bath are on the main floor   Support Systems:  Children, Home Care Staff       Current Services PTA:  [facility] thru [place]: [facility]       Patient able to perform ADL's:Independent  DME in home:  Walker, neb, shower bench, toielt riser. 02 at night only thru [facility]> need to clarify   DME used to aid ambulation prior to admission:   [employer] [facility]   DME used during admission:  walke     Potential Assistance Needed:  [address]: RA on secor and lyubov    Potential Assistance Purchasing Medications:  No  Does patient want to participate in local refill/ meds to beds program?  Yes    Patient agreeable to home care: Yes  Freedom of choice provided:  yes     The Plan for Transition of Care is related to the following treatment goals: skilled RN     The Patient and/or patient representative   was provided with a choice of provider and agrees   with the discharge plan. [x] Yes [] No    Freedom of choice list was provided with basic dialogue that supports the patient's individualized plan of care/goals, treatment preferences and shares the quality data associated with the providers.  [x] Yes [] No      Type of Home Care Services:  [address]  Patient expects to be discharged to:   [facility]     Prior SNF/Rehab Placement and Facility: [facility]   Agreeable to SNF/Rehab: unsure   [place] of choice provided: n/a   Evaluation: n/a    Expected Discharge date:       Follow Up Appointment: Best Day/ Time:  any     Transportation provider: per family  Transportation arrangements needed for discharge: No    Discharge Plan:   Met with patient to complete a discharge assessment. As walked in room. Bed alarm going off and patient trying to get out of bed. Very upset at being here. Assisted back to bed. Attempted to assist with her 18 and she threw it at [address] stating nothing is coming out     02 was at 2 liters NC and placed against both writer hand and patient to show there is 02 coming out and she still doesn't believe. Obtained new tubing and gave to patient. Patient was not very forthcoming with information. Difficult ot obtain full assessment. She states she is current with francia A 3 x week. They come for few hours 3 x week. Per [facility] patient has history of falls once being recently at home. Has lung cancer and is followed per Dr Ok Farris at [place] . She gets chemo every month here at [facility]. Did discuss if interested in having nurse or therapy in home and did not respond. Will have to follow up with patient and or son. PT and OT are not ordered as of yet. Will need to have RN reach out to attending to see if can be ordered especially in light of falls. VELMA in [facility] and sent referral for skilled rn and therapy     [facility] [address] [address]         Phone: [phone]       Fax: [phone]        Will need to call on Friday to see if can accept skilled side if needed     Will also need to call [facility] to verify her home dosage of 02 in case need to evaluate for exertion vs 24/7.     1300  Spoke with dr Mynor Huerta therapy order obtained and placed.      Electronically signed by Ladonna Blackwood RN on 11/25/21 at 11:51 AM EST

## 2021-11-25 NOTE — PROGRESS NOTES
Patient admitted to room 2012, alert x4. Patient oriented to call light, Bed in lowest position, siderails up x2. Patient started to get anxious, and irritated, pulling at IV site stating that it's not right, patient was offered to help put on her oxygen which patient declined and stated to pull at it. Nurse attempted to calm patient and was successful.

## 2021-11-25 NOTE — PLAN OF CARE
Problem: Falls - Risk of:  Goal: Will remain free from falls  Description: Will remain free from falls  Outcome: Ongoing  Note: Siderails up x 2  Hourly rounding  Call light in reach  Instructed to call for assist before attempting out of bed.   Remains free from falls and accidental injury at this time   Floor free from obstacles  Bed is locked and in lowest position  Adequate lighting provided  Bed alarm on, [place] star and Stay with Me signs posted      Goal: Absence of physical injury  Description: Absence of physical injury  Outcome: Ongoing     Problem: Sensory:  Goal: General experience of comfort will improve  Description: General experience of comfort will improve  Outcome: Ongoing     Problem: Urinary Elimination:  Goal: Signs and symptoms of infection will decrease  Description: Signs and symptoms of infection will decrease  Outcome: Ongoing  Goal: Ability to reestablish a normal urinary elimination pattern will improve - after catheter removal  Description: Ability to reestablish a normal urinary elimination pattern will improve  Outcome: Ongoing  Goal: Complications related to the disease process, condition or treatment will be avoided or minimized  Description: Complications related to the disease process, condition or treatment will be avoided or minimized  Outcome: Ongoing

## 2021-11-25 NOTE — PROGRESS NOTES
[facility].,    Adult Hospitalist      Name: Jl Greer  MRN: [id number]     Acct: [de-identified]  Room: 2012/2012-02    Admit Date: 11/24/2021  1:08 PM  PCP: Pushpa Payne MD    Primary Problem  Active Problems:    UTI (urinary tract infection)  Resolved Problems:    * No resolved hospital problems. *        Assesment:     · Fall   · Encephalopathy  · Acute cystitis without hematuria  · Diabetes mellitus type 2  · Coronary artery disease, native vessel  · History of lung cancer  · Gastroesophageal reflux disease without esophagitis  · Essential hypertension  · Moderate persistent asthma/COPD  · Anxiety disorder  · Insomnia  · Osteoarthritis multiple joints  · Agitation        Plan:     · Admit to MedSurg  · Monitor vitals closely  · Keep SPO2 above 90%  · I's and O's  · IV fluids  · Pain control  · Rocephin IV daily  · Antiemetics as needed  · Resume essential home meds  · Accu-Cheks before meals and at bedtime  · Insulin sliding scale  · Hypoglycemia protocol  · CBC, BMP  · Incentive spirometry  · Seroquel    · DVT and GI prophylaxis.         Chief Complaint:     Chief Complaint   Patient presents with    Extremity Weakness    Fall         History of Present Illness:        Patient seen and examined at bedside  Patient was agitated earlier  She had been very paranoid  Saying that there were people who are trying to kill her  She wanted to rip off her IV line  She wanted to get dressed and go home  There had not been any visual hallucinations reported  Vitals have been stable  No hypoxia  In fact oxygen levels were 99% on nasal cannula O2  We have recommended to keep oxygen between 92 to 94%    Denies chest pain, wheezing, headache  Denies neck pain, fever, photophobia  Denies vomiting, diarrhea, constipation  Denies rash or joint swelling    Initial HPI  Jl Greer is a 80 y.o.  female who presents with Extremity Weakness and Fall    Patient admitted through the emergency room where she uses 2 liters at night    On home oxygen therapy     patient states 2 liters/nasal cannula continuous    Overactive bladder     patient incont. wears a brief    Pneumonia     PONV (postoperative nausea and vomiting)     Vitamin D deficiency         Past Surgical History:     Past Surgical History:   Procedure Laterality Date    AORTIC VALVE REPAIR N/A 4/11/2017    AORTIC VALVE REPAIR REPLACEMENT performed by Navid Templeton MD at [address] N/A 8/31/2020    BRONCHOSCOPY BIOPSY BRONCHUS performed by Ivan Hull MD at [address], COLON, DIAGNOSTIC  12/08/2016    EYE SURGERY      HYSTERECTOMY      IR INS PICC VAD W SQ PORT GREATER THAN 5  9/4/2020    IR INS PICC VAD W SQ PORT GREATER THAN 5 9/4/2020 [facility]    IR PORT PLACEMENT EQUAL OR GREATER THAN 5 YEARS  9/29/2020    IR PORT PLACEMENT EQUAL OR GREATER THAN 5 YEARS 9/29/2020 MD JAY JAY Alexander SPECIAL PROCED[facility]    LUMBAR LAMINECTOMY      TONSILLECTOMY          Medications Prior to Admission:       Prior to Admission medications    Medication Sig Start Date End Date Taking? Authorizing Provider   gabapentin (NEURONTIN) 400 MG capsule Take 400-800 mg by mouth See Admin Instructions.  Indications: 400mg AM, 400mg PM and 800mg HS 400mg AM, 400mg PM and 800mg HS    Historical Provider, MD   cephALEXin (KEFLEX) 500 MG capsule Take 500 mg by mouth 3 times daily    Historical Provider, MD   ipratropium-albuterol (DUONEB) 0.5-2.5 (3) MG/3ML SOLN nebulizer solution Inhale 1 vial into the lungs every 8 hours as needed for Shortness of Breath    Historical Provider, MD   rosuvastatin (CRESTOR) 5 MG tablet Take 5 mg by mouth daily    Historical Provider, MD   ondansetron (ZOFRAN) 4 MG tablet Take 1 tablet by mouth every 8 hours as needed for Nausea or Vomiting 10/15/21   Gabe Love MD   lidocaine-prilocaine (EMLA) 2.5-2.5 % cream To port site before chemo  Patient not taking: Reported on 10/27/2021 3/31/21   Víctor Jackson MD   oxyCODONE-acetaminophen (PERCOCET)  MG per tablet Take 1 tablet by mouth every 6 hours as needed for Pain. Historical Provider, MD   traZODone (DESYREL) 300 MG tablet Take 0.5 tablets by mouth nightly 11/12/20   Whitney Becerril MD   clonazePAM (KLONOPIN) 1 MG tablet Take 1 tablet by mouth 3 times daily as needed for Anxiety for up to 3 doses. 11/5/20 10/27/21  Tucker Pedraza MD   ARIPiprazole (ABILIFY) 5 MG tablet Take 1 tablet by mouth daily for 3 doses 11/5/20 10/27/21  Tucker Pedraza MD   furosemide (LASIX) 40 MG tablet Take 40 mg by mouth daily    Historical Provider, MD   melatonin 5 MG TABS tablet Take 20 mg by mouth nightly     Historical Provider, MD   glimepiride (AMARYL) 1 MG tablet Take 1-2 mg by mouth See Admin Instructions Indications: 2mg AM and 1mg PM 2mg AM and 1mg PM    Historical Provider, MD   metoprolol tartrate (LOPRESSOR) 25 MG tablet Take 25 mg by mouth 2 times daily    Historical Provider, MD   venlafaxine (EFFEXOR XR) 150 MG extended release capsule Take 150 mg by mouth 2 times daily    Historical Provider, MD   lansoprazole (PREVACID) 30 MG delayed release capsule Take 30 mg by mouth daily 11/10/16   Historical Provider, MD   metFORMIN (GLUCOPHAGE) 500 MG tablet Take 500 mg by mouth 2 times daily 12/7/16   Historical Provider, MD   aspirin EC 81 MG EC tablet Take 81 mg by mouth daily    Historical Provider, MD   mometasone-formoterol (DULERA) 200-5 MCG/ACT inhaler Inhale 2 puffs into the lungs every 12 hours as needed (wheezing/SOB)     Historical Provider, MD        Allergies:       Codeine and Dye [iodides]    Social History:     Tobacco:    reports that she quit smoking about 5 years ago. She has never used smokeless tobacco.  Alcohol:      reports no history of alcohol use. Drug Use:  reports no history of drug use.     Family History:     Family History   Problem Relation Age of Onset    Cancer Mother     Cancer Father          Physical Exam:     Vitals:  /61   Pulse 81   Temp 97.3 °F (36.3 °C) (Oral)   Resp 16   Ht 5' 8\" (1.727 m)   Wt 173 lb (78.5 kg)   SpO2 95%   BMI 26.30 kg/m²   Temp (24hrs), Av.7 °F (36.5 °C), Min:97.3 °F (36.3 °C), Max:98.4 °F (36.9 °C)          General appearance - alert, well appearing, and in no acute distress  Mental status -not oriented to person, place, and time with normal affect  Head - normocephalic and atraumatic  Eyes - pupils equal and reactive, extraocular eye movements intact, conjunctiva clear  Ears - hearing appears to be intact  Nose - no drainage noted  Mouth - mucous membranes moist  Neck - supple, no carotid bruits, thyroid not palpable  Chest - clear to auscultation, normal effort  Heart - normal rate, regular rhythm, no murmur  Abdomen - soft, nontender, nondistended, bowel sounds present all four quadrants, no masses, hepatomegaly or splenomegaly  Neurological - normal speech, no focal findings or movement disorder noted, cranial nerves II through XII grossly intact  Extremities - peripheral pulses palpable, no pedal edema or calf pain with palpation  Skin - no gross lesions, rashes, or induration noted        Data:     Labs:    Hematology:  Recent Labs     21  1403 21  0551   WBC 7.5 7.3   RBC 3.47* 3.49*   HGB 10.3* 10.3*   HCT 32.3* 33.3*   MCV 93.1 95.4   MCH 29.7 29.5   MCHC 31.9 30.9   RDW 17.0* 17.2*    149   MPV 9.3 8.9     Chemistry:  Recent Labs     21  1403 21  1545 21  0551     --  141   K 4.2  --  3.4*     --  103   CO2 23  --  24   GLUCOSE 103*  --  124*   BUN 15  --  18   CREATININE 0.89  --  1.01*   ANIONGAP 12  --  14   LABGLOM >60  --  54*   GFRAA >60  --  >60   CALCIUM 8.8  --  8.8   TROPHS 18* 17*  --    MYOGLOBIN 142*  --   --      Recent Labs     21  1403 21  0551 21  1203   AMMONIA 16  --   --    POCGLU  --  118* 99       Lab Results   Component Value Date    INR 1.0 01/06/2021    INR 0.9 09/29/2020    INR 1.1 08/28/2020    PROTIME 12.6 01/06/2021    PROTIME 11.8 09/29/2020    PROTIME 14.4 (H) 08/28/2020       Lab Results   Component Value Date/Time    SPECIAL NOT REPORTED 11/24/2021 06:32 PM     Lab Results   Component Value Date/Time    CULTURE NO GROWTH 9 HOURS 11/24/2021 06:32 PM       Lab Results   Component Value Date    POCPH 7.36 04/12/2017    PHART 7.42 08/26/2012    PH 7.22 04/11/2017    POCPCO2 45 04/12/2017    UBO7HDH 41 08/26/2012    PCO2 54 04/11/2017    POCPO2 93 04/12/2017    PO2ART 59 08/26/2012    PO2 89 04/11/2017    POCHCO3 25.3 04/12/2017    UJG7URU 26.1 08/26/2012    HCO3 22.2 04/11/2017    NBEA NOT REPORTED 04/12/2017    PBEA 0 04/12/2017    BDE2LOU 27 04/12/2017    PCKX0VGX 97 04/12/2017    Q6VUZCYI 92.1 08/26/2012    O2SAT 95 04/11/2017    FIO2 UNKNOWN 10/31/2017       Radiology:    XR PELVIS (1-2 VIEWS)    Result Date: 11/24/2021  No acute osseous abnormality     CT HEAD WO CONTRAST    Result Date: 11/24/2021  No acute intracranial abnormality. CT CERVICAL SPINE WO CONTRAST    Result Date: 11/24/2021  1. Mild multilevel degenerative changes in the cervical spine, similar to 09/25/2020. 2. Cavitary spiculated mass lesion in the superomedial right upper lobe/right lung apex consistent with patient's known history of right upper lobe malignancy. 3. Atherosclerotic calcification of the vasculature. 4. No acute vertebral body height loss or malalignment within the cervical spine. XR CHEST PORTABLE    Result Date: 11/24/2021  1. Masslike opacity at the superomedial right upper lobe likely related to underlying malignancy. Left-sided Port-A-Cath in place. 2. Stable mild airspace disease at the left base, atelectasis and/or infiltrate. Follow-up is recommended to document resolution. 3. Underlying COPD. No new focal airspace disease or pleural effusion. 4. Prior median sternotomy and CABG.          All radiological studies reviewed                Code Status:  Full Code    Electronically signed by Trey Dominguez MD on 11/25/2021 at 3:25 PM     Copy sent to Dr. Sharda Diane MD    This note was created with the assistance of a speech-recognition program.  Although the intention is to generate a document that actually reflects the content of the visit, no guarantees can be provided that every mistake has been identified and corrected by editing. Note was updated later by me after  physical examination and  completion of the assessment.

## 2021-11-25 NOTE — PROGRESS NOTES
Multiple attempts have been made by nurse and staff to check patient's blood sugar. Patient continues to refuse. Metformin and insulin not administered.

## 2021-11-25 NOTE — FLOWSHEET NOTE
11/25/21 9516   Provider Notification   Reason for Communication New orders  (patient refusing treatments,agitated, but A&Ox4 )   Provider Name Dr. Storm Matson   Provider Notification Physician   Method of Communication Call   Response See orders   Notification Time 5729

## 2021-11-26 LAB
ANION GAP SERPL CALCULATED.3IONS-SCNC: 14 MMOL/L (ref 9–17)
BUN BLDV-MCNC: 25 MG/DL (ref 8–23)
BUN/CREAT BLD: 20 (ref 9–20)
CALCIUM SERPL-MCNC: 8.4 MG/DL (ref 8.6–10.4)
CHLORIDE BLD-SCNC: 103 MMOL/L (ref 98–107)
CO2: 26 MMOL/L (ref 20–31)
CREAT SERPL-MCNC: 1.26 MG/DL (ref 0.5–0.9)
EKG ATRIAL RATE: 102 BPM
EKG P AXIS: 75 DEGREES
EKG P-R INTERVAL: 164 MS
EKG Q-T INTERVAL: 408 MS
EKG QRS DURATION: 152 MS
EKG QTC CALCULATION (BAZETT): 531 MS
EKG R AXIS: -54 DEGREES
EKG T AXIS: 30 DEGREES
EKG VENTRICULAR RATE: 102 BPM
GFR AFRICAN AMERICAN: 51 ML/MIN
GFR NON-AFRICAN AMERICAN: 42 ML/MIN
GFR SERPL CREATININE-BSD FRML MDRD: ABNORMAL ML/MIN/{1.73_M2}
GFR SERPL CREATININE-BSD FRML MDRD: ABNORMAL ML/MIN/{1.73_M2}
GLUCOSE BLD-MCNC: 100 MG/DL (ref 65–105)
GLUCOSE BLD-MCNC: 103 MG/DL (ref 70–99)
GLUCOSE BLD-MCNC: 122 MG/DL (ref 65–105)
GLUCOSE BLD-MCNC: 138 MG/DL (ref 65–105)
GLUCOSE BLD-MCNC: 151 MG/DL (ref 65–105)
GLUCOSE BLD-MCNC: 176 MG/DL (ref 65–105)
HCT VFR BLD CALC: 32.8 % (ref 36.3–47.1)
HEMOGLOBIN: 10.2 G/DL (ref 11.9–15.1)
MCH RBC QN AUTO: 29.8 PG (ref 25.2–33.5)
MCHC RBC AUTO-ENTMCNC: 31.1 G/DL (ref 28.4–34.8)
MCV RBC AUTO: 95.9 FL (ref 82.6–102.9)
NRBC AUTOMATED: 0 PER 100 WBC
PDW BLD-RTO: 17.3 % (ref 11.8–14.4)
PLATELET # BLD: 143 K/UL (ref 138–453)
PMV BLD AUTO: 8.6 FL (ref 8.1–13.5)
POTASSIUM SERPL-SCNC: 4.2 MMOL/L (ref 3.7–5.3)
RBC # BLD: 3.42 M/UL (ref 3.95–5.11)
SODIUM BLD-SCNC: 143 MMOL/L (ref 135–144)
WBC # BLD: 6.4 K/UL (ref 3.5–11.3)

## 2021-11-26 PROCEDURE — 93010 ELECTROCARDIOGRAM REPORT: CPT | Performed by: INTERNAL MEDICINE

## 2021-11-26 PROCEDURE — 2580000003 HC RX 258: Performed by: FAMILY MEDICINE

## 2021-11-26 PROCEDURE — 6370000000 HC RX 637 (ALT 250 FOR IP): Performed by: FAMILY MEDICINE

## 2021-11-26 PROCEDURE — 97162 PT EVAL MOD COMPLEX 30 MIN: CPT

## 2021-11-26 PROCEDURE — 97530 THERAPEUTIC ACTIVITIES: CPT

## 2021-11-26 PROCEDURE — 1200000000 HC SEMI PRIVATE

## 2021-11-26 PROCEDURE — 97166 OT EVAL MOD COMPLEX 45 MIN: CPT

## 2021-11-26 PROCEDURE — 85027 COMPLETE CBC AUTOMATED: CPT

## 2021-11-26 PROCEDURE — 97535 SELF CARE MNGMENT TRAINING: CPT

## 2021-11-26 PROCEDURE — 82947 ASSAY GLUCOSE BLOOD QUANT: CPT

## 2021-11-26 PROCEDURE — 97116 GAIT TRAINING THERAPY: CPT

## 2021-11-26 PROCEDURE — 36415 COLL VENOUS BLD VENIPUNCTURE: CPT

## 2021-11-26 PROCEDURE — 6360000002 HC RX W HCPCS: Performed by: FAMILY MEDICINE

## 2021-11-26 PROCEDURE — 80048 BASIC METABOLIC PNL TOTAL CA: CPT

## 2021-11-26 RX ORDER — CEPHALEXIN 500 MG/1
500 CAPSULE ORAL 3 TIMES DAILY
Status: DISCONTINUED | OUTPATIENT
Start: 2021-11-26 | End: 2021-11-27 | Stop reason: HOSPADM

## 2021-11-26 RX ADMIN — GLIMEPIRIDE 1 MG: 1 TABLET ORAL at 08:59

## 2021-11-26 RX ADMIN — CLONAZEPAM 1 MG: 0.5 TABLET ORAL at 17:36

## 2021-11-26 RX ADMIN — OXYCODONE AND ACETAMINOPHEN 1 TABLET: 325; 10 TABLET ORAL at 06:01

## 2021-11-26 RX ADMIN — PANTOPRAZOLE SODIUM 40 MG: 40 TABLET, DELAYED RELEASE ORAL at 08:59

## 2021-11-26 RX ADMIN — METOPROLOL TARTRATE 25 MG: 25 TABLET, FILM COATED ORAL at 08:59

## 2021-11-26 RX ADMIN — CEPHALEXIN 500 MG: 500 CAPSULE ORAL at 22:40

## 2021-11-26 RX ADMIN — Medication 9 MG: at 22:40

## 2021-11-26 RX ADMIN — OXYCODONE AND ACETAMINOPHEN 1 TABLET: 325; 10 TABLET ORAL at 22:40

## 2021-11-26 RX ADMIN — VENLAFAXINE HYDROCHLORIDE 150 MG: 75 CAPSULE, EXTENDED RELEASE ORAL at 08:59

## 2021-11-26 RX ADMIN — METFORMIN HYDROCHLORIDE 500 MG: 500 TABLET ORAL at 08:59

## 2021-11-26 RX ADMIN — TRAZODONE HYDROCHLORIDE 150 MG: 100 TABLET ORAL at 22:40

## 2021-11-26 RX ADMIN — GABAPENTIN 400 MG: 400 CAPSULE ORAL at 08:59

## 2021-11-26 RX ADMIN — METFORMIN HYDROCHLORIDE 500 MG: 500 TABLET ORAL at 17:36

## 2021-11-26 RX ADMIN — FUROSEMIDE 40 MG: 40 TABLET ORAL at 09:00

## 2021-11-26 RX ADMIN — CLONAZEPAM 1 MG: 0.5 TABLET ORAL at 22:41

## 2021-11-26 RX ADMIN — SODIUM CHLORIDE, PRESERVATIVE FREE 10 ML: 5 INJECTION INTRAVENOUS at 09:01

## 2021-11-26 RX ADMIN — OXYCODONE AND ACETAMINOPHEN 1 TABLET: 325; 10 TABLET ORAL at 12:59

## 2021-11-26 RX ADMIN — GABAPENTIN 400 MG: 400 CAPSULE ORAL at 14:51

## 2021-11-26 RX ADMIN — Medication 325 MG: at 08:59

## 2021-11-26 RX ADMIN — ARIPIPRAZOLE 5 MG: 5 TABLET ORAL at 08:59

## 2021-11-26 RX ADMIN — GABAPENTIN 400 MG: 400 CAPSULE ORAL at 22:39

## 2021-11-26 RX ADMIN — METOPROLOL TARTRATE 25 MG: 25 TABLET, FILM COATED ORAL at 22:40

## 2021-11-26 ASSESSMENT — PAIN DESCRIPTION - ONSET
ONSET: ON-GOING
ONSET: ON-GOING

## 2021-11-26 ASSESSMENT — PAIN DESCRIPTION - PROGRESSION
CLINICAL_PROGRESSION: NOT CHANGED
CLINICAL_PROGRESSION: NOT CHANGED

## 2021-11-26 ASSESSMENT — PAIN DESCRIPTION - PAIN TYPE
TYPE: CHRONIC PAIN

## 2021-11-26 ASSESSMENT — PAIN DESCRIPTION - DESCRIPTORS
DESCRIPTORS: ACHING;DISCOMFORT
DESCRIPTORS: ACHING;DISCOMFORT

## 2021-11-26 ASSESSMENT — PAIN DESCRIPTION - LOCATION
LOCATION: BACK;CHEST
LOCATION: BACK;CHEST

## 2021-11-26 ASSESSMENT — PAIN SCALES - GENERAL
PAINLEVEL_OUTOF10: 8

## 2021-11-26 ASSESSMENT — PAIN DESCRIPTION - FREQUENCY
FREQUENCY: CONTINUOUS
FREQUENCY: CONTINUOUS

## 2021-11-26 ASSESSMENT — PAIN DESCRIPTION - ORIENTATION: ORIENTATION: MID

## 2021-11-26 ASSESSMENT — PAIN - FUNCTIONAL ASSESSMENT
PAIN_FUNCTIONAL_ASSESSMENT: ACTIVITIES ARE NOT PREVENTED
PAIN_FUNCTIONAL_ASSESSMENT: ACTIVITIES ARE NOT PREVENTED

## 2021-11-26 NOTE — PROGRESS NOTES
AM-PAC 6 Clicks Basic Mobility  Clinical Presentation: evolving  PT Education: Goals; PT Role; Precautions; Functional Mobility Training; Transfer Training; Plan of Care; Gait Training; General Safety  Patient Education: Ed pt on functional mobility, safety awareness, importance of being up & OOB to regain strength, & prevention of sedentary complications, circulation ex's,  & optimal breathing techniques  REQUIRES PT FOLLOW UP: Yes  Activity Tolerance  Activity Tolerance: Treatment limited secondary to agitation       Patient Diagnosis(es): The primary encounter diagnosis was Fall, initial encounter. Diagnoses of Urinary tract infection without hematuria, site unspecified and Hx of cancer of lung were also pertinent to this visit. has a past medical history of AAA (abdominal aortic aneurysm) (HCC), Acid reflux, Anxiety and depression, Aortic stenosis, Arthritis, Blister of ankle, right, CAD (coronary artery disease), Cancer ([facility] [address] COPD (chronic obstructive pulmonary disease) ([facility] [address] Diabetes mellitus ([facility] [address] Falls, Heart block, Hypokalemia, MDRO (multiple drug resistant organisms) resistance, MRSA (methicillin resistant staph aureus) culture positive, On home oxygen therapy, On home oxygen therapy, Overactive bladder, Pneumonia, PONV (postoperative nausea and vomiting), and Vitamin D deficiency. has a past surgical history that includes back surgery; eye surgery; Cholecystectomy; Appendectomy; Hysterectomy; Tonsillectomy; lumbar laminectomy; Endoscopy, colon, diagnostic (12/08/2016); aortic valve repair (N/A, 4/11/2017); bronchoscopy (N/A, 8/31/2020); IR INSERT PICC VAD W SQ PORT >5 YEARS (9/4/2020); and IR PORT PLACEMENT > 5 YEARS (9/29/2020). Restrictions  Restrictions/Precautions  Restrictions/Precautions: General Precautions, Fall Risk  Position Activity Restriction  Other position/activity restrictions:  Up with asssit, telemetry, Maintain oxygen level above 90% but no higher than 94%, L chest port, LUE IV, ALARMS,  Vision/Hearing  Vision: Impaired  Vision Exceptions: Wears glasses at all times (pt reports she needs her eyes checked, needs new prescription)     Subjective  General  Chart Reviewed: Yes  Patient assessed for rehabilitation services?: Yes  Additional Pertinent Hx: chronic mechanical lumbar pain, discectomy, lung carcinoma, COPD, falls, home O2 use  Pain Screening  Patient Currently in Pain: Denies  Vital Signs  Patient Currently in Pain: Denies       Orientation  Orientation  Overall Orientation Status: Within Functional Limits  Social/Functional History  Social/Functional History  Lives With: Alone  Type of Home: House  Home Layout: Able to Live on Main level with bedroom/bathroom, Multi-level  Home Access: Ramped entrance  Bathroom Shower/Tub: Walk-in shower  Bathroom Toilet: Standard  Bathroom Equipment: 3-in-1 commode, Shower chair, Grab bars around toilet  Home Equipment: Rolling walker, Alert Button (Simultaneous filing. User may not have seen previous data.)  Receives Help From: Home health (HHA comes 3 days a week)  ADL Assistance: Needs assistance (HHA comes 3x/week to assist shower & some dressing)  Homemaking Assistance: Needs assistance (HHA asssits some, she can do some herself & Son assists food shoppping)  Transfer Assistance: Independent  Active : No  Patient's  Info: granddaughter  Occupation: Retired  Type of occupation: [employer]  Leisure & Hobbies: watch TV  IADL Comments: Question reliability of info pt reported as she is a poor historian with inconsistent answers to questions, will need verified for accuracy  Additional Comments: Pt initially denies falls in more than a year, then reports fall earlier this week when she fell going into building where her Dr office is located & entrance blocked + wind blowing(chart record also show pt fell day she was admitted).    Pt goes for chemoTx monthly  Cognition   Cognition  Overall Cognitive Status: Exceptions  Arousal/Alertness: Appropriate responses to stimuli  Following Commands: Inconsistently follows commands  Attention Span: Appears intact  Memory: Decreased recall of recent events  Safety Judgement: Decreased awareness of need for safety  Problem Solving: Decreased awareness of errors; Assistance required to identify errors made; Assistance required to correct errors made; Assistance required to implement solutions  Insights: Decreased awareness of deficits  Initiation: Does not require cues  Sequencing: Does not require cues    Objective     Observation/Palpation  Posture: Fair  Observation: pt is easily agitated & quick to anger with bouts of tantrums at times if asked to do something she does not agree with  Edema: none    AROM RLE (degrees)  RLE AROM: WFL  AROM LLE (degrees)  LLE AROM : WFL  AROM RUE (degrees)  RUE General AROM: See OT assessment  AROM LUE (degrees)  LUE General AROM: See OT assessment  Strength RLE  Strength RLE: WFL  Strength LLE  Strength LLE: WFL  Strength RUE  Comment: See OT assessment  Strength LUE  Comment: See OT assessment  Tone RLE  RLE Tone: Normotonic  Tone LLE  LLE Tone: Normotonic  Sensation  Overall Sensation Status: WFL  Bed mobility  Supine to Sit: Contact guard assistance  Scooting: Contact guard assistance  Comment: Pt requires Mod cues to slow down movements & for pacing as well as awareness/assist with lines to increase safety.   Transfers  Sit to Stand: Contact guard assistance  Stand to sit: Contact guard assistance  Bed to Chair: Contact guard assistance  Stand Pivot Transfers: Contact guard assistance  Lateral Transfers: Contact guard assistance  Comment: Mod Ed + tactile assist on correct use of upper body for safe sit/stand + to back all way back to surface with walker close until she feels chair touch behind legs & to ensure she reaches with UB support to arms of chair  Ambulation  Ambulation?: Yes  Ambulation 1  Surface: level tile  Device: Rolling Walker  Assistance: Minimal assistance  Quality of Gait: step to pattern, cues to slow pace for safety & posture  Distance: 25ft, 20ft  Comments: Pt amb to BR to sink for oral & facial hygiene x 6 minutes then ambulated 20 ft with R/walker & sat to chair with Contact Guard Assistance     Balance  Posture: Fair  Sitting - Static: Good  Sitting - Dynamic: Good  Standing - Static: Good; -  Standing - Dynamic: Fair; +  Exercises  Comments: Ed circulation ex's, pursed lip breathing techniques & ex's to move what moves to maintain strength & joint congruency     Plan   Plan  Times per week: 1-2x/D, 5-6D/week  Current Treatment Recommendations: Strengthening, Balance Training, Functional Mobility Training, Transfer Training, ADL/Self-care Training, Gait Training, Safety Education & Training, Home Exercise Program, Endurance Training  Safety Devices  Type of devices: Call light within reach, Gait belt, Patient at risk for falls, Left in chair, Nurse notified    G-Code       OutComes Score                                                  AM-PAC Score  AM-PAC Inpatient Mobility Raw Score : 18 (11/26/21 0855)  AM-PAC Inpatient T-Scale Score : 43.63 (11/26/21 0855)  Mobility Inpatient CMS 0-100% Score: 46.58 (11/26/21 0855)  Mobility Inpatient CMS G-Code Modifier : CK (11/26/21 0855)          Goals  Short term goals  Time Frame for Short term goals: 12 visits  Short term goal 1: Inc bed-mobility & transfers to independent to enable pt to safely get in/OOB & chair  Short term goal 2: Inc gait to amb 250ft or > indep w/ RW to enable pt to return to previous level of independence  Short term goal 3:  Inc standing balance to good with device to facilitate pt independence for performance of ADL's & functional mobility, & reduce fall risk  Short term goal 4: Pt able to tolerate 30-40 min of activity to include 15-20 reps of ex, NMR & functional mobility to facilitate activity tolerance to [facility]  Short term goal 5: Ed pt on home ex's, safety & energy principles, fall prevention, & issue written home program  Patient Goals   Patient goals : RETURN HOME!        Therapy Time   Individual Concurrent Group Co-treatment   Time In 0806         Time Out 1044         Minutes 49+10=59              Additional 10 minutes for chart review      Treatment time: 45 minutes    [facility], PT

## 2021-11-26 NOTE — PROGRESS NOTES
Called out at 1045, states she is shaky and starving, , box lunch given per pt insistence for something to eat.  (did eat entire bkfst this AM)

## 2021-11-26 NOTE — CARE COORDINATION
Call from [facility] and pt is out of network for [facility] and should be transferred once stable. Nurse informed.

## 2021-11-26 NOTE — PROGRESS NOTES
Dr. Alli Uribe in to see patient. Informed of need to transfer due to lack of insurance coverage due for [facility]. Call placed to [facility] to initiate transfer to [facility]. Dr. Maricarmen Murdock will be the attending physician at [facility] if bed is available.

## 2021-11-26 NOTE — PROGRESS NOTES
Occupational Therapy   Occupational Therapy Initial Assessment  Date: 2021   Patient Name: Jose Juan Monzon  MRN: [id number]     : 1950    RN Aggie Briscoe reports patient is medically stable for therapy treatment this date. Chart reviewed prior to treatment and patient is agreeable for therapy. All lines intact and patient positioned comfortably at end of treatment. All patient needs addressed prior to ending therapy session. Date of Service: 2021    Discharge Recommendations:  Patient would benefit from continued therapy after discharge   Due to recent hospitalization and medical condition, pt would benefit from additional therapy at time of discharge to ensure safety. Please refer to the AM-PAC score for current functional status. OT Equipment Recommendations  Equipment Needed: Yes  Mobility Devices: ADL Assistive Devices  ADL Assistive Devices: Long-handled Shoe Horn; Long-handled Sponge; Reacher; Sock-Aid Hard    Assessment   Performance deficits / Impairments: Decreased functional mobility ; Decreased safe awareness; Decreased cognition; Decreased ADL status; Decreased strength; Decreased endurance; Decreased high-level IADLs; Decreased posture; Decreased balance; Decreased coordination  Assessment: Pt would benefit from continued skilled OT services to increase I and safety during functional tasks to return home at prior level of function as able.   Prognosis: Good  Decision Making: Medium Complexity  OT Education: OT Role; Transfer Training; Energy Conservation; Plan of Care; ADL Adaptive Strategies  Patient Education: benefits of being oob, recommendations for continued therapy, safety in funciton, fall prevention/call light use, pursed lip breathing  Barriers to Learning: cognition  REQUIRES OT FOLLOW UP: Yes  Activity Tolerance  Activity Tolerance: Patient limited by fatigue; Treatment limited secondary to agitation  Activity Tolerance: fair -, pt required encouragement to participate and was easily agitiated  Safety Devices  Safety Devices in place: Yes  Type of devices: Nurse notified; Gait belt; Call light within reach; Patient at risk for falls; Left in bed; Bed alarm in place (RN in room at end of session)           Patient Diagnosis(es): The primary encounter diagnosis was Fall, initial encounter. Diagnoses of Urinary tract infection without hematuria, site unspecified and Hx of cancer of lung were also pertinent to this visit. has a past medical history of AAA (abdominal aortic aneurysm) (HCC), Acid reflux, Anxiety and depression, Aortic stenosis, Arthritis, Blister of ankle, right, CAD (coronary artery disease), Cancer ([facility] COPD (chronic obstructive pulmonary disease) ([facility] Diabetes mellitus ([facility] [facility] Falls, Heart block, Hypokalemia, MDRO (multiple drug resistant organisms) resistance, MRSA (methicillin resistant staph aureus) culture positive, On home oxygen therapy, On home oxygen therapy, Overactive bladder, Pneumonia, PONV (postoperative nausea and vomiting), and Vitamin D deficiency. has a past surgical history that includes back surgery; eye surgery; Cholecystectomy; Appendectomy; Hysterectomy; Tonsillectomy; lumbar laminectomy; Endoscopy, colon, diagnostic (12/08/2016); aortic valve repair (N/A, 4/11/2017); bronchoscopy (N/A, 8/31/2020); IR INSERT PICC VAD W SQ PORT >5 YEARS (9/4/2020); and IR PORT PLACEMENT > 5 YEARS (9/29/2020). PER H&P: Elvin De La Paz is a 80 y.o.  female who presents with Extremity Weakness and Fall     Patient admitted through the emergency room where she presented via EMS with complaints of generalized weakness. Patient had fallen earlier this morning. At that time she had denied any loss of consciousness or any seizures. There was no bleeding either. Patient says she thinks she did strike her head somewhere. She also complained of pain in her head and left side of her neck.   Patient says she was able to get up on her own and was able to ambulate. However since then patient has been found to be confused.     She was visited by her home care nursing aide who found the patient confused and called 911. Patient shows intermittent labs and orientation in the emergency room. She was told she was given IV antibiotics but patient is adamant that she was not.      Patient denies any chest pain, dyspnea or orthopnea. Denies any nausea, vomiting or abdominal pain. Denies any diplopia or photophobia. Denies back pain. Denies rash or joint swelling     I have personally reviewed the past medical history, past surgical history, medications, social history, and family history, and summarized in the note. Restrictions  Restrictions/Precautions  Restrictions/Precautions: General Precautions, Fall Risk  Position Activity Restriction  Other position/activity restrictions: Up with asssit, telemetry, Maintain oxygen level above 90% but no higher than 94%, L chest port, LUE IV, ALARMS,    Subjective   General  Chart Reviewed: Yes  Patient assessed for rehabilitation services?: Yes  Family / Caregiver Present: No  Subjective  Subjective: \"keep the door open! I'm claustrophobic! It's my room, I am paying for it! \"  General Comment  Comments: Pt resting in bed, eating breakfast, easily agitiated.       Social/Functional History  Social/Functional History  Lives With: Alone  Type of Home: House  Home Layout: Able to Live on Main level with bedroom/bathroom, Multi-level  Home Access: Ramped entrance  Bathroom Shower/Tub: Walk-in shower  Bathroom Toilet: Standard  Bathroom Equipment: 3-in-1 commode, Shower chair, Grab bars around toilet  Home Equipment: Rolling walker, Alert [employer] Help From: Home health (HHA comes 3 days a week)  ADL Assistance: Needs assistance (HHA comes 3x/week to assist shower & some dressing)  Homemaking Assistance: Needs assistance (HHA asssits some, she can do some herself & Son assists food shoppping)  Transfer Assistance: Independent  Active : No  Patient's  Info: granddaughter  Occupation: Retired  Type of occupation: [employer]  Leisure & Hobbies: watch TV  IADL Comments: Question reliability of info pt reported as she is a poor historian with inconsistent answers to questions, will need verified for accuracy  Additional Comments: Pt initially denies falls in more than a year, then reports fall earlier this week when she fell going into building where her Dr office is located & entrance blocked + wind blowing(chart record also show pt fell day she was admitted). Pt later reported that the fall happened inside pts house. Pt goes for chemoTx monthly       Objective   Vision: Impaired  Vision Exceptions: Wears glasses at all times (pt reports she needs her eyes checked, needs new prescription)  Hearing: Within functional limits    Orientation  Overall Orientation Status: Within Functional Limits  Observation/Palpation  Posture: Fair  Observation: pt is easily agitated & quick to anger with bouts of tantrums at times if asked to do something she does not agree with  Edema: none       Balance  Sitting Balance: Stand by assistance  Standing Balance: Minimal assistance (with RW)  Standing Balance  Time: standing emely ~ 5 min  Activity: functional mobility  Functional Mobility  Functional - Mobility Device: Rolling Walker  Activity:  (bed to door, door to bedside chair, bedside chair to sink, sink to bedside chair, chair to bed)  Assist Level: Minimal assistance  Functional Mobility Comments: Pt required MOD verbal cues/tactile assist for RW safety, upright posture, scanning environment, pacing self, and slowing down movements all to increase safety. Toilet Transfers  Toilet Transfer: Unable to assess  Toilet Transfers Comments: Pt with no needs at this time.        ADL  Feeding: Setup  Grooming: Setup; Stand by assistance (to stand at sink with RW and complete oral care/face washing)  UE Bathing: Setup; Stand by assistance  LE Bathing: Setup; Moderate assistance  UE Dressing: Setup; Minimal assistance (to tie/adjust hosp gown while seated on EOB)  LE Dressing: Setup; Moderate assistance (with B socks)  Toileting: Minimal assistance       Tone RUE  RUE Tone: Normotonic  Tone LUE  LUE Tone: Normotonic  Coordination  Movements Are Fluid And Coordinated: Yes        Bed mobility  Supine to Sit: Contact guard assistance  Sit to Supine: Contact guard assistance (Attempted to have pt sit in bedside chair at end of session, initatitally pt agreeable but upon therapist departing pts room requested back to bed)  Scooting: Contact guard assistance  Comment: Pt requires Mod cues to slow down movements, pacing self and line mgmt all to increase safety and reduce risk of falls. Transfers  Sit to stand: Contact guard assistance  Stand to sit: Contact guard assistance  Transfer Comments: Pt required Mod verbal cues/tactile assist for RW safety, upright posture, squaring self/AD just to surface and reaching back prior to sitting, slowing down movements and line mgmt all to increase safety.  Pt can be impulsive and easily agitiated        Cognition  Overall Cognitive Status: Exceptions  Arousal/Alertness: Appropriate responses to stimuli  Following Commands: Inconsistently follows commands  Attention Span: Appears intact  Memory: Decreased recall of recent events  Safety Judgement: Decreased awareness of need for safety  Problem Solving: Decreased awareness of errors; Assistance required to identify errors made; Assistance required to correct errors made; Assistance required to implement solutions  Insights: Decreased awareness of deficits  Initiation: Does not require cues  Sequencing: Does not require cues        Sensation  Overall Sensation Status: WFL        LUE AROM (degrees)  LUE AROM : WFL  RUE AROM (degrees)  RUE AROM : WFL  LUE Strength  Gross LUE Strength: WFL  LUE Strength Comment: ~ 4/5  RUE Strength  Gross RUE Strength: WFL  RUE Strength Comment: ~ 4/5                   Plan   Plan  Times per week: 4-5x/wk 1x/day as emely  Current Treatment Recommendations: Strengthening, Neuromuscular Re-education, Patient/Caregiver Education & Training, Equipment Evaluation, Education, & procurement, Self-Care / ADL, Endurance Training, Balance Training, Functional Mobility Training, Safety Education & Training, Home Management Training, Pain Management                        AM-PAC Score        AM-PAC Inpatient Daily Activity Raw Score: 17 (11/26/21 1329)  AM-PAC Inpatient ADL T-Scale Score : 37.26 (11/26/21 1329)  ADL Inpatient CMS 0-100% Score: 50.11 (11/26/21 1329)  ADL Inpatient CMS G-Code Modifier : CK (11/26/21 1329)      Goals  Short term goals  Time Frame for Short term goals: By discharge, pt to demo  Short term goal 1: ADL transfers and functional mobility to SBA with use of AD as needed. Short term goal 2: toileting to SBA with use of AD/grab bars as needed. Short term goal 3: bed mobility to Independent without use of bedrails. Short term goal 4: UB ADLs to Set up and LB ADLs to Min A with use of AD/AE as needed. Short term goal 5: increased B UE strength by 1/2 grade to assist with functional tasks/I with B UE HEP with use of handouts as needed. Long term goals  Long term goal 1: Pt to be I with fall prevention education, EC/WS tech, recommendations for AE/discharge with use of handouts as needed. Patient Goals   Patient goals : To go home!        Therapy Time   Individual Concurrent Group Co-treatment   Time In 0809         Time Out 8779         Minutes 46          tx time: 39 min       Sommer Givens OT

## 2021-11-26 NOTE — PLAN OF CARE
Problem: Falls - Risk of:  Goal: Will remain free from falls  Description: Will remain free from falls  11/26/2021 0020 by Hortencia Funes RN  Outcome: Ongoing     Problem: Pain:  Goal: Pain level will decrease  Description: Pain level will decrease  11/26/2021 0020 by Hortencia Funes RN  Outcome: Ongoing     Problem: Sensory:  Goal: General experience of comfort will improve  Description: General experience of comfort will improve  11/26/2021 0020 by Hortencia Funes RN  Outcome: Ongoing     Problem: Falls - Risk of:  Goal: Absence of physical injury  Description: Absence of physical injury  11/26/2021 0020 by Hortencia Funes RN  Outcome: Ongoing

## 2021-11-26 NOTE — PROGRESS NOTES
Pt refusing IV antibiotic, even after explaining that she has a UTI and this is the treatment for it. States she is not taking it IV.  Call placed to Dr Sharifa Bruce, await return call

## 2021-11-26 NOTE — PROGRESS NOTES
[facility]    Adult Hospitalist      Name: Edwige Huddleston  MRN: [id number]     Acct: [de-identified]  Room: 2012/2012-02    Admit Date: 11/24/2021  1:08 PM  PCP: Lorie Miranda MD    Primary Problem  Active Problems:    UTI (urinary tract infection)  Resolved Problems:    * No resolved hospital problems. *        Assesment:     · Fall   · Encephalopathy  · Acute cystitis without hematuria  · Diabetes mellitus type 2  · Coronary artery disease, native vessel  · History of lung cancer  · Gastroesophageal reflux disease without esophagitis  · Essential hypertension  · Moderate persistent asthma/COPD  · Anxiety disorder  · Insomnia  · Osteoarthritis multiple joints  · Agitation        Plan:     · Admit to MedSurg  · Monitor vitals closely  · Keep SPO2 above 90%  · I's and O's  · IV fluids  · Pain control  · Rocephin IV daily  · Antiemetics as needed  · Resume essential home meds  · Accu-Cheks before meals and at bedtime  · Insulin sliding scale  · Hypoglycemia protocol  · CBC, BMP  · Incentive spirometry  · Seroquel    · DVT and GI prophylaxis.         Chief Complaint:     Chief Complaint   Patient presents with    Extremity Weakness    Fall         History of Present Illness:        Patient seen and examined at bedside  [address] daughter at bedside  Pt fully alert now  Feels tired    SW informs us that Hospital is out of network  Case mgmt has contacted [facility] for transfer  We are told they do not have a bed available  Will transfer a soon as bed available    Pt is improving well  May be ready for DC in am    Vitals have been stable  No hypoxia  In fact oxygen levels were 99% on nasal cannula O2  We have recommended to keep oxygen between 92 to 94%    Denies chest pain, wheezing, headache  Denies neck pain, fever, photophobia  Denies vomiting, diarrhea, constipation  Denies rash or joint swelling    Initial HPI  Edwige Huddleston is a 80 y.o.  female who presents with Extremity Weakness and Fall    Patient admitted through the emergency room where she presented via EMS with complaints of generalized weakness. Patient had fallen earlier this morning. At that time she had denied any loss of consciousness or any seizures. There was no bleeding either. Patient says she thinks she did strike her head somewhere. She also complained of pain in her head and left side of her neck. Patient says she was able to get up on her own and was able to ambulate. However since then patient has been found to be confused. She was visited by her home care nursing aide who found the patient confused and called 911. Patient shows intermittent labs and orientation in the emergency room. She was told she was given IV antibiotics but patient is adamant that she was not. Patient denies any chest pain, dyspnea or orthopnea. Denies any nausea, vomiting or abdominal pain. Denies any diplopia or photophobia. Denies back pain. Denies rash or joint swelling    I have personally reviewed the past medical history, past surgical history, medications, social history, and family history, and summarized in the note. Review of Systems:     All 10 point system is reviewed and negative otherwise mentioned in HPI. Past Medical History:     Past Medical History:   Diagnosis Date    AAA (abdominal aortic aneurysm) ([facility] [address]     Pt denies having a history of AAA    Acid reflux     Anxiety and depression     Aortic stenosis     Arthritis     Blister of ankle, right 10/13/2016    blister broke open & draining, is on antibiotics    CAD (coronary artery disease)     Cancer ([address]     lung cancer    COPD (chronic obstructive pulmonary disease) ([address]     Diabetes mellitus ([facility] [address] [facility]     Falls     Heart block     bifasicular    Hypokalemia     MDRO (multiple drug resistant organisms) resistance 10/17/2014    E.  Coli urine    MRSA (methicillin resistant staph aureus) culture positive resolved 12/2016    2 negative nasal screens - 2016 (hx in urine 2014)    On home oxygen therapy     uses 2 liters at night    On home oxygen therapy     patient states 2 liters/nasal cannula continuous    Overactive bladder     patient incont. wears a brief    Pneumonia     PONV (postoperative nausea and vomiting)     Vitamin D deficiency         Past Surgical History:     Past Surgical History:   Procedure Laterality Date    AORTIC VALVE REPAIR N/A 4/11/2017    AORTIC VALVE REPAIR REPLACEMENT performed by Fely Nelson MD at [facility] N/A 8/31/2020    BRONCHOSCOPY BIOPSY BRONCHUS performed by Vini Lindo MD at [facility], COLON, DIAGNOSTIC  12/08/2016    EYE SURGERY      HYSTERECTOMY      IR INS PICC VAD W SQ PORT GREATER THAN 5  9/4/2020    IR INS PICC VAD W SQ PORT GREATER THAN 5 9/4/2020 [facility]    IR PORT PLACEMENT EQUAL OR GREATER THAN 5 YEARS  9/29/2020    IR PORT PLACEMENT EQUAL OR GREATER THAN 5 YEARS 9/29/2020 MD JAY JAY Mir [facility]    LUMBAR LAMINECTOMY      TONSILLECTOMY          Medications Prior to Admission:       Prior to Admission medications    Medication Sig Start Date End Date Taking? Authorizing Provider   gabapentin (NEURONTIN) 400 MG capsule Take 400-800 mg by mouth See Admin Instructions.  Indications: 400mg AM, 400mg PM and 800mg HS 400mg AM, 400mg PM and 800mg HS    Historical Provider, MD   cephALEXin (KEFLEX) 500 MG capsule Take 500 mg by mouth 3 times daily    Historical Provider, MD   ipratropium-albuterol (DUONEB) 0.5-2.5 (3) MG/3ML SOLN nebulizer solution Inhale 1 vial into the lungs every 8 hours as needed for Shortness of Breath    Historical Provider, MD   rosuvastatin (CRESTOR) 5 MG tablet Take 5 mg by mouth daily    Historical Provider, MD   ondansetron (ZOFRAN) 4 MG tablet Take 1 tablet by mouth every 8 hours as needed for Nausea or Vomiting 10/15/21   Larisa Ordaz MD History:     Family History   Problem Relation Age of Onset    Cancer Mother     Cancer Father          Physical Exam:     Vitals:  /73   Pulse 89   Temp 98.6 °F (37 °C) (Oral)   Resp 17   Ht 5' 8\" (1.727 m)   Wt 173 lb (78.5 kg)   SpO2 94%   BMI 26.30 kg/m²   Temp (24hrs), Av.1 °F (36.7 °C), Min:97.9 °F (36.6 °C), Max:98.6 °F (37 °C)          General appearance - alert, well appearing, and in no acute distress  Mental status -oriented to person, place, and time with normal affect  Head - normocephalic and atraumatic  Eyes - pupils equal and reactive, extraocular eye movements intact, conjunctiva clear  Ears - hearing appears to be intact  Nose - no drainage noted  Mouth - mucous membranes moist  Neck - supple, no carotid bruits, thyroid not palpable  Chest - clear to auscultation, normal effort  Heart - normal rate, regular rhythm, no murmur  Abdomen - soft, nontender, nondistended, bowel sounds present all four quadrants, no masses, hepatomegaly or splenomegaly  Neurological - normal speech, no focal findings or movement disorder noted, cranial nerves II through XII grossly intact  Extremities - peripheral pulses palpable, no pedal edema or calf pain with palpation  Skin - no gross lesions, rashes, or induration noted        Data:     Labs:    Hematology:  Recent Labs     21  1403 21  0551 21  0656   WBC 7.5 7.3 6.4   RBC 3.47* 3.49* 3.42*   HGB 10.3* 10.3* 10.2*   HCT 32.3* 33.3* 32.8*   MCV 93.1 95.4 95.9   MCH 29.7 29.5 29.8   MCHC 31.9 30.9 31.1   RDW 17.0* 17.2* 17.3*    149 143   MPV 9.3 8.9 8.6     Chemistry:  Recent Labs     21  1403 21  1545 21  0551 21  0656     --  141 143   K 4.2  --  3.4* 4.2     --  103 103   CO2 23  --  24 26   GLUCOSE 103*  --  124* 103*   BUN 15  --  18 25*   CREATININE 0.89  --  1.01* 1.26*   ANIONGAP 12  --  14 14   LABGLOM >60  --  54* 42*   GFRAA >60  --  >60 51*   CALCIUM 8.8  --  8.8 8.4* TROPHS 18* 17*  --   --    MYOGLOBIN 142*  --   --   --      Recent Labs     11/24/21  1403 11/25/21  0551 11/25/21  1203 11/25/21  2146 11/26/21  0620 11/26/21  1047 11/26/21  1138   AMMONIA 16  --   --   --   --   --   --    POCGLU  --  118* 99 262* 100 151* 138*       Lab Results   Component Value Date    INR 1.0 01/06/2021    INR 0.9 09/29/2020    INR 1.1 08/28/2020    PROTIME 12.6 01/06/2021    PROTIME 11.8 09/29/2020    PROTIME 14.4 (H) 08/28/2020       Lab Results   Component Value Date/Time    SPECIAL NOT REPORTED 11/24/2021 06:32 PM     Lab Results   Component Value Date/Time    CULTURE NO GROWTH 2 DAYS 11/24/2021 06:32 PM       Lab Results   Component Value Date    POCPH 7.36 04/12/2017    PHART 7.42 08/26/2012    PH 7.22 04/11/2017    POCPCO2 45 04/12/2017    YFE9EQB 41 08/26/2012    PCO2 54 04/11/2017    POCPO2 93 04/12/2017    PO2ART 59 08/26/2012    PO2 89 04/11/2017    POCHCO3 25.3 04/12/2017    XZO1ORQ 26.1 08/26/2012    HCO3 22.2 04/11/2017    NBEA NOT REPORTED 04/12/2017    PBEA 0 04/12/2017    WQT8OIK 27 04/12/2017    TGTU3OKN 97 04/12/2017    H8ZSELBD 92.1 08/26/2012    O2SAT 95 04/11/2017    FIO2 UNKNOWN 10/31/2017       Radiology:    XR PELVIS (1-2 VIEWS)    Result Date: 11/24/2021  No acute osseous abnormality     CT HEAD WO CONTRAST    Result Date: 11/24/2021  No acute intracranial abnormality. CT CERVICAL SPINE WO CONTRAST    Result Date: 11/24/2021  1. Mild multilevel degenerative changes in the cervical spine, similar to 09/25/2020. 2. Cavitary spiculated mass lesion in the superomedial right upper lobe/right lung apex consistent with patient's known history of right upper lobe malignancy. 3. Atherosclerotic calcification of the vasculature. 4. No acute vertebral body height loss or malalignment within the cervical spine. XR CHEST PORTABLE    Result Date: 11/24/2021  1. Masslike opacity at the superomedial right upper lobe likely related to underlying malignancy.   Left-sided Port-A-Cath in place. 2. Stable mild airspace disease at the left base, atelectasis and/or infiltrate. Follow-up is recommended to document resolution. 3. Underlying COPD. No new focal airspace disease or pleural effusion. 4. Prior median sternotomy and CABG. All radiological studies reviewed                Code Status:  Full Code    Electronically signed by Kat Domínguez MD on 11/26/2021 at 4:17 PM     Copy sent to Dr. Barrera Jaimes MD    This note was created with the assistance of a speech-recognition program.  Although the intention is to generate a document that actually reflects the content of the visit, no guarantees can be provided that every mistake has been identified and corrected by editing. Note was updated later by me after  physical examination and  completion of the assessment.

## 2021-11-26 NOTE — PROGRESS NOTES
Physician Progress Note      PATIENT:               Christine Lynch  CSN #:                  [id number]  :                       1950  ADMIT DATE:       2021 1:08 PM  [address] [place] DATE:  RESPONDING  PROVIDER #:        Rich Grajeda MD        QUERY TEXT:    Type of Encephalopathy: Please provide further specificity, if known. Clinical indicators include: infection, encephalopathy, hypoglycemia, alcohol,   history of alcohol use, ammonia, fever  Options provided:  -- Anoxic/hypoxic encephalopathy  -- Metabolic encephalopathy  -- Toxic encephalopathy  -- Hepatic encephalopathy  -- Hypertensive encephalopathy  -- Other - I will add my own diagnosis  -- Disagree - Not applicable / Not valid  -- Disagree - Clinically Unable to determine / Unknown        PROVIDER RESPONSE TEXT:    The patient has toxic encephalopathy.       Electronically signed by:  Rich Grajeda MD 2021 4:15 PM

## 2021-11-26 NOTE — PLAN OF CARE
Problem: Falls - Risk of:  Goal: Will remain free from falls  Description: Will remain free from falls  Outcome: Ongoing  Note: Fall safety precautions maintained, alarms on, calls for assist appropriately     Problem: Pain:  Goal: Control of acute pain  Description: Control of acute pain  Outcome: Ongoing  Note: Pt reports adequate pain control with current meds

## 2021-11-27 VITALS
TEMPERATURE: 97.5 F | WEIGHT: 173 LBS | HEIGHT: 68 IN | SYSTOLIC BLOOD PRESSURE: 122 MMHG | BODY MASS INDEX: 26.22 KG/M2 | OXYGEN SATURATION: 100 % | HEART RATE: 83 BPM | DIASTOLIC BLOOD PRESSURE: 53 MMHG | RESPIRATION RATE: 16 BRPM

## 2021-11-27 LAB
ANION GAP SERPL CALCULATED.3IONS-SCNC: 15 MMOL/L (ref 9–17)
BUN BLDV-MCNC: 26 MG/DL (ref 8–23)
BUN/CREAT BLD: 22 (ref 9–20)
CALCIUM SERPL-MCNC: 8.6 MG/DL (ref 8.6–10.4)
CHLORIDE BLD-SCNC: 102 MMOL/L (ref 98–107)
CO2: 24 MMOL/L (ref 20–31)
CREAT SERPL-MCNC: 1.2 MG/DL (ref 0.5–0.9)
CULTURE: ABNORMAL
GFR AFRICAN AMERICAN: 54 ML/MIN
GFR NON-AFRICAN AMERICAN: 44 ML/MIN
GFR SERPL CREATININE-BSD FRML MDRD: ABNORMAL ML/MIN/{1.73_M2}
GFR SERPL CREATININE-BSD FRML MDRD: ABNORMAL ML/MIN/{1.73_M2}
GLUCOSE BLD-MCNC: 116 MG/DL (ref 65–105)
GLUCOSE BLD-MCNC: 135 MG/DL (ref 70–99)
GLUCOSE BLD-MCNC: 165 MG/DL (ref 65–105)
GLUCOSE BLD-MCNC: 83 MG/DL (ref 65–105)
HCT VFR BLD CALC: 34.4 % (ref 36.3–47.1)
HEMOGLOBIN: 10.7 G/DL (ref 11.9–15.1)
Lab: ABNORMAL
MCH RBC QN AUTO: 29.9 PG (ref 25.2–33.5)
MCHC RBC AUTO-ENTMCNC: 31.1 G/DL (ref 28.4–34.8)
MCV RBC AUTO: 96.1 FL (ref 82.6–102.9)
NRBC AUTOMATED: 0 PER 100 WBC
PDW BLD-RTO: 17.1 % (ref 11.8–14.4)
PLATELET # BLD: 133 K/UL (ref 138–453)
PMV BLD AUTO: 9.1 FL (ref 8.1–13.5)
POTASSIUM SERPL-SCNC: 3.8 MMOL/L (ref 3.7–5.3)
RBC # BLD: 3.58 M/UL (ref 3.95–5.11)
SODIUM BLD-SCNC: 141 MMOL/L (ref 135–144)
SPECIMEN DESCRIPTION: ABNORMAL
WBC # BLD: 5.3 K/UL (ref 3.5–11.3)

## 2021-11-27 PROCEDURE — 85027 COMPLETE CBC AUTOMATED: CPT

## 2021-11-27 PROCEDURE — 80048 BASIC METABOLIC PNL TOTAL CA: CPT

## 2021-11-27 PROCEDURE — 97116 GAIT TRAINING THERAPY: CPT

## 2021-11-27 PROCEDURE — 6370000000 HC RX 637 (ALT 250 FOR IP): Performed by: FAMILY MEDICINE

## 2021-11-27 PROCEDURE — 97112 NEUROMUSCULAR REEDUCATION: CPT

## 2021-11-27 PROCEDURE — 97110 THERAPEUTIC EXERCISES: CPT

## 2021-11-27 PROCEDURE — 97530 THERAPEUTIC ACTIVITIES: CPT

## 2021-11-27 PROCEDURE — 82947 ASSAY GLUCOSE BLOOD QUANT: CPT

## 2021-11-27 PROCEDURE — 36415 COLL VENOUS BLD VENIPUNCTURE: CPT

## 2021-11-27 RX ORDER — GABAPENTIN 400 MG/1
400 CAPSULE ORAL 2 TIMES DAILY
Qty: 60 CAPSULE | Refills: 0 | Status: SHIPPED | OUTPATIENT
Start: 2021-11-27 | End: 2022-03-18

## 2021-11-27 RX ORDER — CEPHALEXIN 500 MG/1
500 CAPSULE ORAL 3 TIMES DAILY
Qty: 21 CAPSULE | Refills: 0 | Status: SHIPPED | OUTPATIENT
Start: 2021-11-27 | End: 2021-12-04

## 2021-11-27 RX ORDER — CHOLECALCIFEROL (VITAMIN D3) 125 MCG
10 CAPSULE ORAL NIGHTLY
Qty: 30 TABLET | Refills: 0 | Status: SHIPPED | OUTPATIENT
Start: 2021-11-27 | End: 2022-03-18

## 2021-11-27 RX ADMIN — METFORMIN HYDROCHLORIDE 500 MG: 500 TABLET ORAL at 08:45

## 2021-11-27 RX ADMIN — GABAPENTIN 400 MG: 400 CAPSULE ORAL at 08:45

## 2021-11-27 RX ADMIN — PANTOPRAZOLE SODIUM 40 MG: 40 TABLET, DELAYED RELEASE ORAL at 08:53

## 2021-11-27 RX ADMIN — OXYCODONE AND ACETAMINOPHEN 1 TABLET: 325; 10 TABLET ORAL at 08:55

## 2021-11-27 RX ADMIN — FUROSEMIDE 40 MG: 40 TABLET ORAL at 08:45

## 2021-11-27 RX ADMIN — INSULIN LISPRO 2 UNITS: 100 INJECTION, SOLUTION INTRAVENOUS; SUBCUTANEOUS at 11:48

## 2021-11-27 RX ADMIN — GABAPENTIN 400 MG: 400 CAPSULE ORAL at 14:39

## 2021-11-27 RX ADMIN — GLIMEPIRIDE 1 MG: 1 TABLET ORAL at 08:45

## 2021-11-27 RX ADMIN — Medication 325 MG: at 08:45

## 2021-11-27 RX ADMIN — METOPROLOL TARTRATE 25 MG: 25 TABLET, FILM COATED ORAL at 08:45

## 2021-11-27 RX ADMIN — ARIPIPRAZOLE 5 MG: 5 TABLET ORAL at 08:45

## 2021-11-27 RX ADMIN — CEPHALEXIN 500 MG: 500 CAPSULE ORAL at 14:39

## 2021-11-27 RX ADMIN — VENLAFAXINE HYDROCHLORIDE 150 MG: 75 CAPSULE, EXTENDED RELEASE ORAL at 08:45

## 2021-11-27 RX ADMIN — CEPHALEXIN 500 MG: 500 CAPSULE ORAL at 08:45

## 2021-11-27 RX ADMIN — CLONAZEPAM 1 MG: 0.5 TABLET ORAL at 08:59

## 2021-11-27 ASSESSMENT — PAIN SCALES - GENERAL: PAINLEVEL_OUTOF10: 8

## 2021-11-27 NOTE — PROGRESS NOTES
[facility]    Adult Hospitalist      Name: Latha Lilly  MRN: [id number]     Acct: [de-identified]  Room: 2012/2012-02    Admit Date: 11/24/2021  1:08 PM  PCP: Braydon Solorzano MD    Primary Problem  Active Problems:    UTI (urinary tract infection)  Resolved Problems:    * No resolved hospital problems. *        Assesment:     · Fall   · Encephalopathy  · Acute cystitis without hematuria, sec E Coli  · BRAYAN  · Diabetes mellitus type 2  · Coronary artery disease, native vessel  · History of lung cancer  · Gastroesophageal reflux disease without esophagitis  · Essential hypertension  · Moderate persistent asthma/COPD  · Anxiety disorder  · Insomnia  · Osteoarthritis multiple joints  · Agitation        Plan:     · Admit to MedSurg  · Monitor vitals closely  · Keep SPO2 above 90%  · I's and O's  · IV fluids  · Pain control  · Rocephin IV daily  · Antiemetics as needed  · Resume essential home meds  · Accu-Cheks before meals and at bedtime  · Insulin sliding scale  · Hypoglycemia protocol  · CBC, BMP  · Incentive spirometry  · Seroquel    · C/s reviewed Cephalexin amongst most potent response  · DC planning   · Advised IS  · Advised BRAYAN - need to hold Lasix, Metformin and then resume w increased food intake  · Pt ate well today  · Rx  · Lab draw education  · Needs to follow up w PCP within a week   · Spent more than 35 min   · DVT and GI prophylaxis.         Chief Complaint:     Chief Complaint   Patient presents with    Extremity Weakness    Fall         History of Present Illness:        Patient seen and examined at bedside  Pt fully alert now  Pt is improving well  Discussed all above  Vitals have been stable  Denies chest pain, wheezing, headache  Denies neck pain, fever, photophobia  Denies vomiting, diarrhea, constipation  Denies rash or joint swelling    Initial HPI  Latha Lilly is a 80 y.o.  female who presents with Extremity Weakness and Fall    Patient admitted through the emergency room where she presented via EMS with complaints of generalized weakness. Patient had fallen earlier this morning. At that time she had denied any loss of consciousness or any seizures. There was no bleeding either. Patient says she thinks she did strike her head somewhere. She also complained of pain in her head and left side of her neck. Patient says she was able to get up on her own and was able to ambulate. However since then patient has been found to be confused. She was visited by her home care nursing aide who found the patient confused and called 911. Patient shows intermittent labs and orientation in the emergency room. She was told she was given IV antibiotics but patient is adamant that she was not. Patient denies any chest pain, dyspnea or orthopnea. Denies any nausea, vomiting or abdominal pain. Denies any diplopia or photophobia. Denies back pain. Denies rash or joint swelling    I have personally reviewed the past medical history, past surgical history, medications, social history, and family history, and summarized in the note. Review of Systems:     All 10 point system is reviewed and negative otherwise mentioned in HPI. Past Medical History:     Past Medical History:   Diagnosis Date    AAA (abdominal aortic aneurysm) ([facility]     Pt denies having a history of AAA    Acid reflux     Anxiety and depression     Aortic stenosis     Arthritis     Blister of ankle, right 10/13/2016    blister broke open & draining, is on antibiotics    CAD (coronary artery disease)     Cancer ([address]     lung cancer    COPD (chronic obstructive pulmonary disease) ([facility] [address]     Diabetes mellitus ([facility] [address] [facility]     Falls     Heart block     bifasicular    Hypokalemia     MDRO (multiple drug resistant organisms) resistance 10/17/2014    E.  Coli urine    MRSA (methicillin resistant staph aureus) culture positive resolved 12/2016    2 negative nasal screens - 2016 (hx in urine 2014)    On home oxygen site before chemo  Patient not taking: Reported on 10/27/2021 3/31/21   Ailyn Collins MD   oxyCODONE-acetaminophen (PERCOCET)  MG per tablet Take 1 tablet by mouth every 6 hours as needed for Pain. Historical Provider, MD   traZODone (DESYREL) 300 MG tablet Take 0.5 tablets by mouth nightly 11/12/20   Whitney Becerril MD   clonazePAM (KLONOPIN) 1 MG tablet Take 1 tablet by mouth 3 times daily as needed for Anxiety for up to 3 doses. 11/5/20 10/27/21  Wily Selby MD   ARIPiprazole (ABILIFY) 5 MG tablet Take 1 tablet by mouth daily for 3 doses 11/5/20 10/27/21  Wily Selby MD   furosemide (LASIX) 40 MG tablet Take 40 mg by mouth daily    Historical Provider, MD   melatonin 5 MG TABS tablet Take 20 mg by mouth nightly     Historical Provider, MD   glimepiride (AMARYL) 1 MG tablet Take 1-2 mg by mouth See Admin Instructions Indications: 2mg AM and 1mg PM 2mg AM and 1mg PM    Historical Provider, MD   metoprolol tartrate (LOPRESSOR) 25 MG tablet Take 25 mg by mouth 2 times daily    Historical Provider, MD   venlafaxine (EFFEXOR XR) 150 MG extended release capsule Take 150 mg by mouth 2 times daily    Historical Provider, MD   lansoprazole (PREVACID) 30 MG delayed release capsule Take 30 mg by mouth daily 11/10/16   Historical Provider, MD   metFORMIN (GLUCOPHAGE) 500 MG tablet Take 500 mg by mouth 2 times daily 12/7/16   Historical Provider, MD   aspirin EC 81 MG EC tablet Take 81 mg by mouth daily    Historical Provider, MD   mometasone-formoterol (DULERA) 200-5 MCG/ACT inhaler Inhale 2 puffs into the lungs every 12 hours as needed (wheezing/SOB)     Historical Provider, MD        Allergies:       Codeine and Dye [iodides]    Social History:     Tobacco:    reports that she quit smoking about 5 years ago. She has never used smokeless tobacco.  Alcohol:      reports no history of alcohol use. Drug Use:  reports no history of drug use.     Family History:     Family History   Problem Relation Age of Onset    Cancer Mother     Cancer Father          Physical Exam:     Vitals:  BP (!) 123/53   Pulse 81   Temp 97.7 °F (36.5 °C) (Oral)   Resp 16   Ht 5' 8\" (1.727 m)   Wt 173 lb (78.5 kg)   SpO2 94%   BMI 26.30 kg/m²   Temp (24hrs), Av.2 °F (36.8 °C), Min:97.7 °F (36.5 °C), Max:98.6 °F (37 °C)          General appearance - alert, well appearing, and in no acute distress  Mental status -oriented to person, place, and time with normal affect  Head - normocephalic and atraumatic  Eyes - pupils equal and reactive, extraocular eye movements intact, conjunctiva clear  Ears - hearing appears to be intact  Nose - no drainage noted  Mouth - mucous membranes moist  Neck - supple, no carotid bruits, thyroid not palpable  Chest - clear to auscultation, normal effort  Heart - normal rate, regular rhythm, no murmur  Abdomen - soft, nontender, nondistended, bowel sounds present all four quadrants, no masses, hepatomegaly or splenomegaly  Neurological - normal speech, no focal findings or movement disorder noted, cranial nerves II through XII grossly intact  Extremities - peripheral pulses palpable, no pedal edema or calf pain with palpation  Skin - no gross lesions, rashes, or induration noted        Data:     Labs:    Hematology:  Recent Labs     21  0551 21  0656 21  0644   WBC 7.3 6.4 5.3   RBC 3.49* 3.42* 3.58*   HGB 10.3* 10.2* 10.7*   HCT 33.3* 32.8* 34.4*   MCV 95.4 95.9 96.1   MCH 29.5 29.8 29.9   MCHC 30.9 31.1 31.1   RDW 17.2* 17.3* 17.1*    143 133*   MPV 8.9 8.6 9.1     Chemistry:  Recent Labs     21  1545 21  0551 21  0656 21  0644   NA  --  141 143 141   K  --  3.4* 4.2 3.8   CL  --  103 103 102   CO2  --   24   GLUCOSE  --  124* 103* 135*   BUN  --  18 25* 26*   CREATININE  --  1.01* 1.26* 1.20*   ANIONGAP  --  14 14 15   LABGLOM  --  54* 42* 44*   GFRAA  --  >60 51* 54*   CALCIUM  --  8.8 8.4* 8.6   TROPHS 17*  --   --   --      Recent Labs 11/26/21  1047 11/26/21  1138 11/26/21  1631 11/26/21  2027 11/27/21  0637 11/27/21  1117   POCGLU 151* 138* 122* 176* 116* 165*       Lab Results   Component Value Date    INR 1.0 01/06/2021    INR 0.9 09/29/2020    INR 1.1 08/28/2020    PROTIME 12.6 01/06/2021    PROTIME 11.8 09/29/2020    PROTIME 14.4 (H) 08/28/2020       Lab Results   Component Value Date/Time    SPECIAL NOT REPORTED 11/24/2021 06:32 PM     Lab Results   Component Value Date/Time    CULTURE NO GROWTH 3 DAYS 11/24/2021 06:32 PM       Lab Results   Component Value Date    POCPH 7.36 04/12/2017    PHART 7.42 08/26/2012    PH 7.22 04/11/2017    POCPCO2 45 04/12/2017    GQE8QQX 41 08/26/2012    PCO2 54 04/11/2017    POCPO2 93 04/12/2017    PO2ART 59 08/26/2012    PO2 89 04/11/2017    POCHCO3 25.3 04/12/2017    DJB4XJN 26.1 08/26/2012    HCO3 22.2 04/11/2017    NBEA NOT REPORTED 04/12/2017    PBEA 0 04/12/2017    INC2FYW 27 04/12/2017    KBKA5OCX 97 04/12/2017    T9QIJUGY 92.1 08/26/2012    O2SAT 95 04/11/2017    FIO2 UNKNOWN 10/31/2017       Radiology:    XR PELVIS (1-2 VIEWS)    Result Date: 11/24/2021  No acute osseous abnormality     CT HEAD WO CONTRAST    Result Date: 11/24/2021  No acute intracranial abnormality. CT CERVICAL SPINE WO CONTRAST    Result Date: 11/24/2021  1. Mild multilevel degenerative changes in the cervical spine, similar to 09/25/2020. 2. Cavitary spiculated mass lesion in the superomedial right upper lobe/right lung apex consistent with patient's known history of right upper lobe malignancy. 3. Atherosclerotic calcification of the vasculature. 4. No acute vertebral body height loss or malalignment within the cervical spine. XR CHEST PORTABLE    Result Date: 11/24/2021  1. Masslike opacity at the superomedial right upper lobe likely related to underlying malignancy. Left-sided Port-A-Cath in place. 2. Stable mild airspace disease at the left base, atelectasis and/or infiltrate.   Follow-up is recommended to document resolution. 3. Underlying COPD. No new focal airspace disease or pleural effusion. 4. Prior median sternotomy and CABG. All radiological studies reviewed                Code Status:  Full Code    Electronically signed by Maida Naik MD on 11/27/2021 at 3:07 PM     Copy sent to Dr. Wilner Redman MD    This note was created with the assistance of a speech-recognition program.  Although the intention is to generate a document that actually reflects the content of the visit, no guarantees can be provided that every mistake has been identified and corrected by editing. Note was updated later by me after  physical examination and  completion of the assessment.

## 2021-11-27 NOTE — PLAN OF CARE
Problem: Falls - Risk of:  Goal: Will remain free from falls  Description: Will remain free from falls  Outcome: Ongoing  Goal: Absence of physical injury  Description: Absence of physical injury  Outcome: Ongoing     Problem: Sensory:  Goal: General experience of comfort will improve  Description: General experience of comfort will improve  Outcome: Ongoing     Problem: Urinary Elimination:  Goal: Signs and symptoms of infection will decrease  Description: Signs and symptoms of infection will decrease  Outcome: Ongoing  Goal: Ability to reestablish a normal urinary elimination pattern will improve - after catheter removal  Description: Ability to reestablish a normal urinary elimination pattern will improve  Outcome: Ongoing  Goal: Complications related to the disease process, condition or treatment will be avoided or minimized  Description: Complications related to the disease process, condition or treatment will be avoided or minimized  Outcome: Ongoing     Problem: Pain:  Goal: Pain level will decrease  Description: Pain level will decrease  Outcome: Ongoing  Goal: Control of acute pain  Description: Control of acute pain  Outcome: Ongoing  Goal: Control of chronic pain  Description: Control of chronic pain  Outcome: Ongoing     Problem: Serum Glucose Level - Abnormal:  Goal: Ability to maintain appropriate glucose levels will improve  Description: Ability to maintain appropriate glucose levels will improve  Outcome: Ongoing   Patient is a fall risk during this admission. Fall risk assessment was performed. Patient is absent of falls. Bed is in the lowest position. Wheels on the bed are locked. Call light and bed side table are within reach. Clutter is removed. Patient was educated to call out when needing assistance or wanting to get out of bed. Patient offered toileting assistance during rounding. Hourly rounds have been performed.

## 2021-11-27 NOTE — DISCHARGE INSTR - COC
Continuity of Care Form    Patient Name: Sandra Canales   :  1950  MRN:  [id number]    Admit date:  2021  Discharge date:  2021     Code Status Order: Full Code   Advance Directives:      Admitting Physician:  Ekaterina Grajeda MD  PCP: Stephen Solo MD    Discharging Nurse: [facility] Unit/Room#:   Discharging Unit Phone Number: [phone]    Emergency Contact:   Extended Emergency Contact Information  Primary Emergency Contact: JoelKalen jiménez  Address: .            [address] Phone: [phone]  Mobile Phone: [phone]  Relation: Child  Secondary Emergency Contact: Kaleigh moulton  [place] Phone: [phone]  Mobile Phone: [phone]  Relation: Grandchild    Past Surgical History:  Past Surgical History:   Procedure Laterality Date    AORTIC VALVE REPAIR N/A 2017    AORTIC VALVE REPAIR REPLACEMENT performed by Carie Phan MD at [facility] N/A 2020    BRONCHOSCOPY BIOPSY BRONCHUS [facility] COLON, DIAGNOSTIC  2016    EYE SURGERY      HYSTERECTOMY      IR INS PICC VAD W SQ PORT GREATER THAN 5  2020    IR INS PICC VAD W SQ PORT GREATER THAN 5 2020 [facility]    IR PORT PLACEMENT EQUAL OR GREATER THAN 5 YEARS  2020    IR PORT PLACEMENT EQUAL OR GREATER THAN 5 YEARS 2020 Gayland Homans, MD [facility]    LUMBAR LAMINECTOMY      TONSILLECTOMY         Immunization History:   Immunization History   Administered Date(s) Administered    Tdap (Boostrix, Adacel) 10/31/2017       Active Problems:  Patient Active Problem List   Diagnosis Code    Valvular heart disease I38    Type 2 diabetes mellitus without complication, without long-term current use of insulin ([facility] [address] E11.9    Open wound of right ankle S91.001A    COPD (chronic obstructive pulmonary disease) ([facility] [address] J44.9    Syncope and collapse R55    Chronic ulcer of right heel ([facility]) L97.419    Multifocal pneumonia J18.9    Aortic valve stenosis I35.0    Esophageal dilatation K22.89    Aspiration pneumonia of both lower lobes due to gastric secretions ([facility]) J69.0    Falls frequently R29.6    Chronic respiratory failure ([facility]) J96.10    Contusion of right knee S80. 01XA    Closed fracture of right distal radius S52.501A    Subdural hemorrhage ([facility]) I62.00    Subarachnoid hemorrhage ([facility]) I60.9    Traumatic hemorrhage of cerebrum ([facility]) H01.571D    Chronic bilateral low back pain M54.50, G89.29    Cellulitis of both feet L03.115, L03.116    Acute on chronic congestive heart failure ([facility]) I50.9    Bilateral leg edema R60.0    Cellulitis L03.90    Lung mass R91.8    Malignant neoplasm of upper lobe of right lung ([facility]) C34.11    Urinary tract infectious disease N39.0    Closed fracture of one rib of right side S22.31XA    Degenerative disc disease, lumbar M51.36    Moderate malnutrition ([facility]) E44.0    UTI (urinary tract infection) N39.0       Isolation/Infection:   Isolation            No Isolation          Patient Infection Status       Infection Onset Added Last Indicated Last Indicated By Review Planned Expiration Resolved Resolved By    None active    Resolved    COVID-19 (Rule Out) 20 COVID-19 (Ordered)   20     COVID-19 (Rule Out) 20 COVID-19 (Ordered)   20 Rule-Out Test Resulted    MRSA  14 Veronica Thompson RN   17 Caro Parrish RN    2 negative nasal screens 10/2016 & 2016  Urine - 2014      MDRO (multi-drug resistant organism)  14 Veronica Thompson RN   10/01/20 Flynn Concepcion RN    E.  Coli - urine 10/2016              Nurse Assessment:  Last Vital Signs: BP (!) 137/92   Pulse 108   Temp 98.5 °F (36.9 °C) (Oral)   Resp 16   Ht 5' 8\" (1.727 m)   Wt 173 lb (78.5 kg)   SpO2 100%   BMI 26.30 kg/m² Last documented pain score (0-10 scale): Pain Level: 8  Last Weight:   Wt Readings from Last 1 Encounters:   11/24/21 173 lb (78.5 kg)     Mental Status:  oriented, alert, and coherent    IV Access:  - None    Nursing Mobility/ADLs:  Walking   Assisted  Transfer  Assisted  Bathing  Assisted  Dressing  Assisted  Toileting  [facility] Delivery   whole    Wound Care Documentation and Therapy:        Elimination:  Continence: Bowel: Yes  Bladder: Yes  Urinary Catheter: None   Colostomy/Ileostomy/Ileal Conduit: No       Date of Last BM: 11/27/2021   No intake or output data in the 24 hours ending 11/27/21 0511  No intake/output data recorded. Safety Concerns: At Risk for Falls    Impairments/Disabilities:      None    Nutrition Therapy:  Current Nutrition Therapy:   - Oral Diet:  Carb Control 3 carbs/meal (1500kcals/day)    Routes of Feeding: Oral  Liquids: No Restrictions  Daily Fluid Restriction: no  Last Modified Barium Swallow with Video (Video Swallowing Test): not done    Treatments at the Time of Hospital Discharge:   Respiratory Treatments: None  Oxygen Therapy:  is on oxygen at 2 L/min per nasal cannula.   Ventilator:    - No ventilator support    Rehab Therapies: Physical Therapy and Occupational Therapy  Weight Bearing Status/Restrictions: No weight bearing restirctions  Other Medical Equipment (for information only, NOT a DME order):  walker  Other Treatments:   1) SN for Assessment  2) SN for Medication Teaching & Compliance  3) HHA 3x/week    Patient's personal belongings (please select all that are sent with patient):  Glasses    RN SIGNATURE:  Electronically signed by Louie Reid RN on 11/27/21 at 4:26 PM EST    CASE MANAGEMENT/SOCIAL WORK SECTION    Inpatient Status Date: 11/24/2021    Readmission Risk Assessment Score:  Readmission Risk              Risk of Unplanned Readmission:  27           Discharging to Facility/ Agency   Name: [facility]  Address:  Phone: [phone]  Fax: [phone]    Dialysis Facility (if applicable)   Name:  Address:  Dialysis Schedule:  Phone:  Fax:    / signature: Electronically signed by Tanja Burleson RN on 11/27/21 at 3:36 PM EST    PHYSICIAN SECTION    Prognosis: Fair    Condition at Discharge: Stable    Rehab Potential (if transferring to Rehab): Fair    Recommended Labs or Other Treatments After Discharge: Have pt hold Lasix for a day. Ask to increase oral fluids    Physician Certification: I certify the above information and transfer of Indy Stephen  is necessary for the continuing treatment of the diagnosis listed and that she requires Home Care for greater 30 days.      Update Admission H&P: No change in H&P    PHYSICIAN SIGNATURE:  Electronically signed by Rayray Fry MD on 11/27/21 at 3:14 PM EST

## 2021-11-27 NOTE — PROGRESS NOTES
Physical Therapy  Facility/Department: [facility]  Daily Treatment Note  NAME: Latha Lilly  : 1950  MRN: [id number]    Date of Service: 2021    Discharge Recommendations:  Patient would benefit from continued therapy after discharge     Due to recent hospitalization and medical condition, pt would benefit from additional therapy at time of discharge to ensure safety. Please refer to the AM-PAC score for current functional status. Assessment   Body structures, Functions, Activity limitations: Decreased functional mobility ; Decreased safe awareness; Decreased balance; Decreased ADL status; Decreased endurance  Assessment: Patient has demo'd improve ambulation and activity tolerance with increased balance noted. Patient would benefit from continues skilled PT services as patient still remains a moderate fall risk. Prognosis: Good  Decision Making: Medium Complexity  PT Education: Goals; PT Role; Precautions; Functional Mobility Training; Transfer Training; Plan of Care; Gait Training; General Safety  Patient Education: Ed pt on functional mobility, safety awareness, importance of being up & OOB to regain strength, & prevention of sedentary complications, circulation ex's,  & optimal breathing techniques  REQUIRES PT FOLLOW UP: Yes  Activity Tolerance  Activity Tolerance: Treatment limited secondary to agitation     Patient Diagnosis(es): The primary encounter diagnosis was Fall, initial encounter. Diagnoses of Urinary tract infection without hematuria, site unspecified and Hx of cancer of lung were also pertinent to this visit.      has a past medical history of AAA (abdominal aortic aneurysm) (HCC), Acid reflux, Anxiety and depression, Aortic stenosis, Arthritis, Blister of ankle, right, CAD (coronary artery disease), Cancer ([address] COPD (chronic obstructive pulmonary disease) ([facility] [address] Diabetes mellitus ([facility] [address] Falls, Heart block, Hypokalemia, MDRO (multiple drug resistant organisms) resistance, MRSA (methicillin resistant staph aureus) culture positive, On home oxygen therapy, On home oxygen therapy, Overactive bladder, Pneumonia, PONV (postoperative nausea and vomiting), and Vitamin D deficiency. has a past surgical history that includes back surgery; eye surgery; Cholecystectomy; Appendectomy; Hysterectomy; Tonsillectomy; lumbar laminectomy; Endoscopy, colon, diagnostic (12/08/2016); aortic valve repair (N/A, 4/11/2017); bronchoscopy (N/A, 8/31/2020); IR INSERT PICC VAD W SQ PORT >5 YEARS (9/4/2020); and IR PORT PLACEMENT > 5 YEARS (9/29/2020). Restrictions  Restrictions/Precautions  Restrictions/Precautions: General Precautions, Fall Risk  Required Braces or Orthoses?: No  Position Activity Restriction  Other position/activity restrictions: Up with asssit, telemetry, Maintain oxygen level above 90% but no higher than 94%, L chest port, LUE IV, ALARMS,  Subjective   General  Chart Reviewed: Yes  Additional Pertinent Hx: chronic mechanical lumbar pain, discectomy, lung carcinoma, COPD, falls, home O2 use  Family / Caregiver Present: No  Subjective  Subjective: Patient agreeable for PT treatment. General Comment  Comments: RNWenceslao reports patient is medically stable.   Pain Screening  Patient Currently in Pain: Denies  Vital Signs  Patient Currently in Pain: Denies       Orientation  Orientation  Overall Orientation Status: Within Functional Limits  Cognition   Cognition  Overall Cognitive Status: Exceptions  Arousal/Alertness: Appropriate responses to stimuli  Following Commands: Inconsistently follows commands  Attention Span: Appears intact  Memory: Decreased recall of recent events  Safety Judgement: Decreased awareness of need for safety  Problem Solving: Decreased awareness of errors; Assistance required to identify errors made; Assistance required to correct errors made; Assistance required to implement solutions  Insights: Decreased awareness of deficits  Initiation: Does not require cues  Sequencing: Does not require cues  Objective   Bed mobility  Supine to Sit: Stand by assistance  Sit to Supine: Stand by assistance  Scooting: Stand by assistance  Comment: VCs for self pacing  Transfers  Sit to Stand: Contact guard assistance  Stand to sit: Contact guard assistance  Bed to Chair: Contact guard assistance  Stand Pivot Transfers: Contact guard assistance  Lateral Transfers: Contact guard assistance  Comment: Mod Ed + tactile assist on correct use of upper body for safe sit/stand + to back all way back to surface with walker close until she feels chair touch behind legs & to ensure she reaches with UB support to arms of chair  Ambulation  Ambulation?: Yes  More Ambulation?: No  Ambulation 1  Surface: level tile  Device: Rolling Walker  Assistance: Contact guard assistance  Quality of Gait: step to pattern VCs for improve postural control  Gait Deviations: Slow Gwendolyn; Decreased step length; Decreased step height  Distance: 150' x 1  Comments: Patient tolerated ambulation well and minimal fatigue. Patient requested to return to bed when her back is more comfortable. Stairs/Curb  Stairs?: No (Patient reports she has a second floor at home but has everything she needs on the 1st floor.)  Neuromuscular Education  Neuromuscular Comments: Standing balance activity as sink completing high level ADLs assessing balance. Balance fair+ required one hand to be on either sink or Rw at all times, VCs for correct RW placement to increase safety and increase fall prevention. Balance  Posture: Fair  Sitting - Static: Good  Sitting - Dynamic: Good  Standing - Static: Good; -  Standing - Dynamic: Fair; +  Single Leg Stance R Le  Single Leg Stance L Le  Comments: w/ RW for standing balance  Exercises  Comments: Reviewed seated HEP with patient, reports she has a copy for supine, seated and supine HEP at home from pervious admits and does not want another one.  Performed standing ousmane LE AROM x 10 reps with RW for UB support and Minimal assist to correct LOB. AM-PAC Score  AM-PAC Inpatient Mobility Raw Score : 18 (11/27/21 1032)  AM-PAC Inpatient T-Scale Score : 43.63 (11/27/21 1032)  Mobility Inpatient CMS 0-100% Score: 46.58 (11/27/21 1032)  Mobility Inpatient CMS G-Code Modifier : CK (11/27/21 1032)          Goals  Short term goals  Time Frame for Short term goals: 12 visits  Short term goal 1: Inc bed-mobility & transfers to independent to enable pt to safely get in/OOB & chair  Short term goal 2: Inc gait to amb 250ft or > indep w/ RW to enable pt to return to previous level of independence  Short term goal 3: Inc standing balance to good with device to facilitate pt independence for performance of ADL's & functional mobility, & reduce fall risk  Short term goal 4: Pt able to tolerate 30-40 min of activity to include 15-20 reps of ex, NMR & functional mobility to facilitate activity tolerance to [facility]  Short term goal 5: Ed pt on home ex's, safety & energy principles, fall prevention, & issue written home program  Patient Goals   Patient goals : [address]!     Plan    Plan  Times per week: 1-2x/D, 5-6D/week  Current Treatment Recommendations: Strengthening, Balance Training, Functional Mobility Training, Transfer Training, ADL/Self-care Training, Gait Training, Safety Education & Training, Home Exercise Program, Endurance Training  Safety Devices  Type of devices: Call light within reach, Gait belt, Patient at risk for falls, Left in chair, Nurse notified  Restraints  Initially in place: No     Therapy Time   Individual Concurrent Group Co-treatment   Time In 0925         Time Out 1021         Minutes [address] [place] [address]

## 2021-11-27 NOTE — PLAN OF CARE
Problem: Falls - Risk of:  Goal: Will remain free from falls  Description: Will remain free from falls  11/26/2021 2217 by Yahaira Porras RN  Outcome: Ongoing  11/26/2021 1629 by Noah Blank RN  Outcome: Ongoing  Note: Fall safety precautions maintained, alarms on, calls for assist appropriately  Goal: Absence of physical injury  Description: Absence of physical injury  Outcome: Ongoing     Problem: Sensory:  Goal: General experience of comfort will improve  Description: General experience of comfort will improve  Outcome: Ongoing     Problem: Urinary Elimination:  Goal: Signs and symptoms of infection will decrease  Description: Signs and symptoms of infection will decrease  Outcome: Ongoing  Goal: Ability to reestablish a normal urinary elimination pattern will improve - after catheter removal  Description: Ability to reestablish a normal urinary elimination pattern will improve  Outcome: Ongoing  Goal: Complications related to the disease process, condition or treatment will be avoided or minimized  Description: Complications related to the disease process, condition or treatment will be avoided or minimized  Outcome: Ongoing     Problem: Pain:  Goal: Pain level will decrease  Description: Pain level will decrease  Outcome: Ongoing  Goal: Control of acute pain  Description: Control of acute pain  11/26/2021 2217 by Yahaira Porras RN  Outcome: Ongoing  11/26/2021 1629 by Noah Blank RN  Outcome: Ongoing  Note: Pt reports adequate pain control with current meds  Goal: Control of chronic pain  Description: Control of chronic pain  Outcome: Ongoing     Problem: Serum Glucose Level - Abnormal:  Goal: Ability to maintain appropriate glucose levels will improve  Description: Ability to maintain appropriate glucose levels will improve  Outcome: Ongoing

## 2021-11-28 NOTE — DISCHARGE SUMMARY
[address] [place] [address].,    Adult Hospitalist      Patient ID: Darius Sanchez  MRN: [id number]     [place]:  [de-identified]       Patient's PCP: Yahir Hernandes MD    Admit Date: 11/24/2021     Discharge Date: 11/27/2021      Admitting Physician: Malachi Garrett MD    Discharge Physician: Malachi Garrett MD     CONSULTANTS: Patient Care Team:  Yahir Hernandes MD as PCP - General (Family Medicine)  Lauren Hayes RN as Nurse Navigator (Oncology)  Aure Sutton MD as Consulting Physician (Hematology and Oncology)  Angelo Dumas MD as Consulting Physician (Radiation Oncology)    PROCEDURES PERFORMED:     Active Discharge Diagnoses:  · Fall   · Toxic encephalopathy-improved  · Acute cystitis without hematuria, sec E Coli-improved  · BRAYAN-improved  · Diabetes mellitus type 2  · Coronary artery disease, native vessel  · History of lung cancer  · Gastroesophageal reflux disease without esophagitis  · Essential hypertension  · Moderate persistent asthma/COPD  · Anxiety disorder  · Insomnia  · Osteoarthritis multiple joints  · Agitation        Primary Problem  <principal problem not specified>    Hospital Course:   Darius Sanchez is a 80 y.o.  female who presents with Extremity Weakness and Fall     Patient admitted through the emergency room where she presented via EMS with complaints of generalized weakness. Patient had fallen earlier this morning. At that time she had denied any loss of consciousness or any seizures. There was no bleeding either. Patient says she thinks she did strike her head somewhere. She also complained of pain in her head and left side of her neck. Patient says she was able to get up on her own and was able to ambulate. However since then patient has been found to be confused.     She was visited by her home care nursing aide who found the patient confused and called 911. Patient shows intermittent labs and orientation in the emergency room.   She was told she was given IV antibiotics but patient is adamant that she was not.      Patient denies any chest pain, dyspnea or orthopnea. Denies any nausea, vomiting or abdominal pain. Denies any diplopia or photophobia. Denies back pain. Denies rash or joint swelling    Patient given IV fluids. Patient started on Rocephin IV. Antiemetics as needed. Home essential medication resumed. Accu-Cheks before meals and at bedtime and insulin sliding scale. Patient placed on hypoglycemia protocol. Patient developed agitation overnight and became very paranoid. She wanted to pull out her IV and get dressed and go home. She thought that people were trying to kill her. Patient was given Seroquel after which she became much more calm. Patient's mentation quickly cleared after that. Family were able to verify the. Patient was back to her baseline. Patient was given oral alternative antibiotic treatment. Patient continued to improve and was discharged home in stable condition     I have personally reviewed the past medical history, past surgical history, medications, social history, and family history, and summarized in the note. The plan was discussed in detail with patient who agreed with the plan and verbalized understanding . The patient was seen and examined on day of discharge and this discharge summary is in conjunction with any daily progress note from day of discharge.     Hospital Data:    Labs:    Hematology:  Recent Labs     11/26/21  0656 11/27/21  0644   WBC 6.4 5.3   RBC 3.42* 3.58*   HGB 10.2* 10.7*   HCT 32.8* 34.4*   MCV 95.9 96.1   MCH 29.8 29.9   MCHC 31.1 31.1   RDW 17.3* 17.1*    133*   MPV 8.6 9.1     Chemistry:  Recent Labs     11/26/21  0656 11/27/21  0644    141   K 4.2 3.8    102   CO2 26 24   GLUCOSE 103* 135*   BUN 25* 26*   CREATININE 1.26* 1.20*   ANIONGAP 14 15   LABGLOM 42* 44*   GFRAA 51* 54*   CALCIUM 8.4* 8.6     Recent Labs     11/26/21  1138 11/26/21  1631 11/26/21 2027 11/27/21  0637 11/27/21  1117 11/27/21  1555   POCGLU 138* 122* 176* 116* 165* 83     Lab Results   Component Value Date    INR 1.0 01/06/2021    INR 0.9 09/29/2020    INR 1.1 08/28/2020    PROTIME 12.6 01/06/2021    PROTIME 11.8 09/29/2020    PROTIME 14.4 (H) 08/28/2020     Lab Results   Component Value Date/Time    SPECIAL NOT REPORTED 11/24/2021 06:32 PM     Lab Results   Component Value Date/Time    CULTURE NO GROWTH 4 DAYS 11/24/2021 06:32 PM       Lab Results   Component Value Date    POCPH 7.36 04/12/2017    PHART 7.42 08/26/2012    PH 7.22 04/11/2017    POCPCO2 45 04/12/2017    TRT4ATD 41 08/26/2012    PCO2 54 04/11/2017    POCPO2 93 04/12/2017    PO2ART 59 08/26/2012    PO2 89 04/11/2017    POCHCO3 25.3 04/12/2017    YEH7AJC 26.1 08/26/2012    HCO3 22.2 04/11/2017    NBEA NOT REPORTED 04/12/2017    PBEA 0 04/12/2017    ZUY4VIP 27 04/12/2017    XFHQ9SPE 97 04/12/2017    L4FJEKKR 92.1 08/26/2012    O2SAT 95 04/11/2017    FIO2 UNKNOWN 10/31/2017       Radiology:    XR PELVIS (1-2 VIEWS)    Result Date: 11/24/2021  No acute osseous abnormality     CT HEAD WO CONTRAST    Result Date: 11/24/2021  No acute intracranial abnormality. CT CERVICAL SPINE WO CONTRAST    Result Date: 11/24/2021  1. Mild multilevel degenerative changes in the cervical spine, similar to 09/25/2020. 2. Cavitary spiculated mass lesion in the superomedial right upper lobe/right lung apex consistent with patient's known history of right upper lobe malignancy. 3. Atherosclerotic calcification of the vasculature. 4. No acute vertebral body height loss or malalignment within the cervical spine. XR CHEST PORTABLE    Result Date: 11/24/2021  1. Masslike opacity at the superomedial right upper lobe likely related to underlying malignancy. Left-sided Port-A-Cath in place. 2. Stable mild airspace disease at the left base, atelectasis and/or infiltrate. Follow-up is recommended to document resolution. 3. Underlying COPD.   No new focal airspace disease or pleural effusion. 4. Prior median sternotomy and CABG. All radiological studies reviewed      Reviews of Symptoms:    A 10 point system is reviewed and  negative except described in hospital course    Physical Exam:    Vitals:  BP (!) 122/53   Pulse 83   Temp 97.5 °F (36.4 °C) (Oral)   Resp 16   Ht 5' 8\" (1.727 m)   Wt 173 lb (78.5 kg)   SpO2 100%   BMI 26.30 kg/m²   No data recorded.       General appearance - alert, well appearing, and in no acute distress  Mental status - oriented to person, place, and time with normal affect  Head - normocephalic and atraumatic  Eyes - pupils equal and reactive, extraocular eye movements intact, conjunctiva clear  Ears - hearing appears to be intact  Nose - no drainage noted  Mouth - mucous membranes moist  Neck - supple, no carotid bruits, thyroid not palpable  Chest - clear to auscultation, normal effort  Heart - normal rate, regular rhythm, no murmur  Abdomen - soft, nontender, nondistended, bowel sounds present all four quadrants, no masses, hepatomegaly or splenomegaly  Neurological - normal speech, no focal findings or movement disorder noted, cranial nerves II through XII grossly intact  Extremities - peripheral pulses palpable, no pedal edema or calf pain with palpation  Skin - no gross lesions, rashes, or induration noted      Consults:  IP CONSULT TO INTERNAL MEDICINE    Disposition: Home    Discharged Condition: Stable    Follow Up: Gume Murdock MD  [address] [place] [address]  [phone]    In 1 week      [employer]  P: [phone]  F: [address]      Lab Frequency Next Occurrence   Urinalysis Reflex to Culture Once 12/08/2020   PET CT SKULL BASE TO MID THIGH Once 03/03/2021   PET CT SKULL BASE TO MID THIGH Once 08/25/2021   XR CHEST STANDARD (2 VW) Once 10/06/2021   PET CT SKULL BASE TO MID THIGH Once 10/27/2021   CT NEEDLE BIOPSY LUNG PERCUTANEOUS Once 01/91/5314   Basic Metabolic Panel Once 12/04/2021   TSH     CBC With Auto Differential     Comprehensive Metabolic Panel     Hemoglobin and Hematocrit, Blood, Post Transfusion POST TRANSFUSION          Diet: No diet orders on file    Discharge Medications:   @DISCHARGEMEDSLIST(<NOROUTINE> error)@    Code Status:  Prior    Time Spent on discharge is  35 mins in patient examination, evaluation, counseling as well as medication reconciliation, prescriptions for required medications, discharge plan and follow up. Electronically signed by Kael Lott MD on 11/28/2021 at 6:16 PM     Thank you Dr. Oswald Shrestha MD for the opportunity to be involved in this patient's care. This note was created with the assistance of a speech-recognition program.  Although the intention is to generate a document that actually reflects the content of the visit, no guarantees can be provided that every mistake has been identified and corrected by editing. Note was updated later by me after  physical examination and  completion of the assessment.

## 2021-11-30 ENCOUNTER — TELEPHONE (OUTPATIENT)
Dept: CASE MANAGEMENT | Age: 71
End: 2021-11-30

## 2021-11-30 LAB
CULTURE: NORMAL
CULTURE: NORMAL
Lab: NORMAL
Lab: NORMAL
SPECIMEN DESCRIPTION: NORMAL
SPECIMEN DESCRIPTION: NORMAL

## 2021-11-30 NOTE — TELEPHONE ENCOUNTER
Name: Indy Stephen  : 1950  MRN: [id number]    Oncology Navigation Follow-Up Note  Navigator left VM on [phone] line to have pt.  Rescheduled for MO F/U and PET  Electronically signed by Thompson Villalta RN on 2021 at 10:43 AM

## 2021-12-01 ENCOUNTER — TELEPHONE (OUTPATIENT)
Dept: ONCOLOGY | Age: 71
End: 2021-12-01

## 2021-12-01 ENCOUNTER — TELEPHONE (OUTPATIENT)
Dept: CASE MANAGEMENT | Age: 71
End: 2021-12-01

## 2021-12-01 ENCOUNTER — HOSPITAL ENCOUNTER (OUTPATIENT)
Facility: MEDICAL CENTER | Age: 71
End: 2021-12-01

## 2021-12-01 ENCOUNTER — OFFICE VISIT (OUTPATIENT)
Dept: ONCOLOGY | Age: 71
End: 2021-12-01
Payer: MEDICARE

## 2021-12-01 VITALS
SYSTOLIC BLOOD PRESSURE: 158 MMHG | HEART RATE: 111 BPM | BODY MASS INDEX: 26.06 KG/M2 | TEMPERATURE: 99.1 F | DIASTOLIC BLOOD PRESSURE: 88 MMHG | RESPIRATION RATE: 18 BRPM | WEIGHT: 171.4 LBS

## 2021-12-01 DIAGNOSIS — C34.91 NON-SMALL CELL CANCER OF RIGHT LUNG (HCC): ICD-10-CM

## 2021-12-01 PROCEDURE — 99215 OFFICE O/P EST HI 40 MIN: CPT | Performed by: INTERNAL MEDICINE

## 2021-12-01 PROCEDURE — 99211 OFF/OP EST MAY X REQ PHY/QHP: CPT | Performed by: INTERNAL MEDICINE

## 2021-12-01 RX ORDER — ONDANSETRON 4 MG/1
4 TABLET, FILM COATED ORAL EVERY 8 HOURS PRN
Qty: 30 TABLET | Refills: 3 | Status: SHIPPED | OUTPATIENT
Start: 2021-12-01 | End: 2022-01-26 | Stop reason: SDUPTHER

## 2021-12-01 NOTE — TELEPHONE ENCOUNTER
Ada Dawn MD VISIT  CT LUNG BIOPSY THIS WK OR NEXT WK  [address] AFTER BIOPSY  CALLED [address] @ [phone] AFTER DR Gabriella Campoverde HIS MD NOTES PLEASE FAX THE ORDER AND MD NOTES -[phone] SO THEY CAN SCHEDULE AN APPT W/ PT  MD VISIT TBD ONCE THE BIOPSY IS SCHEDULED  AVS PRINTED W/ INSTRUCTIONS AND MAILED  TO PT

## 2021-12-01 NOTE — TELEPHONE ENCOUNTER
Name: Haley Charlton  : 1950  MRN: [id number]    Oncology Navigation Follow-Up Note  Navigator spoke with Dr. Jennifer Lackey wanting to know if needing PET rescheduled? Pt. Had PET in November at [facility] and this will be sufficient. Await biopsy for further POC . Plan to send for PDL1 if positive. If unable to Radiate, MO may plan infusion?    Electronically signed by Areli Fernandez RN on 2021 at 3:19 PM

## 2021-12-03 NOTE — PROGRESS NOTES
Clinically she is feeling better. She denies any fever chills, nausea matting, abdominal pain. During this visit patient's allergy, social, medical, surgical history and medications were reviewed and updated. HISTORY OF PRESENT ILLNESS:    Rudy Haskins is a 70-year-old female with a past medical history of COPD, history of tobacco abuse, depression, CAD, arthritis was admitted with multiple falls at home. She complains of back pain and also chronic cough. On admission she had a CT scan of the chest which showed 7.2 cm spiculated mass in the right upper lobe with mediastinal lymphadenopathy suspicious for malignancy. 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 Oncology consulted for further evaluation. Patient has COPD and uses Home oxygen at night and sues walker at home. She smokes more than a PPD. 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 Past Medical History:   has a past medical history of AAA (abdominal aortic aneurysm) ([facility] [address] Acid reflux, Anxiety and depression, Aortic stenosis, Arthritis, Blister of ankle, right, CAD (coronary artery disease), Cancer ([address] COPD (chronic obstructive pulmonary disease) ([address] Diabetes mellitus ([address] Falls, Heart block, Hypokalemia, MDRO (multiple drug resistant organisms) resistance, MRSA (methicillin resistant staph aureus) culture positive, On home oxygen therapy, On home oxygen therapy, Overactive bladder, Pneumonia, PONV (postoperative nausea and vomiting), and Vitamin D deficiency. Past Surgical History:   has a past surgical history that includes back surgery; eye surgery; Cholecystectomy; Appendectomy; Hysterectomy; Tonsillectomy; lumbar laminectomy; Endoscopy, colon, diagnostic (12/08/2016); aortic valve repair (N/A, 4/11/2017); bronchoscopy (N/A, 8/31/2020);  IR INSERT PICC VAD W SQ PORT >5 YEARS (9/4/2020); and IR PORT PLACEMENT > 5 YEARS (9/29/2020). Medications:    Current Outpatient Medications   Medication Sig Dispense Refill    ondansetron (ZOFRAN) 4 MG tablet Take 1 tablet by mouth every 8 hours as needed for Nausea or Vomiting 30 tablet 3    cephALEXin (KEFLEX) 500 MG capsule Take 1 capsule by mouth 3 times daily for 7 days 21 capsule 0    melatonin 5 MG TABS tablet Take 2 tablets by mouth nightly (Patient taking differently: Take 15 mg by mouth nightly ) 30 tablet 0    gabapentin (NEURONTIN) 400 MG capsule Take 1 capsule by mouth 2 times daily for 30 days. Indications: 400mg AM, 400mg PM and 800mg HS 400mg AM, 400mg PM and 800mg HS 60 capsule 0    ipratropium-albuterol (DUONEB) 0.5-2.5 (3) MG/3ML SOLN nebulizer solution Inhale 1 vial into the lungs every 8 hours as needed for Shortness of Breath      rosuvastatin (CRESTOR) 5 MG tablet Take 5 mg by mouth daily      oxyCODONE-acetaminophen (PERCOCET)  MG per tablet Take 1 tablet by mouth every 6 hours as needed for Pain.  traZODone (DESYREL) 300 MG tablet Take 0.5 tablets by mouth nightly 30 tablet 0    clonazePAM (KLONOPIN) 1 MG tablet Take 1 tablet by mouth 3 times daily as needed for Anxiety for up to 3 doses.  3 tablet 0    ARIPiprazole (ABILIFY) 5 MG tablet Take 1 tablet by mouth daily for 3 doses 3 tablet 0    furosemide (LASIX) 40 MG tablet Take 40 mg by mouth daily      glimepiride (AMARYL) 1 MG tablet Take 1-2 mg by mouth See Admin Instructions Indications: 2mg AM and 1mg PM 2mg AM and 1mg PM      metoprolol tartrate (LOPRESSOR) 25 MG tablet Take 25 mg by mouth 2 times daily      venlafaxine (EFFEXOR XR) 150 MG extended release capsule Take 150 mg by mouth 2 times daily      lansoprazole (PREVACID) 30 MG delayed release capsule Take 30 mg by mouth daily      aspirin EC 81 MG EC tablet Take 81 mg by mouth daily      mometasone-formoterol (DULERA) 200-5 MCG/ACT inhaler Inhale 2 puffs into the lungs every 12 hours as needed (wheezing/SOB)       lidocaine-prilocaine (EMLA) 2.5-2.5 % cream To port site before chemo (Patient not taking: Reported on 10/27/2021) 1 Tube 1     No current facility-administered medications for this visit. Allergies:  Codeine and Dye [iodides]    Social History:   reports that she quit smoking about 5 years ago. She has never used smokeless tobacco. She reports that she does not drink alcohol and does not use drugs. Family History: family history includes Cancer in her father and mother. REVIEW OF SYSTEMS:    Constitutional: ++fatigue, No fever or chills. No night sweats, no weight loss   Eyes: No eye discharge, double vision, or eye pain   HEENT: negative for sore mouth, sore throat, hoarseness and voice change   Respiratory: negative for cough , sputum, ++dyspnea, wheezing, hemoptysis, chest pain   Cardiovascular: negative for chest pain, dyspnea, palpitations, orthopnea, PND   Gastrointestinal: negative for nausea, vomiting, diarrhea, constipation, abdominal pain, Dysphagia, hematemesis and hematochezia   Genitourinary: negative for frequency, dysuria, nocturia, urinary incontinence, and hematuria   Integument: negative for rash, skin lesions, bruises.    Hematologic/Lymphatic: negative for easy bruising, bleeding, lymphadenopathy, or petechiae   Endocrine: negative for heat or cold intolerance,weight changes, change in bowel habits and hair loss   Musculoskeletal: negative for myalgias, arthralgias, pain, joint swelling,and bone pain   Neurological: negative for headaches, dizziness, seizures, weakness, numbness    PHYSICAL EXAM:      BP (!) 158/88   Pulse 111   Temp 99.1 °F (37.3 °C)   Resp 18   Wt 171 lb 6.4 oz (77.7 kg)   BMI 26.06 kg/m²    Temp (24hrs), Av.7 °F (36.5 °C), Min:97.3 °F (36.3 °C), Max:98.1 °F (36.7 °C)  General appearance - well appearing, no in pain or distress   Mental status - alert and cooperative   Eyes - pupils equal and reactive, extraocular eye movements intact   Ears - bilateral TM's and external ear canals normal   Mouth - mucous membranes moist, pharynx normal without lesions   Neck - supple, no significant adenopathy   Lymphatics - no palpable lymphadenopathy, no hepatosplenomegaly   Chest - clear to auscultation, no wheezes, rales or rhonchi, symmetric air entry   Heart - normal rate, regular rhythm, normal S1, S2, no murmurs  Abdomen - soft, nontender, nondistended, no masses or organomegaly   Neurological - alert, oriented, normal speech, no focal findings or movement disorder noted   Musculoskeletal - no joint tenderness, deformity or swelling   Extremities - peripheral pulses normal, no pedal edema, no clubbing or cyanosis   Skin - normal coloration and turgor, no rashes, no suspicious skin lesions noted ,    DATA:    Labs:   CBC:   No results for input(s): WBC, HGB, HCT, PLT in the last 72 hours. BMP:   No results for input(s): NA, K, CO2, BUN, CREATININE, LABGLOM, GLUCOSE in the last 72 hours. PT/INR: No results for input(s): PROTIME, INR in the last 72 hours. Surgical Pathology Report   Surgical Pathology   Collected:  08/31/20 0803    Lab status:  Final    Resulting lab:  [facility]    Value:  -- Diagnosis --     BRONCHIAL WASHINGS RIGHT UPPER LOBE:          POSITIVE FOR MALIGNANCY.        SQUAMOUS CELL CARCINOMA.           COMMENT: VOLUME OF TUMOR IN THE CELL BLOCK IS LOW AND ADDITIONAL   MATERIAL WOULD BE REQUIRED IF MOLECULAR TESTING IS NECESSARY. IMAGING DATA:  CT chest 8/27/2020:   FINDINGS:    Mediastinum: Three vessel coronary artery calcification.  Newly enlarged    mediastinal lymph nodes including example station 7 node measuring 3.1 x 2.1    cm on series 2, image 45.         Lungs/pleura: Mild upper lobe predominant centrilobular emphysema. Algis Broaden Spiculated mass of the right upper lobe measuring 7.2 x 6.5 cm with    broad-base of contact with the mediastinal pleura, right major fissure,    encircling and obliterating the right upper lobe bronchus.  Spicules are seen    to extend to the anterior costal pleura.  There is adjacent bronchial wall    thickening and interlobular septal thickening.  Stable 5 mm solid nodule of    the left upper lobe since 2016, benign.  No pleural effusion or pneumothorax.         Upper Abdomen: Adrenals are unremarkable.         Soft Tissues/Bones: Old right rib fractures.              Impression    Approximate 7.2 cm spiculated mass of the right upper lobe likely with    invasion of the mediastinum, adjacent lymphangitic spread and suspected    metastatic mediastinal lymphadenopathy. Scan 9/11/2020: Impression    1. The patient's known right upper lobe lung mass is FDG avid consistent with    the patient's primary malignancy. 2. FDG avid mediastinal lymph nodes consistent with metastatic disease. 3. No abnormal FDG activity is identified involving the abdomen or pelvis. PET 2/3/21  IMPRESSION:   *  Unexpected findings of emphysematous cystitis. *  Positive response to therapy with markedly decreased size and FDG avidity of right upper lobe mass and mediastinal adenopathy. *  Healing right rib fractures.     Ct chest 6/14/21  FINDINGS:     Emphysema.  Spiculated mass in the medial right upper lobe which appears to invade into the upper right mediastinum measures 5.2 x 3.4 cm (4.4 x 1.8 cm previously).  Malignant matted lymphadenopathy in the subcarinal and right paratracheal regions is unchanged.  Noncalcified left upper lobe lung   nodule measuring 0.6 cm is unchanged.  There is a 1.4 cm nodular opacity in the lingula that appear slightly larger than on previous study.  Left chest port in place.  Atherosclerotic thoracic aorta.  Visualized structures in the upper abdomen are unremarkable.  Normal heart size.  No pleural or   pericardial effusions.  The central pulmonary arteries are unremarkable.  Extensive coronary artery calcifications.  Status post median sternotomy.  Wall thickening of the esophagus.  Visualized chest wall structures are unremarkable.  There are multiple healing and/or old rib fractures. IMPRESSION:   1.  Increase in size of spiculated malignant mass in the medial right upper lobe measuring 5.2 x 3.4 cm, which shows evidence of mediastinal invasion.  Matted metastatic lymphadenopathy in the subcarinal and right paratracheal regions is unchanged. 2.  Unchanged noncalcified left upper lobe nodule measuring 0.6 cm.  A 1.4 cm nodular opacity in the lingula appears slightly larger than on previous study. 3.  Wall thickening of esophagus suggestive of esophagitis. Ct abd  6/13/21  CT images of the abdomen and pelvis are obtained without contrast.  There is a 1.8 cm soft tissue density arising from or directly adjacent to the uncinate process of the pancreas.  Consider contrast-enhanced CT or MRI for further evaluation.  Bowel is unremarkable. Shelda Clock is no intraperitoneal or   retroperitoneal lymphadenopathy.  Colonic stool burden is moderate.  The adrenals appear symmetric.  Images the lung bases are grossly clear.  There appear to be remote right rib fractures.  Remote left rib fracture also noted.  There is no hepatic or splenic abnormality.  No hydronephrosis is seen. IMPRESSION:     1.8 cm soft tissue density arising from, or directly adjacent to, the uncinate process of the pancreas.  Contrast-enhanced CT or MRI recommended. CT Chest 6/14/21  IMPRESSION:     1.  Increase in size of spiculated malignant mass in the medial right upper lobe measuring 5.2 x 3.4 cm, which shows evidence of mediastinal invasion.  Matted metastatic lymphadenopathy in the subcarinal and right paratracheal regions is unchanged. 2.  Unchanged noncalcified left upper lobe nodule measuring 0.6 cm.  A 1.4 cm nodular opacity in the lingula appears slightly larger than on previous study. 3.  Wall thickening of esophagus suggestive of esophagitis.      Ct abd  Pelvis 6/13/21  CT images of the abdomen and pelvis are obtained without contrast.  There is a 1.8 cm soft tissue density arising from or directly adjacent to the uncinate process of the pancreas.  Consider contrast-enhanced CT or MRI for further evaluation.  Bowel is unremarkable. Ochoa Laredo is no intraperitoneal or   retroperitoneal lymphadenopathy.  Colonic stool burden is moderate.  The adrenals appear symmetric.  Images the lung bases are grossly clear.  There appear to be remote right rib fractures.  Remote left rib fracture also noted.  There is no hepatic or splenic abnormality.  No hydronephrosis is seen. IMPRESSION:   1.8 cm soft tissue density arising from, or directly adjacent to, the uncinate process of the pancreas.  Contrast-enhanced CT or MRI recommended. PET scan 7/2/2021  FINDINGS:   Normal FDG uptake is seen in brain, oral pharyngeal region, myocardium, liver, spleen, bowel, kidneys/ureters and urinary bladder. Head and neck:   No suspicious hypermetabolic activity. No lymphadenopathy. Chest:   Relatively unchanged spiculated mass within the right upper lobe measuring 4.7 x 3.0 cm when compared to recent CT from June 2021. Ochoa Laredo has been significant increase in size compared to prior PET/CT from February 2021 with new invasion into the right mediastinum.  There is associated increased FDG  PET activity measuring with a max SUV of 16.2, previously 3.1. Unchanged opacities along the posterior lateral right upper lobe.  Minimally increased activity is noted in this area. Improving nodular opacity within the lingula with no associated increased metabolic activity. No significant activity is noted within the previously seen mediastinal lymph nodes.  There is mild diffuse uptake throughout the esophagus with a thickened wall.  This is similar to prior CT in June 2021. Very severe calcification involving the left coronary, LAD, circumflex, ramus and right coronary.      Abdomen:   Adrenals are normal.  Diffuse uptake is noted throughout the colon with no focal areas appreciated.  No increased activity is noted at the soft tissue density adjacent to the uncinate process of the pancreas as seen on recent CT. Heavy intra-abdominal vascular calcifications. Persistent irregular appearance of the bladder contour with a small amount of intraluminal gas.  No emphysematous change within the bladder wall is currently visualized. Musculoskeletal system:   No suspicious hypermetabolic activity. No lymphadenopathy. Multiple bilateral chronic rib fractures.  Old transverse process fracture at L2 No focal osseous uptake. IMPRESSION:   *  Hypermetabolic right upper lobe spiculated mass compatible with patient's known neoplasm. *  Low level increased activity within the posterior lateral right upper lobe opacities most likely representing scarring from prior radiation. *  Diffuse mild uptake through the esophagus with persistent abnormal wall thickening suggestive of esophagitis. *  Nonfocal diffuse uptake throughout the colon which can be seen with certain medications such as metformin. *  Abnormal appearance to the bladder.  The bladder is distended and contains intraluminal gas which may be due to recent instrumentation and/or infection. Shelda Clock is also small amount of gas seen within the wall of the bladder.  This raises the possibility of emphysematous cystitis.  However, the   amount of emphysematous changes has improved.  As Correlation clinical exam recommended. *  Severe multivessel coronary artery calcification   PET scan 9/3/2021  IMPRESSION:     1.  There is a hypermetabolic pulmonary neoplasm involving the right upper lobe medially.  There has been interval improvement with a decrease in the size and the an intensity since prior study of 7/1/2021.  Findings are suggestive of partial response.  It has 9.8 SUV compared to the 16.1 SUV on   prior study.    2.  Focal small opacity with FDG uptake at the right lung base.  This is a new uptake.  This could be a focal infiltrate, atelectasis or satellite lesion.  Follow-up CT scan examination is recommended. 3. Melita Iram is a stable linear mildly FDG avid abnormality in the lateral portion of the right upper mid lung field.  Abnormalities from parenchymal scarring. 4.  No evidence of distance metastasis in the neck, abdomen and pelvis and visualized skeleton. PET scan 11/15/2021  FINDINGS:   Neck:   No significant hypermetabolic foci. Major vascular structures are unremarkable. Dental amalgam artifact is present. Nonhypermetabolic small lymph nodes. Nonhypermetabolic homogeneous thyroid. Thorax: There continues to be a hypermetabolic focus in the medial right apex similar in size and avidity from prior study with maximum SUV of 10.8.  The previously seen hypermetabolic focus adjacent to the diaphragm is no longer visualized.  However, there is a nonspecific area of soft tissue irregularity   in the posterior right lung base with maximal activity of 2.7, indeterminate   Heart and supracardiac vessels are normal. Atherosclerotic calcifications. No hypermetabolic mediastinal adenopathy. No pleural or pericardial effusion. Adequate lung volumes with satisfactory inflation. Abdomen:   No significant hypermetabolic foci. Liver, spleen, pancreas, gall bladder and great abdominal vessels are normal. Kidneys are normal. Anterior abdominal wall is intact. No hypermetabolic lymph nodes. Pelvis:   No significant hypermetabolic foci. Anterior and inner pelvic walls are intact. Pelvic viscera are normal. No significant infradiaphragmatic free air. No free pelvic fluid. No hypermetabolic lymph nodes. Atherosclerotic calcifications. Osseous:   No significant hypermetabolic foci. Intact pelvis and hip girdles. Dorsolumbar spondylosis with no lytic or blastic foci. Intact ribs and shoulder girdles.      IMPRESSION:   Stable area of malignancy within the right apex.  Interval resolution of activity adjacent to diaphragm with development of indeterminate activity in the posterior lung base. IMPRESSION:   1. 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 PET scan negative for distant metastasis: Clinical stage T4 N2 M0, stage IIIb  2. Anemia  3. Leukopenia: chemo related  4. COPD  5. Falls  6. CAD  7. Tobacco abuse  8. Actinomyces bacteremia  9. Right lower extremity cellulitis    RECOMMENDATIONS:  1. I reviewed the laboratory data,  diagnosis, prognosis and treatment options with patient  2. Her recent lab work showed improved hemoglobin at 10.7  3. I discussed the results of recent PET scan with patient and also had discussion with radiation oncology  4. As per radiation oncology patient can be a candidate for radiation given her recent scan showing continued hypermetabolic focus in the right middle apex. Although the size of the lesion appears similar, as per radiation oncology biopsy would help assessing any residual disease to discuss risk and benefit from additional radiation therapy  5. I reviewed the risk benefits with patient and she is agreeable. 6. Patient to proceed with biopsy of the residual focus of lung lesion as per radiation oncology  7. Return to clinic after the biopsy, and will plan for systemic treatment based on biopsy  8. I reviewed the NCCN guidelines and goals of care  9. Patient's questions were sought and answered to her satisfaction      Mendoza Zuñiga MD  Hematologist/Medical Oncologist    This note is created with the assistance of a speech recognition program.  While intending to generate a document that actually reflects the content of the visit, the document can still have some errors including those of syntax and sound a like substitutions which may escape proof reading. It such instances, actual meaning can be extrapolated by contextual diversion.

## 2021-12-06 ENCOUNTER — TELEPHONE (OUTPATIENT)
Dept: ONCOLOGY | Age: 71
End: 2021-12-06

## 2021-12-06 NOTE — TELEPHONE ENCOUNTER
Left message for Raúl Castañeda at [facility] to check if she was following up on patient with Dr. Ashutosh Hogan.

## 2021-12-06 NOTE — TELEPHONE ENCOUNTER
Latonia SANFORD covering for [facility]. I called [facility] IR to check on status of biopsy. Spoke with Ubaldo Campbell and she states that she already called the office and spoke to Kayleigh Sahni. She states that the radiology did not approve to be done stating that a bronchoscopy needs to be done.

## 2021-12-08 NOTE — TELEPHONE ENCOUNTER
LATE ENTRY:  Spoke with Rema Carranza on 12/7/21. She is addressing this with Dr. Ching Hooker on 12/8/21.

## 2021-12-09 NOTE — TELEPHONE ENCOUNTER
When arrived this morning I had a voice mail from Allyson Poole asking that I return call in regards to pt. I called and had to leave a voice mail for Allyson Poole.

## 2021-12-10 ENCOUNTER — TELEPHONE (OUTPATIENT)
Dept: ONCOLOGY | Age: 71
End: 2021-12-10

## 2021-12-10 NOTE — TELEPHONE ENCOUNTER
RECEIVED FAX FROM [facility] REQUESTING UPDATE MD NOTE/ [facility]. FAXED TO 1 [phone] W/ CONFIRMATION.

## 2021-12-14 ENCOUNTER — TELEPHONE (OUTPATIENT)
Dept: CASE MANAGEMENT | Age: 71
End: 2021-12-14

## 2021-12-14 NOTE — TELEPHONE ENCOUNTER
Lyndon at [facility]. [facility] cancer center called me back. She states she addressed this with Dr. Claire Mendoza and she states he said \"he will do something\" with this. She states she doesn't remember what he said. Dr. Claire Mendoza does not have any notes, orders, or referrals put in.

## 2021-12-14 NOTE — TELEPHONE ENCOUNTER
Name: Indy Stephen  : 1950  MRN: [id number]    Oncology Navigation Follow-Up Note  Navigator spoke with pt. And informed her [facility] not willing to complete IR biopsy and may need Bronchoscopy. Dr. Farrell Elmo aware and wanting pt. scheduled for [facility] and F/U next week and Dr. Farrell Elmo will go into further details during F/U . Pt. Instructed to return Yohan's call and obtain appt.    Electronically signed by Thompson Villalta RN on 2021 at 3:16 PM

## 2021-12-14 NOTE — TELEPHONE ENCOUNTER
Name: Usama Bravo  : 1950  MRN: [id number]    Oncology Navigation Follow-Up Note  Navigator reviewing chart notes and [facility] spoke with Tommy Holt MA and informed her IR not willing to do biopsy sharing pt. Needing Bronch? Writer unsure if Dr. Cabrales Fairly aware, placed call to MD and left  for return call.                           Electronically signed by Phuong Daniel RN on 2021 at 10:42 AM

## 2021-12-20 ENCOUNTER — HOSPITAL ENCOUNTER (OUTPATIENT)
Dept: RADIATION ONCOLOGY | Facility: MEDICAL CENTER | Age: 71
End: 2021-12-20
Attending: STUDENT IN AN ORGANIZED HEALTH CARE EDUCATION/TRAINING PROGRAM
Payer: MEDICARE

## 2021-12-20 ENCOUNTER — HOSPITAL ENCOUNTER (OUTPATIENT)
Dept: RADIATION ONCOLOGY | Facility: MEDICAL CENTER | Age: 71
Discharge: HOME | End: 2021-12-20
Payer: MEDICARE

## 2021-12-20 ENCOUNTER — TELEPHONE (OUTPATIENT)
Dept: CASE MANAGEMENT | Age: 71
End: 2021-12-20

## 2021-12-20 VITALS
SYSTOLIC BLOOD PRESSURE: 115 MMHG | OXYGEN SATURATION: 94 % | HEART RATE: 93 BPM | TEMPERATURE: 96.8 F | DIASTOLIC BLOOD PRESSURE: 73 MMHG | RESPIRATION RATE: 20 BRPM | WEIGHT: 186.2 LBS | BODY MASS INDEX: 28.31 KG/M2

## 2021-12-20 DIAGNOSIS — C34.11 MALIGNANT NEOPLASM OF UPPER LOBE OF RIGHT LUNG (HCC): Primary | ICD-10-CM

## 2021-12-20 PROCEDURE — 99212 OFFICE O/P EST SF 10 MIN: CPT | Performed by: STUDENT IN AN ORGANIZED HEALTH CARE EDUCATION/TRAINING PROGRAM

## 2021-12-20 ASSESSMENT — PAIN SCALES - GENERAL: PAINLEVEL_OUTOF10: 4

## 2021-12-20 NOTE — TELEPHONE ENCOUNTER
Name: Sofi Sims  : 1950  MRN: [id number]    Oncology Navigation Follow-Up Note  Navigator reviewing chart and recent RO notes. Pt. Needing EBUS due to IR not willing to complete Bx. Once biopsy completed RTX POC to follow. Attempted to call Dr. Vinayak Moreno office, but phones off.    Maurobubba Lex please assist and fax Dr. Gerald Love note and My note to Dr. Vinayak Moreno office   Thanks, Tho Peters  Electronically signed by Miguel A Saleh RN on 2021 at 3:10 PM

## 2021-12-20 NOTE — PROGRESS NOTES
Radiation Oncology Office Note    Diagnosis/Treatment History:   Cancer Staging  Malignant neoplasm of upper lobe of right lung [facility])  Staging form: Lung, AJCC 8th Edition  - Clinical stage from 9/18/2020: Stage IIIB (cT4, cN2, cM0) - Signed by Karime Barger MD on 9/18/2020    - S/p chemoradation - 60 Gy in 30 fractions - completed 12/14/2020  - PET scan 2/3/21 showed no recurrence with positive response to treatment, started on consolidation durvalumab 2/3/2021  - CT chest 6/14/21 showed Increase in size of spiculated malignant mass in the medial right upper lobe measuring 5.2 x 3.4 cm, which shows evidence of mediastinal invasion.  Matted metastatic lymphadenopathy in the subcarinal and right paratracheal regions is unchanged PET scan on 7/2/2021 showed  There has been significant increase in size compared to prior PET/CT from February 2021 with new invasion into the right mediastinum.  There is associated increased FDG  PET activity measuring with a max SUV of 16.2, previously 3.1. Started on chemotherapy with carboplatin and Taxol on 7/14/2021  - PET scan on 9/3/2021 showed interval improvement with decrease in the size and intensity of the right upper lobe mass  - PET scan on 11/15/2021 revealed continued avidity in the RUL mass - concerning for recurrence of disease    Interval History:   Ms. Argentina Farias returns with granddaughter for routine follow-up. Overall, she reports that she has recovered from a recent hospitalization due to falls. She was referred to [address] [place] [address] for a lung biopsy, who were not willing to do the biopsy due to risk. Interval Imaging  11/15/2021 PET-CT    FINDINGS:     Neck:   No significant hypermetabolic foci. Major vascular structures are unremarkable. Dental amalgam artifact is present. Nonhypermetabolic small lymph nodes. Nonhypermetabolic homogeneous thyroid. Thorax:    There continues to be a hypermetabolic focus in the medial right apex similar in size and avidity from prior study with maximum SUV of 10.8.  The previously seen hypermetabolic focus adjacent to the diaphragm is no longer visualized.  However, there is a nonspecific area of soft tissue irregularity   in the posterior right lung base with maximal activity of 2.7, indeterminate   Heart and supracardiac vessels are normal. Atherosclerotic calcifications. No hypermetabolic mediastinal adenopathy. No pleural or pericardial effusion. Adequate lung volumes with satisfactory inflation. Abdomen:   No significant hypermetabolic foci. Liver, spleen, pancreas, gall bladder and great abdominal vessels are normal. Kidneys are normal. Anterior abdominal wall is intact. No hypermetabolic lymph nodes. Pelvis:   No significant hypermetabolic foci. Anterior and inner pelvic walls are intact. Pelvic viscera are normal. No significant infradiaphragmatic free air. No free pelvic fluid. No hypermetabolic lymph nodes. Atherosclerotic calcifications. Osseous:   No significant hypermetabolic foci. Intact pelvis and hip girdles. Dorsolumbar spondylosis with no lytic or blastic foci. Intact ribs and shoulder girdles. IMPRESSION:     Stable area of malignancy within the right apex.  Interval resolution of activity adjacent to diaphragm with development of indeterminate activity in the posterior lung base. Physical Examination:   /73   Pulse 93   Temp 96.8 °F (36 °C) (Temporal)   Resp 20   Wt 186 lb 3.2 oz (84.5 kg)   SpO2 94%   BMI 28.31 kg/m²   ECO Symptomatic, <50% in bed during the day  CONSTITUTIONAL: Well developed, well nourished female. Alert and oriented x3. No acute distress. HEENT: Head normocephalic and atraumatic. Extraocular movements intact. NEUROLOGIC EXAM: Cranial nerves II through XII grossly intact. No focal neurologic deficit. Speech is fluent. Gait and posture are steady with a walker.   PSYCHIATRIC:  Appropriate mood and affect for her clinical situation. Assessment/Plan:  Patient with PET-avid in right lung apex, concerning for recurrent disease. IR not willing to do biopsy due to risk. I will therefore refer the patient for a bronchoscopy to see if it can be biopsied endoscopically. Patient has seen Dr. Alessandro Heller of the [facility] for her original EBUS, so I will send the patient back to him to see if this can be accomplished. After biopsy, re-irradiation of the lesion may be feasible. I once again explained that re-irradiation to this area does come with additional risks, such as brachial plexopathy or potentially fatal radiation pneumonitis. Will tentatively arrange for follow-up 1 week after the biopsy to review the pathology results. Total time spent on this case on the day of encounter is more than 15 minutes. This time includes combination of one or more of the following - review of necessary tests, review of pertinent medical records from the EMR, performing medically appropriate examination and evaluation, counseling and educating the patient/family/caregiver, ordering necessary medical tests, procedures etc., documenting the clinical information in the electronic medical record, care coordination, referring and communicating with other health care providers and interpretation of results independently.

## 2021-12-20 NOTE — PROGRESS NOTES
Indy Stephen  12/20/2021  1:20 PM  Patient presents for follow up from radiation treatment to rt. Lung tumor. Recent PET showed increased activity in rt. Middle apex concerning for recurrence. C/o some exertional SOB and non-productive cough. Using walker for ambulation- \"helps keep me steady\". Dr. Jami Nguyen notified of assessment and patient examined. Pt. to return to pulmonologist Dr. Sara Garcia for possible endobronchial biopsy. of lesion. Referral completed by Dr. Jami Nguyen. Patient to call when biopsy scheduled so follow up can be arranged. Vitals:    12/20/21 1313   BP: 115/73   Pulse: 93   Resp: 20   Temp: 96.8 °F (36 °C)   SpO2: 94%    :  Patient Currently in Pain: Yes  Pain Assessment: 0-10  Pain Level: 4       Wt Readings from Last 1 Encounters:   12/20/21 186 lb 3.2 oz (84.5 kg)                Current Outpatient Medications:     ondansetron (ZOFRAN) 4 MG tablet, Take 1 tablet by mouth every 8 hours as needed for Nausea or Vomiting, Disp: 30 tablet, Rfl: 3    melatonin 5 MG TABS tablet, Take 2 tablets by mouth nightly (Patient taking differently: Take 15 mg by mouth nightly ), Disp: 30 tablet, Rfl: 0    gabapentin (NEURONTIN) 400 MG capsule, Take 1 capsule by mouth 2 times daily for 30 days.  Indications: 400mg AM, 400mg PM and 800mg HS 400mg AM, 400mg PM and 800mg HS, Disp: 60 capsule, Rfl: 0    ipratropium-albuterol (DUONEB) 0.5-2.5 (3) MG/3ML SOLN nebulizer solution, Inhale 1 vial into the lungs every 8 hours as needed for Shortness of Breath, Disp: , Rfl:     rosuvastatin (CRESTOR) 5 MG tablet, Take 5 mg by mouth daily, Disp: , Rfl:     lidocaine-prilocaine (EMLA) 2.5-2.5 % cream, To port site before chemo (Patient not taking: Reported on 10/27/2021), Disp: 1 Tube, Rfl: 1    oxyCODONE-acetaminophen (PERCOCET)  MG per tablet, Take 1 tablet by mouth every 6 hours as needed for Pain., Disp: , Rfl:     traZODone (DESYREL) 300 MG tablet, Take 0.5 tablets by mouth nightly, Disp: 30 tablet, Rfl: 0 clonazePAM (KLONOPIN) 1 MG tablet, Take 1 tablet by mouth 3 times daily as needed for Anxiety for up to 3 doses. , Disp: 3 tablet, Rfl: 0    ARIPiprazole (ABILIFY) 5 MG tablet, Take 1 tablet by mouth daily for 3 doses, Disp: 3 tablet, Rfl: 0    furosemide (LASIX) 40 MG tablet, Take 40 mg by mouth daily, Disp: , Rfl:     glimepiride (AMARYL) 1 MG tablet, Take 1-2 mg by mouth See Admin Instructions Indications: 2mg AM and 1mg PM 2mg AM and 1mg PM, Disp: , Rfl:     metoprolol tartrate (LOPRESSOR) 25 MG tablet, Take 25 mg by mouth 2 times daily, Disp: , Rfl:     venlafaxine (EFFEXOR XR) 150 MG extended release capsule, Take 150 mg by mouth 2 times daily, Disp: , Rfl:     lansoprazole (PREVACID) 30 MG delayed release capsule, Take 30 mg by mouth daily, Disp: , Rfl:     aspirin EC 81 MG EC tablet, Take 81 mg by mouth daily, Disp: , Rfl:     mometasone-formoterol (DULERA) 200-5 MCG/ACT inhaler, Inhale 2 puffs into the lungs every 12 hours as needed (wheezing/SOB) , Disp: , Rfl:     Immunizations:    Influenza status:    []   Current   [x]   Patient declined    Pneumococcal status:  []   Current  [x]   Patient declined  Covid status:   [x]  Dose #1:                     []  Dose #2:               []   Patient declined    Smoking Status:    [] Smoker - PPD:   [x] Nonsmoker - Quit Date:               [] Never a smoker      Cancer Screening:  Colonoscopy   [] Current       [] Not current   [] Not current, but scheduled   [x] NA  Mammogram   [] Current       [] Not current   [] Not current, but scheduled   [x] NA  Prostate           [] Current       [] Not current   [] Not current, but scheduled   [x] NA  PAP/Pelvic      [] Current       [] Not current   [] Not current, but scheduled   [x] NA  Skin                 [] Current       [] Not current   [] Not current, but scheduled   [x] NA     Hormone:  Lupron []   Last dose given:           Next dose due:   Eligard []   Last dose given:           Next dose due:   Aromatase

## 2021-12-21 ENCOUNTER — TELEPHONE (OUTPATIENT)
Dept: CASE MANAGEMENT | Age: 71
End: 2021-12-21

## 2021-12-22 ENCOUNTER — TELEPHONE (OUTPATIENT)
Dept: RADIATION ONCOLOGY | Facility: MEDICAL CENTER | Age: 71
End: 2021-12-22

## 2021-12-22 NOTE — TELEPHONE ENCOUNTER
I called Dr Calvin Villarreal office to verify they received the consult request for bronchoscopy. They verified they did receive it.  But the doctor's are out of the office until January 3rd, 2022

## 2021-12-24 PROBLEM — N39.0 UTI (URINARY TRACT INFECTION): Status: RESOLVED | Noted: 2021-11-24 | Resolved: 2021-12-24

## 2022-01-03 ENCOUNTER — TELEPHONE (OUTPATIENT)
Dept: CASE MANAGEMENT | Age: 72
End: 2022-01-03

## 2022-01-03 NOTE — TELEPHONE ENCOUNTER
Name: Mikaela Ambrose  : 1950  MRN: [id number]    Oncology Navigation Follow-Up Note  Navigator spoke with Dr. Td Alicea office and pt. Scheduled to see Dr. Bear Pass .   Electronically signed by Tierra Jacobo RN on 1/3/2022 at 3:46 PM

## 2022-01-12 ENCOUNTER — TELEPHONE (OUTPATIENT)
Dept: CASE MANAGEMENT | Age: 72
End: 2022-01-12

## 2022-01-12 NOTE — TELEPHONE ENCOUNTER
Name: Jessica Duke  : 1950  MRN: [id number]    Oncology Navigation Follow-Up Note  Navigator spoke with Dr. Rica Chen office and pt. Seen yesterday  and pt. Bev Rivero  to reach out to pt. Lawrence Lanza, please request Dr. Rica Chen notes for this pt.  Steve Young  Electronically signed by Leigh Ann Rogers RN on 2022 at 10:24 AM

## 2022-01-19 ENCOUNTER — TELEPHONE (OUTPATIENT)
Dept: CASE MANAGEMENT | Age: 72
End: 2022-01-19

## 2022-01-19 NOTE — TELEPHONE ENCOUNTER
Name: Shanelle Hung  : 1950  MRN: [id number]    Oncology Navigation Follow-Up Note  Navigator spoke with both pt. And Jeremi Showers from Dr. Jas Rodgers office. Pt.'s procedure cancelled today due to pt. Not completing pretesting . Jeremi Showers will speak with Dr. Leata Brittle about rescheduling pt. ? And Jeremi Showers to return writers call.    Electronically signed by Addi Gordon RN on 2022 at 3:40 PM

## 2022-01-24 ENCOUNTER — TELEPHONE (OUTPATIENT)
Dept: CASE MANAGEMENT | Age: 72
End: 2022-01-24

## 2022-01-24 NOTE — TELEPHONE ENCOUNTER
Name: Mesha Gordon  : 1950  MRN: [id number]    Oncology Navigation Follow-Up Note  Navigator reaching out to Rosa M Lara from Dr. Melo Pedersen office and pt. Rescheduled for EBUS . Pretesting  and  Covid testing 2/15. Writer reviewing with pt. In detail  What's required with appts. Plan to follow up with pt. Just prior to appts. To confirm understanding. 4:00 Dr. Mustapha Betancourt calling and updated. Dr. Mustapha Betancourt requesting pt. See Dr. Gemma Quintana due to appt. For EBUS so far out ()  Pt. To see Dr. Gemma Quintana . Plan to follow.          Electronically signed by Jesus Fernandez RN on 2022 at 4:31 PM

## 2022-01-25 ENCOUNTER — HOSPITAL ENCOUNTER (OUTPATIENT)
Facility: MEDICAL CENTER | Age: 72
End: 2022-01-25

## 2022-01-26 ENCOUNTER — TELEPHONE (OUTPATIENT)
Dept: ONCOLOGY | Age: 72
End: 2022-01-26

## 2022-01-26 ENCOUNTER — OFFICE VISIT (OUTPATIENT)
Dept: ONCOLOGY | Age: 72
End: 2022-01-26
Payer: MEDICARE

## 2022-01-26 VITALS
BODY MASS INDEX: 27.66 KG/M2 | SYSTOLIC BLOOD PRESSURE: 127 MMHG | RESPIRATION RATE: 20 BRPM | HEART RATE: 95 BPM | WEIGHT: 181.9 LBS | TEMPERATURE: 97.8 F | DIASTOLIC BLOOD PRESSURE: 77 MMHG

## 2022-01-26 DIAGNOSIS — C34.91 NON-SMALL CELL CANCER OF RIGHT LUNG (HCC): ICD-10-CM

## 2022-01-26 PROCEDURE — 99211 OFF/OP EST MAY X REQ PHY/QHP: CPT | Performed by: INTERNAL MEDICINE

## 2022-01-26 PROCEDURE — 99214 OFFICE O/P EST MOD 30 MIN: CPT | Performed by: INTERNAL MEDICINE

## 2022-01-26 RX ORDER — ONDANSETRON 4 MG/1
4 TABLET, FILM COATED ORAL EVERY 8 HOURS PRN
Qty: 30 TABLET | Refills: 3 | Status: ON HOLD | OUTPATIENT
Start: 2022-01-26 | End: 2022-05-21 | Stop reason: HOSPADM

## 2022-01-26 NOTE — PROGRESS NOTES
Today's Date: 1/26/2022  Patient Name: Silver Parekh  Patient's age: 71 y. o., 1950    Diagnosis:   RUL squamous cell carcinoma,   Cancer Staging  Malignant neoplasm of upper lobe of right lung (HCC)  Staging form: Lung, AJCC 8th Edition  - Clinical stage from 9/18/2020: Stage IIIB (cT4, cN2, cM0) - Signed by Claudia Ortiz MD on 9/18/2020    Current therapy:  Started on concurrent chemoradiation with weekly carbotaxol on 10/21/20  She completed radiation therapy in mid December  Her pET scan 2/3/21 showed no recurrence with positive response to treatment  Restarted on consolidation durvalumab 2/3/2021  Recent CT chest 6/14/21 showed Increase in size of spiculated malignant mass in the medial right upper lobe measuring 5.2 x 3.4 cm, which shows evidence of mediastinal invasion.  Matted metastatic lymphadenopathy in the subcarinal and right paratracheal regions is unchanged  PET scan on 7/2/2021 showed  There has been significant increase in size compared to prior PET/CT from February 2021 with new invasion into the right mediastinum.  There is associated increased FDG  PET activity measuring with a max SUV of 16.2, previously 3.1. Started on chemotherapy with carboplatin and Taxol on 7/14/2021  PET scan on 9/3/2021 showed interval improvement with decrease in the size and intensity of the right upper lobe mass  She completed 6 cycles of chemotherapy on 11/3/2021  PET scan on 11/15/2021 showed persistent but stable FDG avid mass in the right apex with interval resolution of the activity near the diaphragm  IR guided biopsy was planned but due to the risk from the procedure IR deferred the biopsy  Patient planning to see pulmonologist for bronchoscopic biopsy    CHIEF COMPLAINT:    Chief Complaint   Patient presents with    Follow-up    Medication Refill     INTERVAL HISTORY:    Brooke Nicole is returning for follow-up and to discuss further up commendations.   She is planning to have bronchoscopic biopsy with pulmonary on 12/18/2022. Patient has been evaluated by IR and because of the high risk procedure they deferred the lung biopsy   She denies any chest pain or shortness of breath. She reports her fatigue is better. She has mild nausea. She does have some lower back pain. During this visit patient's allergy, social, medical, surgical history and medications were reviewed and updated. HISTORY OF PRESENT ILLNESS:    Zachary Marcos is a 41-year-old female with a past medical history of COPD, history of tobacco abuse, depression, CAD, arthritis was admitted with multiple falls at home. She complains of back pain and also chronic cough. On admission she had a CT scan of the chest which showed 7.2 cm spiculated mass in the right upper lobe with mediastinal lymphadenopathy suspicious for malignancy. 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 Oncology consulted for further evaluation. Patient has COPD and uses Home oxygen at night and sues walker at home. She smokes more than a PPD. 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 Past Medical History:   has a past medical history of AAA (abdominal aortic aneurysm) ([address] Acid reflux, Anxiety and depression, Aortic stenosis, Arthritis, Blister of ankle, right, CAD (coronary artery disease), Cancer ([address] COPD (chronic obstructive pulmonary disease) ([address] Diabetes mellitus ([address] Falls, Heart block, Hypokalemia, MDRO (multiple drug resistant organisms) resistance, MRSA (methicillin resistant staph aureus) culture positive, On home oxygen therapy, On home oxygen therapy, Overactive bladder, Pneumonia, PONV (postoperative nausea and vomiting), and Vitamin D deficiency. Past Surgical History:   has a past surgical history that includes back surgery; eye surgery; Cholecystectomy; Appendectomy; Hysterectomy; Tonsillectomy; lumbar laminectomy;  Endoscopy, colon, diagnostic (12/08/2016); aortic valve repair (N/A, 4/11/2017); bronchoscopy (N/A, 8/31/2020); IR INSERT PICC VAD W SQ PORT >5 YEARS (9/4/2020); and IR PORT PLACEMENT > 5 YEARS (9/29/2020). Medications:    Current Outpatient Medications   Medication Sig Dispense Refill    ondansetron (ZOFRAN) 4 MG tablet Take 1 tablet by mouth every 8 hours as needed for Nausea or Vomiting 30 tablet 3    melatonin 5 MG TABS tablet Take 2 tablets by mouth nightly (Patient taking differently: Take 15 mg by mouth nightly ) 30 tablet 0    gabapentin (NEURONTIN) 400 MG capsule Take 1 capsule by mouth 2 times daily for 30 days. Indications: 400mg AM, 400mg PM and 800mg HS 400mg AM, 400mg PM and 800mg HS 60 capsule 0    rosuvastatin (CRESTOR) 5 MG tablet Take 5 mg by mouth daily      oxyCODONE-acetaminophen (PERCOCET)  MG per tablet Take 1 tablet by mouth every 6 hours as needed for Pain.  traZODone (DESYREL) 300 MG tablet Take 0.5 tablets by mouth nightly 30 tablet 0    clonazePAM (KLONOPIN) 1 MG tablet Take 1 tablet by mouth 3 times daily as needed for Anxiety for up to 3 doses.  3 tablet 0    ARIPiprazole (ABILIFY) 5 MG tablet Take 1 tablet by mouth daily for 3 doses 3 tablet 0    furosemide (LASIX) 40 MG tablet Take 40 mg by mouth daily      glimepiride (AMARYL) 1 MG tablet Take 1-2 mg by mouth See Admin Instructions Indications: 2mg AM and 1mg PM 2mg AM and 1mg PM/ 1/26/21 Taking 2 tablets at bedtime      metoprolol tartrate (LOPRESSOR) 25 MG tablet Take 25 mg by mouth 2 times daily      venlafaxine (EFFEXOR XR) 150 MG extended release capsule Take 150 mg by mouth 2 times daily      lansoprazole (PREVACID) 30 MG delayed release capsule Take 30 mg by mouth daily      aspirin EC 81 MG EC tablet Take 81 mg by mouth daily      mometasone-formoterol (DULERA) 200-5 MCG/ACT inhaler Inhale 2 puffs into the lungs every 12 hours as needed (wheezing/SOB)       lidocaine-prilocaine (EMLA) 2.5-2.5 % cream To port site before chemo (Patient not taking: Reported on 10/27/2021) 1 Tube 1     No current facility-administered medications for this visit. Allergies:  Codeine and Dye [iodides]    Social History:   reports that she quit smoking about 5 years ago. She has never used smokeless tobacco. She reports that she does not drink alcohol and does not use drugs. Family History: family history includes Cancer in her father and mother. REVIEW OF SYSTEMS:    Constitutional: ++fatigue, No fever or chills. No night sweats, no weight loss   Eyes: No eye discharge, double vision, or eye pain   HEENT: negative for sore mouth, sore throat, hoarseness and voice change   Respiratory: negative for cough , sputum, ++dyspnea, wheezing, hemoptysis, chest pain   Cardiovascular: negative for chest pain, dyspnea, palpitations, orthopnea, PND   Gastrointestinal: negative for nausea, vomiting, diarrhea, constipation, abdominal pain, Dysphagia, hematemesis and hematochezia   Genitourinary: negative for frequency, dysuria, nocturia, urinary incontinence, and hematuria   Integument: negative for rash, skin lesions, bruises.    Hematologic/Lymphatic: negative for easy bruising, bleeding, lymphadenopathy, or petechiae   Endocrine: negative for heat or cold intolerance,weight changes, change in bowel habits and hair loss   Musculoskeletal: negative for myalgias, arthralgias, pain, joint swelling,and bone pain   Neurological: negative for headaches, dizziness, seizures, weakness, numbness    PHYSICAL EXAM:      /77   Pulse 95   Temp 97.8 °F (36.6 °C) (Temporal)   Resp 20   Wt 181 lb 14.4 oz (82.5 kg)   BMI 27.66 kg/m²    Temp (24hrs), Av.7 °F (36.5 °C), Min:97.3 °F (36.3 °C), Max:98.1 °F (36.7 °C)  General appearance - well appearing, no in pain or distress   Mental status - alert and cooperative   Eyes - pupils equal and reactive, extraocular eye movements intact   Ears - bilateral TM's and external ear canals normal   Mouth - mucous membranes moist, pharynx normal without lesions   Neck - supple, no significant adenopathy   Lymphatics - no palpable lymphadenopathy, no hepatosplenomegaly   Chest - clear to auscultation, no wheezes, rales or rhonchi, symmetric air entry   Heart - normal rate, regular rhythm, normal S1, S2, no murmurs  Abdomen - soft, nontender, nondistended, no masses or organomegaly   Neurological - alert, oriented, normal speech, no focal findings or movement disorder noted   Musculoskeletal - no joint tenderness, deformity or swelling   Extremities - peripheral pulses normal, no pedal edema, no clubbing or cyanosis   Skin - normal coloration and turgor, no rashes, no suspicious skin lesions noted ,    DATA:    Labs:   CBC:   No results for input(s): WBC, HGB, HCT, PLT in the last 72 hours. BMP:   No results for input(s): NA, K, CO2, BUN, CREATININE, LABGLOM, GLUCOSE in the last 72 hours. PT/INR: No results for input(s): PROTIME, INR in the last 72 hours. Surgical Pathology Report   Surgical Pathology   Collected:  08/31/20 0803    Lab status:  Final    Resulting lab:  [facility]    Value:  -- Diagnosis --     BRONCHIAL WASHINGS RIGHT UPPER LOBE:          POSITIVE FOR MALIGNANCY.        SQUAMOUS CELL CARCINOMA.           COMMENT: VOLUME OF TUMOR IN THE CELL BLOCK IS LOW AND ADDITIONAL   MATERIAL WOULD BE REQUIRED IF MOLECULAR TESTING IS NECESSARY. IMAGING DATA:  CT chest 8/27/2020:   FINDINGS:    Mediastinum: Three vessel coronary artery calcification.  Newly enlarged    mediastinal lymph nodes including example station 7 node measuring 3.1 x 2.1    cm on series 2, image 45.         Lungs/pleura: Mild upper lobe predominant centrilobular emphysema. Troy Neighbours     Spiculated mass of the right upper lobe measuring 7.2 x 6.5 cm with    broad-base of contact with the mediastinal pleura, right major fissure,    encircling and obliterating the right upper lobe bronchus.  Spicules are seen    to extend to the anterior costal pleura.  There is adjacent bronchial wall    thickening and interlobular septal thickening.  Stable 5 mm solid nodule of    the left upper lobe since 2016, benign.  No pleural effusion or pneumothorax.         Upper Abdomen: Adrenals are unremarkable.         Soft Tissues/Bones: Old right rib fractures.              Impression    Approximate 7.2 cm spiculated mass of the right upper lobe likely with    invasion of the mediastinum, adjacent lymphangitic spread and suspected    metastatic mediastinal lymphadenopathy. Scan 9/11/2020: Impression    1. The patient's known right upper lobe lung mass is FDG avid consistent with    the patient's primary malignancy. 2. FDG avid mediastinal lymph nodes consistent with metastatic disease. 3. No abnormal FDG activity is identified involving the abdomen or pelvis. PET 2/3/21  IMPRESSION:   *  Unexpected findings of emphysematous cystitis. *  Positive response to therapy with markedly decreased size and FDG avidity of right upper lobe mass and mediastinal adenopathy. *  Healing right rib fractures.     Ct chest 6/14/21  FINDINGS:     Emphysema.  Spiculated mass in the medial right upper lobe which appears to invade into the upper right mediastinum measures 5.2 x 3.4 cm (4.4 x 1.8 cm previously).  Malignant matted lymphadenopathy in the subcarinal and right paratracheal regions is unchanged.  Noncalcified left upper lobe lung   nodule measuring 0.6 cm is unchanged.  There is a 1.4 cm nodular opacity in the lingula that appear slightly larger than on previous study.  Left chest port in place.  Atherosclerotic thoracic aorta.  Visualized structures in the upper abdomen are unremarkable.  Normal heart size.  No pleural or   pericardial effusions.  The central pulmonary arteries are unremarkable.  Extensive coronary artery calcifications.  Status post median sternotomy.  Wall thickening of the esophagus.  Visualized chest wall structures are unremarkable. Matheus Augustin are multiple healing and/or old rib fractures. IMPRESSION:   1.  Increase in size of spiculated malignant mass in the medial right upper lobe measuring 5.2 x 3.4 cm, which shows evidence of mediastinal invasion.  Matted metastatic lymphadenopathy in the subcarinal and right paratracheal regions is unchanged. 2.  Unchanged noncalcified left upper lobe nodule measuring 0.6 cm.  A 1.4 cm nodular opacity in the lingula appears slightly larger than on previous study. 3.  Wall thickening of esophagus suggestive of esophagitis. Ct abd  6/13/21  CT images of the abdomen and pelvis are obtained without contrast.  There is a 1.8 cm soft tissue density arising from or directly adjacent to the uncinate process of the pancreas.  Consider contrast-enhanced CT or MRI for further evaluation.  Bowel is unremarkable. Jessica Pilon is no intraperitoneal or   retroperitoneal lymphadenopathy.  Colonic stool burden is moderate.  The adrenals appear symmetric.  Images the lung bases are grossly clear.  There appear to be remote right rib fractures.  Remote left rib fracture also noted.  There is no hepatic or splenic abnormality.  No hydronephrosis is seen. IMPRESSION:     1.8 cm soft tissue density arising from, or directly adjacent to, the uncinate process of the pancreas.  Contrast-enhanced CT or MRI recommended. CT Chest 6/14/21  IMPRESSION:     1.  Increase in size of spiculated malignant mass in the medial right upper lobe measuring 5.2 x 3.4 cm, which shows evidence of mediastinal invasion.  Matted metastatic lymphadenopathy in the subcarinal and right paratracheal regions is unchanged. 2.  Unchanged noncalcified left upper lobe nodule measuring 0.6 cm.  A 1.4 cm nodular opacity in the lingula appears slightly larger than on previous study. 3.  Wall thickening of esophagus suggestive of esophagitis.      Ct abd  Pelvis 6/13/21  CT images of the abdomen and pelvis are obtained without contrast.  There is a 1.8 cm soft tissue density arising from or directly adjacent to the uncinate process of the pancreas.  Consider contrast-enhanced CT or MRI for further evaluation.  Bowel is unremarkable. Lunette Justin is no intraperitoneal or   retroperitoneal lymphadenopathy.  Colonic stool burden is moderate.  The adrenals appear symmetric.  Images the lung bases are grossly clear.  There appear to be remote right rib fractures.  Remote left rib fracture also noted.  There is no hepatic or splenic abnormality.  No hydronephrosis is seen. IMPRESSION:   1.8 cm soft tissue density arising from, or directly adjacent to, the uncinate process of the pancreas.  Contrast-enhanced CT or MRI recommended. PET scan 7/2/2021  FINDINGS:   Normal FDG uptake is seen in brain, oral pharyngeal region, myocardium, liver, spleen, bowel, kidneys/ureters and urinary bladder. Head and neck:   No suspicious hypermetabolic activity. No lymphadenopathy. Chest:   Relatively unchanged spiculated mass within the right upper lobe measuring 4.7 x 3.0 cm when compared to recent CT from June 2021. Lunette Justin has been significant increase in size compared to prior PET/CT from February 2021 with new invasion into the right mediastinum.  There is associated increased FDG  PET activity measuring with a max SUV of 16.2, previously 3.1. Unchanged opacities along the posterior lateral right upper lobe.  Minimally increased activity is noted in this area. Improving nodular opacity within the lingula with no associated increased metabolic activity. No significant activity is noted within the previously seen mediastinal lymph nodes.  There is mild diffuse uptake throughout the esophagus with a thickened wall.  This is similar to prior CT in June 2021. Very severe calcification involving the left coronary, LAD, circumflex, ramus and right coronary.      Abdomen:   Adrenals are normal.  Diffuse uptake is noted throughout the colon with no focal areas appreciated.  No increased activity is noted at the soft tissue density adjacent to the uncinate process of the pancreas as seen on recent CT. Heavy intra-abdominal vascular calcifications. Persistent irregular appearance of the bladder contour with a small amount of intraluminal gas.  No emphysematous change within the bladder wall is currently visualized. Musculoskeletal system:   No suspicious hypermetabolic activity. No lymphadenopathy. Multiple bilateral chronic rib fractures.  Old transverse process fracture at L2 No focal osseous uptake. IMPRESSION:   *  Hypermetabolic right upper lobe spiculated mass compatible with patient's known neoplasm. *  Low level increased activity within the posterior lateral right upper lobe opacities most likely representing scarring from prior radiation. *  Diffuse mild uptake through the esophagus with persistent abnormal wall thickening suggestive of esophagitis. *  Nonfocal diffuse uptake throughout the colon which can be seen with certain medications such as metformin. *  Abnormal appearance to the bladder.  The bladder is distended and contains intraluminal gas which may be due to recent instrumentation and/or infection. Mick Spatz is also small amount of gas seen within the wall of the bladder.  This raises the possibility of emphysematous cystitis.  However, the   amount of emphysematous changes has improved.  As Correlation clinical exam recommended. *  Severe multivessel coronary artery calcification   PET scan 9/3/2021  IMPRESSION:     1.  There is a hypermetabolic pulmonary neoplasm involving the right upper lobe medially.  There has been interval improvement with a decrease in the size and the an intensity since prior study of 7/1/2021.  Findings are suggestive of partial response.  It has 9.8 SUV compared to the 16.1 SUV on   prior study.    2.  Focal small opacity with FDG uptake at the right lung base.  This is a new uptake.  This could be a focal infiltrate, atelectasis or satellite lesion.  Follow-up CT scan examination is recommended. 3. Dorothe Ewa is a stable linear mildly FDG avid abnormality in the lateral portion of the right upper mid lung field.  Abnormalities from parenchymal scarring. 4.  No evidence of distance metastasis in the neck, abdomen and pelvis and visualized skeleton. PET scan 11/15/2021  FINDINGS:   Neck:   No significant hypermetabolic foci. Major vascular structures are unremarkable. Dental amalgam artifact is present. Nonhypermetabolic small lymph nodes. Nonhypermetabolic homogeneous thyroid. Thorax: There continues to be a hypermetabolic focus in the medial right apex similar in size and avidity from prior study with maximum SUV of 10.8.  The previously seen hypermetabolic focus adjacent to the diaphragm is no longer visualized.  However, there is a nonspecific area of soft tissue irregularity   in the posterior right lung base with maximal activity of 2.7, indeterminate   Heart and supracardiac vessels are normal. Atherosclerotic calcifications. No hypermetabolic mediastinal adenopathy. No pleural or pericardial effusion. Adequate lung volumes with satisfactory inflation. Abdomen:   No significant hypermetabolic foci. Liver, spleen, pancreas, gall bladder and great abdominal vessels are normal. Kidneys are normal. Anterior abdominal wall is intact. No hypermetabolic lymph nodes. Pelvis:   No significant hypermetabolic foci. Anterior and inner pelvic walls are intact. Pelvic viscera are normal. No significant infradiaphragmatic free air. No free pelvic fluid. No hypermetabolic lymph nodes. Atherosclerotic calcifications. Osseous:   No significant hypermetabolic foci. Intact pelvis and hip girdles. Dorsolumbar spondylosis with no lytic or blastic foci. Intact ribs and shoulder girdles.      IMPRESSION:   Stable area of malignancy within the right apex.  Interval resolution of activity adjacent to diaphragm with development of indeterminate activity in the posterior lung base. IMPRESSION:   1. 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 PET scan negative for distant metastasis: Clinical stage T4 N2 M0, stage IIIb  2. Anemia  3. Leukopenia: chemo related  4. COPD  5. Falls  6. CAD  7. Tobacco abuse  8. Actinomyces bacteremia  9. Right lower extremity cellulitis    RECOMMENDATIONS:  1. I reviewed the laboratory data,  diagnosis, prognosis and treatment options with patient  2. Her recent lab work showed improved hemoglobin at 10.7  3. I discussed the results of recent PET scan with patient and also had discussion with radiation oncology  4. As per radiation oncology patient can be a candidate for radiation given her recent scan showing continued hypermetabolic focus in the right middle apex. Although the size of the lesion appears similar, as per radiation oncology biopsy would help assessing any residual disease to discuss risk and benefit from additional radiation therapy  5. She is scheduled to have bronchoscopy biopsy with pulmonary  6. Return to clinic 1 week after bronchoscopic biopsy to discuss further recommendations  7. I reviewed the NCCN guidelines and goals of care  8. Patient's questions were sought and answered to her satisfaction      Mendoza Dougherty MD  Hematologist/Medical Oncologist    This note is created with the assistance of a speech recognition program.  While intending to generate a document that actually reflects the content of the visit, the document can still have some errors including those of syntax and sound a like substitutions which may escape proof reading. It such instances, actual meaning can be extrapolated by contextual diversion.

## 2022-01-26 NOTE — TELEPHONE ENCOUNTER
Melissa eNlson MD VISIT  RV IN 4 WKS  MD VISIT 2/23/22 @ 10:15AM  SCRIPTS SENT TO PT ON EXIT  AVS PRINTED W/ INSTRUCTIONS AND GIVEN TO PT ON EXIT

## 2022-02-01 ENCOUNTER — TELEPHONE (OUTPATIENT)
Dept: ONCOLOGY | Age: 72
End: 2022-02-01

## 2022-02-07 ENCOUNTER — TELEPHONE (OUTPATIENT)
Dept: ONCOLOGY | Age: 72
End: 2022-02-07

## 2022-02-07 NOTE — TELEPHONE ENCOUNTER
Name: Antwon Damon  : 1950  MRN: [id number]    Oncology Navigation Follow-Up Note  Navigator spoke with pt. Reminding her of pretesting appt. Tomorrow 12:15 [address] Pt. Verbalized understanding.    Electronically signed by Bird Beck RN on 2022 at 5:00 PM

## 2022-02-15 ENCOUNTER — HOSPITAL ENCOUNTER (OUTPATIENT)
Facility: MEDICAL CENTER | Age: 72
End: 2022-02-15

## 2022-02-21 ENCOUNTER — TELEPHONE (OUTPATIENT)
Dept: CASE MANAGEMENT | Age: 72
End: 2022-02-21

## 2022-02-21 NOTE — TELEPHONE ENCOUNTER
Name: Tawanna Pope  : 1950  MRN: [id number]    Oncology Navigation Follow-Up Note  Navigator reaching out to pt. To verify if Pt. Completed EBUS? Pt. Did not have testing done after multiple reminders. When surgery reached out to pt. On  pt. Was asleep and thought her test was the next day(Saturday). Writer left  for David and Armando to keep pt. On schedule for  to speak with Dr. Ramya Patterson , not sure how he will want to proceed?   Electronically signed by Tammy Fritz RN on 2022 at 3:04 PM

## 2022-02-23 ENCOUNTER — TELEPHONE (OUTPATIENT)
Dept: CASE MANAGEMENT | Age: 72
End: 2022-02-23

## 2022-02-23 NOTE — TELEPHONE ENCOUNTER
Name: Carolina Moore  : 1950  MRN: [id number]    Oncology Navigation Follow-Up Note  Navigator spoke with pt. And asking pt. How navigator can facilitate her biopsy . Pt. States, \"I have an appt. With Dr. Lees Situ this Friday\"   Francine Balderrama did verify pt. Does have appt. Writer spoke with office and discussed with them our concerns. Writer will try and reach out to family as well. Writer spoke with Brandie Donissb and he is willing to try and assist as well as his daughter Jenn(left VM for [employer]) [address] of appt. Friday with pulmonary office.    Electronically signed by Sanjana Yuan RN on 2022 at 3:20 PM

## 2022-02-24 ENCOUNTER — TELEPHONE (OUTPATIENT)
Dept: CASE MANAGEMENT | Age: 72
End: 2022-02-24

## 2022-02-24 NOTE — TELEPHONE ENCOUNTER
Name: Katharina Light  : 1950  MRN: [id number]    Oncology Navigation Follow-Up Note  Navigator left VM for pt. Reminding her of appt. With Dr. Vandana Bond tomorrow 10:30. Writer also left VM with pt. Granddaughter/Son hoping for their assistance in guideing pt.    Electronically signed by Ayah Briceño RN on 2022 at 3:51 PM

## 2022-02-25 ENCOUNTER — TELEPHONE (OUTPATIENT)
Dept: CASE MANAGEMENT | Age: 72
End: 2022-02-25

## 2022-02-25 NOTE — TELEPHONE ENCOUNTER
Name: Edith Tay  : 1950  MRN: [id number]    Oncology Navigation Follow-Up Note  Navigator reaching out to pt. Checking on status of Pulmonary appt. Outcome? Writer could only leave a message. Writer spoke with Elo Dickens in office and pt. Did complete visit along with granddaughter and they are working on getting her rescheduled for EBUS and will keep family involved in appts.       Electronically signed by Yaima White RN on 2022 at 1:47 PM

## 2022-03-10 ENCOUNTER — TELEPHONE (OUTPATIENT)
Dept: CASE MANAGEMENT | Age: 72
End: 2022-03-10

## 2022-03-10 NOTE — TELEPHONE ENCOUNTER
Name: Teo Ramirez  : 1950  MRN: [id number]    Oncology Navigation Follow-Up Note  Navigator spoke with Dr. Mara Cheney pt. Was recently noncompliant once again with Dr. Matt Frank office and instructions. Pt. Needing to complete bloodwork and PFT and family involved in transporting pt., but family unreliable as well. Dr. Catalina Bruno wanting pt. Scheduled for F/U.    Electronically signed by Sandra Morales RN on 3/10/2022 at 4:18 PM

## 2022-03-11 ENCOUNTER — TELEPHONE (OUTPATIENT)
Dept: ONCOLOGY | Age: 72
End: 2022-03-11

## 2022-03-11 NOTE — TELEPHONE ENCOUNTER
RECEIVED A VM FROM SUSANA THAT PT NEEDS A F/U WITH DR Chandra Kang. WRITER CALLED PT, PT ASKED FOR WRITER TO CALL GRANDDAUGHTER. WRITER CALLED LORI, NO ANSWER LEFT VM.

## 2022-03-16 ENCOUNTER — HOSPITAL ENCOUNTER (OUTPATIENT)
Facility: MEDICAL CENTER | Age: 72
End: 2022-03-16

## 2022-03-18 ENCOUNTER — APPOINTMENT (OUTPATIENT)
Dept: GENERAL RADIOLOGY | Age: 72
DRG: 565 | End: 2022-03-18
Payer: MEDICARE

## 2022-03-18 ENCOUNTER — HOSPITAL ENCOUNTER (INPATIENT)
Age: 72
LOS: 8 days | Discharge: HOME HEALTH | DRG: 565 | End: 2022-03-26
Attending: EMERGENCY MEDICINE | Admitting: FAMILY MEDICINE
Payer: MEDICARE

## 2022-03-18 DIAGNOSIS — T79.6XXA TRAUMATIC RHABDOMYOLYSIS, INITIAL ENCOUNTER (HCC): Primary | ICD-10-CM

## 2022-03-18 DIAGNOSIS — E86.0 DEHYDRATION: ICD-10-CM

## 2022-03-18 PROBLEM — M62.82 RHABDOMYOLYSIS: Status: ACTIVE | Noted: 2022-03-18

## 2022-03-18 LAB
ABSOLUTE EOS #: 0 K/UL (ref 0–0.4)
ABSOLUTE IMMATURE GRANULOCYTE: 0 K/UL (ref 0–0.3)
ABSOLUTE LYMPH #: 0.4 K/UL (ref 1–4.8)
ABSOLUTE MONO #: 0.61 K/UL (ref 0.2–0.8)
ALBUMIN SERPL-MCNC: 3.9 G/DL (ref 3.5–5.2)
ALP BLD-CCNC: 51 U/L (ref 35–104)
ALT SERPL-CCNC: 45 U/L (ref 5–33)
ANION GAP SERPL CALCULATED.3IONS-SCNC: 12 MMOL/L (ref 9–17)
AST SERPL-CCNC: 144 U/L
BASOPHILS # BLD: 0 %
BASOPHILS ABSOLUTE: 0 K/UL (ref 0–0.2)
BILIRUB SERPL-MCNC: 0.24 MG/DL (ref 0.3–1.2)
BILIRUBIN DIRECT: <0.08 MG/DL
BILIRUBIN, INDIRECT: ABNORMAL MG/DL (ref 0–1)
BUN BLDV-MCNC: 20 MG/DL (ref 8–23)
BUN/CREAT BLD: 24 (ref 9–20)
CALCIUM SERPL-MCNC: 9.7 MG/DL (ref 8.6–10.4)
CHLORIDE BLD-SCNC: 99 MMOL/L (ref 98–107)
CO2: 28 MMOL/L (ref 20–31)
CREAT SERPL-MCNC: 0.84 MG/DL (ref 0.5–0.9)
EOSINOPHILS RELATIVE PERCENT: 0 % (ref 1–4)
GFR AFRICAN AMERICAN: >60 ML/MIN
GFR NON-AFRICAN AMERICAN: >60 ML/MIN
GFR SERPL CREATININE-BSD FRML MDRD: ABNORMAL ML/MIN/{1.73_M2}
GLUCOSE BLD-MCNC: 182 MG/DL (ref 70–99)
HCT VFR BLD CALC: 39.1 % (ref 36.3–47.1)
HEMOGLOBIN: 12.2 G/DL (ref 11.9–15.1)
IMMATURE GRANULOCYTES: 0 %
LACTIC ACID, SEPSIS: 1 MMOL/L (ref 0.5–1.9)
LACTIC ACID, SEPSIS: 1.5 MMOL/L (ref 0.5–1.9)
LYMPHOCYTES # BLD: 4 % (ref 24–44)
MCH RBC QN AUTO: 27.6 PG (ref 25.2–33.5)
MCHC RBC AUTO-ENTMCNC: 31.2 G/DL (ref 28.4–34.8)
MCV RBC AUTO: 88.5 FL (ref 82.6–102.9)
MONOCYTES # BLD: 6 % (ref 1–7)
MYOGLOBIN: 2626 NG/ML (ref 25–58)
NRBC AUTOMATED: 0 PER 100 WBC
PDW BLD-RTO: 13.6 % (ref 11.8–14.4)
PLATELET # BLD: 300 K/UL (ref 138–453)
PMV BLD AUTO: 8.6 FL (ref 8.1–13.5)
POTASSIUM SERPL-SCNC: 4.1 MMOL/L (ref 3.7–5.3)
PRO-BNP: 2314 PG/ML
RBC # BLD: 4.42 M/UL (ref 3.95–5.11)
SEG NEUTROPHILS: 90 % (ref 36–66)
SEGMENTED NEUTROPHILS ABSOLUTE COUNT: 9.09 K/UL (ref 1.8–7.7)
SODIUM BLD-SCNC: 139 MMOL/L (ref 135–144)
TOTAL CK: 5990 U/L (ref 26–192)
TOTAL PROTEIN: 8.8 G/DL (ref 6.4–8.3)
TROPONIN, HIGH SENSITIVITY: 55 NG/L (ref 0–14)
TROPONIN, HIGH SENSITIVITY: 59 NG/L (ref 0–14)
WBC # BLD: 10.1 K/UL (ref 3.5–11.3)

## 2022-03-18 PROCEDURE — 2580000003 HC RX 258: Performed by: FAMILY MEDICINE

## 2022-03-18 PROCEDURE — 99283 EMERGENCY DEPT VISIT LOW MDM: CPT

## 2022-03-18 PROCEDURE — 82550 ASSAY OF CK (CPK): CPT

## 2022-03-18 PROCEDURE — 71045 X-RAY EXAM CHEST 1 VIEW: CPT

## 2022-03-18 PROCEDURE — 6370000000 HC RX 637 (ALT 250 FOR IP): Performed by: FAMILY MEDICINE

## 2022-03-18 PROCEDURE — 83880 ASSAY OF NATRIURETIC PEPTIDE: CPT

## 2022-03-18 PROCEDURE — 1200000000 HC SEMI PRIVATE

## 2022-03-18 PROCEDURE — 83874 ASSAY OF MYOGLOBIN: CPT

## 2022-03-18 PROCEDURE — 2700000000 HC OXYGEN THERAPY PER DAY

## 2022-03-18 PROCEDURE — 83605 ASSAY OF LACTIC ACID: CPT

## 2022-03-18 PROCEDURE — 94761 N-INVAS EAR/PLS OXIMETRY MLT: CPT

## 2022-03-18 PROCEDURE — 94640 AIRWAY INHALATION TREATMENT: CPT

## 2022-03-18 PROCEDURE — 80076 HEPATIC FUNCTION PANEL: CPT

## 2022-03-18 PROCEDURE — 81001 URINALYSIS AUTO W/SCOPE: CPT

## 2022-03-18 PROCEDURE — 80048 BASIC METABOLIC PNL TOTAL CA: CPT

## 2022-03-18 PROCEDURE — 2580000003 HC RX 258: Performed by: EMERGENCY MEDICINE

## 2022-03-18 PROCEDURE — 93005 ELECTROCARDIOGRAM TRACING: CPT | Performed by: EMERGENCY MEDICINE

## 2022-03-18 PROCEDURE — 85025 COMPLETE CBC W/AUTO DIFF WBC: CPT

## 2022-03-18 PROCEDURE — 84484 ASSAY OF TROPONIN QUANT: CPT

## 2022-03-18 RX ORDER — HEPARIN SODIUM 5000 [USP'U]/ML
5000 INJECTION, SOLUTION INTRAVENOUS; SUBCUTANEOUS EVERY 8 HOURS SCHEDULED
Status: DISCONTINUED | OUTPATIENT
Start: 2022-03-18 | End: 2022-03-26 | Stop reason: HOSPADM

## 2022-03-18 RX ORDER — ACETAMINOPHEN 650 MG/1
650 SUPPOSITORY RECTAL EVERY 6 HOURS PRN
Status: DISCONTINUED | OUTPATIENT
Start: 2022-03-18 | End: 2022-03-26 | Stop reason: HOSPADM

## 2022-03-18 RX ORDER — BUDESONIDE AND FORMOTEROL FUMARATE DIHYDRATE 160; 4.5 UG/1; UG/1
2 AEROSOL RESPIRATORY (INHALATION) 2 TIMES DAILY
Status: DISCONTINUED | OUTPATIENT
Start: 2022-03-18 | End: 2022-03-26 | Stop reason: HOSPADM

## 2022-03-18 RX ORDER — 0.9 % SODIUM CHLORIDE 0.9 %
1000 INTRAVENOUS SOLUTION INTRAVENOUS ONCE
Status: COMPLETED | OUTPATIENT
Start: 2022-03-18 | End: 2022-03-18

## 2022-03-18 RX ORDER — CLONAZEPAM 0.5 MG/1
1 TABLET ORAL 3 TIMES DAILY PRN
Status: DISCONTINUED | OUTPATIENT
Start: 2022-03-18 | End: 2022-03-26 | Stop reason: HOSPADM

## 2022-03-18 RX ORDER — ARIPIPRAZOLE 5 MG/1
5 TABLET ORAL DAILY
Status: DISCONTINUED | OUTPATIENT
Start: 2022-03-18 | End: 2022-03-26 | Stop reason: HOSPADM

## 2022-03-18 RX ORDER — ARIPIPRAZOLE 5 MG/1
5 TABLET ORAL NIGHTLY
COMMUNITY

## 2022-03-18 RX ORDER — TRAZODONE HYDROCHLORIDE 150 MG/1
300 TABLET ORAL NIGHTLY
COMMUNITY

## 2022-03-18 RX ORDER — SODIUM CHLORIDE 9 MG/ML
INJECTION, SOLUTION INTRAVENOUS CONTINUOUS
Status: DISCONTINUED | OUTPATIENT
Start: 2022-03-18 | End: 2022-03-20

## 2022-03-18 RX ORDER — GABAPENTIN 400 MG/1
800 CAPSULE ORAL 2 TIMES DAILY
Status: ON HOLD | COMMUNITY
End: 2022-04-04 | Stop reason: SDUPTHER

## 2022-03-18 RX ORDER — SODIUM CHLORIDE 9 MG/ML
INJECTION, SOLUTION INTRAVENOUS CONTINUOUS
Status: DISCONTINUED | OUTPATIENT
Start: 2022-03-18 | End: 2022-03-19

## 2022-03-18 RX ORDER — SODIUM POLYSTYRENE SULFONATE 15 G/60ML
15 SUSPENSION ORAL; RECTAL
Status: ACTIVE | OUTPATIENT
Start: 2022-03-18 | End: 2022-03-18

## 2022-03-18 RX ORDER — VENLAFAXINE HYDROCHLORIDE 37.5 MG/1
37.5 CAPSULE, EXTENDED RELEASE ORAL 2 TIMES DAILY
Status: DISCONTINUED | OUTPATIENT
Start: 2022-03-18 | End: 2022-03-26 | Stop reason: HOSPADM

## 2022-03-18 RX ORDER — SODIUM CHLORIDE 9 MG/ML
25 INJECTION, SOLUTION INTRAVENOUS PRN
Status: DISCONTINUED | OUTPATIENT
Start: 2022-03-18 | End: 2022-03-26 | Stop reason: HOSPADM

## 2022-03-18 RX ORDER — CHOLECALCIFEROL (VITAMIN D3) 125 MCG
5 CAPSULE ORAL NIGHTLY
COMMUNITY

## 2022-03-18 RX ORDER — ONDANSETRON 2 MG/ML
4 INJECTION INTRAMUSCULAR; INTRAVENOUS EVERY 6 HOURS PRN
Status: DISCONTINUED | OUTPATIENT
Start: 2022-03-18 | End: 2022-03-26 | Stop reason: HOSPADM

## 2022-03-18 RX ORDER — LANOLIN ALCOHOL/MO/W.PET/CERES
9 CREAM (GRAM) TOPICAL NIGHTLY
Status: DISCONTINUED | OUTPATIENT
Start: 2022-03-18 | End: 2022-03-20

## 2022-03-18 RX ORDER — PANTOPRAZOLE SODIUM 20 MG/1
20 TABLET, DELAYED RELEASE ORAL
Status: DISCONTINUED | OUTPATIENT
Start: 2022-03-19 | End: 2022-03-26 | Stop reason: HOSPADM

## 2022-03-18 RX ORDER — ACETAMINOPHEN 325 MG/1
650 TABLET ORAL EVERY 6 HOURS PRN
Status: DISCONTINUED | OUTPATIENT
Start: 2022-03-18 | End: 2022-03-26 | Stop reason: HOSPADM

## 2022-03-18 RX ORDER — ASPIRIN 81 MG/1
81 TABLET ORAL DAILY
Status: DISCONTINUED | OUTPATIENT
Start: 2022-03-18 | End: 2022-03-26 | Stop reason: HOSPADM

## 2022-03-18 RX ORDER — OXYCODONE AND ACETAMINOPHEN 10; 325 MG/1; MG/1
1 TABLET ORAL 2 TIMES DAILY PRN
Status: DISCONTINUED | OUTPATIENT
Start: 2022-03-18 | End: 2022-03-19

## 2022-03-18 RX ORDER — SODIUM CHLORIDE 0.9 % (FLUSH) 0.9 %
5-40 SYRINGE (ML) INJECTION PRN
Status: DISCONTINUED | OUTPATIENT
Start: 2022-03-18 | End: 2022-03-26 | Stop reason: HOSPADM

## 2022-03-18 RX ORDER — SODIUM CHLORIDE 0.9 % (FLUSH) 0.9 %
5-40 SYRINGE (ML) INJECTION EVERY 12 HOURS SCHEDULED
Status: DISCONTINUED | OUTPATIENT
Start: 2022-03-18 | End: 2022-03-26 | Stop reason: HOSPADM

## 2022-03-18 RX ORDER — ONDANSETRON 4 MG/1
4 TABLET, ORALLY DISINTEGRATING ORAL EVERY 8 HOURS PRN
Status: DISCONTINUED | OUTPATIENT
Start: 2022-03-18 | End: 2022-03-26 | Stop reason: HOSPADM

## 2022-03-18 RX ADMIN — Medication 9 MG: at 21:21

## 2022-03-18 RX ADMIN — SODIUM CHLORIDE 1000 ML: 9 INJECTION, SOLUTION INTRAVENOUS at 18:13

## 2022-03-18 RX ADMIN — ASPIRIN 81 MG: 81 TABLET, COATED ORAL at 22:16

## 2022-03-18 RX ADMIN — VENLAFAXINE HYDROCHLORIDE 37.5 MG: 37.5 CAPSULE, EXTENDED RELEASE ORAL at 22:16

## 2022-03-18 RX ADMIN — SODIUM CHLORIDE: 9 INJECTION, SOLUTION INTRAVENOUS at 18:14

## 2022-03-18 RX ADMIN — METOPROLOL TARTRATE 25 MG: 25 TABLET, FILM COATED ORAL at 21:21

## 2022-03-18 RX ADMIN — BUDESONIDE AND FORMOTEROL FUMARATE DIHYDRATE 2 PUFF: 160; 4.5 AEROSOL RESPIRATORY (INHALATION) at 21:25

## 2022-03-18 RX ADMIN — ARIPIPRAZOLE 5 MG: 5 TABLET ORAL at 22:16

## 2022-03-18 RX ADMIN — SODIUM CHLORIDE: 9 INJECTION, SOLUTION INTRAVENOUS at 21:11

## 2022-03-18 ASSESSMENT — ENCOUNTER SYMPTOMS
NAUSEA: 0
SORE THROAT: 0
COLOR CHANGE: 0
COUGH: 0
DIARRHEA: 0
RHINORRHEA: 0
VOMITING: 0
SHORTNESS OF BREATH: 0
EYE REDNESS: 0
EYE DISCHARGE: 0

## 2022-03-18 ASSESSMENT — PAIN - FUNCTIONAL ASSESSMENT: PAIN_FUNCTIONAL_ASSESSMENT: 0-10

## 2022-03-18 ASSESSMENT — PAIN DESCRIPTION - ONSET: ONSET: ON-GOING

## 2022-03-18 ASSESSMENT — PAIN SCALES - GENERAL
PAINLEVEL_OUTOF10: 9
PAINLEVEL_OUTOF10: 0

## 2022-03-18 ASSESSMENT — PAIN DESCRIPTION - PAIN TYPE: TYPE: ACUTE PAIN;CHRONIC PAIN

## 2022-03-18 ASSESSMENT — PAIN DESCRIPTION - LOCATION: LOCATION: BACK

## 2022-03-18 ASSESSMENT — PAIN DESCRIPTION - FREQUENCY: FREQUENCY: CONTINUOUS

## 2022-03-18 ASSESSMENT — PAIN DESCRIPTION - PROGRESSION: CLINICAL_PROGRESSION: NOT CHANGED

## 2022-03-18 NOTE — ED NOTES
Pt placed on purewick for moisture management and I&O monitoring      Kai Snellen, RN  20/45/52 1733

## 2022-03-18 NOTE — H&P
History & Physical  [facility].,    Adult Hospitalist      Name: Jeanna Sexton  MRN: [id number]     [place]: [de-identified]  Room: [place] [address]    Admit Date: 3/18/2022  1:59 PM  PCP: Vinny Shaver MD    Primary Problem  Principal Problem:    Rhabdomyolysis  Resolved Problems:    * No resolved hospital problems. *        Assesment:     · Acute rhabdomyolysis  · Essential hypertension  · Diabetes mellitus type 2  · Diabetic neuropathy  · Mixed hyperlipidemia  · Chronic pain syndrome  · Gastroesophageal reflux disease without esophagitis  · Major depressive disorder/bipolar disorder  · Chronic obstructive pulmonary disease, unspecified  · Lung mass  · Left face abrasion        Plan:     · Admit to progressive  · Monitor vitals closely  · Keep SPO2 above 90%  · I's and O's  · IV fluids at 150 mL/h  · Pain control  · Reduce pain meds secondary to lethargy and question of renal impairment  · Antiemetics as needed  · Hold Crestor, Amaryl, Lasix, trazodone, Neurontin  · Resume aspirin, Prevacid, Lopressor  · Resume Effexor, Abilify, Klonopin, melatonin  · Resume Dulera  · DuoNeb  · RT eval  · CBC, BMP  · CK, myoglobin  · Urinalysis with culture sensitivity-if UTI will treat with antibiotic  · DVT and GI prophylaxis. Chief Complaint:     Chief Complaint   Patient presents with    Fatigue    Chest Pain    Cough    Back Pain         History of Present Illness:      Jeanna Sexton is a 71 y.o.  female who presents with Fatigue, Chest Pain, Cough, and Back Pain    Patient admitted through the emergency room where she presented with generalized weakness and malaise. Patient said this has been progressively getting worse over the last 5 to 6 days. Patient walks with the help of a walker and says at times she would slide down and then not be able to get up from the ground. She says categorically that she did not have a fall. Patient says she felt she did not have significant strength to get up.   Apparently this is happened in the past and she has been helped by her home care nurse. Today however she was visited by a friend who found her laying down possibly for several hours. EMTs were called in who brought the patient to the emergency room. Patient still complains of lethargy and pain over several parts of her body. She denies any chest pain though she has had some dyspnea and tachycardia. Patient complains of some increased frequency of urination but denies any fever or chills. She had some lower abdominal pain and cramping in addition. Patient denies any headache, photophobia or neck pain. Denies any nausea, vomiting or diarrhea. Patient has a large blister over the left side of her face. She says it was one of the masks. Later she added to the help of nursing that when EMTs were trying to assist her one of their apparatus-left side of her face. It is difficult to determine that since there are inflammatory changes around the blister suggesting this might be older than a day. I have personally reviewed the past medical history, past surgical history, medications, social history, and family history, and summarized in the note. Review of Systems:     All 10 point system is reviewed and negative otherwise mentioned in HPI. Past Medical History:     Past Medical History:   Diagnosis Date    AAA (abdominal aortic aneurysm) ([facility] [address]     Pt denies having a history of AAA    Acid reflux     Anxiety and depression     Aortic stenosis     Arthritis     Blister of ankle, right 10/13/2016    blister broke open & draining, is on antibiotics    CAD (coronary artery disease)     Cancer ([address]     lung cancer    COPD (chronic obstructive pulmonary disease) ([address]     Diabetes mellitus ([address]     Falls     Heart block     bifasicular    Hypokalemia     MDRO (multiple drug resistant organisms) resistance 10/17/2014    E.  Coli urine    MRSA (methicillin resistant staph aureus) culture positive resolved 12/2016    2 negative nasal screens - 2016 (hx in urine 2014)    On home oxygen therapy     uses 2 liters at night    On home oxygen therapy     patient states 2 liters/nasal cannula continuous    Overactive bladder     patient incont. wears a brief    Pneumonia     PONV (postoperative nausea and vomiting)     Vitamin D deficiency         Past Surgical History:     Past Surgical History:   Procedure Laterality Date    AORTIC VALVE REPAIR N/A 4/11/2017    AORTIC VALVE REPAIR REPLACEMENT performed by Robyn Alejandre MD at [facility] N/A 8/31/2020    BRONCHOSCOPY BIOPSY BRONCHUS performed by Arlene Burnette MD at [address] [facility], COLON, DIAGNOSTIC  12/08/2016    EYE SURGERY      HYSTERECTOMY      IR INS PICC VAD W SQ PORT GREATER THAN 5  9/4/2020    IR INS PICC VAD W SQ PORT GREATER THAN 5 9/4/2020 [facility]    IR PORT PLACEMENT EQUAL OR GREATER THAN 5 YEARS  9/29/2020    IR PORT PLACEMENT EQUAL OR GREATER THAN 5 YEARS 9/29/2020 MD [facility] PROCED[facility]    LUMBAR LAMINECTOMY      TONSILLECTOMY          Medications Prior to Admission:       Prior to Admission medications    Medication Sig Start Date End Date Taking? Authorizing Provider   ondansetron (ZOFRAN) 4 MG tablet Take 1 tablet by mouth every 8 hours as needed for Nausea or Vomiting 1/26/22   Bibiana Bradford MD   melatonin 5 MG TABS tablet Take 2 tablets by mouth nightly  Patient taking differently: Take 15 mg by mouth nightly  11/27/21   Ras Wesley MD   gabapentin (NEURONTIN) 400 MG capsule Take 1 capsule by mouth 2 times daily for 30 days.  Indications: 400mg AM, 400mg PM and 800mg HS 400mg AM, 400mg PM and 800mg HS 11/27/21 1/26/22  Whitney Becerril MD   rosuvastatin (CRESTOR) 5 MG tablet Take 5 mg by mouth daily    Historical Provider, MD   lidocaine-prilocaine (EMLA) 2.5-2.5 % cream To port site before chemo  Patient not taking: Reported on 10/27/2021 3/31/21   Jeremy Ochoa MD   oxyCODONE-acetaminophen (PERCOCET)  MG per tablet Take 1 tablet by mouth every 6 hours as needed for Pain. Historical Provider, MD   traZODone (DESYREL) 300 MG tablet Take 0.5 tablets by mouth nightly 11/12/20   Whitney Becerril MD   clonazePAM (KLONOPIN) 1 MG tablet Take 1 tablet by mouth 3 times daily as needed for Anxiety for up to 3 doses. 11/5/20 1/26/22  Sofia Castañeda MD   ARIPiprazole (ABILIFY) 5 MG tablet Take 1 tablet by mouth daily for 3 doses 11/5/20 1/26/22  Sofia Castañeda MD   furosemide (LASIX) 40 MG tablet Take 40 mg by mouth daily    Historical Provider, MD   glimepiride (AMARYL) 1 MG tablet Take 1-2 mg by mouth See Admin Instructions Indications: 2mg AM and 1mg PM 2mg AM and 1mg PM/ 1/26/21 Taking 2 tablets at bedtime    Historical Provider, MD   metoprolol tartrate (LOPRESSOR) 25 MG tablet Take 25 mg by mouth 2 times daily    Historical Provider, MD   venlafaxine (EFFEXOR XR) 150 MG extended release capsule Take 150 mg by mouth 2 times daily    Historical Provider, MD   lansoprazole (PREVACID) 30 MG delayed release capsule Take 30 mg by mouth daily 11/10/16   Historical Provider, MD   aspirin EC 81 MG EC tablet Take 81 mg by mouth daily    Historical Provider, MD   mometasone-formoterol (DULERA) 200-5 MCG/ACT inhaler Inhale 2 puffs into the lungs every 12 hours as needed (wheezing/SOB)     Historical Provider, MD        Allergies:       Codeine and Dye [iodides]    Social History:     Tobacco:    reports that she quit smoking about 5 years ago. She has never used smokeless tobacco.  Alcohol:      reports no history of alcohol use. Drug Use:  reports no history of drug use.     Family History:     Family History   Problem Relation Age of Onset    Cancer Mother     Cancer Father          Physical Exam:     Vitals:  BP (!) 171/118   Pulse 134   Temp 98.6 °F (37 °C) (Oral)   Resp 29   Ht 5' 8\" (1.727 m)   Wt 170 lb (77.1 kg)   SpO2 100%   BMI 25.85 kg/m²   Temp (24hrs), Av.6 °F (37 °C), Min:98.6 °F (37 °C), Max:98.6 °F (37 °C)          General appearance - alert, lethargic, and in mild acute distress.   Tachypnea  Mental status - oriented to person, place, and time with normal affect  Head - normocephalic and atraumatic  Eyes - pupils equal and reactive, extraocular eye movements intact, conjunctiva clear  Ears - hearing appears to be intact  Nose - no drainage noted  Mouth - mucous membranes moist  Neck - supple, no carotid bruits, thyroid not palpable  Chest - clear to auscultation, increased effort  Heart - normal rate, regular rhythm, no murmur  Abdomen - soft, nontender, nondistended, bowel sounds present all four quadrants, no masses, hepatomegaly or splenomegaly  Neurological - normal speech, no focal findings or movement disorder noted, cranial nerves II through XII grossly intact  Extremities - peripheral pulses palpable, no pedal edema or calf pain with palpation  Skin - no gross lesions, rashes, or induration noted        Data:     Labs:    Hematology:  Recent Labs     22  1555   WBC 10.1   RBC 4.42   HGB 12.2   HCT 39.1   MCV 88.5   MCH 27.6   MCHC 31.2   RDW 13.6      MPV 8.6     Chemistry:  Recent Labs     22  1555      K 4.1   CL 99   CO2 28   GLUCOSE 182*   BUN 20   CREATININE 0.84   ANIONGAP 12   LABGLOM >60   GFRAA >60   CALCIUM 9.7   PROBNP 2,314*   TROPHS 59*   CKTOTAL 5,990*   MYOGLOBIN 2,626*     Recent Labs     22  1555   PROT 8.8*   LABALBU 3.9   *   ALT 45*   ALKPHOS 51   BILITOT 0.24*   BILIDIR <0.08       Lab Results   Component Value Date    INR 1.0 2021    INR 0.9 2020    INR 1.1 2020    PROTIME 12.6 2021    PROTIME 11.8 2020    PROTIME 14.4 (H) 2020       Lab Results   Component Value Date/Time    SPECIAL NOT REPORTED 2021 06:32 PM     Lab Results   Component Value Date/Time    CULTURE NO GROWTH 6 DAYS 2021 06:32 PM       Lab Results   Component Value Date    POCPH 7.36 04/12/2017    PHART 7.42 08/26/2012    PH 7.22 04/11/2017    POCPCO2 45 04/12/2017    LAR1TNE 41 08/26/2012    PCO2 54 04/11/2017    POCPO2 93 04/12/2017    PO2ART 59 08/26/2012    PO2 89 04/11/2017    POCHCO3 25.3 04/12/2017    BRT0VOA 26.1 08/26/2012    HCO3 22.2 04/11/2017    NBEA NOT REPORTED 04/12/2017    PBEA 0 04/12/2017    ZYT4DDF 27 04/12/2017    THSL5MPD 97 04/12/2017    Q1VHCEIZ 92.1 08/26/2012    O2SAT 95 04/11/2017    FIO2 UNKNOWN 10/31/2017       Radiology:    XR CHEST PORTABLE    Result Date: 3/18/2022  No acute process. All radiological studies reviewed                Code Status:  Prior    Electronically signed by Arvind Granger MD on 3/18/2022 at 6:21 PM     Copy sent to Dr. Juan Roe MD    This note was created with the assistance of a speech-recognition program.  Although the intention is to generate a document that actually reflects the content of the visit, no guarantees can be provided that every mistake has been identified and corrected by editing. Note was updated later by me after  physical examination and  completion of the assessment.

## 2022-03-18 NOTE — ED PROVIDER NOTES
EMERGENCY DEPARTMENT ENCOUNTER    Pt Name: Abdiaziz Salamanca  MRN: [id number]  [place] 1950  Date of evaluation: 3/18/22  CHIEF COMPLAINT       Chief Complaint   Patient presents with    Fatigue    Chest Pain    Cough    Back Pain     HISTORY OF PRESENT ILLNESS   This is a 78-year-old female that presents with complaints of generalized weakness. Patient walks with a walker, over the past few days she has had episodes of feeling weak. Patient denies any fevers or chills, she complains of some mild nausea and just generally not feeling strong. The patient states that over the past few days she has ended up on the ground multiple times. The patient states that she has been helped up by her home health nurse. Today a friend came and found her, they called EMS and brought her in for further evaluation. She complains of some associated urinary frequency, she denies fevers or chills, she has no chest pain or shortness of breath. REVIEW OF SYSTEMS     Review of Systems   Constitutional: Negative for chills and fever. HENT: Negative for rhinorrhea and sore throat. Eyes: Negative for discharge, redness and visual disturbance. Respiratory: Negative for cough and shortness of breath. Cardiovascular: Negative for chest pain, palpitations and leg swelling. Gastrointestinal: Negative for diarrhea, nausea and vomiting. Genitourinary: Positive for frequency. Negative for dysuria and hematuria. Musculoskeletal: Negative for arthralgias, myalgias and neck pain. Skin: Negative for color change and rash. Neurological: Positive for weakness. Negative for seizures and headaches. Psychiatric/Behavioral: Negative for hallucinations, self-injury and suicidal ideas.      PASTMEDICAL HISTORY     Past Medical History:   Diagnosis Date    AAA (abdominal aortic aneurysm) ([facility] [address]     Pt denies having a history of AAA    Acid reflux     Anxiety and depression     Aortic stenosis     Arthritis  Blister of ankle, right 10/13/2016    blister broke open & draining, is on antibiotics    CAD (coronary artery disease)     Cancer ([facility])     lung cancer    COPD (chronic obstructive pulmonary disease) ([facility])     Diabetes mellitus ([facility] [address]     Falls     Heart block     bifasicular    Hypokalemia     MDRO (multiple drug resistant organisms) resistance 10/17/2014    E. Coli urine    MRSA (methicillin resistant staph aureus) culture positive resolved 12/2016    2 negative nasal screens - 2016 (hx in urine 2014)    On home oxygen therapy     uses 2 liters at night    On home oxygen therapy     patient states 2 liters/nasal cannula continuous    Overactive bladder     patient incont. wears a brief    Pneumonia     PONV (postoperative nausea and vomiting)     Vitamin D deficiency      Past Problem List  Patient Active Problem List   Diagnosis Code    Valvular heart disease I38    Type 2 diabetes mellitus without complication, without long-term current use of insulin ([facility] [address] E11.9    Open wound of right ankle S91.001A    COPD (chronic obstructive pulmonary disease) ([facility]) J44.9    Syncope and collapse R55    Chronic ulcer of right heel ([facility]) L97.419    Multifocal pneumonia J18.9    Aortic valve stenosis I35.0    Esophageal dilatation K22.89    Aspiration pneumonia of both lower lobes due to gastric secretions ([facility] [address] J69.0    Falls frequently R29.6    Chronic respiratory failure ([facility]) J96.10    Contusion of right knee S80. 01XA    Closed fracture of right distal radius S52.501A    Subdural hemorrhage ([facility]) I62.00    Subarachnoid hemorrhage ([facility]) I60.9    Traumatic hemorrhage of cerebrum ([facility]) J24.499O    Chronic bilateral low back pain M54.50, G89.29    Cellulitis of both feet L03.115, L03. 116    Acute on chronic congestive heart failure ([facility]) I50.9    Bilateral leg edema R60.0    Cellulitis L03.90    Lung mass R91.8    Malignant neoplasm of upper lobe of right lung ([facility]) C34.11    Urinary tract infectious disease N39.0    Closed fracture of one rib of right side S22.31XA    Degenerative disc disease, lumbar M51.36    Moderate malnutrition (HCC) E44.0    Rhabdomyolysis M62.82     SURGICAL HISTORY       Past Surgical History:   Procedure Laterality Date    AORTIC VALVE REPAIR N/A 4/11/2017    AORTIC VALVE REPAIR REPLACEMENT performed by Jose Lynn MD at [address] [facility] [address] [address] N/A 8/31/2020    BRONCHOSCOPY BIOPSY BRONCHUS performed by Madonna Reyes MD at [address], COLON, DIAGNOSTIC  12/08/2016    EYE SURGERY      HYSTERECTOMY      IR INS PICC VAD W SQ PORT GREATER THAN 5  9/4/2020    IR INS PICC VAD W SQ PORT GREATER THAN 5 9/4/2020 [facility] PROCED[facility]    IR PORT PLACEMENT EQUAL OR GREATER THAN 5 YEARS  9/29/2020    IR PORT PLACEMENT EQUAL OR GREATER THAN 5 YEARS 9/29/2020 MD [facility] SPECIAL PROCEDURES    LUMBAR LAMINECTOMY      TONSILLECTOMY       CURRENT MEDICATIONS       Current Discharge Medication List      CONTINUE these medications which have NOT CHANGED    Details   ARIPiprazole (ABILIFY) 5 MG tablet Take 5 mg by mouth at bedtime      metFORMIN (GLUCOPHAGE) 500 MG tablet Take 500 mg by mouth 2 times daily (with meals)      Pseudoeph-Doxylamine-DM-APAP (NYQUIL MULTI-SYMPTOM PO) Take by mouth at bedtime      traZODone (DESYREL) 150 MG tablet Take 300 mg by mouth nightly      gabapentin (NEURONTIN) 400 MG capsule Take 800 mg by mouth in the morning and at bedtime.       melatonin 5 MG TABS tablet Take 15 mg by mouth nightly      ondansetron (ZOFRAN) 4 MG tablet Take 1 tablet by mouth every 8 hours as needed for Nausea or Vomiting  Qty: 30 tablet, Refills: 3    Associated Diagnoses: Non-small cell cancer of right lung (HCC)      rosuvastatin (CRESTOR) 5 MG tablet Take 5 mg by mouth daily      oxyCODONE-acetaminophen (PERCOCET)  MG per tablet Take 1 tablet by mouth every 6 hours as normal. No murmur heard. Pulmonary:      Effort: Pulmonary effort is normal. No accessory muscle usage or respiratory distress. Breath sounds: Normal breath sounds. Chest:      Chest wall: No deformity or tenderness. Abdominal:      General: Bowel sounds are normal. There is no distension or abdominal bruit. Palpations: Abdomen is not rigid. Tenderness: There is no abdominal tenderness. There is no guarding or rebound. Musculoskeletal:      Cervical back: Normal range of motion and neck supple. Skin:     General: Skin is warm. Findings: No rash. Neurological:      Mental Status: She is alert and oriented to person, place, and time. GCS: GCS eye subscore is 4. GCS verbal subscore is 5. GCS motor subscore is 6. Psychiatric:         Speech: Speech normal.         MEDICAL DECISION MAKIN-year-old female presents with generalized weakness, not feeling well. Plan is basic labs, urinalysis chest x-ray, lactic acid, rule out sepsis. Will provide IV fluid bolus and reevaluate. CRITICAL CARE:       PROCEDURES:    Procedures    DIAGNOSTIC RESULTS   EKG:All EKG's are interpreted by the Emergency Department Physician who either signs or Co-signs this chart in the absence of a cardiologist.    Patient's EKG shows a narrow complex rhythm with a rate of 132 QRS unremarkable QTC mildly prolonged. Patient has left axis deviation no ST elevations or depressions, nonspecific EKG. RADIOLOGY:All plain film, CT, MRI, and formal ultrasound images (except ED bedside ultrasound) are read by the radiologist, see reports below, unless otherwisenoted in MDM or here. XR CHEST PORTABLE   Final Result   No acute process. LABS: All lab results were reviewed by myself, and all abnormals are listed below.   Labs Reviewed   BASIC METABOLIC PANEL - Abnormal; Notable for the following components:       Result Value    Glucose 182 (*)     Bun/Cre Ratio 24 (*)     All other components within normal limits   CBC WITH AUTO DIFFERENTIAL - Abnormal; Notable for the following components:    Seg Neutrophils 90 (*)     Lymphocytes 4 (*)     Eosinophils % 0 (*)     Segs Absolute 9.09 (*)     Absolute Lymph # 0.40 (*)     All other components within normal limits   HEPATIC FUNCTION PANEL - Abnormal; Notable for the following components:    ALT 45 (*)      (*)     Total Bilirubin 0.24 (*)     Total Protein 8.8 (*)     All other components within normal limits   TROPONIN - Abnormal; Notable for the following components:    Troponin, High Sensitivity 59 (*)     All other components within normal limits   TROPONIN - Abnormal; Notable for the following components:    Troponin, High Sensitivity 55 (*)     All other components within normal limits   CK - Abnormal; Notable for the following components: Total CK 5,990 (*)     All other components within normal limits   MYOGLOBIN, SERUM - Abnormal; Notable for the following components:    Myoglobin 2,626 (*)     All other components within normal limits   BRAIN NATRIURETIC PEPTIDE - Abnormal; Notable for the following components:    Pro-BNP 2,314 (*)     All other components within normal limits   COMPREHENSIVE METABOLIC PANEL W/ REFLEX TO MG FOR LOW K - Abnormal; Notable for the following components:    Glucose 162 (*)     Bun/Cre Ratio 25 (*)     Potassium 3.5 (*)     Anion Gap 8 (*)     ALT 35 (*)      (*)     Total Bilirubin 0.21 (*)     Albumin 3.2 (*)     All other components within normal limits   CK - Abnormal; Notable for the following components:     Total CK 3,007 (*)     All other components within normal limits   MYOGLOBIN, SERUM - Abnormal; Notable for the following components:    Myoglobin 689 (*)     All other components within normal limits   LACTATE, SEPSIS   LACTATE, SEPSIS   MAGNESIUM   PROTIME-INR   URINALYSIS   BASIC METABOLIC PANEL   CK   MYOGLOBIN, SERUM       EMERGENCY DEPARTMENTCOURSE:         Vitals:    Vitals:    03/19/22 1400 03/19/22 1500 03/19/22 1600 03/19/22 1700   BP: (!) 154/83 (!) 172/111 (!) 186/106 (!) 173/110   Pulse: 113 108 109 105   Resp:   16    Temp:   99.2 °F (37.3 °C)    TempSrc:   Oral    SpO2:    96%   Weight:       Height:           The patient was given the following medications while in the emergency department:  Orders Placed This Encounter   Medications    0.9 % sodium chloride bolus    0.9 % sodium chloride infusion    ARIPiprazole (ABILIFY) tablet 5 mg    aspirin EC tablet 81 mg    clonazePAM (KLONOPIN) tablet 1 mg    pantoprazole (PROTONIX) tablet 20 mg    melatonin tablet 9 mg    metoprolol tartrate (LOPRESSOR) tablet 25 mg    budesonide-formoterol (SYMBICORT) 160-4.5 MCG/ACT inhaler 2 puff    venlafaxine (EFFEXOR XR) extended release capsule 37.5 mg    DISCONTD: oxyCODONE-acetaminophen (PERCOCET)  MG per tablet 1 tablet    DISCONTD: 0.9 % sodium chloride infusion    sodium chloride flush 0.9 % injection 5-40 mL    sodium chloride flush 0.9 % injection 5-40 mL    0.9 % sodium chloride infusion    OR Linked Order Group     ondansetron (ZOFRAN-ODT) disintegrating tablet 4 mg     ondansetron (ZOFRAN) injection 4 mg    OR Linked Order Group     acetaminophen (TYLENOL) tablet 650 mg     acetaminophen (TYLENOL) suppository 650 mg    sodium polystyrene (KAYEXALATE) 15 GM/60ML suspension 15 g    heparin (porcine) injection 5,000 Units    metoprolol (LOPRESSOR) injection 2.5 mg    oxyCODONE-acetaminophen (PERCOCET)  MG per tablet 1 tablet    neomycin-bacitracin-polymyxin (NEOSPORIN) ointment     CONSULTS:  IP CONSULT TO CARDIOLOGY    FINAL IMPRESSION      1. Traumatic rhabdomyolysis, initial encounter ([facility] [address]    2. Dehydration          DISPOSITION/PLAN   DISPOSITION Admitted 03/18/2022 06:19:58 PM      PATIENT REFERRED TO:  No follow-up provider specified.   DISCHARGE MEDICATIONS:  Current Discharge Medication List        The care is provided during an unprecedented national emergency due to the novel coronavirus, COVID 19.   MD Peyton Bautista MD  03/19/22 [id number]

## 2022-03-18 NOTE — PROGRESS NOTES
Transitions of Care Pharmacy Service   Medication Review    The patient's list of current home medications has been reviewed and updated. Source(s) of information: Patient, surescripts    Please feel free to call with any questions about this encounter. Thank you. John Rose, [facility]  Transitions of Care Pharmacy Service  Phone:  [phone]  Fax: [phone]      Prior to Admission medications    Medication Sig Start Date End Date Taking? Authorizing Provider   ARIPiprazole (ABILIFY) 5 MG tablet Take 5 mg by mouth at bedtime   Yes Historical Provider, MD   metFORMIN (GLUCOPHAGE) 500 MG tablet Take 500 mg by mouth 2 times daily (with meals)   Yes Historical Provider, MD   Pseudoeph-Doxylamine-DM-APAP (NYQUIL MULTI-SYMPTOM PO) Take by mouth at bedtime   Yes Historical Provider, MD   traZODone (DESYREL) 150 MG tablet Take 300 mg by mouth nightly   Yes Historical Provider, MD   gabapentin (NEURONTIN) 400 MG capsule Take 800 mg by mouth in the morning and at bedtime. Yes Historical Provider, MD   melatonin 5 MG TABS tablet Take 15 mg by mouth nightly   Yes Historical Provider, MD   ondansetron (ZOFRAN) 4 MG tablet Take 1 tablet by mouth every 8 hours as needed for Nausea or Vomiting 1/26/22   Karena Perdomo MD   rosuvastatin (CRESTOR) 5 MG tablet Take 5 mg by mouth daily    Historical Provider, MD   oxyCODONE-acetaminophen (PERCOCET)  MG per tablet Take 1 tablet by mouth every 6 hours as needed for Pain. Historical Provider, MD   clonazePAM (KLONOPIN) 1 MG tablet Take 1 tablet by mouth 3 times daily as needed for Anxiety for up to 3 doses.  11/5/20 1/26/22  Remedios King MD   furosemide (LASIX) 40 MG tablet Take 40 mg by mouth daily as needed (swelling)     Historical Provider, MD   glimepiride (AMARYL) 1 MG tablet Take 1 mg by mouth 2 times daily (with meals)     Historical Provider, MD   metoprolol tartrate (LOPRESSOR) 25 MG tablet Take 25 mg by mouth 2 times daily    Historical Provider, MD venlafaxine (EFFEXOR XR) 150 MG extended release capsule Take 150 mg by mouth 2 times daily    Historical Provider, MD   lansoprazole (PREVACID) 30 MG delayed release capsule Take 30 mg by mouth daily 11/10/16   Historical Provider, MD   aspirin EC 81 MG EC tablet Take 81 mg by mouth daily    Historical Provider, MD   mometasone-formoterol (DULERA) 200-5 MCG/ACT inhaler Inhale 2 puffs into the lungs every 12 hours as needed (wheezing/SOB)     Historical Provider, MD

## 2022-03-19 LAB
ALBUMIN SERPL-MCNC: 3.2 G/DL (ref 3.5–5.2)
ALP BLD-CCNC: 40 U/L (ref 35–104)
ALT SERPL-CCNC: 35 U/L (ref 5–33)
ANION GAP SERPL CALCULATED.3IONS-SCNC: 8 MMOL/L (ref 9–17)
AST SERPL-CCNC: 101 U/L
BILIRUB SERPL-MCNC: 0.21 MG/DL (ref 0.3–1.2)
BUN BLDV-MCNC: 16 MG/DL (ref 8–23)
BUN/CREAT BLD: 25 (ref 9–20)
CALCIUM SERPL-MCNC: 8.6 MG/DL (ref 8.6–10.4)
CHLORIDE BLD-SCNC: 102 MMOL/L (ref 98–107)
CO2: 28 MMOL/L (ref 20–31)
CREAT SERPL-MCNC: 0.65 MG/DL (ref 0.5–0.9)
EKG ATRIAL RATE: 132 BPM
EKG Q-T INTERVAL: 348 MS
EKG QRS DURATION: 146 MS
EKG QTC CALCULATION (BAZETT): 515 MS
EKG R AXIS: -59 DEGREES
EKG T AXIS: 69 DEGREES
EKG VENTRICULAR RATE: 132 BPM
GFR AFRICAN AMERICAN: >60 ML/MIN
GFR NON-AFRICAN AMERICAN: >60 ML/MIN
GFR SERPL CREATININE-BSD FRML MDRD: ABNORMAL ML/MIN/{1.73_M2}
GLUCOSE BLD-MCNC: 161 MG/DL (ref 65–105)
GLUCOSE BLD-MCNC: 162 MG/DL (ref 70–99)
INR BLD: 1.1
MAGNESIUM: 1.8 MG/DL (ref 1.6–2.6)
MYOGLOBIN: 689 NG/ML (ref 25–58)
POTASSIUM SERPL-SCNC: 3.5 MMOL/L (ref 3.7–5.3)
PROTHROMBIN TIME: 13.8 SEC (ref 11.5–14.2)
SODIUM BLD-SCNC: 138 MMOL/L (ref 135–144)
TOTAL CK: 3007 U/L (ref 26–192)
TOTAL PROTEIN: 7 G/DL (ref 6.4–8.3)

## 2022-03-19 PROCEDURE — 93010 ELECTROCARDIOGRAM REPORT: CPT | Performed by: INTERNAL MEDICINE

## 2022-03-19 PROCEDURE — 2580000003 HC RX 258: Performed by: FAMILY MEDICINE

## 2022-03-19 PROCEDURE — 1200000000 HC SEMI PRIVATE

## 2022-03-19 PROCEDURE — 94761 N-INVAS EAR/PLS OXIMETRY MLT: CPT

## 2022-03-19 PROCEDURE — 2700000000 HC OXYGEN THERAPY PER DAY

## 2022-03-19 PROCEDURE — 85610 PROTHROMBIN TIME: CPT

## 2022-03-19 PROCEDURE — 80053 COMPREHEN METABOLIC PANEL: CPT

## 2022-03-19 PROCEDURE — 6370000000 HC RX 637 (ALT 250 FOR IP): Performed by: FAMILY MEDICINE

## 2022-03-19 PROCEDURE — 36415 COLL VENOUS BLD VENIPUNCTURE: CPT

## 2022-03-19 PROCEDURE — 82947 ASSAY GLUCOSE BLOOD QUANT: CPT

## 2022-03-19 PROCEDURE — 2500000003 HC RX 250 WO HCPCS: Performed by: FAMILY MEDICINE

## 2022-03-19 PROCEDURE — 94640 AIRWAY INHALATION TREATMENT: CPT

## 2022-03-19 PROCEDURE — 82550 ASSAY OF CK (CPK): CPT

## 2022-03-19 PROCEDURE — 83735 ASSAY OF MAGNESIUM: CPT

## 2022-03-19 PROCEDURE — 83874 ASSAY OF MYOGLOBIN: CPT

## 2022-03-19 RX ORDER — BACITRACIN, NEOMYCIN, POLYMYXIN B 400; 3.5; 5 [USP'U]/G; MG/G; [USP'U]/G
OINTMENT TOPICAL 2 TIMES DAILY
Status: DISCONTINUED | OUTPATIENT
Start: 2022-03-19 | End: 2022-03-26 | Stop reason: HOSPADM

## 2022-03-19 RX ORDER — OXYCODONE AND ACETAMINOPHEN 10; 325 MG/1; MG/1
1 TABLET ORAL EVERY 4 HOURS PRN
Status: DISCONTINUED | OUTPATIENT
Start: 2022-03-19 | End: 2022-03-26 | Stop reason: HOSPADM

## 2022-03-19 RX ORDER — TRAZODONE HYDROCHLORIDE 100 MG/1
300 TABLET ORAL NIGHTLY PRN
Status: DISCONTINUED | OUTPATIENT
Start: 2022-03-19 | End: 2022-03-24 | Stop reason: SDUPTHER

## 2022-03-19 RX ORDER — METOPROLOL TARTRATE 5 MG/5ML
2.5 INJECTION INTRAVENOUS EVERY 6 HOURS PRN
Status: DISCONTINUED | OUTPATIENT
Start: 2022-03-19 | End: 2022-03-26 | Stop reason: HOSPADM

## 2022-03-19 RX ADMIN — VENLAFAXINE HYDROCHLORIDE 37.5 MG: 37.5 CAPSULE, EXTENDED RELEASE ORAL at 22:16

## 2022-03-19 RX ADMIN — SODIUM CHLORIDE: 9 INJECTION, SOLUTION INTRAVENOUS at 22:20

## 2022-03-19 RX ADMIN — SODIUM CHLORIDE, PRESERVATIVE FREE 10 ML: 5 INJECTION INTRAVENOUS at 09:13

## 2022-03-19 RX ADMIN — METOPROLOL TARTRATE 2.5 MG: 5 INJECTION INTRAVENOUS at 16:15

## 2022-03-19 RX ADMIN — CLONAZEPAM 1 MG: 0.5 TABLET ORAL at 22:31

## 2022-03-19 RX ADMIN — BUDESONIDE AND FORMOTEROL FUMARATE DIHYDRATE 2 PUFF: 160; 4.5 AEROSOL RESPIRATORY (INHALATION) at 09:41

## 2022-03-19 RX ADMIN — VENLAFAXINE HYDROCHLORIDE 37.5 MG: 37.5 CAPSULE, EXTENDED RELEASE ORAL at 09:13

## 2022-03-19 RX ADMIN — OXYCODONE AND ACETAMINOPHEN 1 TABLET: 325; 10 TABLET ORAL at 04:24

## 2022-03-19 RX ADMIN — METOPROLOL TARTRATE 25 MG: 25 TABLET, FILM COATED ORAL at 09:13

## 2022-03-19 RX ADMIN — TRAZODONE HYDROCHLORIDE 300 MG: 100 TABLET ORAL at 23:19

## 2022-03-19 RX ADMIN — ARIPIPRAZOLE 5 MG: 5 TABLET ORAL at 09:13

## 2022-03-19 RX ADMIN — OXYCODONE AND ACETAMINOPHEN 1 TABLET: 325; 10 TABLET ORAL at 23:19

## 2022-03-19 RX ADMIN — METOPROLOL TARTRATE 25 MG: 25 TABLET, FILM COATED ORAL at 22:31

## 2022-03-19 RX ADMIN — SODIUM CHLORIDE: 9 INJECTION, SOLUTION INTRAVENOUS at 10:15

## 2022-03-19 RX ADMIN — POLYMYXIN B SULFATE, BACITRACIN ZINC, NEOMYCIN SULFATE: 5000; 3.5; 4 OINTMENT TOPICAL at 22:16

## 2022-03-19 RX ADMIN — OXYCODONE AND ACETAMINOPHEN 1 TABLET: 325; 10 TABLET ORAL at 18:16

## 2022-03-19 RX ADMIN — ASPIRIN 81 MG: 81 TABLET, COATED ORAL at 09:13

## 2022-03-19 RX ADMIN — PANTOPRAZOLE SODIUM 20 MG: 20 TABLET, DELAYED RELEASE ORAL at 06:14

## 2022-03-19 RX ADMIN — Medication 9 MG: at 22:16

## 2022-03-19 ASSESSMENT — PAIN DESCRIPTION - ORIENTATION
ORIENTATION: UPPER;MID
ORIENTATION: UPPER;MID

## 2022-03-19 ASSESSMENT — PAIN DESCRIPTION - PAIN TYPE
TYPE: CHRONIC PAIN
TYPE: CHRONIC PAIN

## 2022-03-19 ASSESSMENT — PAIN DESCRIPTION - DESCRIPTORS
DESCRIPTORS: ACHING;DISCOMFORT
DESCRIPTORS: ACHING;DISCOMFORT

## 2022-03-19 ASSESSMENT — PAIN DESCRIPTION - ONSET
ONSET: ON-GOING
ONSET: ON-GOING

## 2022-03-19 ASSESSMENT — PAIN SCALES - GENERAL
PAINLEVEL_OUTOF10: 10
PAINLEVEL_OUTOF10: 8
PAINLEVEL_OUTOF10: 8

## 2022-03-19 ASSESSMENT — PAIN DESCRIPTION - PROGRESSION
CLINICAL_PROGRESSION: GRADUALLY WORSENING

## 2022-03-19 ASSESSMENT — PAIN DESCRIPTION - FREQUENCY
FREQUENCY: CONTINUOUS
FREQUENCY: CONTINUOUS

## 2022-03-19 ASSESSMENT — PAIN DESCRIPTION - LOCATION
LOCATION: BACK
LOCATION: BACK

## 2022-03-19 NOTE — CARE COORDINATION
Case Management Initial Discharge Plan  Cal Yun,         Readmission Risk              Risk of Unplanned Readmission:  21             Met with:patient to discuss discharge plans. Information verified: address, contacts, phone number, , insurance Yes  PCP: Eliazar Merlin, MD  Date of last visit: sometime in November     Insurance Provider: [facility]     Discharge Planning  Current Residence:  Private home   Living Arrangements:    alone     Home has 2 stories/0 stairs to climb, has ramp to enter. Her bed and bath are on the main floor. Support Systems:    toi Bolivar and uyen johansen       Current Services PTA:    1. Home care for HHA thru [facility]   2. Home 02 with HCS 2 liters all the time     Patient able to perform ADL's:Assisted  DME in home:  02 at 2 liters, walker, neb, shower bench, toilet riser  DME used to aid ambulation prior to admission:   walker  DME used during admission:  Walker     Potential Assistance Needed:   snf vs home care     Pharmacy: [facility] [address]    Potential Assistance Purchasing Medications:    any   Does patient want to participate in local refill/ meds to beds program?     No       Patient agreeable to home care: Yes  Freedom of choice provided:  yes      Type of Home Care Services:    HHA will need skilled   Patient expects to be discharged to:   home     Prior SNF/Rehab Placement and Facility: [facility]   Agreeable to SNF/Rehab: No  [place] of choice provided: n/a   Evaluation: n/a    Expected Discharge date:    3/24  Follow Up Appointment: Best Day/ Time:  any     Transportation provider: unsure  Transportation arrangements needed for discharge: Yes    Discharge Plan:   Met with patient to complete discharge assessment. Patient lives alone. She has HHA that come out TWF for 3 hours to assist with meals, cleaning and ADL's. She has had skilled thru them in past .     Epic reviewed.  Patient is under the care of DR Naida Almeida for RUL cancer and was to have EBUS thru Dr Sandip Boles but has not been compliant with follow up or appointments. Patient stated her chemo and infusions have been put on hold until bx was completed. When asked why unable to keep appointments or completing test she stated that it was cause she was admitted or had ED visits. ( in encounter notes patient has CM notes regarding EBUS since December )     Patient admitted with [facility], denies any falls. She lives alone and states she can get up with walker and walk around house but will need therapy to evaluated for safety. She is declining going to a snf but would allow home care therapy again. May benefit from palliative consult also for goals of care. Since she does not seem inclined to get EBUS and treatment not being done would benefit from a goal of care conversation. Did place sticky note for both therapy and palliative care consult. Notified [facility] of admission for HHA will need to anticipate skilled services.      VELMA in epic     Electronically signed by Florencio Song RN on 3/19/22 at 10:09 AM EDT

## 2022-03-19 NOTE — CONSULTS
Continue ventilator cardiology consultation    Reason for consult: Tachycardia. Date of consult 3/19/2022. Mrs. Orville Garsia is a 67-year-old female with multiple comorbidities including lung carcinoma, history of CVA, patient was found to be in rhabdomyolysis and was tachycardic. Cardiology service was consulted in view of tachycardia. At the time of my examination patient is somewhat anxious at bedrest, reports having some low back discomfort. Past medical history:    1. Diabetes mellitus. 2.. Peripheral neuropathy  3. Dyslipidemia  4. Glucose intolerance  5. Anxiety/depression. Social history: Denies current use of tobacco products or alcohol. Family history:    Negative for sudden cardiac death or significant coronary disease. Allergies are to iodine    Review of systems:    Constitutional: Fatigue. HEENT: Difficulty hearing, dry mouth. Cardiovascular: Patient reports episodes of palpitations. Denies chest pain    Respiratory: Exertional shortness of breath, occasional productive cough. Gastrointestinal: Constipation,    Neurologic: Occasional weakness and numbness of the lower extremities. Musculoskeletal: Generalized aches and pains. Psychiatric: History of anxiety and depression. Physical exam:    Patient appears somewhat anxious at bedrest, reports that she has some low back discomfort. Vitals blood pressure of 110/62, pulse rate of 122, monitor shows sinus tachycardia with isolated APCs and PVCs.,  Respiratory rate is 18. Patient is afebrile. Constitutional: Patient is awake, in no apparent distress but does report some discomfort in the low back.     HEENT: Cranium is atraumatic normocephalic there is mild jugular venous distention normal carotid upstrokes no audible bruits his sclera is anicteric oral mucosa is moist skin is warm and dry    Examination of the heart shows S1, S2 tachycardic there is a grade 1/6 to 2/6 systolic murmur at cardiac apex    Lungs reveal bronchovesicular breath sounds bilaterally. Abdomen: Soft, bowel sounds present. Extremities demonstrate 1+ edema diminished pedal pulses. Labs: All labs reviewed    Potassium 3.5 BUN 25 procalcitonin 0.26 ALT 35  hemoglobin 11.9 hematocrit 35. TSH 7.11    CBC:  Recent Labs     03/18/22  1555   WBC 10.1   HGB 12.2   HCT 39.1        Magnesium:  Recent Labs     03/19/22  0406   MG 1.8     BMP:  Recent Labs     03/18/22  1555 03/19/22  0406    138   K 4.1 3.5*   CALCIUM 9.7 8.6   CO2 28 28   BUN 20 16   CREATININE 0.84 0.65   LABGLOM >60 >60   GLUCOSE 182* 162*     BNP:  Recent Labs     03/18/22  1555   PROBNP 2,314*     PT/INR:  Recent Labs     03/19/22  0406   PROTIME 13.8   INR 1.1     APTT:No results for input(s): APTT in the last 72 hours. CARDIAC ENZYMES:  Recent Labs     03/18/22  1555 03/18/22  1845 03/19/22  0406   MYOGLOBIN 2,626*  --  689*   CKTOTAL 5,990*  --  3,007*   TROPHS 59* 55*  --      FASTING LIPID PANEL:No results found for: HDL, LDLDIRECT, LDLCALC, TRIG  LIVER PROFILE:  Recent Labs     03/18/22  1555 03/19/22  0406   * 101*   ALT 45* 35*   LABALBU 3.9 3.2*   ALKPHOS 51 40   BILITOT 0.24* 0.21*   BILIDIR <0.08  --    IBILI Can not be calculated  --    PROT 8.8* 7.0        ASSESSMENT:  1.      Patient Active Problem List   Diagnosis    Valvular heart disease    Type 2 diabetes mellitus without complication, without long-term current use of insulin ([address]    Open wound of right ankle    COPD (chronic obstructive pulmonary disease) (HCC)    Syncope and collapse    Chronic ulcer of right heel ([address]    Multifocal pneumonia    Aortic valve stenosis    Esophageal dilatation    Aspiration pneumonia of both lower lobes due to gastric secretions ([address]    Falls frequently    Chronic respiratory failure (HCC)    Contusion of right knee    Closed fracture of right distal radius    Subdural hemorrhage (HCC)    Subarachnoid hemorrhage [facility])    Traumatic hemorrhage of cerebrum (HCC)    Chronic bilateral low back pain    Cellulitis of both feet    Acute on chronic congestive heart failure (HCC)    Bilateral leg edema    Cellulitis    Lung mass    Malignant neoplasm of upper lobe of right lung (HCC)    Urinary tract infectious disease    Closed fracture of one rib of right side    Degenerative disc disease, lumbar    Moderate malnutrition (HCC)    Rhabdomyolysis       Impression:    1. Rhabdomyolysis likely due to patient laying down on the ground for extended period of time. 2.  Sinus tachycardia with PVCs    3.  Diabetes mellitus    4. Dyslipidemia        Plan:    Tachycardia appears secondary to underlying metabolic derangement currently patient is maintaining sinus rhythm with isolated PVCs. Replace potassium if low, correct electrolytes. Sinus tachycardia appears to be reactive in nature. Add metoprolol 25 mg p.o. twice daily to current regimen. We will continue to monitor serial CPKs in view of rhabdomyolysis.     Thank you for consultation and will follow the patient with you.    Ab Arias.

## 2022-03-19 NOTE — PROGRESS NOTES
Located within [facility].,    Adult Hospitalist      Name: Lenka Greene  MRN: [id number]     Acct: [de-identified]  Room: 2014/2014-01    Admit Date: 3/18/2022  1:59 PM  PCP: Rosalio Gayle MD    Primary Problem  Principal Problem:    Rhabdomyolysis  Resolved Problems:    * No resolved hospital problems. *        Assesment:     · Acute rhabdomyolysis-improving  · Essential hypertension  · Diabetes mellitus type 2  · Diabetic neuropathy  · Mixed hyperlipidemia  · Chronic pain syndrome  · Gastroesophageal reflux disease without esophagitis  · Major depressive disorder/bipolar disorder  · Chronic obstructive pulmonary disease, unspecified  · Lung mass  · Left face abrasion  · Hypertensive urgency  · Tachycardia        Plan:     · Admit to progressive  · Monitor vitals closely  · Keep SPO2 above 90%  · I's and O's  · IV fluids at 150 mL/h  · Pain control  · Reduce pain meds secondary to lethargy and question of renal impairment  · Antiemetics as needed  · Hold Crestor, Amaryl, Lasix, trazodone, Neurontin  · Resume aspirin, Prevacid, Lopressor  · Resume Effexor, Abilify, Klonopin, melatonin  · Resume Dulera  · DuoNeb  · RT eval  · CBC, BMP  · CK, myoglobin  · Urinalysis with culture sensitivity-if UTI will treat with antibiotic  · Consult cardiology  · Increased dose of Percocet  · Bacitracin top  · Lopressor IV as needed  · DVT and GI prophylaxis.         Chief Complaint:     Chief Complaint   Patient presents with    Fatigue    Chest Pain    Cough    Back Pain         History of Present Illness:        Patient seen and examined at bedside  Last 24-hour events reviewed with nursing  Patient says she feels better  She still complains of pain in several parts of her body  However she says pain has improved about 20 to 30%  Denies fever or chills  There is no visible new rash  Patient complains of pain in her joints  Says she is on chronic pain management regimen  Complains of pain and asking for her to be on her usual dose and frequency  Patient has been more alert  Dose has been adjusted  Tachycardia improved  Urine output 2000 mL  IV fluids reduced    Patient denies any headache, photophobia or diplopia  Denies dyspnea, orthopnea  Denies nausea, vomiting, diarrhea   Denies joint swelling      Initial HPI  Mesha Gordon is a 71 y.o.  female who presents with Fatigue, Chest Pain, Cough, and Back Pain    Patient admitted through the emergency room where she presented with generalized weakness and malaise. Patient said this has been progressively getting worse over the last 5 to 6 days. Patient walks with the help of a walker and says at times she would slide down and then not be able to get up from the ground. She says categorically that she did not have a fall. Patient says she felt she did not have significant strength to get up. Apparently this is happened in the past and she has been helped by her home care nurse. Today however she was visited by a friend who found her laying down possibly for several hours. EMTs were called in who brought the patient to the emergency room. Patient still complains of lethargy and pain over several parts of her body. She denies any chest pain though she has had some dyspnea and tachycardia. Patient complains of some increased frequency of urination but denies any fever or chills. She had some lower abdominal pain and cramping in addition. Patient denies any headache, photophobia or neck pain. Denies any nausea, vomiting or diarrhea. Patient has a large blister over the left side of her face. She says it was one of the masks. Later she added to the help of nursing that when EMTs were trying to assist her one of their apparatus-left side of her face. It is difficult to determine that since there are inflammatory changes around the blister suggesting this might be older than a day.     I have personally reviewed the past medical history, past surgical history, medications, social history, and family history, and summarized in the note. Review of Systems:     All 10 point system is reviewed and negative otherwise mentioned in HPI. Past Medical History:     Past Medical History:   Diagnosis Date    AAA (abdominal aortic aneurysm) [facility]     Pt denies having a history of AAA    Acid reflux     Anxiety and depression     Aortic stenosis     Arthritis     Blister of ankle, right 10/13/2016    blister broke open & draining, is on antibiotics    CAD (coronary artery disease)     Cancer ([facility]     lung cancer    COPD (chronic obstructive pulmonary disease) ([address]     Diabetes mellitus ([address]     Falls     Heart block     bifasicular    Hypokalemia     MDRO (multiple drug resistant organisms) resistance 10/17/2014    E. Coli urine    MRSA (methicillin resistant staph aureus) culture positive resolved 12/2016    2 negative nasal screens - 2016 (hx in urine 2014)    On home oxygen therapy     uses 2 liters at night    On home oxygen therapy     patient states 2 liters/nasal cannula continuous    Overactive bladder     patient incont.  wears a brief    Pneumonia     PONV (postoperative nausea and vomiting)     Vitamin D deficiency         Past Surgical History:     Past Surgical History:   Procedure Laterality Date    AORTIC VALVE REPAIR N/A 4/11/2017    AORTIC VALVE REPAIR REPLACEMENT performed by Charmayne Huxley, MD at [facility] N/A 8/31/2020    BRONCHOSCOPY BIOPSY BRONCHUS performed by Perla Donis MD at [address] [facility], [place], DIAGNOSTIC  12/08/2016    EYE SURGERY      HYSTERECTOMY      IR INS PICC VAD W SQ PORT GREATER THAN 5  9/4/2020    IR INS PICC VAD W SQ PORT GREATER THAN 5 9/4/2020 [facility]    IR PORT PLACEMENT EQUAL OR GREATER THAN 5 YEARS  9/29/2020    IR PORT PLACEMENT EQUAL OR GREATER THAN 5 YEARS 9/29/2020 MD JAY JAY Morejon SPECIAL PROCEDURES    LUMBAR LAMINECTOMY      TONSILLECTOMY          Medications Prior to Admission:       Prior to Admission medications    Medication Sig Start Date End Date Taking? Authorizing Provider   ARIPiprazole (ABILIFY) 5 MG tablet Take 5 mg by mouth at bedtime   Yes Historical Provider, MD   metFORMIN (GLUCOPHAGE) 500 MG tablet Take 500 mg by mouth 2 times daily (with meals)   Yes Historical Provider, MD   Pseudoeph-Doxylamine-DM-APAP (NYQUIL MULTI-SYMPTOM PO) Take by mouth at bedtime   Yes Historical Provider, MD   traZODone (DESYREL) 150 MG tablet Take 300 mg by mouth nightly   Yes Historical Provider, MD   gabapentin (NEURONTIN) 400 MG capsule Take 800 mg by mouth in the morning and at bedtime. Yes Historical Provider, MD   melatonin 5 MG TABS tablet Take 15 mg by mouth nightly   Yes Historical Provider, MD   ondansetron (ZOFRAN) 4 MG tablet Take 1 tablet by mouth every 8 hours as needed for Nausea or Vomiting 1/26/22   Dale Leblanc MD   rosuvastatin (CRESTOR) 5 MG tablet Take 5 mg by mouth daily    Historical Provider, MD   oxyCODONE-acetaminophen (PERCOCET)  MG per tablet Take 1 tablet by mouth every 6 hours as needed for Pain. Historical Provider, MD   clonazePAM (KLONOPIN) 1 MG tablet Take 1 tablet by mouth 3 times daily as needed for Anxiety for up to 3 doses.  11/5/20 1/26/22  Taisha Patrick MD   furosemide (LASIX) 40 MG tablet Take 40 mg by mouth daily as needed (swelling)     Historical Provider, MD   glimepiride (AMARYL) 1 MG tablet Take 1 mg by mouth 2 times daily (with meals)     Historical Provider, MD   metoprolol tartrate (LOPRESSOR) 25 MG tablet Take 25 mg by mouth 2 times daily    Historical Provider, MD   venlafaxine (EFFEXOR XR) 150 MG extended release capsule Take 150 mg by mouth 2 times daily    Historical Provider, MD   lansoprazole (PREVACID) 30 MG delayed release capsule Take 30 mg by mouth daily 11/10/16   Historical Provider, MD   aspirin EC 81 MG EC tablet Take 81 mg by mouth daily Historical Provider, MD   mometasone-formoterol [facility]) 200-5 MCG/ACT inhaler Inhale 2 puffs into the lungs every 12 hours as needed (wheezing/SOB)     Historical Provider, MD        Allergies:       Codeine and Dye [iodides]    Social History:     Tobacco:    reports that she quit smoking about 5 years ago. She has never used smokeless tobacco.  Alcohol:      reports no history of alcohol use. Drug Use:  reports no history of drug use. Family History:     Family History   Problem Relation Age of Onset    Cancer Mother     Cancer Father          Physical Exam:     Vitals:  BP (!) 173/110   Pulse 105   Temp 99.2 °F (37.3 °C) (Oral)   Resp 16   Ht 5' 8\" (1.727 m)   Wt 166 lb 9.6 oz (75.6 kg)   SpO2 96%   BMI 25.33 kg/m²   Temp (24hrs), Av.8 °F (36.6 °C), Min:97.2 °F (36.2 °C), Max:99.2 °F (37.3 °C)          General appearance - alert, lethargic, and in mild acute distress.   Tachypnea  Mental status - oriented to person, place, and time with normal affect  Head - normocephalic and atraumatic  Eyes - pupils equal and reactive, extraocular eye movements intact, conjunctiva clear  Ears - hearing appears to be intact  Nose - no drainage noted  Mouth - mucous membranes moist  Neck - supple, no carotid bruits, thyroid not palpable  Chest - clear to auscultation, increased effort  Heart - normal rate, regular rhythm, no murmur  Abdomen - soft, nontender, nondistended, bowel sounds present all four quadrants, no masses, hepatomegaly or splenomegaly  Neurological - normal speech, no focal findings or movement disorder noted, cranial nerves II through XII grossly intact  Extremities - peripheral pulses palpable, no pedal edema or calf pain with palpation  Skin - no gross lesions, rashes, or induration noted        Data:     Labs:    Hematology:  Recent Labs     22  1555 22  0406   WBC 10.1  --    RBC 4.42  --    HGB 12.2  --    HCT 39.1  --    MCV 88.5  --    MCH 27.6  --    MCHC 31.2  --    RDW 13. 6  --      --    MPV 8.6  --    INR  --  1.1     Chemistry:  Recent Labs     03/18/22  1555 03/18/22  1845 03/19/22  0406     --  138   K 4.1  --  3.5*   CL 99  --  102   CO2 28  --  28   GLUCOSE 182*  --  162*   BUN 20  --  16   CREATININE 0.84  --  0.65   MG  --   --  1.8   ANIONGAP 12  --  8*   LABGLOM >60  --  >60   GFRAA >60  --  >60   CALCIUM 9.7  --  8.6   PROBNP 2,314*  --   --    TROPHS 59* 55*  --    CKTOTAL 5,990*  --  3,007*   MYOGLOBIN 2,626*  --  689*     Recent Labs     03/18/22  1555 03/19/22  0406   PROT 8.8* 7.0   LABALBU 3.9 3.2*   * 101*   ALT 45* 35*   ALKPHOS 51 40   BILITOT 0.24* 0.21*   BILIDIR <0.08  --        Lab Results   Component Value Date    INR 1.1 03/19/2022    INR 1.0 01/06/2021    INR 0.9 09/29/2020    PROTIME 13.8 03/19/2022    PROTIME 12.6 01/06/2021    PROTIME 11.8 09/29/2020       Lab Results   Component Value Date/Time    SPECIAL NOT REPORTED 11/24/2021 06:32 PM     Lab Results   Component Value Date/Time    CULTURE NO GROWTH 6 DAYS 11/24/2021 06:32 PM       Lab Results   Component Value Date    POCPH 7.36 04/12/2017    PHART 7.42 08/26/2012    PH 7.22 04/11/2017    POCPCO2 45 04/12/2017    MMC6EZM 41 08/26/2012    PCO2 54 04/11/2017    POCPO2 93 04/12/2017    PO2ART 59 08/26/2012    PO2 89 04/11/2017    POCHCO3 25.3 04/12/2017    TDM9JUY 26.1 08/26/2012    HCO3 22.2 04/11/2017    NBEA NOT REPORTED 04/12/2017    PBEA 0 04/12/2017    SLB9LQE 27 04/12/2017    UBCK2IQQ 97 04/12/2017    S6ECSCJT 92.1 08/26/2012    O2SAT 95 04/11/2017    FIO2 UNKNOWN 10/31/2017       Radiology:    XR CHEST PORTABLE    Result Date: 3/18/2022  No acute process.          All radiological studies reviewed                Code Status:  Full Code    Electronically signed by To Hanson MD on 3/19/2022 at 7:18 PM     Copy sent to Dr. Eduar Bledsoe MD    This note was created with the assistance of a speech-recognition program.  Although the intention is to generate a document that actually reflects the content of the visit, no guarantees can be provided that every mistake has been identified and corrected by editing. Note was updated later by me after  physical examination and  completion of the assessment.

## 2022-03-19 NOTE — DISCHARGE INSTR - COC
Continuity of Care Form    Patient Name: Stacey Lowe   :  1950  MRN:  [id number]    Admit date:  3/18/2022  Discharge date:  2022    Code Status Order: Full Code   Advance Directives:      Admitting Physician:  Christina Norton MD  PCP: Curtis Chavis MD    Discharging Nurse: [facility] Unit/Room#: 2041/2041-01  Discharging Unit Phone Number: [phone]    Emergency Contact:   Extended Emergency Contact Information  Primary Emergency Contact: JoelKalen jiménez  Address: .            [address] Phone: [phone]  Mobile Phone: [phone]  Relation: Child  Secondary Emergency Contact: Kaleigh moulton  [place] Phone: [phone]  Mobile Phone: [phone]  Relation: Grandchild    Past Surgical History:  Past Surgical History:   Procedure Laterality Date    AORTIC VALVE REPAIR N/A 2017    AORTIC VALVE REPAIR REPLACEMENT performed by Chiara Uribe MD at [facility] N/A 2020    BRONCHOSCOPY BIOPSY BRONCHUS performed by Wade Mohan MD at [facility] [address] COLON, DIAGNOSTIC  2016    EYE SURGERY      HYSTERECTOMY      IR INS PICC VAD W SQ PORT GREATER THAN 5  2020    IR INS PICC VAD W SQ PORT GREATER THAN 5 2020 [facility]    IR PORT PLACEMENT EQUAL OR GREATER THAN 5 YEARS  2020    IR PORT PLACEMENT EQUAL OR GREATER THAN 5 YEARS 2020 Simran Pereyra MD [facility]    LUMBAR LAMINECTOMY      TONSILLECTOMY         Immunization History:   Immunization History   Administered Date(s) Administered    Influenza Virus Vaccine 10/11/2012, 10/07/2013, 2014    Influenza, High Dose (Fluzone 65 yrs and older) 2016, 10/02/2018, 10/08/2019    Influenza, Triv, inactivated, subunit, adjuvanted, IM (Fluad 65 yrs and older) 10/03/2017    Pneumococcal Conjugate 13-valent (Ukaqunz36) 10/02/2018    Pneumococcal Polysaccharide (Ihaffeyor95) 2014    Tdap (Boostrix, Adacel) 10/31/2017       Active Problems:  Patient Active Problem List   Diagnosis Code    Valvular heart disease I38    Type 2 diabetes mellitus without complication, without long-term current use of insulin ([facility] [address] E11.9    Open wound of right ankle S91.001A    COPD (chronic obstructive pulmonary disease) ([facility]) J44.9    Syncope and collapse R55    Chronic ulcer of right heel ([facility]) L97.419    Multifocal pneumonia J18.9    Aortic valve stenosis I35.0    Esophageal dilatation K22.89    Aspiration pneumonia of both lower lobes due to gastric secretions ([facility] [address] J69.0    Falls frequently R29.6    Chronic respiratory failure ([facility]) J96.10    Contusion of right knee S80. 01XA    Closed fracture of right distal radius S52.501A    Subdural hemorrhage ([facility]) I62.00    Subarachnoid hemorrhage ([facility]) I60.9    Traumatic hemorrhage of cerebrum ([facility]) B12.472Q    Chronic bilateral low back pain M54.50, G89.29    Cellulitis of both feet L03.115, L03.116    Acute on chronic congestive heart failure ([facility]) I50.9    Bilateral leg edema R60.0    Cellulitis L03.90    Lung mass R91.8    Malignant neoplasm of upper lobe of right lung ([facility]) C34.11    Urinary tract infectious disease N39.0    Closed fracture of one rib of right side S22.31XA    Degenerative disc disease, lumbar M51.36    Moderate malnutrition ([facility]) E44.0    Rhabdomyolysis M62.82       Isolation/Infection:   Isolation            No Isolation          Patient Infection Status       Infection Onset Added Last Indicated Last Indicated By Review Planned Expiration Resolved Resolved By    None active    Resolved    COVID-19 (Rule Out) 20 COVID-19 (Ordered)   20     COVID-19 (Rule Out) 20 COVID-19 (Ordered)   20 Rule-Out Test Resulted    MRSA  14 Rhea Gardner RN   17 Deborah Beauchamp RN    2 negative nasal screens 10/2016 & 2016  Urine - 2014      MDRO (multi-drug resistant organism)  11/24/14 11/24/14 Rory Tafoya RN   10/01/20 Db Lake RN    E. Coli - urine 10/2016              Nurse Assessment:  Last Vital Signs: BP (!) 170/100   Pulse 118   Temp 97.3 °F (36.3 °C) (Temporal)   Resp 18   Ht 5' 8\" (1.727 m)   Wt 166 lb 9.6 oz (75.6 kg)   SpO2 98%   BMI 25.33 kg/m²     Last documented pain score (0-10 scale): Pain Level: 8  Last Weight:   Wt Readings from Last 1 Encounters:   03/19/22 166 lb 9.6 oz (75.6 kg)     Mental Status:  oriented, alert, coherent, logical, thought processes intact, and able to concentrate and follow conversation    IV Access:  - None    Nursing Mobility/ADLs:  Walking   Assisted  Transfer  Assisted  Bathing  Assisted  Dressing  Independent  Toileting  Independent  Feeding  Independent  Med [address] [place]  Med Delivery   whole    Wound Care Documentation and Therapy:  Wound 03/18/22 Buttocks (Active)   Wound Etiology Pressure Stage  2 03/18/22 2030   Wound Cleansed Not Cleansed 03/18/22 2030   Dressing/Treatment Foam 03/18/22 2030   Wound Assessment Pink/red 03/18/22 2030   Drainage Amount None 03/18/22 2030   Number of days: 0       Wound 03/18/22 Sacrum (Active)   Wound Etiology Pressure Stage  1 03/18/22 2030   Wound Cleansed Not Cleansed 03/18/22 2030   Dressing/Treatment Foam 03/18/22 2030   Wound Assessment Pink/red 03/18/22 2030   Drainage Amount None 03/18/22 2030   Number of days: 0        Elimination:  Continence: Bowel: No  Bladder: No  Urinary Catheter: None   Colostomy/Ileostomy/Ileal Conduit: No       Date of Last BM: ***    Intake/Output Summary (Last 24 hours) at 3/19/2022 1036  Last data filed at 3/19/2022 0554  Gross per 24 hour   Intake 1284 ml   Output --   Net 1284 ml     I/O last 3 completed shifts: In: 8864 [I.V.:1284]  Out: -     Safety Concerns:      At Risk for Falls    Impairments/Disabilities:      None    Nutrition Therapy:  Current Nutrition Therapy:   - Oral Diet:  Carb Control 4 carbs/meal (1800kcals/day)    Routes of Feeding: Oral  Liquids: No Restrictions  Daily Fluid Restriction: no  Last Modified Barium Swallow with Video (Video Swallowing Test): not done    Treatments at the Time of Hospital Discharge:   Respiratory Treatments: n/a  Oxygen Therapy:  is on oxygen at 3 L/min per nasal cannula. Ventilator:    - No ventilator support    Rehab Therapies: Physical Therapy and Occupational Therapy  Weight Bearing Status/Restrictions: No weight bearing restrictions  Other Medical Equipment (for information only, NOT a DME order):  walker  Other Treatments:   1) SN for Assessment  2) SN for Medication Teaching & Compliance  3) SN for dressing changes to:  4 Social Service assessment due to falls and ?  Unsafe home environment  Patient's personal belongings (please select all that are sent with patient):  Glasses, Dentures upper    RN SIGNATURE:  Electronically signed by Vimal Nevarez RN on 3/23/22 at 9:46 AM EDT    CASE MANAGEMENT/SOCIAL WORK SECTION    Inpatient Status Date: 3/18/22    Readmission Risk Assessment Score:  Readmission Risk              Risk of Unplanned Readmission:  21           Discharging to Facility/ Agency   Name:  [facility]  Phone: [phone]  Fax: [phone]    Discharging to Facility/ Agency   Name:  [facility]  Phone: [phone]  Fax: [phone]    Dialysis Facility (if applicable)   Name:  Address:  Dialysis Schedule:  Phone:  Fax:    / signature: Electronically signed by Poncho Ernst RN on 3/19/22 at 10:39 AM EDT    PHYSICIAN SECTION    Prognosis: Good    Condition at Discharge: Stable    Rehab Potential (if transferring to Rehab): Good    Recommended Labs or Other Treatments After Discharge:     Physician Certification: I certify the above information and transfer of Salina Landry  is necessary for the continuing treatment of the diagnosis listed and that she requires Home Care for greater 30 days.     Update Admission H&P: No change in H&P    PHYSICIAN SIGNATURE:  Electronically signed by Jeancarlos Graves MD on 3/26/22 at 11:34 AM EDT

## 2022-03-19 NOTE — PROGRESS NOTES
Patient was transferred to [place] [address] from [facility]. Patient was hooked up to a purwick and placed on 3L's of O2 via NC. Patient was oriented to room and bed mechanics. Will continue to monitor.

## 2022-03-20 ENCOUNTER — APPOINTMENT (OUTPATIENT)
Dept: GENERAL RADIOLOGY | Age: 72
DRG: 565 | End: 2022-03-20
Payer: MEDICARE

## 2022-03-20 LAB
ABSOLUTE EOS #: 0.04 K/UL (ref 0–0.44)
ABSOLUTE IMMATURE GRANULOCYTE: 0.09 K/UL (ref 0–0.3)
ABSOLUTE LYMPH #: 0.95 K/UL (ref 1.1–3.7)
ABSOLUTE MONO #: 0.4 K/UL (ref 0.1–1.2)
ANION GAP SERPL CALCULATED.3IONS-SCNC: 11 MMOL/L (ref 9–17)
BASOPHILS # BLD: 0 % (ref 0–2)
BASOPHILS ABSOLUTE: <0.03 K/UL (ref 0–0.2)
BUN BLDV-MCNC: 8 MG/DL (ref 8–23)
BUN/CREAT BLD: 13 (ref 9–20)
CALCIUM SERPL-MCNC: 8.8 MG/DL (ref 8.6–10.4)
CHLORIDE BLD-SCNC: 98 MMOL/L (ref 98–107)
CO2: 27 MMOL/L (ref 20–31)
CREAT SERPL-MCNC: 0.6 MG/DL (ref 0.5–0.9)
EOSINOPHILS RELATIVE PERCENT: 1 % (ref 1–4)
GFR AFRICAN AMERICAN: >60 ML/MIN
GFR NON-AFRICAN AMERICAN: >60 ML/MIN
GFR SERPL CREATININE-BSD FRML MDRD: ABNORMAL ML/MIN/{1.73_M2}
GLUCOSE BLD-MCNC: 158 MG/DL (ref 65–105)
GLUCOSE BLD-MCNC: 166 MG/DL (ref 70–99)
GLUCOSE BLD-MCNC: 180 MG/DL (ref 65–105)
HCT VFR BLD CALC: 33.9 % (ref 36.3–47.1)
HEMOGLOBIN: 10.7 G/DL (ref 11.9–15.1)
IMMATURE GRANULOCYTES: 1 %
LYMPHOCYTES # BLD: 13 % (ref 24–43)
MCH RBC QN AUTO: 27 PG (ref 25.2–33.5)
MCHC RBC AUTO-ENTMCNC: 31.6 G/DL (ref 28.4–34.8)
MCV RBC AUTO: 85.6 FL (ref 82.6–102.9)
MONOCYTES # BLD: 6 % (ref 3–12)
MYOGLOBIN: 116 NG/ML (ref 25–58)
NRBC AUTOMATED: 0 PER 100 WBC
PDW BLD-RTO: 13.4 % (ref 11.8–14.4)
PLATELET # BLD: 251 K/UL (ref 138–453)
PMV BLD AUTO: 8.3 FL (ref 8.1–13.5)
POTASSIUM SERPL-SCNC: 3.2 MMOL/L (ref 3.7–5.3)
RBC # BLD: 3.96 M/UL (ref 3.95–5.11)
SEG NEUTROPHILS: 80 % (ref 36–65)
SEGMENTED NEUTROPHILS ABSOLUTE COUNT: 5.81 K/UL (ref 1.5–8.1)
SODIUM BLD-SCNC: 136 MMOL/L (ref 135–144)
TOTAL CK: 2294 U/L (ref 26–192)
WBC # BLD: 7.3 K/UL (ref 3.5–11.3)

## 2022-03-20 PROCEDURE — 85025 COMPLETE CBC W/AUTO DIFF WBC: CPT

## 2022-03-20 PROCEDURE — 83874 ASSAY OF MYOGLOBIN: CPT

## 2022-03-20 PROCEDURE — 80048 BASIC METABOLIC PNL TOTAL CA: CPT

## 2022-03-20 PROCEDURE — 82550 ASSAY OF CK (CPK): CPT

## 2022-03-20 PROCEDURE — 2700000000 HC OXYGEN THERAPY PER DAY

## 2022-03-20 PROCEDURE — 71045 X-RAY EXAM CHEST 1 VIEW: CPT

## 2022-03-20 PROCEDURE — 2580000003 HC RX 258: Performed by: FAMILY MEDICINE

## 2022-03-20 PROCEDURE — 1200000000 HC SEMI PRIVATE

## 2022-03-20 PROCEDURE — 82947 ASSAY GLUCOSE BLOOD QUANT: CPT

## 2022-03-20 PROCEDURE — 36415 COLL VENOUS BLD VENIPUNCTURE: CPT

## 2022-03-20 PROCEDURE — 6370000000 HC RX 637 (ALT 250 FOR IP): Performed by: FAMILY MEDICINE

## 2022-03-20 RX ORDER — TRAZODONE HYDROCHLORIDE 100 MG/1
300 TABLET ORAL NIGHTLY
Status: DISCONTINUED | OUTPATIENT
Start: 2022-03-20 | End: 2022-03-26 | Stop reason: HOSPADM

## 2022-03-20 RX ORDER — GABAPENTIN 400 MG/1
800 CAPSULE ORAL 2 TIMES DAILY
Status: DISCONTINUED | OUTPATIENT
Start: 2022-03-20 | End: 2022-03-26 | Stop reason: HOSPADM

## 2022-03-20 RX ORDER — FUROSEMIDE 20 MG/1
20 TABLET ORAL ONCE
Status: DISCONTINUED | OUTPATIENT
Start: 2022-03-21 | End: 2022-03-20

## 2022-03-20 RX ORDER — ALBUTEROL SULFATE 90 UG/1
2 AEROSOL, METERED RESPIRATORY (INHALATION) 2 TIMES DAILY
Status: DISCONTINUED | OUTPATIENT
Start: 2022-03-21 | End: 2022-03-22

## 2022-03-20 RX ORDER — DEXTROSE MONOHYDRATE 25 G/50ML
12.5 INJECTION, SOLUTION INTRAVENOUS PRN
Status: DISCONTINUED | OUTPATIENT
Start: 2022-03-20 | End: 2022-03-20 | Stop reason: RX

## 2022-03-20 RX ORDER — ARIPIPRAZOLE 5 MG/1
5 TABLET ORAL NIGHTLY
Status: DISCONTINUED | OUTPATIENT
Start: 2022-03-20 | End: 2022-03-20

## 2022-03-20 RX ORDER — NICOTINE POLACRILEX 4 MG
15 LOZENGE BUCCAL PRN
Status: DISCONTINUED | OUTPATIENT
Start: 2022-03-20 | End: 2022-03-26 | Stop reason: HOSPADM

## 2022-03-20 RX ORDER — SODIUM CHLORIDE 9 MG/ML
INJECTION, SOLUTION INTRAVENOUS CONTINUOUS
Status: DISCONTINUED | OUTPATIENT
Start: 2022-03-20 | End: 2022-03-23

## 2022-03-20 RX ORDER — ALBUTEROL SULFATE 90 UG/1
2 AEROSOL, METERED RESPIRATORY (INHALATION) EVERY 4 HOURS PRN
Status: DISCONTINUED | OUTPATIENT
Start: 2022-03-20 | End: 2022-03-26 | Stop reason: HOSPADM

## 2022-03-20 RX ORDER — DEXTROSE MONOHYDRATE 50 MG/ML
100 INJECTION, SOLUTION INTRAVENOUS PRN
Status: DISCONTINUED | OUTPATIENT
Start: 2022-03-20 | End: 2022-03-26 | Stop reason: HOSPADM

## 2022-03-20 RX ORDER — LANOLIN ALCOHOL/MO/W.PET/CERES
10 CREAM (GRAM) TOPICAL NIGHTLY
Status: DISCONTINUED | OUTPATIENT
Start: 2022-03-20 | End: 2022-03-26 | Stop reason: HOSPADM

## 2022-03-20 RX ORDER — IPRATROPIUM BROMIDE AND ALBUTEROL SULFATE 2.5; .5 MG/3ML; MG/3ML
1 SOLUTION RESPIRATORY (INHALATION) EVERY 4 HOURS PRN
Status: DISCONTINUED | OUTPATIENT
Start: 2022-03-20 | End: 2022-03-20

## 2022-03-20 RX ORDER — GLIPIZIDE 5 MG/1
2.5 TABLET ORAL
Status: DISCONTINUED | OUTPATIENT
Start: 2022-03-21 | End: 2022-03-26 | Stop reason: HOSPADM

## 2022-03-20 RX ADMIN — METOPROLOL TARTRATE 25 MG: 25 TABLET, FILM COATED ORAL at 21:02

## 2022-03-20 RX ADMIN — METFORMIN HYDROCHLORIDE 500 MG: 500 TABLET ORAL at 23:28

## 2022-03-20 RX ADMIN — Medication 10.5 MG: at 23:29

## 2022-03-20 RX ADMIN — ARIPIPRAZOLE 5 MG: 5 TABLET ORAL at 08:07

## 2022-03-20 RX ADMIN — SODIUM CHLORIDE: 9 INJECTION, SOLUTION INTRAVENOUS at 06:35

## 2022-03-20 RX ADMIN — VENLAFAXINE HYDROCHLORIDE 37.5 MG: 37.5 CAPSULE, EXTENDED RELEASE ORAL at 08:07

## 2022-03-20 RX ADMIN — POLYMYXIN B SULFATE, BACITRACIN ZINC, NEOMYCIN SULFATE: 5000; 3.5; 4 OINTMENT TOPICAL at 21:04

## 2022-03-20 RX ADMIN — OXYCODONE AND ACETAMINOPHEN 1 TABLET: 325; 10 TABLET ORAL at 14:47

## 2022-03-20 RX ADMIN — VENLAFAXINE HYDROCHLORIDE 37.5 MG: 37.5 CAPSULE, EXTENDED RELEASE ORAL at 23:27

## 2022-03-20 RX ADMIN — CLONAZEPAM 1 MG: 0.5 TABLET ORAL at 08:17

## 2022-03-20 RX ADMIN — PANTOPRAZOLE SODIUM 20 MG: 20 TABLET, DELAYED RELEASE ORAL at 06:33

## 2022-03-20 RX ADMIN — OXYCODONE AND ACETAMINOPHEN 1 TABLET: 325; 10 TABLET ORAL at 10:33

## 2022-03-20 RX ADMIN — OXYCODONE AND ACETAMINOPHEN 1 TABLET: 325; 10 TABLET ORAL at 06:33

## 2022-03-20 RX ADMIN — CLONAZEPAM 1 MG: 0.5 TABLET ORAL at 21:31

## 2022-03-20 RX ADMIN — POLYMYXIN B SULFATE, BACITRACIN ZINC, NEOMYCIN SULFATE: 5000; 3.5; 4 OINTMENT TOPICAL at 08:07

## 2022-03-20 RX ADMIN — METOPROLOL TARTRATE 25 MG: 25 TABLET, FILM COATED ORAL at 08:07

## 2022-03-20 RX ADMIN — TRAZODONE HYDROCHLORIDE 300 MG: 100 TABLET ORAL at 23:28

## 2022-03-20 RX ADMIN — GABAPENTIN 800 MG: 400 CAPSULE ORAL at 21:00

## 2022-03-20 RX ADMIN — OXYCODONE AND ACETAMINOPHEN 1 TABLET: 325; 10 TABLET ORAL at 20:56

## 2022-03-20 RX ADMIN — SODIUM CHLORIDE: 9 INJECTION, SOLUTION INTRAVENOUS at 18:14

## 2022-03-20 RX ADMIN — ASPIRIN 81 MG: 81 TABLET, COATED ORAL at 08:07

## 2022-03-20 ASSESSMENT — PAIN DESCRIPTION - PAIN TYPE
TYPE: CHRONIC PAIN

## 2022-03-20 ASSESSMENT — PAIN DESCRIPTION - FREQUENCY
FREQUENCY: CONTINUOUS

## 2022-03-20 ASSESSMENT — PAIN SCALES - GENERAL
PAINLEVEL_OUTOF10: 8
PAINLEVEL_OUTOF10: 10

## 2022-03-20 ASSESSMENT — PAIN DESCRIPTION - LOCATION
LOCATION: BACK

## 2022-03-20 ASSESSMENT — PAIN DESCRIPTION - PROGRESSION
CLINICAL_PROGRESSION: NOT CHANGED
CLINICAL_PROGRESSION: GRADUALLY WORSENING
CLINICAL_PROGRESSION: NOT CHANGED
CLINICAL_PROGRESSION: GRADUALLY WORSENING
CLINICAL_PROGRESSION: NOT CHANGED

## 2022-03-20 ASSESSMENT — PAIN DESCRIPTION - DESCRIPTORS
DESCRIPTORS: ACHING;DISCOMFORT

## 2022-03-20 ASSESSMENT — PAIN DESCRIPTION - ONSET
ONSET: ON-GOING

## 2022-03-20 ASSESSMENT — PAIN DESCRIPTION - ORIENTATION
ORIENTATION: UPPER;MID

## 2022-03-20 NOTE — PROGRESS NOTES
Section of Cardiology  Progress Note      Date:  3/20/2022  Patient: Kyle Davis  Admission:  3/18/2022  1:59 PM  Admit DX: Rhabdomyolysis [M62.82]  Dehydration [E86.0]  Traumatic rhabdomyolysis, initial encounter ([facility] [place]. 6XXA]  Age:  71 y. o., 1950     LOS: 2 days     Reason for evaluation:   arrhythmia      SUBJECTIVE:     The patient was seen and examined. Notes and labs reviewed. There were not complications over night. Patient seen and examined in her room with her nurse at bedside. No further dizziness. No falls. Patient laying in bed appeared to be comfortable. Patient's cardiac review of systems: negative for chest pain and dyspnea. The patient is generally feeling stable. OBJECTIVE:    Telemetry: Sinus  BP (!) 152/95   Pulse 111   Temp 98.4 °F (36.9 °C) (Oral)   Resp 18   Ht 5' 8\" (1.727 m)   Wt 159 lb 12.8 oz (72.5 kg)   SpO2 99%   BMI 24.30 kg/m²     Intake/Output Summary (Last 24 hours) at 3/20/2022 1104  Last data filed at 3/20/2022 1036  Gross per 24 hour   Intake 1452.01 ml   Output 4950 ml   Net -3497.99 ml       EXAM:   CONSTITUTIONAL:  awake, alert, cooperative, no apparent distress, and appears stated age. HEENT: Normal jugular venous pulsations, no carotid bruits. Head is atraumatic, normocephalic. Eyes and oral mucosa are normal.  LUNGS: Good respiratory effort. No for increased work of breathing. On auscultation: clear to auscultation bilaterally  CARDIOVASCULAR:  Normal apical impulse, regular rate and rhythm, normal S1 and S2, no S3 or S4,   ABDOMEN: Soft, nontender, nondistended. Bowel sounds present. SKIN: Warm and dry. EXTREMITIES: No lower extremity edema. Motor movement grossly intact. No cyanosis or clubbing.     Current Inpatient Medications:   neomycin-bacitracin-polymyxin   Topical BID    ARIPiprazole  5 mg Oral Daily    aspirin EC  81 mg Oral Daily    pantoprazole  20 mg Oral QAM AC    melatonin  9 mg Oral Nightly    metoprolol tartrate 25 mg Oral BID    budesonide-formoterol  2 puff Inhalation BID    venlafaxine  37.5 mg Oral BID    sodium chloride flush  5-40 mL IntraVENous 2 times per day    heparin (porcine)  5,000 Units SubCUTAneous 3 times per day       IV Infusions (if any):   sodium chloride 125 mL/hr at 03/20/22 0635    sodium chloride         Diagnostics:   EKG: . ECHO: Previous echo reviewed with moderate aortic stenosis  Ejection fraction: %  Stress Test: not obtained. Cardiac Angiography: reviewed from 2016. Labs:   CBC:   Recent Labs     03/18/22  1555 03/20/22  0623   WBC 10.1 7.3   HGB 12.2 10.7*   HCT 39.1 33.9*    251     BMP:   Recent Labs     03/19/22  0406 03/20/22  0623    136   K 3.5* 3.2*   CO2 28 27   BUN 16 8   CREATININE 0.65 0.60   LABGLOM >60 >60   GLUCOSE 162* 166*     Lab Results   Component Value Date    BNP 48 03/13/2014     PT/INR:   Recent Labs     03/19/22  0406   PROTIME 13.8   INR 1.1     APTT:No results for input(s): APTT in the last 72 hours. CARDIAC ENZYMES:  Recent Labs     03/18/22  1555 03/19/22  0406 03/20/22  0623   CKTOTAL 5,990* 3,007* 2,294*     FASTING LIPID PANEL:No results found for: HDL, LDLDIRECT, LDLCALC, TRIG  LIVER PROFILE:  Recent Labs     03/18/22  1555 03/19/22  0406   * 101*   ALT 45* 35*   LABALBU 3.9 3.2*       ASSESSMENT:  1. Sinus tachycardia, secondary to other problem  2. ABCs, isolated  3. Dyslipidemia  4. Rhabdomyolysis, improving  5. Diabetes mellitus  6. History of lung cancer  7. Remote history of traumatic subarachnoid bleed  8.  moderate aortic stenosis  9. History of lung cancer  10.   Moderate CAD from 2016 involving the circumflex coronary artery    Patient Active Problem List   Diagnosis    Valvular heart disease    Type 2 diabetes mellitus without complication, without long-term current use of insulin ([facility] [address]    Open wound of right ankle    COPD (chronic obstructive pulmonary disease) (HCC)    Syncope and collapse    Chronic ulcer of right heel ([facility] [address]    Multifocal pneumonia    Aortic valve stenosis    Esophageal dilatation    Aspiration pneumonia of both lower lobes due to gastric secretions (HCC)    Falls frequently    Chronic respiratory failure (HCC)    Contusion of right knee    Closed fracture of right distal radius    Subdural hemorrhage (HCC)    Subarachnoid hemorrhage (HCC)    Traumatic hemorrhage of cerebrum (HCC)    Chronic bilateral low back pain    Cellulitis of both feet    Acute on chronic congestive heart failure (HCC)    Bilateral leg edema    Cellulitis    Lung mass    Malignant neoplasm of upper lobe of right lung (HCC)    Urinary tract infectious disease    Closed fracture of one rib of right side    Degenerative disc disease, lumbar    Moderate malnutrition (HCC)    Rhabdomyolysis       PLAN:    1. Patient's heart rate is settling with improvement in the fluid status. Her CPK is improving  2. Replace potassium      Please see orders. Discussed with patient and nursing.     Olivier Harrington MD, MD

## 2022-03-20 NOTE — PLAN OF CARE
Problem: Skin Integrity:  Goal: Will show no infection signs and symptoms  Description: Will show no infection signs and symptoms  3/20/2022 0047 by Miri Dale RN  Outcome: Ongoing     Problem: Skin Integrity:  Goal: Absence of new skin breakdown  Description: Absence of new skin breakdown  3/20/2022 0047 by Miri Dale RN  Outcome: Ongoing     Problem: Falls - Risk of:  Goal: Will remain free from falls  Description: Will remain free from falls  3/20/2022 0047 by Miri Dale RN  Outcome: Ongoing  Note: Siderails up x 2  Hourly rounding  Call light in reach  Instructed to call for assist before attempting out of bed.   Remains free from falls and accidental injury at this time   Floor free from obstacles  Bed is locked and in lowest position  Adequate lighting provided  Bed alarm on, Red Falling star and Stay with Me signs posted         Problem: Falls - Risk of:  Goal: Absence of physical injury  Description: Absence of physical injury  3/20/2022 0047 by Miri Dale RN  Outcome: Ongoing     Problem: Pain:  Goal: Pain level will decrease  Description: Pain level will decrease  3/20/2022 0047 by Miri Dale RN  Outcome: Ongoing     Problem: Pain:  Goal: Control of acute pain  Description: Control of acute pain  Outcome: Ongoing

## 2022-03-20 NOTE — PLAN OF CARE
Problem: Skin Integrity:  Goal: Will show no infection signs and symptoms  Description: Will show no infection signs and symptoms  3/20/2022 1329 by Bisi Terrell RN  Outcome: Ongoing  3/20/2022 0047 by Yobani Cote RN  Outcome: Ongoing  Goal: Absence of new skin breakdown  Description: Absence of new skin breakdown  3/20/2022 1329 by Bisi Terrell RN  Outcome: Ongoing  3/20/2022 0047 by Yobani Cote RN  Outcome: Ongoing  Goal: Risk for impaired skin integrity will decrease  Description: Risk for impaired skin integrity will decrease  Outcome: Ongoing     Problem: Falls - Risk of:  Goal: Will remain free from falls  Description: Will remain free from falls  3/20/2022 1329 by Bisi Terrell RN  Outcome: Ongoing  3/20/2022 0047 by Yobani Cote RN  Outcome: Ongoing  Note: Siderails up x 2  Hourly rounding  Call light in reach  Instructed to call for assist before attempting out of bed. Remains free from falls and accidental injury at this time   Floor free from obstacles  Bed is locked and in lowest position  Adequate lighting provided  Bed alarm on, Red Falling star and Stay with Me signs posted      Goal: Absence of physical injury  Description: Absence of physical injury  3/20/2022 1329 by Bisi Terrell RN  Outcome: Ongoing  3/20/2022 0047 by Yobani Cote RN  Outcome: Ongoing     Problem: Pain:  Goal: Pain level will decrease  Description: Pain level will decrease  3/20/2022 1329 by Bisi Terrell RN  Outcome: Ongoing  3/20/2022 0047 by Yobani Cote RN  Outcome: Ongoing  3/20/2022 0047 by Yobani Cote RN  Note: Pain level assessment complete.    Patient educated on pain scale and control interventions  PRN pain medication given per patient request  Patient instructed to call out with new onset of pain or unrelieved pain    Goal: Control of acute pain  Description: Control of acute pain  3/20/2022 1329 by Bisi Terrell RN  Outcome: Ongoing  3/20/2022 0047 by Yobani Cote RN  Outcome: Ongoing  Goal: Control of chronic pain  Description: Control of chronic pain  3/20/2022 1329 by Kimmy Monzon RN  Outcome: Ongoing  3/20/2022 0047 by Guido Tsang, RN  Outcome: Ongoing     Problem: Discharge Planning:  Goal: Discharged to appropriate level of care  Description: Discharged to appropriate level of care  Outcome: Ongoing     Problem: Activity Intolerance:  Goal: Ability to tolerate increased activity will improve  Description: Ability to tolerate increased activity will improve  Outcome: Ongoing     Problem: Airway Clearance - Ineffective:  Goal: Ability to maintain a clear airway will improve  Description: Ability to maintain a clear airway will improve  Outcome: Ongoing     Problem: Breathing Pattern - Ineffective:  Goal: Ability to achieve and maintain a regular respiratory rate will improve  Description: Ability to achieve and maintain a regular respiratory rate will improve  Outcome: Ongoing     Problem: Gas Exchange - Impaired:  Goal: Levels of oxygenation will improve  Description: Levels of oxygenation will improve  Outcome: Ongoing     Problem:  Activity:  Goal: Risk for activity intolerance will decrease  Description: Risk for activity intolerance will decrease  Outcome: Ongoing     Problem: Coping:  Goal: Ability to adjust to condition or change in health will improve  Description: Ability to adjust to condition or change in health will improve  Outcome: Ongoing     Problem: Fluid Volume:  Goal: Ability to maintain a balanced intake and output will improve  Description: Ability to maintain a balanced intake and output will improve  Outcome: Ongoing     Problem: Health Behavior:  Goal: Ability to identify and utilize available resources and services will improve  Description: Ability to identify and utilize available resources and services will improve  Outcome: Ongoing  Goal: Ability to manage health-related needs will improve  Description: Ability to manage health-related needs will improve  Outcome: Ongoing     Problem: Metabolic:  Goal: Ability to maintain appropriate glucose levels will improve  Description: Ability to maintain appropriate glucose levels will improve  Outcome: Ongoing     Problem: Nutritional:  Goal: Maintenance of adequate nutrition will improve  Description: Maintenance of adequate nutrition will improve  Outcome: Ongoing  Goal: Progress toward achieving an optimal weight will improve  Description: Progress toward achieving an optimal weight will improve  Outcome: Ongoing     Problem: Physical Regulation:  Goal: Complications related to the disease process, condition or treatment will be avoided or minimized  Description: Complications related to the disease process, condition or treatment will be avoided or minimized  Outcome: Ongoing  Goal: Diagnostic test results will improve  Description: Diagnostic test results will improve  Outcome: Ongoing     Problem: Tissue Perfusion:  Goal: Adequacy of tissue perfusion will improve  Description: Adequacy of tissue perfusion will improve  Outcome: Ongoing

## 2022-03-20 NOTE — PROGRESS NOTES
Patient denied Heparin injection and also denied EPC cuffs. She states she is allergic to them. Patient was educated on increased risk of blood clots d/t being in bed and not moving. Patient denied to get out of bed and get into chair or walk in the room. Nurse encouraged patient to get OOB. Patient denied.

## 2022-03-20 NOTE — FLOWSHEET NOTE
[facility]  PROGRESS NOTE    Room # 2014/2014-01   Name: Jeanna Sexton              Reason for visit: Routine    I visited the patient. Admit Date & Time: 3/18/2022  1:59 PM    Assessment:  Jeanna Sexton is a 71 y.o. female. Upon entering the room patient was sleeping. Intervention:   provided a ministry presence and brief prayer. Outcome:  Patient did not respond. Plan:  Chaplains will remain available to offer spiritual and emotional support as needed. Electronically signed by Chaplain Mally, on 3/20/2022 at 5:57 PM.  Meena       03/20/22 0146   Encounter Summary   Services provided to: Patient   Referral/Consult From: Rounding   Continue Visiting   (3-20-22 PT, sleeping)   Complexity of Encounter Low   Length of Encounter 15 minutes   Routine   Type Initial   Assessment Sleeping   Intervention Prayer

## 2022-03-21 LAB
-: ABNORMAL
ABSOLUTE EOS #: 0.08 K/UL (ref 0–0.44)
ABSOLUTE IMMATURE GRANULOCYTE: 0.11 K/UL (ref 0–0.3)
ABSOLUTE LYMPH #: 1.11 K/UL (ref 1.1–3.7)
ABSOLUTE MONO #: 0.45 K/UL (ref 0.1–1.2)
ANION GAP SERPL CALCULATED.3IONS-SCNC: 11 MMOL/L (ref 9–17)
BACTERIA: ABNORMAL
BASOPHILS # BLD: 1 % (ref 0–2)
BASOPHILS ABSOLUTE: 0.06 K/UL (ref 0–0.2)
BILIRUBIN URINE: NEGATIVE
BUN BLDV-MCNC: 10 MG/DL (ref 8–23)
BUN/CREAT BLD: 13 (ref 9–20)
CALCIUM SERPL-MCNC: 9 MG/DL (ref 8.6–10.4)
CHLORIDE BLD-SCNC: 101 MMOL/L (ref 98–107)
CO2: 29 MMOL/L (ref 20–31)
COLOR: YELLOW
CREAT SERPL-MCNC: 0.75 MG/DL (ref 0.5–0.9)
EKG ATRIAL RATE: 106 BPM
EKG P AXIS: 72 DEGREES
EKG P-R INTERVAL: 156 MS
EKG Q-T INTERVAL: 396 MS
EKG QRS DURATION: 164 MS
EKG QTC CALCULATION (BAZETT): 526 MS
EKG R AXIS: -53 DEGREES
EKG T AXIS: 32 DEGREES
EKG VENTRICULAR RATE: 106 BPM
EOSINOPHILS RELATIVE PERCENT: 1 % (ref 1–4)
EPITHELIAL CELLS UA: ABNORMAL /HPF (ref 0–5)
GFR AFRICAN AMERICAN: >60 ML/MIN
GFR NON-AFRICAN AMERICAN: >60 ML/MIN
GFR SERPL CREATININE-BSD FRML MDRD: ABNORMAL ML/MIN/{1.73_M2}
GLUCOSE BLD-MCNC: 136 MG/DL (ref 65–105)
GLUCOSE BLD-MCNC: 141 MG/DL (ref 65–105)
GLUCOSE BLD-MCNC: 145 MG/DL (ref 65–105)
GLUCOSE BLD-MCNC: 149 MG/DL (ref 65–105)
GLUCOSE BLD-MCNC: 149 MG/DL (ref 70–99)
GLUCOSE BLD-MCNC: 188 MG/DL (ref 65–105)
GLUCOSE URINE: NEGATIVE
HCT VFR BLD CALC: 35.4 % (ref 36.3–47.1)
HEMOGLOBIN: 11.1 G/DL (ref 11.9–15.1)
IMMATURE GRANULOCYTES: 2 %
KETONES, URINE: NEGATIVE
LEUKOCYTE ESTERASE, URINE: ABNORMAL
LYMPHOCYTES # BLD: 15 % (ref 24–43)
MAGNESIUM: 1.6 MG/DL (ref 1.6–2.6)
MCH RBC QN AUTO: 27.3 PG (ref 25.2–33.5)
MCHC RBC AUTO-ENTMCNC: 31.4 G/DL (ref 28.4–34.8)
MCV RBC AUTO: 87 FL (ref 82.6–102.9)
MONOCYTES # BLD: 6 % (ref 3–12)
MYOGLOBIN: 77 NG/ML (ref 25–58)
NITRITE, URINE: POSITIVE
NRBC AUTOMATED: 0 PER 100 WBC
PDW BLD-RTO: 13.5 % (ref 11.8–14.4)
PH UA: 6 (ref 5–8)
PLATELET # BLD: 251 K/UL (ref 138–453)
PMV BLD AUTO: 8.7 FL (ref 8.1–13.5)
POTASSIUM SERPL-SCNC: 3.3 MMOL/L (ref 3.7–5.3)
POTASSIUM SERPL-SCNC: 4.4 MMOL/L (ref 3.7–5.3)
PROTEIN UA: NEGATIVE
RBC # BLD: 4.07 M/UL (ref 3.95–5.11)
RBC UA: ABNORMAL /HPF (ref 0–2)
SEG NEUTROPHILS: 75 % (ref 36–65)
SEGMENTED NEUTROPHILS ABSOLUTE COUNT: 5.61 K/UL (ref 1.5–8.1)
SODIUM BLD-SCNC: 141 MMOL/L (ref 135–144)
SPECIFIC GRAVITY UA: 1.01 (ref 1–1.03)
TOTAL CK: 731 U/L (ref 26–192)
TURBIDITY: ABNORMAL
URINE HGB: NEGATIVE
UROBILINOGEN, URINE: NORMAL
WBC # BLD: 7.4 K/UL (ref 3.5–11.3)
WBC UA: ABNORMAL /HPF (ref 0–5)

## 2022-03-21 PROCEDURE — 94761 N-INVAS EAR/PLS OXIMETRY MLT: CPT

## 2022-03-21 PROCEDURE — 6370000000 HC RX 637 (ALT 250 FOR IP): Performed by: FAMILY MEDICINE

## 2022-03-21 PROCEDURE — 83735 ASSAY OF MAGNESIUM: CPT

## 2022-03-21 PROCEDURE — 36415 COLL VENOUS BLD VENIPUNCTURE: CPT

## 2022-03-21 PROCEDURE — 84132 ASSAY OF SERUM POTASSIUM: CPT

## 2022-03-21 PROCEDURE — 51702 INSERT TEMP BLADDER CATH: CPT

## 2022-03-21 PROCEDURE — 1200000000 HC SEMI PRIVATE

## 2022-03-21 PROCEDURE — 82947 ASSAY GLUCOSE BLOOD QUANT: CPT

## 2022-03-21 PROCEDURE — 2580000003 HC RX 258: Performed by: HOSPITALIST

## 2022-03-21 PROCEDURE — 83874 ASSAY OF MYOGLOBIN: CPT

## 2022-03-21 PROCEDURE — 82550 ASSAY OF CK (CPK): CPT

## 2022-03-21 PROCEDURE — 6370000000 HC RX 637 (ALT 250 FOR IP): Performed by: INTERNAL MEDICINE

## 2022-03-21 PROCEDURE — 2700000000 HC OXYGEN THERAPY PER DAY

## 2022-03-21 PROCEDURE — 2580000003 HC RX 258: Performed by: FAMILY MEDICINE

## 2022-03-21 PROCEDURE — 6360000002 HC RX W HCPCS: Performed by: HOSPITALIST

## 2022-03-21 PROCEDURE — 80048 BASIC METABOLIC PNL TOTAL CA: CPT

## 2022-03-21 PROCEDURE — 85025 COMPLETE CBC W/AUTO DIFF WBC: CPT

## 2022-03-21 PROCEDURE — 6370000000 HC RX 637 (ALT 250 FOR IP): Performed by: HOSPITALIST

## 2022-03-21 RX ORDER — METOPROLOL TARTRATE 50 MG/1
50 TABLET, FILM COATED ORAL 2 TIMES DAILY
Status: DISCONTINUED | OUTPATIENT
Start: 2022-03-21 | End: 2022-03-26 | Stop reason: HOSPADM

## 2022-03-21 RX ORDER — POTASSIUM CHLORIDE 7.45 MG/ML
10 INJECTION INTRAVENOUS PRN
Status: DISCONTINUED | OUTPATIENT
Start: 2022-03-21 | End: 2022-03-26 | Stop reason: HOSPADM

## 2022-03-21 RX ORDER — TAMSULOSIN HYDROCHLORIDE 0.4 MG/1
0.4 CAPSULE ORAL DAILY
Status: DISCONTINUED | OUTPATIENT
Start: 2022-03-21 | End: 2022-03-26 | Stop reason: HOSPADM

## 2022-03-21 RX ORDER — POTASSIUM CHLORIDE 20 MEQ/1
40 TABLET, EXTENDED RELEASE ORAL PRN
Status: DISCONTINUED | OUTPATIENT
Start: 2022-03-21 | End: 2022-03-26 | Stop reason: HOSPADM

## 2022-03-21 RX ADMIN — PIPERACILLIN AND TAZOBACTAM 3375 MG: 3; .375 INJECTION, POWDER, LYOPHILIZED, FOR SOLUTION INTRAVENOUS at 18:35

## 2022-03-21 RX ADMIN — TAMSULOSIN HYDROCHLORIDE 0.4 MG: 0.4 CAPSULE ORAL at 16:55

## 2022-03-21 RX ADMIN — METFORMIN HYDROCHLORIDE 500 MG: 500 TABLET ORAL at 16:55

## 2022-03-21 RX ADMIN — VENLAFAXINE HYDROCHLORIDE 37.5 MG: 37.5 CAPSULE, EXTENDED RELEASE ORAL at 09:16

## 2022-03-21 RX ADMIN — SODIUM CHLORIDE 25 ML: 9 INJECTION, SOLUTION INTRAVENOUS at 18:34

## 2022-03-21 RX ADMIN — ASPIRIN 81 MG: 81 TABLET, COATED ORAL at 09:10

## 2022-03-21 RX ADMIN — OXYCODONE AND ACETAMINOPHEN 1 TABLET: 325; 10 TABLET ORAL at 19:20

## 2022-03-21 RX ADMIN — POLYMYXIN B SULFATE, BACITRACIN ZINC, NEOMYCIN SULFATE: 5000; 3.5; 4 OINTMENT TOPICAL at 23:12

## 2022-03-21 RX ADMIN — GABAPENTIN 800 MG: 400 CAPSULE ORAL at 09:09

## 2022-03-21 RX ADMIN — CLONAZEPAM 1 MG: 0.5 TABLET ORAL at 11:29

## 2022-03-21 RX ADMIN — OXYCODONE AND ACETAMINOPHEN 1 TABLET: 325; 10 TABLET ORAL at 10:38

## 2022-03-21 RX ADMIN — GABAPENTIN 800 MG: 400 CAPSULE ORAL at 23:12

## 2022-03-21 RX ADMIN — METOPROLOL TARTRATE 25 MG: 25 TABLET, FILM COATED ORAL at 12:48

## 2022-03-21 RX ADMIN — BUDESONIDE AND FORMOTEROL FUMARATE DIHYDRATE 2 PUFF: 160; 4.5 AEROSOL RESPIRATORY (INHALATION) at 10:19

## 2022-03-21 RX ADMIN — Medication 10.5 MG: at 23:11

## 2022-03-21 RX ADMIN — ALBUTEROL SULFATE 2 PUFF: 90 AEROSOL, METERED RESPIRATORY (INHALATION) at 10:19

## 2022-03-21 RX ADMIN — VENLAFAXINE HYDROCHLORIDE 37.5 MG: 37.5 CAPSULE, EXTENDED RELEASE ORAL at 23:13

## 2022-03-21 RX ADMIN — GLIPIZIDE 2.5 MG: 5 TABLET ORAL at 06:39

## 2022-03-21 RX ADMIN — POLYMYXIN B SULFATE, BACITRACIN ZINC, NEOMYCIN SULFATE: 5000; 3.5; 4 OINTMENT TOPICAL at 09:16

## 2022-03-21 RX ADMIN — OXYCODONE AND ACETAMINOPHEN 1 TABLET: 325; 10 TABLET ORAL at 14:50

## 2022-03-21 RX ADMIN — ARIPIPRAZOLE 5 MG: 5 TABLET ORAL at 09:16

## 2022-03-21 RX ADMIN — PANTOPRAZOLE SODIUM 20 MG: 20 TABLET, DELAYED RELEASE ORAL at 06:39

## 2022-03-21 RX ADMIN — METFORMIN HYDROCHLORIDE 500 MG: 500 TABLET ORAL at 09:20

## 2022-03-21 RX ADMIN — METOPROLOL TARTRATE 25 MG: 25 TABLET, FILM COATED ORAL at 09:10

## 2022-03-21 RX ADMIN — CLONAZEPAM 1 MG: 0.5 TABLET ORAL at 23:11

## 2022-03-21 RX ADMIN — METOPROLOL TARTRATE 50 MG: 50 TABLET, FILM COATED ORAL at 23:13

## 2022-03-21 RX ADMIN — SODIUM CHLORIDE: 9 INJECTION, SOLUTION INTRAVENOUS at 11:46

## 2022-03-21 RX ADMIN — TRAZODONE HYDROCHLORIDE 300 MG: 100 TABLET ORAL at 23:12

## 2022-03-21 RX ADMIN — POTASSIUM CHLORIDE 40 MEQ: 20 TABLET, EXTENDED RELEASE ORAL at 09:42

## 2022-03-21 ASSESSMENT — PAIN SCALES - GENERAL
PAINLEVEL_OUTOF10: 10
PAINLEVEL_OUTOF10: 8
PAINLEVEL_OUTOF10: 10

## 2022-03-21 NOTE — PROGRESS NOTES
Section of Cardiology  Progress Note      Date:  3/21/2022  Patient: Vanesa Clarke  Admission:  3/18/2022  1:59 PM  Admit DX: Rhabdomyolysis [M62.82]  Dehydration [E86.0]  Traumatic rhabdomyolysis, initial encounter ([facility] Kaylin Banuelos. 6XXA]  Age:  71 y. o., 1950     LOS: 3 days     Reason for evaluation:   ischemic heart disease and arrhythmia      SUBJECTIVE:     The patient was seen and examined. Notes and labs reviewed. There were not complications over night. Patient seen and examined in her room with her nurse at bedside. Patient appeared to be slightly dyspneic during conversation however she is stable from oxygen perspective and she is on 3 L nasal cannula. According to her nurse she has good urine output. Heart rate is in the low 100 but remained above expected level. Patient's cardiac review of systems: negative for chest pain, dyspnea and irregular heart beat. The patient is generally feeling stable. OBJECTIVE:    Telemetry: Sinus  BP (!) 152/88   Pulse 112   Temp 98.4 °F (36.9 °C) (Oral)   Resp 16   Ht 5' 8\" (1.727 m)   Wt 158 lb (71.7 kg)   SpO2 99%   BMI 24.02 kg/m²     Intake/Output Summary (Last 24 hours) at 3/21/2022 0900  Last data filed at 3/21/2022 0505  Gross per 24 hour   Intake 3383.36 ml   Output 2750 ml   Net 633.36 ml       EXAM:   CONSTITUTIONAL:  awake, alert, cooperative, no apparent distress, and appears stated age. HEENT: Normal jugular venous pulsations, no carotid bruits. Head is traumatic, normocephalic. Eyes and oral mucosa are normal.  LUNGS: Good respiratory effort. No for increased work of breathing. On auscultation: Diminished breath sounds in the bases  CARDIOVASCULAR:  Normal apical impulse, regular rate and rhythm, normal S1 and S2, no S3 or S4,   ABDOMEN: Soft, nontender, nondistended. Bowel sounds present. SKIN: Warm and dry. EXTREMITIES: No lower extremity edema. Motor movement grossly intact. No cyanosis or clubbing.     Current Inpatient from 2016 involving the circumflex coronary artery    Patient Active Problem List   Diagnosis    Valvular heart disease    Type 2 diabetes mellitus without complication, without long-term current use of insulin ([address]    Open wound of right ankle    COPD (chronic obstructive pulmonary disease) (HCC)    Syncope and collapse    Chronic ulcer of right heel ([address]    Multifocal pneumonia    Aortic valve stenosis    Esophageal dilatation    Aspiration pneumonia of both lower lobes due to gastric secretions ([address]    Falls frequently    Chronic respiratory failure (HCC)    Contusion of right knee    Closed fracture of right distal radius    Subdural hemorrhage (HCC)    Subarachnoid hemorrhage (HCC)    Traumatic hemorrhage of cerebrum (HCC)    Chronic bilateral low back pain    Cellulitis of both feet    Acute on chronic congestive heart failure (HCC)    Bilateral leg edema    Cellulitis    Lung mass    Malignant neoplasm of upper lobe of right lung (HCC)    Urinary tract infectious disease    Closed fracture of one rib of right side    Degenerative disc disease, lumbar    Moderate malnutrition (HCC)    Rhabdomyolysis       PLAN:    1. I am increasing her Lopressor to 50 mg twice daily for hypertension and heart rate control. 2. Asked her nurse to monitor her lungs status with frequent listening to her lungs. Please see orders. Discussed with patient and nursing.     Noel Pryor MD, MD

## 2022-03-21 NOTE — RT PROTOCOL NOTE
RT Inhaler-Nebulizer Bronchodilator Protocol Note    There is a bronchodilator order in the chart from a provider indicating to follow the RT Bronchodilator Protocol and there is an Initiate RT Inhaler-Nebulizer Bronchodilator Protocol order as well (see protocol at bottom of note). CXR Findings:  No results found. The findings from the last RT Protocol Assessment were as follows:   History Pulmonary Disease: Chronic pulmonary disease  Respiratory Pattern: Dyspnea on exertion or RR 21-25 bpm  Breath Sounds: Clear breath sounds  Cough: Strong, spontaneous, non-productive  Indication for Bronchodilator Therapy: Decreased or absent breath sounds  Bronchodilator Assessment Score: 4    Aerosolized bronchodilator medication orders have been revised according to the RT Inhaler-Nebulizer Bronchodilator Protocol below. Respiratory Therapist to perform RT Therapy Protocol Assessment initially then follow the protocol. Repeat RT Therapy Protocol Assessment PRN for score 0-3 or on second treatment, BID, and PRN for scores above 3. No Indications - adjust the frequency to every 6 hours PRN wheezing or bronchospasm, if no treatments needed after 48 hours then discontinue using Per Protocol order mode. If indication present, adjust the RT bronchodilator orders based on the Bronchodilator Assessment Score as indicated below. Use Inhaler orders unless patient has one or more of the following: on home nebulizer, not able to hold breath for 10 seconds, is not alert and oriented, cannot activate and use MDI correctly, or respiratory rate 25 breaths per minute or more, then use the equivalent nebulizer order(s) with same Frequency and PRN reasons based on the score. If a patient is on this medication at home then do not decrease Frequency below that used at home.     0-3 - enter or revise RT bronchodilator order(s) to equivalent RT Bronchodilator order with Frequency of every 4 hours PRN for wheezing or increased work of breathing using Per Protocol order mode. 4-6 - enter or revise RT Bronchodilator order(s) to two equivalent RT bronchodilator orders with one order with BID Frequency and one order with Frequency of every 4 hours PRN wheezing or increased work of breathing using Per Protocol order mode. 7-10 - enter or revise RT Bronchodilator order(s) to two equivalent RT bronchodilator orders with one order with TID Frequency and one order with Frequency of every 4 hours PRN wheezing or increased work of breathing using Per Protocol order mode. 11-13 - enter or revise RT Bronchodilator order(s) to one equivalent RT bronchodilator order with QID Frequency and an Albuterol order with Frequency of every 4 hours PRN wheezing or increased work of breathing using Per Protocol order mode. Greater than 13 - enter or revise RT Bronchodilator order(s) to one equivalent RT bronchodilator order with every 4 hours Frequency and an Albuterol order with Frequency of every 2 hours PRN wheezing or increased work of breathing using Per Protocol order mode. RT to enter RT Home Evaluation for COPD & MDI Assessment order using Per Protocol order mode.     Electronically signed by Cathi Lima RCP on 3/20/2022 at 9:06 PM

## 2022-03-21 NOTE — PROGRESS NOTES
Physical Therapy  DATE: 3/21/2022    NAME: Bobby Campos  MRN: [id number]   : 1950    Patient not seen this date for Physical Therapy due to:      [] Cancel by RN or physician due to:    [] Hemodialysis    [] Critical Lab Value Level     [] Blood transfusion in progress    [] Acute or unstable cardiovascular status   _MAP < 55 or more than >115  _HR < 40 or > 130    [] Acute or unstable pulmonary status   -FiO2 > 60%   _RR < 5 or >40    _O2 sats < 85%    [] Strict Bedrest    [] Off Unit for surgery or procedure    [] Off Unit for testing       [] Pending imaging to R/O fracture    [x] [employer] Refusal by Patient, even after max edu and encouragment and told writer to [employer] OUT OF MY ROOM! Leave my room now, I'm 71 and know what I need! \"; RN was informed of refusal.      [] Other      [] PT being discontinued at this time. Patient independent. No further needs. [] PT being discontinued at this time as the patient has been transferred to hospice care. No further needs.       [address], PT

## 2022-03-21 NOTE — PROGRESS NOTES
[facility]    Adult [facility]ist      Name: Samson Vang  MRN: [id number]     Acct: [de-identified]  Room: 2014/2014-01    Admit Date: 3/18/2022  1:59 PM  PCP: Blake Allan MD    Primary Problem  Principal Problem:    Rhabdomyolysis  Resolved Problems:    * No resolved hospital problems. *        Assesment:     · Acute rhabdomyolysis-improving  · Essential hypertension  · Diabetes mellitus type 2  · Diabetic neuropathy  · Mixed hyperlipidemia  · Chronic pain syndrome  · Gastroesophageal reflux disease without esophagitis  · Major depressive disorder/bipolar disorder  · Chronic obstructive pulmonary disease, unspecified  · Lung mass  · Left face abrasion  · Hypertensive urgency  · Tachycardia-improved  · Dyspnea ? cause        Plan:     · Admit to progressive  · Monitor vitals closely  · Keep SPO2 above 90%  · I's and O's  · IV fluids at 150 mL/h  · Pain control  · Reduce pain meds secondary to lethargy and question of renal impairment  · Antiemetics as needed  · Hold Crestor, Lasix  · Resume aspirin, Prevacid, Lopressor  · Resume Effexor, Abilify, Klonopin, melatonin  · Adjust dose  · Resume Dulera  · Resume Amaryl, trazodone, Neurontin  · DuoNeb  · RT eval  · CBC, BMP  · CK, myoglobin  · Urinalysis with culture sensitivity-if UTI will treat with antibiotic  · Consult cardiology  · Increased dose of Percocet  · Bacitracin top  · Lopressor IV as needed  · Advance diet  · Reduce IV fluids, start aerosol treatments as needed  · Chest x-ray  · DVT and GI prophylaxis.         Chief Complaint:     Chief Complaint   Patient presents with    Fatigue    Chest Pain    Cough    Back Pain         History of Present Illness:        Patient seen and examined at bedside  Last 24-hour events reviewed with nursing  Patient says she feels much better now  Pain multiple joints improved  Denies fever or chills  There is no visible new rash  Patient has been more alert  Tachycardia improved  Complains of dyspnea  However patient was tachypneic in the emergency room also  Patient says she does not remember that  Anion gap and bicarb are well compensated  IV fluids reduced further  We will check chest x-ray  Start aerosol treatments    Patient denies any headache, photophobia or diplopia  Denies dyspnea, orthopnea  Denies nausea, vomiting, diarrhea   Denies joint swelling      Initial HPI  Michael Thornton is a 71 y.o.  female who presents with Fatigue, Chest Pain, Cough, and Back Pain    Patient admitted through the emergency room where she presented with generalized weakness and malaise. Patient said this has been progressively getting worse over the last 5 to 6 days. Patient walks with the help of a walker and says at times she would slide down and then not be able to get up from the ground. She says categorically that she did not have a fall. Patient says she felt she did not have significant strength to get up. Apparently this is happened in the past and she has been helped by her home care nurse. Today however she was visited by a friend who found her laying down possibly for several hours. EMTs were called in who brought the patient to the emergency room. Patient still complains of lethargy and pain over several parts of her body. She denies any chest pain though she has had some dyspnea and tachycardia. Patient complains of some increased frequency of urination but denies any fever or chills. She had some lower abdominal pain and cramping in addition. Patient denies any headache, photophobia or neck pain. Denies any nausea, vomiting or diarrhea. Patient has a large blister over the left side of her face. She says it was one of the masks. Later she added to the help of nursing that when EMTs were trying to assist her one of their apparatus-left side of her face.   It is difficult to determine that since there are inflammatory changes around the blister suggesting this might be older than a day.    I have personally reviewed the past medical history, past surgical history, medications, social history, and family history, and summarized in the note. Review of Systems:     All 10 point system is reviewed and negative otherwise mentioned in HPI. Past Medical History:     Past Medical History:   Diagnosis Date    AAA (abdominal aortic aneurysm) ([facility]     Pt denies having a history of AAA    Acid reflux     Anxiety and depression     Aortic stenosis     Arthritis     Blister of ankle, right 10/13/2016    blister broke open & draining, is on antibiotics    CAD (coronary artery disease)     Cancer ([address]     lung cancer    COPD (chronic obstructive pulmonary disease) ([address]     Diabetes mellitus ([facility]     Falls     Heart block     bifasicular    Hypokalemia     MDRO (multiple drug resistant organisms) resistance 10/17/2014    E. Coli urine    MRSA (methicillin resistant staph aureus) culture positive resolved 12/2016    2 negative nasal screens - 2016 (hx in urine 2014)    On home oxygen therapy     uses 2 liters at night    On home oxygen therapy     patient states 2 liters/nasal cannula continuous    Overactive bladder     patient incont.  wears a brief    Pneumonia     PONV (postoperative nausea and vomiting)     Vitamin D deficiency         Past Surgical History:     Past Surgical History:   Procedure Laterality Date    AORTIC VALVE REPAIR N/A 4/11/2017    AORTIC VALVE REPAIR REPLACEMENT performed by Willie Nath MD at [facility] N/A 8/31/2020    BRONCHOSCOPY BIOPSY BRONCHUS performed by Mary Maldonado MD at [address], COLON, DIAGNOSTIC  12/08/2016    EYE SURGERY      HYSTERECTOMY      IR INS PICC VAD W SQ PORT GREATER THAN 5  9/4/2020    IR INS PICC VAD W SQ PORT GREATER THAN 5 9/4/2020 [facility]    IR PORT PLACEMENT EQUAL OR GREATER THAN 5 YEARS  9/29/2020    IR PORT PLACEMENT EQUAL OR GREATER THAN 5 YEARS 9/29/2020 MD JAY JAY [facility] PROCED[facility]    LUMBAR LAMINECTOMY      TONSILLECTOMY          Medications Prior to Admission:       Prior to Admission medications    Medication Sig Start Date End Date Taking? Authorizing Provider   ARIPiprazole (ABILIFY) 5 MG tablet Take 5 mg by mouth at bedtime   Yes Historical Provider, MD   metFORMIN (GLUCOPHAGE) 500 MG tablet Take 500 mg by mouth 2 times daily (with meals)   Yes Historical Provider, MD   Pseudoeph-Doxylamine-DM-APAP (NYQUIL MULTI-SYMPTOM PO) Take by mouth at bedtime   Yes Historical Provider, MD   traZODone (DESYREL) 150 MG tablet Take 300 mg by mouth nightly   Yes Historical Provider, MD   gabapentin (NEURONTIN) 400 MG capsule Take 800 mg by mouth in the morning and at bedtime. Yes Historical Provider, MD   melatonin 5 MG TABS tablet Take 15 mg by mouth nightly   Yes Historical Provider, MD   ondansetron (ZOFRAN) 4 MG tablet Take 1 tablet by mouth every 8 hours as needed for Nausea or Vomiting 1/26/22   Dilan Goldberg MD   rosuvastatin (CRESTOR) 5 MG tablet Take 5 mg by mouth daily    Historical Provider, MD   oxyCODONE-acetaminophen (PERCOCET)  MG per tablet Take 1 tablet by mouth every 6 hours as needed for Pain. Historical Provider, MD   clonazePAM (KLONOPIN) 1 MG tablet Take 1 tablet by mouth 3 times daily as needed for Anxiety for up to 3 doses.  11/5/20 1/26/22  Lila Gudino MD   furosemide (LASIX) 40 MG tablet Take 40 mg by mouth daily as needed (swelling)     Historical Provider, MD   glimepiride (AMARYL) 1 MG tablet Take 1 mg by mouth 2 times daily (with meals)     Historical Provider, MD   metoprolol tartrate (LOPRESSOR) 25 MG tablet Take 25 mg by mouth 2 times daily    Historical Provider, MD   venlafaxine (EFFEXOR XR) 150 MG extended release capsule Take 150 mg by mouth 2 times daily    Historical Provider, MD   lansoprazole (PREVACID) 30 MG delayed release capsule Take 30 mg by mouth --  7.3   RBC 4.42  --  3.96   HGB 12.2  --  10.7*   HCT 39.1  --  33.9*   MCV 88.5  --  85.6   MCH 27.6  --  27.0   MCHC 31.2  --  31.6   RDW 13.6  --  13.4     --  251   MPV 8.6  --  8.3   INR  --  1.1  --      Chemistry:  Recent Labs     03/18/22  1555 03/18/22  1845 03/19/22  0406 03/20/22  0623     --  138 136   K 4.1  --  3.5* 3.2*   CL 99  --  102 98   CO2 28  --  28 27   GLUCOSE 182*  --  162* 166*   BUN 20  --  16 8   CREATININE 0.84  --  0.65 0.60   MG  --   --  1.8  --    ANIONGAP 12  --  8* 11   LABGLOM >60  --  >60 >60   GFRAA >60  --  >60 >60   CALCIUM 9.7  --  8.6 8.8   PROBNP 2,314*  --   --   --    TROPHS 59* 55*  --   --    CKTOTAL 5,990*  --  3,007* 2,294*   MYOGLOBIN 2,626*  --  689* 116*     Recent Labs     03/18/22  1555 03/19/22  0406 03/19/22  2034 03/20/22  0614 03/20/22  1150   PROT 8.8* 7.0  --   --   --    LABALBU 3.9 3.2*  --   --   --    * 101*  --   --   --    ALT 45* 35*  --   --   --    ALKPHOS 51 40  --   --   --    BILITOT 0.24* 0.21*  --   --   --    BILIDIR <0.08  --   --   --   --    POCGLU  --   --  161* 158* 180*       Lab Results   Component Value Date    INR 1.1 03/19/2022    INR 1.0 01/06/2021    INR 0.9 09/29/2020    PROTIME 13.8 03/19/2022    PROTIME 12.6 01/06/2021    PROTIME 11.8 09/29/2020       Lab Results   Component Value Date/Time    SPECIAL NOT REPORTED 11/24/2021 06:32 PM     Lab Results   Component Value Date/Time    CULTURE NO GROWTH 6 DAYS 11/24/2021 06:32 PM       Lab Results   Component Value Date    POCPH 7.36 04/12/2017    PHART 7.42 08/26/2012    PH 7.22 04/11/2017    POCPCO2 45 04/12/2017    OCL7SXJ 41 08/26/2012    PCO2 54 04/11/2017    POCPO2 93 04/12/2017    PO2ART 59 08/26/2012    PO2 89 04/11/2017    POCHCO3 25.3 04/12/2017    NJG9ZGM 26.1 08/26/2012    HCO3 22.2 04/11/2017    NBEA NOT REPORTED 04/12/2017    PBEA 0 04/12/2017    YYT6GCH 27 04/12/2017    ANWR7IYH 97 04/12/2017    M7KLTRUM 92.1 08/26/2012    O2SAT 95 04/11/2017 FIO2 UNKNOWN 10/31/2017       Radiology:    XR CHEST PORTABLE    Result Date: 3/18/2022  No acute process. All radiological studies reviewed                Code Status:  Full Code    Electronically signed by Minnie Hodgkins, MD on 3/20/2022 at 8:41 PM     Copy sent to Dr. Vinita Burkitt, MD    This note was created with the assistance of a speech-recognition program.  Although the intention is to generate a document that actually reflects the content of the visit, no guarantees can be provided that every mistake has been identified and corrected by editing. Note was updated later by me after  physical examination and  completion of the assessment.

## 2022-03-21 NOTE — PLAN OF CARE
Problem: Skin Integrity:  Goal: Will show no infection signs and symptoms  Description: Will show no infection signs and symptoms  3/21/2022 0153 by Eun Gonzalez RN  Outcome: Ongoing     Problem: Falls - Risk of:  Goal: Will remain free from falls  Description: Will remain free from falls  3/21/2022 0153 by Eun Gonzalez RN  Outcome: Ongoing     Problem: Pain:  Goal: Control of chronic pain  Description: Control of chronic pain  3/21/2022 0153 by Eun Gonzalez RN  Outcome: Ongoing     Problem: Airway Clearance - Ineffective:  Goal: Ability to maintain a clear airway will improve  Description: Ability to maintain a clear airway will improve  3/21/2022 0153 by Eun Gonzalez RN  Outcome: Ongoing     Problem: Breathing Pattern - Ineffective:  Goal: Ability to achieve and maintain a regular respiratory rate will improve  Description: Ability to achieve and maintain a regular respiratory rate will improve  3/21/2022 0153 by Eun Gonzalez RN  Outcome: Ongoing

## 2022-03-21 NOTE — PROGRESS NOTES
[facility]    Adult Hospitalist      Name: Bobby Campos  MRN: [id number]     Acct: [de-identified]  Room: 2014/2014-01    Admit Date: 3/18/2022  1:59 PM  PCP: Benjie Pradhan MD    Primary Problem  Principal Problem:    Rhabdomyolysis  Resolved Problems:    * No resolved hospital problems. *        Assesment:     · Acute rhabdomyolysis-improving  · L sided face lesion  · Essential hypertension  · Diabetes mellitus type 2  · Diabetic neuropathy  · Mixed hyperlipidemia  · Chronic pain syndrome  · Gastroesophageal reflux disease without esophagitis  · Major depressive disorder/bipolar disorder  · Chronic obstructive pulmonary disease, unspecified  · Lung mass  · Left face abrasion  · Hypertensive urgency  · Tachycardia-improved  · Dyspnea ? cause        Plan:     · Admit to progressive  · Monitor vitals closely  · Keep SPO2 above 90%  · I's and O's  · IV fluids at 150 mL/h  · Pain control  · Reduce pain meds secondary to lethargy and question of renal impairment  · Antiemetics as needed  · Hold Crestor, Lasix  · Resume aspirin, Prevacid, Lopressor  · Resume Effexor, Abilify, Klonopin, melatonin  · Adjust dose  · Resume Dulera  · Resume Amaryl, trazodone, Neurontin  · DuoNeb  · RT eval  · CBC, BMP  · CK, myoglobin  · Urinalysis with culture sensitivity-if UTI will treat with antibiotic  · Consult cardiology  · Increased dose of Percocet  · Bacitracin top  · Lopressor IV as needed  · Advance diet  · Reduce IV fluids, start aerosol treatments as needed  · Chest x-ray  · ID consult for L Face lesion  · Zosyn  · DVT and GI prophylaxis.         Chief Complaint:     Chief Complaint   Patient presents with    Fatigue    Chest Pain    Cough    Back Pain         History of Present Illness:        Patient seen and examined at bedside  Last 24-hour events reviewed with nursing  Patient says she feels much better now  Pain multiple joints improved  Denies fever or chills  Has L sided facial lesion    Initial HPI  Silver Parekh is a 71 y.o.  female who presents with Fatigue, Chest Pain, Cough, and Back Pain    Patient admitted through the emergency room where she presented with generalized weakness and malaise. Patient said this has been progressively getting worse over the last 5 to 6 days. Patient walks with the help of a walker and says at times she would slide down and then not be able to get up from the ground. She says categorically that she did not have a fall. Patient says she felt she did not have significant strength to get up. Apparently this is happened in the past and she has been helped by her home care nurse. Today however she was visited by a friend who found her laying down possibly for several hours. EMTs were called in who brought the patient to the emergency room. Patient still complains of lethargy and pain over several parts of her body. She denies any chest pain though she has had some dyspnea and tachycardia. Patient complains of some increased frequency of urination but denies any fever or chills. She had some lower abdominal pain and cramping in addition. Patient denies any headache, photophobia or neck pain. Denies any nausea, vomiting or diarrhea. Patient has a large blister over the left side of her face. She says it was one of the masks. Later she added to the help of nursing that when EMTs were trying to assist her one of their apparatus-left side of her face. It is difficult to determine that since there are inflammatory changes around the blister suggesting this might be older than a day. I have personally reviewed the past medical history, past surgical history, medications, social history, and family history, and summarized in the note. Review of Systems:     All 10 point system is reviewed and negative otherwise mentioned in HPI.       Past Medical History:     Past Medical History:   Diagnosis Date    AAA (abdominal aortic aneurysm) ([facility] [address]     Pt denies having a history of AAA    Acid reflux     Anxiety and depression     Aortic stenosis     Arthritis     Blister of ankle, right 10/13/2016    blister broke open & draining, is on antibiotics    CAD (coronary artery disease)     Cancer (HCC)     lung cancer    COPD (chronic obstructive pulmonary disease) (HCC)     Diabetes mellitus ([address]     Falls     Heart block     bifasicular    Hypokalemia     MDRO (multiple drug resistant organisms) resistance 10/17/2014    E. Coli urine    MRSA (methicillin resistant staph aureus) culture positive resolved 12/2016    2 negative nasal screens - 2016 (hx in urine 2014)    On home oxygen therapy     uses 2 liters at night    On home oxygen therapy     patient states 2 liters/nasal cannula continuous    Overactive bladder     patient incont. wears a brief    Pneumonia     PONV (postoperative nausea and vomiting)     Vitamin D deficiency         Past Surgical History:     Past Surgical History:   Procedure Laterality Date    AORTIC VALVE REPAIR N/A 4/11/2017    AORTIC VALVE REPAIR REPLACEMENT performed by Steven Virk MD at [facility] N/A 8/31/2020    BRONCHOSCOPY BIOPSY BRONCHUS performed by Anya Jones MD at [facility], COLON, DIAGNOSTIC  12/08/2016    EYE SURGERY      HYSTERECTOMY      IR INS PICC VAD W SQ PORT GREATER THAN 5  9/4/2020    IR INS PICC VAD W SQ PORT GREATER THAN 5 9/4/2020 [facility]    IR PORT PLACEMENT EQUAL OR GREATER THAN 5 YEARS  9/29/2020    IR PORT PLACEMENT EQUAL OR GREATER THAN 5 YEARS 9/29/2020 [facility]    LUMBAR LAMINECTOMY      TONSILLECTOMY          Medications Prior to Admission:       Prior to Admission medications    Medication Sig Start Date End Date Taking?  Authorizing Provider   ARIPiprazole (ABILIFY) 5 MG tablet Take 5 mg by mouth at bedtime   Yes Historical Provider, MD metFORMIN (GLUCOPHAGE) 500 MG tablet Take 500 mg by mouth 2 times daily (with meals)   Yes Historical Provider, MD   Pseudoeph-Doxylamine-DM-APAP (NYQUIL MULTI-SYMPTOM PO) Take by mouth at bedtime   Yes Historical Provider, MD   traZODone (DESYREL) 150 MG tablet Take 300 mg by mouth nightly   Yes Historical Provider, MD   gabapentin (NEURONTIN) 400 MG capsule Take 800 mg by mouth in the morning and at bedtime. Yes Historical Provider, MD   melatonin 5 MG TABS tablet Take 15 mg by mouth nightly   Yes Historical Provider, MD   ondansetron (ZOFRAN) 4 MG tablet Take 1 tablet by mouth every 8 hours as needed for Nausea or Vomiting 1/26/22   Chelly Cowart MD   rosuvastatin (CRESTOR) 5 MG tablet Take 5 mg by mouth daily    Historical Provider, MD   oxyCODONE-acetaminophen (PERCOCET)  MG per tablet Take 1 tablet by mouth every 6 hours as needed for Pain. Historical Provider, MD   clonazePAM (KLONOPIN) 1 MG tablet Take 1 tablet by mouth 3 times daily as needed for Anxiety for up to 3 doses.  11/5/20 1/26/22  Rajesh Baez MD   furosemide (LASIX) 40 MG tablet Take 40 mg by mouth daily as needed (swelling)     Historical Provider, MD   glimepiride (AMARYL) 1 MG tablet Take 1 mg by mouth 2 times daily (with meals)     Historical Provider, MD   metoprolol tartrate (LOPRESSOR) 25 MG tablet Take 25 mg by mouth 2 times daily    Historical Provider, MD   venlafaxine (EFFEXOR XR) 150 MG extended release capsule Take 150 mg by mouth 2 times daily    Historical Provider, MD   lansoprazole (PREVACID) 30 MG delayed release capsule Take 30 mg by mouth daily 11/10/16   Historical Provider, MD   aspirin EC 81 MG EC tablet Take 81 mg by mouth daily    Historical Provider, MD   mometasone-formoterol (DULERA) 200-5 MCG/ACT inhaler Inhale 2 puffs into the lungs every 12 hours as needed (wheezing/SOB)     Historical Provider, MD        Allergies:       Codeine and Dye [iodides]    Social History:     Tobacco:    reports that she quit smoking about 5 years ago. She has never used smokeless tobacco.  Alcohol:      reports no history of alcohol use. Drug Use:  reports no history of drug use. Family History:     Family History   Problem Relation Age of Onset    Cancer Mother     Cancer Father          Physical Exam:     Vitals:  /69   Pulse 86   Temp 97.9 °F (36.6 °C) (Oral)   Resp 16   Ht 5' 8\" (1.727 m)   Wt 158 lb (71.7 kg)   SpO2 99%   BMI 24.02 kg/m²   Temp (24hrs), Av.7 °F (36.5 °C), Min:97.2 °F (36.2 °C), Max:98.4 °F (36.9 °C)          General appearance - alert, lethargic, and in mild acute distress.   Tachypnea  Mental status - oriented to person, place, and time with normal affect  Head - normocephalic and atraumatic  Eyes - pupils equal and reactive, extraocular eye movements intact, conjunctiva clear  Ears - hearing appears to be intact  Nose - no drainage noted  Mouth - mucous membranes moist  Neck - supple, no carotid bruits, thyroid not palpable  Chest - clear to auscultation, increased effort  Heart - normal rate, regular rhythm, no murmur  Abdomen - soft, nontender, nondistended, bowel sounds present all four quadrants, no masses, hepatomegaly or splenomegaly  Neurological - normal speech, no focal findings or movement disorder noted, cranial nerves II through XII grossly intact  Extremities - peripheral pulses palpable, no pedal edema or calf pain with palpation  Skin - L sided facial lesion        Data:     Labs:    Hematology:  Recent Labs     22  0406 22  0623 22  0618   WBC  --  7.3 7.4   RBC  --  3.96 4.07   HGB  --  10.7* 11.1*   HCT  --  33.9* 35.4*   MCV  --  85.6 87.0   MCH  --  27.0 27.3   MCHC  --  31.6 31.4   RDW  --  13.4 13.5   PLT  --  251 251   MPV  --  8.3 8.7   INR 1.1  --   --      Chemistry:  Recent Labs     22  1845 22  0406 22  0623 22  0618   NA  --  138 136 141   K  --  3.5* 3.2* 3.3*   CL  --  102 98 101   CO2  --  28 27 29   GLUCOSE  -- 162* 166* 149*   BUN  --  16 8 10   CREATININE  --  0.65 0.60 0.75   MG  --  1.8  --   --    ANIONGAP  --  8* 11 11   LABGLOM  --  >60 >60 >60   GFRAA  --  >60 >60 >60   CALCIUM  --  8.6 8.8 9.0   TROPHS 55*  --   --   --    CKTOTAL  --  3,007* 2,294* 731*   MYOGLOBIN  --  689* 116* 77*     Recent Labs     03/19/22  0406 03/19/22 2034 03/20/22  0614 03/20/22  1150 03/21/22  0149 03/21/22  0605 03/21/22  1223 03/21/22  1638   PROT 7.0  --   --   --   --   --   --   --    LABALBU 3.2*  --   --   --   --   --   --   --    *  --   --   --   --   --   --   --    ALT 35*  --   --   --   --   --   --   --    ALKPHOS 40  --   --   --   --   --   --   --    BILITOT 0.21*  --   --   --   --   --   --   --    POCGLU  --    < > 158* 180* 141* 145* 188* 136*    < > = values in this interval not displayed. Lab Results   Component Value Date    INR 1.1 03/19/2022    INR 1.0 01/06/2021    INR 0.9 09/29/2020    PROTIME 13.8 03/19/2022    PROTIME 12.6 01/06/2021    PROTIME 11.8 09/29/2020       Lab Results   Component Value Date/Time    SPECIAL NOT REPORTED 11/24/2021 06:32 PM     Lab Results   Component Value Date/Time    CULTURE NO GROWTH 6 DAYS 11/24/2021 06:32 PM       Lab Results   Component Value Date    POCPH 7.36 04/12/2017    PHART 7.42 08/26/2012    PH 7.22 04/11/2017    POCPCO2 45 04/12/2017    WRG0GZG 41 08/26/2012    PCO2 54 04/11/2017    POCPO2 93 04/12/2017    PO2ART 59 08/26/2012    PO2 89 04/11/2017    POCHCO3 25.3 04/12/2017    LFE1QRM 26.1 08/26/2012    HCO3 22.2 04/11/2017    NBEA NOT REPORTED 04/12/2017    PBEA 0 04/12/2017    BYB8URA 27 04/12/2017    YWHE9VKD 97 04/12/2017    T5JMYXUX 92.1 08/26/2012    O2SAT 95 04/11/2017    FIO2 UNKNOWN 10/31/2017       Radiology:    XR CHEST PORTABLE    Result Date: 3/18/2022  No acute process.          All radiological studies reviewed                Code Status:  Full Code    Electronically signed by Laurie Cam MD on 3/21/2022 at 6:04 PM     Copy sent to Dr. Cheri Saleh MD    This note was created with the assistance of a speech-recognition program.  Although the intention is to generate a document that actually reflects the content of the visit, no guarantees can be provided that every mistake has been identified and corrected by editing. Note was updated later by me after  physical examination and  completion of the assessment.

## 2022-03-21 NOTE — PLAN OF CARE
Problem: Skin Integrity:  Goal: Will show no infection signs and symptoms  Description: Will show no infection signs and symptoms  3/21/2022 1158 by Balta Thurman RN  Outcome: Ongoing  3/21/2022 0153 by Ayesha Bermudez RN  Outcome: Ongoing  Goal: Absence of new skin breakdown  Description: Absence of new skin breakdown  Outcome: Ongoing  Goal: Risk for impaired skin integrity will decrease  Description: Risk for impaired skin integrity will decrease  Outcome: Ongoing     Problem: Falls - Risk of:  Goal: Will remain free from falls  Description: Will remain free from falls  3/21/2022 1158 by Balta Thurman RN  Outcome: Ongoing  3/21/2022 0153 by Ayesha Bermudez RN  Outcome: Ongoing  Goal: Absence of physical injury  Description: Absence of physical injury  Outcome: Ongoing     Problem: Pain:  Description: Pain management should include both nonpharmacologic and pharmacologic interventions. Goal: Pain level will decrease  Description: Pain level will decrease  Outcome: Ongoing  Goal: Control of acute pain  Description: Control of acute pain  Outcome: Ongoing  Goal: Control of chronic pain  Description: Control of chronic pain  3/21/2022 1158 by Balta Thurman RN  Outcome: Ongoing  3/21/2022 0153 by Ayesha Bermudez RN  Outcome: Ongoing     Problem: Discharge Planning:  Goal: Discharged to appropriate level of care  Description: Discharged to appropriate level of care  Outcome: Ongoing     Problem:  Activity Intolerance:  Goal: Ability to tolerate increased activity will improve  Description: Ability to tolerate increased activity will improve  Outcome: Ongoing     Problem: Airway Clearance - Ineffective:  Goal: Ability to maintain a clear airway will improve  Description: Ability to maintain a clear airway will improve  3/21/2022 1158 by Balta Thurman RN  Outcome: Ongoing  3/21/2022 0153 by Ayesha Bermudez RN  Outcome: Ongoing     Problem: Breathing Pattern - Ineffective:  Goal: Ability to minimized  Description: Complications related to the disease process, condition or treatment will be avoided or minimized  Outcome: Ongoing  Goal: Diagnostic test results will improve  Description: Diagnostic test results will improve  Outcome: Ongoing     Problem: Tissue Perfusion:  Goal: Adequacy of tissue perfusion will improve  Description: Adequacy of tissue perfusion will improve  Outcome: Ongoing

## 2022-03-21 NOTE — CARE COORDINATION
Discharge Planning:    Informed by Zofia Potter that patient is interested in [place] [address] now. Writer down to see patient and speak to patient and she is in agreement with [place] [address] due to the decubitus ulcer on her buttock. Patient requesting referrals in the following order:    1) [facility]  2) [address]  3) [facility] [place] [id number]     [facility], Awaiting response. The Plan for Transition of Care is related to the following treatment goals: SN/PT/OT    The Patient and/or patient representative was provided with a choice of provider and agrees   with the discharge plan. [x] Yes [] No    Freedom of choice list was provided with basic dialogue that supports the patient's individualized plan of care/goals, treatment preferences and shares the quality data associated with the providers.  [x] Yes [] No

## 2022-03-21 NOTE — PROGRESS NOTES
Occupational Therapy  DATE: 3/21/2022    NAME: Abdiaziz Salamanca  MRN: [id number]   : 1950    Patient not seen this date for Occupational Therapy due to:      [] Cancel by RN or physician due to:    [] Hemodialysis    [] Critical Lab Value Level     [] Blood transfusion in progress    [] Acute or unstable cardiovascular status   _MAP < 55 or more than >115  _HR < 40 or > 130    [] Acute or unstable pulmonary status   -FiO2 > 60%   _RR < 5 or >40    _O2 sats < 85%    [] Strict Bedrest    [] Off Unit for surgery or procedure    [] Off Unit for testing       [] Pending imaging to R/O fracture    [x] Refusal by Patient- 3/21 pt refused OT eval even after max edu and encouragment and told writer to JAELYN my room now and I'm 72 and know what I need! \"; RN was informed of refusal  [] Other      [] PT being discontinued at this time. Patient independent. No further needs. [] PT being discontinued at this time as the patient has been transferred to hospice care. No further needs.       Khanh Layton, OT

## 2022-03-22 LAB
ABSOLUTE EOS #: 0.14 K/UL (ref 0–0.44)
ABSOLUTE IMMATURE GRANULOCYTE: 0.12 K/UL (ref 0–0.3)
ABSOLUTE LYMPH #: 0.91 K/UL (ref 1.1–3.7)
ABSOLUTE MONO #: 0.4 K/UL (ref 0.1–1.2)
ANION GAP SERPL CALCULATED.3IONS-SCNC: 11 MMOL/L (ref 9–17)
BASOPHILS # BLD: 1 % (ref 0–2)
BASOPHILS ABSOLUTE: 0.04 K/UL (ref 0–0.2)
BUN BLDV-MCNC: 13 MG/DL (ref 8–23)
BUN/CREAT BLD: 16 (ref 9–20)
CALCIUM SERPL-MCNC: 8.6 MG/DL (ref 8.6–10.4)
CHLORIDE BLD-SCNC: 101 MMOL/L (ref 98–107)
CO2: 25 MMOL/L (ref 20–31)
CREAT SERPL-MCNC: 0.79 MG/DL (ref 0.5–0.9)
EOSINOPHILS RELATIVE PERCENT: 2 % (ref 1–4)
GFR AFRICAN AMERICAN: >60 ML/MIN
GFR NON-AFRICAN AMERICAN: >60 ML/MIN
GFR SERPL CREATININE-BSD FRML MDRD: ABNORMAL ML/MIN/{1.73_M2}
GLUCOSE BLD-MCNC: 119 MG/DL (ref 65–105)
GLUCOSE BLD-MCNC: 124 MG/DL (ref 65–105)
GLUCOSE BLD-MCNC: 134 MG/DL (ref 65–105)
GLUCOSE BLD-MCNC: 149 MG/DL (ref 70–99)
GLUCOSE BLD-MCNC: 90 MG/DL (ref 65–105)
HCT VFR BLD CALC: 31.9 % (ref 36.3–47.1)
HEMOGLOBIN: 9.6 G/DL (ref 11.9–15.1)
IMMATURE GRANULOCYTES: 2 %
LYMPHOCYTES # BLD: 14 % (ref 24–43)
MCH RBC QN AUTO: 27 PG (ref 25.2–33.5)
MCHC RBC AUTO-ENTMCNC: 30.1 G/DL (ref 28.4–34.8)
MCV RBC AUTO: 89.9 FL (ref 82.6–102.9)
MONOCYTES # BLD: 6 % (ref 3–12)
MYOGLOBIN: 50 NG/ML (ref 25–58)
NRBC AUTOMATED: 0 PER 100 WBC
PDW BLD-RTO: 13.7 % (ref 11.8–14.4)
PLATELET # BLD: 221 K/UL (ref 138–453)
PMV BLD AUTO: 8.9 FL (ref 8.1–13.5)
POTASSIUM SERPL-SCNC: 4.1 MMOL/L (ref 3.7–5.3)
RBC # BLD: 3.55 M/UL (ref 3.95–5.11)
SEG NEUTROPHILS: 75 % (ref 36–65)
SEGMENTED NEUTROPHILS ABSOLUTE COUNT: 5.15 K/UL (ref 1.5–8.1)
SODIUM BLD-SCNC: 137 MMOL/L (ref 135–144)
TOTAL CK: 311 U/L (ref 26–192)
WBC # BLD: 6.8 K/UL (ref 3.5–11.3)

## 2022-03-22 PROCEDURE — 82550 ASSAY OF CK (CPK): CPT

## 2022-03-22 PROCEDURE — 6360000002 HC RX W HCPCS: Performed by: INTERNAL MEDICINE

## 2022-03-22 PROCEDURE — 80048 BASIC METABOLIC PNL TOTAL CA: CPT

## 2022-03-22 PROCEDURE — 6370000000 HC RX 637 (ALT 250 FOR IP): Performed by: INTERNAL MEDICINE

## 2022-03-22 PROCEDURE — 36415 COLL VENOUS BLD VENIPUNCTURE: CPT

## 2022-03-22 PROCEDURE — 2580000003 HC RX 258: Performed by: FAMILY MEDICINE

## 2022-03-22 PROCEDURE — 2700000000 HC OXYGEN THERAPY PER DAY

## 2022-03-22 PROCEDURE — 82947 ASSAY GLUCOSE BLOOD QUANT: CPT

## 2022-03-22 PROCEDURE — 2580000003 HC RX 258: Performed by: INTERNAL MEDICINE

## 2022-03-22 PROCEDURE — 85025 COMPLETE CBC W/AUTO DIFF WBC: CPT

## 2022-03-22 PROCEDURE — 94761 N-INVAS EAR/PLS OXIMETRY MLT: CPT

## 2022-03-22 PROCEDURE — 83874 ASSAY OF MYOGLOBIN: CPT

## 2022-03-22 PROCEDURE — 6370000000 HC RX 637 (ALT 250 FOR IP): Performed by: FAMILY MEDICINE

## 2022-03-22 PROCEDURE — 6370000000 HC RX 637 (ALT 250 FOR IP): Performed by: HOSPITALIST

## 2022-03-22 PROCEDURE — 1200000000 HC SEMI PRIVATE

## 2022-03-22 PROCEDURE — 99222 1ST HOSP IP/OBS MODERATE 55: CPT | Performed by: INTERNAL MEDICINE

## 2022-03-22 RX ADMIN — PIPERACILLIN AND TAZOBACTAM 3375 MG: 3; .375 INJECTION, POWDER, LYOPHILIZED, FOR SOLUTION INTRAVENOUS at 17:24

## 2022-03-22 RX ADMIN — PIPERACILLIN AND TAZOBACTAM 3375 MG: 3; .375 INJECTION, POWDER, LYOPHILIZED, FOR SOLUTION INTRAVENOUS at 09:31

## 2022-03-22 RX ADMIN — METOPROLOL TARTRATE 50 MG: 50 TABLET, FILM COATED ORAL at 09:31

## 2022-03-22 RX ADMIN — SODIUM CHLORIDE: 9 INJECTION, SOLUTION INTRAVENOUS at 11:39

## 2022-03-22 RX ADMIN — OXYCODONE AND ACETAMINOPHEN 1 TABLET: 325; 10 TABLET ORAL at 21:39

## 2022-03-22 RX ADMIN — OXYCODONE AND ACETAMINOPHEN 1 TABLET: 325; 10 TABLET ORAL at 07:49

## 2022-03-22 RX ADMIN — VENLAFAXINE HYDROCHLORIDE 37.5 MG: 37.5 CAPSULE, EXTENDED RELEASE ORAL at 11:37

## 2022-03-22 RX ADMIN — GABAPENTIN 800 MG: 400 CAPSULE ORAL at 21:40

## 2022-03-22 RX ADMIN — Medication 10.5 MG: at 21:41

## 2022-03-22 RX ADMIN — PIPERACILLIN AND TAZOBACTAM 3375 MG: 3; .375 INJECTION, POWDER, LYOPHILIZED, FOR SOLUTION INTRAVENOUS at 02:57

## 2022-03-22 RX ADMIN — TRAZODONE HYDROCHLORIDE 300 MG: 100 TABLET ORAL at 21:40

## 2022-03-22 RX ADMIN — PANTOPRAZOLE SODIUM 20 MG: 20 TABLET, DELAYED RELEASE ORAL at 07:40

## 2022-03-22 RX ADMIN — ASPIRIN 81 MG: 81 TABLET, COATED ORAL at 09:31

## 2022-03-22 RX ADMIN — VENLAFAXINE HYDROCHLORIDE 37.5 MG: 37.5 CAPSULE, EXTENDED RELEASE ORAL at 21:40

## 2022-03-22 RX ADMIN — METFORMIN HYDROCHLORIDE 500 MG: 500 TABLET ORAL at 09:32

## 2022-03-22 RX ADMIN — ARIPIPRAZOLE 5 MG: 5 TABLET ORAL at 09:31

## 2022-03-22 RX ADMIN — CLONAZEPAM 1 MG: 0.5 TABLET ORAL at 07:49

## 2022-03-22 RX ADMIN — OXYCODONE AND ACETAMINOPHEN 1 TABLET: 325; 10 TABLET ORAL at 15:14

## 2022-03-22 RX ADMIN — POLYMYXIN B SULFATE, BACITRACIN ZINC, NEOMYCIN SULFATE: 5000; 3.5; 4 OINTMENT TOPICAL at 09:34

## 2022-03-22 RX ADMIN — METOPROLOL TARTRATE 50 MG: 50 TABLET, FILM COATED ORAL at 21:40

## 2022-03-22 RX ADMIN — GLIPIZIDE 2.5 MG: 5 TABLET ORAL at 07:40

## 2022-03-22 RX ADMIN — CLONAZEPAM 1 MG: 0.5 TABLET ORAL at 21:39

## 2022-03-22 RX ADMIN — GABAPENTIN 800 MG: 400 CAPSULE ORAL at 09:32

## 2022-03-22 RX ADMIN — TAMSULOSIN HYDROCHLORIDE 0.4 MG: 0.4 CAPSULE ORAL at 09:31

## 2022-03-22 RX ADMIN — POLYMYXIN B SULFATE, BACITRACIN ZINC, NEOMYCIN SULFATE: 5000; 3.5; 4 OINTMENT TOPICAL at 21:40

## 2022-03-22 ASSESSMENT — PAIN DESCRIPTION - FREQUENCY
FREQUENCY: CONTINUOUS
FREQUENCY: CONTINUOUS

## 2022-03-22 ASSESSMENT — PAIN DESCRIPTION - DESCRIPTORS
DESCRIPTORS: ACHING;DISCOMFORT
DESCRIPTORS: ACHING;DISCOMFORT

## 2022-03-22 ASSESSMENT — PAIN DESCRIPTION - LOCATION
LOCATION: BACK;CHEST
LOCATION: BACK

## 2022-03-22 ASSESSMENT — PAIN DESCRIPTION - ORIENTATION
ORIENTATION: MID;LOWER
ORIENTATION: LOWER;MID

## 2022-03-22 ASSESSMENT — PAIN DESCRIPTION - PAIN TYPE
TYPE: CHRONIC PAIN
TYPE: CHRONIC PAIN

## 2022-03-22 ASSESSMENT — PAIN SCALES - GENERAL
PAINLEVEL_OUTOF10: 0
PAINLEVEL_OUTOF10: 10
PAINLEVEL_OUTOF10: 10
PAINLEVEL_OUTOF10: 8

## 2022-03-22 ASSESSMENT — PAIN DESCRIPTION - ONSET: ONSET: ON-GOING

## 2022-03-22 ASSESSMENT — PAIN - FUNCTIONAL ASSESSMENT: PAIN_FUNCTIONAL_ASSESSMENT: PREVENTS OR INTERFERES SOME ACTIVE ACTIVITIES AND ADLS

## 2022-03-22 ASSESSMENT — PAIN DESCRIPTION - PROGRESSION: CLINICAL_PROGRESSION: NOT CHANGED

## 2022-03-22 NOTE — PLAN OF CARE
Problem: Skin Integrity:  Goal: Will show no infection signs and symptoms  Description: Will show no infection signs and symptoms  Outcome: Ongoing     Problem: Falls - Risk of:  Goal: Will remain free from falls  Description: Will remain free from falls  Outcome: Ongoing     Problem:  Activity Intolerance:  Goal: Ability to tolerate increased activity will improve  Description: Ability to tolerate increased activity will improve  Outcome: Ongoing     Problem: Airway Clearance - Ineffective:  Goal: Ability to maintain a clear airway will improve  Description: Ability to maintain a clear airway will improve  Outcome: Ongoing

## 2022-03-22 NOTE — PROGRESS NOTES
Pt refuses heparin injection; explained purpose for the heparin but pt still refuses. Upon assessment - pt refused Rn to assess wounds to buttocks; mepilex remains in place. Wound to lt temporal area with dried pus-like drainage; pt's reading glasses and oxygen tubing imbedded to wound. Washed area with soap and water; offered to place gauze dressing to area but pt refused that as well. Will continue to monitor.

## 2022-03-22 NOTE — CARE COORDINATION
Social work: [facility]  called to ask for updates. They are providing services also to this patient. If fax does not work today the email is confidential and can be used per . [email or web address]  Phone: [phone] if needed.     Maria D serna

## 2022-03-22 NOTE — PLAN OF CARE
Problem: Skin Integrity:  Goal: Absence of new skin breakdown  Description: Absence of new skin breakdown  Outcome: Ongoing  Note: Ongoing     Problem: Falls - Risk of:  Goal: Will remain free from falls  Description: Will remain free from falls  3/22/2022 1501 by Teri Sweeney RN  Outcome: Ongoing  Note: Ongoing     Problem: Pain:  Goal: Control of acute pain  Description: Control of acute pain  Outcome: Ongoing  Note: Ongoing     Problem:  Activity Intolerance:  Goal: Ability to tolerate increased activity will improve  Description: Ability to tolerate increased activity will improve  3/22/2022 1501 by Teri Sweeney RN  Outcome: Ongoing  Note: Ongoing     Problem: Airway Clearance - Ineffective:  Goal: Ability to maintain a clear airway will improve  Description: Ability to maintain a clear airway will improve  3/22/2022 1501 by Teri Sweeney RN  Outcome: Ongoing  Note: Ongoing     Problem: Breathing Pattern - Ineffective:  Goal: Ability to achieve and maintain a regular respiratory rate will improve  Description: Ability to achieve and maintain a regular respiratory rate will improve  Outcome: Ongoing  Note: Ongoing     Problem: Gas Exchange - Impaired:  Goal: Levels of oxygenation will improve  Description: Levels of oxygenation will improve  Outcome: Ongoing  Note: Ongoing

## 2022-03-22 NOTE — FLOWSHEET NOTE
Patient appears to be sleeping but responds to writer's knock. Patient is somewhat passive but engages in conversation. Patient reports family support and denies any spiritual or emotional concerns. Patient shares her frustrations about trying to sleep. Writer leaves to allow patient to sleep. Spiritual care will follow as needed.        03/22/22 1200   Encounter Summary   Services provided to: Patient   Referral/Consult From: [facility];Family members   Continue Visiting   (3/22/22)   Complexity of Encounter Low   Length of Encounter 15 minutes   Routine   Type Follow up   Assessment Approachable   Intervention Active listening;Explored coping resources;Nurtured hope   Outcome Expressed gratitude

## 2022-03-22 NOTE — CONSULTS
[facility]  Initial Consult Note  Date: 3/22/2022    Hospital day :4     Impression:   1. Multiple falls and was found down at home. 2. Rhabdomyolysis improved with IV hydration. 3. Left-sided facial abrasion-limited to skin and subcutaneous area with no deep involvement and the surrounding erythema is not secondary to infection but rather inflammation. 4. Diabetes mellitus type 2  5. Essential hypertension  6. Known lung cancer    Recommendations   · At this point time the patient does not have an acute infection and I will discontinue the Zosyn. · The patient does need topical/wound care and I would recommend a wound care evaluation. · For now I have covered the area with a silver-based topical treatment. · The patient will need outpatient treatment with the wound care center for close follow-up. Chief complaint/reason for consultation:   Left lateral face wound    History of Present Illness:   Vicky Kelly is a 71y.o.-year-old female who was initially admitted on 3/18/2022. Biju Larios has multiple medical problems including diabetes mellitus type 2, COPD, right upper lobe cancer, coronary artery disease, tobacco abuse, depression among other medical problems. The patient does have a history of multiple falls and came into the emergency room with complaints of generalized malaise/weakness and has had several falls unable to get up from the ground and the patient was found at home laying down and possibly for several hours. Patient was brought in for evaluation in the emergency room and was found to have an elevated proBNP, CK at 5990, and myoglobin at 2626. The patient was admitted for dehydration, rhabdomyolysis and was treated with IV fluids. The patient was seen by the cardiology service as she was initially tachycardic and was also noted to have a left-sided facial lesion. The patient was started on Zosyn and I was asked to evaluate and help with antibiotic choice.     I LUMBAR LAMINECTOMY      TONSILLECTOMY       Medications:      metoprolol tartrate  50 mg Oral BID    tamsulosin  0.4 mg Oral Daily    piperacillin-tazobactam  3,375 mg IntraVENous Q8H    gabapentin  800 mg Oral BID    melatonin  10.5 mg Oral Nightly    metFORMIN  500 mg Oral BID WC    traZODone  300 mg Oral Nightly    glipiZIDE  2.5 mg Oral QAM AC    albuterol sulfate HFA  2 puff Inhalation BID    neomycin-bacitracin-polymyxin   Topical BID    ARIPiprazole  5 mg Oral Daily    aspirin EC  81 mg Oral Daily    pantoprazole  20 mg Oral QAM AC    budesonide-formoterol  2 puff Inhalation BID    venlafaxine  37.5 mg Oral BID    sodium chloride flush  5-40 mL IntraVENous 2 times per day    heparin (porcine)  5,000 Units SubCUTAneous 3 times per day     Social History:     Social History     Socioeconomic History    Marital status: Single     Spouse name: Not on file    Number of children: Not on file    Years of education: Not on file    Highest education level: Not on file   Occupational History    Not on file   Tobacco Use    Smoking status: Former Smoker     Quit date: 2016     Years since quittin.5    Smokeless tobacco: Never Used   Vaping Use    Vaping Use: Never used   Substance and Sexual Activity    Alcohol use: No    Drug use: No    Sexual activity: Not on file   Other Topics Concern    Not on file   Social History Narrative    Not on file     Social Determinants of Health     Financial Resource Strain:     Difficulty of Paying Living Expenses: Not on file   Food Insecurity:     Worried About Running Out of Food in the Last Year: Not on file    Enmanuel of Food in the Last Year: Not on file   Transportation Needs:     Lack of Transportation (Medical): Not on file    Lack of Transportation (Non-Medical):  Not on file   Physical Activity:     Days of Exercise per Week: Not on file    Minutes of Exercise per Session: Not on file   Stress:     Feeling of Stress : Not on file   Social Connections:     Frequency of Communication with Friends and Family: Not on file    Frequency of Social Gatherings with Friends and Family: Not on file    Attends [employer] Services: Not on file    Active Member of [address] [place] [address] [employer] or Organizations: Not on file    Attends Club or Organization Meetings: Not on file    Marital Status: Not on file   Intimate Partner Violence:     Fear of Current or Ex-Partner: Not on file    Emotionally Abused: Not on file    Physically Abused: Not on file    Sexually Abused: Not on file   Housing Stability:     Unable to Pay for Housing in the Last Year: Not on file    Number of [place] in the Last Year: Not on file    Unstable Housing in the Last Year: Not on file     Family History:     Family History   Problem Relation Age of Onset    Cancer Mother     Cancer Father       Allergies:   Codeine and Dye [iodides]     Review of Systems:   General: No fevers or chills. Eyes: No double vision or blurry vision. ENT: No sore throat or runny nose. Cardiovascular: No chest pain or palpitations. Lung: No shortness of breath or cough. Abdomen: No nausea, vomiting, diarrhea, or abdominal pain. Genitourinary: No increased urinary frequency, or dysuria. Musculoskeletal: No muscle aches or pains. Hematologic: No bleeding or bruising. Neurologic: No headache, weakness, numbness, or tingling.     Physical Examination :   /70   Pulse 96   Temp 98.2 °F (36.8 °C) (Axillary)   Resp 16   Ht 5' 8\" (1.727 m)   Wt 158 lb (71.7 kg)   SpO2 99%   BMI 24.02 kg/m²     Temperature Range: Temp: 98.2 °F (36.8 °C) Temp  Av.1 °F (36.7 °C)  Min: 97.6 °F (36.4 °C)  Max: 98.4 °F (36.9 °C)  General Appearance: Awake, alert, and in no apparent distress  Head: Normocephalic, without obvious abnormality  Eyes: Pupils equal, round, reactive, to light and accommodation; extraocular movements intact; sclera anicteric; conjunctivae pink  ENT: Oropharynx clear, without erythema, exudate, or thrush. Neck: Supple, without lymphadenopathy. Pulmonary/Chest: Clear to auscultation, without wheezes, rales, or rhonchi  Cardiovascular: Regular rate and rhythm without murmurs, rubs, or gallops. Abdomen: Soft, nontender, nondistended. Extremities: No cyanosis, clubbing, edema, or effusions. Neurologic: No gross sensory or motor deficits. Skin: There is a left lateral face lesion in the temporal area with some slough in the wound bed. There are some mild surrounding erythema              Medical Decision Making:   I have independently reviewed/ordered the following labs:  CBC with Differential:   Recent Labs     03/21/22 0618 03/22/22 0617   WBC 7.4 6.8   HGB 11.1* 9.6*   HCT 35.4* 31.9*    221   LYMPHOPCT 15* 14*   MONOPCT 6 6     BMP:   Recent Labs     03/21/22 0618 03/21/22 0618 03/21/22 2125 03/22/22 0617     --   --  137   K 3.3*   < > 4.4 4.1     --   --  101   CO2 29  --   --  25   BUN 10  --   --  13   CREATININE 0.75  --   --  0.79   MG  --   --  1.6  --     < > = values in this interval not displayed. Hepatic Function Panel: No results for input(s): PROT, LABALBU, BILIDIR, IBILI, BILITOT, ALKPHOS, ALT, AST in the last 72 hours. Lab Results   Component Value Date    PROCAL 0.26 11/03/2020    PROCAL 0.06 09/01/2020       No results found for: CRP  Lab Results   Component Value Date    FERRITIN 149 11/05/2020     No results found for: FIBRINOGEN  Lab Results   Component Value Date    DDIMER 1.91 01/06/2021    DDIMER 4.51 09/25/2020    DDIMER 0.58 12/28/2018    DDIMER 0.45 12/23/2013     No results found for: LDH    No results found for: Camilo Lingo    Lab Results   Component Value Date    COVID19 Not Detected 11/10/2020    COVID19 Not Detected 08/29/2020     No results found for requested labs within last 30 days. Imaging Studies:   ONE XRAY VIEW OF THE CHEST 3/20/2022 9:50 pm   FINDINGS:   New electronic device left pulmonary apex.   Otherwise stable chest.         ONE XRAY VIEW OF THE CHEST 3/18/2022 2:29 pm   FINDINGS:   No acute process. Cultures:     Culture, Urine [[id number]]    Order Status: Sent Specimen: Urine, clean catch            Thank you for allowing us to participate in the care of this patient. Please call with questions. Electronically signed by Silvino Huntley MD on 3/22/2022 at 11:11 AM      [facility]  Silvino Huntley MD  [employer] messaging  OFFICE: [phone]      This note is created with the assistance of a speech recognition program.  While intending to generate a document that actually reflects the content of the visit, the document can still have some errors including those of syntax and sound a like substitutions which may escape proof reading. In such instances, actual meaning can be extrapolated by contextual diversion.

## 2022-03-22 NOTE — PROGRESS NOTES
Incentive spirometer given to patient. RN educated patient on how to use it and the importance. Patient demonstrated the use.

## 2022-03-22 NOTE — PROGRESS NOTES
Physical Therapy  DATE: 3/22/2022    NAME: Carolina Moore  MRN: [id number]   : 1950    Patient not seen this date for Physical Therapy due to:      [] Cancel by RN or physician due to:    [] Hemodialysis    [] Critical Lab Value Level     [] Blood transfusion in progress    [] Acute or unstable cardiovascular status   _MAP < 55 or more than >115  _HR < 40 or > 130    [] Acute or unstable pulmonary status   -FiO2 > 60%   _RR < 5 or >40    _O2 sats < 85%    [] Strict Bedrest    [] Off Unit for surgery or procedure    [] Off Unit for testing       [] Pending imaging to R/O fracture    [x] Refusal by Patient  - pt just got up to chair and grand kids currently visiting. Attempted 2nd time -> pt back to bed and sleeping. Pt reports \"I will not get up again\". [] Other      [] PT being discontinued at this time. Patient independent. No further needs. [] PT being discontinued at this time as the patient has been transferred to hospice care. No further needs.       Stanislav Larry, PT

## 2022-03-22 NOTE — RT PROTOCOL NOTE
RT Inhaler-Nebulizer Bronchodilator Protocol Note    There is a bronchodilator order in the chart from a provider indicating to follow the RT Bronchodilator Protocol and there is an Initiate RT Inhaler-Nebulizer Bronchodilator Protocol order as well (see protocol at bottom of note). CXR Findings:  XR CHEST PORTABLE    Result Date: 3/20/2022  New electronic device left pulmonary apex. Otherwise stable chest.       The findings from the last RT Protocol Assessment were as follows:   History Pulmonary Disease: Chronic pulmonary disease  Respiratory Pattern: Regular pattern and RR 12-20 bpm  Breath Sounds: Clear breath sounds  Cough: Strong, spontaneous, non-productive  Indication for Bronchodilator Therapy: Decreased or absent breath sounds  Bronchodilator Assessment Score: 2    Aerosolized bronchodilator medication orders have been revised according to the RT Inhaler-Nebulizer Bronchodilator Protocol below. Respiratory Therapist to perform RT Therapy Protocol Assessment initially then follow the protocol. Repeat RT Therapy Protocol Assessment PRN for score 0-3 or on second treatment, BID, and PRN for scores above 3. No Indications - adjust the frequency to every 6 hours PRN wheezing or bronchospasm, if no treatments needed after 48 hours then discontinue using Per Protocol order mode. If indication present, adjust the RT bronchodilator orders based on the Bronchodilator Assessment Score as indicated below. Use Inhaler orders unless patient has one or more of the following: on home nebulizer, not able to hold breath for 10 seconds, is not alert and oriented, cannot activate and use MDI correctly, or respiratory rate 25 breaths per minute or more, then use the equivalent nebulizer order(s) with same Frequency and PRN reasons based on the score. If a patient is on this medication at home then do not decrease Frequency below that used at home.     0-3 - enter or revise RT bronchodilator order(s) to equivalent RT Bronchodilator order with Frequency of every 4 hours PRN for wheezing or increased work of breathing using Per Protocol order mode. 4-6 - enter or revise RT Bronchodilator order(s) to two equivalent RT bronchodilator orders with one order with BID Frequency and one order with Frequency of every 4 hours PRN wheezing or increased work of breathing using Per Protocol order mode. 7-10 - enter or revise RT Bronchodilator order(s) to two equivalent RT bronchodilator orders with one order with TID Frequency and one order with Frequency of every 4 hours PRN wheezing or increased work of breathing using Per Protocol order mode. 11-13 - enter or revise RT Bronchodilator order(s) to one equivalent RT bronchodilator order with QID Frequency and an Albuterol order with Frequency of every 4 hours PRN wheezing or increased work of breathing using Per Protocol order mode. Greater than 13 - enter or revise RT Bronchodilator order(s) to one equivalent RT bronchodilator order with every 4 hours Frequency and an Albuterol order with Frequency of every 2 hours PRN wheezing or increased work of breathing using Per Protocol order mode. RT to enter RT Home Evaluation for COPD & MDI Assessment order using Per Protocol order mode.     Electronically signed by Juan Francisco Rodríguez RCP on 3/22/2022 at 11:26 AM

## 2022-03-22 NOTE — PROGRESS NOTES
Section of Cardiology  Progress Note      Date:  3/22/2022  Patient: Jessica Duke  Admission:  3/18/2022  1:59 PM  Admit DX: Rhabdomyolysis [M62.82]  Dehydration [E86.0]  Traumatic rhabdomyolysis, initial encounter ([facility] [address]. 6XXA]  Age:  71 y. o., 1950     LOS: 4 days     Reason for evaluation:   arrhythmia      SUBJECTIVE:     The patient was seen and examined. Notes and labs reviewed. There were not complications over night. Patient seen and examined in her room with her son and granddaughter at bedside. The patient appeared to be tachypneic. Oxygenation is normal.  She is still in pain with her facial abrasion. No fever or chills or cough. No sputum production and no obvious bleeding  Patient's cardiac review of systems: negative for chest pain. The patient is generally feeling stable. OBJECTIVE:    Telemetry: Sinus  BP (!) 110/58   Pulse 80   Temp 98.2 °F (36.8 °C) (Axillary)   Resp 16   Ht 5' 8\" (1.727 m)   Wt 158 lb (71.7 kg)   SpO2 99%   BMI 24.02 kg/m²     Intake/Output Summary (Last 24 hours) at 3/22/2022 1425  Last data filed at 3/22/2022 1147  Gross per 24 hour   Intake 427.7 ml   Output 900 ml   Net -472.3 ml       EXAM:   CONSTITUTIONAL:  awake, alert, cooperative, no apparent distress, and appears stated age. HEENT: Normal jugular venous pulsations, no carotid bruits. Head is atraumatic, normocephalic. Eyes and oral mucosa are normal.  LUNGS: Good respiratory effort. No for increased work of breathing. On auscultation: Anteriorly the patient has upper airway rhonchi  CARDIOVASCULAR:  Normal apical impulse, regular rate and rhythm, normal S1 and S2, no S3 or S4, with aortic stenosis murmur  ABDOMEN: Soft, nontender, nondistended. Bowel sounds present. SKIN: Warm and dry. EXTREMITIES: No lower extremity edema. Motor movement grossly intact. No cyanosis or clubbing.     Current Inpatient Medications:   metoprolol tartrate  50 mg Oral BID    tamsulosin  0.4 mg Oral subarachnoid bleed  8.  moderate aortic stenosis  9.  History of lung cancer  10.  Moderate CAD from 2016 involving the circumflex coronary artery    Patient Active Problem List   Diagnosis    Valvular heart disease    Type 2 diabetes mellitus without complication, without long-term current use of insulin (HCC)    Open wound of right ankle    COPD (chronic obstructive pulmonary disease) (HCC)    Syncope and collapse    Chronic ulcer of right heel (HCC)    Multifocal pneumonia    Aortic valve stenosis    Esophageal dilatation    Aspiration pneumonia of both lower lobes due to gastric secretions ([address]    Falls frequently    Chronic respiratory failure (HCC)    Contusion of right knee    Closed fracture of right distal radius    Subdural hemorrhage (HCC)    Subarachnoid hemorrhage (HCC)    Traumatic hemorrhage of cerebrum (HCC)    Chronic bilateral low back pain    Cellulitis of both feet    Acute on chronic congestive heart failure (HCC)    Bilateral leg edema    Cellulitis    Lung mass    Malignant neoplasm of upper lobe of right lung (HCC)    Urinary tract infectious disease    Closed fracture of one rib of right side    Degenerative disc disease, lumbar    Moderate malnutrition (HCC)    Rhabdomyolysis       PLAN:    1. Patient responding to increase Lopressor heart rate PERRL, conjunctiva normal today. 2. Encouraged to get out of bed and that will help moving the lungs secretions. 3. Incentive spirometer will be ordered      Please see orders. Discussed with patient and family and nursing.     Ruth Ingram MD, MD

## 2022-03-22 NOTE — PROGRESS NOTES
chloride 50 mL/hr at 22 1448    sodium chloride Stopped (22 0739)     PRN Meds:  potassium chloride, 40 mEq, PRN   Or  potassium alternative oral replacement, 40 mEq, PRN   Or  potassium chloride, 10 mEq, PRN  glucose, 15 g, PRN  glucagon (rDNA), 1 mg, PRN  dextrose, 100 mL/hr, PRN  dextrose bolus (hypoglycemia), 125 mL, PRN   Or  dextrose bolus (hypoglycemia), 250 mL, PRN  albuterol sulfate HFA, 2 puff, Q4H PRN  metoprolol, 2.5 mg, Q6H PRN  oxyCODONE-acetaminophen, 1 tablet, Q4H PRN  traZODone, 300 mg, Nightly PRN  clonazePAM, 1 mg, TID PRN  sodium chloride flush, 5-40 mL, PRN  sodium chloride, 25 mL, PRN  ondansetron, 4 mg, Q8H PRN   Or  ondansetron, 4 mg, Q6H PRN  acetaminophen, 650 mg, Q6H PRN   Or  acetaminophen, 650 mg, Q6H PRN            Subjective:     Patient seen and examined at bedside. No overnight events. No acute complaints today. Afebrile  Pt. Denies any CP, SOB, palpitation, HA, dizziness, chills, cough, cold, changes in urination, BM or skin changes or any pain. ROS:  A 10 point system reviewed and negative otherwise mentioned above. Physical Examination:      Vitals:  BP (!) 107/58   Pulse 83   Temp 97.9 °F (36.6 °C) (Oral)   Resp 18   Ht 5' 8\" (1.727 m)   Wt 158 lb (71.7 kg)   SpO2 97%   BMI 24.02 kg/m²   Temp (24hrs), Av.2 °F (36.8 °C), Min:97.9 °F (36.6 °C), Max:98.4 °F (36.9 °C)    Weight:   Wt Readings from Last 3 Encounters:   22 158 lb (71.7 kg)   22 181 lb 14.4 oz (82.5 kg)   21 186 lb 3.2 oz (84.5 kg)     I/O last 3 completed shifts:  I/O last 3 completed shifts:   In: 811.1 [I.V.:811.1]  Out: 1500 [Urine:1500]     Recent Labs     22  1638 22  2054 22  0616 22  1145   POCGLU 136* 149* 134* 90         General appearance - alert, well appearing, and in no acute distress  Mental status - oriented to person, place, and time with normal affect  Head - normocephalic and atraumatic  Eyes - pupils equal and reactive, extraocular eye movements intact, conjunctiva clear  Ears - hearing appears to be intact  Nose - no drainage noted  Mouth - mucous membranes moist  Neck - supple, no carotid bruits, thyroid not palpable  Chest - clear to auscultation, normal effort  Heart - normal rate, regular rhythm, no murmur  Abdomen - soft, nontender, nondistended, bowel sounds present all four quadrants, no masses, hepatomegaly or splenomegaly  Neurological - normal speech, no focal findings or movement disorder noted, cranial nerves II through XII grossly intact  Extremities - peripheral pulses palpable, no pedal edema or calf pain with palpation  Skin -left face necrotic lesion        Medications: Allergies:    Allergies   Allergen Reactions    Codeine      \"feeling of heavy on chest\"    Dye [Iodides]      Hallucination,vomiting       Current Meds:   Current Facility-Administered Medications:     potassium chloride (KLOR-CON M) extended release tablet 40 mEq, 40 mEq, Oral, PRN, 40 mEq at 03/21/22 0942 **OR** potassium bicarb-citric acid (EFFER-K) effervescent tablet 40 mEq, 40 mEq, Oral, PRN **OR** potassium chloride 10 mEq/100 mL IVPB (Peripheral Line), 10 mEq, IntraVENous, PRN, Serena Reyes MD    metoprolol tartrate (LOPRESSOR) tablet 50 mg, 50 mg, Oral, BID, Tanya Ramirez MD, 50 mg at 03/22/22 0931    tamsulosin (FLOMAX) capsule 0.4 mg, 0.4 mg, Oral, Daily, Serena Reyes MD, 0.4 mg at 03/22/22 0931    piperacillin-tazobactam (ZOSYN) 3,375 mg in dextrose 5 % 50 mL IVPB extended infusion (mini-bag), 3,375 mg, IntraVENous, Q8H, Gerald Franz MD, Stopped at 03/22/22 1331    gabapentin (NEURONTIN) capsule 800 mg, 800 mg, Oral, BID, Whitney Becerril MD, 800 mg at 03/22/22 0932    melatonin tablet 10.5 mg, 10.5 mg, Oral, Nightly, Whitney Becerril MD, 10.5 mg at 03/21/22 2311    metFORMIN (GLUCOPHAGE) tablet 500 mg, 500 mg, Oral, BID WC, Whitney Becerril MD, 500 mg at 03/22/22 0932    traZODone (DESYREL) tablet 300 mg, 300 mg, Oral, Nightly, Whitney Becerril MD, 300 mg at 03/21/22 2312    glipiZIDE (GLUCOTROL) tablet 2.5 mg, 2.5 mg, Oral, QAM AC, Whitney Becerril MD, 2.5 mg at 03/22/22 0740    glucose (GLUTOSE) 40 % oral gel 15 g, 15 g, Oral, PRN, Whitney Becerril MD    glucagon (rDNA) injection 1 mg, 1 mg, IntraMUSCular, PRN, Whitney Becerril MD    dextrose 5 % solution, 100 mL/hr, IntraVENous, PRN, Whitney Becerril MD    0.9 % sodium chloride infusion, , IntraVENous, Continuous, Whitney Becerril MD, Last Rate: 50 mL/hr at 03/22/22 1448, Rate Verify at 03/22/22 1448    dextrose bolus (hypoglycemia) 10% 125 mL, 125 mL, IntraVENous, PRN **OR** dextrose bolus (hypoglycemia) 10% 250 mL, 250 mL, IntraVENous, PRN, Whitney Becerril MD    albuterol sulfate  (90 Base) MCG/ACT inhaler 2 puff, 2 puff, Inhalation, Q4H PRN, Whitney Becerril MD    metoprolol (LOPRESSOR) injection 2.5 mg, 2.5 mg, IntraVENous, Q6H PRN, Whitney Becerril MD, 2.5 mg at 03/19/22 1615    oxyCODONE-acetaminophen (PERCOCET)  MG per tablet 1 tablet, 1 tablet, Oral, Q4H PRN, Whitney Becerril MD, 1 tablet at 03/22/22 1514    neomycin-bacitracin-polymyxin (NEOSPORIN) ointment, , Topical, BID, Whitney Becerril MD, Given at 03/22/22 0934    traZODone (DESYREL) tablet 300 mg, 300 mg, Oral, Nightly PRN, Whitney Becerril MD, 300 mg at 03/19/22 2319    ARIPiprazole (ABILIFY) tablet 5 mg, 5 mg, Oral, Daily, Whitney Becerril MD, 5 mg at 03/22/22 0931    aspirin EC tablet 81 mg, 81 mg, Oral, Daily, Whitney Becerril MD, 81 mg at 03/22/22 0931    clonazePAM (KLONOPIN) tablet 1 mg, 1 mg, Oral, TID PRN, Whitney Becerril MD, 1 mg at 03/22/22 0749    pantoprazole (PROTONIX) tablet 20 mg, 20 mg, Oral, QAM AC, Whitney Becerril MD, 20 mg at 03/22/22 0740    budesonide-formoterol (SYMBICORT) 160-4.5 MCG/ACT inhaler 2 puff, 2 puff, Inhalation, BID, Whitney Becerril MD, 2 puff at 03/21/22 1019    venlafaxine (EFFEXOR XR) extended release capsule 37.5 mg, 37.5 mg, Oral, BID, Whitney Becerril MD, 37.5 mg at 03/22/22 1137    sodium chloride flush 0.9 % injection 5-40 mL, 5-40 mL, IntraVENous, 2 times per day, Monetta Lesches, MD, 10 mL at 03/19/22 0913    sodium chloride flush 0.9 % injection 5-40 mL, 5-40 mL, IntraVENous, PRN, Whitney Becerril MD    0.9 % sodium chloride infusion, 25 mL, IntraVENous, PRN, Whitney Becerril MD, Paused at 03/22/22 0739    ondansetron (ZOFRAN-ODT) disintegrating tablet 4 mg, 4 mg, Oral, Q8H PRN **OR** ondansetron (ZOFRAN) injection 4 mg, 4 mg, IntraVENous, Q6H PRN, Whitney Becerril MD    acetaminophen (TYLENOL) tablet 650 mg, 650 mg, Oral, Q6H PRN **OR** acetaminophen (TYLENOL) suppository 650 mg, 650 mg, Rectal, Q6H PRN, Whitney Becerril MD    heparin (porcine) injection 5,000 Units, 5,000 Units, SubCUTAneous, 3 times per day, Monetta Lesches, MD      I/O (24Hr): Intake/Output Summary (Last 24 hours) at 3/22/2022 1559  Last data filed at 3/22/2022 1448  Gross per 24 hour   Intake 1704.15 ml   Output 900 ml   Net 804.15 ml       Data:           Labs:    Hematology:  Recent Labs     03/20/22  0623 03/21/22  0618 03/22/22  0617   WBC 7.3 7.4 6.8   RBC 3.96 4.07 3.55*   HGB 10.7* 11.1* 9.6*   HCT 33.9* 35.4* 31.9*   MCV 85.6 87.0 89.9   MCH 27.0 27.3 27.0   MCHC 31.6 31.4 30.1   RDW 13.4 13.5 13.7    251 221   MPV 8.3 8.7 8.9     Chemistry:  Recent Labs     03/20/22  0623 03/20/22  0623 03/21/22  0618 03/21/22 2125 03/22/22  0617     --  141  --  137   K 3.2*   < > 3.3* 4.4 4.1   CL 98  --  101  --  101   CO2 27  --  29  --  25   GLUCOSE 166*  --  149*  --  149*   BUN 8  --  10  --  13   CREATININE 0.60  --  0.75  --  0.79   MG  --   --   --  1.6  --    ANIONGAP 11  --  11  --  11   LABGLOM >60  --  >60  --  >60   GFRAA >60  --  >60  --  >60   CALCIUM 8.8  --  9.0  --  8.6   CKTOTAL 2,294*  --  731*  --  311*   MYOGLOBIN 116*  --  77*  --  50    < > = values in this interval not displayed.      Recent Labs     03/21/22  9654 03/21/22  1223 03/21/22  1638 03/21/22  4590 03/22/22  0616 03/22/22  1145   POCGLU 145* 188* 136* 149* 134* 90       Lab Results   Component Value Date/Time    SPECIAL NOT REPORTED 11/24/2021 06:32 PM     Lab Results   Component Value Date/Time    CULTURE NO GROWTH 6 DAYS 11/24/2021 06:32 PM       Lab Results   Component Value Date    POCPH 7.36 04/12/2017    PHART 7.42 08/26/2012    PH 7.22 04/11/2017    POCPCO2 45 04/12/2017    EQS3UHZ 41 08/26/2012    PCO2 54 04/11/2017    POCPO2 93 04/12/2017    PO2ART 59 08/26/2012    PO2 89 04/11/2017    POCHCO3 25.3 04/12/2017    CEY2EBS 26.1 08/26/2012    HCO3 22.2 04/11/2017    NBEA NOT REPORTED 04/12/2017    PBEA 0 04/12/2017    GFH0JSM 27 04/12/2017    UPPQ6IKH 97 04/12/2017    P2VBGZVA 92.1 08/26/2012    O2SAT 95 04/11/2017    FIO2 UNKNOWN 10/31/2017       Radiology:    XR CHEST PORTABLE    Result Date: 3/20/2022  New electronic device left pulmonary apex. Otherwise stable chest.     XR CHEST PORTABLE    Result Date: 3/18/2022  No acute process. All radiological studies reviewed  Code Status:  Full Code        Electronically signed by Teri Wheatley MD on 3/22/2022 at 3:59 PM    This note was created with the assistance of a speech-recognition program.  Although the intention is to generate a document that actually reflects the content of the visit, no guarantees can be provided that every mistake has been identified and corrected by editing. Note was updated later by me after  physical examination and  completion of the assessment.

## 2022-03-22 NOTE — PROGRESS NOTES
Pt unable to void; 700 ml urine per bladder scan. Placed quach catheter as ordered. Pt tolerated well. Clear yellow urine obtained; cloudy urine now draining at this time - sent UA to lab. Page sent to [facility] NP to obtain urine culture. Pt resting in bed; bed alarm on for safety. Spoke to [employer] NP - updated on pt's cloudy urine and tachycardia; new orders received for urine culture and one time Potassium and Mg lab work.

## 2022-03-23 LAB
ABSOLUTE EOS #: 0.12 K/UL (ref 0–0.44)
ABSOLUTE IMMATURE GRANULOCYTE: 0.13 K/UL (ref 0–0.3)
ABSOLUTE LYMPH #: 0.86 K/UL (ref 1.1–3.7)
ABSOLUTE MONO #: 0.4 K/UL (ref 0.1–1.2)
ANION GAP SERPL CALCULATED.3IONS-SCNC: 9 MMOL/L (ref 9–17)
BASOPHILS # BLD: 1 % (ref 0–2)
BASOPHILS ABSOLUTE: 0.04 K/UL (ref 0–0.2)
BUN BLDV-MCNC: 13 MG/DL (ref 8–23)
BUN/CREAT BLD: 17 (ref 9–20)
CALCIUM SERPL-MCNC: 8.8 MG/DL (ref 8.6–10.4)
CHLORIDE BLD-SCNC: 101 MMOL/L (ref 98–107)
CO2: 30 MMOL/L (ref 20–31)
CREAT SERPL-MCNC: 0.75 MG/DL (ref 0.5–0.9)
EOSINOPHILS RELATIVE PERCENT: 2 % (ref 1–4)
GFR AFRICAN AMERICAN: >60 ML/MIN
GFR NON-AFRICAN AMERICAN: >60 ML/MIN
GFR SERPL CREATININE-BSD FRML MDRD: ABNORMAL ML/MIN/{1.73_M2}
GLUCOSE BLD-MCNC: 121 MG/DL (ref 65–105)
GLUCOSE BLD-MCNC: 130 MG/DL (ref 70–99)
GLUCOSE BLD-MCNC: 160 MG/DL (ref 65–105)
GLUCOSE BLD-MCNC: 77 MG/DL (ref 65–105)
HCT VFR BLD CALC: 32.4 % (ref 36.3–47.1)
HEMOGLOBIN: 9.7 G/DL (ref 11.9–15.1)
IMMATURE GRANULOCYTES: 2 %
LYMPHOCYTES # BLD: 13 % (ref 24–43)
MCH RBC QN AUTO: 27.1 PG (ref 25.2–33.5)
MCHC RBC AUTO-ENTMCNC: 29.9 G/DL (ref 28.4–34.8)
MCV RBC AUTO: 90.5 FL (ref 82.6–102.9)
MONOCYTES # BLD: 6 % (ref 3–12)
MYOGLOBIN: 40 NG/ML (ref 25–58)
NRBC AUTOMATED: 0 PER 100 WBC
PDW BLD-RTO: 13.7 % (ref 11.8–14.4)
PLATELET # BLD: 218 K/UL (ref 138–453)
PMV BLD AUTO: 8.8 FL (ref 8.1–13.5)
POTASSIUM SERPL-SCNC: 4.2 MMOL/L (ref 3.7–5.3)
RBC # BLD: 3.58 M/UL (ref 3.95–5.11)
SEG NEUTROPHILS: 76 % (ref 36–65)
SEGMENTED NEUTROPHILS ABSOLUTE COUNT: 5.01 K/UL (ref 1.5–8.1)
SODIUM BLD-SCNC: 140 MMOL/L (ref 135–144)
TOTAL CK: 127 U/L (ref 26–192)
WBC # BLD: 6.6 K/UL (ref 3.5–11.3)

## 2022-03-23 PROCEDURE — 6370000000 HC RX 637 (ALT 250 FOR IP): Performed by: FAMILY MEDICINE

## 2022-03-23 PROCEDURE — 1200000000 HC SEMI PRIVATE

## 2022-03-23 PROCEDURE — 2700000000 HC OXYGEN THERAPY PER DAY

## 2022-03-23 PROCEDURE — 85025 COMPLETE CBC W/AUTO DIFF WBC: CPT

## 2022-03-23 PROCEDURE — 94761 N-INVAS EAR/PLS OXIMETRY MLT: CPT

## 2022-03-23 PROCEDURE — 82550 ASSAY OF CK (CPK): CPT

## 2022-03-23 PROCEDURE — 99232 SBSQ HOSP IP/OBS MODERATE 35: CPT | Performed by: NURSE PRACTITIONER

## 2022-03-23 PROCEDURE — 2580000003 HC RX 258: Performed by: FAMILY MEDICINE

## 2022-03-23 PROCEDURE — 6370000000 HC RX 637 (ALT 250 FOR IP): Performed by: HOSPITALIST

## 2022-03-23 PROCEDURE — 82947 ASSAY GLUCOSE BLOOD QUANT: CPT

## 2022-03-23 PROCEDURE — 6360000002 HC RX W HCPCS: Performed by: INTERNAL MEDICINE

## 2022-03-23 PROCEDURE — 36415 COLL VENOUS BLD VENIPUNCTURE: CPT

## 2022-03-23 PROCEDURE — 6370000000 HC RX 637 (ALT 250 FOR IP): Performed by: INTERNAL MEDICINE

## 2022-03-23 PROCEDURE — 2580000003 HC RX 258: Performed by: INTERNAL MEDICINE

## 2022-03-23 PROCEDURE — 80048 BASIC METABOLIC PNL TOTAL CA: CPT

## 2022-03-23 PROCEDURE — 83874 ASSAY OF MYOGLOBIN: CPT

## 2022-03-23 PROCEDURE — 94640 AIRWAY INHALATION TREATMENT: CPT

## 2022-03-23 RX ADMIN — ARIPIPRAZOLE 5 MG: 5 TABLET ORAL at 08:24

## 2022-03-23 RX ADMIN — PANTOPRAZOLE SODIUM 20 MG: 20 TABLET, DELAYED RELEASE ORAL at 08:23

## 2022-03-23 RX ADMIN — VENLAFAXINE HYDROCHLORIDE 37.5 MG: 37.5 CAPSULE, EXTENDED RELEASE ORAL at 21:41

## 2022-03-23 RX ADMIN — METFORMIN HYDROCHLORIDE 500 MG: 500 TABLET ORAL at 16:41

## 2022-03-23 RX ADMIN — GLIPIZIDE 2.5 MG: 5 TABLET ORAL at 08:23

## 2022-03-23 RX ADMIN — SODIUM CHLORIDE, PRESERVATIVE FREE 10 ML: 5 INJECTION INTRAVENOUS at 21:41

## 2022-03-23 RX ADMIN — CLONAZEPAM 1 MG: 0.5 TABLET ORAL at 21:41

## 2022-03-23 RX ADMIN — TRAZODONE HYDROCHLORIDE 300 MG: 100 TABLET ORAL at 21:41

## 2022-03-23 RX ADMIN — SODIUM CHLORIDE: 9 INJECTION, SOLUTION INTRAVENOUS at 08:29

## 2022-03-23 RX ADMIN — ASPIRIN 81 MG: 81 TABLET, COATED ORAL at 08:24

## 2022-03-23 RX ADMIN — METOPROLOL TARTRATE 50 MG: 50 TABLET, FILM COATED ORAL at 21:41

## 2022-03-23 RX ADMIN — POLYMYXIN B SULFATE, BACITRACIN ZINC, NEOMYCIN SULFATE: 5000; 3.5; 4 OINTMENT TOPICAL at 08:25

## 2022-03-23 RX ADMIN — GABAPENTIN 800 MG: 400 CAPSULE ORAL at 21:40

## 2022-03-23 RX ADMIN — BUDESONIDE AND FORMOTEROL FUMARATE DIHYDRATE 2 PUFF: 160; 4.5 AEROSOL RESPIRATORY (INHALATION) at 07:41

## 2022-03-23 RX ADMIN — VENLAFAXINE HYDROCHLORIDE 37.5 MG: 37.5 CAPSULE, EXTENDED RELEASE ORAL at 08:24

## 2022-03-23 RX ADMIN — GABAPENTIN 800 MG: 400 CAPSULE ORAL at 09:14

## 2022-03-23 RX ADMIN — BUDESONIDE AND FORMOTEROL FUMARATE DIHYDRATE 2 PUFF: 160; 4.5 AEROSOL RESPIRATORY (INHALATION) at 20:33

## 2022-03-23 RX ADMIN — PIPERACILLIN AND TAZOBACTAM 3375 MG: 3; .375 INJECTION, POWDER, LYOPHILIZED, FOR SOLUTION INTRAVENOUS at 01:45

## 2022-03-23 RX ADMIN — METFORMIN HYDROCHLORIDE 500 MG: 500 TABLET ORAL at 08:23

## 2022-03-23 RX ADMIN — Medication 10.5 MG: at 21:40

## 2022-03-23 RX ADMIN — METOPROLOL TARTRATE 50 MG: 50 TABLET, FILM COATED ORAL at 08:24

## 2022-03-23 RX ADMIN — TAMSULOSIN HYDROCHLORIDE 0.4 MG: 0.4 CAPSULE ORAL at 08:23

## 2022-03-23 RX ADMIN — OXYCODONE AND ACETAMINOPHEN 1 TABLET: 325; 10 TABLET ORAL at 10:48

## 2022-03-23 RX ADMIN — OXYCODONE AND ACETAMINOPHEN 1 TABLET: 325; 10 TABLET ORAL at 18:58

## 2022-03-23 ASSESSMENT — PAIN DESCRIPTION - PAIN TYPE
TYPE: CHRONIC PAIN

## 2022-03-23 ASSESSMENT — PAIN DESCRIPTION - LOCATION
LOCATION: GENERALIZED
LOCATION: BACK;CHEST
LOCATION: GENERALIZED

## 2022-03-23 ASSESSMENT — PAIN SCALES - GENERAL
PAINLEVEL_OUTOF10: 0
PAINLEVEL_OUTOF10: 10
PAINLEVEL_OUTOF10: 8
PAINLEVEL_OUTOF10: 9
PAINLEVEL_OUTOF10: 10
PAINLEVEL_OUTOF10: 0

## 2022-03-23 ASSESSMENT — ENCOUNTER SYMPTOMS
GASTROINTESTINAL NEGATIVE: 1
RESPIRATORY NEGATIVE: 1

## 2022-03-23 ASSESSMENT — PAIN DESCRIPTION - ORIENTATION: ORIENTATION: LOWER;MID

## 2022-03-23 ASSESSMENT — PAIN DESCRIPTION - PROGRESSION
CLINICAL_PROGRESSION: GRADUALLY WORSENING
CLINICAL_PROGRESSION: NOT CHANGED

## 2022-03-23 ASSESSMENT — PAIN DESCRIPTION - DESCRIPTORS
DESCRIPTORS: ACHING
DESCRIPTORS: DISCOMFORT

## 2022-03-23 ASSESSMENT — PAIN DESCRIPTION - ONSET
ONSET: ON-GOING
ONSET: ON-GOING

## 2022-03-23 ASSESSMENT — PAIN DESCRIPTION - FREQUENCY
FREQUENCY: CONTINUOUS
FREQUENCY: CONTINUOUS

## 2022-03-23 NOTE — PLAN OF CARE
Problem: Skin Integrity:  Goal: Will show no infection signs and symptoms  Description: Will show no infection signs and symptoms  3/23/2022 0937 by Alisson Strickland RN  Outcome: Ongoing     Problem: Skin Integrity:  Goal: Absence of new skin breakdown  Description: Absence of new skin breakdown  3/23/2022 0937 by Alisson Strickland RN  Outcome: Ongoing     Problem: Skin Integrity:  Goal: Risk for impaired skin integrity will decrease  Description: Risk for impaired skin integrity will decrease  3/23/2022 0937 by Alisson Strickland RN  Outcome: Ongoing     Problem: Falls - Risk of:  Goal: Will remain free from falls  Description: Will remain free from falls  3/23/2022 0937 by Alisson Strickland RN  Outcome: Ongoing     Problem: Pain:  Goal: Control of acute pain  Description: Control of acute pain  3/23/2022 0937 by Alisson Strickland RN  Outcome: Ongoing     Problem: Pain:  Goal: Control of chronic pain  Description: Control of chronic pain  3/23/2022 0937 by Alisson Strickland RN  Outcome: Ongoing     Problem: Discharge Planning:  Goal: Discharged to appropriate level of care  Description: Discharged to appropriate level of care  3/23/2022 0937 by Alisson Strickland RN  Outcome: Ongoing     Problem:  Activity Intolerance:  Goal: Ability to tolerate increased activity will improve  Description: Ability to tolerate increased activity will improve  3/23/2022 0937 by Alisson Strickland RN  Outcome: Ongoing     Problem: Airway Clearance - Ineffective:  Goal: Ability to maintain a clear airway will improve  Description: Ability to maintain a clear airway will improve  3/23/2022 0937 by Alisson Strickland RN  Outcome: Ongoing     Problem: Breathing Pattern - Ineffective:  Goal: Ability to achieve and maintain a regular respiratory rate will improve  Description: Ability to achieve and maintain a regular respiratory rate will improve  3/23/2022 0937 by Alisson Strickland RN  Outcome: Ongoing     Problem: Gas Exchange - Impaired:  Goal: Levels of oxygenation will improve  Description: Levels of oxygenation will improve  3/23/2022 0937 by Edu Castro RN  Outcome: Ongoing     Problem:  Activity:  Goal: Risk for activity intolerance will decrease  Description: Risk for activity intolerance will decrease  3/23/2022 0937 by Edu Castro RN  Outcome: Ongoing     Problem: Coping:  Goal: Ability to adjust to condition or change in health will improve  Description: Ability to adjust to condition or change in health will improve  3/23/2022 0937 by Edu Castro RN  Outcome: Ongoing     Problem: Fluid Volume:  Goal: Ability to maintain a balanced intake and output will improve  Description: Ability to maintain a balanced intake and output will improve  3/23/2022 0937 by Edu Castro RN  Outcome: Ongoing     Problem: Health Behavior:  Goal: Ability to identify and utilize available resources and services will improve  Description: Ability to identify and utilize available resources and services will improve  3/23/2022 0937 by Edu Castro RN  Outcome: Ongoing     Problem: Health Behavior:  Goal: Ability to manage health-related needs will improve  Description: Ability to manage health-related needs will improve  3/23/2022 0937 by Edu Castro RN  Outcome: Ongoing     Problem: Metabolic:  Goal: Ability to maintain appropriate glucose levels will improve  Description: Ability to maintain appropriate glucose levels will improve  3/23/2022 0937 by Edu Castro RN  Outcome: Ongoing     Problem: Nutritional:  Goal: Maintenance of adequate nutrition will improve  Description: Maintenance of adequate nutrition will improve  3/23/2022 0937 by Edu Castro RN  Outcome: Ongoing     Problem: Nutritional:  Goal: Progress toward achieving an optimal weight will improve  Description: Progress toward achieving an optimal weight will improve  3/23/2022 0937 by Edu Castro RN  Outcome: Ongoing     Problem: Physical Regulation:  Goal: Complications related to the disease process, condition or treatment will be avoided or minimized  Description: Complications related to the disease process, condition or treatment will be avoided or minimized  3/23/2022 0937 by Ze Shrestha RN  Outcome: Ongoing     Problem: Physical Regulation:  Goal: Diagnostic test results will improve  Description: Diagnostic test results will improve  3/23/2022 0937 by Ze Shrestha RN  Outcome: Ongoing     Problem: Tissue Perfusion:  Goal: Adequacy of tissue perfusion will improve  Description: Adequacy of tissue perfusion will improve  3/23/2022 0937 by Ze Shrestha RN  Outcome: Ongoing

## 2022-03-23 NOTE — PROGRESS NOTES
[facility]  Progress Note    Magali Hager  MRN: [id number]  Date: 3/23/2022  LOS: 5     Reason for F/U :   Fall/abrasion    Impression :   1. Multiple falls and was found down at home  2. Rhabdomyolysis improved with IV hydration  3. Left-sided facial abrasion-limited to skin and subcutaneous area with no deep involvement and the surrounding erythema is not secondary to infection but rather inflammation  4. Diabetes mellitus type 2  5. Essential hypertension  6. Lung cancer    Recommendations:   · Patient did receive Zosyn, I did stop this  · Patient needs topical wound care  · Patient will need outpatient treatment with wound care center for close follow-up  · Continue supportive care, okay for discharge from infectious disease service when okay with other services    Infection Control Recommendations:   Universal precautions    Discharge Planning:   Estimated Length of IV antimicrobials:   Patient will need Midline Catheter Insertion/ PICC line Insertion: No  Patient will need: Home IV , [place],  SNF,  LTAC: Undetermined  Patient willneed outpatient wound care: No    Medical Decision making / Summary of Stay:   Magali Hager is a 71y.o.-year-old female who was initially admitted on 3/18/2022. [facility] has multiple medical problems including diabetes mellitus type 2, COPD, right upper lobe cancer, coronary artery disease, tobacco abuse, depression among other medical problems. The patient does have a history of multiple falls and came into the emergency room with complaints of generalized malaise/weakness and has had several falls unable to get up from the ground and the patient was found at home laying down and possibly for several hours. Patient was brought in for evaluation in the emergency room and was found to have an elevated proBNP, CK at 5990, and myoglobin at 2626. The patient was admitted for dehydration, rhabdomyolysis and was treated with IV fluids.   The patient was seen by the cardiology service as she was initially tachycardic and was also noted to have a left-sided facial lesion. The patient was started on Zosyn and I was asked to evaluate and help with antibiotic choice. Current evaluation:3/23/2022    BP (!) 149/71   Pulse 87   Temp 98.5 °F (36.9 °C) (Oral)   Resp 18   Ht 5' 8\" (1.727 m)   Wt 160 lb 0.6 oz (72.6 kg)   SpO2 98%   BMI 24.33 kg/m²     Temperature Range: Temp: 98.5 °F (36.9 °C) Temp  Av °F (36.7 °C)  Min: 97.8 °F (36.6 °C)  Max: 98.5 °F (36.9 °C)     Patient was seen and evaluated sitting up in the chair  She denies complaints aside from not liking the way her wound is dressed  No fevers or chills    Review of Systems   Constitutional: Negative. HENT: Negative. Respiratory: Negative. Cardiovascular: Negative. Gastrointestinal: Negative. Genitourinary: Negative. Musculoskeletal: Negative. Skin: Negative. Neurological: Negative. Psychiatric/Behavioral: Negative. Physical Examination :     Physical Exam  Constitutional:       Appearance: Normal appearance. She is normal weight. HENT:      Head: Normocephalic. Comments: Left-sided abrasion with slough over the wound bed and mild surrounding erythema  No drainage  Cardiovascular:      Rate and Rhythm: Normal rate and regular rhythm. Abdominal:      General: Abdomen is flat. Bowel sounds are normal.      Palpations: Abdomen is soft. Musculoskeletal:         General: Normal range of motion. Skin:     General: Skin is warm and dry. Neurological:      General: No focal deficit present. Mental Status: She is alert and oriented to person, place, and time. Mental status is at baseline. Psychiatric:         Mood and Affect: Mood normal.         Behavior: Behavior normal.         Thought Content:  Thought content normal.         Laboratory data:   I have independently reviewed the followinglabs:  CBC with Differential:   Recent Labs 03/22/22  0617 03/23/22  0636   WBC 6.8 6.6   HGB 9.6* 9.7*   HCT 31.9* 32.4*    218   LYMPHOPCT 14* 13*   MONOPCT 6 6     BMP:   Recent Labs     03/21/22  0618 03/21/22 2125 03/22/22  0617 03/23/22  0636   NA   < >  --  137 140   K   < > 4.4 4.1 4.2   CL   < >  --  101 101   CO2   < >  --  25 30   BUN   < >  --  13 13   CREATININE   < >  --  0.79 0.75   MG  --  1.6  --   --     < > = values in this interval not displayed. Hepatic Function Panel: No results for input(s): PROT, LABALBU, BILIDIR, IBILI, BILITOT, ALKPHOS, ALT, AST in the last 72 hours. Lab Results   Component Value Date    PROCAL 0.26 11/03/2020    PROCAL 0.06 09/01/2020     No results found for: CRP  No results found for: SEDRATE      Lab Results   Component Value Date    DDIMER 1.91 01/06/2021    DDIMER 4.51 09/25/2020    DDIMER 0.58 12/28/2018    DDIMER 0.45 12/23/2013     Lab Results   Component Value Date    FERRITIN 149 11/05/2020     No results found for: LDH  No results found for: FIBRINOGEN    No results found for requested labs within last 30 days. Lab Results   Component Value Date    COVID19 Not Detected 11/10/2020    COVID19 Not Detected 08/29/2020       No results for input(s): VANCOTROUGH in the last 72 hours.     Imaging Studies:   No new imaging    Cultures:   No cultures    Medications:      metoprolol tartrate  50 mg Oral BID    tamsulosin  0.4 mg Oral Daily    piperacillin-tazobactam  3,375 mg IntraVENous Q8H    gabapentin  800 mg Oral BID    melatonin  10.5 mg Oral Nightly    metFORMIN  500 mg Oral BID WC    traZODone  300 mg Oral Nightly    glipiZIDE  2.5 mg Oral QAM AC    neomycin-bacitracin-polymyxin   Topical BID    ARIPiprazole  5 mg Oral Daily    aspirin EC  81 mg Oral Daily    pantoprazole  20 mg Oral QAM AC    budesonide-formoterol  2 puff Inhalation BID    venlafaxine  37.5 mg Oral BID    sodium chloride flush  5-40 mL IntraVENous 2 times per day    heparin (porcine)  5,000 Units SubCUTAneous 3 times per day           [facility]  [place] Vo, [address] messaging  OFFICE: [phone]      Electronically signed by AV Warner CNP on 3/23/2022 at 8:42 AM  Thank you for allowing us to participate in the care of this patient. Please call with questions. This note iscreated with the assistance of a speech recognition program.  While intending to generate a document that actually reflects the content of the visit, the document can still have some errors including those of syntax andsound a like substitutions which may escape proof reading. In such instances, actual meaning can be extrapolated by contextual diversion.

## 2022-03-23 NOTE — PROGRESS NOTES
Occupational Therapy  DATE: 3/23/2022    NAME: Jessica Duke  MRN: [id number]   : 1950    Patient not seen this date for Occupational Therapy due to:      [] Cancel by RN or physician due to:    [] Hemodialysis    [] Critical Lab Value Level     [] Blood transfusion in progress    [] Acute or unstable cardiovascular status   _MAP < 55 or more than >115  _HR < 40 or > 130    [] Acute or unstable pulmonary status   -FiO2 > 60%   _RR < 5 or >40    _O2 sats < 85%    [] Strict Bedrest    [] Off Unit for surgery or procedure    [] Off Unit for testing       [] Pending imaging to R/O fracture    [x] Refusal by Patient- 3/23 pt refusing OT eval and stated we are all a bunch of BS!!!  Pt requested OT order DC as she doesn't want to be bothered anymore from therapists and RN and DC planner were both informed. DC OT order    [] Other      [] PT being discontinued at this time. Patient independent. No further needs. [] PT being discontinued at this time as the patient has been transferred to hospice care. No further needs.       Karyn Vale, OT

## 2022-03-23 NOTE — PROGRESS NOTES
Section of Cardiology  Progress Note      Date:  3/23/2022  Patient: Bobby Campos  Admission:  3/18/2022  1:59 PM  Admit DX: Rhabdomyolysis [M62.82]  Dehydration [E86.0]  Traumatic rhabdomyolysis, initial encounter ([facility] [[email or web address] Leroy Cunningham. [id number]]  Age:  71 y. o., 1950     LOS: 5 days     Reason for evaluation:   arrhythmia and valvular disease      SUBJECTIVE:     The patient was seen and examined. Notes and labs reviewed. There were not complications over night. Patient seen and examined in her room with her nurse at bedside. Her main complaint today is the chest pain and according to her it is her cancer pain? .  It is right and left side of her lower sternum. She still has some cough. Her nurse told me that she is not using her incentive spirometer but the patient told me the opposite. She was able to get up and go to the bathroom and sit in the chair. No dizziness. Patient's cardiac review of systems: No palpitation. The patient is generally feeling unchanged. OBJECTIVE:    Telemetry: Sinus  BP (!) 149/71   Pulse 87   Temp 98.5 °F (36.9 °C) (Oral)   Resp 18   Ht 5' 8\" (1.727 m)   Wt 160 lb 0.6 oz (72.6 kg)   SpO2 98%   BMI 24.33 kg/m²     Intake/Output Summary (Last 24 hours) at 3/23/2022 0901  Last data filed at 3/23/2022 0600  Gross per 24 hour   Intake 2352.21 ml   Output 1500 ml   Net 852.21 ml       EXAM:   CONSTITUTIONAL:  awake, alert, cooperative, no apparent distress, and appears stated age. HEENT: Normal jugular venous pulsations, no carotid bruits. Head is atraumatic, normocephalic. Eyes and oral mucosa are normal.  LUNGS: Good respiratory effort. No for increased work of breathing. On auscultation: Upper airway crackles  CARDIOVASCULAR:  Normal apical impulse, regular rate and rhythm, normal S1 and S2, no S3 or S4,   ABDOMEN: Soft, nontender, nondistended. Bowel sounds present. SKIN: Warm and dry. EXTREMITIES: No lower extremity edema. Motor movement grossly intact.   No cyanosis or clubbing. Her chest wall is tender  Current Inpatient Medications:   metoprolol tartrate  50 mg Oral BID    tamsulosin  0.4 mg Oral Daily    gabapentin  800 mg Oral BID    melatonin  10.5 mg Oral Nightly    metFORMIN  500 mg Oral BID WC    traZODone  300 mg Oral Nightly    glipiZIDE  2.5 mg Oral QAM AC    neomycin-bacitracin-polymyxin   Topical BID    ARIPiprazole  5 mg Oral Daily    aspirin EC  81 mg Oral Daily    pantoprazole  20 mg Oral QAM AC    budesonide-formoterol  2 puff Inhalation BID    venlafaxine  37.5 mg Oral BID    sodium chloride flush  5-40 mL IntraVENous 2 times per day    heparin (porcine)  5,000 Units SubCUTAneous 3 times per day       IV Infusions (if any):   dextrose      sodium chloride 50 mL/hr at 03/23/22 0829    sodium chloride Stopped (03/22/22 0739)       Diagnostics:   EKG: . ECHO: .   Ejection fraction: %  Stress Test: .  Cardiac Angiography: .    Labs:   CBC:   Recent Labs     03/22/22  0617 03/23/22  0636   WBC 6.8 6.6   HGB 9.6* 9.7*   HCT 31.9* 32.4*    218     BMP:   Recent Labs     03/22/22  0617 03/23/22  0636    140   K 4.1 4.2   CO2 25 30   BUN 13 13   CREATININE 0.79 0.75   LABGLOM >60 >60   GLUCOSE 149* 130*     Lab Results   Component Value Date    BNP 48 03/13/2014     PT/INR: No results for input(s): PROTIME, INR in the last 72 hours. APTT:No results for input(s): APTT in the last 72 hours. CARDIAC ENZYMES:  Recent Labs     03/21/22  0618 03/22/22 0617 03/23/22 0636   CKTOTAL 731* 311* 127     FASTING LIPID PANEL:No results found for: HDL, LDLDIRECT, LDLCALC, TRIG  LIVER PROFILE:No results for input(s): AST, ALT, LABALBU in the last 72 hours.     ASSESSMENT:  1.  Sinus tachycardia, secondary to other problem  2.  ABCs, isolated  3.  Dyslipidemia  4.  Rhabdomyolysis, improving  5.  Diabetes mellitus  6.  History of lung cancer  7.  Remote history of traumatic subarachnoid bleed  8.  moderate aortic stenosis  9.  History of lung cancer  8.  Moderate CAD from 2016 involving the circumflex coronary artery     Patient Active Problem List   Diagnosis    Valvular heart disease    Type 2 diabetes mellitus without complication, without long-term current use of insulin ([address]    Open wound of right ankle    COPD (chronic obstructive pulmonary disease) (HCC)    Syncope and collapse    Chronic ulcer of right heel ([address]    Multifocal pneumonia    Aortic valve stenosis    Esophageal dilatation    Aspiration pneumonia of both lower lobes due to gastric secretions ([address]    Falls frequently    Chronic respiratory failure (HCC)    Contusion of right knee    Closed fracture of right distal radius    Subdural hemorrhage (HCC)    Subarachnoid hemorrhage (HCC)    Traumatic hemorrhage of cerebrum (HCC)    Chronic bilateral low back pain    Cellulitis of both feet    Acute on chronic congestive heart failure (HCC)    Bilateral leg edema    Cellulitis    Lung mass    Malignant neoplasm of upper lobe of right lung (HCC)    Urinary tract infectious disease    Closed fracture of one rib of right side    Degenerative disc disease, lumbar    Moderate malnutrition (HCC)    Rhabdomyolysis       PLAN:    1. DC IV fluid  2. Ambulate as tolerated  3. Emphasized on the use of her incentive spirometer  4. Discharge planning      Please see orders. Discussed with  and nursing.     Akash Ugalde MD, MD

## 2022-03-23 NOTE — PLAN OF CARE
Problem: Skin Integrity:  Goal: Will show no infection signs and symptoms  Description: Will show no infection signs and symptoms  Outcome: Ongoing  Goal: Absence of new skin breakdown  Description: Absence of new skin breakdown  3/23/2022 0238 by Sadia Whitley RN  Outcome: Ongoing  3/22/2022 1501 by Karime Jenkins RN  Outcome: Ongoing  Note: Ongoing  Goal: Risk for impaired skin integrity will decrease  Description: Risk for impaired skin integrity will decrease  Outcome: Ongoing     Problem: Falls - Risk of:  Goal: Will remain free from falls  Description: Will remain free from falls  3/23/2022 0238 by Sadia Whitley RN  Outcome: Ongoing  3/22/2022 1501 by Karime Jenkins RN  Outcome: Ongoing  Note: Ongoing  Goal: Absence of physical injury  Description: Absence of physical injury  Outcome: Ongoing     Problem: Pain:  Goal: Pain level will decrease  Description: Pain level will decrease  Outcome: Ongoing  Goal: Control of acute pain  Description: Control of acute pain  3/23/2022 0238 by Sadia Whitley RN  Outcome: Ongoing  3/22/2022 1501 by Karime Jenkins RN  Outcome: Ongoing  Note: Ongoing  Goal: Control of chronic pain  Description: Control of chronic pain  Outcome: Ongoing     Problem: Discharge Planning:  Goal: Discharged to appropriate level of care  Description: Discharged to appropriate level of care  Outcome: Ongoing     Problem:  Activity Intolerance:  Goal: Ability to tolerate increased activity will improve  Description: Ability to tolerate increased activity will improve  3/23/2022 0238 by Sadia Whitley RN  Outcome: Ongoing  3/22/2022 1501 by Karime Jenkins RN  Outcome: Ongoing  Note: Ongoing     Problem: Airway Clearance - Ineffective:  Goal: Ability to maintain a clear airway will improve  Description: Ability to maintain a clear airway will improve  3/23/2022 0238 by Sadia Whitley RN  Outcome: Ongoing  3/22/2022 1501 by Karime Jenkins RN  Outcome: Ongoing  Note: Ongoing     Problem: Breathing Pattern - Ineffective:  Goal: Ability to achieve and maintain a regular respiratory rate will improve  Description: Ability to achieve and maintain a regular respiratory rate will improve  3/23/2022 0238 by Stephenie Ace RN  Outcome: Ongoing  3/22/2022 1501 by Wade Conley RN  Outcome: Ongoing  Note: Ongoing     Problem: Gas Exchange - Impaired:  Goal: Levels of oxygenation will improve  Description: Levels of oxygenation will improve  3/23/2022 0238 by Stephenie Ace RN  Outcome: Ongoing  3/22/2022 1501 by Wade Conley RN  Outcome: Ongoing  Note: Ongoing     Problem:  Activity:  Goal: Risk for activity intolerance will decrease  Description: Risk for activity intolerance will decrease  Outcome: Ongoing     Problem: Coping:  Goal: Ability to adjust to condition or change in health will improve  Description: Ability to adjust to condition or change in health will improve  Outcome: Ongoing     Problem: Fluid Volume:  Goal: Ability to maintain a balanced intake and output will improve  Description: Ability to maintain a balanced intake and output will improve  Outcome: Ongoing     Problem: Health Behavior:  Goal: Ability to identify and utilize available resources and services will improve  Description: Ability to identify and utilize available resources and services will improve  Outcome: Ongoing  Goal: Ability to manage health-related needs will improve  Description: Ability to manage health-related needs will improve  Outcome: Ongoing     Problem: Metabolic:  Goal: Ability to maintain appropriate glucose levels will improve  Description: Ability to maintain appropriate glucose levels will improve  Outcome: Ongoing     Problem: Nutritional:  Goal: Maintenance of adequate nutrition will improve  Description: Maintenance of adequate nutrition will improve  Outcome: Ongoing  Goal: Progress toward achieving an optimal weight will improve  Description: Progress toward achieving an optimal weight will improve  Outcome: Ongoing Problem: Physical Regulation:  Goal: Complications related to the disease process, condition or treatment will be avoided or minimized  Description: Complications related to the disease process, condition or treatment will be avoided or minimized  Outcome: Ongoing  Goal: Diagnostic test results will improve  Description: Diagnostic test results will improve  Outcome: Ongoing     Problem: Tissue Perfusion:  Goal: Adequacy of tissue perfusion will improve  Description: Adequacy of tissue perfusion will improve  Outcome: Ongoing

## 2022-03-23 NOTE — PROGRESS NOTES
Addended by: SHAREE ALATORRE on: 4/27/2020 04:08 PM     Modules accepted: Orders     Patient removed Silver dressing on her left temple per self. Writer educated patient on need for dressing to promote wound healing. Patient requesting not to have another dressing on her face at this time because its \"annoying and gets in the way of my glasses and hat\" per patient.

## 2022-03-23 NOTE — PROGRESS NOTES
[address] Hospitalist        3/23/2022   3:49 PM    Name:  Vanesa Clarke  MRN:    [id number]     Acct:     [de-identified]   Room:  2014/2014-01  IP Day: 5     Admit Date: 3/18/2022  1:59 PM  PCP: Daniele Gregory MD    C/C:   Chief Complaint   Patient presents with   [employer] Fatigue    Chest Pain    Cough    Back Pain       Assessment:      · Acute rhabdomyolysis-improving  · L sided face lesion  · Acute respiratory insufficiency  · Essential hypertension  · Diabetes mellitus type 2  · Diabetic neuropathy  · Mixed hyperlipidemia  · Chronic pain syndrome  · Gastroesophageal reflux disease without esophagitis  · Major depressive disorder/bipolar disorder  · Chronic obstructive pulmonary disease, unspecified  · Lung mass  · Left face abrasion  · Hypertensive urgency  · Tachycardia-improved          Plan:     · Patient is on intermediate floor  · Oxygen to maintain oxygen saturation greater than 92%  ·   · IV fluids dced  · Lasix on hold  · Monitor CK myoglobin and creatinine  ·   · Cardiology consult  ·   ·   · ID consult for L Face lesion  · Zosyn dced, topical care    · DVT and GI prophylaxis  · Continue to monitor/telemetry/CBC with differential daily/BMP daily  Continue medications as below    Discharge planning      Scheduled Meds:   metoprolol tartrate  50 mg Oral BID    tamsulosin  0.4 mg Oral Daily    gabapentin  800 mg Oral BID    melatonin  10.5 mg Oral Nightly    metFORMIN  500 mg Oral BID WC    traZODone  300 mg Oral Nightly    glipiZIDE  2.5 mg Oral QAM AC    neomycin-bacitracin-polymyxin   Topical BID    ARIPiprazole  5 mg Oral Daily    aspirin EC  81 mg Oral Daily    pantoprazole  20 mg Oral QAM AC    budesonide-formoterol  2 puff Inhalation BID    venlafaxine  37.5 mg Oral BID    sodium chloride flush  5-40 mL IntraVENous 2 times per day    heparin (porcine)  5,000 Units SubCUTAneous 3 times per day     Continuous Infusions:   dextrose      sodium chloride Stopped (22 0739)     PRN Meds:  potassium chloride, 40 mEq, PRN   Or  potassium alternative oral replacement, 40 mEq, PRN   Or  potassium chloride, 10 mEq, PRN  glucose, 15 g, PRN  glucagon (rDNA), 1 mg, PRN  dextrose, 100 mL/hr, PRN  dextrose bolus (hypoglycemia), 125 mL, PRN   Or  dextrose bolus (hypoglycemia), 250 mL, PRN  albuterol sulfate HFA, 2 puff, Q4H PRN  metoprolol, 2.5 mg, Q6H PRN  oxyCODONE-acetaminophen, 1 tablet, Q4H PRN  traZODone, 300 mg, Nightly PRN  clonazePAM, 1 mg, TID PRN  sodium chloride flush, 5-40 mL, PRN  sodium chloride, 25 mL, PRN  ondansetron, 4 mg, Q8H PRN   Or  ondansetron, 4 mg, Q6H PRN  acetaminophen, 650 mg, Q6H PRN   Or  acetaminophen, 650 mg, Q6H PRN            Subjective:     Patient seen and examined at bedside. No overnight events. No acute complaints today. Afebrile  Pt. Denies any CP, SOB, palpitation, HA, dizziness, chills, cough, cold, changes in urination, BM or skin changes or any pain. ROS:  A 10 point system reviewed and negative otherwise mentioned above. Physical Examination:      Vitals:  BP (!) 94/57   Pulse 88   Temp 97.2 °F (36.2 °C) (Oral)   Resp 19   Ht 5' 8\" (1.727 m)   Wt 160 lb 0.6 oz (72.6 kg)   SpO2 98%   BMI 24.33 kg/m²   Temp (24hrs), Av.9 °F (36.6 °C), Min:97.2 °F (36.2 °C), Max:98.5 °F (36.9 °C)    Weight:   Wt Readings from Last 3 Encounters:   22 160 lb 0.6 oz (72.6 kg)   22 181 lb 14.4 oz (82.5 kg)   21 186 lb 3.2 oz (84.5 kg)     I/O last 3 completed shifts:  I/O last 3 completed shifts: In: 2352.2 [P.O.:300; I.V.:1900.4;  IV Piggyback:151.9]  Out: 1800 [Urine:1800]     Recent Labs     22  1145 22  1618 22  2109 22  0603   POCGLU 90 124* 119* 121*         General appearance - alert, well appearing, and in no acute distress  Mental status - oriented to person, place, and time with normal affect  Head - normocephalic and atraumatic  Eyes - pupils equal and reactive, extraocular eye movements intact, conjunctiva clear  Ears - hearing appears to be intact  Nose - no drainage noted  Mouth - mucous membranes moist  Neck - supple, no carotid bruits, thyroid not palpable  Chest - clear to auscultation, normal effort  Heart - normal rate, regular rhythm, no murmur  Abdomen - soft, nontender, nondistended, bowel sounds present all four quadrants, no masses, hepatomegaly or splenomegaly  Neurological - normal speech, no focal findings or movement disorder noted, cranial nerves II through XII grossly intact  Extremities - peripheral pulses palpable, no pedal edema or calf pain with palpation  Skin -left face necrotic lesion        Medications: Allergies:    Allergies   Allergen Reactions    Codeine      \"feeling of heavy on chest\"    Dye [Iodides]      Hallucination,vomiting       Current Meds:   Current Facility-Administered Medications:     potassium chloride (KLOR-CON M) extended release tablet 40 mEq, 40 mEq, Oral, PRN, 40 mEq at 03/21/22 0942 **OR** potassium bicarb-citric acid (EFFER-K) effervescent tablet 40 mEq, 40 mEq, Oral, PRN **OR** potassium chloride 10 mEq/100 mL IVPB (Peripheral Line), 10 mEq, IntraVENous, PRN, Dianne Funes MD    metoprolol tartrate (LOPRESSOR) tablet 50 mg, 50 mg, Oral, BID, Loann Hamman, MD, 50 mg at 03/23/22 0824    tamsulosin (FLOMAX) capsule 0.4 mg, 0.4 mg, Oral, Daily, Dianne Funes MD, 0.4 mg at 03/23/22 8383    gabapentin (NEURONTIN) capsule 800 mg, 800 mg, Oral, BID, Whitney Becerril MD, 800 mg at 03/23/22 0914    melatonin tablet 10.5 mg, 10.5 mg, Oral, Nightly, Whitney Becerril MD, 10.5 mg at 03/22/22 2141    metFORMIN (GLUCOPHAGE) tablet 500 mg, 500 mg, Oral, BID , Whitney Becerril MD, 500 mg at 03/23/22 8456    traZODone (DESYREL) tablet 300 mg, 300 mg, Oral, Nightly, Whitney Becerril MD, 300 mg at 03/22/22 2140    glipiZIDE (GLUCOTROL) tablet 2.5 mg, 2.5 mg, Oral, RANGEL PACHECO, Whitney Becerril MD, 2.5 mg at 03/23/22 0823    glucose (GLUTOSE) 40 % oral gel 15 g, 15 g, Oral, PRN, Whitney Becerril MD    glucagon (rDNA) injection 1 mg, 1 mg, IntraMUSCular, PRN, Whitney Becerril MD    dextrose 5 % solution, 100 mL/hr, IntraVENous, PRN, Whitney Becerril MD    dextrose bolus (hypoglycemia) 10% 125 mL, 125 mL, IntraVENous, PRN **OR** dextrose bolus (hypoglycemia) 10% 250 mL, 250 mL, IntraVENous, PRN, Whitney Becerril MD    albuterol sulfate  (90 Base) MCG/ACT inhaler 2 puff, 2 puff, Inhalation, Q4H PRN, Whitney Becerril MD    metoprolol (LOPRESSOR) injection 2.5 mg, 2.5 mg, IntraVENous, Q6H PRN, Whitney Becerril MD, 2.5 mg at 03/19/22 1615    oxyCODONE-acetaminophen (PERCOCET)  MG per tablet 1 tablet, 1 tablet, Oral, Q4H PRN, Whitney Becerril MD, 1 tablet at 03/23/22 1048    neomycin-bacitracin-polymyxin (NEOSPORIN) ointment, , Topical, BID, Whitney Becerril MD, Given at 03/23/22 0825    traZODone (DESYREL) tablet 300 mg, 300 mg, Oral, Nightly PRN, Whitney Becerril MD, 300 mg at 03/19/22 2319    ARIPiprazole (ABILIFY) tablet 5 mg, 5 mg, Oral, Daily, Whitney Becerril MD, 5 mg at 03/23/22 0824    aspirin EC tablet 81 mg, 81 mg, Oral, Daily, Whitney Becerril MD, 81 mg at 03/23/22 0824    clonazePAM (KLONOPIN) tablet 1 mg, 1 mg, Oral, TID PRN, Whitney Becerril MD, 1 mg at 03/22/22 2139    pantoprazole (PROTONIX) tablet 20 mg, 20 mg, Oral, QAM AC, Whitney Becerril MD, 20 mg at 03/23/22 0823    budesonide-formoterol (SYMBICORT) 160-4.5 MCG/ACT inhaler 2 puff, 2 puff, Inhalation, BID, Whitney Becerril MD, 2 puff at 03/23/22 0741    venlafaxine (EFFEXOR XR) extended release capsule 37.5 mg, 37.5 mg, Oral, BID, Whitney Becerril MD, 37.5 mg at 03/23/22 0824    sodium chloride flush 0.9 % injection 5-40 mL, 5-40 mL, IntraVENous, 2 times per day, Hoda Rogers MD, 10 mL at 03/19/22 0913    sodium chloride flush 0.9 % injection 5-40 mL, 5-40 mL, IntraVENous, PRN, Whitney Becerril MD    0.9 % sodium chloride infusion, 25 mL, IntraVENous, PRN, Hoda Rogers MD, Paused at 03/22/22 0739    ondansetron (ZOFRAN-ODT) disintegrating tablet 4 mg, 4 mg, Oral, Q8H PRN **OR** ondansetron (ZOFRAN) injection 4 mg, 4 mg, IntraVENous, Q6H PRN, Whitney Becerril MD    acetaminophen (TYLENOL) tablet 650 mg, 650 mg, Oral, Q6H PRN **OR** acetaminophen (TYLENOL) suppository 650 mg, 650 mg, Rectal, Q6H PRN, Whitney Becerril MD    heparin (porcine) injection 5,000 Units, 5,000 Units, SubCUTAneous, 3 times per day, Lisa Wheatley MD      I/O (24Hr): Intake/Output Summary (Last 24 hours) at 3/23/2022 1549  Last data filed at 3/23/2022 1430  Gross per 24 hour   Intake 1675.76 ml   Output 900 ml   Net 775.76 ml       Data:           Labs:    Hematology:  Recent Labs     03/21/22 0618 03/22/22 0617 03/23/22 0636   WBC 7.4 6.8 6.6   RBC 4.07 3.55* 3.58*   HGB 11.1* 9.6* 9.7*   HCT 35.4* 31.9* 32.4*   MCV 87.0 89.9 90.5   MCH 27.3 27.0 27.1   MCHC 31.4 30.1 29.9   RDW 13.5 13.7 13.7    221 218   MPV 8.7 8.9 8.8     Chemistry:  Recent Labs     03/21/22 0618 03/21/22 0618 03/21/22 2125 03/22/22 0617 03/23/22  0636     --   --  137 140   K 3.3*   < > 4.4 4.1 4.2     --   --  101 101   CO2 29  --   --  25 30   GLUCOSE 149*  --   --  149* 130*   BUN 10  --   --  13 13   CREATININE 0.75  --   --  0.79 0.75   MG  --   --  1.6  --   --    ANIONGAP 11  --   --  11 9   LABGLOM >60  --   --  >60 >60   GFRAA >60  --   --  >60 >60   CALCIUM 9.0  --   --  8.6 8.8   CKTOTAL 731*  --   --  311* 127   MYOGLOBIN 77*  --   --  50 40    < > = values in this interval not displayed.      Recent Labs     03/21/22 2054 03/22/22 0616 03/22/22  1145 03/22/22  1618 03/22/22  2109 03/23/22  0603   POCGLU 149* 134* 90 124* 119* 121*       Lab Results   Component Value Date/Time    SPECIAL NOT REPORTED 11/24/2021 06:32 PM     Lab Results   Component Value Date/Time    CULTURE NO GROWTH 6 DAYS 11/24/2021 06:32 PM       Lab Results   Component Value Date    POCPH 7.36 04/12/2017    PHART 7.42 08/26/2012    PH 7.22 04/11/2017    POCPCO2 45 04/12/2017    NUC3ZUB 41 08/26/2012    PCO2 54 04/11/2017    POCPO2 93 04/12/2017    PO2ART 59 08/26/2012    PO2 89 04/11/2017    POCHCO3 25.3 04/12/2017    TTZ5DQL 26.1 08/26/2012    HCO3 22.2 04/11/2017    NBEA NOT REPORTED 04/12/2017    PBEA 0 04/12/2017    VKM6CNA 27 04/12/2017    DDZK4KCB 97 04/12/2017    U6MQAGKR 92.1 08/26/2012    O2SAT 95 04/11/2017    FIO2 UNKNOWN 10/31/2017       Radiology:    XR CHEST PORTABLE    Result Date: 3/20/2022  New electronic device left pulmonary apex. Otherwise stable chest.     XR CHEST PORTABLE    Result Date: 3/18/2022  No acute process. All radiological studies reviewed  Code Status:  Full Code        Electronically signed by Donte Ang MD on 3/23/2022 at 3:49 PM    This note was created with the assistance of a speech-recognition program.  Although the intention is to generate a document that actually reflects the content of the visit, no guarantees can be provided that every mistake has been identified and corrected by editing. Note was updated later by me after  physical examination and  completion of the assessment.

## 2022-03-23 NOTE — PROGRESS NOTES
Physical Therapy  DATE: 3/23/2022    NAME: Teo Ramirez  MRN: [id number]   : 1950    Patient not seen this date for Physical Therapy due to:      [] Cancel by RN or physician due to:    [] Hemodialysis    [] Critical Lab Value Level     [] Blood transfusion in progress    [] Acute or unstable cardiovascular status   _MAP < 55 or more than >115  _HR < 40 or > 130    [] Acute or unstable pulmonary status   -FiO2 > 60%   _RR < 5 or >40    _O2 sats < 85%    [] Strict Bedrest    [] Off Unit for surgery or procedure    [] Off Unit for testing       [] Pending imaging to R/O fracture    [x] Refusal by Patient : pt reports she just returned to bed and refusing PT at this time. x2 attempts today. [x] Other Will d/c PT order per patients request.       [] PT being discontinued at this time. Patient independent. No further needs. [] PT being discontinued at this time as the patient has been transferred to hospice care. No further needs.       Indy Miguel, PTA

## 2022-03-23 NOTE — PROGRESS NOTES
[facility] [place], [facility]. called writer via telephone for update on patient status. [facility] from [place] wants to be called at [phone] or faxed at [phone] upon patient d/c to start aide services again.

## 2022-03-24 ENCOUNTER — TELEPHONE (OUTPATIENT)
Dept: CASE MANAGEMENT | Age: 72
End: 2022-03-24

## 2022-03-24 LAB
ABSOLUTE EOS #: 0.12 K/UL (ref 0–0.44)
ABSOLUTE IMMATURE GRANULOCYTE: 0.11 K/UL (ref 0–0.3)
ABSOLUTE LYMPH #: 1.02 K/UL (ref 1.1–3.7)
ABSOLUTE MONO #: 0.47 K/UL (ref 0.1–1.2)
ANION GAP SERPL CALCULATED.3IONS-SCNC: 5 MMOL/L (ref 9–17)
BASOPHILS # BLD: 1 % (ref 0–2)
BASOPHILS ABSOLUTE: 0.03 K/UL (ref 0–0.2)
BUN BLDV-MCNC: 11 MG/DL (ref 8–23)
BUN/CREAT BLD: 17 (ref 9–20)
CALCIUM SERPL-MCNC: 8.3 MG/DL (ref 8.6–10.4)
CHLORIDE BLD-SCNC: 101 MMOL/L (ref 98–107)
CO2: 31 MMOL/L (ref 20–31)
CREAT SERPL-MCNC: 0.65 MG/DL (ref 0.5–0.9)
EOSINOPHILS RELATIVE PERCENT: 2 % (ref 1–4)
GFR AFRICAN AMERICAN: >60 ML/MIN
GFR NON-AFRICAN AMERICAN: >60 ML/MIN
GFR SERPL CREATININE-BSD FRML MDRD: ABNORMAL ML/MIN/{1.73_M2}
GLUCOSE BLD-MCNC: 103 MG/DL (ref 65–105)
GLUCOSE BLD-MCNC: 105 MG/DL (ref 65–105)
GLUCOSE BLD-MCNC: 108 MG/DL (ref 70–99)
GLUCOSE BLD-MCNC: 119 MG/DL (ref 65–105)
GLUCOSE BLD-MCNC: 135 MG/DL (ref 65–105)
GLUCOSE BLD-MCNC: 185 MG/DL (ref 65–105)
HCT VFR BLD CALC: 28.2 % (ref 36.3–47.1)
HEMOGLOBIN: 8.7 G/DL (ref 11.9–15.1)
IMMATURE GRANULOCYTES: 2 %
LYMPHOCYTES # BLD: 18 % (ref 24–43)
MCH RBC QN AUTO: 27.4 PG (ref 25.2–33.5)
MCHC RBC AUTO-ENTMCNC: 30.9 G/DL (ref 28.4–34.8)
MCV RBC AUTO: 88.7 FL (ref 82.6–102.9)
MONOCYTES # BLD: 8 % (ref 3–12)
MYOGLOBIN: 39 NG/ML (ref 25–58)
NRBC AUTOMATED: 0 PER 100 WBC
PDW BLD-RTO: 13.8 % (ref 11.8–14.4)
PLATELET # BLD: 191 K/UL (ref 138–453)
PMV BLD AUTO: 8.8 FL (ref 8.1–13.5)
POTASSIUM SERPL-SCNC: 3.6 MMOL/L (ref 3.7–5.3)
RBC # BLD: 3.18 M/UL (ref 3.95–5.11)
SEG NEUTROPHILS: 69 % (ref 36–65)
SEGMENTED NEUTROPHILS ABSOLUTE COUNT: 4.01 K/UL (ref 1.5–8.1)
SODIUM BLD-SCNC: 137 MMOL/L (ref 135–144)
TOTAL CK: 107 U/L (ref 26–192)
WBC # BLD: 5.8 K/UL (ref 3.5–11.3)

## 2022-03-24 PROCEDURE — 36415 COLL VENOUS BLD VENIPUNCTURE: CPT

## 2022-03-24 PROCEDURE — 99231 SBSQ HOSP IP/OBS SF/LOW 25: CPT | Performed by: NURSE PRACTITIONER

## 2022-03-24 PROCEDURE — 83874 ASSAY OF MYOGLOBIN: CPT

## 2022-03-24 PROCEDURE — 80048 BASIC METABOLIC PNL TOTAL CA: CPT

## 2022-03-24 PROCEDURE — 97530 THERAPEUTIC ACTIVITIES: CPT

## 2022-03-24 PROCEDURE — 97162 PT EVAL MOD COMPLEX 30 MIN: CPT

## 2022-03-24 PROCEDURE — 94640 AIRWAY INHALATION TREATMENT: CPT

## 2022-03-24 PROCEDURE — 82550 ASSAY OF CK (CPK): CPT

## 2022-03-24 PROCEDURE — 6370000000 HC RX 637 (ALT 250 FOR IP): Performed by: INTERNAL MEDICINE

## 2022-03-24 PROCEDURE — 6370000000 HC RX 637 (ALT 250 FOR IP): Performed by: FAMILY MEDICINE

## 2022-03-24 PROCEDURE — 2580000003 HC RX 258: Performed by: FAMILY MEDICINE

## 2022-03-24 PROCEDURE — 94760 N-INVAS EAR/PLS OXIMETRY 1: CPT

## 2022-03-24 PROCEDURE — 97116 GAIT TRAINING THERAPY: CPT

## 2022-03-24 PROCEDURE — 6370000000 HC RX 637 (ALT 250 FOR IP): Performed by: HOSPITALIST

## 2022-03-24 PROCEDURE — 82947 ASSAY GLUCOSE BLOOD QUANT: CPT

## 2022-03-24 PROCEDURE — 1200000000 HC SEMI PRIVATE

## 2022-03-24 PROCEDURE — 85025 COMPLETE CBC W/AUTO DIFF WBC: CPT

## 2022-03-24 RX ADMIN — PANTOPRAZOLE SODIUM 20 MG: 20 TABLET, DELAYED RELEASE ORAL at 06:39

## 2022-03-24 RX ADMIN — METFORMIN HYDROCHLORIDE 500 MG: 500 TABLET ORAL at 09:35

## 2022-03-24 RX ADMIN — VENLAFAXINE HYDROCHLORIDE 37.5 MG: 37.5 CAPSULE, EXTENDED RELEASE ORAL at 09:35

## 2022-03-24 RX ADMIN — SODIUM CHLORIDE, PRESERVATIVE FREE 10 ML: 5 INJECTION INTRAVENOUS at 20:42

## 2022-03-24 RX ADMIN — GABAPENTIN 800 MG: 400 CAPSULE ORAL at 09:35

## 2022-03-24 RX ADMIN — TRAZODONE HYDROCHLORIDE 300 MG: 100 TABLET ORAL at 20:40

## 2022-03-24 RX ADMIN — VENLAFAXINE HYDROCHLORIDE 37.5 MG: 37.5 CAPSULE, EXTENDED RELEASE ORAL at 20:40

## 2022-03-24 RX ADMIN — BUDESONIDE AND FORMOTEROL FUMARATE DIHYDRATE 2 PUFF: 160; 4.5 AEROSOL RESPIRATORY (INHALATION) at 20:00

## 2022-03-24 RX ADMIN — CLONAZEPAM 1 MG: 0.5 TABLET ORAL at 06:39

## 2022-03-24 RX ADMIN — GABAPENTIN 800 MG: 400 CAPSULE ORAL at 20:40

## 2022-03-24 RX ADMIN — ASPIRIN 81 MG: 81 TABLET, COATED ORAL at 09:35

## 2022-03-24 RX ADMIN — POLYMYXIN B SULFATE, BACITRACIN ZINC, NEOMYCIN SULFATE: 5000; 3.5; 4 OINTMENT TOPICAL at 20:48

## 2022-03-24 RX ADMIN — Medication 10.5 MG: at 20:40

## 2022-03-24 RX ADMIN — METFORMIN HYDROCHLORIDE 500 MG: 500 TABLET ORAL at 17:48

## 2022-03-24 RX ADMIN — POLYMYXIN B SULFATE, BACITRACIN ZINC, NEOMYCIN SULFATE: 5000; 3.5; 4 OINTMENT TOPICAL at 09:36

## 2022-03-24 RX ADMIN — ARIPIPRAZOLE 5 MG: 5 TABLET ORAL at 09:35

## 2022-03-24 RX ADMIN — SODIUM CHLORIDE, PRESERVATIVE FREE 10 ML: 5 INJECTION INTRAVENOUS at 09:36

## 2022-03-24 RX ADMIN — CLONAZEPAM 1 MG: 0.5 TABLET ORAL at 20:47

## 2022-03-24 RX ADMIN — OXYCODONE AND ACETAMINOPHEN 1 TABLET: 325; 10 TABLET ORAL at 00:19

## 2022-03-24 RX ADMIN — OXYCODONE AND ACETAMINOPHEN 1 TABLET: 325; 10 TABLET ORAL at 06:39

## 2022-03-24 RX ADMIN — METOPROLOL TARTRATE 50 MG: 50 TABLET, FILM COATED ORAL at 20:40

## 2022-03-24 RX ADMIN — TAMSULOSIN HYDROCHLORIDE 0.4 MG: 0.4 CAPSULE ORAL at 09:35

## 2022-03-24 RX ADMIN — BUDESONIDE AND FORMOTEROL FUMARATE DIHYDRATE 2 PUFF: 160; 4.5 AEROSOL RESPIRATORY (INHALATION) at 10:07

## 2022-03-24 RX ADMIN — OXYCODONE AND ACETAMINOPHEN 1 TABLET: 325; 10 TABLET ORAL at 19:45

## 2022-03-24 ASSESSMENT — PAIN DESCRIPTION - LOCATION
LOCATION: BACK;CHEST
LOCATION: BACK;CHEST
LOCATION: BACK
LOCATION: BACK;CHEST

## 2022-03-24 ASSESSMENT — PAIN SCALES - GENERAL
PAINLEVEL_OUTOF10: 10
PAINLEVEL_OUTOF10: 10
PAINLEVEL_OUTOF10: 0
PAINLEVEL_OUTOF10: 8
PAINLEVEL_OUTOF10: 3
PAINLEVEL_OUTOF10: 10

## 2022-03-24 ASSESSMENT — PAIN DESCRIPTION - PAIN TYPE
TYPE: CHRONIC PAIN

## 2022-03-24 ASSESSMENT — PAIN - FUNCTIONAL ASSESSMENT
PAIN_FUNCTIONAL_ASSESSMENT: PREVENTS OR INTERFERES SOME ACTIVE ACTIVITIES AND ADLS

## 2022-03-24 ASSESSMENT — PAIN DESCRIPTION - PROGRESSION
CLINICAL_PROGRESSION: NOT CHANGED

## 2022-03-24 ASSESSMENT — PAIN SCALES - WONG BAKER
WONGBAKER_NUMERICALRESPONSE: 0
WONGBAKER_NUMERICALRESPONSE: 0

## 2022-03-24 ASSESSMENT — PAIN DESCRIPTION - ONSET
ONSET: ON-GOING

## 2022-03-24 ASSESSMENT — PAIN DESCRIPTION - ORIENTATION
ORIENTATION: MID
ORIENTATION: LOWER;MID
ORIENTATION: LOWER;MID
ORIENTATION: MID;LOWER

## 2022-03-24 ASSESSMENT — PAIN DESCRIPTION - DESCRIPTORS
DESCRIPTORS: ACHING

## 2022-03-24 ASSESSMENT — PAIN DESCRIPTION - FREQUENCY
FREQUENCY: CONTINUOUS

## 2022-03-24 NOTE — PROGRESS NOTES
[address] Hospitalist        3/24/2022   3:39 PM    Name:  Jeanna Sexton  MRN:    [id number]     Acct:     [de-identified]   Room:  2014/2014-01  IP Day: 6     Admit Date: 3/18/2022  1:59 PM  PCP: Vinny hSaver MD    C/C:   Chief Complaint   Patient presents with   [employer] [place] Fatigue    Chest Pain    Cough    Back Pain       Assessment:      · Acute rhabdomyolysis-improving  · L sided face lesion  · Acute respiratory insufficiency  · Essential hypertension  · Diabetes mellitus type 2  · Diabetic neuropathy  · Mixed hyperlipidemia  · Chronic pain syndrome  · Gastroesophageal reflux disease without esophagitis  · Major depressive disorder/bipolar disorder  · Chronic obstructive pulmonary disease, unspecified  · Lung mass  · Left face abrasion  · Hypertensive urgency  · Tachycardia-improved          Plan:     · Patient is on intermediate floor  · Oxygen to maintain oxygen saturation greater than 92%  ·   · IV fluids dced  · Lasix on hold  · Monitor CK myoglobin and creatinine  ·   · Cardiology consult  ·   ·   · ID consult for L Face lesion  · Off Zosyn,  topical care    · DVT and GI prophylaxis  · Continue to monitor/telemetry/CBC with differential daily/BMP daily  Continue medications as below    Discharge planning      Scheduled Meds:   metoprolol tartrate  50 mg Oral BID    tamsulosin  0.4 mg Oral Daily    gabapentin  800 mg Oral BID    melatonin  10.5 mg Oral Nightly    metFORMIN  500 mg Oral BID WC    traZODone  300 mg Oral Nightly    glipiZIDE  2.5 mg Oral QAM AC    neomycin-bacitracin-polymyxin   Topical BID    ARIPiprazole  5 mg Oral Daily    aspirin EC  81 mg Oral Daily    pantoprazole  20 mg Oral QAM AC    budesonide-formoterol  2 puff Inhalation BID    venlafaxine  37.5 mg Oral BID    sodium chloride flush  5-40 mL IntraVENous 2 times per day    heparin (porcine)  5,000 Units SubCUTAneous 3 times per day     Continuous Infusions:   dextrose      sodium chloride Stopped (22 0739)     PRN Meds:  potassium chloride, 40 mEq, PRN   Or  potassium alternative oral replacement, 40 mEq, PRN   Or  potassium chloride, 10 mEq, PRN  glucose, 15 g, PRN  glucagon (rDNA), 1 mg, PRN  dextrose, 100 mL/hr, PRN  dextrose bolus (hypoglycemia), 125 mL, PRN   Or  dextrose bolus (hypoglycemia), 250 mL, PRN  albuterol sulfate HFA, 2 puff, Q4H PRN  metoprolol, 2.5 mg, Q6H PRN  oxyCODONE-acetaminophen, 1 tablet, Q4H PRN  traZODone, 300 mg, Nightly PRN  clonazePAM, 1 mg, TID PRN  sodium chloride flush, 5-40 mL, PRN  sodium chloride, 25 mL, PRN  ondansetron, 4 mg, Q8H PRN   Or  ondansetron, 4 mg, Q6H PRN  acetaminophen, 650 mg, Q6H PRN   Or  acetaminophen, 650 mg, Q6H PRN            Subjective:     Patient seen and examined at bedside. No overnight events. No acute complaints today. Afebrile  Pt. Denies any CP, SOB, palpitation, HA, dizziness, chills, cough, cold, changes in urination, BM or skin changes or any pain. ROS:  A 10 point system reviewed and negative otherwise mentioned above. Physical Examination:      Vitals:  BP (!) 100/52   Pulse 70   Temp 97.4 °F (36.3 °C) (Oral)   Resp 16   Ht 5' 8\" (1.727 m)   Wt 160 lb 0.3 oz (72.6 kg)   SpO2 97%   BMI 24.33 kg/m²   Temp (24hrs), Av.9 °F (36.6 °C), Min:97.4 °F (36.3 °C), Max:98.2 °F (36.8 °C)    Weight:   Wt Readings from Last 3 Encounters:   22 160 lb 0.3 oz (72.6 kg)   22 181 lb 14.4 oz (82.5 kg)   21 186 lb 3.2 oz (84.5 kg)     I/O last 3 completed shifts:  I/O last 3 completed shifts: In: 2390.1 [P.O.:1510;  I.V.:880.1]  Out: 1700 [Urine:1700]     Recent Labs     227 2228 22  1119   POCGLU 160* 105 103 119*         General appearance - alert, well appearing, and in no acute distress  Mental status - oriented to person, place, and time with normal affect  Head - normocephalic and atraumatic  Eyes - pupils equal and reactive, extraocular eye movements intact, conjunctiva clear  Ears - hearing appears to be intact  Nose - no drainage noted  Mouth - mucous membranes moist  Neck - supple, no carotid bruits, thyroid not palpable  Chest - clear to auscultation, normal effort  Heart - normal rate, regular rhythm, no murmur  Abdomen - soft, nontender, nondistended, bowel sounds present all four quadrants, no masses, hepatomegaly or splenomegaly  Neurological - normal speech, no focal findings or movement disorder noted, cranial nerves II through XII grossly intact  Extremities - peripheral pulses palpable, no pedal edema or calf pain with palpation  Skin -left face necrotic lesion        Medications: Allergies:    Allergies   Allergen Reactions    Codeine      \"feeling of heavy on chest\"    Dye [Iodides]      Hallucination,vomiting       Current Meds:   Current Facility-Administered Medications:     potassium chloride (KLOR-CON M) extended release tablet 40 mEq, 40 mEq, Oral, PRN, 40 mEq at 03/21/22 0942 **OR** potassium bicarb-citric acid (EFFER-K) effervescent tablet 40 mEq, 40 mEq, Oral, PRN **OR** potassium chloride 10 mEq/100 mL IVPB (Peripheral Line), 10 mEq, IntraVENous, PRN, Gema Glass MD    metoprolol tartrate (LOPRESSOR) tablet 50 mg, 50 mg, Oral, BID, Chan Carson MD, 50 mg at 03/23/22 2141    tamsulosin (FLOMAX) capsule 0.4 mg, 0.4 mg, Oral, Daily, Gema Glass MD, 0.4 mg at 03/24/22 0935    gabapentin (NEURONTIN) capsule 800 mg, 800 mg, Oral, BID, Whitney Becerril MD, 800 mg at 03/24/22 0935    melatonin tablet 10.5 mg, 10.5 mg, Oral, Nightly, Whitney Becerril MD, 10.5 mg at 03/23/22 2140    metFORMIN (GLUCOPHAGE) tablet 500 mg, 500 mg, Oral, BID WC, Whitney Becerril MD, 500 mg at 03/24/22 0935    traZODone (DESYREL) tablet 300 mg, 300 mg, Oral, Nightly, Whitney Becerirl MD, 300 mg at 03/23/22 2141    glipiZIDE (GLUCOTROL) tablet 2.5 mg, 2.5 mg, Oral, RANGEL PACHECO, Whitney Becerril MD, 2.5 mg at 03/23/22 0883    glucose (GLUTOSE) 40 % oral gel 15 g, 15 g, Oral, PRN, Whitney Becerril MD    glucagon (rDNA) injection 1 mg, 1 mg, IntraMUSCular, PRN, Whitney Becerril MD    dextrose 5 % solution, 100 mL/hr, IntraVENous, PRN, Whitney Becerril MD    dextrose bolus (hypoglycemia) 10% 125 mL, 125 mL, IntraVENous, PRN **OR** dextrose bolus (hypoglycemia) 10% 250 mL, 250 mL, IntraVENous, PRN, Whitney Becerril MD    albuterol sulfate  (90 Base) MCG/ACT inhaler 2 puff, 2 puff, Inhalation, Q4H PRN, Whitney Becerril MD    metoprolol (LOPRESSOR) injection 2.5 mg, 2.5 mg, IntraVENous, Q6H PRN, Cinthya Eli MD, 2.5 mg at 03/19/22 1615    oxyCODONE-acetaminophen (PERCOCET)  MG per tablet 1 tablet, 1 tablet, Oral, Q4H PRN, Cinthya Eli MD, 1 tablet at 03/24/22 0639    neomycin-bacitracin-polymyxin (NEOSPORIN) ointment, , Topical, BID, Cinthya Eli MD, Given at 03/24/22 0936    traZODone (DESYREL) tablet 300 mg, 300 mg, Oral, Nightly PRN, Whitney Becerril MD, 300 mg at 03/19/22 2319    ARIPiprazole (ABILIFY) tablet 5 mg, 5 mg, Oral, Daily, Whitney Becerril MD, 5 mg at 03/24/22 0935    aspirin EC tablet 81 mg, 81 mg, Oral, Daily, Whitney Becerril MD, 81 mg at 03/24/22 0935    clonazePAM (KLONOPIN) tablet 1 mg, 1 mg, Oral, TID PRN, Cinthya Eli MD, 1 mg at 03/24/22 0639    pantoprazole (PROTONIX) tablet 20 mg, 20 mg, Oral, QAM AC, Whitney Becerril MD, 20 mg at 03/24/22 0639    budesonide-formoterol (SYMBICORT) 160-4.5 MCG/ACT inhaler 2 puff, 2 puff, Inhalation, BID, Whitney Becerril MD, 2 puff at 03/24/22 1007    venlafaxine (EFFEXOR XR) extended release capsule 37.5 mg, 37.5 mg, Oral, BID, Whitney Becerril MD, 37.5 mg at 03/24/22 0935    sodium chloride flush 0.9 % injection 5-40 mL, 5-40 mL, IntraVENous, 2 times per day, Cinthya Eli MD, 10 mL at 03/24/22 0936    sodium chloride flush 0.9 % injection 5-40 mL, 5-40 mL, IntraVENous, PRN, Whitney Becerril MD    0.9 % sodium chloride infusion, 25 mL, IntraVENous, PRN, Cinthya Eli MD, Paused at 03/22/22 0791   ondansetron (ZOFRAN-ODT) disintegrating tablet 4 mg, 4 mg, Oral, Q8H PRN **OR** ondansetron (ZOFRAN) injection 4 mg, 4 mg, IntraVENous, Q6H PRN, Whitney Becerril MD    acetaminophen (TYLENOL) tablet 650 mg, 650 mg, Oral, Q6H PRN **OR** acetaminophen (TYLENOL) suppository 650 mg, 650 mg, Rectal, Q6H PRN, Whitney Becerril MD    heparin (porcine) injection 5,000 Units, 5,000 Units, SubCUTAneous, 3 times per day, Thomas Arteaga MD      I/O (24Hr): Intake/Output Summary (Last 24 hours) at 3/24/2022 1539  Last data filed at 3/24/2022 0542  Gross per 24 hour   Intake 860.81 ml   Output 1100 ml   Net -239.19 ml       Data:           Labs:    Hematology:  Recent Labs     03/22/22 0617 03/23/22 0636 03/24/22  0629   WBC 6.8 6.6 5.8   RBC 3.55* 3.58* 3.18*   HGB 9.6* 9.7* 8.7*   HCT 31.9* 32.4* 28.2*   MCV 89.9 90.5 88.7   MCH 27.0 27.1 27.4   MCHC 30.1 29.9 30.9   RDW 13.7 13.7 13.8    218 191   MPV 8.9 8.8 8.8     Chemistry:  Recent Labs     03/21/22 2125 03/21/22 2125 03/22/22 0617 03/23/22 0636 03/24/22  0629   NA  --   --  137 140 137   K 4.4   < > 4.1 4.2 3.6*   CL  --   --  101 101 101   CO2  --   --  25 30 31   GLUCOSE  --   --  149* 130* 108*   BUN  --   --  13 13 11   CREATININE  --   --  0.79 0.75 0.65   MG 1.6  --   --   --   --    ANIONGAP  --   --  11 9 5*   LABGLOM  --   --  >60 >60 >60   GFRAA  --   --  >60 >60 >60   CALCIUM  --   --  8.6 8.8 8.3*   CKTOTAL  --   --  311* 127 107   MYOGLOBIN  --   --  50 40 39    < > = values in this interval not displayed.      Recent Labs     03/23/22  0603 03/23/22  1656 03/23/22 2029 03/24/22  0457 03/24/22  0628 03/24/22  1119   POCGLU 121* 77 160* 105 103 119*       Lab Results   Component Value Date/Time    SPECIAL NOT REPORTED 11/24/2021 06:32 PM     Lab Results   Component Value Date/Time    CULTURE NO GROWTH 6 DAYS 11/24/2021 06:32 PM       Lab Results   Component Value Date    POCPH 7.36 04/12/2017    PHART 7.42 08/26/2012    PH 7.22 04/11/2017 POCPCO2 45 04/12/2017    TNO4JZC 41 08/26/2012    PCO2 54 04/11/2017    POCPO2 93 04/12/2017    PO2ART 59 08/26/2012    PO2 89 04/11/2017    POCHCO3 25.3 04/12/2017    IKN7OPR 26.1 08/26/2012    HCO3 22.2 04/11/2017    NBEA NOT REPORTED 04/12/2017    PBEA 0 04/12/2017    ZOS0WJH 27 04/12/2017    IZEG9KSM 97 04/12/2017    V9VXEXKF 92.1 08/26/2012    O2SAT 95 04/11/2017    FIO2 UNKNOWN 10/31/2017       Radiology:    XR CHEST PORTABLE    Result Date: 3/20/2022  New electronic device left pulmonary apex. Otherwise stable chest.     XR CHEST PORTABLE    Result Date: 3/18/2022  No acute process. All radiological studies reviewed  Code Status:  Full Code        Electronically signed by Taisha Patrick MD on 3/24/2022 at 3:39 PM    This note was created with the assistance of a speech-recognition program.  Although the intention is to generate a document that actually reflects the content of the visit, no guarantees can be provided that every mistake has been identified and corrected by editing. Note was updated later by me after  physical examination and  completion of the assessment.

## 2022-03-24 NOTE — TELEPHONE ENCOUNTER
Name: Justina Eid  : 1950  MRN: [id number]    Oncology Navigation Follow-Up Note  Name: Justina Eid  : 1950  MRN: [id number]    Oncology Navigation Follow-Up Note  Navigator reviewing chart and pt. Inpt. Due to fall. Medical Oncology for sometime now has been attempting to get Lung Biopsy via Dr. Jayleen Cleaning attempts to get EBUS, but pt. Did not show even when family involved. Pt. Was suppose to F/U with Dr. Skyler Coles this Friday to determine next POC. Writer not sure family is reliable, pt. May need assistance ATC?       Electronically signed by Jaclynn Dubin, RN on 3/24/2022 at 9:24 AM    Electronically signed by Jaclynn Dubin, RN on 3/24/2022 at 9:24 AM

## 2022-03-24 NOTE — PROGRESS NOTES
Comprehensive Nutrition Assessment    Type and Reason for Visit:  Initial,RD Nutrition Re-Screen/LOS    Nutrition Recommendations/Plan:   1. Continue current ADULT DIET; Regular; 4 carb choices (60 gm/meal); Low Fat/Low Chol/High Fiber/ISAMAR  2. Monitor po intakes, need for ONS, weights and labs     Nutrition Assessment:  Patient assessed for LOS. Patient admission r/t Traumatic rhabdomyolysis and dehydration. Pt with Malignant neoplasm of upper lobe of right lung with hx COPD/GERD/DM. She has decubitus ulcer to buttocks. # -- unable to assess wt changes/history per EHR (1 yr ago pt weighed 150# then she gained 30# to be 180# and now she loss 20# to weigh 160#?). PO intakes 50-75% and %. Appetite \"so-so\". Will continue current diet and monitor need for ONS. Malnutrition Assessment:  Malnutrition Status:  Insufficient data    Context:  Acute Illness     Findings of the 6 clinical characteristics of malnutrition:  Energy Intake:  Mild decrease in energy intake (Comment)  Weight Loss:  Unable to assess (5.9% weight loss -- unknown time frame)     Body Fat Loss:  Unable to assess     Muscle Mass Loss:  Unable to assess    Fluid Accumulation:  1 - Mild Extremities   Strength:  Not Performed    Estimated Daily Nutrient Needs:  Energy (kcal):  1940 kcal (MSJ X 1.5); Weight Used for Energy Requirements:  Current     Protein (g):  110 gm protein (1.5 g/kg); Weight Used for Protein Requirements:  Current          Nutrition Related Findings:  Active bowel sounds. Edema: BLE trace. Lung cancer- needs EBUS- chemo/infusion on hold. Palliative care consult. Wounds:  Pressure Injury (buttocks)       Current Nutrition Therapies:    ADULT DIET; Regular; 4 carb choices (60 gm/meal);  Low Fat/Low Chol/High Fiber/IASMAR    Anthropometric Measures:  · Height: 5' 8\" (172.7 cm)  · Current Body Weight: 160 lb 0.3 oz (72.6 kg)   · Admission Body Weight: 160 lb (72.6 kg)    · Usual Body Weight: 170 lb (77.1 kg) (stated) · Ideal Body Weight: 140 lbs; % Ideal Body Weight 114.3 %   · BMI: 24.3  · Adjusted Body Weight:  ; No Adjustment   · BMI Categories: Normal Weight (BMI 22.0 to 24.9) age over 72       Nutrition Diagnosis:   · Inadequate protein-energy intake related to inadequate protein-energy intake,catabolic illness as evidenced by intake 51-75%,weight loss    Nutrition Interventions:   Food and/or Nutrient Delivery:  Continue Current Diet  Nutrition Education/Counseling:  Education not indicated   Coordination of Nutrition Care:  Continue to monitor while inpatient    Goals:  PO intakes to provide >75% of estimated nutritional needs       Nutrition Monitoring and Evaluation:   Behavioral-Environmental Outcomes:  None Identified   Food/Nutrient Intake Outcomes:  Food and Nutrient Intake  Physical Signs/Symptoms Outcomes:  Biochemical Data,Weight,Skin     Discharge Planning:    Continue current diet     Eleanor Seen, [address] [place] [address],   Office Number: [phone]

## 2022-03-24 NOTE — PROGRESS NOTES
[facility]  Progress Note    iN Otero  MRN: [id number]  Date: 3/24/2022  LOS: 6     Reason for F/U :   Fall/abrasion    Impression :   1. Multiple falls and was found down at home  2. Rhabdomyolysis improved with IV hydration  3. Left-sided facial abrasion-limited to skin and subcutaneous area with no deep involvement and the surrounding erythema is not secondary to infection but rather inflammation  4. Diabetes mellitus type 2  5. Essential hypertension  6. Lung cancer    Recommendations:   · Patient continues off systemic antimicrobial therapy  · Patient needs topical wound care  · Patient will need outpatient treatment with wound care center for close follow-up  · Continue supportive care, okay for discharge from infectious disease service when okay with other services  · We will sign off at this time, call us if needed    Infection Control Recommendations:   Universal precautions    Discharge Planning:   Estimated Length of IV antimicrobials:   Patient will need Midline Catheter Insertion/ PICC line Insertion: No  Patient will need: Home IV , [place],  SNF,  LTAC: Undetermined  Patient willneed outpatient wound care: No    Medical Decision making / Summary of Stay:   Ni Otero is a 71y.o.-year-old female who was initially admitted on 3/18/2022. Deb Godinez has multiple medical problems including diabetes mellitus type 2, COPD, right upper lobe cancer, coronary artery disease, tobacco abuse, depression among other medical problems. The patient does have a history of multiple falls and came into the emergency room with complaints of generalized malaise/weakness and has had several falls unable to get up from the ground and the patient was found at home laying down and possibly for several hours. Patient was brought in for evaluation in the emergency room and was found to have an elevated proBNP, CK at 5990, and myoglobin at 2626.   The patient was admitted for dehydration, rhabdomyolysis and was treated with IV fluids. The patient was seen by the cardiology service as she was initially tachycardic and was also noted to have a left-sided facial lesion. The patient was started on Zosyn and I was asked to evaluate and help with antibiotic choice. Current evaluation:3/24/2022    BP (!) 96/53   Pulse 74   Temp 98.2 °F (36.8 °C) (Oral)   Resp 18   Ht 5' 8\" (1.727 m)   Wt 160 lb 0.3 oz (72.6 kg)   SpO2 95%   BMI 24.33 kg/m²     Temperature Range: Temp: 98.2 °F (36.8 °C) Temp  Av °F (36.7 °C)  Min: 97.2 °F (36.2 °C)  Max: 98.2 °F (36.8 °C)     Patient was seen and evaluated at bedside, she is sleeping without signs of distress I did not wake her    Review of Systems   Unable to perform ROS: Other       Physical Examination :     Physical Exam  Constitutional:       Appearance: Normal appearance. She is normal weight. HENT:      Head: Normocephalic. Comments: Left-sided abrasion with slough over the wound bed and mild surrounding erythema  No drainage  Cardiovascular:      Rate and Rhythm: Normal rate and regular rhythm. Abdominal:      General: Abdomen is flat. Bowel sounds are normal.      Palpations: Abdomen is soft. Musculoskeletal:         General: Normal range of motion. Skin:     General: Skin is warm and dry. Neurological:      General: No focal deficit present. Mental Status: She is alert and oriented to person, place, and time. Mental status is at baseline. Psychiatric:         Mood and Affect: Mood normal.         Behavior: Behavior normal.         Thought Content:  Thought content normal.         Laboratory data:   I have independently reviewed the followinglabs:  CBC with Differential:   Recent Labs     22  0636 22  0629   WBC 6.6 5.8   HGB 9.7* 8.7*   HCT 32.4* 28.2*    191   LYMPHOPCT 13* 18*   MONOPCT 6 8     BMP:   Recent Labs     225 22  0617 22  0636 22  0629   NA  --    < > 140 137   K 4.4   < > 4.2 3.6*   CL  --    < > 101 101   CO2  --    < > 30 31   BUN  --    < > 13 11   CREATININE  --    < > 0.75 0.65   MG 1.6  --   --   --     < > = values in this interval not displayed. Hepatic Function Panel: No results for input(s): PROT, LABALBU, BILIDIR, IBILI, BILITOT, ALKPHOS, ALT, AST in the last 72 hours. Lab Results   Component Value Date    PROCAL 0.26 11/03/2020    PROCAL 0.06 09/01/2020     No results found for: CRP  No results found for: SEDRATE      Lab Results   Component Value Date    DDIMER 1.91 01/06/2021    DDIMER 4.51 09/25/2020    DDIMER 0.58 12/28/2018    DDIMER 0.45 12/23/2013     Lab Results   Component Value Date    FERRITIN 149 11/05/2020     No results found for: LDH  No results found for: FIBRINOGEN    No results found for requested labs within last 30 days. Lab Results   Component Value Date    COVID19 Not Detected 11/10/2020    COVID19 Not Detected 08/29/2020       No results for input(s): VANCOTROUGH in the last 72 hours.     Imaging Studies:   No new imaging    Cultures:   No cultures    Medications:      metoprolol tartrate  50 mg Oral BID    tamsulosin  0.4 mg Oral Daily    gabapentin  800 mg Oral BID    melatonin  10.5 mg Oral Nightly    metFORMIN  500 mg Oral BID WC    traZODone  300 mg Oral Nightly    glipiZIDE  2.5 mg Oral QAM AC    neomycin-bacitracin-polymyxin   Topical BID    ARIPiprazole  5 mg Oral Daily    aspirin EC  81 mg Oral Daily    pantoprazole  20 mg Oral QAM AC    budesonide-formoterol  2 puff Inhalation BID    venlafaxine  37.5 mg Oral BID    sodium chloride flush  5-40 mL IntraVENous 2 times per day    heparin (porcine)  5,000 Units SubCUTAneous 3 times per day           [facility]  AV Yeung CNP  [employer]  OFFICE: [phone]      Electronically signed by AV Yeung CNP on 3/24/2022 at 7:24 AM  Thank you for allowing us to participate in the care of this patient. Please call with questions. This note iscreated with the assistance of a speech recognition program.  While intending to generate a document that actually reflects the content of the visit, the document can still have some errors including those of syntax andsound a like substitutions which may escape proof reading. In such instances, actual meaning can be extrapolated by contextual diversion.

## 2022-03-24 NOTE — PROGRESS NOTES
Section of Cardiology  Progress Note      Date:  3/24/2022  Patient: Zuleyma Kumari  Admission:  3/18/2022  1:59 PM  Admit DX: Rhabdomyolysis [M62.82]  Dehydration [E86.0]  Tra[facility]habdom[facility], [facility]. 6XXA]  Age:  71 y. o., 1950     LOS: 6 days     Reason for evaluation:   arrhythmia      SUBJECTIVE:     The patient was seen and examined. Notes and labs reviewed. There were not complications over night. Patient seen and examined in her room. She is laying completely flat position and covered herself from head to toe, she claims that she is not feeling well and unable to walk however I was told by her nurse that she is refusing physical therapy. Patient's cardiac review of systems: negative for chest pain and dyspnea. The patient is generally feeling unchanged. OBJECTIVE:    Telemetry: Sinus  BP (!) 100/52   Pulse 70   Temp 97.4 °F (36.3 °C) (Oral)   Resp 16   Ht 5' 8\" (1.727 m)   Wt 160 lb 0.3 oz (72.6 kg)   SpO2 97%   BMI 24.33 kg/m²     Intake/Output Summary (Last 24 hours) at 3/24/2022 1427  Last data filed at 3/24/2022 0542  Gross per 24 hour   Intake 1100.81 ml   Output 1100 ml   Net 0.81 ml       EXAM:   CONSTITUTIONAL:  awake, alert, cooperative, no apparent distress, and appears stated age. HEENT: Normal jugular venous pulsations, no carotid bruits. Head is atraumatic, normocephalic. Eyes and oral mucosa are normal.  LUNGS: Good respiratory effort. No for increased work of breathing. On auscultation: clear to auscultation bilaterally  CARDIOVASCULAR:  Normal apical impulse, regular rate and rhythm, normal S1 and S2, no S3 or S4, with aortic stenosis murmur  ABDOMEN: Soft, nontender, nondistended. Bowel sounds present. SKIN: Warm and dry. EXTREMITIES: No lower extremity edema. Motor movement grossly intact. No cyanosis or clubbing.     Current Inpatient Medications:   metoprolol tartrate  50 mg Oral BID    tamsulosin  0.4 mg Oral Daily    gabapentin  800 mg Oral BID    melatonin  10.5 mg Oral Nightly    metFORMIN  500 mg Oral BID WC    traZODone  300 mg Oral Nightly    glipiZIDE  2.5 mg Oral QAM AC    neomycin-bacitracin-polymyxin   Topical BID    ARIPiprazole  5 mg Oral Daily    aspirin EC  81 mg Oral Daily    pantoprazole  20 mg Oral QAM AC    budesonide-formoterol  2 puff Inhalation BID    venlafaxine  37.5 mg Oral BID    sodium chloride flush  5-40 mL IntraVENous 2 times per day    heparin (porcine)  5,000 Units SubCUTAneous 3 times per day       IV Infusions (if any):   dextrose      sodium chloride Stopped (03/22/22 0739)       Diagnostics:   EKG: . ECHO: .   Ejection fraction: %  Stress Test: .  Cardiac Angiography: .    Labs:   CBC:   Recent Labs     03/23/22  0636 03/24/22  0629   WBC 6.6 5.8   HGB 9.7* 8.7*   HCT 32.4* 28.2*    191     BMP:   Recent Labs     03/23/22  0636 03/24/22  0629    137   K 4.2 3.6*   CO2 30 31   BUN 13 11   CREATININE 0.75 0.65   LABGLOM >60 >60   GLUCOSE 130* 108*     Lab Results   Component Value Date    BNP 48 03/13/2014     PT/INR: No results for input(s): PROTIME, INR in the last 72 hours. APTT:No results for input(s): APTT in the last 72 hours. CARDIAC ENZYMES:  Recent Labs     03/22/22  0617 03/23/22  0636 03/24/22  0629   CKTOTAL 311* 127 107     FASTING LIPID PANEL:No results found for: HDL, LDLDIRECT, LDLCALC, TRIG  LIVER PROFILE:No results for input(s): AST, ALT, LABALBU in the last 72 hours.     ASSESSMENT:  1.  Sinus tachycardia, secondary to other problem  2.  APCs, isolated  3.  Dyslipidemia  4.  Rhabdomyolysis, improving  5.  Diabetes mellitus  6.  History of lung cancer  7.  Remote history of traumatic subarachnoid bleed  8.  moderate aortic stenosis  9.  History of lung cancer  10.  Moderate CAD from 2016 involving the circumflex coronary artery    Patient Active Problem List   Diagnosis    Valvular heart disease    Type 2 diabetes mellitus without complication, without long-term current use of insulin (HCC)    Open wound of right ankle    COPD (chronic obstructive pulmonary disease) (HCC)    Syncope and collapse    Chronic ulcer of right heel (HCC)    Multifocal pneumonia    Aortic valve stenosis    Esophageal dilatation    Aspiration pneumonia of both lower lobes due to gastric secretions (HCC)    Falls frequently    Chronic respiratory failure (HCC)    Contusion of right knee    Closed fracture of right distal radius    Subdural hemorrhage (HCC)    Subarachnoid hemorrhage (HCC)    Traumatic hemorrhage of cerebrum (HCC)    Chronic bilateral low back pain    Cellulitis of both feet    Acute on chronic congestive heart failure (HCC)    Bilateral leg edema    Cellulitis    Lung mass    Malignant neoplasm of upper lobe of right lung (HCC)    Urinary tract infectious disease    Closed fracture of one rib of right side    Degenerative disc disease, lumbar    Moderate malnutrition (HCC)    Rhabdomyolysis       PLAN:    1. Patient does not want to go to [facility] but at the same time she does not want to have physical therapy? .  2. She is off IV fluid. 3. Renal function is normal.  4. Okay to discharge from cardiac perspective. Please see orders. Discussed with patient and nursing.     Satira Frankel, MD, MD

## 2022-03-24 NOTE — FLOWSHEET NOTE
Writer attempted to visit Patient in the inpatient setting. It appeared that Pt was sleeping. Writer offered a silent prayer at Pt's bedside. Writer left her business card on Patient's bedside table. Writer will attempt to follow up with Pt at another time.        03/24/22 1643   Encounter Summary   Services provided to: Patient   Referral/Consult From: Henrry   Continue Visiting   (3/24/22)   Complexity of Encounter Low   Length of Encounter 15 minutes   Routine   Type Follow up   [address] [place] [address] of presence;Prayer   Outcome Did not respond     Electronically signed by Anil Mercado, Oncology [facility], [address] [facility] Radiation Oncology  3/24/2022  4:45 PM

## 2022-03-24 NOTE — PROGRESS NOTES
Physical Therapy    Facility/Department: [facility]  Initial Assessment    NAME: Lenka Greene  : 1950  MRN: [id number]    Date of Service: 3/24/2022    Discharge Recommendations:  Patient would benefit from continued therapy after discharge     Pt currently functioning below baseline. Would suggest additional therapy at time of discharge to maximize long term outcomes and prevent re-admission. Please refer to AM-PAC score for current level of function. HPI (per chart): Lenka Greene is a 71 y.o.  female who presents with Fatigue, Chest Pain, Cough, and Back Pain. Patient admitted through the emergency room where she presented with generalized weakness and malaise. Patient said this has been progressively getting worse over the last 5 to 6 days. Patient walks with the help of a walker and says at times she would slide down and then not be able to get up from the ground. She says categorically that she did not have a fall. Patient says she felt she did not have significant strength to get up. Apparently this is happened in the past and she has been helped by her home care nurse. Today however she was visited by a friend who found her laying down possibly for several hours. EMTs were called in who brought the patient to the emergency room. Assessment   Body structures, Functions, Activity limitations: Decreased functional mobility ; Decreased ADL status; Decreased strength;Decreased safe awareness;Decreased endurance;Decreased balance;Decreased posture  Assessment: Patient would benefit from SNF as she in not ambulating functional distance and had LOB with gait that required assist to keep from falling. Patient firmly stating she will not go to SNF. Discussed with patient that it is her choice, but if she wants to return home at discharge she will need more assist for a while and she needs to talk with family and friends to make a plan to have someone there to assist her.   Prognosis: Good  Decision Making: Medium Complexity  PT Education: Goals;PT Role;Plan of Care;General Safety;Transfer Training;Gait Training  Patient Education: Patient not receptive to education. REQUIRES PT FOLLOW UP: Yes  Activity Tolerance  Activity Tolerance: Patient Tolerated treatment well       Patient Diagnosis(es): The primary encounter diagnosis was Traumatic rhabdomyolysis, initial encounter ([facility] A diagnosis of Dehydration was also pertinent to this visit. has a past medical history of AAA (abdominal aortic aneurysm) (HCC), Acid reflux, Anxiety and depression, Aortic stenosis, Arthritis, Blister of ankle, right, CAD (coronary artery disease), Cancer ([facility] COPD (chronic obstructive pulmonary disease) ([facility] Falls, Heart block, Hypokalemia, MDRO (multiple drug resistant organisms) resistance, MRSA (methicillin resistant staph aureus) culture positive, On home oxygen therapy, On home oxygen therapy, Overactive bladder, Pneumonia, PONV (postoperative nausea and vomiting), and Vitamin D deficiency. has a past surgical history that includes back surgery; eye surgery; Cholecystectomy; Appendectomy; Hysterectomy; Tonsillectomy; lumbar laminectomy; Endoscopy, colon, diagnostic (12/08/2016); aortic valve repair (N/A, 4/11/2017); bronchoscopy (N/A, 8/31/2020); IR INSERT PICC VAD W SQ PORT >5 YEARS (9/4/2020); and IR PORT PLACEMENT > 5 YEARS (9/29/2020). Restrictions  Restrictions/Precautions  Restrictions/Precautions: General Precautions,Fall Risk  Position Activity Restriction  Other position/activity restrictions:  Up with assist, telemetry, no added salt diet  Vision/Hearing  Vision: Impaired  Vision Exceptions: Wears glasses at all times  Hearing: Exceptions to [facility]  Hearing Exceptions: Hard of hearing/hearing concerns     Subjective  General  Chart Reviewed: Yes  Patient assessed for rehabilitation services?: Yes  Additional Pertinent Hx: DM, Lung Cancer, Neuropathy, depression/bipolar  Response To Previous Treatment: Not applicable  Family / Caregiver Present: No  Diagnosis: Rhabdomyolysis, tachycardia  Follows Commands: Within Functional Limits  General Comment  Comments: Yolanda Rodriguez, RN reports patient medically appropriate for PT. Patient has been adamantly refusing PT despite not being able to ambulate, but is also refusing SNF. RN reports patient is now agreeable to PT. Subjective  Subjective: Patient agreeable to PT, reports she normally is able to ambulate better than she is currently walking. Pre Treatment Pain Screening  Intervention List: Patient able to continue with treatment    Orientation  Orientation  Overall Orientation Status: Impaired  Orientation Level: Oriented to place;Oriented to time;Oriented to person;Oriented to situation  Social/Functional History  Social/Functional History  Lives With: Alone  Type of Home: House  Home Layout: Two level,Able to Live on Main level with bedroom/bathroom  Home Access: Ramped entrance  Bathroom Shower/Tub: Walk-in shower  Bathroom Toilet: Standard  Bathroom Equipment: Tub transfer bench  Home Equipment: Rolling walker  Receives Help From: Home health (3 hours/ day, 3 days a week)  ADL Assistance: Independent  Homemaking Assistance: Needs assistance (Patient cooks, HHA cleans)  Ambulation Assistance: Independent (RW, mostly in home distances. )  Transfer Assistance: Independent  Active : No  Patient's  Info: granddaughter  Occupation: Retired  Type of occupation: Factory  Additional Comments: Patient reports she has not fallen, but she was found on the floor in her home. Cognition   Cognition  Overall Cognitive Status: Exceptions  Arousal/Alertness: Appropriate responses to stimuli  Following Commands:  Follows all commands without difficulty  Attention Span: Appears intact  Memory: Appears intact  Safety Judgement: Decreased awareness of need for assistance;Decreased awareness of need for safety  Problem Solving: Assistance required to implement solutions;Assistance required to generate solutions  Insights: Fully aware of deficits  Initiation: Does not require cues  Sequencing: Does not require cues    Objective     Observation/Palpation  Posture: Poor (Kyphotic, flexed posture)  Observation: Patient with 3 liters 02, pure wick, and telemetry    AROM RLE (degrees)  RLE AROM: WFL  AROM LLE (degrees)  LLE AROM : WFL  Strength RLE  Comment: hip 4-/5, knee flex 3+/5, knee ext 4-/5, ankle 4/5  Strength LLE  Comment: hip 4-/5, knee flex 3+/5, knee ext 4-/5, ankle 4/5  Tone RLE  RLE Tone: Normotonic  Tone LLE  LLE Tone: Normotonic  Motor Control  Gross Motor?: WFL  Sensation  Overall Sensation Status: Impaired (N/T fingers of right hand)  Bed mobility  Rolling to Left: Stand by assistance  Rolling to Right: Stand by assistance  Supine to Sit: Stand by assistance  Comment: Patient required significantly increased time and effort to complete bed mobility and required use of bed rail. Patient reports she sleeps in recliner at home. Transfers  Sit to Stand: Contact guard assistance  Stand to sit: Minimal Assistance  Bed to Chair: Contact guard assistance  Stand Pivot Transfers: Contact guard assistance  Comment: Patient pulling up on walker to stand, attempted to educated patient to push up from bed, patient not receptive to education. Patient allowed self to \"fall\" into chair when sitting, min assist to control descent. Ambulation  Ambulation?: Yes  Ambulation 1  Surface: level tile  Device: Rolling Walker  Assistance: Minimal assistance  Quality of Gait: Attempted to encourage patient to ambulate further, patient not receptive. LOB backing up to chair and required therapist assist to prevent fall. Gait Deviations: Slow Gwendolyn; Increased TIFFANY;Shuffles;Decreased head and trunk rotation  Distance: 5 feet forward, 3 feet backward to chair     Balance  Sitting - Static: Good  Sitting - Dynamic: Good  Standing - Static: Fair;+ (RW)  Standing - Dynamic: Fair (RW)        Plan   Plan  Times per week: 1-2x/d, 5-6 d/wk  Current Treatment Recommendations: Strengthening,Balance Training,Functional Mobility Training,Transfer Training,Gait Training,Endurance Training,Patient/Caregiver Education & [employer] Exercise Program,Safety Education & Training  Safety Devices  Type of devices: All fall risk precautions in place,Gait belt,Nurse notified,Call light within reach,Left in bed    G-Code       OutComes Score  Balance Score: 4 (03/24/22 1742)  Gait Score: 5 (03/24/22 1742)        Tinetti Total Score: 9 (03/24/22 1742)                                   AM-PAC Score  AM-PAC Inpatient Mobility Raw Score : 13 (03/24/22 1742)  AM-PAC Inpatient T-Scale Score : 36.74 (03/24/22 1742)  Mobility Inpatient CMS 0-100% Score: 64.91 (03/24/22 1742)  Mobility Inpatient CMS G-Code Modifier : CL (03/24/22 1742)          Goals  Short term goals  Time Frame for Short term goals: 12 visits  Short term goal 1: Patient will be indep with transfers. Short term goal 2: Patient will amb 60 feet with RW indep. Short term goal 3: Patient will tolerate 30 minutes of ther-ex and ther-act. Short term goal 4: Patient will be indep with HEP.   Patient Goals   Patient goals : Reurn home       Therapy Time   Individual Concurrent Group Co-treatment   Time In 8862         Time Out 1717         Minutes 38         Timed Code Treatment Minutes: [address], PT

## 2022-03-24 NOTE — PLAN OF CARE
Problem: Breathing Pattern - Ineffective:  Goal: Ability to achieve and maintain a regular respiratory rate will improve  Description: Ability to achieve and maintain a regular respiratory rate will improve  3/24/2022 1254 by Kami Valdez RN  Outcome: Ongoing  3/24/2022 0224 by Dori Rea RN  Outcome: Ongoing     Problem: Airway Clearance - Ineffective:  Goal: Ability to maintain a clear airway will improve  Description: Ability to maintain a clear airway will improve  3/24/2022 1254 by Kami Valdez RN  Outcome: Ongoing  3/24/2022 0224 by Dori Rea RN  Outcome: Ongoing     Problem: Gas Exchange - Impaired:  Goal: Levels of oxygenation will improve  Description: Levels of oxygenation will improve  3/24/2022 1254 by Kami Valdez RN  Outcome: Ongoing  3/24/2022 0224 by Dori Rea RN  Outcome: Ongoing

## 2022-03-25 LAB
ABSOLUTE EOS #: 0.13 K/UL (ref 0–0.44)
ABSOLUTE IMMATURE GRANULOCYTE: 0.15 K/UL (ref 0–0.3)
ABSOLUTE LYMPH #: 1.21 K/UL (ref 1.1–3.7)
ABSOLUTE MONO #: 0.54 K/UL (ref 0.1–1.2)
ANION GAP SERPL CALCULATED.3IONS-SCNC: 8 MMOL/L (ref 9–17)
BASOPHILS # BLD: 1 % (ref 0–2)
BASOPHILS ABSOLUTE: 0.03 K/UL (ref 0–0.2)
BUN BLDV-MCNC: 10 MG/DL (ref 8–23)
BUN/CREAT BLD: 13 (ref 9–20)
CALCIUM SERPL-MCNC: 8.4 MG/DL (ref 8.6–10.4)
CHLORIDE BLD-SCNC: 102 MMOL/L (ref 98–107)
CO2: 30 MMOL/L (ref 20–31)
CREAT SERPL-MCNC: 0.75 MG/DL (ref 0.5–0.9)
EOSINOPHILS RELATIVE PERCENT: 2 % (ref 1–4)
GFR AFRICAN AMERICAN: >60 ML/MIN
GFR NON-AFRICAN AMERICAN: >60 ML/MIN
GFR SERPL CREATININE-BSD FRML MDRD: ABNORMAL ML/MIN/{1.73_M2}
GLUCOSE BLD-MCNC: 100 MG/DL (ref 65–105)
GLUCOSE BLD-MCNC: 163 MG/DL (ref 65–105)
GLUCOSE BLD-MCNC: 83 MG/DL (ref 65–105)
GLUCOSE BLD-MCNC: 90 MG/DL (ref 70–99)
HCT VFR BLD CALC: 28.3 % (ref 36.3–47.1)
HEMOGLOBIN: 8.5 G/DL (ref 11.9–15.1)
IMMATURE GRANULOCYTES: 2 %
LYMPHOCYTES # BLD: 19 % (ref 24–43)
MCH RBC QN AUTO: 27.2 PG (ref 25.2–33.5)
MCHC RBC AUTO-ENTMCNC: 30 G/DL (ref 28.4–34.8)
MCV RBC AUTO: 90.4 FL (ref 82.6–102.9)
MONOCYTES # BLD: 8 % (ref 3–12)
MYOGLOBIN: 31 NG/ML (ref 25–58)
NRBC AUTOMATED: 0 PER 100 WBC
PDW BLD-RTO: 13.9 % (ref 11.8–14.4)
PLATELET # BLD: 209 K/UL (ref 138–453)
PMV BLD AUTO: 9 FL (ref 8.1–13.5)
POTASSIUM SERPL-SCNC: 4.1 MMOL/L (ref 3.7–5.3)
RBC # BLD: 3.13 M/UL (ref 3.95–5.11)
SEG NEUTROPHILS: 68 % (ref 36–65)
SEGMENTED NEUTROPHILS ABSOLUTE COUNT: 4.48 K/UL (ref 1.5–8.1)
SODIUM BLD-SCNC: 140 MMOL/L (ref 135–144)
TOTAL CK: 77 U/L (ref 26–192)
WBC # BLD: 6.5 K/UL (ref 3.5–11.3)

## 2022-03-25 PROCEDURE — 36415 COLL VENOUS BLD VENIPUNCTURE: CPT

## 2022-03-25 PROCEDURE — 94761 N-INVAS EAR/PLS OXIMETRY MLT: CPT

## 2022-03-25 PROCEDURE — 80048 BASIC METABOLIC PNL TOTAL CA: CPT

## 2022-03-25 PROCEDURE — 2700000000 HC OXYGEN THERAPY PER DAY

## 2022-03-25 PROCEDURE — 97167 OT EVAL HIGH COMPLEX 60 MIN: CPT

## 2022-03-25 PROCEDURE — 97530 THERAPEUTIC ACTIVITIES: CPT

## 2022-03-25 PROCEDURE — 1200000000 HC SEMI PRIVATE

## 2022-03-25 PROCEDURE — 82550 ASSAY OF CK (CPK): CPT

## 2022-03-25 PROCEDURE — 2580000003 HC RX 258: Performed by: FAMILY MEDICINE

## 2022-03-25 PROCEDURE — 94640 AIRWAY INHALATION TREATMENT: CPT

## 2022-03-25 PROCEDURE — 6370000000 HC RX 637 (ALT 250 FOR IP): Performed by: INTERNAL MEDICINE

## 2022-03-25 PROCEDURE — 97535 SELF CARE MNGMENT TRAINING: CPT

## 2022-03-25 PROCEDURE — 6370000000 HC RX 637 (ALT 250 FOR IP): Performed by: FAMILY MEDICINE

## 2022-03-25 PROCEDURE — 6370000000 HC RX 637 (ALT 250 FOR IP): Performed by: HOSPITALIST

## 2022-03-25 PROCEDURE — 97110 THERAPEUTIC EXERCISES: CPT

## 2022-03-25 PROCEDURE — 97116 GAIT TRAINING THERAPY: CPT

## 2022-03-25 PROCEDURE — 85025 COMPLETE CBC W/AUTO DIFF WBC: CPT

## 2022-03-25 PROCEDURE — 82947 ASSAY GLUCOSE BLOOD QUANT: CPT

## 2022-03-25 PROCEDURE — 83874 ASSAY OF MYOGLOBIN: CPT

## 2022-03-25 RX ORDER — ALBUTEROL SULFATE 90 UG/1
2 AEROSOL, METERED RESPIRATORY (INHALATION) EVERY 4 HOURS PRN
Qty: 18 G | Refills: 3 | Status: SHIPPED | OUTPATIENT
Start: 2022-03-25

## 2022-03-25 RX ORDER — METOPROLOL TARTRATE 50 MG/1
50 TABLET, FILM COATED ORAL 2 TIMES DAILY
Qty: 60 TABLET | Refills: 3 | Status: ON HOLD | OUTPATIENT
Start: 2022-03-25 | End: 2022-05-21 | Stop reason: HOSPADM

## 2022-03-25 RX ORDER — BACITRACIN, NEOMYCIN, POLYMYXIN B 400; 3.5; 5 [USP'U]/G; MG/G; [USP'U]/G
OINTMENT TOPICAL DAILY
Qty: 10 G | Refills: 0 | Status: ON HOLD | OUTPATIENT
Start: 2022-03-25 | End: 2022-04-06 | Stop reason: HOSPADM

## 2022-03-25 RX ORDER — TAMSULOSIN HYDROCHLORIDE 0.4 MG/1
0.4 CAPSULE ORAL DAILY
Qty: 30 CAPSULE | Refills: 0 | Status: ON HOLD | OUTPATIENT
Start: 2022-03-26 | End: 2022-05-14

## 2022-03-25 RX ADMIN — TAMSULOSIN HYDROCHLORIDE 0.4 MG: 0.4 CAPSULE ORAL at 09:09

## 2022-03-25 RX ADMIN — BUDESONIDE AND FORMOTEROL FUMARATE DIHYDRATE 2 PUFF: 160; 4.5 AEROSOL RESPIRATORY (INHALATION) at 08:02

## 2022-03-25 RX ADMIN — CLONAZEPAM 1 MG: 0.5 TABLET ORAL at 13:24

## 2022-03-25 RX ADMIN — METOPROLOL TARTRATE 50 MG: 50 TABLET, FILM COATED ORAL at 20:43

## 2022-03-25 RX ADMIN — POLYMYXIN B SULFATE, BACITRACIN ZINC, NEOMYCIN SULFATE: 5000; 3.5; 4 OINTMENT TOPICAL at 09:14

## 2022-03-25 RX ADMIN — CLONAZEPAM 1 MG: 0.5 TABLET ORAL at 21:57

## 2022-03-25 RX ADMIN — METOPROLOL TARTRATE 50 MG: 50 TABLET, FILM COATED ORAL at 09:09

## 2022-03-25 RX ADMIN — PANTOPRAZOLE SODIUM 20 MG: 20 TABLET, DELAYED RELEASE ORAL at 06:36

## 2022-03-25 RX ADMIN — METFORMIN HYDROCHLORIDE 500 MG: 500 TABLET ORAL at 17:18

## 2022-03-25 RX ADMIN — GABAPENTIN 800 MG: 400 CAPSULE ORAL at 09:10

## 2022-03-25 RX ADMIN — VENLAFAXINE HYDROCHLORIDE 37.5 MG: 37.5 CAPSULE, EXTENDED RELEASE ORAL at 09:10

## 2022-03-25 RX ADMIN — ARIPIPRAZOLE 5 MG: 5 TABLET ORAL at 09:09

## 2022-03-25 RX ADMIN — SODIUM CHLORIDE, PRESERVATIVE FREE 10 ML: 5 INJECTION INTRAVENOUS at 09:36

## 2022-03-25 RX ADMIN — ASPIRIN 81 MG: 81 TABLET, COATED ORAL at 09:09

## 2022-03-25 RX ADMIN — OXYCODONE AND ACETAMINOPHEN 1 TABLET: 325; 10 TABLET ORAL at 09:12

## 2022-03-25 RX ADMIN — OXYCODONE AND ACETAMINOPHEN 1 TABLET: 325; 10 TABLET ORAL at 20:44

## 2022-03-25 RX ADMIN — METFORMIN HYDROCHLORIDE 500 MG: 500 TABLET ORAL at 09:09

## 2022-03-25 RX ADMIN — GABAPENTIN 800 MG: 400 CAPSULE ORAL at 20:43

## 2022-03-25 RX ADMIN — VENLAFAXINE HYDROCHLORIDE 37.5 MG: 37.5 CAPSULE, EXTENDED RELEASE ORAL at 20:43

## 2022-03-25 ASSESSMENT — PAIN DESCRIPTION - ORIENTATION: ORIENTATION: LOWER

## 2022-03-25 ASSESSMENT — PAIN DESCRIPTION - LOCATION: LOCATION: BACK;CHEST

## 2022-03-25 ASSESSMENT — PAIN DESCRIPTION - PROGRESSION: CLINICAL_PROGRESSION: NOT CHANGED

## 2022-03-25 ASSESSMENT — PAIN DESCRIPTION - DESCRIPTORS: DESCRIPTORS: ACHING

## 2022-03-25 ASSESSMENT — PAIN SCALES - WONG BAKER: WONGBAKER_NUMERICALRESPONSE: 0

## 2022-03-25 ASSESSMENT — PAIN DESCRIPTION - ONSET: ONSET: ON-GOING

## 2022-03-25 ASSESSMENT — PAIN SCALES - GENERAL
PAINLEVEL_OUTOF10: 10
PAINLEVEL_OUTOF10: 6

## 2022-03-25 ASSESSMENT — PAIN DESCRIPTION - PAIN TYPE: TYPE: CHRONIC PAIN

## 2022-03-25 ASSESSMENT — PAIN DESCRIPTION - FREQUENCY: FREQUENCY: CONTINUOUS

## 2022-03-25 ASSESSMENT — PAIN - FUNCTIONAL ASSESSMENT: PAIN_FUNCTIONAL_ASSESSMENT: PREVENTS OR INTERFERES WITH ALL ACTIVE AND SOME PASSIVE ACTIVITIES

## 2022-03-25 NOTE — PROGRESS NOTES
[address] Hospitalist        3/25/2022   5:30 PM    Name:  Cesia Horan  MRN:    [id number]     [place]:     [de-identified]   Room:  2014/2014-01  IP Day: 7     Admit Date: 3/18/2022  1:59 PM  PCP: Cheri Saleh MD    C/C:   Chief Complaint   Patient presents with   Jv Sampson Fatigue    Chest Pain    Cough    Back Pain       Assessment:      · Acute rhabdomyolysis-improving  · L sided face lesion  · Acute respiratory insufficiency  · Essential hypertension  · Diabetes mellitus type 2  · Diabetic neuropathy  · Mixed hyperlipidemia  · Chronic pain syndrome  · Gastroesophageal reflux disease without esophagitis  · Major depressive disorder/bipolar disorder  · Chronic obstructive pulmonary disease, unspecified  · Lung mass  · Left face abrasion  · Hypertensive urgency  · Tachycardia-improved          Plan:     Patient is on intermediate floor  Oxygen to maintain oxygen saturation greater than 92%    IV fluids dced  Lasix on hold  Monitor CK myoglobin and creatinine    Cardiology consult      ID consult for L Face lesion  Off Zosyn,  topical care    DVT and GI prophylaxis  Continue to monitor/telemetry/CBC with differential daily/BMP daily  Continue medications as below    Declining to go to skilled nurse care facility, also declining further PT OT  Discharge to home with home health care      Scheduled Meds:   metoprolol tartrate  50 mg Oral BID    tamsulosin  0.4 mg Oral Daily    gabapentin  800 mg Oral BID    melatonin  10.5 mg Oral Nightly    metFORMIN  500 mg Oral BID WC    traZODone  300 mg Oral Nightly    glipiZIDE  2.5 mg Oral QAM AC    neomycin-bacitracin-polymyxin   Topical BID    ARIPiprazole  5 mg Oral Daily    aspirin EC  81 mg Oral Daily    pantoprazole  20 mg Oral QAM AC    budesonide-formoterol  2 puff Inhalation BID    venlafaxine  37.5 mg Oral BID    sodium chloride flush  5-40 mL IntraVENous 2 times per day    heparin (porcine)  5,000 Units SubCUTAneous 3 times per day     Continuous Infusions:   dextrose      sodium chloride Stopped (22 0739)     PRN Meds:  potassium chloride, 40 mEq, PRN   Or  potassium alternative oral replacement, 40 mEq, PRN   Or  potassium chloride, 10 mEq, PRN  glucose, 15 g, PRN  glucagon (rDNA), 1 mg, PRN  dextrose, 100 mL/hr, PRN  dextrose bolus (hypoglycemia), 125 mL, PRN   Or  dextrose bolus (hypoglycemia), 250 mL, PRN  albuterol sulfate HFA, 2 puff, Q4H PRN  metoprolol, 2.5 mg, Q6H PRN  oxyCODONE-acetaminophen, 1 tablet, Q4H PRN  clonazePAM, 1 mg, TID PRN  sodium chloride flush, 5-40 mL, PRN  sodium chloride, 25 mL, PRN  ondansetron, 4 mg, Q8H PRN   Or  ondansetron, 4 mg, Q6H PRN  acetaminophen, 650 mg, Q6H PRN   Or  acetaminophen, 650 mg, Q6H PRN            Subjective:     Patient seen and examined at bedside. No overnight events. No acute complaints today. Afebrile  Pt. Denies any CP, SOB, palpitation, HA, dizziness, chills, cough, cold, changes in urination, BM or skin changes or any pain. ROS:  A 10 point system reviewed and negative otherwise mentioned above. Physical Examination:      Vitals:  /60   Pulse 76   Temp 98.2 °F (36.8 °C) (Oral)   Resp 16   Ht 5' 8\" (1.727 m)   Wt 167 lb (75.8 kg)   SpO2 97%   BMI 25.39 kg/m²   Temp (24hrs), Av °F (36.7 °C), Min:97.4 °F (36.3 °C), Max:98.2 °F (36.8 °C)    Weight:   Wt Readings from Last 3 Encounters:   22 167 lb (75.8 kg)   22 181 lb 14.4 oz (82.5 kg)   21 186 lb 3.2 oz (84.5 kg)     I/O last 3 completed shifts:  I/O last 3 completed shifts:   In: 56 [P.O.:490]  Out: [address]     22  1642 22  1957 22  0552 22  1601   POCGLU 135* 185* 83 100         General appearance - alert, well appearing, and in no acute distress  Mental status - oriented to person, place, and time with normal affect  Head - normocephalic and atraumatic  Eyes - pupils equal and reactive, extraocular eye movements intact, conjunctiva clear  Ears - hearing appears to be intact  Nose - no drainage noted  Mouth - mucous membranes moist  Neck - supple, no carotid bruits, thyroid not palpable  Chest - clear to auscultation, normal effort  Heart - normal rate, regular rhythm, no murmur  Abdomen - soft, nontender, nondistended, bowel sounds present all four quadrants, no masses, hepatomegaly or splenomegaly  Neurological - normal speech, no focal findings or movement disorder noted, cranial nerves II through XII grossly intact  Extremities - peripheral pulses palpable, no pedal edema or calf pain with palpation  Skin -left face necrotic lesion        Medications: Allergies:    Allergies   Allergen Reactions    Codeine      \"feeling of heavy on chest\"    Dye [Iodides]      Hallucination,vomiting       Current Meds:   Current Facility-Administered Medications:     potassium chloride (KLOR-CON M) extended release tablet 40 mEq, 40 mEq, Oral, PRN, 40 mEq at 03/21/22 0942 **OR** potassium bicarb-citric acid (EFFER-K) effervescent tablet 40 mEq, 40 mEq, Oral, PRN **OR** potassium chloride 10 mEq/100 mL IVPB (Peripheral Line), 10 mEq, IntraVENous, PRN, Leonora Heart MD    metoprolol tartrate (LOPRESSOR) tablet 50 mg, 50 mg, Oral, BID, Adal Bolanos MD, 50 mg at 03/25/22 0909    tamsulosin (FLOMAX) capsule 0.4 mg, 0.4 mg, Oral, Daily, Leonora Heart MD, 0.4 mg at 03/25/22 0909    gabapentin (NEURONTIN) capsule 800 mg, 800 mg, Oral, BID, Whitney Becerril MD, 800 mg at 03/25/22 0910    melatonin tablet 10.5 mg, 10.5 mg, Oral, Nightly, Whitney Becerril MD, 10.5 mg at 03/24/22 2040    metFORMIN (GLUCOPHAGE) tablet 500 mg, 500 mg, Oral, BID , Whitney Becerril MD, 500 mg at 03/25/22 1718    traZODone (DESYREL) tablet 300 mg, 300 mg, Oral, Nightly, Whitney Becerril MD, 300 mg at 03/24/22 2040    glipiZIDE (GLUCOTROL) tablet 2.5 mg, 2.5 mg, Oral, QAM AC, Whitney Becerril MD, 2.5 mg at 03/23/22 0823    glucose (GLUTOSE) 40 % oral gel 15 g, 15 g, Oral, PRN, Whitney Becerril MD    glucagon (rDNA) injection 1 mg, 1 mg, IntraMUSCular, PRN, Whitney Becerril MD    dextrose 5 % solution, 100 mL/hr, IntraVENous, PRN, Whitney Becerril MD    dextrose bolus (hypoglycemia) 10% 125 mL, 125 mL, IntraVENous, PRN **OR** dextrose bolus (hypoglycemia) 10% 250 mL, 250 mL, IntraVENous, PRN, Whitney Becerril MD    albuterol sulfate  (90 Base) MCG/ACT inhaler 2 puff, 2 puff, Inhalation, Q4H PRN, Whitney Becerril MD    metoprolol (LOPRESSOR) injection 2.5 mg, 2.5 mg, IntraVENous, Q6H PRN, Grupo Edge MD, 2.5 mg at 03/19/22 1615    oxyCODONE-acetaminophen (PERCOCET)  MG per tablet 1 tablet, 1 tablet, Oral, Q4H PRN, Grupo Edge MD, 1 tablet at 03/25/22 0912    neomycin-bacitracin-polymyxin (NEOSPORIN) ointment, , Topical, BID, Grupo Edge MD, Given at 03/25/22 0914    ARIPiprazole (ABILIFY) tablet 5 mg, 5 mg, Oral, Daily, Whitney Becerril MD, 5 mg at 03/25/22 0909    aspirin EC tablet 81 mg, 81 mg, Oral, Daily, Whitney Becerril MD, 81 mg at 03/25/22 0909    clonazePAM (KLONOPIN) tablet 1 mg, 1 mg, Oral, TID PRN, Grupo Edge MD, 1 mg at 03/25/22 1324    pantoprazole (PROTONIX) tablet 20 mg, 20 mg, Oral, QAM AC, Whitney Becerril MD, 20 mg at 03/25/22 0636    budesonide-formoterol (SYMBICORT) 160-4.5 MCG/ACT inhaler 2 puff, 2 puff, Inhalation, BID, Whitney Becerril MD, 2 puff at 03/25/22 0802    venlafaxine (EFFEXOR XR) extended release capsule 37.5 mg, 37.5 mg, Oral, BID, Whtiney Becerril MD, 37.5 mg at 03/25/22 0910    sodium chloride flush 0.9 % injection 5-40 mL, 5-40 mL, IntraVENous, 2 times per day, Grupo Edge MD, 10 mL at 03/25/22 0936    sodium chloride flush 0.9 % injection 5-40 mL, 5-40 mL, IntraVENous, PRN, Whitney Becerril MD    0.9 % sodium chloride infusion, 25 mL, IntraVENous, PRN, Grupo Edge MD, Paused at 03/22/22 0739    ondansetron (ZOFRAN-ODT) disintegrating tablet 4 mg, 4 mg, Oral, Q8H PRN **OR** ondansetron (ZOFRAN) injection 4 mg, 4 mg, IntraVENous, Q6H PRN, Whitney Becerril MD    acetaminophen (TYLENOL) tablet 650 mg, 650 mg, Oral, Q6H PRN **OR** acetaminophen (TYLENOL) suppository 650 mg, 650 mg, Rectal, Q6H PRN, Whitney Becerril MD    heparin (porcine) injection 5,000 Units, 5,000 Units, SubCUTAneous, 3 times per day, Cinthya Eli MD      I/O (24Hr):     Intake/Output Summary (Last 24 hours) at 3/25/2022 1730  Last data filed at 3/25/2022 1052  Gross per 24 hour   Intake 240 ml   Output 1150 ml   Net -910 ml       Data:           Labs:    Hematology:  Recent Labs     03/23/22  0636 03/24/22  0629 03/25/22  0521   WBC 6.6 5.8 6.5   RBC 3.58* 3.18* 3.13*   HGB 9.7* 8.7* 8.5*   HCT 32.4* 28.2* 28.3*   MCV 90.5 88.7 90.4   MCH 27.1 27.4 27.2   MCHC 29.9 30.9 30.0   RDW 13.7 13.8 13.9    191 209   MPV 8.8 8.8 9.0     Chemistry:  Recent Labs     03/23/22  0636 03/24/22  0629 03/25/22  0521    137 140   K 4.2 3.6* 4.1    101 102   CO2 30 31 30   GLUCOSE 130* 108* 90   BUN 13 11 10   CREATININE 0.75 0.65 0.75   ANIONGAP 9 5* 8*   LABGLOM >60 >60 >60   GFRAA >60 >60 >60   CALCIUM 8.8 8.3* 8.4*   CKTOTAL 127 107 77   MYOGLOBIN 40 39 31     Recent Labs     03/24/22  0628 03/24/22  1119 03/24/22  1642 03/24/22  1957 03/25/22  0552 03/25/22  1601   POCGLU 103 119* 135* 185* 83 100       Lab Results   Component Value Date/Time    SPECIAL NOT REPORTED 11/24/2021 06:32 PM     Lab Results   Component Value Date/Time    CULTURE NO GROWTH 6 DAYS 11/24/2021 06:32 PM       Lab Results   Component Value Date    POCPH 7.36 04/12/2017    PHART 7.42 08/26/2012    PH 7.22 04/11/2017    POCPCO2 45 04/12/2017    ULI4LPC 41 08/26/2012    PCO2 54 04/11/2017    POCPO2 93 04/12/2017    PO2ART 59 08/26/2012    PO2 89 04/11/2017    POCHCO3 25.3 04/12/2017    DLT2EXK 26.1 08/26/2012    HCO3 22.2 04/11/2017    NBEA NOT REPORTED 04/12/2017    PBEA 0 04/12/2017    WIS0QSZ 27 04/12/2017    PMFB5OTI 97 04/12/2017    O3XJSCXA 92.1 08/26/2012    O2SAT 95 04/11/2017    FIO2 UNKNOWN 10/31/2017       Radiology:    XR CHEST PORTABLE    Result Date: 3/20/2022  New electronic device left pulmonary apex. Otherwise stable chest.     XR CHEST PORTABLE    Result Date: 3/18/2022  No acute process. All radiological studies reviewed  Code Status:  Full Code        Electronically signed by Jw Knight MD on 3/25/2022 at 5:30 PM    This note was created with the assistance of a speech-recognition program.  Although the intention is to generate a document that actually reflects the content of the visit, no guarantees can be provided that every mistake has been identified and corrected by editing. Note was updated later by me after  physical examination and  completion of the assessment.

## 2022-03-25 NOTE — PROGRESS NOTES
Occupational Therapy   Occupational Therapy Initial Assessment  Date: 3/25/2022   Patient Name: Antwon Damon  MRN: [id number]     : 1950    Date of Service: 3/25/2022    Discharge Recommendations:  Patient would benefit from continued therapy after discharge Pt currently functioning below baseline. Would suggest additional therapy at time of discharge to maximize long term outcomes and prevent re-admission. Please refer to AM-PAC score for current level of function. RN reports patient is medically stable for therapy treatment this date. Chart reviewed prior to treatment and patient is agreeable for therapy. All lines intact and patient positioned comfortably at end of treatment. All patient needs addressed prior to ending therapy session. Assessment   Performance deficits / Impairments: Decreased functional mobility ; Decreased ADL status; Decreased strength;Decreased safe awareness;Decreased endurance;Decreased balance;Decreased posture;Decreased cognition  Assessment: Skilled OT is indicated to increase overall safety awareness in function as well as strenght, balance, ADL status, functional mobility, and cognition to improve functional outcomes, I, and return to home. Prognosis: Fair  Decision Making: High Complexity  OT Education: OT Role;Plan of Care;Home Exercise Program;Precautions; ADL Adaptive Strategies;Transfer Training;Energy Conservation;Equipment; Family Education  Patient Education: attempting to ed on fall prevention techs, safety, use of call light  REQUIRES OT FOLLOW UP: Yes  Activity Tolerance  Activity Tolerance: Patient limited by fatigue  Safety Devices  Safety Devices in place: Yes  Type of devices: Call light within reach;Nurse notified;Gait belt;Patient at risk for falls; Left in bed;Bed alarm in place           Patient Diagnosis(es): The primary encounter diagnosis was Traumatic rhabdomyolysis, initial encounter ([facility] [address]  A diagnosis of Dehydration was also pertinent to this visit.     has a past medical history of AAA (abdominal aortic aneurysm) ([facility] [address] Acid reflux, Anxiety and depression, Aortic stenosis, Arthritis, Blister of ankle, right, CAD (coronary artery disease), Cancer ([facility] [address] COPD (chronic obstructive pulmonary disease) ([facility] [address] [facility] Falls, Heart block, Hypokalemia, MDRO (multiple drug resistant organisms) resistance, MRSA (methicillin resistant staph aureus) culture positive, On home oxygen therapy, On home oxygen therapy, Overactive bladder, Pneumonia, PONV (postoperative nausea and vomiting), and Vitamin D deficiency. has a past surgical history that includes back surgery; eye surgery; Cholecystectomy; Appendectomy; Hysterectomy; Tonsillectomy; lumbar laminectomy; Endoscopy, colon, diagnostic (12/08/2016); aortic valve repair (N/A, 4/11/2017); bronchoscopy (N/A, 8/31/2020); IR INSERT PICC VAD W SQ PORT >5 YEARS (9/4/2020); and IR PORT PLACEMENT > 5 YEARS (9/29/2020). Restrictions  Restrictions/Precautions  Restrictions/Precautions: General Precautions,Fall Risk  Position Activity Restriction  Other position/activity restrictions:  Up with assist, 02, no added salt diet    Subjective   General  Chart Reviewed: Yes  Patient assessed for rehabilitation services?: Yes  Family / Caregiver Present: No  Vital Signs  Temp: 98.1 °F (36.7 °C)  Temp Source: Oral  Pulse: 78  Heart Rate Source: Monitor  Resp: 16  BP: (!) 120/52  MAP (mmHg): 70  Oxygen Therapy  SpO2: 96 %  O2 Device: Nasal cannula  O2 Flow Rate (L/min): 2 L/min  Social/Functional History  Social/Functional History  Lives With: Alone  Type of Home: House  Home Layout: Two level,Able to Live on Main level with bedroom/bathroom  Home Access: Ramped entrance  Bathroom Shower/Tub: Walk-in shower  Bathroom Toilet: Standard  Bathroom Equipment: Tub transfer bench  Home Equipment: Rolling walker  Receives Help From: Home health (3 hours/ day, 3 days a week)  ADL Assistance: [facility] Assistance: Needs assistance (Patient cooks, HHA cleans)  Ambulation Assistance: Independent (RW, mostly in home distances. )  Transfer Assistance: Independent  Active : No  Patient's  Info: granddaughter  Occupation: Retired  Type of occupation: Factory  Additional Comments: Patient reports she has not fallen, but she was found on the floor in her home. Objective   Vision: Impaired  Vision Exceptions: Wears glasses at all times  Hearing: Exceptions to [facility]  Hearing Exceptions: Hard of hearing/hearing concerns    Orientation  Overall Orientation Status: Within Functional Limits  Observation/Palpation  Posture: Poor (Kyphotic, flexed posture)  Observation: Patient with 3 liters 02, pure wick, and telemetry. Pt agreeable to session this am  Balance  Sitting Balance: Contact guard assistance  Standing Balance: Moderate assistance (fluctuates between min A and mod A x 2 depending on fatigue level and safety awareness.)  Functional Mobility  Functional - Mobility Device: Rolling Walker  Assist Level: Moderate assistance (x2 at times; pt completed 2 trials of mob; both times pt only able to tolerate short distance (not functional household distance) before needing to sit. Pt needing min A for a few steps and then up to mod A x 2 when turning and getting back to chair)  Functional Mobility Comments: pt iis limited by dec. balance (loses balance posteriorly), poor safety awareness, and dec. activity tolerance. Pt needs repetition of instructions to improve safety, but pt resists education and becomes easily irritated. ADL  Feeding: Independent;Setup  Grooming: Minimal assistance;Setup  UE Bathing: Minimal assistance; Moderate assistance  LE Bathing: Maximum assistance  UE Dressing: Minimal assistance  LE Dressing: Maximum assistance  Toileting: Maximum assistance (max A for brief management/changing purewick)  Additional Comments: pt is limited by dec. activity tolerance, dec.  safety awareness, posterior LOB frequently in standing. Tone RUE  RUE Tone: Normotonic  Tone LUE  LUE Tone: Normotonic  Coordination  Movements Are Fluid And Coordinated: Yes     Bed mobility  Rolling to Left: Modified independent  Rolling to Right: Modified independent  Supine to Sit: Minimal assistance  Sit to Supine: Independent  Scooting: Supervision  Comment: pt struggling to perform sup to sit and wanted assistance, but became angry when therapist attempted to assist. Pt ended up needing assist to bring trunk up to sitting. Transfers  Sit to stand: Minimal assistance;2 Person assistance; Moderate assistance  Stand to sit: Minimal assistance; Moderate assistance;2 Person assistance  Transfer Comments: Patient min x 1 for sit to stand from bed, varied from min x 1 to mod x 2 for sit to stand from chair. Patient pulls up on walker, but not receptive to education. Patient varied from min x 1 to mod x 2 for safe stand to sit as she cannot control her descent and keeps hand son walker instead of reaching back for chair. Patient became very angry when therapist attempted to educate her to reach back to chair to control descent. Performed sit <->stand x 6 repetition. Mod x 2 for pivot tranfers due to LOB with with turns. Cognition  Arousal/Alertness: Appropriate responses to stimuli  Following Commands:  Follows multistep commands with repitition  Attention Span: Appears intact  Memory: Appears intact  Safety Judgement: Decreased awareness of need for assistance;Decreased awareness of need for safety  Problem Solving: Assistance required to implement solutions;Assistance required to generate solutions;Decreased awareness of errors;Assistance required to identify errors made;Assistance required to correct errors made  Insights: Decreased awareness of deficits  Initiation: Requires cues for some  Sequencing: Requires cues for some  Perception  Overall Perceptual Status: WFL     Sensation  Overall Sensation Status: Impaired (N/T fingers of right hand)        LUE AROM (degrees)  LUE AROM : WFL  RUE AROM (degrees)  RUE AROM : WFL  LUE Strength  Gross LUE Strength: WFL  LUE Strength Comment: B UE's 3+/5  RUE Strength  Gross RUE Strength: WFL                   Plan   Plan  Times per week: 4-5x/week  Current Treatment Recommendations: Strengthening,Balance Training,Functional Mobility Training,Endurance Training,Safety Education & Training,Self-Care / ADL,Patient/Caregiver Education & Training,Equipment Evaluation, Education, & procurement,Positioning,Cognitive/Perceptual Training       AM-[facility] Inpatient Daily Activity Raw Score: 15 (03/25/22 1226)  AM-PAC Inpatient ADL T-Scale Score : 34.69 (03/25/22 1226)  ADL Inpatient CMS 0-100% Score: 56.46 (03/25/22 1226)  ADL Inpatient CMS G-Code Modifier : CK (03/25/22 1226)    Goals  Short term goals  Time Frame for Short term goals: by discharge, pt will  Short term goal 1: demo CGA with ADL transfers with approp AD/DME and good safety  Short term goal 2: demo CGA with functional mob in room with good safety, pacing for ADL completion with approp AD  Short term goal 3: demo CGA with toileting routine with good safety/DME as needed  Short term goal 4: demo and verb good understanding of fall prevention techs, EC/WS techs, equip needs, d/c recommendations, and B UE HEP  Short term goal 5: demo SBA with UB ADLs and min A LB AdLs with good safety/pacing and AE/DME as needed  Patient Goals   Patient goals : to go home (pt is refusing SNF/home OT)       Therapy Time   Individual Concurrent Group Co-treatment   Time In 0920         Time Out 1003         Minutes 43          treatment min: 27  Co-treatment with PT warranted secondary to decreased safety and independence requiring 2 skilled therapy professionals to address individual discipline's goals.  OT addressing preparation for ADL transfer, sitting balance for increased ADL performance, sitting/activity tolerance, functional reaching, environmental safety/scanning, fall prevention, functional mobility for ADL transfers, ability to sequence and follow directions and functional UE strength.        Jessica Kerry, OT

## 2022-03-25 NOTE — PROGRESS NOTES
diagnosis of Dehydration was also pertinent to this visit. has a past medical history of AAA (abdominal aortic aneurysm) (HCC), Acid reflux, Anxiety and depression, Aortic stenosis, Arthritis, Blister of ankle, right, CAD (coronary artery disease), Cancer ([facility] [address] COPD (chronic obstructive pulmonary disease) ([facility] [address] Diabetes mellitus ([facility] [address] Falls, Heart block, Hypokalemia, MDRO (multiple drug resistant organisms) resistance, MRSA (methicillin resistant staph aureus) culture positive, On home oxygen therapy, On home oxygen therapy, Overactive bladder, Pneumonia, PONV (postoperative nausea and vomiting), and Vitamin D deficiency. has a past surgical history that includes back surgery; eye surgery; Cholecystectomy; Appendectomy; Hysterectomy; Tonsillectomy; lumbar laminectomy; Endoscopy, colon, diagnostic (12/08/2016); aortic valve repair (N/A, 4/11/2017); bronchoscopy (N/A, 8/31/2020); IR INSERT PICC VAD W SQ PORT >5 YEARS (9/4/2020); and IR PORT PLACEMENT > 5 YEARS (9/29/2020). Restrictions  Restrictions/Precautions  Restrictions/Precautions: General Precautions,Fall Risk  Position Activity Restriction  Other position/activity restrictions: Up with assist, 02, no added salt diet  Subjective   General  Chart Reviewed: Yes  Additional Pertinent Hx: DM, Lung Cancer, Neuropathy, depression/bipolar  Response To Previous Treatment: Patient with no complaints from previous session. Family / Caregiver Present: No  Subjective  Subjective: Patient agreed to PT treatment. General Comment  Comments: Seth Jean-Baptiste RN reports patient medically appropriate for PT, reports that yesterday it took 2 people and faye murillo to get her out of chair. Orientation  Orientation  Overall Orientation Status: Within Functional Limits  Cognition   Cognition  Arousal/Alertness: Appropriate responses to stimuli  Following Commands:  Follows all commands without difficulty  Attention Span: Appears intact  Memory: Appears intact  Safety Judgement: Decreased awareness of need for assistance;Decreased awareness of need for safety  Problem Solving: Assistance required to implement solutions;Assistance required to generate solutions;Decreased awareness of errors;Assistance required to identify errors made;Assistance required to correct errors made  Insights: Fully aware of deficits  Initiation: Does not require cues  Sequencing: Does not require cues  Objective   Bed mobility  Rolling to Left: Modified independent  Rolling to Right: Modified independent  Supine to Sit: Minimal assistance  Sit to Supine: Independent  Scooting: Supervision  Comment: Patient struggling to perform supine to sit and wanted assistance, but became angry when Pt attempted to assist with legs stating that it hurt, patient then moved her legs on her own to get OOB, did require min assist with trunk to push up to sitting. Patient SOB with bed mobility and required about 2 minutes of recovery. Transfers  Sit to Stand: Moderate Assistance;2 Person Assistance  Stand to sit: Moderate Assistance  Bed to Chair: Minimal assistance;2 Person Assistance  Stand Pivot Transfers: Minimal Assistance;2 Person Assistance  Comment: Patient min x 1 for sit to stand from bed, varied from min x 1 to mod x 2 for sit to stand from chair. Patient pulls up on walker, but not receptive to education. Patient varied from min x 1 to mod x 2 for safe stand to sit as she cannot control her descent and keeps hand son walker instead of reaching back for chair. Patient became very angry when PT attempted to educate her to reach back to chair to control descent. Performed sit <->stand x 6 repetition. Mod x 2 for pivot tranfers due to LOB with with turns.   Ambulation  Ambulation?: Yes  Ambulation 1  Surface: level tile  Device: Rolling Walker  Assistance: Minimal assistance;2 Person assistance  Quality of Gait: Patient demo posterior LOB multiple times with assist for recovery, demo need to sit suddenly without warning. Dependent for managing 02 line. Patel is NOT able to ambulate functional distance. Gait Deviations: Slow Gwendolny; Increased TIFFANY;Shuffles;Decreased head and trunk rotation;Decreased step length;Decreased step height  Distance: 10 feet x 2     Balance  Sitting - Static: Good  Sitting - Dynamic: Good  Standing - Static: Fair;+ (RW)  Standing - Dynamic: Fair;- (RW)  Exercises  Gluteal Sets: 5x  Hip Flexion: 5x  Knee Long Arc Quad: 10x     OutComes Score    AM-PAC Score  AM-PAC Inpatient Mobility Raw Score : 11 (03/25/22 1131)  AM-PAC Inpatient T-Scale Score : 33.86 (03/25/22 1131)  Mobility Inpatient CMS 0-100% Score: 72.57 (03/25/22 1131)  Mobility Inpatient CMS G-Code Modifier : CL (03/25/22 1131)          Goals  Short term goals  Time Frame for Short term goals: 12 visits  Short term goal 1: Patient will be indep with transfers. Short term goal 2: Patient will amb 60 feet with RW indep. Short term goal 3: Patient will tolerate 30 minutes of ther-ex and ther-act. Short term goal 4: Patient will be indep with HEP. Patient Goals   Patient goals : Reurn home    Plan    Plan  Times per week: 1-2x/d, 5-6 d/wk  Current Treatment Recommendations: Strengthening,Balance Training,Functional Mobility Training,Transfer Training,Gait Training,Endurance Training,Patient/Caregiver Education & [employer] Exercise Program,Safety Education & Training  Safety Devices  Type of devices: All fall risk precautions in place,Gait belt,Nurse notified,Call light within reach,Bed alarm in place,Left in bed (Encouraged patient to sit up on chair after visit, but patient reports she is too tired,)   Co-treatment with OT warranted secondary to decreased safety and independence requiring 2 skilled therapy professionals to address individual discipline's goals. PT addressing pre gait trunk strengthening, weight shifting prior to transfers and transfer training.   Therapy Time   Individual Concurrent Group Co-treatment   Time In 7136 Time Out 1008         Minutes [address] [facility], [address] [facility]

## 2022-03-25 NOTE — PROGRESS NOTES
 tamsulosin  0.4 mg Oral Daily    gabapentin  800 mg Oral BID    melatonin  10.5 mg Oral Nightly    metFORMIN  500 mg Oral BID WC    traZODone  300 mg Oral Nightly    glipiZIDE  2.5 mg Oral QAM AC    neomycin-bacitracin-polymyxin   Topical BID    ARIPiprazole  5 mg Oral Daily    aspirin EC  81 mg Oral Daily    pantoprazole  20 mg Oral QAM AC    budesonide-formoterol  2 puff Inhalation BID    venlafaxine  37.5 mg Oral BID    sodium chloride flush  5-40 mL IntraVENous 2 times per day    heparin (porcine)  5,000 Units SubCUTAneous 3 times per day       IV Infusions (if any):   dextrose      sodium chloride Stopped (03/22/22 0739)       Diagnostics:   EKG: . ECHO: .   Ejection fraction: %  Stress Test: .  Cardiac Angiography: .    Labs:   CBC:   Recent Labs     03/24/22  0629 03/25/22  0521   WBC 5.8 6.5   HGB 8.7* 8.5*   HCT 28.2* 28.3*    209     BMP:   Recent Labs     03/24/22  0629 03/25/22  0521    140   K 3.6* 4.1   CO2 31 30   BUN 11 10   CREATININE 0.65 0.75   LABGLOM >60 >60   GLUCOSE 108* 90     Lab Results   Component Value Date    BNP 48 03/13/2014     PT/INR: No results for input(s): PROTIME, INR in the last 72 hours. APTT:No results for input(s): APTT in the last 72 hours. CARDIAC ENZYMES:  Recent Labs     03/23/22  0636 03/24/22 0629 03/25/22  0521   CKTOTAL 127 107 77     FASTING LIPID PANEL:No results found for: HDL, LDLDIRECT, LDLCALC, TRIG  LIVER PROFILE:No results for input(s): AST, ALT, LABALBU in the last 72 hours.     ASSESSMENT:  1.  Sinus tachycardia, secondary to other problem  2.  APCs, isolated  3.  Dyslipidemia  4.  Rhabdomyolysis, resolved  5.  Diabetes mellitus  6.  History of lung cancer  7.  Remote history of traumatic subarachnoid bleed  8.  moderate aortic stenosis  9.  History of lung cancer  10.  Moderate CAD from 2016 involving the circumflex coronary artery    Patient Active Problem List   Diagnosis    Valvular heart disease    Type 2 diabetes mellitus without complication, without long-term current use of insulin (HCC)    Open wound of right ankle    COPD (chronic obstructive pulmonary disease) (HCC)    Syncope and collapse    Chronic ulcer of right heel (HCC)    Multifocal pneumonia    Aortic valve stenosis    Esophageal dilatation    Aspiration pneumonia of both lower lobes due to gastric secretions (HCC)    Falls frequently    Chronic respiratory failure (HCC)    Contusion of right knee    Closed fracture of right distal radius    Subdural hemorrhage (HCC)    Subarachnoid hemorrhage (HCC)    Traumatic hemorrhage of cerebrum (HCC)    Chronic bilateral low back pain    Cellulitis of both feet    Acute on chronic congestive heart failure (HCC)    Bilateral leg edema    Cellulitis    Lung mass    Malignant neoplasm of upper lobe of right lung (HCC)    Urinary tract infectious disease    Closed fracture of one rib of right side    Degenerative disc disease, lumbar    Moderate malnutrition (HCC)    Rhabdomyolysis       PLAN:    1. Patient can be discharged. 2. Medication review  3. Happy to see that the patient agreed to have physical therapy which will give her benefit for the future. Please see orders. Discussed with patient and nursing.     Loann Hamman, MD, MD

## 2022-03-25 NOTE — CARE COORDINATION
Discharge Planning    Phone call to son Nicolle Reece and informed him pt refusing SNF and may be discharged later today. He states his daughter has helped his mom out but she has started a full time job this week and will not be available. He states he works two part time jobs and is looking for a full time job. He will be working this evening till 9pm.    He discussed that pt has had so many falls where she has had to use her life alert and the rescue team has had to break down her doors that they can no longer be repaired or locked. He is frustrated that his mom will not go to a SNF. He states she is forgetful and uncooperative. Explained to son that will add social service consult to home care referral due to falls and safety issues. He is in agreement. Discussed that a wheelchair would not be safe as pt may not remember to lock the brakes. Discussed safety issue with PT Stefanie Walker.

## 2022-03-25 NOTE — PLAN OF CARE
Problem: Skin Integrity:  Goal: Will show no infection signs and symptoms  Description: Will show no infection signs and symptoms  Outcome: Ongoing  Goal: Absence of new skin breakdown  Description: Absence of new skin breakdown  Outcome: Ongoing  Goal: Risk for impaired skin integrity will decrease  Description: Risk for impaired skin integrity will decrease  Outcome: Ongoing     Problem: Falls - Risk of:  Goal: Will remain free from falls  Description: Will remain free from falls  Outcome: Ongoing  Note: Patient is a fall risk during this admission. Fall risk assessment was performed. Patient is absent of falls. Bed is in the lowest position. Wheels on the bed are locked. Call light and bed side table are within reach. Clutter is removed. Patient was educated to call out when needing assistance or wanting to get out of bed. Patient offered toileting assistance during rounding. Hourly rounds have been performed. Goal: Absence of physical injury  Description: Absence of physical injury  Outcome: Ongoing     Problem: Pain:  Goal: Pain level will decrease  Description: Pain level will decrease  Outcome: Ongoing  Goal: Control of acute pain  Description: Control of acute pain  Outcome: Ongoing  Goal: Control of chronic pain  Description: Control of chronic pain  Outcome: Ongoing     Problem: Discharge Planning:  Goal: Discharged to appropriate level of care  Description: Discharged to appropriate level of care  Outcome: Ongoing     Problem:  Activity Intolerance:  Goal: Ability to tolerate increased activity will improve  Description: Ability to tolerate increased activity will improve  Outcome: Ongoing     Problem: Airway Clearance - Ineffective:  Goal: Ability to maintain a clear airway will improve  Description: Ability to maintain a clear airway will improve  3/24/2022 2356 by Jonatan Arias RN  Outcome: Ongoing     Problem: Breathing Pattern - Ineffective:  Goal: Ability to achieve and maintain a regular respiratory rate will improve  Description: Ability to achieve and maintain a regular respiratory rate will improve  3/24/2022 2356 by Hudson Raya RN  Outcome: Ongoing     Problem: Gas Exchange - Impaired:  Goal: Levels of oxygenation will improve  Description: Levels of oxygenation will improve  3/24/2022 2356 by Hudson Raya RN  Outcome: Ongoing    Problem:  Activity:  Goal: Risk for activity intolerance will decrease  Description: Risk for activity intolerance will decrease  Outcome: Ongoing     Problem: Coping:  Goal: Ability to adjust to condition or change in health will improve  Description: Ability to adjust to condition or change in health will improve  Outcome: Ongoing     Problem: Fluid Volume:  Goal: Ability to maintain a balanced intake and output will improve  Description: Ability to maintain a balanced intake and output will improve  Outcome: Ongoing     Problem: Health Behavior:  Goal: Ability to identify and utilize available resources and services will improve  Description: Ability to identify and utilize available resources and services will improve  Outcome: Ongoing  Goal: Ability to manage health-related needs will improve  Description: Ability to manage health-related needs will improve  Outcome: Ongoing     Problem: Metabolic:  Goal: Ability to maintain appropriate glucose levels will improve  Description: Ability to maintain appropriate glucose levels will improve  Outcome: Ongoing     Problem: Nutritional:  Goal: Maintenance of adequate nutrition will improve  Description: Maintenance of adequate nutrition will improve  Outcome: Ongoing  Goal: Progress toward achieving an optimal weight will improve  Description: Progress toward achieving an optimal weight will improve  Outcome: Ongoing     Problem: Physical Regulation:  Goal: Complications related to the disease process, condition or treatment will be avoided or minimized  Description: Complications related to the disease process, condition or treatment will be avoided or minimized  Outcome: Ongoing  Goal: Diagnostic test results will improve  Description: Diagnostic test results will improve  Outcome: Ongoing     Problem: Tissue Perfusion:  Goal: Adequacy of tissue perfusion will improve  Description: Adequacy of tissue perfusion will improve  Outcome: Ongoing     Problem: Nutrition  Goal: Optimal nutrition therapy  Outcome: Ongoing

## 2022-03-26 VITALS
WEIGHT: 167 LBS | DIASTOLIC BLOOD PRESSURE: 61 MMHG | RESPIRATION RATE: 16 BRPM | BODY MASS INDEX: 25.31 KG/M2 | TEMPERATURE: 98.7 F | OXYGEN SATURATION: 98 % | HEART RATE: 95 BPM | SYSTOLIC BLOOD PRESSURE: 118 MMHG | HEIGHT: 68 IN

## 2022-03-26 LAB
ABSOLUTE EOS #: 0.11 K/UL (ref 0–0.44)
ABSOLUTE IMMATURE GRANULOCYTE: 0.11 K/UL (ref 0–0.3)
ABSOLUTE LYMPH #: 1 K/UL (ref 1.1–3.7)
ABSOLUTE MONO #: 0.43 K/UL (ref 0.1–1.2)
ANION GAP SERPL CALCULATED.3IONS-SCNC: 9 MMOL/L (ref 9–17)
BASOPHILS # BLD: 1 % (ref 0–2)
BASOPHILS ABSOLUTE: 0.03 K/UL (ref 0–0.2)
BUN BLDV-MCNC: 10 MG/DL (ref 8–23)
BUN/CREAT BLD: 14 (ref 9–20)
CALCIUM SERPL-MCNC: 8.8 MG/DL (ref 8.6–10.4)
CHLORIDE BLD-SCNC: 99 MMOL/L (ref 98–107)
CO2: 30 MMOL/L (ref 20–31)
CREAT SERPL-MCNC: 0.74 MG/DL (ref 0.5–0.9)
EOSINOPHILS RELATIVE PERCENT: 2 % (ref 1–4)
GFR AFRICAN AMERICAN: >60 ML/MIN
GFR NON-AFRICAN AMERICAN: >60 ML/MIN
GFR SERPL CREATININE-BSD FRML MDRD: ABNORMAL ML/MIN/{1.73_M2}
GLUCOSE BLD-MCNC: 103 MG/DL (ref 65–105)
GLUCOSE BLD-MCNC: 123 MG/DL (ref 70–99)
GLUCOSE BLD-MCNC: 143 MG/DL (ref 65–105)
HCT VFR BLD CALC: 29.6 % (ref 36.3–47.1)
HEMOGLOBIN: 9 G/DL (ref 11.9–15.1)
IMMATURE GRANULOCYTES: 2 %
LYMPHOCYTES # BLD: 16 % (ref 24–43)
MCH RBC QN AUTO: 27.4 PG (ref 25.2–33.5)
MCHC RBC AUTO-ENTMCNC: 30.4 G/DL (ref 28.4–34.8)
MCV RBC AUTO: 90 FL (ref 82.6–102.9)
MONOCYTES # BLD: 7 % (ref 3–12)
MYOGLOBIN: 25 NG/ML (ref 25–58)
NRBC AUTOMATED: 0 PER 100 WBC
PDW BLD-RTO: 13.9 % (ref 11.8–14.4)
PLATELET # BLD: 221 K/UL (ref 138–453)
PMV BLD AUTO: 9.1 FL (ref 8.1–13.5)
POTASSIUM SERPL-SCNC: 4.3 MMOL/L (ref 3.7–5.3)
RBC # BLD: 3.29 M/UL (ref 3.95–5.11)
SEG NEUTROPHILS: 72 % (ref 36–65)
SEGMENTED NEUTROPHILS ABSOLUTE COUNT: 4.55 K/UL (ref 1.5–8.1)
SODIUM BLD-SCNC: 138 MMOL/L (ref 135–144)
TOTAL CK: 79 U/L (ref 26–192)
WBC # BLD: 6.2 K/UL (ref 3.5–11.3)

## 2022-03-26 PROCEDURE — 6370000000 HC RX 637 (ALT 250 FOR IP): Performed by: FAMILY MEDICINE

## 2022-03-26 PROCEDURE — 80048 BASIC METABOLIC PNL TOTAL CA: CPT

## 2022-03-26 PROCEDURE — 83874 ASSAY OF MYOGLOBIN: CPT

## 2022-03-26 PROCEDURE — 97116 GAIT TRAINING THERAPY: CPT

## 2022-03-26 PROCEDURE — 97110 THERAPEUTIC EXERCISES: CPT

## 2022-03-26 PROCEDURE — 36415 COLL VENOUS BLD VENIPUNCTURE: CPT

## 2022-03-26 PROCEDURE — 94640 AIRWAY INHALATION TREATMENT: CPT

## 2022-03-26 PROCEDURE — 82947 ASSAY GLUCOSE BLOOD QUANT: CPT

## 2022-03-26 PROCEDURE — 6370000000 HC RX 637 (ALT 250 FOR IP): Performed by: INTERNAL MEDICINE

## 2022-03-26 PROCEDURE — 6370000000 HC RX 637 (ALT 250 FOR IP): Performed by: HOSPITALIST

## 2022-03-26 PROCEDURE — 97530 THERAPEUTIC ACTIVITIES: CPT

## 2022-03-26 PROCEDURE — 2580000003 HC RX 258: Performed by: FAMILY MEDICINE

## 2022-03-26 PROCEDURE — 85025 COMPLETE CBC W/AUTO DIFF WBC: CPT

## 2022-03-26 PROCEDURE — 2700000000 HC OXYGEN THERAPY PER DAY

## 2022-03-26 PROCEDURE — 82550 ASSAY OF CK (CPK): CPT

## 2022-03-26 RX ADMIN — BUDESONIDE AND FORMOTEROL FUMARATE DIHYDRATE 2 PUFF: 160; 4.5 AEROSOL RESPIRATORY (INHALATION) at 09:26

## 2022-03-26 RX ADMIN — ASPIRIN 81 MG: 81 TABLET, COATED ORAL at 08:35

## 2022-03-26 RX ADMIN — ARIPIPRAZOLE 5 MG: 5 TABLET ORAL at 08:35

## 2022-03-26 RX ADMIN — GABAPENTIN 800 MG: 400 CAPSULE ORAL at 08:35

## 2022-03-26 RX ADMIN — METOPROLOL TARTRATE 50 MG: 50 TABLET, FILM COATED ORAL at 08:35

## 2022-03-26 RX ADMIN — GLIPIZIDE 2.5 MG: 5 TABLET ORAL at 06:10

## 2022-03-26 RX ADMIN — OXYCODONE AND ACETAMINOPHEN 1 TABLET: 325; 10 TABLET ORAL at 06:16

## 2022-03-26 RX ADMIN — VENLAFAXINE HYDROCHLORIDE 37.5 MG: 37.5 CAPSULE, EXTENDED RELEASE ORAL at 08:35

## 2022-03-26 RX ADMIN — OXYCODONE AND ACETAMINOPHEN 1 TABLET: 325; 10 TABLET ORAL at 11:34

## 2022-03-26 RX ADMIN — PANTOPRAZOLE SODIUM 20 MG: 20 TABLET, DELAYED RELEASE ORAL at 06:10

## 2022-03-26 RX ADMIN — POLYMYXIN B SULFATE, BACITRACIN ZINC, NEOMYCIN SULFATE: 5000; 3.5; 4 OINTMENT TOPICAL at 11:35

## 2022-03-26 RX ADMIN — METFORMIN HYDROCHLORIDE 500 MG: 500 TABLET ORAL at 08:35

## 2022-03-26 RX ADMIN — SODIUM CHLORIDE, PRESERVATIVE FREE 10 ML: 5 INJECTION INTRAVENOUS at 08:39

## 2022-03-26 RX ADMIN — CLONAZEPAM 1 MG: 0.5 TABLET ORAL at 08:34

## 2022-03-26 RX ADMIN — TAMSULOSIN HYDROCHLORIDE 0.4 MG: 0.4 CAPSULE ORAL at 08:37

## 2022-03-26 ASSESSMENT — PAIN SCALES - GENERAL
PAINLEVEL_OUTOF10: 10
PAINLEVEL_OUTOF10: 10

## 2022-03-26 NOTE — RT PROTOCOL NOTE
RT Inhaler-Nebulizer Bronchodilator Protocol Note    There is a bronchodilator order in the chart from a provider indicating to follow the RT Bronchodilator Protocol and there is an Initiate RT Inhaler-Nebulizer Bronchodilator Protocol order as well (see protocol at bottom of note). CXR Findings:  No results found. The findings from the last RT Protocol Assessment were as follows:   History Pulmonary Disease: Chronic pulmonary disease  Respiratory Pattern: Regular pattern and RR 12-20 bpm  Breath Sounds: Clear breath sounds  Cough: Strong, spontaneous, non-productive  Indication for Bronchodilator Therapy: None  Bronchodilator Assessment Score: 2    Aerosolized bronchodilator medication orders have been revised according to the RT Inhaler-Nebulizer Bronchodilator Protocol below. Respiratory Therapist to perform RT Therapy Protocol Assessment initially then follow the protocol. Repeat RT Therapy Protocol Assessment PRN for score 0-3 or on second treatment, BID, and PRN for scores above 3. No Indications - adjust the frequency to every 6 hours PRN wheezing or bronchospasm, if no treatments needed after 48 hours then discontinue using Per Protocol order mode. If indication present, adjust the RT bronchodilator orders based on the Bronchodilator Assessment Score as indicated below. Use Inhaler orders unless patient has one or more of the following: on home nebulizer, not able to hold breath for 10 seconds, is not alert and oriented, cannot activate and use MDI correctly, or respiratory rate 25 breaths per minute or more, then use the equivalent nebulizer order(s) with same Frequency and PRN reasons based on the score. If a patient is on this medication at home then do not decrease Frequency below that used at home.     0-3 - enter or revise RT bronchodilator order(s) to equivalent RT Bronchodilator order with Frequency of every 4 hours PRN for wheezing or increased work of breathing using Per Protocol order mode. 4-6 - enter or revise RT Bronchodilator order(s) to two equivalent RT bronchodilator orders with one order with BID Frequency and one order with Frequency of every 4 hours PRN wheezing or increased work of breathing using Per Protocol order mode. 7-10 - enter or revise RT Bronchodilator order(s) to two equivalent RT bronchodilator orders with one order with TID Frequency and one order with Frequency of every 4 hours PRN wheezing or increased work of breathing using Per Protocol order mode. 11-13 - enter or revise RT Bronchodilator order(s) to one equivalent RT bronchodilator order with QID Frequency and an Albuterol order with Frequency of every 4 hours PRN wheezing or increased work of breathing using Per Protocol order mode. Greater than 13 - enter or revise RT Bronchodilator order(s) to one equivalent RT bronchodilator order with every 4 hours Frequency and an Albuterol order with Frequency of every 2 hours PRN wheezing or increased work of breathing using Per Protocol order mode. RT to enter RT Home Evaluation for COPD & MDI Assessment order using Per Protocol order mode.     Electronically signed by Laureen Neves RCP on 3/26/2022 at 10:35 AM

## 2022-03-26 NOTE — PROGRESS NOTES
Writer attempted to contact patient's son, Bipin Singh, to find out his ETA, who was due to arrive at 9:00 pm to pick-up patient and take home. Phone number listed for Bipin Singh was called multiple times, without any success, along with patient's grandchild, [place], also unavailable.

## 2022-03-26 NOTE — PROGRESS NOTES
Physical Therapy  Facility/Department: [facility] MED SURG  Daily Treatment Note  NAME: Mesha Gordon  : 1950  MRN: [id number]    Date of Service: 3/26/2022    Discharge Recommendations:  Patient would benefit from continued therapy after discharge   PT Equipment Recommendations  Equipment Needed: Yes  Mobility Devices: Wheelchair  Wheelchair: Standard    Assessment   Body structures, Functions, Activity limitations: Decreased functional mobility ; Decreased ADL status; Decreased strength;Decreased safe awareness;Decreased endurance;Decreased balance;Decreased posture  Assessment: Pt presents with deficits in gait, balance, strength, endurance and is with poor safety awareness requiring minimal to moderate assistance with all mobility. With current deficits pt is at risk for fall and would benefit from continues therapy after discharge. Prognosis: Good  Decision Making: Medium Complexity  REQUIRES PT FOLLOW UP: Yes  Activity Tolerance  Activity Tolerance: Patient limited by endurance     Patient Diagnosis(es): The primary encounter diagnosis was Traumatic rhabdomyolysis, initial encounter ([facility] [address] A diagnosis of Dehydration was also pertinent to this visit. has a past medical history of AAA (abdominal aortic aneurysm) (HCC), Acid reflux, Anxiety and depression, Aortic stenosis, Arthritis, Blister of ankle, right, CAD (coronary artery disease), Cancer ([facility] [address] COPD (chronic obstructive pulmonary disease) ([facility] [address] Diabetes mellitus ([facility] [address] Falls, Heart block, Hypokalemia, MDRO (multiple drug resistant organisms) resistance, MRSA (methicillin resistant staph aureus) culture positive, On home oxygen therapy, On home oxygen therapy, Overactive bladder, Pneumonia, PONV (postoperative nausea and vomiting), and Vitamin D deficiency. has a past surgical history that includes back surgery; eye surgery; Cholecystectomy; Appendectomy; Hysterectomy; Tonsillectomy; lumbar laminectomy;  Endoscopy, colon, diagnostic (2016); aortic valve repair (N/A, 4/11/2017); bronchoscopy (N/A, 8/31/2020); IR INSERT PICC VAD W SQ PORT >5 YEARS (9/4/2020); and IR PORT PLACEMENT > 5 YEARS (9/29/2020). Restrictions  Restrictions/Precautions  Restrictions/Precautions: General Precautions,Fall Risk  Position Activity Restriction  Other position/activity restrictions: Up with assist, 02, no added salt diet  Subjective   General  Chart Reviewed: Yes  Additional Pertinent Hx: DM, Lung Cancer, Neuropathy, depression/bipolar  Family / Caregiver Present: No  Subjective  Subjective: Patient agreed to PT treatment. General Comment  Comments: RN reports pt is medically appropriate for PT    Orientation  Orientation  Overall Orientation Status: Within Functional Limits     Objective   Bed mobility  Rolling: Minimal assistance  Supine to sit: Moderate assistance  Sit to supine: Minimal assistance  Scooting: Stand by assistance  Transfers  Sit to Stand: Minimal Assistance; Moderate Assistance  Stand to sit: Minimal Assistance  Bed to Chair: Minimal assistance  Stand Pivot Transfers: Minimal Assistance  Lateral Transfers: Minimal Assistance  Comment: Verbal cues for correct hand placement with sit to stand transfer  Ambulation  Ambulation?: Yes  More Ambulation?: Yes  Ambulation 1  Surface: level tile  Device: Rolling Walker  Other Apparatus: O2  Assistance: Minimal assistance; Moderate assistance  Quality of Gait: fair steadiness, assist to manage O2 line and manage walker with turns  Gait Deviations: Slow Gwendolyn;Decreased step length;Decreased step height  Distance: 10ft  Ambulation 2  Surface - 2: level tile  Device 2: Rolling Walker  Other Apparatus 2: O2  Assistance 2: Minimal assistance; Moderate assistance  Quality of Gait 2: fair steadiness, assist to manage O2 and walker with turns, decreased endurnace and standing tolerance  Gait Deviations: Slow Gwendolyn;Decreased step length;Decreased step height  Distance: 20ft  Comments: SOB  Stairs/Curb  Stairs?: No Balance  Posture: Fair  Sitting - Static: Good  Sitting - Dynamic: Good  Standing - Static: Fair;+  Standing - Dynamic: Fair;-  Exercises  Quad Sets: 10  Hip Flexion: 10  Ankle Pumps: 10   Other Activities: Assisted pt to the bathroom with moderate assistance for sit to stand from commode. Pt is dependent for edilia care and brief change. Pt with limited static standing tolerance with ADLs in the bathroom. AM-PAC Score     AM-PAC Inpatient Mobility without Stair Climbing Raw Score : 15 (03/26/22 1115)  AM-PAC Inpatient without Stair Climbing T-Scale Score : 43.03 (03/26/22 1115)  Mobility Inpatient CMS 0-100% Score: 47.43 (03/26/22 1115)  Mobility Inpatient without Stair CMS G-Code Modifier : CK (03/26/22 1115)       Goals  Short term goals  Time Frame for Short term goals: 12 visits  Short term goal 1: Patient will be indep with transfers. Short term goal 2: Patient will amb 60 feet with RW indep. Short term goal 3: Patient will tolerate 30 minutes of ther-ex and ther-act. Short term goal 4: Patient will be indep with HEP.   Patient Goals   Patient goals : Return home    Plan    Plan  Times per week: 1-2x/d, 5-6 d/wk  Current Treatment Recommendations: Strengthening,Balance Training,Functional Mobility Training,Transfer Training,Gait Training,Endurance Training,Patient/Caregiver Education & Training,Home Exercise Program,Safety Education & Training  Safety Devices  Type of devices: Nurse notified,Left in bed,Gait belt,Call light within reach,Bed alarm in place,All fall risk precautions in place     Therapy Time   Individual Concurrent Group Co-treatment   Time In  1037         Time Out  1120         Minutes  [address] [place] [address]

## 2022-03-26 NOTE — PROGRESS NOTES
Called son Madonna Beaver per request of pt re:discharge.  He states he will be here at 2:30 PM to , pt informed

## 2022-03-26 NOTE — PROGRESS NOTES
AVS reprinted with updated med times, reviewed with pt who expressed appreciation. reminde that [facility] has scripts from yesterday.  Dressed, awaiting ride

## 2022-03-28 NOTE — CARE COORDINATION
Social work: making APS report due to pt refusal of therapy and snf options for safety. Bo serna    Faxed elizabeth to both agencies [facility] on elizabeth.  Bo serna

## 2022-03-29 ENCOUNTER — APPOINTMENT (OUTPATIENT)
Dept: GENERAL RADIOLOGY | Age: 72
DRG: 948 | End: 2022-03-29
Payer: MEDICARE

## 2022-03-29 ENCOUNTER — HOSPITAL ENCOUNTER (INPATIENT)
Age: 72
LOS: 8 days | Discharge: NURSING, REHAB OR LONG-TERM CARE | DRG: 948 | End: 2022-04-06
Attending: EMERGENCY MEDICINE | Admitting: FAMILY MEDICINE
Payer: MEDICARE

## 2022-03-29 DIAGNOSIS — S91.001A OPEN WOUND OF RIGHT ANKLE, INITIAL ENCOUNTER: ICD-10-CM

## 2022-03-29 DIAGNOSIS — R53.1 GENERALIZED WEAKNESS: Primary | ICD-10-CM

## 2022-03-29 DIAGNOSIS — R91.8 LUNG MASS: ICD-10-CM

## 2022-03-29 PROBLEM — R26.2 UNABLE TO AMBULATE: Status: ACTIVE | Noted: 2022-03-29

## 2022-03-29 LAB
ABSOLUTE EOS #: 0.07 K/UL (ref 0–0.44)
ABSOLUTE IMMATURE GRANULOCYTE: 0.09 K/UL (ref 0–0.3)
ABSOLUTE LYMPH #: 0.93 K/UL (ref 1.1–3.7)
ABSOLUTE MONO #: 0.58 K/UL (ref 0.1–1.2)
ANION GAP SERPL CALCULATED.3IONS-SCNC: 10 MMOL/L (ref 9–17)
BASOPHILS # BLD: 1 % (ref 0–2)
BASOPHILS ABSOLUTE: 0.05 K/UL (ref 0–0.2)
BUN BLDV-MCNC: 11 MG/DL (ref 8–23)
BUN/CREAT BLD: 18 (ref 9–20)
CALCIUM SERPL-MCNC: 9.5 MG/DL (ref 8.6–10.4)
CHLORIDE BLD-SCNC: 95 MMOL/L (ref 98–107)
CO2: 31 MMOL/L (ref 20–31)
CREAT SERPL-MCNC: 0.61 MG/DL (ref 0.5–0.9)
EOSINOPHILS RELATIVE PERCENT: 1 % (ref 1–4)
GFR AFRICAN AMERICAN: >60 ML/MIN
GFR NON-AFRICAN AMERICAN: >60 ML/MIN
GFR SERPL CREATININE-BSD FRML MDRD: ABNORMAL ML/MIN/{1.73_M2}
GLUCOSE BLD-MCNC: 108 MG/DL (ref 65–105)
GLUCOSE BLD-MCNC: 162 MG/DL
GLUCOSE BLD-MCNC: 162 MG/DL (ref 65–105)
GLUCOSE BLD-MCNC: 171 MG/DL (ref 70–99)
HCT VFR BLD CALC: 35.1 % (ref 36.3–47.1)
HEMOGLOBIN: 11 G/DL (ref 11.9–15.1)
IMMATURE GRANULOCYTES: 1 %
LYMPHOCYTES # BLD: 10 % (ref 24–43)
MCH RBC QN AUTO: 27.3 PG (ref 25.2–33.5)
MCHC RBC AUTO-ENTMCNC: 31.3 G/DL (ref 28.4–34.8)
MCV RBC AUTO: 87.1 FL (ref 82.6–102.9)
MONOCYTES # BLD: 6 % (ref 3–12)
MYOGLOBIN: 37 NG/ML (ref 25–58)
MYOGLOBIN: 46 NG/ML (ref 25–58)
NRBC AUTOMATED: 0 PER 100 WBC
PDW BLD-RTO: 14 % (ref 11.8–14.4)
PLATELET # BLD: 385 K/UL (ref 138–453)
PMV BLD AUTO: 8.9 FL (ref 8.1–13.5)
POTASSIUM SERPL-SCNC: 4 MMOL/L (ref 3.7–5.3)
RBC # BLD: 4.03 M/UL (ref 3.95–5.11)
REASON FOR REJECTION: NORMAL
SEG NEUTROPHILS: 82 % (ref 36–65)
SEGMENTED NEUTROPHILS ABSOLUTE COUNT: 8.01 K/UL (ref 1.5–8.1)
SODIUM BLD-SCNC: 136 MMOL/L (ref 135–144)
TOTAL CK: 64 U/L (ref 26–192)
TROPONIN, HIGH SENSITIVITY: 49 NG/L (ref 0–14)
TROPONIN, HIGH SENSITIVITY: 51 NG/L (ref 0–14)
WBC # BLD: 9.7 K/UL (ref 3.5–11.3)
ZZ NTE CLEAN UP: ORDERED TEST: NORMAL
ZZ NTE WITH NAME CLEAN UP: SPECIMEN SOURCE: NORMAL

## 2022-03-29 PROCEDURE — 94761 N-INVAS EAR/PLS OXIMETRY MLT: CPT

## 2022-03-29 PROCEDURE — 6370000000 HC RX 637 (ALT 250 FOR IP): Performed by: FAMILY MEDICINE

## 2022-03-29 PROCEDURE — 1200000000 HC SEMI PRIVATE

## 2022-03-29 PROCEDURE — 93005 ELECTROCARDIOGRAM TRACING: CPT | Performed by: EMERGENCY MEDICINE

## 2022-03-29 PROCEDURE — 96360 HYDRATION IV INFUSION INIT: CPT

## 2022-03-29 PROCEDURE — 71045 X-RAY EXAM CHEST 1 VIEW: CPT

## 2022-03-29 PROCEDURE — 87040 BLOOD CULTURE FOR BACTERIA: CPT

## 2022-03-29 PROCEDURE — 2700000000 HC OXYGEN THERAPY PER DAY

## 2022-03-29 PROCEDURE — 6360000002 HC RX W HCPCS: Performed by: FAMILY MEDICINE

## 2022-03-29 PROCEDURE — 99284 EMERGENCY DEPT VISIT MOD MDM: CPT

## 2022-03-29 PROCEDURE — 82947 ASSAY GLUCOSE BLOOD QUANT: CPT

## 2022-03-29 PROCEDURE — 36415 COLL VENOUS BLD VENIPUNCTURE: CPT

## 2022-03-29 PROCEDURE — 84484 ASSAY OF TROPONIN QUANT: CPT

## 2022-03-29 PROCEDURE — 85025 COMPLETE CBC W/AUTO DIFF WBC: CPT

## 2022-03-29 PROCEDURE — 2580000003 HC RX 258: Performed by: FAMILY MEDICINE

## 2022-03-29 PROCEDURE — 94640 AIRWAY INHALATION TREATMENT: CPT

## 2022-03-29 PROCEDURE — 2580000003 HC RX 258: Performed by: EMERGENCY MEDICINE

## 2022-03-29 PROCEDURE — 83874 ASSAY OF MYOGLOBIN: CPT

## 2022-03-29 PROCEDURE — 80048 BASIC METABOLIC PNL TOTAL CA: CPT

## 2022-03-29 PROCEDURE — 82550 ASSAY OF CK (CPK): CPT

## 2022-03-29 RX ORDER — NICOTINE POLACRILEX 4 MG
15 LOZENGE BUCCAL PRN
Status: DISCONTINUED | OUTPATIENT
Start: 2022-03-29 | End: 2022-04-06 | Stop reason: HOSPADM

## 2022-03-29 RX ORDER — POLYETHYLENE GLYCOL 3350 17 G/17G
17 POWDER, FOR SOLUTION ORAL DAILY PRN
Status: DISCONTINUED | OUTPATIENT
Start: 2022-03-29 | End: 2022-04-06 | Stop reason: HOSPADM

## 2022-03-29 RX ORDER — ONDANSETRON 2 MG/ML
4 INJECTION INTRAMUSCULAR; INTRAVENOUS EVERY 6 HOURS PRN
Status: DISCONTINUED | OUTPATIENT
Start: 2022-03-29 | End: 2022-04-06 | Stop reason: HOSPADM

## 2022-03-29 RX ORDER — POTASSIUM CHLORIDE 20 MEQ/1
40 TABLET, EXTENDED RELEASE ORAL PRN
Status: DISCONTINUED | OUTPATIENT
Start: 2022-03-29 | End: 2022-04-06 | Stop reason: HOSPADM

## 2022-03-29 RX ORDER — TRAZODONE HYDROCHLORIDE 100 MG/1
300 TABLET ORAL NIGHTLY
Status: DISCONTINUED | OUTPATIENT
Start: 2022-03-29 | End: 2022-04-06 | Stop reason: HOSPADM

## 2022-03-29 RX ORDER — ALBUTEROL SULFATE 90 UG/1
2 AEROSOL, METERED RESPIRATORY (INHALATION) 3 TIMES DAILY
Status: DISCONTINUED | OUTPATIENT
Start: 2022-03-30 | End: 2022-03-31

## 2022-03-29 RX ORDER — MAGNESIUM SULFATE 1 G/100ML
1000 INJECTION INTRAVENOUS PRN
Status: DISCONTINUED | OUTPATIENT
Start: 2022-03-29 | End: 2022-04-06 | Stop reason: HOSPADM

## 2022-03-29 RX ORDER — ASPIRIN 81 MG/1
81 TABLET ORAL DAILY
Status: DISCONTINUED | OUTPATIENT
Start: 2022-03-30 | End: 2022-04-06 | Stop reason: HOSPADM

## 2022-03-29 RX ORDER — PANTOPRAZOLE SODIUM 20 MG/1
20 TABLET, DELAYED RELEASE ORAL
Status: DISCONTINUED | OUTPATIENT
Start: 2022-03-30 | End: 2022-04-06 | Stop reason: HOSPADM

## 2022-03-29 RX ORDER — TAMSULOSIN HYDROCHLORIDE 0.4 MG/1
0.4 CAPSULE ORAL DAILY
Status: DISCONTINUED | OUTPATIENT
Start: 2022-03-30 | End: 2022-04-06 | Stop reason: HOSPADM

## 2022-03-29 RX ORDER — DEXTROSE MONOHYDRATE 25 G/50ML
12.5 INJECTION, SOLUTION INTRAVENOUS PRN
Status: DISCONTINUED | OUTPATIENT
Start: 2022-03-29 | End: 2022-03-29 | Stop reason: ALTCHOICE

## 2022-03-29 RX ORDER — FUROSEMIDE 40 MG/1
40 TABLET ORAL DAILY PRN
Status: DISCONTINUED | OUTPATIENT
Start: 2022-03-29 | End: 2022-04-02

## 2022-03-29 RX ORDER — SODIUM CHLORIDE 0.9 % (FLUSH) 0.9 %
10 SYRINGE (ML) INJECTION PRN
Status: DISCONTINUED | OUTPATIENT
Start: 2022-03-29 | End: 2022-04-06 | Stop reason: HOSPADM

## 2022-03-29 RX ORDER — ACETAMINOPHEN 650 MG/1
650 SUPPOSITORY RECTAL EVERY 6 HOURS PRN
Status: DISCONTINUED | OUTPATIENT
Start: 2022-03-29 | End: 2022-04-06 | Stop reason: HOSPADM

## 2022-03-29 RX ORDER — BUDESONIDE AND FORMOTEROL FUMARATE DIHYDRATE 160; 4.5 UG/1; UG/1
2 AEROSOL RESPIRATORY (INHALATION) 2 TIMES DAILY
Status: DISCONTINUED | OUTPATIENT
Start: 2022-03-29 | End: 2022-04-06 | Stop reason: HOSPADM

## 2022-03-29 RX ORDER — METOPROLOL TARTRATE 50 MG/1
50 TABLET, FILM COATED ORAL 2 TIMES DAILY
Status: DISCONTINUED | OUTPATIENT
Start: 2022-03-29 | End: 2022-04-06 | Stop reason: HOSPADM

## 2022-03-29 RX ORDER — GABAPENTIN 400 MG/1
800 CAPSULE ORAL 2 TIMES DAILY
Status: DISCONTINUED | OUTPATIENT
Start: 2022-03-29 | End: 2022-04-06 | Stop reason: HOSPADM

## 2022-03-29 RX ORDER — SODIUM CHLORIDE 0.9 % (FLUSH) 0.9 %
5-40 SYRINGE (ML) INJECTION EVERY 12 HOURS SCHEDULED
Status: DISCONTINUED | OUTPATIENT
Start: 2022-03-29 | End: 2022-04-06 | Stop reason: HOSPADM

## 2022-03-29 RX ORDER — ONDANSETRON 4 MG/1
4 TABLET, ORALLY DISINTEGRATING ORAL EVERY 8 HOURS PRN
Status: DISCONTINUED | OUTPATIENT
Start: 2022-03-29 | End: 2022-04-06 | Stop reason: HOSPADM

## 2022-03-29 RX ORDER — SODIUM CHLORIDE 9 MG/ML
INJECTION, SOLUTION INTRAVENOUS PRN
Status: DISCONTINUED | OUTPATIENT
Start: 2022-03-29 | End: 2022-04-06 | Stop reason: HOSPADM

## 2022-03-29 RX ORDER — ACETAMINOPHEN 325 MG/1
650 TABLET ORAL EVERY 6 HOURS PRN
Status: DISCONTINUED | OUTPATIENT
Start: 2022-03-29 | End: 2022-04-06 | Stop reason: HOSPADM

## 2022-03-29 RX ORDER — POTASSIUM CHLORIDE 7.45 MG/ML
10 INJECTION INTRAVENOUS PRN
Status: DISCONTINUED | OUTPATIENT
Start: 2022-03-29 | End: 2022-04-06 | Stop reason: HOSPADM

## 2022-03-29 RX ORDER — ALBUTEROL SULFATE 2.5 MG/3ML
2.5 SOLUTION RESPIRATORY (INHALATION) EVERY 4 HOURS PRN
Status: DISCONTINUED | OUTPATIENT
Start: 2022-03-29 | End: 2022-03-30

## 2022-03-29 RX ORDER — CLONAZEPAM 0.5 MG/1
1 TABLET ORAL 3 TIMES DAILY PRN
Status: DISCONTINUED | OUTPATIENT
Start: 2022-03-29 | End: 2022-04-06 | Stop reason: HOSPADM

## 2022-03-29 RX ORDER — SODIUM CHLORIDE 9 MG/ML
INJECTION, SOLUTION INTRAVENOUS CONTINUOUS
Status: DISCONTINUED | OUTPATIENT
Start: 2022-03-29 | End: 2022-04-02

## 2022-03-29 RX ORDER — LANOLIN ALCOHOL/MO/W.PET/CERES
9 CREAM (GRAM) TOPICAL NIGHTLY
Status: DISCONTINUED | OUTPATIENT
Start: 2022-03-29 | End: 2022-04-06 | Stop reason: HOSPADM

## 2022-03-29 RX ORDER — ALBUTEROL SULFATE 90 UG/1
2 AEROSOL, METERED RESPIRATORY (INHALATION) EVERY 4 HOURS PRN
Status: DISCONTINUED | OUTPATIENT
Start: 2022-03-29 | End: 2022-04-06 | Stop reason: HOSPADM

## 2022-03-29 RX ORDER — OXYCODONE AND ACETAMINOPHEN 10; 325 MG/1; MG/1
1 TABLET ORAL EVERY 6 HOURS PRN
Status: DISCONTINUED | OUTPATIENT
Start: 2022-03-29 | End: 2022-03-30

## 2022-03-29 RX ORDER — ROSUVASTATIN CALCIUM 10 MG/1
5 TABLET, COATED ORAL DAILY
Status: DISCONTINUED | OUTPATIENT
Start: 2022-03-30 | End: 2022-04-06 | Stop reason: HOSPADM

## 2022-03-29 RX ORDER — ARIPIPRAZOLE 5 MG/1
5 TABLET ORAL NIGHTLY
Status: DISCONTINUED | OUTPATIENT
Start: 2022-03-29 | End: 2022-04-06 | Stop reason: HOSPADM

## 2022-03-29 RX ORDER — GLIPIZIDE 5 MG/1
2.5 TABLET ORAL
Status: DISCONTINUED | OUTPATIENT
Start: 2022-03-30 | End: 2022-04-06 | Stop reason: HOSPADM

## 2022-03-29 RX ORDER — VENLAFAXINE HYDROCHLORIDE 75 MG/1
150 CAPSULE, EXTENDED RELEASE ORAL 2 TIMES DAILY
Status: DISCONTINUED | OUTPATIENT
Start: 2022-03-29 | End: 2022-04-06 | Stop reason: HOSPADM

## 2022-03-29 RX ORDER — DEXTROSE MONOHYDRATE 50 MG/ML
100 INJECTION, SOLUTION INTRAVENOUS PRN
Status: DISCONTINUED | OUTPATIENT
Start: 2022-03-29 | End: 2022-04-06 | Stop reason: HOSPADM

## 2022-03-29 RX ADMIN — VENLAFAXINE HYDROCHLORIDE 150 MG: 75 CAPSULE, EXTENDED RELEASE ORAL at 23:13

## 2022-03-29 RX ADMIN — METOPROLOL TARTRATE 50 MG: 50 TABLET, FILM COATED ORAL at 23:13

## 2022-03-29 RX ADMIN — SODIUM CHLORIDE, PRESERVATIVE FREE 10 ML: 5 INJECTION INTRAVENOUS at 23:13

## 2022-03-29 RX ADMIN — GABAPENTIN 800 MG: 400 CAPSULE ORAL at 23:12

## 2022-03-29 RX ADMIN — SODIUM CHLORIDE: 9 INJECTION, SOLUTION INTRAVENOUS at 20:13

## 2022-03-29 RX ADMIN — ARIPIPRAZOLE 5 MG: 5 TABLET ORAL at 23:12

## 2022-03-29 RX ADMIN — Medication 9 MG: at 23:13

## 2022-03-29 RX ADMIN — ALBUTEROL SULFATE 2 PUFF: 90 AEROSOL, METERED RESPIRATORY (INHALATION) at 22:23

## 2022-03-29 RX ADMIN — BUDESONIDE AND FORMOTEROL FUMARATE DIHYDRATE 2 PUFF: 160; 4.5 AEROSOL RESPIRATORY (INHALATION) at 22:23

## 2022-03-29 RX ADMIN — TRAZODONE HYDROCHLORIDE 300 MG: 100 TABLET ORAL at 23:13

## 2022-03-29 RX ADMIN — OXYCODONE AND ACETAMINOPHEN 1 TABLET: 325; 10 TABLET ORAL at 23:12

## 2022-03-29 RX ADMIN — SODIUM CHLORIDE: 9 INJECTION, SOLUTION INTRAVENOUS at 23:19

## 2022-03-29 RX ADMIN — CEFTRIAXONE SODIUM 1000 MG: 1 INJECTION, POWDER, FOR SOLUTION INTRAMUSCULAR; INTRAVENOUS at 23:19

## 2022-03-29 RX ADMIN — CLONAZEPAM 1 MG: 0.5 TABLET ORAL at 23:12

## 2022-03-29 ASSESSMENT — PAIN DESCRIPTION - LOCATION: LOCATION: BACK

## 2022-03-29 ASSESSMENT — ENCOUNTER SYMPTOMS
COUGH: 0
ABDOMINAL PAIN: 0
VOMITING: 0
COLOR CHANGE: 0
EYE DISCHARGE: 0
SHORTNESS OF BREATH: 0
DIARRHEA: 0
FACIAL SWELLING: 0
EYE REDNESS: 0
CONSTIPATION: 0

## 2022-03-29 ASSESSMENT — PAIN DESCRIPTION - DESCRIPTORS: DESCRIPTORS: ACHING

## 2022-03-29 ASSESSMENT — PAIN DESCRIPTION - ONSET: ONSET: ON-GOING

## 2022-03-29 ASSESSMENT — PAIN DESCRIPTION - FREQUENCY: FREQUENCY: CONTINUOUS

## 2022-03-29 ASSESSMENT — PAIN SCALES - GENERAL: PAINLEVEL_OUTOF10: 10

## 2022-03-29 ASSESSMENT — PAIN - FUNCTIONAL ASSESSMENT: PAIN_FUNCTIONAL_ASSESSMENT: PREVENTS OR INTERFERES WITH MANY ACTIVE NOT PASSIVE ACTIVITIES

## 2022-03-29 ASSESSMENT — PAIN DESCRIPTION - PROGRESSION: CLINICAL_PROGRESSION: NOT CHANGED

## 2022-03-29 ASSESSMENT — PAIN DESCRIPTION - PAIN TYPE: TYPE: CHRONIC PAIN

## 2022-03-29 NOTE — ED NOTES
Contacted lab to have someone come down to collect blood cultures as pt is a hard stick and we are waiting for someone to come start an ultrasound IV. Lab states they will send someone down.      Ewelina [facility]  03/29/22 2422

## 2022-03-29 NOTE — ED NOTES
Patient comes in via ems from home and presents with general weakness. Patient was just in [facility] and discharged. Patient had been home for three days and did not get out of her chair, has not eaten, and not taken any medications. Patient is alert and oriented x4 and denies any other complaints at this time. Patient has large pressure ulcer on buttocks.        Keith Saleh RN  03/29/22 1147

## 2022-03-29 NOTE — ED PROVIDER NOTES
[address] [place] [address] ED  EMERGENCY DEPARTMENT ENCOUNTER      Pt Name: Jermaine Arevalo  MRN: [id number]  [place] 1950  Date of evaluation: 3/29/2022  Provider: Marcelo Pollock MD    CHIEF COMPLAINT       Chief Complaint   Patient presents with    Extremity Weakness         HISTORY OF PRESENT ILLNESS  (Location/Symptom, Timing/Onset, Context/Setting, Quality, Duration, Modifying Factors, Severity.)   Jermaine Arevalo is a 71 y.o. female who presents to the emergency department dentalized weakness. She was recently in this hospital and was discharged 3 days ago. She apparently has not had of her chair since then. She had been admitted for traumatic rhabdomyolysis. She has not had any subsequent trauma, she just has not gotten out of her chair. She does not complain of fever or vomiting. Her whole body hurts. Nursing Notes were reviewed. ALLERGIES     Codeine and Dye [iodides]    CURRENT MEDICATIONS       Previous Medications    ALBUTEROL SULFATE  (90 BASE) MCG/ACT INHALER    Inhale 2 puffs into the lungs every 4 hours as needed for Wheezing    ARIPIPRAZOLE (ABILIFY) 5 MG TABLET    Take 5 mg by mouth at bedtime    ASPIRIN EC 81 MG EC TABLET    Take 81 mg by mouth daily    CLONAZEPAM (KLONOPIN) 1 MG TABLET    Take 1 tablet by mouth 3 times daily as needed for Anxiety for up to 3 doses. FUROSEMIDE (LASIX) 40 MG TABLET    Take 40 mg by mouth daily as needed (swelling)     GABAPENTIN (NEURONTIN) 400 MG CAPSULE    Take 800 mg by mouth in the morning and at bedtime.     GLIMEPIRIDE (AMARYL) 1 MG TABLET    Take 1 mg by mouth 2 times daily (with meals)     LANSOPRAZOLE (PREVACID) 30 MG DELAYED RELEASE CAPSULE    Take 30 mg by mouth daily    MELATONIN 5 MG TABS TABLET    Take 15 mg by mouth nightly    METFORMIN (GLUCOPHAGE) 500 MG TABLET    Take 500 mg by mouth 2 times daily (with meals)    METOPROLOL TARTRATE (LOPRESSOR) 50 MG TABLET    Take 1 tablet by mouth 2 times daily    MOMETASONE-FORMOTEROL (DULERA) 200-5 MCG/ACT INHALER    Inhale 2 puffs into the lungs every 12 hours as needed (wheezing/SOB)     NEOMYCIN-BACITRACIN-POLYMYXIN (NEOSPORIN) 400-5-5000 OINTMENT    Apply topically daily for 5 days Apply topically 2 times daily. ONDANSETRON (ZOFRAN) 4 MG TABLET    Take 1 tablet by mouth every 8 hours as needed for Nausea or Vomiting    OXYCODONE-ACETAMINOPHEN (PERCOCET)  MG PER TABLET    Take 1 tablet by mouth every 6 hours as needed for Pain. PSEUDOEPH-DOXYLAMINE-DM-APAP (NYQUIL MULTI-SYMPTOM PO)    Take by mouth at bedtime    ROSUVASTATIN (CRESTOR) 5 MG TABLET    Take 5 mg by mouth daily    TAMSULOSIN (FLOMAX) 0.4 MG CAPSULE    Take 1 capsule by mouth daily    TRAZODONE (DESYREL) 150 MG TABLET    Take 300 mg by mouth nightly    VENLAFAXINE (EFFEXOR XR) 150 MG EXTENDED RELEASE CAPSULE    Take 150 mg by mouth 2 times daily       PAST MEDICAL HISTORY         Diagnosis Date    AAA [facility]abdominal aortic aneurysm) [facility]     Pt denies having a history of AAA    Acid reflux     Anxiety and depression     Aortic stenosis     Arthritis     Blister of ankle, right 10/13/2016    blister broke open & draining, is on antibiotics    CAD (coronary artery disease)     Cancer (HCC)     lung cancer    COPD (chronic obstructive pulmonary disease) ([facility]     Diabetes mellitus ([facility]     Falls     Heart block     bifasicular    Hypokalemia     MDRO (multiple drug resistant organisms) resistance 10/17/2014    E. Coli urine    MRSA (methicillin resistant staph aureus) culture positive resolved 12/2016    2 negative nasal screens - 2016 (hx in urine 2014)    On home oxygen therapy     uses 2 liters at night    On home oxygen therapy     patient states 2 liters/nasal cannula continuous    Overactive bladder     patient incont.  wears a brief    Pneumonia     PONV (postoperative nausea and vomiting)     Vitamin D deficiency        SURGICAL HISTORY           Procedure Laterality Date    AORTIC VALVE REPAIR N/A 4/11/2017    AORTIC VALVE REPAIR REPLACEMENT performed by Donna Schneider MD at [facility] N/A 8/31/2020    BRONCHOSCOPY BIOPSY BRONCHUS performed by Erica Monet MD at [address], COLON, DIAGNOSTIC  12/08/2016    EYE SURGERY      HYSTERECTOMY      IR INS PICC VAD W SQ PORT GREATER THAN 5  9/4/2020    IR INS PICC VAD W SQ PORT GREATER THAN 5 9/4/2020 [facility]    IR PORT PLACEMENT EQUAL OR GREATER THAN 5 YEARS  9/29/2020    IR PORT PLACEMENT EQUAL OR GREATER THAN 5 YEARS 9/29/2020 Kaci Gordon MD [facility]    LUMBAR LAMINECTOMY      TONSILLECTOMY           FAMILY HISTORY           Problem Relation Age of Onset    Cancer Mother     Cancer Father      Family Status   Relation Name Status    Mother  (Not Specified)    Father  (Not Specified)        SOCIAL HISTORY      reports that she quit smoking about 5 years ago. She has never used smokeless tobacco. She reports that she does not drink alcohol and does not use drugs. REVIEW OF SYSTEMS    (2-9 systems for level 4, 10 or more for level 5)     Review of Systems   Constitutional: Negative for chills, fatigue and fever. HENT: Negative for congestion, ear discharge and facial swelling. Eyes: Negative for discharge and redness. Respiratory: Negative for cough and shortness of breath. Cardiovascular: Negative for chest pain. Gastrointestinal: Negative for abdominal pain, constipation, diarrhea and vomiting. Genitourinary: Negative for dysuria and hematuria. Musculoskeletal: Negative for arthralgias. Skin: Negative for color change and rash. Neurological: Positive for weakness. Negative for syncope, numbness and headaches. Hematological: Negative for adenopathy. Psychiatric/Behavioral: Negative for confusion. The patient is not nervous/anxious.          Except as noted above the remainder of the review of systems was reviewed and negative. PHYSICAL EXAM    (up to 7 for level 4, 8 or more for level 5)     Vitals:    03/29/22 1830 03/29/22 1902 03/29/22 1917 03/29/22 1932   BP: (!) 170/122 (!) 160/106 (!) 146/122 (!) 141/114   Pulse: 123 118 119 120   Resp: 27 24 21 26   Temp:       TempSrc:       SpO2: 100% 100% 100%    Weight:       Height:           Physical Exam  Vitals reviewed. Constitutional:       General: She is not in acute distress. Appearance: She is well-developed. She is not diaphoretic. HENT:      Head: Normocephalic and atraumatic. Eyes:      General: No scleral icterus. Right eye: No discharge. Left eye: No discharge. Cardiovascular:      Rate and Rhythm: Tachycardia present. Pulmonary:      Effort: Pulmonary effort is normal. No respiratory distress. Breath sounds: Normal breath sounds. No stridor. No wheezing or rales. Abdominal:      General: There is no distension. Palpations: Abdomen is soft. Tenderness: There is no abdominal tenderness. Musculoskeletal:         General: Normal range of motion. Cervical back: Neck supple. Lymphadenopathy:      Cervical: No cervical adenopathy. Skin:     General: Skin is warm and dry. Findings: No erythema or rash. Comments: She has areas of skin breakdown on her buttock and sacral areas. No surrounding erythema or purulent drainage. Photographs were requested of nursing staff. Neurological:      Mental Status: She is alert.    Psychiatric:         Behavior: Behavior normal.             DIAGNOSTIC RESULTS     EKG: All EKG's are interpreted by the Emergency Department Physician who either signs or Co-signs this chart in the absence of a cardiologist.    RADIOLOGY:   Non-plain film images such as CT, Ultrasound and MRI are read by the radiologist. Plain radiographic images are visualized and preliminarily interpreted by the emergency physician with the below findings:    Interpretation per the Radiologist below, if available at the time of this note:    LABS:  Labs Reviewed   BASIC METABOLIC PANEL - Abnormal; Notable for the following components:       Result Value    Glucose 171 (*)     Chloride 95 (*)     All other components within normal limits   CBC WITH AUTO DIFFERENTIAL - Abnormal; Notable for the following components:    Hemoglobin 11.0 (*)     Hematocrit 35.1 (*)     Seg Neutrophils 82 (*)     Lymphocytes 10 (*)     Immature Granulocytes 1 (*)     Absolute Lymph # 0.93 (*)     All other components within normal limits   TROP/MYOGLOBIN - Abnormal; Notable for the following components:    Troponin, High Sensitivity 49 (*)     All other components within normal limits   POC GLUCOSE FINGERSTICK - Abnormal; Notable for the following components:    POC Glucose 162 (*)     All other components within normal limits   POCT GLUCOSE - Normal   CULTURE, BLOOD 1   CULTURE, BLOOD 1   SPECIMEN REJECTION   CK   TROP/MYOGLOBIN       All other labs were within normal range or not returned as of this dictation. EMERGENCY DEPARTMENT COURSE and DIFFERENTIAL DIAGNOSIS/MDM:   Vitals:    Vitals:    03/29/22 1830 03/29/22 1902 03/29/22 1917 03/29/22 1932   BP: (!) 170/122 (!) 160/106 (!) 146/122 (!) 141/114   Pulse: 123 118 119 120   Resp: 27 24 21 26   Temp:       TempSrc:       SpO2: 100% 100% 100%    Weight:       Height:           Orders Placed This Encounter   Medications    0.9 % sodium chloride infusion       Medical Decision Making: She is not in rhabdomyolysis. She has generalized weakness. When she was discharged from the hospital 3 days ago it was recommended that she go to skilled nursing facility. She refused and it appears that social work made a referral to [facility] because of their concerns about her going home. She will be admitted to the hospital again today. Findings were discussed with the patient.       CONSULTS:  IP CONSULT TO HOSPITALIST    PROCEDURES:  None    FINAL IMPRESSION      1. Generalized weakness          DISPOSITION/PLAN   DISPOSITION Decision To Admit 03/29/2022 08:16:23 PM      PATIENT REFERRED TO:   No follow-up provider specified. DISCHARGE MEDICATIONS:     New Prescriptions    No medications on file       The care is provided during an unprecedented national emergency due to the novel coronavirus, COVID-19.     (Please note that portions of this note were completed with a voice recognition program.  Efforts were made to edit the dictations but occasionally words are mis-transcribed.)    Duc Lu MD  Attending Emergency Physician            Duc Lu MD  03/29/22 2018

## 2022-03-30 ENCOUNTER — TELEPHONE (OUTPATIENT)
Dept: CASE MANAGEMENT | Age: 72
End: 2022-03-30

## 2022-03-30 ENCOUNTER — APPOINTMENT (OUTPATIENT)
Dept: GENERAL RADIOLOGY | Age: 72
DRG: 948 | End: 2022-03-30
Payer: MEDICARE

## 2022-03-30 PROBLEM — E44.1 MILD MALNUTRITION (HCC): Status: ACTIVE | Noted: 2022-03-30

## 2022-03-30 LAB
ANION GAP SERPL CALCULATED.3IONS-SCNC: 9 MMOL/L (ref 9–17)
BUN BLDV-MCNC: 10 MG/DL (ref 8–23)
BUN/CREAT BLD: 18 (ref 9–20)
CALCIUM SERPL-MCNC: 8.3 MG/DL (ref 8.6–10.4)
CHLORIDE BLD-SCNC: 104 MMOL/L (ref 98–107)
CO2: 27 MMOL/L (ref 20–31)
CREAT SERPL-MCNC: 0.55 MG/DL (ref 0.5–0.9)
EKG ATRIAL RATE: 121 BPM
EKG P AXIS: 68 DEGREES
EKG P-R INTERVAL: 148 MS
EKG Q-T INTERVAL: 332 MS
EKG QRS DURATION: 144 MS
EKG QTC CALCULATION (BAZETT): 471 MS
EKG R AXIS: -58 DEGREES
EKG T AXIS: 79 DEGREES
EKG VENTRICULAR RATE: 121 BPM
GFR AFRICAN AMERICAN: >60 ML/MIN
GFR NON-AFRICAN AMERICAN: >60 ML/MIN
GFR SERPL CREATININE-BSD FRML MDRD: ABNORMAL ML/MIN/{1.73_M2}
GLUCOSE BLD-MCNC: 131 MG/DL (ref 65–105)
GLUCOSE BLD-MCNC: 133 MG/DL (ref 65–105)
GLUCOSE BLD-MCNC: 148 MG/DL (ref 65–105)
GLUCOSE BLD-MCNC: 170 MG/DL (ref 70–99)
GLUCOSE BLD-MCNC: 74 MG/DL (ref 65–105)
INR BLD: 1
POTASSIUM SERPL-SCNC: 4 MMOL/L (ref 3.7–5.3)
PROTHROMBIN TIME: 13.4 SEC (ref 11.5–14.2)
SODIUM BLD-SCNC: 140 MMOL/L (ref 135–144)

## 2022-03-30 PROCEDURE — 2580000003 HC RX 258: Performed by: FAMILY MEDICINE

## 2022-03-30 PROCEDURE — 97116 GAIT TRAINING THERAPY: CPT

## 2022-03-30 PROCEDURE — 1200000000 HC SEMI PRIVATE

## 2022-03-30 PROCEDURE — 85610 PROTHROMBIN TIME: CPT

## 2022-03-30 PROCEDURE — 2700000000 HC OXYGEN THERAPY PER DAY

## 2022-03-30 PROCEDURE — 80048 BASIC METABOLIC PNL TOTAL CA: CPT

## 2022-03-30 PROCEDURE — 6360000002 HC RX W HCPCS: Performed by: FAMILY MEDICINE

## 2022-03-30 PROCEDURE — 71045 X-RAY EXAM CHEST 1 VIEW: CPT

## 2022-03-30 PROCEDURE — 97535 SELF CARE MNGMENT TRAINING: CPT

## 2022-03-30 PROCEDURE — 97163 PT EVAL HIGH COMPLEX 45 MIN: CPT

## 2022-03-30 PROCEDURE — 97167 OT EVAL HIGH COMPLEX 60 MIN: CPT

## 2022-03-30 PROCEDURE — 94761 N-INVAS EAR/PLS OXIMETRY MLT: CPT

## 2022-03-30 PROCEDURE — 6370000000 HC RX 637 (ALT 250 FOR IP): Performed by: FAMILY MEDICINE

## 2022-03-30 PROCEDURE — 97530 THERAPEUTIC ACTIVITIES: CPT

## 2022-03-30 PROCEDURE — 36415 COLL VENOUS BLD VENIPUNCTURE: CPT

## 2022-03-30 PROCEDURE — 94640 AIRWAY INHALATION TREATMENT: CPT

## 2022-03-30 PROCEDURE — 82947 ASSAY GLUCOSE BLOOD QUANT: CPT

## 2022-03-30 RX ORDER — OXYCODONE AND ACETAMINOPHEN 10; 325 MG/1; MG/1
1 TABLET ORAL EVERY 4 HOURS PRN
Status: DISCONTINUED | OUTPATIENT
Start: 2022-03-30 | End: 2022-03-31

## 2022-03-30 RX ADMIN — ACETAMINOPHEN 325MG 650 MG: 325 TABLET ORAL at 17:34

## 2022-03-30 RX ADMIN — INSULIN LISPRO 2 UNITS: 100 INJECTION, SOLUTION INTRAVENOUS; SUBCUTANEOUS at 08:11

## 2022-03-30 RX ADMIN — BUDESONIDE AND FORMOTEROL FUMARATE DIHYDRATE 2 PUFF: 160; 4.5 AEROSOL RESPIRATORY (INHALATION) at 20:04

## 2022-03-30 RX ADMIN — Medication 9 MG: at 21:41

## 2022-03-30 RX ADMIN — SODIUM CHLORIDE: 9 INJECTION, SOLUTION INTRAVENOUS at 16:34

## 2022-03-30 RX ADMIN — VENLAFAXINE HYDROCHLORIDE 150 MG: 75 CAPSULE, EXTENDED RELEASE ORAL at 21:41

## 2022-03-30 RX ADMIN — ASPIRIN 81 MG: 81 TABLET, COATED ORAL at 08:11

## 2022-03-30 RX ADMIN — SODIUM CHLORIDE: 9 INJECTION, SOLUTION INTRAVENOUS at 11:33

## 2022-03-30 RX ADMIN — SODIUM CHLORIDE: 9 INJECTION, SOLUTION INTRAVENOUS at 21:43

## 2022-03-30 RX ADMIN — SODIUM CHLORIDE: 9 INJECTION, SOLUTION INTRAVENOUS at 06:14

## 2022-03-30 RX ADMIN — METFORMIN HYDROCHLORIDE 500 MG: 500 TABLET ORAL at 08:10

## 2022-03-30 RX ADMIN — METOPROLOL TARTRATE 50 MG: 50 TABLET, FILM COATED ORAL at 21:41

## 2022-03-30 RX ADMIN — TAMSULOSIN HYDROCHLORIDE 0.4 MG: 0.4 CAPSULE ORAL at 08:10

## 2022-03-30 RX ADMIN — OXYCODONE AND ACETAMINOPHEN 1 TABLET: 325; 10 TABLET ORAL at 06:10

## 2022-03-30 RX ADMIN — SODIUM CHLORIDE, PRESERVATIVE FREE 10 ML: 5 INJECTION INTRAVENOUS at 08:11

## 2022-03-30 RX ADMIN — ARIPIPRAZOLE 5 MG: 5 TABLET ORAL at 21:41

## 2022-03-30 RX ADMIN — ALBUTEROL SULFATE 2 PUFF: 90 AEROSOL, METERED RESPIRATORY (INHALATION) at 08:08

## 2022-03-30 RX ADMIN — METOPROLOL TARTRATE 50 MG: 50 TABLET, FILM COATED ORAL at 08:10

## 2022-03-30 RX ADMIN — ALBUTEROL SULFATE 2 PUFF: 90 AEROSOL, METERED RESPIRATORY (INHALATION) at 14:44

## 2022-03-30 RX ADMIN — TRAZODONE HYDROCHLORIDE 300 MG: 100 TABLET ORAL at 21:40

## 2022-03-30 RX ADMIN — SODIUM CHLORIDE: 9 INJECTION, SOLUTION INTRAVENOUS at 01:44

## 2022-03-30 RX ADMIN — OXYCODONE AND ACETAMINOPHEN 1 TABLET: 325; 10 TABLET ORAL at 18:58

## 2022-03-30 RX ADMIN — GLIPIZIDE 2.5 MG: 5 TABLET ORAL at 06:10

## 2022-03-30 RX ADMIN — GABAPENTIN 800 MG: 400 CAPSULE ORAL at 21:41

## 2022-03-30 RX ADMIN — ROSUVASTATIN CALCIUM 5 MG: 10 TABLET, FILM COATED ORAL at 12:26

## 2022-03-30 RX ADMIN — PANTOPRAZOLE SODIUM 20 MG: 20 TABLET, DELAYED RELEASE ORAL at 06:10

## 2022-03-30 RX ADMIN — OXYCODONE AND ACETAMINOPHEN 1 TABLET: 325; 10 TABLET ORAL at 12:23

## 2022-03-30 RX ADMIN — CEFTRIAXONE SODIUM 1000 MG: 1 INJECTION, POWDER, FOR SOLUTION INTRAMUSCULAR; INTRAVENOUS at 23:47

## 2022-03-30 RX ADMIN — GABAPENTIN 800 MG: 400 CAPSULE ORAL at 08:10

## 2022-03-30 RX ADMIN — CLONAZEPAM 1 MG: 0.5 TABLET ORAL at 08:10

## 2022-03-30 RX ADMIN — VENLAFAXINE HYDROCHLORIDE 150 MG: 75 CAPSULE, EXTENDED RELEASE ORAL at 08:11

## 2022-03-30 RX ADMIN — BUDESONIDE AND FORMOTEROL FUMARATE DIHYDRATE 2 PUFF: 160; 4.5 AEROSOL RESPIRATORY (INHALATION) at 08:08

## 2022-03-30 RX ADMIN — METFORMIN HYDROCHLORIDE 500 MG: 500 TABLET ORAL at 16:35

## 2022-03-30 ASSESSMENT — PAIN DESCRIPTION - DESCRIPTORS
DESCRIPTORS: ACHING

## 2022-03-30 ASSESSMENT — PAIN DESCRIPTION - ONSET
ONSET: ON-GOING

## 2022-03-30 ASSESSMENT — PAIN DESCRIPTION - LOCATION
LOCATION: BACK

## 2022-03-30 ASSESSMENT — PAIN SCALES - GENERAL
PAINLEVEL_OUTOF10: 10
PAINLEVEL_OUTOF10: 0

## 2022-03-30 ASSESSMENT — PAIN DESCRIPTION - ORIENTATION
ORIENTATION: LOWER

## 2022-03-30 ASSESSMENT — PAIN - FUNCTIONAL ASSESSMENT
PAIN_FUNCTIONAL_ASSESSMENT: PREVENTS OR INTERFERES SOME ACTIVE ACTIVITIES AND ADLS
PAIN_FUNCTIONAL_ASSESSMENT: PREVENTS OR INTERFERES WITH MANY ACTIVE NOT PASSIVE ACTIVITIES

## 2022-03-30 ASSESSMENT — PAIN DESCRIPTION - FREQUENCY
FREQUENCY: CONTINUOUS

## 2022-03-30 ASSESSMENT — PAIN DESCRIPTION - PAIN TYPE
TYPE: CHRONIC PAIN

## 2022-03-30 ASSESSMENT — PAIN DESCRIPTION - PROGRESSION
CLINICAL_PROGRESSION: NOT CHANGED
CLINICAL_PROGRESSION: NOT CHANGED
CLINICAL_PROGRESSION: GRADUALLY WORSENING
CLINICAL_PROGRESSION: NOT CHANGED
CLINICAL_PROGRESSION: NOT CHANGED

## 2022-03-30 NOTE — CARE COORDINATION
Social Work-Patient is requesting SNF at [facility], [facility], [facility]  [facility]. Sent referral to all 4 facilities.  Darnell Carranza

## 2022-03-30 NOTE — CARE COORDINATION
Discharge Planning:    Writer in to see patient for choices and the following are what she provided in no particular order:    [facility]    Notified Michael/RAJINDER of patient choices. The Plan for Transition of Care is related to the following treatment goals: SN/PT/OT    The Patient and/or patient representative was provided with a choice of provider and agrees   with the discharge plan. [x] Yes [] No    Freedom of choice list was provided with basic dialogue that supports the patient's individualized plan of care/goals, treatment preferences and shares the quality data associated with the providers.  [x] Yes [] No

## 2022-03-30 NOTE — TELEPHONE ENCOUNTER
Name: Tha Khan  : 1950  MRN: [id number]    Oncology Navigation Follow-Up Note  Navigator reviewing chart and pt. Returning to ED. Pt. With decubitis now and needing further wound care. Pt. Initially refusing SNF, but now realizing she will need further Rehab. Plan to follow.  Electronically signed by Yanelis Perry RN on 3/30/2022 at 12:08 PM

## 2022-03-30 NOTE — H&P
History & Physical  [facility]    Adult Hospitalist      Name: Charity Collazo  MRN: [id number]     [place]: [de-identified]  Room: [place] [address]    Admit Date: 3/29/2022  6:18 PM  PCP: Nereida Givens MD    Primary Problem  Principal Problem:    Unable to ambulate  Resolved Problems:    * No resolved hospital problems. *        Assesment:     · Unable to ambulate  · Weakness  · Decubitus ulcer buttocks  · Coronary disease, native vessel  · Chronic obstructive pulmonary disease, unspecified  · Diabetes mellitus type 2  · Essential hypertension  · Major depressive disorder  · Anxiety disorder  · Reported abdominal aortic aneurysm  · Gastroesophageal reflux disease without esophagitis  · Aortic stenosis  · Osteoarthritis multiple joints  · Lung cancer  · Past smoker        Plan:     · Admit to MedSurg  · Monitor vitals closely  · Keep SPO2 above 90%  · I's and O's  · IV Hep-Lock  · Pain control   · antiemetics as needed  · DuoNeb  · RT eval  · Resume essential home medication  · Add parameters  · Consult social work  · Discharge planning  · Consult PT/OT  · CBC, BMP  · DVT and GI prophylaxis. Chief Complaint:     Chief Complaint   Patient presents with    Extremity Weakness         History of Present Illness:      Charity Collazo is a 71 y.o.  female who presents with Extremity Weakness    Patient admitted through the emergency room where she presented via EMS. Patient had recently been discharged from the hospital for acute rhabdomyolysis. Patient had been recommended to go to a skilled nursing facility for rehabilitation. However in spite of repeated recommendations patient refused to go to a rehab center. Social work had made a call to [facility] in addition in view of the patient's condition. Apparently the patient was discharged 3 days ago and says since then she has been sitting in one chair. She says she has not been able to get up and has developed a decubitus ulcer.   She is asking for pain medication for that which has been ordered. Patient's muscle enzymes have been unremarkable now    Patient says she does understand now that she will need to go to a nursing facility for more strengthening. She understands also that this will help her return to her home and that this is necessary for her safety. However she says she does not like to hear recommendations like these again and again. We have advised her that there will be selected people through who decisions will need to be made of their she goes. Patient verbalizes understanding on that. Patient has had some cough and congestion. She says she has used her inhaler treatments. She is on oxygen via nasal cannula at 3 L/min. She denies any chest pain or wheezing. Denies any headache, photophobia or diplopia. Denies any nausea, vomiting or abdominal pain. Denies any diarrhea or constipation. Denies any joint swelling though she states she has chronic pain and needs her pain medication    I have personally reviewed the past medical history, past surgical history, medications, social history, and family history, and summarized in the note. Review of Systems:     All 10 point system is reviewed and negative otherwise mentioned in HPI. Past Medical History:     Past Medical History:   Diagnosis Date    AAA (abdominal aortic aneurysm) ([facility]     Pt denies having a history of AAA    Acid reflux     Anxiety and depression     Aortic stenosis     Arthritis     Blister of ankle, right 10/13/2016    blister broke open & draining, is on antibiotics    CAD (coronary artery disease)     Cancer ([address]     lung cancer    COPD (chronic obstructive pulmonary disease) ([facility]     Diabetes mellitus ([facility]     Falls     Heart block     bifasicular    Hypokalemia     MDRO (multiple drug resistant organisms) resistance 10/17/2014    E.  Coli urine    MRSA (methicillin resistant staph aureus) culture positive resolved 12/2016    2 negative nasal screens - 2016 (hx in urine 2014)    On home oxygen therapy     uses 2 liters at night    On home oxygen therapy     patient states 2 liters/nasal cannula continuous    Overactive bladder     patient incont. wears a brief    Pneumonia     PONV (postoperative nausea and vomiting)     Vitamin D deficiency         Past Surgical History:     Past Surgical History:   Procedure Laterality Date    AORTIC VALVE REPAIR N/A 4/11/2017    AORTIC VALVE REPAIR REPLACEMENT performed by Kasey Louie MD at [address] N/A 8/31/2020    BRONCHOSCOPY BIOPSY BRONCHUS performed by Johan Hugo MD at [address] [facility], COLON, DIAGNOSTIC  12/08/2016    EYE SURGERY      HYSTERECTOMY      IR INS PICC VAD W SQ PORT GREATER THAN 5  9/4/2020    IR INS PICC VAD W SQ PORT GREATER THAN 5 9/4/2020 [facility]    IR PORT PLACEMENT EQUAL OR GREATER THAN 5 YEARS  9/29/2020    IR PORT PLACEMENT EQUAL OR GREATER THAN 5 YEARS 9/29/2020 MD [facility]    LUMBAR LAMINECTOMY      TONSILLECTOMY          Medications Prior to Admission:       Prior to Admission medications    Medication Sig Start Date End Date Taking? Authorizing Provider   metoprolol tartrate (LOPRESSOR) 50 MG tablet Take 1 tablet by mouth 2 times daily 3/25/22   Gustavo Enriquez MD   albuterol sulfate  (90 Base) MCG/ACT inhaler Inhale 2 puffs into the lungs every 4 hours as needed for Wheezing 3/25/22   Gustavo Enriquez MD   neomycin-bacitracin-polymyxin (NEOSPORIN) 400-5-5000 ointment Apply topically daily for 5 days Apply topically 2 times daily.  3/25/22 3/30/22  Gustavo Enriquez MD   tamsulosin (FLOMAX) 0.4 MG capsule Take 1 capsule by mouth daily 3/26/22   Gustavo Enriquez MD   ARIPiprazole (ABILIFY) 5 MG tablet Take 5 mg by mouth at bedtime    Historical Provider, MD   metFORMIN (GLUCOPHAGE) 500 MG tablet Take 500 mg by mouth 2 times daily (with meals)    Historical Provider, MD   Pseudoeph-Doxylamine-DM-APAP (NYQUIL MULTI-SYMPTOM PO) Take by mouth at bedtime    Historical Provider, MD   traZODone (DESYREL) 150 MG tablet Take 300 mg by mouth nightly    Historical Provider, MD   gabapentin (NEURONTIN) 400 MG capsule Take 800 mg by mouth in the morning and at bedtime. Historical Provider, MD   melatonin 5 MG TABS tablet Take 15 mg by mouth nightly    Historical Provider, MD   ondansetron (ZOFRAN) 4 MG tablet Take 1 tablet by mouth every 8 hours as needed for Nausea or Vomiting 1/26/22   Krishna Jj MD   rosuvastatin (CRESTOR) 5 MG tablet Take 5 mg by mouth daily    Historical Provider, MD   oxyCODONE-acetaminophen (PERCOCET)  MG per tablet Take 1 tablet by mouth every 6 hours as needed for Pain. Historical Provider, MD   clonazePAM (KLONOPIN) 1 MG tablet Take 1 tablet by mouth 3 times daily as needed for Anxiety for up to 3 doses. 11/5/20 1/26/22  Christopher Decker MD   furosemide (LASIX) 40 MG tablet Take 40 mg by mouth daily as needed (swelling)     Historical Provider, MD   glimepiride (AMARYL) 1 MG tablet Take 1 mg by mouth 2 times daily (with meals)     Historical Provider, MD   venlafaxine (EFFEXOR XR) 150 MG extended release capsule Take 150 mg by mouth 2 times daily    Historical Provider, MD   lansoprazole (PREVACID) 30 MG delayed release capsule Take 30 mg by mouth daily 11/10/16   Historical Provider, MD   aspirin EC 81 MG EC tablet Take 81 mg by mouth daily    Historical Provider, MD   mometasone-formoterol (DULERA) 200-5 MCG/ACT inhaler Inhale 2 puffs into the lungs every 12 hours as needed (wheezing/SOB)     Historical Provider, MD        Allergies:       Codeine and Dye [iodides]    Social History:     Tobacco:    reports that she quit smoking about 5 years ago. She has never used smokeless tobacco.  Alcohol:      reports no history of alcohol use. Drug Use:  reports no history of drug use.     Family History: Family History   Problem Relation Age of Onset    Cancer Mother     Cancer Father          Physical Exam:     Vitals:  BP (!) 141/114   Pulse 120   Temp 98.8 °F (37.1 °C) (Oral)   Resp 26   Ht 5' 8\" (1.727 m)   Wt 167 lb (75.8 kg)   SpO2 100%   BMI 25.39 kg/m²   Temp (24hrs), Av.8 °F (37.1 °C), Min:98.8 °F (37.1 °C), Max:98.8 °F (37.1 °C)          General appearance - alert, well appearing, and in moderate acute distress  Mental status - oriented to person, place, and time with normal affect  Head - normocephalic and atraumatic  Eyes - pupils equal and reactive, extraocular eye movements intact, conjunctiva clear  Ears - hearing appears to be intact  Nose - no drainage noted  Mouth - mucous membranes moist  Neck - supple, no carotid bruits, thyroid not palpable  Chest -coarse crackles to auscultation, increased effort  Heart - normal rate, regular rhythm, no murmur  Abdomen - soft, nontender, nondistended, bowel sounds present all four quadrants, no masses, hepatomegaly or splenomegaly  Neurological - normal speech, no focal findings or movement disorder noted, cranial nerves II through XII grossly intact.   Reduced strength upper and lower extremities, equal bilateral  Extremities - peripheral pulses palpable, no pedal edema or calf pain with palpation  Skin - no gross lesions, rashes, or induration noted        Data:     Labs:    Hematology:  Recent Labs     22   WBC 9.7   RBC 4.03   HGB 11.0*   HCT 35.1*   MCV 87.1   MCH 27.3   MCHC 31.3   RDW 14.0      MPV 8.9     Chemistry:  Recent Labs     22     --    K 4.0  --    CL 95*  --    CO2 31  --    GLUCOSE 171* 162   BUN 11  --    CREATININE 0.61  --    ANIONGAP 10  --    LABGLOM >60  --    GFRAA >60  --    CALCIUM 9.5  --    TROPHS 49*  --    CKTOTAL 64  --    MYOGLOBIN 46  --      Recent Labs     22   POCGLU 162*       Lab Results   Component Value Date    INR 1.1 2022    INR 1.0 01/06/2021    INR 0.9 09/29/2020    PROTIME 13.8 03/19/2022    PROTIME 12.6 01/06/2021    PROTIME 11.8 09/29/2020       Lab Results   Component Value Date/Time    SPECIAL NOT REPORTED 11/24/2021 06:32 PM     Lab Results   Component Value Date/Time    CULTURE NO GROWTH 6 DAYS 11/24/2021 06:32 PM       Lab Results   Component Value Date    POCPH 7.36 04/12/2017    PHART 7.42 08/26/2012    PH 7.22 04/11/2017    POCPCO2 45 04/12/2017    SPY0REX 41 08/26/2012    PCO2 54 04/11/2017    POCPO2 93 04/12/2017    PO2ART 59 08/26/2012    PO2 89 04/11/2017    POCHCO3 25.3 04/12/2017    ZBJ3EJJ 26.1 08/26/2012    HCO3 22.2 04/11/2017    NBEA NOT REPORTED 04/12/2017    PBEA 0 04/12/2017    HXI9OSX 27 04/12/2017    LGRJ5XLT 97 04/12/2017    H2NWHJZI 92.1 08/26/2012    O2SAT 95 04/11/2017    FIO2 UNKNOWN 10/31/2017       Radiology:    XR CHEST PORTABLE    Result Date: 3/29/2022  Right apical consolidation is similar prior examination. Chest CT could provide further information. All radiological studies reviewed                Code Status:  Prior    Electronically signed by Paddy Robledo MD on 3/29/2022 at 8:45 PM     Copy sent to Dr. Noah Braswell MD    This note was created with the assistance of a speech-recognition program.  Although the intention is to generate a document that actually reflects the content of the visit, no guarantees can be provided that every mistake has been identified and corrected by editing. Note was updated later by me after  physical examination and  completion of the assessment.

## 2022-03-30 NOTE — RT PROTOCOL NOTE
RT Inhaler-Nebulizer Bronchodilator Protocol Note    There is a bronchodilator order in the chart from a provider indicating to follow the RT Bronchodilator Protocol and there is an Initiate RT Inhaler-Nebulizer Bronchodilator Protocol order as well (see protocol at bottom of note). CXR Findings:  XR CHEST PORTABLE    Result Date: 3/29/2022  Right apical consolidation is similar prior examination. Chest CT could provide further information. The findings from the last RT Protocol Assessment were as follows:   History Pulmonary Disease: Chronic pulmonary disease  Respiratory Pattern: Dyspnea on exertion or RR 21-25 bpm  Breath Sounds: Slightly diminished and/or crackles  Cough: Strong, productive  Indication for Bronchodilator Therapy:    Bronchodilator Assessment Score: 7    Aerosolized bronchodilator medication orders have been revised according to the RT Inhaler-Nebulizer Bronchodilator Protocol below. Respiratory Therapist to perform RT Therapy Protocol Assessment initially then follow the protocol. Repeat RT Therapy Protocol Assessment PRN for score 0-3 or on second treatment, BID, and PRN for scores above 3. No Indications - adjust the frequency to every 6 hours PRN wheezing or bronchospasm, if no treatments needed after 48 hours then discontinue using Per Protocol order mode. If indication present, adjust the RT bronchodilator orders based on the Bronchodilator Assessment Score as indicated below. Use Inhaler orders unless patient has one or more of the following: on home nebulizer, not able to hold breath for 10 seconds, is not alert and oriented, cannot activate and use MDI correctly, or respiratory rate 25 breaths per minute or more, then use the equivalent nebulizer order(s) with same Frequency and PRN reasons based on the score. If a patient is on this medication at home then do not decrease Frequency below that used at home.     0-3 - enter or revise RT bronchodilator order(s) to equivalent RT Bronchodilator order with Frequency of every 4 hours PRN for wheezing or increased work of breathing using Per Protocol order mode. 4-6 - enter or revise RT Bronchodilator order(s) to two equivalent RT bronchodilator orders with one order with BID Frequency and one order with Frequency of every 4 hours PRN wheezing or increased work of breathing using Per Protocol order mode. 7-10 - enter or revise RT Bronchodilator order(s) to two equivalent RT bronchodilator orders with one order with TID Frequency and one order with Frequency of every 4 hours PRN wheezing or increased work of breathing using Per Protocol order mode. 11-13 - enter or revise RT Bronchodilator order(s) to one equivalent RT bronchodilator order with QID Frequency and an Albuterol order with Frequency of every 4 hours PRN wheezing or increased work of breathing using Per Protocol order mode. Greater than 13 - enter or revise RT Bronchodilator order(s) to one equivalent RT bronchodilator order with every 4 hours Frequency and an Albuterol order with Frequency of every 2 hours PRN wheezing or increased work of breathing using Per Protocol order mode. RT to enter RT Home Evaluation for COPD & MDI Assessment order using Per Protocol order mode.     Electronically signed by Sheryl Vargas RCP on 3/29/2022 at 10:16 PM

## 2022-03-30 NOTE — FLOWSHEET NOTE
Patient was in chair sleeping as writer entered the room (no family members present). Writer provided a silent prayer of healing, comfort, and rest during her stay. Spiritual care will follow up as needed or requested.      03/30/22 5483   Encounter Summary   Services provided to: Patient   Referral/Consult From: [facility]   (3/30/2022 Sleeping)   Complexity of Encounter Low   Length of Encounter 15 minutes   Routine   Type Initial   Assessment Sleeping   Intervention Prayer

## 2022-03-30 NOTE — PROGRESS NOTES
Comprehensive Nutrition Assessment    Type and Reason for Visit:  Positive Nutrition Screen (Pressure ulcer or non-healing wounds)    Nutrition Recommendations/Plan:   1. Continue ADULT DIET; Regular; 4 carb choices (60 gm/meal); Low Fat/Low Chol/High Fiber/2 gm Na; Low Sodium (2 gm)  2. Start Glucerna 2x/day  3. Monitor p.o intakes and labs    Nutrition Assessment:  Patient admission is related to generalized weakness and difficulty ambulated. Patient reports she has lung cancer and is receiving cancer treatments. Patient has a stage 2 pressure on buttocks. Patient reports a decreased appetite and is not eating well at meals. Patient reports eating about 26-50% at meals. Patient reports about 3 years ago she weighed 190 lbs. Patient has lost about 30 lbs. Patient is at least mildly malnourished. Will start Glucerna 2x/day. Malnutrition Assessment:  Malnutrition Status:  Mild malnutrition    Context:  Acute Illness     Findings of the 6 clinical characteristics of malnutrition:  Energy Intake:  7 - 50% or less of estimated energy requirements for 5 or more days  Weight Loss:  No significant weight loss     Body Fat Loss:  Unable to assess     Muscle Mass Loss:  Unable to assess    Fluid Accumulation:  No significant fluid accumulation Extremities   Strength:  Not Performed    Estimated Daily Nutrient Needs:  Energy (kcal):  8436-4970 kcal (25-27 kcal/kg); Weight Used for Energy Requirements:  Current     Protein (g):   gm (1.3-1.4 gm/kg); Weight Used for Protein Requirements:  Current          Nutrition Related Findings:  Edema: trace BLE. Active bowel sounds. Wounds:  Stage II (buttocks)       Current Nutrition Therapies:    ADULT DIET; Regular; 4 carb choices (60 gm/meal); Low Fat/Low Chol/High Fiber/2 gm Na;  Low Sodium (2 gm)  ADULT ORAL NUTRITION SUPPLEMENT; Breakfast, Dinner; Diabetic Oral Supplement    Anthropometric Measures:  · Height: 5' 8\" (172.7 cm)  · Current Body Weight: 159 lb (72.1 kg)   · Ideal Body Weight: 140 lbs; % Ideal Body Weight 113.6 %   · BMI: 24.2  · BMI Categories: Normal Weight (BMI 22.0 to 24.9) age over 72       Nutrition Diagnosis:   · Mild malnutrition related to inadequate protein-energy intake as evidenced by intake 51-75%,weight loss      Nutrition Interventions:   Food and/or Nutrient Delivery:  Continue Current Diet,Start Oral Nutrition Supplement  Nutrition Education/Counseling:  Education not indicated   Coordination of Nutrition Care:  Continue to monitor while inpatient    Goals:  PO intakes are greater than 75% at meals       Nutrition Monitoring and Evaluation:   Food/Nutrient Intake Outcomes:  Food and Nutrient Intake,Supplement Intake  Physical Signs/Symptoms Outcomes:  Biochemical Data,Fluid Status or Edema,Skin,Weight     Discharge Planning:    Continue current diet,Continue Oral Nutrition Supplement           Eliu LUIN, RDN, LDN  Lead Clinical Dietitian  RD Office Phone [phone]

## 2022-03-30 NOTE — PROGRESS NOTES
Physician Progress Note      PATIENT:               Shawn Street  CSN #:                  [id number]  :                       1950  ADMIT DATE:       3/29/2022 6:18 PM  [address] [place] DATE:  RESPONDING  PROVIDER #:        Dickson Mistry MD          QUERY TEXT:    Patient admitted with generalized weakness[de-identified] Per  on 3/29/22, noted to also   have pressure ulcer. If possible, please document in progress notes and   discharge summary the location, present on admission status and stage of the   pressure ulcer: The medical record reflects the following:  Risk Factors: Advanced age of 71, Generalized weakness and has not been able   to get out of chair  Clinical Indicators: note of redness around site, photo shows skin breakdown  Treatment: dressing applied, monitoring    Thank you,  Swathi Villavicencio RN    Stage 1:  Non-blanchable erythema of intact skin  Stage 2:  Abrasion, Blister, Partial-thickness skin loss, with exposed dermis  Stage 3:  Full-thickness skin loss with damage or necrosis of subcutaneous   tissue  Stage 4:  Full-thickness skin & soft tissue loss through to underlying muscle,   tendon or bone  Unstageable: Obscured full-thickness skin & tissue loss  Options provided:  -- Stage 1 Pressure Ulcer of buttock present on admission  -- Stage 2 Pressure Ulcer of buttock  present on admission  -- Stage 3 Pressure Ulcer of buttock present on admission  -- Stage 4 Pressure Ulcer of buttock present on admission  -- Other - I will add my own diagnosis  -- Disagree - Not applicable / Not valid  -- Disagree - Clinically unable to determine / Unknown  -- Refer to Clinical Documentation Reviewer    PROVIDER RESPONSE TEXT:    This patient has a Stage 2 pressure ulcer of the buttock which was present on   admission. Query created by:  Brandyn Lowe on 3/30/2022 6:35 AM      Electronically signed by:  Dickson Mistry MD 3/30/2022 9:32 AM

## 2022-03-30 NOTE — PROGRESS NOTES
Occupational Therapy   Occupational Therapy Initial Assessment  Date: 3/30/2022   Patient Name: Jong Parr  MRN: [id number]     : 1950    Date of Service: 3/30/2022    Discharge Recommendations:  Patient would benefit from continued therapy after discharge Pt currently functioning below baseline. Would suggest additional therapy at time of discharge to maximize long term outcomes and prevent re-admission. Please refer to AM-PAC score for current level of function. Lucretia Gitelman Shultz is a 71 y.o.  female who presents with Extremity Weakness     Patient admitted through the emergency room where she presented via EMS. Deepak Mondragon had recently been discharged from the hospital for acute rhabdomyolysis. Deepak Mondragon had been recommended to go to a skilled nursing facility for rehabilitation. Malinda De Paz in spite of repeated recommendations patient refused to go to a rehab center.  Social work had made a call to [facility] in addition in view of the patient's condition.     Apparently the patient was discharged 3 days ago and says since then she has been sitting in one chair.  She says she has not been able to get up and has developed a decubitus ulcer. Bernarda Mcdonald is asking for pain medication for that which has been ordered.  Patient's muscle enzymes have been unremarkable now             RN reports patient is medically stable for therapy treatment this date. Chart reviewed prior to treatment and patient is agreeable for therapy. All lines intact and patient positioned comfortably at end of treatment. All patient needs addressed prior to ending therapy session. Assessment   Performance deficits / Impairments: Decreased functional mobility ; Decreased ADL status; Decreased strength;Decreased safe awareness;Decreased endurance;Decreased balance;Decreased posture;Decreased cognition  Assessment: Skilled OT is indicated to increase overall safety awareness in function as well as strenght, balance, ADL status, functional mobility, and cognition to improve functional outcomes, I, and return to home. Prognosis: Good  Decision Making: High Complexity  OT Education: OT Role;Plan of Care;Home Exercise Program;Precautions; ADL Adaptive Strategies;Transfer Training;Energy Conservation; Family Education;Equipment  Patient Education: benefits of OOB activity, use of call light, fall prevention techs  REQUIRES OT FOLLOW UP: Yes  Activity Tolerance  Activity Tolerance: Patient Tolerated treatment well;Treatment limited secondary to decreased cognition  Safety Devices  Safety Devices in place: Yes  Type of devices: Call light within reach;Nurse notified;Gait belt;Patient at risk for falls; Left in chair;Chair alarm in place           Patient Diagnosis(es): The encounter diagnosis was Generalized weakness. has a past medical history of AAA (abdominal aortic aneurysm) (HCC), Acid reflux, Anxiety and depression, Aortic stenosis, Arthritis, Blister of ankle, right, CAD (coronary artery disease), Cancer ([facility] [address] COPD (chronic obstructive pulmonary disease) ([facility] [address] Diabetes mellitus ([facility] [address] Falls, Heart block, Hypokalemia, MDRO (multiple drug resistant organisms) resistance, MRSA (methicillin resistant staph aureus) culture positive, On home oxygen therapy, On home oxygen therapy, Overactive bladder, Pneumonia, PONV (postoperative nausea and vomiting), and Vitamin D deficiency. has a past surgical history that includes back surgery; eye surgery; Cholecystectomy; Appendectomy; Hysterectomy; Tonsillectomy; lumbar laminectomy; Endoscopy, colon, diagnostic (12/08/2016); aortic valve repair (N/A, 4/11/2017); bronchoscopy (N/A, 8/31/2020); IR INSERT PICC VAD W SQ PORT >5 YEARS (9/4/2020); and IR PORT PLACEMENT > 5 YEARS (9/29/2020).            Restrictions  Restrictions/Precautions  Restrictions/Precautions: General Precautions,Fall Risk  Position Activity Restriction  Other position/activity restrictions: L UE IV, jc    Subjective   General  Chart Reviewed: Yes  Patient assessed for rehabilitation services?: Yes  Family / Caregiver Present: No  Subjective  Subjective: pt c/o pain in R shoulder with ROM (unable to state why it hurts)  Patient Currently in Pain: Yes  Pain Assessment  Pain Assessment: 0-10  Pain Level: 10  Patient's Stated Pain Goal: No pain  Pain Type: Chronic pain  Pain Location: Back  Pain Orientation: Lower  Pain Descriptors: Aching  Pain Frequency: Continuous  Pain Onset: On-going  Clinical Progression: Gradually worsening  Functional Pain Assessment: Prevents or interferes with many active not passive activities  Non-Pharmaceutical Pain Intervention(s): Emotional support  Response to Pain Intervention: None  Vital Signs  Patient Currently in Pain: Yes  Social/Functional History  Social/Functional History  Lives With: Alone  Type of Home: House  Home Layout: Two level,Able to Live on Main level with bedroom/bathroom  Home Access: Ramped entrance  Bathroom Shower/Tub: Tub/Shower unit  Bathroom Equipment: Tub transfer bench  Home Equipment: Rolling walker  ADL Assistance: Needs assistance (HHA 3x/week, assist for showers)  Homemaking Assistance: Needs assistance  Ambulation Assistance: Independent (RW)  Transfer Assistance: Independent  Active : No  Patient's  Info: granddaughter  Additional Comments: pt was just her, home for 3 days and couldn't get out of chair entire time home. Did not eat or take any meds. Pt readmitted and is now agreeable to SNF. Pt is a questionable historian with regards to recent timeline of events. Pt does not know details of how she got home the last time, why she sat there for 3 days without eating or calling for help. Does report h.o. falls but states it has been a while (despite being found on floor prior to last admission). Also has scab on side of face by glasses that pt cannot fully explain.        Objective   Vision: Impaired  Vision Exceptions: Wears glasses at all times  Hearing: Within functional limits    Orientation  Overall Orientation Status: Within Functional Limits (grossly intact however, pt has difficulty with timeline of recent events and multiple hospital admissions.)  Observation/Palpation  Posture: Fair (kyphotic posture  Simultaneous filing. User may not have seen previous data.)  Observation: pt lying in bed, agreeable to getting up with PT/OT. (Simultaneous filing. User may not have seen previous data.)  Balance  Sitting Balance: Stand by assistance  Standing Balance: Moderate assistance (min-mod A ; varies during session d/t fatigue and then reports of dizziness)  Functional Mobility  Functional - Mobility Device: Rolling Walker  Assist Level: Minimal assistance (x2 for mob to bathroom. Assist for lines/O2 tubing. Pt with dec awareness. Pt then getting dizzy following toileting and needing Ronna Maori to get back to chair)  Toilet Transfers  Equipment Used: Standard toilet  Toilet Transfer: Moderate assistance;2 Person assistance  Toilet Transfers Comments: Vado Maori from toilet on 2nd stand d/t reports of dizziness. Pt is inconsistent somewhat and fatigues easily. pt is also somewhat unpredicable as she suddenly needs to sit while standing at toilet and being asssisted with hygiene  ADL  Feeding: Independent;Setup  Grooming: Stand by assistance;Minimal assistance;Setup  UE Bathing: Moderate assistance;Minimal assistance  LE Bathing: Maximum assistance  UE Dressing: Moderate assistance;Minimal assistance  LE Dressing: Maximum assistance  Toileting: Maximum assistance (assisted with toileting; max A for brief and mod-max A for hygiene. Pt has difficulty with thorough cleaning following BM.  Pt reporting dizziness upon standing from toilet.)  Tone RUE  RUE Tone: Normotonic  Tone LUE  LUE Tone: Normotonic  Coordination  Movements Are Fluid And Coordinated: Yes     Bed mobility  Supine to Sit: Minimal assistance  Comment: up to chair  Transfers  Sit to stand: Moderate assistance;2 Person assistance;Minimal assistance  Stand to sit: Minimal assistance; Moderate assistance;2 Person assistance  Transfer Comments: pt initially min A x 2 from bed and then needing mod A x 2 from toilet with fatigue . Pt needing cues on safety awareness throughout but can get easily irritated with too many instructions     Cognition  Overall Cognitive Status: Exceptions  Arousal/Alertness: Appropriate responses to stimuli  Following Commands: Inconsistently follows commands  Memory: Decreased recall of recent events  Safety Judgement: Decreased awareness of need for assistance;Decreased awareness of need for safety  Problem Solving: Assistance required to generate solutions;Assistance required to identify errors made;Decreased awareness of errors;Assistance required to implement solutions;Assistance required to correct errors made  Insights: Decreased awareness of deficits  Initiation: Requires cues for some  Sequencing: Requires cues for some  Perception  Overall Perceptual Status: WFL     Sensation  Overall Sensation Status: WFL        LUE AROM (degrees)  LUE AROM : Exceptions  LUE General AROM: pain with L shoulder ROM.   Other WFLs  RUE AROM (degrees)  RUE AROM : WFL  LUE Strength  Gross LUE Strength: Exceptions to [facility]  LUE Strength Comment: 3-/5 L UE, other4/5  RUE Strength  RUE Strength Comment: R 4/5                   Plan   Plan  Times per week: 4-5x/week, 1-2x/day  Current Treatment Recommendations: Strengthening,Balance Training,Functional Mobility Training,Endurance Training,Safety Education & Training,Self-Care / ADL,Patient/Caregiver Education & Training,Equipment Evaluation, Education, & procurement,Positioning,Cognitive/Perceptual Training,Pain Management,Neuromuscular Re-education          AM-[facility] Inpatient Daily Activity Raw Score: 15 (03/30/22 1340)  AM-PAC Inpatient ADL T-Scale Score : 34.69 (03/30/22 1340)  ADL Inpatient CMS 0-100% Score: 56.46 (03/30/22 1340)  ADL Inpatient CMS G-Code Modifier : CK (03/30/22 7690)    Goals  Short term goals  Time Frame for Short term goals: by discharge, pt will  Short term goal 1: demo CGA with ADL transfers with approp AD/DME  Short term goal 2: demo CGA with functional mob in room distances with min cues for safety and approp AD  Short term goal 3: demo CGA with toileting routine with approp DME and min cues for safety  Short term goal 4: demo SBA with UB ADLs and min A LB ADLs with AE/DME and min cues for safety  Short term goal 5: demo and verb good understanding of fall prevention techs, EC/WS techs, equip needs, B UE HEP, positioning techs/skin integrity and pressure relieving techs, and d/c recommendations  Patient Goals   Patient goals : agreeable to rehab       Therapy Time   Individual Concurrent Group Co-treatment   Time In 1058         Time Out 1157         Minutes 59          treatment min: 50  Co-treatment with PT warranted secondary to decreased safety and independence requiring 2 skilled therapy professionals to address individual discipline's goals. OT addressing preparation for ADL transfer, sitting balance for increased ADL performance, sitting/activity tolerance, functional reaching, environmental safety/scanning, fall prevention, functional mobility for ADL transfers, ability to sequence and follow directions and functional UE strength.        Alam Harness, OT

## 2022-03-30 NOTE — RT PROTOCOL NOTE
RT Inhaler-Nebulizer Bronchodilator Protocol Note    There is a bronchodilator order in the chart from a provider indicating to follow the RT Bronchodilator Protocol and there is an Initiate RT Inhaler-Nebulizer Bronchodilator Protocol order as well (see protocol at bottom of note). CXR Findings:  XR CHEST PORTABLE    Result Date: 3/29/2022  Right apical consolidation is similar prior examination. Chest CT could provide further information. The findings from the last RT Protocol Assessment were as follows:   History Pulmonary Disease: Chronic pulmonary disease  Respiratory Pattern: Dyspnea on exertion or RR 21-25 bpm  Breath Sounds: Slightly diminished and/or crackles  Cough: Strong, productive  Indication for Bronchodilator Therapy: Decreased or absent breath sounds  Bronchodilator Assessment Score: 7    Aerosolized bronchodilator medication orders have been revised according to the RT Inhaler-Nebulizer Bronchodilator Protocol below. Respiratory Therapist to perform RT Therapy Protocol Assessment initially then follow the protocol. Repeat RT Therapy Protocol Assessment PRN for score 0-3 or on second treatment, BID, and PRN for scores above 3. No Indications - adjust the frequency to every 6 hours PRN wheezing or bronchospasm, if no treatments needed after 48 hours then discontinue using Per Protocol order mode. If indication present, adjust the RT bronchodilator orders based on the Bronchodilator Assessment Score as indicated below. Use Inhaler orders unless patient has one or more of the following: on home nebulizer, not able to hold breath for 10 seconds, is not alert and oriented, cannot activate and use MDI correctly, or respiratory rate 25 breaths per minute or more, then use the equivalent nebulizer order(s) with same Frequency and PRN reasons based on the score. If a patient is on this medication at home then do not decrease Frequency below that used at home.     0-3 - enter or revise RT bronchodilator order(s) to equivalent RT Bronchodilator order with Frequency of every 4 hours PRN for wheezing or increased work of breathing using Per Protocol order mode. 4-6 - enter or revise RT Bronchodilator order(s) to two equivalent RT bronchodilator orders with one order with BID Frequency and one order with Frequency of every 4 hours PRN wheezing or increased work of breathing using Per Protocol order mode. 7-10 - enter or revise RT Bronchodilator order(s) to two equivalent RT bronchodilator orders with one order with TID Frequency and one order with Frequency of every 4 hours PRN wheezing or increased work of breathing using Per Protocol order mode. 11-13 - enter or revise RT Bronchodilator order(s) to one equivalent RT bronchodilator order with QID Frequency and an Albuterol order with Frequency of every 4 hours PRN wheezing or increased work of breathing using Per Protocol order mode. Greater than 13 - enter or revise RT Bronchodilator order(s) to one equivalent RT bronchodilator order with every 4 hours Frequency and an Albuterol order with Frequency of every 2 hours PRN wheezing or increased work of breathing using Per Protocol order mode. RT to enter RT Home Evaluation for COPD & MDI Assessment order using Per Protocol order mode.     Electronically signed by Judy Browning RCP on 3/30/2022 at 8:14 AM

## 2022-03-30 NOTE — PROGRESS NOTES
[facility]    Adult Hospitalist      Name: Kimmie Andrews  MRN: [id number]     Acct: [de-identified]  Room: 2003/2003-02    Admit Date: 3/29/2022  6:18 PM  PCP: Kimberlee Kanner, MD    Primary Problem  Principal Problem:    Unable to ambulate  Resolved Problems:    * No resolved hospital problems. *        Assesment:     · Unable to ambulate  · Weakness  · Decubitus ulcer buttocks  · Coronary disease, native vessel  · Chronic obstructive pulmonary disease, unspecified  · Diabetes mellitus type 2  · Essential hypertension  · Major depressive disorder  · Anxiety disorder  · Reported abdominal aortic aneurysm  · Gastroesophageal reflux disease without esophagitis  · Aortic stenosis  · Osteoarthritis multiple joints  · Lung cancer  · Past smoker        Plan:     · Admit to MedSurg  · Monitor vitals closely  · Keep SPO2 above 90%  · I's and O's  · IV Hep-Lock  · Pain control   · antiemetics as needed  · DuoNeb  · RT eval  · Resume essential home medication  · Add parameters  · Consult social work  · Discharge planning  · Consult PT/OT  · CBC, BMP  · DC planning   · Await [facility] SNF  · DVT and GI prophylaxis. Chief Complaint:     Chief Complaint   Patient presents with    Extremity Weakness         History of Present Illness:        Pt seen and examined at bedside  Last 24 hr events d/w RN  Pt feels better  C/o pain  Says sh ewas on Perccoet q4 h at home  Requesting for same regimen  Says has not had any adverse effects w that    Lt face abrasion healing  Denies CP, abd apin, nausea, vomiting    Cough better  No more wheezing  Denies fever, chills    Other ROS neg      Initial HPI  Kimmie Andrews is a 71 y.o.  female who presents with Extremity Weakness    Patient admitted through the emergency room where she presented via EMS. Patient had recently been discharged from the hospital for acute rhabdomyolysis. Patient had been recommended to go to a skilled nursing facility for rehabilitation. However in spite of repeated recommendations patient refused to go to a rehab center. Social work had made a call to [facility] in addition in view of the patient's condition. Apparently the patient was discharged 3 days ago and says since then she has been sitting in one chair. She says she has not been able to get up and has developed a decubitus ulcer. She is asking for pain medication for that which has been ordered. Patient's muscle enzymes have been unremarkable now    Patient says she does understand now that she will need to go to a nursing facility for more strengthening. She understands also that this will help her return to her home and that this is necessary for her safety. However she says she does not like to hear recommendations like these again and again. We have advised her that there will be selected people through who decisions will need to be made of their she goes. Patient verbalizes understanding on that. Patient has had some cough and congestion. She says she has used her inhaler treatments. She is on oxygen via nasal cannula at 3 L/min. She denies any chest pain or wheezing. Denies any headache, photophobia or diplopia. Denies any nausea, vomiting or abdominal pain. Denies any diarrhea or constipation. Denies any joint swelling though she states she has chronic pain and needs her pain medication    I have personally reviewed the past medical history, past surgical history, medications, social history, and family history, and summarized in the note. Review of Systems:     All 10 point system is reviewed and negative otherwise mentioned in HPI.       Past Medical History:     Past Medical History:   Diagnosis Date    AAA (abdominal aortic aneurysm) ([facility] [address]     Pt denies having a history of AAA    Acid reflux     Anxiety and depression     Aortic stenosis     Arthritis     Blister of ankle, right 10/13/2016    blister broke open & draining, is on antibiotics    CAD (coronary artery disease)     Cancer (HCC)     lung cancer    COPD (chronic obstructive pulmonary disease) (HCC)     Diabetes mellitus ([facility] [address]     Falls     Heart block     bifasicular    Hypokalemia     MDRO (multiple drug resistant organisms) resistance 10/17/2014    E. Coli urine    MRSA (methicillin resistant staph aureus) culture positive resolved 12/2016    2 negative nasal screens - 2016 (hx in urine 2014)    On home oxygen therapy     uses 2 liters at night    On home oxygen therapy     patient states 2 liters/nasal cannula continuous    Overactive bladder     patient incont. wears a brief    Pneumonia     PONV (postoperative nausea and vomiting)     Vitamin D deficiency         Past Surgical History:     Past Surgical History:   Procedure Laterality Date    AORTIC VALVE REPAIR N/A 4/11/2017    AORTIC VALVE REPAIR REPLACEMENT performed by Ravi Carmen MD at [facility] N/A 8/31/2020    BRONCHOSCOPY BIOPSY BRONCHUS performed by Shakira Moralez MD at [facility], COLON, DIAGNOSTIC  12/08/2016    EYE SURGERY      HYSTERECTOMY      IR INS PICC VAD W SQ PORT GREATER THAN 5  9/4/2020    IR INS PICC VAD W SQ PORT GREATER THAN 5 9/4/2020 [facility]    IR PORT PLACEMENT EQUAL OR GREATER THAN 5 YEARS  9/29/2020    IR PORT PLACEMENT EQUAL OR GREATER THAN 5 YEARS 9/29/2020 MD JAY JAY Torres SPECIAL PROCEDURES    LUMBAR LAMINECTOMY      TONSILLECTOMY          Medications Prior to Admission:       Prior to Admission medications    Medication Sig Start Date End Date Taking?  Authorizing Provider   metoprolol tartrate (LOPRESSOR) 50 MG tablet Take 1 tablet by mouth 2 times daily 3/25/22   Charlee Ac MD   albuterol sulfate  (90 Base) MCG/ACT inhaler Inhale 2 puffs into the lungs every 4 hours as needed for Wheezing 3/25/22   Charlee Ac MD neomycin-bacitracin-polymyxin (NEOSPORIN) 400-5-5000 ointment Apply topically daily for 5 days Apply topically 2 times daily. 3/25/22 3/30/22  Ralf Nelson MD   tamsulosin (FLOMAX) 0.4 MG capsule Take 1 capsule by mouth daily 3/26/22   Ralf Nelson MD   ARIPiprazole (ABILIFY) 5 MG tablet Take 5 mg by mouth at bedtime    Historical Provider, MD   metFORMIN (GLUCOPHAGE) 500 MG tablet Take 500 mg by mouth 2 times daily (with meals)    Historical Provider, MD   Pseudoepmirta-Doxylamine-DM-APAP (NYQUIL MULTI-SYMPTOM PO) Take by mouth at bedtime    Historical Provider, MD   traZODone (DESYREL) 150 MG tablet Take 300 mg by mouth nightly    Historical Provider, MD   gabapentin (NEURONTIN) 400 MG capsule Take 800 mg by mouth in the morning and at bedtime. Historical Provider, MD   melatonin 5 MG TABS tablet Take 15 mg by mouth nightly    Historical Provider, MD   ondansetron (ZOFRAN) 4 MG tablet Take 1 tablet by mouth every 8 hours as needed for Nausea or Vomiting 1/26/22   Gladystrenata Maciel MD   rosuvastatin (CRESTOR) 5 MG tablet Take 5 mg by mouth daily    Historical Provider, MD   oxyCODONE-acetaminophen (PERCOCET)  MG per tablet Take 1 tablet by mouth every 6 hours as needed for Pain. Historical Provider, MD   clonazePAM (KLONOPIN) 1 MG tablet Take 1 tablet by mouth 3 times daily as needed for Anxiety for up to 3 doses.  11/5/20 1/26/22  Will Narayan MD   furosemide (LASIX) 40 MG tablet Take 40 mg by mouth daily as needed (swelling)     Historical Provider, MD   glimepiride (AMARYL) 1 MG tablet Take 1 mg by mouth 2 times daily (with meals)     Historical Provider, MD   venlafaxine (EFFEXOR XR) 150 MG extended release capsule Take 150 mg by mouth 2 times daily    Historical Provider, MD   lansoprazole (PREVACID) 30 MG delayed release capsule Take 30 mg by mouth daily 11/10/16   Historical Provider, MD   aspirin EC 81 MG EC tablet Take 81 mg by mouth daily    Historical Provider, MD mometasone-formoterol (DULERA) 200-5 MCG/ACT inhaler Inhale 2 puffs into the lungs every 12 hours as needed (wheezing/SOB)     Historical Provider, MD        Allergies:       Codeine and Dye [iodides]    Social History:     Tobacco:    reports that she quit smoking about 5 years ago. She has never used smokeless tobacco.  Alcohol:      reports no history of alcohol use. Drug Use:  reports no history of drug use. Family History:     Family History   Problem Relation Age of Onset    Cancer Mother     Cancer Father          Physical Exam:     Vitals:  /63   Pulse 88   Temp 97.5 °F (36.4 °C) (Oral)   Resp 16   Ht 5' 8\" (1.727 m)   Wt 159 lb (72.1 kg)   SpO2 99%   BMI 24.18 kg/m²   Temp (24hrs), Av °F (36.7 °C), Min:97.5 °F (36.4 °C), Max:98.8 °F (37.1 °C)          General appearance - alert, well appearing, and in moderate acute distress  Mental status - oriented to person, place, and time with normal affect  Head - normocephalic and atraumatic  Eyes - pupils equal and reactive, extraocular eye movements intact, conjunctiva clear  Ears - hearing appears to be intact  Nose - no drainage noted  Mouth - mucous membranes moist  Neck - supple, no carotid bruits, thyroid not palpable  Chest -coarse crackles to auscultation, increased effort  Heart - normal rate, regular rhythm, no murmur  Abdomen - soft, nontender, nondistended, bowel sounds present all four quadrants, no masses, hepatomegaly or splenomegaly  Neurological - normal speech, no focal findings or movement disorder noted, cranial nerves II through XII grossly intact.   Reduced strength upper and lower extremities, equal bilateral  Extremities - peripheral pulses palpable, no pedal edema or calf pain with palpation  Skin - no gross lesions, rashes, or induration noted        Data:     Labs:    Hematology:  Recent Labs     22  0549   WBC 9.7  --    RBC 4.03  --    HGB 11.0*  --    HCT 35.1*  --    MCV 87.1  --    MCH 27.3 --    MCHC 31.3  --    RDW 14.0  --      --    MPV 8.9  --    INR  --  1.0     Chemistry:  Recent Labs     03/29/22 1927 03/29/22 1942 03/29/22 2122 03/30/22  0549     --   --  140   K 4.0  --   --  4.0   CL 95*  --   --  104   CO2 31  --   --  27   GLUCOSE 171* 162  --  170*   BUN 11  --   --  10   CREATININE 0.61  --   --  0.55   ANIONGAP 10  --   --  9   LABGLOM >60  --   --  >60   GFRAA >60  --   --  >60   CALCIUM 9.5  --   --  8.3*   TROPHS 49*  --  51*  --    CKTOTAL 64  --   --   --    MYOGLOBIN 46  --  37  --      Recent Labs     03/29/22 1924 03/29/22 2304 03/30/22  0553   POCGLU 162* 108* 148*       Lab Results   Component Value Date    INR 1.0 03/30/2022    INR 1.1 03/19/2022    INR 1.0 01/06/2021    PROTIME 13.4 03/30/2022    PROTIME 13.8 03/19/2022    PROTIME 12.6 01/06/2021       Lab Results   Component Value Date/Time    SPECIAL LT AC 10ML 03/29/2022 07:28 PM     Lab Results   Component Value Date/Time    CULTURE NO GROWTH <24 HRS 03/29/2022 07:28 PM       Lab Results   Component Value Date    POCPH 7.36 04/12/2017    PHART 7.42 08/26/2012    PH 7.22 04/11/2017    POCPCO2 45 04/12/2017    EBA7AZK 41 08/26/2012    PCO2 54 04/11/2017    POCPO2 93 04/12/2017    PO2ART 59 08/26/2012    PO2 89 04/11/2017    POCHCO3 25.3 04/12/2017    QGW1PCR 26.1 08/26/2012    HCO3 22.2 04/11/2017    NBEA NOT REPORTED 04/12/2017    PBEA 0 04/12/2017    OGP1RHZ 27 04/12/2017    VRNL1EHV 97 04/12/2017    K7XKPIJW 92.1 08/26/2012    O2SAT 95 04/11/2017    FIO2 UNKNOWN 10/31/2017       Radiology:    XR CHEST PORTABLE    Result Date: 3/29/2022  Right apical consolidation is similar prior examination. Chest CT could provide further information.          All radiological studies reviewed                Code Status:  Full Code    Electronically signed by Carole Faith MD on 3/30/2022 at 9:33 AM     Copy sent to Dr. Arnaud Mena MD    This note was created with the assistance of a speech-recognition program.  Although the intention is to generate a document that actually reflects the content of the visit, no guarantees can be provided that every mistake has been identified and corrected by editing. Note was updated later by me after  physical examination and  completion of the assessment.

## 2022-03-30 NOTE — PLAN OF CARE
Problem: Skin Integrity:  Goal: Will show no infection signs and symptoms  Description: Will show no infection signs and symptoms  3/30/2022 1040 by Santiago Sue RN  Outcome: Ongoing  3/30/2022 0200 by Mila Hernandez RN  Outcome: Ongoing  Goal: Absence of new skin breakdown  Description: Absence of new skin breakdown  3/30/2022 1040 by Santiago Sue RN  Outcome: Ongoing  3/30/2022 0200 by Mila Hernandez RN  Outcome: Ongoing     Problem: Falls - Risk of:  Goal: Will remain free from falls  Description: Will remain free from falls  3/30/2022 1040 by Santiago Sue RN  Outcome: Ongoing  3/30/2022 0200 by Mila Hernandez RN  Outcome: Ongoing  Goal: Absence of physical injury  Description: Absence of physical injury  3/30/2022 1040 by Santiago Sue RN  Outcome: Ongoing  3/30/2022 0200 by Mila Hernandez RN  Outcome: Ongoing     Problem: Pain:  Goal: Pain level will decrease  Description: Pain level will decrease  Outcome: Ongoing  Goal: Control of acute pain  Description: Control of acute pain  Outcome: Ongoing  Goal: Control of chronic pain  Description: Control of chronic pain  Outcome: Ongoing     Problem: Tissue Perfusion - Cardiopulmonary, Altered:  Goal: Absence of angina  Description: Absence of angina  Outcome: Ongoing  Goal: Hemodynamic stability will improve  Description: Hemodynamic stability will improve  Outcome: Ongoing     Problem: Metabolic:  Goal: Ability to maintain appropriate glucose levels will improve  Description: Ability to maintain appropriate glucose levels will improve  Outcome: Ongoing

## 2022-03-30 NOTE — CARE COORDINATION
Case Management Initial Discharge Plan  Jong Parr,             Met with:patient to discuss discharge plans. Information verified: address, contacts, phone number, , insurance Yes  PCP: Maricarmen Andersen MD  Date of last visit: 2021    Insurance Provider: [employer]    Discharge Planning    Living Arrangements:  Alone   Support Systems:  [id number] has 2 stories  Ramp/0 stairs to climb to get into front door, flight of stairs to climb to reach second floor  Location of bedroom/bathroom in home Main Floor    Patient able to perform ADL's:Assisted    [facility] & [facility] home[facility]  DME equipment: O2 at 2L, walker, neb, shower bench Toilet [address]  DME provider: [employer] [facility] [place]    Pharmacy: [address] Needed:  [facility]    Patient agreeable to home care: Yes  Freedom of choice provided:  yes    Prior SNF/Rehab Placement and Facility: Yes, [address] [place] [address] Road to SNF/Rehab: Yes  [place] of choice provided: yes   Evaluation: yes    Expected Discharge date:  22  Patient expects to be discharged to: Follow Up Appointment: Best Day/ Time:      Transportation provider: Will need set up  Transportation arrangements needed for discharge: Yes    Readmission Risk              Risk of Unplanned Readmission:  33             Does patient have a readmission risk score greater than 14?: Yes  If yes, follow-up appointment must be made within 7 days of discharge. Goal of Care:       Discharge Plan:   Met with patient at bedside to discuss d/c plans. Patient is admitted d/t being unable to ambulate and extremity weakness. It is noted that patient has areas of skin breakdown on her buttock and sacral areas. Currently on IV Rocephin. Currently on 2L O2 which is same as her home O2 through [employer] [place]. Patient recently at [facility] and discharged home with [facility].    Patient also reports she received A services through [facility] 2-3 days per week -assist with meals, cleaning and ADLs. Please NOTIFY [facility] when patient discharges and to what facility. Call [phone]. Patient is now agreeable to SNF and list has been provided. Writer to return to room this afternoon to obtain choices. Lives alone. Prior to admission patient was not getting around very well - notes state she sat in her chair, did not eat and did not take her medication. Continue to follow.                    Electronically signed by Kala Velasquez RN on 3/30/22 at 12:01 PM EDT

## 2022-03-30 NOTE — PROGRESS NOTES
Patient admitted to room 2003 from emergency department. Patient transferred into bed by staff. Vitals and general assessment completed as charted. Patient oriented to room, call light, and bed controls. Call light placed within reach, bed in lowest position, and side rails up x 2. Patient educated on the importance of frequent repositioning to prevent further skin breakdown, patient verbalized understanding.

## 2022-03-31 LAB
-: NORMAL
ANION GAP SERPL CALCULATED.3IONS-SCNC: 10 MMOL/L (ref 9–17)
BILIRUBIN URINE: NEGATIVE
BUN BLDV-MCNC: 6 MG/DL (ref 8–23)
BUN/CREAT BLD: 9 (ref 9–20)
CALCIUM SERPL-MCNC: 7.8 MG/DL (ref 8.6–10.4)
CHLORIDE BLD-SCNC: 106 MMOL/L (ref 98–107)
CO2: 25 MMOL/L (ref 20–31)
COLOR: YELLOW
CORTISOL: 9 UG/DL (ref 2.7–18.4)
CREAT SERPL-MCNC: 0.64 MG/DL (ref 0.5–0.9)
EKG ATRIAL RATE: 92 BPM
EKG P AXIS: 68 DEGREES
EKG P-R INTERVAL: 156 MS
EKG Q-T INTERVAL: 422 MS
EKG QRS DURATION: 154 MS
EKG QTC CALCULATION (BAZETT): 521 MS
EKG R AXIS: -51 DEGREES
EKG T AXIS: 46 DEGREES
EKG VENTRICULAR RATE: 92 BPM
EPITHELIAL CELLS UA: NORMAL /HPF (ref 0–5)
GFR AFRICAN AMERICAN: >60 ML/MIN
GFR NON-AFRICAN AMERICAN: >60 ML/MIN
GFR SERPL CREATININE-BSD FRML MDRD: ABNORMAL ML/MIN/{1.73_M2}
GLUCOSE BLD-MCNC: 131 MG/DL (ref 65–105)
GLUCOSE BLD-MCNC: 141 MG/DL (ref 70–99)
GLUCOSE BLD-MCNC: 183 MG/DL (ref 65–105)
GLUCOSE BLD-MCNC: 233 MG/DL (ref 65–105)
GLUCOSE BLD-MCNC: 78 MG/DL (ref 65–105)
GLUCOSE URINE: NEGATIVE
KETONES, URINE: NEGATIVE
LACTIC ACID, SEPSIS: 1.2 MMOL/L (ref 0.5–1.9)
LACTIC ACID, SEPSIS: 1.2 MMOL/L (ref 0.5–1.9)
LEUKOCYTE ESTERASE, URINE: ABNORMAL
MYOGLOBIN: 25 NG/ML (ref 25–58)
MYOGLOBIN: 29 NG/ML (ref 25–58)
NITRITE, URINE: POSITIVE
PH UA: 6 (ref 5–8)
POTASSIUM SERPL-SCNC: 3.9 MMOL/L (ref 3.7–5.3)
PROCALCITONIN: 0.06 NG/ML
PROTEIN UA: NEGATIVE
RBC UA: NORMAL /HPF (ref 0–2)
SARS-COV-2, RAPID: NOT DETECTED
SODIUM BLD-SCNC: 141 MMOL/L (ref 135–144)
SPECIFIC GRAVITY UA: 1.01 (ref 1–1.03)
SPECIMEN DESCRIPTION: NORMAL
TOTAL CK: 40 U/L (ref 26–192)
TOTAL CK: 43 U/L (ref 26–192)
TROPONIN, HIGH SENSITIVITY: 35 NG/L (ref 0–14)
TROPONIN, HIGH SENSITIVITY: 40 NG/L (ref 0–14)
TURBIDITY: CLEAR
URINE HGB: NEGATIVE
UROBILINOGEN, URINE: NORMAL
WBC UA: NORMAL /HPF (ref 0–5)

## 2022-03-31 PROCEDURE — 97530 THERAPEUTIC ACTIVITIES: CPT

## 2022-03-31 PROCEDURE — 83874 ASSAY OF MYOGLOBIN: CPT

## 2022-03-31 PROCEDURE — 80048 BASIC METABOLIC PNL TOTAL CA: CPT

## 2022-03-31 PROCEDURE — 6370000000 HC RX 637 (ALT 250 FOR IP): Performed by: FAMILY MEDICINE

## 2022-03-31 PROCEDURE — 2580000003 HC RX 258: Performed by: FAMILY MEDICINE

## 2022-03-31 PROCEDURE — 82533 TOTAL CORTISOL: CPT

## 2022-03-31 PROCEDURE — 99213 OFFICE O/P EST LOW 20 MIN: CPT

## 2022-03-31 PROCEDURE — 81001 URINALYSIS AUTO W/SCOPE: CPT

## 2022-03-31 PROCEDURE — 82550 ASSAY OF CK (CPK): CPT

## 2022-03-31 PROCEDURE — 97535 SELF CARE MNGMENT TRAINING: CPT | Performed by: NURSE PRACTITIONER

## 2022-03-31 PROCEDURE — 84484 ASSAY OF TROPONIN QUANT: CPT

## 2022-03-31 PROCEDURE — 94640 AIRWAY INHALATION TREATMENT: CPT

## 2022-03-31 PROCEDURE — 93005 ELECTROCARDIOGRAM TRACING: CPT | Performed by: FAMILY MEDICINE

## 2022-03-31 PROCEDURE — 87635 SARS-COV-2 COVID-19 AMP PRB: CPT

## 2022-03-31 PROCEDURE — 36415 COLL VENOUS BLD VENIPUNCTURE: CPT

## 2022-03-31 PROCEDURE — 97110 THERAPEUTIC EXERCISES: CPT | Performed by: NURSE PRACTITIONER

## 2022-03-31 PROCEDURE — 84145 PROCALCITONIN (PCT): CPT

## 2022-03-31 PROCEDURE — 6360000002 HC RX W HCPCS: Performed by: FAMILY MEDICINE

## 2022-03-31 PROCEDURE — 1200000000 HC SEMI PRIVATE

## 2022-03-31 PROCEDURE — 83605 ASSAY OF LACTIC ACID: CPT

## 2022-03-31 PROCEDURE — 94760 N-INVAS EAR/PLS OXIMETRY 1: CPT

## 2022-03-31 PROCEDURE — 82947 ASSAY GLUCOSE BLOOD QUANT: CPT

## 2022-03-31 PROCEDURE — 51702 INSERT TEMP BLADDER CATH: CPT

## 2022-03-31 PROCEDURE — 6360000002 HC RX W HCPCS: Performed by: INTERNAL MEDICINE

## 2022-03-31 RX ORDER — IPRATROPIUM BROMIDE AND ALBUTEROL SULFATE 2.5; .5 MG/3ML; MG/3ML
1 SOLUTION RESPIRATORY (INHALATION) 3 TIMES DAILY
Status: DISCONTINUED | OUTPATIENT
Start: 2022-03-31 | End: 2022-03-31

## 2022-03-31 RX ORDER — OXYCODONE HYDROCHLORIDE AND ACETAMINOPHEN 5; 325 MG/1; MG/1
1 TABLET ORAL EVERY 4 HOURS PRN
Status: DISCONTINUED | OUTPATIENT
Start: 2022-03-31 | End: 2022-04-06 | Stop reason: HOSPADM

## 2022-03-31 RX ORDER — ALBUTEROL SULFATE 90 UG/1
2 AEROSOL, METERED RESPIRATORY (INHALATION) 3 TIMES DAILY
Status: DISCONTINUED | OUTPATIENT
Start: 2022-03-31 | End: 2022-04-01

## 2022-03-31 RX ORDER — IPRATROPIUM BROMIDE AND ALBUTEROL SULFATE 2.5; .5 MG/3ML; MG/3ML
1 SOLUTION RESPIRATORY (INHALATION) EVERY 4 HOURS PRN
Status: DISCONTINUED | OUTPATIENT
Start: 2022-03-31 | End: 2022-04-06 | Stop reason: HOSPADM

## 2022-03-31 RX ORDER — 0.9 % SODIUM CHLORIDE 0.9 %
500 INTRAVENOUS SOLUTION INTRAVENOUS ONCE
Status: COMPLETED | OUTPATIENT
Start: 2022-03-31 | End: 2022-03-31

## 2022-03-31 RX ORDER — METHYLPREDNISOLONE SODIUM SUCCINATE 125 MG/2ML
60 INJECTION, POWDER, LYOPHILIZED, FOR SOLUTION INTRAMUSCULAR; INTRAVENOUS EVERY 6 HOURS SCHEDULED
Status: DISCONTINUED | OUTPATIENT
Start: 2022-03-31 | End: 2022-04-02

## 2022-03-31 RX ORDER — AZITHROMYCIN 250 MG/1
500 TABLET, FILM COATED ORAL DAILY
Status: COMPLETED | OUTPATIENT
Start: 2022-03-31 | End: 2022-04-02

## 2022-03-31 RX ORDER — MIDODRINE HYDROCHLORIDE 10 MG/1
10 TABLET ORAL 3 TIMES DAILY PRN
Status: DISCONTINUED | OUTPATIENT
Start: 2022-03-31 | End: 2022-04-06 | Stop reason: HOSPADM

## 2022-03-31 RX ADMIN — SODIUM CHLORIDE 500 ML: 9 INJECTION, SOLUTION INTRAVENOUS at 10:46

## 2022-03-31 RX ADMIN — VENLAFAXINE HYDROCHLORIDE 150 MG: 75 CAPSULE, EXTENDED RELEASE ORAL at 09:36

## 2022-03-31 RX ADMIN — GABAPENTIN 800 MG: 400 CAPSULE ORAL at 09:36

## 2022-03-31 RX ADMIN — OXYCODONE AND ACETAMINOPHEN 1 TABLET: 5; 325 TABLET ORAL at 17:19

## 2022-03-31 RX ADMIN — Medication 9 MG: at 20:12

## 2022-03-31 RX ADMIN — BUDESONIDE AND FORMOTEROL FUMARATE DIHYDRATE 2 PUFF: 160; 4.5 AEROSOL RESPIRATORY (INHALATION) at 09:49

## 2022-03-31 RX ADMIN — METFORMIN HYDROCHLORIDE 500 MG: 500 TABLET ORAL at 17:15

## 2022-03-31 RX ADMIN — SODIUM CHLORIDE: 9 INJECTION, SOLUTION INTRAVENOUS at 03:08

## 2022-03-31 RX ADMIN — METHYLPREDNISOLONE SODIUM SUCCINATE 60 MG: 125 INJECTION, POWDER, FOR SOLUTION INTRAMUSCULAR; INTRAVENOUS at 23:35

## 2022-03-31 RX ADMIN — OXYCODONE AND ACETAMINOPHEN 1 TABLET: 5; 325 TABLET ORAL at 21:31

## 2022-03-31 RX ADMIN — CEFTRIAXONE SODIUM 1000 MG: 1 INJECTION, POWDER, FOR SOLUTION INTRAMUSCULAR; INTRAVENOUS at 23:35

## 2022-03-31 RX ADMIN — MIDODRINE HYDROCHLORIDE 10 MG: 10 TABLET ORAL at 15:03

## 2022-03-31 RX ADMIN — INSULIN LISPRO 2 UNITS: 100 INJECTION, SOLUTION INTRAVENOUS; SUBCUTANEOUS at 20:23

## 2022-03-31 RX ADMIN — GABAPENTIN 800 MG: 400 CAPSULE ORAL at 20:12

## 2022-03-31 RX ADMIN — OXYCODONE AND ACETAMINOPHEN 1 TABLET: 325; 10 TABLET ORAL at 08:24

## 2022-03-31 RX ADMIN — INSULIN LISPRO 2 UNITS: 100 INJECTION, SOLUTION INTRAVENOUS; SUBCUTANEOUS at 17:14

## 2022-03-31 RX ADMIN — METFORMIN HYDROCHLORIDE 500 MG: 500 TABLET ORAL at 09:36

## 2022-03-31 RX ADMIN — TRAZODONE HYDROCHLORIDE 300 MG: 100 TABLET ORAL at 20:12

## 2022-03-31 RX ADMIN — SODIUM CHLORIDE 500 ML: 9 INJECTION, SOLUTION INTRAVENOUS at 15:09

## 2022-03-31 RX ADMIN — SODIUM CHLORIDE: 9 INJECTION, SOLUTION INTRAVENOUS at 08:24

## 2022-03-31 RX ADMIN — ALBUTEROL SULFATE 2 PUFF: 90 AEROSOL, METERED RESPIRATORY (INHALATION) at 11:29

## 2022-03-31 RX ADMIN — ALBUTEROL SULFATE 2 PUFF: 90 AEROSOL, METERED RESPIRATORY (INHALATION) at 13:51

## 2022-03-31 RX ADMIN — METHYLPREDNISOLONE SODIUM SUCCINATE 60 MG: 125 INJECTION, POWDER, FOR SOLUTION INTRAMUSCULAR; INTRAVENOUS at 17:15

## 2022-03-31 RX ADMIN — ASPIRIN 81 MG: 81 TABLET, COATED ORAL at 09:40

## 2022-03-31 RX ADMIN — ARIPIPRAZOLE 5 MG: 5 TABLET ORAL at 20:13

## 2022-03-31 RX ADMIN — METHYLPREDNISOLONE SODIUM SUCCINATE 60 MG: 125 INJECTION, POWDER, FOR SOLUTION INTRAMUSCULAR; INTRAVENOUS at 13:35

## 2022-03-31 RX ADMIN — VENLAFAXINE HYDROCHLORIDE 150 MG: 75 CAPSULE, EXTENDED RELEASE ORAL at 20:12

## 2022-03-31 RX ADMIN — PANTOPRAZOLE SODIUM 20 MG: 20 TABLET, DELAYED RELEASE ORAL at 06:29

## 2022-03-31 RX ADMIN — GLIPIZIDE 2.5 MG: 5 TABLET ORAL at 06:29

## 2022-03-31 RX ADMIN — METOPROLOL TARTRATE 50 MG: 50 TABLET, FILM COATED ORAL at 20:12

## 2022-03-31 RX ADMIN — BUDESONIDE AND FORMOTEROL FUMARATE DIHYDRATE 2 PUFF: 160; 4.5 AEROSOL RESPIRATORY (INHALATION) at 20:23

## 2022-03-31 RX ADMIN — AZITHROMYCIN MONOHYDRATE 500 MG: 250 TABLET ORAL at 12:20

## 2022-03-31 RX ADMIN — SODIUM CHLORIDE: 9 INJECTION, SOLUTION INTRAVENOUS at 21:24

## 2022-03-31 RX ADMIN — TAMSULOSIN HYDROCHLORIDE 0.4 MG: 0.4 CAPSULE ORAL at 09:36

## 2022-03-31 RX ADMIN — ROSUVASTATIN CALCIUM 5 MG: 10 TABLET, FILM COATED ORAL at 09:35

## 2022-03-31 ASSESSMENT — PAIN DESCRIPTION - FREQUENCY: FREQUENCY: CONTINUOUS

## 2022-03-31 ASSESSMENT — PAIN DESCRIPTION - ORIENTATION: ORIENTATION: MID;LOWER

## 2022-03-31 ASSESSMENT — PAIN SCALES - GENERAL
PAINLEVEL_OUTOF10: 10
PAINLEVEL_OUTOF10: 10
PAINLEVEL_OUTOF10: 6

## 2022-03-31 ASSESSMENT — PAIN DESCRIPTION - DESCRIPTORS
DESCRIPTORS: ACHING;DISCOMFORT
DESCRIPTORS: SORE;DISCOMFORT

## 2022-03-31 ASSESSMENT — PAIN DESCRIPTION - LOCATION
LOCATION: BACK
LOCATION: BACK;CHEST;COCCYX

## 2022-03-31 ASSESSMENT — PAIN DESCRIPTION - PAIN TYPE
TYPE: ACUTE PAIN;CHRONIC PAIN
TYPE: CHRONIC PAIN

## 2022-03-31 NOTE — CARE COORDINATION
Social work: Spoke to [facility] [employer], they are able to accept and will start precert. Hens started.

## 2022-03-31 NOTE — PLAN OF CARE
Problem: Skin Integrity:  Goal: Absence of new skin breakdown  Description: Absence of new skin breakdown  Outcome: Ongoing  Note: Ongoing     Problem: Falls - Risk of:  Goal: Absence of physical injury  Description: Absence of physical injury  Outcome: Ongoing  Note: Ongoing     Problem: Pain:  Goal: Pain level will decrease  Description: Pain level will decrease  Outcome: Ongoing  Note: Ongoing     Problem: Tissue Perfusion - Cardiopulmonary, Altered:  Goal: Hemodynamic stability will improve  Description: Hemodynamic stability will improve  Outcome: Ongoing  Note: Ongoing     Problem: Metabolic:  Goal: Ability to maintain appropriate glucose levels will improve  Description: Ability to maintain appropriate glucose levels will improve  Outcome: Ongoing  Note: Ongoing     Problem: Breathing Pattern - Ineffective:  Goal: Ability to achieve and maintain a regular respiratory rate will improve  Description: Ability to achieve and maintain a regular respiratory rate will improve  Outcome: Ongoing  Note: Ongoing

## 2022-03-31 NOTE — PROGRESS NOTES
Occupational Therapy  Facility/Department: [facility] MED SURG  Daily Treatment Note  NAME: Sandra Salguero  : 1950  MRN: [id number]    Date of Service: 3/31/2022    Discharge Recommendations:  Patient would benefit from continued therapy after discharge    Pt currently functioning below baseline. Would suggest additional therapy at time of discharge to maximize long term outcomes and prevent re-admission. Please refer to AM-PAC score for current level of function. Assessment   Performance deficits / Impairments: Decreased functional mobility ; Decreased ADL status; Decreased strength;Decreased safe awareness;Decreased endurance;Decreased balance;Decreased posture;Decreased cognition  Assessment: Pt limited this treatment due to low BP. Pt tolerated treatment fair and has shown progress towards goals in areas of functional balance, functional transfers and functional mobility Pt would benefit from continued skilled OT services to address deficits in areas of functional balance, ADL completion, safety awareness, functional transfers, UE strength, functional mobility, cognition and understanding education provided all to ensure safe return home to [facility] and to decrease caregiver burden. Prognosis: Good  OT Education: OT Role;Plan of Care;Home Exercise Program;Precautions;Transfer Training;Energy Conservation  REQUIRES OT FOLLOW UP: Yes  Activity Tolerance  Activity Tolerance: Patient Tolerated treatment well;Treatment limited secondary to medical complications (free text) (Low BP)  Safety Devices  Safety Devices in place: Yes  Type of devices: Call light within reach;Nurse notified;Gait belt;Patient at risk for falls; Left in chair;Chair alarm in place; All fall risk precautions in place         Patient Diagnosis(es): The encounter diagnosis was Generalized weakness.       has a past medical history of AAA (abdominal aortic aneurysm) ([facility] [address] Acid reflux, Anxiety and depression, Aortic stenosis, Arthritis, Blister of ankle, right, CAD (coronary artery disease), Cancer ([facility] [address] COPD (chronic obstructive pulmonary disease) ([facility] [address] Diabetes mellitus ([facility] [address] Falls, Heart block, Hypokalemia, MDRO (multiple drug resistant organisms) resistance, MRSA (methicillin resistant staph aureus) culture positive, On home oxygen therapy, On home oxygen therapy, Overactive bladder, Pneumonia, PONV (postoperative nausea and vomiting), and Vitamin D deficiency. has a past surgical history that includes back surgery; eye surgery; Cholecystectomy; Appendectomy; Hysterectomy; Tonsillectomy; lumbar laminectomy; Endoscopy, colon, diagnostic (12/08/2016); aortic valve repair (N/A, 4/11/2017); bronchoscopy (N/A, 8/31/2020); IR INSERT PICC VAD W SQ PORT >5 YEARS (9/4/2020); and IR PORT PLACEMENT > 5 YEARS (9/29/2020). Restrictions  Restrictions/Precautions  Restrictions/Precautions: General Precautions,Fall Risk  Position Activity Restriction  Other position/activity restrictions:  Up with assist, alarms, IV, purewick, O2, telemetry  Subjective   General  Chart Reviewed: Yes  Patient assessed for rehabilitation services?: Yes  Response to previous treatment: Patient with no complaints from previous session  Family / Caregiver Present: No  Subjective  Subjective: pt c/o pain in R shoulder with ROM (unable to state why it hurts)  Vital Signs  Vitals:      03/31/22 1020 03/31/22 1024   BP:   84/61 (!) 90/52   Pulse:   93 96   Resp:       Temp:       TempSrc:       SpO2:   93% 95%   Weight:       Height:           Oxygen Therapy  SpO2: 95 %  O2 Device: Nasal cannula  O2 Flow Rate (L/min): 3 L/min   Orientation  Orientation  Overall Orientation Status: Within Functional Limits  Objective             Balance  Sitting Balance: Stand by assistance  Standing Balance: Contact guard assistance  Functional Mobility  Functional - Mobility Device: Rolling Walker  Activity: Other  Assist Level: Minimal assistance (x2)  Functional Mobility Comments: Completed functional mobility/stand steps from EOB to chair. Pt likely to be able to tolerate further distance if not for low BP and dizziness/feeling \"off\". Bed mobility  Supine to Sit: Stand by assistance;Minimal assistance  Sit to Supine: Stand by assistance  Scooting: Stand by assistance  Comment: Ángel with 1st supine to sit,  sit>supine the rolling L to supine to sit SBA with use of rails and VC for breathing tech. Transfers  Sit to stand: Minimal assistance;2 Person assistance  Stand to sit: Minimal assistance;2 Person assistance  Transfer Comments: Multiple sit>stands, attempting to take BP standing but unable to tolerate standing long enough. Cognition  Overall Cognitive Status: Exceptions  Arousal/Alertness: Appropriate responses to stimuli  Following Commands: Follows one step commands with increased time; Follows multistep commands with increased time  Attention Span: Attends with cues to redirect  Memory: Decreased recall of recent events  Safety Judgement: Decreased awareness of need for assistance;Decreased awareness of need for safety  Problem Solving: Assistance required to generate solutions;Assistance required to identify errors made;Decreased awareness of errors;Assistance required to implement solutions;Assistance required to correct errors made  Insights: Decreased awareness of deficits  Initiation: Requires cues for some  Sequencing: Requires cues for some                    Type of ROM/Therapeutic Exercise  Comment: Edu on benefits of scap retraction therex/postural correction coordinated with breathing ex, as well as IS use. Pt verbalized good understanding although carry over is questionable.                     Plan   Plan  Times per week: 4-5x/week, 1-2x/day  Current Treatment Recommendations: Strengthening,Balance Training,Functional Mobility Training,Endurance Training,Safety Education & Training,Self-Care / ADL,Patient/Caregiver Education & Training,Equipment Evaluation, Education, & procurement,Positioning,Cognitive/Perceptual Training,Pain Management,Neuromuscular Re-education    AM-PAC Score        AM-PAC Inpatient Daily Activity Raw Score: 15 (03/31/22 1217)  AM-PAC Inpatient ADL T-Scale Score : 34.69 (03/31/22 1217)  ADL Inpatient CMS 0-100% Score: 56.46 (03/31/22 1217)  ADL Inpatient CMS G-Code Modifier : CK (03/31/22 1217)    Goals  Short term goals  Time Frame for Short term goals: by discharge, pt will  Short term goal 1: demo CGA with ADL transfers with approp AD/DME  Short term goal 2: demo CGA with functional mob in room distances with min cues for safety and approp AD  Short term goal 3: demo CGA with toileting routine with approp DME and min cues for safety  Short term goal 4: demo SBA with UB ADLs and min A LB ADLs with AE/DME and min cues for safety  Short term goal 5: demo and verb good understanding of fall prevention techs, EC/WS techs, equip needs, B UE HEP, positioning techs/skin integrity and pressure relieving techs, and d/c recommendations  Patient Goals   Patient goals : agreeable to rehab       Therapy Time   Individual Concurrent Group Co-treatment   Time In       1008   Time Out       1038   Minutes       27     RN reports patient is medically stable for therapy treatment this date. Chart reviewed prior to treatment and patient is agreeable for therapy. All lines intact and patient positioned comfortably at end of treatment. All patient needs addressed prior to ending therapy session. Co-treatment with PT warranted secondary to decreased safety and independence requiring 2 skilled therapy professionals to address individual discipline's goals. OT addressing preparation for ADL transfer, sitting balance for increased ADL performance, sitting/activity tolerance, functional reaching, environmental safety/scanning, fall prevention, functional mobility for ADL transfers, ability to sequence and follow directions and functional UE strength.        Monda Memory, ROLO

## 2022-03-31 NOTE — CONSULTS
Pulmonary Medicine and [address] Missy Conde MD      Patient - Jose R Metcalf   MRN -  [id number]   Magali # - [de-identified]   - 1950      Date of Admission -  3/29/2022  6:18 PM  Date of evaluation -  3/31/2022  Room - -   Hospital Day - 134 Kyara Olguin MD Primary Care Physician - Misael Vickers MD     Reason for Consult    Dyspnea    Assessment   · Chronic hypoxic respiratory failure  · Acute exacerbation of COPD  · Former smoker, quit   · Right upper lobe squamous cell carcinoma, following with Dr. Olympia Essex  · Recent history of falls with left facial abrasion  · Anxiety, aortic stenosis, depression, diabetes, GERD  · Hypotension, ? sepsis versus dehydration    Recommendations   · Oxygen via nasal cannula, keep SPO2 90% or greater  · Incentive spirometry every hour while awake  · Acapella  · Start Mucinex  · Symbicort 160  · Start IV Solu-Medrol 60 mg every 6 hours  · Albuterol HFA 3 times daily and as needed  · Continue Rocephin and oral Zithromax  · X-ray chest in am  · Labs: CBC and BMP in am  · Check procalcitonin and lactic acid. · Check cortisol level  · IV fluids/bolus  · Monitor blood pressure. · DVT prophylaxis with low molecular weight heparin  · Discussed with RN  · Will follow with you    DARREL     Jose R Done is 71 y.o., female admitted to the hospital 2 days ago for rhabdomyolysis and generalized weakness. We were consulted for dyspnea. She has a known history of chronic hypoxic respiratory failure on oxygen 2-3 L nasal cannula at home. She also has a history of COPD and lung cancer. She quit smoking in . Her medications include Dulera and albuterol HFA. She does have nebulizer at home but does not use it as she states it makes her throw up. She follows with Dr. Elmer Vyas in our office. She was checked for sleep apnea and does not have it. She currently sitting up in the bedside chair. Just does not feel well.   She feels weak and tired.  Her shortness of breath is not much change. Denies any dizziness, lightheadedness at this time. PMHx   Past Medical History      Diagnosis Date    AAA (abdominal aortic aneurysm) ([facility] [address]     Pt denies having a history of AAA    Acid reflux     Anxiety and depression     Aortic stenosis     Arthritis     Blister of ankle, right 10/13/2016    blister broke open & draining, is on antibiotics    CAD (coronary artery disease)     Cancer ([facility] [address]     lung cancer    COPD (chronic obstructive pulmonary disease) ([facility] [address]     Diabetes mellitus ([facility] [address]     Falls     Heart block     bifasicular    Hypokalemia     MDRO (multiple drug resistant organisms) resistance 10/17/2014    E. Coli urine    MRSA (methicillin resistant staph aureus) culture positive resolved 12/2016    2 negative nasal screens - 2016 (hx in urine 2014)    On home oxygen therapy     uses 2 liters at night    On home oxygen therapy     patient states 2 liters/nasal cannula continuous    Overactive bladder     patient incont.  wears a brief    Pneumonia     PONV (postoperative nausea and vomiting)     Vitamin D deficiency       Past Surgical History        Procedure Laterality Date    AORTIC VALVE REPAIR N/A 4/11/2017    AORTIC VALVE REPAIR REPLACEMENT performed by Nazia Steen MD at [address] N/A 8/31/2020    BRONCHOSCOPY BIOPSY BRONCHUS performed by Peyman Arroyo MD at [address] [facility], COLON, DIAGNOSTIC  12/08/2016    EYE SURGERY      HYSTERECTOMY      IR INS PICC VAD W SQ PORT GREATER THAN 5  9/4/2020    IR INS PICC VAD W SQ PORT GREATER THAN 5 9/4/2020 [facility]    IR PORT PLACEMENT EQUAL OR GREATER THAN 5 YEARS  9/29/2020    IR PORT PLACEMENT EQUAL OR GREATER THAN 5 YEARS 9/29/2020 [facility]    LUMBAR LAMINECTOMY      TONSILLECTOMY         Meds    Current Medications    ARIPiprazole  5 mg Oral Nightly    aspirin EC  81 mg Oral Daily    gabapentin  800 mg Oral BID    glipiZIDE  2.5 mg Oral QAM AC    pantoprazole  20 mg Oral QAM AC    melatonin  9 mg Oral Nightly    metFORMIN  500 mg Oral BID WC    metoprolol tartrate  50 mg Oral BID    budesonide-formoterol  2 puff Inhalation BID    rosuvastatin  5 mg Oral Daily    tamsulosin  0.4 mg Oral Daily    traZODone  300 mg Oral Nightly    venlafaxine  150 mg Oral BID    sodium chloride flush  5-40 mL IntraVENous 2 times per day    enoxaparin  40 mg SubCUTAneous Daily    insulin lispro  0-12 Units SubCUTAneous TID WC    insulin lispro  0-6 Units SubCUTAneous Nightly    albuterol sulfate HFA  2 puff Inhalation TID    cefTRIAXone (ROCEPHIN) IV  1,000 mg IntraVENous Q24H     oxyCODONE-acetaminophen, albuterol sulfate HFA, clonazePAM, furosemide, sodium chloride flush, sodium chloride, potassium chloride **OR** potassium alternative oral replacement **OR** potassium chloride, magnesium sulfate, ondansetron **OR** ondansetron, polyethylene glycol, acetaminophen **OR** acetaminophen, glucose, glucagon (rDNA), dextrose, dextrose bolus (hypoglycemia) **OR** dextrose bolus (hypoglycemia)  IV Drips/Infusions   sodium chloride 75 mL/hr at 03/31/22 0933    sodium chloride      dextrose       Home Medications  Medications Prior to Admission: metoprolol tartrate (LOPRESSOR) 50 MG tablet, Take 1 tablet by mouth 2 times daily  albuterol sulfate  (90 Base) MCG/ACT inhaler, Inhale 2 puffs into the lungs every 4 hours as needed for Wheezing  neomycin-bacitracin-polymyxin (NEOSPORIN) 400-5-5000 ointment, Apply topically daily for 5 days Apply topically 2 times daily.   tamsulosin (FLOMAX) 0.4 MG capsule, Take 1 capsule by mouth daily  ARIPiprazole (ABILIFY) 5 MG tablet, Take 5 mg by mouth at bedtime  metFORMIN (GLUCOPHAGE) 500 MG tablet, Take 500 mg by mouth 2 times daily (with meals)  Pseudoeph-Doxylamine-DM-APAP (NYQUIL MULTI-SYMPTOM PO), Take by mouth at bedtime  traZODone (DESYREL) 150 MG tablet, Take 300 mg by mouth nightly  gabapentin (NEURONTIN) 400 MG capsule, Take 800 mg by mouth in the morning and at bedtime. melatonin 5 MG TABS tablet, Take 15 mg by mouth nightly  ondansetron (ZOFRAN) 4 MG tablet, Take 1 tablet by mouth every 8 hours as needed for Nausea or Vomiting  rosuvastatin (CRESTOR) 5 MG tablet, Take 5 mg by mouth daily  oxyCODONE-acetaminophen (PERCOCET)  MG per tablet, Take 1 tablet by mouth every 6 hours as needed for Pain. clonazePAM (KLONOPIN) 1 MG tablet, Take 1 tablet by mouth 3 times daily as needed for Anxiety for up to 3 doses.   furosemide (LASIX) 40 MG tablet, Take 40 mg by mouth daily as needed (swelling)   glimepiride (AMARYL) 1 MG tablet, Take 1 mg by mouth 2 times daily (with meals)   venlafaxine (EFFEXOR XR) 150 MG extended release capsule, Take 150 mg by mouth 2 times daily  lansoprazole (PREVACID) 30 MG delayed release capsule, Take 30 mg by mouth daily  aspirin EC 81 MG EC tablet, Take 81 mg by mouth daily  mometasone-formoterol (DULERA) 200-5 MCG/ACT inhaler, Inhale 2 puffs into the lungs every 12 hours as needed (wheezing/SOB)     Allergies    Codeine and Dye [iodides]  Social History     Social History     Tobacco Use    Smoking status: Former Smoker     Quit date: 2016     Years since quittin.6    Smokeless tobacco: Never Used   Substance Use Topics    Alcohol use: No     Family History          Problem Relation Age of Onset    Cancer Mother     Cancer Father      ROS - 11 systems   General Denies any fever or chills  HEENT Denies any diplopia, tinnitus or vertigo  Resp positive for  dyspnea, wheezing and loose nonproductive cough  Cardiac Denies any chest pain, palpitations, claudication or edema  GI Denies any melena, hematochezia, hematemesis or pyrosis   Denies any frequency, urgency, hesitancy or incontinence  Heme Denies bruising or bleeding easily  Endocrine Denies any history of thyroid disease  Neuro Denies any focal motor or sensory deficits  Psychiatric Denies anxiety, depression, suicidal ideation  Skin Denies rashes, itching, open sores  Vitals     height is 5' 8\" (1.727 m) and weight is 168 lb 0.1 oz (76.2 kg). Her oral temperature is 98.2 °F (36.8 °C). Her blood pressure is 136/71 and her pulse is 88. Her respiration is 18 and oxygen saturation is 96%. Body mass index is 25.54 kg/m². I/O        Intake/Output Summary (Last 24 hours) at 3/31/2022 1017  Last data filed at 3/31/2022 9619  Gross per 24 hour   Intake 4620.31 ml   Output 1180 ml   Net 3440.31 ml     I/O last 3 completed shifts: In: 7027.7 [P.O.:400; I.V.:6577.7; IV Piggyback:50]  Out: 1280 [Urine:1280]   Patient Vitals for the past 96 hrs (Last 3 readings):   Weight   03/31/22 0533 168 lb 0.1 oz (76.2 kg)   03/29/22 2156 159 lb (72.1 kg)   03/29/22 1827 167 lb (75.8 kg)     Exam   General Appearance  Awake, alert, oriented, in no acute distress  HEENT - Head is normocephalic, atraumatic. Pupil reactive to light  Neck - Supple, symmetrical, trachea midline and Soft, trachea midline and straight  Lungs -moderate air exchange, positive wheezing and rhonchi  Cardiovascular - Heart sounds are normal.  Regular rhythm normal rate without murmur, gallop or rub. Abdomen - Soft, nontender, nondistended, no masses or organomegaly  Neurologic - CN II-XII are grossly intact.  There are no focal motor or sensory deficits  Skin - No bruising or bleeding  Extremities - No cyanosis, clubbing or edema    Labs  - Old records and notes have been reviewed in [facility]   CBC     Lab Results   Component Value Date    WBC 9.7 03/29/2022    RBC 4.03 03/29/2022    HGB 11.0 03/29/2022    HCT 35.1 03/29/2022     03/29/2022    MCV 87.1 03/29/2022    MCH 27.3 03/29/2022    MCHC 31.3 03/29/2022    RDW 14.0 03/29/2022    LYMPHOPCT 10 03/29/2022    MONOPCT 6 03/29/2022    BASOPCT 1 03/29/2022    MONOSABS 0.58 03/29/2022    LYMPHSABS 0.93 03/29/2022 EOSABS 0.07 03/29/2022    BASOSABS 0.05 03/29/2022    DIFFTYPE NOT REPORTED 11/24/2021     BMP   Lab Results   Component Value Date     03/31/2022    K 3.9 03/31/2022     03/31/2022    CO2 25 03/31/2022    BUN 6 03/31/2022    CREATININE 0.64 03/31/2022    GLUCOSE 141 03/31/2022    CALCIUM 7.8 03/31/2022    MG 1.6 03/21/2022     LFTS  Lab Results   Component Value Date    ALKPHOS 40 03/19/2022    ALT 35 03/19/2022     03/19/2022    PROT 7.0 03/19/2022    BILITOT 0.21 03/19/2022    BILIDIR <0.08 03/18/2022    IBILI Can not be calculated 03/18/2022    LABALBU 3.2 03/19/2022     ABG   Lab Results   Component Value Date    PHART 7.42 08/26/2012    YBQ9DXO 41 08/26/2012    PO2ART 59 08/26/2012    DJB2MBX 26.1 08/26/2012    KUX4EOI 27 04/12/2017    A7EELUZU 92.1 08/26/2012     PTT  Lab Results   Component Value Date    APTT 33.4 09/29/2020     INR   Lab Results   Component Value Date    INR 1.0 03/30/2022    INR 1.1 03/19/2022    INR 1.0 01/06/2021    PROTIME 13.4 03/30/2022    PROTIME 13.8 03/19/2022    PROTIME 12.6 01/06/2021       Radiology    CXR         (See actual reports for details)    \"Thank you for asking us to see this patient\"    Case discussed with nurse and patient. Questions and concerns addressed.     Electronically signed by     Clay Carbone MD on 3/31/2022 at 10:17 AM  Pulmonary Critical Care and Sleep Medicine,  [facility]  Cell: [phone]  Office: [phone]

## 2022-03-31 NOTE — DISCHARGE INSTR - COC
Continuity of Care Form    Patient Name: Cale Crowe   :  1950  MRN:  [id number]    Admit date:  3/29/2022  Discharge date:  2022    Code Status Order: Full Code   Advance Directives:      Admitting Physician:  Leonela Hankins MD  PCP: Wayne Rdz MD    Discharging Nurse: [facility] Unit/Room#:   Discharging Unit Phone Number: [phone]    Emergency Contact:   Extended Emergency Contact Information  Primary Emergency Contact: Kalen Maldonado  Address: .            [address] Phone: [phone]  Mobile Phone: [phone]  Relation: Child  Secondary Emergency Contact: [address] Mobile Phone: [phone]  Mobile Phone: [phone]  Relation: Grandchild    Past Surgical History:  Past Surgical History:   Procedure Laterality Date    AORTIC VALVE REPAIR N/A 2017    AORTIC VALVE REPAIR REPLACEMENT performed by Tatiana Carlin MD at [facility] N/A 2020    BRONCHOSCOPY BIOPSY BRONCHUS performed by Binh Solorio MD at [facility] [address] COLON, DIAGNOSTIC  2016    EYE SURGERY      HYSTERECTOMY      IR INS PICC VAD W SQ PORT GREATER THAN 5  2020    IR INS PICC VAD W SQ PORT GREATER THAN 5 2020 [facility] PROCEDURES    IR PORT PLACEMENT EQUAL OR GREATER THAN 5 YEARS  2020    IR PORT PLACEMENT EQUAL OR GREATER THAN 5 YEARS 2020 Meng Mills MD [facility] SPECIAL PROCEDURES    LUMBAR LAMINECTOMY      TONSILLECTOMY         Immunization History:   Immunization History   Administered Date(s) Administered    Influenza Virus Vaccine 10/11/2012, 10/07/2013, 2014    Influenza, High Dose (Fluzone 65 yrs and older) 2016, 10/02/2018, 10/08/2019    Influenza, Triv, inactivated, subunit, adjuvanted, IM (Fluad 65 yrs and older) 10/03/2017    Pneumococcal Conjugate 13-valent (Oqdlokc25) 10/02/2018    Pneumococcal Polysaccharide (Efdmurppc26) 09/30/2014    Tdap (Boostrix, Adacel) 10/31/2017       Active Problems:  Patient Active Problem List   Diagnosis Code    Valvular heart disease I38    Type 2 diabetes mellitus without complication, without long-term current use of insulin ([facility] [address] E11.9    Open wound of right ankle S91.001A    COPD (chronic obstructive pulmonary disease) ([facility]) J44.9    Syncope and collapse R55    Chronic ulcer of right heel ([facility]) L97.419    Multifocal pneumonia J18.9    Aortic valve stenosis I35.0    Esophageal dilatation K22.89    Aspiration pneumonia of both lower lobes due to gastric secretions ([facility] [address] J69.0    Falls frequently R29.6    Chronic respiratory failure ([facility]) J96.10    Contusion of right knee S80. 01XA    Closed fracture of right distal radius S52.501A    Subdural hemorrhage ([facility]) I62.00    Subarachnoid hemorrhage ([facility]) I60.9    Traumatic hemorrhage of cerebrum ([facility]) F05.367W    Chronic bilateral low back pain M54.50, G89.29    Cellulitis of both feet L03.115, L03.116    Acute on chronic congestive heart failure ([facility]) I50.9    Bilateral leg edema R60.0    Cellulitis L03.90    Lung mass R91.8    Malignant neoplasm of upper lobe of right lung ([facility]) C34.11    Urinary tract infectious disease N39.0    Closed fracture of one rib of right side S22.31XA    Degenerative disc disease, lumbar M51.36    Moderate malnutrition ([facility]) E44.0    Rhabdomyolysis M62.82    Unable to ambulate R26.2    Mild malnutrition ([facility] [address] E44.1       Isolation/Infection:   Isolation            Contact          Patient Infection Status       Infection Onset Added Last Indicated Last Indicated By Review Planned Expiration Resolved Resolved By    MDRO (multi-drug resistant organism)  03/25/22 03/25/22 Daly Butcher RN  Never      E. Coli urine 2016    Resolved    MDRO (multi-drug resistant organism)  03/25/22 03/25/22 Daly Butcher RN   03/25/22 Daly Butcher RN    E.  Coli urine 10/2016    COVID-19 (Rule Out) 08/31/20 08/31/20 08/31/20 COVID-19 (Ordered)   20     COVID-19 (Rule Out) 20 COVID-19 (Ordered)   20 Rule-Out Test Resulted    MRSA  14 Didi Barton RN   17 [facility], RN    2 negative nasal screens 10/2016 & 2016  Urine - 2014      MDRO (multi-drug resistant organism)  14 Didi Barton RN   10/01/20 Iliana Wolfe RN    E. Coli - urine 10/2016              Nurse Assessment:  Last Vital Signs: /73   Pulse 103   Temp 98.9 °F (37.2 °C) (Oral)   Resp 16   Ht 5' 8\" (1.727 m)   Wt 168 lb 0.1 oz (76.2 kg)   SpO2 95%   BMI 25.54 kg/m²     Last documented pain score (0-10 scale): Pain Level: 10  Last Weight:   Wt Readings from Last 1 Encounters:   22 168 lb 0.1 oz (76.2 kg)     Mental Status:  oriented and alert    IV Access:  - Left Chest port -not accessed    Nursing Mobility/ADLs:  Walking   Assisted  Transfer  Assisted  Bathing  Assisted  Dressing  Assisted  Toileting  Assisted  Feeding  Independent  Med Admin  Independent  Med Delivery   whole    Wound Care Documentation and Therapy:  Wound 22 Face Left (Active)   Wound Image   22 120   Wound Etiology Traumatic 22 120   Dressing Status New dressing applied; Old drainage noted 22 120   Wound Cleansed Soap and water;Irrigated with saline 22 1205   Dressing/Treatment Barrier film; Foam 22 120   Dressing Change Due 22 1205   Wound Length (cm) 3.5 cm 22 1205   Wound Width (cm) 3 cm 22 1205   Wound Depth (cm) 0.1 cm 22 1205   Wound Surface Area (cm^2) 10.5 cm^2 22 120   Wound Volume (cm^3) 1.05 cm^3 22 120   Wound Assessment Other (Comment);Pink/red;Granulation tissue 03/1205   Drainage Amount Moderate 22   Drainage Description Serosanguinous;Purulent 22   Odor None 22   Mary-wound Assessment Intact 22   Margins Attached edges; Defined edges 03/31/22 1205   Wound Thickness Description not for Pressure Injury Full thickness 03/31/22 1205   Number of days: 12       Wound 03/29/22 Buttocks Right (Active)   Wound Image   03/31/22 1205   Wound Etiology Pressure Stage  2 03/31/22 1205   Dressing Status New dressing applied; Old drainage noted 03/31/22 1205   Wound Cleansed Soap and water 03/31/22 1205   Dressing/Treatment Barrier film;Alginate; Foam 03/31/22 1205   Dressing Change Due 04/03/22 03/31/22 1205   Wound Length (cm) 3.8 cm 03/31/22 1205   Wound Width (cm) 3.2 cm 03/31/22 1205   Wound Depth (cm) 0.1 cm 03/31/22 1205   Wound Surface Area (cm^2) 12.16 cm^2 03/31/22 1205   Wound Volume (cm^3) 1.216 cm^3 03/31/22 1205   Wound Assessment Pink/red 03/31/22 1205   Drainage Amount Large 03/31/22 1205   Odor None 03/31/22 1205   Mary-wound Assessment Blanchable erythema 03/31/22 1205   Margins Attached edges; Defined edges 03/31/22 1205   Wound Thickness Description not for Pressure Injury Partial thickness 03/31/22 1205   Number of days: 1       Wound 03/31/22 Heel Right (Active)   Wound Image   03/31/22 1205   Wound Etiology Pressure Stage  1 03/31/22 1205   Dressing Status New dressing applied 03/31/22 1205   Dressing/Treatment Barrier film 03/31/22 1205   Dressing Change Due 04/03/22 03/31/22 1205   Wound Length (cm) 1 cm 03/31/22 1205   Wound Width (cm) 1 cm 03/31/22 1205   Wound Depth (cm) 0 cm 03/31/22 1205   Wound Surface Area (cm^2) 1 cm^2 03/31/22 1205   Wound Volume (cm^3) 0 cm^3 03/31/22 1205   Wound Assessment Non-blanchable erythema 03/31/22 1205   Drainage Amount None 03/31/22 1205   Odor None 03/31/22 1205   Mary-wound Assessment Blanchable erythema 03/31/22 1205   Number of days: 0        Elimination:  Continence: Bowel: Yes  Bladder: Yes  Urinary Catheter:  Insertion Date: 03/31/2022 and Removal Date 04/02/2022    Colostomy/Ileostomy/Ileal Conduit: No       Date of Last BM: 03/30/2022    Intake/Output Summary (Last 24 hours) at 3/31/2022 [facility] filed at 3/31/2022 1532  Gross per 24 hour   Intake 5224.77 ml   Output 2080 ml   Net 3144.77 ml     I/O last 3 completed shifts: In: 7027.7 [P.O.:400; I.V.:6577.7; IV Piggyback:50]  Out: 1280 [Urine:1280]    Safety Concerns:     History of falls, At risk for falls, and generalized weakness. Impairments/Disabilities:      Vision    Nutrition Therapy:  Current Nutrition Therapy:   - Oral Diet:  Carb Control 4 carbs/meal (1800kcals/day), Low Fat, Low Sodium (2gm), and Low cholesterol, High Fiber, Adult oral nutrition supplement; breakfast & Dinner; Diabetic oral supplement    Routes of Feeding: Oral  Liquids: No Restrictions  Daily Fluid Restriction: no  Last Modified Barium Swallow with Video (Video Swallowing Test): not done    Treatments at the Time of Hospital Discharge:   Respiratory Treatments: yes  Oxygen Therapy:  is on oxygen at 3 L/min per nasal cannula. Ventilator:    - BiPAP   IPAP: 14 cmH20, CPAP/EPAP: 8 cmH2O only when sleeping and with 3 L/min oxygen    Rehab Therapies: Physical Therapy and Occupational Therapy  Weight Bearing Status/Restrictions: No weight bearing restrictions  Other Medical Equipment (for information only, NOT a DME order):  walker and Larwence Job Steady  Other Treatments: Left temporal area wound care: cleanse with saline, pat dry. Apply skin barrier wipe to wound edges/perimeter. Maxorb Ag to cover wound. Cover with foam dressing cut to fit over the area. Change the dressing daily. Buttock wound care: Change foam dressing every 3 days. Bilateral knee high Rj hose.     Patient's personal belongings (please select all that are sent with patient):  Glasses    RN SIGNATURE:  Electronically signed by Andrew Mathis RN on 4/2/22 at 7:34 AM EDT    CASE MANAGEMENT/SOCIAL WORK SECTION    Inpatient Status Date: ***    Readmission Risk Assessment Score:  Readmission Risk              Risk of Unplanned Readmission:  33           Discharging to Facility/ Agency   Name: Your care will be continued at a skilled nursing facility:  Name:  [facility] [place]  Address: [address] [place] [address] Willie Madrid  Phone:  [phone]  Fax: [phone] on Weekends on WEEKDAYS FAX: [phone]     / signature: Electronically signed by MINI Raines on 3/31/22 at 3:50 PM EDT    PHYSICIAN SECTION    Prognosis: Fair    Condition at Discharge: Stable    Rehab Potential (if transferring to Rehab): Fair    Recommended Labs or Other Treatments After Discharge:     Physician Certification: I certify the above information and transfer of Catalina Lott  is necessary for the continuing treatment of the diagnosis listed and that she requires [facility] for greater 30 days.      Update Admission H&P: No change in H&P    PHYSICIAN SIGNATURE:  Electronically signed by Davida Shah MD on 4/3/22 at 10:51 PM EDT

## 2022-03-31 NOTE — CARE COORDINATION
[facility] Kaylee Dougherty is coming to do an on site prior to accepting pt for admission. Will need elizabeth, Rx and discharge order for snf.  Boogie serna

## 2022-03-31 NOTE — PROGRESS NOTES
[facility]    Adult Hospitalist      Name: Jose Rafael Roy  MRN: [id number]     Acct: [de-identified]  Room: 2003/2003-02    Admit Date: 3/29/2022  6:18 PM  PCP: Charbel Veloz MD    Primary Problem  Principal Problem:    Unable to ambulate  Active Problems:    Mild malnutrition ([address]  Resolved Problems:    * No resolved hospital problems. *        Assesment:     · Unable to ambulate  · Weakness  · Decubitus ulcer buttocks stage II  · Coronary disease, native vessel  · Chronic obstructive pulmonary disease, unspecified  · Diabetes mellitus type 2  · Essential hypertension  · Major depressive disorder  · Anxiety disorder  · Reported abdominal aortic aneurysm  · Gastroesophageal reflux disease without esophagitis  · Aortic stenosis  · Osteoarthritis multiple joints  · Lung cancer  · Past smoker  · Orthostatic hypotension  · Dehydration        Plan:     · Admit to MedSurg  · Monitor vitals closely  · Keep SPO2 above 90%  · I's and O's  · IV Hep-Lock  · Pain control   · antiemetics as needed  · DuoNeb  · RT eval  · Resume essential home medication  · Add parameters  · Consult social work  · Consult PT/OT  · CBC, BMP  · Await [facility] SNF  · IV fluid bolus-recheck  · Maintenance IV fluids  · Twelve-lead EKG/telemetry  · Cardiac enzymes  · ProAmatine  · Consult Pulm  · Rocephin IV  · Azithromycin p.o.  · DVT and GI prophylaxis.          Chief Complaint:     Chief Complaint   Patient presents with    Extremity Weakness         History of Present Illness:        Pt seen and examined at bedside  Last 24 hr events d/w RN  Complains of weakness   Also complains of pain in joints in all extremities  says she was on Perccoet q4 h at home  We suspect that is contributing  Says has not had any adverse effects w that    Lt face abrasion healing  Denies CP, abd apin, nausea, vomiting  Cough and dyspnea worse  No more wheezing  Denies fever, chills    Other ROS neg      Initial HPI  Jose Rafael Roy is a 71 y.o.  female who presents with Extremity Weakness    Patient admitted through the emergency room where she presented via EMS. Patient had recently been discharged from the hospital for acute rhabdomyolysis. Patient had been recommended to go to a skilled nursing facility for rehabilitation. However in spite of repeated recommendations patient refused to go to a rehab center. Social work had made a call to [facility] in addition in view of the patient's condition. Apparently the patient was discharged 3 days ago and says since then she has been sitting in one chair. She says she has not been able to get up and has developed a decubitus ulcer. She is asking for pain medication for that which has been ordered. Patient's muscle enzymes have been unremarkable now    Patient says she does understand now that she will need to go to a nursing facility for more strengthening. She understands also that this will help her return to her home and that this is necessary for her safety. However she says she does not like to hear recommendations like these again and again. We have advised her that there will be selected people through who decisions will need to be made of their she goes. Patient verbalizes understanding on that. Patient has had some cough and congestion. She says she has used her inhaler treatments. She is on oxygen via nasal cannula at 3 L/min. She denies any chest pain or wheezing. Denies any headache, photophobia or diplopia. Denies any nausea, vomiting or abdominal pain. Denies any diarrhea or constipation. Denies any joint swelling though she states she has chronic pain and needs her pain medication    I have personally reviewed the past medical history, past surgical history, medications, social history, and family history, and summarized in the note. Review of Systems:     All 10 point system is reviewed and negative otherwise mentioned in HPI.       Past Medical History:     Past Medical History:   Diagnosis Date    AAA (abdominal aortic aneurysm) ([facility] [address]     Pt denies having a history of AAA    Acid reflux     Anxiety and depression     Aortic stenosis     Arthritis     Blister of ankle, right 10/13/2016    blister broke open & draining, is on antibiotics    CAD (coronary artery disease)     Cancer ([facility] [address]     lung cancer    COPD (chronic obstructive pulmonary disease) ([facility] [address]     [facility]     Falls     Heart block     bifasicular    Hypokalemia     MDRO (multiple drug resistant organisms) resistance 10/17/2014    E. Coli urine    MRSA (methicillin resistant staph aureus) culture positive resolved 12/2016    2 negative nasal screens - 2016 (hx in urine 2014)    On home oxygen therapy     uses 2 liters at night    On home oxygen therapy     patient states 2 liters/nasal cannula continuous    Overactive bladder     patient incont. wears a brief    Pneumonia     PONV (postoperative nausea and vomiting)     Vitamin D deficiency         Past Surgical History:     Past Surgical History:   Procedure Laterality Date    AORTIC VALVE REPAIR N/A 4/11/2017    AORTIC VALVE REPAIR REPLACEMENT performed by Rangel Campbell MD at [facility] N/A 8/31/2020    BRONCHOSCOPY BIOPSY BRONCHUS performed by Ventura Khan MD at [facility][1], COLON, DIAGNOSTIC  12/08/2016    EYE SURGERY      HYSTERECTOMY      IR INS PICC VAD W SQ PORT GREATER THAN 5  9/4/2020    IR INS PICC VAD W SQ PORT GREATER THAN 5 9/4/2020 [facility] SPECIAL PROCEDURES    IR PORT PLACEMENT EQUAL OR GREATER THAN 5 YEARS  9/29/2020    IR PORT PLACEMENT EQUAL OR GREATER THAN 5 YEARS 9/29/2020 MD [facility] SPECIAL PROCEDURES    LUMBAR LAMINECTOMY      TONSILLECTOMY          Medications Prior to Admission:       Prior to Admission medications    Medication Sig Start Date End Date Taking?  Authorizing Provider   metoprolol tartrate (LOPRESSOR) 50 MG tablet Take 1 tablet by mouth 2 times daily 3/25/22   Skinny Pearce MD   albuterol sulfate  (90 Base) MCG/ACT inhaler Inhale 2 puffs into the lungs every 4 hours as needed for Wheezing 3/25/22   Skinny Pearce MD   neomycin-bacitracin-polymyxin (NEOSPORIN) 400-5-5000 ointment Apply topically daily for 5 days Apply topically 2 times daily. 3/25/22 3/30/22  Skinny Pearce MD   tamsulosin (FLOMAX) 0.4 MG capsule Take 1 capsule by mouth daily 3/26/22   Skinny Pearce MD   ARIPiprazole (ABILIFY) 5 MG tablet Take 5 mg by mouth at bedtime    Historical Provider, MD   metFORMIN (GLUCOPHAGE) 500 MG tablet Take 500 mg by mouth 2 times daily (with meals)    Historical Provider, MD   Pseudoeph-Doxylamine-DM-APAP (NYQUIL MULTI-SYMPTOM PO) Take by mouth at bedtime    Historical Provider, MD   traZODone (DESYREL) 150 MG tablet Take 300 mg by mouth nightly    Historical Provider, MD   gabapentin (NEURONTIN) 400 MG capsule Take 800 mg by mouth in the morning and at bedtime. Historical Provider, MD   melatonin 5 MG TABS tablet Take 15 mg by mouth nightly    Historical Provider, MD   ondansetron (ZOFRAN) 4 MG tablet Take 1 tablet by mouth every 8 hours as needed for Nausea or Vomiting 1/26/22   Maged Toussaint MD   rosuvastatin (CRESTOR) 5 MG tablet Take 5 mg by mouth daily    Historical Provider, MD   oxyCODONE-acetaminophen (PERCOCET)  MG per tablet Take 1 tablet by mouth every 6 hours as needed for Pain. Historical Provider, MD   clonazePAM (KLONOPIN) 1 MG tablet Take 1 tablet by mouth 3 times daily as needed for Anxiety for up to 3 doses.  11/5/20 1/26/22  Fanta Fraser MD   furosemide (LASIX) 40 MG tablet Take 40 mg by mouth daily as needed (swelling)     Historical Provider, MD   glimepiride (AMARYL) 1 MG tablet Take 1 mg by mouth 2 times daily (with meals)     Historical Provider, MD   venlafaxine (EFFEXOR XR) 150 MG extended release capsule Take 150 mg by mouth 2 times daily    Historical Provider, MD   lansoprazole (PREVACID) 30 MG delayed release capsule Take 30 mg by mouth daily 11/10/16   Historical Provider, MD   aspirin EC 81 MG EC tablet Take 81 mg by mouth daily    Historical Provider, MD   mometasone-formoterol (DULERA) 200-5 MCG/ACT inhaler Inhale 2 puffs into the lungs every 12 hours as needed (wheezing/SOB)     Historical Provider, MD        Allergies:       Codeine and Dye [iodides]    Social History:     Tobacco:    reports that she quit smoking about 5 years ago. She has never used smokeless tobacco.  Alcohol:      reports no history of alcohol use. Drug Use:  reports no history of drug use. Family History:     Family History   Problem Relation Age of Onset    Cancer Mother     Cancer Father          Physical Exam:     Vitals:  /73   Pulse 103   Temp 98.9 °F (37.2 °C) (Oral)   Resp 16   Ht 5' 8\" (1.727 m)   Wt 168 lb 0.1 oz (76.2 kg)   SpO2 95%   BMI 25.54 kg/m²   Temp (24hrs), Av.2 °F (36.8 °C), Min:97.7 °F (36.5 °C), Max:98.9 °F (37.2 °C)          General appearance - alert, well appearing, and in moderate acute distress  Mental status - oriented to person, place, and time with normal affect  Head - normocephalic and atraumatic  Eyes - pupils equal and reactive, extraocular eye movements intact, conjunctiva clear  Ears - hearing appears to be intact  Nose - no drainage noted  Mouth - mucous membranes moist  Neck - supple, no carotid bruits, thyroid not palpable  Chest -coarse crackles to auscultation, increased effort  Heart - normal rate, regular rhythm, no murmur  Abdomen - soft, nontender, nondistended, bowel sounds present all four quadrants, no masses, hepatomegaly or splenomegaly  Neurological - normal speech, no focal findings or movement disorder noted, cranial nerves II through XII grossly intact.   Reduced strength upper and lower extremities, equal bilateral  Extremities - peripheral pulses palpable, no pedal edema or calf 04/12/2017    XAI1NWV 26.1 08/26/2012    HCO3 22.2 04/11/2017    NBEA NOT REPORTED 04/12/2017    PBEA 0 04/12/2017    QAK4JZS 27 04/12/2017    JFNA0LPO 97 04/12/2017    J5YGBEEE 92.1 08/26/2012    O2SAT 95 04/11/2017    FIO2 UNKNOWN 10/31/2017       Radiology:    XR CHEST PORTABLE    Result Date: 3/29/2022  Right apical consolidation is similar prior examination. Chest CT could provide further information. All radiological studies reviewed                Code Status:  Full Code    Electronically signed by Malissa Villasenor MD on 3/31/2022 at 5:40 PM     Copy sent to Dr. Maricarmen Andersen MD    This note was created with the assistance of a speech-recognition program.  Although the intention is to generate a document that actually reflects the content of the visit, no guarantees can be provided that every mistake has been identified and corrected by editing. Note was updated later by me after  physical examination and  completion of the assessment.

## 2022-03-31 NOTE — RT PROTOCOL NOTE
RT Inhaler-Nebulizer Bronchodilator Protocol Note    There is a bronchodilator order in the chart from a provider indicating to follow the RT Bronchodilator Protocol and there is an Initiate RT Inhaler-Nebulizer Bronchodilator Protocol order as well (see protocol at bottom of note). CXR Findings:  XR CHEST PORTABLE    Result Date: 3/29/2022  Right apical consolidation is similar prior examination. Chest CT could provide further information. The findings from the last RT Protocol Assessment were as follows:   History Pulmonary Disease: Chronic pulmonary disease  Respiratory Pattern: Dyspnea on exertion or RR 21-25 bpm  Breath Sounds: Slightly diminished and/or crackles  Cough: Strong, productive  Indication for Bronchodilator Therapy: Decreased or absent breath sounds  Bronchodilator Assessment Score: 7    Aerosolized bronchodilator medication orders have been revised according to the RT Inhaler-Nebulizer Bronchodilator Protocol below. Respiratory Therapist to perform RT Therapy Protocol Assessment initially then follow the protocol. Repeat RT Therapy Protocol Assessment PRN for score 0-3 or on second treatment, BID, and PRN for scores above 3. No Indications - adjust the frequency to every 6 hours PRN wheezing or bronchospasm, if no treatments needed after 48 hours then discontinue using Per Protocol order mode. If indication present, adjust the RT bronchodilator orders based on the Bronchodilator Assessment Score as indicated below. Use Inhaler orders unless patient has one or more of the following: on home nebulizer, not able to hold breath for 10 seconds, is not alert and oriented, cannot activate and use MDI correctly, or respiratory rate 25 breaths per minute or more, then use the equivalent nebulizer order(s) with same Frequency and PRN reasons based on the score. If a patient is on this medication at home then do not decrease Frequency below that used at home.     0-3 - enter or revise RT bronchodilator order(s) to equivalent RT Bronchodilator order with Frequency of every 4 hours PRN for wheezing or increased work of breathing using Per Protocol order mode. 4-6 - enter or revise RT Bronchodilator order(s) to two equivalent RT bronchodilator orders with one order with BID Frequency and one order with Frequency of every 4 hours PRN wheezing or increased work of breathing using Per Protocol order mode. 7-10 - enter or revise RT Bronchodilator order(s) to two equivalent RT bronchodilator orders with one order with TID Frequency and one order with Frequency of every 4 hours PRN wheezing or increased work of breathing using Per Protocol order mode. 11-13 - enter or revise RT Bronchodilator order(s) to one equivalent RT bronchodilator order with QID Frequency and an Albuterol order with Frequency of every 4 hours PRN wheezing or increased work of breathing using Per Protocol order mode. Greater than 13 - enter or revise RT Bronchodilator order(s) to one equivalent RT bronchodilator order with every 4 hours Frequency and an Albuterol order with Frequency of every 2 hours PRN wheezing or increased work of breathing using Per Protocol order mode. RT to enter RT Home Evaluation for COPD & MDI Assessment order using Per Protocol order mode.     Electronically signed by Loc Post RCP on 3/31/2022 at 10:20 AM

## 2022-03-31 NOTE — CONSULTS
[facility] Wound Ostomy Continence Nurse  Consult Note       NAME:  Laura Marcos  MEDICAL RECORD NUMBER:  [id number]  AGE: 71 y.o. GENDER: female  : 1950  TODAY'S DATE:  3/31/2022    Subjective:      Laura Marcos is a 71 y.o. female with inpatient referral to Wound Ostomy Continence [facility] for:  Left facial wound and buttock wound, also found heel stage 1 on eval      Wound Identification:  Wound Type: pressure and traumatic  Contributing Factors: chronic pressure, decreased mobility, shear force, smoking and incontinence of urine    Wound History: left facial wound present on previous admission and attributed to a recent fall. The buttock wound is new, but was present on admission. Stage 1 on right heel not documented. Current Wound Care Treatment:  Foam to buttock, facial wound and heel were open to air. Patient Goal of Care:  [x] Wound Healing  [] Odor Control  [] Palliative Care  [] Pain Control   [] Other:         PAST MEDICAL HISTORY        Diagnosis Date    AAA (abdominal aortic aneurysm) ([facility]     Pt denies having a history of AAA    Acid reflux     Anxiety and depression     Aortic stenosis     Arthritis     Blister of ankle, right 10/13/2016    blister broke open & draining, is on antibiotics    CAD (coronary artery disease)     Cancer ([facility]     lung cancer    COPD (chronic obstructive pulmonary disease) ([facility]     Diabetes mellitus ([facility]     Falls     Heart block     bifasicular    Hypokalemia     MDRO (multiple drug resistant organisms) resistance 10/17/2014    E. Coli urine    MRSA (methicillin resistant staph aureus) culture positive resolved 2016    2 negative nasal screens -  (hx in urine )    On home oxygen therapy     uses 2 liters at night    On home oxygen therapy     patient states 2 liters/nasal cannula continuous    Overactive bladder     patient incont.  wears a brief    Pneumonia     PONV (postoperative nausea and vomiting)     Vitamin D deficiency        PAST SURGICAL HISTORY    Past Surgical History:   Procedure Laterality Date    AORTIC VALVE REPAIR N/A 2017    AORTIC VALVE REPAIR REPLACEMENT performed by Layne Madera MD at [facility] N/A 2020    BRONCHOSCOPY BIOPSY BRONCHUS performed by Qasim Faiban MD at [address], COLON, DIAGNOSTIC  2016    EYE SURGERY      HYSTERECTOMY      IR INS PICC VAD W SQ PORT GREATER THAN 5  2020    IR INS PICC VAD W SQ PORT GREATER THAN 5 2020 [facility]    IR PORT PLACEMENT EQUAL OR GREATER THAN 5 YEARS  2020    IR PORT PLACEMENT EQUAL OR GREATER THAN 5 YEARS 2020 Garrett Bull MD [facility]    LUMBAR LAMINECTOMY      TONSILLECTOMY         FAMILY HISTORY    Family History   Problem Relation Age of Onset    Cancer Mother     Cancer Father        SOCIAL HISTORY    Social History     Tobacco Use    Smoking status: Former Smoker     Quit date: 2016     Years since quittin.6    Smokeless tobacco: Never Used   Vaping Use    Vaping Use: Never used   Substance Use Topics    Alcohol use: No    Drug use: No         ALLERGIES    Allergies   Allergen Reactions    Codeine      \"feeling of heavy on chest\"    Dye [Iodides]      Hallucination,vomiting       HOME MEDICATIONS  Prior to Admission medications    Medication Sig Start Date End Date Taking? Authorizing Provider   metoprolol tartrate (LOPRESSOR) 50 MG tablet Take 1 tablet by mouth 2 times daily 3/25/22   Yinka Dixon MD   albuterol sulfate  (90 Base) MCG/ACT inhaler Inhale 2 puffs into the lungs every 4 hours as needed for Wheezing 3/25/22   Yinka Dixon MD   neomycin-bacitracin-polymyxin (NEOSPORIN) 400-5-5000 ointment Apply topically daily for 5 days Apply topically 2 times daily.  3/25/22 3/30/22  Yinka Dixon MD   tamsulosin (FLOMAX) 0.4 MG capsule Take 1 capsule by mouth daily 3/26/22   Jaret Adams MD   ARIPiprazole (ABILIFY) 5 MG tablet Take 5 mg by mouth at bedtime    Historical Provider, MD   metFORMIN (GLUCOPHAGE) 500 MG tablet Take 500 mg by mouth 2 times daily (with meals)    Historical Provider, MD   Pseudoepmirta-Doxylamine-DM-APAP (NYQUIL MULTI-SYMPTOM PO) Take by mouth at bedtime    Historical Provider, MD   traZODone (DESYREL) 150 MG tablet Take 300 mg by mouth nightly    Historical Provider, MD   gabapentin (NEURONTIN) 400 MG capsule Take 800 mg by mouth in the morning and at bedtime. Historical Provider, MD   melatonin 5 MG TABS tablet Take 15 mg by mouth nightly    Historical Provider, MD   ondansetron (ZOFRAN) 4 MG tablet Take 1 tablet by mouth every 8 hours as needed for Nausea or Vomiting 1/26/22   Ramon Lynch MD   rosuvastatin (CRESTOR) 5 MG tablet Take 5 mg by mouth daily    Historical Provider, MD   oxyCODONE-acetaminophen (PERCOCET)  MG per tablet Take 1 tablet by mouth every 6 hours as needed for Pain. Historical Provider, MD   clonazePAM (KLONOPIN) 1 MG tablet Take 1 tablet by mouth 3 times daily as needed for Anxiety for up to 3 doses.  11/5/20 1/26/22  Reuben Hernandez MD   furosemide (LASIX) 40 MG tablet Take 40 mg by mouth daily as needed (swelling)     Historical Provider, MD   glimepiride (AMARYL) 1 MG tablet Take 1 mg by mouth 2 times daily (with meals)     Historical Provider, MD   venlafaxine (EFFEXOR XR) 150 MG extended release capsule Take 150 mg by mouth 2 times daily    Historical Provider, MD   lansoprazole (PREVACID) 30 MG delayed release capsule Take 30 mg by mouth daily 11/10/16   Historical Provider, MD   aspirin EC 81 MG EC tablet Take 81 mg by mouth daily    Historical Provider, MD   mometasone-formoterol (DULERA) 200-5 MCG/ACT inhaler Inhale 2 puffs into the lungs every 12 hours as needed (wheezing/SOB)     Historical Provider, MD       CURRENT MEDICATIONS:  Current Facility-Administered Medications   Medication Dose Route Frequency Provider Last Rate Last Admin    oxyCODONE-acetaminophen (PERCOCET) 5-325 MG per tablet 1 tablet  1 tablet Oral Q4H PRN Whitney Becerril MD        0.9 % sodium chloride bolus  500 mL IntraVENous Once Sam Park .9 mL/hr at 03/31/22 1046 500 mL at 03/31/22 1046    ipratropium-albuterol (DUONEB) nebulizer solution 1 ampule  1 ampule Inhalation Q4H PRN Whitney Becerril MD        azithromycin (ZITHROMAX) tablet 500 mg  500 mg Oral Daily Sam Park MD        ipratropium-albuterol (DUONEB) nebulizer solution 1 ampule  1 ampule Inhalation TID Whitney Becerril MD        0.9 % sodium chloride infusion   IntraVENous Continuous Whitney Becerril  mL/hr at 03/31/22 1046 Rate Change at 03/31/22 1046    albuterol sulfate  (90 Base) MCG/ACT inhaler 2 puff  2 puff Inhalation Q4H PRN Whitney Becerril MD   2 puff at 03/31/22 1129    ARIPiprazole (ABILIFY) tablet 5 mg  5 mg Oral Nightly Whitney Becerril MD   5 mg at 03/30/22 2141    aspirin EC tablet 81 mg  81 mg Oral Daily Whitney Becerril MD   81 mg at 03/31/22 0940    clonazePAM (KLONOPIN) tablet 1 mg  1 mg Oral TID PRN Whitney Becerril MD   1 mg at 03/30/22 0810    furosemide (LASIX) tablet 40 mg  40 mg Oral Daily PRN Whitney Becerril MD        gabapentin (NEURONTIN) capsule 800 mg  800 mg Oral BID Sam Park MD   800 mg at 03/31/22 0936    glipiZIDE (GLUCOTROL) tablet 2.5 mg  2.5 mg Oral QAM JUNIOR Becerril MD   2.5 mg at 03/31/22 0629    pantoprazole (PROTONIX) tablet 20 mg  20 mg Oral QAM AC Whitney Becerril MD   20 mg at 03/31/22 0629    melatonin tablet 9 mg  9 mg Oral Nightly Whitney Becerril MD   9 mg at 03/30/22 2141    metFORMIN (GLUCOPHAGE) tablet 500 mg  500 mg Oral BID  Sam Park MD   500 mg at 03/31/22 0936    metoprolol tartrate (LOPRESSOR) tablet 50 mg  50 mg Oral BID Whitney Becerril MD   50 mg at 03/30/22 2141    budesonide-formoterol (SYMBICORT) 160-4.5 MCG/ACT inhaler 2 puff  2 puff Inhalation BID Sam Park MD   2 puff at 03/31/22 0949    rosuvastatin (CRESTOR) tablet 5 mg  5 mg Oral Daily Whitney Becerril MD   5 mg at 03/31/22 0935    tamsulosin (FLOMAX) capsule 0.4 mg  0.4 mg Oral Daily Whitney Becerril MD   0.4 mg at 03/31/22 0936    traZODone (DESYREL) tablet 300 mg  300 mg Oral Nightly Yina Lopes MD   300 mg at 03/30/22 2140    venlafaxine (EFFEXOR XR) extended release capsule 150 mg  150 mg Oral BID Yina Lopes MD   150 mg at 03/31/22 0936    sodium chloride flush 0.9 % injection 5-40 mL  5-40 mL IntraVENous 2 times per day Yina Lopes MD   10 mL at 03/30/22 8277    sodium chloride flush 0.9 % injection 10 mL  10 mL IntraVENous PRN Yina Lopes MD        0.9 % sodium chloride infusion   IntraVENous PRN Yina Lopes MD        potassium chloride (KLOR-CON M) extended release tablet 40 mEq  40 mEq Oral PRN Yina Lopes MD        Or    potassium bicarb-citric acid (EFFER-K) effervescent tablet 40 mEq  40 mEq Oral PRN Whitney Becerril MD        Or    potassium chloride 10 mEq/100 mL IVPB (Peripheral Line)  10 mEq IntraVENous PRN Yina Lopes MD        magnesium sulfate 1000 mg in dextrose 5% 100 mL IVPB  1,000 mg IntraVENous PRN Whitney Becerril MD        enoxaparin (LOVENOX) injection 40 mg  40 mg SubCUTAneous Daily Whitney Becerril MD        ondansetron (ZOFRAN-ODT) disintegrating tablet 4 mg  4 mg Oral Q8H PRN Whitney Becerril MD        Or    ondansetron (ZOFRAN) injection 4 mg  4 mg IntraVENous Q6H PRN Whitney Becerril MD        polyethylene glycol (GLYCOLAX) packet 17 g  17 g Oral Daily PRN Whitney Becerirl MD        acetaminophen (TYLENOL) tablet 650 mg  650 mg Oral Q6H PRN Whitney Becerril MD   650 mg at 03/30/22 1734    Or    acetaminophen (TYLENOL) suppository 650 mg  650 mg Rectal Q6H PRN Whitney Becerril MD        insulin lispro (HUMALOG) injection vial 0-12 Units  0-12 Units SubCUTAneous TID  Whitney Becerril MD   2 Units at 03/30/22 0811    insulin lispro (HUMALOG) injection vial 0-6 Units  0-6 J96.10    Contusion of right knee S80. 01XA    Closed fracture of right distal radius S52.501A    Subdural hemorrhage ([facility]) I62.00    Subarachnoid hemorrhage ([facility]) I60.9    Traumatic hemorrhage of cerebrum ([facility]) R62.982J    Chronic bilateral low back pain M54.50, G89.29    Cellulitis of both feet L03.115, L03. 116    Acute on chronic congestive heart failure ([facility]) I50.9    Bilateral leg edema R60.0    Cellulitis L03.90    Lung mass R91.8    Malignant neoplasm of upper lobe of right lung ([facility]) C34.11    Urinary tract infectious disease N39.0    Closed fracture of one rib of right side S22.31XA    Degenerative disc disease, lumbar M51.36    Moderate malnutrition ([facility]) E44.0    Rhabdomyolysis M62.82    Unable to ambulate R26.2    Mild malnutrition ([facility]) E44.1         Measurements:  Wound 03/18/22 Face Left (Active)   Wound Image   03/31/22 1205   Wound Etiology Traumatic 03/31/22 1205   Dressing Status New dressing applied; Old drainage noted 03/31/22 1205   Wound Cleansed Soap and water;Irrigated with saline 03/31/22 1205   Dressing/Treatment Barrier film; Foam 03/31/22 1205   Dressing Change Due 04/01/22 03/31/22 1205   Wound Length (cm) 3.5 cm 03/31/22 1205   Wound Width (cm) 3 cm 03/31/22 1205   Wound Depth (cm) 0.1 cm 03/31/22 1205   Wound Surface Area (cm^2) 10.5 cm^2 03/31/22 1205   Wound Volume (cm^3) 1.05 cm^3 03/31/22 1205   Wound Assessment Other (Comment);Pink/red;Granulation tissue 03/31/22 1205   Drainage Amount Moderate 03/31/22 1205   Drainage Description Serosanguinous;Purulent 03/31/22 1205   Odor None 03/31/22 1205   Mary-wound Assessment Intact 03/31/22 1205   Margins Attached edges; Defined edges 03/31/22 1205   Wound Thickness Description not for Pressure Injury Full thickness 03/31/22 1205   Number of days: 12       Wound 03/29/22 Buttocks Right (Active)   Wound Image   03/31/22 1205   Wound Etiology Pressure Stage  2 03/31/22 1205   Dressing Status New dressing applied; Old drainage noted 03/31/22 1205   Wound Cleansed Soap and water 03/31/22 1205   Dressing/Treatment Barrier film;Alginate; Foam 03/31/22 1205   Dressing Change Due 04/03/22 03/31/22 1205   Wound Length (cm) 3.8 cm 03/31/22 1205   Wound Width (cm) 3.2 cm 03/31/22 1205   Wound Depth (cm) 0.1 cm 03/31/22 1205   Wound Surface Area (cm^2) 12.16 cm^2 03/31/22 1205   Wound Volume (cm^3) 1.216 cm^3 03/31/22 1205   Wound Assessment Pink/red 03/31/22 1205   Drainage Amount Large 03/31/22 1205   Odor None 03/31/22 1205   Mary-wound Assessment Blanchable erythema 03/31/22 1205   Margins Attached edges; Defined edges 03/31/22 1205   Wound Thickness Description not for Pressure Injury Partial thickness 03/31/22 1205   Number of days: 1       Wound 03/31/22 Heel Right (Active)   Wound Image   03/31/22 1205   Wound Etiology Pressure Stage  1 03/31/22 1205   Dressing Status New dressing applied 03/31/22 1205   Dressing/Treatment Barrier film 03/31/22 1205   Dressing Change Due 04/03/22 03/31/22 1205   Wound Length (cm) 1 cm 03/31/22 1205   Wound Width (cm) 1 cm 03/31/22 1205   Wound Depth (cm) 0 cm 03/31/22 1205   Wound Surface Area (cm^2) 1 cm^2 03/31/22 1205   Wound Volume (cm^3) 0 cm^3 03/31/22 1205   Wound Assessment Non-blanchable erythema 03/31/22 1205   Drainage Amount None 03/31/22 1205   Odor None 03/31/22 1205   Mary-wound Assessment Blanchable erythema 03/31/22 1205   Number of days: 0       WOUND DESCRIPTION:   Left facial wound near temporal area with a large crusted-over lesion. On eval, the scabbed area was exuding some purulent exudate. The wound was soaked with soapy water and some of the previously crusted area loosened up and dislodged to reveal and open full thickness wound. Part of the crusting/dried debris remained adhered to the wound bed and unable to be removed. Honey gel was applied to the wound bed and covered by a foam dressing.  I will round tomorrow to remove this and hopefully the honey will soften the remaining crust to allow for appropriate wound care. Buttock wound: the dressing was quite moist, per RN the patient had some previous incontinent episodes, but now has a quach. Wound is partial thickness. Recommend foam for protection. Right heel with non-blanchable erythema. Reocmmend barrier film and offloading heels. Response to treatment:  Well tolerated by patient. Plan:     Plan of Care:     -Buttock wound care: foam    -Left temporal wound care: will readdress at tomorrow's visit. Please leave current foam dressing intact.    -Heels: use barrier film and offload by floating heels on a pillow    -Turn every 2 hours    -Routine incontinence care with foaming cleanser and zinc oxide cream. Apply zinc oxide cream twice daily and prn incontinence. Use moisture wicking under pad (one layer only). -Waffle mattress overlay. Check inflation each shift by sliding hand under the air overlay. Feel for the patient's heaviest/ most dependant body part. Ideally 1/2 to 1\" of air will be between your hand and the patient's body. Add air prn. Current Diet: ADULT DIET; Regular; 4 carb choices (60 gm/meal); Low Fat/Low Chol/High Fiber/2 gm Na;  Low Sodium (2 gm)  ADULT ORAL NUTRITION SUPPLEMENT; Breakfast, Dinner; Diabetic Oral Supplement  Dietician consult:  Yes    Discharge Plan:  Placement for patient upon discharge: skilled nursing   Patient appropriate for Outpatient [address]: Yes    Patient/Caregiver Teaching:  Level of patientunderstanding able to:     [x] Indicates understanding       [x] Needs reinforcement  [] Unsuccessful      [x] Verbal Understanding  [] Demonstrated understanding       [] No evidence of learning  [] Refused teaching         [] N/A       Electronically signed by AV Levy CNP on  3/31/2022 at 12:09 PM

## 2022-03-31 NOTE — PROGRESS NOTES
Physical Therapy  Facility/Department: [facility]  Daily Treatment Note  NAME: Colleen Le  : 1950  MRN: [id number]    Date of Service: 3/31/2022    Discharge Recommendations:  Pt currently functioning below baseline. Would suggest additional therapy at time of discharge to maximize long term outcomes and prevent re-admission. Please refer to AM-PAC score for current level of function. Assessment   Emphasized the importance of OOB activity and exercises to complete throughout admission, pt states she understanding this, however, \"will probably not do it. \" Pt demos improved bed mobilities this session but warrants x2 assist for t/fs and ambulation d/t safety concerns. Will cont progressing towards goals. Body structures, Functions, Activity limitations: Decreased functional mobility ; Decreased safe awareness;Decreased balance;Decreased ROM; Decreased strength;Decreased endurance; Increased pain;Decreased cognition  Specific instructions for Next Treatment: progress OOB mobility  Prognosis: Good  Decision Making: High Complexity  PT Education: Goals;PT Role;Plan of Care;General Safety  Patient Education: Discussed importance and benefits of OOB activity throughout admission to prevent secondary complications and better regulate BP. Barriers to Learning: questionable cognition  REQUIRES PT FOLLOW UP: Yes  Activity Tolerance  Activity Tolerance: Treatment limited secondary to medical complications (free text); Patient limited by fatigue;Patient limited by endurance  Activity Tolerance: BP limiting activity     Patient Diagnosis(es): The encounter diagnosis was Generalized weakness.      has a past medical history of AAA (abdominal aortic aneurysm) (HCC), Acid reflux, Anxiety and depression, Aortic stenosis, Arthritis, Blister of ankle, right, CAD (coronary artery disease), Cancer ([facility] [address] COPD (chronic obstructive pulmonary disease) ([facility] [address] Diabetes mellitus ([facility] [address] Falls, Heart block, Hypokalemia, MDRO (multiple drug resistant organisms) resistance, MRSA (methicillin resistant staph aureus) culture positive, On home oxygen therapy, On home oxygen therapy, Overactive bladder, Pneumonia, PONV (postoperative nausea and vomiting), and Vitamin D deficiency. has a past surgical history that includes back surgery; eye surgery; Cholecystectomy; Appendectomy; Hysterectomy; Tonsillectomy; lumbar laminectomy; Endoscopy, colon, diagnostic (12/08/2016); aortic valve repair (N/A, 4/11/2017); bronchoscopy (N/A, 8/31/2020); IR INSERT PICC VAD W SQ PORT >5 YEARS (9/4/2020); and IR PORT PLACEMENT > 5 YEARS (9/29/2020). Restrictions  Restrictions/Precautions  Restrictions/Precautions: General Precautions,Fall Risk  Position Activity Restriction  Other position/activity restrictions: L YAKOVE IV, jc  Subjective   General  Chart Reviewed: Yes  Response To Previous Treatment: Patient reporting fatigue but able to participate. Family / Caregiver Present: No  Subjective  Subjective: Pt not thrilled to work with PT/OT but is agreeable this date. General Comment  Comments: RN okays PT but to monitor BP throughout. Orientation  Orientation  Overall Orientation Status: Within Functional Limits  Cognition      Objective   Bed mobility  Rolling to Left: Modified independent  Rolling to Right: Modified independent  Supine to Sit: Stand by assistance;Minimal assistance  Sit to Supine: Stand by assistance  Scooting: Stand by assistance  Comment: Pt with improved bed mobilities this session, only requiring minimal assistance with Sup>Sit. Demos good use of bed rails throughout. Transfers  Sit to Stand: Minimal Assistance;Contact guard assistance  Stand to sit: Minimal Assistance;Contact guard assistance  Bed to Chair: Minimal assistance;2 Person Assistance  Stand Pivot Transfers: Minimal Assistance;2 Person Assistance  Comment: BP taken: sitting 84/61 66 MAP; standing 90/52 62 MAP.  Unable to tolerate longer than ~45secs of standing at this time. Instructed pt to push up from bed and hold onto RW once in standing with poor return demo. Pt requires assist managing lines during transfers. Ambulation  Ambulation?: No (Took a few steps from bed>chair)        Exercises  Comments: Instructed pt on BLE ex's to complete while in chair during commercial breaks. AM-PAC Score     AM-PAC Inpatient Mobility without Stair Climbing Raw Score : 16 (03/31/22 1136)  AM-PAC Inpatient without Stair Climbing T-Scale Score : 45.54 (03/31/22 1136)  Mobility Inpatient CMS 0-100% Score: 40.64 (03/31/22 1136)  Mobility Inpatient without Stair CMS G-Code Modifier : CK (03/31/22 1136)    Goals  Short term goals  Time Frame for Short term goals: 12 visits:  Short term goal 1: Pt. to be SBA for bed mob. Short term goal 2: Pt. to require CGA for sit to stand tranfers from all surface heights with safe hand placement. Short term goal 3: Pt. to require CGA to amb. 25ft. with RW and approp. O2. Short term goal 4: Pt. to tolerate 25+ min. of PT daily for ther ex/ gait/ balance training  Patient Goals   Patient goals : d/c to SNF    Plan    Plan  Times per week: 1-2x/day; 5-6days/wk  Specific instructions for Next Treatment: progress OOB mobility  Current Treatment Recommendations: Strengthening,Transfer Training,ROM,Balance Training,Functional Mobility Training,Gait Training,Safety Education & [employer] Exercise Program,Patient/Caregiver Education & Training,Endurance Training  Safety Devices  Type of devices:  All fall risk precautions in place,Gait belt,Patient at risk for falls,Call light within reach,Nurse notified,Left in chair,Chair alarm in place     Therapy Time   Individual Concurrent Group Co-treatment   Time In 1007         Time Out 1038         Minutes [place], [place]

## 2022-03-31 NOTE — CARE COORDINATION
Spoke to Zully with [facility] and when pt was discharged on 3-26 they attempted to restart care but nurse felt pt was not safe at home alone  and unable to care for self. Nurse from [facility] contacted son and  advised for pt to return to ED for SNF placement. APS was notified by [facility].

## 2022-04-01 ENCOUNTER — APPOINTMENT (OUTPATIENT)
Dept: GENERAL RADIOLOGY | Age: 72
DRG: 948 | End: 2022-04-01
Payer: MEDICARE

## 2022-04-01 LAB
ANION GAP SERPL CALCULATED.3IONS-SCNC: 9 MMOL/L (ref 9–17)
BUN BLDV-MCNC: 8 MG/DL (ref 8–23)
BUN/CREAT BLD: 13 (ref 9–20)
CALCIUM SERPL-MCNC: 8.3 MG/DL (ref 8.6–10.4)
CHLORIDE BLD-SCNC: 108 MMOL/L (ref 98–107)
CO2: 26 MMOL/L (ref 20–31)
CREAT SERPL-MCNC: 0.62 MG/DL (ref 0.5–0.9)
GFR AFRICAN AMERICAN: >60 ML/MIN
GFR NON-AFRICAN AMERICAN: >60 ML/MIN
GFR SERPL CREATININE-BSD FRML MDRD: ABNORMAL ML/MIN/{1.73_M2}
GLUCOSE BLD-MCNC: 144 MG/DL (ref 65–105)
GLUCOSE BLD-MCNC: 160 MG/DL (ref 65–105)
GLUCOSE BLD-MCNC: 160 MG/DL (ref 70–99)
GLUCOSE BLD-MCNC: 162 MG/DL (ref 65–105)
POTASSIUM SERPL-SCNC: 4.4 MMOL/L (ref 3.7–5.3)
SODIUM BLD-SCNC: 143 MMOL/L (ref 135–144)

## 2022-04-01 PROCEDURE — 94660 CPAP INITIATION&MGMT: CPT

## 2022-04-01 PROCEDURE — 94761 N-INVAS EAR/PLS OXIMETRY MLT: CPT

## 2022-04-01 PROCEDURE — 82947 ASSAY GLUCOSE BLOOD QUANT: CPT

## 2022-04-01 PROCEDURE — 99212 OFFICE O/P EST SF 10 MIN: CPT

## 2022-04-01 PROCEDURE — 6360000002 HC RX W HCPCS: Performed by: NURSE PRACTITIONER

## 2022-04-01 PROCEDURE — 71045 X-RAY EXAM CHEST 1 VIEW: CPT

## 2022-04-01 PROCEDURE — 6360000002 HC RX W HCPCS: Performed by: INTERNAL MEDICINE

## 2022-04-01 PROCEDURE — 6370000000 HC RX 637 (ALT 250 FOR IP): Performed by: FAMILY MEDICINE

## 2022-04-01 PROCEDURE — 97116 GAIT TRAINING THERAPY: CPT

## 2022-04-01 PROCEDURE — 2580000003 HC RX 258: Performed by: FAMILY MEDICINE

## 2022-04-01 PROCEDURE — 6360000002 HC RX W HCPCS: Performed by: FAMILY MEDICINE

## 2022-04-01 PROCEDURE — 94640 AIRWAY INHALATION TREATMENT: CPT

## 2022-04-01 PROCEDURE — 80048 BASIC METABOLIC PNL TOTAL CA: CPT

## 2022-04-01 PROCEDURE — 97530 THERAPEUTIC ACTIVITIES: CPT

## 2022-04-01 PROCEDURE — 36415 COLL VENOUS BLD VENIPUNCTURE: CPT

## 2022-04-01 PROCEDURE — 2700000000 HC OXYGEN THERAPY PER DAY

## 2022-04-01 PROCEDURE — 1200000000 HC SEMI PRIVATE

## 2022-04-01 RX ORDER — ALBUTEROL SULFATE 90 UG/1
2 AEROSOL, METERED RESPIRATORY (INHALATION) 2 TIMES DAILY
Status: DISCONTINUED | OUTPATIENT
Start: 2022-04-02 | End: 2022-04-03

## 2022-04-01 RX ORDER — FUROSEMIDE 10 MG/ML
20 INJECTION INTRAMUSCULAR; INTRAVENOUS ONCE
Status: COMPLETED | OUTPATIENT
Start: 2022-04-01 | End: 2022-04-01

## 2022-04-01 RX ADMIN — GLIPIZIDE 2.5 MG: 5 TABLET ORAL at 05:53

## 2022-04-01 RX ADMIN — TRAZODONE HYDROCHLORIDE 300 MG: 100 TABLET ORAL at 20:55

## 2022-04-01 RX ADMIN — VENLAFAXINE HYDROCHLORIDE 150 MG: 75 CAPSULE, EXTENDED RELEASE ORAL at 08:51

## 2022-04-01 RX ADMIN — GABAPENTIN 800 MG: 400 CAPSULE ORAL at 08:51

## 2022-04-01 RX ADMIN — INSULIN LISPRO 2 UNITS: 100 INJECTION, SOLUTION INTRAVENOUS; SUBCUTANEOUS at 18:10

## 2022-04-01 RX ADMIN — TAMSULOSIN HYDROCHLORIDE 0.4 MG: 0.4 CAPSULE ORAL at 08:51

## 2022-04-01 RX ADMIN — VENLAFAXINE HYDROCHLORIDE 150 MG: 75 CAPSULE, EXTENDED RELEASE ORAL at 20:55

## 2022-04-01 RX ADMIN — FUROSEMIDE 20 MG: 10 INJECTION, SOLUTION INTRAMUSCULAR; INTRAVENOUS at 11:52

## 2022-04-01 RX ADMIN — BUDESONIDE AND FORMOTEROL FUMARATE DIHYDRATE 2 PUFF: 160; 4.5 AEROSOL RESPIRATORY (INHALATION) at 20:26

## 2022-04-01 RX ADMIN — GABAPENTIN 800 MG: 400 CAPSULE ORAL at 20:55

## 2022-04-01 RX ADMIN — INSULIN LISPRO 2 UNITS: 100 INJECTION, SOLUTION INTRAVENOUS; SUBCUTANEOUS at 08:48

## 2022-04-01 RX ADMIN — INSULIN LISPRO 2 UNITS: 100 INJECTION, SOLUTION INTRAVENOUS; SUBCUTANEOUS at 12:37

## 2022-04-01 RX ADMIN — INSULIN LISPRO 1 UNITS: 100 INJECTION, SOLUTION INTRAVENOUS; SUBCUTANEOUS at 20:55

## 2022-04-01 RX ADMIN — AZITHROMYCIN MONOHYDRATE 500 MG: 250 TABLET ORAL at 08:50

## 2022-04-01 RX ADMIN — METOPROLOL TARTRATE 50 MG: 50 TABLET, FILM COATED ORAL at 08:51

## 2022-04-01 RX ADMIN — SODIUM CHLORIDE: 9 INJECTION, SOLUTION INTRAVENOUS at 14:31

## 2022-04-01 RX ADMIN — SODIUM CHLORIDE: 9 INJECTION, SOLUTION INTRAVENOUS at 05:58

## 2022-04-01 RX ADMIN — Medication 9 MG: at 20:55

## 2022-04-01 RX ADMIN — METHYLPREDNISOLONE SODIUM SUCCINATE 60 MG: 125 INJECTION, POWDER, FOR SOLUTION INTRAMUSCULAR; INTRAVENOUS at 18:03

## 2022-04-01 RX ADMIN — ARIPIPRAZOLE 5 MG: 5 TABLET ORAL at 20:55

## 2022-04-01 RX ADMIN — OXYCODONE AND ACETAMINOPHEN 1 TABLET: 5; 325 TABLET ORAL at 14:29

## 2022-04-01 RX ADMIN — METOPROLOL TARTRATE 50 MG: 50 TABLET, FILM COATED ORAL at 20:55

## 2022-04-01 RX ADMIN — BUDESONIDE AND FORMOTEROL FUMARATE DIHYDRATE 2 PUFF: 160; 4.5 AEROSOL RESPIRATORY (INHALATION) at 07:58

## 2022-04-01 RX ADMIN — SODIUM CHLORIDE, PRESERVATIVE FREE 10 ML: 5 INJECTION INTRAVENOUS at 20:57

## 2022-04-01 RX ADMIN — OXYCODONE AND ACETAMINOPHEN 1 TABLET: 5; 325 TABLET ORAL at 05:53

## 2022-04-01 RX ADMIN — OXYCODONE AND ACETAMINOPHEN 1 TABLET: 5; 325 TABLET ORAL at 18:54

## 2022-04-01 RX ADMIN — ROSUVASTATIN CALCIUM 5 MG: 10 TABLET, FILM COATED ORAL at 08:51

## 2022-04-01 RX ADMIN — OXYCODONE AND ACETAMINOPHEN 1 TABLET: 5; 325 TABLET ORAL at 23:06

## 2022-04-01 RX ADMIN — CEFTRIAXONE SODIUM 1000 MG: 1 INJECTION, POWDER, FOR SOLUTION INTRAMUSCULAR; INTRAVENOUS at 23:03

## 2022-04-01 RX ADMIN — METFORMIN HYDROCHLORIDE 500 MG: 500 TABLET ORAL at 18:03

## 2022-04-01 RX ADMIN — CLONAZEPAM 1 MG: 0.5 TABLET ORAL at 11:48

## 2022-04-01 RX ADMIN — METHYLPREDNISOLONE SODIUM SUCCINATE 60 MG: 125 INJECTION, POWDER, FOR SOLUTION INTRAMUSCULAR; INTRAVENOUS at 11:50

## 2022-04-01 RX ADMIN — METFORMIN HYDROCHLORIDE 500 MG: 500 TABLET ORAL at 08:51

## 2022-04-01 RX ADMIN — ASPIRIN 81 MG: 81 TABLET, COATED ORAL at 08:51

## 2022-04-01 RX ADMIN — SODIUM CHLORIDE: 9 INJECTION, SOLUTION INTRAVENOUS at 23:02

## 2022-04-01 RX ADMIN — METHYLPREDNISOLONE SODIUM SUCCINATE 60 MG: 125 INJECTION, POWDER, FOR SOLUTION INTRAMUSCULAR; INTRAVENOUS at 05:54

## 2022-04-01 RX ADMIN — METHYLPREDNISOLONE SODIUM SUCCINATE 60 MG: 125 INJECTION, POWDER, FOR SOLUTION INTRAMUSCULAR; INTRAVENOUS at 23:02

## 2022-04-01 RX ADMIN — PANTOPRAZOLE SODIUM 20 MG: 20 TABLET, DELAYED RELEASE ORAL at 05:54

## 2022-04-01 ASSESSMENT — PAIN SCALES - GENERAL
PAINLEVEL_OUTOF10: 7
PAINLEVEL_OUTOF10: 6
PAINLEVEL_OUTOF10: 10
PAINLEVEL_OUTOF10: 7
PAINLEVEL_OUTOF10: 10

## 2022-04-01 ASSESSMENT — PAIN DESCRIPTION - PROGRESSION
CLINICAL_PROGRESSION: GRADUALLY IMPROVING
CLINICAL_PROGRESSION: GRADUALLY WORSENING

## 2022-04-01 ASSESSMENT — PAIN DESCRIPTION - ORIENTATION: ORIENTATION: ANTERIOR;POSTERIOR

## 2022-04-01 ASSESSMENT — PAIN DESCRIPTION - ONSET
ONSET: ON-GOING
ONSET: ON-GOING

## 2022-04-01 ASSESSMENT — PAIN DESCRIPTION - FREQUENCY: FREQUENCY: CONTINUOUS

## 2022-04-01 ASSESSMENT — PAIN DESCRIPTION - LOCATION: LOCATION: BACK;CHEST

## 2022-04-01 NOTE — PROGRESS NOTES
[facility].,    Adult Hospitalist      Name: Jose Rafael Roy  MRN: [id number]     Acct: [de-identified]  Room: 2003/2003-02    Admit Date: 3/29/2022  6:18 PM  PCP: Charbel Veloz MD    Primary Problem  Principal Problem:    Unable to ambulate  Active Problems:    Mild malnutrition ([address]  Resolved Problems:    * No resolved hospital problems. *        Assesment:     · Unable to ambulate  · Weakness  · Decubitus ulcer buttocks stage II  · Coronary disease, native vessel  · Chronic obstructive pulmonary disease, unspecified  · Diabetes mellitus type 2  · Essential hypertension  · Major depressive disorder  · Anxiety disorder  · Reported abdominal aortic aneurysm  · Gastroesophageal reflux disease without esophagitis  · Aortic stenosis  · Osteoarthritis multiple joints  · Lung cancer  · Past smoker  · Orthostatic hypotension  · Dehydration  · Left face abrasion healing  · AECOPD        Plan:     · Admit to [facility]  · Monitor vitals closely  · Keep SPO2 above 90%  · I's and O's  · IV Hep-Lock  · Pain control   · antiemetics as needed  · DuoNeb  · RT eval  · Resume essential home medication  · Add parameters  · Consult social work  · Consult PT/OT  · CBC, BMP  · Await [facility] SNF  · IV fluid bolus-recheck  · Maintenance IV fluids  · Twelve-lead EKG/telemetry  · Cardiac enzymes  · ProAmatine  · Consult Pulm  · Rocephin IV  · Azithromycin p.o.  · Solummedrol IV  · Plan transfer to SNF when stable  · DVT and GI prophylaxis.          Chief Complaint:     Chief Complaint   Patient presents with    Extremity Weakness         History of Present Illness:        Pt seen and examined at bedside  Last 24 hr events d/w RN  Pt feels better  Dyspnea better  Cough better  Still congested in chest    Denies CP, abd apin, nausea, vomiting  Denies fever, chills  Other ROS neg      Initial HPI  Jose Rafael Roy is a 71 y.o.  female who presents with Extremity Weakness    Patient admitted through the emergency room where she presented via EMS. Patient had recently been discharged from the hospital for acute rhabdomyolysis. Patient had been recommended to go to a skilled nursing facility for rehabilitation. However in spite of repeated recommendations patient refused to go to a rehab center. Social work had made a call to [facility] in addition in view of the patient's condition. Apparently the patient was discharged 3 days ago and says since then she has been sitting in one chair. She says she has not been able to get up and has developed a decubitus ulcer. She is asking for pain medication for that which has been ordered. Patient's muscle enzymes have been unremarkable now    Patient says she does understand now that she will need to go to a nursing facility for more strengthening. She understands also that this will help her return to her home and that this is necessary for her safety. However she says she does not like to hear recommendations like these again and again. We have advised her that there will be selected people through who decisions will need to be made of their she goes. Patient verbalizes understanding on that. Patient has had some cough and congestion. She says she has used her inhaler treatments. She is on oxygen via nasal cannula at 3 L/min. She denies any chest pain or wheezing. Denies any headache, photophobia or diplopia. Denies any nausea, vomiting or abdominal pain. Denies any diarrhea or constipation. Denies any joint swelling though she states she has chronic pain and needs her pain medication    I have personally reviewed the past medical history, past surgical history, medications, social history, and family history, and summarized in the note. Review of Systems:     All 10 point system is reviewed and negative otherwise mentioned in HPI.       Past Medical History:     Past Medical History:   Diagnosis Date    AAA (abdominal aortic aneurysm) ([facility] [address]     Pt denies having a history of AAA    Acid reflux     Anxiety and depression     Aortic stenosis     Arthritis     Blister of ankle, right 10/13/2016    blister broke open & draining, is on antibiotics    CAD (coronary artery disease)     Cancer (HCC)     lung cancer    COPD (chronic obstructive pulmonary disease) (HCC)     Diabetes mellitus ([facility] [address]     Falls     Heart block     bifasicular    Hypokalemia     MDRO (multiple drug resistant organisms) resistance 10/17/2014    E. Coli urine    MRSA (methicillin resistant staph aureus) culture positive resolved 12/2016    2 negative nasal screens - 2016 (hx in urine 2014)    On home oxygen therapy     uses 2 liters at night    On home oxygen therapy     patient states 2 liters/nasal cannula continuous    Overactive bladder     patient incont. wears a brief    Pneumonia     PONV (postoperative nausea and vomiting)     Vitamin D deficiency         Past Surgical History:     Past Surgical History:   Procedure Laterality Date    AORTIC VALVE REPAIR N/A 4/11/2017    AORTIC VALVE REPAIR REPLACEMENT performed by Charla Machuca MD at [facility] N/A 8/31/2020    BRONCHOSCOPY BIOPSY BRONCHUS performed by Redd Banuelos MD at [address] [place], DIAGNOSTIC  12/08/2016    EYE SURGERY      HYSTERECTOMY      IR INS PICC VAD W SQ PORT GREATER THAN 5  9/4/2020    IR INS PICC VAD W SQ PORT GREATER THAN 5 9/4/2020 [facility]    IR PORT PLACEMENT EQUAL OR GREATER THAN 5 YEARS  9/29/2020    IR PORT PLACEMENT EQUAL OR GREATER THAN 5 YEARS 9/29/2020 MD JAY J[facility]    LUMBAR LAMINECTOMY      TONSILLECTOMY          Medications Prior to Admission:       Prior to Admission medications    Medication Sig Start Date End Date Taking?  Authorizing Provider   metoprolol tartrate (LOPRESSOR) 50 MG tablet Take 1 tablet by mouth 2 times daily 3/25/22   [employer] MD [place]iel   albuterol sulfate  (90 Base) MCG/ACT inhaler Inhale 2 puffs into the lungs every 4 hours as needed for Wheezing 3/25/22   Yobany Thomas MD   neomycin-bacitracin-polymyxin (NEOSPORIN) 400-5-5000 ointment Apply topically daily for 5 days Apply topically 2 times daily. 3/25/22 3/30/22  Yobany Thomas MD   tamsulosin (FLOMAX) 0.4 MG capsule Take 1 capsule by mouth daily 3/26/22   Yobany Thomas MD   ARIPiprazole (ABILIFY) 5 MG tablet Take 5 mg by mouth at bedtime    Historical Provider, MD   metFORMIN (GLUCOPHAGE) 500 MG tablet Take 500 mg by mouth 2 times daily (with meals)    Historical Provider, MD   Pseudoeph-Doxylamine-DM-APAP (NYQUIL MULTI-SYMPTOM PO) Take by mouth at bedtime    Historical Provider, MD   traZODone (DESYREL) 150 MG tablet Take 300 mg by mouth nightly    Historical Provider, MD   gabapentin (NEURONTIN) 400 MG capsule Take 800 mg by mouth in the morning and at bedtime. Historical Provider, MD   melatonin 5 MG TABS tablet Take 15 mg by mouth nightly    Historical Provider, MD   ondansetron (ZOFRAN) 4 MG tablet Take 1 tablet by mouth every 8 hours as needed for Nausea or Vomiting 1/26/22   Gege Sweeney MD   rosuvastatin (CRESTOR) 5 MG tablet Take 5 mg by mouth daily    Historical Provider, MD   oxyCODONE-acetaminophen (PERCOCET)  MG per tablet Take 1 tablet by mouth every 6 hours as needed for Pain. Historical Provider, MD   clonazePAM (KLONOPIN) 1 MG tablet Take 1 tablet by mouth 3 times daily as needed for Anxiety for up to 3 doses.  11/5/20 1/26/22  Stephania Kilgore MD   furosemide (LASIX) 40 MG tablet Take 40 mg by mouth daily as needed (swelling)     Historical Provider, MD   glimepiride (AMARYL) 1 MG tablet Take 1 mg by mouth 2 times daily (with meals)     Historical Provider, MD   venlafaxine (EFFEXOR XR) 150 MG extended release capsule Take 150 mg by mouth 2 times daily    Historical Provider, MD   lansoprazole (PREVACID) 30 MG delayed release capsule Labs:    Hematology:  Recent Labs     03/29/22 1927 03/30/22  0549   WBC 9.7  --    RBC 4.03  --    HGB 11.0*  --    HCT 35.1*  --    MCV 87.1  --    MCH 27.3  --    MCHC 31.3  --    RDW 14.0  --      --    MPV 8.9  --    INR  --  1.0     Chemistry:  Recent Labs     03/29/22 1927 03/29/22 1927 03/29/22 1942 03/29/22 2122 03/30/22  0549 03/31/22  0552 03/31/22  1046 03/31/22  1618 04/01/22  0546      < >  --   --  140 141  --   --  143   K 4.0   < >  --   --  4.0 3.9  --   --  4.4   CL 95*   < >  --   --  104 106  --   --  108*   CO2 31   < >  --   --  27 25  --   --  26   GLUCOSE 171*   < >   < >  --  170* 141*  --   --  160*   BUN 11   < >  --   --  10 6*  --   --  8   CREATININE 0.61   < >  --   --  0.55 0.64  --   --  0.62   ANIONGAP 10   < >  --   --  9 10  --   --  9   LABGLOM >60   < >  --   --  >60 >60  --   --  >60   GFRAA >60   < >  --   --  >60 >60  --   --  >60   CALCIUM 9.5   < >  --   --  8.3* 7.8*  --   --  8.3*   TROPHS 49*  --    < > 51*  --   --  40* 35*  --    CKTOTAL 64  --   --   --   --   --  40 43  --    MYOGLOBIN 46  --    < > 37  --   --  29 25  --     < > = values in this interval not displayed.      Recent Labs     03/31/22  0628 03/31/22  1151 03/31/22  1557 03/31/22  1945 04/01/22  0625 04/01/22  1142   POCGLU 131* 78 183* 233* 160* 162*       Lab Results   Component Value Date    INR 1.0 03/30/2022    INR 1.1 03/19/2022    INR 1.0 01/06/2021    PROTIME 13.4 03/30/2022    PROTIME 13.8 03/19/2022    PROTIME 12.6 01/06/2021       Lab Results   Component Value Date/Time    SPECIAL LT AC 10ML 03/29/2022 07:28 PM     Lab Results   Component Value Date/Time    CULTURE NO GROWTH 2 DAYS 03/29/2022 07:28 PM       Lab Results   Component Value Date    POCPH 7.36 04/12/2017    PHART 7.42 08/26/2012    PH 7.22 04/11/2017    POCPCO2 45 04/12/2017    DFH0CAZ 41 08/26/2012    PCO2 54 04/11/2017    POCPO2 93 04/12/2017    PO2ART 59 08/26/2012    PO2 89 04/11/2017    POCHCO3 25.3 04/12/2017    VUN2MQH 26.1 08/26/2012    HCO3 22.2 04/11/2017    NBEA NOT REPORTED 04/12/2017    PBEA 0 04/12/2017    OJB6PLA 27 04/12/2017    PHSU3CUE 97 04/12/2017    C9ELFNFY 92.1 08/26/2012    O2SAT 95 04/11/2017    FIO2 UNKNOWN 10/31/2017       Radiology:    XR CHEST PORTABLE    Result Date: 3/29/2022  Right apical consolidation is similar prior examination. Chest CT could provide further information. All radiological studies reviewed                Code Status:  Full Code    Electronically signed by Antoine Wilkerson MD on 4/1/2022 at 12:08 PM     Copy sent to Dr. Keo Shook MD    This note was created with the assistance of a speech-recognition program.  Although the intention is to generate a document that actually reflects the content of the visit, no guarantees can be provided that every mistake has been identified and corrected by editing. Note was updated later by me after  physical examination and  completion of the assessment.

## 2022-04-01 NOTE — RT PROTOCOL NOTE
RT Inhaler-Nebulizer Bronchodilator Protocol Note    There is a bronchodilator order in the chart from a provider indicating to follow the RT Bronchodilator Protocol and there is an Initiate RT Inhaler-Nebulizer Bronchodilator Protocol order as well (see protocol at bottom of note). CXR Findings:  No results found. The findings from the last RT Protocol Assessment were as follows:   History Pulmonary Disease: Chronic pulmonary disease  Respiratory Pattern: Dyspnea on exertion or RR 21-25 bpm  Breath Sounds: Slightly diminished and/or crackles  Cough: Strong, productive  Indication for Bronchodilator Therapy: Decreased or absent breath sounds  Bronchodilator Assessment Score: 7    Aerosolized bronchodilator medication orders have been revised according to the RT Inhaler-Nebulizer Bronchodilator Protocol below. Respiratory Therapist to perform RT Therapy Protocol Assessment initially then follow the protocol. Repeat RT Therapy Protocol Assessment PRN for score 0-3 or on second treatment, BID, and PRN for scores above 3. No Indications  adjust the frequency to every 6 hours PRN wheezing or bronchospasm, if no treatments needed after 48 hours then discontinue using Per Protocol order mode. If indication present, adjust the RT bronchodilator orders based on the Bronchodilator Assessment Score as indicated below. Use Inhaler orders unless patient has one or more of the following: on home nebulizer, not able to hold breath for 10 seconds, is not alert and oriented, cannot activate and use MDI correctly, or respiratory rate 25 breaths per minute or more, then use the equivalent nebulizer order(s) with same Frequency and PRN reasons based on the score. If a patient is on this medication at home then do not decrease Frequency below that used at home.     0-3  enter or revise RT bronchodilator order(s) to equivalent RT Bronchodilator order with Frequency of every 4 hours PRN for wheezing or increased work of breathing using Per Protocol order mode. 4-6  enter or revise RT Bronchodilator order(s) to two equivalent RT bronchodilator orders with one order with BID Frequency and one order with Frequency of every 4 hours PRN wheezing or increased work of breathing using Per Protocol order mode. 7-10  enter or revise RT Bronchodilator order(s) to two equivalent RT bronchodilator orders with one order with TID Frequency and one order with Frequency of every 4 hours PRN wheezing or increased work of breathing using Per Protocol order mode. 11-13  enter or revise RT Bronchodilator order(s) to one equivalent RT bronchodilator order with QID Frequency and an Albuterol order with Frequency of every 4 hours PRN wheezing or increased work of breathing using Per Protocol order mode. Greater than 13  enter or revise RT Bronchodilator order(s) to one equivalent RT bronchodilator order with every 4 hours Frequency and an Albuterol order with Frequency of every 2 hours PRN wheezing or increased work of breathing using Per Protocol order mode. RT to enter RT Home Evaluation for COPD & MDI Assessment order using Per Protocol order mode.     Electronically signed by Slick Schultz RCP on 3/31/2022 at 8:29 PM

## 2022-04-01 NOTE — PLAN OF CARE
Problem: Skin Integrity:  Goal: Will show no infection signs and symptoms  Description: Will show no infection signs and symptoms  Outcome: Met This Shift     Problem: Falls - Risk of:  Goal: Will remain free from falls  Description: Will remain free from falls  Outcome: Met This Shift     Problem: Pain:  Goal: Pain level will decrease  Description: Pain level will decrease  Outcome: Ongoing     Problem: Metabolic:  Goal: Ability to maintain appropriate glucose levels will improve  Description: Ability to maintain appropriate glucose levels will improve  Outcome: Ongoing

## 2022-04-01 NOTE — RT PROTOCOL NOTE
RT Inhaler-Nebulizer Bronchodilator Protocol Note    There is a bronchodilator order in the chart from a provider indicating to follow the RT Bronchodilator Protocol and there is an Initiate RT Inhaler-Nebulizer Bronchodilator Protocol order as well (see protocol at bottom of note). CXR Findings:  No results found. The findings from the last RT Protocol Assessment were as follows:   History Pulmonary Disease: Chronic pulmonary disease  Respiratory Pattern: Dyspnea on exertion or RR 21-25 bpm  Breath Sounds: Slightly diminished and/or crackles  Cough: Strong, productive  Indication for Bronchodilator Therapy: Decreased or absent breath sounds  Bronchodilator Assessment Score: 7    Aerosolized bronchodilator medication orders have been revised according to the RT Inhaler-Nebulizer Bronchodilator Protocol below. Respiratory Therapist to perform RT Therapy Protocol Assessment initially then follow the protocol. Repeat RT Therapy Protocol Assessment PRN for score 0-3 or on second treatment, BID, and PRN for scores above 3. No Indications - adjust the frequency to every 6 hours PRN wheezing or bronchospasm, if no treatments needed after 48 hours then discontinue using Per Protocol order mode. If indication present, adjust the RT bronchodilator orders based on the Bronchodilator Assessment Score as indicated below. Use Inhaler orders unless patient has one or more of the following: on home nebulizer, not able to hold breath for 10 seconds, is not alert and oriented, cannot activate and use MDI correctly, or respiratory rate 25 breaths per minute or more, then use the equivalent nebulizer order(s) with same Frequency and PRN reasons based on the score. If a patient is on this medication at home then do not decrease Frequency below that used at home.     0-3 - enter or revise RT bronchodilator order(s) to equivalent RT Bronchodilator order with Frequency of every 4 hours PRN for wheezing or increased work of breathing using Per Protocol order mode. 4-6 - enter or revise RT Bronchodilator order(s) to two equivalent RT bronchodilator orders with one order with BID Frequency and one order with Frequency of every 4 hours PRN wheezing or increased work of breathing using Per Protocol order mode. 7-10 - enter or revise RT Bronchodilator order(s) to two equivalent RT bronchodilator orders with one order with TID Frequency and one order with Frequency of every 4 hours PRN wheezing or increased work of breathing using Per Protocol order mode. 11-13 - enter or revise RT Bronchodilator order(s) to one equivalent RT bronchodilator order with QID Frequency and an Albuterol order with Frequency of every 4 hours PRN wheezing or increased work of breathing using Per Protocol order mode. Greater than 13 - enter or revise RT Bronchodilator order(s) to one equivalent RT bronchodilator order with every 4 hours Frequency and an Albuterol order with Frequency of every 2 hours PRN wheezing or increased work of breathing using Per Protocol order mode. RT to enter RT Home Evaluation for COPD & MDI Assessment order using Per Protocol order mode.     Electronically signed by Sandor Cardoso RCP on 4/1/2022 at 10:21 AM

## 2022-04-01 NOTE — PROGRESS NOTES
Pulmonary Critical Care Progress Note  Sascha Flower MD     Patient seen for the follow up of chronic hypoxic respiratory failure, acute exacerbation of COPD, squamous cell carcinoma right upper lobe, hypotension    Subjective:  She is in bed, she has increased work of breathing and is telling me she feels short of breath. She has decreased air exchange, no appreciable crackles or wheezing at this time. She has loose mostly nonproductive cough. No chest pain. Her blood pressure is improved since yesterday. Examination:  Vitals: /69   Pulse 81   Temp 98.9 °F (37.2 °C) (Oral)   Resp 18   Ht 5' 8\" (1.727 m)   Wt 170 lb 0.4 oz (77.1 kg)   SpO2 95%   BMI 25.85 kg/m²   General appearance: alert and cooperative with exam, in bed, increased work of breathing  Neck: No JVD  Lungs: Decreased air exchange, no crackles or wheezing  Heart: regular rate and rhythm, S1, S2 normal, no gallop  Abdomen: Soft, non tender, + BS  Extremities: no cyanosis or clubbing.  No significant edema    LABs:  CBC:   Recent Labs     03/29/22  1927   WBC 9.7   HGB 11.0*   HCT 35.1*        BMP:   Recent Labs     03/31/22  0552 04/01/22  0546    143   K 3.9 4.4   CO2 25 26   BUN 6* 8   CREATININE 0.64 0.62   LABGLOM >60 >60   GLUCOSE 141* 160*     PT/INR:   Recent Labs     03/30/22  0549   PROTIME 13.4   INR 1.0     ABG:  Lab Results   Component Value Date    PHART 7.42 08/26/2012    PH 7.22 04/11/2017    WQP6AAE 41 08/26/2012    PCO2 54 04/11/2017    PO2ART 59 08/26/2012    PO2 89 04/11/2017    WJP4LCK 26.1 08/26/2012    HCO3 22.2 04/11/2017    FQN0GGU 27 04/12/2017    A5FRGVFX 92.1 08/26/2012    O2SAT 95 04/11/2017    FIO2 UNKNOWN 10/31/2017       Lab Results   Component Value Date    POCPH 7.36 04/12/2017    PHART 7.42 08/26/2012    PH 7.22 04/11/2017    POCPCO2 45 04/12/2017    BBQ4TAK 41 08/26/2012    PCO2 54 04/11/2017    POCPO2 93 04/12/2017    PO2ART 59 08/26/2012    PO2 89 04/11/2017    POCHCO3 25.3 04/12/2017 PQC8HMM 26.1 08/26/2012    HCO3 22.2 04/11/2017    NBEA NOT REPORTED 04/12/2017    PBEA 0 04/12/2017    KEJ5TBO 27 04/12/2017    SFJB0MVH 97 04/12/2017    H1FDEPBM 92.1 08/26/2012    O2SAT 95 04/11/2017    FIO2 UNKNOWN 10/31/2017     Radiology:  4/1/22      Impression:  · Chronic hypoxic respiratory failure  · Acute exacerbation of COPD  · Former smoker, quit 2016  · Right upper lobe squamous cell carcinoma, following with Dr. Christine Levine  · Recent history of falls with left facial abrasion  · Anxiety, aortic stenosis, depression, diabetes, GERD  · Hypotension, ? sepsis versus dehydration    Recommendations:  · Oxygen via nasal cannula, keep SPO2 90% or greater  · Incentive spirometry every hour while awake  · IV Lasix 20 mg x 1 dose  · BiPAP now for a few hours and again overnight  · Acapella  · Mucinex  · Symbicort 160  · IV Solu-Medrol 60 mg every 6 hours  · Albuterol HFA 3 times daily and as needed  · Continue Rocephin and oral Zithromax  · X-ray chest in am  · Labs: CBC and BMP in am  · DVT prophylaxis with low molecular weight heparin  · Discussed with RN  · Will follow with you  Electronically signed by     Jorge De Luna MD on 4/1/2022 at 1:30 PM  Pulmonary Critical Care and Sleep Medicine,  [facility]  Cell: [phone]  Office: [phone]

## 2022-04-01 NOTE — PROGRESS NOTES
Occupational Therapy  Facility/Department: [facility]  Daily Treatment Note  NAME: Mary Phelan  : 1950  MRN: [id number]    RN reports patient is medically stable for therapy treatment this date. Chart reviewed prior to treatment and patient is agreeable for therapy. All lines intact and patient positioned comfortably at end of treatment. All patient needs addressed prior to ending therapy session. Date of Service: 2022    Discharge Recommendations:  Patient would benefit from continued therapy after discharge    Pt currently functioning below baseline. Would suggest additional therapy at time of discharge to maximize long term outcomes and prevent re-admission. Please refer to AM-PAC score for current level of function. Assessment   Performance deficits / Impairments: Decreased functional mobility ; Decreased ADL status; Decreased strength;Decreased safe awareness;Decreased endurance;Decreased balance;Decreased posture;Decreased cognition  Assessment: Pt progressing towards goals. Pt would benefit from continued skilled OT services to increase I and safety during functional tasks to return [facility] at [facility] when able. Prognosis: Good  OT Education: OT Role;Plan of Care;Transfer Training;Energy Conservation  Patient Education: fall prevention/call light use, benefits of being oob, safety in function, pursed lip breathing, recommendations for continued therapy, EC/WS during ADL's handout given. Pt verbalized understanding but may need further education  REQUIRES OT FOLLOW UP: Yes  Activity Tolerance  Activity Tolerance: Patient Tolerated treatment well  Activity Tolerance: fair  Safety Devices  Safety Devices in place: Yes  Type of devices: Call light within reach;Nurse notified;Gait belt;Patient at risk for falls; Left in chair;Chair alarm in place; All fall risk precautions in place         Patient Diagnosis(es): The encounter diagnosis was Generalized weakness.       has a past medical history of AAA (abdominal aortic aneurysm) (HCC), Acid reflux, Anxiety and depression, Aortic stenosis, Arthritis, Blister of ankle, right, CAD (coronary artery disease), Cancer ([facility] [address] COPD (chronic obstructive pulmonary disease) ([facility] [address] Diabetes mellitus ([facility] [address] Falls, Heart block, Hypokalemia, MDRO (multiple drug resistant organisms) resistance, MRSA (methicillin resistant staph aureus) culture positive, On home oxygen therapy, On home oxygen therapy, Overactive bladder, Pneumonia, PONV (postoperative nausea and vomiting), and Vitamin D deficiency. has a past surgical history that includes back surgery; eye surgery; Cholecystectomy; Appendectomy; Hysterectomy; Tonsillectomy; lumbar laminectomy; Endoscopy, colon, diagnostic (12/08/2016); aortic valve repair (N/A, 4/11/2017); bronchoscopy (N/A, 8/31/2020); IR INSERT PICC VAD W SQ PORT >5 YEARS (9/4/2020); and IR PORT PLACEMENT > 5 YEARS (9/29/2020). Restrictions  Restrictions/Precautions  Restrictions/Precautions: General Precautions,Fall Risk  Position Activity Restriction  Other position/activity restrictions: Up with assist, alarms, LUE IV, Gregory cath, 3L O2 per nc, telemetry       Subjective   General  Chart Reviewed: Yes  Patient assessed for rehabilitation services?: Yes  Response to previous treatment: Patient with no complaints from previous session  Family / Caregiver Present: No  Subjective  Subjective: Pt resting in bed, agreeable to OT eval.  Vital Signs  Patient Currently in Pain: Yes       Orientation  Orientation  Overall Orientation Status: Within Functional Limits       Objective    ADL  UE Dressing: Minimal assistance (to tie/adjust hosp gown seated on EOB)  Additional Comments: Pt declining all other ADLs at this time.         Balance  Sitting Balance: Stand by assistance  Standing Balance: Contact guard assistance  Standing Balance  Time: standing ~ 2 min  Activity: funcitonal mobility  Functional Mobility  Functional - Mobility Device: Rolling Walker  Activity: Other (bed forward ~ 8ft and back x2, to bedside chair)  Assist Level: Minimal assistance (x2)  Functional Mobility Comments: Pt required Min verbal cues for RW safety, pacing self, upright posture, scanning environment, awareness/assist wtih lines all to increase safety and reduce risk of falls. Bed mobility  Supine to Sit: Modified independent  Sit to Supine: Unable to assess (Pt agreeable to sitting up in chair at end of session)  Scooting: Modified independent  Comment: Pt completed bed mobility without difficulties, good use of bedrails, Min vues for pacing self and line mgmt all to increase safety. Transfers  Sit to stand: Minimal assistance  Stand to sit: Minimal assistance  Transfer Comments: Pt given Mod verbal cues for paicng self, controlled stand to sit, squaring self/AD up to surface and reaching back prior to sitting, awareness/assist with lines all to increase safety and reduce risk of falls. Cognition  Overall Cognitive Status: Exceptions  Arousal/Alertness: Appropriate responses to stimuli  Following Commands: Follows one step commands consistently; Follows multistep commands with increased time  Attention Span: Appears intact  Memory: Decreased recall of recent events  Safety Judgement: Decreased awareness of need for assistance;Decreased awareness of need for safety  Problem Solving: Assistance required to generate solutions;Assistance required to identify errors made;Decreased awareness of errors;Assistance required to implement solutions;Assistance required to correct errors made  Insights: Decreased awareness of deficits  Initiation: Requires cues for some  Sequencing: Does not require cues                       Plan   Plan  Times per week: 4-5x/week, 1-2x/day  Current Treatment Recommendations: Strengthening,Balance Training,Functional Mobility Training,Endurance Training,Safety Education & Training,Self-Care / ADL,Patient/Caregiver Education & Training,Equipment Evaluation, Education, & procurement,Positioning,Cognitive/Perceptual Training,Pain Management,Neuromuscular Re-education                                   AM-PAC Score: 15             Goals  Short term goals  Time Frame for Short term goals: by discharge, pt will  Short term goal 1: demo CGA with ADL transfers with approp AD/DME  Short term goal 2: demo CGA with functional mob in room distances with min cues for safety and approp AD  Short term goal 3: demo CGA with toileting routine with approp DME and min cues for safety  Short term goal 4: demo SBA with UB ADLs and min A LB ADLs with AE/DME and min cues for safety  Short term goal 5: demo and verb good understanding of fall prevention techs, EC/WS techs, equip needs, B UE HEP, positioning techs/skin integrity and pressure relieving techs, and d/c recommendations  Patient Goals   Patient goals : agreeable to rehab       Therapy Time   Individual Concurrent Group Co-treatment   Time In 0840         Time Out 0903         Minutes [facility], OT

## 2022-04-01 NOTE — PLAN OF CARE
Problem: Skin Integrity:  Goal: Will show no infection signs and symptoms  Description: Will show no infection signs and symptoms  Outcome: Ongoing     Problem: Falls - Risk of:  Goal: Will remain free from falls  Description: Will remain free from falls  Outcome: Ongoing     Problem: Pain:  Goal: Pain level will decrease  Description: Pain level will decrease  3/31/2022 2354 by Sita Daniel RN  Outcome: Ongoing     Problem: Tissue Perfusion - Cardiopulmonary, Altered:  Goal: Absence of angina  Description: Absence of angina  Outcome: Ongoing     Problem: Metabolic:  Goal: Ability to maintain appropriate glucose levels will improve  Description: Ability to maintain appropriate glucose levels will improve  3/31/2022 2354 by Sita Daniel RN  Outcome: Ongoing     Problem: Breathing Pattern - Ineffective:  Goal: Ability to achieve and maintain a regular respiratory rate will improve  Description: Ability to achieve and maintain a regular respiratory rate will improve  3/31/2022 2354 by Sita Daniel RN  Outcome: Ongoing

## 2022-04-01 NOTE — PROGRESS NOTES
Physical Therapy  Facility/Department: [facility]  Daily Treatment Note  NAME: João Jennings  : 1950  MRN: [id number]    Date of Service: 2022    Discharge Recommendations:  Patient would benefit from continued therapy after discharge     Pt currently functioning below baseline. Would suggest additional therapy at time of discharge to maximize long term outcomes and prevent re-admission. Please refer to AM-PAC score for current level of function. Assessment   Body structures, Functions, Activity limitations: Decreased functional mobility ; Decreased safe awareness;Decreased balance;Decreased ROM; Decreased strength;Decreased endurance; Increased pain;Decreased cognition  Assessment: Patient much more agreeable to PT compared to last admission and willing to participate. Patient also demo improved BM, transfers, and gait compared to last admission and eval completed on this admission. Patient contiues to need assist for transfers and gait. Prognosis: Good  Decision Making: High Complexity  PT Education: General Safety  Patient Education: Education on general safety with gait amd transfers. REQUIRES PT FOLLOW UP: Yes  Activity Tolerance  Activity Tolerance: Patient limited by endurance  Activity Tolerance: Patient mildly SOB with ambulation     Patient Diagnosis(es): The encounter diagnosis was Generalized weakness. has a past medical history of AAA (abdominal aortic aneurysm) (HCC), Acid reflux, Anxiety and depression, Aortic stenosis, Arthritis, Blister of ankle, right, CAD (coronary artery disease), Cancer ([facility] [address] COPD (chronic obstructive pulmonary disease) ([facility] [address] Diabetes mellitus ([facility] [address] Falls, Heart block, Hypokalemia, MDRO (multiple drug resistant organisms) resistance, MRSA (methicillin resistant staph aureus) culture positive, On home oxygen therapy, On home oxygen therapy, Overactive bladder, Pneumonia, PONV (postoperative nausea and vomiting), and Vitamin D deficiency.    has a past surgical history that includes back surgery; eye surgery; Cholecystectomy; Appendectomy; Hysterectomy; Tonsillectomy; lumbar laminectomy; Endoscopy, colon, diagnostic (12/08/2016); aortic valve repair (N/A, 4/11/2017); bronchoscopy (N/A, 8/31/2020); IR INSERT PICC VAD W SQ PORT >5 YEARS (9/4/2020); and IR PORT PLACEMENT > 5 YEARS (9/29/2020). Restrictions  Restrictions/Precautions  Restrictions/Precautions: General Precautions,Fall Risk  Position Activity Restriction  Other position/activity restrictions: Up with assist, alarms, LUE IV, Gregory cath, 3L O2 per nc, telemetry  Subjective   General  Chart Reviewed: Yes  Response To Previous Treatment: Patient with no complaints from previous session. Family / Caregiver Present: No  Subjective  Subjective: Patient agreeable to PT. General Comment  Comments: Kenna Mercado RN reports patient medically appropriate for PT. Orientation  Orientation  Overall Orientation Status: Within Functional Limits  Cognition   Cognition  Overall Cognitive Status: Exceptions  Arousal/Alertness: Appropriate responses to stimuli  Following Commands: Follows one step commands consistently; Follows multistep commands with increased time  Attention Span: Appears intact  Safety Judgement: Decreased awareness of need for assistance;Decreased awareness of need for safety  Problem Solving: Assistance required to generate solutions;Assistance required to identify errors made;Decreased awareness of errors;Assistance required to implement solutions;Assistance required to correct errors made  Insights: Decreased awareness of deficits  Initiation: Requires cues for some  Sequencing: Does not require cues  Objective   Bed mobility  Rolling to Left: Modified independent  Rolling to Right: Modified independent  Supine to Sit: Stand by assistance  Scooting: Stand by assistance  Comment: Patient demo good use of bed rails. Patient did not need any cues for safety.   Transfers  Sit to Stand: Minimal Assistance  Stand to sit: Minimal Assistance  Bed to Chair: Minimal assistance  Stand Pivot Transfers: Minimal Assistance  Comment: Patient demo improved tranfers on this date. Patient continues to pull up on walker to stand, but did reach back with one hand to sit. Ambulation  Ambulation?: Yes  Ambulation 1  Surface: level tile  Device: Rolling Walker  Assistance: Minimal assistance;2 Person assistance  Quality of Gait: Patient able to improve ambulation safety and distance today. Initially min x 1, but with fatigue required min x 2. Patient had one small LOB when fatigued at end of gait with turning that PT assisted to correct. Gait Deviations: Decreased step length;Decreased step height  Distance: 50 feet     Balance  Posture: Fair  Sitting - Static: Good  Sitting - Dynamic: Good  Standing - Static: Fair (RW)  Standing - Dynamic: Fair (RW)      OutComes Score  Balance Score: 3 (04/01/22 1101)  Gait Score: 5 (04/01/22 1101)        Tinetti Total Score: 8 (04/01/22 1101)                                      AM-PAC Score  AM-PAC Inpatient Mobility Raw Score : 15 (04/01/22 1100)  AM-PAC Inpatient T-Scale Score : 39.45 (04/01/22 1100)  Mobility Inpatient CMS 0-100% Score: 57.7 (04/01/22 1100)  Mobility Inpatient CMS G-Code Modifier : CK (04/01/22 1100)          Goals  Short term goals  Time Frame for Short term goals: 12 visits:  Short term goal 1: Pt. to be SBA for bed mob. Short term goal 2: Pt. to require CGA for sit to stand tranfers from all surface heights with safe hand placement. Short term goal 3: Pt. to require CGA to amb. 25ft. with RW and approp. O2. Short term goal 4: Pt. to tolerate 25+ min.  of PT daily for ther ex/ gait/ balance training  Patient Goals   Patient goals : d/c to SNF    Plan    Plan  Times per week: 1-2x/day; 5-6days/wk  Specific instructions for Next Treatment: progress OOB mobility  Current Treatment Recommendations: Judy uRdd Mobility Training,Gait Training,Safety Education & [employer] Exercise Program,Patient/Caregiver Education & Training,Endurance Training  Safety Devices  Type of devices:  All fall risk precautions in place,Gait belt,Patient at risk for falls,Call light within reach,Nurse notified,Left in chair,Chair alarm in place     Therapy Time   Individual Concurrent Group Co-treatment   Time In 0856         Time Out 0912         Minutes Alvin 27, PT

## 2022-04-01 NOTE — CARE COORDINATION
Social work: got auth for [employer]. Auth # [id number]  Phone for rep of insurance was provided if snf had questions: [phone] They gave auth for 6 days thru weds. [facility] fax for weekend is fax [phone] and phone for report at [facility] [phone]. Will need elizabeth, Rx and discharge order for snf.   Juana Felty lsw

## 2022-04-01 NOTE — PROGRESS NOTES
[facility] Continence Nursing  Follow Up      NAME:  Colleen Le  MEDICAL RECORD NUMBER:  [id number]  AGE: 71 y.o. GENDER: female  : 1950  TODAY'S DATE:  2022      Left temporal lobe dressing removed. The crusted tissue has softened up and is loose enough now to be removed with gently soap and water cleansing. There is now very little slough remaining. Will order a silver alginate dressing with foam.  [employer] boots provided for the patient today. Will follow case from a distance, please call with [phone] nursing needs or concerns. Measurements:  Wound 22 Face Left (Active)   Wound Image   22 1043   Wound Etiology Traumatic 22 1043   Dressing Status New dressing applied; Old drainage noted 22 1043   Wound Cleansed Cleansed with saline 22 1043   Dressing/Treatment Barrier film;Alginate with Ag; Foam 22 1043   Dressing Change Due 22 1043   Wound Length (cm) 3.5 cm 22 1205   Wound Width (cm) 3 cm 22 1205   Wound Depth (cm) 0.1 cm 22 1205   Wound Surface Area (cm^2) 10.5 cm^2 22 1205   Wound Volume (cm^3) 1.05 cm^3 22 1205   Wound Assessment Granulation tissue;Pink/red;Slough 22 1043   Drainage Amount Moderate 22 1043   Drainage Description Serosanguinous 22 1043   Odor None 22 1043   Mary-wound Assessment Intact 22 1043   Margins Attached edges; Defined edges 22 1043   Wound Thickness Description not for Pressure Injury Full thickness 22 1043   Number of days: 13         Armida Bertrand AGNP-C APRN, CWS, C-WOCN  [id number]  Wound, Ostomy, and Continence Nursing  [phone]

## 2022-04-02 ENCOUNTER — APPOINTMENT (OUTPATIENT)
Dept: GENERAL RADIOLOGY | Age: 72
DRG: 948 | End: 2022-04-02
Payer: MEDICARE

## 2022-04-02 LAB
ANION GAP SERPL CALCULATED.3IONS-SCNC: 10 MMOL/L (ref 9–17)
BUN BLDV-MCNC: 13 MG/DL (ref 8–23)
BUN/CREAT BLD: 20 (ref 9–20)
CALCIUM SERPL-MCNC: 8.4 MG/DL (ref 8.6–10.4)
CHLORIDE BLD-SCNC: 106 MMOL/L (ref 98–107)
CO2: 28 MMOL/L (ref 20–31)
CREAT SERPL-MCNC: 0.64 MG/DL (ref 0.5–0.9)
GFR AFRICAN AMERICAN: >60 ML/MIN
GFR NON-AFRICAN AMERICAN: >60 ML/MIN
GFR SERPL CREATININE-BSD FRML MDRD: ABNORMAL ML/MIN/{1.73_M2}
GLUCOSE BLD-MCNC: 101 MG/DL (ref 65–105)
GLUCOSE BLD-MCNC: 108 MG/DL (ref 65–105)
GLUCOSE BLD-MCNC: 125 MG/DL (ref 70–99)
GLUCOSE BLD-MCNC: 142 MG/DL (ref 65–105)
GLUCOSE BLD-MCNC: 192 MG/DL (ref 65–105)
MYOGLOBIN: 27 NG/ML (ref 25–58)
POTASSIUM SERPL-SCNC: 4.1 MMOL/L (ref 3.7–5.3)
SODIUM BLD-SCNC: 144 MMOL/L (ref 135–144)
TROPONIN, HIGH SENSITIVITY: 29 NG/L (ref 0–14)

## 2022-04-02 PROCEDURE — 94660 CPAP INITIATION&MGMT: CPT

## 2022-04-02 PROCEDURE — 84484 ASSAY OF TROPONIN QUANT: CPT

## 2022-04-02 PROCEDURE — 6370000000 HC RX 637 (ALT 250 FOR IP): Performed by: FAMILY MEDICINE

## 2022-04-02 PROCEDURE — 97116 GAIT TRAINING THERAPY: CPT

## 2022-04-02 PROCEDURE — 97530 THERAPEUTIC ACTIVITIES: CPT

## 2022-04-02 PROCEDURE — 71045 X-RAY EXAM CHEST 1 VIEW: CPT

## 2022-04-02 PROCEDURE — 6360000002 HC RX W HCPCS: Performed by: INTERNAL MEDICINE

## 2022-04-02 PROCEDURE — 97110 THERAPEUTIC EXERCISES: CPT

## 2022-04-02 PROCEDURE — 83874 ASSAY OF MYOGLOBIN: CPT

## 2022-04-02 PROCEDURE — 80048 BASIC METABOLIC PNL TOTAL CA: CPT

## 2022-04-02 PROCEDURE — 93005 ELECTROCARDIOGRAM TRACING: CPT | Performed by: FAMILY MEDICINE

## 2022-04-02 PROCEDURE — 94640 AIRWAY INHALATION TREATMENT: CPT

## 2022-04-02 PROCEDURE — 2580000003 HC RX 258: Performed by: FAMILY MEDICINE

## 2022-04-02 PROCEDURE — 2700000000 HC OXYGEN THERAPY PER DAY

## 2022-04-02 PROCEDURE — 36415 COLL VENOUS BLD VENIPUNCTURE: CPT

## 2022-04-02 PROCEDURE — 82947 ASSAY GLUCOSE BLOOD QUANT: CPT

## 2022-04-02 PROCEDURE — 1200000000 HC SEMI PRIVATE

## 2022-04-02 PROCEDURE — 6360000002 HC RX W HCPCS: Performed by: FAMILY MEDICINE

## 2022-04-02 PROCEDURE — 94761 N-INVAS EAR/PLS OXIMETRY MLT: CPT

## 2022-04-02 RX ORDER — HYDRALAZINE HYDROCHLORIDE 20 MG/ML
10 INJECTION INTRAMUSCULAR; INTRAVENOUS EVERY 6 HOURS PRN
Status: DISCONTINUED | OUTPATIENT
Start: 2022-04-02 | End: 2022-04-06 | Stop reason: HOSPADM

## 2022-04-02 RX ORDER — METHYLPREDNISOLONE SODIUM SUCCINATE 40 MG/ML
30 INJECTION, POWDER, LYOPHILIZED, FOR SOLUTION INTRAMUSCULAR; INTRAVENOUS EVERY 8 HOURS
Status: DISCONTINUED | OUTPATIENT
Start: 2022-04-02 | End: 2022-04-03

## 2022-04-02 RX ORDER — FUROSEMIDE 40 MG/1
40 TABLET ORAL DAILY
Status: DISCONTINUED | OUTPATIENT
Start: 2022-04-03 | End: 2022-04-06 | Stop reason: HOSPADM

## 2022-04-02 RX ORDER — FUROSEMIDE 40 MG/1
40 TABLET ORAL ONCE
Status: COMPLETED | OUTPATIENT
Start: 2022-04-02 | End: 2022-04-02

## 2022-04-02 RX ADMIN — TRAZODONE HYDROCHLORIDE 300 MG: 100 TABLET ORAL at 22:40

## 2022-04-02 RX ADMIN — GLIPIZIDE 2.5 MG: 5 TABLET ORAL at 06:42

## 2022-04-02 RX ADMIN — ROSUVASTATIN CALCIUM 5 MG: 10 TABLET, FILM COATED ORAL at 07:58

## 2022-04-02 RX ADMIN — PANTOPRAZOLE SODIUM 20 MG: 20 TABLET, DELAYED RELEASE ORAL at 06:42

## 2022-04-02 RX ADMIN — OXYCODONE AND ACETAMINOPHEN 1 TABLET: 5; 325 TABLET ORAL at 16:35

## 2022-04-02 RX ADMIN — SODIUM CHLORIDE, PRESERVATIVE FREE 10 ML: 5 INJECTION INTRAVENOUS at 22:40

## 2022-04-02 RX ADMIN — CLONAZEPAM 1 MG: 0.5 TABLET ORAL at 18:39

## 2022-04-02 RX ADMIN — METHYLPREDNISOLONE SODIUM SUCCINATE 60 MG: 125 INJECTION, POWDER, FOR SOLUTION INTRAMUSCULAR; INTRAVENOUS at 06:42

## 2022-04-02 RX ADMIN — INSULIN LISPRO 2 UNITS: 100 INJECTION, SOLUTION INTRAVENOUS; SUBCUTANEOUS at 17:05

## 2022-04-02 RX ADMIN — METFORMIN HYDROCHLORIDE 500 MG: 500 TABLET ORAL at 08:00

## 2022-04-02 RX ADMIN — GABAPENTIN 800 MG: 400 CAPSULE ORAL at 22:40

## 2022-04-02 RX ADMIN — AZITHROMYCIN MONOHYDRATE 500 MG: 250 TABLET ORAL at 08:00

## 2022-04-02 RX ADMIN — OXYCODONE AND ACETAMINOPHEN 1 TABLET: 5; 325 TABLET ORAL at 06:45

## 2022-04-02 RX ADMIN — ASPIRIN 81 MG: 81 TABLET, COATED ORAL at 07:59

## 2022-04-02 RX ADMIN — METOPROLOL TARTRATE 50 MG: 50 TABLET, FILM COATED ORAL at 22:40

## 2022-04-02 RX ADMIN — BUDESONIDE AND FORMOTEROL FUMARATE DIHYDRATE 2 PUFF: 160; 4.5 AEROSOL RESPIRATORY (INHALATION) at 09:25

## 2022-04-02 RX ADMIN — SODIUM CHLORIDE, PRESERVATIVE FREE 10 ML: 5 INJECTION INTRAVENOUS at 08:02

## 2022-04-02 RX ADMIN — METHYLPREDNISOLONE SODIUM SUCCINATE 60 MG: 125 INJECTION, POWDER, FOR SOLUTION INTRAMUSCULAR; INTRAVENOUS at 12:29

## 2022-04-02 RX ADMIN — OXYCODONE AND ACETAMINOPHEN 1 TABLET: 5; 325 TABLET ORAL at 22:40

## 2022-04-02 RX ADMIN — CLONAZEPAM 1 MG: 0.5 TABLET ORAL at 08:00

## 2022-04-02 RX ADMIN — METHYLPREDNISOLONE SODIUM SUCCINATE 30 MG: 40 INJECTION, POWDER, FOR SOLUTION INTRAMUSCULAR; INTRAVENOUS at 22:39

## 2022-04-02 RX ADMIN — VENLAFAXINE HYDROCHLORIDE 150 MG: 75 CAPSULE, EXTENDED RELEASE ORAL at 07:59

## 2022-04-02 RX ADMIN — FUROSEMIDE 40 MG: 40 TABLET ORAL at 19:38

## 2022-04-02 RX ADMIN — METFORMIN HYDROCHLORIDE 500 MG: 500 TABLET ORAL at 17:06

## 2022-04-02 RX ADMIN — TAMSULOSIN HYDROCHLORIDE 0.4 MG: 0.4 CAPSULE ORAL at 07:59

## 2022-04-02 RX ADMIN — OXYCODONE AND ACETAMINOPHEN 1 TABLET: 5; 325 TABLET ORAL at 12:29

## 2022-04-02 RX ADMIN — GABAPENTIN 800 MG: 400 CAPSULE ORAL at 07:58

## 2022-04-02 RX ADMIN — HYDRALAZINE HYDROCHLORIDE 10 MG: 20 INJECTION INTRAMUSCULAR; INTRAVENOUS at 18:24

## 2022-04-02 RX ADMIN — SODIUM CHLORIDE: 9 INJECTION, SOLUTION INTRAVENOUS at 06:49

## 2022-04-02 RX ADMIN — METOPROLOL TARTRATE 50 MG: 50 TABLET, FILM COATED ORAL at 08:00

## 2022-04-02 ASSESSMENT — PAIN DESCRIPTION - ONSET
ONSET: ON-GOING

## 2022-04-02 ASSESSMENT — PAIN - FUNCTIONAL ASSESSMENT
PAIN_FUNCTIONAL_ASSESSMENT: ACTIVITIES ARE NOT PREVENTED
PAIN_FUNCTIONAL_ASSESSMENT: PREVENTS OR INTERFERES SOME ACTIVE ACTIVITIES AND ADLS

## 2022-04-02 ASSESSMENT — PAIN DESCRIPTION - ORIENTATION
ORIENTATION: RIGHT

## 2022-04-02 ASSESSMENT — PAIN DESCRIPTION - PAIN TYPE
TYPE: CHRONIC PAIN

## 2022-04-02 ASSESSMENT — PAIN DESCRIPTION - DESCRIPTORS
DESCRIPTORS: ACHING;DISCOMFORT
DESCRIPTORS: ACHING;DISCOMFORT
DESCRIPTORS: DISCOMFORT;ACHING
DESCRIPTORS: ACHING;DISCOMFORT

## 2022-04-02 ASSESSMENT — PAIN SCALES - GENERAL
PAINLEVEL_OUTOF10: 10
PAINLEVEL_OUTOF10: 9
PAINLEVEL_OUTOF10: 10
PAINLEVEL_OUTOF10: 7
PAINLEVEL_OUTOF10: 9
PAINLEVEL_OUTOF10: 8

## 2022-04-02 ASSESSMENT — PAIN DESCRIPTION - LOCATION
LOCATION: CHEST;BACK

## 2022-04-02 ASSESSMENT — PAIN DESCRIPTION - FREQUENCY
FREQUENCY: CONTINUOUS

## 2022-04-02 ASSESSMENT — PAIN DESCRIPTION - PROGRESSION
CLINICAL_PROGRESSION: GRADUALLY IMPROVING
CLINICAL_PROGRESSION: GRADUALLY WORSENING
CLINICAL_PROGRESSION: GRADUALLY IMPROVING
CLINICAL_PROGRESSION: GRADUALLY IMPROVING

## 2022-04-02 NOTE — PROGRESS NOTES
Comprehensive Nutrition Assessment    Type and Reason for Visit:  Reassess    Nutrition Recommendations/Plan: Continue current diet and supplements. Nutrition Assessment:  Patient reports she is eating good, has an appetite but she does not always get what she orders. Ate more than 75% at breakfast today, at lunch didn't get meat what she ordered ate only mashed potatoes and vegetable. Notified Ambassador about patient's meal tray issue. Will continue current diet and oral supplements. Malnutrition Assessment:  Malnutrition Status:  Mild malnutrition    Context:  Acute Illness     Findings of the 6 clinical characteristics of malnutrition:  Energy Intake:  7 - 50% or less of estimated energy requirements for 5 or more days  Weight Loss:  No significant weight loss     Body Fat Loss:  Unable to assess     Muscle Mass Loss:  Unable to assess    Fluid Accumulation:  No significant fluid accumulation Extremities   Strength:  Not Performed    Estimated Daily Nutrient Needs:  Energy (kcal):  8243-1212 kcal (25-27 kcal/kg); Weight Used for Energy Requirements:  Current     Protein (g):   gm (1.3-1.4 gm/kg); Weight Used for Protein Requirements:  Current          Nutrition Related Findings:  Edema: trace BLE. Active bowel sounds. Wounds:  Stage II (buttocks)       Current Nutrition Therapies:    ADULT DIET; Regular; 4 carb choices (60 gm/meal); Low Fat/Low Chol/High Fiber/2 gm Na; Low Sodium (2 gm)  ADULT ORAL NUTRITION SUPPLEMENT; Breakfast, Dinner; Diabetic Oral Supplement    Anthropometric Measures:  · Height: 5' 8\" (172.7 cm)  · Current Body Weight: 171 lb 11.2 oz (77.9 kg) (question weight accuracy.  Likely weighing process or scale error.)   · Ideal Body Weight: 140 lbs; % Ideal Body Weight 122.6 %   · BMI: 26.1  · Adjusted Body Weight:  ; No Adjustment   · BMI Categories: Normal Weight (BMI 22.0 to 24.9) age over 72       Nutrition Diagnosis:   · Mild malnutrition related to inadequate

## 2022-04-02 NOTE — PROGRESS NOTES
Pulmonary Critical Care Progress Note  Lacey Scanlon MD     Patient seen for the follow up of chronic hypoxic respiratory failure, acute exacerbation of COPD, squamous cell carcinoma right upper lobe, hypotension    Subjective:  Patient resting comfortably in bed. She states her shortness of breath is worse today. She denies chest pain and cough. She is eating and drinking well. She wore BiPAP last night. Examination:  Vitals: BP (!) 146/93   Pulse 84   Temp 98.1 °F (36.7 °C) (Oral)   Resp 18   Ht 5' 8\" (1.727 m)   Wt 171 lb 11.2 oz (77.9 kg)   SpO2 97%   BMI 26.11 kg/m²   General appearance: alert and cooperative with exam, in bed, increased work of breathing  Neck: No JVD  Lungs: Rhonchi thoughout, no crackles or wheezing  Heart: regular rate and rhythm, S1, S2 normal, no gallop  Abdomen: Soft, non tender, + BS  Extremities: no cyanosis or clubbing.  No significant edema    LABs:  BMP:   Recent Labs     04/01/22  0546 04/02/22  0555    144   K 4.4 4.1   CO2 26 28   BUN 8 13   CREATININE 0.62 0.64   LABGLOM >60 >60   GLUCOSE 160* 125*     ABG:  Lab Results   Component Value Date    PHART 7.42 08/26/2012    PH 7.22 04/11/2017    CMH8ABG 41 08/26/2012    PCO2 54 04/11/2017    PO2ART 59 08/26/2012    PO2 89 04/11/2017    NBG8IDQ 26.1 08/26/2012    HCO3 22.2 04/11/2017    SXY5YCR 27 04/12/2017    F5FWHBDO 92.1 08/26/2012    O2SAT 95 04/11/2017    FIO2 UNKNOWN 10/31/2017       Lab Results   Component Value Date    POCPH 7.36 04/12/2017    PHART 7.42 08/26/2012    PH 7.22 04/11/2017    POCPCO2 45 04/12/2017    QQQ2JGR 41 08/26/2012    PCO2 54 04/11/2017    POCPO2 93 04/12/2017    PO2ART 59 08/26/2012    PO2 89 04/11/2017    POCHCO3 25.3 04/12/2017    VWM7DUH 26.1 08/26/2012    HCO3 22.2 04/11/2017    NBEA NOT REPORTED 04/12/2017    PBEA 0 04/12/2017    HSB2NOF 27 04/12/2017    TMRF5RFD 97 04/12/2017    R7FUSTHK 92.1 08/26/2012    O2SAT 95 04/11/2017    FIO2 UNKNOWN 10/31/2017     Radiology:  4/2/22  X-ray chest:      Impression:  · Chronic hypoxic respiratory failure  · Acute exacerbation of COPD  · Former smoker, quit 2016  · Right upper lobe squamous cell carcinoma, following with Dr. Nahid Chiang  · Recent history of falls with left facial abrasion  · Anxiety, aortic stenosis, depression, diabetes, GERD  · Hypotension, ? sepsis versus dehydration    Recommendations:  · Oxygen via nasal cannula, keep SPO2 90% or greater  · Incentive spirometry every hour while awake  · Received IV Lasix 20 mg x 1 dose 4/1/2022, PRN lasix   · BiPAP at night and as needed  · Acapella  · Mucinex  · Symbicort 160  · Decrease IV Solu-Medrol to 30 mg IV every 8 hours   · Albuterol HFA 3 times daily and as needed  · Oral Zithromax completed, Continue rocephin and switch to oral in a.m.   · Labs: CBC and BMP in am  · DVT prophylaxis with low molecular weight heparin  · GI prophylaxis, protonix  · Discussed with RN  · Will follow with you  Electronically signed by     Tristan Sheffield MD on 4/2/2022 at 1:58 PM  Pulmonary Critical Care and Sleep Medicine,  [facility]  Cell: [phone]  Office: [phone]

## 2022-04-02 NOTE — PROGRESS NOTES
[facility]    Adult Hospitalist      Name: Magdalena Galeano  MRN: [id number]     Acct: [de-identified]  Room: 2003/2003-02    Admit Date: 3/29/2022  6:18 PM  PCP: Silverio Weir MD    Primary Problem  Principal Problem:    Unable to ambulate  Active Problems:    Mild malnutrition ([address]  Resolved Problems:    * No resolved hospital problems. *        Assesment:     · Unable to ambulate  · Weakness  · Decubitus ulcer buttocks stage II  · Coronary disease, native vessel  · Chronic obstructive pulmonary disease, unspecified  · Diabetes mellitus type 2  · Essential hypertension  · Major depressive disorder  · Anxiety disorder  · Reported abdominal aortic aneurysm  · Gastroesophageal reflux disease without esophagitis  · Aortic stenosis  · Osteoarthritis multiple joints  · Lung cancer  · Past smoker  · Orthostatic hypotension-resolved  · Dehydration-resolved  · Left face abrasion healing  · AECOPD        Plan:     · Admit to MedSurg  · Monitor vitals closely  · Keep SPO2 above 90%  · I's and O's  · IV Hep-Lock  · Pain control   · antiemetics as needed  · DuoNeb  · RT eval  · Resume essential home medication  · Add parameters  · Consult social work  · Consult PT/OT  · CBC, BMP  · Await [facility] SNF  · IV fluid bolus-recheck  · Maintenance IV fluids-continue  · Twelve-lead EKG/telemetry  · Cardiac enzymes  · ProAmatine-as needed only  · Consult Pulm  · Rocephin IV  · Azithromycin p.o.  · Solummedrol IV  · DC IV fluids  · Resume Lasix  · Hydralazine IV as needed for systolic blood pressure above 150  · Plan transfer to SNF when stable  · DVT and GI prophylaxis.          Chief Complaint:     Chief Complaint   Patient presents with    Extremity Weakness         History of Present Illness:        Pt seen and examined at bedside  Last 24 hr events d/w RN  Complains of intermittent cough, dyspnea, congestion    Denies CP, abd apin, nausea, vomiting  Denies fever, chills  Other ROS neg      Initial HPI  Magdalena Galeano is a 71 y.o.  female who presents with Extremity Weakness    Patient admitted through the emergency room where she presented via EMS. Patient had recently been discharged from the hospital for acute rhabdomyolysis. Patient had been recommended to go to a skilled nursing facility for rehabilitation. However in spite of repeated recommendations patient refused to go to a rehab center. Social work had made a call to [facility] in addition in view of the patient's condition. Apparently the patient was discharged 3 days ago and says since then she has been sitting in one chair. She says she has not been able to get up and has developed a decubitus ulcer. She is asking for pain medication for that which has been ordered. Patient's muscle enzymes have been unremarkable now    Patient says she does understand now that she will need to go to a nursing facility for more strengthening. She understands also that this will help her return to her home and that this is necessary for her safety. However she says she does not like to hear recommendations like these again and again. We have advised her that there will be selected people through who decisions will need to be made of their she goes. Patient verbalizes understanding on that. Patient has had some cough and congestion. She says she has used her inhaler treatments. She is on oxygen via nasal cannula at 3 L/min. She denies any chest pain or wheezing. Denies any headache, photophobia or diplopia. Denies any nausea, vomiting or abdominal pain. Denies any diarrhea or constipation. Denies any joint swelling though she states she has chronic pain and needs her pain medication    I have personally reviewed the past medical history, past surgical history, medications, social history, and family history, and summarized in the note. Review of Systems:     All 10 point system is reviewed and negative otherwise mentioned in HPI.       Past Medical History:     Past Medical History:   Diagnosis Date    AAA (abdominal aortic aneurysm) ([facility]     Pt denies having a history of AAA    Acid reflux     Anxiety and depression     Aortic stenosis     Arthritis     Blister of ankle, right 10/13/2016    blister broke open & draining, is on antibiotics    CAD (coronary artery disease)     Cancer ([facility]     lung cancer    COPD (chronic obstructive pulmonary disease) ([facility]     Diabetes mellitus [facility]     Falls     Heart block     bifasicular    Hypokalemia     MDRO (multiple drug resistant organisms) resistance 10/17/2014    E. Coli urine    MRSA (methicillin resistant staph aureus) culture positive resolved 12/2016    2 negative nasal screens - 2016 (hx in urine 2014)    On home oxygen therapy     uses 2 liters at night    On home oxygen therapy     patient states 2 liters/nasal cannula continuous    Overactive bladder     patient incont. wears a brief    Pneumonia     PONV (postoperative nausea and vomiting)     Vitamin D deficiency         Past Surgical History:     Past Surgical History:   Procedure Laterality Date    AORTIC VALVE REPAIR N/A 4/11/2017    AORTIC VALVE REPAIR REPLACEMENT performed by Brooke Lara MD at [facility] N/A 8/31/2020    BRONCHOSCOPY BIOPSY BRONCHUS performed by Baljit Harris MD at [facility], COLON, DIAGNOSTIC  12/08/2016    EYE SURGERY      HYSTERECTOMY      IR INS PICC VAD W SQ PORT GREATER THAN 5  9/4/2020    IR INS PICC VAD W SQ PORT GREATER THAN 5 9/4/2020 [facility]    IR PORT PLACEMENT EQUAL OR GREATER THAN 5 YEARS  9/29/2020    IR PORT PLACEMENT EQUAL OR GREATER THAN 5 YEARS 9/29/2020 MD JAY JAY Helms SPECIAL PROCEDURES    LUMBAR LAMINECTOMY      TONSILLECTOMY          Medications Prior to Admission:       Prior to Admission medications    Medication Sig Start Date End Date Taking?  Authorizing Provider   metoprolol tartrate (LOPRESSOR) 50 MG tablet Take 1 tablet by mouth 2 times daily 3/25/22   Krista Lake MD   albuterol sulfate  (90 Base) MCG/ACT inhaler Inhale 2 puffs into the lungs every 4 hours as needed for Wheezing 3/25/22   Krista Lake MD   neomycin-bacitracin-polymyxin (NEOSPORIN) 400-5-5000 ointment Apply topically daily for 5 days Apply topically 2 times daily. 3/25/22 3/30/22  Krista Lake MD   tamsulosin (FLOMAX) 0.4 MG capsule Take 1 capsule by mouth daily 3/26/22   Krista Lake MD   ARIPiprazole (ABILIFY) 5 MG tablet Take 5 mg by mouth at bedtime    Historical Provider, MD   metFORMIN (GLUCOPHAGE) 500 MG tablet Take 500 mg by mouth 2 times daily (with meals)    Historical Provider, MD   Pseudoeph-Doxylamine-DM-APAP (NYQUIL MULTI-SYMPTOM PO) Take by mouth at bedtime    Historical Provider, MD   traZODone (DESYREL) 150 MG tablet Take 300 mg by mouth nightly    Historical Provider, MD   gabapentin (NEURONTIN) 400 MG capsule Take 800 mg by mouth in the morning and at bedtime. Historical Provider, MD   melatonin 5 MG TABS tablet Take 15 mg by mouth nightly    Historical Provider, MD   ondansetron (ZOFRAN) 4 MG tablet Take 1 tablet by mouth every 8 hours as needed for Nausea or Vomiting 1/26/22   Christiano Castro MD   rosuvastatin (CRESTOR) 5 MG tablet Take 5 mg by mouth daily    Historical Provider, MD   oxyCODONE-acetaminophen (PERCOCET)  MG per tablet Take 1 tablet by mouth every 6 hours as needed for Pain. Historical Provider, MD   clonazePAM (KLONOPIN) 1 MG tablet Take 1 tablet by mouth 3 times daily as needed for Anxiety for up to 3 doses.  11/5/20 1/26/22  Rose Marie Hightower MD   furosemide (LASIX) 40 MG tablet Take 40 mg by mouth daily as needed (swelling)     Historical Provider, MD   glimepiride (AMARYL) 1 MG tablet Take 1 mg by mouth 2 times daily (with meals)     Historical Provider, MD   venlafaxine (EFFEXOR XR) 150 MG extended release capsule Take 150 mg by mouth 2 times daily    Historical Provider, MD   lansoprazole (PREVACID) 30 MG delayed release capsule Take 30 mg by mouth daily 11/10/16   Historical Provider, MD   aspirin EC 81 MG EC tablet Take 81 mg by mouth daily    Historical Provider, MD   mometasone-formoterol (DULERA) 200-5 MCG/ACT inhaler Inhale 2 puffs into the lungs every 12 hours as needed (wheezing/SOB)     Historical Provider, MD        Allergies:       Codeine and Dye [iodides]    Social History:     Tobacco:    reports that she quit smoking about 5 years ago. She has never used smokeless tobacco.  Alcohol:      reports no history of alcohol use. Drug Use:  reports no history of drug use. Family History:     Family History   Problem Relation Age of Onset    Cancer Mother     Cancer Father          Physical Exam:     Vitals:  BP (!) 164/89   Pulse 79   Temp 98.1 °F (36.7 °C) (Oral)   Resp 20   Ht 5' 8\" (1.727 m)   Wt 171 lb 11.2 oz (77.9 kg)   SpO2 96%   BMI 26.11 kg/m²   Temp (24hrs), Av.2 °F (36.8 °C), Min:98.1 °F (36.7 °C), Max:98.4 °F (36.9 °C)          General appearance - alert, well appearing, and in moderate acute distress  Mental status - oriented to person, place, and time with normal affect  Head - normocephalic and atraumatic  Eyes - pupils equal and reactive, extraocular eye movements intact, conjunctiva clear  Ears - hearing appears to be intact  Nose - no drainage noted  Mouth - mucous membranes moist  Neck - supple, no carotid bruits, thyroid not palpable  Chest -coarse crackles to auscultation, increased effort  Heart - normal rate, regular rhythm, no murmur  Abdomen - soft, nontender, nondistended, bowel sounds present all four quadrants, no masses, hepatomegaly or splenomegaly  Neurological - normal speech, no focal findings or movement disorder noted, cranial nerves II through XII grossly intact.   Reduced strength upper and lower extremities, equal bilateral  Extremities - peripheral pulses palpable, no pedal edema or calf pain with palpation  Skin - no gross lesions, rashes, or induration noted        Data:     Labs:    Hematology:  No results for input(s): WBC, RBC, HGB, HCT, MCV, MCH, MCHC, RDW, PLT, MPV, SEDRATE, CRP, INR, DDIMER, TX7EBHBJ, LABABSO in the last 72 hours.     Invalid input(s): PT  Chemistry:  Recent Labs     03/31/22  0552 03/31/22  1046 03/31/22  1618 04/01/22  0546 04/02/22  0555     --   --  143 144   K 3.9  --   --  4.4 4.1     --   --  108* 106   CO2 25  --   --  26 28   GLUCOSE 141*  --   --  160* 125*   BUN 6*  --   --  8 13   CREATININE 0.64  --   --  0.62 0.64   ANIONGAP 10  --   --  9 10   LABGLOM >60  --   --  >60 >60   GFRAA >60  --   --  >60 >60   CALCIUM 7.8*  --   --  8.3* 8.4*   TROPHS  --  40* 35*  --   --    CKTOTAL  --  40 43  --   --    MYOGLOBIN  --  29 25  --   --      Recent Labs     04/01/22  0625 04/01/22  1142 04/01/22  1647 04/02/22  0558 04/02/22  1206 04/02/22  1625   POCGLU 160* 162* 144* 108* 101 142*       Lab Results   Component Value Date    INR 1.0 03/30/2022    INR 1.1 03/19/2022    INR 1.0 01/06/2021    PROTIME 13.4 03/30/2022    PROTIME 13.8 03/19/2022    PROTIME 12.6 01/06/2021       Lab Results   Component Value Date/Time    SPECIAL LT AC 10ML 03/29/2022 07:28 PM     Lab Results   Component Value Date/Time    CULTURE NO GROWTH 3 DAYS 03/29/2022 07:28 PM       Lab Results   Component Value Date    POCPH 7.36 04/12/2017    PHART 7.42 08/26/2012    PH 7.22 04/11/2017    POCPCO2 45 04/12/2017    MDW0THY 41 08/26/2012    PCO2 54 04/11/2017    POCPO2 93 04/12/2017    PO2ART 59 08/26/2012    PO2 89 04/11/2017    POCHCO3 25.3 04/12/2017    QSL6OGX 26.1 08/26/2012    HCO3 22.2 04/11/2017    NBEA NOT REPORTED 04/12/2017    PBEA 0 04/12/2017    JYU3QDP 27 04/12/2017    CWJM0RID 97 04/12/2017    M5LXUGUX 92.1 08/26/2012    O2SAT 95 04/11/2017    FIO2 UNKNOWN 10/31/2017       Radiology:    XR CHEST PORTABLE    Result Date: 3/29/2022  Right apical consolidation is similar prior examination. Chest CT could provide further information. All radiological studies reviewed                Code Status:  Full Code    Electronically signed by Miguel Angel Obregon MD on 4/2/2022 at 6:04 PM     Copy sent to Dr. Dallas Mcdonough MD    This note was created with the assistance of a speech-recognition program.  Although the intention is to generate a document that actually reflects the content of the visit, no guarantees can be provided that every mistake has been identified and corrected by editing. Note was updated later by me after  physical examination and  completion of the assessment.

## 2022-04-02 NOTE — PLAN OF CARE
will improve  4/2/2022 1045 by Wicho Hunter RN  Outcome: Ongoing  4/2/2022 0507 by Alejandro Nunez RN  Outcome: Ongoing     Problem: Nutrition  Goal: Optimal nutrition therapy  4/2/2022 1045 by Wicho Hunter RN  Outcome: Ongoing  4/2/2022 0507 by Alejandro Nunez RN  Outcome: Ongoing     Problem: Breathing Pattern - Ineffective:  Goal: Ability to achieve and maintain a regular respiratory rate will improve  Description: Ability to achieve and maintain a regular respiratory rate will improve  4/2/2022 1045 by Wicho Hunter RN  Outcome: Ongoing  4/2/2022 0507 by Alejandro Nunez RN  Outcome: Ongoing     Problem: Discharge Planning:  Goal: Discharged to appropriate level of care  Description: Discharged to appropriate level of care  Outcome: Ongoing     Problem: Gas Exchange - Impaired:  Goal: Levels of oxygenation will improve  Description: Levels of oxygenation will improve  Outcome: Ongoing

## 2022-04-02 NOTE — PLAN OF CARE
Problem: Skin Integrity:  Goal: Will show no infection signs and symptoms  Description: Will show no infection signs and symptoms  4/2/2022 0507 by Kevin Sarah RN  Outcome: Ongoing  4/1/2022 1639 by Misti Jones RN  Outcome: Met This Shift  Goal: Absence of new skin breakdown  Description: Absence of new skin breakdown  Outcome: Ongoing     Problem: Falls - Risk of:  Goal: Will remain free from falls  Description: Will remain free from falls  4/2/2022 0507 by Kevin Sarah RN  Outcome: Ongoing  4/1/2022 1639 by Misti Jones RN  Outcome: Met This Shift  Goal: Absence of physical injury  Description: Absence of physical injury  Outcome: Ongoing     Problem: Pain:  Goal: Pain level will decrease  Description: Pain level will decrease  4/2/2022 0507 by Kevin Sarah RN  Outcome: Ongoing  4/1/2022 1639 by Misti Jones RN  Outcome: Ongoing  Goal: Control of acute pain  Description: Control of acute pain  Outcome: Ongoing  Goal: Control of chronic pain  Description: Control of chronic pain  Outcome: Ongoing     Problem: Tissue Perfusion - Cardiopulmonary, Altered:  Goal: Absence of angina  Description: Absence of angina  Outcome: Ongoing  Goal: Hemodynamic stability will improve  Description: Hemodynamic stability will improve  Outcome: Ongoing     Problem: Metabolic:  Goal: Ability to maintain appropriate glucose levels will improve  Description: Ability to maintain appropriate glucose levels will improve  4/2/2022 0507 by Kevin Sarah RN  Outcome: Ongoing  4/1/2022 1639 by Misti Jones RN  Outcome: Ongoing     Problem: Nutrition  Goal: Optimal nutrition therapy  Outcome: Ongoing     Problem: Breathing Pattern - Ineffective:  Goal: Ability to achieve and maintain a regular respiratory rate will improve  Description: Ability to achieve and maintain a regular respiratory rate will improve  Outcome: Ongoing

## 2022-04-02 NOTE — PLAN OF CARE
Nutrition Problem #1: Mild malnutrition  Intervention: Food and/or Nutrient Delivery: Continue Current Diet,Continue Oral Nutrition Supplement  Nutritional Goals: PO intakes are greater than 75% at meals

## 2022-04-02 NOTE — RT PROTOCOL NOTE
RT Inhaler-Nebulizer Bronchodilator Protocol Note    There is a bronchodilator order in the chart from a provider indicating to follow the RT Bronchodilator Protocol and there is an Initiate RT Inhaler-Nebulizer Bronchodilator Protocol order as well (see protocol at bottom of note). CXR Findings:  XR CHEST PORTABLE    Result Date: 4/1/2022  Interstitial opacities in the left lung appear more prominent in the interval.  No appreciable change in right perihilar and upper lung field opacity. The findings from the last RT Protocol Assessment were as follows:   History Pulmonary Disease: Chronic pulmonary disease  Respiratory Pattern: Dyspnea on exertion or RR 21-25 bpm  Breath Sounds: Slightly diminished and/or crackles  Cough: Strong, spontaneous, non-productive  Indication for Bronchodilator Therapy: Decreased or absent breath sounds  Bronchodilator Assessment Score: 6    Aerosolized bronchodilator medication orders have been revised according to the RT Inhaler-Nebulizer Bronchodilator Protocol below. Respiratory Therapist to perform RT Therapy Protocol Assessment initially then follow the protocol. Repeat RT Therapy Protocol Assessment PRN for score 0-3 or on second treatment, BID, and PRN for scores above 3. No Indications - adjust the frequency to every 6 hours PRN wheezing or bronchospasm, if no treatments needed after 48 hours then discontinue using Per Protocol order mode. If indication present, adjust the RT bronchodilator orders based on the Bronchodilator Assessment Score as indicated below. Use Inhaler orders unless patient has one or more of the following: on home nebulizer, not able to hold breath for 10 seconds, is not alert and oriented, cannot activate and use MDI correctly, or respiratory rate 25 breaths per minute or more, then use the equivalent nebulizer order(s) with same Frequency and PRN reasons based on the score.   If a patient is on this medication at home then do not decrease Frequency below that used at home. 0-3 - enter or revise RT bronchodilator order(s) to equivalent RT Bronchodilator order with Frequency of every 4 hours PRN for wheezing or increased work of breathing using Per Protocol order mode. 4-6 - enter or revise RT Bronchodilator order(s) to two equivalent RT bronchodilator orders with one order with BID Frequency and one order with Frequency of every 4 hours PRN wheezing or increased work of breathing using Per Protocol order mode. 7-10 - enter or revise RT Bronchodilator order(s) to two equivalent RT bronchodilator orders with one order with TID Frequency and one order with Frequency of every 4 hours PRN wheezing or increased work of breathing using Per Protocol order mode. 11-13 - enter or revise RT Bronchodilator order(s) to one equivalent RT bronchodilator order with QID Frequency and an Albuterol order with Frequency of every 4 hours PRN wheezing or increased work of breathing using Per Protocol order mode. Greater than 13 - enter or revise RT Bronchodilator order(s) to one equivalent RT bronchodilator order with every 4 hours Frequency and an Albuterol order with Frequency of every 2 hours PRN wheezing or increased work of breathing using Per Protocol order mode. RT to enter RT Home Evaluation for COPD & MDI Assessment order using Per Protocol order mode.     Electronically signed by Donal Davenport RCP on 4/1/2022 at 8:34 PM

## 2022-04-02 NOTE — PROGRESS NOTES
Patient's quach was removed. Patient tolerated well. She was assisted to recliner with 1 assist with walker. Resting comfortably. Will continue to monitor.

## 2022-04-02 NOTE — CARE COORDINATION
Social work: spoke to jenny from [facility] last night and today is fine for admission since Waldo Burton obtained. NEED ELIZABETH, Rx and discharge order for snf. [facility]    Social work: RN advised pt not going today. Alerted katerina and bennie at [address] [facility] [place] [facility] [facility] and changed dates to tomorrow all on floor. MUST HAVE DISCHARGE ORDER IN ORDER TO COMPLETE HENS BY TOMORROW. Will also need elizabeth and Rx at discharge.  [facility]

## 2022-04-02 NOTE — PROGRESS NOTES
Physical Therapy  Facility/Department: [facility] MED SURG  Daily Treatment Note  NAME: Jermaine Arevalo  : 1950  MRN: [id number]    Date of Service: 2022    Discharge Recommendations:  Patient would benefit from continued therapy after discharge     Pt currently functioning below baseline. Would suggest additional therapy at time of discharge to maximize long term outcomes and prevent re-admission. Please refer to AM-PAC score for current level of function. Assessment   Assessment: Patient much more agreeable to PT compared to last admission and willing to participate. Patient also demo improved Bed Mobility, transfers, and gait compared to prior level. Patient contiues to need assist for transfers and gait. Prognosis: Good  Decision Making: High Complexity  PT Education: General Safety;Goals;PT Role;Plan of Care;Home Exercise Program;Energy Conservation;Transfer Training; Injury Prevention; Functional Mobility Training  REQUIRES PT FOLLOW UP: Yes  Activity Tolerance  Activity Tolerance: Patient limited by endurance  Activity Tolerance: Patient mildly SOB with ambulation     Patient Diagnosis(es): The encounter diagnosis was Generalized weakness. has a past medical history of AAA (abdominal aortic aneurysm) (HCC), Acid reflux, Anxiety and depression, Aortic stenosis, Arthritis, Blister of ankle, right, CAD (coronary artery disease), Cancer ([address] COPD (chronic obstructive pulmonary disease) ([facility] [address] Diabetes mellitus ([facility] [address] Falls, Heart block, Hypokalemia, MDRO (multiple drug resistant organisms) resistance, MRSA (methicillin resistant staph aureus) culture positive, On home oxygen therapy, On home oxygen therapy, Overactive bladder, Pneumonia, PONV (postoperative nausea and vomiting), and Vitamin D deficiency. has a past surgical history that includes back surgery; eye surgery; Cholecystectomy; Appendectomy; Hysterectomy; Tonsillectomy; lumbar laminectomy;  Endoscopy, colon, diagnostic (2016); aortic valve repair (N/A, 4/11/2017); bronchoscopy (N/A, 8/31/2020); IR INSERT PICC VAD W SQ PORT >5 YEARS (9/4/2020); and IR PORT PLACEMENT > 5 YEARS (9/29/2020). Restrictions  Restrictions/Precautions  Restrictions/Precautions: General Precautions,Fall Risk  Position Activity Restriction  Other position/activity restrictions: Up with assist, alarms, LUE IV, Gregory cath, 3L O2 per nc, telemetry  Subjective   General  Chart Reviewed: Yes  Response To Previous Treatment: Patient with no complaints from previous session. Family / Caregiver Present: No  Subjective  Subjective: Patient agreeable to PT. General Comment  Comments: RN reports patient medically appropriate for PT. Orientation  Orientation  Overall Orientation Status: Within Functional Limits  Cognition   Cognition  Overall Cognitive Status: Exceptions  Arousal/Alertness: Appropriate responses to stimuli  Following Commands: Follows one step commands consistently; Follows multistep commands with increased time  Attention Span: Appears intact  Memory: Decreased recall of recent events  Safety Judgement: Decreased awareness of need for assistance;Decreased awareness of need for safety  Problem Solving: Assistance required to generate solutions;Assistance required to identify errors made;Decreased awareness of errors;Assistance required to implement solutions;Assistance required to correct errors made  Insights: Decreased awareness of deficits  Initiation: Requires cues for some  Sequencing: Does not require cues  Objective   Bed mobility  Rolling to Left: Modified independent  Rolling to Right: Modified independent  Supine to Sit: Stand by assistance  Sit to Supine: Unable to assess  Scooting: Stand by assistance  Comment: Good use ofbed rails with no VCs needed. HOB elevated.   Transfers  Sit to Stand: Minimal Assistance  Stand to sit: Minimal Assistance  Bed to Chair: Minimal assistance  Stand Pivot Transfers: Minimal Assistance  Lateral Transfers: Minimal Assistance  Comment: Patient demo improved tranfers on this date. Patient continues to pull up on walker to stand, but did reach back with one hand to sit. Ambulation  Ambulation?: Yes  More Ambulation?: No  Ambulation 1  Surface: level tile  Device: Rolling Walker  Assistance: Moderate assistance  Quality of Gait: 1 LOB noted during gait. Gait Deviations: Decreased step length;Decreased step height  Distance: 20' x 2  Comments: Toilet transfer min A to get stand to sit and mod for sit to stand. VCs for hand placement with poor carryover. Balance  Posture: Fair  Sitting - Static: Good  Sitting - Dynamic: Good  Standing - Static: Fair  Standing - Dynamic: Fair;-  Comments: w/ RW  Exercises  Comments: Seated ousmane LE AROM x 15 reps with good understanding and good tolerance. Strength RLE  Strength RLE: WFL  Strength LLE  Strength LLE: [facility]     AM-PAC Score  AM-PAC Inpatient Mobility Raw Score : 15 (04/02/22 1715)  AM-PAC Inpatient T-Scale Score : 39.45 (04/02/22 1715)  Mobility Inpatient CMS 0-100% Score: 57.7 (04/02/22 1715)  Mobility Inpatient CMS G-Code Modifier : CK (04/02/22 1715)          Goals  Short term goals  Time Frame for Short term goals: 12 visits:  Short term goal 1: Pt. to be SBA for bed mob. Short term goal 2: Pt. to require CGA for sit to stand tranfers from all surface heights with safe hand placement. Short term goal 3: Pt. to require CGA to amb. 25ft. with RW and approp. O2. Short term goal 4: Pt. to tolerate 25+ min.  of PT daily for ther ex/ gait/ balance training  Patient Goals   Patient goals : d/c to SNF    Plan    Plan  Times per week: 1-2x/day; 5-6days/wk  Specific instructions for Next Treatment: progress OOB mobility  Current Treatment Recommendations: Strengthening,Transfer Training,ROM,Balance Training,Functional Mobility Training,Gait Training,Safety Education & [employer] Exercise Program,Patient/Caregiver Education & Training,Endurance Training  Safety Devices  Type of devices:  All fall risk precautions in place,Gait belt,Patient at risk for falls,Call light within reach,Nurse notified,Left in chair,Chair alarm in place  Restraints  Initially in place: No     Therapy Time   Individual Concurrent Group Co-treatment   Time In 1519         Time Out 1601         Minutes [address]

## 2022-04-03 LAB
ANION GAP SERPL CALCULATED.3IONS-SCNC: 11 MMOL/L (ref 9–17)
BUN BLDV-MCNC: 17 MG/DL (ref 8–23)
BUN/CREAT BLD: 27 (ref 9–20)
CALCIUM SERPL-MCNC: 9 MG/DL (ref 8.6–10.4)
CHLORIDE BLD-SCNC: 99 MMOL/L (ref 98–107)
CO2: 28 MMOL/L (ref 20–31)
CREAT SERPL-MCNC: 0.62 MG/DL (ref 0.5–0.9)
CULTURE: NORMAL
CULTURE: NORMAL
EKG ATRIAL RATE: 84 BPM
EKG P AXIS: 76 DEGREES
EKG P-R INTERVAL: 156 MS
EKG Q-T INTERVAL: 422 MS
EKG QRS DURATION: 152 MS
EKG QTC CALCULATION (BAZETT): 498 MS
EKG R AXIS: -42 DEGREES
EKG T AXIS: 20 DEGREES
EKG VENTRICULAR RATE: 84 BPM
GFR AFRICAN AMERICAN: >60 ML/MIN
GFR NON-AFRICAN AMERICAN: >60 ML/MIN
GFR SERPL CREATININE-BSD FRML MDRD: ABNORMAL ML/MIN/{1.73_M2}
GLUCOSE BLD-MCNC: 111 MG/DL (ref 65–105)
GLUCOSE BLD-MCNC: 111 MG/DL (ref 70–99)
GLUCOSE BLD-MCNC: 132 MG/DL (ref 65–105)
GLUCOSE BLD-MCNC: 149 MG/DL (ref 65–105)
GLUCOSE BLD-MCNC: 93 MG/DL (ref 65–105)
Lab: NORMAL
Lab: NORMAL
POTASSIUM SERPL-SCNC: 3.7 MMOL/L (ref 3.7–5.3)
SODIUM BLD-SCNC: 138 MMOL/L (ref 135–144)
SPECIMEN DESCRIPTION: NORMAL
SPECIMEN DESCRIPTION: NORMAL

## 2022-04-03 PROCEDURE — 94660 CPAP INITIATION&MGMT: CPT

## 2022-04-03 PROCEDURE — 97110 THERAPEUTIC EXERCISES: CPT

## 2022-04-03 PROCEDURE — 6370000000 HC RX 637 (ALT 250 FOR IP): Performed by: FAMILY MEDICINE

## 2022-04-03 PROCEDURE — 2580000003 HC RX 258: Performed by: FAMILY MEDICINE

## 2022-04-03 PROCEDURE — 36415 COLL VENOUS BLD VENIPUNCTURE: CPT

## 2022-04-03 PROCEDURE — 94761 N-INVAS EAR/PLS OXIMETRY MLT: CPT

## 2022-04-03 PROCEDURE — 1200000000 HC SEMI PRIVATE

## 2022-04-03 PROCEDURE — 6360000002 HC RX W HCPCS: Performed by: INTERNAL MEDICINE

## 2022-04-03 PROCEDURE — 2700000000 HC OXYGEN THERAPY PER DAY

## 2022-04-03 PROCEDURE — 82947 ASSAY GLUCOSE BLOOD QUANT: CPT

## 2022-04-03 PROCEDURE — 6360000002 HC RX W HCPCS: Performed by: FAMILY MEDICINE

## 2022-04-03 PROCEDURE — 97530 THERAPEUTIC ACTIVITIES: CPT

## 2022-04-03 PROCEDURE — 80048 BASIC METABOLIC PNL TOTAL CA: CPT

## 2022-04-03 PROCEDURE — 94640 AIRWAY INHALATION TREATMENT: CPT

## 2022-04-03 RX ORDER — AMOXICILLIN AND CLAVULANATE POTASSIUM 500; 125 MG/1; MG/1
1 TABLET, FILM COATED ORAL EVERY 8 HOURS SCHEDULED
Status: DISCONTINUED | OUTPATIENT
Start: 2022-04-03 | End: 2022-04-06 | Stop reason: HOSPADM

## 2022-04-03 RX ORDER — PREDNISONE 20 MG/1
40 TABLET ORAL DAILY
Status: DISCONTINUED | OUTPATIENT
Start: 2022-04-04 | End: 2022-04-06 | Stop reason: HOSPADM

## 2022-04-03 RX ADMIN — METFORMIN HYDROCHLORIDE 500 MG: 500 TABLET ORAL at 17:57

## 2022-04-03 RX ADMIN — Medication 9 MG: at 20:31

## 2022-04-03 RX ADMIN — AMOXICILLIN AND CLAVULANATE POTASSIUM 1 TABLET: 500; 125 TABLET, FILM COATED ORAL at 21:25

## 2022-04-03 RX ADMIN — OXYCODONE AND ACETAMINOPHEN 1 TABLET: 5; 325 TABLET ORAL at 20:33

## 2022-04-03 RX ADMIN — VENLAFAXINE HYDROCHLORIDE 150 MG: 75 CAPSULE, EXTENDED RELEASE ORAL at 09:22

## 2022-04-03 RX ADMIN — METOPROLOL TARTRATE 50 MG: 50 TABLET, FILM COATED ORAL at 09:22

## 2022-04-03 RX ADMIN — VENLAFAXINE HYDROCHLORIDE 150 MG: 75 CAPSULE, EXTENDED RELEASE ORAL at 00:23

## 2022-04-03 RX ADMIN — BUDESONIDE AND FORMOTEROL FUMARATE DIHYDRATE 2 PUFF: 160; 4.5 AEROSOL RESPIRATORY (INHALATION) at 09:51

## 2022-04-03 RX ADMIN — GABAPENTIN 800 MG: 400 CAPSULE ORAL at 20:33

## 2022-04-03 RX ADMIN — OXYCODONE AND ACETAMINOPHEN 1 TABLET: 5; 325 TABLET ORAL at 09:26

## 2022-04-03 RX ADMIN — ASPIRIN 81 MG: 81 TABLET, COATED ORAL at 09:21

## 2022-04-03 RX ADMIN — METOPROLOL TARTRATE 50 MG: 50 TABLET, FILM COATED ORAL at 20:33

## 2022-04-03 RX ADMIN — CLONAZEPAM 1 MG: 0.5 TABLET ORAL at 09:27

## 2022-04-03 RX ADMIN — INSULIN LISPRO 1 UNITS: 100 INJECTION, SOLUTION INTRAVENOUS; SUBCUTANEOUS at 00:23

## 2022-04-03 RX ADMIN — TAMSULOSIN HYDROCHLORIDE 0.4 MG: 0.4 CAPSULE ORAL at 09:23

## 2022-04-03 RX ADMIN — TRAZODONE HYDROCHLORIDE 300 MG: 100 TABLET ORAL at 20:32

## 2022-04-03 RX ADMIN — INSULIN LISPRO 2 UNITS: 100 INJECTION, SOLUTION INTRAVENOUS; SUBCUTANEOUS at 12:16

## 2022-04-03 RX ADMIN — OXYCODONE AND ACETAMINOPHEN 1 TABLET: 5; 325 TABLET ORAL at 04:31

## 2022-04-03 RX ADMIN — METFORMIN HYDROCHLORIDE 500 MG: 500 TABLET ORAL at 09:22

## 2022-04-03 RX ADMIN — FUROSEMIDE 40 MG: 40 TABLET ORAL at 09:22

## 2022-04-03 RX ADMIN — SODIUM CHLORIDE, PRESERVATIVE FREE 10 ML: 5 INJECTION INTRAVENOUS at 04:31

## 2022-04-03 RX ADMIN — BUDESONIDE AND FORMOTEROL FUMARATE DIHYDRATE 2 PUFF: 160; 4.5 AEROSOL RESPIRATORY (INHALATION) at 19:56

## 2022-04-03 RX ADMIN — VENLAFAXINE HYDROCHLORIDE 150 MG: 75 CAPSULE, EXTENDED RELEASE ORAL at 20:31

## 2022-04-03 RX ADMIN — METHYLPREDNISOLONE SODIUM SUCCINATE 30 MG: 40 INJECTION, POWDER, FOR SOLUTION INTRAMUSCULAR; INTRAVENOUS at 12:16

## 2022-04-03 RX ADMIN — ROSUVASTATIN CALCIUM 5 MG: 10 TABLET, FILM COATED ORAL at 09:22

## 2022-04-03 RX ADMIN — ARIPIPRAZOLE 5 MG: 5 TABLET ORAL at 00:23

## 2022-04-03 RX ADMIN — GLIPIZIDE 2.5 MG: 5 TABLET ORAL at 09:27

## 2022-04-03 RX ADMIN — CEFTRIAXONE SODIUM 1000 MG: 1 INJECTION, POWDER, FOR SOLUTION INTRAMUSCULAR; INTRAVENOUS at 00:32

## 2022-04-03 RX ADMIN — ARIPIPRAZOLE 5 MG: 5 TABLET ORAL at 20:31

## 2022-04-03 RX ADMIN — GABAPENTIN 800 MG: 400 CAPSULE ORAL at 09:22

## 2022-04-03 RX ADMIN — Medication 9 MG: at 00:23

## 2022-04-03 RX ADMIN — METHYLPREDNISOLONE SODIUM SUCCINATE 30 MG: 40 INJECTION, POWDER, FOR SOLUTION INTRAMUSCULAR; INTRAVENOUS at 04:31

## 2022-04-03 RX ADMIN — PANTOPRAZOLE SODIUM 20 MG: 20 TABLET, DELAYED RELEASE ORAL at 04:32

## 2022-04-03 ASSESSMENT — PAIN SCALES - GENERAL
PAINLEVEL_OUTOF10: 8
PAINLEVEL_OUTOF10: 10
PAINLEVEL_OUTOF10: 0
PAINLEVEL_OUTOF10: 10

## 2022-04-03 NOTE — PROGRESS NOTES
Pt sched'd appt 4/30; call transf'd from Rec to spk w/ nurse  Pt states in past she has seen [facility] for IBS & diverticulitis; about 5 yrs ago had bowel perf, colostomy, sepsis; hasn't been back since/has been seeing surgeon  Over wknd had severe shortness of breath/was gasping for air/went to [place]; was advised she spk w/ nurse  Pt did not yet call primary/I recommended she call primary also; req they fax ER note to us  Pt presently has SOB, stomach bloated like IBS; rates pain as discomfort #1/seeks advice  # [phone]  Pulmonary Critical Care Progress Note  Pool Baeza MD     Patient seen for the follow up of chronic hypoxic respiratory failure, acute exacerbation of COPD, squamous cell carcinoma right upper lobe, hypotension    Subjective:  Patient resting comfortably in bed. She states her shortness of breath is worse today. She denies chest pain and cough. She is eating and drinking well. She wore BiPAP last night. Examination:  Vitals: BP (!) 147/80   Pulse 72   Temp 98.8 °F (37.1 °C) (Oral)   Resp 18   Ht 5' 8\" (1.727 m)   Wt 173 lb (78.5 kg)   SpO2 100%   BMI 26.30 kg/m²   General appearance: alert and cooperative with exam, in bed, increased work of breathing  Neck: No JVD  Lungs: Rhonchi thoughout, no crackles or wheezing  Heart: regular rate and rhythm, S1, S2 normal, no gallop  Abdomen: Soft, non tender, + BS  Extremities: no cyanosis or clubbing.  No significant edema    LABs:  BMP:   Recent Labs     04/02/22  0555 04/03/22  0536    138   K 4.1 3.7   CO2 28 28   BUN 13 17   CREATININE 0.64 0.62   LABGLOM >60 >60   GLUCOSE 125* 111*     ABG:  Lab Results   Component Value Date    PHART 7.42 08/26/2012    PH 7.22 04/11/2017    QTZ3UBM 41 08/26/2012    PCO2 54 04/11/2017    PO2ART 59 08/26/2012    PO2 89 04/11/2017    DEW6RVK 26.1 08/26/2012    HCO3 22.2 04/11/2017    OBL4FUL 27 04/12/2017    E8HSZWCO 92.1 08/26/2012    O2SAT 95 04/11/2017    FIO2 UNKNOWN 10/31/2017       Lab Results   Component Value Date    POCPH 7.36 04/12/2017    PHART 7.42 08/26/2012    PH 7.22 04/11/2017    POCPCO2 45 04/12/2017    LTB2PFV 41 08/26/2012    PCO2 54 04/11/2017    POCPO2 93 04/12/2017    PO2ART 59 08/26/2012    PO2 89 04/11/2017    POCHCO3 25.3 04/12/2017    CHT6UQL 26.1 08/26/2012    HCO3 22.2 04/11/2017    NBEA NOT REPORTED 04/12/2017    PBEA 0 04/12/2017    IVF1NUY 27 04/12/2017    NRKA1SIA 97 04/12/2017    E2TYKASO 92.1 08/26/2012    O2SAT 95 04/11/2017    FIO2 UNKNOWN 10/31/2017     Radiology:  4/2/22  X-ray chest:      Impression:  · Chronic hypoxic respiratory failure  · Acute exacerbation of COPD  · Former smoker, quit 2016  · Right upper lobe squamous cell carcinoma, following with Dr. Marshall Po  · Recent history of falls with left facial abrasion  · Anxiety, aortic stenosis, depression, diabetes, GERD  · Hypotension, ? sepsis versus dehydration    Recommendations:  · Oxygen via nasal cannula, keep SPO2 90% or greater  · Incentive spirometry every hour while awake  · Lasix 40 mg oral daily  · BiPAP at night and as needed  · Acapella  · Mucinex  · Symbicort 160  · IV Solu-Medrol to 30 mg IV every 8 hours   · Albuterol HFA 3 times daily and as needed  · Oral Zithromax completed, Continue rocephin  · Labs: CBC and BMP in am  · DVT prophylaxis with low molecular weight heparin  · GI prophylaxis, protonix  · Will follow with you    Electronically signed by     Mitzy Osman MD on 4/3/2022 at 1:22 PM  Pulmonary Critical Care and Sleep Medicine,  [facility]  Cell: [phone]  Office: [phone]

## 2022-04-03 NOTE — PROGRESS NOTES
Pulmonary Critical Care Progress Note  Raf Boykin MD     Patient seen for the follow up of chronic hypoxic respiratory failure, acute exacerbation of COPD, squamous cell carcinoma right upper lobe, hypotension    Subjective:  Patient resting comfortably in bed. She states her shortness of breath is worse today. She denies chest pain and cough. She is eating and drinking well. She wore BiPAP last night. Examination:  Vitals: BP (!) 140/80   Pulse 81   Temp 98.1 °F (36.7 °C) (Oral)   Resp 17   Ht 5' 8\" (1.727 m)   Wt 173 lb (78.5 kg)   SpO2 100%   BMI 26.30 kg/m²   General appearance: alert and cooperative with exam, in bed, increased work of breathing  Neck: No JVD  Lungs: Rhonchi thoughout, no crackles or wheezing  Heart: regular rate and rhythm, S1, S2 normal, no gallop  Abdomen: Soft, non tender, + BS  Extremities: no cyanosis or clubbing.  No significant edema    LABs:  BMP:   Recent Labs     04/02/22  0555 04/03/22  0536    138   K 4.1 3.7   CO2 28 28   BUN 13 17   CREATININE 0.64 0.62   LABGLOM >60 >60   GLUCOSE 125* 111*     ABG:  Lab Results   Component Value Date    PHART 7.42 08/26/2012    PH 7.22 04/11/2017    PGI1DWX 41 08/26/2012    PCO2 54 04/11/2017    PO2ART 59 08/26/2012    PO2 89 04/11/2017    KJE8MOI 26.1 08/26/2012    HCO3 22.2 04/11/2017    KBQ0SST 27 04/12/2017    W5VSMCRI 92.1 08/26/2012    O2SAT 95 04/11/2017    FIO2 UNKNOWN 10/31/2017       Lab Results   Component Value Date    POCPH 7.36 04/12/2017    PHART 7.42 08/26/2012    PH 7.22 04/11/2017    POCPCO2 45 04/12/2017    HJK7LGX 41 08/26/2012    PCO2 54 04/11/2017    POCPO2 93 04/12/2017    PO2ART 59 08/26/2012    PO2 89 04/11/2017    POCHCO3 25.3 04/12/2017    INX4RCC 26.1 08/26/2012    HCO3 22.2 04/11/2017    NBEA NOT REPORTED 04/12/2017    PBEA 0 04/12/2017    PQG2SYR 27 04/12/2017    OSHZ3LUH 97 04/12/2017    W0BMGZIN 92.1 08/26/2012    O2SAT 95 04/11/2017    FIO2 UNKNOWN 10/31/2017     Radiology:  4/2/22  X-ray chest:      Impression:  · Chronic hypoxic respiratory failure  · Acute exacerbation of COPD  · Former smoker, quit 2016  · Right upper lobe squamous cell carcinoma, following with Dr. Christine Levine  · Recent history of falls with left facial abrasion  · Anxiety, aortic stenosis, depression, diabetes, GERD  · Hypotension, ? sepsis versus dehydration    Recommendations:  · Oxygen via nasal cannula, keep SPO2 90% or greater  · Incentive spirometry every hour while awake  · Lasix 40 mg oral daily  · BiPAP at night and as needed  · Acapella  · Mucinex  · Symbicort 160  · IV Solu-Medrol to 30 mg IV every 8 hours   · Albuterol HFA 3 times daily and as needed  · Oral Zithromax completed, Continue rocephin  · Labs: CBC and BMP in am  · DVT prophylaxis with low molecular weight heparin  · GI prophylaxis, protonix  · Will follow with adalid Black Si, RN   Patient seen, evaluated, and data collected for Val Morley MD, [facility]  Above assessment and plan will be reviewed with Dr. Katarzyna Nunez. Patient plan will be finalized following review and evaluation by Dr. Katarzyna Nunez.

## 2022-04-03 NOTE — PLAN OF CARE
Problem: Skin Integrity:  Goal: Will show no infection signs and symptoms  Description: Will show no infection signs and symptoms  4/3/2022 1149 by Claudell Monday, RN  Outcome: Ongoing  Note: Checked for incontinence every 2 hours and prn. Pericare as needed. Assisted to reposition off back frequently. On waffle mattress. Heels off bed with pillows. 4/3/2022 0313 by Amanuel Victoria RN  Outcome: Ongoing  Goal: Absence of new skin breakdown  Description: Absence of new skin breakdown  4/3/2022 1149 by Claudell Monday, RN  Outcome: Ongoing  4/3/2022 0313 by Amanuel Victoria RN  Outcome: Ongoing     Problem: Falls - Risk of:  Goal: Will remain free from falls  Description: Will remain free from falls  4/3/2022 1149 by Claudell Monday, RN  Outcome: Ongoing  Note: Siderails up x 2  Hourly rounding  Call light in reach  Instructed to call for assist before attempting out of bed. Remains free from falls and accidental injury at this time   Floor free from obstacles  Bed is locked and in lowest position  Adequate lighting provided  Bed alarm on, [place] star and Stay with Me signs posted      4/3/2022 0313 by Amanuel Victoria RN  Outcome: Ongoing  Goal: Absence of physical injury  Description: Absence of physical injury  4/3/2022 1149 by Claudell Monday, RN  Outcome: Ongoing  4/3/2022 0313 by Amanuel Victoria RN  Outcome: Ongoing     Problem: Pain:  Description: Pain management should include both nonpharmacologic and pharmacologic interventions. Goal: Pain level will decrease  Description: Pain level will decrease  4/3/2022 1149 by Claudell Monday, RN  Outcome: Ongoing  Note: Pain level assessment complete.    Patient educated on pain scale and control interventions  PRN pain medication given per patient request  Patient instructed to call out with new onset of pain or unrelieved pain    4/3/2022 0313 by Amanuel Victoria RN  Outcome: Ongoing  Goal: Control of acute pain  Description: Control of acute pain  4/3/2022 1149 by Henna Yu RN  Outcome: Ongoing  4/3/2022 0313 by Rex Keita RN  Outcome: Ongoing  Goal: Control of chronic pain  Description: Control of chronic pain  4/3/2022 1149 by Henna Yu RN  Outcome: Ongoing  4/3/2022 0313 by Rex Keita RN  Outcome: Ongoing     Problem: Tissue Perfusion - Cardiopulmonary, Altered:  Goal: Absence of angina  Description: Absence of angina  4/3/2022 1149 by Henna Yu RN  Outcome: Ongoing  4/3/2022 0313 by Rex Keita RN  Outcome: Ongoing  Goal: Hemodynamic stability will improve  Description: Hemodynamic stability will improve  4/3/2022 1149 by Henna Yu RN  Outcome: Ongoing  4/3/2022 0313 by Rex Keita RN  Outcome: Ongoing     Problem: Metabolic:  Goal: Ability to maintain appropriate glucose levels will improve  Description: Ability to maintain appropriate glucose levels will improve  4/3/2022 1149 by Henna Yu RN  Outcome: Ongoing  4/3/2022 0313 by Rex Keita RN  Outcome: Ongoing     Problem: Nutrition  Goal: Optimal nutrition therapy  4/3/2022 1149 by Henna Yu RN  Outcome: Ongoing  4/3/2022 0313 by Rex Keita RN  Outcome: Ongoing     Problem: Breathing Pattern - Ineffective:  Goal: Ability to achieve and maintain a regular respiratory rate will improve  Description: Ability to achieve and maintain a regular respiratory rate will improve  4/3/2022 1149 by Henna Yu RN  Outcome: Ongoing  4/3/2022 0313 by Rex Keita RN  Outcome: Ongoing     Problem: Discharge Planning:  Goal: Discharged to appropriate level of care  Description: Discharged to appropriate level of care  4/3/2022 1149 by Henna Yu RN  Outcome: Ongoing  4/3/2022 0313 by Rex Keita RN  Outcome: Ongoing     Problem: Gas Exchange - Impaired:  Goal: Levels of oxygenation will improve  Description: Levels of oxygenation will improve  4/3/2022 1149 by Henna Yu RN  Outcome: Ongoing  4/3/2022 0056 by Amanuel Victoria, RN  Outcome: Ongoing

## 2022-04-03 NOTE — PROGRESS NOTES
[facility].,    Adult Hospitalist      Name: Jose R Metcalf  MRN: [id number]     Acct: [de-identified]  Room: 2003/2003-02    Admit Date: 3/29/2022  6:18 PM  PCP: Misael Vickers MD    Primary Problem  Principal Problem:    Unable to ambulate  Active Problems:    Mild malnutrition ([address]  Resolved Problems:    * No resolved hospital problems. *        Assesment:     · Unable to ambulate  · Weakness  · Decubitus ulcer buttocks stage II  · Coronary disease, native vessel  · Chronic obstructive pulmonary disease, unspecified  · Diabetes mellitus type 2  · Essential hypertension  · Major depressive disorder  · Anxiety disorder  · Reported abdominal aortic aneurysm  · Gastroesophageal reflux disease without esophagitis  · Aortic stenosis  · Osteoarthritis multiple joints  · Lung cancer  · Past smoker  · Orthostatic hypotension-resolved  · Dehydration-resolved  · Left face abrasion healing  · AECOPD  · Right small pleural effusion  · Atelectasis         Plan:     · Admit to MedSurg  · Monitor vitals closely  · Keep SPO2 above 90%  · I's and O's  · IV Hep-Lock  · Pain control   · antiemetics as needed  · DuoNeb  · RT eval  · Resume essential home medication  · Add parameters  · Consult social work  · Consult PT/OT  · CBC, BMP  · Await [facility]  · IV fluid bolus-recheck  · Maintenance IV fluids-continue- dc  · Twelve-lead EKG/telemetry  · Cardiac enzymes  · ProAmatine-as needed only  · Consult Pulm  · Rocephin IV  · Azithromycin p.o.  · Solummedrol IV  · DC IV fluids  · Resume Lasix  · Hydralazine IV as needed for systolic blood pressure above 150  · Plan transfer to SNF 4/4  · VELMA  · Refusing IV  · meds changed to PO  · DVT and GI prophylaxis.          Chief Complaint:     Chief Complaint   Patient presents with    Extremity Weakness         History of Present Illness:        Pt seen and examined at bedside  Last 24 hr events d/w RN  Pt says she feels better now  Had episode of dyspnea and wheezing  XR chest showed small right pleural effusion   Given lasix earlier  Needed BiPAP  On Incentive spirometry   Has been better sequentially     Denies CP, abd apin, nausea, vomiting  Denies fever, chills  Denies any joint swelling or rash  Other ROS neg      Initial HPI  Susan Cueva is a 71 y.o.  female who presents with Extremity Weakness    Patient admitted through the emergency room where she presented via EMS. Patient had recently been discharged from the hospital for acute rhabdomyolysis. Patient had been recommended to go to a skilled nursing facility for rehabilitation. However in spite of repeated recommendations patient refused to go to a rehab center. Social work had made a call to [facility] in addition in view of the patient's condition. Apparently the patient was discharged 3 days ago and says since then she has been sitting in one chair. She says she has not been able to get up and has developed a decubitus ulcer. She is asking for pain medication for that which has been ordered. Patient's muscle enzymes have been unremarkable now    Patient says she does understand now that she will need to go to a nursing facility for more strengthening. She understands also that this will help her return to her home and that this is necessary for her safety. However she says she does not like to hear recommendations like these again and again. We have advised her that there will be selected people through who decisions will need to be made of their she goes. Patient verbalizes understanding on that. Patient has had some cough and congestion. She says she has used her inhaler treatments. She is on oxygen via nasal cannula at 3 L/min. She denies any chest pain or wheezing. Denies any headache, photophobia or diplopia. Denies any nausea, vomiting or abdominal pain. Denies any diarrhea or constipation.   Denies any joint swelling though she states she has chronic pain and needs her pain medication    I have personally reviewed the past medical history, past surgical history, medications, social history, and family history, and summarized in the note. Review of Systems:     All 10 point system is reviewed and negative otherwise mentioned in HPI. Past Medical History:     Past Medical History:   Diagnosis Date    AAA (abdominal aortic aneurysm) ([facility]     Pt denies having a history of AAA    Acid reflux     Anxiety and depression     Aortic stenosis     Arthritis     Blister of ankle, right 10/13/2016    blister broke open & draining, is on antibiotics    CAD (coronary artery disease)     Cancer ([address]     lung cancer    COPD (chronic obstructive pulmonary disease) ([facility]     Diabetes mellitus ([facility]     Falls     Heart block     bifasicular    Hypokalemia     MDRO (multiple drug resistant organisms) resistance 10/17/2014    E. Coli urine    MRSA (methicillin resistant staph aureus) culture positive resolved 12/2016    2 negative nasal screens - 2016 (hx in urine 2014)    On home oxygen therapy     uses 2 liters at night    On home oxygen therapy     patient states 2 liters/nasal cannula continuous    Overactive bladder     patient incont.  wears a brief    Pneumonia     PONV (postoperative nausea and vomiting)     Vitamin D deficiency         Past Surgical History:     Past Surgical History:   Procedure Laterality Date    AORTIC VALVE REPAIR N/A 4/11/2017    AORTIC VALVE REPAIR REPLACEMENT performed by Erika Jennings MD at [facility] N/A 8/31/2020    BRONCHOSCOPY BIOPSY BRONCHUS performed by Ade De La Cruz MD at [address], COLON, DIAGNOSTIC  12/08/2016    EYE SURGERY      HYSTERECTOMY      IR INS PICC VAD W SQ PORT GREATER THAN 5  9/4/2020    IR INS PICC VAD W SQ PORT GREATER THAN 5 9/4/2020 [facility]    IR PORT PLACEMENT EQUAL OR GREATER THAN 5 YEARS  9/29/2020    IR PORT PLACEMENT EQUAL OR GREATER THAN 5 YEARS 9/29/2020 [facility]    LUMBAR LAMINECTOMY      TONSILLECTOMY          Medications Prior to Admission:       Prior to Admission medications    Medication Sig Start Date End Date Taking? Authorizing Provider   metoprolol tartrate (LOPRESSOR) 50 MG tablet Take 1 tablet by mouth 2 times daily 3/25/22   Jenny Mckeon MD   albuterol sulfate  (90 Base) MCG/ACT inhaler Inhale 2 puffs into the lungs every 4 hours as needed for Wheezing 3/25/22   Jenny Mckeon MD   neomycin-bacitracin-polymyxin (NEOSPORIN) 400-5-5000 ointment Apply topically daily for 5 days Apply topically 2 times daily. 3/25/22 3/30/22  Jenny Mckeon MD   tamsulosin (FLOMAX) 0.4 MG capsule Take 1 capsule by mouth daily 3/26/22   Jenny Mckeon MD   ARIPiprazole (ABILIFY) 5 MG tablet Take 5 mg by mouth at bedtime    Historical Provider, MD   metFORMIN (GLUCOPHAGE) 500 MG tablet Take 500 mg by mouth 2 times daily (with meals)    Historical Provider, MD   Pseudoeph-Doxylamine-DM-APAP (NYQUIL MULTI-SYMPTOM PO) Take by mouth at bedtime    Historical Provider, MD   traZODone (DESYREL) 150 MG tablet Take 300 mg by mouth nightly    Historical Provider, MD   gabapentin (NEURONTIN) 400 MG capsule Take 800 mg by mouth in the morning and at bedtime. Historical Provider, MD   melatonin 5 MG TABS tablet Take 15 mg by mouth nightly    Historical Provider, MD   ondansetron (ZOFRAN) 4 MG tablet Take 1 tablet by mouth every 8 hours as needed for Nausea or Vomiting 1/26/22   Sasha Theodore MD   rosuvastatin (CRESTOR) 5 MG tablet Take 5 mg by mouth daily    Historical Provider, MD   oxyCODONE-acetaminophen (PERCOCET)  MG per tablet Take 1 tablet by mouth every 6 hours as needed for Pain. Historical Provider, MD   clonazePAM (KLONOPIN) 1 MG tablet Take 1 tablet by mouth 3 times daily as needed for Anxiety for up to 3 doses.  11/5/20 1/26/22  Rod Johnson MD furosemide (LASIX) 40 MG tablet Take 40 mg by mouth daily as needed (swelling)     Historical Provider, MD   glimepiride (AMARYL) 1 MG tablet Take 1 mg by mouth 2 times daily (with meals)     Historical Provider, MD   venlafaxine (EFFEXOR XR) 150 MG extended release capsule Take 150 mg by mouth 2 times daily    Historical Provider, MD   lansoprazole (PREVACID) 30 MG delayed release capsule Take 30 mg by mouth daily 11/10/16   Historical Provider, MD   aspirin EC 81 MG EC tablet Take 81 mg by mouth daily    Historical Provider, MD   mometasone-formoterol (DULERA) 200-5 MCG/ACT inhaler Inhale 2 puffs into the lungs every 12 hours as needed (wheezing/SOB)     Historical Provider, MD        Allergies:       Codeine and Dye [iodides]    Social History:     Tobacco:    reports that she quit smoking about 5 years ago. She has never used smokeless tobacco.  Alcohol:      reports no history of alcohol use. Drug Use:  reports no history of drug use.     Family History:     Family History   Problem Relation Age of Onset    Cancer Mother     Cancer Father          Physical Exam:     Vitals:  /64   Pulse 81   Temp 98.2 °F (36.8 °C) (Oral)   Resp 18   Ht 5' 8\" (1.727 m)   Wt 173 lb (78.5 kg)   SpO2 100%   BMI 26.30 kg/m²   Temp (24hrs), Av.1 °F (36.7 °C), Min:97.7 °F (36.5 °C), Max:98.8 °F (37.1 °C)          General appearance - alert, well appearing, and in no acute distress  Mental status - oriented to person, place, and time with normal affect  Head - normocephalic and atraumatic  Eyes - pupils equal and reactive, extraocular eye movements intact, conjunctiva clear  Ears - hearing appears to be intact  Nose - no drainage noted  Mouth - mucous membranes moist  Neck - supple, no carotid bruits, thyroid not palpable  Chest -coarse crackles to auscultation w upper airway transmitted sounds,normal effort  Heart - normal rate, regular rhythm, no murmur  Abdomen - soft, nontender, nondistended, bowel sounds present all four quadrants, no masses, hepatomegaly or splenomegaly  Neurological - normal speech, no focal findings or movement disorder noted, cranial nerves II through XII grossly intact. Reduced strength upper and lower extremities, equal bilateral  Extremities - peripheral pulses palpable, no pedal edema or calf pain with palpation  Skin - no gross lesions, rashes, or induration noted        Data:     Labs:    Hematology:  No results for input(s): WBC, RBC, HGB, HCT, MCV, MCH, MCHC, RDW, PLT, MPV, SEDRATE, CRP, INR, DDIMER, [id number], LABABSO in the last 72 hours.     Invalid input(s): PT  Chemistry:  Recent Labs     04/01/22  0546 04/02/22  0555 04/02/22  2220 04/03/22  0536    144  --  138   K 4.4 4.1  --  3.7   * 106  --  99   CO2 26 28  --  28   GLUCOSE 160* 125*  --  111*   BUN 8 13  --  17   CREATININE 0.62 0.64  --  0.62   ANIONGAP 9 10  --  11   LABGLOM >60 >60  --  >60   GFRAA >60 >60  --  >60   CALCIUM 8.3* 8.4*  --  9.0   TROPHS  --   --  29*  --    MYOGLOBIN  --   --  27  --      Recent Labs     04/02/22  1206 04/02/22  1625 04/02/22  1958 04/03/22  0603 04/03/22  1120 04/03/22  1626   POCGLU 101 142* 192* 111* 149* 93       Lab Results   Component Value Date    INR 1.0 03/30/2022    INR 1.1 03/19/2022    INR 1.0 01/06/2021    PROTIME 13.4 03/30/2022    PROTIME 13.8 03/19/2022    PROTIME 12.6 01/06/2021       Lab Results   Component Value Date/Time    SPECIAL LT AC 10ML 03/29/2022 07:28 PM     Lab Results   Component Value Date/Time    CULTURE NO GROWTH 4 DAYS 03/29/2022 07:28 PM       Lab Results   Component Value Date    POCPH 7.36 04/12/2017    PHART 7.42 08/26/2012    PH 7.22 04/11/2017    POCPCO2 45 04/12/2017    HHT8VNB 41 08/26/2012    PCO2 54 04/11/2017    POCPO2 93 04/12/2017    PO2ART 59 08/26/2012    PO2 89 04/11/2017    POCHCO3 25.3 04/12/2017    VPM4EVW 26.1 08/26/2012    HCO3 22.2 04/11/2017    NBEA NOT REPORTED 04/12/2017    PBEA 0 04/12/2017    GGP8YQR 27 04/12/2017 LKJT0FZL 97 04/12/2017    U6JIPIRC 92.1 08/26/2012    O2SAT 95 04/11/2017    FIO2 UNKNOWN 10/31/2017       Radiology:    XR CHEST PORTABLE    Result Date: 3/29/2022  Right apical consolidation is similar prior examination. Chest CT could provide further information. All radiological studies reviewed                Code Status:  Full Code    Electronically signed by Halina Garrett MD on 4/3/2022 at 5:38 PM     Copy sent to Dr. Oscar Pascual MD    This note was created with the assistance of a speech-recognition program.  Although the intention is to generate a document that actually reflects the content of the visit, no guarantees can be provided that every mistake has been identified and corrected by editing. Note was updated later by me after  physical examination and  completion of the assessment.

## 2022-04-03 NOTE — CARE COORDINATION
Social work: Transportation arranged for 4:00PM today via [facility], await determination if patient will be ready for dc today. MUST HAVE DISCHARGE ORDER IN ORDER TO COMPLETE HENS FOR DISCHARGE TODAY. VELMA will need signed by attending physician. If dc held, SW will cancel transport via [facility] informed of potential dc today. UPDATE: Spoke to [facility], bipap will not be delivered today. Will plan for dc Monday to [facility] [place]. Sharifa/RN informed. Transport via [facility] cancelled and put on will call for Monday.

## 2022-04-03 NOTE — PROGRESS NOTES
Physical Therapy  Facility/Department: [facility]  Daily Treatment Note  NAME: Florina Tavarez  : 1950  MRN: [id number]    Date of Service: 4/3/2022    Discharge Recommendations:  Continue to assess pending progress,Patient would benefit from continued therapy after discharge   PT Equipment Recommendations  Equipment Needed: No    Assessment   Body structures, Functions, Activity limitations: Decreased endurance  Assessment: pt demonstrated good body mechanics with bed mobility, out of bed, sit to standing and demonstrated good standing stabilty with walker but selt limiting with over all partisipation during PT. Pt voices concerns with breathing status. Sat 02 99% on NC throughout session. Specific instructions for Next Treatment: progress gait distance and or reps. Prognosis: Good  PT Education: Goals;PT Role;Plan of Care;General Safety  Patient Education: goals to increased OOB time, gait frequency each day/ progress time on feet. REQUIRES PT FOLLOW UP: Yes  Activity Tolerance  Activity Tolerance: Other;Patient limited by endurance  Activity Tolerance: pt self limiting with full session today,  reports she had an \"episode\"  (breathing episode that requried bipap) last evening. Pt demonstrates some anxiety due to respiratory issues/concerns this AM.     Patient Diagnosis(es): The encounter diagnosis was Generalized weakness. has a past medical history of AAA (abdominal aortic aneurysm) (HCC), Acid reflux, Anxiety and depression, Aortic stenosis, Arthritis, Blister of ankle, right, CAD (coronary artery disease), Cancer ([facility] [address] COPD (chronic obstructive pulmonary disease) ([facility] [address] Diabetes mellitus ([facility] [address] Falls, Heart block, Hypokalemia, MDRO (multiple drug resistant organisms) resistance, MRSA (methicillin resistant staph aureus) culture positive, On home oxygen therapy, On home oxygen therapy, Overactive bladder, Pneumonia, PONV (postoperative nausea and vomiting), and Vitamin D deficiency.    has a past surgical history that includes back surgery; eye surgery; Cholecystectomy; Appendectomy; Hysterectomy; Tonsillectomy; lumbar laminectomy; Endoscopy, colon, diagnostic (12/08/2016); aortic valve repair (N/A, 4/11/2017); bronchoscopy (N/A, 8/31/2020); IR INSERT PICC VAD W SQ PORT >5 YEARS (9/4/2020); and IR PORT PLACEMENT > 5 YEARS (9/29/2020). Restrictions  Restrictions/Precautions  Restrictions/Precautions: General Precautions,Fall Risk  Position Activity Restriction  Other position/activity restrictions: Up with assist, alarms, LUE IV, Gregory cath, 3L O2 per nc, telemetry  Subjective     Resting in bed, reports she had an \"episode\" last evening and was on bipap. Agreeable to some PT this AM.   Orientation   x4     Cognition      Objective   Bed mobility  Rolling to Left: Dependent/Total  Rolling to Right: Dependent/Total  Supine to Sit: Modified independent  Sit to Supine: Unable to assess  Scooting: Supervision  Comment: Pt stating she is not walking any further than bed to chair. States she had an \"episode\" with difficult breathing last evening and not sure what happened. Transfers  Sit to Stand: Stand by assistance  Stand to sit: Stand by assistance  Bed to Chair: Contact guard assistance  Stand Pivot Transfers: Contact guard assistance  Lateral Transfers: Contact guard assistance  Comment: demonstrated good sitting balance and good standing balance in standing with walker. Ambulation  Ambulation?: Yes  Ambulation 1  Surface: level tile  Device: Rolling Walker  Assistance: Contact guard assistance  Quality of Gait: short step length  Gait Deviations: Slow Gwendolyn;Decreased step length;Decreased step height  Distance: 6 ftx1, up to bedside chair. Comments: pt declined to walker further than 6 ft to chair. Pt declined 2nd walk, also declined to address, walking in place with walker and sit to standing reps for  increased wt bearing time/quad work.    Stairs/Curb  Stairs?: No    exercises: TERESA LE ankle pumps, hip/knee flexion, hip abd/add x 10 reps AROM. LAQ x 10 AROM . Dangle EOB, with supervision for approx 4 mins. AM-PAC Score  AM-PAC Inpatient Mobility Raw Score : 21 (04/03/22 1055)  AM-PAC Inpatient T-Scale Score : 50.25 (04/03/22 1055)  Mobility Inpatient CMS 0-100% Score: 28.97 (04/03/22 1055)  Mobility Inpatient CMS G-Code Modifier : CJ (04/03/22 1055)          Goals  Short term goals  Time Frame for Short term goals: 12 visits:  Short term goal 1: Pt. to be SBA for bed mob. Short term goal 2: Pt. to require CGA for sit to stand tranfers from all surface heights with safe hand placement. Short term goal 3: Pt. to require CGA to amb. 25ft. with RW and approp. O2. Short term goal 4: Pt. to tolerate 25+ min. of PT daily for ther ex/ gait/ balance training  Patient Goals   Patient goals : d/c to SNF    Plan    Plan  Times per week: 5-6xwk  Times per day: Twice a day  Specific instructions for Next Treatment: progress gait distance and or reps. Current Treatment Recommendations: Strengthening,Functional Mobility Training,Home Exercise Program,Balance Training,Gait Training  Plan Comment: progress ambulation distance/time on feet and frequency of walking.   Safety Devices  Type of devices: Call light within reach,Chair alarm in place,Nurse notified  Restraints  Initially in place: No     Therapy Time   Individual Concurrent Group Co-treatment   Time In 0842         Time Out 0906         Minutes 24                 [address], [facility]

## 2022-04-03 NOTE — RT PROTOCOL NOTE
RT Inhaler-Nebulizer Bronchodilator Protocol Note    There is a bronchodilator order in the chart from a provider indicating to follow the RT Bronchodilator Protocol and there is an Initiate RT Inhaler-Nebulizer Bronchodilator Protocol order as well (see protocol at bottom of note). CXR Findings:  XR CHEST PORTABLE    Result Date: 4/2/2022  Increased small right pleural effusion, with increased right basilar airspace opacities favored to represent atelectasis     XR CHEST PORTABLE    Result Date: 4/2/2022  No significant change from prior study. Stable cardiac size and continued interstitial opacities with opacity in the right upper lung field adjacent to the right mediastinum consistent with known lung cancer. The findings from the last RT Protocol Assessment were as follows:   History Pulmonary Disease: Chronic pulmonary disease  Respiratory Pattern: Dyspnea on exertion or RR 21-25 bpm  Breath Sounds: Slightly diminished and/or crackles  Cough: Strong, spontaneous, non-productive  Indication for Bronchodilator Therapy: Decreased or absent breath sounds  Bronchodilator Assessment Score: 6 Refusing Alb will make PRN    Aerosolized bronchodilator medication orders have been revised according to the RT Inhaler-Nebulizer Bronchodilator Protocol below. Respiratory Therapist to perform RT Therapy Protocol Assessment initially then follow the protocol. Repeat RT Therapy Protocol Assessment PRN for score 0-3 or on second treatment, BID, and PRN for scores above 3. No Indications - adjust the frequency to every 6 hours PRN wheezing or bronchospasm, if no treatments needed after 48 hours then discontinue using Per Protocol order mode. If indication present, adjust the RT bronchodilator orders based on the Bronchodilator Assessment Score as indicated below.   Use Inhaler orders unless patient has one or more of the following: on home nebulizer, not able to hold breath for 10 seconds, is not alert and oriented, cannot activate and use MDI correctly, or respiratory rate 25 breaths per minute or more, then use the equivalent nebulizer order(s) with same Frequency and PRN reasons based on the score. If a patient is on this medication at home then do not decrease Frequency below that used at home. 0-3 - enter or revise RT bronchodilator order(s) to equivalent RT Bronchodilator order with Frequency of every 4 hours PRN for wheezing or increased work of breathing using Per Protocol order mode. 4-6 - enter or revise RT Bronchodilator order(s) to two equivalent RT bronchodilator orders with one order with BID Frequency and one order with Frequency of every 4 hours PRN wheezing or increased work of breathing using Per Protocol order mode. 7-10 - enter or revise RT Bronchodilator order(s) to two equivalent RT bronchodilator orders with one order with TID Frequency and one order with Frequency of every 4 hours PRN wheezing or increased work of breathing using Per Protocol order mode. 11-13 - enter or revise RT Bronchodilator order(s) to one equivalent RT bronchodilator order with QID Frequency and an Albuterol order with Frequency of every 4 hours PRN wheezing or increased work of breathing using Per Protocol order mode. Greater than 13 - enter or revise RT Bronchodilator order(s) to one equivalent RT bronchodilator order with every 4 hours Frequency and an Albuterol order with Frequency of every 2 hours PRN wheezing or increased work of breathing using Per Protocol order mode. RT to enter RT Home Evaluation for COPD & MDI Assessment order using Per Protocol order mode.     Electronically signed by Naz Tyler RCP on 4/3/2022 at 9:53 AM

## 2022-04-03 NOTE — PLAN OF CARE
Problem: Skin Integrity:  Goal: Will show no infection signs and symptoms  Description: Will show no infection signs and symptoms  Outcome: Ongoing  Goal: Absence of new skin breakdown  Description: Absence of new skin breakdown  Outcome: Ongoing     Problem: Falls - Risk of:  Goal: Will remain free from falls  Description: Will remain free from falls  Outcome: Ongoing  Goal: Absence of physical injury  Description: Absence of physical injury  Outcome: Ongoing     Problem: Pain:  Goal: Pain level will decrease  Description: Pain level will decrease  Outcome: Ongoing  Goal: Control of acute pain  Description: Control of acute pain  Outcome: Ongoing  Goal: Control of chronic pain  Description: Control of chronic pain  Outcome: Ongoing     Problem: Tissue Perfusion - Cardiopulmonary, Altered:  Goal: Absence of angina  Description: Absence of angina  Outcome: Ongoing  Goal: Hemodynamic stability will improve  Description: Hemodynamic stability will improve  Outcome: Ongoing     Problem: Metabolic:  Goal: Ability to maintain appropriate glucose levels will improve  Description: Ability to maintain appropriate glucose levels will improve  Outcome: Ongoing     Problem: Nutrition  Goal: Optimal nutrition therapy  Outcome: Ongoing     Problem: Breathing Pattern - Ineffective:  Goal: Ability to achieve and maintain a regular respiratory rate will improve  Description: Ability to achieve and maintain a regular respiratory rate will improve  Outcome: Ongoing     Problem: Discharge Planning:  Goal: Discharged to appropriate level of care  Description: Discharged to appropriate level of care  Outcome: Ongoing     Problem: Gas Exchange - Impaired:  Goal: Levels of oxygenation will improve  Description: Levels of oxygenation will improve  Outcome: Ongoing

## 2022-04-04 LAB
ANION GAP SERPL CALCULATED.3IONS-SCNC: 11 MMOL/L (ref 9–17)
BUN BLDV-MCNC: 21 MG/DL (ref 8–23)
BUN/CREAT BLD: 26 (ref 9–20)
CALCIUM SERPL-MCNC: 8.4 MG/DL (ref 8.6–10.4)
CHLORIDE BLD-SCNC: 99 MMOL/L (ref 98–107)
CO2: 33 MMOL/L (ref 20–31)
CREAT SERPL-MCNC: 0.82 MG/DL (ref 0.5–0.9)
GFR AFRICAN AMERICAN: >60 ML/MIN
GFR NON-AFRICAN AMERICAN: >60 ML/MIN
GFR SERPL CREATININE-BSD FRML MDRD: ABNORMAL ML/MIN/{1.73_M2}
GLUCOSE BLD-MCNC: 132 MG/DL (ref 65–105)
GLUCOSE BLD-MCNC: 186 MG/DL (ref 65–105)
GLUCOSE BLD-MCNC: 204 MG/DL (ref 65–105)
GLUCOSE BLD-MCNC: 80 MG/DL (ref 65–105)
GLUCOSE BLD-MCNC: 91 MG/DL (ref 70–99)
MAGNESIUM: 1.7 MG/DL (ref 1.6–2.6)
POTASSIUM SERPL-SCNC: 3.5 MMOL/L (ref 3.7–5.3)
SODIUM BLD-SCNC: 143 MMOL/L (ref 135–144)

## 2022-04-04 PROCEDURE — 6370000000 HC RX 637 (ALT 250 FOR IP): Performed by: FAMILY MEDICINE

## 2022-04-04 PROCEDURE — 94761 N-INVAS EAR/PLS OXIMETRY MLT: CPT

## 2022-04-04 PROCEDURE — 82947 ASSAY GLUCOSE BLOOD QUANT: CPT

## 2022-04-04 PROCEDURE — 94660 CPAP INITIATION&MGMT: CPT

## 2022-04-04 PROCEDURE — 2700000000 HC OXYGEN THERAPY PER DAY

## 2022-04-04 PROCEDURE — 97530 THERAPEUTIC ACTIVITIES: CPT

## 2022-04-04 PROCEDURE — 36415 COLL VENOUS BLD VENIPUNCTURE: CPT

## 2022-04-04 PROCEDURE — 94640 AIRWAY INHALATION TREATMENT: CPT

## 2022-04-04 PROCEDURE — 1200000000 HC SEMI PRIVATE

## 2022-04-04 PROCEDURE — 80048 BASIC METABOLIC PNL TOTAL CA: CPT

## 2022-04-04 PROCEDURE — 97535 SELF CARE MNGMENT TRAINING: CPT

## 2022-04-04 PROCEDURE — 83735 ASSAY OF MAGNESIUM: CPT

## 2022-04-04 RX ORDER — CLONAZEPAM 1 MG/1
1 TABLET ORAL 3 TIMES DAILY PRN
Qty: 3 TABLET | Refills: 0 | Status: ON HOLD | OUTPATIENT
Start: 2022-04-04 | End: 2022-05-21 | Stop reason: SDUPTHER

## 2022-04-04 RX ORDER — GABAPENTIN 400 MG/1
800 CAPSULE ORAL 2 TIMES DAILY
Qty: 10 CAPSULE | Refills: 0 | Status: ON HOLD | OUTPATIENT
Start: 2022-04-04 | End: 2022-05-14

## 2022-04-04 RX ORDER — OXYCODONE AND ACETAMINOPHEN 10; 325 MG/1; MG/1
1 TABLET ORAL EVERY 6 HOURS PRN
Qty: 5 TABLET | Refills: 0 | Status: SHIPPED | OUTPATIENT
Start: 2022-04-04 | End: 2022-04-06

## 2022-04-04 RX ADMIN — OXYCODONE AND ACETAMINOPHEN 1 TABLET: 5; 325 TABLET ORAL at 18:44

## 2022-04-04 RX ADMIN — METOPROLOL TARTRATE 50 MG: 50 TABLET, FILM COATED ORAL at 20:25

## 2022-04-04 RX ADMIN — AMOXICILLIN AND CLAVULANATE POTASSIUM 1 TABLET: 500; 125 TABLET, FILM COATED ORAL at 06:11

## 2022-04-04 RX ADMIN — BUDESONIDE AND FORMOTEROL FUMARATE DIHYDRATE 2 PUFF: 160; 4.5 AEROSOL RESPIRATORY (INHALATION) at 09:47

## 2022-04-04 RX ADMIN — PREDNISONE 40 MG: 20 TABLET ORAL at 10:01

## 2022-04-04 RX ADMIN — GABAPENTIN 800 MG: 400 CAPSULE ORAL at 20:26

## 2022-04-04 RX ADMIN — BUDESONIDE AND FORMOTEROL FUMARATE DIHYDRATE 2 PUFF: 160; 4.5 AEROSOL RESPIRATORY (INHALATION) at 20:40

## 2022-04-04 RX ADMIN — METFORMIN HYDROCHLORIDE 500 MG: 500 TABLET ORAL at 10:03

## 2022-04-04 RX ADMIN — VENLAFAXINE HYDROCHLORIDE 150 MG: 75 CAPSULE, EXTENDED RELEASE ORAL at 10:02

## 2022-04-04 RX ADMIN — POTASSIUM CHLORIDE 40 MEQ: 20 TABLET, EXTENDED RELEASE ORAL at 16:19

## 2022-04-04 RX ADMIN — OXYCODONE AND ACETAMINOPHEN 1 TABLET: 5; 325 TABLET ORAL at 12:00

## 2022-04-04 RX ADMIN — GABAPENTIN 800 MG: 400 CAPSULE ORAL at 10:01

## 2022-04-04 RX ADMIN — OXYCODONE AND ACETAMINOPHEN 1 TABLET: 5; 325 TABLET ORAL at 03:38

## 2022-04-04 RX ADMIN — INSULIN LISPRO 2 UNITS: 100 INJECTION, SOLUTION INTRAVENOUS; SUBCUTANEOUS at 11:58

## 2022-04-04 RX ADMIN — GLIPIZIDE 2.5 MG: 5 TABLET ORAL at 10:02

## 2022-04-04 RX ADMIN — AMOXICILLIN AND CLAVULANATE POTASSIUM 1 TABLET: 500; 125 TABLET, FILM COATED ORAL at 16:19

## 2022-04-04 RX ADMIN — ARIPIPRAZOLE 5 MG: 5 TABLET ORAL at 20:25

## 2022-04-04 RX ADMIN — ASPIRIN 81 MG: 81 TABLET, COATED ORAL at 10:01

## 2022-04-04 RX ADMIN — FUROSEMIDE 40 MG: 40 TABLET ORAL at 10:02

## 2022-04-04 RX ADMIN — Medication 9 MG: at 20:25

## 2022-04-04 RX ADMIN — VENLAFAXINE HYDROCHLORIDE 150 MG: 75 CAPSULE, EXTENDED RELEASE ORAL at 20:25

## 2022-04-04 RX ADMIN — TAMSULOSIN HYDROCHLORIDE 0.4 MG: 0.4 CAPSULE ORAL at 10:02

## 2022-04-04 RX ADMIN — METOPROLOL TARTRATE 50 MG: 50 TABLET, FILM COATED ORAL at 10:03

## 2022-04-04 RX ADMIN — PANTOPRAZOLE SODIUM 20 MG: 20 TABLET, DELAYED RELEASE ORAL at 06:11

## 2022-04-04 RX ADMIN — OXYCODONE AND ACETAMINOPHEN 1 TABLET: 5; 325 TABLET ORAL at 08:06

## 2022-04-04 RX ADMIN — AMOXICILLIN AND CLAVULANATE POTASSIUM 1 TABLET: 500; 125 TABLET, FILM COATED ORAL at 20:25

## 2022-04-04 RX ADMIN — ROSUVASTATIN CALCIUM 5 MG: 10 TABLET, FILM COATED ORAL at 10:01

## 2022-04-04 RX ADMIN — INSULIN LISPRO 4 UNITS: 100 INJECTION, SOLUTION INTRAVENOUS; SUBCUTANEOUS at 16:44

## 2022-04-04 RX ADMIN — METFORMIN HYDROCHLORIDE 500 MG: 500 TABLET ORAL at 16:19

## 2022-04-04 RX ADMIN — TRAZODONE HYDROCHLORIDE 300 MG: 100 TABLET ORAL at 20:26

## 2022-04-04 ASSESSMENT — PAIN DESCRIPTION - FREQUENCY: FREQUENCY: CONTINUOUS

## 2022-04-04 ASSESSMENT — PAIN SCALES - GENERAL
PAINLEVEL_OUTOF10: 8
PAINLEVEL_OUTOF10: 5
PAINLEVEL_OUTOF10: 4
PAINLEVEL_OUTOF10: 6
PAINLEVEL_OUTOF10: 8
PAINLEVEL_OUTOF10: 7
PAINLEVEL_OUTOF10: 6
PAINLEVEL_OUTOF10: 0

## 2022-04-04 ASSESSMENT — PAIN DESCRIPTION - DESCRIPTORS: DESCRIPTORS: ACHING;DISCOMFORT

## 2022-04-04 ASSESSMENT — PAIN DESCRIPTION - ONSET: ONSET: ON-GOING

## 2022-04-04 ASSESSMENT — PAIN DESCRIPTION - PAIN TYPE: TYPE: CHRONIC PAIN

## 2022-04-04 ASSESSMENT — PAIN DESCRIPTION - LOCATION: LOCATION: BACK;CHEST

## 2022-04-04 ASSESSMENT — PAIN DESCRIPTION - ORIENTATION: ORIENTATION: RIGHT

## 2022-04-04 NOTE — PROGRESS NOTES
Physical Therapy    DATE: 2022    NAME: Colleen Le  MRN: [id number]   : 1950    Patient not seen this date for Physical Therapy due to:      [] Cancel by RN or physician due to:    [] Hemodialysis    [] Critical Lab Value Level     [] Blood transfusion in progress    [] Acute or unstable cardiovascular status   _MAP < 55 or more than >115  _HR < 40 or > 130    [] Acute or unstable pulmonary status   -FiO2 > 60%   _RR < 5 or >40    _O2 sats < 85%    [] Strict Bedrest    [] Off Unit for surgery or procedure    [] Off Unit for testing       [] Pending imaging to R/O fracture    [x] Refusal by Patient: x2 attempts; pt refusing tx stating \"I have a headache and I'm leaving today. \"      [] Other      [] PT being discontinued at this time. Patient independent. No further needs. [] PT being discontinued at this time as the patient has been transferred to hospice care. No further needs.       Terra Hodges, PTA

## 2022-04-04 NOTE — PROGRESS NOTES
Occupational Therapy  Facility/Department: [facility]  Daily Treatment Note  NAME: Yoana Clemente  : 1950  MRN: [id number]    Date of Service: 2022    Discharge Recommendations:  Patient would benefit from continued therapy after discharge    Pt currently functioning below baseline. Would suggest additional therapy at time of discharge to maximize long term outcomes and prevent re-admission. Please refer to AM-PAC score for current level of function. ELLIE Moody reports patient is medically stable for therapy treatment this date. Chart reviewed prior to treatment and patient is agreeable for therapy. All lines intact and patient positioned comfortably at end of treatment. All patient needs addressed prior to ending therapy session. Assessment   Performance deficits / Impairments: Decreased functional mobility ; Decreased ADL status; Decreased strength;Decreased safe awareness;Decreased endurance;Decreased balance;Decreased posture;Decreased cognition  Assessment: Pt progressing towards goals, but is still limited d/t dec strength, endurance, and standing emely. Pt would benefit from continued skilled OT services to increase I and safety during functional tasks to return home at [facility] when able. Prognosis: Good  OT Education: OT Role;Plan of Care;Transfer Training;Energy Conservation  Patient Education: fall prevention/call light use, benefits of being oob, safety in function, pursed lip breathing  REQUIRES OT FOLLOW UP: Yes  Activity Tolerance  Activity Tolerance: Patient Tolerated treatment well         Patient Diagnosis(es): The encounter diagnosis was Generalized weakness.       has a past medical history of AAA (abdominal aortic aneurysm) (HCC), Acid reflux, Anxiety and depression, Aortic stenosis, Arthritis, Blister of ankle, right, CAD (coronary artery disease), Cancer ([facility] [address] COPD (chronic obstructive pulmonary disease) ([facility] [address] Diabetes mellitus ([facility] [address] Falls, Heart block, Hypokalemia, MDRO (multiple drug resistant organisms) resistance, MRSA (methicillin resistant staph aureus) culture positive, On home oxygen therapy, On home oxygen therapy, Overactive bladder, Pneumonia, PONV (postoperative nausea and vomiting), and Vitamin D deficiency. has a past surgical history that includes back surgery; eye surgery; Cholecystectomy; Appendectomy; Hysterectomy; Tonsillectomy; lumbar laminectomy; Endoscopy, colon, diagnostic (12/08/2016); aortic valve repair (N/A, 4/11/2017); bronchoscopy (N/A, 8/31/2020); IR INSERT PICC VAD W SQ PORT >5 YEARS (9/4/2020); and IR PORT PLACEMENT > 5 YEARS (9/29/2020). Restrictions  Restrictions/Precautions  Restrictions/Precautions: General Precautions,Fall Risk  Position Activity Restriction  Other position/activity restrictions: Up with assist, alarms, LUE IV, Gregory cath, 3L O2 per nc, telemetry  Subjective   General  Chart Reviewed: Yes  Patient assessed for rehabilitation services?: Yes  Response to previous treatment: Patient with no complaints from previous session  Family / Caregiver Present: No  Subjective  Subjective: \"I'm eating, come back later\"  General Comment  Comments: Pt agreeable to OT tx with encouragement      Orientation  Orientation  Overall Orientation Status: Within Functional Limits  Objective    ADL  UE Dressing: Minimal assistance (to adjust hosp gown)  Additional Comments: Pt declining all other ADLs at this time. Balance  Sitting Balance: Stand by assistance  Standing Balance: Contact guard assistance  Standing Balance  Time: standing ~ 1-2 min  Activity: funcitonal mobility    Functional Mobility  Functional - Mobility Device: Rolling Walker  Activity: Other (from bed to recliner)  Assist Level: Minimal assistance  Functional Mobility Comments: Pt required Min verbal cues for RW safety, pacing self, upright posture, scanning environment, awareness/assist wtih lines all to increase safety and reduce risk of falls.     Bed mobility  Supine to Sit: Stand by assistance  Sit to Supine: Unable to assess (pt in recliner at end of session)  Scooting: Stand by assistance  Comment: Pt sat EOB ~5-7 min. Pt spitting up phlem once seated EOB. Pt required VCs for assist with line mgmt    Transfers  Sit to stand: Contact guard assistance  Stand to sit: Contact guard assistance  Transfer Comments: Pt required Mod VC/tactile cues for pacing, pursed lip breathing, controlled stand to sit, squaring self/AD up to surface and reaching back prior to sitting, awareness/assist with lines all to increase safety and reduce risk of falls. Cognition  Overall Cognitive Status: Exceptions  Arousal/Alertness: Appropriate responses to stimuli  Following Commands: Follows one step commands consistently; Follows multistep commands with increased time  Attention Span: Appears intact  Memory: Decreased recall of recent events  Safety Judgement: Decreased awareness of need for assistance;Decreased awareness of need for safety  Problem Solving: Assistance required to generate solutions;Assistance required to identify errors made;Decreased awareness of errors;Assistance required to implement solutions;Assistance required to correct errors made  Insights: Decreased awareness of deficits  Initiation: Requires cues for some  Sequencing: Does not require cues                                         Plan   Plan  Times per week: 4-5x/week, 1-2x/day  Current Treatment Recommendations: Strengthening,Balance Training,Functional Mobility Training,Endurance Training,Safety Education & Training,Self-Care / ADL,Patient/Caregiver Education & Training,Equipment Evaluation, Education, & procurement,Positioning,Cognitive/Perceptual Training,Pain Management,Neuromuscular Re-education    AM-PAC Score        AM-PAC Inpatient Daily Activity Raw Score: 16 (04/04/22 1032)  AM-PAC Inpatient ADL T-Scale Score : 35.96 (04/04/22 1032)  ADL Inpatient CMS 0-100% Score: 53.32 (04/04/22 1032)  ADL Inpatient CMS G-Code Modifier : CK (04/04/22 1032)    Goals  Short term goals  Time Frame for Short term goals: by discharge, pt will  Short term goal 1: demo CGA with ADL transfers with approp AD/DME  Short term goal 2: demo CGA with functional mob in room distances with min cues for safety and approp AD  Short term goal 3: demo CGA with toileting routine with approp DME and min cues for safety  Short term goal 4: demo SBA with UB ADLs and min A LB ADLs with AE/DME and min cues for safety  Short term goal 5: demo and verb good understanding of fall prevention techs, EC/WS techs, equip needs, B UE HEP, positioning techs/skin integrity and pressure relieving techs, and d/c recommendations  Patient Goals   Patient goals : agreeable to rehab       Therapy Time   Individual Concurrent Group Co-treatment   Time In 0959         Time Out 1024         Minutes 25                 Upon writer exit, call light within reach, pt retired to chair. All lines intact and patient positioned comfortably. All patient needs addressed prior to ending therapy session. Chart reviewed prior to treatment and patient is agreeable for therapy. RN reports patient is medically stable for therapy treatment this date.     Adelaida Reynolds OTR/L

## 2022-04-04 NOTE — PROGRESS NOTES
Pulmonary Critical Care Progress Note  Joslyn Driscoll MD     Patient seen for the follow up of chronic hypoxic respiratory failure, acute exacerbation of COPD, squamous cell carcinoma right upper lobe, hypotension    Subjective:  She had uneventful night. She is currently sitting up in the bedside chair no distress. She is feeling better today. She did wear BiPAP last night. She denies any chest pain. She has a cough, mostly nonproductive. She has been tolerating orals. Examination:  Vitals: /60   Pulse 77   Temp 97.7 °F (36.5 °C) (Oral)   Resp 16   Ht 5' 8\" (1.727 m)   Wt 165 lb (74.8 kg)   SpO2 98%   BMI 25.09 kg/m²   General appearance: alert and cooperative with exam, up in the chair  Neck: No JVD  Lungs: Moderate air exchange, occasional wheeze and rhonchi  Heart: regular rate and rhythm, S1, S2 normal, no gallop  Abdomen: Soft, non tender, + BS  Extremities: no cyanosis or clubbing.  No significant edema    LABs:  BMP:   Recent Labs     04/03/22  0536 04/04/22  0543    143   K 3.7 3.5*   CO2 28 33*   BUN 17 21   CREATININE 0.62 0.82   LABGLOM >60 >60   GLUCOSE 111* 91     ABG:  Lab Results   Component Value Date    PHART 7.42 08/26/2012    PH 7.22 04/11/2017    JOR3CKC 41 08/26/2012    PCO2 54 04/11/2017    PO2ART 59 08/26/2012    PO2 89 04/11/2017    FBL5MTK 26.1 08/26/2012    HCO3 22.2 04/11/2017    XPO3NKJ 27 04/12/2017    N3HVZNZG 92.1 08/26/2012    O2SAT 95 04/11/2017    FIO2 UNKNOWN 10/31/2017       Lab Results   Component Value Date    POCPH 7.36 04/12/2017    PHART 7.42 08/26/2012    PH 7.22 04/11/2017    POCPCO2 45 04/12/2017    SPT0NEP 41 08/26/2012    PCO2 54 04/11/2017    POCPO2 93 04/12/2017    PO2ART 59 08/26/2012    PO2 89 04/11/2017    POCHCO3 25.3 04/12/2017    DOR0AJA 26.1 08/26/2012    HCO3 22.2 04/11/2017    NBEA NOT REPORTED 04/12/2017    PBEA 0 04/12/2017    OHB4LYQ 27 04/12/2017    ALRQ7DLG 97 04/12/2017    F3MLTPKA 92.1 08/26/2012    O2SAT 95 04/11/2017 FIO2 UNKNOWN 10/31/2017     Radiology:  4/2/22  X-ray chest:      Impression:  · Chronic hypoxic respiratory failure  · Acute exacerbation of COPD  · Former smoker, quit 2016  · Right upper lobe squamous cell carcinoma, following with Dr. Yahaira Orozco  · Recent history of falls with left facial abrasion  · Anxiety, aortic stenosis, depression, diabetes, GERD  · Hypotension, ? sepsis versus dehydration    Recommendations:  · Oxygen via nasal cannula, keep SPO2 90% or greater  · Incentive spirometry every hour while awake/Acapella  · BiPAP at night and as needed. Will make sure she has BiPAP available for use at nighttime at [facility]. · Mucinex  · Symbicort 160  · Prednisone taper  · Albuterol HFA 3 times daily and as needed  · Continue Augmentin. Course of Zithromax and Rocephin completed  · Diuresis with oral Lasix  · DVT prophylaxis with low molecular weight heparin  · GI prophylaxis, protonix  · PT/OT  · Discharge planning to rehab when all arrangements can be made.   · Discussed with RN  · Will follow with you    Electronically signed by     Alexa Phelan MD on 4/4/2022 at 11:46 AM  Pulmonary Critical Care and Sleep Medicine,  [facility]  Cell: [phone]  Office: [phone]

## 2022-04-04 NOTE — CARE COORDINATION
Social work: if bipap is ordered at discharge it should be added to elizabeth or Rx. Auth . Snf started precert again this morning. MUST HAVE PHYSICIAN DISCHARGE ORDER to complete hens. Will need updated elizabeth and Rx at discharge. Wm serna    Social work: OT to see pt in am and note needed to restart precert.  Wm serna

## 2022-04-04 NOTE — PROGRESS NOTES
[address] Hospitalist        4/4/2022   5:40 PM    Name:  Justina Eid  MRN:    [id number]     Acct:     [de-identified]   Room:  2003/2003-02  IP Day: 10     Admit Date: 3/29/2022  6:18 PM  PCP: Keena Virk MD    C/C:   Chief Complaint   Patient presents with    Extremity Weakness       Assessment:      · Unable to ambulate  · Weakness  · Decubitus ulcer buttocks stage II  · Coronary disease, native vessel  · Chronic obstructive pulmonary disease, unspecified  · Diabetes mellitus type 2  · Essential hypertension  · Major depressive disorder  · Anxiety disorder  · Reported abdominal aortic aneurysm  · Gastroesophageal reflux disease without esophagitis  · Aortic stenosis  · Osteoarthritis multiple joints  · Lung cancer  · Past smoker  · Orthostatic hypotension-resolved  · Dehydration-resolved  · Left face abrasion healing  · AECOPD  · Right small pleural effusion  · Atelectasis   ·         Plan:        · Patient is on MedSurg floor  · O2 to maintain oxygen saturation greater than 92%  · BiPAP at night  · Prednisone taper  · Continue Augmentin  · Rocephin and Zithromax completed  · P.o.  Lasix  · Pulmonology following  · DVT and GI prophylaxis  · Continue to monitor/telemetry/CBC with differential daily/BMP daily  · Continue medications as below  ·   · Discharge planning to rehab    Scheduled Meds:   predniSONE  40 mg Oral Daily    amoxicillin-clavulanate  1 tablet Oral 3 times per day    furosemide  40 mg Oral Daily    ARIPiprazole  5 mg Oral Nightly    aspirin EC  81 mg Oral Daily    gabapentin  800 mg Oral BID    glipiZIDE  2.5 mg Oral QAM AC    pantoprazole  20 mg Oral QAM AC    melatonin  9 mg Oral Nightly    metFORMIN  500 mg Oral BID WC    metoprolol tartrate  50 mg Oral BID    budesonide-formoterol  2 puff Inhalation BID    rosuvastatin  5 mg Oral Daily    tamsulosin  0.4 mg Oral Daily    traZODone  300 mg Oral Nightly    venlafaxine  150 mg Oral BID    sodium chloride flush  5-40 mL IntraVENous 2 times per day    enoxaparin  40 mg SubCUTAneous Daily    insulin lispro  0-12 Units SubCUTAneous TID WC    insulin lispro  0-6 Units SubCUTAneous Nightly     Continuous Infusions:   sodium chloride      dextrose       PRN Meds:  hydrALAZINE, 10 mg, Q6H PRN  oxyCODONE-acetaminophen, 1 tablet, Q4H PRN  ipratropium-albuterol, 1 ampule, Q4H PRN  midodrine, 10 mg, TID PRN  albuterol sulfate HFA, 2 puff, Q4H PRN  clonazePAM, 1 mg, TID PRN  sodium chloride flush, 10 mL, PRN  sodium chloride, , PRN  potassium chloride, 40 mEq, PRN   Or  potassium alternative oral replacement, 40 mEq, PRN   Or  potassium chloride, 10 mEq, PRN  magnesium sulfate, 1,000 mg, PRN  ondansetron, 4 mg, Q8H PRN   Or  ondansetron, 4 mg, Q6H PRN  polyethylene glycol, 17 g, Daily PRN  acetaminophen, 650 mg, Q6H PRN   Or  acetaminophen, 650 mg, Q6H PRN  glucose, 15 g, PRN  glucagon (rDNA), 1 mg, PRN  dextrose, 100 mL/hr, PRN  dextrose bolus (hypoglycemia), 125 mL, PRN   Or  dextrose bolus (hypoglycemia), 250 mL, PRN            Subjective:     Patient seen and examined at bedside. No overnight events. No acute complaints today. Afebrile  Pt. Denies any CP, SOB, palpitation, HA, dizziness, chills, cough, cold, changes in urination, BM or skin changes or any pain. ROS:  A 10 point system reviewed and negative otherwise mentioned above.         Physical Examination:      Vitals:  /60   Pulse 83   Temp 98.4 °F (36.9 °C) (Oral)   Resp 18   Ht 5' 8\" (1.727 m)   Wt 165 lb (74.8 kg)   SpO2 96%   BMI 25.09 kg/m²   Temp (24hrs), Av °F (36.7 °C), Min:97.7 °F (36.5 °C), Max:98.4 °F (36.9 °C)    Weight:   Wt Readings from Last 3 Encounters:   22 165 lb (74.8 kg)   22 167 lb (75.8 kg)   22 181 lb 14.4 oz (82.5 kg)     I/O last 3 completed shifts:  I/O last 3 completed shifts:  In: -   Out: 5500 [Urine:5500]     Recent Labs     22  0555 22  1155 04/04/22  1624   POCGLU 132* 80 186* 204*         General appearance - alert, well appearing, and in no acute distress  Mental status - oriented to person, place, and time with normal affect  Head - normocephalic and atraumatic  Eyes - pupils equal and reactive, extraocular eye movements intact, conjunctiva clear  Ears - hearing appears to be intact  Nose - no drainage noted  Mouth - mucous membranes moist  Neck - supple, no carotid bruits, thyroid not palpable  Chest - clear to auscultation, normal effort  Heart - normal rate, regular rhythm, no murmur  Abdomen - soft, nontender, nondistended, bowel sounds present all four quadrants, no masses, hepatomegaly or splenomegaly  Neurological - normal speech, no focal findings or movement disorder noted, cranial nerves II through XII grossly intact  Extremities - peripheral pulses palpable, no pedal edema or calf pain with palpation  Skin - no gross lesions, rashes, or induration noted        Medications: Allergies:    Allergies   Allergen Reactions    Codeine      \"feeling of heavy on chest\"    Dye [Iodides]      Hallucination,vomiting       Current Meds:   Current Facility-Administered Medications:     predniSONE (DELTASONE) tablet 40 mg, 40 mg, Oral, Daily, Whitney Becerril MD, 40 mg at 04/04/22 1001    amoxicillin-clavulanate (AUGMENTIN) 500-125 MG per tablet 1 tablet, 1 tablet, Oral, 3 times per day, Sam Park MD, 1 tablet at 04/04/22 1619    furosemide (LASIX) tablet 40 mg, 40 mg, Oral, Daily, Whitney Becerril MD, 40 mg at 04/04/22 1002    hydrALAZINE (APRESOLINE) injection 10 mg, 10 mg, IntraVENous, Q6H PRN, Whitney Becerril MD, 10 mg at 04/02/22 1824    oxyCODONE-acetaminophen (PERCOCET) 5-325 MG per tablet 1 tablet, 1 tablet, Oral, Q4H PRN, Whitney Becerril MD, 1 tablet at 04/04/22 1200    ipratropium-albuterol (DUONEB) nebulizer solution 1 ampule, 1 ampule, Inhalation, Q4H PRN, Whitney Becerril MD    midodrine (PROAMATINE) tablet 10 mg, 10 mg, Oral, TID PRN, Lisa Sherman MD, 10 mg at 03/31/22 1503    albuterol sulfate  (90 Base) MCG/ACT inhaler 2 puff, 2 puff, Inhalation, Q4H PRN, Whitney Becerril MD, 2 puff at 03/31/22 1351    ARIPiprazole (ABILIFY) tablet 5 mg, 5 mg, Oral, Nightly, Whitney Becerril MD, 5 mg at 04/03/22 2031    aspirin EC tablet 81 mg, 81 mg, Oral, Daily, Whitney Becerril MD, 81 mg at 04/04/22 1001    clonazePAM (KLONOPIN) tablet 1 mg, 1 mg, Oral, TID PRN, Whitney Becerril MD, 1 mg at 04/03/22 0927    gabapentin (NEURONTIN) capsule 800 mg, 800 mg, Oral, BID, Whitney Becerril MD, 800 mg at 04/04/22 1001    glipiZIDE (GLUCOTROL) tablet 2.5 mg, 2.5 mg, Oral, QAM AC, Whitney Becerril MD, 2.5 mg at 04/04/22 1002    pantoprazole (PROTONIX) tablet 20 mg, 20 mg, Oral, RANGEL AC, Whitney Becerril MD, 20 mg at 04/04/22 0611    melatonin tablet 9 mg, 9 mg, Oral, Nightly, Whitney Becerril MD, 9 mg at 04/03/22 2031    metFORMIN (GLUCOPHAGE) tablet 500 mg, 500 mg, Oral, BID WC, Whitney Becerril MD, 500 mg at 04/04/22 1619    metoprolol tartrate (LOPRESSOR) tablet 50 mg, 50 mg, Oral, BID, Whitney Becerril MD, 50 mg at 04/04/22 1003    budesonide-formoterol (SYMBICORT) 160-4.5 MCG/ACT inhaler 2 puff, 2 puff, Inhalation, BID, Whitney Becerril MD, 2 puff at 04/04/22 0947    rosuvastatin (CRESTOR) tablet 5 mg, 5 mg, Oral, Daily, Whitney Becerril MD, 5 mg at 04/04/22 1001    tamsulosin (FLOMAX) capsule 0.4 mg, 0.4 mg, Oral, Daily, Whitney Becerril MD, 0.4 mg at 04/04/22 1002    traZODone (DESYREL) tablet 300 mg, 300 mg, Oral, Nightly, Whitney Becerril MD, 300 mg at 04/03/22 2032    venlafaxine (EFFEXOR XR) extended release capsule 150 mg, 150 mg, Oral, BID, Whitney Becerril MD, 150 mg at 04/04/22 1002    sodium chloride flush 0.9 % injection 5-40 mL, 5-40 mL, IntraVENous, 2 times per day, Lisa Sherman MD, 10 mL at 04/02/22 2240    sodium chloride flush 0.9 % injection 10 mL, 10 mL, IntraVENous, PRN, Whitney Becerril MD, 10 mL at 04/03/22 0431    0.9 % sodium chloride infusion, , IntraVENous, PRN, Whitney Becerril MD    potassium chloride (KLOR-CON M) extended release tablet 40 mEq, 40 mEq, Oral, PRN, 40 mEq at 04/04/22 1619 **OR** potassium bicarb-citric acid (EFFER-K) effervescent tablet 40 mEq, 40 mEq, Oral, PRN **OR** potassium chloride 10 mEq/100 mL IVPB (Peripheral Line), 10 mEq, IntraVENous, PRN, Whitney Becerril MD    magnesium sulfate 1000 mg in dextrose 5% 100 mL IVPB, 1,000 mg, IntraVENous, PRN, Whitney Becerril MD    enoxaparin (LOVENOX) injection 40 mg, 40 mg, SubCUTAneous, Daily, Whitney Becerril MD    ondansetron (ZOFRAN-ODT) disintegrating tablet 4 mg, 4 mg, Oral, Q8H PRN **OR** ondansetron (ZOFRAN) injection 4 mg, 4 mg, IntraVENous, Q6H PRN, Whitney Becerril MD    polyethylene glycol (GLYCOLAX) packet 17 g, 17 g, Oral, Daily PRN, Whitney Becerril MD    acetaminophen (TYLENOL) tablet 650 mg, 650 mg, Oral, Q6H PRN, 650 mg at 03/30/22 1734 **OR** acetaminophen (TYLENOL) suppository 650 mg, 650 mg, Rectal, Q6H PRN, Whitney Becerril MD    insulin lispro (HUMALOG) injection vial 0-12 Units, 0-12 Units, SubCUTAneous, TID WC, Whitney Becerril MD, 4 Units at 04/04/22 1644    insulin lispro (HUMALOG) injection vial 0-6 Units, 0-6 Units, SubCUTAneous, Nightly, Whitney Becerril MD, 1 Units at 04/03/22 0023    glucose (GLUTOSE) 40 % oral gel 15 g, 15 g, Oral, PRN, Whitney Becerril MD    glucagon (rDNA) injection 1 mg, 1 mg, IntraMUSCular, PRN, Whitney Becerril MD    dextrose 5 % solution, 100 mL/hr, IntraVENous, PRN, Whitney Becerril MD    dextrose bolus (hypoglycemia) 10% 125 mL, 125 mL, IntraVENous, PRN **OR** dextrose bolus (hypoglycemia) 10% 250 mL, 250 mL, IntraVENous, PRN, Kat Navarro MD      I/O (24Hr):     Intake/Output Summary (Last 24 hours) at 4/4/2022 1740  Last data filed at 4/4/2022 1706  Gross per 24 hour   Intake --   Output 2650 ml   Net -2650 ml       Data:           Labs:    Hematology:No results for input(s): WBC, RBC, HGB, HCT, MCV, MCH, MCHC, RDW, PLT, MPV, SEDRATE, CRP, INR, DDIMER, SX3OWCST, LABABSO in the last 72 hours. Invalid input(s): PT  Chemistry:  Recent Labs     04/02/22  0555 04/02/22  2220 04/03/22  0536 04/04/22  0543     --  138 143   K 4.1  --  3.7 3.5*     --  99 99   CO2 28  --  28 33*   GLUCOSE 125*  --  111* 91   BUN 13  --  17 21   CREATININE 0.64  --  0.62 0.82   MG  --   --   --  1.7   ANIONGAP 10  --  11 11   LABGLOM >60  --  >60 >60   GFRAA >60  --  >60 >60   CALCIUM 8.4*  --  9.0 8.4*   TROPHS  --  29*  --   --    MYOGLOBIN  --  27  --   --      Recent Labs     04/03/22  1120 04/03/22  1626 04/03/22 2007 04/04/22  0555 04/04/22  1151 04/04/22  1624   POCGLU 149* 93 132* 80 186* 204*       Lab Results   Component Value Date/Time    SPECIAL LT AC 10ML 03/29/2022 07:28 PM     Lab Results   Component Value Date/Time    CULTURE NO GROWTH 5 DAYS 03/29/2022 07:28 PM       Lab Results   Component Value Date    POCPH 7.36 04/12/2017    PHART 7.42 08/26/2012    PH 7.22 04/11/2017    POCPCO2 45 04/12/2017    YOI6DXB 41 08/26/2012    PCO2 54 04/11/2017    POCPO2 93 04/12/2017    PO2ART 59 08/26/2012    PO2 89 04/11/2017    POCHCO3 25.3 04/12/2017    CPL6YYM 26.1 08/26/2012    HCO3 22.2 04/11/2017    NBEA NOT REPORTED 04/12/2017    PBEA 0 04/12/2017    PZV7LBK 27 04/12/2017    FGFL9XSU 97 04/12/2017    M8VXBWBM 92.1 08/26/2012    O2SAT 95 04/11/2017    FIO2 UNKNOWN 10/31/2017       Radiology:    XR CHEST (SINGLE VIEW FRONTAL)    Result Date: 3/30/2022  No significant change from prior exam.     XR CHEST PORTABLE    Result Date: 4/2/2022  Increased small right pleural effusion, with increased right basilar airspace opacities favored to represent atelectasis     XR CHEST PORTABLE    Result Date: 4/2/2022  No significant change from prior study. Stable cardiac size and continued interstitial opacities with opacity in the right upper lung field adjacent to the right mediastinum consistent with known lung cancer.      XR CHEST PORTABLE    Result Date: 4/1/2022  Interstitial opacities in the left lung appear more prominent in the interval.  No appreciable change in right perihilar and upper lung field opacity. XR CHEST PORTABLE    Result Date: 3/29/2022  Right apical consolidation is similar prior examination. Chest CT could provide further information. All radiological studies reviewed  Code Status:  Full Code        Electronically signed by Travis Walden MD on 4/4/2022 at 5:40 PM    This note was created with the assistance of a speech-recognition program.  Although the intention is to generate a document that actually reflects the content of the visit, no guarantees can be provided that every mistake has been identified and corrected by editing. Note was updated later by me after  physical examination and  completion of the assessment.

## 2022-04-04 NOTE — FLOWSHEET NOTE
[facility]  PROGRESS NOTE    Shift date: 4/4/22  Shift day: Monday   Shift # 1    Room # 2003/2003-02   Name: Caitie Cedeño            Age: 71 y.o. Gender: female          Sikh: [employer]: Shoaiblytanna [place]    Referral: Routine Visit    Admit Date & Time: 3/29/2022  6:18 PM    PATIENT/EVENT DESCRIPTION:  Caitie Cedeño is a 71 y.o. female whom writer has met in the outpatient oncology center at [address]. Writer provided a supportive visit to Patient in the hospital today. SPIRITUAL ASSESSMENT/INTERVENTION:  Ms. Crystal Hunter was eating dinner. She smiled and greeted writer. She shared that she has been in the hospital for eight days. She is hoping to leave for rehab tomorrow. She expressed gratitude that she was feeling better. She accessed her sense of humor, noting that \"something always happens around a CT. \" She laughed as she said this. She shared that the  visited her and that it was helpful. She welcomed a copy of \"Our Daily Bread\" and prayer. She voiced her prayer requests. Writer provided supportive presence and active listening; inquired about Pt's coping and needs; offered words of encouragement and support; affirmed Pt's strengths; gave Pt a copy of \"Our Daily Bread;\" prayed for Pt. SPIRITUAL CARE FOLLOW-UP PLAN:  Chaplains will remain available to offer spiritual and emotional support as needed. 04/04/22 1731   Encounter Summary   Services provided to: Patient   Referral/Consult From: [address] [place] [address] Children;Family members;Friends/neighbors   Continue Visiting   (4/4/22)   Complexity of Encounter Moderate   Length of Encounter 15 minutes   Spiritual Assessment Completed Yes   Routine   Type Follow up   Spiritual/Episcopal   Type Spiritual support   Assessment Calm; Approachable   Intervention Active listening;Explored feelings, thoughts, concerns;Explored coping resources;Prayer;Provided reading materials/devotional materials;Sustaining presence/ Ministry of presence; Discussed illness/injury and it's impact; Discussed belief system/Anabaptism practices/chuck;Discussed relationship with God;Discussed meaning/purpose   Outcome Receptive; Hopeful;Encouraged;Coping;Expressed feelings/needs/concerns;Engaged in conversation;Expressed gratitude     Electronically signed by Emile Charlton on 4/4/2022 at 5:32 PM.  [facility]  [phone]

## 2022-04-04 NOTE — PLAN OF CARE
Problem: Falls - Risk of:  Goal: Will remain free from falls  Description: Will remain free from falls  4/4/2022 1228 by Lia Hansen RN  Outcome: Ongoing  Note: Patient remains free from falls.   Bed in lowest position, call light within reach, side rails x2, hourly rounding, fallen star, bed/chair alarm, non skid slippers, patient instructed to call out for assist  4/4/2022 0554 by Loyd Bundy RN  Outcome: Ongoing

## 2022-04-05 LAB
ANION GAP SERPL CALCULATED.3IONS-SCNC: 11 MMOL/L (ref 9–17)
BUN BLDV-MCNC: 20 MG/DL (ref 8–23)
BUN/CREAT BLD: 28 (ref 9–20)
CALCIUM SERPL-MCNC: 8.4 MG/DL (ref 8.6–10.4)
CHLORIDE BLD-SCNC: 96 MMOL/L (ref 98–107)
CO2: 33 MMOL/L (ref 20–31)
CREAT SERPL-MCNC: 0.71 MG/DL (ref 0.5–0.9)
GFR AFRICAN AMERICAN: >60 ML/MIN
GFR NON-AFRICAN AMERICAN: >60 ML/MIN
GFR SERPL CREATININE-BSD FRML MDRD: ABNORMAL ML/MIN/{1.73_M2}
GLUCOSE BLD-MCNC: 106 MG/DL (ref 65–105)
GLUCOSE BLD-MCNC: 127 MG/DL (ref 70–99)
GLUCOSE BLD-MCNC: 128 MG/DL (ref 65–105)
GLUCOSE BLD-MCNC: 242 MG/DL (ref 65–105)
GLUCOSE BLD-MCNC: 94 MG/DL (ref 65–105)
MAGNESIUM: 1.8 MG/DL (ref 1.6–2.6)
POTASSIUM SERPL-SCNC: 3.8 MMOL/L (ref 3.7–5.3)
SODIUM BLD-SCNC: 140 MMOL/L (ref 135–144)

## 2022-04-05 PROCEDURE — 6370000000 HC RX 637 (ALT 250 FOR IP): Performed by: FAMILY MEDICINE

## 2022-04-05 PROCEDURE — 94761 N-INVAS EAR/PLS OXIMETRY MLT: CPT

## 2022-04-05 PROCEDURE — 80048 BASIC METABOLIC PNL TOTAL CA: CPT

## 2022-04-05 PROCEDURE — 94660 CPAP INITIATION&MGMT: CPT

## 2022-04-05 PROCEDURE — 1200000000 HC SEMI PRIVATE

## 2022-04-05 PROCEDURE — 2700000000 HC OXYGEN THERAPY PER DAY

## 2022-04-05 PROCEDURE — 93005 ELECTROCARDIOGRAM TRACING: CPT | Performed by: HOSPITALIST

## 2022-04-05 PROCEDURE — 83735 ASSAY OF MAGNESIUM: CPT

## 2022-04-05 PROCEDURE — 82947 ASSAY GLUCOSE BLOOD QUANT: CPT

## 2022-04-05 PROCEDURE — 36415 COLL VENOUS BLD VENIPUNCTURE: CPT

## 2022-04-05 PROCEDURE — 94640 AIRWAY INHALATION TREATMENT: CPT

## 2022-04-05 RX ADMIN — OXYCODONE AND ACETAMINOPHEN 1 TABLET: 5; 325 TABLET ORAL at 01:36

## 2022-04-05 RX ADMIN — METOPROLOL TARTRATE 50 MG: 50 TABLET, FILM COATED ORAL at 21:02

## 2022-04-05 RX ADMIN — GABAPENTIN 800 MG: 400 CAPSULE ORAL at 21:01

## 2022-04-05 RX ADMIN — Medication 9 MG: at 21:01

## 2022-04-05 RX ADMIN — OXYCODONE AND ACETAMINOPHEN 1 TABLET: 5; 325 TABLET ORAL at 18:07

## 2022-04-05 RX ADMIN — INSULIN LISPRO 2 UNITS: 100 INJECTION, SOLUTION INTRAVENOUS; SUBCUTANEOUS at 21:02

## 2022-04-05 RX ADMIN — OXYCODONE AND ACETAMINOPHEN 1 TABLET: 5; 325 TABLET ORAL at 07:28

## 2022-04-05 RX ADMIN — METFORMIN HYDROCHLORIDE 500 MG: 500 TABLET ORAL at 07:28

## 2022-04-05 RX ADMIN — OXYCODONE AND ACETAMINOPHEN 1 TABLET: 5; 325 TABLET ORAL at 13:15

## 2022-04-05 RX ADMIN — BUDESONIDE AND FORMOTEROL FUMARATE DIHYDRATE 2 PUFF: 160; 4.5 AEROSOL RESPIRATORY (INHALATION) at 20:25

## 2022-04-05 RX ADMIN — ONDANSETRON 4 MG: 4 TABLET, ORALLY DISINTEGRATING ORAL at 11:15

## 2022-04-05 RX ADMIN — PANTOPRAZOLE SODIUM 20 MG: 20 TABLET, DELAYED RELEASE ORAL at 06:05

## 2022-04-05 RX ADMIN — METFORMIN HYDROCHLORIDE 500 MG: 500 TABLET ORAL at 18:07

## 2022-04-05 RX ADMIN — VENLAFAXINE HYDROCHLORIDE 150 MG: 75 CAPSULE, EXTENDED RELEASE ORAL at 21:01

## 2022-04-05 RX ADMIN — TRAZODONE HYDROCHLORIDE 300 MG: 100 TABLET ORAL at 21:02

## 2022-04-05 RX ADMIN — AMOXICILLIN AND CLAVULANATE POTASSIUM 1 TABLET: 500; 125 TABLET, FILM COATED ORAL at 21:01

## 2022-04-05 RX ADMIN — BUDESONIDE AND FORMOTEROL FUMARATE DIHYDRATE 2 PUFF: 160; 4.5 AEROSOL RESPIRATORY (INHALATION) at 08:34

## 2022-04-05 RX ADMIN — AMOXICILLIN AND CLAVULANATE POTASSIUM 1 TABLET: 500; 125 TABLET, FILM COATED ORAL at 14:37

## 2022-04-05 RX ADMIN — AMOXICILLIN AND CLAVULANATE POTASSIUM 1 TABLET: 500; 125 TABLET, FILM COATED ORAL at 06:05

## 2022-04-05 RX ADMIN — ARIPIPRAZOLE 5 MG: 5 TABLET ORAL at 21:01

## 2022-04-05 RX ADMIN — ONDANSETRON 4 MG: 4 TABLET, ORALLY DISINTEGRATING ORAL at 21:09

## 2022-04-05 RX ADMIN — GLIPIZIDE 2.5 MG: 5 TABLET ORAL at 06:05

## 2022-04-05 ASSESSMENT — PAIN SCALES - GENERAL
PAINLEVEL_OUTOF10: 10
PAINLEVEL_OUTOF10: 4
PAINLEVEL_OUTOF10: 7
PAINLEVEL_OUTOF10: 6
PAINLEVEL_OUTOF10: 6
PAINLEVEL_OUTOF10: 4

## 2022-04-05 ASSESSMENT — PAIN DESCRIPTION - DESCRIPTORS: DESCRIPTORS: ACHING;DISCOMFORT

## 2022-04-05 ASSESSMENT — PAIN DESCRIPTION - PAIN TYPE: TYPE: CHRONIC PAIN

## 2022-04-05 ASSESSMENT — PAIN DESCRIPTION - LOCATION: LOCATION: BACK;CHEST

## 2022-04-05 ASSESSMENT — PAIN DESCRIPTION - ONSET: ONSET: ON-GOING

## 2022-04-05 ASSESSMENT — PAIN DESCRIPTION - FREQUENCY: FREQUENCY: CONTINUOUS

## 2022-04-05 NOTE — PROGRESS NOTES
Physical Therapy  DATE: 2022    NAME: Josette Zaysa  MRN: [id number]   : 1950    Patient not seen this date for Physical Therapy due to:      [] Cancel by RN or physician due to:    [] Hemodialysis    [] Critical Lab Value Level     [] Blood transfusion in progress    [] Acute or unstable cardiovascular status   _MAP < 55 or more than >115  _HR < 40 or > 130    [] Acute or unstable pulmonary status   -FiO2 > 60%   _RR < 5 or >40    _O2 sats < 85%    [] Strict Bedrest    [] Off Unit for surgery or procedure    [] Off Unit for testing       [] Pending imaging to R/O fracture    [x] Refusal by Patient: Pt refusing tx again this date. Reports vomiting and nausea and not willing to try again later. RN notified. [] Other      [] PT being discontinued at this time. Patient independent. No further needs. [] PT being discontinued at this time as the patient has been transferred to hospice care. No further needs.       Zackery Maier, PTA

## 2022-04-05 NOTE — PLAN OF CARE
Problem: Falls - Risk of:  Goal: Will remain free from falls  Description: Will remain free from falls  Outcome: Ongoing  Note: Patient remains free from falls  Bed in lowest position, call light within reach, siderails x2, hourly rounding, fallen star. Non skid slippers.  Patient instructed to call out for assist

## 2022-04-05 NOTE — PROGRESS NOTES
Patient resting in bed. Denies needs. asymptomatic Patient with run of vtach .   Dr. Alex Snyder notified new orders obtained

## 2022-04-05 NOTE — PROGRESS NOTES
Pulmonary Critical Care Progress Note  Pool Baeza MD     Patient seen for the follow up of chronic hypoxic respiratory failure, acute exacerbation of COPD, squamous cell carcinoma right upper lobe, hypotension    Subjective:  She had uneventful night. She is currently resting in bed does not appear to be in any distress. She complains of nausea this morning. She states she had emesis however RN says she has not had any emesis, just coughed up a little bit of phlegm earlier this morning. She wore BiPAP last night. She denies any chest pain. Examination:  Vitals: BP (!) 138/59   Pulse 78   Temp 97.9 °F (36.6 °C) (Oral)   Resp 18   Ht 5' 8\" (1.727 m)   Wt 165 lb 0.8 oz (74.9 kg)   SpO2 97%   BMI 25.10 kg/m²   General appearance: alert and cooperative with exam, resting in bed  Neck: No JVD  Lungs: Moderate air exchange, no wheezing, rales or rhonchi  Heart: regular rate and rhythm, S1, S2 normal, no gallop  Abdomen: Soft, non tender, + BS  Extremities: no cyanosis or clubbing.  No significant edema    LABs:  BMP:   Recent Labs     04/04/22  0543 04/05/22  0715    140   K 3.5* 3.8   CO2 33* 33*   BUN 21 20   CREATININE 0.82 0.71   LABGLOM >60 >60   GLUCOSE 91 127*     ABG:  Lab Results   Component Value Date    PHART 7.42 08/26/2012    PH 7.22 04/11/2017    UTS0ZBG 41 08/26/2012    PCO2 54 04/11/2017    PO2ART 59 08/26/2012    PO2 89 04/11/2017    JBT8WAM 26.1 08/26/2012    HCO3 22.2 04/11/2017    GWD0BHW 27 04/12/2017    Q2DCGNFC 92.1 08/26/2012    O2SAT 95 04/11/2017    FIO2 UNKNOWN 10/31/2017       Lab Results   Component Value Date    POCPH 7.36 04/12/2017    PHART 7.42 08/26/2012    PH 7.22 04/11/2017    POCPCO2 45 04/12/2017    WMV3WJG 41 08/26/2012    PCO2 54 04/11/2017    POCPO2 93 04/12/2017    PO2ART 59 08/26/2012    PO2 89 04/11/2017    POCHCO3 25.3 04/12/2017    ETM5GRK 26.1 08/26/2012    HCO3 22.2 04/11/2017    NBEA NOT REPORTED 04/12/2017    PBEA 0 04/12/2017    PYA8KXD 27 04/12/2017    TTGS2AQL 97 04/12/2017    J4DNPETI 92.1 08/26/2012    O2SAT 95 04/11/2017    FIO2 UNKNOWN 10/31/2017     Radiology:  4/2/22  X-ray chest:      Impression:  · Chronic hypoxic respiratory failure  · Acute exacerbation of COPD  · Former smoker, quit 2016  · Right upper lobe squamous cell carcinoma, following with Dr. Sharad Jennings  · Recent history of falls with left facial abrasion  · Anxiety, aortic stenosis, depression, diabetes, GERD  · Hypotension, ? sepsis versus dehydration    Recommendations:  · Oxygen via nasal cannula, keep SPO2 90% or greater  · Incentive spirometry every hour while awake/Acapella  · BiPAP at night and as needed. Will make sure she has BiPAP available for use at nighttime at [facility]. · Mucinex  · Symbicort 160  · Prednisone taper  · Albuterol HFA 3 times daily and as needed  · Continue Augmentin. Course of Zithromax and Rocephin completed  · Diuresis with oral Lasix  · DVT prophylaxis with low molecular weight heparin  · GI prophylaxis, protonix  · PT/OT  · Discharge planning to rehab when all arrangements can be made.   · Discussed with RN      Electronically signed by     AV Camp CNP on 4/5/2022 at 11:11 AM  Pulmonary Critical Care and Sleep Medicine,  [facility] AT [facility] [place]: [phone]

## 2022-04-05 NOTE — PROGRESS NOTES
[address]  Occupational Therapy Not Seen    DATE: 2022    NAME: Sandra Salguero  MRN: [id number]   : 1950    Patient not seen this date for Occupational Therapy due to:      [] Cancel by RN or physician due to:    [] Hemodialysis    [] Critical Lab Value Level     [] Blood transfusion in progress    [] Acute or unstable cardiovascular status   _MAP < 55 or more than >115  _HR < 40 or > 130    [] Acute or unstable pulmonary status   -FiO2 > 60%   _RR < 5 or >40    _O2 sats < 85%    [] Strict Bedrest    [] Off Unit for surgery or procedure    [] Off Unit for testing       [] Pending imaging to R/O fracture    [x] Refusal by Patient. Pt reports she was throwing up earlier and feeling very nauseous. She does not want to attempt anything right now and upset herself again. OT will check back 2022. [] Other      [] OT being discontinued at this time. Patient independent. No further needs. [] OT being discontinued at this time as the patient has been transferred to hospice care. No further needs.       ROLO Tse

## 2022-04-05 NOTE — CONSULTS
Section of Cardiology   Consult Note      Reason for Consult:  Ventricular tachycardia  Requesting Physician: Juanita Mims MD    CHIEF COMPLAINT:  Shortness of breath, fatigue    History Obtained From:  patient, electronic medical record, patient status    HISTORY OF PRESENT ILLNESS:      The patient is a 71 y.o. female with significant past medical history of aortic stenosis, advanced COPD, lung cancer who was admitted due to fatigue and weakness, she is awaiting placement. Incidentally the patient had tachycardia tach and I was told to see the patient. She is known to our practice. She is on continuous oxygen use. The patient did not report palpitation. She is chronically dizzy however her blood pressure and heart rate remained normal.  She has chronic dyspnea. No PND orthopnea. Denies any current abdominal pain. No nausea or vomiting. No bleeding. Previous diagnostic testing for coronary artery disease includes: cardiac catheterization, echocardiogram.   Previous history of cardiac disease includes: ischemic heart disease, arrhythmia and valvular disease. Coronary artery disease risk factors include: advanced age (older than 54 for men, 72 for women), dyslipidemia, hypertension, sedentary lifestyle and smoking/ tobacco exposure. Past Medical History:    Past Medical History:   Diagnosis Date    AAA (abdominal aortic aneurysm) ([facility]     Pt denies having a history of AAA    Acid reflux     Anxiety and depression     Aortic stenosis     Arthritis     Blister of ankle, right 10/13/2016    blister broke open & draining, is on antibiotics    CAD (coronary artery disease)     Cancer ([address]     lung cancer    COPD (chronic obstructive pulmonary disease) ([address]     Diabetes mellitus ([facility]     Falls     Heart block     bifasicular    Hypokalemia     MDRO (multiple drug resistant organisms) resistance 10/17/2014    E.  Coli urine    MRSA (methicillin resistant staph aureus) culture positive resolved 12/2016    2 negative nasal screens - 2016 (hx in urine 2014)    On home oxygen therapy     uses 2 liters at night    On home oxygen therapy     patient states 2 liters/nasal cannula continuous    Overactive bladder     patient incont. wears a brief    Pneumonia     PONV (postoperative nausea and vomiting)     Vitamin D deficiency      Past Surgical History:    Past Surgical History:   Procedure Laterality Date    AORTIC VALVE REPAIR N/A 4/11/2017    AORTIC VALVE REPAIR REPLACEMENT performed by Kasey Louie MD at [address] N/A 8/31/2020    BRONCHOSCOPY BIOPSY BRONCHUS performed by Johan Hugo MD at [address] [facility], COLON, DIAGNOSTIC  12/08/2016    EYE SURGERY      HYSTERECTOMY      IR INS PICC VAD W SQ PORT GREATER THAN 5  9/4/2020    IR INS PICC VAD W SQ PORT GREATER THAN 5 9/4/2020 [facility]    IR PORT PLACEMENT EQUAL OR GREATER THAN 5 YEARS  9/29/2020    IR PORT PLACEMENT EQUAL OR GREATER THAN 5 YEARS 9/29/2020 MD JAY JAY Gallo [facility]    LUMBAR LAMINECTOMY      TONSILLECTOMY       Home Medications:  Prior to Admission medications    Medication Sig Start Date End Date Taking? Authorizing Provider   clonazePAM (KLONOPIN) 1 MG tablet Take 1 tablet by mouth 3 times daily as needed for Anxiety for up to 3 doses. 4/4/22 6/25/23 Yes Terrial Night, MD   gabapentin (NEURONTIN) 400 MG capsule Take 2 capsules by mouth in the morning and at bedtime for 5 doses. 4/4/22 4/7/22 Yes Terrial Night, MD   oxyCODONE-acetaminophen (PERCOCET)  MG per tablet Take 1 tablet by mouth every 6 hours as needed for Pain for up to 2 days.  4/4/22 4/6/22 Yes Terrial Night, MD   metoprolol tartrate (LOPRESSOR) 50 MG tablet Take 1 tablet by mouth 2 times daily 3/25/22   Gustavo Enriquez MD   albuterol sulfate  (90 Base) MCG/ACT inhaler Inhale 2 puffs into the lungs every 4 hours as needed for Wheezing 3/25/22   Jaret Adams MD   neomycin-bacitracin-polymyxin (NEOSPORIN) 400-5-5000 ointment Apply topically daily for 5 days Apply topically 2 times daily.  3/25/22 3/30/22  Jaret Adams MD   tamsulosin (FLOMAX) 0.4 MG capsule Take 1 capsule by mouth daily 3/26/22   Jaret Adams MD   ARIPiprazole (ABILIFY) 5 MG tablet Take 5 mg by mouth at bedtime    Historical Provider, MD   metFORMIN (GLUCOPHAGE) 500 MG tablet Take 500 mg by mouth 2 times daily (with meals)    Historical Provider, MD   Pseudoeph-Doxylamine-DM-APAP (NYQUIL MULTI-SYMPTOM PO) Take by mouth at bedtime    Historical Provider, MD   traZODone (DESYREL) 150 MG tablet Take 300 mg by mouth nightly    Historical Provider, MD   melatonin 5 MG TABS tablet Take 15 mg by mouth nightly    Historical Provider, MD   ondansetron (ZOFRAN) 4 MG tablet Take 1 tablet by mouth every 8 hours as needed for Nausea or Vomiting 1/26/22   Ramon Lynch MD   rosuvastatin (CRESTOR) 5 MG tablet Take 5 mg by mouth daily    Historical Provider, MD   furosemide (LASIX) 40 MG tablet Take 40 mg by mouth daily as needed (swelling)     Historical Provider, MD   glimepiride (AMARYL) 1 MG tablet Take 1 mg by mouth 2 times daily (with meals)     Historical Provider, MD   venlafaxine (EFFEXOR XR) 150 MG extended release capsule Take 150 mg by mouth 2 times daily    Historical Provider, MD   lansoprazole (PREVACID) 30 MG delayed release capsule Take 30 mg by mouth daily 11/10/16   Historical Provider, MD   aspirin EC 81 MG EC tablet Take 81 mg by mouth daily    Historical Provider, MD   mometasone-formoterol (DULERA) 200-5 MCG/ACT inhaler Inhale 2 puffs into the lungs every 12 hours as needed (wheezing/SOB)     Historical Provider, MD     Current Medications:    Current Facility-Administered Medications   Medication Dose Route Frequency Provider Last Rate Last Admin    predniSONE (DELTASONE) tablet 40 mg  40 mg Oral Daily Whitney Becerirl MD   40 mg at 04/04/22 1001    amoxicillin-clavulanate (AUGMENTIN) 500-125 MG per tablet 1 tablet  1 tablet Oral 3 times per day Emily Arteaga MD   1 tablet at 04/05/22 1437    furosemide (LASIX) tablet 40 mg  40 mg Oral Daily Whitney Becerril MD   40 mg at 04/04/22 1002    hydrALAZINE (APRESOLINE) injection 10 mg  10 mg IntraVENous Q6H PRN Whitney Becerril MD   10 mg at 04/02/22 1824    oxyCODONE-acetaminophen (PERCOCET) 5-325 MG per tablet 1 tablet  1 tablet Oral Q4H PRN Emily Arteaga MD   1 tablet at 04/05/22 1807    ipratropium-albuterol (DUONEB) nebulizer solution 1 ampule  1 ampule Inhalation Q4H PRN Whitney Becerril MD        midodrine (PROAMATINE) tablet 10 mg  10 mg Oral TID PRN Emily Arteaga MD   10 mg at 03/31/22 1503    albuterol sulfate  (90 Base) MCG/ACT inhaler 2 puff  2 puff Inhalation Q4H PRN Whitney Becerril MD   2 puff at 03/31/22 1351    ARIPiprazole (ABILIFY) tablet 5 mg  5 mg Oral Nightly Whitney Becerril MD   5 mg at 04/04/22 2025    aspirin EC tablet 81 mg  81 mg Oral Daily Whitney Becerril MD   81 mg at 04/04/22 1001    clonazePAM (KLONOPIN) tablet 1 mg  1 mg Oral TID PRN Emily Arteaga MD   1 mg at 04/03/22 0927    gabapentin (NEURONTIN) capsule 800 mg  800 mg Oral BID Emily Arteaga MD   800 mg at 04/04/22 2026    glipiZIDE (GLUCOTROL) tablet 2.5 mg  2.5 mg Oral QAM JUNIOR Becerril MD   2.5 mg at 04/05/22 0605    pantoprazole (PROTONIX) tablet 20 mg  20 mg Oral QAM JUNIOR Becerril MD   20 mg at 04/05/22 0605    melatonin tablet 9 mg  9 mg Oral Nightly Whitney Becerril MD   9 mg at 04/04/22 2025    metFORMIN (GLUCOPHAGE) tablet 500 mg  500 mg Oral BID  Whitney Becerril MD   500 mg at 04/05/22 1807    metoprolol tartrate (LOPRESSOR) tablet 50 mg  50 mg Oral BID Whitney Becerril MD   50 mg at 04/04/22 2025    budesonide-formoterol (SYMBICORT) 160-4.5 MCG/ACT inhaler 2 puff  2 puff Inhalation BID Emily Arteaga MD   2 puff at 04/05/22 0834    rosuvastatin (CRESTOR) tablet 5 mg  5 mg Oral Daily Whitney Becerril MD   5 mg at 04/04/22 1001    tamsulosin (FLOMAX) capsule 0.4 mg  0.4 mg Oral Daily Whitney Becerril MD   0.4 mg at 04/04/22 1002    traZODone (DESYREL) tablet 300 mg  300 mg Oral Nightly Paddy Robledo MD   300 mg at 04/04/22 2026    venlafaxine (EFFEXOR XR) extended release capsule 150 mg  150 mg Oral BID Paddy Robledo MD   150 mg at 04/04/22 2025    sodium chloride flush 0.9 % injection 5-40 mL  5-40 mL IntraVENous 2 times per day Paddy Robledo MD   10 mL at 04/02/22 2240    sodium chloride flush 0.9 % injection 10 mL  10 mL IntraVENous PRN Paddy Robledo MD   10 mL at 04/03/22 0431    0.9 % sodium chloride infusion   IntraVENous PRN Paddy Robledo MD        potassium chloride (KLOR-CON M) extended release tablet 40 mEq  40 mEq Oral PRN Whitney Becerril MD   40 mEq at 04/04/22 1619    Or    potassium bicarb-citric acid (EFFER-K) effervescent tablet 40 mEq  40 mEq Oral PRN Whitney Becerril MD        Or    potassium chloride 10 mEq/100 mL IVPB (Peripheral Line)  10 mEq IntraVENous PRN Whitney Becerril MD        magnesium sulfate 1000 mg in dextrose 5% 100 mL IVPB  1,000 mg IntraVENous PRN Whitney Becerril MD        enoxaparin (LOVENOX) injection 40 mg  40 mg SubCUTAneous Daily Whitney Becerril MD        ondansetron (ZOFRAN-ODT) disintegrating tablet 4 mg  4 mg Oral Q8H PRN Whitney Becerril MD   4 mg at 04/05/22 1115    Or    ondansetron (ZOFRAN) injection 4 mg  4 mg IntraVENous Q6H PRN Whitney Becerril MD        polyethylene glycol (GLYCOLAX) packet 17 g  17 g Oral Daily PRN Whitney Becerril MD        acetaminophen (TYLENOL) tablet 650 mg  650 mg Oral Q6H PRN Whitney Becerril MD   650 mg at 03/30/22 1734    Or    acetaminophen (TYLENOL) suppository 650 mg  650 mg Rectal Q6H PRN Whitney Becerril MD        insulin lispro (HUMALOG) injection vial 0-12 Units  0-12 Units SubCUTAneous TID  Whitney Becerril MD   4 Units at 04/04/22 1644    insulin lispro (HUMALOG) injection vial 0-6 Units  0-6 Units SubCUTAneous Nightly Whitney Becerril MD   1 Units at 22 0023    glucose (GLUTOSE) 40 % oral gel 15 g  15 g Oral PRN Whitney Becerril MD        glucagon (rDNA) injection 1 mg  1 mg IntraMUSCular PRN Whitney Becerril MD        dextrose 5 % solution  100 mL/hr IntraVENous PRN Whitney Becerril MD        dextrose bolus (hypoglycemia) 10% 125 mL  125 mL IntraVENous PRN Gerardo Newsome MD        Or    dextrose bolus (hypoglycemia) 10% 250 mL  250 mL IntraVENous PRN Whitney Becerril MD         Allergies:  Codeine and Dye [iodides]    Social History:    Social History     Socioeconomic History    Marital status: Single     Spouse name: Not on file    Number of children: Not on file    Years of education: Not on file    Highest education level: Not on file   Occupational History    Not on file   Tobacco Use    Smoking status: Former Smoker     Quit date: 2016     Years since quittin.6    Smokeless tobacco: Never Used   Vaping Use    Vaping Use: Never used   Substance and Sexual Activity    Alcohol use: No    Drug use: No    Sexual activity: Not on file   Other Topics Concern    Not on file   Social History Narrative    Not on file     Social Determinants of Health     Financial Resource Strain:     Difficulty of Paying Living Expenses: Not on file   Food Insecurity:     Worried About Running Out of Food in the Last Year: Not on file    Enmanuel of Food in the Last Year: Not on file   Transportation Needs:     Lack of Transportation (Medical): Not on file    Lack of Transportation (Non-Medical):  Not on file   Physical Activity:     Days of Exercise per Week: Not on file    Minutes of Exercise per Session: Not on file   Stress:     Feeling of Stress : Not on file   Social Connections:     Frequency of Communication with Friends and Family: Not on file    Frequency of Social Gatherings with Friends and Family: Not on file    Attends Zoroastrianism Services: Not on file   [employer] of [employer]s or Organizations: Not on file    Attends Club or Organization Meetings: Not on file    Marital Status: Not on file   Intimate Partner Violence:     Fear of Current or Ex-Partner: Not on file    Emotionally Abused: Not on file    Physically Abused: Not on file    Sexually Abused: Not on file   Housing Stability:     Unable to Pay for Housing in the Last Year: Not on file    Number of [place] in the Last Year: Not on file    Unstable Housing in the Last Year: Not on file     Family History:   Family History   Problem Relation Age of Onset    Cancer Mother     Cancer Father        · REVIEW OF SYSTEMS   Pertinent information as above    PHYSICAL EXAM:    Vitals:    VITALS:  /62   Pulse 105   Temp 97.9 °F (36.6 °C) (Oral)   Resp 18   Ht 5' 8\" (1.727 m)   Wt 165 lb 0.8 oz (74.9 kg)   SpO2 97%   BMI 25.10 kg/m²   24HR INTAKE/OUTPUT:      Intake/Output Summary (Last 24 hours) at 4/5/2022 1849  Last data filed at 4/5/2022 1810  Gross per 24 hour   Intake 480 ml   Output 850 ml   Net -370 ml       CONSTITUTIONAL:  awake, alert, cooperative, no apparent distress, and appears stated age  EYES: Pupils equal, round and reactive to light, extra ocular muscles intact, sclera clear, conjunctiva normal  ENT:  normocepalic, without obvious abnormality  NECK:  supple, symmetrical, trachea midline, no carotid bruit ,   No  JVD  BACK:  symmetric  LUNGS: Non-labored, diminished breath sounds bilaterally CARDIOVASCULAR:  Normal apical impulse, regular rate and rhythm, normal S1 and S2, no S3 or S4, and no murmur noted, no rub.  radial and bilateralpresent 2+  ABDOMEN:  No scars, normal bowel sounds, soft, non-distended, non-tender  MUSCULOSKELETAL:  there is no redness, warmth, or swelling of the joints   No leg edema. NEUROLOGIC:  Awake, alert, oriented to name, place and time.   SKIN:  no bruising or bleeding, normal skin color, texture, turgor and no jaundice    DATA:   ECG:  EKG done today showed sinus rhythm, right bundle branch block, prolonged QT interval, old septal wall MI, no new changes  ECHO: Date:2020   it showed normal LV systolic function with moderate aortic stenosis  Stress Test:  Not performed to date  Angiography: In 2016 showed moderate circumflex coronary artery disease    Cardiology Labs:  Recent Labs     04/02/22 2220   MYOGLOBIN 27     Warfarin PT/INR:  Lab Results   Component Value Date    PROTIME 13.4 03/30/2022    INR 1.0 03/30/2022     CBC:  Lab Results   Component Value Date    WBC 9.7 03/29/2022    RBC 4.03 03/29/2022    HGB 11.0 03/29/2022    HCT 35.1 03/29/2022    MCV 87.1 03/29/2022    MCH 27.3 03/29/2022    MCHC 31.3 03/29/2022    RDW 14.0 03/29/2022     03/29/2022    MPV 8.9 03/29/2022     CMP:  Lab Results   Component Value Date     04/05/2022    K 3.8 04/05/2022    CL 96 04/05/2022    CO2 33 04/05/2022    BUN 20 04/05/2022    CREATININE 0.71 04/05/2022    GFRAA >60 04/05/2022    LABGLOM >60 04/05/2022    GLUCOSE 127 04/05/2022    CALCIUM 8.4 04/05/2022     Magnesium:    Lab Results   Component Value Date    MG 1.8 04/05/2022     PTT:    Lab Results   Component Value Date    APTT 33.4 09/29/2020     TSH:    Lab Results   Component Value Date    TSH 7.11 08/25/2021     BMP:  Lab Results   Component Value Date     04/05/2022    K 3.8 04/05/2022    CL 96 04/05/2022    CO2 33 04/05/2022    BUN 20 04/05/2022    LABALBU 3.2 03/19/2022    CREATININE 0.71 04/05/2022    CALCIUM 8.4 04/05/2022    GFRAA >60 04/05/2022    LABGLOM >60 04/05/2022    GLUCOSE 127 04/05/2022     LIVER PROFILE:No results for input(s): AST, ALT, LABALBU, ALKPHOS, BILITOT, BILIDIR, IBILI, PROT, GLOB, ALBUMIN in the last 72 hours. FLP:  No results found for: CHOL, TRIG, HDL, LDLCHOLESTEROL      IMPRESSION  1.    1 short run of nonsustained ventricular tachycardia  2.    1 short run of SVT  3. Dyslipidemia  4. Moderate coronary disease of the circumflex by cardiac catheterization done in 2016  5. Diabetes mellitus  6. COPD exacerbation  7. Remote history of traumatic subarachnoid bleed  8.  moderate aortic stenosis  9. History of lung cancer  10. Chronic prolonged QT interval    Patient Active Problem List   Diagnosis    Valvular heart disease    Type 2 diabetes mellitus without complication, without long-term current use of insulin ([address]    Open wound of right ankle    COPD (chronic obstructive pulmonary disease) (HCC)    Syncope and collapse    Chronic ulcer of right heel ([address]    Multifocal pneumonia    Aortic valve stenosis    Esophageal dilatation    Aspiration pneumonia of both lower lobes due to gastric secretions ([address]    Falls frequently    Chronic respiratory failure (HCC)    Contusion of right knee    Closed fracture of right distal radius    Subdural hemorrhage (HCC)    Subarachnoid hemorrhage (HCC)    Traumatic hemorrhage of cerebrum (HCC)    Chronic bilateral low back pain    Cellulitis of both feet    Acute on chronic congestive heart failure (HCC)    Bilateral leg edema    Cellulitis    Lung mass    Malignant neoplasm of upper lobe of right lung (HCC)    Urinary tract infectious disease    Closed fracture of one rib of right side    Degenerative disc disease, lumbar    Moderate malnutrition ([address]    Rhabdomyolysis    Unable to ambulate    Mild malnutrition (HCC)           RECOMMENDATIONS:     Continue current medications  Management plan was discussed with patient     Highly likely her arrhythmia is related to her poor lungs status and oxygenation problem. I reviewed her medication and she is on adequate dose of Lopressor and we will keep her on this medication. Magnesium and potassium check back normal and will keep the level above 2 for the magnesium and above 4 for the potassium. We will get an echocardiogram.  The last one was done in 2020. Case discussed with the patient's nurses. Thank you for the consultation.       Electronically signed by Camille Gaffney Joanna Woo MD on 4/5/2022 at 6:49 PM     CC: Montserrat Mar MD

## 2022-04-05 NOTE — PLAN OF CARE
Problem: Skin Integrity:  Goal: Will show no infection signs and symptoms  Description: Will show no infection signs and symptoms  Outcome: Ongoing  Note: Patient has no new skin issues and no signs of infection at this time      Problem: Falls - Risk of:  Goal: Will remain free from falls  Description: Will remain free from falls  4/4/2022 2314 by Sonia Samuel RN  Outcome: Ongoing  Note: Patient will be free from falls this shift and is aware of personal limits. 4/4/2022 1228 by Lia Hansen RN  Outcome: Ongoing  Note: Patient remains free from falls.   Bed in lowest position, call light within reach, side rails x2, hourly rounding, fallen star, bed/chair alarm, non skid slippers, patient instructed to call out for assist

## 2022-04-05 NOTE — CARE COORDINATION
Social work: Germaine at [employer] is watching for insurance ok. None yet. Will need elizabeth, Rx and discharge order for snf.   Deepak serna

## 2022-04-05 NOTE — PROGRESS NOTES
[address] Hospitalist        4/5/2022   7:22 PM    Name:  Kari Lau  MRN:    [id number]     Acct:     [de-identified]   Room:  2003/2003-02  IP Day: 9     Admit Date: 3/29/2022  6:18 PM  PCP: Andrei Meza MD    C/C:   Chief Complaint   Patient presents with    Extremity Weakness       Assessment:      · Unable to ambulate  · Weakness  · Decubitus ulcer buttocks stage II  · NSVT  · Coronary disease, native vessel  · Chronic obstructive pulmonary disease, unspecified  · Diabetes mellitus type 2  · Essential hypertension  · Major depressive disorder  · Anxiety disorder  · Reported abdominal aortic aneurysm  · Gastroesophageal reflux disease without esophagitis  · Aortic stenosis  · Osteoarthritis multiple joints  · Lung cancer  · Past smoker  · Orthostatic hypotension-resolved  · Dehydration-resolved  · Left face abrasion healing  · AECOPD  · Right small pleural effusion  · Atelectasis   ·         Plan:        · Patient is on MedSurg floor  · O2 to maintain oxygen saturation greater than 92%  · BiPAP at night    · Antiemetics  · Electrolyte replacement  · Twelve-lead EKG  · Cardiology consult  ·   · Prednisone taper  · Continue Augmentin  · Rocephin and Zithromax completed  · P.o.  Lasix  · Pulmonology following  · DVT and GI prophylaxis  · Continue to monitor/telemetry/CBC with differential daily/BMP daily  · Continue medications as below  ·   · Discharge planning to rehab    Scheduled Meds:   predniSONE  40 mg Oral Daily    amoxicillin-clavulanate  1 tablet Oral 3 times per day    furosemide  40 mg Oral Daily    ARIPiprazole  5 mg Oral Nightly    aspirin EC  81 mg Oral Daily    gabapentin  800 mg Oral BID    glipiZIDE  2.5 mg Oral QAM AC    pantoprazole  20 mg Oral QAM AC    melatonin  9 mg Oral Nightly    metFORMIN  500 mg Oral BID WC    metoprolol tartrate  50 mg Oral BID    budesonide-formoterol  2 puff Inhalation BID    rosuvastatin  5 mg Oral Daily    tamsulosin 0.4 mg Oral Daily    traZODone  300 mg Oral Nightly    venlafaxine  150 mg Oral BID    sodium chloride flush  5-40 mL IntraVENous 2 times per day    enoxaparin  40 mg SubCUTAneous Daily    insulin lispro  0-12 Units SubCUTAneous TID WC    insulin lispro  0-6 Units SubCUTAneous Nightly     Continuous Infusions:   sodium chloride      dextrose       PRN Meds:  hydrALAZINE, 10 mg, Q6H PRN  oxyCODONE-acetaminophen, 1 tablet, Q4H PRN  ipratropium-albuterol, 1 ampule, Q4H PRN  midodrine, 10 mg, TID PRN  albuterol sulfate HFA, 2 puff, Q4H PRN  clonazePAM, 1 mg, TID PRN  sodium chloride flush, 10 mL, PRN  sodium chloride, , PRN  potassium chloride, 40 mEq, PRN   Or  potassium alternative oral replacement, 40 mEq, PRN   Or  potassium chloride, 10 mEq, PRN  magnesium sulfate, 1,000 mg, PRN  ondansetron, 4 mg, Q8H PRN   Or  ondansetron, 4 mg, Q6H PRN  polyethylene glycol, 17 g, Daily PRN  acetaminophen, 650 mg, Q6H PRN   Or  acetaminophen, 650 mg, Q6H PRN  glucose, 15 g, PRN  glucagon (rDNA), 1 mg, PRN  dextrose, 100 mL/hr, PRN  dextrose bolus (hypoglycemia), 125 mL, PRN   Or  dextrose bolus (hypoglycemia), 250 mL, PRN            Subjective:     Patient seen and examined at bedside. Patient is complaining of feeling sick today. She is throwing up. RN reported patient had RN of nausea and tach. Afebrile  Pt. Denies any CP, SOB, palpitation, HA, dizziness, chills,  changes in urination, BM or skin changes or any pain. ROS:  A 10 point system reviewed and negative otherwise mentioned above.         Physical Examination:      Vitals:  /62   Pulse 105   Temp 97.9 °F (36.6 °C) (Oral)   Resp 18   Ht 5' 8\" (1.727 m)   Wt 165 lb 0.8 oz (74.9 kg)   SpO2 97%   BMI 25.10 kg/m²   Temp (24hrs), Av.9 °F (36.6 °C), Min:97.9 °F (36.6 °C), Max:97.9 °F (36.6 °C)    Weight:   Wt Readings from Last 3 Encounters:   22 165 lb 0.8 oz (74.9 kg)   22 167 lb (75.8 kg)   22 181 lb 14.4 oz (82.5 kg) I/O last 3 completed shifts:  I/O last 3 completed shifts: In: 1200 [P.O.:1200]  Out: 90 [facility]     04/04/22  1953 04/05/22  0616 04/05/22  1146 04/05/22  1621   POCGLU 132* 106* 94 128*         General appearance - alert, well appearing, and in no acute distress  Mental status - oriented to person, place, and time with normal affect  Head - normocephalic and atraumatic  Eyes - pupils equal and reactive, extraocular eye movements intact, conjunctiva clear  Ears - hearing appears to be intact  Nose - no drainage noted  Mouth - mucous membranes moist  Neck - supple, no carotid bruits, thyroid not palpable  Chest - clear to auscultation, normal effort  Heart - normal rate, regular rhythm, no murmur  Abdomen - soft, nontender, nondistended, bowel sounds present all four quadrants, no masses, hepatomegaly or splenomegaly  Neurological - normal speech, no focal findings or movement disorder noted, cranial nerves II through XII grossly intact  Extremities - peripheral pulses palpable, no pedal edema or calf pain with palpation  Skin - no gross lesions, rashes, or induration noted        Medications: Allergies:    Allergies   Allergen Reactions    Codeine      \"feeling of heavy on chest\"    Dye [Iodides]      Hallucination,vomiting       Current Meds:   Current Facility-Administered Medications:     predniSONE (DELTASONE) tablet 40 mg, 40 mg, Oral, Daily, Whitney Becerril MD, 40 mg at 04/04/22 1001    amoxicillin-clavulanate (AUGMENTIN) 500-125 MG per tablet 1 tablet, 1 tablet, Oral, 3 times per day, Lisa Sherman MD, 1 tablet at 04/05/22 1437    furosemide (LASIX) tablet 40 mg, 40 mg, Oral, Daily, Whitney Becerril MD, 40 mg at 04/04/22 1002    hydrALAZINE (APRESOLINE) injection 10 mg, 10 mg, IntraVENous, Q6H PRN, Whitney Becerril MD, 10 mg at 04/02/22 1824    oxyCODONE-acetaminophen (PERCOCET) 5-325 MG per tablet 1 tablet, 1 tablet, Oral, Q4H PRN, Whitney Becerril MD, 1 tablet at 04/05/22 1807    ipratropium-albuterol (DUONEB) nebulizer solution 1 ampule, 1 ampule, Inhalation, Q4H PRN, Whitney Becerril MD    midodrine (PROAMATINE) tablet 10 mg, 10 mg, Oral, TID PRN, Whitney Becerril MD, 10 mg at 03/31/22 1503    albuterol sulfate  (90 Base) MCG/ACT inhaler 2 puff, 2 puff, Inhalation, Q4H PRN, Whitney Becerril MD, 2 puff at 03/31/22 1351    ARIPiprazole (ABILIFY) tablet 5 mg, 5 mg, Oral, Nightly, Whitney Becerril MD, 5 mg at 04/04/22 2025    aspirin EC tablet 81 mg, 81 mg, Oral, Daily, Whitney Becerril MD, 81 mg at 04/04/22 1001    clonazePAM (KLONOPIN) tablet 1 mg, 1 mg, Oral, TID PRN, Whitney Becerril MD, 1 mg at 04/03/22 0927    gabapentin (NEURONTIN) capsule 800 mg, 800 mg, Oral, BID, Whitney Becerril MD, 800 mg at 04/04/22 2026    glipiZIDE (GLUCOTROL) tablet 2.5 mg, 2.5 mg, Oral, Whitney WAN MD, 2.5 mg at 04/05/22 0605    pantoprazole (PROTONIX) tablet 20 mg, 20 mg, Oral, RANGEL Whitney MD, 20 mg at 04/05/22 0605    melatonin tablet 9 mg, 9 mg, Oral, Nightly, Whitney Becerril MD, 9 mg at 04/04/22 2025    metFORMIN (GLUCOPHAGE) tablet 500 mg, 500 mg, Oral, BID WC, Whitney Becerril MD, 500 mg at 04/05/22 1807    metoprolol tartrate (LOPRESSOR) tablet 50 mg, 50 mg, Oral, BID, Whitney Becerril MD, 50 mg at 04/04/22 2025    budesonide-formoterol (SYMBICORT) 160-4.5 MCG/ACT inhaler 2 puff, 2 puff, Inhalation, BID, Whitney Becerril MD, 2 puff at 04/05/22 0834    rosuvastatin (CRESTOR) tablet 5 mg, 5 mg, Oral, Daily, Whitney Becerril MD, 5 mg at 04/04/22 1001    tamsulosin (FLOMAX) capsule 0.4 mg, 0.4 mg, Oral, Daily, Whitney Becerril MD, 0.4 mg at 04/04/22 1002    traZODone (DESYREL) tablet 300 mg, 300 mg, Oral, Nightly, Whitney Becerril MD, 300 mg at 04/04/22 2026    venlafaxine (EFFEXOR XR) extended release capsule 150 mg, 150 mg, Oral, BID, Whitney Becerril MD, 150 mg at 04/04/22 2025    sodium chloride flush 0.9 % injection 5-40 mL, 5-40 mL, IntraVENous, 2 times per day, Whitney MD Jerrica, 10 mL at 04/02/22 2240    sodium chloride flush 0.9 % injection 10 mL, 10 mL, IntraVENous, PRN, Whitney Becerril MD, 10 mL at 04/03/22 0431    0.9 % sodium chloride infusion, , IntraVENous, PRN, Whitney Becerril MD    potassium chloride (KLOR-CON M) extended release tablet 40 mEq, 40 mEq, Oral, PRN, 40 mEq at 04/04/22 1619 **OR** potassium bicarb-citric acid (EFFER-K) effervescent tablet 40 mEq, 40 mEq, Oral, PRN **OR** potassium chloride 10 mEq/100 mL IVPB (Peripheral Line), 10 mEq, IntraVENous, PRN, Whitney Becerril MD    magnesium sulfate 1000 mg in dextrose 5% 100 mL IVPB, 1,000 mg, IntraVENous, PRN, Whitney Becerril MD    enoxaparin (LOVENOX) injection 40 mg, 40 mg, SubCUTAneous, Daily, Whitney Becerril MD    ondansetron (ZOFRAN-ODT) disintegrating tablet 4 mg, 4 mg, Oral, Q8H PRN, 4 mg at 04/05/22 1115 **OR** ondansetron (ZOFRAN) injection 4 mg, 4 mg, IntraVENous, Q6H PRN, Whitney Becerril MD    polyethylene glycol (GLYCOLAX) packet 17 g, 17 g, Oral, Daily PRN, Whitney Becerril MD    acetaminophen (TYLENOL) tablet 650 mg, 650 mg, Oral, Q6H PRN, 650 mg at 03/30/22 1734 **OR** acetaminophen (TYLENOL) suppository 650 mg, 650 mg, Rectal, Q6H PRN, Whitney Becerril MD    insulin lispro (HUMALOG) injection vial 0-12 Units, 0-12 Units, SubCUTAneous, TID WC, Whitney Becerril MD, 4 Units at 04/04/22 1644    insulin lispro (HUMALOG) injection vial 0-6 Units, 0-6 Units, SubCUTAneous, Nightly, Whitney Becerril MD, 1 Units at 04/03/22 0023    glucose (GLUTOSE) 40 % oral gel 15 g, 15 g, Oral, PRN, Whitney Becerril MD    glucagon (rDNA) injection 1 mg, 1 mg, IntraMUSCular, PRN, Whitney Becerril MD    dextrose 5 % solution, 100 mL/hr, IntraVENous, PRN, Whitney Becerril MD    dextrose bolus (hypoglycemia) 10% 125 mL, 125 mL, IntraVENous, PRN **OR** dextrose bolus (hypoglycemia) 10% 250 mL, 250 mL, IntraVENous, PRN, Yina Lopes MD      I/O (24Hr):     Intake/Output Summary (Last 24 hours) at 4/5/2022 1922  Last data filed at 4/5/2022 1810  Gross per 24 hour   Intake 480 ml   Output 850 ml   Net -370 ml       Data:           Labs:    Hematology:No results for input(s): WBC, RBC, HGB, HCT, MCV, MCH, MCHC, RDW, PLT, MPV, SEDRATE, CRP, INR, DDIMER, [id number], LABABSO in the last 72 hours. Invalid input(s): PT  Chemistry:  Recent Labs     04/02/22  2220 04/03/22  0536 04/04/22  0543 04/05/22  0715   NA  --  138 143 140   K  --  3.7 3.5* 3.8   CL  --  99 99 96*   CO2  --  28 33* 33*   GLUCOSE  --  111* 91 127*   BUN  --  17 21 20   CREATININE  --  0.62 0.82 0.71   MG  --   --  1.7 1.8   ANIONGAP  --  11 11 11   LABGLOM  --  >60 >60 >60   GFRAA  --  >60 >60 >60   CALCIUM  --  9.0 8.4* 8.4*   TROPHS 29*  --   --   --    MYOGLOBIN 27  --   --   --      Recent Labs     04/04/22  1151 04/04/22  1624 04/04/22  1953 04/05/22  0616 04/05/22  1146 04/05/22  1621   POCGLU 186* 204* 132* 106* 94 128*       Lab Results   Component Value Date/Time    SPECIAL LT AC 10ML 03/29/2022 07:28 PM     Lab Results   Component Value Date/Time    CULTURE NO GROWTH 5 DAYS 03/29/2022 07:28 PM       Lab Results   Component Value Date    POCPH 7.36 04/12/2017    PHART 7.42 08/26/2012    PH 7.22 04/11/2017    POCPCO2 45 04/12/2017    IAW6WUQ 41 08/26/2012    PCO2 54 04/11/2017    POCPO2 93 04/12/2017    PO2ART 59 08/26/2012    PO2 89 04/11/2017    POCHCO3 25.3 04/12/2017    CTP9LJQ 26.1 08/26/2012    HCO3 22.2 04/11/2017    NBEA NOT REPORTED 04/12/2017    PBEA 0 04/12/2017    NVI6KHF 27 04/12/2017    PHGV4MEL 97 04/12/2017    D8IYZUMY 92.1 08/26/2012    O2SAT 95 04/11/2017    FIO2 UNKNOWN 10/31/2017       Radiology:    XR CHEST (SINGLE VIEW FRONTAL)    Result Date: 3/30/2022  No significant change from prior exam.     XR CHEST PORTABLE    Result Date: 4/2/2022  Increased small right pleural effusion, with increased right basilar airspace opacities favored to represent atelectasis     XR CHEST PORTABLE    Result Date: 4/2/2022  No significant change from prior study. Stable cardiac size and continued interstitial opacities with opacity in the right upper lung field adjacent to the right mediastinum consistent with known lung cancer. XR CHEST PORTABLE    Result Date: 4/1/2022  Interstitial opacities in the left lung appear more prominent in the interval.  No appreciable change in right perihilar and upper lung field opacity. XR CHEST PORTABLE    Result Date: 3/29/2022  Right apical consolidation is similar prior examination. Chest CT could provide further information. All radiological studies reviewed  Code Status:  Full Code        Electronically signed by Alice Singh MD on 4/5/2022 at 7:22 PM    This note was created with the assistance of a speech-recognition program.  Although the intention is to generate a document that actually reflects the content of the visit, no guarantees can be provided that every mistake has been identified and corrected by editing. Note was updated later by me after  physical examination and  completion of the assessment.

## 2022-04-05 NOTE — PROGRESS NOTES
Patient c/o nausea and episode of choking of food/ small amount of thick phlegm noted in basin.  Patient medicated with Zofran 4 mg-ODT

## 2022-04-06 VITALS
DIASTOLIC BLOOD PRESSURE: 53 MMHG | TEMPERATURE: 98.4 F | RESPIRATION RATE: 20 BRPM | OXYGEN SATURATION: 98 % | SYSTOLIC BLOOD PRESSURE: 105 MMHG | WEIGHT: 153.06 LBS | BODY MASS INDEX: 23.2 KG/M2 | HEIGHT: 68 IN | HEART RATE: 80 BPM

## 2022-04-06 LAB
ANION GAP SERPL CALCULATED.3IONS-SCNC: 12 MMOL/L (ref 9–17)
BUN BLDV-MCNC: 18 MG/DL (ref 8–23)
BUN/CREAT BLD: 29 (ref 9–20)
CALCIUM SERPL-MCNC: 8.7 MG/DL (ref 8.6–10.4)
CHLORIDE BLD-SCNC: 98 MMOL/L (ref 98–107)
CO2: 31 MMOL/L (ref 20–31)
CREAT SERPL-MCNC: 0.63 MG/DL (ref 0.5–0.9)
GFR AFRICAN AMERICAN: >60 ML/MIN
GFR NON-AFRICAN AMERICAN: >60 ML/MIN
GFR SERPL CREATININE-BSD FRML MDRD: ABNORMAL ML/MIN/{1.73_M2}
GLUCOSE BLD-MCNC: 252 MG/DL (ref 65–105)
GLUCOSE BLD-MCNC: 88 MG/DL (ref 65–105)
GLUCOSE BLD-MCNC: 94 MG/DL (ref 65–105)
GLUCOSE BLD-MCNC: 97 MG/DL (ref 70–99)
LV EF: 55 %
LVEF MODALITY: NORMAL
POTASSIUM SERPL-SCNC: 4.6 MMOL/L (ref 3.7–5.3)
SODIUM BLD-SCNC: 141 MMOL/L (ref 135–144)

## 2022-04-06 PROCEDURE — 97530 THERAPEUTIC ACTIVITIES: CPT

## 2022-04-06 PROCEDURE — 94640 AIRWAY INHALATION TREATMENT: CPT

## 2022-04-06 PROCEDURE — 6370000000 HC RX 637 (ALT 250 FOR IP): Performed by: FAMILY MEDICINE

## 2022-04-06 PROCEDURE — 97110 THERAPEUTIC EXERCISES: CPT

## 2022-04-06 PROCEDURE — 2700000000 HC OXYGEN THERAPY PER DAY

## 2022-04-06 PROCEDURE — 93306 TTE W/DOPPLER COMPLETE: CPT

## 2022-04-06 PROCEDURE — 94761 N-INVAS EAR/PLS OXIMETRY MLT: CPT

## 2022-04-06 PROCEDURE — 80048 BASIC METABOLIC PNL TOTAL CA: CPT

## 2022-04-06 PROCEDURE — 36415 COLL VENOUS BLD VENIPUNCTURE: CPT

## 2022-04-06 PROCEDURE — 94660 CPAP INITIATION&MGMT: CPT

## 2022-04-06 PROCEDURE — 82947 ASSAY GLUCOSE BLOOD QUANT: CPT

## 2022-04-06 RX ORDER — FUROSEMIDE 40 MG/1
40 TABLET ORAL DAILY
Qty: 60 TABLET | Refills: 3 | Status: SHIPPED | OUTPATIENT
Start: 2022-04-07

## 2022-04-06 RX ORDER — PREDNISONE 20 MG/1
40 TABLET ORAL DAILY
Qty: 4 TABLET | Refills: 0 | Status: SHIPPED | OUTPATIENT
Start: 2022-04-07 | End: 2022-04-09

## 2022-04-06 RX ORDER — AMOXICILLIN AND CLAVULANATE POTASSIUM 500; 125 MG/1; MG/1
1 TABLET, FILM COATED ORAL EVERY 8 HOURS SCHEDULED
Qty: 12 TABLET | Refills: 0 | Status: SHIPPED | OUTPATIENT
Start: 2022-04-06 | End: 2022-04-10

## 2022-04-06 RX ORDER — MIDODRINE HYDROCHLORIDE 10 MG/1
10 TABLET ORAL 3 TIMES DAILY PRN
Qty: 45 TABLET | Refills: 0 | Status: ON HOLD | OUTPATIENT
Start: 2022-04-06 | End: 2022-05-21 | Stop reason: HOSPADM

## 2022-04-06 RX ADMIN — VENLAFAXINE HYDROCHLORIDE 150 MG: 75 CAPSULE, EXTENDED RELEASE ORAL at 08:45

## 2022-04-06 RX ADMIN — BUDESONIDE AND FORMOTEROL FUMARATE DIHYDRATE 2 PUFF: 160; 4.5 AEROSOL RESPIRATORY (INHALATION) at 08:09

## 2022-04-06 RX ADMIN — ACETAMINOPHEN 325MG 650 MG: 325 TABLET ORAL at 12:35

## 2022-04-06 RX ADMIN — AMOXICILLIN AND CLAVULANATE POTASSIUM 1 TABLET: 500; 125 TABLET, FILM COATED ORAL at 06:14

## 2022-04-06 RX ADMIN — OXYCODONE AND ACETAMINOPHEN 1 TABLET: 5; 325 TABLET ORAL at 04:40

## 2022-04-06 RX ADMIN — METFORMIN HYDROCHLORIDE 500 MG: 500 TABLET ORAL at 08:45

## 2022-04-06 RX ADMIN — FUROSEMIDE 40 MG: 40 TABLET ORAL at 08:45

## 2022-04-06 RX ADMIN — TAMSULOSIN HYDROCHLORIDE 0.4 MG: 0.4 CAPSULE ORAL at 08:45

## 2022-04-06 RX ADMIN — PREDNISONE 40 MG: 20 TABLET ORAL at 08:46

## 2022-04-06 RX ADMIN — CLONAZEPAM 1 MG: 0.5 TABLET ORAL at 12:35

## 2022-04-06 RX ADMIN — AMOXICILLIN AND CLAVULANATE POTASSIUM 1 TABLET: 500; 125 TABLET, FILM COATED ORAL at 14:12

## 2022-04-06 RX ADMIN — ONDANSETRON 4 MG: 4 TABLET, ORALLY DISINTEGRATING ORAL at 10:31

## 2022-04-06 RX ADMIN — ROSUVASTATIN CALCIUM 5 MG: 10 TABLET, FILM COATED ORAL at 08:46

## 2022-04-06 RX ADMIN — METOPROLOL TARTRATE 50 MG: 50 TABLET, FILM COATED ORAL at 08:45

## 2022-04-06 RX ADMIN — GABAPENTIN 800 MG: 400 CAPSULE ORAL at 08:45

## 2022-04-06 RX ADMIN — INSULIN LISPRO 6 UNITS: 100 INJECTION, SOLUTION INTRAVENOUS; SUBCUTANEOUS at 11:52

## 2022-04-06 RX ADMIN — ASPIRIN 81 MG: 81 TABLET, COATED ORAL at 08:44

## 2022-04-06 ASSESSMENT — PAIN DESCRIPTION - ORIENTATION: ORIENTATION: RIGHT

## 2022-04-06 ASSESSMENT — PAIN SCALES - GENERAL
PAINLEVEL_OUTOF10: 10
PAINLEVEL_OUTOF10: 0
PAINLEVEL_OUTOF10: 10
PAINLEVEL_OUTOF10: 0
PAINLEVEL_OUTOF10: 10

## 2022-04-06 ASSESSMENT — PAIN DESCRIPTION - PAIN TYPE
TYPE: CHRONIC PAIN
TYPE: CHRONIC PAIN

## 2022-04-06 ASSESSMENT — PAIN DESCRIPTION - LOCATION: LOCATION: BACK

## 2022-04-06 ASSESSMENT — PAIN DESCRIPTION - DESCRIPTORS: DESCRIPTORS: SORE

## 2022-04-06 NOTE — PROGRESS NOTES
Physical Therapy  Facility/Department: [facility] [[email or web address]] MED SURG  Daily Treatment Note  NAME: Yoana Clemente  : 1950  MRN: [id number]    Date of Service: 2022    Discharge Recommendations:  Continue to assess pending progress,Patient would benefit from continued therapy after discharge        Assessment   Body structures, Functions, Activity limitations: Decreased endurance  Assessment: pt limited by fatigue  Activity Tolerance  Activity Tolerance: Other;Patient limited by endurance     Patient Diagnosis(es): The primary encounter diagnosis was Generalized weakness. Diagnoses of Lung mass and Open wound of right ankle, initial encounter were also pertinent to this visit. has a past medical history of AAA (abdominal aortic aneurysm) (HCC), Acid reflux, Anxiety and depression, Aortic stenosis, Arthritis, Blister of ankle, right, CAD (coronary artery disease), Cancer ([facility] [address] COPD (chronic obstructive pulmonary disease) ([facility] [address] Diabetes mellitus ([facility] [address] Falls, Heart block, Hypokalemia, MDRO (multiple drug resistant organisms) resistance, MRSA (methicillin resistant staph aureus) culture positive, On home oxygen therapy, On home oxygen therapy, Overactive bladder, Pneumonia, PONV (postoperative nausea and vomiting), and Vitamin D deficiency. has a past surgical history that includes back surgery; eye surgery; Cholecystectomy; Appendectomy; Hysterectomy; Tonsillectomy; lumbar laminectomy; Endoscopy, colon, diagnostic (2016); aortic valve repair (N/A, 2017); bronchoscopy (N/A, 2020); IR INSERT PICC VAD W SQ PORT >5 YEARS (2020); and IR PORT PLACEMENT > 5 YEARS (2020). Restrictions  Restrictions/Precautions  Restrictions/Precautions: General Precautions,Fall Risk,Up as Tolerated  Required Braces or Orthoses?: No  Position Activity Restriction  Other position/activity restrictions:  Up with assist, alarms, LUE IV, ext cath, 3L O2 per nc, telemetry  Subjective   General  Chart Reviewed: Yes  Subjective  Subjective: Patient agreeable to bedside  PT only  General Comment  Comments: RN reports patient medically appropriate for PT. Pain Screening  Patient Currently in Pain: No  Pain Assessment  Pain Assessment: 0-10  Vital Signs  Patient Currently in Pain: No       Orientation     Cognition      Objective         Ambulation  Ambulation?: No     Balance  Posture: Fair  Sitting - Static: Good  Sitting - Dynamic: Good  Standing - Static: Fair  Standing - Dynamic: Fair;-  Exercises  Comments: supine LE AROM x 20 reps, UE medium resistance tband ex  x 20 reps                        G-Code     OutComes Score                                                     AM-PAC Score  AM-PAC Inpatient Mobility Raw Score : 16 (04/06/22 1531)  AM-PAC Inpatient T-Scale Score : 40.78 (04/06/22 1531)  Mobility Inpatient CMS 0-100% Score: 54.16 (04/06/22 1531)  Mobility Inpatient CMS G-Code Modifier : CK (04/06/22 1531)          Goals  Short term goals  Time Frame for Short term goals: 12 visits:  Short term goal 1: Pt. to be SBA for bed mob. Short term goal 2: Pt. to require CGA for sit to stand tranfers from all surface heights with safe hand placement. Short term goal 3: Pt. to require CGA to amb. 25ft. with RW and approp. O2. Short term goal 4: Pt. to tolerate 25+ min. of PT daily for ther ex/ gait/ balance training  Patient Goals   Patient goals : d/c to SNF    Plan    Plan  Times per week: 5-6xwk  Times per day: Twice a day  Specific instructions for Next Treatment: progress gait distance and or reps. Current Treatment Recommendations: Strengthening,Functional Mobility Training,Home Exercise Program,Balance Training,Gait Training  Plan Comment: progress ambulation distance/time on feet and frequency of walking.   Safety Devices  Type of devices: Call light within reach,Chair alarm in place,Nurse notified  Restraints  Initially in place: No     Therapy Time   Individual Concurrent Group Co-treatment   Time In  1400 Time Out  1410         Minutes  10                 FARHANA GIL, PTA

## 2022-04-06 NOTE — PROGRESS NOTES
DATE: 2022    NAME: Quynh Truong  MRN: [id number]   : 1950    Patient not seen this date for Physical Therapy due to:      [] Cancel by RN or physician due to:    [] Hemodialysis    [] Critical Lab Value Level     [] Blood transfusion in progress    [] Acute or unstable cardiovascular status   _MAP < 55 or more than >115  _HR < 40 or > 130    [] Acute or unstable pulmonary status   -FiO2 > 60%   _RR < 5 or >40    _O2 sats < 85%    [] Strict Bedrest    [] Off Unit for surgery or procedure    [] Off Unit for testing       [] Pending imaging to R/O fracture    [x] Refusal by Patient Pt just back to bed has HA and states she is spitting up, says she already did therapy explained that was OT and insurance would like a progress note, pt cont. to refuse        [] Other      [] PT being discontinued at this time. Patient independent. No further needs. [] PT being discontinued at this time as the patient has been transferred to hospice care. No further needs.       FARHANA GIL, PTA

## 2022-04-06 NOTE — PROGRESS NOTES
Occupational Therapy  Facility/Department: [facility] MED SURG  Daily Treatment Note  NAME: Luli Howell  : 1950  MRN: [id number]    Date of Service: 2022    Discharge Recommendations:  Patient would benefit from continued therapy after discharge   Pt currently functioning below baseline. Would suggest additional therapy at time of discharge to maximize long term outcomes and prevent re-admission. Please refer to AM-PAC score for current level of function. Assessment   Performance deficits / Impairments: Decreased functional mobility ; Decreased ADL status; Decreased strength;Decreased safe awareness;Decreased endurance;Decreased balance;Decreased posture;Decreased cognition  Assessment: Pt is easily agitated and is very self limiting. Pt is rude to staff and does not initiate any functional tasks. Pt is at risk for futher decline if she continues to not participate in daily tasks and being up and out of bed. Pt is unable to care for self at this time. Skilled OT indicated to increase safety and IND with will all functional tasks to ensure a safe return to PLOF. Prognosis: Fair  REQUIRES OT FOLLOW UP: Yes  Activity Tolerance  Activity Tolerance: Treatment limited secondary to agitation  Activity Tolerance: fair  Safety Devices  Safety Devices in place: Yes  Type of devices: Left in chair;Call light within reach;Nurse notified; Chair alarm in place; Patient at risk for falls         Patient Diagnosis(es): The primary encounter diagnosis was Generalized weakness. Diagnoses of Lung mass and Open wound of right ankle, initial encounter were also pertinent to this visit.       has a past medical history of AAA (abdominal aortic aneurysm) (HCC), Acid reflux, Anxiety and depression, Aortic stenosis, Arthritis, Blister of ankle, right, CAD (coronary artery disease), Cancer ([facility] [address] COPD (chronic obstructive pulmonary disease) ([facility] [address] Diabetes mellitus ([facility] [address] Falls, Heart block, Hypokalemia, MDRO (multiple drug resistant organisms) resistance, MRSA (methicillin resistant staph aureus) culture positive, On home oxygen therapy, On home oxygen therapy, Overactive bladder, Pneumonia, PONV (postoperative nausea and vomiting), and Vitamin D deficiency. has a past surgical history that includes back surgery; eye surgery; Cholecystectomy; Appendectomy; Hysterectomy; Tonsillectomy; lumbar laminectomy; Endoscopy, colon, diagnostic (12/08/2016); aortic valve repair (N/A, 4/11/2017); bronchoscopy (N/A, 8/31/2020); IR INSERT PICC VAD W SQ PORT >5 YEARS (9/4/2020); and IR PORT PLACEMENT > 5 YEARS (9/29/2020). Restrictions  Restrictions/Precautions  Restrictions/Precautions: General Precautions,Fall Risk,Up as Tolerated  Required Braces or Orthoses?: No  Position Activity Restriction  Other position/activity restrictions: Up with assist, alarms, LUE IV, ext cath, 3L O2 per nc, telemetry  Subjective   General  Chart Reviewed: Yes  Patient assessed for rehabilitation services?: Yes  Response to previous treatment: Patient with no complaints from previous session  Family / Caregiver Present: No  Subjective  Subjective: Pt in bed upon arrival with PCT finishing up bed bath. Pt agreeable to get out of bed. General Comment  Comments: RN ok'd pt for therapy. Pain Assessment  Pain Assessment: 0-10  Pain Level: 10  Pain Type: Chronic pain  Pain Location: Nose; Shoulder; Chest  Pain Orientation: Right  Pain Radiating Towards: Right chest, right shoulder  Vital Signs  Patient Currently in Pain: Yes   Orientation  Orientation  Overall Orientation Status: Within Functional Limits  Objective             Balance  Sitting Balance: Supervision  Standing Balance: Contact guard assistance  Bed mobility  Supine to Sit: Stand by assistance (assist with line mgmt)  Transfers  Stand Step Transfers: Contact guard assistance  Sit to stand: Contact guard assistance  Stand to sit: Contact guard assistance  Transfer Comments: Pt required Mod VC/tactile cues for pacing, pursed lip breathing, controlled stand to sit, squaring self/AD up to surface and reaching back prior to sitting, awareness/assist with lines all to increase safety and reduce risk of falls. Cognition  Overall Cognitive Status: Exceptions  Arousal/Alertness: Appropriate responses to stimuli  Following Commands: Follows multistep commands consistently  Attention Span: Appears intact  Memory: Decreased recall of recent events;Decreased short term memory  Safety Judgement: Decreased awareness of need for safety;Decreased awareness of need for assistance  Problem Solving: Assistance required to identify errors made;Assistance required to correct errors made  Insights: Decreased awareness of deficits  Initiation: Requires cues for all  Sequencing: Requires cues for some  Cognition Comment: Pt is easily agitated. Perception  Overall Perceptual Status: Impaired  Initiation: Cues to initiate tasks              Type of ROM/Therapeutic Exercise  Comment: Once pt was sitting up in chair writer asked pt if she would be agreeable to complete B UE exercises, pt replied \"In a minute. \" Writer asked pt to let her know when she was ready. Student RN came in to give insulin and then writer asked again if pt was ready to continue with therapy and complete exercises and pt ignored writer looking in other direction. Writer gave pt a few more minutes but pt apparently did not want to participate further and was left in chair.                     Plan   Plan  Times per week: 4-5x/week, 1-2x/day  Current Treatment Recommendations: Strengthening,Balance Training,Functional Mobility Training,Endurance Training,Safety Education & Training,Self-Care / ADL,Patient/Caregiver Education & Training,Equipment Evaluation, Education, & procurement,Positioning,Cognitive/Perceptual Training,Pain Management,Neuromuscular Re-education                                   AM-PAC Score        AM-PAC Inpatient Daily Activity Raw Score: 17 (04/06/22 1145)  AM-PAC Inpatient ADL T-Scale Score : 37.26 (04/06/22 1145)  ADL Inpatient CMS 0-100% Score: 50.11 (04/06/22 1145)  ADL Inpatient CMS G-Code Modifier : CK (04/06/22 1145)    Goals  Short term goals  Time Frame for Short term goals: by discharge, pt will  Short term goal 1: demo CGA with ADL transfers with approp AD/DME  Short term goal 2: demo CGA with functional mob in room distances with min cues for safety and approp AD  Short term goal 3: demo CGA with toileting routine with approp DME and min cues for safety  Short term goal 4: demo SBA with UB ADLs and min A LB ADLs with AE/DME and min cues for safety  Short term goal 5: demo and verb good understanding of fall prevention techs, EC/WS techs, equip needs, B UE HEP, positioning techs/skin integrity and pressure relieving techs, and d/c recommendations  Patient Goals   Patient goals : agreeable to rehab       Therapy Time   Individual Individual Individual Co-treatment   Time In [phone]     Time Out [phone]     Minutes 2 1 21      See previous cancel note from today.        ROLO Cartagena

## 2022-04-06 NOTE — PLAN OF CARE
Problem: Skin Integrity:  Goal: Will show no infection signs and symptoms  Description: Will show no infection signs and symptoms  Outcome: Ongoing     Problem: Falls - Risk of:  Goal: Will remain free from falls  Description: Will remain free from falls  4/5/2022 2358 by Kenzie Jennings RN  Outcome: Ongoing     Problem: Tissue Perfusion - Cardiopulmonary, Altered:  Goal: Absence of angina  Description: Absence of angina  Outcome: Ongoing     Problem: Nutrition  Goal: Optimal nutrition therapy  Outcome: Ongoing

## 2022-04-06 NOTE — RT PROTOCOL NOTE
RT Inhaler-Nebulizer Bronchodilator Protocol Note    There is a bronchodilator order in the chart from a provider indicating to follow the RT Bronchodilator Protocol and there is an Initiate RT Inhaler-Nebulizer Bronchodilator Protocol order as well (see protocol at bottom of note). CXR Findings:  No results found. The findings from the last RT Protocol Assessment were as follows:   History Pulmonary Disease: Chronic pulmonary disease  Respiratory Pattern: Regular pattern and RR 12-20 bpm  Breath Sounds: Slightly diminished and/or crackles  Cough: Strong, spontaneous, non-productive  Indication for Bronchodilator Therapy: Decreased or absent breath sounds (refusing Alb will make PRN)  Bronchodilator Assessment Score: 4    Aerosolized bronchodilator medication orders have been revised according to the RT Inhaler-Nebulizer Bronchodilator Protocol below. Respiratory Therapist to perform RT Therapy Protocol Assessment initially then follow the protocol. Repeat RT Therapy Protocol Assessment PRN for score 0-3 or on second treatment, BID, and PRN for scores above 3. No Indications - adjust the frequency to every 6 hours PRN wheezing or bronchospasm, if no treatments needed after 48 hours then discontinue using Per Protocol order mode. If indication present, adjust the RT bronchodilator orders based on the Bronchodilator Assessment Score as indicated below. Use Inhaler orders unless patient has one or more of the following: on home nebulizer, not able to hold breath for 10 seconds, is not alert and oriented, cannot activate and use MDI correctly, or respiratory rate 25 breaths per minute or more, then use the equivalent nebulizer order(s) with same Frequency and PRN reasons based on the score. If a patient is on this medication at home then do not decrease Frequency below that used at home.     0-3 - enter or revise RT bronchodilator order(s) to equivalent RT Bronchodilator order with Frequency of every 4 hours PRN for wheezing or increased work of breathing using Per Protocol order mode. 4-6 - enter or revise RT Bronchodilator order(s) to two equivalent RT bronchodilator orders with one order with BID Frequency and one order with Frequency of every 4 hours PRN wheezing or increased work of breathing using Per Protocol order mode. 7-10 - enter or revise RT Bronchodilator order(s) to two equivalent RT bronchodilator orders with one order with TID Frequency and one order with Frequency of every 4 hours PRN wheezing or increased work of breathing using Per Protocol order mode. 11-13 - enter or revise RT Bronchodilator order(s) to one equivalent RT bronchodilator order with QID Frequency and an Albuterol order with Frequency of every 4 hours PRN wheezing or increased work of breathing using Per Protocol order mode. Greater than 13 - enter or revise RT Bronchodilator order(s) to one equivalent RT bronchodilator order with every 4 hours Frequency and an Albuterol order with Frequency of every 2 hours PRN wheezing or increased work of breathing using Per Protocol order mode. RT to enter RT Home Evaluation for COPD & MDI Assessment order using Per Protocol order mode.     Electronically signed by Edd Prieto RCP on 4/5/2022 at 8:38 PM

## 2022-04-06 NOTE — PROGRESS NOTES
Section of Cardiology  Progress Note      Date:  4/6/2022  Patient: Alvin Og  Admission:  3/29/2022  6:18 PM  Admit DX: Generalized weakness [R53.1]  Unable to ambulate [R26.2]  Age:  71 y. o., 1950     LOS: 8 days     Reason for evaluation:   arrhythmia      SUBJECTIVE:     The patient was seen and examined. Notes and labs reviewed. There were not complications over night. Patient seen and examined in her room with the nurse in charge is at bedside. Patient does not report palpitation however she states that she is feeling dizzy at all times. No change in consciousness. She did not report that to her nurses. She is in a flat position without difficulty breathing. She denies chest pain. Patient's cardiac review of systems: negative. The patient is generally feeling stable. OBJECTIVE:    Telemetry: Sinus and with a 4 beat run of ventricular tachycardia at 1 AM  BP (!) 105/53   Pulse 80   Temp 98.4 °F (36.9 °C) (Oral)   Resp 20   Ht 5' 8\" (1.727 m)   Wt 153 lb 0.9 oz (69.4 kg)   SpO2 98%   BMI 23.27 kg/m²     Intake/Output Summary (Last 24 hours) at 4/6/2022 1444  Last data filed at 4/6/2022 0840  Gross per 24 hour   Intake --   Output 900 ml   Net -900 ml       EXAM:   CONSTITUTIONAL:  awake, alert, cooperative, no apparent distress, and appears stated age. HEENT: Normal jugular venous pulsations, no carotid bruits. Head is atraumatic, normocephalic. Eyes and oral mucosa are normal.  LUNGS: Good respiratory effort. No for increased work of breathing. On auscultation: clear to auscultation bilaterally and diminished breath sounds bilaterally  CARDIOVASCULAR:  Normal apical impulse, regular rate and rhythm, normal S1 and S2, no S3 or S4,   ABDOMEN: Soft, nontender, nondistended. Bowel sounds present. SKIN: Warm and dry. EXTREMITIES: No lower extremity edema. Motor movement grossly intact. No cyanosis or clubbing.     Current Inpatient Medications:   predniSONE  40 mg Oral Daily  amoxicillin-clavulanate  1 tablet Oral 3 times per day    furosemide  40 mg Oral Daily    ARIPiprazole  5 mg Oral Nightly    aspirin EC  81 mg Oral Daily    gabapentin  800 mg Oral BID    glipiZIDE  2.5 mg Oral QAM AC    pantoprazole  20 mg Oral QAM AC    melatonin  9 mg Oral Nightly    metFORMIN  500 mg Oral BID WC    metoprolol tartrate  50 mg Oral BID    budesonide-formoterol  2 puff Inhalation BID    rosuvastatin  5 mg Oral Daily    tamsulosin  0.4 mg Oral Daily    traZODone  300 mg Oral Nightly    venlafaxine  150 mg Oral BID    sodium chloride flush  5-40 mL IntraVENous 2 times per day    enoxaparin  40 mg SubCUTAneous Daily    insulin lispro  0-12 Units SubCUTAneous TID WC    insulin lispro  0-6 Units SubCUTAneous Nightly       IV Infusions (if any):   sodium chloride      dextrose         Diagnostics:   EKG: . ECHO: .   Ejection fraction: %  Stress Test: .  Cardiac Angiography: .    Labs:   CBC: No results for input(s): WBC, HGB, HCT, PLT in the last 72 hours. BMP:   Recent Labs     04/05/22  0715 04/06/22  0613    141   K 3.8 4.6   CO2 33* 31   BUN 20 18   CREATININE 0.71 0.63   LABGLOM >60 >60   GLUCOSE 127* 97     Lab Results   Component Value Date    BNP 48 03/13/2014     PT/INR: No results for input(s): PROTIME, INR in the last 72 hours. APTT:No results for input(s): APTT in the last 72 hours. CARDIAC ENZYMES:No results for input(s): CKTOTAL, CKMB, CKMBINDEX, TROPONINT in the last 72 hours. FASTING LIPID PANEL:No results found for: HDL, LDLDIRECT, LDLCALC, TRIG  LIVER PROFILE:No results for input(s): AST, ALT, LABALBU in the last 72 hours.     ASSESSMENT:    Patient Active Problem List   Diagnosis    Valvular heart disease    Type 2 diabetes mellitus without complication, without long-term current use of insulin ([facility] [address]    Open wound of right ankle    COPD (chronic obstructive pulmonary disease) (HCC)    Syncope and collapse    Chronic ulcer of right heel ([facility] [address]    Multifocal pneumonia    Aortic valve stenosis    Esophageal dilatation    Aspiration pneumonia of both lower lobes due to gastric secretions (HCC)    Falls frequently    Chronic respiratory failure (HCC)    Contusion of right knee    Closed fracture of right distal radius    Subdural hemorrhage (HCC)    Subarachnoid hemorrhage (HCC)    Traumatic hemorrhage of cerebrum (HCC)    Chronic bilateral low back pain    Cellulitis of both feet    Acute on chronic congestive heart failure (HCC)    Bilateral leg edema    Cellulitis    Lung mass    Malignant neoplasm of upper lobe of right lung (HCC)    Urinary tract infectious disease    Closed fracture of one rib of right side    Degenerative disc disease, lumbar    Moderate malnutrition (HCC)    Rhabdomyolysis    Unable to ambulate    Mild malnutrition ([address]       PLAN:    1. Continue Lopressor  2. Patient can be discharged and follow-up with her cardiologist  3. Arrhythmia could happen again considering her poor lung status. Please see orders. Discussed with patient and nursing.     Suzanna Aguirre MD, MD

## 2022-04-06 NOTE — PLAN OF CARE
Problem: Skin Integrity:  Goal: Will show no infection signs and symptoms  Description: Will show no infection signs and symptoms  Outcome: Completed  Goal: Absence of new skin breakdown  Description: Absence of new skin breakdown  Outcome: Completed     Problem: Falls - Risk of:  Goal: Will remain free from falls  Description: Will remain free from falls  4/6/2022 1618 by Lucero Rolle RN  Outcome: Completed  4/6/2022 1144 by Lucero Rolle RN  Outcome: Ongoing  Goal: Absence of physical injury  Description: Absence of physical injury  4/6/2022 1618 by Lucero Rolle RN  Outcome: Completed  4/6/2022 1144 by Lucero Rolle RN  Outcome: Ongoing     Problem: Pain:  Description: Pain management should include both nonpharmacologic and pharmacologic interventions.   Goal: Pain level will decrease  Description: Pain level will decrease  Outcome: Completed  Goal: Control of acute pain  Description: Control of acute pain  Outcome: Completed  Goal: Control of chronic pain  Description: Control of chronic pain  Outcome: Completed     Problem: Tissue Perfusion - Cardiopulmonary, Altered:  Goal: Absence of angina  Description: Absence of angina  4/6/2022 1618 by Lucero Rolle RN  Outcome: Completed  4/6/2022 1144 by Lucero Rolle RN  Outcome: Ongoing  Goal: Hemodynamic stability will improve  Description: Hemodynamic stability will improve  4/6/2022 1618 by Lucero Rolle RN  Outcome: Completed  4/6/2022 1144 by Lucero Rolle RN  Outcome: Ongoing     Problem: Metabolic:  Goal: Ability to maintain appropriate glucose levels will improve  Description: Ability to maintain appropriate glucose levels will improve  Outcome: Completed     Problem: Nutrition  Goal: Optimal nutrition therapy  Outcome: Completed     Problem: Breathing Pattern - Ineffective:  Goal: Ability to achieve and maintain a regular respiratory rate will improve  Description: Ability to achieve and maintain a regular respiratory rate will improve  4/6/2022 1618 by Delmi Belcher RN  Outcome: Completed  4/6/2022 1144 by Demli Belcher RN  Outcome: Ongoing     Problem: Discharge Planning:  Goal: Discharged to appropriate level of care  Description: Discharged to appropriate level of care  Outcome: Completed     Problem: Gas Exchange - Impaired:  Goal: Levels of oxygenation will improve  Description: Levels of oxygenation will improve  Outcome: Completed

## 2022-04-06 NOTE — PROGRESS NOTES
The pt was discharged to the [facility]. She was transported by [facility] per stretcher.   Report was called Aldair Castro

## 2022-04-06 NOTE — PROGRESS NOTES
[address]  Occupational Therapy Not Seen    DATE: 2022    NAME: Caitie Cedeño  MRN: [id number]   : 1950    Patient not seen this date for Occupational Therapy due to:      [] Cancel by RN or physician due to:    [] Hemodialysis    [] Critical Lab Value Level     [] Blood transfusion in progress    [] Acute or unstable cardiovascular status   _MAP < 55 or more than >115  _HR < 40 or > 130    [] Acute or unstable pulmonary status   -FiO2 > 60%   _RR < 5 or >40    _O2 sats < 85%    [] Strict Bedrest    [] Off Unit for surgery or procedure    [] Off Unit for testing       [] Pending imaging to R/O fracture    [x] Refusal by Patient: Pt in bed upon arrival. Domitilarush Chu entered room and introduced self and pt yelled \"NO, I'm trying to sleep and you people won't leave me alone! \" Writer asked pt if she would like her to come back later and what time would be good for her and pt yelled \"Oh I don't know! \" Rolan Sage began exiting room and began closing door to provide quiet environment for pt to rest and pt yelled \"Don't close my door! \" Writer asked pt to refrain from yelling at her as she was just trying to help her and that the yelling was not necessary to which pt replied \"YOU'RE NOT NECESSARY! \"   Writer received message that  spoke with pt and that she is now agreeable to therapy. Writer returned at [phone], pt in bed getting a bed bath, will check back. [] Other      [] OT being discontinued at this time. Patient independent. No further needs. [] OT being discontinued at this time as the patient has been transferred to hospice care. No further needs.       ROLO Maldonado

## 2022-04-06 NOTE — PROGRESS NOTES
Pulmonary Critical Care Progress Note  Loc Negrete MD     Patient seen for the follow up of chronic hypoxic respiratory failure, acute exacerbation of COPD, squamous cell carcinoma right upper lobe, hypotension    Subjective:  Patient had uneventful night. She wore BiPAP last night. She is currently resting in bed does not appear to be in any distress. She is on oxygen at 3L/NC, SpO2 96%. Shortness of breath is mild today. She denies chest pain. Her cough is productive with small amount of clear sputum and getting better. Examination:  Vitals: BP (!) 105/53   Pulse 80   Temp 98.4 °F (36.9 °C) (Oral)   Resp 20   Ht 5' 8\" (1.727 m)   Wt 153 lb 0.9 oz (69.4 kg)   SpO2 98%   BMI 23.27 kg/m²   General appearance: alert and cooperative with exam, resting in bed  Neck: No JVD  Lungs: Moderate air exchange, no wheezing, rales or rhonchi  Heart: regular rate and rhythm, S1, S2 normal, no gallop  Abdomen: Soft, non tender, + BS  Extremities: no cyanosis or clubbing.  No significant edema    LABs:  BMP:   Recent Labs     04/05/22  0715 04/06/22  0613    141   K 3.8 4.6   CO2 33* 31   BUN 20 18   CREATININE 0.71 0.63   LABGLOM >60 >60   GLUCOSE 127* 97     ABG:  Lab Results   Component Value Date    PHART 7.42 08/26/2012    PH 7.22 04/11/2017    KSJ8YTL 41 08/26/2012    PCO2 54 04/11/2017    PO2ART 59 08/26/2012    PO2 89 04/11/2017    HPI9KVZ 26.1 08/26/2012    HCO3 22.2 04/11/2017    FVJ3USG 27 04/12/2017    S4YFBZTO 92.1 08/26/2012    O2SAT 95 04/11/2017    FIO2 UNKNOWN 10/31/2017       Lab Results   Component Value Date    POCPH 7.36 04/12/2017    PHART 7.42 08/26/2012    PH 7.22 04/11/2017    POCPCO2 45 04/12/2017    QWR8IDR 41 08/26/2012    PCO2 54 04/11/2017    POCPO2 93 04/12/2017    PO2ART 59 08/26/2012    PO2 89 04/11/2017    POCHCO3 25.3 04/12/2017    YTP2LQK 26.1 08/26/2012    HCO3 22.2 04/11/2017    NBEA NOT REPORTED 04/12/2017    PBEA 0 04/12/2017    MQY6LBJ 27 04/12/2017    DVOZ8QTV 97 04/12/2017    C4SUEFYZ 92.1 08/26/2012    O2SAT 95 04/11/2017    FIO2 UNKNOWN 10/31/2017     Radiology:  4/2/22  X-ray chest:      Impression:  · Chronic hypoxic respiratory failure  · Acute exacerbation of COPD  · Former smoker, quit 2016  · Right upper lobe squamous cell carcinoma, following with Dr. Miriam Tran  · Recent history of falls with left facial abrasion  · Anxiety, aortic stenosis, depression, diabetes, GERD  · Hypotension, ? sepsis versus dehydration    Recommendations:  · Oxygen via nasal cannula, keep SPO2 90% or greater  · Incentive spirometry every hour while awake/Acapella  · BiPAP at night and as needed. Will make sure she has BiPAP available for use at nighttime at [facility]. · Mucinex  · Symbicort 160  · Prednisone taper  · Albuterol HFA 3 times daily and as needed  · Continue Augmentin. Course of Zithromax and Rocephin completed  · Diuresis with oral Lasix  · DVT prophylaxis with low molecular weight heparin  · GI prophylaxis, protonix  · PT/OT  · Discharge planning to rehab when all arrangements can be made.   · Discussed with RN      Electronically signed by     Valdemar Servin MD on 4/6/2022 at 2:19 PM  Pulmonary Critical Care and Sleep Medicine,  [facility]  Cell: [phone]  Office: [phone]

## 2022-04-06 NOTE — PROGRESS NOTES
Continue current diet,Continue Oral Nutrition Supplement         Ginette LUIN, RDN, LDN  Lead Clinical Dietitian  RD Office Phone [phone]

## 2022-04-06 NOTE — CARE COORDINATION
Social Work-Met with patient. Discussed that if she refuses therapy, insurance is likely to deny payment for SNF. Encouraged her to participate today in therapy.  Harris Palmer

## 2022-04-06 NOTE — PROGRESS NOTES
DATE: 2022    NAME: Floyd Coyle  MRN: [id number]   : 1950    Patient not seen this date for Physical Therapy due to:      [] Cancel by RN or physician due to:    [] Hemodialysis    [] Critical Lab Value Level     [] Blood transfusion in progress    [] Acute or unstable cardiovascular status   _MAP < 55 or more than >115  _HR < 40 or > 130    [] Acute or unstable pulmonary status   -FiO2 > 60%   _RR < 5 or >40    _O2 sats < 85%    [] Strict Bedrest    [] Off Unit for surgery or procedure    [] Off Unit for testing       [] Pending imaging to R/O fracture    [] Refusal by Patient      [x] Other OT working with patient      [] PT being discontinued at this time. Patient independent. No further needs. [] PT being discontinued at this time as the patient has been transferred to hospice care. No further needs.       FARHANA GIL, PTA

## 2022-04-07 LAB
EKG ATRIAL RATE: 96 BPM
EKG P AXIS: 52 DEGREES
EKG P-R INTERVAL: 150 MS
EKG Q-T INTERVAL: 406 MS
EKG QRS DURATION: 156 MS
EKG QTC CALCULATION (BAZETT): 512 MS
EKG R AXIS: -79 DEGREES
EKG T AXIS: 66 DEGREES
EKG VENTRICULAR RATE: 96 BPM

## 2022-04-07 PROCEDURE — 93010 ELECTROCARDIOGRAM REPORT: CPT | Performed by: INTERNAL MEDICINE

## 2022-04-11 ENCOUNTER — TELEPHONE (OUTPATIENT)
Dept: CASE MANAGEMENT | Age: 72
End: 2022-04-11

## 2022-04-11 NOTE — TELEPHONE ENCOUNTER
Name: Teo Millard  : 1950  MRN: [id number]    Oncology Navigation Follow-Up Note  Navigator spoke with Michelle and requesting MO F/U appt. For pt. Guerita augustinmred pt. Resides at [facility] [place] at this time.    Electronically signed by Veronica Esquivel RN on 2022 at 3:53 PM

## 2022-04-15 NOTE — PROGRESS NOTES
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 RN aware.  Will check back as able  [] Surgery:     [] Intubation:     [] Pain Medication:    [] Sedation:     [] Spine Precautions :    [] Medical Instability:    [] Other:      HARSHA Wheeler/JARROD

## 2022-04-20 ENCOUNTER — HOSPITAL ENCOUNTER (OUTPATIENT)
Facility: MEDICAL CENTER | Age: 72
End: 2022-04-20

## 2022-04-22 NOTE — DISCHARGE SUMMARY
[address] [place] [address].,    Adult Hospitalist      Patient ID: Jovan West  MRN: [id number]     Acct:  [de-identified]       Patient's PCP: Lian Lombardo MD    Admit Date: 3/29/2022     Discharge Date: 4/6/2022      Admitting Physician: Frances Rocha MD    Discharge Physician: Rod Johnson MD     CONSULTANTS: Patient Care Team:  Lian Lombardo MD as PCP - General (Family Medicine)  Daniel Pal RN as Nurse Navigator (Oncology)  Sasha Theodore MD as Consulting Physician (Hematology and Oncology)  David Sen MD as Consulting Physician (Radiation Oncology)    PROCEDURES PERFORMED:     Active Discharge Diagnoses:  · Unable to ambulate  · Weakness  · Decubitus ulcer buttocks stage II  · NSVT  · Coronary disease, native vessel  · Chronic obstructive pulmonary disease, unspecified  · Diabetes mellitus type 2  · Essential hypertension  · Major depressive disorder  · Anxiety disorder  · Reported abdominal aortic aneurysm  · Gastroesophageal reflux disease without esophagitis  · Aortic stenosis  · Osteoarthritis multiple joints  · Lung cancer  · Past smoker  · Orthostatic hypotension-resolved  · Dehydration-resolved  · Left face abrasion healing  · AECOPD  · Right small pleural effusion  · Atelectasis   ·       Primary Problem  Unable to ambulate    Hospital Course:       Patient admitted through the emergency room where she presented via EMS. Patient had recently been discharged from the hospital for acute rhabdomyolysis. Patient had been recommended to go to a skilled nursing facility for rehabilitation. However in spite of repeated recommendations patient refused to go to a rehab center. Social work had made a call to [facility] in addition in view of the patient's condition.     Apparently the patient was discharged 3 days ago and says since then she has been sitting in one chair. She says she has not been able to get up and has developed a decubitus ulcer.   She is asking for pain medication for that which has been ordered. Patient's muscle enzymes have been unremarkable nowNow. Patient has had some cough and congestion. She says she has used her inhaler treatments. She is on oxygen via nasal cannula at 3 L/min. She denies any chest pain or wheezing. Denies any headache, photophobia or diplopia. Denies any nausea, vomiting or abdominal pain. Denies any diarrhea or constipation. Denies any joint swelling though she states she has chronic pain and needs her pain medication. Patient admitted for further management. Patient   Had left-sided facial abrasion. Infectious Disease was involved in the care. Recommended topical wound care. Patient was treated for acute respiratory failure and exacerbation of COPD. Pulmonology was involved in the care. Patient was initially requiring BiPAP. Later on improved. Received IV [facility]Medrol in [facility]. Patient also received empiric antibiotics. Respiratory status improved. Patient also noted to have arrhythmia. Cardiology evaluated the patient. Her medications adjusted. Lopressor   Continued. At the time of discharge patient was hemodynamically stable and asymptomatic. The plan was discussed in detail with patient who agreed with the plan and verbalized understanding . The patient was seen and examined on day of discharge and this discharge summary is in conjunction with any daily progress note from day of discharge. Hospital Data:    Labs:    Hematology:No results for input(s): WBC, RBC, HGB, HCT, MCV, MCH, MCHC, RDW, PLT, MPV, SEDRATE, CRP, INR, DDIMER, CH1QVNDJ, LABABSO in the last 72 hours. Invalid input(s): PT  Chemistry:No results for input(s): NA, K, CL, CO2, GLUCOSE, BUN, CREATININE, MG, ANIONGAP, LABGLOM, GFRAA, CALCIUM, CAION, PHOS, PSA, PROBNP, TROPHS, CKTOTAL, CKMB, CKMBINDEX, MYOGLOBIN, DIGOXIN, LACTACIDWB in the last 72 hours.   No results for input(s): PROT, LABALBU, LABA1C, I4ERMXG, S6TXWTI, FT4, TSH, AST, ALT, LDH, GGT, ALKPHOS, LABGGT, BILITOT, BILIDIR, AMMONIA, AMYLASE, LIPASE, LACTATE, CHOL, HDL, LDLCHOLESTEROL, CHOLHDLRATIO, TRIG, VLDL, JDT67EE, PHENYTOIN, PHENYF, URICACID, POCGLU in the last 72 hours. Lab Results   Component Value Date    INR 1.0 03/30/2022    INR 1.1 03/19/2022    INR 1.0 01/06/2021    PROTIME 13.4 03/30/2022    PROTIME 13.8 03/19/2022    PROTIME 12.6 01/06/2021     Lab Results   Component Value Date/Time    SPECIAL LT AC 10ML 03/29/2022 07:28 PM     Lab Results   Component Value Date/Time    CULTURE NO GROWTH 5 DAYS 03/29/2022 07:28 PM       Lab Results   Component Value Date    POCPH 7.36 04/12/2017    PHART 7.42 08/26/2012    PH 7.22 04/11/2017    POCPCO2 45 04/12/2017    LQI8UUR 41 08/26/2012    PCO2 54 04/11/2017    POCPO2 93 04/12/2017    PO2ART 59 08/26/2012    PO2 89 04/11/2017    POCHCO3 25.3 04/12/2017    MOW7MCW 26.1 08/26/2012    HCO3 22.2 04/11/2017    NBEA NOT REPORTED 04/12/2017    PBEA 0 04/12/2017    SEW5MHI 27 04/12/2017    SMHE2UJK 97 04/12/2017    D6RVPKMQ 92.1 08/26/2012    O2SAT 95 04/11/2017    FIO2 UNKNOWN 10/31/2017       Radiology:    No results found. All radiological studies reviewed      Reviews of Symptoms:    A 10 point system is reviewed and  negative except described in hospital course    Physical Exam:    Vitals:  BP (!) 105/53   Pulse 80   Temp 98.4 °F (36.9 °C) (Oral)   Resp 20   Ht 5' 8\" (1.727 m)   Wt 153 lb 0.9 oz (69.4 kg)   SpO2 98%   BMI 23.27 kg/m²   No data recorded.       General appearance - alert, well appearing, and in no acute distress  Mental status - oriented to person, place, and time with normal affect  Head - normocephalic and atraumatic  Eyes - pupils equal and reactive, extraocular eye movements intact, conjunctiva clear  Ears - hearing appears to be intact  Nose - no drainage noted  Mouth - mucous membranes moist  Neck - supple, no carotid bruits, thyroid not palpable  Chest - clear to auscultation, normal effort  Heart - normal rate, regular rhythm, no murmur  Abdomen - soft, nontender, nondistended, bowel sounds present all four quadrants, no masses, hepatomegaly or splenomegaly  Neurological - normal speech, no focal findings or movement disorder noted, cranial nerves II through XII grossly intact  Extremities - peripheral pulses palpable, no pedal edema or calf pain with palpation  Skin - no gross lesions, rashes, or induration noted      Consults:  IP CONSULT TO HOSPITALIST  IP CONSULT TO SOCIAL WORK  IP CONSULT TO PULMONOLOGY  IP CONSULT TO CARDIOLOGY    Disposition: SNF    Discharged Condition: Stable    Follow Up: Charbel Veloz MD  [address]  [phone]    Schedule an appointment as soon as possible for a visit in 1 week  For follow up      Lab Frequency Next Occurrence   PET CT SKULL BASE TO MID THIGH Once 08/25/2021   XR CHEST STANDARD (2 VW) Once 10/06/2021   PET CT SKULL BASE TO MID THIGH Once 10/27/2021   CT NEEDLE BIOPSY LUNG PERCUTANEOUS Once 21/88/3189   Basic Metabolic Panel Once 67/10/7139   CT NEEDLE BIOPSY LUNG PERCUTANEOUS Once 12/01/2021   CBC With Auto Differential     Comprehensive Metabolic Panel     Hemoglobin and Hematocrit, Blood, Post Transfusion POST TRANSFUSION          Diet: No diet orders on file    Discharge Medications:      Medication List      START taking these medications    midodrine 10 MG tablet  Commonly known as: PROAMATINE  Take 1 tablet by mouth 3 times daily as needed (SBP <90)        CHANGE how you take these medications    furosemide 40 MG tablet  Commonly known as: LASIX  Take 1 tablet by mouth daily  What changed:   · when to take this  · reasons to take this        CONTINUE taking these medications    albuterol sulfate  (90 Base) MCG/ACT inhaler  Inhale 2 puffs into the lungs every 4 hours as needed for Wheezing ARIPiprazole 5 MG tablet  Commonly known as: ABILIFY     aspirin EC 81 MG EC tablet     clonazePAM 1 MG tablet  Commonly known as: KLONOPIN  Take 1 tablet by mouth 3 times daily as needed for Anxiety for up to 3 doses. Crestor 5 MG tablet  Generic drug: rosuvastatin     Dulera 200-5 MCG/ACT inhaler  Generic drug: mometasone-formoterol     gabapentin 400 MG capsule  Commonly known as: NEURONTIN  Take 2 capsules by mouth in the morning and at bedtime for 5 doses. glimepiride 1 MG tablet  Commonly known as: AMARYL     lansoprazole 30 MG delayed release capsule  Commonly known as: PREVACID     melatonin 5 MG Tabs tablet     metFORMIN 500 MG tablet  Commonly known as: GLUCOPHAGE     metoprolol tartrate 50 MG tablet  Commonly known as: LOPRESSOR  Take 1 tablet by mouth 2 times daily     ondansetron 4 MG tablet  Commonly known as: ZOFRAN  Take 1 tablet by mouth every 8 hours as needed for Nausea or Vomiting     tamsulosin 0.4 MG capsule  Commonly known as: FLOMAX  Take 1 capsule by mouth daily     traZODone 150 MG tablet  Commonly known as: DESYREL     venlafaxine 150 MG extended release capsule  Commonly known as: EFFEXOR XR        STOP taking these medications    neomycin-bacitracin-polymyxin 400-5-5000 ointment  Commonly known as: NEOSPORIN     NYQUIL MULTI-SYMPTOM PO     oxyCODONE-acetaminophen  MG per tablet  Commonly known as: PERCOCET        ASK your doctor about these medications    amoxicillin-clavulanate 500-125 MG per tablet  Commonly known as: AUGMENTIN  Take 1 tablet by mouth every 8 hours for 12 doses  Ask about: Should I take this medication?     oxyCODONE-acetaminophen  MG per tablet  Commonly known as: PERCOCET  Take 1 tablet by mouth every 6 hours as needed for Pain for up to 2 days. Ask about: Should I take this medication?     predniSONE 20 MG tablet  Commonly known as: DELTASONE  Take 2 tablets by mouth daily for 2 doses  Ask about: Should I take this medication?            Where to Get Your Medications      These medications were sent to [facility], [address] [facility] Cleopatra Rossi [phone]  [address] [place] [address], [address] [place] [id number]    Phone: [phone]   · amoxicillin-clavulanate 500-125 MG per tablet  · furosemide 40 MG tablet  · midodrine 10 MG tablet  · predniSONE 20 MG tablet     You can get these medications from any pharmacy    Bring a paper prescription for each of these medications  · clonazePAM 1 MG tablet  · gabapentin 400 MG capsule  · oxyCODONE-acetaminophen  MG per tablet         Code Status:  Prior    Time Spent on discharge is  41 mins in patient examination, evaluation, counseling as well as medication reconciliation, prescriptions for required medications, discharge plan and follow up. Electronically signed by Fabiola Stanton MD on 4/22/2022 at 10:04 AM     Thank you Dr. Montserrat Mar MD for the opportunity to be involved in this patient's care. This note was created with the assistance of a speech-recognition program.  Although the intention is to generate a document that actually reflects the content of the visit, no guarantees can be provided that every mistake has been identified and corrected by editing. Note was updated later by me after  physical examination and  completion of the assessment.

## 2022-04-26 NOTE — PROGRESS NOTES
Physician Progress Note      PATIENT:               Severo Tierney  CSN #:                  [id number]  :                       1950  ADMIT DATE:       3/29/2022 6:18 PM  [address] [employer] DATE:        2022 4:25 PM  RESPONDING  PROVIDER #:        Shira Torres MD          QUERY TEXT:    Patient admitted with weakness. Documentation reflects ? sepsis by pulmonology   consultant. If possible, please document in the progress notes: The medical record reflects the following:  Risk Factors: decubitus ulcer to buttock and heel, COPD, lung cancer, recent   hospitalization  Clinical Indicators:  afebrile, HR initially 114-123, RR 10-28/min, WBC 9.7,   initially hypertensive 170/122;  BP down to 91/49(60), 90/52(62) on 3/31 with   HR 80's;  3/31 Procalcitonin 0.6, UA with positive nitrites and small   leukocyte esterase but no bacteria or yeast, 5-10 WBCs;  Rocephin and   Azithromycin order indications:  URI; 3/31 Pulmonology consult note and   subsequent progress notes:  \"Hypotension, ? sepsis versus dehydration. \"  Treatment: Rocephin 3/29-4/3 for URI, Zithromax 3/31- for URI, Augmentin   4/3- for COPD exacerbation    Thank you,  Quan Garza RN  Options provided:  -- Sepsis unlikely after study  -- Other - I will add my own diagnosis  -- Disagree - Not applicable / Not valid  -- Disagree - Clinically unable to determine / Unknown  -- Refer to Clinical Documentation Reviewer    PROVIDER RESPONSE TEXT:    Provider is clinically unable to determine a response to this query. Query created by:  Tia Christensen on 2022 10:05 AM      Electronically signed by:  Shira Torres MD 2022 5:54 PM

## 2022-04-27 ENCOUNTER — TELEPHONE (OUTPATIENT)
Dept: ONCOLOGY | Age: 72
End: 2022-04-27

## 2022-04-27 ENCOUNTER — OFFICE VISIT (OUTPATIENT)
Dept: ONCOLOGY | Age: 72
End: 2022-04-27
Payer: MEDICARE

## 2022-04-27 VITALS
TEMPERATURE: 97.5 F | RESPIRATION RATE: 18 BRPM | SYSTOLIC BLOOD PRESSURE: 94 MMHG | DIASTOLIC BLOOD PRESSURE: 64 MMHG | HEART RATE: 96 BPM

## 2022-04-27 DIAGNOSIS — R91.8 OTHER NONSPECIFIC ABNORMAL FINDING OF LUNG FIELD: ICD-10-CM

## 2022-04-27 DIAGNOSIS — C34.91 NON-SMALL CELL CANCER OF RIGHT LUNG (HCC): Primary | ICD-10-CM

## 2022-04-27 PROCEDURE — 99211 OFF/OP EST MAY X REQ PHY/QHP: CPT

## 2022-04-27 PROCEDURE — 99214 OFFICE O/P EST MOD 30 MIN: CPT | Performed by: INTERNAL MEDICINE

## 2022-04-27 RX ORDER — ONDANSETRON 4 MG/1
4 TABLET, ORALLY DISINTEGRATING ORAL EVERY 6 HOURS PRN
Qty: 60 TABLET | Refills: 0 | Status: SHIPPED | OUTPATIENT
Start: 2022-04-27

## 2022-04-27 RX ORDER — SENNA AND DOCUSATE SODIUM 50; 8.6 MG/1; MG/1
1 TABLET, FILM COATED ORAL 2 TIMES DAILY
COMMUNITY

## 2022-04-27 NOTE — TELEPHONE ENCOUNTER
Garfield Leung MD VISIT  PET SCAN 1-2 WKS GETS @ [facility]  [facility] AFTER PET   PET/CT WAS CALLED [phone] FOR [facility]. SCHEDULING AND FAXED [phone]  AND SCANNED UNDER MEDIA THEY WILL CALL PT TO SCHEDULE AN APPT PT WILL CALL US BACK TO LET US KNOW WHEN HER APPT IS SO THAT SHE CAN GET A F/U APPT W/ DR Rhea Ortiz MD VISIT AFTER PET SCAN  AVS PRINTED W/ INSTRUCTIONS AND GIVEN TO PT ON EXIT

## 2022-04-27 NOTE — PROGRESS NOTES
Today's Date: 4/27/2022  Patient Name: Mackenzie Fallon  Patient's age: 71 y. o., 1950    Diagnosis:   RUL squamous cell carcinoma,   Cancer Staging  Malignant neoplasm of upper lobe of right lung (HCC)  Staging form: Lung, AJCC 8th Edition  - Clinical stage from 9/18/2020: Stage IIIB (cT4, cN2, cM0) - Signed by Cheri Bazzi MD on 9/18/2020    Current therapy:  Started on concurrent chemoradiation with weekly carbotaxol on 10/21/20  She completed radiation therapy in mid December  Her pET scan 2/3/21 showed no recurrence with positive response to treatment  Restarted on consolidation durvalumab 2/3/2021  Recent CT chest 6/14/21 showed Increase in size of spiculated malignant mass in the medial right upper lobe measuring 5.2 x 3.4 cm, which shows evidence of mediastinal invasion.  Matted metastatic lymphadenopathy in the subcarinal and right paratracheal regions is unchanged  PET scan on 7/2/2021 showed  There has been significant increase in size compared to prior PET/CT from February 2021 with new invasion into the right mediastinum.  There is associated increased FDG  PET activity measuring with a max SUV of 16.2, previously 3.1. Started on chemotherapy with carboplatin and Taxol on 7/14/2021  PET scan on 9/3/2021 showed interval improvement with decrease in the size and intensity of the right upper lobe mass  She completed 6 cycles of chemotherapy on 11/3/2021  PET scan on 11/15/2021 showed persistent but stable FDG avid mass in the right apex with interval resolution of the activity near the diaphragm  IR guided biopsy was planned but due to the risk from the procedure IR deferred the biopsy  Patient planning to see pulmonologist for bronchoscopic biopsy.   Unfortunately she has had multiple hospitalization and continued to miss appointment with pulmonologist    CHIEF COMPLAINT:    Chief Complaint   Patient presents with    Follow-Up from [address]:    Sapphire Sweet is returning for follow-up visit and to discuss lab results, recent imaging studies and further recommendations. Patient was recently admitted to hospital with fall and rhabdomyolysis. She missed many appointments with pulmonologist for bronchoscopic biopsy of her lung lesion. She is at nursing home now and complains of some fatigue and tiredness. She also has some cough with mucus production but denies any fever chills. Her last PET scan was in November 2021. During this visit patient's allergy, social, medical, surgical history and medications were reviewed and updated. HISTORY OF PRESENT ILLNESS:    Vanessa Means is a 59-year-old female with a past medical history of COPD, history of tobacco abuse, depression, CAD, arthritis was admitted with multiple falls at home. She complains of back pain and also chronic cough. On admission she had a CT scan of the chest which showed 7.2 cm spiculated mass in the right upper lobe with mediastinal lymphadenopathy suspicious for malignancy. 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 Oncology consulted for further evaluation. Patient has COPD and uses Home oxygen at night and sues walker at home. She smokes more than a PPD. 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 Past Medical History:   has a past medical history of AAA (abdominal aortic aneurysm) ([address] Acid reflux, Anxiety and depression, Aortic stenosis, Arthritis, Blister of ankle, right, CAD (coronary artery disease), Cancer ([address] COPD (chronic obstructive pulmonary disease) ([address] Diabetes mellitus ([address] Falls, Heart block, Hypokalemia, MDRO (multiple drug resistant organisms) resistance, MRSA (methicillin resistant staph aureus) culture positive, On home oxygen therapy, On home oxygen therapy, Overactive bladder, Pneumonia, PONV (postoperative nausea and vomiting), and Vitamin D deficiency.     Past Surgical History:   has a past surgical history that includes back surgery; eye surgery; Cholecystectomy; Appendectomy; Hysterectomy; Tonsillectomy; lumbar laminectomy; Endoscopy, colon, diagnostic (12/08/2016); aortic valve repair (N/A, 4/11/2017); bronchoscopy (N/A, 8/31/2020); IR INSERT PICC VAD W SQ PORT >5 YEARS (9/4/2020); and IR PORT PLACEMENT > 5 YEARS (9/29/2020). Medications:    Current Outpatient Medications   Medication Sig Dispense Refill    sennosides-docusate sodium (SENOKOT-S) 8.6-50 MG tablet Take 1 tablet by mouth in the morning and at bedtime      furosemide (LASIX) 40 MG tablet Take 1 tablet by mouth daily 60 tablet 3    midodrine (PROAMATINE) 10 MG tablet Take 1 tablet by mouth 3 times daily as needed (SBP <90) 45 tablet 0    clonazePAM (KLONOPIN) 1 MG tablet Take 1 tablet by mouth 3 times daily as needed for Anxiety for up to 3 doses. 3 tablet 0    gabapentin (NEURONTIN) 400 MG capsule Take 2 capsules by mouth in the morning and at bedtime for 5 doses. (Patient taking differently: Take 300 mg by mouth in the morning and at bedtime.  ) 10 capsule 0    metoprolol tartrate (LOPRESSOR) 50 MG tablet Take 1 tablet by mouth 2 times daily 60 tablet 3    albuterol sulfate  (90 Base) MCG/ACT inhaler Inhale 2 puffs into the lungs every 4 hours as needed for Wheezing 18 g 3    ARIPiprazole (ABILIFY) 5 MG tablet Take 5 mg by mouth at bedtime      metFORMIN (GLUCOPHAGE) 500 MG tablet Take 500 mg by mouth 2 times daily (with meals)      traZODone (DESYREL) 150 MG tablet Take 300 mg by mouth nightly      melatonin 5 MG TABS tablet Take 15 mg by mouth nightly      ondansetron (ZOFRAN) 4 MG tablet Take 1 tablet by mouth every 8 hours as needed for Nausea or Vomiting 30 tablet 3    rosuvastatin (CRESTOR) 5 MG tablet Take 5 mg by mouth daily      glimepiride (AMARYL) 1 MG tablet Take 1 mg by mouth 2 times daily (with meals)       venlafaxine (EFFEXOR XR) 150 MG extended release capsule Take 150 mg by mouth 2 times daily      lansoprazole (PREVACID) 30 MG delayed release capsule Take 30 mg by mouth daily      aspirin EC 81 MG EC tablet Take 81 mg by mouth daily      mometasone-formoterol (DULERA) 200-5 MCG/ACT inhaler Inhale 2 puffs into the lungs every 12 hours as needed (wheezing/SOB)       tamsulosin (FLOMAX) 0.4 MG capsule Take 1 capsule by mouth daily (Patient not taking: Reported on 2022) 30 capsule 0     No current facility-administered medications for this visit. Allergies:  Codeine and Dye [iodides]    Social History:   reports that she quit smoking about 5 years ago. She has never used smokeless tobacco. She reports that she does not drink alcohol and does not use drugs. Family History: family history includes Cancer in her father and mother. REVIEW OF SYSTEMS:    Constitutional: ++fatigue, No fever or chills. No night sweats, no weight loss   Eyes: No eye discharge, double vision, or eye pain   HEENT: negative for sore mouth, sore throat, hoarseness and voice change   Respiratory: negative for cough , sputum, ++dyspnea, wheezing, hemoptysis, chest pain   Cardiovascular: negative for chest pain, dyspnea, palpitations, orthopnea, PND   Gastrointestinal: negative for nausea, vomiting, diarrhea, constipation, abdominal pain, Dysphagia, hematemesis and hematochezia   Genitourinary: negative for frequency, dysuria, nocturia, urinary incontinence, and hematuria   Integument: negative for rash, skin lesions, bruises. Hematologic/Lymphatic: negative for easy bruising, bleeding, lymphadenopathy, or petechiae   Endocrine: negative for heat or cold intolerance,weight changes, change in bowel habits and hair loss   Musculoskeletal: negative for myalgias, arthralgias, pain, joint swelling,and bone pain   Neurological: negative for headaches, dizziness, seizures, weakness, numbness    PHYSICAL EXAM:      There were no vitals taken for this visit.    Temp (24hrs), Av.7 °F (36.5 °C), Min:97.3 °F (36.3 °C), Max:98.1 °F (36.7 °C)  General appearance - well appearing, no in pain or distress   Mental status - alert and cooperative   Eyes - pupils equal and reactive, extraocular eye movements intact   Ears - bilateral TM's and external ear canals normal   Mouth - mucous membranes moist, pharynx normal without lesions   Neck - supple, no significant adenopathy   Lymphatics - no palpable lymphadenopathy, no hepatosplenomegaly   Chest - clear to auscultation, no wheezes, rales or rhonchi, symmetric air entry   Heart - normal rate, regular rhythm, normal S1, S2, no murmurs  Abdomen - soft, nontender, nondistended, no masses or organomegaly   Neurological - alert, oriented, normal speech, no focal findings or movement disorder noted   Musculoskeletal - no joint tenderness, deformity or swelling   Extremities - peripheral pulses normal, no pedal edema, no clubbing or cyanosis   Skin - normal coloration and turgor, no rashes, no suspicious skin lesions noted ,    DATA:    Labs:   CBC:   No results for input(s): WBC, HGB, HCT, PLT in the last 72 hours. BMP:   No results for input(s): NA, K, CO2, BUN, CREATININE, LABGLOM, GLUCOSE in the last 72 hours. PT/INR: No results for input(s): PROTIME, INR in the last 72 hours. Surgical Pathology Report   Surgical Pathology   Collected:  08/31/20 0803    Lab status:  Final    Resulting lab:  [employer]    Value:  -- Diagnosis --     BRONCHIAL WASHINGS RIGHT UPPER LOBE:          POSITIVE FOR MALIGNANCY.        SQUAMOUS CELL CARCINOMA.           COMMENT: VOLUME OF TUMOR IN THE CELL BLOCK IS LOW AND ADDITIONAL   MATERIAL WOULD BE REQUIRED IF MOLECULAR TESTING IS NECESSARY. IMAGING DATA:  CT chest 8/27/2020:   FINDINGS:    Mediastinum:  Three vessel coronary artery calcification.  Newly enlarged    mediastinal lymph nodes including example station 7 node measuring 3.1 x 2.1    cm on series 2, image 45.         Lungs/pleura: Mild upper lobe predominant centrilobular emphysema. Gurwinder Glow Spiculated mass of the right upper lobe measuring 7.2 x 6.5 cm with    broad-base of contact with the mediastinal pleura, right major fissure,    encircling and obliterating the right upper lobe bronchus.  Spicules are seen    to extend to the anterior costal pleura.  There is adjacent bronchial wall    thickening and interlobular septal thickening.  Stable 5 mm solid nodule of    the left upper lobe since 2016, benign.  No pleural effusion or pneumothorax.         Upper Abdomen: Adrenals are unremarkable.         Soft Tissues/Bones: Old right rib fractures.              Impression    Approximate 7.2 cm spiculated mass of the right upper lobe likely with    invasion of the mediastinum, adjacent lymphangitic spread and suspected    metastatic mediastinal lymphadenopathy. Scan 9/11/2020: Impression    1. The patient's known right upper lobe lung mass is FDG avid consistent with    the patient's primary malignancy. 2. FDG avid mediastinal lymph nodes consistent with metastatic disease. 3. No abnormal FDG activity is identified involving the abdomen or pelvis. PET 2/3/21  IMPRESSION:   *  Unexpected findings of emphysematous cystitis. *  Positive response to therapy with markedly decreased size and FDG avidity of right upper lobe mass and mediastinal adenopathy. *  Healing right rib fractures.     Ct chest 6/14/21  FINDINGS:     Emphysema.  Spiculated mass in the medial right upper lobe which appears to invade into the upper right mediastinum measures 5.2 x 3.4 cm (4.4 x 1.8 cm previously).  Malignant matted lymphadenopathy in the subcarinal and right paratracheal regions is unchanged.  Noncalcified left upper lobe lung   nodule measuring 0.6 cm is unchanged.  There is a 1.4 cm nodular opacity in the lingula that appear slightly larger than on previous study.  Left chest port in place.  Atherosclerotic thoracic aorta.  Visualized structures in the upper abdomen are unremarkable.  Normal heart size.  No pleural or   pericardial effusions.  The central pulmonary arteries are unremarkable.  Extensive coronary artery calcifications.  Status post median sternotomy.  Wall thickening of the esophagus.  Visualized chest wall structures are unremarkable.  There are multiple healing and/or old rib fractures. IMPRESSION:   1.  Increase in size of spiculated malignant mass in the medial right upper lobe measuring 5.2 x 3.4 cm, which shows evidence of mediastinal invasion.  Matted metastatic lymphadenopathy in the subcarinal and right paratracheal regions is unchanged. 2.  Unchanged noncalcified left upper lobe nodule measuring 0.6 cm.  A 1.4 cm nodular opacity in the lingula appears slightly larger than on previous study. 3.  Wall thickening of esophagus suggestive of esophagitis. Ct abd  6/13/21  CT images of the abdomen and pelvis are obtained without contrast.  There is a 1.8 cm soft tissue density arising from or directly adjacent to the uncinate process of the pancreas.  Consider contrast-enhanced CT or MRI for further evaluation.  Bowel is unremarkable. Kennth Storla is no intraperitoneal or   retroperitoneal lymphadenopathy.  Colonic stool burden is moderate.  The adrenals appear symmetric.  Images the lung bases are grossly clear.  There appear to be remote right rib fractures.  Remote left rib fracture also noted.  There is no hepatic or splenic abnormality.  No hydronephrosis is seen. IMPRESSION:     1.8 cm soft tissue density arising from, or directly adjacent to, the uncinate process of the pancreas.  Contrast-enhanced CT or MRI recommended. CT Chest 6/14/21  IMPRESSION:     1.  Increase in size of spiculated malignant mass in the medial right upper lobe measuring 5.2 x 3.4 cm, which shows evidence of mediastinal invasion.  Matted metastatic lymphadenopathy in the subcarinal and right paratracheal regions is unchanged.    2.  Unchanged noncalcified left upper lobe nodule measuring 0.6 cm.  A 1.4 cm nodular opacity in the lingula appears slightly larger than on previous study. 3.  Wall thickening of esophagus suggestive of esophagitis. Ct abd  Pelvis 6/13/21  CT images of the abdomen and pelvis are obtained without contrast.  There is a 1.8 cm soft tissue density arising from or directly adjacent to the uncinate process of the pancreas.  Consider contrast-enhanced CT or MRI for further evaluation.  Bowel is unremarkable. Sylwia Seller is no intraperitoneal or   retroperitoneal lymphadenopathy.  Colonic stool burden is moderate.  The adrenals appear symmetric.  Images the lung bases are grossly clear.  There appear to be remote right rib fractures.  Remote left rib fracture also noted.  There is no hepatic or splenic abnormality.  No hydronephrosis is seen. IMPRESSION:   1.8 cm soft tissue density arising from, or directly adjacent to, the uncinate process of the pancreas.  Contrast-enhanced CT or MRI recommended. PET scan 7/2/2021  FINDINGS:   Normal FDG uptake is seen in brain, oral pharyngeal region, myocardium, liver, spleen, bowel, kidneys/ureters and urinary bladder. Head and neck:   No suspicious hypermetabolic activity. No lymphadenopathy. Chest:   Relatively unchanged spiculated mass within the right upper lobe measuring 4.7 x 3.0 cm when compared to recent CT from June 2021. Sylwia Seller has been significant increase in size compared to prior PET/CT from February 2021 with new invasion into the right mediastinum.  There is associated increased FDG  PET activity measuring with a max SUV of 16.2, previously 3.1. Unchanged opacities along the posterior lateral right upper lobe.  Minimally increased activity is noted in this area. Improving nodular opacity within the lingula with no associated increased metabolic activity.    No significant activity is noted within the previously seen mediastinal lymph nodes.  There is mild diffuse uptake throughout the esophagus with a thickened wall.  This is similar to prior CT in June 2021. Very severe calcification involving the left coronary, LAD, circumflex, ramus and right coronary. Abdomen:   Adrenals are normal.  Diffuse uptake is noted throughout the colon with no focal areas appreciated.  No increased activity is noted at the soft tissue density adjacent to the uncinate process of the pancreas as seen on recent CT. Heavy intra-abdominal vascular calcifications. Persistent irregular appearance of the bladder contour with a small amount of intraluminal gas.  No emphysematous change within the bladder wall is currently visualized. Musculoskeletal system:   No suspicious hypermetabolic activity. No lymphadenopathy. Multiple bilateral chronic rib fractures.  Old transverse process fracture at L2 No focal osseous uptake. IMPRESSION:   *  Hypermetabolic right upper lobe spiculated mass compatible with patient's known neoplasm. *  Low level increased activity within the posterior lateral right upper lobe opacities most likely representing scarring from prior radiation. *  Diffuse mild uptake through the esophagus with persistent abnormal wall thickening suggestive of esophagitis. *  Nonfocal diffuse uptake throughout the colon which can be seen with certain medications such as metformin. *  Abnormal appearance to the bladder.  The bladder is distended and contains intraluminal gas which may be due to recent instrumentation and/or infection. [place] is also small amount of gas seen within the wall of the bladder.  This raises the possibility of emphysematous cystitis.  However, the   amount of emphysematous changes has improved.  As Correlation clinical exam recommended.    *  Severe multivessel coronary artery calcification   PET scan 9/3/2021  IMPRESSION:     1.  There is a hypermetabolic pulmonary neoplasm involving the right upper lobe medially.  There has been interval improvement with a decrease in the size and the an intensity since prior study of 7/1/2021.  Findings are suggestive of partial response.  It has 9.8 SUV compared to the 16.1 SUV on   prior study. 2.  Focal small opacity with FDG uptake at the right lung base.  This is a new uptake.  This could be a focal infiltrate, atelectasis or satellite lesion.  Follow-up CT scan examination is recommended. 3. Dewitte Peto is a stable linear mildly FDG avid abnormality in the lateral portion of the right upper mid lung field.  Abnormalities from parenchymal scarring. 4.  No evidence of distance metastasis in the neck, abdomen and pelvis and visualized skeleton. PET scan 11/15/2021  FINDINGS:   Neck:   No significant hypermetabolic foci. Major vascular structures are unremarkable. Dental amalgam artifact is present. Nonhypermetabolic small lymph nodes. Nonhypermetabolic homogeneous thyroid. Thorax: There continues to be a hypermetabolic focus in the medial right apex similar in size and avidity from prior study with maximum SUV of 10.8.  The previously seen hypermetabolic focus adjacent to the diaphragm is no longer visualized.  However, there is a nonspecific area of soft tissue irregularity   in the posterior right lung base with maximal activity of 2.7, indeterminate   Heart and supracardiac vessels are normal. Atherosclerotic calcifications. No hypermetabolic mediastinal adenopathy. No pleural or pericardial effusion. Adequate lung volumes with satisfactory inflation. Abdomen:   No significant hypermetabolic foci. Liver, spleen, pancreas, gall bladder and great abdominal vessels are normal. Kidneys are normal. Anterior abdominal wall is intact. No hypermetabolic lymph nodes. Pelvis:   No significant hypermetabolic foci. Anterior and inner pelvic walls are intact. Pelvic viscera are normal. No significant infradiaphragmatic free air. No free pelvic fluid. No hypermetabolic lymph nodes. Atherosclerotic calcifications.    Osseous:   No significant hypermetabolic foci. Intact pelvis and hip girdles. Dorsolumbar spondylosis with no lytic or blastic foci. Intact ribs and shoulder girdles. IMPRESSION:   Stable area of malignancy within the right apex.  Interval resolution of activity adjacent to diaphragm with development of indeterminate activity in the posterior lung base. IMPRESSION:   1. 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 PET scan negative for distant metastasis: Clinical stage T4 N2 M0, stage IIIb  2. Anemia  3. Leukopenia: chemo related  4. COPD  5. Falls  6. CAD  7. Tobacco abuse  8. Actinomyces bacteremia  9. Right lower extremity cellulitis    RECOMMENDATIONS:  1. I reviewed her recent lab work, discuss imaging studies, diagnosis and further treatment recommendations  2. She has had multiple hospitalization and missed appointment with pulmonologist and did not get the lung biopsy  3. Her last scan was in November 2021 therefore I will arrange for restaging PET scan now to assess any disease progression and decide about biopsy thereafter  4. Return to clinic after PET scan  5. I reviewed the NCCN guidelines and goals of care  6. Patient's questions were sought and answered to her satisfaction      Mendoza Haney MD  Hematologist/Medical Oncologist    This note is created with the assistance of a speech recognition program.  While intending to generate a document that actually reflects the content of the visit, the document can still have some errors including those of syntax and sound a like substitutions which may escape proof reading. It such instances, actual meaning can be extrapolated by contextual diversion.

## 2022-05-03 ENCOUNTER — TELEPHONE (OUTPATIENT)
Dept: CASE MANAGEMENT | Age: 72
End: 2022-05-03

## 2022-05-03 NOTE — TELEPHONE ENCOUNTER
Name: Caitie Cedeño  : 1950  MRN: [id number]    Oncology Navigation Follow-Up Note  Navigator spoke with [facility] scheduling and PET scheduled at [facility] for  12:00 . [facility], then registration. Writer attempting to arrange transportation with [facility], but could not leave a VM. [phone]  Writer unable to leave VM for Hermann Kaufman. 1:19 Pts. Son Jag Myles returning call and writer informing son PET scheduled for  12:00. Electronically signed by Lorne Dominguez RN on 5/3/2022 at 12:03 PM

## 2022-05-06 ENCOUNTER — TELEPHONE (OUTPATIENT)
Dept: CASE MANAGEMENT | Age: 72
End: 2022-05-06

## 2022-05-06 NOTE — TELEPHONE ENCOUNTER
Name: Magdalena Galeano  : 1950  MRN: [id number]    Oncology Navigation Follow-Up Note  Navigator reaching out to [facility] to inform them aware of PET for . Writer spoke with Martina Au and given message to give to day nurse caring for pt.  Plan to follow   Electronically signed by Garfield Elder RN on 2022 at 2:46 PM

## 2022-05-14 ENCOUNTER — HOSPITAL ENCOUNTER (INPATIENT)
Age: 72
LOS: 7 days | Discharge: NURSING, REHAB OR LONG-TERM CARE | DRG: 309 | End: 2022-05-21
Attending: EMERGENCY MEDICINE | Admitting: FAMILY MEDICINE
Payer: MEDICARE

## 2022-05-14 ENCOUNTER — APPOINTMENT (OUTPATIENT)
Dept: GENERAL RADIOLOGY | Age: 72
DRG: 309 | End: 2022-05-14
Payer: MEDICARE

## 2022-05-14 DIAGNOSIS — J44.1 COPD EXACERBATION (HCC): Primary | ICD-10-CM

## 2022-05-14 DIAGNOSIS — R91.8 LUNG MASS: ICD-10-CM

## 2022-05-14 DIAGNOSIS — M19.90 ARTHRITIS: ICD-10-CM

## 2022-05-14 DIAGNOSIS — I48.91 ATRIAL FIBRILLATION WITH RAPID VENTRICULAR RESPONSE (HCC): ICD-10-CM

## 2022-05-14 LAB
ABSOLUTE EOS #: 0.04 K/UL (ref 0–0.44)
ABSOLUTE IMMATURE GRANULOCYTE: 0.11 K/UL (ref 0–0.3)
ABSOLUTE LYMPH #: 1.32 K/UL (ref 1.1–3.7)
ABSOLUTE MONO #: 0.36 K/UL (ref 0.1–1.2)
ANION GAP SERPL CALCULATED.3IONS-SCNC: 14 MMOL/L (ref 9–17)
BASOPHILS # BLD: 1 % (ref 0–2)
BASOPHILS ABSOLUTE: 0.03 K/UL (ref 0–0.2)
BUN BLDV-MCNC: 9 MG/DL (ref 8–23)
BUN/CREAT BLD: 20 (ref 9–20)
CALCIUM SERPL-MCNC: 9.4 MG/DL (ref 8.6–10.4)
CHLORIDE BLD-SCNC: 97 MMOL/L (ref 98–107)
CO2: 32 MMOL/L (ref 20–31)
CREAT SERPL-MCNC: 0.44 MG/DL (ref 0.5–0.9)
EKG ATRIAL RATE: 136 BPM
EKG P AXIS: 42 DEGREES
EKG P-R INTERVAL: 168 MS
EKG Q-T INTERVAL: 324 MS
EKG QRS DURATION: 148 MS
EKG QTC CALCULATION (BAZETT): 501 MS
EKG R AXIS: -63 DEGREES
EKG T AXIS: 76 DEGREES
EKG VENTRICULAR RATE: 144 BPM
EOSINOPHILS RELATIVE PERCENT: 1 % (ref 1–4)
GFR AFRICAN AMERICAN: >60 ML/MIN
GFR NON-AFRICAN AMERICAN: >60 ML/MIN
GFR SERPL CREATININE-BSD FRML MDRD: ABNORMAL ML/MIN/{1.73_M2}
GLUCOSE BLD-MCNC: 144 MG/DL (ref 70–99)
HCT VFR BLD CALC: 47.9 % (ref 36.3–47.1)
HEMOGLOBIN: 14.1 G/DL (ref 11.9–15.1)
IMMATURE GRANULOCYTES: 2 %
INR BLD: 0.9
LYMPHOCYTES # BLD: 22 % (ref 24–43)
MAGNESIUM: 1.8 MG/DL (ref 1.6–2.6)
MCH RBC QN AUTO: 26.3 PG (ref 25.2–33.5)
MCHC RBC AUTO-ENTMCNC: 29.4 G/DL (ref 28.4–34.8)
MCV RBC AUTO: 89.4 FL (ref 82.6–102.9)
MONOCYTES # BLD: 6 % (ref 3–12)
NRBC AUTOMATED: 0 PER 100 WBC
PARTIAL THROMBOPLASTIN TIME: 31.5 SEC (ref 23.9–33.8)
PDW BLD-RTO: 18 % (ref 11.8–14.4)
PLATELET # BLD: 205 K/UL (ref 138–453)
PMV BLD AUTO: 8.2 FL (ref 8.1–13.5)
POTASSIUM SERPL-SCNC: 3.3 MMOL/L (ref 3.7–5.3)
PRO-BNP: 1642 PG/ML
PROTHROMBIN TIME: 12.6 SEC (ref 11.5–14.2)
RBC # BLD: 5.36 M/UL (ref 3.95–5.11)
RBC # BLD: ABNORMAL 10*6/UL
SARS-COV-2, RAPID: NOT DETECTED
SEG NEUTROPHILS: 68 % (ref 36–65)
SEGMENTED NEUTROPHILS ABSOLUTE COUNT: 4.12 K/UL (ref 1.5–8.1)
SODIUM BLD-SCNC: 143 MMOL/L (ref 135–144)
SPECIMEN DESCRIPTION: NORMAL
TROPONIN, HIGH SENSITIVITY: 31 NG/L (ref 0–14)
WBC # BLD: 6 K/UL (ref 3.5–11.3)

## 2022-05-14 PROCEDURE — 36415 COLL VENOUS BLD VENIPUNCTURE: CPT

## 2022-05-14 PROCEDURE — 2060000000 HC ICU INTERMEDIATE R&B

## 2022-05-14 PROCEDURE — 96365 THER/PROPH/DIAG IV INF INIT: CPT

## 2022-05-14 PROCEDURE — 2580000003 HC RX 258: Performed by: FAMILY MEDICINE

## 2022-05-14 PROCEDURE — 6370000000 HC RX 637 (ALT 250 FOR IP): Performed by: FAMILY MEDICINE

## 2022-05-14 PROCEDURE — 2500000003 HC RX 250 WO HCPCS: Performed by: FAMILY MEDICINE

## 2022-05-14 PROCEDURE — 2700000000 HC OXYGEN THERAPY PER DAY

## 2022-05-14 PROCEDURE — 93005 ELECTROCARDIOGRAM TRACING: CPT | Performed by: EMERGENCY MEDICINE

## 2022-05-14 PROCEDURE — 6370000000 HC RX 637 (ALT 250 FOR IP): Performed by: EMERGENCY MEDICINE

## 2022-05-14 PROCEDURE — 6360000002 HC RX W HCPCS: Performed by: EMERGENCY MEDICINE

## 2022-05-14 PROCEDURE — 80048 BASIC METABOLIC PNL TOTAL CA: CPT

## 2022-05-14 PROCEDURE — 6360000002 HC RX W HCPCS: Performed by: INTERNAL MEDICINE

## 2022-05-14 PROCEDURE — 94761 N-INVAS EAR/PLS OXIMETRY MLT: CPT

## 2022-05-14 PROCEDURE — 2580000003 HC RX 258: Performed by: EMERGENCY MEDICINE

## 2022-05-14 PROCEDURE — 83735 ASSAY OF MAGNESIUM: CPT

## 2022-05-14 PROCEDURE — 94640 AIRWAY INHALATION TREATMENT: CPT

## 2022-05-14 PROCEDURE — 85610 PROTHROMBIN TIME: CPT

## 2022-05-14 PROCEDURE — 85025 COMPLETE CBC W/AUTO DIFF WBC: CPT

## 2022-05-14 PROCEDURE — 87635 SARS-COV-2 COVID-19 AMP PRB: CPT

## 2022-05-14 PROCEDURE — 96375 TX/PRO/DX INJ NEW DRUG ADDON: CPT

## 2022-05-14 PROCEDURE — 84484 ASSAY OF TROPONIN QUANT: CPT

## 2022-05-14 PROCEDURE — 83880 ASSAY OF NATRIURETIC PEPTIDE: CPT

## 2022-05-14 PROCEDURE — 2500000003 HC RX 250 WO HCPCS: Performed by: EMERGENCY MEDICINE

## 2022-05-14 PROCEDURE — 85520 HEPARIN ASSAY: CPT

## 2022-05-14 PROCEDURE — 93010 ELECTROCARDIOGRAM REPORT: CPT | Performed by: INTERNAL MEDICINE

## 2022-05-14 PROCEDURE — 96366 THER/PROPH/DIAG IV INF ADDON: CPT

## 2022-05-14 PROCEDURE — 99285 EMERGENCY DEPT VISIT HI MDM: CPT

## 2022-05-14 PROCEDURE — 85730 THROMBOPLASTIN TIME PARTIAL: CPT

## 2022-05-14 PROCEDURE — 71045 X-RAY EXAM CHEST 1 VIEW: CPT

## 2022-05-14 RX ORDER — ONDANSETRON 4 MG/1
4 TABLET, ORALLY DISINTEGRATING ORAL EVERY 6 HOURS PRN
Status: DISCONTINUED | OUTPATIENT
Start: 2022-05-14 | End: 2022-05-14 | Stop reason: DRUGHIGH

## 2022-05-14 RX ORDER — DEXTROSE MONOHYDRATE 50 MG/ML
100 INJECTION, SOLUTION INTRAVENOUS PRN
Status: DISCONTINUED | OUTPATIENT
Start: 2022-05-14 | End: 2022-05-22 | Stop reason: HOSPADM

## 2022-05-14 RX ORDER — ACETAMINOPHEN 325 MG/1
650 TABLET ORAL EVERY 6 HOURS PRN
Status: DISCONTINUED | OUTPATIENT
Start: 2022-05-14 | End: 2022-05-22 | Stop reason: HOSPADM

## 2022-05-14 RX ORDER — OXYCODONE AND ACETAMINOPHEN 10; 325 MG/1; MG/1
1 TABLET ORAL EVERY 4 HOURS PRN
Status: ON HOLD | COMMUNITY
End: 2022-05-21 | Stop reason: SDUPTHER

## 2022-05-14 RX ORDER — HEPARIN SODIUM 1000 [USP'U]/ML
4000 INJECTION, SOLUTION INTRAVENOUS; SUBCUTANEOUS ONCE
Status: COMPLETED | OUTPATIENT
Start: 2022-05-14 | End: 2022-05-14

## 2022-05-14 RX ORDER — TAMSULOSIN HYDROCHLORIDE 0.4 MG/1
0.4 CAPSULE ORAL DAILY
Status: DISCONTINUED | OUTPATIENT
Start: 2022-05-14 | End: 2022-05-22 | Stop reason: HOSPADM

## 2022-05-14 RX ORDER — MAGNESIUM SULFATE 1 G/100ML
1000 INJECTION INTRAVENOUS PRN
Status: DISCONTINUED | OUTPATIENT
Start: 2022-05-14 | End: 2022-05-22 | Stop reason: HOSPADM

## 2022-05-14 RX ORDER — HEPARIN SODIUM 10000 [USP'U]/100ML
5-30 INJECTION, SOLUTION INTRAVENOUS CONTINUOUS
Status: DISCONTINUED | OUTPATIENT
Start: 2022-05-14 | End: 2022-05-20

## 2022-05-14 RX ORDER — GABAPENTIN 300 MG/1
300 CAPSULE ORAL 2 TIMES DAILY
Status: ON HOLD | COMMUNITY
End: 2022-05-21 | Stop reason: HOSPADM

## 2022-05-14 RX ORDER — POTASSIUM CHLORIDE 7.45 MG/ML
10 INJECTION INTRAVENOUS PRN
Status: DISCONTINUED | OUTPATIENT
Start: 2022-05-14 | End: 2022-05-22 | Stop reason: HOSPADM

## 2022-05-14 RX ORDER — ONDANSETRON 2 MG/ML
4 INJECTION INTRAMUSCULAR; INTRAVENOUS EVERY 6 HOURS PRN
Status: DISCONTINUED | OUTPATIENT
Start: 2022-05-14 | End: 2022-05-22 | Stop reason: HOSPADM

## 2022-05-14 RX ORDER — GUAIFENESIN 100 MG/5ML
200 SOLUTION ORAL ONCE
Status: COMPLETED | OUTPATIENT
Start: 2022-05-14 | End: 2022-05-14

## 2022-05-14 RX ORDER — TRAZODONE HYDROCHLORIDE 100 MG/1
300 TABLET ORAL NIGHTLY
Status: DISCONTINUED | OUTPATIENT
Start: 2022-05-14 | End: 2022-05-22 | Stop reason: HOSPADM

## 2022-05-14 RX ORDER — ONDANSETRON 4 MG/1
4 TABLET, ORALLY DISINTEGRATING ORAL EVERY 8 HOURS PRN
Status: DISCONTINUED | OUTPATIENT
Start: 2022-05-14 | End: 2022-05-22 | Stop reason: HOSPADM

## 2022-05-14 RX ORDER — ASPIRIN 81 MG/1
81 TABLET ORAL DAILY
Status: DISCONTINUED | OUTPATIENT
Start: 2022-05-14 | End: 2022-05-22 | Stop reason: HOSPADM

## 2022-05-14 RX ORDER — GLIPIZIDE 5 MG/1
2.5 TABLET ORAL
Status: DISCONTINUED | OUTPATIENT
Start: 2022-05-15 | End: 2022-05-14

## 2022-05-14 RX ORDER — LANOLIN ALCOHOL/MO/W.PET/CERES
15 CREAM (GRAM) TOPICAL NIGHTLY
Status: DISCONTINUED | OUTPATIENT
Start: 2022-05-14 | End: 2022-05-22 | Stop reason: HOSPADM

## 2022-05-14 RX ORDER — ENOXAPARIN SODIUM 100 MG/ML
1 INJECTION SUBCUTANEOUS 2 TIMES DAILY
Status: DISCONTINUED | OUTPATIENT
Start: 2022-05-14 | End: 2022-05-14

## 2022-05-14 RX ORDER — ALBUTEROL SULFATE 90 UG/1
2 AEROSOL, METERED RESPIRATORY (INHALATION) EVERY 4 HOURS PRN
Status: DISCONTINUED | OUTPATIENT
Start: 2022-05-14 | End: 2022-05-22 | Stop reason: HOSPADM

## 2022-05-14 RX ORDER — SODIUM CHLORIDE 9 MG/ML
INJECTION, SOLUTION INTRAVENOUS PRN
Status: DISCONTINUED | OUTPATIENT
Start: 2022-05-14 | End: 2022-05-22 | Stop reason: HOSPADM

## 2022-05-14 RX ORDER — SENNA AND DOCUSATE SODIUM 50; 8.6 MG/1; MG/1
1 TABLET, FILM COATED ORAL 2 TIMES DAILY
Status: DISCONTINUED | OUTPATIENT
Start: 2022-05-14 | End: 2022-05-22 | Stop reason: HOSPADM

## 2022-05-14 RX ORDER — PANTOPRAZOLE SODIUM 40 MG/1
40 TABLET, DELAYED RELEASE ORAL
Status: DISCONTINUED | OUTPATIENT
Start: 2022-05-15 | End: 2022-05-22 | Stop reason: HOSPADM

## 2022-05-14 RX ORDER — ARIPIPRAZOLE 5 MG/1
5 TABLET ORAL NIGHTLY
Status: DISCONTINUED | OUTPATIENT
Start: 2022-05-14 | End: 2022-05-22 | Stop reason: HOSPADM

## 2022-05-14 RX ORDER — HEPARIN SODIUM 1000 [USP'U]/ML
4000 INJECTION, SOLUTION INTRAVENOUS; SUBCUTANEOUS PRN
Status: DISCONTINUED | OUTPATIENT
Start: 2022-05-14 | End: 2022-05-20

## 2022-05-14 RX ORDER — SODIUM CHLORIDE 9 MG/ML
1000 INJECTION, SOLUTION INTRAVENOUS CONTINUOUS
Status: DISCONTINUED | OUTPATIENT
Start: 2022-05-14 | End: 2022-05-22 | Stop reason: HOSPADM

## 2022-05-14 RX ORDER — VENLAFAXINE HYDROCHLORIDE 75 MG/1
150 CAPSULE, EXTENDED RELEASE ORAL 2 TIMES DAILY
Status: DISCONTINUED | OUTPATIENT
Start: 2022-05-14 | End: 2022-05-22 | Stop reason: HOSPADM

## 2022-05-14 RX ORDER — SODIUM CHLORIDE 0.9 % (FLUSH) 0.9 %
5-40 SYRINGE (ML) INJECTION EVERY 12 HOURS SCHEDULED
Status: DISCONTINUED | OUTPATIENT
Start: 2022-05-14 | End: 2022-05-22 | Stop reason: HOSPADM

## 2022-05-14 RX ORDER — GLIPIZIDE 10 MG/1
5 TABLET ORAL
Status: DISCONTINUED | OUTPATIENT
Start: 2022-05-15 | End: 2022-05-22 | Stop reason: HOSPADM

## 2022-05-14 RX ORDER — FUROSEMIDE 40 MG/1
40 TABLET ORAL DAILY
Status: DISCONTINUED | OUTPATIENT
Start: 2022-05-14 | End: 2022-05-22 | Stop reason: HOSPADM

## 2022-05-14 RX ORDER — SODIUM CHLORIDE 0.9 % (FLUSH) 0.9 %
10 SYRINGE (ML) INJECTION PRN
Status: DISCONTINUED | OUTPATIENT
Start: 2022-05-14 | End: 2022-05-22 | Stop reason: HOSPADM

## 2022-05-14 RX ORDER — ONDANSETRON 4 MG/1
4 TABLET, FILM COATED ORAL EVERY 8 HOURS PRN
Status: DISCONTINUED | OUTPATIENT
Start: 2022-05-14 | End: 2022-05-14 | Stop reason: DRUGHIGH

## 2022-05-14 RX ORDER — ROSUVASTATIN CALCIUM 10 MG/1
5 TABLET, COATED ORAL DAILY
Status: DISCONTINUED | OUTPATIENT
Start: 2022-05-14 | End: 2022-05-22 | Stop reason: HOSPADM

## 2022-05-14 RX ORDER — METOPROLOL TARTRATE 50 MG/1
50 TABLET, FILM COATED ORAL 2 TIMES DAILY
Status: DISCONTINUED | OUTPATIENT
Start: 2022-05-14 | End: 2022-05-15

## 2022-05-14 RX ORDER — MIDODRINE HYDROCHLORIDE 10 MG/1
10 TABLET ORAL 3 TIMES DAILY PRN
Status: DISCONTINUED | OUTPATIENT
Start: 2022-05-14 | End: 2022-05-16

## 2022-05-14 RX ORDER — DILTIAZEM HYDROCHLORIDE 5 MG/ML
5 INJECTION INTRAVENOUS ONCE
Status: COMPLETED | OUTPATIENT
Start: 2022-05-14 | End: 2022-05-14

## 2022-05-14 RX ORDER — BUDESONIDE AND FORMOTEROL FUMARATE DIHYDRATE 160; 4.5 UG/1; UG/1
2 AEROSOL RESPIRATORY (INHALATION) 2 TIMES DAILY
Status: DISCONTINUED | OUTPATIENT
Start: 2022-05-14 | End: 2022-05-22 | Stop reason: HOSPADM

## 2022-05-14 RX ORDER — POLYETHYLENE GLYCOL 3350 17 G/17G
17 POWDER, FOR SOLUTION ORAL DAILY PRN
Status: DISCONTINUED | OUTPATIENT
Start: 2022-05-14 | End: 2022-05-22 | Stop reason: HOSPADM

## 2022-05-14 RX ORDER — ENOXAPARIN SODIUM 100 MG/ML
1 INJECTION SUBCUTANEOUS ONCE
Status: DISCONTINUED | OUTPATIENT
Start: 2022-05-14 | End: 2022-05-14 | Stop reason: SDUPTHER

## 2022-05-14 RX ORDER — POTASSIUM CHLORIDE 20 MEQ/1
40 TABLET, EXTENDED RELEASE ORAL PRN
Status: DISCONTINUED | OUTPATIENT
Start: 2022-05-14 | End: 2022-05-22 | Stop reason: HOSPADM

## 2022-05-14 RX ORDER — HEPARIN SODIUM 1000 [USP'U]/ML
2000 INJECTION, SOLUTION INTRAVENOUS; SUBCUTANEOUS PRN
Status: DISCONTINUED | OUTPATIENT
Start: 2022-05-14 | End: 2022-05-20

## 2022-05-14 RX ORDER — LEVALBUTEROL 1.25 MG/.5ML
1.25 SOLUTION, CONCENTRATE RESPIRATORY (INHALATION) ONCE
Status: COMPLETED | OUTPATIENT
Start: 2022-05-14 | End: 2022-05-14

## 2022-05-14 RX ORDER — DIGOXIN 125 MCG
250 TABLET ORAL DAILY
Status: COMPLETED | OUTPATIENT
Start: 2022-05-14 | End: 2022-05-14

## 2022-05-14 RX ORDER — OXYCODONE AND ACETAMINOPHEN 10; 325 MG/1; MG/1
1 TABLET ORAL EVERY 4 HOURS PRN
Status: DISCONTINUED | OUTPATIENT
Start: 2022-05-14 | End: 2022-05-22 | Stop reason: HOSPADM

## 2022-05-14 RX ORDER — GABAPENTIN 300 MG/1
300 CAPSULE ORAL 2 TIMES DAILY
Status: DISCONTINUED | OUTPATIENT
Start: 2022-05-14 | End: 2022-05-22 | Stop reason: HOSPADM

## 2022-05-14 RX ORDER — ACETAMINOPHEN 650 MG/1
650 SUPPOSITORY RECTAL EVERY 6 HOURS PRN
Status: DISCONTINUED | OUTPATIENT
Start: 2022-05-14 | End: 2022-05-22 | Stop reason: HOSPADM

## 2022-05-14 RX ORDER — CLONAZEPAM 0.5 MG/1
1 TABLET ORAL 3 TIMES DAILY PRN
Status: DISCONTINUED | OUTPATIENT
Start: 2022-05-14 | End: 2022-05-22 | Stop reason: HOSPADM

## 2022-05-14 RX ADMIN — SODIUM CHLORIDE 1000 ML: 9 INJECTION, SOLUTION INTRAVENOUS at 07:54

## 2022-05-14 RX ADMIN — OXYCODONE AND ACETAMINOPHEN 1 TABLET: 325; 10 TABLET ORAL at 17:41

## 2022-05-14 RX ADMIN — DILTIAZEM HYDROCHLORIDE 5 MG: 5 INJECTION INTRAVENOUS at 07:37

## 2022-05-14 RX ADMIN — METOPROLOL TARTRATE 50 MG: 50 TABLET ORAL at 21:25

## 2022-05-14 RX ADMIN — DIGOXIN 250 MCG: 125 TABLET ORAL at 16:54

## 2022-05-14 RX ADMIN — VENLAFAXINE HYDROCHLORIDE 150 MG: 75 CAPSULE, EXTENDED RELEASE ORAL at 21:23

## 2022-05-14 RX ADMIN — GABAPENTIN 300 MG: 300 CAPSULE ORAL at 21:26

## 2022-05-14 RX ADMIN — HEPARIN SODIUM 4000 UNITS: 1000 INJECTION INTRAVENOUS; SUBCUTANEOUS at 17:22

## 2022-05-14 RX ADMIN — BUDESONIDE AND FORMOTEROL FUMARATE DIHYDRATE 2 PUFF: 160; 4.5 AEROSOL RESPIRATORY (INHALATION) at 21:14

## 2022-05-14 RX ADMIN — POTASSIUM CHLORIDE 40 MEQ: 20 TABLET, EXTENDED RELEASE ORAL at 16:57

## 2022-05-14 RX ADMIN — DILTIAZEM HYDROCHLORIDE 5 MG/HR: 5 INJECTION, SOLUTION INTRAVENOUS at 07:53

## 2022-05-14 RX ADMIN — ASPIRIN 81 MG: 81 TABLET, COATED ORAL at 16:55

## 2022-05-14 RX ADMIN — IPRATROPIUM BROMIDE 0.5 MG: 0.5 SOLUTION RESPIRATORY (INHALATION) at 10:38

## 2022-05-14 RX ADMIN — CLONAZEPAM 1 MG: 0.5 TABLET ORAL at 21:34

## 2022-05-14 RX ADMIN — Medication 15 MG: at 21:26

## 2022-05-14 RX ADMIN — TAMSULOSIN HYDROCHLORIDE 0.4 MG: 0.4 CAPSULE ORAL at 16:55

## 2022-05-14 RX ADMIN — TRAZODONE HYDROCHLORIDE 300 MG: 100 TABLET ORAL at 21:25

## 2022-05-14 RX ADMIN — SENNOSIDES AND DOCUSATE SODIUM 1 TABLET: 50; 8.6 TABLET ORAL at 21:25

## 2022-05-14 RX ADMIN — DILTIAZEM HYDROCHLORIDE 15 MG/HR: 5 INJECTION, SOLUTION INTRAVENOUS at 17:14

## 2022-05-14 RX ADMIN — FUROSEMIDE 40 MG: 40 TABLET ORAL at 16:54

## 2022-05-14 RX ADMIN — GUAIFENESIN 200 MG: 200 SOLUTION ORAL at 10:55

## 2022-05-14 RX ADMIN — HEPARIN SODIUM AND DEXTROSE 12 UNITS/KG/HR: 10000; 5 INJECTION INTRAVENOUS at 17:28

## 2022-05-14 RX ADMIN — ARIPIPRAZOLE 5 MG: 5 TABLET ORAL at 21:26

## 2022-05-14 RX ADMIN — LEVALBUTEROL 1.25 MG: 1.25 SOLUTION, CONCENTRATE RESPIRATORY (INHALATION) at 10:38

## 2022-05-14 ASSESSMENT — ENCOUNTER SYMPTOMS
CHEST TIGHTNESS: 0
FACIAL SWELLING: 0
BACK PAIN: 0
EYE DISCHARGE: 0
ABDOMINAL PAIN: 0
COUGH: 1
ABDOMINAL DISTENTION: 0
EYE PAIN: 0
SHORTNESS OF BREATH: 1

## 2022-05-14 ASSESSMENT — PAIN DESCRIPTION - ORIENTATION: ORIENTATION: RIGHT

## 2022-05-14 ASSESSMENT — PAIN SCALES - GENERAL
PAINLEVEL_OUTOF10: 10
PAINLEVEL_OUTOF10: 0

## 2022-05-14 ASSESSMENT — PAIN DESCRIPTION - LOCATION: LOCATION: BACK;CHEST;SHOULDER

## 2022-05-14 ASSESSMENT — PAIN - FUNCTIONAL ASSESSMENT: PAIN_FUNCTIONAL_ASSESSMENT: NONE - DENIES PAIN

## 2022-05-14 NOTE — RT PROTOCOL NOTE
RT Inhaler-Nebulizer Bronchodilator Protocol Note    There is a bronchodilator order in the chart from a provider indicating to follow the RT Bronchodilator Protocol and there is an Initiate RT Inhaler-Nebulizer Bronchodilator Protocol order as well (see protocol at bottom of note). CXR Findings:  XR CHEST PORTABLE    Result Date: 5/14/2022  1. Improved right pleural effusion. 2.  Patchy opacity in the medial right upper lobe, possibly post treatment change versus residual tumor. Correlation with any recent CT imaging is recommended. The findings from the last RT Protocol Assessment were as follows:   History Pulmonary Disease: Chronic pulmonary disease  Respiratory Pattern: Regular pattern and RR 12-20 bpm  Breath Sounds: Clear breath sounds  Cough: Strong, spontaneous, non-productive  Indication for Bronchodilator Therapy: Decreased or absent breath sounds  Bronchodilator Assessment Score: 2    Aerosolized bronchodilator medication orders have been revised according to the RT Inhaler-Nebulizer Bronchodilator Protocol below. Respiratory Therapist to perform RT Therapy Protocol Assessment initially then follow the protocol. Repeat RT Therapy Protocol Assessment PRN for score 0-3 or on second treatment, BID, and PRN for scores above 3. No Indications - adjust the frequency to every 6 hours PRN wheezing or bronchospasm, if no treatments needed after 48 hours then discontinue using Per Protocol order mode. If indication present, adjust the RT bronchodilator orders based on the Bronchodilator Assessment Score as indicated below. Use Inhaler orders unless patient has one or more of the following: on home nebulizer, not able to hold breath for 10 seconds, is not alert and oriented, cannot activate and use MDI correctly, or respiratory rate 25 breaths per minute or more, then use the equivalent nebulizer order(s) with same Frequency and PRN reasons based on the score.   If a patient is on this medication at home then do not decrease Frequency below that used at home. 0-3 - enter or revise RT bronchodilator order(s) to equivalent RT Bronchodilator order with Frequency of every 4 hours PRN for wheezing or increased work of breathing using Per Protocol order mode. 4-6 - enter or revise RT Bronchodilator order(s) to two equivalent RT bronchodilator orders with one order with BID Frequency and one order with Frequency of every 4 hours PRN wheezing or increased work of breathing using Per Protocol order mode. 7-10 - enter or revise RT Bronchodilator order(s) to two equivalent RT bronchodilator orders with one order with TID Frequency and one order with Frequency of every 4 hours PRN wheezing or increased work of breathing using Per Protocol order mode. 11-13 - enter or revise RT Bronchodilator order(s) to one equivalent RT bronchodilator order with QID Frequency and an Albuterol order with Frequency of every 4 hours PRN wheezing or increased work of breathing using Per Protocol order mode. Greater than 13 - enter or revise RT Bronchodilator order(s) to one equivalent RT bronchodilator order with every 4 hours Frequency and an Albuterol order with Frequency of every 2 hours PRN wheezing or increased work of breathing using Per Protocol order mode. RT to enter RT Home Evaluation for COPD & MDI Assessment order using Per Protocol order mode.     Electronically signed by Enrique Schroeder RCP on 5/14/2022 at 3:59 PM

## 2022-05-14 NOTE — ED PROVIDER NOTES
[facility]    Pt Name: Tha Khan  MRN: [id number]  [place] 1950  Date of evaluation: 5/14/22  CHIEF COMPLAINT       Chief Complaint   Patient presents with    Shortness of Breath    Tachycardia     HISTORY OF PRESENT ILLNESS   HPI   The patient is a 26-year-old female with history of COPD who presented to the emergency room via EMS from home secondary to shortness of breath cough and congestion. Upon EMS arrival patient had noted wheezing EKG obtained patient was A. fib with RVR with a rate of 151 no previous history of A. fib. Hospital patient was given DuoNeb breathing treatments as well as Solu-Medrol. Patient is on home oxygen she states that all times now. States she was recently discharged from the hospital.  She denied a previous history of A. fib but states she has a history of and is on a diuretic. REVIEW OF SYSTEMS     Review of Systems   Constitutional: Positive for fatigue. Negative for chills, diaphoresis and fever. HENT: Negative for congestion, ear pain and facial swelling. Eyes: Negative for pain, discharge and visual disturbance. Respiratory: Positive for cough and shortness of breath. Negative for chest tightness. Cardiovascular: Negative for chest pain and palpitations. Gastrointestinal: Negative for abdominal distention and abdominal pain. Genitourinary: Negative for difficulty urinating and flank pain. Musculoskeletal: Negative for back pain. Skin: Negative for wound. Neurological: Positive for weakness. Negative for dizziness, light-headedness and headaches.      PASTMEDICAL HISTORY     Past Medical History:   Diagnosis Date    AAA (abdominal aortic aneurysm) ([facility] [address]     Pt denies having a history of AAA    Acid reflux     Anxiety and depression     Aortic stenosis     Arthritis     Blister of ankle, right 10/13/2016    blister broke open & draining, is on antibiotics    CAD (coronary artery disease)     Cancer ([facility] [address]     lung cancer    COPD (chronic obstructive pulmonary disease) (HCC)     Diabetes mellitus ([address]     Falls     Heart block     bifasicular    Hypokalemia     MDRO (multiple drug resistant organisms) resistance 10/17/2014    E. Coli urine    MRSA (methicillin resistant staph aureus) culture positive resolved 12/2016    2 negative nasal screens - 2016 (hx in urine 2014)    On home oxygen therapy     uses 2 liters at night    On home oxygen therapy     patient states 2 liters/nasal cannula continuous    Overactive bladder     patient incont.  wears a brief    Pneumonia     PONV (postoperative nausea and vomiting)     Vitamin D deficiency      SURGICAL HISTORY       Past Surgical History:   Procedure Laterality Date    AORTIC VALVE REPAIR N/A 4/11/2017    AORTIC VALVE REPAIR REPLACEMENT performed by Donna Schneider MD at [address] N/A 8/31/2020    BRONCHOSCOPY BIOPSY BRONCHUS performed by Erica Monet MD at [address], COLON, DIAGNOSTIC  12/08/2016    EYE SURGERY      HYSTERECTOMY      IR INS PICC VAD W SQ PORT GREATER THAN 5  9/4/2020    IR INS PICC VAD W SQ PORT GREATER THAN 5 9/4/2020 [facility]    IR PORT PLACEMENT EQUAL OR GREATER THAN 5 YEARS  9/29/2020    IR PORT PLACEMENT EQUAL OR GREATER THAN 5 YEARS 9/29/2020 Kaci Gordon MD [facility]    LUMBAR LAMINECTOMY      TONSILLECTOMY       CURRENT MEDICATIONS       Current Discharge Medication List      CONTINUE these medications which have NOT CHANGED    Details   oxyCODONE-acetaminophen (PERCOCET)  MG per tablet Take 1 tablet by mouth every 4 hours as needed for Pain.      sennosides-docusate sodium (SENOKOT-S) 8.6-50 MG tablet Take 1 tablet by mouth in the morning and at bedtime      ondansetron (ZOFRAN ODT) 4 MG disintegrating tablet Place 1 tablet under the tongue every 6 hours as needed for Nausea or Vomiting  Qty: 60 tablet, Refills: 0      furosemide (LASIX) 40 MG tablet Take 1 tablet by mouth daily  Qty: 60 tablet, Refills: 3      midodrine (PROAMATINE) 10 MG tablet Take 1 tablet by mouth 3 times daily as needed (SBP <90)  Qty: 45 tablet, Refills: 0      clonazePAM (KLONOPIN) 1 MG tablet Take 1 tablet by mouth 3 times daily as needed for Anxiety for up to 3 doses. Qty: 3 tablet, Refills: 0    Associated Diagnoses: Lung mass      gabapentin (NEURONTIN) 400 MG capsule Take 2 capsules by mouth in the morning and at bedtime for 5 doses.   Qty: 10 capsule, Refills: 0      metoprolol tartrate (LOPRESSOR) 50 MG tablet Take 1 tablet by mouth 2 times daily  Qty: 60 tablet, Refills: 3      albuterol sulfate  (90 Base) MCG/ACT inhaler Inhale 2 puffs into the lungs every 4 hours as needed for Wheezing  Qty: 18 g, Refills: 3      tamsulosin (FLOMAX) 0.4 MG capsule Take 1 capsule by mouth daily  Qty: 30 capsule, Refills: 0      ARIPiprazole (ABILIFY) 5 MG tablet Take 5 mg by mouth at bedtime      metFORMIN (GLUCOPHAGE) 500 MG tablet Take 500 mg by mouth 2 times daily (with meals)      traZODone (DESYREL) 150 MG tablet Take 300 mg by mouth nightly      melatonin 5 MG TABS tablet Take 15 mg by mouth nightly      ondansetron (ZOFRAN) 4 MG tablet Take 1 tablet by mouth every 8 hours as needed for Nausea or Vomiting  Qty: 30 tablet, Refills: 3    Associated Diagnoses: Non-small cell cancer of right lung (HCC)      rosuvastatin (CRESTOR) 5 MG tablet Take 5 mg by mouth daily      glimepiride (AMARYL) 1 MG tablet Take 1 mg by mouth 2 times daily (with meals)       venlafaxine (EFFEXOR XR) 150 MG extended release capsule Take 150 mg by mouth 2 times daily      lansoprazole (PREVACID) 30 MG delayed release capsule Take 30 mg by mouth daily      aspirin EC 81 MG EC tablet Take 81 mg by mouth daily      mometasone-formoterol (DULERA) 200-5 MCG/ACT inhaler Inhale 2 puffs into the lungs every 12 hours as needed (wheezing/SOB)            ALLERGIES     is allergic to codeine and dye [iodides]. FAMILY HISTORY     She indicated that the status of her mother is unknown. She indicated that the status of her father is unknown. SOCIAL HISTORY       Social History     Tobacco Use    Smoking status: Former Smoker     Quit date: 2016     Years since quittin.7    Smokeless tobacco: Never Used   Vaping Use    Vaping Use: Never used   Substance Use Topics    Alcohol use: No    Drug use: No     PHYSICAL EXAM     INITIAL VITALS: /77   Pulse 107   Temp 96.7 °F (35.9 °C) (Temporal)   Resp 27   Wt 148 lb 9.6 oz (67.4 kg)   SpO2 95%   BMI 22.59 kg/m²    Physical Exam  Vitals and nursing note reviewed. Constitutional:       General: She is in acute distress. Appearance: She is well-developed. She is ill-appearing, toxic-appearing and diaphoretic. HENT:      Head: Normocephalic and atraumatic. Eyes:      Pupils: Pupils are equal, round, and reactive to light. Cardiovascular:      Rate and Rhythm: Tachycardia present. Rhythm irregular. Pulmonary:      Effort: Respiratory distress present. No accessory muscle usage. Breath sounds: Examination of the right-upper field reveals wheezing. Examination of the left-upper field reveals wheezing. Examination of the right-middle field reveals wheezing. Examination of the left-middle field reveals wheezing. Examination of the right-lower field reveals wheezing. Examination of the left-lower field reveals wheezing. Wheezing present. Abdominal:      General: Bowel sounds are normal.      Palpations: Abdomen is soft. Musculoskeletal:         General: Normal range of motion. Cervical back: Normal range of motion and neck supple. Skin:     General: Skin is warm. Capillary Refill: Capillary refill takes less than 2 seconds. Neurological:      Mental Status: She is alert and oriented to person, place, and time.          MEDICAL DECISION MAKING:    The patient is a71year-old female who presented to the emergency department secondary to shortness of breath. Patient noted to have new onset A. fib with RVR. Orders for labs including rapid COVID patient placed on droplet precautions. Chest x-ray no infiltrate dissection by thorax. Patient placed on a Cardizem drip received a Cardizem bolus remained in A. fib. Patient discussed with Dr. Fan Savage who agreed to admit patient, patient received Lovenox further anticoagulation deferred to admitting team.  Patient's admitted for further evaluation and treatment. All patient's question's and concerns were answered prior to disposition and patient and/or family expressed understanding and agreement of treatment plan. CRITICAL CARE:   CRITICAL CARE TIME     Due to the high probability of sudden and clinically significant deterioration in the patient's condition she required highest level of my preparedness to intervene urgently. I provided critical care time including documentation time, medication orders and management, reevaluation, vital sign assessment, ordering and reviewing of of lab tests ordering and reviewing of x-ray studies, and admission orders. Aggregate critical care time is between 45 minutes including only time during which I was engaged in work directly related to her care and did not include time spent treating other patients simultaneously. NIH STROKE SCALE:            PROCEDURES:    Procedures    DIAGNOSTIC RESULTS   EKG:All EKG's are interpreted by the Emergency Department Physician who either signs or Co-signs this chart in the absence of a cardiologist.        RADIOLOGY:All plain film, CT, MRI, and formal ultrasound images (except ED bedside ultrasound) are read by the radiologist, see reports below, unless otherwisenoted in MDM or here. XR CHEST PORTABLE   Preliminary Result   1. Improved right pleural effusion.       2.  Patchy opacity in the medial right upper lobe, possibly post treatment   change versus residual tumor. Correlation with any recent CT imaging is   recommended. LABS: All lab results were reviewed by myself, and all abnormals are listed below. Labs Reviewed   CBC WITH AUTO DIFFERENTIAL - Abnormal; Notable for the following components:       Result Value    RBC 5.36 (*)     Hematocrit 47.9 (*)     RDW 18.0 (*)     Seg Neutrophils 68 (*)     Lymphocytes 22 (*)     Immature Granulocytes 2 (*)     All other components within normal limits   BASIC METABOLIC PANEL W/ REFLEX TO MG FOR LOW K - Abnormal; Notable for the following components:    Glucose 144 (*)     CREATININE 0.44 (*)     Potassium 3.3 (*)     Chloride 97 (*)     CO2 32 (*)     All other components within normal limits   TROPONIN - Abnormal; Notable for the following components:    Troponin, High Sensitivity 31 (*)     All other components within normal limits   BRAIN NATRIURETIC PEPTIDE - Abnormal; Notable for the following components:    Pro-BNP 1,642 (*)     All other components within normal limits   COVID-19, RAPID   MAGNESIUM   PROTIME-INR   APTT   ANTI-XA, UNFRACTIONATED HEPARIN   ANTI-XA, UNFRACTIONATED HEPARIN   POCT GLUCOSE       EMERGENCY DEPARTMENTCOURSE:         Vitals:    Vitals:    05/14/22 1345 05/14/22 1411 05/14/22 1500 05/14/22 1614   BP:  115/77 114/77    Pulse:  118 107    Resp:  (!) 31 27    Temp: 96.8 °F (36 °C)   96.7 °F (35.9 °C)   TempSrc: Temporal   Temporal   SpO2:  95% 95%    Weight:  148 lb 9.6 oz (67.4 kg)         The patient was given the following medications while in the emergency department:  Orders Placed This Encounter   Medications    dilTIAZem injection 5 mg    dilTIAZem 125 mg in dextrose 5 % 125 mL infusion     Order Specific Question:   Titrate Infusion? Answer:   Yes     Order Specific Question:   Initial Infusion Dose: Answer:   5 mg/hr     Order Specific Question:   Goal of Therapy is:      Answer:   HR less than 100 bpm     Order Specific Question:   Contact Provider if: Answer:   SBP less than 90 mmHg     Order Specific Question:   Contact Provider if:     Answer:   HR less than 60 bpm     Order Specific Question:   HOLD for     Answer:   SBP less than 90 mmHg     Order Specific Question:   HOLD for     Answer:   HR less than 60 bpm    0.9 % sodium chloride infusion    guaiFENesin (ROBITUSSIN) 100 MG/5ML oral solution 200 mg    levalbuterol (XOPENEX) nebulizer solution 1.25 mg    ipratropium (ATROVENT) 0.02 % nebulizer solution 0.5 mg     Order Specific Question:   Initiate RT Bronchodilator Protocol     Answer:   No    aspirin EC tablet 81 mg    budesonide-formoterol (SYMBICORT) 160-4.5 MCG/ACT inhaler 2 puff    pantoprazole (PROTONIX) tablet 40 mg    glipiZIDE (GLUCOTROL) tablet 2.5 mg    venlafaxine (EFFEXOR XR) extended release capsule 150 mg    rosuvastatin (CRESTOR) tablet 5 mg    DISCONTD: ondansetron (ZOFRAN) tablet 4 mg    ARIPiprazole (ABILIFY) tablet 5 mg    metFORMIN (GLUCOPHAGE) tablet 500 mg    traZODone (DESYREL) tablet 300 mg    melatonin tablet 15 mg    metoprolol tartrate (LOPRESSOR) tablet 50 mg    albuterol sulfate  (90 Base) MCG/ACT inhaler 2 puff     Order Specific Question:   Initiate RT Bronchodilator Protocol     Answer:    Yes    tamsulosin (FLOMAX) capsule 0.4 mg    clonazePAM (KLONOPIN) tablet 1 mg    gabapentin (NEURONTIN) capsule 300 mg    furosemide (LASIX) tablet 40 mg    midodrine (PROAMATINE) tablet 10 mg    sennosides-docusate sodium (SENOKOT-S) 8.6-50 MG tablet 1 tablet    DISCONTD: ondansetron (ZOFRAN-ODT) disintegrating tablet 4 mg    sodium chloride flush 0.9 % injection 5-40 mL    sodium chloride flush 0.9 % injection 10 mL    0.9 % sodium chloride infusion    OR Linked Order Group     potassium chloride (KLOR-CON M) extended release tablet 40 mEq     potassium bicarb-citric acid (EFFER-K) effervescent tablet 40 mEq     potassium chloride 10 mEq/100 mL IVPB (Peripheral Line)    magnesium sulfate 1000 mg in dextrose 5% 100 mL IVPB    DISCONTD: enoxaparin (LOVENOX) injection 70 mg     Order Specific Question:   Indication of Use     Answer:   Prophylaxis-DVT/PE    OR Linked Order Group     ondansetron (ZOFRAN-ODT) disintegrating tablet 4 mg     ondansetron (ZOFRAN) injection 4 mg    polyethylene glycol (GLYCOLAX) packet 17 g    OR Linked Order Group     acetaminophen (TYLENOL) tablet 650 mg     acetaminophen (TYLENOL) suppository 650 mg    DISCONTD: enoxaparin (LOVENOX) injection 70 mg     Order Specific Question:   Indication of Use     Answer: Other     Order Specific Question:   Other Enoxaparin Indication     Answer:   new onset afib    oxyCODONE-acetaminophen (PERCOCET)  MG per tablet 1 tablet    digoxin (LANOXIN) tablet 250 mcg    glucose chewable tablet 16 g    OR Linked Order Group     dextrose bolus 10% 125 mL     dextrose bolus 10% 250 mL    glucagon (rDNA) injection 1 mg    dextrose 5 % solution    heparin (porcine) injection 4,000 Units    heparin (porcine) injection 4,000 Units    heparin (porcine) injection 2,000 Units    heparin 25,000 units in dextrose 5% 250 mL (premix) infusion     CONSULTS:  IP CONSULT TO INTERNAL MEDICINE  IP CONSULT TO CARDIOLOGY    FINAL IMPRESSION      1. COPD exacerbation ([facility] [address]    2. Atrial fibrillation with rapid ventricular response ([facility] [address]          DISPOSITION/PLAN   DISPOSITION Admitted 05/14/2022 10:47:26 AM      PATIENT REFERRED TO:  No follow-up provider specified. DISCHARGE MEDICATIONS:  Current Discharge Medication List        Kourtney Delacruz MD  Attending Emergency Physician      The care is provided during an unprecedented national emergency due to the novel coronavirus, COVID 19.     This note was created with the assistance of a speech-recognition program. While intending to generate a document that actually reflects the content of the visit, no guarantees can be provided that every mistake has been identified and corrected by editing.     Liv Izaguirre MD  51/60/21 [id number]

## 2022-05-14 NOTE — CONSULTS
Section of Cardiology   Consult Note      Reason for Consult: Atrial fibrillation  Requesting Physician: Miguel Angel Obregon MD    CHIEF COMPLAINT: Shortness of breath    History Obtained From:  patient, electronic medical record, patient's nurse    HISTORY OF PRESENT ILLNESS:      The patient is a 71 y.o. female with significant past medical history of arrhythmia, moderate coronary artery disease, aortic stenosis who presents with shortness of breath. The patient was in the hospital recently and was told that she had COVID-19 infection. She was not rehabs and discharged 3 days ago and this morning she called the ambulance because she felt like she is not able to breathe. Upon arrival to the emergency room she was found to be in atrial fibrillation rapid ventricular response. She was given medication and when she admitted to the floor she converted to sinus rhythm and sinus tachycardia. The patient denies chest pain. No palpitation. She has no dizziness. Her nurse telling me that the patient has multiple falls noted by her son. At the time I saw the patient she appeared to be comfortable. She was started on Lovenox injections by the patient absolutely refuses diet but she is open to use oral medication. It was noted in her history that she has somatic subarachnoid bleed however the patient has no recollection of such problem. Currently the patient has no fever or chills or cough. Denies any weakness or numbness in any arm or leg. No spontaneous bleed. Previous diagnostic testing for coronary artery disease includes: cardiac catheterization, echocardiogram.   Previous history of cardiac disease includes: atrial fibrillation, hypertension and coronary artery disease. Coronary artery disease risk factors include: advanced age (older than 54 for men, 72 for women), diabetes mellitus, hypertension, sedentary lifestyle and smoking/ tobacco exposure.     Past Medical History:    Past Medical History: Take 1 tablet by mouth every 4 hours as needed for Pain. Yes Historical Provider, MD   sennosides-docusate sodium (SENOKOT-S) 8.6-50 MG tablet Take 1 tablet by mouth in the morning and at bedtime    Historical Provider, MD   ondansetron (ZOFRAN ODT) 4 MG disintegrating tablet Place 1 tablet under the tongue every 6 hours as needed for Nausea or Vomiting 4/27/22   Viktor Lee MD   furosemide (LASIX) 40 MG tablet Take 1 tablet by mouth daily 4/7/22   María White MD   midodrine (PROAMATINE) 10 MG tablet Take 1 tablet by mouth 3 times daily as needed (SBP <90) 4/6/22 4/27/22  María White MD   clonazePAM (KLONOPIN) 1 MG tablet Take 1 tablet by mouth 3 times daily as needed for Anxiety for up to 3 doses. 4/4/22 6/25/23  María White MD   gabapentin (NEURONTIN) 400 MG capsule Take 2 capsules by mouth in the morning and at bedtime for 5 doses. Patient taking differently: Take 300 mg by mouth in the morning and at bedtime.   4/4/22 4/27/22  María White MD   metoprolol tartrate (LOPRESSOR) 50 MG tablet Take 1 tablet by mouth 2 times daily 3/25/22   Anthony Vizcarra MD   albuterol sulfate  (90 Base) MCG/ACT inhaler Inhale 2 puffs into the lungs every 4 hours as needed for Wheezing 3/25/22   Anthony Vizcarra MD   tamsulosin (FLOMAX) 0.4 MG capsule Take 1 capsule by mouth daily  Patient not taking: Reported on 4/27/2022 3/26/22   Anthony Vizcarra MD   ARIPiprazole (ABILIFY) 5 MG tablet Take 5 mg by mouth at bedtime    Historical Provider, MD   metFORMIN (GLUCOPHAGE) 500 MG tablet Take 500 mg by mouth 2 times daily (with meals)    Historical Provider, MD   traZODone (DESYREL) 150 MG tablet Take 300 mg by mouth nightly    Historical Provider, MD   melatonin 5 MG TABS tablet Take 15 mg by mouth nightly    Historical Provider, MD   ondansetron (ZOFRAN) 4 MG tablet Take 1 tablet by mouth every 8 hours as needed for Nausea or Vomiting 1/26/22   Viktro Lee MD   rosuvastatin (CRESTOR) 5 MG tablet Take 5 mg by mouth daily    Historical Provider, MD   glimepiride (AMARYL) 1 MG tablet Take 1 mg by mouth 2 times daily (with meals)     Historical Provider, MD   venlafaxine (EFFEXOR XR) 150 MG extended release capsule Take 150 mg by mouth 2 times daily    Historical Provider, MD   lansoprazole (PREVACID) 30 MG delayed release capsule Take 30 mg by mouth daily 11/10/16   Historical Provider, MD   aspirin EC 81 MG EC tablet Take 81 mg by mouth daily    Historical Provider, MD   mometasone-formoterol (DULERA) 200-5 MCG/ACT inhaler Inhale 2 puffs into the lungs every 12 hours as needed (wheezing/SOB)     Historical Provider, MD     Current Medications:    Current Facility-Administered Medications   Medication Dose Route Frequency Provider Last Rate Last Admin    dilTIAZem 125 mg in dextrose 5 % 125 mL infusion  2.5-15 mg/hr IntraVENous Continuous Whitney Becerril MD 15 mL/hr at 05/14/22 1415 15 mg/hr at 05/14/22 1415    0.9 % sodium chloride infusion  1,000 mL IntraVENous Continuous Whitney Becerril MD 10 mL/hr at 05/14/22 0754 1,000 mL at 05/14/22 0754    aspirin EC tablet 81 mg  81 mg Oral Daily Whitney Becerril MD        budesonide-formoterol (SYMBICORT) 160-4.5 MCG/ACT inhaler 2 puff  2 puff Inhalation BID MD Kami Dugan [START ON 5/15/2022] pantoprazole (PROTONIX) tablet 40 mg  40 mg Oral QAM AC Whitney Becerril MD        [START ON 5/15/2022] glipiZIDE (GLUCOTROL) tablet 2.5 mg  2.5 mg Oral QAM AC Whitney Becerril MD        venlafaxine (EFFEXOR XR) extended release capsule 150 mg  150 mg Oral BID Whitney Becerril MD        rosuvastatin (CRESTOR) tablet 5 mg  5 mg Oral Daily Whitney Becerril MD        ARIPiprazole (ABILIFY) tablet 5 mg  5 mg Oral Nightly Whitney Becerril MD        metFORMIN (GLUCOPHAGE) tablet 500 mg  500 mg Oral BID  Lisa Sherman MD        traZODone (DESYREL) tablet 300 mg  300 mg Oral Nightly Whitney Becerril MD        melatonin tablet 15 mg  15 mg Oral Nightly MD Kami Dugan metoprolol tartrate (LOPRESSOR) tablet 50 mg  50 mg Oral BID Whitney Becerril MD        albuterol sulfate  (90 Base) MCG/ACT inhaler 2 puff  2 puff Inhalation Q4H PRN Whitney Becerril MD        tamsulosin (FLOMAX) capsule 0.4 mg  0.4 mg Oral Daily Whitney Becerril MD        clonazePAM (KLONOPIN) tablet 1 mg  1 mg Oral TID PRN Whitney Becerril MD        gabapentin (NEURONTIN) capsule 300 mg  300 mg Oral BID Whitney Becerril MD        furosemide (LASIX) tablet 40 mg  40 mg Oral Daily Whitney Becerril MD        midodrine (PROAMATINE) tablet 10 mg  10 mg Oral TID PRN Whitney Becerril MD        sennosides-docusate sodium (SENOKOT-S) 8.6-50 MG tablet 1 tablet  1 tablet Oral BID Whitney Becerril MD        sodium chloride flush 0.9 % injection 5-40 mL  5-40 mL IntraVENous 2 times per day Sam Park MD        sodium chloride flush 0.9 % injection 10 mL  10 mL IntraVENous PRN Whitney Becerril MD        0.9 % sodium chloride infusion   IntraVENous PRN Whitney Becerril MD        potassium chloride (KLOR-CON M) extended release tablet 40 mEq  40 mEq Oral PRN Whitney Becerril MD        Or    potassium bicarb-citric acid (EFFER-K) effervescent tablet 40 mEq  40 mEq Oral PRN Whitney Becerril MD        Or    potassium chloride 10 mEq/100 mL IVPB (Peripheral Line)  10 mEq IntraVENous PRN Whitney Becerril MD        magnesium sulfate 1000 mg in dextrose 5% 100 mL IVPB  1,000 mg IntraVENous PRN Whitney Becerril MD        enoxaparin (LOVENOX) injection 70 mg  1 mg/kg SubCUTAneous BID Whitney Becerril MD        ondansetron (ZOFRAN-ODT) disintegrating tablet 4 mg  4 mg Oral Q8H PRN Whitney Becerril MD        Or    ondansetron (ZOFRAN) injection 4 mg  4 mg IntraVENous Q6H PRN Whitney Becerril MD        polyethylene glycol (GLYCOLAX) packet 17 g  17 g Oral Daily PRN Whitney Becerril MD        acetaminophen (TYLENOL) tablet 650 mg  650 mg Oral Q6H PRN Whitney Becerril MD        Or    acetaminophen (TYLENOL) suppository 650 mg  650 mg Rectal Q6H PRN Lisa Sherman MD        oxyCODONE-acetaminophen (PERCOCET)  MG per tablet 1 tablet  1 tablet Oral Q4H PRN Whitney Becerril MD        digoxin (LANOXIN) tablet 250 mcg  250 mcg Oral Daily Whitney Becerril MD        glucose chewable tablet 16 g  4 tablet Oral PRN Whitney Becerril MD        dextrose bolus 10% 125 mL  125 mL IntraVENous PRN Whitney Becerril MD        Or    dextrose bolus 10% 250 mL  250 mL IntraVENous PRN Whitney Becerril MD        glucagon (rDNA) injection 1 mg  1 mg IntraMUSCular PRN Lisa Sherman MD        dextrose 5 % solution  100 mL/hr IntraVENous PRN Whitney Becerril MD         Allergies:  Codeine and Dye [iodides]    Social History:    Social History     Socioeconomic History    Marital status: Single     Spouse name: Not on file    Number of children: Not on file    Years of education: Not on file    Highest education level: Not on file   Occupational History    Not on file   Tobacco Use    Smoking status: Former Smoker     Quit date: 2016     Years since quittin.7    Smokeless tobacco: Never Used   Vaping Use    Vaping Use: Never used   Substance and Sexual Activity    Alcohol use: No    Drug use: No    Sexual activity: Not on file   Other Topics Concern    Not on file   Social History Narrative    Not on file     Social Determinants of Health     Financial Resource Strain:     Difficulty of Paying Living Expenses: Not on file   Food Insecurity:     Worried About Running Out of Food in the Last Year: Not on file    Enmanuel of Food in the Last Year: Not on file   Transportation Needs:     Lack of Transportation (Medical): Not on file    Lack of Transportation (Non-Medical):  Not on file   Physical Activity:     Days of Exercise per Week: Not on file    Minutes of Exercise per Session: Not on file   Stress:     Feeling of Stress : Not on file   Social Connections:     Frequency of Communication with Friends and Family: Not on file    Frequency of Social Gatherings with Friends and Family: Not on file    Attends Catholic Services: Not on file    Active Member of Clubs or Organizations: Not on file    Attends Club or Organization Meetings: Not on file    Marital Status: Not on file   Intimate Partner Violence:     Fear of Current or Ex-Partner: Not on file    Emotionally Abused: Not on file    Physically Abused: Not on file    Sexually Abused: Not on file   Housing Stability:     Unable to Pay for Housing in the Last Year: Not on file    Number of [place] in the Last Year: Not on file    Unstable Housing in the Last Year: Not on file     Family History:   Family History   Problem Relation Age of Onset    Cancer Mother     Cancer Father        · REVIEW OF SYSTEMS   No current headache. No change in her vision or hearing. Denies nausea or vomiting. She claims to be compliant with medication except today. PHYSICAL EXAM:    Vitals:    VITALS:  /77   Pulse 107   Temp 96.7 °F (35.9 °C) (Temporal)   Resp 27   Wt 148 lb 9.6 oz (67.4 kg)   SpO2 95%   BMI 22.59 kg/m²   24HR INTAKE/OUTPUT:  No intake or output data in the 24 hours ending 05/14/22 1629    CONSTITUTIONAL:  awake, alert, cooperative, no apparent distress, and appears stated age  EYES: Pupils equal, round and reactive to light, extra ocular muscles intact, sclera clear, conjunctiva normal  ENT:  normocepalic, without obvious abnormality  NECK:  supple, symmetrical, trachea midline, no carotid bruit ,   No  JVD  BACK:  symmetric  LUNGS: Non-labored, good air exchange, clear to auscultation bilaterally, no crackles or wheezing  CARDIOVASCULAR:  Normal apical impulse, regular rate and rhythm, normal S1 and S2, no S3 or S4, and no murmur noted, no rub.  radial and bilateralpresent 1+  ABDOMEN:  No scars, normal bowel sounds, soft, non-distended, non-tender,  MUSCULOSKELETAL:  there is no redness, warmth, or swelling of the joints   No leg edema.   NEUROLOGIC:  Awake, alert, oriented to name, place and time. SKIN:  no bruising or bleeding, normal skin color, texture, turgor and no jaundice    DATA:   ECG: Very artifactual EKG but appeared to be in atrial fibrillation with rapid ventricular spots. She has chronic right bundle branch block. ECHO: Date: Few months ago   Reported to have normal LV systolic function but it was poor quality study. The echocardiogram couple years ago showed what reported as moderate aortic stenosis. Stres normal LV systolic functions Test:  Not performed to date  Angiography:  Not performed to date    Cardiology Labs:No results for input(s): MYOGLOBIN, CKTOTAL, CKMB, CKMBINDEX, TROPONINT, BNP in the last 72 hours.   Warfarin PT/INR:  Lab Results   Component Value Date    PROTIME 12.6 05/14/2022    INR 0.9 05/14/2022     CBC:  Lab Results   Component Value Date    WBC 6.0 05/14/2022    RBC 5.36 05/14/2022    HGB 14.1 05/14/2022    HCT 47.9 05/14/2022    MCV 89.4 05/14/2022    MCH 26.3 05/14/2022    MCHC 29.4 05/14/2022    RDW 18.0 05/14/2022     05/14/2022    MPV 8.2 05/14/2022     CMP:  Lab Results   Component Value Date     05/14/2022    K 3.3 05/14/2022    CL 97 05/14/2022    CO2 32 05/14/2022    BUN 9 05/14/2022    CREATININE 0.44 05/14/2022    GFRAA >60 05/14/2022    LABGLOM >60 05/14/2022    GLUCOSE 144 05/14/2022    CALCIUM 9.4 05/14/2022     Magnesium:    Lab Results   Component Value Date    MG 1.8 05/14/2022     PTT:    Lab Results   Component Value Date    APTT 31.5 05/14/2022     TSH:    Lab Results   Component Value Date    TSH 7.11 08/25/2021     BMP:  Lab Results   Component Value Date     05/14/2022    K 3.3 05/14/2022    CL 97 05/14/2022    CO2 32 05/14/2022    BUN 9 05/14/2022    LABALBU 3.2 03/19/2022    CREATININE 0.44 05/14/2022    CALCIUM 9.4 05/14/2022    GFRAA >60 05/14/2022    LABGLOM >60 05/14/2022    GLUCOSE 144 05/14/2022     LIVER PROFILE:No results for input(s): AST, ALT, LABALBU, ALKPHOS, BILITOT, BILIDIR, IBILI, PROT, GLOB, ALBUMIN in the last 72 hours. FLP:  No results found for: CHOL, TRIG, HDL, LDLCHOLESTEROL    IMPRESSION  1. New onset atrial fibrillation with rapid ventricular response, converted to sinus rhythm  2. Chronic history of sinus tachycardia  3. History of moderate coronary artery disease of the circumflex  4. Moderate aortic stenosis  5. Chronic right bundle branch block  6. COPD  7. History of right lung cancer  8. History of non-insulin-dependent diabetes mellitus    Patient Active Problem List   Diagnosis    Valvular heart disease    Type 2 diabetes mellitus without complication, without long-term current use of insulin ([address]    Open wound of right ankle    COPD (chronic obstructive pulmonary disease) (HCC)    Syncope and collapse    Chronic ulcer of right heel ([address]    Multifocal pneumonia    Aortic valve stenosis    Esophageal dilatation    Aspiration pneumonia of both lower lobes due to gastric secretions ([address]    Falls frequently    Chronic respiratory failure (HCC)    Contusion of right knee    Closed fracture of right distal radius    Subdural hemorrhage (HCC)    Subarachnoid hemorrhage (HCC)    Traumatic hemorrhage of cerebrum (HCC)    Chronic bilateral low back pain    Cellulitis of both feet    Acute on chronic congestive heart failure (HCC)    Bilateral leg edema    Cellulitis    Lung mass    Malignant neoplasm of upper lobe of right lung (HCC)    Urinary tract infectious disease    Closed fracture of one rib of right side    Degenerative disc disease, lumbar    Moderate malnutrition (HCC)    Rhabdomyolysis    Unable to ambulate    Mild malnutrition (HCC)    Atrial fibrillation with rapid ventricular response (HCC)           RECOMMENDATIONS:     Continue current medications  Management plan was discussed with patient     Patient complaint is multifactorial.  We will resume her beta-blocker therapy.   The patient is a fall risk and it is a gray zone regarding the indication for long-term anticoagulation. She is considered CHADS2 score vascular 4,  I will switch her Lovenox since she is refusing it to heparin drip and that will give us time to assess the indication for the anticoagulation in the presence of risk of falls. Prognosis is guarded. Discussed with the patient's nurse  Thank you for consultation.       Electronically signed by Bonifacio Haro MD on 5/14/2022 at 4:29 PM     CC: Dallas Mcdonough MD

## 2022-05-14 NOTE — ACP (ADVANCE CARE PLANNING)
Advance Care Planning     Advance Care Planning Activator (Inpatient)  Conversation Note      Date of ACP Conversation: 5/14/2022     Conversation Conducted with: Patient with Decision Making Capacity    ACP Activator: Nereida Warner RN    When Decision Maker makes decisions on behalf of the incapacitated patient: Decision Maker is asked to consider and make decisions based on patient values, known preferences, or best interests. Health Care Decision Maker:     Current Designated Health Care Decision Maker:     Primary Decision Maker: Kalen Maldonado - Child - [phone]  Click here to complete Healthcare Decision Makers including section of the Healthcare Decision Maker Relationship (ie \"Primary\")  Today we documented Decision Maker(s) consistent with Legal Next of Kin hierarchy. Care Preferences    Ventilation: \"If you were in your present state of health and suddenly became very ill and were unable to breathe on your own, what would your preference be about the use of a ventilator (breathing machine) if it were available to you? \"      Would the patient desire the use of ventilator (breathing machine)?: yes    Resuscitation  \"CPR works best to restart the heart when there is a sudden event, like a heart attack, in someone who is otherwise healthy. Unfortunately, CPR does not typically restart the heart for people who have serious health conditions or who are very sick. \"    \"In the event your heart stopped as a result of an underlying serious health condition, would you want attempts to be made to restart your heart (answer \"yes\" for attempt to resuscitate) or would you prefer a natural death (answer \"no\" for do not attempt to resuscitate)? \" yes       [] Yes   [] No   Educated Patient / Xenia Proud regarding differences between Advance Directives and portable DNR orders.     Length of ACP Conversation in minutes:   5    Conversation Outcomes:  [x] ACP discussion completed  [] Existing advance directive reviewed with patient; no changes to patient's previously recorded wishes  [] New Advance Directive completed  [] Portable Do Not Rescitate prepared for Provider review and signature  [] POLST/POST/MOLST/MOST prepared for Provider review and signature      Follow-up plan:    [] Schedule follow-up conversation to continue planning  [] Referred individual to Provider for additional questions/concerns   [] Advised patient/agent/surrogate to review completed ACP document and update if needed with changes in condition, patient preferences or care setting    [] This note routed to one or more involved healthcare providers    If the relationship to the patient does NOT follow your state's Next of Kin hierarchy, the patient must complete an ACP document to allow him/her to act on the patient's behalf. If the patient has a valid advance directive AND verbally provides inconsistent care preference(s), advise the patient to either create a new advance directive or to orally revoke that prior directive.

## 2022-05-14 NOTE — H&P
History & Physical  [facility].,    Adult Hospitalist      Name: João Jennings  MRN: [id number]     Acct: [de-identified]  Room: [facility]/    Admit Date: 5/14/2022  7:18 AM  PCP: Naun Byrd MD    Primary Problem  Principal Problem:    Atrial fibrillation with rapid ventricular response ([facility] [address]  Resolved Problems:    * No resolved hospital problems. *        Assesment:     · Atrial fibrillation with rapid ventricular response  · Acute exacerbation of COPD  · Chronic respiratory failure with hypoxia requiring O2 via nasal cannula  · Bronchogenic carcinoma  · Essential hypertension  · Coronary artery disease, native vessel  · Diabetes mellitus type 2  · Mixed hyperlipidemia  · Anxiety disorder  · History of hypertension  · Osteoarthritis, multiple joints  · Mood disorder  · Gastroesophageal reflux disease without esophagitis        Plan:     · Admit to CVT U  · Monitor vitals closely  · Keep SPO2 above 90%  · I's and O's  · IV  · Cardizem infusion  · Heparin infusion  · 2D echo if already not done  · Consult cardiology  · Pain control  · Antiemetics as needed  · IV antibiotics  · IV Solu-Medrol  · DuoNeb  · RT eval  · Chest x-ray  · Resume essential home medication  · Add parameters  · Incentive spirometry  · CBC, BMP  · Digoxin 0.25 mg p.o. once  · Discussed with RN  · Discussed with pharmacist  · DVT and GI prophylaxis. Chief Complaint:     Chief Complaint   Patient presents with    Shortness of Breath    Tachycardia         History of Present Illness:      João Jennings is a 71 y.o.  female who presents with Shortness of Breath and Tachycardia    Patient admitted through the emergency room where she presented with worsening episodes of dyspnea, cough and chest congestion. Patient had called 911 and on arrival EMS noted A. fib on the EKG strip. Her heart rate was in the 150s at that time. Patient had also been complaining of weakness and dizziness.   Patient was wheezing and was given a Take 1 tablet by mouth 2 times daily 3/25/22   Gil Mancini MD   albuterol sulfate  (90 Base) MCG/ACT inhaler Inhale 2 puffs into the lungs every 4 hours as needed for Wheezing 3/25/22   Gil Mancini MD   tamsulosin (FLOMAX) 0.4 MG capsule Take 1 capsule by mouth daily  Patient not taking: Reported on 4/27/2022 3/26/22   Gil Mancini MD   ARIPiprazole (ABILIFY) 5 MG tablet Take 5 mg by mouth at bedtime    Historical Provider, MD   metFORMIN (GLUCOPHAGE) 500 MG tablet Take 500 mg by mouth 2 times daily (with meals)    Historical Provider, MD   traZODone (DESYREL) 150 MG tablet Take 300 mg by mouth nightly    Historical Provider, MD   melatonin 5 MG TABS tablet Take 15 mg by mouth nightly    Historical Provider, MD   ondansetron (ZOFRAN) 4 MG tablet Take 1 tablet by mouth every 8 hours as needed for Nausea or Vomiting 1/26/22   Julian Hill MD   rosuvastatin (CRESTOR) 5 MG tablet Take 5 mg by mouth daily    Historical Provider, MD   glimepiride (AMARYL) 1 MG tablet Take 1 mg by mouth 2 times daily (with meals)     Historical Provider, MD   venlafaxine (EFFEXOR XR) 150 MG extended release capsule Take 150 mg by mouth 2 times daily    Historical Provider, MD   lansoprazole (PREVACID) 30 MG delayed release capsule Take 30 mg by mouth daily 11/10/16   Historical Provider, MD   aspirin EC 81 MG EC tablet Take 81 mg by mouth daily    Historical Provider, MD   mometasone-formoterol (DULERA) 200-5 MCG/ACT inhaler Inhale 2 puffs into the lungs every 12 hours as needed (wheezing/SOB)     Historical Provider, MD        Allergies:       Codeine and Dye [iodides]    Social History:     Tobacco:    reports that she quit smoking about 5 years ago. She has never used smokeless tobacco.  Alcohol:      reports no history of alcohol use. Drug Use:  reports no history of drug use.     Family History:     Family History   Problem Relation Age of Onset    Cancer Mother     Cancer Father          Physical Exam: Vitals:  /74   Pulse 133   Temp 97.7 °F (36.5 °C) (Oral)   Resp 24   Wt 148 lb (67.1 kg)   SpO2 94%   BMI 22.50 kg/m²   Temp (24hrs), Av.7 °F (36.5 °C), Min:97.7 °F (36.5 °C), Max:97.7 °F (36.5 °C)          General appearance - alert, well appearing, and in no acute distress  Mental status - oriented to person, place, and time with normal affect  Head - normocephalic and atraumatic  Eyes - pupils equal and reactive, extraocular eye movements intact, conjunctiva clear  Ears - hearing appears to be intact  Nose - no drainage noted  Mouth - mucous membranes moist  Neck - supple, no carotid bruits, thyroid not palpable  Chest - clear to auscultation, normal effort  Heart -tachycardia, irregular rhythm, no murmur  Abdomen - soft, nontender, nondistended, bowel sounds present all four quadrants, no masses, hepatomegaly or splenomegaly  Neurological - normal speech, no focal findings or movement disorder noted, cranial nerves II through XII grossly intact  Extremities - peripheral pulses palpable, no pedal edema or calf pain with palpation  Skin - no gross lesions, rashes, or induration noted. Left face wound        Data:     Labs:    Hematology:  Recent Labs     22  0720   WBC 6.0   RBC 5.36*   HGB 14.1   HCT 47.9*   MCV 89.4   MCH 26.3   MCHC 29.4   RDW 18.0*      MPV 8.2     Chemistry:  Recent Labs     22  0720      K 3.3*   CL 97*   CO2 32*   GLUCOSE 144*   BUN 9   CREATININE 0.44*   MG 1.8   ANIONGAP 14   LABGLOM >60   GFRAA >60   CALCIUM 9.4   PROBNP 1,642*   TROPHS 31*     No results for input(s): PROT, LABALBU, LABA1C, O9WUMNX, J5RJRYN, FT4, TSH, AST, ALT, LDH, GGT, ALKPHOS, LABGGT, BILITOT, BILIDIR, AMMONIA, AMYLASE, LIPASE, LACTATE, CHOL, HDL, LDLCHOLESTEROL, CHOLHDLRATIO, TRIG, VLDL, OQC83JZ, PHENYTOIN, PHENYF, URICACID, POCGLU in the last 72 hours.     Lab Results   Component Value Date    INR 1.0 2022    INR 1.1 2022    INR 1.0 2021    PROTIME 13.4 03/30/2022    PROTIME 13.8 03/19/2022    PROTIME 12.6 01/06/2021       Lab Results   Component Value Date/Time    SPECIAL LT AC 10ML 03/29/2022 07:28 PM     Lab Results   Component Value Date/Time    CULTURE NO GROWTH 5 DAYS 03/29/2022 07:28 PM       Lab Results   Component Value Date    POCPH 7.36 04/12/2017    PHART 7.42 08/26/2012    PH 7.22 04/11/2017    POCPCO2 45 04/12/2017    PSD0HQQ 41 08/26/2012    PCO2 54 04/11/2017    POCPO2 93 04/12/2017    PO2ART 59 08/26/2012    PO2 89 04/11/2017    POCHCO3 25.3 04/12/2017    BQZ2NWO 26.1 08/26/2012    HCO3 22.2 04/11/2017    NBEA NOT REPORTED 04/12/2017    PBEA 0 04/12/2017    LFY6POT 27 04/12/2017    ZMPN5FFH 97 04/12/2017    O6GKJENW 92.1 08/26/2012    O2SAT 95 04/11/2017    FIO2 UNKNOWN 10/31/2017       Radiology:    XR CHEST PORTABLE    Result Date: 5/14/2022  1. Improved right pleural effusion. 2.  Patchy opacity in the medial right upper lobe, possibly post treatment change versus residual tumor. Correlation with any recent CT imaging is recommended. All radiological studies reviewed                Code Status:  Prior    Electronically signed by Dion Moraes MD on 5/14/2022 at 10:48 AM     Copy sent to Dr. Donald Ramirez MD    This note was created with the assistance of a speech-recognition program.  Although the intention is to generate a document that actually reflects the content of the visit, no guarantees can be provided that every mistake has been identified and corrected by editing. Note was updated later by me after  physical examination and  completion of the assessment.

## 2022-05-14 NOTE — PROGRESS NOTES
PT received from ER per cart. Pt placed on cardiac monitor, continuous oxygen saturation monitor, O2 @ 3 liters per NC. Oriented to room. Admission documentation completed.

## 2022-05-14 NOTE — PROGRESS NOTES
Transitions of Care Pharmacy Service   Medication Review    The patient's list of current home medications has been reviewed. Source(s) of information: Patient's home medication list Abbey Davey), recent med rec from 3/18/22    Based on information provided by the above source(s), I have updated the patient's home med list as described below. Please review the ACTION REQUESTED section of this note below for any discrepancies on current hospital orders. I changed or updated the following medications on the patient's home medication list:  Removed tamsulosin     Added N/A     Adjusted   Melatonin dose changed to 5 mg (from 15 mg)   Other Notes N/A         PROVIDER ACTION REQUESTED  Medications that need to be addressed by a physician/nurse practitioner:    Medication Action Requested   N/A     N/A         Please feel free to call me with any questions about this encounter. Thank you. lGenn Martinez [facility]   Transitions of Care Pharmacy Service  Phone:  [phone]  Fax: [phone]      Electronically signed by Glenn [facility] on 5/14/2022 at 5:51 PM       Medications Prior to Admission: oxyCODONE-acetaminophen (PERCOCET)  MG per tablet, Take 1 tablet by mouth every 4 hours as needed for Pain.  gabapentin (NEURONTIN) 300 MG capsule, Take 300 mg by mouth in the morning and at bedtime. sennosides-docusate sodium (SENOKOT-S) 8.6-50 MG tablet, Take 1 tablet by mouth in the morning and at bedtime  ondansetron (ZOFRAN ODT) 4 MG disintegrating tablet, Place 1 tablet under the tongue every 6 hours as needed for Nausea or Vomiting  furosemide (LASIX) 40 MG tablet, Take 1 tablet by mouth daily  midodrine (PROAMATINE) 10 MG tablet, Take 1 tablet by mouth 3 times daily as needed (SBP <90)  clonazePAM (KLONOPIN) 1 MG tablet, Take 1 tablet by mouth 3 times daily as needed for Anxiety for up to 3 doses.   metoprolol tartrate (LOPRESSOR) 50 MG tablet, Take 1 tablet by mouth 2 times daily  albuterol sulfate HFA 108 (90 Base) MCG/ACT inhaler, Inhale 2 puffs into the lungs every 4 hours as needed for Wheezing  ARIPiprazole (ABILIFY) 5 MG tablet, Take 5 mg by mouth at bedtime  metFORMIN (GLUCOPHAGE) 500 MG tablet, Take 500 mg by mouth 2 times daily (with meals)  traZODone (DESYREL) 150 MG tablet, Take 300 mg by mouth nightly  melatonin 5 MG TABS tablet, Take 5 mg by mouth nightly   ondansetron (ZOFRAN) 4 MG tablet, Take 1 tablet by mouth every 8 hours as needed for Nausea or Vomiting  rosuvastatin (CRESTOR) 5 MG tablet, Take 5 mg by mouth daily  glimepiride (AMARYL) 1 MG tablet, Take 1 mg by mouth 2 times daily (with meals)   venlafaxine (EFFEXOR XR) 150 MG extended release capsule, Take 150 mg by mouth 2 times daily  lansoprazole (PREVACID) 30 MG delayed release capsule, Take 30 mg by mouth daily  aspirin EC 81 MG EC tablet, Take 81 mg by mouth daily  mometasone-formoterol (DULERA) 200-5 MCG/ACT inhaler, Inhale 2 puffs into the lungs every 12 hours as needed (wheezing/SOB)

## 2022-05-15 LAB
ABSOLUTE EOS #: <0.03 K/UL (ref 0–0.44)
ABSOLUTE IMMATURE GRANULOCYTE: 0.22 K/UL (ref 0–0.3)
ABSOLUTE LYMPH #: 1.08 K/UL (ref 1.1–3.7)
ABSOLUTE MONO #: 0.39 K/UL (ref 0.1–1.2)
ANION GAP SERPL CALCULATED.3IONS-SCNC: 12 MMOL/L (ref 9–17)
ANTI-XA UNFRAC HEPARIN: 0.26 IU/L (ref 0.3–0.7)
ANTI-XA UNFRAC HEPARIN: <0.1 IU/L (ref 0.3–0.7)
ANTI-XA UNFRAC HEPARIN: <0.1 IU/L (ref 0.3–0.7)
ANTI-XA UNFRAC HEPARIN: >1.1 IU/L (ref 0.3–0.7)
BASOPHILS # BLD: 0 % (ref 0–2)
BASOPHILS ABSOLUTE: <0.03 K/UL (ref 0–0.2)
BUN BLDV-MCNC: 10 MG/DL (ref 8–23)
BUN/CREAT BLD: 19 (ref 9–20)
CALCIUM SERPL-MCNC: 9.5 MG/DL (ref 8.6–10.4)
CHLORIDE BLD-SCNC: 99 MMOL/L (ref 98–107)
CO2: 31 MMOL/L (ref 20–31)
CREAT SERPL-MCNC: 0.52 MG/DL (ref 0.5–0.9)
EOSINOPHILS RELATIVE PERCENT: 0 % (ref 1–4)
GFR AFRICAN AMERICAN: >60 ML/MIN
GFR NON-AFRICAN AMERICAN: >60 ML/MIN
GFR SERPL CREATININE-BSD FRML MDRD: ABNORMAL ML/MIN/{1.73_M2}
GLUCOSE BLD-MCNC: 100 MG/DL (ref 65–105)
GLUCOSE BLD-MCNC: 111 MG/DL (ref 65–105)
GLUCOSE BLD-MCNC: 120 MG/DL (ref 65–105)
GLUCOSE BLD-MCNC: 127 MG/DL (ref 70–99)
GLUCOSE BLD-MCNC: 201 MG/DL (ref 65–105)
HCT VFR BLD CALC: 29.4 % (ref 36.3–47.1)
HEMOGLOBIN: 8.6 G/DL (ref 11.9–15.1)
IMMATURE GRANULOCYTES: 3 %
INR BLD: 1
LYMPHOCYTES # BLD: 13 % (ref 24–43)
MCH RBC QN AUTO: 26.2 PG (ref 25.2–33.5)
MCHC RBC AUTO-ENTMCNC: 29.3 G/DL (ref 28.4–34.8)
MCV RBC AUTO: 89.6 FL (ref 82.6–102.9)
MONOCYTES # BLD: 5 % (ref 3–12)
NRBC AUTOMATED: 0 PER 100 WBC
PDW BLD-RTO: 17.2 % (ref 11.8–14.4)
PLATELET # BLD: 340 K/UL (ref 138–453)
PMV BLD AUTO: 8.9 FL (ref 8.1–13.5)
POTASSIUM SERPL-SCNC: 4 MMOL/L (ref 3.7–5.3)
PROTHROMBIN TIME: 12.9 SEC (ref 11.5–14.2)
RBC # BLD: 3.28 M/UL (ref 3.95–5.11)
RBC # BLD: ABNORMAL 10*6/UL
SEG NEUTROPHILS: 80 % (ref 36–65)
SEGMENTED NEUTROPHILS ABSOLUTE COUNT: 6.79 K/UL (ref 1.5–8.1)
SODIUM BLD-SCNC: 142 MMOL/L (ref 135–144)
WBC # BLD: 8.5 K/UL (ref 3.5–11.3)

## 2022-05-15 PROCEDURE — 80048 BASIC METABOLIC PNL TOTAL CA: CPT

## 2022-05-15 PROCEDURE — 94640 AIRWAY INHALATION TREATMENT: CPT

## 2022-05-15 PROCEDURE — 6360000002 HC RX W HCPCS: Performed by: FAMILY MEDICINE

## 2022-05-15 PROCEDURE — 85025 COMPLETE CBC W/AUTO DIFF WBC: CPT

## 2022-05-15 PROCEDURE — 82947 ASSAY GLUCOSE BLOOD QUANT: CPT

## 2022-05-15 PROCEDURE — 97530 THERAPEUTIC ACTIVITIES: CPT

## 2022-05-15 PROCEDURE — 85520 HEPARIN ASSAY: CPT

## 2022-05-15 PROCEDURE — 97110 THERAPEUTIC EXERCISES: CPT

## 2022-05-15 PROCEDURE — 97162 PT EVAL MOD COMPLEX 30 MIN: CPT

## 2022-05-15 PROCEDURE — 85610 PROTHROMBIN TIME: CPT

## 2022-05-15 PROCEDURE — 2580000003 HC RX 258: Performed by: FAMILY MEDICINE

## 2022-05-15 PROCEDURE — 6370000000 HC RX 637 (ALT 250 FOR IP): Performed by: INTERNAL MEDICINE

## 2022-05-15 PROCEDURE — 6360000002 HC RX W HCPCS: Performed by: INTERNAL MEDICINE

## 2022-05-15 PROCEDURE — 94761 N-INVAS EAR/PLS OXIMETRY MLT: CPT

## 2022-05-15 PROCEDURE — 6370000000 HC RX 637 (ALT 250 FOR IP): Performed by: FAMILY MEDICINE

## 2022-05-15 PROCEDURE — 2060000000 HC ICU INTERMEDIATE R&B

## 2022-05-15 PROCEDURE — 2700000000 HC OXYGEN THERAPY PER DAY

## 2022-05-15 PROCEDURE — 36415 COLL VENOUS BLD VENIPUNCTURE: CPT

## 2022-05-15 RX ORDER — IPRATROPIUM BROMIDE AND ALBUTEROL SULFATE 2.5; .5 MG/3ML; MG/3ML
1 SOLUTION RESPIRATORY (INHALATION) EVERY 4 HOURS PRN
Status: DISCONTINUED | OUTPATIENT
Start: 2022-05-15 | End: 2022-05-22 | Stop reason: HOSPADM

## 2022-05-15 RX ORDER — LEVOFLOXACIN 5 MG/ML
750 INJECTION, SOLUTION INTRAVENOUS EVERY 24 HOURS
Status: DISCONTINUED | OUTPATIENT
Start: 2022-05-15 | End: 2022-05-16

## 2022-05-15 RX ADMIN — ROSUVASTATIN CALCIUM 5 MG: 10 TABLET, FILM COATED ORAL at 07:41

## 2022-05-15 RX ADMIN — BUDESONIDE AND FORMOTEROL FUMARATE DIHYDRATE 2 PUFF: 160; 4.5 AEROSOL RESPIRATORY (INHALATION) at 20:17

## 2022-05-15 RX ADMIN — MIDODRINE HYDROCHLORIDE 10 MG: 10 TABLET ORAL at 23:05

## 2022-05-15 RX ADMIN — SODIUM CHLORIDE, PRESERVATIVE FREE 10 ML: 5 INJECTION INTRAVENOUS at 21:03

## 2022-05-15 RX ADMIN — ASPIRIN 81 MG: 81 TABLET, COATED ORAL at 07:41

## 2022-05-15 RX ADMIN — GABAPENTIN 300 MG: 300 CAPSULE ORAL at 07:42

## 2022-05-15 RX ADMIN — GABAPENTIN 300 MG: 300 CAPSULE ORAL at 21:01

## 2022-05-15 RX ADMIN — TRAZODONE HYDROCHLORIDE 300 MG: 100 TABLET ORAL at 21:02

## 2022-05-15 RX ADMIN — SENNOSIDES AND DOCUSATE SODIUM 1 TABLET: 50; 8.6 TABLET ORAL at 07:41

## 2022-05-15 RX ADMIN — GLIPIZIDE 5 MG: 10 TABLET ORAL at 05:41

## 2022-05-15 RX ADMIN — HEPARIN SODIUM AND DEXTROSE 19 UNITS/KG/HR: 10000; 5 INJECTION INTRAVENOUS at 18:39

## 2022-05-15 RX ADMIN — FUROSEMIDE 40 MG: 40 TABLET ORAL at 07:42

## 2022-05-15 RX ADMIN — VENLAFAXINE HYDROCHLORIDE 150 MG: 75 CAPSULE, EXTENDED RELEASE ORAL at 07:43

## 2022-05-15 RX ADMIN — Medication 15 MG: at 21:02

## 2022-05-15 RX ADMIN — VENLAFAXINE HYDROCHLORIDE 150 MG: 75 CAPSULE, EXTENDED RELEASE ORAL at 21:02

## 2022-05-15 RX ADMIN — METOPROLOL TARTRATE 25 MG: 50 TABLET ORAL at 07:42

## 2022-05-15 RX ADMIN — LEVOFLOXACIN 750 MG: 750 INJECTION, SOLUTION INTRAVENOUS at 21:28

## 2022-05-15 RX ADMIN — SENNOSIDES AND DOCUSATE SODIUM 1 TABLET: 50; 8.6 TABLET ORAL at 21:02

## 2022-05-15 RX ADMIN — HEPARIN SODIUM 4000 UNITS: 1000 INJECTION INTRAVENOUS; SUBCUTANEOUS at 07:18

## 2022-05-15 RX ADMIN — METFORMIN HYDROCHLORIDE 500 MG: 500 TABLET ORAL at 16:17

## 2022-05-15 RX ADMIN — HEPARIN SODIUM 2000 UNITS: 1000 INJECTION INTRAVENOUS; SUBCUTANEOUS at 18:40

## 2022-05-15 RX ADMIN — ARIPIPRAZOLE 5 MG: 5 TABLET ORAL at 21:01

## 2022-05-15 RX ADMIN — TAMSULOSIN HYDROCHLORIDE 0.4 MG: 0.4 CAPSULE ORAL at 07:42

## 2022-05-15 RX ADMIN — PANTOPRAZOLE SODIUM 40 MG: 40 TABLET, DELAYED RELEASE ORAL at 05:41

## 2022-05-15 RX ADMIN — OXYCODONE AND ACETAMINOPHEN 1 TABLET: 325; 10 TABLET ORAL at 18:37

## 2022-05-15 RX ADMIN — METFORMIN HYDROCHLORIDE 500 MG: 500 TABLET ORAL at 07:42

## 2022-05-15 RX ADMIN — METOPROLOL TARTRATE 25 MG: 25 TABLET, FILM COATED ORAL at 18:37

## 2022-05-15 RX ADMIN — HEPARIN SODIUM 4000 UNITS: 1000 INJECTION INTRAVENOUS; SUBCUTANEOUS at 13:01

## 2022-05-15 RX ADMIN — CLONAZEPAM 1 MG: 0.5 TABLET ORAL at 21:02

## 2022-05-15 ASSESSMENT — PAIN SCALES - GENERAL: PAINLEVEL_OUTOF10: 6

## 2022-05-15 ASSESSMENT — PAIN DESCRIPTION - LOCATION: LOCATION: BACK

## 2022-05-15 NOTE — FLOWSHEET NOTE
[facility] NOTE    Room # 2031/2031-01   Name: Angélica Coley              Reason for visit: Routine    I visited the patient. Admit Date & Time: 5/14/2022  7:18 AM    Assessment:  Angélica Coley is a 71 y.o. female. Upon entering the room patient was sleeping. Intervention:   provided a ministry presence and brief prayer. Outcome:  Patient did not respond. Plan:  Chaplains will remain available to offer spiritual and emotional support as needed. Electronically signed by Chaplain Kevin, on 5/15/2022 at 1:50 PM.  Meena       05/15/22 1346   Encounter Summary   Service Provided For: Patient   Referral/Consult From: [facility]   Support System Unknown   Last Encounter  05/15/22  (PT, sleeping)   Complexity of Encounter Low   Begin Time 1033   End Time  1034   Total Time Calculated 1 min   Encounter    Type Initial Screen/Assessment   Assessment/Intervention/Outcome   Assessment Unable to assess   Intervention Prayer (assurance of)/[place]

## 2022-05-15 NOTE — PROGRESS NOTES
Physical Therapy  Facility/Department: [facility] CVICU  Physical Therapy Initial Assessment    Name: Charity Collazo  : 1950  MRN: [id number]  Date of Service: 5/15/2022    Discharge Recommendations:  Patient would benefit from continued therapy after discharge Pt currently functioning below baseline. Would suggest additional therapy at time of discharge to maximize long term outcomes and prevent re-admission. Please refer to AM-PAC score for current level of function. Patient Diagnosis(es): The primary encounter diagnosis was COPD exacerbation ([facility] [address] A diagnosis of Atrial fibrillation with rapid ventricular response (HCC) was also pertinent to this visit. Past Medical History:  has a past medical history of AAA (abdominal aortic aneurysm) ([facility] [address] Acid reflux, Anxiety and depression, Aortic stenosis, Arthritis, Blister of ankle, right, CAD (coronary artery disease), Cancer ([facility] [address] COPD (chronic obstructive pulmonary disease) ([facility] [address] Diabetes [facility] ([facility] Falls, Heart block, Hypokalemia, MDRO (multiple drug resistant organisms) resistance, MRSA (methicillin resistant staph aureus) culture positive, On home oxygen therapy, On home oxygen therapy, Overactive bladder, Pneumonia, PONV (postoperative nausea and vomiting), and Vitamin D deficiency. Past Surgical History:  has a past surgical history that includes back surgery; eye surgery; Cholecystectomy; Appendectomy; Hysterectomy; Tonsillectomy; lumbar laminectomy; Endoscopy, colon, diagnostic (2016); aortic valve repair (N/A, 2017); bronchoscopy (N/A, 2020); IR INSERT PICC VAD W SQ PORT >5 YEARS (2020); and IR PORT PLACEMENT > 5 YEARS (2020). Assessment   Body Structures, Functions, Activity Limitations Requiring Skilled Therapeutic Intervention: Decreased functional mobility ; Decreased strength;Decreased safe awareness;Decreased endurance;Decreased balance  Assessment: Pt presents with new onset heart a fib and w/ only d/c to home x 1 day  From SNF before returning to hospital. Pt is motivated but hopeful no one commands her to do therapy or she will just refuse. Pt would benefit from additonal therapy and will focus on w/c for mobility for improved safety in the home. Will progress. Specific Instructions for Next Treatment: w/c mobility  Therapy Prognosis: Good  Decision Making: Medium Complexity  Clinical Presentation: evolving  Activity Tolerance  Activity Tolerance: Patient tolerated evaluation without incident     Plan   Plan  Plan: 5-7 times per week  Specific Instructions for Next Treatment: w/c mobility  Current Treatment Recommendations: Strengthening,Balance training,Functional mobility training,Patient/Caregiver education & training,Home exercise program,Safety education & training,Wheelchair mobility training,Transfer training,Gait training  Safety Devices  Type of Devices: All fall risk precautions in place,Patient at risk for falls,Call light within reach,Bed alarm in place     Restrictions  Restrictions/Precautions  Restrictions/Precautions: Fall Risk  Position Activity Restriction  Other position/activity restrictions: pt requesting no one demand orders at her or dont ask the same questions.      Subjective   General  Chart Reviewed: Yes  Patient assessed for rehabilitation services?: Yes  Response To Previous Treatment: Not applicable  Family / Caregiver Present: No  Follows Commands: Within Functional Limits         Social/Functional History  Social/Functional History  Lives With: Alone  Type of Home: Senior housing apartment  [address] Help From: Family  ADL Assistance: Independent  Ambulation Assistance: Independent (pt reports she was \"fearful of falling and didn't feel like she had enough w/c training at facility prior to being d/c home.)  Transfer Assistance: Independent  Occupation: Retired  Additional Comments: Pt reporting she only had the w/c one day prior to being d/c from the SNF and just didn't feel safe walking either. Pt states he son and grand kids were staying with er for Most of the time while she was home and that she would just sit in chair when they were home  Vision/Hearing       Cognition   Orientation  Overall Orientation Status: Within Functional Limits     Objective   Pulse: 109  Heart Rate Source: Monitor  BP: (!) 96/50  BP Location: Right upper arm  BP Method: Automatic  Patient Position: Semi fowlers  MAP (Calculated): 65.33  Resp: 30  SpO2: 96 %  O2 Device: Nasal cannula     Observation/Palpation  Edema: - neg        AROM RLE (degrees)  RLE AROM: WFL  AROM LLE (degrees)  LLE AROM : WFL  AROM RUE (degrees)  RUE General AROM: decreased ROM and extremely weak; especially hand pinch  AROM LUE (degrees)  LUE General AROM: decreased ROM and extremely weak  Strength RLE  Comment: 4/5  Strength LLE  Comment: 4/5           Bed mobility  Rolling to Left: Modified independent  Rolling to Right: Modified independent  Supine to Sit: Stand by assistance  Sit to Supine: Stand by assistance  Transfers  Sit to Stand: Minimal Assistance  Stand to sit: Minimal Assistance  Bed to Chair: Minimal assistance  Ambulation  Surface: level tile  Device: Rolling Walker  Assistance: Minimal assistance  Quality of Gait: steady steady; UE support needed for balance  Gait Deviations: Slow Gwendolyn;Decreased step length  Distance: 5 ft  Comments: gait distance limited to her quick fatigue. O2 sats not reading     Balance  Posture: Fair  Sitting - Static: Good  Sitting - Dynamic: Good  Standing - Static: Good  Standing - Dynamic: Fair       deep breathing ex' circulation exercises; dangled bedside x 6 minutes only prior to fatiguing.      AM-PAC Score 17/24       Goals  Short Term Goals  Time Frame for Short term goals: 15  Short term goal 1: bed mob ind  Short term goal 2: trnasfers ind  Short term goal 3: amb x 50 ft w. r.walker sBA  Short term goal 4: w/c x 200 ft w/ turns and manuvering ind  Patient Goals   Patient goals : Pt goal is for a safe d/c and she is hopeful that she will be strong enough to go home       Education: safety; hosp secondary complication preventions; Therapy Time   Individual   Time In 1115   Time Out 1158   Minutes 43    treatment 24 minutes.      Rj Jackson, PT

## 2022-05-15 NOTE — PROGRESS NOTES
Section of Cardiology  Progress Note      Date:  5/15/2022  Patient: João Jennings  Admission:  5/14/2022  7:18 AM  Admit DX: Atrial fibrillation with rapid ventricular response ([facility] [address] [I48.91]  Age:  71 y. o., 1950     LOS: 1 day     Reason for evaluation:   Atrial fibrillation  SUBJECTIVE:     The patient was seen and examined. Notes and labs reviewed. At the time of my examination patient is sitting up in her bed and having her lunch, reports that she is feeling okay. Telemetry monitor currently shows sinus rhythm with frequent APCs. Patient apparently is wheelchair-bound but did have multiple falls as noted by her son. OBJECTIVE:      EXAM:   Vitals:    VITALS:  BP 96/82   Pulse 92   Temp 97.5 °F (36.4 °C) (Temporal)   Resp 22   Ht 5' 8\" (1.727 m)   Wt 145 lb 8 oz (66 kg)   SpO2 (!) 84%   BMI 22.12 kg/m²   24HR INTAKE/OUTPUT:    Intake/Output Summary (Last 24 hours) at 5/15/2022 1259  Last data filed at 5/15/2022 0950  Gross per 24 hour   Intake 100 ml   Output --   Net 100 ml       CONSTITUTIONAL:  awake, alert, cooperative, no apparent distress, and appears stated age. HEENT: Normal jugular venous pulsations, no carotid bruits. Head is atraumatic, normocephalic. Eyes and oral mucosa are normal.  LUNGS: Good respiratory effort On auscultation: clear to auscultation bilaterally and diminished breath sounds bibasilar  CARDIOVASCULAR:  Normal apical impulse, irregular rate and rhythm, normal S1 and S2,   ABDOMEN: Soft, nontender, nondistended. Bowel sounds present. No masses or tenderness. No bruit. SKIN: Warm and dry. EXTREMITIES: trace lower extremity edema.     Current Inpatient Medications:   metoprolol tartrate  25 mg Oral BID    aspirin EC  81 mg Oral Daily    budesonide-formoterol  2 puff Inhalation BID    pantoprazole  40 mg Oral QAM AC    venlafaxine  150 mg Oral BID    rosuvastatin  5 mg Oral Daily    ARIPiprazole  5 mg Oral Nightly    metFORMIN  500 mg Oral BID WC    traZODone  300 mg Oral Nightly    melatonin  15 mg Oral Nightly    tamsulosin  0.4 mg Oral Daily    gabapentin  300 mg Oral BID    furosemide  40 mg Oral Daily    sennosides-docusate sodium  1 tablet Oral BID    sodium chloride flush  5-40 mL IntraVENous 2 times per day    glipiZIDE  5 mg Oral QAM AC       IV Infusions (if any):   dilTIAZem (CARDIZEM) 125 mg in dextrose 5% 125 mL infusion Stopped (05/14/22 2345)    sodium chloride Stopped (05/14/22 1625)    sodium chloride      dextrose      heparin (PORCINE) Infusion 13 Units/kg/hr (05/15/22 0713)       Diagnostics:   Telemetry: Sinus rhythm with APCs. Labs:   CBC:  Recent Labs     05/14/22  0720 05/15/22  0549   WBC 6.0 8.5   HGB 14.1 8.6*   HCT 47.9* 29.4*    340     Magnesium:  Recent Labs     05/14/22  0720   MG 1.8     BMP:  Recent Labs     05/14/22  0720 05/15/22  0549    142   K 3.3* 4.0   CALCIUM 9.4 9.5   CO2 32* 31   BUN 9 10   CREATININE 0.44* 0.52   LABGLOM >60 >60   GLUCOSE 144* 127*     BNP:  Recent Labs     05/14/22  0720   PROBNP 1,642*     PT/INR:  Recent Labs     05/14/22  0720 05/15/22  0549   PROTIME 12.6 12.9   INR 0.9 1.0     APTT:  Recent Labs     05/14/22  0720   APTT 31.5     CARDIAC ENZYMES:  Recent Labs     05/14/22  0720   TROPHS 31*     FASTING LIPID PANEL:No results found for: HDL, LDLDIRECT, LDLCALC, TRIG  LIVER PROFILE:No results for input(s): AST, ALT, LABALBU, ALKPHOS, BILITOT, BILIDIR, IBILI, PROT, GLOB, ALBUMIN in the last 72 hours. ASSESSMENT:  1.  New onset atrial fibrillation with rapid ventricular response, converted to sinus rhythm. 2.  Chronic history of sinus tachycardia  3. History of moderate coronary artery disease of the circumflex  4. Moderate aortic stenosis  5. Chronic right bundle branch block  6. COPD  7. History of right lung cancer  8.   History of non-insulin-dependent diabetes mellitus       Patient Active Problem List   Diagnosis    Valvular heart disease    Type 2 diabetes mellitus without complication, without long-term current use of insulin (HCC)    Open wound of right ankle    COPD (chronic obstructive pulmonary disease) (HCC)    Syncope and collapse    Chronic ulcer of right heel (HCC)    Multifocal pneumonia    Aortic valve stenosis    Esophageal dilatation    Aspiration pneumonia of both lower lobes due to gastric secretions (HCC)    Falls frequently    Chronic respiratory failure (HCC)    Contusion of right knee    Closed fracture of right distal radius    Subdural hemorrhage (HCC)    Subarachnoid hemorrhage (HCC)    Traumatic hemorrhage of cerebrum (HCC)    Chronic bilateral low back pain    Cellulitis of both feet    Acute on chronic congestive heart failure (HCC)    Bilateral leg edema    Cellulitis    Lung mass    Malignant neoplasm of upper lobe of right lung (HCC)    Urinary tract infectious disease    Closed fracture of one rib of right side    Degenerative disc disease, lumbar    Moderate malnutrition (HCC)    Rhabdomyolysis    Unable to ambulate    Mild malnutrition (HCC)    Atrial fibrillation with rapid ventricular response ([address]       PLAN:  1. We will attempt to wean off IV diltiazem. 2.  Patient continues to be on IV heparin drip. .  3.  In view of high fall risk patient may not be a candidate for long-term oral anticoagulation. Discussed with patient and nursing.     Leif Cervantes MD

## 2022-05-15 NOTE — PROGRESS NOTES
formerly [facility].,    Adult Hospitalist      Name: Caitie Cedeño  MRN: [id number]     Acct: [de-identified]  Room: 2031/2031-01    Admit Date: 5/14/2022  7:18 AM  PCP: Juan Graham MD    Primary Problem  Principal Problem:    Atrial fibrillation with rapid ventricular response ([facility] [address]  Resolved Problems:    * No resolved hospital problems. *        Assesment:     · Atrial fibrillation with rapid ventricular response  · Acute exacerbation of COPD  · Chronic respiratory failure with hypoxia requiring O2 via nasal cannula  · Bronchogenic carcinoma  · Essential hypertension  · Coronary artery disease, native vessel  · Diabetes mellitus type 2  · Mixed hyperlipidemia  · Anxiety disorder  · History of hypotension  · Osteoarthritis, multiple joints  · Mood disorder  · Gastroesophageal reflux disease without esophagitis        Plan:     · Admit to CVT U  · Monitor vitals closely  · Keep SPO2 above 90%  · I's and O's  · IV  · Cardizem infusion  · Heparin infusion  · 2D echo if already not done  · Consult cardiology  · Pain control  · Antiemetics as needed  · IV antibiotics  · IV Solu-Medrol  · DuoNeb  · RT eval  · Chest x-ray  · Resume essential home medication  · Add parameters  · Incentive spirometry  · CBC, BMP  · Digoxin 0.25 mg p.o. once  · Discussed with RN  · Discussed with pharmacist  · CXR  · DVT and GI prophylaxis.         Chief Complaint:     Chief Complaint   Patient presents with    Shortness of Breath    Tachycardia         History of Present Illness:        Patient seen and examined at bedside  Last 24-hour events reviewed with nursing  Patient has had more cough and congestion  Occasional wheezing  She has phlegm in her throat  Continues to be tachycardic  Says her energy is a little improved  Denies fever or chills  Denies nausea or vomiting  Denies abdominal pain or back pain  Other review of system negative      Initial HPI  Caitie Cedeño is a 71 y.o.  female who presents with Shortness of Breath and Tachycardia    Patient admitted through the emergency room where she presented with worsening episodes of dyspnea, cough and chest congestion. Patient had called 911 and on arrival EMS noted A. fib on the EKG strip. Her heart rate was in the 150s at that time. Patient had also been complaining of weakness and dizziness. Patient was wheezing and was given a DuoNeb nebulizer treatment as well as Solu-Medrol. Patient had earlier been discharged from the hospital and had been at a skilled nursing facility. Patient says she had been feeling well however over the last week she noted dyspnea and dry cough. Patient denies any chest pain or orthopnea. Denies nausea, vomiting or abdominal pain. Denies diarrhea or constipation. Denies headache, photophobia or diplopia. Denies neck pain or back pain    Patient says since admission she has been much better. She was started on Cardizem infusion and heart rate presently is in the low 100s. She denies having any palpitations at this time. She says her breathing has been better. Patient was also started on heparin infusion    I have personally reviewed the past medical history, past surgical history, medications, social history, and family history, and summarized in the note. Review of Systems:     All 10 point system is reviewed and negative otherwise mentioned in HPI.       Past Medical History:     Past Medical History:   Diagnosis Date    AAA (abdominal aortic aneurysm) ([facility] [address]     Pt denies having a history of AAA    Acid reflux     Anxiety and depression     Aortic stenosis     Arthritis     Blister of ankle, right 10/13/2016    blister broke open & draining, is on antibiotics    CAD (coronary artery disease)     Cancer ([facility] [address]     lung cancer    COPD (chronic obstructive pulmonary disease) (HCC)     Diabetes mellitus ([facility] [address]     Falls     Heart block     bifasicular    Hypokalemia     MDRO (multiple drug resistant organisms) resistance 10/17/2014    E. Coli urine    MRSA (methicillin resistant staph aureus) culture positive resolved 12/2016    2 negative nasal screens - 2016 (hx in urine 2014)    On home oxygen therapy     uses 2 liters at night    On home oxygen therapy     patient states 2 liters/nasal cannula continuous    Overactive bladder     patient incont. wears a brief    Pneumonia     PONV (postoperative nausea and vomiting)     Vitamin D deficiency         Past Surgical History:     Past Surgical History:   Procedure Laterality Date    AORTIC VALVE REPAIR N/A 4/11/2017    AORTIC VALVE REPAIR REPLACEMENT performed by Claudio Garcia MD at [facility] N/A 8/31/2020    BRONCHOSCOPY BIOPSY BRONCHUS performed by Andrzej Phillips MD at [facility], COLON, DIAGNOSTIC  12/08/2016    EYE SURGERY      HYSTERECTOMY      IR INS PICC VAD W SQ PORT GREATER THAN 5  9/4/2020    IR INS PICC VAD W SQ PORT GREATER THAN 5 9/4/2020 [facility]    IR PORT PLACEMENT EQUAL OR GREATER THAN 5 YEARS  9/29/2020    IR PORT PLACEMENT EQUAL OR GREATER THAN 5 YEARS 9/29/2020 MD JAY JAY Gilliland SPECIAL PROCEDURES    LUMBAR LAMINECTOMY      TONSILLECTOMY          Medications Prior to Admission:       Prior to Admission medications    Medication Sig Start Date End Date Taking? Authorizing Provider   oxyCODONE-acetaminophen (PERCOCET)  MG per tablet Take 1 tablet by mouth every 4 hours as needed for Pain. Yes Historical Provider, MD   gabapentin (NEURONTIN) 300 MG capsule Take 300 mg by mouth in the morning and at bedtime.    Yes Historical Provider, MD   sennosides-docusate sodium (SENOKOT-S) 8.6-50 MG tablet Take 1 tablet by mouth in the morning and at bedtime    Historical Provider, MD   ondansetron (ZOFRAN ODT) 4 MG disintegrating tablet Place 1 tablet under the tongue every 6 hours as needed for Nausea or Vomiting 4/27/22   Nicky Todd MD furosemide (LASIX) 40 MG tablet Take 1 tablet by mouth daily 4/7/22   Alex Mcnulty MD   midodrine (PROAMATINE) 10 MG tablet Take 1 tablet by mouth 3 times daily as needed (SBP <90) 4/6/22 4/27/22  Alex Mcnulty MD   clonazePAM (KLONOPIN) 1 MG tablet Take 1 tablet by mouth 3 times daily as needed for Anxiety for up to 3 doses.  4/4/22 6/25/23  Alex Mcnulty MD   metoprolol tartrate (LOPRESSOR) 50 MG tablet Take 1 tablet by mouth 2 times daily 3/25/22   Charlee Ac MD   albuterol sulfate  (90 Base) MCG/ACT inhaler Inhale 2 puffs into the lungs every 4 hours as needed for Wheezing 3/25/22   Charlee Ac MD   ARIPiprazole (ABILIFY) 5 MG tablet Take 5 mg by mouth at bedtime    Historical Provider, MD   metFORMIN (GLUCOPHAGE) 500 MG tablet Take 500 mg by mouth 2 times daily (with meals)    Historical Provider, MD   traZODone (DESYREL) 150 MG tablet Take 300 mg by mouth nightly    Historical Provider, MD   melatonin 5 MG TABS tablet Take 5 mg by mouth nightly     Historical Provider, MD   ondansetron (ZOFRAN) 4 MG tablet Take 1 tablet by mouth every 8 hours as needed for Nausea or Vomiting 1/26/22   Violeta Fitzgerald MD   rosuvastatin (CRESTOR) 5 MG tablet Take 5 mg by mouth daily    Historical Provider, MD   glimepiride (AMARYL) 1 MG tablet Take 1 mg by mouth 2 times daily (with meals)     Historical Provider, MD   venlafaxine (EFFEXOR XR) 150 MG extended release capsule Take 150 mg by mouth 2 times daily    Historical Provider, MD   lansoprazole (PREVACID) 30 MG delayed release capsule Take 30 mg by mouth daily 11/10/16   Historical Provider, MD   aspirin EC 81 MG EC tablet Take 81 mg by mouth daily    Historical Provider, MD   mometasone-formoterol (DULERA) 200-5 MCG/ACT inhaler Inhale 2 puffs into the lungs every 12 hours as needed (wheezing/SOB)     Historical Provider, MD        Allergies:       Codeine and Dye [iodides]    Social History:     Tobacco:    reports that she quit smoking about 5 years ago. She has never used smokeless tobacco.  Alcohol:      reports no history of alcohol use. Drug Use:  reports no history of drug use. Family History:     Family History   Problem Relation Age of Onset   Alicja Simmons Cancer Mother     Cancer Father          Physical Exam:     Vitals:  /77   Pulse 120   Temp 97.5 °F (36.4 °C) (Temporal)   Resp 22   Ht 5' 8\" (1.727 m)   Wt 145 lb 8 oz (66 kg)   SpO2 96%   BMI 22.12 kg/m²   Temp (24hrs), Av.6 °F (36.4 °C), Min:97.3 °F (36.3 °C), Max:98.2 °F (36.8 °C)          General appearance - alert, well appearing, and in no acute distress  Mental status - oriented to person, place, and time with normal affect  Head - normocephalic and atraumatic  Eyes - pupils equal and reactive, extraocular eye movements intact, conjunctiva clear  Ears - hearing appears to be intact  Nose - no drainage noted  Mouth - mucous membranes moist  Neck - supple, no carotid bruits, thyroid not palpable  Chest - clear to auscultation, normal effort  Heart -tachycardia, irregular rhythm, no murmur  Abdomen - soft, nontender, nondistended, bowel sounds present all four quadrants, no masses, hepatomegaly or splenomegaly  Neurological - normal speech, no focal findings or movement disorder noted, cranial nerves II through XII grossly intact  Extremities - peripheral pulses palpable, no pedal edema or calf pain with palpation  Skin - no gross lesions, rashes, or induration noted.   Left face wound        Data:     Labs:    Hematology:  Recent Labs     22  0720 05/15/22  0549   WBC 6.0 8.5   RBC 5.36* 3.28*   HGB 14.1 8.6*   HCT 47.9* 29.4*   MCV 89.4 89.6   MCH 26.3 26.2   MCHC 29.4 29.3   RDW 18.0* 17.2*    340   MPV 8.2 8.9   INR 0.9 1.0     Chemistry:  Recent Labs     22  0720 05/15/22  0549    142   K 3.3* 4.0   CL 97* 99   CO2 32* 31   GLUCOSE 144* 127*   BUN 9 10   CREATININE 0.44* 0.52   MG 1.8  --    ANIONGAP 14 12   LABGLOM >60 >60   GFRAA >60 >60   CALCIUM 9.4 9.5   PROBNP 1,642*  --    TROPHS 31*  --      Recent Labs     05/15/22  0730 05/15/22  1120 05/15/22  1650   POCGLU 100 111* 120*       Lab Results   Component Value Date    INR 1.0 05/15/2022    INR 0.9 05/14/2022    INR 1.0 03/30/2022    PROTIME 12.9 05/15/2022    PROTIME 12.6 05/14/2022    PROTIME 13.4 03/30/2022       Lab Results   Component Value Date/Time    SPECIAL LT AC 10ML 03/29/2022 07:28 PM     Lab Results   Component Value Date/Time    CULTURE NO GROWTH 5 DAYS 03/29/2022 07:28 PM       Lab Results   Component Value Date    POCPH 7.36 04/12/2017    PHART 7.42 08/26/2012    PH 7.22 04/11/2017    POCPCO2 45 04/12/2017    TWX2KBG 41 08/26/2012    PCO2 54 04/11/2017    POCPO2 93 04/12/2017    PO2ART 59 08/26/2012    PO2 89 04/11/2017    POCHCO3 25.3 04/12/2017    SLY9WIM 26.1 08/26/2012    HCO3 22.2 04/11/2017    NBEA NOT REPORTED 04/12/2017    PBEA 0 04/12/2017    NPU5UWM 27 04/12/2017    GIGD2UQN 97 04/12/2017    D3NVYPVP 92.1 08/26/2012    O2SAT 95 04/11/2017    FIO2 UNKNOWN 10/31/2017       Radiology:    XR CHEST PORTABLE    Result Date: 5/14/2022  1. Improved right pleural effusion. 2.  Patchy opacity in the medial right upper lobe, possibly post treatment change versus residual tumor. Correlation with any recent CT imaging is recommended. All radiological studies reviewed                Code Status:  Full Code    Electronically signed by Yesenia Foreman MD on 5/15/2022 at 7:11 PM     Copy sent to Dr. Andrei Meza MD    This note was created with the assistance of a speech-recognition program.  Although the intention is to generate a document that actually reflects the content of the visit, no guarantees can be provided that every mistake has been identified and corrected by editing. Note was updated later by me after  physical examination and  completion of the assessment.

## 2022-05-16 LAB
ABSOLUTE EOS #: 0.1 K/UL (ref 0–0.44)
ABSOLUTE IMMATURE GRANULOCYTE: 0.42 K/UL (ref 0–0.3)
ABSOLUTE LYMPH #: 1.78 K/UL (ref 1.1–3.7)
ABSOLUTE MONO #: 0.56 K/UL (ref 0.1–1.2)
ANION GAP SERPL CALCULATED.3IONS-SCNC: 11 MMOL/L (ref 9–17)
ANTI-XA UNFRAC HEPARIN: 0.23 IU/L (ref 0.3–0.7)
ANTI-XA UNFRAC HEPARIN: 0.3 IU/L (ref 0.3–0.7)
ANTI-XA UNFRAC HEPARIN: 0.34 IU/L (ref 0.3–0.7)
ANTI-XA UNFRAC HEPARIN: 0.48 IU/L (ref 0.3–0.7)
BASOPHILS # BLD: 0 % (ref 0–2)
BASOPHILS ABSOLUTE: 0.04 K/UL (ref 0–0.2)
BUN BLDV-MCNC: 12 MG/DL (ref 8–23)
BUN/CREAT BLD: 19 (ref 9–20)
CALCIUM SERPL-MCNC: 8.9 MG/DL (ref 8.6–10.4)
CHLORIDE BLD-SCNC: 97 MMOL/L (ref 98–107)
CO2: 33 MMOL/L (ref 20–31)
CREAT SERPL-MCNC: 0.63 MG/DL (ref 0.5–0.9)
EOSINOPHILS RELATIVE PERCENT: 1 % (ref 1–4)
GFR AFRICAN AMERICAN: >60 ML/MIN
GFR NON-AFRICAN AMERICAN: >60 ML/MIN
GFR SERPL CREATININE-BSD FRML MDRD: ABNORMAL ML/MIN/{1.73_M2}
GLUCOSE BLD-MCNC: 116 MG/DL (ref 65–105)
GLUCOSE BLD-MCNC: 125 MG/DL (ref 65–105)
GLUCOSE BLD-MCNC: 174 MG/DL (ref 65–105)
GLUCOSE BLD-MCNC: 48 MG/DL (ref 70–99)
GLUCOSE BLD-MCNC: 50 MG/DL (ref 65–105)
GLUCOSE BLD-MCNC: 98 MG/DL (ref 65–105)
HCT VFR BLD CALC: 29.2 % (ref 36.3–47.1)
HEMOGLOBIN: 8.3 G/DL (ref 11.9–15.1)
IMMATURE GRANULOCYTES: 4 %
LYMPHOCYTES # BLD: 17 % (ref 24–43)
MAGNESIUM: 1.5 MG/DL (ref 1.6–2.6)
MCH RBC QN AUTO: 26.6 PG (ref 25.2–33.5)
MCHC RBC AUTO-ENTMCNC: 28.4 G/DL (ref 28.4–34.8)
MCV RBC AUTO: 93.6 FL (ref 82.6–102.9)
MONOCYTES # BLD: 5 % (ref 3–12)
NRBC AUTOMATED: 0.2 PER 100 WBC
PDW BLD-RTO: 17.9 % (ref 11.8–14.4)
PLATELET # BLD: 303 K/UL (ref 138–453)
PMV BLD AUTO: 8.4 FL (ref 8.1–13.5)
POTASSIUM SERPL-SCNC: 3.3 MMOL/L (ref 3.7–5.3)
PROCALCITONIN: 0.09 NG/ML
RBC # BLD: 3.12 M/UL (ref 3.95–5.11)
RBC # BLD: ABNORMAL 10*6/UL
SEG NEUTROPHILS: 73 % (ref 36–65)
SEGMENTED NEUTROPHILS ABSOLUTE COUNT: 7.66 K/UL (ref 1.5–8.1)
SODIUM BLD-SCNC: 141 MMOL/L (ref 135–144)
WBC # BLD: 10.6 K/UL (ref 3.5–11.3)

## 2022-05-16 PROCEDURE — 2060000000 HC ICU INTERMEDIATE R&B

## 2022-05-16 PROCEDURE — 2700000000 HC OXYGEN THERAPY PER DAY

## 2022-05-16 PROCEDURE — 94640 AIRWAY INHALATION TREATMENT: CPT

## 2022-05-16 PROCEDURE — 97530 THERAPEUTIC ACTIVITIES: CPT

## 2022-05-16 PROCEDURE — 6370000000 HC RX 637 (ALT 250 FOR IP): Performed by: FAMILY MEDICINE

## 2022-05-16 PROCEDURE — 97166 OT EVAL MOD COMPLEX 45 MIN: CPT

## 2022-05-16 PROCEDURE — 94761 N-INVAS EAR/PLS OXIMETRY MLT: CPT

## 2022-05-16 PROCEDURE — 83735 ASSAY OF MAGNESIUM: CPT

## 2022-05-16 PROCEDURE — 85520 HEPARIN ASSAY: CPT

## 2022-05-16 PROCEDURE — 6370000000 HC RX 637 (ALT 250 FOR IP): Performed by: INTERNAL MEDICINE

## 2022-05-16 PROCEDURE — 84145 PROCALCITONIN (PCT): CPT

## 2022-05-16 PROCEDURE — 36415 COLL VENOUS BLD VENIPUNCTURE: CPT

## 2022-05-16 PROCEDURE — 2500000003 HC RX 250 WO HCPCS: Performed by: FAMILY MEDICINE

## 2022-05-16 PROCEDURE — 6360000002 HC RX W HCPCS: Performed by: FAMILY MEDICINE

## 2022-05-16 PROCEDURE — 82947 ASSAY GLUCOSE BLOOD QUANT: CPT

## 2022-05-16 PROCEDURE — 80048 BASIC METABOLIC PNL TOTAL CA: CPT

## 2022-05-16 PROCEDURE — 6360000002 HC RX W HCPCS: Performed by: INTERNAL MEDICINE

## 2022-05-16 PROCEDURE — 85025 COMPLETE CBC W/AUTO DIFF WBC: CPT

## 2022-05-16 PROCEDURE — 97110 THERAPEUTIC EXERCISES: CPT

## 2022-05-16 PROCEDURE — 97535 SELF CARE MNGMENT TRAINING: CPT

## 2022-05-16 RX ORDER — LEVOFLOXACIN 750 MG/1
750 TABLET ORAL EVERY 24 HOURS
Status: COMPLETED | OUTPATIENT
Start: 2022-05-16 | End: 2022-05-19

## 2022-05-16 RX ORDER — MIDODRINE HYDROCHLORIDE 10 MG/1
10 TABLET ORAL 3 TIMES DAILY PRN
Status: DISCONTINUED | OUTPATIENT
Start: 2022-05-16 | End: 2022-05-17

## 2022-05-16 RX ADMIN — MAGNESIUM SULFATE HEPTAHYDRATE 1000 MG: 1 INJECTION, SOLUTION INTRAVENOUS at 20:10

## 2022-05-16 RX ADMIN — ROSUVASTATIN CALCIUM 5 MG: 10 TABLET, FILM COATED ORAL at 09:45

## 2022-05-16 RX ADMIN — OXYCODONE AND ACETAMINOPHEN 1 TABLET: 325; 10 TABLET ORAL at 12:48

## 2022-05-16 RX ADMIN — ARIPIPRAZOLE 5 MG: 5 TABLET ORAL at 21:12

## 2022-05-16 RX ADMIN — TAMSULOSIN HYDROCHLORIDE 0.4 MG: 0.4 CAPSULE ORAL at 09:46

## 2022-05-16 RX ADMIN — MIDODRINE HYDROCHLORIDE 10 MG: 10 TABLET ORAL at 20:04

## 2022-05-16 RX ADMIN — TRAZODONE HYDROCHLORIDE 300 MG: 100 TABLET ORAL at 21:11

## 2022-05-16 RX ADMIN — GLIPIZIDE 5 MG: 10 TABLET ORAL at 06:13

## 2022-05-16 RX ADMIN — MIDODRINE HYDROCHLORIDE 10 MG: 10 TABLET ORAL at 09:59

## 2022-05-16 RX ADMIN — PANTOPRAZOLE SODIUM 40 MG: 40 TABLET, DELAYED RELEASE ORAL at 06:13

## 2022-05-16 RX ADMIN — POTASSIUM BICARBONATE 40 MEQ: 782 TABLET, EFFERVESCENT ORAL at 09:45

## 2022-05-16 RX ADMIN — OXYCODONE AND ACETAMINOPHEN 1 TABLET: 325; 10 TABLET ORAL at 20:04

## 2022-05-16 RX ADMIN — HEPARIN SODIUM AND DEXTROSE 21 UNITS/KG/HR: 10000; 5 INJECTION INTRAVENOUS at 12:48

## 2022-05-16 RX ADMIN — SENNOSIDES AND DOCUSATE SODIUM 1 TABLET: 50; 8.6 TABLET ORAL at 09:46

## 2022-05-16 RX ADMIN — GABAPENTIN 300 MG: 300 CAPSULE ORAL at 21:11

## 2022-05-16 RX ADMIN — METFORMIN HYDROCHLORIDE 500 MG: 500 TABLET ORAL at 17:28

## 2022-05-16 RX ADMIN — LEVOFLOXACIN 750 MG: 750 TABLET, FILM COATED ORAL at 17:28

## 2022-05-16 RX ADMIN — FUROSEMIDE 40 MG: 40 TABLET ORAL at 09:45

## 2022-05-16 RX ADMIN — BUDESONIDE AND FORMOTEROL FUMARATE DIHYDRATE 2 PUFF: 160; 4.5 AEROSOL RESPIRATORY (INHALATION) at 07:35

## 2022-05-16 RX ADMIN — MAGNESIUM SULFATE HEPTAHYDRATE 1000 MG: 1 INJECTION, SOLUTION INTRAVENOUS at 21:27

## 2022-05-16 RX ADMIN — METOPROLOL TARTRATE 25 MG: 25 TABLET, FILM COATED ORAL at 21:12

## 2022-05-16 RX ADMIN — HEPARIN SODIUM 2000 UNITS: 1000 INJECTION INTRAVENOUS; SUBCUTANEOUS at 02:16

## 2022-05-16 RX ADMIN — VENLAFAXINE HYDROCHLORIDE 150 MG: 75 CAPSULE, EXTENDED RELEASE ORAL at 09:47

## 2022-05-16 RX ADMIN — METOPROLOL TARTRATE 25 MG: 25 TABLET, FILM COATED ORAL at 09:46

## 2022-05-16 RX ADMIN — Medication 15 MG: at 21:12

## 2022-05-16 RX ADMIN — SENNOSIDES AND DOCUSATE SODIUM 1 TABLET: 50; 8.6 TABLET ORAL at 21:11

## 2022-05-16 RX ADMIN — GABAPENTIN 300 MG: 300 CAPSULE ORAL at 09:46

## 2022-05-16 RX ADMIN — BUDESONIDE AND FORMOTEROL FUMARATE DIHYDRATE 2 PUFF: 160; 4.5 AEROSOL RESPIRATORY (INHALATION) at 19:32

## 2022-05-16 RX ADMIN — ASPIRIN 81 MG: 81 TABLET, COATED ORAL at 09:46

## 2022-05-16 RX ADMIN — VENLAFAXINE HYDROCHLORIDE 150 MG: 75 CAPSULE, EXTENDED RELEASE ORAL at 21:11

## 2022-05-16 RX ADMIN — ANTI-FUNGAL POWDER MICONAZOLE NITRATE TALC FREE: 1.42 POWDER TOPICAL at 21:12

## 2022-05-16 ASSESSMENT — PAIN - FUNCTIONAL ASSESSMENT: PAIN_FUNCTIONAL_ASSESSMENT: PREVENTS OR INTERFERES SOME ACTIVE ACTIVITIES AND ADLS

## 2022-05-16 ASSESSMENT — PAIN DESCRIPTION - DESCRIPTORS: DESCRIPTORS: ACHING;DISCOMFORT

## 2022-05-16 ASSESSMENT — PAIN SCALES - GENERAL
PAINLEVEL_OUTOF10: 10
PAINLEVEL_OUTOF10: 10

## 2022-05-16 ASSESSMENT — PAIN DESCRIPTION - PAIN TYPE: TYPE: CHRONIC PAIN

## 2022-05-16 ASSESSMENT — PAIN DESCRIPTION - ORIENTATION: ORIENTATION: RIGHT

## 2022-05-16 ASSESSMENT — PAIN DESCRIPTION - ONSET: ONSET: ON-GOING

## 2022-05-16 ASSESSMENT — PAIN DESCRIPTION - LOCATION: LOCATION: BACK;OTHER (COMMENT)

## 2022-05-16 NOTE — PROGRESS NOTES
Occupational Therapy  Facility/Department: [facility] CVICU  Occupational Therapy Initial Assessment    Name: Floyd Coyle  : 1950  MRN: [id number]  Date of Service: 2022    RN reports patient is medically stable for therapy treatment this date. Chart reviewed prior to treatment and patient is agreeable for therapy. All lines intact and patient positioned comfortably at end of treatment. All patient needs addressed prior to ending therapy session. Discharge Recommendations:  Patient would benefit from continued therapy after discharge   Pt currently functioning below baseline. Would suggest additional therapy at time of discharge to maximize long term outcomes and prevent re-admission. Please refer to AM-PAC score for current level of function. OT Equipment Recommendations  Equipment Needed: Yes  Mobility Devices: ADL Assistive Devices  ADL Assistive Devices: Long-handled Shoe Horn;Long-handled Sponge;Reacher;Emergency Alert System;Sock-Aid Hard       Patient Diagnosis(es): The primary encounter diagnosis was COPD exacerbation ([facility] [address] A diagnosis of Atrial fibrillation with rapid ventricular response (HCC) was also pertinent to this visit. Past Medical History:  has a past medical history of AAA (abdominal aortic aneurysm) ([facility] [address] Acid reflux, Anxiety and depression, Aortic stenosis, Arthritis, Blister of ankle, right, CAD (coronary artery disease), Cancer ([address] COPD (chronic obstructive pulmonary disease) ([facility] [address] Diabetes mellitus ([facility] [address] Falls, Heart block, Hypokalemia, MDRO (multiple drug resistant organisms) resistance, MRSA (methicillin resistant staph aureus) culture positive, On home oxygen therapy, On home oxygen therapy, Overactive bladder, Pneumonia, PONV (postoperative nausea and vomiting), and Vitamin D deficiency. Past Surgical History:  has a past surgical history that includes back surgery; eye surgery; Cholecystectomy; Appendectomy; Hysterectomy;  Tonsillectomy; lumbar laminectomy; Endoscopy, colon, diagnostic (12/08/2016); aortic valve repair (N/A, 4/11/2017); bronchoscopy (N/A, 8/31/2020); IR INSERT PICC VAD W SQ PORT >5 YEARS (9/4/2020); and IR PORT PLACEMENT > 5 YEARS (9/29/2020). PER H&P: The patient is a 71-year-old female with history of COPD who presented to the emergency room via EMS from home secondary to shortness of breath cough and congestion. Upon EMS arrival patient had noted wheezing EKG obtained patient was A. fib with RVR with a rate of 151 no previous history of A. fib. Hospital patient was given DuoNeb breathing treatments as well as Solu-Medrol. Patient is on home oxygen she states that all times now. States she was recently discharged from the hospital.  She denied a previous history of A. fib but states she has a history of and is on a diuretic. Assessment   Performance deficits / Impairments: Decreased cognition;Decreased ADL status; Decreased safe awareness;Decreased functional mobility ; Decreased strength;Decreased endurance;Decreased fine motor control;Decreased high-level IADLs;Decreased balance;Decreased posture  Assessment: Skilled OT services are indicated to increase endurace, safety and I during functional and ADL tasks to return home at prior level of function.   Prognosis: Fair  Decision Making: Medium Complexity  REQUIRES OT FOLLOW-UP: Yes  Activity Tolerance  Activity Tolerance: Patient Tolerated treatment well;Treatment limited secondary to agitation  Activity Tolerance Comments: fair, Pt has limited motivation and requires MAX education and encouragement to participate in OT eval.        Plan   Plan  Times per Week: 4-5x/wk 1x/day as emely  Current Treatment Recommendations: Strengthening,Balance training,Functional mobility training,Endurance training,Safety education & training,Equipment evaluation, education, & procurement,Patient/Caregiver education & training,Self-Care / ADL,Home management training Restrictions  Restrictions/Precautions  Restrictions/Precautions: Fall Risk,Contact Precautions  Position Activity Restriction  Other position/activity restrictions: up with assist, external cath, L chest port, telemetry, elevate heels, pt easily agitiated (does not like to be asked questions or given \"orders\"    Subjective   General  Chart Reviewed: Yes  Patient assessed for rehabilitation services?: Yes  Family / Caregiver Present: No  Subjective  Subjective: \"I wish everyone would leave me alone\"  General Comment  Comments: Pt resting in bed, agreeable to OT eval with education and encouragement     Social/Functional History  Social/Functional History  Lives With: Alone  Type of Home: House  Home Layout: Two level,Able to Live on Main level with bedroom/bathroom  Home Access: Ramped entrance  Bathroom Shower/Tub: Tub/Shower unit  Bathroom Toilet: Standard  Bathroom Equipment: Tub transfer bench  Home Equipment: [place], rolling,Oxygen  Has the patient had two or more falls in the past year or any fall with injury in the past year?: Yes  Receives Help From: Home health (HHA 3x/week, assist for showers)  ADL Assistance: Needs assistance (Requires assistance from [facility])  Homemaking Assistance: Needs assistance  Ambulation Assistance: Independent (Pt reporting feeling fearful of falling and states she needs more w/c training)  Transfer Assistance: Independent  Active : No  Patient's  Info: granddaughter  Occupation: Retired  Additional Comments: Information obtained from charts d/t pt getting upset when asked questions. Pt has had multiple readmissions. Pt was recently at SNF reports she was given w/c prior to discharge and does not feel like she was given enough training and did not feel safe walking. Objective   Hearing: Within functional limits       Observation/Palpation  Observation: easily agitated  Safety Devices  Type of Devices: All fall risk precautions in place; Patient at risk for falls;Call light within reach; Bed alarm in place; Left in bed;Heels elevated for pressure relief;Gait belt           ADL  Feeding: Setup  Grooming: Setup;Stand by assistance  UE Bathing: Setup;Minimal assistance  LE Bathing: Setup; Moderate assistance  UE Dressing: Setup;Minimal assistance (to tie/adjust hosp gown for modesty sitting on EOB)  LE Dressing: Setup;Maximum assistance (for donning B socks while seated EOB)  Toileting: Maximum assistance (external cath)  Additional Comments: Pt appears self limiting at times needs to be encouraged to complete tasks that she can do herself, limited currently by generalized weakness and fatigue. Tone RUE  RUE Tone: Normotonic  Tone LUE  LUE Tone: Normotonic  Coordination  Movements Are Fluid And Coordinated: No  Coordination and Movement Description: Fine motor impairments;Decreased speed;Right UE;Left UE      Balance  Sitting Balance: Stand by assistance  Standing Balance: Moderate assistance (with RW)  Standing Balance  Time: standing ~ 2-3 min  Activity: functional mobility  Functional Mobility  Functional - Mobility Device: Rolling Walker  Activity:  (4 steps towards [facility])  Assist Level: Moderate assistance  Functional Mobility Comments: Upon standing at EOB pt reporting feeling \"woozy\" and light headed, BP assessed 85/65 (76). Pt took steps at EOB only for proper postioning. Pt given Mod verbal cues for initiation, sequencing, pacing self, pursed lip breathing, upright posture, scanning environment and awareness/darryl with lines all to increase safety. Bed mobility  Rolling to Left: Stand by assistance  Supine to Sit: Minimal assistance  Sit to Supine: Stand by assistance  Scooting: Minimal assistance  Bed Mobility Comments: Pt required Min verbal cues for use of bedrails, pacing self, line mgmt and proper bed mobility tech all to increase ease/safety. Pt adamantly refusing to sit in chair at end of session       Transfers  Sit to stand:  Moderate assistance (with RW)  Stand to sit: Moderate assistance  Transfer Comments: Pt required Mod verbal cues for RW safety, upright posture, controlled stand to sit, reaching back pt surface prior to sitting, pacing self, line mgmt and slowing down all to increase safety. Cognition  Overall Cognitive Status: Exceptions  Arousal/Alertness: Appropriate responses to stimuli  Following Commands: Follows multistep commands with repitition; Follows multistep commands with increased time  Attention Span: Appears intact  Memory: Appears intact  Safety Judgement: Decreased awareness of need for assistance;Decreased awareness of need for safety  Problem Solving: Decreased awareness of errors;Assistance required to identify errors made;Assistance required to generate solutions;Assistance required to implement solutions;Assistance required to correct errors made  Insights: Decreased awareness of deficits  Initiation: Requires cues for some  Sequencing: Requires cues for some                     LUE AROM (degrees)  LUE AROM : WFL  RUE AROM (degrees)  RUE AROM : WFL  LUE Strength  Gross LUE Strength: WFL  LUE Strength Comment: ~ 4-/5  RUE Strength  Gross RUE Strength: WFL  RUE Strength Comment: ~ 4-/5                                 AM-PAC Score: 16              Goals  Short Term Goals  Time Frame for Short term goals: By discharge, pt to demo  Short Term Goal 1: ADL transfers and functional mobility to CGA with use of AD as needed. Short Term Goal 2: toileting to CGA with use of AD/grab bars as needed. Short Term Goal 3: increased B UE strength by 1/2 grade to assist with functional tasks/I with B UE HEP with use of handouts as needed. Short Term Goal 4: bed mobility to SBA with use of bedrails as needed. Short Term Goal 5: UB ADLs to Set up and LB ADLs to SBA with use of AD/AE as needed.   Long Term Goals  Long Term Goal 1: Pt to be I with fall prevention education, EC/WS tech, recommendations for discharge/AE with use of handouts as needed. Long Term Goal 2: Pt to demo increased standing emely > 10 min with Min A using AD as needed to reduce risk of falls during functional tasks. Patient Goals   Patient goals : To go home!        Therapy Time   Individual Concurrent Group Co-treatment   Time In 1102         Time Out 1140         Minutes 38          tx time: 29 min        Tonia Hull OT

## 2022-05-16 NOTE — PROGRESS NOTES
Section of Cardiology  Progress Note      Date:  5/16/2022  Patient: Malissa Pope  Admission:  5/14/2022  7:18 AM  Admit DX: Atrial fibrillation with rapid ventricular response ([facility] [address] [I48.91]  Age:  71 y. o., 1950     LOS: 2 days     Reason for evaluation:   Atrial fibrillation    SUBJECTIVE:     The patient was seen and examined. Notes and labs reviewed. There were not complications over night. Patient's cardiac review of systems: positive for fatigue. The patient is generally feeling unchanged. OBJECTIVE:      EXAM:   Vitals:    VITALS:  BP 85/65   Pulse 120   Temp 97.8 °F (36.6 °C) (Temporal)   Resp 21   Ht 5' 8\" (1.727 m)   Wt 145 lb 8 oz (66 kg)   SpO2 99%   BMI 22.12 kg/m²   24HR INTAKE/OUTPUT:    Intake/Output Summary (Last 24 hours) at 5/16/2022 1635  Last data filed at 5/16/2022 1118  Gross per 24 hour   Intake 240 ml   Output 300 ml   Net -60 ml       CONSTITUTIONAL:  awake, alert, cooperative, no apparent distress, and appears stated age. HEENT: Normal jugular venous pulsations, no carotid bruits. Head is atraumatic, normocephalic. Eyes and oral mucosa are normal.  LUNGS: Good respiratory effort On auscultation: clear to auscultation bilaterally  CARDIOVASCULAR:  Normal apical impulse, irregular rate and rhythm, normal S1 and S2, no S3 .  dorsalis pedis and bilateralpresent 1+  ABDOMEN: Soft, nontender, nondistended. Bowel sounds present. No masses or tenderness. No bruit. SKIN: Warm and dry.   EXTREMITIES[de-identified] Trace lower extremity edema     current Inpatient Medications:   levoFLOXacin  750 mg Oral Q24H    metoprolol tartrate  25 mg Oral BID    aspirin EC  81 mg Oral Daily    budesonide-formoterol  2 puff Inhalation BID    pantoprazole  40 mg Oral QAM AC    venlafaxine  150 mg Oral BID    rosuvastatin  5 mg Oral Daily    ARIPiprazole  5 mg Oral Nightly    metFORMIN  500 mg Oral BID WC    traZODone  300 mg Oral Nightly    melatonin  15 mg Oral Nightly    tamsulosin 0.4 mg Oral Daily    gabapentin  300 mg Oral BID    furosemide  40 mg Oral Daily    sennosides-docusate sodium  1 tablet Oral BID    sodium chloride flush  5-40 mL IntraVENous 2 times per day    glipiZIDE  5 mg Oral QAM AC       IV Infusions (if any):   dilTIAZem (CARDIZEM) 125 mg in dextrose 5% 125 mL infusion Stopped (05/14/22 2345)    sodium chloride Stopped (05/14/22 1625)    sodium chloride      dextrose      heparin (PORCINE) Infusion 21 Units/kg/hr (05/16/22 1248)       Diagnostics:   Telemetry: Atrial fibrillation with controlled ventricular response      Labs:   CBC:  Recent Labs     05/15/22  0549 05/16/22  0803   WBC 8.5 10.6   HGB 8.6* 8.3*   HCT 29.4* 29.2*    303     Magnesium:  Recent Labs     05/14/22  0720 05/16/22  0803   MG 1.8 1.5*     BMP:  Recent Labs     05/15/22  0549 05/16/22  0803    141   K 4.0 3.3*   CALCIUM 9.5 8.9   CO2 31 33*   BUN 10 12   CREATININE 0.52 0.63   LABGLOM >60 >60   GLUCOSE 127* 48*     BNP:  Recent Labs     05/14/22  0720   PROBNP 1,642*     PT/INR:  Recent Labs     05/14/22  0720 05/15/22  0549   PROTIME 12.6 12.9   INR 0.9 1.0     APTT:  Recent Labs     05/14/22  0720   APTT 31.5     CARDIAC ENZYMES:  Recent Labs     05/14/22  0720   TROPHS 31*     FASTING LIPID PANEL:No results found for: HDL, LDLDIRECT, LDLCALC, TRIG  LIVER PROFILE:No results for input(s): AST, ALT, LABALBU, ALKPHOS, BILITOT, BILIDIR, IBILI, PROT, GLOB, ALBUMIN in the last 72 hours. ASSESSMENT:  1.  New onset atrial fibrillation with rapid ventricular response, converted to sinus rhythm.   2.  Chronic history of sinus tachycardia  3.  History of moderate coronary artery disease of the circumflex  4.  Moderate aortic stenosis  5.  Chronic right bundle branch block  6.  COPD  7.  History of right lung cancer  8.  History of non-insulin-dependent diabetes mellitus       Patient Active Problem List   Diagnosis    Valvular heart disease    Type 2 diabetes mellitus without complication, without long-term current use of insulin (HCC)    Open wound of right ankle    COPD (chronic obstructive pulmonary disease) (HCC)    Syncope and collapse    Chronic ulcer of right heel (HCC)    Multifocal pneumonia    Aortic valve stenosis    Esophageal dilatation    Aspiration pneumonia of both lower lobes due to gastric secretions (HCC)    Falls frequently    Chronic respiratory failure (HCC)    Contusion of right knee    Closed fracture of right distal radius    Subdural hemorrhage (HCC)    Subarachnoid hemorrhage (HCC)    Traumatic hemorrhage of cerebrum (HCC)    Chronic bilateral low back pain    Cellulitis of both feet    Acute on chronic congestive heart failure (HCC)    Bilateral leg edema    Cellulitis    Lung mass    Malignant neoplasm of upper lobe of right lung (HCC)    Urinary tract infectious disease    Closed fracture of one rib of right side    Degenerative disc disease, lumbar    Moderate malnutrition (HCC)    Rhabdomyolysis    Unable to ambulate    Mild malnutrition (HCC)    Atrial fibrillation with rapid ventricular response ([address]       PLAN:  1. Continue metoprolol for ventricular rate control. 2.  Continue IV heparin for thromboprophylaxis. 3.  Patient was felt not to be a candidate for long-term oral anticoagulation in view of high fall risk. Discussed with patient and nursing.     Keiko Peña MD

## 2022-05-16 NOTE — FLOWSHEET NOTE
RN asked patient to get up in chair for breakfast this am, she states she's not getting to chair. When asked how about after breakfast to get in chair for a little white patient replied\"I'm not getting to the chair, it's uncomfortable and I was told I was very sick\". RN explained she can get out of bed with staff assit and she rolled her eye's, repositioned in bed, asked patient to try to reposition herself in bed when she can and she replied' I'm not able to move in this bed\". Continue to monitor.

## 2022-05-16 NOTE — PROGRESS NOTES
Progress note  [facility]    Adult Hospitalist      Name: Susan Cueva  MRN: [id number]     Abraham Nicole: [de-identified]  Room: 2031/2031-01    Admit Date: 5/14/2022  7:18 AM  PCP: Umberto Lunsford MD    Primary Problem  Principal Problem:    Atrial fibrillation with rapid ventricular response ([address]  Resolved Problems:    * No resolved hospital problems. *        Assesment:     · Atrial fibrillation with rapid ventricular response  · Acute exacerbation of COPD  · Chronic respiratory failure with hypoxia requiring O2 via nasal cannula  · Bronchogenic carcinoma  · Essential hypertension currently Hypotension  · Coronary artery disease, native vessel  · Diabetes mellitus type 2  · Mixed hyperlipidemia  · Anxiety disorder  · History of hypotension  · Osteoarthritis, multiple joints  · Mood disorder  · Gastroesophageal reflux disease without esophagitis        Plan:     · Admit to CVICU  · Monitor vitals closely  · Keep SPO2 above 90%  · I's and O's  · Oral metoprolol  · Midodrine  · Heparin infusion  · Consult cardiology  · Pain control  · Antiemetics as needed  · IV antibiotics  · IV Solu-Medrol  · DuoNeb  · RT eval  · Resume essential home medication  · Add parameters  · Incentive spirometry  · CBC, BMP  · Digoxin 0.25 mg p.o. once  · Discussed with RN  · Discussed with pharmacist  · CXR  · DVT and GI prophylaxis. Chief Complaint:     Chief Complaint   Patient presents with    Shortness of Breath    Tachycardia         History of Present Illness:      Patient seen and examined at bedside and reviewed  last 24 hrs events with nursing staff  No acute events overnight. Patient denies any acute complaints. Afebrile  Patient denies any chest pain, shortness of Breath, palpitation, headache, dizziness, cough, nausea, vomiting, abdominal pain, pain, changes in urination or bowel habit or rash.           Initial HPI  Susan Cueva is a 71 y.o.  female who presents with Shortness of Breath and Tachycardia    Patient admitted through the emergency room where she presented with worsening episodes of dyspnea, cough and chest congestion. Patient had called 911 and on arrival EMS noted A. fib on the EKG strip. Her heart rate was in the 150s at that time. Patient had also been complaining of weakness and dizziness. Patient was wheezing and was given a DuoNeb nebulizer treatment as well as Solu-Medrol. Patient had earlier been discharged from the hospital and had been at a skilled nursing facility. Patient says she had been feeling well however over the last week she noted dyspnea and dry cough. Patient denies any chest pain or orthopnea. Denies nausea, vomiting or abdominal pain. Denies diarrhea or constipation. Denies headache, photophobia or diplopia. Denies neck pain or back pain    Patient says since admission she has been much better. She was started on Cardizem infusion and heart rate presently is in the low 100s. She denies having any palpitations at this time. She says her breathing has been better. Patient was also started on heparin infusion    I have personally reviewed the past medical history, past surgical history, medications, social history, and family history, and summarized in the note. Review of Systems:     All 10 point system is reviewed and negative otherwise mentioned in HPI.       Past Medical History:     Past Medical History:   Diagnosis Date    AAA (abdominal aortic aneurysm) ([facility] [address]     Pt denies having a history of AAA    Acid reflux     Anxiety and depression     Aortic stenosis     Arthritis     Blister of ankle, right 10/13/2016    blister broke open & draining, is on antibiotics    CAD (coronary artery disease)     Cancer ([address]     lung cancer    COPD (chronic obstructive pulmonary disease) ([address]     Diabetes mellitus ([address]     Falls     Heart block     bifasicular    Hypokalemia     MDRO (multiple drug resistant organisms) resistance 10/17/2014 E. Coli urine    MRSA (methicillin resistant staph aureus) culture positive resolved 12/2016    2 negative nasal screens - 2016 (hx in urine 2014)    On home oxygen therapy     uses 2 liters at night    On home oxygen therapy     patient states 2 liters/nasal cannula continuous    Overactive bladder     patient incont. wears a brief    Pneumonia     PONV (postoperative nausea and vomiting)     Vitamin D deficiency         Past Surgical History:     Past Surgical History:   Procedure Laterality Date    AORTIC VALVE REPAIR N/A 4/11/2017    AORTIC VALVE REPAIR REPLACEMENT performed by Nazia Steen MD at [address] [place] [address] N/A 8/31/2020    BRONCHOSCOPY BIOPSY BRONCHUS performed by Peyman Arroyo MD at [facility]  12/08/2016    EYE SURGERY      HYSTERECTOMY      IR INS PICC VAD W SQ PORT GREATER THAN 5  9/4/2020    IR INS PICC VAD W SQ PORT GREATER THAN 5 9/4/2020 [facility]    IR PORT PLACEMENT EQUAL OR GREATER THAN 5 YEARS  9/29/2020    IR PORT PLACEMENT EQUAL OR GREATER THAN 5 YEARS 9/29/2020 MD [facility]    LUMBAR LAMINECTOMY      TONSILLECTOMY          Medications Prior to Admission:       Prior to Admission medications    Medication Sig Start Date End Date Taking? Authorizing Provider   oxyCODONE-acetaminophen (PERCOCET)  MG per tablet Take 1 tablet by mouth every 4 hours as needed for Pain. Yes Historical Provider, MD   gabapentin (NEURONTIN) 300 MG capsule Take 300 mg by mouth in the morning and at bedtime.    Yes Historical Provider, MD   sennosides-docusate sodium (SENOKOT-S) 8.6-50 MG tablet Take 1 tablet by mouth in the morning and at bedtime    Historical Provider, MD   ondansetron (ZOFRAN ODT) 4 MG disintegrating tablet Place 1 tablet under the tongue every 6 hours as needed for Nausea or Vomiting 4/27/22   Cesar Erazo MD   furosemide (LASIX) 40 MG tablet Take 1 tablet by mouth daily 4/7/22   Rod Johnson MD   midodrine (PROAMATINE) 10 MG tablet Take 1 tablet by mouth 3 times daily as needed (SBP <90) 4/6/22 4/27/22  Rod Johnson MD   clonazePAM (KLONOPIN) 1 MG tablet Take 1 tablet by mouth 3 times daily as needed for Anxiety for up to 3 doses. 4/4/22 6/25/23  Rod Johnson MD   metoprolol tartrate (LOPRESSOR) 50 MG tablet Take 1 tablet by mouth 2 times daily 3/25/22   Jenny Mckeon MD   albuterol sulfate  (90 Base) MCG/ACT inhaler Inhale 2 puffs into the lungs every 4 hours as needed for Wheezing 3/25/22   Jenny Mckeon MD   ARIPiprazole (ABILIFY) 5 MG tablet Take 5 mg by mouth at bedtime    Historical Provider, MD   metFORMIN (GLUCOPHAGE) 500 MG tablet Take 500 mg by mouth 2 times daily (with meals)    Historical Provider, MD   traZODone (DESYREL) 150 MG tablet Take 300 mg by mouth nightly    Historical Provider, MD   melatonin 5 MG TABS tablet Take 5 mg by mouth nightly     Historical Provider, MD   ondansetron (ZOFRAN) 4 MG tablet Take 1 tablet by mouth every 8 hours as needed for Nausea or Vomiting 1/26/22   Sasha Theodore MD   rosuvastatin (CRESTOR) 5 MG tablet Take 5 mg by mouth daily    Historical Provider, MD   glimepiride (AMARYL) 1 MG tablet Take 1 mg by mouth 2 times daily (with meals)     Historical Provider, MD   venlafaxine (EFFEXOR XR) 150 MG extended release capsule Take 150 mg by mouth 2 times daily    Historical Provider, MD   lansoprazole (PREVACID) 30 MG delayed release capsule Take 30 mg by mouth daily 11/10/16   Historical Provider, MD   aspirin EC 81 MG EC tablet Take 81 mg by mouth daily    Historical Provider, MD   mometasone-formoterol (DULERA) 200-5 MCG/ACT inhaler Inhale 2 puffs into the lungs every 12 hours as needed (wheezing/SOB)     Historical Provider, MD        Allergies:       Codeine and Dye [iodides]    Social History:     Tobacco:    reports that she quit smoking about 5 years ago. She has never used smokeless tobacco.  Alcohol:      reports no history of alcohol use. Drug Use:  reports no history of drug use. Family History:     Family History   Problem Relation Age of Onset   [place] Cancer Mother     Cancer Father          Physical Exam:     Vitals:  BP 85/65   Pulse 120   Temp 97.8 °F (36.6 °C) (Temporal)   Resp 21   Ht 5' 8\" (1.727 m)   Wt 145 lb 8 oz (66 kg)   SpO2 99%   BMI 22.12 kg/m²   Temp (24hrs), Av.5 °F (36.4 °C), Min:97.3 °F (36.3 °C), Max:97.8 °F (36.6 °C)          General appearance - alert, well appearing, and in no acute distress  Mental status - oriented to person, place, and time with normal affect  Head - normocephalic and atraumatic  Eyes - pupils equal and reactive, extraocular eye movements intact, conjunctiva clear  Ears - hearing appears to be intact  Nose - no drainage noted  Mouth - mucous membranes moist  Neck - supple, no carotid bruits, thyroid not palpable  Chest - clear to auscultation, normal effort  Heart -tachycardia, irregular rhythm, no murmur  Abdomen - soft, nontender, nondistended, bowel sounds present all four quadrants, no masses, hepatomegaly or splenomegaly  Neurological - normal speech, no focal findings or movement disorder noted, cranial nerves II through XII grossly intact  Extremities - peripheral pulses palpable, no pedal edema or calf pain with palpation  Skin - no gross lesions, rashes, or induration noted.   Left face wound        Data:     Labs:    Hematology:  Recent Labs     22  0720 05/15/22  0549 22  0803   WBC 6.0 8.5 10.6   RBC 5.36* 3.28* 3.12*   HGB 14.1 8.6* 8.3*   HCT 47.9* 29.4* 29.2*   MCV 89.4 89.6 93.6   MCH 26.3 26.2 26.6   MCHC 29.4 29.3 28.4   RDW 18.0* 17.2* 17.9*    340 303   MPV 8.2 8.9 8.4   INR 0.9 1.0  --      Chemistry:  Recent Labs     22  0720 05/15/22  0549 22  0803    142 141   K 3.3* 4.0 3.3*   CL 97* 99 97*   CO2 32* 31 33*   GLUCOSE 144* 127* 48*   BUN 9 10 12 CREATININE 0.44* 0.52 0.63   MG 1.8  --  1.5*   ANIONGAP 14 12 11   LABGLOM >60 >60 >60   GFRAA >60 >60 >60   CALCIUM 9.4 9.5 8.9   PROBNP 1,642*  --   --    TROPHS 31*  --   --      Recent Labs     05/15/22  1650 05/15/22  1929 05/16/22  0747 05/16/22  1017 05/16/22  1230 05/16/22  1606   POCGLU 120* 201* 50* 125* 98 174*       Lab Results   Component Value Date    INR 1.0 05/15/2022    INR 0.9 05/14/2022    INR 1.0 03/30/2022    PROTIME 12.9 05/15/2022    PROTIME 12.6 05/14/2022    PROTIME 13.4 03/30/2022       Lab Results   Component Value Date/Time    SPECIAL LT AC 10ML 03/29/2022 07:28 PM     Lab Results   Component Value Date/Time    CULTURE NO GROWTH 5 DAYS 03/29/2022 07:28 PM       Lab Results   Component Value Date    POCPH 7.36 04/12/2017    PHART 7.42 08/26/2012    PH 7.22 04/11/2017    POCPCO2 45 04/12/2017    AMF9XAG 41 08/26/2012    PCO2 54 04/11/2017    POCPO2 93 04/12/2017    PO2ART 59 08/26/2012    PO2 89 04/11/2017    POCHCO3 25.3 04/12/2017    BVM5SYH 26.1 08/26/2012    HCO3 22.2 04/11/2017    NBEA NOT REPORTED 04/12/2017    PBEA 0 04/12/2017    ZZX0PLT 27 04/12/2017    VJIC8YFZ 97 04/12/2017    U2ZAKZBR 92.1 08/26/2012    O2SAT 95 04/11/2017    FIO2 UNKNOWN 10/31/2017       Radiology:    XR CHEST PORTABLE    Result Date: 5/14/2022  1. Improved right pleural effusion. 2.  Patchy opacity in the medial right upper lobe, possibly post treatment change versus residual tumor. Correlation with any recent CT imaging is recommended. All radiological studies reviewed                Code Status:  Full Code    Electronically signed by Gabriela Law MD on 5/16/2022 at 7:14 PM     Copy sent to Dr. Dilcia Buchanan MD    This note was created with the assistance of a speech-recognition program.  Although the intention is to generate a document that actually reflects the content of the visit, no guarantees can be provided that every mistake has been identified and corrected by editing.      Note was updated later by me after  physical examination and  completion of the assessment.

## 2022-05-16 NOTE — PROGRESS NOTES
Physical Therapy  Facility/Department: [facility] CVICU  Daily Treatment Note  NAME: Quynh Truong  : 1950  MRN: [id number]    Date of Service: 2022    Discharge Recommendations:  Patient would benefit from continued therapy after discharge     Pt currently functioning below baseline. Would suggest additional therapy at time of discharge to maximize long term outcomes and prevent re-admission. Please refer to AM-PAC score for current level of function. Patient Diagnosis(es): The primary encounter diagnosis was COPD exacerbation ([address] A diagnosis of Atrial fibrillation with rapid ventricular response (HCC) was also pertinent to this visit. Assessment   Assessment: Patient limited today by Rt sided pain in back/side/chest but able to tolerate supine A/AROM with extended rest breaks. Activity Tolerance: Patient limited by fatigue;Patient limited by pain   AM-PAC Score:   Plan    Plan  Plan: 5-7 times per week  Current Treatment Recommendations: Strengthening;Balance training;Functional mobility training;Patient/Caregiver education & training;Home exercise program;Safety education & training; Wheelchair mobility training;Transfer training;Gait training     Restrictions  Restrictions/Precautions  Restrictions/Precautions: Fall Risk,Contact Precautions  Position Activity Restriction  Other position/activity restrictions: up with assist, external cath, L chest port, telemetry, elevate heels, pt easily agitiated (does not like to be asked questions or given \"orders\"     Subjective    Subjective  Subjective: Patient agreeable for bed exercise and bed mobility reporting Rt sided side/chest/back pain too high to get OOB and she already did OOB activity with OT this morning. Orientation  Overall Orientation Status: Within Functional Limits  Cognition  Overall Cognitive Status: Exceptions  Arousal/Alertness: Appropriate responses to stimuli  Following Commands:  Follows multistep commands with repitition; Follows multistep commands with increased time  Attention Span: Appears intact  Memory: Appears intact  Safety Judgement: Decreased awareness of need for assistance;Decreased awareness of need for safety  Problem Solving: Decreased awareness of errors;Assistance required to identify errors made;Assistance required to generate solutions;Assistance required to implement solutions;Assistance required to correct errors made  Insights: Decreased awareness of deficits  Initiation: Requires cues for some  Sequencing: Requires cues for some     Objective   Vitals     Bed Mobility Training  Bed Mobility Training: Yes  Overall Level of Assistance: Contact-guard assistance  Interventions: Tactile cues; Verbal cues  Rolling: Contact-guard assistance     PT Exercises  A/AROM Exercises: Supine ousmane LE AROM 2sets x 10 reps with several rest breaks. Safety Devices  Type of Devices: All fall risk precautions in place; Patient at risk for falls;Call light within reach; Bed alarm in place; Left in bed;Heels elevated for pressure relief;Gait belt  Restraints  Restraints Initially in Place: No       Goals  Short Term Goals  Time Frame for Short term goals: 15  Short term goal 1: bed mob ind  Short term goal 2: trnasfers ind  Short term goal 3: amb x 50 ft w. r.walker sBA  Short term goal 4: w/c x 200 ft w/ turns and manuvering ind  Patient Goals   Patient goals : Pt goal is for a safe d/c and she is hopeful that she will be strong enough to go home    Education  Patient Education  Education Given To: Patient  Education Provided: Role of Therapy;Home Exercise Program;Plan of Care  Education Method: Verbal  Education Outcome: Continued education needed    Therapy Time   Individual Concurrent Group Co-treatment   Time In 4392         Time Out 1332         Minutes [address] [place], [place]

## 2022-05-16 NOTE — FLOWSHEET NOTE
Writer attempted to visit Patient in the inpatient setting at [address]. Patient appeared to be sleeping. Writer offered a silent prayer for Patient. Writer will attempt to visit Patient at another time.        05/16/22 1711   Encounter Summary   Encounter Overview/Reason  Spiritual/Emotional Needs   Service Provided For: Patient   Referral/Consult From: [address] [facility] Family members   Last Encounter  03/29/22   Complexity of Encounter Low   Begin Time 1630   End Time  1632   Total Time Calculated 2 min   Encounter    Type Follow up   Assessment/Intervention/Outcome   Assessment Unable to assess   Intervention Sustaining Presence/Ministry of presence;Prayer (assurance of)/[place]   Outcome Did not respond   Plan and Referrals   Plan/Referrals Continue Support (comment)     Electronically signed by Marcelo Quintanilla, Oncology Outpatient [facility], [address] [facility] Radiation Oncology  5/16/2022  5:13 PM

## 2022-05-16 NOTE — PLAN OF CARE
Problem: Chronic Conditions and Co-morbidities  Goal: Patient's chronic conditions and co-morbidity symptoms are monitored and maintained or improved  Outcome: Progressing     Problem: Discharge Planning  Goal: Discharge to home or other facility with appropriate resources  Outcome: Progressing     Problem: Skin/Tissue Integrity  Goal: Absence of new skin breakdown  Description: 1. Monitor for areas of redness and/or skin breakdown  2. Assess vascular access sites hourly  3. Every 4-6 hours minimum:  Change oxygen saturation probe site  4. Every 4-6 hours:  If on nasal continuous positive airway pressure, respiratory therapy assess nares and determine need for appliance change or resting period.   Outcome: Progressing     Problem: Safety - Adult  Goal: Free from fall injury  Outcome: Progressing     Problem: ABCDS Injury Assessment  Goal: Absence of physical injury  Outcome: Progressing     Problem: Pain  Goal: Verbalizes/displays adequate comfort level or baseline comfort level  Outcome: Progressing  Flowsheets (Taken 5/16/2022 0400)  Verbalizes/displays adequate comfort level or baseline comfort level: Encourage patient to monitor pain and request assistance

## 2022-05-17 LAB
ABSOLUTE EOS #: 0.08 K/UL (ref 0–0.4)
ABSOLUTE IMMATURE GRANULOCYTE: 0.31 K/UL (ref 0–0.3)
ABSOLUTE LYMPH #: 0.86 K/UL (ref 1–4.8)
ABSOLUTE MONO #: 0.39 K/UL (ref 0.2–0.8)
ANION GAP SERPL CALCULATED.3IONS-SCNC: 12 MMOL/L (ref 9–17)
ANTI-XA UNFRAC HEPARIN: 0.1 IU/L (ref 0.3–0.7)
ANTI-XA UNFRAC HEPARIN: 0.29 IU/L (ref 0.3–0.7)
ANTI-XA UNFRAC HEPARIN: 0.39 IU/L (ref 0.3–0.7)
BASOPHILS # BLD: 1 %
BASOPHILS ABSOLUTE: 0.08 K/UL (ref 0–0.2)
BUN BLDV-MCNC: 11 MG/DL (ref 8–23)
BUN/CREAT BLD: 18 (ref 9–20)
CALCIUM SERPL-MCNC: 8.6 MG/DL (ref 8.6–10.4)
CHLORIDE BLD-SCNC: 95 MMOL/L (ref 98–107)
CO2: 32 MMOL/L (ref 20–31)
CREAT SERPL-MCNC: 0.61 MG/DL (ref 0.5–0.9)
EOSINOPHILS RELATIVE PERCENT: 1 % (ref 1–4)
GFR AFRICAN AMERICAN: >60 ML/MIN
GFR NON-AFRICAN AMERICAN: >60 ML/MIN
GFR SERPL CREATININE-BSD FRML MDRD: ABNORMAL ML/MIN/{1.73_M2}
GLUCOSE BLD-MCNC: 100 MG/DL (ref 65–105)
GLUCOSE BLD-MCNC: 110 MG/DL (ref 70–99)
GLUCOSE BLD-MCNC: 144 MG/DL (ref 65–105)
GLUCOSE BLD-MCNC: 154 MG/DL (ref 65–105)
GLUCOSE BLD-MCNC: 82 MG/DL (ref 65–105)
HCT VFR BLD CALC: 27.1 % (ref 36.3–47.1)
HEMOGLOBIN: 7.6 G/DL (ref 11.9–15.1)
IMMATURE GRANULOCYTES: 4 %
LYMPHOCYTES # BLD: 11 % (ref 24–44)
MCH RBC QN AUTO: 25.9 PG (ref 25.2–33.5)
MCHC RBC AUTO-ENTMCNC: 28 G/DL (ref 28.4–34.8)
MCV RBC AUTO: 92.2 FL (ref 82.6–102.9)
MONOCYTES # BLD: 5 % (ref 1–7)
MORPHOLOGY: ABNORMAL
NRBC AUTOMATED: 0 PER 100 WBC
PDW BLD-RTO: 17.8 % (ref 11.8–14.4)
PLATELET # BLD: 269 K/UL (ref 138–453)
PMV BLD AUTO: 8.7 FL (ref 8.1–13.5)
POTASSIUM SERPL-SCNC: 3.8 MMOL/L (ref 3.7–5.3)
RBC # BLD: 2.94 M/UL (ref 3.95–5.11)
SEG NEUTROPHILS: 78 % (ref 36–66)
SEGMENTED NEUTROPHILS ABSOLUTE COUNT: 6.08 K/UL (ref 1.8–7.7)
SODIUM BLD-SCNC: 139 MMOL/L (ref 135–144)
WBC # BLD: 7.8 K/UL (ref 3.5–11.3)

## 2022-05-17 PROCEDURE — 99213 OFFICE O/P EST LOW 20 MIN: CPT

## 2022-05-17 PROCEDURE — 94761 N-INVAS EAR/PLS OXIMETRY MLT: CPT

## 2022-05-17 PROCEDURE — 6370000000 HC RX 637 (ALT 250 FOR IP): Performed by: FAMILY MEDICINE

## 2022-05-17 PROCEDURE — 97530 THERAPEUTIC ACTIVITIES: CPT

## 2022-05-17 PROCEDURE — 6360000002 HC RX W HCPCS: Performed by: INTERNAL MEDICINE

## 2022-05-17 PROCEDURE — 82947 ASSAY GLUCOSE BLOOD QUANT: CPT

## 2022-05-17 PROCEDURE — 85025 COMPLETE CBC W/AUTO DIFF WBC: CPT

## 2022-05-17 PROCEDURE — 2060000000 HC ICU INTERMEDIATE R&B

## 2022-05-17 PROCEDURE — 85520 HEPARIN ASSAY: CPT

## 2022-05-17 PROCEDURE — 36415 COLL VENOUS BLD VENIPUNCTURE: CPT

## 2022-05-17 PROCEDURE — 6370000000 HC RX 637 (ALT 250 FOR IP): Performed by: HOSPITALIST

## 2022-05-17 PROCEDURE — 2700000000 HC OXYGEN THERAPY PER DAY

## 2022-05-17 PROCEDURE — 80048 BASIC METABOLIC PNL TOTAL CA: CPT

## 2022-05-17 PROCEDURE — 94640 AIRWAY INHALATION TREATMENT: CPT

## 2022-05-17 RX ORDER — MIDODRINE HYDROCHLORIDE 10 MG/1
10 TABLET ORAL 4 TIMES DAILY
Status: DISCONTINUED | OUTPATIENT
Start: 2022-05-17 | End: 2022-05-22 | Stop reason: HOSPADM

## 2022-05-17 RX ADMIN — HEPARIN SODIUM 2000 UNITS: 1000 INJECTION INTRAVENOUS; SUBCUTANEOUS at 07:20

## 2022-05-17 RX ADMIN — ANTI-FUNGAL POWDER MICONAZOLE NITRATE TALC FREE: 1.42 POWDER TOPICAL at 12:43

## 2022-05-17 RX ADMIN — METFORMIN HYDROCHLORIDE 500 MG: 500 TABLET ORAL at 09:01

## 2022-05-17 RX ADMIN — LEVOFLOXACIN 750 MG: 750 TABLET, FILM COATED ORAL at 17:36

## 2022-05-17 RX ADMIN — PANTOPRAZOLE SODIUM 40 MG: 40 TABLET, DELAYED RELEASE ORAL at 06:13

## 2022-05-17 RX ADMIN — SENNOSIDES AND DOCUSATE SODIUM 1 TABLET: 50; 8.6 TABLET ORAL at 20:38

## 2022-05-17 RX ADMIN — OXYCODONE AND ACETAMINOPHEN 1 TABLET: 325; 10 TABLET ORAL at 09:30

## 2022-05-17 RX ADMIN — METFORMIN HYDROCHLORIDE 500 MG: 500 TABLET ORAL at 15:44

## 2022-05-17 RX ADMIN — BUDESONIDE AND FORMOTEROL FUMARATE DIHYDRATE 2 PUFF: 160; 4.5 AEROSOL RESPIRATORY (INHALATION) at 19:47

## 2022-05-17 RX ADMIN — MIDODRINE HYDROCHLORIDE 10 MG: 10 TABLET ORAL at 15:45

## 2022-05-17 RX ADMIN — GLIPIZIDE 5 MG: 10 TABLET ORAL at 06:13

## 2022-05-17 RX ADMIN — OXYCODONE AND ACETAMINOPHEN 1 TABLET: 325; 10 TABLET ORAL at 05:10

## 2022-05-17 RX ADMIN — TRAZODONE HYDROCHLORIDE 300 MG: 100 TABLET ORAL at 20:39

## 2022-05-17 RX ADMIN — Medication 15 MG: at 20:38

## 2022-05-17 RX ADMIN — VENLAFAXINE HYDROCHLORIDE 150 MG: 75 CAPSULE, EXTENDED RELEASE ORAL at 20:39

## 2022-05-17 RX ADMIN — SENNOSIDES AND DOCUSATE SODIUM 1 TABLET: 50; 8.6 TABLET ORAL at 09:01

## 2022-05-17 RX ADMIN — FUROSEMIDE 40 MG: 40 TABLET ORAL at 09:01

## 2022-05-17 RX ADMIN — MIDODRINE HYDROCHLORIDE 10 MG: 10 TABLET ORAL at 20:39

## 2022-05-17 RX ADMIN — ROSUVASTATIN CALCIUM 5 MG: 10 TABLET, FILM COATED ORAL at 09:02

## 2022-05-17 RX ADMIN — ASPIRIN 81 MG: 81 TABLET, COATED ORAL at 09:01

## 2022-05-17 RX ADMIN — ARIPIPRAZOLE 5 MG: 5 TABLET ORAL at 20:39

## 2022-05-17 RX ADMIN — TAMSULOSIN HYDROCHLORIDE 0.4 MG: 0.4 CAPSULE ORAL at 09:02

## 2022-05-17 RX ADMIN — MIDODRINE HYDROCHLORIDE 10 MG: 10 TABLET ORAL at 09:00

## 2022-05-17 RX ADMIN — VENLAFAXINE HYDROCHLORIDE 150 MG: 75 CAPSULE, EXTENDED RELEASE ORAL at 09:01

## 2022-05-17 RX ADMIN — GABAPENTIN 300 MG: 300 CAPSULE ORAL at 20:39

## 2022-05-17 RX ADMIN — GABAPENTIN 300 MG: 300 CAPSULE ORAL at 09:01

## 2022-05-17 RX ADMIN — ANTI-FUNGAL POWDER MICONAZOLE NITRATE TALC FREE: 1.42 POWDER TOPICAL at 20:46

## 2022-05-17 RX ADMIN — OXYCODONE AND ACETAMINOPHEN 1 TABLET: 325; 10 TABLET ORAL at 15:44

## 2022-05-17 RX ADMIN — HEPARIN SODIUM AND DEXTROSE 21 UNITS/KG/HR: 10000; 5 INJECTION INTRAVENOUS at 05:16

## 2022-05-17 ASSESSMENT — PAIN DESCRIPTION - DESCRIPTORS: DESCRIPTORS: ACHING;DISCOMFORT

## 2022-05-17 ASSESSMENT — PAIN SCALES - GENERAL
PAINLEVEL_OUTOF10: 10
PAINLEVEL_OUTOF10: 0

## 2022-05-17 ASSESSMENT — PAIN - FUNCTIONAL ASSESSMENT: PAIN_FUNCTIONAL_ASSESSMENT: PREVENTS OR INTERFERES SOME ACTIVE ACTIVITIES AND ADLS

## 2022-05-17 ASSESSMENT — PAIN DESCRIPTION - LOCATION: LOCATION: GENERALIZED

## 2022-05-17 ASSESSMENT — PAIN DESCRIPTION - ORIENTATION: ORIENTATION: RIGHT

## 2022-05-17 ASSESSMENT — PAIN DESCRIPTION - PAIN TYPE: TYPE: CHRONIC PAIN

## 2022-05-17 ASSESSMENT — PAIN DESCRIPTION - ONSET: ONSET: ON-GOING

## 2022-05-17 NOTE — PROGRESS NOTES
Section of Cardiology  Progress Note      Date:  5/17/2022  Patient: Haroon Duque  Admission:  5/14/2022  7:18 AM  Admit DX: Atrial fibrillation with rapid ventricular response ([facility] [address] [I48.91]  Age:  71 y. o., 1950     LOS: 3 days     Reason for evaluation:   Atrial fibrillation    SUBJECTIVE:     The patient was seen and examined. Notes and labs reviewed. No complications overnight cardiac review of systems is positive for fatigue. OBJECTIVE:      EXAM:   Vitals:    VITALS:  BP 84/67   Pulse 98   Temp 96.9 °F (36.1 °C) (Temporal)   Resp (!) 31   Ht 5' 8\" (1.727 m)   Wt 154 lb (69.9 kg)   SpO2 90%   BMI 23.42 kg/m²   24HR INTAKE/OUTPUT:    Intake/Output Summary (Last 24 hours) at 5/17/2022 1757  Last data filed at 5/17/2022 1702  Gross per 24 hour   Intake 480 ml   Output 1200 ml   Net -720 ml       CONSTITUTIONAL:  awake, alert, cooperative, no apparent distress, and appears stated age. HEENT: Normal jugular venous pulsations, no carotid bruits. Head is atraumatic, normocephalic. Eyes and oral mucosa are normal.  LUNGS: Good respiratory effort On auscultation: clear to auscultation bilaterally  CARDIOVASCULAR:  Normal apical impulse, irregular rate and rhythm, normal S1 and S2,     ABDOMEN: Soft, nontender, nondistended. Bowel sounds present. No masses or tenderness. No bruit. SKIN: Warm and dry.   EXTREMITIES trace lower extremity edema:Current Inpatient Medications:   midodrine  10 mg Oral 4x Daily    levoFLOXacin  750 mg Oral Q24H    miconazole   Topical BID    [Held by provider] metoprolol tartrate  25 mg Oral BID    aspirin EC  81 mg Oral Daily    budesonide-formoterol  2 puff Inhalation BID    pantoprazole  40 mg Oral QAM AC    venlafaxine  150 mg Oral BID    rosuvastatin  5 mg Oral Daily    ARIPiprazole  5 mg Oral Nightly    metFORMIN  500 mg Oral BID WC    traZODone  300 mg Oral Nightly    melatonin  15 mg Oral Nightly    tamsulosin  0.4 mg Oral Daily    gabapentin 300 mg Oral BID    furosemide  40 mg Oral Daily    sennosides-docusate sodium  1 tablet Oral BID    sodium chloride flush  5-40 mL IntraVENous 2 times per day    glipiZIDE  5 mg Oral QAM AC       IV Infusions (if any):   dilTIAZem (CARDIZEM) 125 mg in dextrose 5% 125 mL infusion Stopped (05/14/22 2345)    sodium chloride Stopped (05/14/22 1625)    sodium chloride      dextrose      heparin (PORCINE) Infusion Stopped (05/17/22 1546)       Diagnostics:   Telemetry: Atrial fibrillation with controlled ventricular response      Labs:   CBC:  Recent Labs     05/16/22  0803 05/17/22  0616   WBC 10.6 7.8   HGB 8.3* 7.6*   HCT 29.2* 27.1*    269     Magnesium:  Recent Labs     05/16/22  0803   MG 1.5*     BMP:  Recent Labs     05/16/22  0803 05/17/22  0616    139   K 3.3* 3.8   CALCIUM 8.9 8.6   CO2 33* 32*   BUN 12 11   CREATININE 0.63 0.61   LABGLOM >60 >60   GLUCOSE 48* 110*     BNP:No results for input(s): BNP, PROBNP in the last 72 hours. PT/INR:  Recent Labs     05/15/22  0549   PROTIME 12.9   INR 1.0     APTT:No results for input(s): APTT in the last 72 hours. CARDIAC ENZYMES:No results for input(s): MYOGLOBIN, CKTOTAL, CKMB, CKMBINDEX, TROPHS, TROPONINT in the last 72 hours. FASTING LIPID PANEL:No results found for: HDL, LDLDIRECT, LDLCALC, TRIG  LIVER PROFILE:No results for input(s): AST, ALT, LABALBU, ALKPHOS, BILITOT, BILIDIR, IBILI, PROT, GLOB, ALBUMIN in the last 72 hours. ASSESSMENT:  1.  New onset atrial fibrillation with rapid ventricular response, converted to sinus rhythm.   2.  Chronic history of sinus tachycardia  3.  History of moderate coronary artery disease of the circumflex  4.  Moderate aortic stenosis  5.  Chronic right bundle branch block  6.  COPD  7.  History of right lung cancer  8.  History of non-insulin-dependent diabetes mellitus    Patient Active Problem List   Diagnosis    Valvular heart disease    Type 2 diabetes mellitus without complication, without long-term current use of insulin (HCC)    Open wound of right ankle    COPD (chronic obstructive pulmonary disease) (HCC)    Syncope and collapse    Chronic ulcer of right heel (HCC)    Multifocal pneumonia    Aortic valve stenosis    Esophageal dilatation    Aspiration pneumonia of both lower lobes due to gastric secretions (HCC)    Falls frequently    Chronic respiratory failure (HCC)    Contusion of right knee    Closed fracture of right distal radius    Subdural hemorrhage (HCC)    Subarachnoid hemorrhage (HCC)    Traumatic hemorrhage of cerebrum (HCC)    Chronic bilateral low back pain    Cellulitis of both feet    Acute on chronic congestive heart failure (HCC)    Bilateral leg edema    Cellulitis    Lung mass    Malignant neoplasm of upper lobe of right lung (HCC)    Urinary tract infectious disease    Closed fracture of one rib of right side    Degenerative disc disease, lumbar    Moderate malnutrition (HCC)    Rhabdomyolysis    Unable to ambulate    Mild malnutrition (HCC)    Atrial fibrillation with rapid ventricular response ([address]       PLAN:  1. Continue metoprolol for ventricular rate control  2. Patient was felt not to be a candidate for long-term oral anticoagulation in view of high fall risk. Discussed with patient and nursing.     Luther Lake MD

## 2022-05-17 NOTE — PLAN OF CARE

## 2022-05-17 NOTE — PROGRESS NOTES
Physical Therapy  DATE: 2022    NAME: Susan Cueva  MRN: [id number]   : 1950    Patient not seen this date for Physical Therapy due to:      [] Cancel by RN or physician due to:    [] Hemodialysis    [] Critical Lab Value Level     [] Blood transfusion in progress    [] Acute or unstable cardiovascular status   _MAP < 55 or more than >115  _HR < 40 or > 130    [] Acute or unstable pulmonary status   -FiO2 > 60%   _RR < 5 or >40    _O2 sats < 85%    [] Strict Bedrest    [] Off Unit for surgery or procedure    [] Off Unit for testing       [] Pending imaging to R/O fracture    [x] Refusal by Patient (Patient experienced low BP and dizziness when she participated with OT this AM, Not appropriate for therapy at this time reporting still feeling dizzy with turning her head)      [] Other      [] PT being discontinued at this time. Patient independent. No further needs. [] PT being discontinued at this time as the patient has been transferred to hospice care. No further needs.       Hoda Gayle, PTA

## 2022-05-17 NOTE — PROGRESS NOTES
Occupational Therapy  Facility/Department: [facility] CVICU  Rehabilitation Occupational Therapy Daily Treatment Note    Date: 22  Patient Name: Ilene Henning       Room:   MRN: [id number]  Account: [de-identified]   : 1950  (71 y.o.) Gender: female         Pt currently functioning below baseline. Would suggest additional therapy at time of discharge to maximize long term outcomes and prevent re-admission. Please refer to AM-PAC score for current level of function. AM PAC 16              Past Medical History:  has a past medical history of AAA (abdominal aortic aneurysm) (HCC), Acid reflux, Anxiety and depression, Aortic stenosis, Arthritis, Blister of ankle, right, CAD (coronary artery disease), Cancer ([facility] [address] COPD (chronic obstructive pulmonary disease) ([facility] [address] Diabetes mellitus ([facility] [address] Falls, Heart block, Hypokalemia, MDRO (multiple drug resistant organisms) resistance, MRSA (methicillin resistant staph aureus) culture positive, On home oxygen therapy, On home oxygen therapy, Overactive bladder, Pneumonia, PONV (postoperative nausea and vomiting), and Vitamin D deficiency. Past Surgical History:   has a past surgical history that includes back surgery; eye surgery; Cholecystectomy; Appendectomy; Hysterectomy; Tonsillectomy; lumbar laminectomy; Endoscopy, colon, diagnostic (2016); aortic valve repair (N/A, 2017); bronchoscopy (N/A, 2020); IR INSERT PICC VAD W SQ PORT >5 YEARS (2020); and IR PORT PLACEMENT > 5 YEARS (2020). Restrictions  Restrictions/Precautions: Fall Risk;Contact Precautions; Up as Tolerated  Other position/activity restrictions: up with assist, external cath, L chest port, telemetry, elevate heels, pt easily agitiated (does not like to be asked questions or given \"orders\"  Required Braces or Orthoses?: No    Subjective  Subjective: Pt in bed upon arrival. Pt stated \"I'll walk around the room. \"  Restrictions/Precautions: Fall Risk;Contact Precautions; Up as Tolerated             Objective     Cognition  Overall Cognitive Status: Exceptions  Arousal/Alertness: Appropriate responses to stimuli  Following Commands: Follows multistep commands with repitition; Follows multistep commands with increased time  Attention Span: Appears intact  Memory: Appears intact  Safety Judgement: Decreased awareness of need for assistance;Decreased awareness of need for safety  Problem Solving: Decreased awareness of errors;Assistance required to identify errors made;Assistance required to generate solutions;Assistance required to implement solutions;Assistance required to correct errors made  Insights: Decreased awareness of deficits  Initiation: Requires cues for all  Sequencing: Requires cues for some  Orientation  Overall Orientation Status: Within Functional Limits                      Functional Mobility  Device: Rolling walker  Activity:  (steps along bed with CGA-MIN A)  Assistance Level: Contact guard assist;Minimal assistance  Skilled Clinical Factors: Pt stood up from EOB and c/o feeling dizzy, pt sat back on bed. BP 92/65, HR high [id number]. Pt too dizzy to mobilize and returned to bed. Bed Mobility  Overall Assistance Level: Stand By Assist  Additional Factors: Verbal cues; Increased time to complete; Head of bed raised; With handrails  Sit to Supine  Assistance Level: Stand by assist  Supine to Sit  Assistance Level: Stand by assist  Scooting  Assistance Level: Stand by assist  Sit to Stand  Assistance Level: Moderate assistance  Stand to Sit  Assistance Level: Minimal assistance  Skilled Clinical Factors: Verbal cues for hand placement, nose over toes andoverall safety. Assessment  Assessment  Assessment: Pt is very easily agitated and prefers to be in bed. Today pt was agreeable to walk but refused to sit up in chair. Pt was too dizzy to walk and returned to bed.  Attempted to position pt with pillows in bed but pt yelled \"I just want to be covered up!\" Pt is MOD A with transfers and req's A LOT of assist with ADLs and mobility. Pt is self limiting. SKilled OT indicated to increase safety and IND with all functional tasks to ensure a safe return to PLOF. Activity Tolerance: Treatment limited secondary to agitation;Patient limited by pain; Patient limited by endurance  Discharge Recommendations: Patient would benefit from continued therapy after discharge  Safety Devices  Safety Devices in place: Yes  Type of devices: All fall risk precautions in place; Left in bed;Bed alarm in place;Call light within reach;Nurse notified;Gait belt;Patient at risk for falls    Patient Education  Education  Education Given To: Patient  Education Provided: Transfer Training; Safety  Education Method: Demonstration;Verbal  Barriers to Learning: None  Education Outcome: Verbalized understanding;Demonstrated understanding    Plan  Plan  Times per Week: 4-5x/wk 1x/day as emely  Current Treatment Recommendations: Strengthening;Balance training;Functional mobility training; Endurance training; Safety education & training;Equipment evaluation, education, & procurement;Patient/Caregiver education & training;Self-Care / ADL; Home management training    Goals  Patient Goals   Patient goals : To go home! Short Term Goals  Time Frame for Short term goals: By discharge, pt to demo  Short Term Goal 1: ADL transfers and functional mobility to CGA with use of AD as needed. Short Term Goal 2: toileting to CGA with use of AD/grab bars as needed. Short Term Goal 3: increased B UE strength by 1/2 grade to assist with functional tasks/I with B UE HEP with use of handouts as needed. Short Term Goal 4: bed mobility to SBA with use of bedrails as needed. Short Term Goal 5: UB ADLs to Set up and LB ADLs to SBA with use of AD/AE as needed. Long Term Goals  Long Term Goal 1: Pt to be I with fall prevention education, EC/WS tech, recommendations for discharge/AE with use of handouts as needed.   Long Term Goal 2: Pt to demo increased standing emely > 10 min with Min A using AD as needed to reduce risk of falls during functional tasks.     Therapy Time   Individual Concurrent Group Co-treatment   Time In 0917         Time Out 0934         Minutes [address] [facility]

## 2022-05-17 NOTE — CONSULTS
[facility] [place] Wound Ostomy Continence Nurse  Consult Note       NAME:  Jermaine Arevalo  MEDICAL RECORD NUMBER:  [id number]  AGE: 71 y.o. GENDER: female  : 1950  TODAY'S DATE:  2022    Subjective:      Jermaine Arevalo is a 71 y.o. female with inpatient referral to Wound Ostomy Continence Specialty for:  Buttocks, left face wounds      Wound Identification:  Wound Type: traumatic and MASD- incontinence associated dermatitis  Contributing Factors: diabetes, decreased mobility, incontinence of stool and incontinence of urine    Wound History: pt states that when she was being transferred by the squad, they put a mask on her that scratched the side of her face; she states buttocks wound has been present for 3 or 4 days  Current Wound Care Treatment:  Foam, zinc    Patient Goal of Care:  [x] Wound Healing  [] Odor Control  [] Palliative Care  [] Pain Control   [] Other:         PAST MEDICAL HISTORY        Diagnosis Date    AAA (abdominal aortic aneurysm) ([facility]     Pt denies having a history of AAA    Acid reflux     Anxiety and depression     Aortic stenosis     Arthritis     Blister of ankle, right 10/13/2016    blister broke open & draining, is on antibiotics    CAD (coronary artery disease)     Cancer ([facility]     lung cancer    COPD (chronic obstructive pulmonary disease) ([facility]     Diabetes mellitus ([facility]     Falls     Heart block     bifasicular    Hypokalemia     MDRO (multiple drug resistant organisms) resistance 10/17/2014    E. Coli urine    MRSA (methicillin resistant staph aureus) culture positive resolved 2016    2 negative nasal screens -  (hx in urine )    On home oxygen therapy     uses 2 liters at night    On home oxygen therapy     patient states 2 liters/nasal cannula continuous    Overactive bladder     patient incont.  wears a brief    Pneumonia     PONV (postoperative nausea and vomiting)     Vitamin D deficiency        PAST SURGICAL HISTORY    Past Surgical History:   Procedure Laterality Date    AORTIC VALVE REPAIR N/A 2017    AORTIC VALVE REPAIR REPLACEMENT performed by Kimberlee Griffin MD at [address] [facility] N/A 2020    BRONCHOSCOPY BIOPSY BRONCHUS performed by Clement Chen MD at [address], COLON, DIAGNOSTIC  2016    EYE SURGERY      HYSTERECTOMY      IR INS PICC VAD W SQ PORT GREATER THAN 5  2020    IR INS PICC VAD W SQ PORT GREATER THAN 5 2020 [facility]    IR PORT PLACEMENT EQUAL OR GREATER THAN 5 YEARS  2020    IR PORT PLACEMENT EQUAL OR GREATER THAN 5 YEARS 2020 Donna Davis MD [facility]    LUMBAR LAMINECTOMY      TONSILLECTOMY         FAMILY HISTORY    Family History   Problem Relation Age of Onset    Cancer Mother     Cancer Father        SOCIAL HISTORY    Social History     Tobacco Use    Smoking status: Former Smoker     Quit date: 2016     Years since quittin.7    Smokeless tobacco: Never Used   Vaping Use    Vaping Use: Never used   Substance Use Topics    Alcohol use: No    Drug use: No         ALLERGIES    Allergies   Allergen Reactions    Codeine      \"feeling of heavy on chest\"    Dye [Iodides]      Hallucination,vomiting       HOME MEDICATIONS  Prior to Admission medications    Medication Sig Start Date End Date Taking? Authorizing Provider   oxyCODONE-acetaminophen (PERCOCET)  MG per tablet Take 1 tablet by mouth every 4 hours as needed for Pain. Yes Historical Provider, MD   gabapentin (NEURONTIN) 300 MG capsule Take 300 mg by mouth in the morning and at bedtime.    Yes Historical Provider, MD   sennosides-docusate sodium (SENOKOT-S) 8.6-50 MG tablet Take 1 tablet by mouth in the morning and at bedtime    Historical Provider, MD   ondansetron (ZOFRAN ODT) 4 MG disintegrating tablet Place 1 tablet under the tongue every 6 hours as needed for Nausea or Vomiting 22 Richar Montesinos MD   furosemide (LASIX) 40 MG tablet Take 1 tablet by mouth daily 4/7/22   Joe Camarillo MD   midodrine (PROAMATINE) 10 MG tablet Take 1 tablet by mouth 3 times daily as needed (SBP <90) 4/6/22 4/27/22  Joe Camarillo MD   clonazePAM (KLONOPIN) 1 MG tablet Take 1 tablet by mouth 3 times daily as needed for Anxiety for up to 3 doses.  4/4/22 6/25/23  Joe Camarillo MD   metoprolol tartrate (LOPRESSOR) 50 MG tablet Take 1 tablet by mouth 2 times daily 3/25/22   Keira Moralez MD   albuterol sulfate  (90 Base) MCG/ACT inhaler Inhale 2 puffs into the lungs every 4 hours as needed for Wheezing 3/25/22   Keira Moralez MD   ARIPiprazole (ABILIFY) 5 MG tablet Take 5 mg by mouth at bedtime    Historical Provider, MD   metFORMIN (GLUCOPHAGE) 500 MG tablet Take 500 mg by mouth 2 times daily (with meals)    Historical Provider, MD   traZODone (DESYREL) 150 MG tablet Take 300 mg by mouth nightly    Historical Provider, MD   melatonin 5 MG TABS tablet Take 5 mg by mouth nightly     Historical Provider, MD   ondansetron (ZOFRAN) 4 MG tablet Take 1 tablet by mouth every 8 hours as needed for Nausea or Vomiting 1/26/22   Richar Montesinos MD   rosuvastatin (CRESTOR) 5 MG tablet Take 5 mg by mouth daily    Historical Provider, MD   glimepiride (AMARYL) 1 MG tablet Take 1 mg by mouth 2 times daily (with meals)     Historical Provider, MD   venlafaxine (EFFEXOR XR) 150 MG extended release capsule Take 150 mg by mouth 2 times daily    Historical Provider, MD   lansoprazole (PREVACID) 30 MG delayed release capsule Take 30 mg by mouth daily 11/10/16   Historical Provider, MD   aspirin EC 81 MG EC tablet Take 81 mg by mouth daily    Historical Provider, MD   mometasone-formoterol (DULERA) 200-5 MCG/ACT inhaler Inhale 2 puffs into the lungs every 12 hours as needed (wheezing/SOB)     Historical Provider, MD       CURRENT MEDICATIONS:  Current Facility-Administered Medications   Medication Dose Route Frequency Provider Last Rate Last Admin    levoFLOXacin (LEVAQUIN) tablet 750 mg  750 mg Oral Q24H Whitney Becerril MD   750 mg at 05/16/22 1728    midodrine (PROAMATINE) tablet 10 mg  10 mg Oral TID PRN Josette Cartagena MD   10 mg at 05/17/22 0900    miconazole (MICOTIN) 2 % powder   Topical BID Max Rocha MD   Given at 05/16/22 2112    [Held by provider] metoprolol tartrate (LOPRESSOR) tablet 25 mg  25 mg Oral BID Lien Joyner MD   25 mg at 05/16/22 2112    ipratropium-albuterol (DUONEB) nebulizer solution 1 ampule  1 ampule Inhalation Q4H PRN Whitney Becerril MD        dilTIAZem 125 mg in dextrose 5 % 125 mL infusion  2.5-15 mg/hr IntraVENous Continuous Deborah Elder MD   Stopped at 05/14/22 2345    0.9 % sodium chloride infusion  1,000 mL IntraVENous Continuous Whitney Becerril MD   Stopped at 05/14/22 1625    aspirin EC tablet 81 mg  81 mg Oral Daily Whitney Becerril MD   81 mg at 05/16/22 0946    budesonide-formoterol (SYMBICORT) 160-4.5 MCG/ACT inhaler 2 puff  2 puff Inhalation BID Whitney Becerril MD   2 puff at 05/16/22 1932    pantoprazole (PROTONIX) tablet 40 mg  40 mg Oral QAM AC Whitney Becerril MD   40 mg at 05/17/22 0613    venlafaxine (EFFEXOR XR) extended release capsule 150 mg  150 mg Oral BID Deborah Elder MD   150 mg at 05/17/22 0901    rosuvastatin (CRESTOR) tablet 5 mg  5 mg Oral Daily Whitney Becerril MD   5 mg at 05/17/22 0902    ARIPiprazole (ABILIFY) tablet 5 mg  5 mg Oral Nightly Whitney Becerril MD   5 mg at 05/16/22 2112    metFORMIN (GLUCOPHAGE) tablet 500 mg  500 mg Oral BID WC Whitney Becerril MD   500 mg at 05/17/22 0901    traZODone (DESYREL) tablet 300 mg  300 mg Oral Nightly Deborah Elder MD   300 mg at 05/16/22 2111    melatonin tablet 15 mg  15 mg Oral Nightly Deborah Elder MD   15 mg at 05/16/22 2112    albuterol sulfate  (90 Base) MCG/ACT inhaler 2 puff  2 puff Inhalation Q4H PRN Whitney Becerril MD        tamsulosin (FLOMAX) capsule 0.4 mg  0.4 mg Oral Daily Whitney Becerril MD   0.4 mg at 05/17/22 0902    clonazePAM (KLONOPIN) tablet 1 mg  1 mg Oral TID PRN Paul Nevarez MD   1 mg at 05/15/22 2102    gabapentin (NEURONTIN) capsule 300 mg  300 mg Oral BID Paul Nevarez MD   300 mg at 05/17/22 0901    furosemide (LASIX) tablet 40 mg  40 mg Oral Daily Whitney Becerril MD   40 mg at 05/17/22 0901    sennosides-docusate sodium (SENOKOT-S) 8.6-50 MG tablet 1 tablet  1 tablet Oral BID Paul Nevarez MD   1 tablet at 05/17/22 0901    sodium chloride flush 0.9 % injection 5-40 mL  5-40 mL IntraVENous 2 times per day Paul Nevarez MD   10 mL at 05/15/22 2103    sodium chloride flush 0.9 % injection 10 mL  10 mL IntraVENous PRN Paul Nevarez MD        0.9 % sodium chloride infusion   IntraVENous PRN Paul Nevarez MD        potassium chloride (KLOR-CON M) extended release tablet 40 mEq  40 mEq Oral PRN Paul Nevarez MD   40 mEq at 05/14/22 1657    Or    potassium bicarb-citric acid (EFFER-K) effervescent tablet 40 mEq  40 mEq Oral PRN Whitney Becerril MD   40 mEq at 05/16/22 0945    Or    potassium chloride 10 mEq/100 mL IVPB (Peripheral Line)  10 mEq IntraVENous PRN Paul Nevarez MD        magnesium sulfate 1000 mg in dextrose 5% 100 mL IVPB  1,000 mg IntraVENous PRN Paul Nevarez MD   Stopped at 05/16/22 2227    ondansetron (ZOFRAN-ODT) disintegrating tablet 4 mg  4 mg Oral Q8H PRN Whitney Becerril MD        Or    ondansetron (ZOFRAN) injection 4 mg  4 mg IntraVENous Q6H PRN Whitney Becerril MD        polyethylene glycol (GLYCOLAX) packet 17 g  17 g Oral Daily PRN Whitney Becerril MD        acetaminophen (TYLENOL) tablet 650 mg  650 mg Oral Q6H PRN Whitney Becerril MD        Or    acetaminophen (TYLENOL) suppository 650 mg  650 mg Rectal Q6H PRN Whitney Becerril MD        oxyCODONE-acetaminophen (PERCOCET)  MG per tablet 1 tablet  1 tablet Oral Q4H PRN Whitney Becerril MD   1 tablet at 05/17/22 0930    glucose chewable tablet 16 g  4 tablet Oral PRN Whitney MD Jerrica        dextrose bolus 10% 125 mL  125 mL IntraVENous PRN Whitney Becerril MD        Or    dextrose bolus 10% 250 mL  250 mL IntraVENous PRN Whitney Becerril MD        glucagon (rDNA) injection 1 mg  1 mg IntraMUSCular PRN Whitney Becerril MD        dextrose 5 % solution  100 mL/hr IntraVENous PRN Whitney Becerril MD        heparin (porcine) injection 4,000 Units  4,000 Units IntraVENous PRN Osiel Mclean MD   4,000 Units at 05/15/22 1301    heparin (porcine) injection 2,000 Units  2,000 Units IntraVENous PRN Osiel Mclean MD   2,000 Units at 05/17/22 0720    heparin 25,000 units in dextrose 5% 250 mL (premix) infusion  5-30 Units/kg/hr IntraVENous Continuous Osiel Mclean MD 15.5 mL/hr at 05/17/22 0722 23 Units/kg/hr at 05/17/22 0722    glipiZIDE (GLUCOTROL) tablet 5 mg  5 mg Oral QAM AC Whitney Becerril MD   5 mg at 05/17/22 3915       Review of Systems      Objective:      BP 84/67   Pulse 98   Temp 97.5 °F (36.4 °C) (Temporal)   Resp (!) 31   Ht 5' 8\" (1.727 m)   Wt 154 lb (69.9 kg)   SpO2 90%   BMI 23.42 kg/m²       LABS    CBC:   Lab Results   Component Value Date    WBC 7.8 05/17/2022    RBC 2.94 05/17/2022    HGB 7.6 05/17/2022     SED RATE: No results found for: SEDRATE    CMP:  Albumin:    Lab Results   Component Value Date    LABALBU 3.2 03/19/2022     PT/INR:    Lab Results   Component Value Date    PROTIME 12.9 05/15/2022    INR 1.0 05/15/2022     HgBA1c:    Lab Results   Component Value Date    LABA1C 6.7 11/12/2020     PTT: No components found for: LABPTT      Assessment:     Physical Exam      Andrei Risk Score: Andrei Scale Score: 12    Patient Active Problem List   Diagnosis Code    Valvular heart disease I38    Type 2 diabetes mellitus without complication, without long-term current use of insulin ([facility] E11.9    Open wound of right ankle S91.001A    COPD (chronic obstructive pulmonary disease) ([facility]) J44.9    Syncope and collapse R55    Chronic ulcer of right heel ([facility] L97.419    Multifocal pneumonia J18.9    Aortic valve stenosis I35.0    Esophageal dilatation K22.89    Aspiration pneumonia of both lower lobes due to gastric secretions ([facility]) J69.0    Falls frequently R29.6    Chronic respiratory failure ([facility]) J96.10    Contusion of right knee S80. 01XA    Closed fracture of right distal radius S52.501A    Subdural hemorrhage ([facility]) I62.00    Subarachnoid hemorrhage ([facility]) I60.9    Traumatic hemorrhage of cerebrum ([facility]) F83.072K    Chronic bilateral low back pain M54.50, G89.29    Cellulitis of both feet L03.115, L03. 116    Acute on chronic congestive heart failure ([facility]) I50.9    Bilateral leg edema R60.0    Cellulitis L03.90    Lung mass R91.8    Malignant neoplasm of upper lobe of right lung ([facility]) C34.11    Urinary tract infectious disease N39.0    Closed fracture of one rib of right side S22.31XA    Degenerative disc disease, lumbar M51.36    Moderate malnutrition ([facility]) E44.0    Rhabdomyolysis M62.82    Unable to ambulate R26.2    Mild malnutrition ([facility]) E44.1    Atrial fibrillation with rapid ventricular response ([facility]) I48.91         Measurements:  Wound 03/18/22 Face Left (Active)   Wound Image   05/17/22 1125   Wound Etiology Skin Tear 05/17/22 1125   Dressing Status Old drainage noted;New dressing applied 05/17/22 1125   Wound Cleansed Cleansed with saline 05/17/22 1125   Dressing/Treatment Petroleum gauze; Foam 05/17/22 1125   Wound Length (cm) 1.6 cm 05/17/22 1125   Wound Width (cm) 1.8 cm 05/17/22 1125   Wound Depth (cm) 0.1 cm 05/17/22 1125   Wound Surface Area (cm^2) 2.88 cm^2 05/17/22 1125   Change in Wound Size % (l*w) 72.57 05/17/22 1125   Wound Volume (cm^3) 0.288 cm^3 05/17/22 1125   Wound Healing % 73 05/17/22 1125   Wound Assessment Pink/red 05/17/22 1125   Drainage Amount Small 05/17/22 1125   Drainage Description Serosanguinous 05/17/22 1125   Odor None 05/17/22 1125   Mary-wound Assessment Dry/flaky 05/17/22 1125   Margins Defined edges 05/17/22 1125   Number of days: 59                WOUND DESCRIPTION:   [id number] [facility] nurse consult for buttocks and left facial wounds. Pt was admitted on 5/14 with shortness of breath and wheezing, in a.fib with RVR. She states that when the squad was transporting her, the mask they put on her slipped and scraped the side of her face. There is a superficial skin tear present. Flap no longer present and unable to be reapproximated. Buttocks have signs of chronic incontinence associated dermatitis. There are a few scattered superficial open areas and healing denudation. Pt is incontinent of urine and stool and wears a brief at home. There is a waffle mattress in place on her bed. Response to treatment:  Well tolerated by patient. Plan:     Plan of Care:     Left face: Cleanse with saline, pat dry. Apply oil emulsion dressing to wound, cover with foam dressing. Change every other day and as needed if loose or soiled. Buttocks: Cleanse with foam cleanser. Apply zinc paste twice daily and as needed     -Turn every 2 hours    -Float heels off of bed with pillows under calves. If needed- use offloading boots.    -Sacral foam dressing to sacrococcygeal area. Apply skin barrier wipe to skin first to help adherence. Peel back dressing to inspect skin beneath qshift, re-secure. Change every 72 hours and prn wrinkles, soilage. Discontinue if repeatedly soiled by incontinence.     -Routine incontinence care with foaming cleanser and zinc oxide cream. Apply zinc oxide cream twice daily and prn incontinence. Use moisture wicking under pad (one layer only). -Waffle mattress overlay. Check inflation each shift by sliding hand under the air overlay. Feel for the patient's heaviest/ most dependant body part. Ideally 1/2 to 1\" of air will be between your hand and the patient's body. Add air prn.     Specialty Bed Required : Yes   [] Low Air Loss   [x] Pressure Redistribution- waffle mattress on bed  [] Fluid Immersion  []

## 2022-05-17 NOTE — DISCHARGE INSTR - COC
Continuity of Care Form    Patient Name: Alex Glass   :  1950  MRN:  [id number]    Admit date:  2022  Discharge date:  22    Code Status Order: Full Code   Advance Directives:      Admitting Physician:  Nani Carlin MD  PCP: Anna Weir MD    Discharging Nurse: Genet Saldana Unit/Room#: -  Discharging Unit Phone Number: [phone]    Emergency Contact:   Extended Emergency Contact Information  Primary Emergency Contact: Kalen Maldonado  Address: .            [address] [facility], [address] [place] [address] Phone: [phone]  Mobile Phone: [phone]  Relation: Child  Secondary Emergency Contact: [address] [facility] Phone: [phone]  Mobile Phone: [phone]  Relation: Grandchild    Past Surgical History:  Past Surgical History:   Procedure Laterality Date    AORTIC VALVE REPAIR N/A 2017    AORTIC VALVE REPAIR REPLACEMENT performed by Estevan Deng MD at [facility] 150 N/A 2020    BRONCHOSCOPY BIOPSY BRONCHUS performed by Marcus Sams MD at [place] [address], DIAGNOSTIC  2016    EYE SURGERY      HYSTERECTOMY      IR INS PICC VAD W SQ PORT GREATER THAN 5  2020    IR INS PICC VAD W SQ PORT GREATER THAN 5 2020 [facility]    IR PORT PLACEMENT EQUAL OR GREATER THAN 5 YEARS  2020    IR PORT PLACEMENT EQUAL OR GREATER THAN 5 YEARS 2020 Julien Murcia MD [facility] PROCEDURES    LUMBAR LAMINECTOMY      TONSILLECTOMY         Immunization History:   Immunization History   Administered Date(s) Administered    Influenza Virus Vaccine 10/11/2012, 10/07/2013, 2014    Influenza, High Dose (Fluzone 65 yrs and older) 2016, 10/02/2018, 10/08/2019    Influenza, Triv, inactivated, subunit, adjuvanted, IM (Fluad 65 yrs and older) 10/03/2017    Pneumococcal Conjugate 13-valent (Cmulcpl19) 10/02/2018    Pneumococcal Polysaccharide (Ponjaanrc96) 09/30/2014    Tdap (Boostrix, Adacel) 10/31/2017       Active Problems:  Patient Active Problem List   Diagnosis Code    Valvular heart disease I38    Type 2 diabetes mellitus without complication, without long-term current use of insulin ([address] E11.9    Open wound of right ankle S91.001A    COPD (chronic obstructive pulmonary disease) ([facility]) J44.9    Syncope and collapse R55    Chronic ulcer of right heel ([facility]) L97.419    Multifocal pneumonia J18.9    Aortic valve stenosis I35.0    Esophageal dilatation K22.89    Aspiration pneumonia of both lower lobes due to gastric secretions ([address] J69.0    Falls frequently R29.6    Chronic respiratory failure ([facility]) J96.10    Contusion of right knee S80. 01XA    Closed fracture of right distal radius S52.501A    Subdural hemorrhage ([facility]) I62.00    Subarachnoid hemorrhage ([facility]) I60.9    Traumatic hemorrhage of cerebrum ([facility]) U80.929N    Chronic bilateral low back pain M54.50, G89.29    Cellulitis of both feet L03.115, L03.116    Acute on chronic congestive heart failure ([facility]) I50.9    Bilateral leg edema R60.0    Cellulitis L03.90    Lung mass R91.8    Malignant neoplasm of upper lobe of right lung ([facility]) C34.11    Urinary tract infectious disease N39.0    Closed fracture of one rib of right side S22.31XA    Degenerative disc disease, lumbar M51.36    Moderate malnutrition ([facility]) E44.0    Rhabdomyolysis M62.82    Unable to ambulate R26.2    Mild malnutrition ([address] E44.1    Atrial fibrillation with rapid ventricular response ([address] I48.91       Isolation/Infection:   Isolation            Contact          Patient Infection Status       Infection Onset Added Last Indicated Last Indicated By Review Planned Expiration Resolved Resolved By    MDRO (multi-drug resistant organism)  03/25/22 03/25/22 Anayeli Becerra RN  Never      E.  Coli urine 2016    Resolved    COVID-19 (Rule Out) 05/14/22 05/14/22 05/14/22 COVID-19, Rapid (Ordered)   05/14/22 Rule-Out Test Resulted    MDRO (multi-drug resistant organism)  22 Skyler Bingham RN   22 Skyler Bingham RN    E. Coli urine 10/2016    COVID-19 (Rule Out) 20 COVID-19 (Ordered)   20     COVID-19 (Rule Out) 20 COVID-19 (Ordered)   20 Rule-Out Test Resulted    MRSA  14 Skyler Bingham RN   17 Dilip Cuenca RN    2 negative nasal screens 10/2016 & 2016  Urine - 2014      MDRO (multi-drug resistant organism)  14 Skyler Bingham RN   10/01/20 Jose A Mckeon RN    E. Coli - urine 10/2016              Nurse Assessment:  Last Vital Signs: BP 84/67   Pulse 98   Temp 97.5 °F (36.4 °C) (Temporal)   Resp (!) 31   Ht 5' 8\" (1.727 m)   Wt 154 lb (69.9 kg)   SpO2 90%   BMI 23.42 kg/m²     Last documented pain score (0-10 scale): Pain Level: 10 (right chest/shoulder,right back)  Last Weight:   Wt Readings from Last 1 Encounters:   22 154 lb (69.9 kg)     Mental Status:  oriented, alert, coherent, logical, thought processes intact, and able to concentrate and follow conversation    IV Access:  - None    Nursing Mobility/ADLs:  Walking   Dependent  Transfer  Dependent  Bathing  Dependent  Dressing  Assisted  Toileting  Dependent  Feeding  Assisted  Med Admin  Assisted  Med Delivery   whole    Wound Care Documentation and Therapy:  Wound Care Documentation:  Wound 22 Face Left (Active)   Wound Image   22 1125   Wound Etiology Skin Tear 22 1125   Dressing Status Old drainage noted;New dressing applied 22 1125   Wound Cleansed Cleansed with saline 22 1125   Dressing/Treatment Petroleum gauze; Foam 22 1125   Wound Length (cm) 1.6 cm 22 1125   Wound Width (cm) 1.8 cm 22 1125   Wound Depth (cm) 0.1 cm 22   Wound Surface Area (cm^2) 2.88 cm^2 225   Change in Wound Size % (l*w) 72.57 22   Wound Volume (cm^3) 0.288 cm^3 05/17/22 1125   Wound Healing % 73 05/17/22 1125   Wound Assessment Pink/red 05/17/22 1125   Drainage Amount Small 05/17/22 1125   Drainage Description Serosanguinous 05/17/22 1125   Odor None 05/17/22 1125   Mary-wound Assessment Dry/flaky 05/17/22 1125   Margins Defined edges 05/17/22 1125   Number of days: 59         Left face: Cleanse with saline, pat dry. Apply oil emulsion dressing to wound, cover with foam dressing. Change every other day and as needed if loose or soiled. Buttocks: Cleanse with foam cleanser. Apply zinc paste twice daily and as needed      Elimination:  Continence: Bowel: No  Bladder: No  Urinary Catheter: None   Colostomy/Ileostomy/Ileal Conduit: No       Date of Last BM: 05/21/22    Intake/Output Summary (Last 24 hours) at 5/17/2022 1143  Last data filed at 5/17/2022 0900  Gross per 24 hour   Intake 480 ml   Output 1700 ml   Net -1220 ml     I/O last 3 completed shifts: In: 480 [P.O.:480]  Out: 2000 [Urine:2000]    Safety Concerns:     History of Falls (last 30 days) and At Risk for Falls    Impairments/Disabilities:      Vision    Nutrition Therapy:  Current Nutrition Therapy:   - Oral Diet:  Carb Control 4 carbs/meal (1800kcals/day), Low Sodium (2gm), and high fiber    Routes of Feeding: Oral  Liquids: No Restrictions  Daily Fluid Restriction: no  Last Modified Barium Swallow with Video (Video Swallowing Test): not done    Treatments at the Time of Hospital Discharge:   Respiratory Treatments: see list  Oxygen Therapy:  is on oxygen at 3 L/min per nasal cannula.   Ventilator:    - No ventilator support    Rehab Therapies: Physical Therapy and Occupational Therapy  Weight Bearing Status/Restrictions: No weight bearing restrictions  Other Medical Equipment (for information only, NOT a DME order):  walker  Other Treatments:   Turn Q2, mepilex on coccyx    Patient's personal belongings (please select all that are sent with patient):  Glasses, Dentures upper and lower    RN SIGNATURE: Electronically signed by Sujatha Veloz RN on 5/21/22 at 10:12 AM EDT    CASE MANAGEMENT/SOCIAL WORK SECTION    Inpatient Status Date: ***    Readmission Risk Assessment Score:  Readmission Risk              Risk of Unplanned Readmission:  36           Discharging to Facility/ Agency   Name: [facility]  Address:  [address] [place] [address] Froylan Fernandes (if applicable)   Name:  Address:  Dialysis Schedule:  Phone:  Fax:    / signature: Electronically signed by NADINE Domínguez LSW on 5/21/22 at 8:04 AM EDT    PHYSICIAN SECTION    Prognosis: Good    Condition at Discharge: Stable    Rehab Potential (if transferring to Rehab): Good    Recommended Labs or Other Treatments After Discharge:Diagnosis:    Skilled Nursing per hospital recommendation ( Monitor Vitals,pain, Mental status, daily weight Blood sugar monitoring TIDAC.) Call MD if Blood sugar<60 or > 350,Fall precaution, wound care quach catheter removal as per Nursing home attending)  Skilled OT  Physical Therapy  Daily Labs: cbc,bmp q weekly  Monitor INR Daily if pt on coumadin   Coumadin management per SNF admitting physician unless otherwise specified. Oxygen 2L/minute   Blood sugar accucheck TIDAC  Daily Pulse oxymetry  Continue CPAP/BIPAP if pt wearing at home. Catheter/drain care per surgery  If pt on Tube feeding- please continue per hospital orders. Physician Certification: I certify the above information and transfer of Alex Glass  is necessary for the continuing treatment of the diagnosis listed and that she requires [facility] for greater 30 days.      Update Admission H&P: No change in H&P    PHYSICIAN SIGNATURE:  Electronically signed by Sunny Gonzalez MD on 5/21/22 at 9:57 AM EDT

## 2022-05-17 NOTE — FLOWSHEET NOTE
05/16/22 2210   Vitals   BP (!) 80/51   MAP (mmHg) (!) 61   Dr. Govind May notified of patient's decreased blood pressure, patient is alert and oriented x4 and denies pain. Per MD, hold metoprolol and keep patient bed rest. Will continue to monitor.

## 2022-05-18 LAB
ABSOLUTE EOS #: 0.05 K/UL (ref 0–0.44)
ABSOLUTE IMMATURE GRANULOCYTE: 0.35 K/UL (ref 0–0.3)
ABSOLUTE LYMPH #: 0.77 K/UL (ref 1.1–3.7)
ABSOLUTE MONO #: 0.46 K/UL (ref 0.1–1.2)
ANION GAP SERPL CALCULATED.3IONS-SCNC: 9 MMOL/L (ref 9–17)
ANTI-XA UNFRAC HEPARIN: <0.1 IU/L (ref 0.3–0.7)
BASOPHILS # BLD: 0 % (ref 0–2)
BASOPHILS ABSOLUTE: <0.03 K/UL (ref 0–0.2)
BUN BLDV-MCNC: 13 MG/DL (ref 8–23)
BUN/CREAT BLD: 20 (ref 9–20)
CALCIUM SERPL-MCNC: 8.7 MG/DL (ref 8.6–10.4)
CHLORIDE BLD-SCNC: 91 MMOL/L (ref 98–107)
CO2: 33 MMOL/L (ref 20–31)
CREAT SERPL-MCNC: 0.65 MG/DL (ref 0.5–0.9)
EOSINOPHILS RELATIVE PERCENT: 1 % (ref 1–4)
GFR AFRICAN AMERICAN: >60 ML/MIN
GFR NON-AFRICAN AMERICAN: >60 ML/MIN
GFR SERPL CREATININE-BSD FRML MDRD: ABNORMAL ML/MIN/{1.73_M2}
GLUCOSE BLD-MCNC: 102 MG/DL (ref 65–105)
GLUCOSE BLD-MCNC: 112 MG/DL (ref 65–105)
GLUCOSE BLD-MCNC: 144 MG/DL (ref 65–105)
GLUCOSE BLD-MCNC: 190 MG/DL (ref 70–99)
GLUCOSE BLD-MCNC: 96 MG/DL (ref 65–105)
HCT VFR BLD CALC: 26.3 % (ref 36.3–47.1)
HEMOGLOBIN: 7.7 G/DL (ref 11.9–15.1)
IMMATURE GRANULOCYTES: 4 %
LYMPHOCYTES # BLD: 8 % (ref 24–43)
MCH RBC QN AUTO: 26.8 PG (ref 25.2–33.5)
MCHC RBC AUTO-ENTMCNC: 29.3 G/DL (ref 28.4–34.8)
MCV RBC AUTO: 91.6 FL (ref 82.6–102.9)
MONOCYTES # BLD: 5 % (ref 3–12)
NRBC AUTOMATED: 0 PER 100 WBC
PDW BLD-RTO: 17.9 % (ref 11.8–14.4)
PLATELET # BLD: 263 K/UL (ref 138–453)
PMV BLD AUTO: 8.4 FL (ref 8.1–13.5)
POTASSIUM SERPL-SCNC: 3.8 MMOL/L (ref 3.7–5.3)
RBC # BLD: 2.87 M/UL (ref 3.95–5.11)
RBC # BLD: ABNORMAL 10*6/UL
SEG NEUTROPHILS: 82 % (ref 36–65)
SEGMENTED NEUTROPHILS ABSOLUTE COUNT: 7.54 K/UL (ref 1.5–8.1)
SODIUM BLD-SCNC: 133 MMOL/L (ref 135–144)
WBC # BLD: 9.2 K/UL (ref 3.5–11.3)

## 2022-05-18 PROCEDURE — 6370000000 HC RX 637 (ALT 250 FOR IP): Performed by: HOSPITALIST

## 2022-05-18 PROCEDURE — 2580000003 HC RX 258: Performed by: FAMILY MEDICINE

## 2022-05-18 PROCEDURE — 2700000000 HC OXYGEN THERAPY PER DAY

## 2022-05-18 PROCEDURE — 2060000000 HC ICU INTERMEDIATE R&B

## 2022-05-18 PROCEDURE — 6370000000 HC RX 637 (ALT 250 FOR IP): Performed by: INTERNAL MEDICINE

## 2022-05-18 PROCEDURE — 97530 THERAPEUTIC ACTIVITIES: CPT

## 2022-05-18 PROCEDURE — 97535 SELF CARE MNGMENT TRAINING: CPT

## 2022-05-18 PROCEDURE — 6370000000 HC RX 637 (ALT 250 FOR IP): Performed by: FAMILY MEDICINE

## 2022-05-18 PROCEDURE — 97110 THERAPEUTIC EXERCISES: CPT

## 2022-05-18 PROCEDURE — 94640 AIRWAY INHALATION TREATMENT: CPT

## 2022-05-18 PROCEDURE — 80048 BASIC METABOLIC PNL TOTAL CA: CPT

## 2022-05-18 PROCEDURE — 94761 N-INVAS EAR/PLS OXIMETRY MLT: CPT

## 2022-05-18 PROCEDURE — 85520 HEPARIN ASSAY: CPT

## 2022-05-18 PROCEDURE — 36415 COLL VENOUS BLD VENIPUNCTURE: CPT

## 2022-05-18 PROCEDURE — 82947 ASSAY GLUCOSE BLOOD QUANT: CPT

## 2022-05-18 PROCEDURE — 85025 COMPLETE CBC W/AUTO DIFF WBC: CPT

## 2022-05-18 RX ADMIN — CLONAZEPAM 1 MG: 0.5 TABLET ORAL at 09:25

## 2022-05-18 RX ADMIN — MIDODRINE HYDROCHLORIDE 10 MG: 10 TABLET ORAL at 12:18

## 2022-05-18 RX ADMIN — VENLAFAXINE HYDROCHLORIDE 150 MG: 75 CAPSULE, EXTENDED RELEASE ORAL at 08:39

## 2022-05-18 RX ADMIN — METFORMIN HYDROCHLORIDE 500 MG: 500 TABLET ORAL at 08:41

## 2022-05-18 RX ADMIN — OXYCODONE AND ACETAMINOPHEN 1 TABLET: 325; 10 TABLET ORAL at 06:17

## 2022-05-18 RX ADMIN — GLIPIZIDE 5 MG: 10 TABLET ORAL at 06:14

## 2022-05-18 RX ADMIN — PANTOPRAZOLE SODIUM 40 MG: 40 TABLET, DELAYED RELEASE ORAL at 06:14

## 2022-05-18 RX ADMIN — ANTI-FUNGAL POWDER MICONAZOLE NITRATE TALC FREE: 1.42 POWDER TOPICAL at 10:26

## 2022-05-18 RX ADMIN — LEVOFLOXACIN 750 MG: 750 TABLET, FILM COATED ORAL at 17:51

## 2022-05-18 RX ADMIN — GABAPENTIN 300 MG: 300 CAPSULE ORAL at 08:39

## 2022-05-18 RX ADMIN — BUDESONIDE AND FORMOTEROL FUMARATE DIHYDRATE 2 PUFF: 160; 4.5 AEROSOL RESPIRATORY (INHALATION) at 07:28

## 2022-05-18 RX ADMIN — SODIUM CHLORIDE, PRESERVATIVE FREE 10 ML: 5 INJECTION INTRAVENOUS at 08:45

## 2022-05-18 RX ADMIN — GABAPENTIN 300 MG: 300 CAPSULE ORAL at 20:37

## 2022-05-18 RX ADMIN — OXYCODONE AND ACETAMINOPHEN 1 TABLET: 325; 10 TABLET ORAL at 20:40

## 2022-05-18 RX ADMIN — FUROSEMIDE 40 MG: 40 TABLET ORAL at 08:39

## 2022-05-18 RX ADMIN — MIDODRINE HYDROCHLORIDE 10 MG: 10 TABLET ORAL at 08:39

## 2022-05-18 RX ADMIN — ARIPIPRAZOLE 5 MG: 5 TABLET ORAL at 20:38

## 2022-05-18 RX ADMIN — Medication 15 MG: at 21:20

## 2022-05-18 RX ADMIN — MIDODRINE HYDROCHLORIDE 10 MG: 10 TABLET ORAL at 16:30

## 2022-05-18 RX ADMIN — SENNOSIDES AND DOCUSATE SODIUM 1 TABLET: 50; 8.6 TABLET ORAL at 08:40

## 2022-05-18 RX ADMIN — ROSUVASTATIN CALCIUM 5 MG: 10 TABLET, FILM COATED ORAL at 08:39

## 2022-05-18 RX ADMIN — TRAZODONE HYDROCHLORIDE 300 MG: 100 TABLET ORAL at 20:38

## 2022-05-18 RX ADMIN — ASPIRIN 81 MG: 81 TABLET, COATED ORAL at 08:39

## 2022-05-18 RX ADMIN — SODIUM CHLORIDE, PRESERVATIVE FREE 10 ML: 5 INJECTION INTRAVENOUS at 21:22

## 2022-05-18 RX ADMIN — TAMSULOSIN HYDROCHLORIDE 0.4 MG: 0.4 CAPSULE ORAL at 08:39

## 2022-05-18 RX ADMIN — VENLAFAXINE HYDROCHLORIDE 150 MG: 75 CAPSULE, EXTENDED RELEASE ORAL at 20:37

## 2022-05-18 RX ADMIN — METOPROLOL TARTRATE 25 MG: 25 TABLET, FILM COATED ORAL at 20:38

## 2022-05-18 RX ADMIN — METFORMIN HYDROCHLORIDE 500 MG: 500 TABLET ORAL at 16:30

## 2022-05-18 RX ADMIN — BUDESONIDE AND FORMOTEROL FUMARATE DIHYDRATE 2 PUFF: 160; 4.5 AEROSOL RESPIRATORY (INHALATION) at 19:32

## 2022-05-18 RX ADMIN — MIDODRINE HYDROCHLORIDE 10 MG: 10 TABLET ORAL at 20:38

## 2022-05-18 RX ADMIN — METOPROLOL TARTRATE 25 MG: 25 TABLET, FILM COATED ORAL at 08:39

## 2022-05-18 ASSESSMENT — PAIN DESCRIPTION - ORIENTATION
ORIENTATION: RIGHT
ORIENTATION: RIGHT

## 2022-05-18 ASSESSMENT — PAIN DESCRIPTION - LOCATION
LOCATION: SHOULDER;ABDOMEN
LOCATION: GENERALIZED

## 2022-05-18 ASSESSMENT — PAIN SCALES - GENERAL
PAINLEVEL_OUTOF10: 8
PAINLEVEL_OUTOF10: 10

## 2022-05-18 ASSESSMENT — PAIN DESCRIPTION - DESCRIPTORS: DESCRIPTORS: ACHING;DISCOMFORT

## 2022-05-18 NOTE — PROGRESS NOTES
Pt c/o of constant chest discomfort. Pt stated this while Dr. Mario Garay was in room. Dr. Mario Garay stated that chest discomfort is normal with midodrine and asked pt if she would want a cardiac cath to which she denied. Dr. Mario Garay states he is unconcerned as this is a normal finding.

## 2022-05-18 NOTE — PROGRESS NOTES
Section of Cardiology  Progress Note      Date:  5/18/2022  Patient: Sandra Salguero  Admission:  5/14/2022  7:18 AM  Admit DX: Atrial fibrillation with rapid ventricular response ([address] [I48.91]  Age:  71 y. o., 1950     LOS: 4 days     Reason for evaluation:   Atrial fibrillation  COPD with exacerbation  SUBJECTIVE:     The patient was seen and examined. Notes and labs reviewed. There were not complications over night. Patient's cardiac review of systems: positive for dyspnea. The patient is generally feeling unchanged. OBJECTIVE:      EXAM:   Vitals:    VITALS:  BP 83/72   Pulse 92   Temp 97.6 °F (36.4 °C) (Temporal)   Resp 21   Ht 5' 8\" (1.727 m)   Wt 156 lb (70.8 kg)   SpO2 97%   BMI 23.72 kg/m²   24HR INTAKE/OUTPUT:    Intake/Output Summary (Last 24 hours) at 5/18/2022 1748  Last data filed at 5/18/2022 1315  Gross per 24 hour   Intake 530 ml   Output 700 ml   Net -170 ml       CONSTITUTIONAL:  awake, alert, cooperative, no apparent distress, and appears stated age. HEENT: Normal jugular venous pulsations, no carotid bruits. Head is atraumatic, normocephalic. Eyes and oral mucosa are normal.  LUNGS: Good respiratory effort On auscultation: clear to auscultation bilaterally and diminished breath sounds bibasilar  CARDIOVASCULAR:  Normal apical impulse, irregular rate and rhythm, normal S1 and S2, .ABDOMEN: Soft, nontender, nondistended. Bowel sounds present. No masses or tenderness. No bruit. SKIN: Warm and dry. EXTREMITIES:1+ lower extremity edema. Motor movement grossly intact. No cyanosis or clubbing.     Current Inpatient Medications:   midodrine  10 mg Oral 4x Daily    metoprolol tartrate  25 mg Oral BID    levoFLOXacin  750 mg Oral Q24H    miconazole   Topical BID    aspirin EC  81 mg Oral Daily    budesonide-formoterol  2 puff Inhalation BID    pantoprazole  40 mg Oral QAM AC    venlafaxine  150 mg Oral BID    rosuvastatin  5 mg Oral Daily    ARIPiprazole  5 mg Oral Nightly    metFORMIN  500 mg Oral BID     traZODone  300 mg Oral Nightly    melatonin  15 mg Oral Nightly    tamsulosin  0.4 mg Oral Daily    gabapentin  300 mg Oral BID    furosemide  40 mg Oral Daily    sennosides-docusate sodium  1 tablet Oral BID    sodium chloride flush  5-40 mL IntraVENous 2 times per day    glipiZIDE  5 mg Oral QAM AC       IV Infusions (if any):   dilTIAZem (CARDIZEM) 125 mg in dextrose 5% 125 mL infusion Stopped (05/14/22 2345)    sodium chloride Stopped (05/14/22 1625)    sodium chloride      dextrose      heparin (PORCINE) Infusion Stopped (05/17/22 1546)       Diagnostics:   Telemetry: Atrial fibrillation with controlled ventricular response  . Labs:   CBC:  Recent Labs     05/17/22  0616 05/18/22  0300   WBC 7.8 9.2   HGB 7.6* 7.7*   HCT 27.1* 26.3*    263     Magnesium:  Recent Labs     05/16/22  0803   MG 1.5*     BMP:  Recent Labs     05/17/22  0616 05/18/22  0300    133*   K 3.8 3.8   CALCIUM 8.6 8.7   CO2 32* 33*   BUN 11 13   CREATININE 0.61 0.65   LABGLOM >60 >60   GLUCOSE 110* 190*     BNP:No results for input(s): BNP, PROBNP in the last 72 hours. PT/INR:No results for input(s): PROTIME, INR in the last 72 hours. APTT:No results for input(s): APTT in the last 72 hours. CARDIAC ENZYMES:No results for input(s): MYOGLOBIN, CKTOTAL, CKMB, CKMBINDEX, TROPHS, TROPONINT in the last 72 hours. FASTING LIPID PANEL:No results found for: HDL, LDLDIRECT, LDLCALC, TRIG  LIVER PROFILE:No results for input(s): AST, ALT, LABALBU, ALKPHOS, BILITOT, BILIDIR, IBILI, PROT, GLOB, ALBUMIN in the last 72 hours. ASSESSMENT:  1.  New onset atrial fibrillation with rapid ventricular response, converted to sinus rhythm. 2.  Chronic history of sinus tachycardia  3.  History of moderate coronary artery disease of the circumflex  4.  Moderate aortic stenosis. 5.  Chronic right bundle branch block.   6.  COPD  7.  History of right lung cancer  8.  History of non-insulin-dependent diabetes mellitus       Patient Active Problem List   Diagnosis    Valvular heart disease    Type 2 diabetes mellitus without complication, without long-term current use of insulin ([address]    Open wound of right ankle    COPD (chronic obstructive pulmonary disease) (HCC)    Syncope and collapse    Chronic ulcer of right heel ([address]    Multifocal pneumonia    Aortic valve stenosis    Esophageal dilatation    Aspiration pneumonia of both lower lobes due to gastric secretions ([address]    Falls frequently    Chronic respiratory failure (HCC)    Contusion of right knee    Closed fracture of right distal radius    Subdural hemorrhage (HCC)    Subarachnoid hemorrhage (HCC)    Traumatic hemorrhage of cerebrum (HCC)    Chronic bilateral low back pain    Cellulitis of both feet    Acute on chronic congestive heart failure (HCC)    Bilateral leg edema    Cellulitis    Lung mass    Malignant neoplasm of upper lobe of right lung (HCC)    Urinary tract infectious disease    Closed fracture of one rib of right side    Degenerative disc disease, lumbar    Moderate malnutrition (HCC)    Rhabdomyolysis    Unable to ambulate    Mild malnutrition (HCC)    Atrial fibrillation with rapid ventricular response ([address]       PLAN:  1. Patient was not felt to be a candidate for anticoagulation in view of high fall risk. 2. Continue metoprolol for ventricular rate control ,and midodrine for hypotension. 3.  Discharge planning, stop IV heparin as patient is at a very high fall risk for long-term oral anticoagulation. 4.  Options of rate control are limited and patient does not want any invasive procedures as AV agata ablation and pacemaker implantation for ventricular rate control. Discussed with patient and nursing.     Lien Joyner MD

## 2022-05-18 NOTE — PROGRESS NOTES
[address] Hospitalist        5/18/2022   4:15 PM    Name:  Angélica Coley  MRN:    [id number]     Acct:     [de-identified]   Room:  2031/2031-01  IP Day: 4     Admit Date: 5/14/2022  7:18 AM  PCP: Doris Cooper MD    C/C:   Chief Complaint   Patient presents with    Shortness of Breath    Tachycardia       Assessment:      · Atrial fibrillation with rapid ventricular response  · Acute exacerbation of COPD  · Chronic respiratory failure with hypoxia requiring O2 via nasal cannula  · Bronchogenic carcinoma  · Essential hypertension currently Hypotension  · Coronary artery disease, native vessel  · Diabetes mellitus type 2  · Mixed hyperlipidemia  · Anxiety disorder  · History of hypotension  · Osteoarthritis, multiple joints  · Mood disorder  · Gastroesophageal reflux disease without esophagitis        Plan:     · Patient is in CVICU  · O2 maintain oxygen saturation greater than 92%  ·   · Monitor heart rate and blood pressure  · On metoprolol  · Continue ProAmatine  · IV heparin  · Cardiology following, do not feel patient is a candidate for long-term anticoagulation in view of high fall risk      ·   · DVT and GI prophylaxis  · Continue to monitor/telemetry/CBC with differential daily/BMP daily  · Continue medications as below  ·   · Patient was recommended to be discharged to skilled facility, however she is refusing it  ·   · Discharge planning as per cardiology recommendation    Scheduled Meds:   midodrine  10 mg Oral 4x Daily    metoprolol tartrate  25 mg Oral BID    levoFLOXacin  750 mg Oral Q24H    miconazole   Topical BID    aspirin EC  81 mg Oral Daily    budesonide-formoterol  2 puff Inhalation BID    pantoprazole  40 mg Oral QAM AC    venlafaxine  150 mg Oral BID    rosuvastatin  5 mg Oral Daily    ARIPiprazole  5 mg Oral Nightly    metFORMIN  500 mg Oral BID WC    traZODone  300 mg Oral Nightly    melatonin  15 mg Oral Nightly    tamsulosin  0.4 mg Oral Daily  gabapentin  300 mg Oral BID    furosemide  40 mg Oral Daily    sennosides-docusate sodium  1 tablet Oral BID    sodium chloride flush  5-40 mL IntraVENous 2 times per day    glipiZIDE  5 mg Oral QAM AC     Continuous Infusions:   dilTIAZem (CARDIZEM) 125 mg in dextrose 5% 125 mL infusion Stopped (22 2345)    sodium chloride Stopped (22 1625)    sodium chloride      dextrose      heparin (PORCINE) Infusion Stopped (22 1546)     PRN Meds:  ipratropium-albuterol, 1 ampule, Q4H PRN  albuterol sulfate HFA, 2 puff, Q4H PRN  clonazePAM, 1 mg, TID PRN  sodium chloride flush, 10 mL, PRN  sodium chloride, , PRN  potassium chloride, 40 mEq, PRN   Or  potassium alternative oral replacement, 40 mEq, PRN   Or  potassium chloride, 10 mEq, PRN  magnesium sulfate, 1,000 mg, PRN  ondansetron, 4 mg, Q8H PRN   Or  ondansetron, 4 mg, Q6H PRN  polyethylene glycol, 17 g, Daily PRN  acetaminophen, 650 mg, Q6H PRN   Or  acetaminophen, 650 mg, Q6H PRN  oxyCODONE-acetaminophen, 1 tablet, Q4H PRN  glucose, 4 tablet, PRN  dextrose bolus, 125 mL, PRN   Or  dextrose bolus, 250 mL, PRN  glucagon (rDNA), 1 mg, PRN  dextrose, 100 mL/hr, PRN  heparin (porcine), 4,000 Units, PRN  heparin (porcine), 2,000 Units, PRN            Subjective:     Patient seen and examined at bedside. No overnight events. Patient still tachycardic and hypotensive but improved from before  Afebrile  Pt. Denies any CP, SOB, palpitation, HA, dizziness, chills, cough, cold, changes in urination, BM or skin changes or any pain. ROS:  A 10 point system reviewed and negative otherwise mentioned above.         Physical Examination:      Vitals:  /63   Pulse 92   Temp 97 °F (36.1 °C) (Temporal)   Resp 24   Ht 5' 8\" (1.727 m)   Wt 156 lb (70.8 kg)   SpO2 96%   BMI 23.72 kg/m²   Temp (24hrs), Av.4 °F (36.3 °C), Min:97 °F (36.1 °C), Max:98 °F (36.7 °C)    Weight:   Wt Readings from Last 3 Encounters:   22 156 lb (70.8 kg) 04/06/22 153 lb 0.9 oz (69.4 kg)   03/25/22 167 lb (75.8 kg)     I/O last 3 completed shifts:  I/O last 3 completed shifts: In: 480 [P.O.:480]  Out: 1200 [Urine:1200]     Recent Labs     05/17/22  1702 05/17/22  1909 05/18/22  0719 05/18/22  1125   POCGLU 144* 154* 102 96         General appearance - alert, well appearing, and in no acute distress  Mental status - oriented to person, place, and time with normal affect  Head - normocephalic and atraumatic  Eyes - pupils equal and reactive, extraocular eye movements intact, conjunctiva clear  Ears - hearing appears to be intact  Nose - no drainage noted  Mouth - mucous membranes moist  Neck - supple, no carotid bruits, thyroid not palpable  Chest - clear to auscultation, normal effort  Heart - normal rate, regular rhythm, no murmur  Abdomen - soft, nontender, nondistended, bowel sounds present all four quadrants, no masses, hepatomegaly or splenomegaly  Neurological - normal speech, no focal findings or movement disorder noted, cranial nerves II through XII grossly intact  Extremities - peripheral pulses palpable, no pedal edema or calf pain with palpation  Skin - no gross lesions, rashes, or induration noted        Medications: Allergies:    Allergies   Allergen Reactions    Codeine      \"feeling of heavy on chest\"    Dye [Iodides]      Hallucination,vomiting       Current Meds:   Current Facility-Administered Medications:     midodrine (PROAMATINE) tablet 10 mg, 10 mg, Oral, 4x Daily, Fabiola Stanton MD, 10 mg at 05/18/22 1218    metoprolol tartrate (LOPRESSOR) tablet 25 mg, 25 mg, Oral, BID, Leif Cervantes MD, 25 mg at 05/18/22 0839    levoFLOXacin (LEVAQUIN) tablet 750 mg, 750 mg, Oral, Q24H, Whitney Becerril MD, 750 mg at 05/17/22 1736    miconazole (MICOTIN) 2 % powder, , Topical, BID, Yoli Hanson MD, Given at 05/18/22 1026    ipratropium-albuterol (DUONEB) nebulizer solution 1 ampule, 1 ampule, Inhalation, Q4H PRN, MD Juan Myers per day, Francisco Javier Seo MD, 10 mL at 05/18/22 0845    sodium chloride flush 0.9 % injection 10 mL, 10 mL, IntraVENous, PRN, Whitney Becerril MD    0.9 % sodium chloride infusion, , IntraVENous, PRN, Whitney Becerril MD    potassium chloride (KLOR-CON M) extended release tablet 40 mEq, 40 mEq, Oral, PRN, 40 mEq at 05/14/22 1657 **OR** potassium bicarb-citric acid (EFFER-K) effervescent tablet 40 mEq, 40 mEq, Oral, PRN, 40 mEq at 05/16/22 0945 **OR** potassium chloride 10 mEq/100 mL IVPB (Peripheral Line), 10 mEq, IntraVENous, PRN, Whitney Becerril MD    magnesium sulfate 1000 mg in dextrose 5% 100 mL IVPB, 1,000 mg, IntraVENous, PRN, Francisco Javier Seo MD, Stopped at 05/16/22 2227    ondansetron (ZOFRAN-ODT) disintegrating tablet 4 mg, 4 mg, Oral, Q8H PRN **OR** ondansetron (ZOFRAN) injection 4 mg, 4 mg, IntraVENous, Q6H PRN, Whitney Becerril MD    polyethylene glycol (GLYCOLAX) packet 17 g, 17 g, Oral, Daily PRN, Whitney Becerril MD    acetaminophen (TYLENOL) tablet 650 mg, 650 mg, Oral, Q6H PRN **OR** acetaminophen (TYLENOL) suppository 650 mg, 650 mg, Rectal, Q6H PRN, Whitney Becerril MD    oxyCODONE-acetaminophen (PERCOCET)  MG per tablet 1 tablet, 1 tablet, Oral, Q4H PRN, Whitney Becerril MD, 1 tablet at 05/18/22 0617    glucose chewable tablet 16 g, 4 tablet, Oral, PRN, Whitney Becerril MD    dextrose bolus 10% 125 mL, 125 mL, IntraVENous, PRN **OR** dextrose bolus 10% 250 mL, 250 mL, IntraVENous, PRN, Whitney Becerril MD    glucagon (rDNA) injection 1 mg, 1 mg, IntraMUSCular, PRN, Whitney Becerril MD    dextrose 5 % solution, 100 mL/hr, IntraVENous, PRN, Whitney Becerril MD    heparin (porcine) injection 4,000 Units, 4,000 Units, IntraVENous, PRN, Stanislav Aranda MD, 4,000 Units at 05/15/22 1301    heparin (porcine) injection 2,000 Units, 2,000 Units, IntraVENous, PRN, Stanislav Aranda MD, 2,000 Units at 05/17/22 0720    heparin 25,000 units in dextrose 5% 250 mL (premix) infusion, 5-30 Units/kg/hr, IntraVENous, Continuous, Emily Lnog MD, Stopped at 05/17/22 1546    glipiZIDE (GLUCOTROL) tablet 5 mg, 5 mg, Oral, QAM AC, Whitney Becerril MD, 5 mg at 05/18/22 8602      I/O (24Hr): Intake/Output Summary (Last 24 hours) at 5/18/2022 1615  Last data filed at 5/18/2022 1315  Gross per 24 hour   Intake 530 ml   Output 1000 ml   Net -470 ml       Data:           Labs:    Hematology:  Recent Labs     05/16/22  0803 05/17/22  0616 05/18/22  0300   WBC 10.6 7.8 9.2   RBC 3.12* 2.94* 2.87*   HGB 8.3* 7.6* 7.7*   HCT 29.2* 27.1* 26.3*   MCV 93.6 92.2 91.6   MCH 26.6 25.9 26.8   MCHC 28.4 28.0* 29.3   RDW 17.9* 17.8* 17.9*    269 263   MPV 8.4 8.7 8.4     Chemistry:  Recent Labs     05/16/22  0803 05/17/22  0616 05/18/22  0300    139 133*   K 3.3* 3.8 3.8   CL 97* 95* 91*   CO2 33* 32* 33*   GLUCOSE 48* 110* 190*   BUN 12 11 13   CREATININE 0.63 0.61 0.65   MG 1.5*  --   --    ANIONGAP 11 12 9   LABGLOM >60 >60 >60   GFRAA >60 >60 >60   CALCIUM 8.9 8.6 8.7     Recent Labs     05/17/22  0744 05/17/22  1129 05/17/22  1702 05/17/22  1909 05/18/22  0719 05/18/22  1125   POCGLU 82 100 144* 154* 102 96       Lab Results   Component Value Date/Time    SPECIAL LT AC 10ML 03/29/2022 07:28 PM     Lab Results   Component Value Date/Time    CULTURE NO GROWTH 5 DAYS 03/29/2022 07:28 PM       Lab Results   Component Value Date    POCPH 7.36 04/12/2017    PHART 7.42 08/26/2012    PH 7.22 04/11/2017    POCPCO2 45 04/12/2017    DPW7DMF 41 08/26/2012    PCO2 54 04/11/2017    POCPO2 93 04/12/2017    PO2ART 59 08/26/2012    PO2 89 04/11/2017    POCHCO3 25.3 04/12/2017    ZIF4KCH 26.1 08/26/2012    HCO3 22.2 04/11/2017    NBEA NOT REPORTED 04/12/2017    PBEA 0 04/12/2017    KZQ6NUQ 27 04/12/2017    MXTH5KQP 97 04/12/2017    W7DMQTBV 92.1 08/26/2012    O2SAT 95 04/11/2017    FIO2 UNKNOWN 10/31/2017       Radiology:    XR CHEST PORTABLE    Result Date: 5/15/2022  1. Improved right pleural effusion.  2.  Patchy opacity in the medial right upper lobe, possibly post treatment change versus residual tumor. Correlation with any recent CT imaging is recommended. All radiological studies reviewed  Code Status:  Full Code        Electronically signed by Fanta Fraser MD on 5/18/2022 at 4:15 PM    This note was created with the assistance of a speech-recognition program.  Although the intention is to generate a document that actually reflects the content of the visit, no guarantees can be provided that every mistake has been identified and corrected by editing. Note was updated later by me after  physical examination and  completion of the assessment.

## 2022-05-18 NOTE — PROGRESS NOTES
ANTIMICROBIAL STEWARDSHIP  Upon review of the patient's chart, the committee has the following recommendation for your consideration:    Indication: AECOPD  Day of Antimicrobial Therapy: 4  Recommendation   Monitor off antibiotics. Rationale:  Procalcitonin negative suggesting this is not a bacterial lung infection. WBCs normal.  Patient remains afebrile. Recent Labs     05/17/22  0616 05/18/22  0300   WBC 7.8 9.2     Recent Labs     05/16/22  0803   PROCAL 0.09*       Unnecessary or inappropriate antimicrobial use increases the risk of subsequent infections with resistant bacterial infections and drug-associated toxicities including C. difficile infection. End date was modified to 2 more doses (Total doses IV + PO would equal 5 days, which was selected with the original IV order). Thank you. Sandra Gore, [facility], PharmD  5/18/2022  8:01 AM    The Antimicrobial Stewardship Committee at [facility] is led by  Leigh Conley MD, Infectious Diseases Section Chair.

## 2022-05-18 NOTE — PROGRESS NOTES
Physical Therapy  Facility/Department: [facility]  Daily Treatment Note  NAME: Rodolfo Hernandes  : 1950  MRN: [id number]    Date of Service: 2022    Discharge Recommendations:  Patient would benefit from continued therapy after discharge     Pt currently functioning below baseline. Would suggest additional therapy at time of discharge to maximize long term outcomes and prevent re-admission. Please refer to AM-PAC score for current level of function. Patient Diagnosis(es): The primary encounter diagnosis was COPD exacerbation ([address] A diagnosis of Atrial fibrillation with rapid ventricular response (HCC) was also pertinent to this visit. Assessment   Assessment: Patient limited today by Rt sided pain in back/side/chest but able to tolerate supine and seated A/AROM with extended rest breaks. Activity Tolerance: Treatment limited secondary to agitation;Patient limited by pain; Patient limited by endurance   AM-PAC Score:   Plan    Plan  Plan: 5-7 times per week  Current Treatment Recommendations: Strengthening;Balance training;Functional mobility training;Patient/Caregiver education & training;Home exercise program;Safety education & training; Wheelchair mobility training;Transfer training;Gait training     Restrictions  Restrictions/Precautions  Restrictions/Precautions: Fall Risk,Contact Precautions,Up as Tolerated  Required Braces or Orthoses?: No  Position Activity Restriction  Other position/activity restrictions: up with assist, external cath, L chest port, telemetry, elevate heels, pt easily agitiated (does not like to be asked questions or given \"orders\"     Subjective    Subjective  Subjective: Patient only agreeable for bed exercises at this time. Orientation  Overall Orientation Status: Within Functional Limits  Cognition  Overall Cognitive Status: Exceptions  Arousal/Alertness: Appropriate responses to stimuli  Following Commands: Follows multistep commands with repitition; Follows multistep commands with increased time  Attention Span: Appears intact  Memory: Appears intact  Safety Judgement: Decreased awareness of need for assistance;Decreased awareness of need for safety  Problem Solving: Decreased awareness of errors;Assistance required to identify errors made;Assistance required to correct errors made  Insights: Decreased awareness of deficits  Initiation: Requires cues for all  Sequencing: Requires cues for some     Objective   Vitals     Bed Mobility Training  Bed Mobility Training: Yes  Overall Level of Assistance: Contact-guard assistance  Interventions: Tactile cues; Verbal cues  Rolling: Contact-guard assistance  Supine to Sit: Minimal Assistance  Sit to Supine: Minimal Assistance  Scooting: Contact-guard assistance  Balance  Sitting: High guard  Neuromuscular Education  NDT Treatment: Gait ;Sitting;Standing  PT Exercises  A/AROM Exercises: Supine ousmane LE AROM 2 sets x 10 reps with several rest breaks. once seated ousmane LE AROM x 8 reps with several rest breaks needed. Patient reported feeling dizzy and tired. Assisted back into bed. Safety Devices  Type of Devices: All fall risk precautions in place; Patient at risk for falls;Call light within reach; Bed alarm in place; Left in bed;Heels elevated for pressure relief;Gait belt  Restraints  Restraints Initially in Place: No       Goals  Short Term Goals  Time Frame for Short term goals: 15  Short term goal 1: bed mob ind  Short term goal 2: trnasfers ind  Short term goal 3: amb x 50 ft w. r.walker sBA  Short term goal 4: w/c x 200 ft w/ turns and manuvering ind  Patient Goals   Patient goals : Pt goal is for a safe d/c and she is hopeful that she will be strong enough to go home    Education  Patient Education  Education Given To: Patient  Education Provided: Role of Therapy;Home Exercise Program;Plan of Care  Education Method: Verbal  Education Outcome: Continued education needed    Therapy Time   Individual Concurrent Group Co-treatment   Time In 1411         Time Out 1435         Minutes 24 [facility] Leobardo Carter, [place]

## 2022-05-18 NOTE — PROGRESS NOTES
Occupational Therapy  Facility/Department: [facility] CVICU  Rehabilitation Occupational Therapy Daily Treatment Note    Date: 22  Patient Name: Charity Collazo       Room:   MRN: [id number]  Account: [de-identified]   : 1950  (71 y.o.) Gender: female         Pt currently functioning below baseline. Would suggest additional therapy at time of discharge to maximize long term outcomes and prevent re-admission. Please refer to AM-PAC score for current level of function. AM PAC 17              Past Medical History:  has a past medical history of AAA (abdominal aortic aneurysm) (HCC), Acid reflux, Anxiety and depression, Aortic stenosis, Arthritis, Blister of ankle, right, CAD (coronary artery disease), Cancer ([facility] [address] COPD (chronic obstructive pulmonary disease) ([facility] [address] Diabetes mellitus ([facility] [address] Falls, Heart block, Hypokalemia, MDRO (multiple drug resistant organisms) resistance, MRSA (methicillin resistant staph aureus) culture positive, On home oxygen therapy, On home oxygen therapy, Overactive bladder, Pneumonia, PONV (postoperative nausea and vomiting), and Vitamin D deficiency. Past Surgical History:   has a past surgical history that includes back surgery; eye surgery; Cholecystectomy; Appendectomy; Hysterectomy; Tonsillectomy; lumbar laminectomy; Endoscopy, colon, diagnostic (2016); aortic valve repair (N/A, 2017); bronchoscopy (N/A, 2020); IR INSERT PICC VAD W SQ PORT >5 YEARS (2020); and IR PORT PLACEMENT > 5 YEARS (2020). Restrictions  Restrictions/Precautions: Fall Risk;Contact Precautions; Up as Tolerated  Other position/activity restrictions: up with assist, external cath, L chest port, telemetry, elevate heels, pt easily agitiated (does not like to be asked questions or given \"orders\"  Required Braces or Orthoses?: No    Subjective  Subjective: Pt in bed upon arrival. Pt agreeable to walk. Restrictions/Precautions: Fall Risk;Contact Precautions; Up as Tolerated Objective     Cognition  Overall Cognitive Status: Exceptions  Arousal/Alertness: Appropriate responses to stimuli  Following Commands: Follows multistep commands with repitition; Follows multistep commands with increased time  Attention Span: Appears intact  Memory: Appears intact  Safety Judgement: Decreased awareness of need for assistance;Decreased awareness of need for safety  Problem Solving: Decreased awareness of errors;Assistance required to identify errors made;Assistance required to correct errors made  Insights: Decreased awareness of deficits  Initiation: Requires cues for all  Sequencing: Requires cues for some  Orientation  Overall Orientation Status: Within Functional Limits         ADL  Upper Extremity Bathing  Assistance Level: Set-up; Minimal assistance  Skilled Clinical Factors: seated EOB  Lower Extremity Bathing  Assistance Level: Maximum assistance  Skilled Clinical Factors: standing with walker  Upper Extremity Dressing  Assistance Level: Set-up; Minimal assistance  Skilled Clinical Factors: to change gowns  Lower Extremity Dressing  Assistance Level: Set-up; Maximum assistance;Dependent  Skilled Clinical Factors: to change brief  Putting On/Taking Off Footwear  Assistance Level: Maximum assistance;Dependent  Toileting  Assistance Level: Maximum assistance  Skilled Clinical Factors: for hygiene after BM and brief mgmt  Toilet Transfers  Technique: Stand step  Equipment: Standard toilet;Grab bars  Additional Factors: Verbal cues;Cues for hand placement; Increased time to complete  Assistance Level: Maximum assistance          Functional Mobility  Device: Rolling walker  Activity: To/From bathroom  Assistance Level: Minimal assistance  Skilled Clinical Factors: Verbal cues for safety and writer managed multiple lines. Bed Mobility  Overall Assistance Level: Moderate Assistance  Additional Factors: Verbal cues; Increased time to complete; Head of bed raised; With handrails  Sit to Supine  Assistance Level: Minimal assistance  Supine to Sit  Assistance Level: Moderate assistance  Scooting  Assistance Level: Stand by assist  Sit to Stand  Assistance Level: Minimal assistance; Moderate assistance  Stand to Sit  Assistance Level: Minimal assistance  Skilled Clinical Factors: Verbal cues for hand placement, nose over toes and overall safety. Neuromuscular Education  Neuromuscular education: Yes  NDT Treatment: Gait ;Sitting;Standing     Assessment  Assessment  Assessment: Pt more cooperative today but is still limited by decreased endurance/activity tolerance and overall strength. Pt has difficulty completing self care thoroughly, joselo LB care. Pt still presents with deficits in bed mobility, transfers and functional mobility. Skilled OT indicated to increase safety and IND with all functional tasks to ensure a safe return to PLOF. Activity Tolerance: Treatment limited secondary to agitation;Patient limited by pain; Patient limited by endurance  Discharge Recommendations: Patient would benefit from continued therapy after discharge  Safety Devices  Safety Devices in place: Yes  Type of devices: All fall risk precautions in place; Left in bed;Bed alarm in place;Call light within reach;Nurse notified;Gait belt;Patient at risk for falls    Patient Education  Education  Education Given To: Patient  Education Provided: Transfer Training; Safety; Energy Conservation  Education Method: Verbal  Barriers to Learning: None  Education Outcome: Verbalized understanding;Demonstrated understanding    Plan  Plan  Times per Week: 4-5x/wk 1x/day as emely  Current Treatment Recommendations: Strengthening;Balance training;Functional mobility training; Endurance training; Safety education & training;Equipment evaluation, education, & procurement;Patient/Caregiver education & training;Self-Care / ADL; Home management training    Goals  Patient Goals   Patient goals : To go home!   Short Term Goals  Time Frame for Short term goals: By discharge, pt to demo  Short Term Goal 1: ADL transfers and functional mobility to CGA with use of AD as needed. Short Term Goal 2: toileting to CGA with use of AD/grab bars as needed. Short Term Goal 3: increased B UE strength by 1/2 grade to assist with functional tasks/I with B UE HEP with use of handouts as needed. Short Term Goal 4: bed mobility to SBA with use of bedrails as needed. Short Term Goal 5: UB ADLs to Set up and LB ADLs to SBA with use of AD/AE as needed. Long Term Goals  Long Term Goal 1: Pt to be I with fall prevention education, EC/WS tech, recommendations for discharge/AE with use of handouts as needed. Long Term Goal 2: Pt to demo increased standing emely > 10 min with Min A using AD as needed to reduce risk of falls during functional tasks.     Therapy Time   Individual Concurrent Group Co-treatment   Time In [address]         Time Out 1026         Minutes 1000 [facility], [facility]

## 2022-05-18 NOTE — FLOWSHEET NOTE
Writer attempted to visit Patient in the inpatient setting. Patient appeared to be sleeping. Writer offered a silent prayer at Pt's bedside. Writer will attempt to visit Patient another time. Writer notes that the [address] has visited with Patient.        05/18/22 1536   Encounter Summary   Encounter Overview/Reason  Spiritual/Emotional Needs   Service Provided For: Patient   Referral/Consult From: [facility] Family members   Last Encounter  03/29/22   Complexity of Encounter Low   Begin Time 1530   End Time  1533   Total Time Calculated 3 min   Encounter    Type Follow up   Assessment/Intervention/Outcome   Assessment Unable to assess   Intervention Sustaining Presence/Ministry of presence;Prayer (assurance of)/[place]   Outcome Did not respond     Electronically signed by Isa Other, Oncology Outpatient   [phone]  [facility] [address], [address] [facility] Radiation Oncology  5/18/2022  3:38 PM

## 2022-05-18 NOTE — PLAN OF CARE
Problem: Chronic Conditions and Co-morbidities  Goal: Patient's chronic conditions and co-morbidity symptoms are monitored and maintained or improved  Outcome: Progressing  Flowsheets (Taken 5/18/2022 0800)  Care Plan - Patient's Chronic Conditions and Co-Morbidity Symptoms are Monitored and Maintained or Improved: Monitor and assess patient's chronic conditions and comorbid symptoms for stability, deterioration, or improvement     Problem: Discharge Planning  Goal: Discharge to home or other facility with appropriate resources  Outcome: Progressing  Flowsheets (Taken 5/18/2022 0800)  Discharge to home or other facility with appropriate resources: Identify barriers to discharge with patient and caregiver     Problem: Skin/Tissue Integrity  Goal: Absence of new skin breakdown  Description: 1. Monitor for areas of redness and/or skin breakdown  2. Assess vascular access sites hourly  3. Every 4-6 hours minimum:  Change oxygen saturation probe site  4. Every 4-6 hours:  If on nasal continuous positive airway pressure, respiratory therapy assess nares and determine need for appliance change or resting period.   Outcome: Progressing

## 2022-05-18 NOTE — FLOWSHEET NOTE
[facility] NOTE    Room # 2031/2031-01   Name: Mary Phelan            Hoahaoism: Maritza Carrera     Reason for visit: Routine    I visited the patient    Admit Date & Time: 5/14/2022  7:18 AM    Assessment:  Mary Phelan is a 71 y.o. female in the hospital because heart issues. Upon entering the room patient was resting with eyes open. Intervention:  I introduced myself and my title as . Asked if there is anything I could so for her. Pt requested prayer. Prayed for pt for comfort, encouragement, met needs, and healing. Outcome:  Pt expressed gratitude for my visit and for prayer. Pt seemed to be coping well.     Plan:  Chaplains will remain available to offer spiritual and emotional support as needed    Electronically signed by Rg Mena on 5/18/2022 at 2:23 PM.  Meena       05/18/22 1420   Encounter Summary   Encounter Overview/Reason  Spiritual/Emotional Needs   Service Provided For: Patient   Referral/Consult From: [facility] Family members   Last Encounter  05/18/22   Complexity of Encounter Low   Begin Time 1320   End Time  1325   Total Time Calculated 5 min   Encounter    Type Follow up   Assessment/Intervention/Outcome   Assessment Calm;Passive   Intervention Sustaining Presence/Ministry of presence;Nurtured Hope;Prayer (assurance of)/[place]   Outcome Peace   Plan and Referrals   Plan/Referrals Continue to visit, (comment)

## 2022-05-19 LAB
ABSOLUTE EOS #: 0.11 K/UL (ref 0–0.44)
ABSOLUTE IMMATURE GRANULOCYTE: 0.32 K/UL (ref 0–0.3)
ABSOLUTE LYMPH #: 0.97 K/UL (ref 1.1–3.7)
ABSOLUTE MONO #: 0.76 K/UL (ref 0.1–1.2)
ANION GAP SERPL CALCULATED.3IONS-SCNC: 9 MMOL/L (ref 9–17)
BASOPHILS # BLD: 0 % (ref 0–2)
BASOPHILS ABSOLUTE: 0 K/UL (ref 0–0.2)
BUN BLDV-MCNC: 12 MG/DL (ref 8–23)
BUN/CREAT BLD: 19 (ref 9–20)
CALCIUM SERPL-MCNC: 9.2 MG/DL (ref 8.6–10.4)
CHLORIDE BLD-SCNC: 95 MMOL/L (ref 98–107)
CO2: 34 MMOL/L (ref 20–31)
CREAT SERPL-MCNC: 0.62 MG/DL (ref 0.5–0.9)
EOSINOPHILS RELATIVE PERCENT: 1 % (ref 1–4)
GFR AFRICAN AMERICAN: >60 ML/MIN
GFR NON-AFRICAN AMERICAN: >60 ML/MIN
GFR SERPL CREATININE-BSD FRML MDRD: ABNORMAL ML/MIN/{1.73_M2}
GLUCOSE BLD-MCNC: 100 MG/DL (ref 65–105)
GLUCOSE BLD-MCNC: 145 MG/DL (ref 65–105)
GLUCOSE BLD-MCNC: 86 MG/DL (ref 70–99)
GLUCOSE BLD-MCNC: 89 MG/DL (ref 65–105)
GLUCOSE BLD-MCNC: 93 MG/DL (ref 65–105)
HCT VFR BLD CALC: 28.3 % (ref 36.3–47.1)
HEMOGLOBIN: 8 G/DL (ref 11.9–15.1)
IMMATURE GRANULOCYTES: 3 %
LYMPHOCYTES # BLD: 9 % (ref 24–43)
MCH RBC QN AUTO: 26.2 PG (ref 25.2–33.5)
MCHC RBC AUTO-ENTMCNC: 28.3 G/DL (ref 28.4–34.8)
MCV RBC AUTO: 92.8 FL (ref 82.6–102.9)
MONOCYTES # BLD: 7 % (ref 3–12)
MORPHOLOGY: ABNORMAL
NRBC AUTOMATED: 0 PER 100 WBC
PDW BLD-RTO: 17.7 % (ref 11.8–14.4)
PLATELET # BLD: 289 K/UL (ref 138–453)
PMV BLD AUTO: 9.1 FL (ref 8.1–13.5)
POTASSIUM SERPL-SCNC: 4.1 MMOL/L (ref 3.7–5.3)
RBC # BLD: 3.05 M/UL (ref 3.95–5.11)
REASON FOR REJECTION: NORMAL
SEG NEUTROPHILS: 80 % (ref 36–65)
SEGMENTED NEUTROPHILS ABSOLUTE COUNT: 8.64 K/UL (ref 1.5–8.1)
SODIUM BLD-SCNC: 138 MMOL/L (ref 135–144)
WBC # BLD: 10.8 K/UL (ref 3.5–11.3)
ZZ NTE CLEAN UP: ORDERED TEST: NORMAL
ZZ NTE WITH NAME CLEAN UP: SPECIMEN SOURCE: NORMAL

## 2022-05-19 PROCEDURE — 6370000000 HC RX 637 (ALT 250 FOR IP): Performed by: FAMILY MEDICINE

## 2022-05-19 PROCEDURE — 2700000000 HC OXYGEN THERAPY PER DAY

## 2022-05-19 PROCEDURE — 2580000003 HC RX 258: Performed by: FAMILY MEDICINE

## 2022-05-19 PROCEDURE — 85025 COMPLETE CBC W/AUTO DIFF WBC: CPT

## 2022-05-19 PROCEDURE — 36415 COLL VENOUS BLD VENIPUNCTURE: CPT

## 2022-05-19 PROCEDURE — 82947 ASSAY GLUCOSE BLOOD QUANT: CPT

## 2022-05-19 PROCEDURE — 6370000000 HC RX 637 (ALT 250 FOR IP): Performed by: HOSPITALIST

## 2022-05-19 PROCEDURE — 97530 THERAPEUTIC ACTIVITIES: CPT

## 2022-05-19 PROCEDURE — 80048 BASIC METABOLIC PNL TOTAL CA: CPT

## 2022-05-19 PROCEDURE — 97535 SELF CARE MNGMENT TRAINING: CPT

## 2022-05-19 PROCEDURE — 97110 THERAPEUTIC EXERCISES: CPT

## 2022-05-19 PROCEDURE — 94761 N-INVAS EAR/PLS OXIMETRY MLT: CPT

## 2022-05-19 PROCEDURE — 6370000000 HC RX 637 (ALT 250 FOR IP): Performed by: INTERNAL MEDICINE

## 2022-05-19 PROCEDURE — 2060000000 HC ICU INTERMEDIATE R&B

## 2022-05-19 PROCEDURE — 94640 AIRWAY INHALATION TREATMENT: CPT

## 2022-05-19 RX ADMIN — METOPROLOL TARTRATE 25 MG: 25 TABLET, FILM COATED ORAL at 20:35

## 2022-05-19 RX ADMIN — METFORMIN HYDROCHLORIDE 500 MG: 500 TABLET ORAL at 07:53

## 2022-05-19 RX ADMIN — MIDODRINE HYDROCHLORIDE 10 MG: 10 TABLET ORAL at 07:52

## 2022-05-19 RX ADMIN — SODIUM CHLORIDE, PRESERVATIVE FREE 10 ML: 5 INJECTION INTRAVENOUS at 20:36

## 2022-05-19 RX ADMIN — PANTOPRAZOLE SODIUM 40 MG: 40 TABLET, DELAYED RELEASE ORAL at 07:53

## 2022-05-19 RX ADMIN — METFORMIN HYDROCHLORIDE 500 MG: 500 TABLET ORAL at 17:02

## 2022-05-19 RX ADMIN — FUROSEMIDE 40 MG: 40 TABLET ORAL at 07:52

## 2022-05-19 RX ADMIN — ASPIRIN 81 MG: 81 TABLET, COATED ORAL at 07:53

## 2022-05-19 RX ADMIN — OXYCODONE AND ACETAMINOPHEN 1 TABLET: 325; 10 TABLET ORAL at 22:39

## 2022-05-19 RX ADMIN — CLONAZEPAM 1 MG: 0.5 TABLET ORAL at 14:14

## 2022-05-19 RX ADMIN — BUDESONIDE AND FORMOTEROL FUMARATE DIHYDRATE 2 PUFF: 160; 4.5 AEROSOL RESPIRATORY (INHALATION) at 08:10

## 2022-05-19 RX ADMIN — TAMSULOSIN HYDROCHLORIDE 0.4 MG: 0.4 CAPSULE ORAL at 07:52

## 2022-05-19 RX ADMIN — BUDESONIDE AND FORMOTEROL FUMARATE DIHYDRATE 2 PUFF: 160; 4.5 AEROSOL RESPIRATORY (INHALATION) at 19:32

## 2022-05-19 RX ADMIN — SODIUM CHLORIDE, PRESERVATIVE FREE 10 ML: 5 INJECTION INTRAVENOUS at 07:53

## 2022-05-19 RX ADMIN — TRAZODONE HYDROCHLORIDE 300 MG: 100 TABLET ORAL at 20:35

## 2022-05-19 RX ADMIN — LEVOFLOXACIN 750 MG: 750 TABLET, FILM COATED ORAL at 17:02

## 2022-05-19 RX ADMIN — GABAPENTIN 300 MG: 300 CAPSULE ORAL at 07:53

## 2022-05-19 RX ADMIN — MIDODRINE HYDROCHLORIDE 10 MG: 10 TABLET ORAL at 20:35

## 2022-05-19 RX ADMIN — ANTI-FUNGAL POWDER MICONAZOLE NITRATE TALC FREE: 1.42 POWDER TOPICAL at 20:36

## 2022-05-19 RX ADMIN — ROSUVASTATIN CALCIUM 5 MG: 10 TABLET, FILM COATED ORAL at 07:53

## 2022-05-19 RX ADMIN — ANTI-FUNGAL POWDER MICONAZOLE NITRATE TALC FREE: 1.42 POWDER TOPICAL at 07:54

## 2022-05-19 RX ADMIN — MIDODRINE HYDROCHLORIDE 10 MG: 10 TABLET ORAL at 17:02

## 2022-05-19 RX ADMIN — VENLAFAXINE HYDROCHLORIDE 150 MG: 75 CAPSULE, EXTENDED RELEASE ORAL at 20:35

## 2022-05-19 RX ADMIN — GABAPENTIN 300 MG: 300 CAPSULE ORAL at 20:35

## 2022-05-19 RX ADMIN — MIDODRINE HYDROCHLORIDE 10 MG: 10 TABLET ORAL at 11:14

## 2022-05-19 RX ADMIN — VENLAFAXINE HYDROCHLORIDE 150 MG: 75 CAPSULE, EXTENDED RELEASE ORAL at 07:52

## 2022-05-19 RX ADMIN — OXYCODONE AND ACETAMINOPHEN 1 TABLET: 325; 10 TABLET ORAL at 11:13

## 2022-05-19 RX ADMIN — GLIPIZIDE 5 MG: 10 TABLET ORAL at 07:53

## 2022-05-19 RX ADMIN — Medication 15 MG: at 20:36

## 2022-05-19 RX ADMIN — ARIPIPRAZOLE 5 MG: 5 TABLET ORAL at 20:35

## 2022-05-19 RX ADMIN — METOPROLOL TARTRATE 25 MG: 25 TABLET, FILM COATED ORAL at 07:53

## 2022-05-19 ASSESSMENT — PAIN SCALES - GENERAL
PAINLEVEL_OUTOF10: 0
PAINLEVEL_OUTOF10: 2
PAINLEVEL_OUTOF10: 7
PAINLEVEL_OUTOF10: 0
PAINLEVEL_OUTOF10: 10
PAINLEVEL_OUTOF10: 2

## 2022-05-19 ASSESSMENT — PAIN - FUNCTIONAL ASSESSMENT: PAIN_FUNCTIONAL_ASSESSMENT: PREVENTS OR INTERFERES SOME ACTIVE ACTIVITIES AND ADLS

## 2022-05-19 ASSESSMENT — PAIN DESCRIPTION - ORIENTATION
ORIENTATION: RIGHT
ORIENTATION: RIGHT

## 2022-05-19 ASSESSMENT — PAIN DESCRIPTION - DESCRIPTORS
DESCRIPTORS: DISCOMFORT
DESCRIPTORS: ACHING;DISCOMFORT

## 2022-05-19 ASSESSMENT — PAIN DESCRIPTION - LOCATION
LOCATION: GENERALIZED
LOCATION: GENERALIZED

## 2022-05-19 NOTE — PROGRESS NOTES
[facility]  Occupational Therapy Not Seen    DATE: 2022    NAME: Alvin Og  MRN: [id number]   : 1950    Patient not seen this date for Occupational Therapy due to:      [] Cancel by RN or physician due to:    [] Hemodialysis    [] Critical Lab Value Level     [] Blood transfusion in progress    [] Acute or unstable cardiovascular status   _MAP < 55 or more than >115  _HR < 40 or > 130    [] Acute or unstable pulmonary status   -FiO2 > 60%   _RR < 5 or >40    _O2 sats < 85%    [] Strict Bedrest    [] Off Unit for surgery or procedure    [] Off Unit for testing       [] Pending imaging to R/O fracture    [x] Refusal by Patient Pt in bed upon arrival and stated she was very sleepy. Writer offered to return later today for therapy and pt agreeable. Will check back. Returned at [phone], pt just starting to eat lunch but agreeable to participate afterwards. Will check back again. [] Other      [] OT being discontinued at this time. Patient independent. No further needs. [] OT being discontinued at this time as the patient has been transferred to hospice care. No further needs.       ROLO Cartagena

## 2022-05-19 NOTE — FLOWSHEET NOTE
[facility] NOTE    Room # 2031/2031-01   Name: Jose R Metcalf              Reason for visit: Routine. I visited the patient. Admit Date & Time: 5/14/2022  7:18 AM    Assessment:  Jose R Metcalf is a 71 y.o. female. Upon entering the room patient was sitting up in bed and was awake and alert. Intervention:  I introduced myself and my title as  I offered space for patient and family to express feelings, needs, and concerns and provided a ministry presence. Patient engaged in conversation. Gave patient spiritual care business card and offered words of encouragement and a brief prayer. Outcome:  Patient expressed gratitude for visit and requested visit from [facility] who visited her in the Oncology Clinic. Plan:  Chaplains will remain available to offer spiritual and emotional support as needed.   Electronically signed by Darin Ortega on 5/19/2022 at 10:14 AM.  Meena       05/19/22 1013   Encounter Summary   Encounter Overview/Reason  Spiritual/Emotional Needs   Service Provided For: Patient   Referral/Consult From: [facility] Family members   Complexity of Encounter Moderate   Begin Time 1000   End Time  1010   Total Time Calculated 10 min   Encounter    Type Follow up   Assessment/Intervention/Outcome   Assessment Calm   Intervention Sustaining Presence/Ministry of presence   Outcome Expressed Gratitude

## 2022-05-19 NOTE — PROGRESS NOTES
Occupational Therapy  Facility/Department: [facility] CVICU  Rehabilitation Occupational Therapy Daily Treatment Note    Date: 22  Patient Name: Dominic Leung       Room:   MRN: [id number]  Account: [de-identified]   : 1950  (71 y.o.) Gender: female         Pt currently functioning below baseline. Would suggest additional therapy at time of discharge to maximize long term outcomes and prevent re-admission. Please refer to AM-PAC score for current level of function. AM PAC 17              Past Medical History:  has a past medical history of AAA (abdominal aortic aneurysm) (HCC), Acid reflux, Anxiety and depression, Aortic stenosis, Arthritis, Blister of ankle, right, CAD (coronary artery disease), Cancer ([facility] [address] COPD (chronic obstructive pulmonary disease) ([facility] [address] Diabetes mellitus ([facility] [address] Falls, Heart block, Hypokalemia, MDRO (multiple drug resistant organisms) resistance, MRSA (methicillin resistant staph aureus) culture positive, On home oxygen therapy, On home oxygen therapy, Overactive bladder, Pneumonia, PONV (postoperative nausea and vomiting), and Vitamin D deficiency. Past Surgical History:   has a past surgical history that includes back surgery; eye surgery; Cholecystectomy; Appendectomy; Hysterectomy; Tonsillectomy; lumbar laminectomy; Endoscopy, colon, diagnostic (2016); aortic valve repair (N/A, 2017); bronchoscopy (N/A, 2020); IR INSERT PICC VAD W SQ PORT >5 YEARS (2020); and IR PORT PLACEMENT > 5 YEARS (2020). Restrictions  Restrictions/Precautions: Fall Risk;Contact Precautions; Up as Tolerated  Other position/activity restrictions: up with assist, external cath, L chest port, telemetry, elevate heels, pt easily agitiated (does not like to be asked questions or given \"orders\"  Required Braces or Orthoses?: No    Subjective  Subjective: Pt in bed upon arrival. Pt agreeable to try walking but once pt began moving in bed writer discovered pt's brief, gown, bedding and pillows were satuarated with urine and pt appeared unaware. Restrictions/Precautions: Fall Risk;Contact Precautions; Up as Tolerated             Objective     Cognition  Overall Cognitive Status: Exceptions  Arousal/Alertness: Appropriate responses to stimuli  Following Commands: Follows multistep commands with repitition; Follows multistep commands with increased time  Attention Span: Appears intact  Memory: Appears intact  Safety Judgement: Decreased awareness of need for assistance;Decreased awareness of need for safety  Problem Solving: Decreased awareness of errors;Assistance required to identify errors made;Assistance required to correct errors made  Insights: Decreased awareness of deficits  Initiation: Requires cues for all  Sequencing: Requires cues for some  Orientation  Overall Orientation Status: Within Functional Limits         ADL  Lower Extremity Bathing  Assistance Level: Maximum assistance  Skilled Clinical Factors: supine in bed  Upper Extremity Dressing  Assistance Level: Set-up; Minimal assistance  Skilled Clinical Factors: to change gowns in supine  Lower Extremity Dressing  Assistance Level: Dependent  Skilled Clinical Factors: to change brief in supine  Putting On/Taking Off Footwear  Assistance Level: Dependent          Bridging  Assistance Level: Minimal assistance  Roll Left  Assistance Level: Minimal assistance  Roll Right  Assistance Level: Minimal assistance  Scooting  Skilled Clinical Factors: MAX x2 to boost pt to [facility]. Pt's BP in supine 95/69 and pt fatigued from rolling and getting changed and all bed linens changed. Assessment  Assessment  Assessment: Pt more cooperative today but is still limited by decreased endurance/activity tolerance and overall strength. Pt has difficulty completing self care thoroughly, joselo LB care. Pt still presents with deficits in bed mobility, transfers and functional mobility.  Skilled OT indicated to increase safety and IND with all functional tasks to ensure a safe return to PLOF. Activity Tolerance: Patient limited by pain; Patient limited by endurance  Discharge Recommendations: Patient would benefit from continued therapy after discharge  Safety Devices  Safety Devices in place: Yes  Type of devices: All fall risk precautions in place; Left in bed;Bed alarm in place;Call light within reach;Nurse notified;Gait belt;Patient at risk for falls           Plan  Plan  Times per Week: 4-5x/wk 1x/day as emely  Current Treatment Recommendations: Strengthening;Balance training;Functional mobility training; Endurance training; Safety education & training;Equipment evaluation, education, & procurement;Patient/Caregiver education & training;Self-Care / ADL; Home management training    Goals  Patient Goals   Patient goals : To go home! Short Term Goals  Time Frame for Short term goals: By discharge, pt to demo  Short Term Goal 1: ADL transfers and functional mobility to CGA with use of AD as needed. Short Term Goal 2: toileting to CGA with use of AD/grab bars as needed. Short Term Goal 3: increased B UE strength by 1/2 grade to assist with functional tasks/I with B UE HEP with use of handouts as needed. Short Term Goal 4: bed mobility to SBA with use of bedrails as needed. Short Term Goal 5: UB ADLs to Set up and LB ADLs to SBA with use of AD/AE as needed. Long Term Goals  Long Term Goal 1: Pt to be I with fall prevention education, EC/WS tech, recommendations for discharge/AE with use of handouts as needed. Long Term Goal 2: Pt to demo increased standing emely > 10 min with Min A using AD as needed to reduce risk of falls during functional tasks.     Therapy Time   Individual Concurrent Group Co-treatment   Time In 9493         Time Out 1333         Minutes [id number] [address]

## 2022-05-19 NOTE — PROGRESS NOTES
Physical Therapy  Facility/Department: [facility]  Rehabilitation Physical Therapy Treatment Note    NAME: Justina Eid  : 1950 (71 y.o.)  MRN: [id number]  CODE STATUS: Full Code    Date of Service: 22  Assessment: Treatment limited by pt irritibility. Declining education and further activity this date. Pt did have complaints of dizziness BP of 87/53 with EOB sitting but was willing to continue through with session. Pt retired supine in bed with call light in reach and bed alarm on. Activity Tolerance: Patient limited by pain; Patient limited by endurance  Discharge Recommendations: Patient would benefit from continued therapy after discharge    Restrictions:  Restrictions/Precautions: Fall Risk;Contact Precautions; Up as Tolerated  Position Activity Restriction  Other position/activity restrictions: up with assist, external cath, L chest port, telemetry, elevate heels, pt easily agitiated (does not like to be asked questions or given \"orders\"     SUBJECTIVE  Subjective  Subjective: Pt easily irritable. Agreeable with sitting EOB and x1 stand. When attempted to edu pt, pt states she is 71years old and doesn't need someone to tell her what to do. Pain: No complaints of pain throughout. OBJECTIVE  Cognition  Overall Cognitive Status: Exceptions  Arousal/Alertness: Appropriate responses to stimuli;Delayed responses to stimuli  Following Commands: Follows multistep commands with repitition; Follows multistep commands with increased time  Attention Span: Appears intact; Attends with cues to redirect  Memory: Appears intact  Safety Judgement: Decreased awareness of need for assistance;Decreased awareness of need for safety  Problem Solving: Decreased awareness of errors;Assistance required to identify errors made;Assistance required to correct errors made  Insights: Decreased awareness of deficits  Initiation: Requires cues for all  Sequencing: Requires cues for some  Cognition Comment: Unsure if patient has hard time hearing or is just flat out ignoring commands at this time. Orientation  Overall Orientation Status: Within Functional Limits    Functional Mobility  Bed Mobility  Overall Assistance Level: Contact Guard Assist  Bridging  Assistance Level: Contact guard assist  Supine to Sit  Assistance Level: Contact guard assist  Balance  Sitting Balance: Supervision  Standing Balance: Contact guard assistance  Standing Balance  Time: <30secs stand  Activity: Static standing with RW  Comments: complaints of dizziness upon standing. Instructed pt to sit if she needs to. Sit to Stand  Assistance Level: Contact guard assist  Skilled Clinical Factors: Instructed pt to push up from seated surface instead of pulling up with RW. Pt was able to stand with steadiness and ease this way despite saying \"I can't do that. \"  Stand to Sit  Assistance Level: Contact guard assist    PT Exercises  A/AROM Exercises: EOB ankle pumps, marches, kicks, hip abd/add  x20    Goals  Patient Goals   Patient goals : Pt goal is for a safe d/c and she is hopeful that she will be strong enough to go home  Short Term Goals  Time Frame for Short term goals: 15  Short term goal 1: bed mob ind  Short term goal 2: trnasfers ind  Short term goal 3: amb x 50 ft w. r.walker sBA  Short term goal 4: w/c x 200 ft w/ turns and manuvering ind    PLAN OF CARE/SAFETY  Plan  Plan: 5-7 times per week  Current Treatment Recommendations: Strengthening;Balance training;Functional mobility training;Patient/Caregiver education & training;Home exercise program;Safety education & training; Wheelchair mobility training;Transfer training;Gait training  Safety Devices  Type of Devices: All fall risk precautions in place; Bed alarm in place;Call light within reach; Left in bed;Gait belt    Therapy Time   Individual Concurrent Group Co-treatment   Time In 1335         Time Out 1358         Minutes [address], 05/19/22 at 3:13 PM

## 2022-05-19 NOTE — PROGRESS NOTES
[address] Hospitalist        5/19/2022   2:48 PM    Name:  Charity Collazo  MRN:    [id number]     [place]:     [de-identified]   Room:  2031/2031-01  IP Day: 5     Admit Date: 5/14/2022  7:18 AM  PCP: Nereida Givens MD    C/C:   Chief Complaint   Patient presents with    Shortness of Breath    Tachycardia       Assessment:      · Atrial fibrillation with rapid ventricular response  · Acute exacerbation of COPD  · Chronic respiratory failure with hypoxia requiring O2 via nasal cannula  · Bronchogenic carcinoma  · Essential hypertension currently Hypotension  · Coronary artery disease, native vessel  · Diabetes mellitus type 2  · Mixed hyperlipidemia  · Anxiety disorder  · History of hypotension  · Osteoarthritis, multiple joints  · Mood disorder  · Gastroesophageal reflux disease without esophagitis        Plan:     · Patient is in CVICU  · O2 maintain oxygen saturation greater than 92%  ·   · Monitor heart rate and blood pressure  · On metoprolol  · Continue ProAmatine  · IV heparin  · Cardiology following, do not feel patient is a candidate for long-term anticoagulation in view of high fall risk      ·   · DVT and GI prophylaxis  · Continue to monitor/telemetry/CBC with differential daily/BMP daily  · Continue medications as below  ·   · Patient was recommended to be discharged to skilled facility, however she is refusing it  ·   · Discharge planning as per cardiology recommendation    Scheduled Meds:   midodrine  10 mg Oral 4x Daily    metoprolol tartrate  25 mg Oral BID    levoFLOXacin  750 mg Oral Q24H    miconazole   Topical BID    aspirin EC  81 mg Oral Daily    budesonide-formoterol  2 puff Inhalation BID    pantoprazole  40 mg Oral QAM AC    venlafaxine  150 mg Oral BID    rosuvastatin  5 mg Oral Daily    ARIPiprazole  5 mg Oral Nightly    metFORMIN  500 mg Oral BID WC    traZODone  300 mg Oral Nightly    melatonin  15 mg Oral Nightly    tamsulosin  0.4 mg Oral Daily  gabapentin  300 mg Oral BID    furosemide  40 mg Oral Daily    sennosides-docusate sodium  1 tablet Oral BID    sodium chloride flush  5-40 mL IntraVENous 2 times per day    glipiZIDE  5 mg Oral QAM AC     Continuous Infusions:   dilTIAZem (CARDIZEM) 125 mg in dextrose 5% 125 mL infusion Stopped (22 2345)    sodium chloride Stopped (22 1625)    sodium chloride      dextrose      heparin (PORCINE) Infusion Stopped (22 1546)     PRN Meds:  ipratropium-albuterol, 1 ampule, Q4H PRN  albuterol sulfate HFA, 2 puff, Q4H PRN  clonazePAM, 1 mg, TID PRN  sodium chloride flush, 10 mL, PRN  sodium chloride, , PRN  potassium chloride, 40 mEq, PRN   Or  potassium alternative oral replacement, 40 mEq, PRN   Or  potassium chloride, 10 mEq, PRN  magnesium sulfate, 1,000 mg, PRN  ondansetron, 4 mg, Q8H PRN   Or  ondansetron, 4 mg, Q6H PRN  polyethylene glycol, 17 g, Daily PRN  acetaminophen, 650 mg, Q6H PRN   Or  acetaminophen, 650 mg, Q6H PRN  oxyCODONE-acetaminophen, 1 tablet, Q4H PRN  glucose, 4 tablet, PRN  dextrose bolus, 125 mL, PRN   Or  dextrose bolus, 250 mL, PRN  glucagon (rDNA), 1 mg, PRN  dextrose, 100 mL/hr, PRN  heparin (porcine), 4,000 Units, PRN  heparin (porcine), 2,000 Units, PRN            Subjective:     Patient seen and examined at bedside. No overnight events. Patient still tachycardic and hypotensive   Afebrile  Pt. Denies any CP, SOB, palpitation, HA, dizziness, chills, cough, cold, changes in urination, BM or skin changes or any pain. ROS:  A 10 point system reviewed and negative otherwise mentioned above.         Physical Examination:      Vitals:  BP (!) 99/59   Pulse 94   Temp 97.4 °F (36.3 °C) (Temporal)   Resp 29   Ht 5' 8\" (1.727 m)   Wt 156 lb (70.8 kg)   SpO2 95%   BMI 23.72 kg/m²   Temp (24hrs), Av.3 °F (36.3 °C), Min:96.7 °F (35.9 °C), Max:97.6 °F (36.4 °C)    Weight:   Wt Readings from Last 3 Encounters:   22 156 lb (70.8 kg)   22 153 lb 0.9 oz (69.4 kg)   03/25/22 167 lb (75.8 kg)     I/O last 3 completed shifts:  I/O last 3 completed shifts: In: 56 [P.O.:610]  Out: 1200 [Urine:1200]     Recent Labs     05/18/22  1641 05/18/22  1928 05/19/22  0729 05/19/22  1059   POCGLU 112* 144* 93 89         General appearance - alert, well appearing, and in no acute distress  Mental status - oriented to person, place, and time with normal affect  Head - normocephalic and atraumatic  Eyes - pupils equal and reactive, extraocular eye movements intact, conjunctiva clear  Ears - hearing appears to be intact  Nose - no drainage noted  Mouth - mucous membranes moist  Neck - supple, no carotid bruits, thyroid not palpable  Chest - clear to auscultation, normal effort  Heart - normal rate, regular rhythm, no murmur  Abdomen - soft, nontender, nondistended, bowel sounds present all four quadrants, no masses, hepatomegaly or splenomegaly  Neurological - normal speech, no focal findings or movement disorder noted, cranial nerves II through XII grossly intact  Extremities - peripheral pulses palpable, no pedal edema or calf pain with palpation  Skin - no gross lesions, rashes, or induration noted        Medications: Allergies:    Allergies   Allergen Reactions    Codeine      \"feeling of heavy on chest\"    Dye [Iodides]      Hallucination,vomiting       Current Meds:   Current Facility-Administered Medications:     midodrine (PROAMATINE) tablet 10 mg, 10 mg, Oral, 4x Daily, Kristine Hua MD, 10 mg at 05/19/22 1114    metoprolol tartrate (LOPRESSOR) tablet 25 mg, 25 mg, Oral, BID, Annie Solares MD, 25 mg at 05/19/22 0753    levoFLOXacin (LEVAQUIN) tablet 750 mg, 750 mg, Oral, Q24H, Whitney Becerril MD, 750 mg at 05/18/22 1751    miconazole (MICOTIN) 2 % powder, , Topical, BID, Linette Romano MD, Given at 05/19/22 0754    ipratropium-albuterol (DUONEB) nebulizer solution 1 ampule, 1 ampule, Inhalation, Q4H PRN, Whitney Beecrril MD    dilTIAZem 125 mg in dextrose 5 % 125 mL infusion, 2.5-15 mg/hr, IntraVENous, Continuous, Whitney Becerril MD, Stopped at 05/14/22 2345    0.9 % sodium chloride infusion, 1,000 mL, IntraVENous, Continuous, Whitney Becerril MD, Stopped at 05/14/22 1625    aspirin EC tablet 81 mg, 81 mg, Oral, Daily, Whitney Becerril MD, 81 mg at 05/19/22 0753    budesonide-formoterol (SYMBICORT) 160-4.5 MCG/ACT inhaler 2 puff, 2 puff, Inhalation, BID, Whitney Becerril MD, 2 puff at 05/18/22 1932    pantoprazole (PROTONIX) tablet 40 mg, 40 mg, Oral, QAM AC, Whitney Becerril MD, 40 mg at 05/19/22 0753    venlafaxine (EFFEXOR XR) extended release capsule 150 mg, 150 mg, Oral, BID, Whitney Becerril MD, 150 mg at 05/19/22 0752    rosuvastatin (CRESTOR) tablet 5 mg, 5 mg, Oral, Daily, Whitney Becerril MD, 5 mg at 05/19/22 0753    ARIPiprazole (ABILIFY) tablet 5 mg, 5 mg, Oral, Nightly, Whitney Becerril MD, 5 mg at 05/18/22 2038    metFORMIN (GLUCOPHAGE) tablet 500 mg, 500 mg, Oral, BID WC, Whitney Becerril MD, 500 mg at 05/19/22 0753    traZODone (DESYREL) tablet 300 mg, 300 mg, Oral, Nightly, Whitney Becerril MD, 300 mg at 05/18/22 2038    melatonin tablet 15 mg, 15 mg, Oral, Nightly, Whitney Becerril MD, 15 mg at 05/18/22 2120    albuterol sulfate  (90 Base) MCG/ACT inhaler 2 puff, 2 puff, Inhalation, Q4H PRN, Whitney Becerril MD    tamsulosin (FLOMAX) capsule 0.4 mg, 0.4 mg, Oral, Daily, Whitney Becerril MD, 0.4 mg at 05/19/22 0752    clonazePAM (KLONOPIN) tablet 1 mg, 1 mg, Oral, TID PRN, Whitney Becerril MD, 1 mg at 05/19/22 1414    gabapentin (NEURONTIN) capsule 300 mg, 300 mg, Oral, BID, Whitney Becerril MD, 300 mg at 05/19/22 0753    furosemide (LASIX) tablet 40 mg, 40 mg, Oral, Daily, Whitney Becerril MD, 40 mg at 05/19/22 0752    sennosides-docusate sodium (SENOKOT-S) 8.6-50 MG tablet 1 tablet, 1 tablet, Oral, BID, Whitney Becerril MD, 1 tablet at 05/18/22 0840    sodium chloride flush 0.9 % injection 5-40 mL, 5-40 mL, IntraVENous, 2 times per day, Whitney MD Jerrica, 10 mL at 05/19/22 0753    sodium chloride flush 0.9 % injection 10 mL, 10 mL, IntraVENous, PRN, Whitney Becerril MD    0.9 % sodium chloride infusion, , IntraVENous, PRN, Whitney Becerril MD    potassium chloride (KLOR-CON M) extended release tablet 40 mEq, 40 mEq, Oral, PRN, 40 mEq at 05/14/22 1657 **OR** potassium bicarb-citric acid (EFFER-K) effervescent tablet 40 mEq, 40 mEq, Oral, PRN, 40 mEq at 05/16/22 0945 **OR** potassium chloride 10 mEq/100 mL IVPB (Peripheral Line), 10 mEq, IntraVENous, PRN, Whitney Becerril MD    magnesium sulfate 1000 mg in dextrose 5% 100 mL IVPB, 1,000 mg, IntraVENous, PRN, Rosas Hayes MD, Stopped at 05/16/22 2227    ondansetron (ZOFRAN-ODT) disintegrating tablet 4 mg, 4 mg, Oral, Q8H PRN **OR** ondansetron (ZOFRAN) injection 4 mg, 4 mg, IntraVENous, Q6H PRN, Whitney Becerril MD    polyethylene glycol (GLYCOLAX) packet 17 g, 17 g, Oral, Daily PRN, Whitney Becerril MD    acetaminophen (TYLENOL) tablet 650 mg, 650 mg, Oral, Q6H PRN **OR** acetaminophen (TYLENOL) suppository 650 mg, 650 mg, Rectal, Q6H PRN, Whitney Becerril MD    oxyCODONE-acetaminophen (PERCOCET)  MG per tablet 1 tablet, 1 tablet, Oral, Q4H PRN, Whitney Becerril MD, 1 tablet at 05/19/22 1113    glucose chewable tablet 16 g, 4 tablet, Oral, PRN, Whitney Becerril MD    dextrose bolus 10% 125 mL, 125 mL, IntraVENous, PRN **OR** dextrose bolus 10% 250 mL, 250 mL, IntraVENous, PRN, Whitney Becerril MD    glucagon (rDNA) injection 1 mg, 1 mg, IntraMUSCular, PRN, Whitney Becerril MD    dextrose 5 % solution, 100 mL/hr, IntraVENous, PRN, Whitney Becerril MD    heparin (porcine) injection 4,000 Units, 4,000 Units, IntraVENous, PRN, Elvin Harrison MD, 4,000 Units at 05/15/22 1301    heparin (porcine) injection 2,000 Units, 2,000 Units, IntraVENous, PRN, Elvin Harrison MD, 2,000 Units at 05/17/22 0720    heparin 25,000 units in dextrose 5% 250 mL (premix) infusion, 5-30 Units/kg/hr, IntraVENous, Continuous, Dari MALDONADO Alex Rossi MD, Stopped at 05/17/22 1546    glipiZIDE (GLUCOTROL) tablet 5 mg, 5 mg, Oral, QAM AC, Whitney Becerril MD, 5 mg at 05/19/22 0754      I/O (24Hr): Intake/Output Summary (Last 24 hours) at 5/19/2022 1448  Last data filed at 5/19/2022 1020  Gross per 24 hour   Intake 320 ml   Output 500 ml   Net -180 ml       Data:           Labs:    Hematology:  Recent Labs     05/17/22  0616 05/18/22  0300 05/19/22  0544   WBC 7.8 9.2 10.8   RBC 2.94* 2.87* 3.05*   HGB 7.6* 7.7* 8.0*   HCT 27.1* 26.3* 28.3*   MCV 92.2 91.6 92.8   MCH 25.9 26.8 26.2   MCHC 28.0* 29.3 28.3*   RDW 17.8* 17.9* 17.7*    263 289   MPV 8.7 8.4 9.1     Chemistry:  Recent Labs     05/17/22  0616 05/18/22  0300 05/19/22  0544    133* 138   K 3.8 3.8 4.1   CL 95* 91* 95*   CO2 32* 33* 34*   GLUCOSE 110* 190* 86   BUN 11 13 12   CREATININE 0.61 0.65 0.62   ANIONGAP 12 9 9   LABGLOM >60 >60 >60   GFRAA >60 >60 >60   CALCIUM 8.6 8.7 9.2     Recent Labs     05/18/22  0719 05/18/22  1125 05/18/22  1641 05/18/22  1928 05/19/22  0729 05/19/22  1059   POCGLU 102 96 112* 144* 93 89       Lab Results   Component Value Date/Time    SPECIAL LT AC 10ML 03/29/2022 07:28 PM     Lab Results   Component Value Date/Time    CULTURE NO GROWTH 5 DAYS 03/29/2022 07:28 PM       Lab Results   Component Value Date    POCPH 7.36 04/12/2017    PHART 7.42 08/26/2012    PH 7.22 04/11/2017    POCPCO2 45 04/12/2017    SAN7BUV 41 08/26/2012    PCO2 54 04/11/2017    POCPO2 93 04/12/2017    PO2ART 59 08/26/2012    PO2 89 04/11/2017    POCHCO3 25.3 04/12/2017    ZMV5QHW 26.1 08/26/2012    HCO3 22.2 04/11/2017    NBEA NOT REPORTED 04/12/2017    PBEA 0 04/12/2017    PTE1DMZ 27 04/12/2017    UWJG9FRV 97 04/12/2017    J0RCRERX 92.1 08/26/2012    O2SAT 95 04/11/2017    FIO2 UNKNOWN 10/31/2017       Radiology:    XR CHEST PORTABLE    Result Date: 5/15/2022  1. Improved right pleural effusion.  2.  Patchy opacity in the medial right upper lobe, possibly post treatment change versus residual tumor. Correlation with any recent CT imaging is recommended. All radiological studies reviewed  Code Status:  Full Code        Electronically signed by Liza Gonzalez MD on 5/19/2022 at 2:48 PM    This note was created with the assistance of a speech-recognition program.  Although the intention is to generate a document that actually reflects the content of the visit, no guarantees can be provided that every mistake has been identified and corrected by editing. Note was updated later by me after  physical examination and  completion of the assessment.

## 2022-05-19 NOTE — PROGRESS NOTES
Section of Cardiology  Progress Note      Date:  5/19/2022  Patient: Alvin Og  Admission:  5/14/2022  7:18 AM  Admit DX: Atrial fibrillation with rapid ventricular response ([facility] [address] [I48.91]  Age:  71 y. o., 1950     LOS: 5 days     Reason for evaluation:   atrial fibrillation      SUBJECTIVE:     The patient was seen and examined. Notes and labs reviewed. BP has remained low/normal  Pt feels well. HR appears to  when next dose of Metoprolol due  No other issues      OBJECTIVE:      EXAM:   Vitals:    VITALS:  BP (!) 91/55   Pulse 101   Temp 97.9 °F (36.6 °C) (Temporal)   Resp 24   Ht 5' 8\" (1.727 m)   Wt 156 lb (70.8 kg)   SpO2 95%   BMI 23.72 kg/m²   24HR INTAKE/OUTPUT:    Intake/Output Summary (Last 24 hours) at 5/19/2022 1744  Last data filed at 5/19/2022 1605  Gross per 24 hour   Intake 320 ml   Output 1300 ml   Net -980 ml       CONSTITUTIONAL:  awake, alert, cooperative, no apparent distress, and appears stated age. HEENT: Normal jugular venous pulsations,   LUNGS: Good respiratory effort On auscultation: clear to auscultation bilaterally  CARDIOVASCULAR:  Normal apical impulse, regular rate and rhythm, normal S1 and S2, no S3 or S4, and no murmur or rub noted. ABDOMEN: Soft, nontender, nondistended. Bowel sounds present. No masses or tenderness. No bruit. SKIN: Warm and dry. EXTREMITIES:No lower extremity edema. Motor movement grossly intact. No cyanosis or clubbing.     Current Inpatient Medications:   midodrine  10 mg Oral 4x Daily    metoprolol tartrate  25 mg Oral BID    miconazole   Topical BID    aspirin EC  81 mg Oral Daily    budesonide-formoterol  2 puff Inhalation BID    pantoprazole  40 mg Oral QAM AC    venlafaxine  150 mg Oral BID    rosuvastatin  5 mg Oral Daily    ARIPiprazole  5 mg Oral Nightly    metFORMIN  500 mg Oral BID WC    traZODone  300 mg Oral Nightly    melatonin  15 mg Oral Nightly    tamsulosin  0.4 mg Oral Daily    gabapentin  300 mg Oral BID    furosemide  40 mg Oral Daily    sennosides-docusate sodium  1 tablet Oral BID    sodium chloride flush  5-40 mL IntraVENous 2 times per day    glipiZIDE  5 mg Oral QAM AC       IV Infusions (if any):   dilTIAZem (CARDIZEM) 125 mg in dextrose 5% 125 mL infusion Stopped (05/14/22 2345)    sodium chloride Stopped (05/14/22 1625)    sodium chloride      dextrose      heparin (PORCINE) Infusion Stopped (05/17/22 1546)       Diagnostics:   Telemetry: Sinus Tach Rate 110      Labs:   CBC:  Recent Labs     05/18/22  0300 05/19/22  0544   WBC 9.2 10.8   HGB 7.7* 8.0*   HCT 26.3* 28.3*    289     Magnesium:No results for input(s): MG in the last 72 hours. BMP:  Recent Labs     05/18/22  0300 05/19/22  0544   * 138   K 3.8 4.1   CALCIUM 8.7 9.2   CO2 33* 34*   BUN 13 12   CREATININE 0.65 0.62   LABGLOM >60 >60   GLUCOSE 190* 86     BNP:No results for input(s): BNP, PROBNP in the last 72 hours. PT/INR:No results for input(s): PROTIME, INR in the last 72 hours. APTT:No results for input(s): APTT in the last 72 hours. CARDIAC ENZYMES:No results for input(s): MYOGLOBIN, CKTOTAL, CKMB, CKMBINDEX, TROPHS, TROPONINT in the last 72 hours. FASTING LIPID PANEL:No results found for: HDL, LDLDIRECT, LDLCALC, TRIG  LIVER PROFILE:No results for input(s): AST, ALT, LABALBU, ALKPHOS, BILITOT, BILIDIR, IBILI, PROT, GLOB, ALBUMIN in the last 72 hours. ASSESSMENT:  1.  New onset atrial fibrillation with rapid ventricular response, converted to sinus rhythm. 2.  Chronic history of sinus tachycardia  3.  History of moderate coronary artery disease of the circumflex  4.  Moderate aortic stenosis. 5.  Chronic right bundle branch block. 6.  COPD  7.  History of right lung cancer  8.  History of non-insulin-dependent diabetes mellitus      PLAN:  1. Continue current medications. 2. Cont Metoprolol 25mg for rate control and midodrine  3. Anticoagulation was stopped due to high fall risk.  No plans for initiation  4. No plans for AVN ablation and/or pacemaker implantation       Discussed with patient and nursing.     Madiha Ross MD

## 2022-05-20 LAB
ABSOLUTE EOS #: 0.11 K/UL (ref 0–0.44)
ABSOLUTE IMMATURE GRANULOCYTE: 0.24 K/UL (ref 0–0.3)
ABSOLUTE LYMPH #: 1.02 K/UL (ref 1.1–3.7)
ABSOLUTE MONO #: 0.62 K/UL (ref 0.1–1.2)
ANION GAP SERPL CALCULATED.3IONS-SCNC: 8 MMOL/L (ref 9–17)
BASOPHILS # BLD: 0 % (ref 0–2)
BASOPHILS ABSOLUTE: 0.03 K/UL (ref 0–0.2)
BUN BLDV-MCNC: 13 MG/DL (ref 8–23)
BUN/CREAT BLD: 18 (ref 9–20)
CALCIUM SERPL-MCNC: 8.8 MG/DL (ref 8.6–10.4)
CHLORIDE BLD-SCNC: 96 MMOL/L (ref 98–107)
CO2: 34 MMOL/L (ref 20–31)
CREAT SERPL-MCNC: 0.74 MG/DL (ref 0.5–0.9)
EOSINOPHILS RELATIVE PERCENT: 1 % (ref 1–4)
GFR AFRICAN AMERICAN: >60 ML/MIN
GFR NON-AFRICAN AMERICAN: >60 ML/MIN
GFR SERPL CREATININE-BSD FRML MDRD: ABNORMAL ML/MIN/{1.73_M2}
GLUCOSE BLD-MCNC: 101 MG/DL (ref 70–99)
GLUCOSE BLD-MCNC: 124 MG/DL (ref 65–105)
GLUCOSE BLD-MCNC: 140 MG/DL (ref 65–105)
GLUCOSE BLD-MCNC: 64 MG/DL (ref 65–105)
GLUCOSE BLD-MCNC: 71 MG/DL (ref 65–105)
HCT VFR BLD CALC: 26.4 % (ref 36.3–47.1)
HEMOGLOBIN: 7.5 G/DL (ref 11.9–15.1)
IMMATURE GRANULOCYTES: 3 %
LYMPHOCYTES # BLD: 12 % (ref 24–43)
MCH RBC QN AUTO: 26.3 PG (ref 25.2–33.5)
MCHC RBC AUTO-ENTMCNC: 28.4 G/DL (ref 28.4–34.8)
MCV RBC AUTO: 92.6 FL (ref 82.6–102.9)
MONOCYTES # BLD: 7 % (ref 3–12)
NRBC AUTOMATED: 0 PER 100 WBC
PDW BLD-RTO: 17.3 % (ref 11.8–14.4)
PLATELET # BLD: 253 K/UL (ref 138–453)
PMV BLD AUTO: 9.1 FL (ref 8.1–13.5)
POTASSIUM SERPL-SCNC: 3.8 MMOL/L (ref 3.7–5.3)
RBC # BLD: 2.85 M/UL (ref 3.95–5.11)
RBC # BLD: ABNORMAL 10*6/UL
SEG NEUTROPHILS: 77 % (ref 36–65)
SEGMENTED NEUTROPHILS ABSOLUTE COUNT: 6.68 K/UL (ref 1.5–8.1)
SODIUM BLD-SCNC: 138 MMOL/L (ref 135–144)
WBC # BLD: 8.7 K/UL (ref 3.5–11.3)

## 2022-05-20 PROCEDURE — 6370000000 HC RX 637 (ALT 250 FOR IP): Performed by: HOSPITALIST

## 2022-05-20 PROCEDURE — 94640 AIRWAY INHALATION TREATMENT: CPT

## 2022-05-20 PROCEDURE — 82947 ASSAY GLUCOSE BLOOD QUANT: CPT

## 2022-05-20 PROCEDURE — 2700000000 HC OXYGEN THERAPY PER DAY

## 2022-05-20 PROCEDURE — 6370000000 HC RX 637 (ALT 250 FOR IP): Performed by: FAMILY MEDICINE

## 2022-05-20 PROCEDURE — 97530 THERAPEUTIC ACTIVITIES: CPT

## 2022-05-20 PROCEDURE — 94761 N-INVAS EAR/PLS OXIMETRY MLT: CPT

## 2022-05-20 PROCEDURE — 85025 COMPLETE CBC W/AUTO DIFF WBC: CPT

## 2022-05-20 PROCEDURE — 6370000000 HC RX 637 (ALT 250 FOR IP): Performed by: INTERNAL MEDICINE

## 2022-05-20 PROCEDURE — 2580000003 HC RX 258: Performed by: FAMILY MEDICINE

## 2022-05-20 PROCEDURE — 80048 BASIC METABOLIC PNL TOTAL CA: CPT

## 2022-05-20 PROCEDURE — 36415 COLL VENOUS BLD VENIPUNCTURE: CPT

## 2022-05-20 PROCEDURE — 2060000000 HC ICU INTERMEDIATE R&B

## 2022-05-20 RX ADMIN — MIDODRINE HYDROCHLORIDE 10 MG: 10 TABLET ORAL at 16:48

## 2022-05-20 RX ADMIN — SENNOSIDES AND DOCUSATE SODIUM 1 TABLET: 50; 8.6 TABLET ORAL at 09:24

## 2022-05-20 RX ADMIN — SENNOSIDES AND DOCUSATE SODIUM 1 TABLET: 50; 8.6 TABLET ORAL at 20:59

## 2022-05-20 RX ADMIN — GABAPENTIN 300 MG: 300 CAPSULE ORAL at 09:18

## 2022-05-20 RX ADMIN — METFORMIN HYDROCHLORIDE 500 MG: 500 TABLET ORAL at 09:46

## 2022-05-20 RX ADMIN — MIDODRINE HYDROCHLORIDE 10 MG: 10 TABLET ORAL at 09:24

## 2022-05-20 RX ADMIN — METOPROLOL TARTRATE 25 MG: 25 TABLET, FILM COATED ORAL at 21:09

## 2022-05-20 RX ADMIN — TRAZODONE HYDROCHLORIDE 300 MG: 100 TABLET ORAL at 21:02

## 2022-05-20 RX ADMIN — VENLAFAXINE HYDROCHLORIDE 150 MG: 75 CAPSULE, EXTENDED RELEASE ORAL at 21:00

## 2022-05-20 RX ADMIN — TAMSULOSIN HYDROCHLORIDE 0.4 MG: 0.4 CAPSULE ORAL at 09:25

## 2022-05-20 RX ADMIN — METOPROLOL TARTRATE 25 MG: 25 TABLET, FILM COATED ORAL at 09:18

## 2022-05-20 RX ADMIN — ASPIRIN 81 MG: 81 TABLET, COATED ORAL at 09:18

## 2022-05-20 RX ADMIN — ANTI-FUNGAL POWDER MICONAZOLE NITRATE TALC FREE: 1.42 POWDER TOPICAL at 21:13

## 2022-05-20 RX ADMIN — FUROSEMIDE 40 MG: 40 TABLET ORAL at 09:18

## 2022-05-20 RX ADMIN — Medication 15 MG: at 21:04

## 2022-05-20 RX ADMIN — SODIUM CHLORIDE, PRESERVATIVE FREE 10 ML: 5 INJECTION INTRAVENOUS at 09:27

## 2022-05-20 RX ADMIN — VENLAFAXINE HYDROCHLORIDE 150 MG: 75 CAPSULE, EXTENDED RELEASE ORAL at 09:25

## 2022-05-20 RX ADMIN — OXYCODONE AND ACETAMINOPHEN 1 TABLET: 325; 10 TABLET ORAL at 09:12

## 2022-05-20 RX ADMIN — ANTI-FUNGAL POWDER MICONAZOLE NITRATE TALC FREE: 1.42 POWDER TOPICAL at 13:31

## 2022-05-20 RX ADMIN — MIDODRINE HYDROCHLORIDE 10 MG: 10 TABLET ORAL at 21:00

## 2022-05-20 RX ADMIN — BUDESONIDE AND FORMOTEROL FUMARATE DIHYDRATE 2 PUFF: 160; 4.5 AEROSOL RESPIRATORY (INHALATION) at 08:00

## 2022-05-20 RX ADMIN — ARIPIPRAZOLE 5 MG: 5 TABLET ORAL at 21:00

## 2022-05-20 RX ADMIN — MIDODRINE HYDROCHLORIDE 10 MG: 10 TABLET ORAL at 13:35

## 2022-05-20 RX ADMIN — GABAPENTIN 300 MG: 300 CAPSULE ORAL at 21:09

## 2022-05-20 RX ADMIN — BUDESONIDE AND FORMOTEROL FUMARATE DIHYDRATE 2 PUFF: 160; 4.5 AEROSOL RESPIRATORY (INHALATION) at 19:19

## 2022-05-20 RX ADMIN — GLIPIZIDE 5 MG: 10 TABLET ORAL at 06:21

## 2022-05-20 RX ADMIN — PANTOPRAZOLE SODIUM 40 MG: 40 TABLET, DELAYED RELEASE ORAL at 06:21

## 2022-05-20 RX ADMIN — SODIUM CHLORIDE, PRESERVATIVE FREE 10 ML: 5 INJECTION INTRAVENOUS at 20:59

## 2022-05-20 RX ADMIN — METFORMIN HYDROCHLORIDE 500 MG: 500 TABLET ORAL at 16:48

## 2022-05-20 RX ADMIN — OXYCODONE AND ACETAMINOPHEN 1 TABLET: 325; 10 TABLET ORAL at 18:39

## 2022-05-20 RX ADMIN — ROSUVASTATIN CALCIUM 5 MG: 10 TABLET, FILM COATED ORAL at 09:24

## 2022-05-20 ASSESSMENT — PAIN DESCRIPTION - DESCRIPTORS: DESCRIPTORS: ACHING;THROBBING;DISCOMFORT

## 2022-05-20 ASSESSMENT — PAIN SCALES - GENERAL
PAINLEVEL_OUTOF10: 5
PAINLEVEL_OUTOF10: 10
PAINLEVEL_OUTOF10: 10

## 2022-05-20 ASSESSMENT — PAIN DESCRIPTION - ORIENTATION: ORIENTATION: RIGHT

## 2022-05-20 ASSESSMENT — PAIN - FUNCTIONAL ASSESSMENT: PAIN_FUNCTIONAL_ASSESSMENT: PREVENTS OR INTERFERES SOME ACTIVE ACTIVITIES AND ADLS

## 2022-05-20 ASSESSMENT — PAIN DESCRIPTION - LOCATION
LOCATION: ARM;BACK;SHOULDER
LOCATION: BACK

## 2022-05-20 ASSESSMENT — PAIN DESCRIPTION - ONSET: ONSET: ON-GOING

## 2022-05-20 ASSESSMENT — PAIN DESCRIPTION - PAIN TYPE: TYPE: CHRONIC PAIN

## 2022-05-20 ASSESSMENT — PAIN DESCRIPTION - FREQUENCY: FREQUENCY: CONTINUOUS

## 2022-05-20 NOTE — PROGRESS NOTES
Section of Cardiology  Progress Note      Date:  5/20/2022  Patient: Jermaine Arevalo  Admission:  5/14/2022  7:18 AM  Admit DX: Atrial fibrillation with rapid ventricular response ([facility] [address] [I48.91]  Age:  71 y. o., 1950     LOS: 6 days     Reason for evaluation:   atrial fibrillation      SUBJECTIVE:     The patient was seen and examined. Notes and labs reviewed. BP has been normal today. HR in the  range, sinus  Pt says she feels crummy  Having poor appetitie, fatigue, occasional chest heaviness  Vitals otherwise stable  Pt says she has been out of bed contrary to nursing reports  Planning for rehab tomorrow am      OBJECTIVE:      EXAM:   Vitals:    VITALS:  /79   Pulse 99   Temp 98 °F (36.7 °C) (Temporal)   Resp 25   Ht 5' 8\" (1.727 m)   Wt 150 lb 14.4 oz (68.4 kg)   SpO2 93%   BMI 22.94 kg/m²   24HR INTAKE/OUTPUT:      Intake/Output Summary (Last 24 hours) at 5/20/2022 1741  Last data filed at 5/20/2022 1630  Gross per 24 hour   Intake 410 ml   Output 900 ml   Net -490 ml       CONSTITUTIONAL:  awake, alert, cooperative, no apparent distress, and appears stated age. HEENT: Normal jugular venous pulsations,   LUNGS: Good respiratory effort On auscultation: clear to auscultation bilaterally  CARDIOVASCULAR:  Normal apical impulse, regular rate and rhythm, normal S1 and S2, no S3 or S4, and no murmur or rub noted. ABDOMEN: Soft, nontender, nondistended. Bowel sounds present. No masses or tenderness. No bruit. SKIN: Warm and dry. EXTREMITIES:No lower extremity edema.     Current Inpatient Medications:   midodrine  10 mg Oral 4x Daily    metoprolol tartrate  25 mg Oral BID    miconazole   Topical BID    aspirin EC  81 mg Oral Daily    budesonide-formoterol  2 puff Inhalation BID    pantoprazole  40 mg Oral QAM AC    venlafaxine  150 mg Oral BID    rosuvastatin  5 mg Oral Daily    ARIPiprazole  5 mg Oral Nightly    metFORMIN  500 mg Oral BID WC    traZODone  300 mg Oral Nightly    melatonin  15 mg Oral Nightly    tamsulosin  0.4 mg Oral Daily    gabapentin  300 mg Oral BID    furosemide  40 mg Oral Daily    sennosides-docusate sodium  1 tablet Oral BID    sodium chloride flush  5-40 mL IntraVENous 2 times per day    glipiZIDE  5 mg Oral QAM AC       IV Infusions (if any):   dilTIAZem (CARDIZEM) 125 mg in dextrose 5% 125 mL infusion Stopped (05/14/22 2345)    sodium chloride Stopped (05/14/22 1625)    sodium chloride      dextrose         Diagnostics:   Telemetry: Sinus Tach Rate 100      Labs:   CBC:  Recent Labs     05/19/22  0544 05/20/22  0351   WBC 10.8 8.7   HGB 8.0* 7.5*   HCT 28.3* 26.4*    253     Magnesium:No results for input(s): MG in the last 72 hours. BMP:  Recent Labs     05/19/22  0544 05/20/22  0351    138   K 4.1 3.8   CALCIUM 9.2 8.8   CO2 34* 34*   BUN 12 13   CREATININE 0.62 0.74   LABGLOM >60 >60   GLUCOSE 86 101*     BNP:No results for input(s): BNP, PROBNP in the last 72 hours. PT/INR:No results for input(s): PROTIME, INR in the last 72 hours. APTT:No results for input(s): APTT in the last 72 hours. CARDIAC ENZYMES:No results for input(s): MYOGLOBIN, CKTOTAL, CKMB, CKMBINDEX, TROPHS, TROPONINT in the last 72 hours. FASTING LIPID PANEL:No results found for: HDL, LDLDIRECT, LDLCALC, TRIG  LIVER PROFILE:No results for input(s): AST, ALT, LABALBU, ALKPHOS, BILITOT, BILIDIR, IBILI, PROT, GLOB, ALBUMIN in the last 72 hours. ASSESSMENT:  1.  New onset atrial fibrillation with rapid ventricular response, converted to sinus rhythm. 2.  Chronic history of sinus tachycardia  3.  History of moderate coronary artery disease of the circumflex  4.  Moderate aortic stenosis. 5.  Chronic right bundle branch block. 6.  COPD  7.  History of right lung cancer  8.  History of non-insulin-dependent diabetes mellitus      PLAN:  1. Continue current medications. 2. Cont Metoprolol 25mg for rate control and midodrine  3.  Anticoagulation was stopped due to high fall risk. No plans for initiation  4. No plans for AVN ablation and/or pacemaker implantation   5. OK for discharge tomorrow from cardiac standpoint      Discussed with patient and nursing.     Michelle Kelly MD N/A

## 2022-05-20 NOTE — PLAN OF CARE
Problem: Skin/Tissue Integrity  Goal: Absence of new skin breakdown  Description: 1. Monitor for areas of redness and/or skin breakdown  2. Assess vascular access sites hourly  3. Every 4-6 hours minimum:  Change oxygen saturation probe site  4. Every 4-6 hours:  If on nasal continuous positive airway pressure, respiratory therapy assess nares and determine need for appliance change or resting period. Outcome: Progressing     Problem: Pain  Goal: Verbalizes/displays adequate comfort level or baseline comfort level  Outcome: Progressing   Continue to monitor skin integrity and IV sites. Patient will have progressive improvement with pain scale with interventions. At present awaiting precertification for [facility] for discharge. Will continue to monitor pt.

## 2022-05-20 NOTE — PROGRESS NOTES
Social work states pt has been approved to go to [facility] ([facility]). Dr Mandy Phillip asking if pt is ok for dc. Message read no response, will await rounds to ask about dc.

## 2022-05-20 NOTE — PROGRESS NOTES
Physical Therapy  Facility/Department: [facility]  Rehabilitation Physical Therapy Treatment Note    NAME: Jemraine Arevalo  : 1950 (1 y.o.)  MRN: [id number]  CODE STATUS: Full Code    Date of Service: 22       Restrictions:  Restrictions/Precautions: Fall Risk;Contact Precautions; Up as Tolerated  Position Activity Restriction  Other position/activity restrictions: up with assist, external cath, L chest port, telemetry, elevate heels, pt easily agitiated (does not like to be asked questions or given \"orders\"     SUBJECTIVE  Subjective  Subjective: agreeable to sit EOB, did not want to get in a chair due to being uncomfortable. Per past notes patient is easily irritable so therapist had patient direct her own treatment and patient pleasant to therapist but limited activity performed        Post Treatment Pain Screening         OBJECTIVE  Cognition  Overall Cognitive Status: Exceptions  Arousal/Alertness: Appropriate responses to stimuli  Following Commands: Follows one step commands consistently  Attention Span: Appears intact  Memory: Appears intact  Safety Judgement: Decreased awareness of need for assistance  Problem Solving: Assistance required to identify errors made  Insights: Decreased awareness of deficits  Initiation: Requires cues for some  Sequencing: Requires cues for some  Orientation  Overall Orientation Status: Within Functional Limits    Functional Mobility  Supine to Sit  Assistance Level: Stand by assist  Skilled Clinical Factors: good use of UEs to push self up  Scooting  Assistance Level:  Moderate assistance  Skilled Clinical Factors: patient states she can't scoot, increased assist needed to move patient up higher in bed  Balance  Sitting Balance: Stand by assistance  Standing Balance: Contact guard assistance  Standing Balance  Time: 60 seconds  Activity: HHA  Comments: patient stated she felt shaky  Sit to Stand  Assistance Level: Contact guard assist  Skilled Clinical Factors: patient stood at bedside with CGA, patient did not want to take any steps  Stand to Sit  Assistance Level: Contact guard assist      Environmental Mobility                PT Exercises  A/AROM Exercises: ankle pumps, marching, LAQ, hip abd/add x15      ASSESSMENT/PROGRESS TOWARDS GOALS       Assessment  Assessment: Patient agreeable to limited activity, decreased assist needed with bed mobility this date, no ambulation performed. Recommend further therapy following discharge. Activity Tolerance: Patient limited by pain; Patient limited by endurance  Discharge Recommendations: Patient would benefit from continued therapy after discharge    Goals  Patient Goals   Patient goals : Pt goal is for a safe d/c and she is hopeful that she will be strong enough to go home  Short Term Goals  Time Frame for Short term goals: 15  Short term goal 1: bed mob ind  Short term goal 2: trnasfers ind  Short term goal 3: amb x 50 ft w. r.walker sBA  Short term goal 4: w/c x 200 ft w/ turns and manuvering ind    PLAN OF CARE/SAFETY  Plan  Plan: 5-7 times per week  Current Treatment Recommendations: Strengthening;Balance training;Functional mobility training;Patient/Caregiver education & training;Home exercise program;Safety education & training; Wheelchair mobility training;Transfer training;Gait training  Safety Devices  Type of Devices: All fall risk precautions in place; Bed alarm in place;Call light within reach; Left in bed      Therapy Time   Individual Concurrent Group Co-treatment   Time In 1142         Time Out 1159         [address] [place], 05/20/22 at 12:19 PM

## 2022-05-20 NOTE — ADT AUTH CERT
Patient Demographics    Name Patient ID SSN Gender Identity Birth Date   Vitaliy Galicia [id number]  Female 52 (71 yrs)     Address Phone Email Employer    [address] [phone] (N)   [phone] (M) --  [employer] Race Occupation Emp Status    -- White (non-) -- Retired      Reg Status PCP Date Last Verified Next Review Date    Verified Rao Leahy MD  [phone] 22      Admission Date Discharge Date Admitting Provider     22 -- Isabel Lino MD       Marital Status [address]        Emergency Contact 1 Emergency Contact 2 Emergency Contact 3   rFeddy Kern (C)   . [address]   [phone] (R)   [phone] Anne Marie Zavaleta) Vergil Amalia [phone] (G)   [phone] [facility] (D)   [phone] (W)   [phone] (L)   [phone] Anne Marie Zavaleta)       Subscriber Details  Hospital Account [de-identified]  [employer] Subscriber Name/Sex/Relation Subscriber  Subscriber Address/Phone Subscriber Emp/Emp Phone   1.  [employer]BS MEDICARE   [address] [facility] Female   (Self) 1950 [address] [place], [place]  [id number] [phone](S) [employer]      Utilization Reviews         Atrial Fibrillation - Care Day 6 (2022) by Nida Andersen RN       Review Status Review Entered   Completed 2022 09:42      Criteria Review      Care Day: 6 Care Date: 2022 Level of Care: Intermediate Care    Guideline Day 2    Level Of Care    (X) ICU, intermediate care, or telemetry to discharge    ( ) Complete discharge planning    2022 9:41 AM EDT by Abigail Field      Pt agree to SNF @ d/c- referral initiated    Clinical Status    (X) * Hemodynamic stability    2022 9:41 AM EDT by Abigail Field      Intermittent dips in BP 87/60,91/55   HR ,    (X) * Sinus rhythm or acceptable ventricular rate    (X) * No evidence of myocardial ischemia    (X) * Mental status at baseline ( ) * Tachypnea absent    5/20/2022 9:41 AM EDT by Gemma Mcleod      RR 21-32, O2 @ 3L/NC    ( ) * Hypoxemia absent    5/20/2022 9:41 AM EDT by Gemma Mcleod      RR 21-32, O2 @ 3L/NC    (X) * Anticoagulants regimen for next level of care established    (X) * Antiarrhythmic medication absent or no requirement for further inpatient ECG monitoring    ( ) * Discharge plans and education understood    Activity    (X) * Ambulatory or acceptable for next level of care    Routes    (X) * Oral hydration    (X) * Oral medications or regimen acceptable for next level of care    (X) * Oral diet or acceptable for next level of care    Interventions    (X) Cardiac monitoring    (X) Possible PT/PTT if anticoagulated    * Milestone   Additional Notes   DATE: 5/19/22         Relevant baselines: (lab values, vitals, o2 amount/delivery, etc.)   Baseline O2 @ 2L/NC      Pertinent Updates:   RR 21-32, O2 @ 3L/NC, HR , BP 87/60,91/55,       Vitals:97.5 (36.4) 32 113 94/71 O2 @ 3L/NC, Sats 91%      Abnl/Pertinent Labs/Radiology/Diagnostic Studies:      5/19/2022 05:44   Chloride: 95 (L)   CO2: 34 (H)   RBC: 3.05 (L)   Hemoglobin Quant: 8.0 (L)   Hematocrit: 28.3 (L)   MCHC: 28.3 (L)   RDW: 17.7 (H)   Seg Neutrophils: 80 (H)   Segs Absolute: 8.64 (H)   Lymphocytes: 9 (L)   Absolute Lymph #: 0.97 (L)   Immature Granulocytes: 3 (H)      5/19/2022 19:11   POC Glucose: 145 (H)      Physical Exam:   CONSTITUTIONAL:  awake, alert, cooperative, no apparent distress, and appears stated age. HEENT: Normal jugular venous pulsations,    LUNGS: Good respiratory effort On auscultation: clear to auscultation bilaterally   CARDIOVASCULAR:  Normal apical impulse, regular rate and rhythm, normal S1 and S2, no S3 or S4, and no murmur or rub noted. ABDOMEN: Soft, nontender, nondistended. Bowel sounds present. No masses or tenderness. No bruit. SKIN: Warm and dry. EXTREMITIES:No lower extremity edema.  Motor movement grossly intact.  No cyanosis or clubbing. MD Consults/Assessments & Plans:      Cardio   PLAN:   1. Continue current medications. 2. Cont Metoprolol 25mg for rate control and midodrine   3. Anticoagulation was stopped due to high fall risk. No plans for initiation   4. No plans for AVN ablation and/or pacemaker implantation    ------------------------         Medications:   Scheduled Medications   · midodrine 10 mg Oral 4x Daily   · metoprolol tartrate 25 mg Oral BID   · levoFLOXacin 750 mg Oral Q24H   · miconazole Topical BID   · aspirin EC 81 mg Oral Daily   · budesonide-formoterol 2 puff Inhalation BID   · pantoprazole 40 mg Oral QAM AC   · venlafaxine 150 mg Oral BID   · rosuvastatin 5 mg Oral Daily   · ARIPiprazole 5 mg Oral Nightly   · metFORMIN 500 mg Oral BID WC   · traZODone 300 mg Oral Nightly   · melatonin 15 mg Oral Nightly   · tamsulosin 0.4 mg Oral Daily   · gabapentin 300 mg Oral BID   · furosemide 40 mg Oral Daily   · sennosides-docusate sodium 1 tablet Oral BID   · sodium chloride flush 5-40 mL IntraVENous 2 times per day   · glipiZIDE 5 mg Oral QAM AC       PRN   oxyCODONE-acetaminophen (PERCOCET)  MG per tablet 1 tablet x2      PT/OT/SLP/CM Assessments or Notes:   CM: and nurse  went to speak with patient regarding patients discharge plan.  and  expressed concern about patient returning home with aide service as she stated that she was only able to stand next to the bed yesterday and got dizzy. Patient ultimately agreed to return to [facility] for more PT/OT. Referral made to liaison.     - - -     spoke with [facility] at [facility]. She indicated that they can take patient back when she is ready for discharge.  Mary stated that she was going to begin precert            Atrial Fibrillation - Care Day 5 (5/18/2022) by Yoana Edwards RN       Review Status Review Entered   Completed 5/20/2022 09:27      Criteria Review      Care Day: 5 Care Date: 5/18/2022 Level of Care: Intermediate Care    Guideline Day 2    Level Of Care    (X) ICU, intermediate care, or telemetry to discharge    Clinical Status    (X) * Hemodynamic stability    (X) * Sinus rhythm or acceptable ventricular rate    (X) * No evidence of myocardial ischemia    (X) * Mental status at baseline    ( ) * Tachypnea absent    5/20/2022 9:27 AM EDT by Yin Nunez      RR 20-29,    ( ) * Hypoxemia absent    5/20/2022 9:27 AM EDT by Yin Nunez      O2 @ 3L/NC, (baseline 2L/Min)    (X) * Anticoagulants regimen for next level of care established    (X) * Antiarrhythmic medication absent or no requirement for further inpatient ECG monitoring    ( ) * Discharge plans and education understood    Activity    (X) * Ambulatory or acceptable for next level of care    Routes    (X) * Oral hydration    5/20/2022 9:27 AM EDT by Yin Nunez      PO PRN    (X) * Oral medications or regimen acceptable for next level of care    (X) * Oral diet or acceptable for next level of care    5/20/2022 9:27 AM EDT by Yin Nunez      ADULT DIET; Regular; 4 carb choices (60 gm/meal); Low Fat/Low Chol/High Fiber/2 gm Na;  Low Sodium (2 gm)    Interventions    (X) Cardiac monitoring    (X) Possible PT/PTT if anticoagulated    5/20/2022 9:27 AM EDT by Yin Nunez      5/18/2022 03:00  Anti-XA Unfrac Heparin: <0.10 (L)    * Milestone   Additional Notes   DATE: 5/18/22         Relevant baselines: (lab values, vitals, o2 amount/delivery, etc.)   O2 @ 2L/NC      Pertinent Updates:   O2 @ 3L/NC, RR 20-29, HR , Sats 90-96%      Vitals:  BP 83/72   Pulse 92   Temp 97.6 °F (36.4 °C) (Temporal)   Resp 21   Ht 5' 8\" (1.727 m)   Wt 156 lb (70.8 kg)   SpO2 97%   BMI 23.72 kg/m²          Abnl/Pertinent Labs/Radiology/Diagnostic Studies:      5/18/2022 03:00   Sodium: 133 (L)   Potassium: 3.8   Chloride: 91 (L)   CO2: 33 (H)   GLUCOSE, FASTING,GF: 190 (H)   RBC: 2.87 (L)   Hemoglobin Quant: 7.7 (L)   Hematocrit: 26.3 (L)   RDW: 17.9 (H)   Seg Neutrophils: 82 (H)   Lymphocytes: 8 (L)   Absolute Lymph #: 0.77 (L)   Immature Granulocytes: 4 (H)   RBC Morphology: ANISOCYTOSIS PRESENT   Anti-XA Unfrac Heparin: <0.10 (L)      5/18/2022 16:41   POC Glucose: 112 (H)      5/18/2022 19:28   POC Glucose: 144 (H)      Physical Exam:       CONSTITUTIONAL:  awake, alert, cooperative, no apparent distress, and appears stated age. HEENT: Normal jugular venous pulsations, no carotid bruits. Head is atraumatic, normocephalic. Eyes and oral mucosa are normal.   LUNGS: Good respiratory effort On auscultation: clear to auscultation bilaterally and diminished breath sounds bibasilar   CARDIOVASCULAR:  Normal apical impulse, irregular rate and rhythm, normal S1 and S2, .ABDOMEN: Soft, nontender, nondistended. Bowel sounds present. No masses or tenderness. No bruit. SKIN: Warm and dry. EXTREMITIES:1+ lower extremity edema. Motor movement grossly intact.  No cyanosis or clubbing. MD Consults/Assessments & Plans:      IM   · DVT and GI prophylaxis   · Continue to monitor/telemetry/CBC with differential daily/BMP daily   · Continue medications as below   ·     · Patient was recommended to be discharged to skilled facility, however she is refusing it   ·     · Discharge planning as per cardiology recommendation   ---------------------      Cardio      PLAN:   1. Patient was not felt to be a candidate for anticoagulation in view of high fall risk. 2. Continue metoprolol for ventricular rate control ,and midodrine for hypotension. 3.  Discharge planning, stop IV heparin as patient is at a very high fall risk for long-term oral anticoagulation.    4.  Options of rate control are limited and patient does not want any invasive procedures as AV agata ablation and pacemaker implantation for ventricular rate control.             Medications:   Scheduled Medications   · midodrine 10 mg Oral 4x Daily   · metoprolol

## 2022-05-20 NOTE — PROGRESS NOTES
Pt rests quietly in bed states she is dry and states purewick is in place. Pt eating breakfast upon writers entry to room and then had a very productive cough with large amt of thick sputum and PND noted. Pt states she has COPD and coughs like that. Will continue to monitor.

## 2022-05-20 NOTE — PROGRESS NOTES
[address] Hospitalist        5/20/2022   6:19 PM    Name:  Mackenzie Fallon  MRN:    [id number]     Acct:     [de-identified]   Room:  2031/2031-01  IP Day: 10     Admit Date: 5/14/2022  7:18 AM  PCP: Donald Ramirez MD    C/C:   Chief Complaint   Patient presents with    Shortness of Breath    Tachycardia       Assessment:      · Atrial fibrillation with rapid ventricular response  · Acute exacerbation of COPD  · Chronic respiratory failure with hypoxia requiring O2 via nasal cannula  · Bronchogenic carcinoma  · Essential hypertension currently Hypotension  · Coronary artery disease, native vessel  · Diabetes mellitus type 2  · Mixed hyperlipidemia  · Anxiety disorder  · History of hypotension  · Osteoarthritis, multiple joints  · Mood disorder  · Gastroesophageal reflux disease without esophagitis        Plan:     · Patient is in CVICU  · O2 maintain oxygen saturation greater than 92%  ·   · Monitor heart rate and blood pressure  · On metoprolol  · Continue ProAmatine  · Off anticoagulation due to high risk for fall  · Cardiology following, do not feel patient is a candidate for long-term anticoagulation in view of high fall risk      ·   · DVT and GI prophylaxis  · Continue to monitor/telemetry/CBC with differential daily/BMP daily  · Continue medications as below  ·   · Patient was recommended to be discharged to skilled facility, however she is refusing it  ·   · Discharge planning as per cardiology recommendation    Scheduled Meds:   midodrine  10 mg Oral 4x Daily    metoprolol tartrate  25 mg Oral BID    miconazole   Topical BID    aspirin EC  81 mg Oral Daily    budesonide-formoterol  2 puff Inhalation BID    pantoprazole  40 mg Oral QAM AC    venlafaxine  150 mg Oral BID    rosuvastatin  5 mg Oral Daily    ARIPiprazole  5 mg Oral Nightly    metFORMIN  500 mg Oral BID WC    traZODone  300 mg Oral Nightly    melatonin  15 mg Oral Nightly    tamsulosin  0.4 mg Oral Daily  gabapentin  300 mg Oral BID    furosemide  40 mg Oral Daily    sennosides-docusate sodium  1 tablet Oral BID    sodium chloride flush  5-40 mL IntraVENous 2 times per day    glipiZIDE  5 mg Oral QAM AC     Continuous Infusions:   dilTIAZem (CARDIZEM) 125 mg in dextrose 5% 125 mL infusion Stopped (22 2345)    sodium chloride Stopped (22 1625)    sodium chloride      dextrose       PRN Meds:  ipratropium-albuterol, 1 ampule, Q4H PRN  albuterol sulfate HFA, 2 puff, Q4H PRN  clonazePAM, 1 mg, TID PRN  sodium chloride flush, 10 mL, PRN  sodium chloride, , PRN  potassium chloride, 40 mEq, PRN   Or  potassium alternative oral replacement, 40 mEq, PRN   Or  potassium chloride, 10 mEq, PRN  magnesium sulfate, 1,000 mg, PRN  ondansetron, 4 mg, Q8H PRN   Or  ondansetron, 4 mg, Q6H PRN  polyethylene glycol, 17 g, Daily PRN  acetaminophen, 650 mg, Q6H PRN   Or  acetaminophen, 650 mg, Q6H PRN  oxyCODONE-acetaminophen, 1 tablet, Q4H PRN  glucose, 4 tablet, PRN  dextrose bolus, 125 mL, PRN   Or  dextrose bolus, 250 mL, PRN  glucagon (rDNA), 1 mg, PRN  dextrose, 100 mL/hr, PRN            Subjective:     Patient seen and examined at bedside. No overnight events. Patient is borderline tachycardic. Blood pressure improved. C/o feeling nauseous and threw up 3 times. Stated she is not feeling good  Afebrile      ROS:  A 10 point system reviewed and negative otherwise mentioned above. Physical Examination:      Vitals:  BP (!) 123/52   Pulse 104   Temp 98 °F (36.7 °C) (Temporal)   Resp 29   Ht 5' 8\" (1.727 m)   Wt 150 lb 14.4 oz (68.4 kg)   SpO2 94%   BMI 22.94 kg/m²   Temp (24hrs), Av.8 °F (36.6 °C), Min:97.5 °F (36.4 °C), Max:98.1 °F (36.7 °C)    Weight:   Wt Readings from Last 3 Encounters:   22 150 lb 14.4 oz (68.4 kg)   22 153 lb 0.9 oz (69.4 kg)   22 167 lb (75.8 kg)     I/O last 3 completed shifts:  I/O last 3 completed shifts: In: 250 [P.O.:240;  I.V.:10]  Out: 800 [Urine:800]     Recent Labs     05/19/22  1911 05/20/22  0742 05/20/22  1132 05/20/22  1555   POCGLU 145* 71 64* 124*         General appearance - alert, well appearing, and in no acute distress  Mental status - oriented to person, place, and time with normal affect  Head - normocephalic and atraumatic  Eyes - pupils equal and reactive, extraocular eye movements intact, conjunctiva clear  Ears - hearing appears to be intact  Nose - no drainage noted  Mouth - mucous membranes moist  Neck - supple, no carotid bruits, thyroid not palpable  Chest - clear to auscultation, normal effort  Heart - normal rate, regular rhythm, no murmur  Abdomen - soft, nontender, nondistended, bowel sounds present all four quadrants, no masses, hepatomegaly or splenomegaly  Neurological - normal speech, no focal findings or movement disorder noted, cranial nerves II through XII grossly intact  Extremities - peripheral pulses palpable, no pedal edema or calf pain with palpation  Skin - no gross lesions, rashes, or induration noted        Medications: Allergies:    Allergies   Allergen Reactions    Codeine      \"feeling of heavy on chest\"    Dye [Iodides]      Hallucination,vomiting       Current Meds:   Current Facility-Administered Medications:     midodrine (PROAMATINE) tablet 10 mg, 10 mg, Oral, 4x Daily, Kristine Hua MD, 10 mg at 05/20/22 1648    metoprolol tartrate (LOPRESSOR) tablet 25 mg, 25 mg, Oral, BID, Annie Solares MD, 25 mg at 05/20/22 0918    miconazole (MICOTIN) 2 % powder, , Topical, BID, Linette Romano MD, Given at 05/20/22 1331    ipratropium-albuterol (DUONEB) nebulizer solution 1 ampule, 1 ampule, Inhalation, Q4H PRN, Whitney Becerril MD    dilTIAZem 125 mg in dextrose 5 % 125 mL infusion, 2.5-15 mg/hr, IntraVENous, Continuous, Whitney Becerril MD, Stopped at 05/14/22 2345    0.9 % sodium chloride infusion, 1,000 mL, IntraVENous, Continuous, Whitney Becerril MD, Stopped at 05/14/22 1625    aspirin EC tablet 81 mg, 81 mg, Oral, Daily, Whitney Becerril MD, 81 mg at 05/20/22 0918    budesonide-formoterol (SYMBICORT) 160-4.5 MCG/ACT inhaler 2 puff, 2 puff, Inhalation, BID, Whitney Becerril MD, 2 puff at 05/20/22 0800    pantoprazole (PROTONIX) tablet 40 mg, 40 mg, Oral, QAM AC, Whitney Becerril MD, 40 mg at 05/20/22 0621    venlafaxine (EFFEXOR XR) extended release capsule 150 mg, 150 mg, Oral, BID, Whitney Becerril MD, 150 mg at 05/20/22 0925    rosuvastatin (CRESTOR) tablet 5 mg, 5 mg, Oral, Daily, Whitney Becerril MD, 5 mg at 05/20/22 0924    ARIPiprazole (ABILIFY) tablet 5 mg, 5 mg, Oral, Nightly, Whitney Becerril MD, 5 mg at 05/19/22 2035    metFORMIN (GLUCOPHAGE) tablet 500 mg, 500 mg, Oral, BID WC, Whitney Becerril MD, 500 mg at 05/20/22 1648    traZODone (DESYREL) tablet 300 mg, 300 mg, Oral, Nightly, Whitney Becerril MD, 300 mg at 05/19/22 2035    melatonin tablet 15 mg, 15 mg, Oral, Nightly, Whitney Becerril MD, 15 mg at 05/19/22 2036    albuterol sulfate  (90 Base) MCG/ACT inhaler 2 puff, 2 puff, Inhalation, Q4H PRN, Whitney Becerril MD    tamsulosin (FLOMAX) capsule 0.4 mg, 0.4 mg, Oral, Daily, Whitney Becerril MD, 0.4 mg at 05/20/22 0925    clonazePAM (KLONOPIN) tablet 1 mg, 1 mg, Oral, TID PRN, Whitney Becerril MD, 1 mg at 05/19/22 1414    gabapentin (NEURONTIN) capsule 300 mg, 300 mg, Oral, BID, Whitney Becerril MD, 300 mg at 05/20/22 0918    furosemide (LASIX) tablet 40 mg, 40 mg, Oral, Daily, Whitney Becerril MD, 40 mg at 05/20/22 0918    sennosides-docusate sodium (SENOKOT-S) 8.6-50 MG tablet 1 tablet, 1 tablet, Oral, BID, Whitney Becerril MD, 1 tablet at 05/20/22 0924    sodium chloride flush 0.9 % injection 5-40 mL, 5-40 mL, IntraVENous, 2 times per day, Damien Daniel MD, 10 mL at 05/20/22 2938    sodium chloride flush 0.9 % injection 10 mL, 10 mL, IntraVENous, PRN, Whitney Becerril MD    0.9 % sodium chloride infusion, , IntraVENous, PRN, Damien Daniel MD    potassium chloride (KLOR-CON M) extended release tablet 40 mEq, 40 mEq, Oral, PRN, 40 mEq at 05/14/22 1657 **OR** potassium bicarb-citric acid (EFFER-K) effervescent tablet 40 mEq, 40 mEq, Oral, PRN, 40 mEq at 05/16/22 0945 **OR** potassium chloride 10 mEq/100 mL IVPB (Peripheral Line), 10 mEq, IntraVENous, PRN, Whitney Becerril MD    magnesium sulfate 1000 mg in dextrose 5% 100 mL IVPB, 1,000 mg, IntraVENous, PRN, Whitney Becerril MD, Stopped at 05/16/22 2227    ondansetron (ZOFRAN-ODT) disintegrating tablet 4 mg, 4 mg, Oral, Q8H PRN **OR** ondansetron (ZOFRAN) injection 4 mg, 4 mg, IntraVENous, Q6H PRN, Whitney Becerril MD    polyethylene glycol (GLYCOLAX) packet 17 g, 17 g, Oral, Daily PRN, Whitney Becerril MD    acetaminophen (TYLENOL) tablet 650 mg, 650 mg, Oral, Q6H PRN **OR** acetaminophen (TYLENOL) suppository 650 mg, 650 mg, Rectal, Q6H PRN, Whitney Becerril MD    oxyCODONE-acetaminophen (PERCOCET)  MG per tablet 1 tablet, 1 tablet, Oral, Q4H PRN, Whitney Becerril MD, 1 tablet at 05/20/22 0912    glucose chewable tablet 16 g, 4 tablet, Oral, PRN, Whitney Becerril MD    dextrose bolus 10% 125 mL, 125 mL, IntraVENous, PRN **OR** dextrose bolus 10% 250 mL, 250 mL, IntraVENous, PRN, Whitney Becerril MD    glucagon (rDNA) injection 1 mg, 1 mg, IntraMUSCular, PRN, Whitney Becerril MD    dextrose 5 % solution, 100 mL/hr, IntraVENous, PRNWhitney MD    glipiZIDE (GLUCOTROL) tablet 5 mg, 5 mg, Oral, QAM AC, Whitney Becerril MD, 5 mg at 05/20/22 0266      I/O (24Hr):     Intake/Output Summary (Last 24 hours) at 5/20/2022 1819  Last data filed at 5/20/2022 1630  Gross per 24 hour   Intake 410 ml   Output 900 ml   Net -490 ml       Data:           Labs:    Hematology:  Recent Labs     05/18/22  0300 05/19/22  0544 05/20/22  0351   WBC 9.2 10.8 8.7   RBC 2.87* 3.05* 2.85*   HGB 7.7* 8.0* 7.5*   HCT 26.3* 28.3* 26.4*   MCV 91.6 92.8 92.6   MCH 26.8 26.2 26.3   MCHC 29.3 28.3* 28.4   RDW 17.9* 17.7* 17.3*    289 253   MPV 8.4 9.1 9.1 Chemistry:  Recent Labs     05/18/22  0300 05/19/22  0544 05/20/22  0351   * 138 138   K 3.8 4.1 3.8   CL 91* 95* 96*   CO2 33* 34* 34*   GLUCOSE 190* 86 101*   BUN 13 12 13   CREATININE 0.65 0.62 0.74   ANIONGAP 9 9 8*   LABGLOM >60 >60 >60   GFRAA >60 >60 >60   CALCIUM 8.7 9.2 8.8     Recent Labs     05/19/22  1059 05/19/22  1635 05/19/22  1911 05/20/22  0742 05/20/22  1132 05/20/22  1555   POCGLU 89 100 145* 71 64* 124*       Lab Results   Component Value Date/Time    SPECIAL LT AC 10ML 03/29/2022 07:28 PM     Lab Results   Component Value Date/Time    CULTURE NO GROWTH 5 DAYS 03/29/2022 07:28 PM       Lab Results   Component Value Date    POCPH 7.36 04/12/2017    PHART 7.42 08/26/2012    PH 7.22 04/11/2017    POCPCO2 45 04/12/2017    WVR4JEM 41 08/26/2012    PCO2 54 04/11/2017    POCPO2 93 04/12/2017    PO2ART 59 08/26/2012    PO2 89 04/11/2017    POCHCO3 25.3 04/12/2017    RAH2YYU 26.1 08/26/2012    HCO3 22.2 04/11/2017    NBEA NOT REPORTED 04/12/2017    PBEA 0 04/12/2017    SBL6GJY 27 04/12/2017    UQWF0OLK 97 04/12/2017    Y9YNKHPU 92.1 08/26/2012    O2SAT 95 04/11/2017    FIO2 UNKNOWN 10/31/2017       Radiology:    XR CHEST PORTABLE    Result Date: 5/15/2022  1. Improved right pleural effusion. 2.  Patchy opacity in the medial right upper lobe, possibly post treatment change versus residual tumor. Correlation with any recent CT imaging is recommended. All radiological studies reviewed  Code Status:  Full Code        Electronically signed by Christopher Decker MD on 5/20/2022 at 6:19 PM    This note was created with the assistance of a speech-recognition program.  Although the intention is to generate a document that actually reflects the content of the visit, no guarantees can be provided that every mistake has been identified and corrected by editing. Note was updated later by me after  physical examination and  completion of the assessment.

## 2022-05-21 VITALS
BODY MASS INDEX: 22.87 KG/M2 | HEART RATE: 120 BPM | WEIGHT: 150.9 LBS | TEMPERATURE: 97 F | RESPIRATION RATE: 28 BRPM | DIASTOLIC BLOOD PRESSURE: 31 MMHG | HEIGHT: 68 IN | OXYGEN SATURATION: 94 % | SYSTOLIC BLOOD PRESSURE: 137 MMHG

## 2022-05-21 LAB
ABSOLUTE EOS #: 0.09 K/UL (ref 0–0.44)
ABSOLUTE IMMATURE GRANULOCYTE: 0.23 K/UL (ref 0–0.3)
ABSOLUTE LYMPH #: 0.94 K/UL (ref 1.1–3.7)
ABSOLUTE MONO #: 0.68 K/UL (ref 0.1–1.2)
ANION GAP SERPL CALCULATED.3IONS-SCNC: 7 MMOL/L (ref 9–17)
BASOPHILS # BLD: 0 % (ref 0–2)
BASOPHILS ABSOLUTE: 0.03 K/UL (ref 0–0.2)
BUN BLDV-MCNC: 10 MG/DL (ref 8–23)
BUN/CREAT BLD: 17 (ref 9–20)
CALCIUM SERPL-MCNC: 8.8 MG/DL (ref 8.6–10.4)
CHLORIDE BLD-SCNC: 95 MMOL/L (ref 98–107)
CO2: 34 MMOL/L (ref 20–31)
CREAT SERPL-MCNC: 0.58 MG/DL (ref 0.5–0.9)
EOSINOPHILS RELATIVE PERCENT: 1 % (ref 1–4)
GFR AFRICAN AMERICAN: >60 ML/MIN
GFR NON-AFRICAN AMERICAN: >60 ML/MIN
GFR SERPL CREATININE-BSD FRML MDRD: ABNORMAL ML/MIN/{1.73_M2}
GLUCOSE BLD-MCNC: 106 MG/DL (ref 65–105)
GLUCOSE BLD-MCNC: 73 MG/DL (ref 65–105)
GLUCOSE BLD-MCNC: 99 MG/DL (ref 70–99)
HCT VFR BLD CALC: 27.6 % (ref 36.3–47.1)
HEMOGLOBIN: 8 G/DL (ref 11.9–15.1)
IMMATURE GRANULOCYTES: 3 %
LYMPHOCYTES # BLD: 11 % (ref 24–43)
MCH RBC QN AUTO: 26.8 PG (ref 25.2–33.5)
MCHC RBC AUTO-ENTMCNC: 29 G/DL (ref 28.4–34.8)
MCV RBC AUTO: 92.3 FL (ref 82.6–102.9)
MONOCYTES # BLD: 8 % (ref 3–12)
NRBC AUTOMATED: 0 PER 100 WBC
PDW BLD-RTO: 17.3 % (ref 11.8–14.4)
PLATELET # BLD: 264 K/UL (ref 138–453)
PMV BLD AUTO: 9.3 FL (ref 8.1–13.5)
POTASSIUM SERPL-SCNC: 3.8 MMOL/L (ref 3.7–5.3)
RBC # BLD: 2.99 M/UL (ref 3.95–5.11)
RBC # BLD: ABNORMAL 10*6/UL
SEG NEUTROPHILS: 77 % (ref 36–65)
SEGMENTED NEUTROPHILS ABSOLUTE COUNT: 6.27 K/UL (ref 1.5–8.1)
SODIUM BLD-SCNC: 136 MMOL/L (ref 135–144)
WBC # BLD: 8.2 K/UL (ref 3.5–11.3)

## 2022-05-21 PROCEDURE — 94761 N-INVAS EAR/PLS OXIMETRY MLT: CPT

## 2022-05-21 PROCEDURE — 6370000000 HC RX 637 (ALT 250 FOR IP): Performed by: FAMILY MEDICINE

## 2022-05-21 PROCEDURE — 85025 COMPLETE CBC W/AUTO DIFF WBC: CPT

## 2022-05-21 PROCEDURE — 36415 COLL VENOUS BLD VENIPUNCTURE: CPT

## 2022-05-21 PROCEDURE — 6370000000 HC RX 637 (ALT 250 FOR IP): Performed by: HOSPITALIST

## 2022-05-21 PROCEDURE — 80048 BASIC METABOLIC PNL TOTAL CA: CPT

## 2022-05-21 PROCEDURE — 97110 THERAPEUTIC EXERCISES: CPT

## 2022-05-21 PROCEDURE — 2580000003 HC RX 258: Performed by: FAMILY MEDICINE

## 2022-05-21 PROCEDURE — 2700000000 HC OXYGEN THERAPY PER DAY

## 2022-05-21 PROCEDURE — 94640 AIRWAY INHALATION TREATMENT: CPT

## 2022-05-21 PROCEDURE — 82947 ASSAY GLUCOSE BLOOD QUANT: CPT

## 2022-05-21 PROCEDURE — 6370000000 HC RX 637 (ALT 250 FOR IP): Performed by: INTERNAL MEDICINE

## 2022-05-21 RX ORDER — CLONAZEPAM 1 MG/1
1 TABLET ORAL 3 TIMES DAILY PRN
Qty: 5 TABLET | Refills: 0 | Status: SHIPPED | OUTPATIENT
Start: 2022-05-21 | End: 2023-08-11

## 2022-05-21 RX ORDER — OXYCODONE AND ACETAMINOPHEN 10; 325 MG/1; MG/1
1 TABLET ORAL EVERY 4 HOURS PRN
Qty: 15 TABLET | Refills: 0 | Status: SHIPPED | OUTPATIENT
Start: 2022-05-21 | End: 2022-05-26

## 2022-05-21 RX ORDER — MIDODRINE HYDROCHLORIDE 10 MG/1
10 TABLET ORAL 4 TIMES DAILY
Qty: 120 TABLET | Refills: 0 | Status: SHIPPED | OUTPATIENT
Start: 2022-05-21 | End: 2022-06-20

## 2022-05-21 RX ORDER — TAMSULOSIN HYDROCHLORIDE 0.4 MG/1
0.4 CAPSULE ORAL DAILY
Qty: 30 CAPSULE | Refills: 3 | Status: SHIPPED | OUTPATIENT
Start: 2022-05-22

## 2022-05-21 RX ORDER — GABAPENTIN 300 MG/1
300 CAPSULE ORAL 2 TIMES DAILY
Qty: 10 CAPSULE | Refills: 0 | Status: SHIPPED | OUTPATIENT
Start: 2022-05-21 | End: 2022-05-26

## 2022-05-21 RX ADMIN — OXYCODONE AND ACETAMINOPHEN 1 TABLET: 325; 10 TABLET ORAL at 07:42

## 2022-05-21 RX ADMIN — TAMSULOSIN HYDROCHLORIDE 0.4 MG: 0.4 CAPSULE ORAL at 07:44

## 2022-05-21 RX ADMIN — METFORMIN HYDROCHLORIDE 500 MG: 500 TABLET ORAL at 16:19

## 2022-05-21 RX ADMIN — MIDODRINE HYDROCHLORIDE 10 MG: 10 TABLET ORAL at 07:44

## 2022-05-21 RX ADMIN — PANTOPRAZOLE SODIUM 40 MG: 40 TABLET, DELAYED RELEASE ORAL at 07:44

## 2022-05-21 RX ADMIN — ANTI-FUNGAL POWDER MICONAZOLE NITRATE TALC FREE: 1.42 POWDER TOPICAL at 07:47

## 2022-05-21 RX ADMIN — FUROSEMIDE 40 MG: 40 TABLET ORAL at 07:44

## 2022-05-21 RX ADMIN — MIDODRINE HYDROCHLORIDE 10 MG: 10 TABLET ORAL at 16:19

## 2022-05-21 RX ADMIN — MIDODRINE HYDROCHLORIDE 10 MG: 10 TABLET ORAL at 12:16

## 2022-05-21 RX ADMIN — ROSUVASTATIN CALCIUM 5 MG: 10 TABLET, FILM COATED ORAL at 07:44

## 2022-05-21 RX ADMIN — BUDESONIDE AND FORMOTEROL FUMARATE DIHYDRATE 2 PUFF: 160; 4.5 AEROSOL RESPIRATORY (INHALATION) at 07:58

## 2022-05-21 RX ADMIN — METOPROLOL TARTRATE 25 MG: 25 TABLET, FILM COATED ORAL at 07:43

## 2022-05-21 RX ADMIN — GABAPENTIN 300 MG: 300 CAPSULE ORAL at 07:44

## 2022-05-21 RX ADMIN — VENLAFAXINE HYDROCHLORIDE 150 MG: 75 CAPSULE, EXTENDED RELEASE ORAL at 07:43

## 2022-05-21 RX ADMIN — METFORMIN HYDROCHLORIDE 500 MG: 500 TABLET ORAL at 07:44

## 2022-05-21 RX ADMIN — SODIUM CHLORIDE, PRESERVATIVE FREE 10 ML: 5 INJECTION INTRAVENOUS at 20:23

## 2022-05-21 RX ADMIN — ASPIRIN 81 MG: 81 TABLET, COATED ORAL at 07:44

## 2022-05-21 RX ADMIN — SODIUM CHLORIDE, PRESERVATIVE FREE 10 ML: 5 INJECTION INTRAVENOUS at 07:43

## 2022-05-21 RX ADMIN — OXYCODONE AND ACETAMINOPHEN 1 TABLET: 325; 10 TABLET ORAL at 20:24

## 2022-05-21 RX ADMIN — GLIPIZIDE 5 MG: 10 TABLET ORAL at 07:44

## 2022-05-21 ASSESSMENT — PAIN SCALES - GENERAL
PAINLEVEL_OUTOF10: 5
PAINLEVEL_OUTOF10: 0
PAINLEVEL_OUTOF10: 0
PAINLEVEL_OUTOF10: 10
PAINLEVEL_OUTOF10: 0

## 2022-05-21 ASSESSMENT — PAIN DESCRIPTION - PAIN TYPE: TYPE: CHRONIC PAIN

## 2022-05-21 ASSESSMENT — PAIN DESCRIPTION - DESCRIPTORS
DESCRIPTORS: DISCOMFORT
DESCRIPTORS: DISCOMFORT

## 2022-05-21 ASSESSMENT — PAIN DESCRIPTION - LOCATION
LOCATION: ARM
LOCATION: GENERALIZED

## 2022-05-21 ASSESSMENT — PAIN DESCRIPTION - ORIENTATION
ORIENTATION: RIGHT
ORIENTATION: RIGHT

## 2022-05-21 ASSESSMENT — PAIN DESCRIPTION - ONSET: ONSET: ON-GOING

## 2022-05-21 ASSESSMENT — PAIN DESCRIPTION - FREQUENCY: FREQUENCY: CONTINUOUS

## 2022-05-21 NOTE — PROGRESS NOTES
Comprehensive Nutrition Assessment    Type and Reason for Visit:  Initial,RD Nutrition Re-Screen/LOS    Nutrition Recommendations/Plan:   1. Continue Adult Diet; Regular; 4 carb choices (60 gm/meal); Low Fat/ Low Chol/ High Fiber/ 2 gm Na   2. Add Glucerna BID to meal trays -- continue at discharge  3. Monitor po intakes, ONS acceptance, weights and labs      Malnutrition Assessment:  Malnutrition Status:  Mild malnutrition (05/21/22 1600)    Context:  Chronic Illness     Findings of the 6 clinical characteristics of malnutrition:  Energy Intake:  Mild decrease in energy intake (Comment)  Weight Loss:  Greater than 10% over 6 months     Body Fat Loss:  Unable to assess     Muscle Mass Loss:  Unable to assess    Fluid Accumulation:  No significant fluid accumulation     Strength:  Not Performed    Nutrition Assessment:    Patient admission r/t SOB -- admit dx: A-fib with RVR. Pt with hx COPD/GERD/DM. Pt with Malignant neoplasm of upper lobe of right lung. Patient with poor appetite -- variable po intakes. Weight loss noted but unable to determine accurate amt of weight loss (EHR weight shows 15% wt loss x 6 months)? Pt is at least mildly malnourished. Continue current diet and suggest adding Glucerna BID to meals. Patient is waiting for possible discharge today. Nutrition Related Findings:    Active bowel sounds. No edema. Poor apeptite. Labs reviewed. Wound Type: Skin Tears       Current Nutrition Intake & Therapies:    Average Meal Intake: 1-25%,26-50%,51-75%,%  Average Supplements Intake: None Ordered  ADULT DIET; Regular; 4 carb choices (60 gm/meal); Low Fat/Low Chol/High Fiber/2 gm Na; Low Sodium (2 gm)    Anthropometric Measures:  Height: 5' 8\" (172.7 cm)  Ideal Body Weight (IBW): 140 lbs (64 kg)    Admission Body Weight: 148 lb 9.6 oz (67.4 kg) (Actual; bed scale)  Current Body Weight: 150 lb 14.4 oz (68.4 kg), 107.8 % IBW.  Weight Source: Bed Scale  Current BMI (kg/m2): 22.9  Usual Body Weight: 179 lb 4 oz (81.3 kg) (standing scale 11/18/21)  % Weight Change (Calculated): -15.8  Weight Adjustment For: No Adjustment                 BMI Categories: Normal Weight (BMI 22.0 to 24.9) age over 72    Estimated Daily Nutrient Needs:  Energy Requirements Based On: Kcal/kg  Weight Used for Energy Requirements: Current  Energy (kcal/day): 7507-6155 kcal (25-27 kcal/kg)  Weight Used for Protein Requirements: Current  Protein (g/day): 75-90 gm protein (1.1-1.3 g/kg)       Nutrition Diagnosis:   · Inadequate protein-energy intake related to inadequate protein-energy intake as evidenced by intake 0-25%,intake 26-50%,intake 51-75%,poor intake prior to admission,weight loss,weight loss greater than or equal to 10% in 6 months      Nutrition Interventions:   Food and/or Nutrient Delivery: Continue Current Diet,Start Oral Nutrition Supplement  Nutrition Education/Counseling: Education not indicated  Coordination of Nutrition Care: Continue to monitor while inpatient       Goals:     Goals: Meet at least 75% of estimated needs       Nutrition Monitoring and Evaluation:   Behavioral-Environmental Outcomes: None Identified  Food/Nutrient Intake Outcomes: Food and Nutrient Intake,Supplement Intake  Physical Signs/Symptoms Outcomes: Biochemical Data,Weight,Skin    Discharge Planning:    Continue current diet,Continue Oral Nutrition Supplement     Lord Jessica RD, MICHELL  Office Number: [phone]

## 2022-05-21 NOTE — FLOWSHEET NOTE
RN notified Dr. Adam Milan of need for cardiology to see patient today for discharge clearance per Dr. Luke Barillas request.

## 2022-05-21 NOTE — DISCHARGE SUMMARY
[facility],    Adult Hospitalist      Patient ID: Rodolfo Hernandes  MRN: [id number]     Acct:  [de-identified]       Patient's PCP: Samaria Guillen MD    Admit Date: 5/14/2022     Discharge Date:   5/21/22    Admitting Physician: Emily Arteaga MD    Discharge Physician: Delfina Pimentel MD     CONSULTANTS: Patient Care Team:  Samaria Guillen MD as PCP - General (Family Medicine)  Lg Dumont RN as Nurse Navigator (Oncology)  Angelique Brar MD as Consulting Physician (Hematology and Oncology)  Steven Rosario MD as Consulting Physician (Radiation Oncology)      Active Discharge Diagnoses:  · Atrial fibrillation with rapid ventricular response  · Acute exacerbation of COPD  · Chronic respiratory failure with hypoxia requiring O2 via nasal cannula  · Bronchogenic carcinoma  · Essential hypertension currently Hypotension  · Coronary artery disease, native vessel  · Diabetes mellitus type 2  · Mixed hyperlipidemia  · Anxiety disorder  · History of hypotension  · Osteoarthritis, multiple joints  · Mood disorder  · Gastroesophageal reflux disease without esophagitis      Hospital Course:   Rodolfo Hernandes is a 71 y.o.  female who presents with Shortness of Breath and Tachycardia     Patient admitted through the emergency room where she presented with worsening episodes of dyspnea, cough and chest congestion. Patient had called 911 and on arrival EMS noted A. fib on the EKG strip. Her heart rate was in the 150s at that time. Patient had also been complaining of weakness and dizziness. Patient was wheezing and was given a DuoNeb nebulizer treatment as well as Solu-Medrol.     Patient had earlier been discharged from the hospital and had been at a skilled nursing facility. Patient says she had been feeling well however over the last week she noted dyspnea and dry cough. Patient denies any chest pain or orthopnea. Denies nausea, vomiting or abdominal pain.   Denies diarrhea or constipation. Denies headache, photophobia or diplopia. Denies neck pain or back pain     Patient says since admission she has been much better. She was started on Cardizem infusion and heart rate presently is in the low 100s. She denies having any palpitations at this time. She says her breathing has been better. Patient was also started on heparin infusion    Patient with history of atrial fibrillation with RVR, started on IV heparin, initially treated with IV Cardizem presented on echocardiogram, patient was eval by cardiology, cardiology recommended to start patient on metoprolol, IV Cardizem discontinued, further recommended no anticoagulation due to recurrent falls, further patient also treated for with IV antibiotic, IV Solu-Medrol, bronchodilators treatment for COPD exacerbation, overall patient did well, patient has completed therapy in the hospital following discharge from the hospital stable condition to skilled skilled facility for further rehabilitation. The plan was discussed in detail with patient who agreed with the plan and verbalized understanding . The patient was seen and examined on day of discharge and this discharge summary is in conjunction with any daily progress note from day of discharge.     Hospital Data:    Labs:    Hematology:  Recent Labs     05/19/22  0544 05/20/22  0351 05/21/22  0336   WBC 10.8 8.7 8.2   RBC 3.05* 2.85* 2.99*   HGB 8.0* 7.5* 8.0*   HCT 28.3* 26.4* 27.6*   MCV 92.8 92.6 92.3   MCH 26.2 26.3 26.8   MCHC 28.3* 28.4 29.0   RDW 17.7* 17.3* 17.3*    253 264   MPV 9.1 9.1 9.3     Chemistry:  Recent Labs     05/19/22  0544 05/20/22  0351 05/21/22  0336    138 136   K 4.1 3.8 3.8   CL 95* 96* 95*   CO2 34* 34* 34*   GLUCOSE 86 101* 99   BUN 12 13 10   CREATININE 0.62 0.74 0.58   ANIONGAP 9 8* 7*   LABGLOM >60 >60 >60   GFRAA >60 >60 >60   CALCIUM 9.2 8.8 8.8     Recent Labs     05/20/22  0742 05/20/22  1132 05/20/22  1555 05/20/22 2056 22  0736 22  1121   POCGLU 71 64* 124* 140* 106* 73     Lab Results   Component Value Date    INR 1.0 05/15/2022    INR 0.9 2022    INR 1.0 2022    PROTIME 12.9 05/15/2022    PROTIME 12.6 2022    PROTIME 13.4 2022     Lab Results   Component Value Date/Time    SPECIAL LT AC 10ML 2022 07:28 PM     Lab Results   Component Value Date/Time    CULTURE NO GROWTH 5 DAYS 2022 07:28 PM       Lab Results   Component Value Date    POCPH 7.36 2017    PHART 7.42 2012    PH 7.22 2017    POCPCO2 45 2017    ZRW0EEZ 41 2012    PCO2 54 2017    POCPO2 93 2017    PO2ART 59 2012    PO2 89 2017    POCHCO3 25.3 2017    UBX6DXC 26.1 2012    HCO3 22.2 2017    NBEA NOT REPORTED 2017    PBEA 0 2017    JFV7TND 27 2017    LVVN5QDG 97 2017    T6NUJPSH 92.1 2012    O2SAT 95 2017    FIO2 UNKNOWN 10/31/2017       Radiology:    No results found.       All radiological studies reviewed      Reviews of Symptoms:    A 10 point system is reviewed and  negative except described in hospital course    Physical Exam:    Vitals:  BP (!) 110/45   Pulse 104   Temp 97 °F (36.1 °C) (Temporal)   Resp 26   Ht 5' 8\" (1.727 m)   Wt 150 lb 14.4 oz (68.4 kg)   SpO2 93%   BMI 22.94 kg/m²   Temp (24hrs), Av.4 °F (36.3 °C), Min:97 °F (36.1 °C), Max:98.4 °F (36.9 °C)      General appearance - alert, well appearing, and in no acute distress  Mental status - oriented to person, place, and time with normal affect  Head - normocephalic and atraumatic  Eyes - pupils equal and reactive, extraocular eye movements intact, conjunctiva clear  Ears - hearing appears to be intact  Nose - no drainage noted  Mouth - mucous membranes moist  Neck - supple, no carotid bruits, thyroid not palpable  Chest - clear to auscultation, normal effort  Heart - normal rate, regular rhythm, no murmur  Abdomen - soft, nontender, nondistended, bowel sounds present all four quadrants, no masses, hepatomegaly or splenomegaly  Neurological - normal speech, no focal findings or movement disorder noted, cranial nerves II through XII grossly intact  Extremities - peripheral pulses palpable, no pedal edema or calf pain with palpation  Skin - no gross lesions, rashes, or induration noted      Consults:  IP CONSULT TO INTERNAL MEDICINE  IP CONSULT TO CARDIOLOGY    Disposition: SNF    Discharged Condition: Stable    Follow Up: Laurie Avendano MD  [address]  [phone]    Schedule an appointment as soon as possible for a visit in 1 week      Jani Ron [address] [facility]  [phone]    Schedule an appointment as soon as possible for a visit in 2 weeks        Lab Frequency Next Occurrence   PET CT SKULL BASE TO MID THIGH Once 08/25/2021   XR CHEST STANDARD (2 VW) Once 10/06/2021   PET CT SKULL BASE TO MID THIGH Once 10/27/2021   CT NEEDLE BIOPSY LUNG PERCUTANEOUS Once 50/23/5476   Basic Metabolic Panel Once 14/60/2873   CT NEEDLE BIOPSY LUNG PERCUTANEOUS Once 12/01/2021   PET CT SKULL BASE TO MID THIGH Once 04/27/2022   CBC With Auto Differential     Comprehensive Metabolic Panel     Hemoglobin and Hematocrit, Blood, Post Transfusion POST TRANSFUSION    Up with assistance PRN    Intake and output EVERY 8 HOURS    Initiate Oxygen Therapy Protocol PRN    Pulse Oximetry Spot Check PRN    Basic Metabolic Panel w/ Reflex to MG DAILY    CBC with Auto Differential DAILY    MDI treatment EVERY 4 HOURS PRN    Initiate RT Inhaler-Nebulizer Bronchodilator Protocol DAILY (RT)    POCT glucose 4 TIMES DAILY BEFORE MEALS & AT BEDTIME    HYPOGLYCEMIA TREATMENT: blood glucose less than 50 mg/dL and patient  ALERT and TOLERATING PO PRN    HYPOGLYCEMIA TREATMENT: blood glucose less than 70 mg/dL and patient ALERT and TOLERATING PO PRN    HYPOGLYCEMIA TREATMENT: blood glucose less than 70 mg/dL and patient NOT ALERT or NPO PRN    POCT Glucose PRN    Nasal Cannula Oxygen DAILY (RT)    CBC EVERY OTHER DAY    Incentive spirometry RT EVERY 2 HOURS WHILE AWAKE          Diet: ADULT DIET; Regular; 4 carb choices (60 gm/meal); Low Fat/Low Chol/High Fiber/2 gm Na; Low Sodium (2 gm)    Discharge Medications:      Medication List      CHANGE how you take these medications    gabapentin 300 MG capsule  Commonly known as: NEURONTIN  Take 1 capsule by mouth in the morning and at bedtime for 5 days. What changed:   · medication strength  · how much to take  · Another medication with the same name was removed. Continue taking this medication, and follow the directions you see here. metoprolol tartrate 25 MG tablet  Commonly known as: LOPRESSOR  Take 1 tablet by mouth 2 times daily  What changed:   · medication strength  · how much to take     midodrine 10 MG tablet  Commonly known as: PROAMATINE  Take 1 tablet by mouth 4 times daily  What changed:   · when to take this  · reasons to take this        CONTINUE taking these medications    albuterol sulfate  (90 Base) MCG/ACT inhaler  Inhale 2 puffs into the lungs every 4 hours as needed for Wheezing     ARIPiprazole 5 MG tablet  Commonly known as: ABILIFY     aspirin EC 81 MG EC tablet     clonazePAM 1 MG tablet  Commonly known as: KLONOPIN  Take 1 tablet by mouth 3 times daily as needed for Anxiety for up to 5 doses.      Crestor 5 MG tablet  Generic drug: rosuvastatin     Dulera 200-5 MCG/ACT inhaler  Generic drug: mometasone-formoterol     furosemide 40 MG tablet  Commonly known as: LASIX  Take 1 tablet by mouth daily     glimepiride 1 MG tablet  Commonly known as: AMARYL     lansoprazole 30 MG delayed release capsule  Commonly known as: PREVACID     melatonin 5 MG Tabs tablet     metFORMIN 500 MG tablet  Commonly known as: GLUCOPHAGE     ondansetron 4 MG disintegrating tablet  Commonly known as: Zofran ODT  Place 1 tablet under the tongue every 6 hours as needed for Nausea or Vomiting     oxyCODONE-acetaminophen  MG per tablet  Commonly known as: PERCOCET  Take 1 tablet by mouth every 4 hours as needed for Pain for up to 5 days. sennosides-docusate sodium 8.6-50 MG tablet  Commonly known as: SENOKOT-S     tamsulosin 0.4 MG capsule  Commonly known as: FLOMAX  Take 1 capsule by mouth daily  Start taking on: May 22, 2022     traZODone 150 MG tablet  Commonly known as: DESYREL     venlafaxine 150 MG extended release capsule  Commonly known as: EFFEXOR XR        STOP taking these medications    ondansetron 4 MG tablet  Commonly known as: Tracy Ragland           Where to Get Your Medications      These medications were sent to [facility], [address] [facility] [phone]  [address] [place] [address] Yvon Uribe [address]    Phone: [phone]   · metoprolol tartrate 25 MG tablet  · midodrine 10 MG tablet  · tamsulosin 0.4 MG capsule     You can get these medications from any pharmacy    Bring a paper prescription for each of these medications  · clonazePAM 1 MG tablet  · gabapentin 300 MG capsule  · oxyCODONE-acetaminophen  MG per tablet         Code Status:  Full Code    Time Spent on discharge is  35 mins in patient examination, evaluation, counseling as well as medication reconciliation, prescriptions for required medications, discharge plan and follow up. Electronically signed by Rubio Helms MD on 5/21/2022 at 5:18 PM     Thank you Dr. Juan Graham MD for the opportunity to be involved in this patient's care. This note was created with the assistance of a speech-recognition program.  Although the intention is to generate a document that actually reflects the content of the visit, no guarantees can be provided that every mistake has been identified and corrected by editing. Note was updated later by me after  physical examination and  completion of the assessment.

## 2022-05-21 NOTE — FLOWSHEET NOTE
RN notified Dr. Barbra Carcamo via perfect serve that cardiology rounded and has signed off. Awaiting response regarding estimated time of arrival to evaluate the patient. Will continue to monitor closely.

## 2022-05-21 NOTE — FLOWSHEET NOTE
RN called and completed telephone report with caregiver who will be taking over patient's care. Plan for patient to be transported tonight at 2030 to [facility] [place].

## 2022-05-21 NOTE — PROGRESS NOTES
Physical Therapy  Facility/Department: [facility] CVICU  Daily Treatment Note  NAME: Laura Marcos  : 1950  MRN: [id number]    Date of Service: 2022    Discharge Recommendations: Patient would benefit from continued therapy after discharge        Patient Diagnosis(es): The primary encounter diagnosis was COPD exacerbation ([facility] [address] Diagnoses of Atrial fibrillation with rapid ventricular response (HCC) and Lung mass were also pertinent to this visit. Assessment   Assessment: Pt only agreeable to supine ther ex this date. Needing rest breaks throughout session. Activity Tolerance: Patient limited by pain; Patient limited by endurance     Plan    Plan  Plan: 5-7 times per week  Current Treatment Recommendations: Strengthening;Balance training;Functional mobility training;Patient/Caregiver education & training;Home exercise program;Safety education & training; Wheelchair mobility training;Transfer training;Gait training     Restrictions  Restrictions/Precautions  Restrictions/Precautions: Fall Risk,Contact Precautions,Up as Tolerated  Required Braces or Orthoses?: No  Position Activity Restriction  Other position/activity restrictions: up with assist, external cath, L chest port, telemetry, elevate heels, pt easily agitiated (does not like to be asked questions or given \"orders\"     Subjective    Subjective  Subjective: Pt only agreeable to ther ex in supine this date. Pain: Stomach. RN aware. Orientation  Overall Orientation Status: Within Functional Limits     Objective   Vitals     Bed Mobility Training  Bed Mobility Training: (P) No     PT Exercises  A/AROM Exercises: x15 reps of AP's, quad sets, SAQ, heel slides and ABD/ADD in supine.  (Rest breaks between each ex.)             Goals  Short Term Goals  Time Frame for Short term goals: 12  Short term goal 1: bed mob ind  Short term goal 2: trnasfers ind  Short term goal 3: amb x 50 ft w. r.walker sBA  Short term goal 4: w/c x 200 ft w/ turns and manuvering ind  Patient Goals   Patient goals : Pt goal is for a safe d/c and she is hopeful that she will be strong enough to go home    Education       Therapy Time   Individual Concurrent Group Co-treatment   Time In 0955         Time Out 1011         Minutes 701  [place], [place]

## 2022-05-21 NOTE — FLOWSHEET NOTE
RN called [facility] and attempted to complete telephone report with caregiver who will be taking over patient's care. On hold for 21 minutes.

## 2022-05-21 NOTE — CARE COORDINATION
and nurse  went to speak with patient regarding patients discharge plan.  and  expressed concern about patient returning home with aide service as she stated that she was only able to stand next to the bed yesterday and got dizzy. Patient ultimately agreed to return to [facility] [place] for more PT/OT. Referral made to liaison.
 contacted Toñito Alfred from [facility] and asked about patients precert. She indicated that the precert was not back yet.
 met with patient at bedside to discuss discharge. Patient agreeable to plan and will be going to [facility] [place] at [facility]. Attempted to contact family but was unable to reach. Left a message for grandchild. Informed patient that she could discuss transport costs with Elke Kaufman. Patient agreed. Will send discharge order to facility once complete.
 spoke with [facility] at [facility] [place]. She indicated that they can take patient back when she is ready for discharge. Mary stated that she was going to begin precert. TODD started.
Social work met with patient at bedside to revisit discharge plan. Patient stated that she did not want to go to SNF as she was just at one. Patient reports that she is able to get care at home.
Social work: set up ambulance again after [address] spoke to Dr. Nancy Hill to see when she was coming to sign Rx at about 4 pm.  Only transport available was 8:30 pm via [employer] tonight. Attempted to call both phone numbers on face sheet. First phone number of Christiano Luis son voicemail was not yet set up and he did not answer. 2nd contact was granddaughter and was able to leave a message on her cell advising of time for transfer tonight at 8:30 pm.  Snf was faxed elizabeth and called to advise of time. RN advised of time. Will make sure pt aware also.   Merissa serna
Social work; await  Order for discharge and elizabeth signed. Changed time of transportation to 2 pm with Betsey Lagos at [place]. Advised floor of same. Gabriela Collins working on physician contacts. texted admissions at [facility]. Still need elizabeth, Rx and discharge order for snf in order to complete hens form for the state. Will need to advise pt and family once plan is completed. Report phone: [facility] [place]: [phone]  Fax: [phone] admissions office [facility]. Boogie serna    Social work: faxed elizabeth and hens to snf [facility]  Phone: [phone]  [place] will work pt in when Rx are signed.  Boogie serna
and ADLs - will need to notify of admission on Monday ([phone]). Patient has had [facility] in the past, but unsure if she is still current. Attempted to call, but unable to reach anyone. Recently discharged from [facility] [place].    Will need PT/OT to eval.     Consults: Cardiology    Continue to follow for needs at discharge      Electronically signed by Awais Cruz RN on 5/14/22 at 4:50 PM EDT

## 2022-05-21 NOTE — FLOWSHEET NOTE
RN notified Dr. Letitia Krishnamurthy of need for physician signature on C.O.C., as well as discharge order. Awaiting response at this time. Will continue to monitor closely.

## 2022-05-21 NOTE — PROGRESS NOTES
Section of Cardiology  Progress Note      Date:  5/21/2022  Patient: Josette Zayas  Admission:  5/14/2022  7:18 AM  Admit DX: Atrial fibrillation with rapid ventricular response ([facility] [address] [I48.91]  Age:  71 y. o., 1950     LOS: 7 days     Reason for evaluation:   atrial fibrillation      SUBJECTIVE:     The patient was seen and examined. Notes and labs reviewed. Pt feeling better today  No chest discomfort, more energy, breathing ok  Didn;t do much PT and did without issue  Pending discharge today  HR is  still. Sinus tach. Occasional PVCs  Denies dizziness. Occasional palpitations  BP much better today. On low dose Metoprolol       OBJECTIVE:      EXAM:   Vitals:    VITALS:  BP (!) 110/45   Pulse 89   Temp 97.3 °F (36.3 °C) (Temporal)   Resp 26   Ht 5' 8\" (1.727 m)   Wt 150 lb 14.4 oz (68.4 kg)   SpO2 93%   BMI 22.94 kg/m²   24HR INTAKE/OUTPUT:      Intake/Output Summary (Last 24 hours) at 5/21/2022 1442  Last data filed at 5/21/2022 0957  Gross per 24 hour   Intake 440 ml   Output 1200 ml   Net -760 ml       CONSTITUTIONAL:  awake, alert, cooperative, no apparent distress, and appears stated age. HEENT: Normal jugular venous pulsations,   LUNGS: Good respiratory effort On auscultation: Expiratory wheeze. Mild. Diffuse  CARDIOVASCULAR:  Normal apical impulse, regular rate and rhythm, normal S1 and S2, no S3 or S4, and no murmur or rub noted. ABDOMEN: Soft, nontender, nondistended. Bowel sounds present. No masses or tenderness. No bruit. SKIN: Warm and dry. EXTREMITIES:No lower extremity edema.     Current Inpatient Medications:   midodrine  10 mg Oral 4x Daily    metoprolol tartrate  25 mg Oral BID    miconazole   Topical BID    aspirin EC  81 mg Oral Daily    budesonide-formoterol  2 puff Inhalation BID    pantoprazole  40 mg Oral QAM AC    venlafaxine  150 mg Oral BID    rosuvastatin  5 mg Oral Daily    ARIPiprazole  5 mg Oral Nightly    metFORMIN  500 mg Oral BID WC    traZODone  300 mg Oral Nightly    melatonin  15 mg Oral Nightly    tamsulosin  0.4 mg Oral Daily    gabapentin  300 mg Oral BID    furosemide  40 mg Oral Daily    sennosides-docusate sodium  1 tablet Oral BID    sodium chloride flush  5-40 mL IntraVENous 2 times per day    glipiZIDE  5 mg Oral QAM AC       IV Infusions (if any):   dilTIAZem (CARDIZEM) 125 mg in dextrose 5% 125 mL infusion Stopped (05/14/22 2345)    sodium chloride Stopped (05/14/22 1625)    sodium chloride      dextrose         Diagnostics:   Telemetry: Sinus Tach Rate 100      Labs:   CBC:  Recent Labs     05/20/22  0351 05/21/22  0336   WBC 8.7 8.2   HGB 7.5* 8.0*   HCT 26.4* 27.6*    264     Magnesium:No results for input(s): MG in the last 72 hours. BMP:  Recent Labs     05/20/22  0351 05/21/22  0336    136   K 3.8 3.8   CALCIUM 8.8 8.8   CO2 34* 34*   BUN 13 10   CREATININE 0.74 0.58   LABGLOM >60 >60   GLUCOSE 101* 99     BNP:No results for input(s): BNP, PROBNP in the last 72 hours. PT/INR:No results for input(s): PROTIME, INR in the last 72 hours. APTT:No results for input(s): APTT in the last 72 hours. CARDIAC ENZYMES:No results for input(s): MYOGLOBIN, CKTOTAL, CKMB, CKMBINDEX, TROPHS, TROPONINT in the last 72 hours. FASTING LIPID PANEL:No results found for: HDL, LDLDIRECT, LDLCALC, TRIG  LIVER PROFILE:No results for input(s): AST, ALT, LABALBU, ALKPHOS, BILITOT, BILIDIR, IBILI, PROT, GLOB, ALBUMIN in the last 72 hours. ASSESSMENT:  1.  New onset atrial fibrillation with rapid ventricular response, converted to sinus rhythm. 2.  Chronic history of sinus tachycardia  3.  History of moderate coronary artery disease of the circumflex  4.  Moderate aortic stenosis. 5.  Chronic right bundle branch block. 6.  COPD  7.  History of right lung cancer  8.  History of non-insulin-dependent diabetes mellitus      PLAN:  1. Continue current medications. 2. Cont Metoprolol 25mg for rate control and midodrine.  Will not adjust dose today but can increase as OP if Bps remain stable as she has had lows in the last few days  3. Anticoagulation was stopped due to high fall risk. No plans for initiation  4. No plans for AVN ablation and/or pacemaker implantation   5. OK for discharge today from cardiac standpoint. No changes to status and she is actually doing a bit better today      Discussed with patient and nursing.     Charlotte Moreno MD

## 2022-05-21 NOTE — FLOWSHEET NOTE
Dr. Doroteo Mejia called and was updated on the patient's current status via RN. Per Dr. Doroteo Mejia, patient needs to be evaluated again by cardiology prior to discharge, even though cardiac's note from yesterday stated patient is clear for discharge. Will continue to monitor closely.

## 2022-05-21 NOTE — FLOWSHEET NOTE
Dr. Lalit Villanueva arrived to the patient's bedside for evaluation and was updated on the patient's current status via RN. Scripts obtained for percocet, klonopin, and neurontin and placed in patient's folder for transport in preporation for discharge to [facility] at 2030.

## 2022-05-21 NOTE — FLOWSHEET NOTE
RN called Social Work and left message informing Matthew Dominguez that Dr. Shena Ramirez stated she will be in today around 1600 to see the patient. Awaiting response at this time.

## 2022-05-22 NOTE — PROGRESS NOTES
Patient alert/oriented x4. Lungs congested non productive cough. Discharge with [facility] transport to [facility]. Two bags of belongings, eye glasses with patient.

## 2022-05-23 ENCOUNTER — HOSPITAL ENCOUNTER (OUTPATIENT)
Age: 72
Setting detail: SPECIMEN
Discharge: HOME | End: 2022-05-23
Payer: MEDICARE

## 2022-05-23 LAB
ABSOLUTE EOS #: 0.16 K/UL (ref 0–0.44)
ABSOLUTE IMMATURE GRANULOCYTE: 0.22 K/UL (ref 0–0.3)
ABSOLUTE LYMPH #: 1.13 K/UL (ref 1.1–3.7)
ABSOLUTE MONO #: 0.51 K/UL (ref 0.1–1.2)
ALBUMIN SERPL-MCNC: 3.6 G/DL (ref 3.5–5.2)
ALP BLD-CCNC: 31 U/L (ref 35–104)
ALT SERPL-CCNC: 12 U/L (ref 5–33)
ANION GAP SERPL CALCULATED.3IONS-SCNC: 11 MMOL/L (ref 9–17)
AST SERPL-CCNC: 24 U/L
BASOPHILS # BLD: 1 % (ref 0–2)
BASOPHILS ABSOLUTE: 0.05 K/UL (ref 0–0.2)
BILIRUB SERPL-MCNC: 0.15 MG/DL (ref 0.3–1.2)
BUN BLDV-MCNC: 19 MG/DL (ref 8–23)
BUN/CREAT BLD: 26 (ref 9–20)
CALCIUM SERPL-MCNC: 9.3 MG/DL (ref 8.6–10.4)
CHLORIDE BLD-SCNC: 97 MMOL/L (ref 98–107)
CO2: 34 MMOL/L (ref 20–31)
CREAT SERPL-MCNC: 0.72 MG/DL (ref 0.5–0.9)
EOSINOPHILS RELATIVE PERCENT: 2 % (ref 1–4)
GFR AFRICAN AMERICAN: >60 ML/MIN
GFR NON-AFRICAN AMERICAN: >60 ML/MIN
GFR SERPL CREATININE-BSD FRML MDRD: ABNORMAL ML/MIN/{1.73_M2}
GLUCOSE BLD-MCNC: 114 MG/DL (ref 70–99)
HCT VFR BLD CALC: 31 % (ref 36.3–47.1)
HEMOGLOBIN: 8.9 G/DL (ref 11.9–15.1)
IMMATURE GRANULOCYTES: 2 %
LYMPHOCYTES # BLD: 12 % (ref 24–43)
MCH RBC QN AUTO: 26.1 PG (ref 25.2–33.5)
MCHC RBC AUTO-ENTMCNC: 28.7 G/DL (ref 28.4–34.8)
MCV RBC AUTO: 90.9 FL (ref 82.6–102.9)
MONOCYTES # BLD: 5 % (ref 3–12)
NRBC AUTOMATED: 0 PER 100 WBC
PDW BLD-RTO: 17.5 % (ref 11.8–14.4)
PLATELET # BLD: 347 K/UL (ref 138–453)
PMV BLD AUTO: 9 FL (ref 8.1–13.5)
POTASSIUM SERPL-SCNC: 3.9 MMOL/L (ref 3.7–5.3)
RBC # BLD: 3.41 M/UL (ref 3.95–5.11)
RBC # BLD: ABNORMAL 10*6/UL
SEG NEUTROPHILS: 78 % (ref 36–65)
SEGMENTED NEUTROPHILS ABSOLUTE COUNT: 7.38 K/UL (ref 1.5–8.1)
SODIUM BLD-SCNC: 142 MMOL/L (ref 135–144)
TOTAL PROTEIN: 7.3 G/DL (ref 6.4–8.3)
WBC # BLD: 9.5 K/UL (ref 3.5–11.3)

## 2022-05-23 PROCEDURE — P9603 ONE-WAY ALLOW PRORATED MILES: HCPCS

## 2022-05-23 PROCEDURE — 85025 COMPLETE CBC W/AUTO DIFF WBC: CPT

## 2022-05-23 PROCEDURE — 80053 COMPREHEN METABOLIC PANEL: CPT

## 2022-05-27 ENCOUNTER — TELEPHONE (OUTPATIENT)
Dept: CASE MANAGEMENT | Age: 72
End: 2022-05-27

## 2022-05-27 NOTE — TELEPHONE ENCOUNTER
Name: Malissa Pope  : 1950  MRN: [id number]    Oncology Navigation Follow-Up Note  Navigator spoke with [facility] and pt.  Presently inpt at [facility].  Electronically signed by Douglas Mejia RN on 2022 at 12:48 PM

## 2022-06-07 ENCOUNTER — TELEPHONE (OUTPATIENT)
Dept: CASE MANAGEMENT | Age: 72
End: 2022-06-07

## 2022-06-07 NOTE — TELEPHONE ENCOUNTER
Name: Dominic Leung  : 1950  MRN: [id number]    Oncology Navigation Follow-Up Note  Navigator reviewing chart and pt. Did not return to [facility] [place]. Revieiwng Care Everywhere and noting [facility] [address] [place] [address] may be involved in pts. Care? Plan to follow. Writer attempted to call home number for pt., but unable to leave message.   Electronically signed by Khoa Chinchilla RN on 2022 at 2:24 PM

## 2022-06-22 ENCOUNTER — TELEPHONE (OUTPATIENT)
Dept: CASE MANAGEMENT | Age: 72
End: 2022-06-22

## 2022-06-22 NOTE — TELEPHONE ENCOUNTER
Name: Colleen Le  : 1950  MRN: [id number]    Oncology Navigation Follow-Up Note  Navigator reaching out to [address] checking on status of pt. However they are not following pt. Modesta Mei Writer unable to locate pt. Will plan to removed self from care team due to ongoing noncompliance issues. Mark Espinoza, please send letter of noncompliance to pt.    Thanks, Lacey  Electronically signed by Unique Ramirez RN on 2022 at 3:18 PM

## 2022-06-22 NOTE — LETTER
6/23/2022      [address] [place] [address]    Dear Justina Eid:    As your Oncology Nurse Navigator, it is my responsibility to assist you in navigating your way through your oncology treatment. Unfortunately, I have not been able to reach you to provide you this assistance. Because I have not received any response from you, I will no longer be making attempts to contact you. I do encourage you to reach out to me at anytime that I can be of assistance in the coordination of your cancer care. Kindest Regards,    Jaclynn Dubin, RN  Oncology Nurse Navigator  [address], [address] [facility]  Phone: [phone]  Fax: [phone]  Email: [email or web address]

## 2023-01-09 NOTE — PROGRESS NOTES
Immunizations are up to date. Encourage low-cholesterol, low-fat, calorie restricted diet. Recommend low-impact cardiovascular exercise (e.g. walking, biking, treadmill, elliptical, swimming) for 20-30 minutes at least three times a week. Encourage compliance with medications as prescribed and regular follow-up. Check IFOBT. Followup colonoscopy per GI. Further evaluation, treatment, and follow-up per orders, current med list, and patient instructions.     Progress note  [facility].,    Adult Hospitalist      Name: Malissa Pope  MRN: [id number]     [place]: [de-identified]  Room: 2031/2031-01    Admit Date: 5/14/2022  7:18 AM  PCP: Vicente Cisneros MD    Primary Problem  Principal Problem:    Atrial fibrillation with rapid ventricular response ([address]  Resolved Problems:    * No resolved hospital problems. *        Assesment:     · Atrial fibrillation with rapid ventricular response  · Acute exacerbation of COPD  · Chronic respiratory failure with hypoxia requiring O2 via nasal cannula  · Bronchogenic carcinoma  · Essential hypertension currently Hypotension  · Coronary artery disease, native vessel  · Diabetes mellitus type 2  · Mixed hyperlipidemia  · Anxiety disorder  · History of hypotension  · Osteoarthritis, multiple joints  · Mood disorder  · Gastroesophageal reflux disease without esophagitis        Plan:     · Admit to CVICU  · Monitor vitals closely  · Keep SPO2 above 90%  · I's and O's  · Oral metoprolol  · Midodrine  · Heparin infusion  · Consult cardiology  · Pain control  · Antiemetics as needed  · PO Levaquin  · DuoNeb  · Increase Midodrine to 10 mg 4 times a day  · RT eval  · Resume essential home medication  · Add parameters  · Incentive spirometry  · CBC, BMP  · Discussed with RN  · Discussed with pharmacist  · CXR  · DVT and GI prophylaxis. Chief Complaint:     Chief Complaint   Patient presents with    Shortness of Breath    Tachycardia         History of Present Illness:      Patient seen and examined at bedside. No overnight events. No acute complaints today.    afebrile   Denies any chest pain, shortness of breath, palpitation, headache, dizziness, cough, cold, changes in urination or bowel habits  Still hypotensive        Initial HPI  Malissa Pope is a 71 y.o.  female who presents with Shortness of Breath and Tachycardia    Patient admitted through the emergency room where she presented with worsening episodes of dyspnea, cough and chest congestion. Patient had called 911 and on arrival EMS noted A. fib on the EKG strip. Her heart rate was in the 150s at that time. Patient had also been complaining of weakness and dizziness. Patient was wheezing and was given a DuoNeb nebulizer treatment as well as Solu-Medrol. Patient had earlier been discharged from the hospital and had been at a skilled nursing facility. Patient says she had been feeling well however over the last week she noted dyspnea and dry cough. Patient denies any chest pain or orthopnea. Denies nausea, vomiting or abdominal pain. Denies diarrhea or constipation. Denies headache, photophobia or diplopia. Denies neck pain or back pain    Patient says since admission she has been much better. She was started on Cardizem infusion and heart rate presently is in the low 100s. She denies having any palpitations at this time. She says her breathing has been better. Patient was also started on heparin infusion    I have personally reviewed the past medical history, past surgical history, medications, social history, and family history, and summarized in the note. Review of Systems:     All 10 point system is reviewed and negative otherwise mentioned in HPI. Past Medical History:     Past Medical History:   Diagnosis Date    AAA (abdominal aortic aneurysm) ([facility]     Pt denies having a history of AAA    Acid reflux     Anxiety and depression     Aortic stenosis     Arthritis     Blister of ankle, right 10/13/2016    blister broke open & draining, is on antibiotics    CAD (coronary artery disease)     Cancer ([facility]     lung cancer    COPD (chronic obstructive pulmonary disease) ([facility]     Diabetes mellitus ([facility]     Falls     Heart block     bifasicular    Hypokalemia     MDRO (multiple drug resistant organisms) resistance 10/17/2014    E.  Coli urine    MRSA (methicillin resistant staph aureus) culture positive resolved 12/2016    2 negative nasal screens - 2016 (hx in urine 2014)    On home oxygen therapy     uses 2 liters at night    On home oxygen therapy     patient states 2 liters/nasal cannula continuous    Overactive bladder     patient incont. wears a brief    Pneumonia     PONV (postoperative nausea and vomiting)     Vitamin D deficiency         Past Surgical History:     Past Surgical History:   Procedure Laterality Date    AORTIC VALVE REPAIR N/A 4/11/2017    AORTIC VALVE REPAIR REPLACEMENT performed by Suzanne Addison MD at [facility] N/A 8/31/2020    BRONCHOSCOPY BIOPSY BRONCHUS performed by Calvin Warren MD at [facility], COLON, DIAGNOSTIC  12/08/2016    EYE SURGERY      HYSTERECTOMY      IR INS PICC VAD W SQ PORT GREATER THAN 5  9/4/2020    IR INS PICC VAD W SQ PORT GREATER THAN 5 9/4/2020 [facility]    IR PORT PLACEMENT EQUAL OR GREATER THAN 5 YEARS  9/29/2020    IR PORT PLACEMENT EQUAL OR GREATER THAN 5 YEARS 9/29/2020 MD JAY JAY Agarwal [facility] PROCEDURES    LUMBAR LAMINECTOMY      TONSILLECTOMY          Medications Prior to Admission:       Prior to Admission medications    Medication Sig Start Date End Date Taking? Authorizing Provider   oxyCODONE-acetaminophen (PERCOCET)  MG per tablet Take 1 tablet by mouth every 4 hours as needed for Pain. Yes Historical Provider, MD   gabapentin (NEURONTIN) 300 MG capsule Take 300 mg by mouth in the morning and at bedtime.    Yes Historical Provider, MD   sennosides-docusate sodium (SENOKOT-S) 8.6-50 MG tablet Take 1 tablet by mouth in the morning and at bedtime    Historical Provider, MD   ondansetron (ZOFRAN ODT) 4 MG disintegrating tablet Place 1 tablet under the tongue every 6 hours as needed for Nausea or Vomiting 4/27/22   Sasha Theodore MD   furosemide (LASIX) 40 MG tablet Take 1 tablet by mouth daily 4/7/22   Rod Johnson MD   midodrine (PROAMATINE) 10 MG tablet Take 1 tablet by mouth 3 times daily as needed (SBP <90) 4/6/22 4/27/22  Claudetta Bias, MD   clonazePAM (KLONOPIN) 1 MG tablet Take 1 tablet by mouth 3 times daily as needed for Anxiety for up to 3 doses. 4/4/22 6/25/23  Claudetta Bias, MD   metoprolol tartrate (LOPRESSOR) 50 MG tablet Take 1 tablet by mouth 2 times daily 3/25/22   Manju Stein MD   albuterol sulfate  (90 Base) MCG/ACT inhaler Inhale 2 puffs into the lungs every 4 hours as needed for Wheezing 3/25/22   Manju Stein MD   ARIPiprazole (ABILIFY) 5 MG tablet Take 5 mg by mouth at bedtime    Historical Provider, MD   metFORMIN (GLUCOPHAGE) 500 MG tablet Take 500 mg by mouth 2 times daily (with meals)    Historical Provider, MD   traZODone (DESYREL) 150 MG tablet Take 300 mg by mouth nightly    Historical Provider, MD   melatonin 5 MG TABS tablet Take 5 mg by mouth nightly     Historical Provider, MD   ondansetron (ZOFRAN) 4 MG tablet Take 1 tablet by mouth every 8 hours as needed for Nausea or Vomiting 1/26/22   John White MD   rosuvastatin (CRESTOR) 5 MG tablet Take 5 mg by mouth daily    Historical Provider, MD   glimepiride (AMARYL) 1 MG tablet Take 1 mg by mouth 2 times daily (with meals)     Historical Provider, MD   venlafaxine (EFFEXOR XR) 150 MG extended release capsule Take 150 mg by mouth 2 times daily    Historical Provider, MD   lansoprazole (PREVACID) 30 MG delayed release capsule Take 30 mg by mouth daily 11/10/16   Historical Provider, MD   aspirin EC 81 MG EC tablet Take 81 mg by mouth daily    Historical Provider, MD   mometasone-formoterol (DULERA) 200-5 MCG/ACT inhaler Inhale 2 puffs into the lungs every 12 hours as needed (wheezing/SOB)     Historical Provider, MD        Allergies:       Codeine and Dye [iodides]    Social History:     Tobacco:    reports that she quit smoking about 5 years ago. She has never used smokeless tobacco.  Alcohol:      reports no history of alcohol use.   Drug Use:  reports no history of drug use. Family History:     Family History   Problem Relation Age of Onset    Cancer Mother     Cancer Father          Physical Exam:     Vitals:  BP 84/67   Pulse 98   Temp 96.9 °F (36.1 °C) (Temporal)   Resp (!) 31   Ht 5' 8\" (1.727 m)   Wt 154 lb (69.9 kg)   SpO2 90%   BMI 23.42 kg/m²   Temp (24hrs), Av.4 °F (36.3 °C), Min:96.9 °F (36.1 °C), Max:98.3 °F (36.8 °C)          General appearance - alert, well appearing, and in no acute distress  Mental status - oriented to person, place, and time with normal affect  Head - normocephalic and atraumatic  Eyes - pupils equal and reactive, extraocular eye movements intact, conjunctiva clear  Ears - hearing appears to be intact  Nose - no drainage noted  Mouth - mucous membranes moist  Neck - supple, no carotid bruits, thyroid not palpable  Chest - clear to auscultation, normal effort  Heart -tachycardia, irregular rhythm, no murmur  Abdomen - soft, nontender, nondistended, bowel sounds present all four quadrants, no masses, hepatomegaly or splenomegaly  Neurological - normal speech, no focal findings or movement disorder noted, cranial nerves II through XII grossly intact  Extremities - peripheral pulses palpable, no pedal edema or calf pain with palpation  Skin - no gross lesions, rashes, or induration noted.   Left face wound        Data:     Labs:    Hematology:  Recent Labs     05/15/22  0549 22  0803 22  0616   WBC 8.5 10.6 7.8   RBC 3.28* 3.12* 2.94*   HGB 8.6* 8.3* 7.6*   HCT 29.4* 29.2* 27.1*   MCV 89.6 93.6 92.2   MCH 26.2 26.6 25.9   MCHC 29.3 28.4 28.0*   RDW 17.2* 17.9* 17.8*    303 269   MPV 8.9 8.4 8.7   INR 1.0  --   --      Chemistry:  Recent Labs     05/15/22  0549 22  0803 22  0616    141 139   K 4.0 3.3* 3.8   CL 99 97* 95*   CO2 31 33* 32*   GLUCOSE 127* 48* 110*   BUN 10 12 11   CREATININE 0.52 0.63 0.61   MG  --  1.5*  --    ANIONGAP 12 11 12   LABGLOM >60 >60 >60   GFRAA >60 >60 >60 CALCIUM 9.5 8.9 8.6     Recent Labs     05/16/22  1230 05/16/22  1606 05/16/22  1956 05/17/22  0744 05/17/22  1129 05/17/22  1702   POCGLU 98 174* 116* 82 100 144*       Lab Results   Component Value Date    INR 1.0 05/15/2022    INR 0.9 05/14/2022    INR 1.0 03/30/2022    PROTIME 12.9 05/15/2022    PROTIME 12.6 05/14/2022    PROTIME 13.4 03/30/2022       Lab Results   Component Value Date/Time    SPECIAL LT AC 10ML 03/29/2022 07:28 PM     Lab Results   Component Value Date/Time    CULTURE NO GROWTH 5 DAYS 03/29/2022 07:28 PM       Lab Results   Component Value Date    POCPH 7.36 04/12/2017    PHART 7.42 08/26/2012    PH 7.22 04/11/2017    POCPCO2 45 04/12/2017    DYK8FHL 41 08/26/2012    PCO2 54 04/11/2017    POCPO2 93 04/12/2017    PO2ART 59 08/26/2012    PO2 89 04/11/2017    POCHCO3 25.3 04/12/2017    SIU1FTW 26.1 08/26/2012    HCO3 22.2 04/11/2017    NBEA NOT REPORTED 04/12/2017    PBEA 0 04/12/2017    MNP7HRW 27 04/12/2017    YANM4DPI 97 04/12/2017    C1ALVAEM 92.1 08/26/2012    O2SAT 95 04/11/2017    FIO2 UNKNOWN 10/31/2017       Radiology:    XR CHEST PORTABLE    Result Date: 5/14/2022  1. Improved right pleural effusion. 2.  Patchy opacity in the medial right upper lobe, possibly post treatment change versus residual tumor. Correlation with any recent CT imaging is recommended. All radiological studies reviewed                Code Status:  Full Code    Electronically signed by Travis Walden MD on 5/17/2022 at 6:50 PM     Copy sent to Dr. Armen Watkins MD    This note was created with the assistance of a speech-recognition program.  Although the intention is to generate a document that actually reflects the content of the visit, no guarantees can be provided that every mistake has been identified and corrected by editing. Note was updated later by me after  physical examination and  completion of the assessment.

## 2023-07-07 NOTE — FLOWSHEET NOTE
Patient placed on cardiac monitor and continuous pulse ox, patient in NAD at this time and is resting on stretcher bed railings up x2, call light in reach. Seizure precautions taken and bed railings padded for patient safety.    SPIRITUAL CARE PROGRESS NOTE: Outpatient Oncology Care at [phone]    Spiritual Assessment: Ms. Leoncio Bledsoe and Granddaughter were sitting in the waiting area of the medical oncology clinic. Patient smiled and greeted Mayur Ibarra. She shared that she had taken a break from the medicine for two months and will be resuming treatment in February. She reported the latest test results. She shared that she has been \"sleeping a lot. \" She agreed that she needed it. She spoke of interactions with healthcare providers and how she advocated for herself. Granddaughter affirmed that this strength \"runs in the family. \"    Intervention: Writer provided supportive presence and active listening; inquired about Pt's sources of support and strength; offered words of encouragement and support. Outcome: Patient and Granddaughter thanked writer. Plan: Chaplains will remain available to provide emotional and spiritual support as needed. 01/26/22 1636   Encounter Summary   Services provided to: Patient and family together   Referral/Consult From: [facility] Family members; Children;Friends/neighbors   Continue Visiting   (1/26/22)   Complexity of Encounter Moderate   Length of Encounter 15 minutes   Routine   Type Follow up   Spiritual/Latter day   Type Spiritual support   Assessment Calm; Approachable   Intervention Active listening;Explored feelings, thoughts, concerns;Explored coping resources;Sustaining presence/ Ministry of presence; Discussed illness/injury and it's impact   Outcome Coping;Expressed feelings/needs/concerns;Engaged in conversation;Expressed gratitude;Receptive     Electronically signed by Dolly Yusuf, Oncology Outpatient [facility], [facility] Radiation Oncology  1/26/2022  4:38 PM

## 2024-01-15 NOTE — CARE COORDINATION
Case Management Initial Discharge Plan  Vasu Scott,         Readmission Risk              Readmission Risk:        25.75       Age 72 or Greater:  1    Admitted from SNF or Requires Paid or Family Care:  2    Currently has CHF,COPD,ARF,CRI,or is on dialysis:  4    Takes more than 5 Prescription Medications:  0    Takes Digoxin,Insulin,Anticoagulants,Narcotics or ASA/Plavix:  [address] in Past 12 Months:  10    On Disability:  3    Patient Considers own Health:  3.75            Met with:patient to discuss discharge plans. Information verified: address, contacts, phone number, , insurance Yes  PCP: Maurice Rodas MD  Date of last visit: Has appt in Nov    Insurance Provider: Medicare/BCBS    Discharge Planning  Current Residence:  Private Residence  Living Arrangements:  LACE[place] Mendoza has 1.5 stories/0 stairs to climb/able to stay on first floor  Support Systems:  Children  Current Services PTA:  Durable Medical Equipment, Oxygen Therapy Supplier: [facility]  Patient able to perform ADL's:Independent  DME used to aid ambulation prior to admission: [facility] admission    Potential Assistance Needed:  Adam: [facility] on [address] Medications:  No  Does patient want to participate in local refill/ meds to beds program?  No    Patient agreeable to home care: Yes  Freedom of choice provided:  yes      Type of Home Care Services:  Aide Services, OT, PT, Nursing Services  Patient expects to be discharged to:  home    Prior SNF/Rehab Placement and Facility: [address] [place] [address]  Agreeable to SNF/Rehab: No  [place] of choice provided: yes   Evaluation: no    Expected Discharge date: Follow Up Appointment: Best Day/ Time: Friday AM    Transportation provider: toi  Transportation arrangements needed for discharge: No    Discharge Plan: Pt prefers home w/home care.   Pt has used [employer] [place] in past and would like to use again. O2 through [facility], RW and shower chair at home. Lives w/Great Akash dog and son lives in [facility]. Pt adamantly stated does not want to go to SNF at [place] and prefers home. Will continue to monitor PT/OT for recs.         Electronically signed by Kasandra Galciia RN on 10/31/17 at 6:00 PM Met with Pt and Private Aide at bedside. Pt states she dislikes hospital meals, have been drinking Ensure as ordered 3x daily. Denies chewing, swallowing difficulties or any nausea, vomiting, diarrhea, constipation. Last bowel movement 1/15, takes prune juice at home as needed. Stated height 4'11". Possible weight loss as fingers appear thinner and ring feels lose.   Pt informed of fluid restriction as ordered, 1200 mL. Encouraged increased PO, prefers to drink Ensure over eating hospital provided trays.  Met with Pt and Private Aide at bedside. Pt states she dislikes hospital meals, have been drinking Ensure as ordered 3x daily. Denies chewing, swallowing difficulties or any nausea, vomiting, diarrhea, constipation. Last bowel movement 1/15, takes prune juice at home as needed. Stated height 4'10". Possible weight loss as fingers appear thinner and ring feels lose.   Pt informed of fluid restriction as ordered, 1200 mL. Encouraged increased PO, prefers to drink Ensure over eating hospital provided trays.

## 2024-11-14 NOTE — PROGRESS NOTES
Progress note  [facility].,    Adult Hospitalist      Name: Dominic Leung  MRN: [id number]     [place]: [de-identified]  Room: 2003/2003-02    Admit Date: 11/3/2020 10:59 AM  PCP: Dilcia Buchanan MD    Primary Problem  Active Problems:    Falls frequently    Chronic bilateral low back pain    Lung mass    Malignant neoplasm of upper lobe of right lung [facility])    Urinary tract infectious disease    Closed fracture of one rib of right side    Degenerative disc disease, lumbar    Moderate malnutrition ([address]  Resolved Problems:    * No resolved hospital problems. *        Assesment:     · UTI E. coli  · Generalized weakness  · 8th rib lucencies suspicious for nondisplaced fracture  · Recent diagnosis of right upper lobe squamous cell carcinoma status post chemoradiation  · Chronic COPD  · Chronic hypoxic respiratory failure on nocturnal O2  · Former smoker  · Diabetes type 2  · Major Depressive disorder  · Anxiety disorder         Plan:     · Admit to [facility] with telemetry  · O2 maintain oxygen saturation greater than 92%  · Follow urine culture: E. coli, sensitive to ceftriaxone  · Off antibiotics  · IV fluids, discontinue  · Orthopedic consult, input noted  · Had bone scan  · Pain control  · X-ray lumbosacral spine reviewed  · Oncology consult for resumption of chemotherapy  · Continue aspirin, Lasix,  Lopressor  · Continue Abilify, Effexor, trazodone, Neurontin  · Continue glimepiride, added SSI  · DVT and GI prophylaxis  · DC plan  · Await SNF bed availability    · DC fluid restriction   · Continue Carb control diet  · Hyperglycemia - accu checks  · ISS  · D/w care coordination - difficult situation where SNF accepting will have to cover treatment costs. SW on board. Extensive discussion about choices  · Await bed at SNF      Discharge planning, likely to SNF once arrangements are made. Patient is unable to go to SNF if she needs to continue with her radiation treatment due to insurance/cost reasons.  Shonda Metzger arranging a SNF which would cover. Family is not ready to take her home. Chief Complaint:     Chief Complaint   Patient presents with    Fall         History of Present Illness:      Patient seen and examined bedside  Pt feels better  Dysuria improved  C/o pain Rt ribs- better  no nausea or anorexia headache no palpitation no focal logical deficit   overall patient is feeling better  Afebrile  WBC count is normal  Lactic acid normalized   Happy with no restriction of fluids now          HPI:    Kimmie Andrews is a 72 y.o.  female who presents with Fall    72year-old lady with past medical history of COPD,  coronary artery disease, depression, arthritis presented to ER complaining of generalized weakness and fall. Patient has been recently diagnosed of lung cell carcinoma. She had a bronchoscopy on 8/31/2020 and endobronchial biopsy showed squamous cell carcinoma. She was started on chemoradiation. Her last chemotherapy was on 10/28/2020. She also has an appointment with Radiation Oncology on 10/30/2020. On presentation to ER her sodium was slightly low at 134. Her hemoglobin was 8.4. Her UA showed moderate leukocyte Estrace and many bacteria. Patient was so weak she was unable to get up and ambulate. She is unable to take care of herself at home. Complaining of low back pain and right-sided rib pain. Patient admitted for further management. I have personally reviewed the past medical history, past surgical history, medications, social history, and family history, and summarized in the note. Review of Systems:     All 10 point system is reviewed and negative otherwise mentioned in HPI.       Past Medical History:     Past Medical History:   Diagnosis Date    AAA (abdominal aortic aneurysm) ([facility] [address]     Pt denies having a history of AAA    Acid reflux     Anxiety and depression     Aortic stenosis     Arthritis     Blister of ankle, right 10/13/2016    blister broke open & draining, is 11/5/20 11/7/20 Yes Christopher Decker MD   gabapentin (NEURONTIN) 400 MG capsule Take 2 capsules by mouth nightly for 3 doses.  Take 2 capsules (=800mg) nightly - in addition to 1BID 11/5/20 11/8/20 Yes Christopher Decker MD   traZODone (DESYREL) 150 MG tablet Take 2 tablets by mouth nightly for 3 doses Take 2 tablets (=300mg) nightly 11/5/20 11/8/20 Yes Christopher Decker MD   ARIPiprazole (ABILIFY) 5 MG tablet Take 1 tablet by mouth daily for 3 doses 11/5/20 11/8/20 Yes Christopher Decker MD   furosemide (LASIX) 40 MG tablet Take 40 mg by mouth daily   Yes Historical Provider, MD   omeprazole (PRILOSEC) 20 MG delayed release capsule Take 20 mg by mouth daily   Yes Historical Provider, MD   dexamethasone (DECADRON) 4 MG tablet Take 20 mg orally 12 hours and 6 hours before Taxol 10/23/20  Yes Krishna Jj MD   lidocaine-prilocaine (EMLA) 2.5-2.5 % cream To port site before chemo 10/21/20  Yes Krishna Jj MD   melatonin 5 MG TABS tablet Take 5 mg by mouth nightly    Yes Historical Provider, MD   glimepiride (AMARYL) 1 MG tablet Take 1 mg by mouth Daily with supper In addition to 2 tablets (=2mg) every morning    Yes Historical Provider, MD   metoprolol tartrate (LOPRESSOR) 25 MG tablet Take 25 mg by mouth 2 times daily   Yes Historical Provider, MD   venlafaxine (EFFEXOR XR) 150 MG extended release capsule Take 150 mg by mouth 2 times daily   Yes Historical Provider, MD   lansoprazole (PREVACID) 30 MG delayed release capsule Take 30 mg by mouth daily 11/10/16  Yes Historical Provider, MD   metFORMIN (GLUCOPHAGE) 500 MG tablet Take 500 mg by mouth 2 times daily 12/7/16  Yes Historical Provider, MD   aspirin EC 81 MG EC tablet Take 81 mg by mouth daily   Yes Historical Provider, MD   mometasone-formoterol (DULERA) 200-5 MCG/ACT inhaler Inhale 2 puffs into the lungs every 12 hours as needed (wheezing/SOB)    Yes Historical Provider, MD   glimepiride (AMARYL) 1 MG tablet Take 2 mg by mouth every morning In addition to 1mg nightly   Yes Historical Provider, MD        Allergies:       Codeine and Dye [iodides]    Social History:     Tobacco:    reports that she quit smoking about 4 years ago. She has never used smokeless tobacco.  Alcohol:      reports no history of alcohol use. Drug Use:  reports no history of drug use.     Family History:     Family History   Problem Relation Age of Onset    Cancer Mother     Cancer Father          Physical Exam:     Vitals:  BP (!) 121/46   Pulse 86   Temp 98.4 °F (36.9 °C) (Oral)   Resp 16   Ht 5' 8\" (1.727 m)   Wt 145 lb 14.4 oz (66.2 kg)   SpO2 97%   BMI 22.18 kg/m²   Temp (24hrs), Av.3 °F (36.8 °C), Min:97.9 °F (36.6 °C), Max:98.5 °F (36.9 °C)          General appearance - alert, well appearing, and in no acute distress  Mental status - oriented to person, place, and time with normal affect  Head - normocephalic and atraumatic  Eyes - pupils equal and reactive, extraocular eye movements intact, conjunctiva clear  Ears - hearing appears to be intact  Nose - no drainage noted  Mouth - mucous membranes moist  Neck - supple, no carotid bruits, thyroid not palpable  Chest - clear to auscultation, normal effort  Heart - normal rate, regular rhythm, no murmur  Abdomen - soft, nontender, nondistended, bowel sounds present all four quadrants, no masses, hepatomegaly or splenomegaly  Neurological - normal speech, no focal findings or movement disorder noted, cranial nerves II through XII grossly intact  Extremities - peripheral pulses palpable, no pedal edema or calf pain with palpation  Skin - no gross lesions, rashes, or induration noted        Data:     Labs:    Hematology:  Recent Labs     11/10/20  0557 20  0532 20  0702   WBC 8.0 5.2 5.7   RBC 3.96 3.52* 3.51*   HGB 9.7* 8.7* 8.6*   HCT 33.4* 29.0* 30.0*   MCV 84.3 82.4* 85.5   MCH 24.5* 24.7* 24.5*   MCHC 29.0 30.0 28.7   RDW 19.6* 19.4* 19.8*   * 346 313   MPV 8.7 8.4 8.6     Chemistry:  Recent Labs 11/10/20  0557 11/11/20  0532 11/12/20  0702    140 140   K 5.0 4.3 4.6    101 102   CO2 32* 31 30   GLUCOSE 153* 118* 223*   BUN 17 22 27*   CREATININE 0.87 0.80 0.86   ANIONGAP 9 8* 8*   LABGLOM >60 >60 >60   GFRAA >60 >60 >60   CALCIUM 9.8 9.1 9.1     Recent Labs     11/10/20  2038 11/11/20  0623 11/11/20  1122 11/11/20  1650 11/11/20 2035 11/12/20  0558   POCGLU 128* 94 273* 109* 188* 79       Lab Results   Component Value Date    INR 0.9 09/29/2020    INR 1.1 08/28/2020    INR 0.9 12/28/2018    PROTIME 11.8 09/29/2020    PROTIME 14.4 (H) 08/28/2020    PROTIME 9.7 12/28/2018       Lab Results   Component Value Date/Time    SPECIAL NOT REPORTED 11/03/2020 02:15 PM     Lab Results   Component Value Date/Time    CULTURE NO SAMPLE RECEIVED 11/03/2020 02:15 PM       Lab Results   Component Value Date    POCPH 7.36 04/12/2017    PHART 7.42 08/26/2012    PH 7.22 04/11/2017    POCPCO2 45 04/12/2017    NAW9HXE 41 08/26/2012    PCO2 54 04/11/2017    POCPO2 93 04/12/2017    PO2ART 59 08/26/2012    PO2 89 04/11/2017    POCHCO3 25.3 04/12/2017    KJI6QZF 26.1 08/26/2012    HCO3 22.2 04/11/2017    NBEA NOT REPORTED 04/12/2017    PBEA 0 04/12/2017    AUO8XPZ 27 04/12/2017    GHPA1YAF 97 04/12/2017    H5ZVDZEH 92.1 08/26/2012    O2SAT 95 04/11/2017    FIO2 UNKNOWN 10/31/2017       Radiology:    Xr Ribs Right Include Chest (min 3 Views)    Result Date: 11/3/2020  Subtle linear lucency within the 8th rib, suggestive of a nondisplaced fracture. Masslike opacity in the right lung apex. Xr Cervical Spine (2-3 Views)    Result Date: 11/3/2020  No convincing evidence for acute fracture or malalignment of the cervical spine. Masslike opacity within the right lung apex.          All radiological studies reviewed                Code Status:  Full Code    Electronically signed by Alexa Leger MD on 11/12/2020 at 7:40 AM     Copy sent to Dr. Lei Lee MD    This note was created with the assistance of a speech-recognition program.  Although the intention is to generate a document that actually reflects the content of the visit, no guarantees can be provided that every mistake has been identified and corrected by editing. Note was updated later by me after  physical examination and  completion of the assessment. SOB

## (undated) DEVICE — GOWN,AURORA,NONRNF,XL,30/CS: Brand: MEDLINE

## (undated) DEVICE — DRAPE,REIN 53X77,STERILE: Brand: MEDLINE

## (undated) DEVICE — SET EXTN L41IN 2 NDL FREE VALVES FREE INJ PRT

## (undated) DEVICE — BASIN EMSIS 700ML GRAPHITE PLAS KID SHP GRAD

## (undated) DEVICE — 3M™ RANGER™ BLOOD/FLUID WARMING STANDARD FLOW SET, 24200, 10/CASE: Brand: 3M™ RANGER™

## (undated) DEVICE — CATHETER ETER CTRL VEN OD7FR 3 LUMN COMP

## (undated) DEVICE — BLADE CLIPPER GEN PURP NS

## (undated) DEVICE — 3M™ IOBAN™ 2 ANTIMICROBIAL INCISE DRAPE 6651EZ: Brand: IOBAN™ 2

## (undated) DEVICE — 3M™ TEGADERM™ CHG DRESSING 25/CARTON 4 CARTONS/CASE 1659: Brand: TEGADERM™

## (undated) DEVICE — PRESSURE MONITORING SET: Brand: TRUWAVE

## (undated) DEVICE — SOLUTION IV IRRIG POUR BRL 0.9% SODIUM CHL 2F7124

## (undated) DEVICE — Device

## (undated) DEVICE — BITE BLK ENDO PED DIA16MM POLYPR ADJ PWD FRE STRP

## (undated) DEVICE — PACK PROCEDURE SURG OPN HRT ADD ON

## (undated) DEVICE — TUBING INSUF 12MM RND CONN LAPSCP AND ROT LUER DISP

## (undated) DEVICE — Z DISCONTINUED USE 2624852 GLOVE SURG 7 PF TEXT NEOPRNE BRN STRL NEOLON 2G LF

## (undated) DEVICE — Z DISCONTINUED APPLICATOR SURG PREP 0.35OZ 2% CHG 70% ISO ALC W/ HI LT

## (undated) DEVICE — SUTURE PROL SZ 4-0 L36IN NONABSORBABLE BLU L26MM SH 1/2 CIR 8521H

## (undated) DEVICE — GLOVE SURG SZ 8 L12IN FNGR THK87MIL WHT LTX FREE

## (undated) DEVICE — Z CONVERTED USE 2271043 CONTAINER SPEC COLL 4OZ SCR ON LID PEEL PCH

## (undated) DEVICE — SENSOR DISP ENTROPY EZ FIT

## (undated) DEVICE — SOLUTION IV 1000ML 0.9% SOD CHL PH 5 INJ USP VIAFLX PLAS

## (undated) DEVICE — FOGARTY - HYDRAGRIP SURGICAL - CLAMP INSERTS: Brand: FOGARTY HYDRAJAW

## (undated) DEVICE — Z DISCONTINUED USE 2275676 GLOVE SURG SZ 65 L12IN FNGR THK87MIL DK GRN LTX FREE ISOLEX

## (undated) DEVICE — MODULE PMP INFUS SET 20 GTT 127 IN 27 CC 20 NEEDLELESS

## (undated) DEVICE — SUTURE SILK PERMAHAND PRECUT 6 X 30 IN SZ 1 BLK BRAID A307H

## (undated) DEVICE — BRA SURG 3XL FOR 44 46IN CHST 96% COT 4% SPANDEX KNIT FAB

## (undated) DEVICE — SUTURE PROL 4-0 L36IN NONABSORBABLE BLU V-7 L26MM 1/2 CIR 8975H

## (undated) DEVICE — SET CATH 20GA L1.75IN RAD ART POLYUR RADPQ W/ INTEGR

## (undated) DEVICE — ULNAR NERVE PROTECTOR: Brand: SOULE MEDICAL

## (undated) DEVICE — SUTURE PERMAHAND SZ 0 L30IN NONABSORBABLE BLK FSL L30MM 3/8 680H

## (undated) DEVICE — Device: Brand: MEDEX

## (undated) DEVICE — PRESSURE MONITORING LINES 4FT. (122CM) M/F: Brand: PRESSURE MONITORING LINES

## (undated) DEVICE — GLOVE,EXAM,NITRL,PF,SELECT,TXT FNGR,M: Brand: MEDLINE

## (undated) DEVICE — SUTURE NONABSORBABLE BRAIDED 2-0 V-7 10X30 IN ETHBND EXCEL 10X72

## (undated) DEVICE — TRAY CATH 16FR COMPLT CARE URIN M TEMP SENS STATLOK STBL

## (undated) DEVICE — Z INACTIVE USE 2540311 LEAD PACE L475MM CHN A OR V MYOCARDIAL STEROID ELUT SIL

## (undated) DEVICE — SUTURE SZ 7 L18IN NONABSORBABLE SIL CCS L48MM 1/2 CIR STRNM M655G

## (undated) DEVICE — COVER US PRB W15XL120CM W/ GEL RUBBERBAND TAPE STRP FLD GEN

## (undated) DEVICE — BLOOD TRANSFUSION FILTER: Brand: HAEMONETICS

## (undated) DEVICE — GEL US 20GM NONIRRITATING OVERWRAPPED FILE PCH TRNSMIT

## (undated) DEVICE — 3M™ BAIR HUGGER® UNDERBODY BLANKET, FULL ACCESS, 10 PER CASE 63500: Brand: BAIR HUGGER™

## (undated) DEVICE — SET IV 12ML L36IN 2ND RLER CLMP SPIN M LUERLOCK W/ HNGR

## (undated) DEVICE — GLOVE SURG SZ 75 L12IN FNGR THK87MIL WHT LTX FREE

## (undated) DEVICE — NEEDLE SURG FR SPR EYE 1 2 CIR TAPR PNT SIE 2

## (undated) DEVICE — PLEDGET SURG W3.5XL7MM THK1.5MM WHT PTFE RECT FIRM TFE

## (undated) DEVICE — CATHETER THOR 28FR L23CM DIA9.3MM POLYVI CHL TAPR CONN TIP

## (undated) DEVICE — SUTURE PERMAHAND SZ 0 L30IN NONABSORBABLE BLK L26MM SH 1/2 K834H

## (undated) DEVICE — SENSOR OXMTR SM AD DISP FOR INVOS SYS

## (undated) DEVICE — SUTURE PERMAHAND SZ 2-0 L30IN NONABSORBABLE BLK SH L26MM C016D

## (undated) DEVICE — INTENDED FOR TISSUE SEPARATION, AND OTHER PROCEDURES THAT REQUIRE A SHARP SURGICAL BLADE TO PUNCTURE OR CUT.: Brand: BARD-PARKER ® CARBON RIB-BACK BLADES

## (undated) DEVICE — COVER LT HNDL BLU PLAS

## (undated) DEVICE — Z DISCONTINUED NO SUB IDED DRAIN SURG 2 COLL PT TB FOR ATS BG OASIS

## (undated) DEVICE — SET ADMIN PRIMING 67ML L105IN NVENT 180UM FLTR 3 RLER CLMP

## (undated) DEVICE — CANNULA PERFUSION 5.5IN 9FR AORTIC ROOT

## (undated) DEVICE — TUBING, SUCTION, 1/4" X 12', STRAIGHT: Brand: MEDLINE

## (undated) DEVICE — SUTURE ABSORBABLE BRAIDED 2-0 CT-1 27 IN UD VICRYL J259H

## (undated) DEVICE — 1840 FOAM BLOCK NEEDLE COUNTER: Brand: DEVON

## (undated) DEVICE — KIT INTRO 9FR L4IN POLYUR PERC SHTH RADPQ W INTEGR HEMSTAS

## (undated) DEVICE — GOWN,AURORA,NONREINFORCED,LARGE: Brand: MEDLINE

## (undated) DEVICE — CATHETER FOL 16 FR 5 CC URETH TEMP SENS 2 CONN SIL LUBRI-SIL

## (undated) DEVICE — SUTURE ETHIB EXCL BR GRN TAPR PT 2-0 30 X563H X563H

## (undated) DEVICE — CATHETER THORACENTESIS STR 28 FRX23 IN 6 EYELET TAPR TIP LF

## (undated) DEVICE — GLOVE SURG SZ 65 L12IN THK91MIL BRN LTX FREE

## (undated) DEVICE — BLADE SAW W6.35XL32MM STRNM CUT STRNOTMY

## (undated) DEVICE — SOLUTION IV 500ML 0.9% SOD CHL PH 5 INJ USP VIAFLX PLAS

## (undated) DEVICE — Z DISCONTINUED USE 2739180 SUTURE ETHIB EXCL BR GRN WHT V-7 2-0 30 PXX77

## (undated) DEVICE — CO2 CANNULA,SUPERSOFT, ADLT,7'O2,7'CO2: Brand: MEDLINE

## (undated) DEVICE — DRAPE SLUSH DISC W44XL66IN ST FOR RND BSIN HUSH SLUSH SYS

## (undated) DEVICE — SUTURE ETHBND EXCEL SZ 0 L30IN NONABSORBABLE GRN CT1 L36MM X424H

## (undated) DEVICE — ELECTRODE ES L3IN S STL BLDE INSUL DISP VALLEYLAB EDGE

## (undated) DEVICE — GLOVE SURG SZ 7 L12IN FNGR THK87MIL WHT LTX FREE

## (undated) DEVICE — SURGIBRA 42-44IN 2XL THER BREAST SUPP LF

## (undated) DEVICE — DRESSING COMP W4XL4IN N ADH PD W2.5XL2.5IN GZ BORDERED ADH

## (undated) DEVICE — CONVERTED USE 248063 TOWELS OR BL ST

## (undated) DEVICE — PACK PROCEDURE SURG OPN HRT